# Patient Record
Sex: FEMALE | Race: WHITE | NOT HISPANIC OR LATINO | Employment: OTHER | URBAN - NONMETROPOLITAN AREA
[De-identification: names, ages, dates, MRNs, and addresses within clinical notes are randomized per-mention and may not be internally consistent; named-entity substitution may affect disease eponyms.]

---

## 2017-02-07 ENCOUNTER — TELEPHONE (OUTPATIENT)
Dept: FAMILY MEDICINE CLINIC | Facility: CLINIC | Age: 72
End: 2017-02-07

## 2017-02-20 ENCOUNTER — OFFICE VISIT (OUTPATIENT)
Dept: FAMILY MEDICINE CLINIC | Facility: CLINIC | Age: 72
End: 2017-02-20

## 2017-02-20 VITALS
HEART RATE: 58 BPM | BODY MASS INDEX: 38.93 KG/M2 | DIASTOLIC BLOOD PRESSURE: 80 MMHG | WEIGHT: 228 LBS | OXYGEN SATURATION: 97 % | SYSTOLIC BLOOD PRESSURE: 138 MMHG | TEMPERATURE: 98.4 F | HEIGHT: 64 IN

## 2017-02-20 DIAGNOSIS — N18.30 CHRONIC KIDNEY DISEASE, STAGE 3 (MODERATE): ICD-10-CM

## 2017-02-20 DIAGNOSIS — M19.90 LOCALIZED OSTEOARTHROSIS: ICD-10-CM

## 2017-02-20 DIAGNOSIS — I10 BENIGN ESSENTIAL HYPERTENSION: ICD-10-CM

## 2017-02-20 DIAGNOSIS — G56.03 BILATERAL CARPAL TUNNEL SYNDROME: ICD-10-CM

## 2017-02-20 DIAGNOSIS — IMO0002 TYPE 2 DIABETES MELLITUS, UNCONTROLLED, WITH NEUROPATHY: Primary | ICD-10-CM

## 2017-02-20 PROCEDURE — 99214 OFFICE O/P EST MOD 30 MIN: CPT | Performed by: PHYSICIAN ASSISTANT

## 2017-02-20 RX ORDER — AMLODIPINE BESYLATE 5 MG/1
5 TABLET ORAL DAILY
COMMUNITY
End: 2017-04-17 | Stop reason: ALTCHOICE

## 2017-02-20 NOTE — PROGRESS NOTES
Subjective   Britta Lee is a 71 y.o. female.     History of Present Illness     Diabetes  She is here today to follow-up on chronic issues including diabetes mellitus.  She has been out of her basal insulin for 3 days and blood sugar this morning is reported to be 218.  Current symptoms include paresthesia of the feet.  Evaluation to date has been: fasting blood sugar. Home sugars: BGs are running  consistent with Hgb A1C. Current treatments: basal insulin - Toujeo. Most recent hemoglobin A1c   Lab Results   Component Value Date    HGBA1C 7.10 (H) 12/01/2016    HGBA1C 8.8 (H) 10/20/2015    she also complains of sharp burning pains in her hands intermittently.  Uncontrolled.      Hypertension-not at goal.  She states that Dr. Johnson restarted amlodipine 5 mg and she has not had a rash with it.  She states that she has not been approved for knee replacement due to uncontrolled hypertension.    Osteoarthritis-not at goal in the knees.  She is followed by orthopedic specialist who is awaiting clearance to do total knee replacement.    Chronic kidney disease-stage III and followed by nephrology.  Not at goal    The following portions of the patient's history were reviewed and updated as appropriate: allergies, current medications, past family history, past medical history, past social history, past surgical history and problem list.    Review of Systems   Constitutional: Negative for activity change, appetite change and fever.   HENT: Negative for ear pain, sinus pressure and sore throat.    Eyes: Negative for pain and visual disturbance.   Respiratory: Negative for cough and chest tightness.    Cardiovascular: Negative for chest pain and palpitations.   Gastrointestinal: Negative for abdominal pain, constipation, diarrhea, nausea and vomiting.   Endocrine: Negative for polydipsia and polyuria.   Genitourinary: Negative for dysuria and frequency.   Musculoskeletal: Negative for back pain and myalgias.   Skin: Negative  for color change and rash.   Allergic/Immunologic: Negative for food allergies and immunocompromised state.   Neurological: Negative for dizziness, syncope and headaches.        Hand pain and knee pain   Hematological: Negative for adenopathy. Does not bruise/bleed easily.   Psychiatric/Behavioral: Negative for hallucinations and suicidal ideas. The patient is not nervous/anxious.        Objective   Physical Exam   Constitutional: She is oriented to person, place, and time. She appears well-developed and well-nourished.   HENT:   Head: Normocephalic and atraumatic.   Nose: Nose normal.   Mouth/Throat: Oropharynx is clear and moist.   Eyes: Conjunctivae and EOM are normal. Pupils are equal, round, and reactive to light.   Neck: Normal range of motion. Neck supple. No tracheal deviation present. No thyromegaly present.   Cardiovascular: Normal rate, regular rhythm, normal heart sounds and intact distal pulses.    No murmur heard.  Pulmonary/Chest: Effort normal and breath sounds normal. No respiratory distress. She has no wheezes.   Abdominal: Soft. Bowel sounds are normal. There is no tenderness. There is no guarding.   Musculoskeletal: Normal range of motion. She exhibits edema (1+ peripheral edema noted bilaterally in lower extremities). She exhibits no tenderness.   Crepitus noted bilaterally in knees.   Lymphadenopathy:     She has no cervical adenopathy.   Neurological: She is alert and oriented to person, place, and time.   Skin: Skin is warm and dry. No rash noted.   Psychiatric: She has a normal mood and affect. Her behavior is normal.   Nursing note and vitals reviewed.      Assessment/Plan   Diagnoses and all orders for this visit:    Type 2 diabetes mellitus, uncontrolled, with neuropathy  -     Insulin Glargine (TOUJEO SOLOSTAR) 300 UNIT/ML solution pen-injector; Inject 80 Units under the skin Daily.    Benign essential hypertension    Bilateral carpal tunnel syndrome    Localized  osteoarthrosis    Chronic kidney disease, stage 3 (moderate)     she is tolerating amlodipine 5 mg daily for blood pressure.  She was advised to increase her amlodipine to 10 mg daily for better blood pressure control.  She states she is waiting on controlled hypertension that she can get her knee replacement.    She was written a prescription for compounded pain gel including gabapentin and lidocaine

## 2017-03-06 ENCOUNTER — OFFICE VISIT (OUTPATIENT)
Dept: ORTHOPEDIC SURGERY | Facility: CLINIC | Age: 72
End: 2017-03-06

## 2017-03-06 ENCOUNTER — HOSPITAL ENCOUNTER (OUTPATIENT)
Dept: GENERAL RADIOLOGY | Facility: HOSPITAL | Age: 72
Discharge: HOME | End: 2017-03-06
Attending: ORTHOPAEDIC SURGERY | Admitting: ORTHOPAEDIC SURGERY

## 2017-03-06 VITALS — WEIGHT: 228 LBS | HEIGHT: 64 IN | BODY MASS INDEX: 38.93 KG/M2

## 2017-03-06 DIAGNOSIS — Z01.818 PREOP TESTING: ICD-10-CM

## 2017-03-06 DIAGNOSIS — Z22.321 CARRIER OR SUSPECTED CARRIER OF METHICILLIN SUSCEPTIBLE STAPHYLOCOCCUS AUREUS: ICD-10-CM

## 2017-03-06 DIAGNOSIS — M17.12 PRIMARY OSTEOARTHRITIS OF LEFT KNEE: Primary | ICD-10-CM

## 2017-03-06 PROCEDURE — 73562 X-RAY EXAM OF KNEE 3: CPT | Performed by: RADIOLOGY

## 2017-03-06 PROCEDURE — 99212 OFFICE O/P EST SF 10 MIN: CPT | Performed by: ORTHOPAEDIC SURGERY

## 2017-03-06 PROCEDURE — 73562 X-RAY EXAM OF KNEE 3: CPT

## 2017-03-06 NOTE — PROGRESS NOTES
Britta Lee   :1945    Date of encounter:2017        HPI:  Britta Lee is a 71 y.o. female who returns in follow-up for osteoarthritis left knee. She has not done well with viscous supplementation and minimal improvement with steroid injection. She has had several falls due to her knee instability.       Exam:  Left knee with moderate effusion, marked medial joint line tenderness, . She has a 5° flexion contracture. Neurovascular grossly intact.    Radiology:  AP and lateral views of the left knee were reviewed revealing complete joint space loss of the medial compartment.    Assessment    ICD-10-CM ICD-9-CM   1. Primary osteoarthritis of left knee M17.12 715.16       Plan:  A 71-year-old female who advanced osteoarthritis of the left knee.  She is felt conservative treatment with both steroid and viscous supplementation injections.  We've discussed proceeding with a total knee arthroplasty that she is somewhat reluctant.  She's like to hold off and think this over [facility] family.  She'll return back when she is ready to schedule total knee arthroplasty.    Written by, Guerita CONROY, acting as a scribe for Dr. Elizabeth CONROY    I, Feroz Johnson MD, personally performed the services described in this documentation as scribed by the above named individual in my presence, and it is both accurate and complete.  3/6/2017  4:47 PM

## 2017-03-20 ENCOUNTER — OFFICE VISIT (OUTPATIENT)
Dept: FAMILY MEDICINE CLINIC | Facility: CLINIC | Age: 72
End: 2017-03-20

## 2017-03-20 VITALS
DIASTOLIC BLOOD PRESSURE: 100 MMHG | TEMPERATURE: 97.7 F | HEIGHT: 65 IN | BODY MASS INDEX: 37.65 KG/M2 | OXYGEN SATURATION: 98 % | HEART RATE: 103 BPM | SYSTOLIC BLOOD PRESSURE: 190 MMHG | WEIGHT: 226 LBS

## 2017-03-20 DIAGNOSIS — Z00.00 HEALTH CARE MAINTENANCE: ICD-10-CM

## 2017-03-20 DIAGNOSIS — I10 BENIGN ESSENTIAL HYPERTENSION: ICD-10-CM

## 2017-03-20 DIAGNOSIS — Z12.31 ENCOUNTER FOR SCREENING MAMMOGRAM FOR BREAST CANCER: Primary | ICD-10-CM

## 2017-03-20 DIAGNOSIS — M79.672 LEFT FOOT PAIN: ICD-10-CM

## 2017-03-20 PROCEDURE — 96160 PT-FOCUSED HLTH RISK ASSMT: CPT | Performed by: PHYSICIAN ASSISTANT

## 2017-03-20 PROCEDURE — G0439 PPPS, SUBSEQ VISIT: HCPCS | Performed by: PHYSICIAN ASSISTANT

## 2017-03-20 PROCEDURE — 90732 PPSV23 VACC 2 YRS+ SUBQ/IM: CPT | Performed by: PHYSICIAN ASSISTANT

## 2017-03-20 PROCEDURE — 90471 IMMUNIZATION ADMIN: CPT | Performed by: PHYSICIAN ASSISTANT

## 2017-03-20 PROCEDURE — G0009 ADMIN PNEUMOCOCCAL VACCINE: HCPCS | Performed by: PHYSICIAN ASSISTANT

## 2017-03-20 PROCEDURE — 90715 TDAP VACCINE 7 YRS/> IM: CPT | Performed by: PHYSICIAN ASSISTANT

## 2017-03-20 RX ORDER — QUINAPRIL 20 MG/1
40 TABLET ORAL NIGHTLY
COMMUNITY
End: 2017-04-17 | Stop reason: ALTCHOICE

## 2017-03-20 RX ORDER — ISOSORBIDE DINITRATE 30 MG/1
30 TABLET ORAL DAILY
Status: ON HOLD | COMMUNITY
End: 2017-05-02

## 2017-03-20 NOTE — PATIENT INSTRUCTIONS
Diabetes Mellitus and Food  It is important for you to manage your blood sugar (glucose) level. Your blood glucose level can be greatly affected by what you eat. Eating healthier foods in the appropriate amounts throughout the day at about the same time each day will help you control your blood glucose level. It can also help slow or prevent worsening of your diabetes mellitus. Healthy eating may even help you improve the level of your blood pressure and reach or maintain a healthy weight.   General recommendations for healthful eating and cooking habits include:  · Eating meals and snacks regularly. Avoid going long periods of time without eating to lose weight.  · Eating a diet that consists mainly of plant-based foods, such as fruits, vegetables, nuts, legumes, and whole grains.  · Using low-heat cooking methods, such as baking, instead of high-heat cooking methods, such as deep frying.  Work with your dietitian to make sure you understand how to use the Nutrition Facts information on food labels.  HOW CAN FOOD AFFECT ME?  Carbohydrates  Carbohydrates affect your blood glucose level more than any other type of food. Your dietitian will help you determine how many carbohydrates to eat at each meal and teach you how to count carbohydrates. Counting carbohydrates is important to keep your blood glucose at a healthy level, especially if you are using insulin or taking certain medicines for diabetes mellitus.  Alcohol  Alcohol can cause sudden decreases in blood glucose (hypoglycemia), especially if you use insulin or take certain medicines for diabetes mellitus. Hypoglycemia can be a life-threatening condition. Symptoms of hypoglycemia (sleepiness, dizziness, and disorientation) are similar to symptoms of having too much alcohol.   If your health care provider has given you approval to drink alcohol, do so in moderation and use the following guidelines:  · Women should not have more than one drink per day, and men  should not have more than two drinks per day. One drink is equal to:    12 oz of beer.    5 oz of wine.    1½ oz of hard liquor.  · Do not drink on an empty stomach.  · Keep yourself hydrated. Have water, diet soda, or unsweetened iced tea.  · Regular soda, juice, and other mixers might contain a lot of carbohydrates and should be counted.  WHAT FOODS ARE NOT RECOMMENDED?  As you make food choices, it is important to remember that all foods are not the same. Some foods have fewer nutrients per serving than other foods, even though they might have the same number of calories or carbohydrates. It is difficult to get your body what it needs when you eat foods with fewer nutrients. Examples of foods that you should avoid that are high in calories and carbohydrates but low in nutrients include:  · Trans fats (most processed foods list trans fats on the Nutrition Facts label).  · Regular soda.  · Juice.  · Candy.  · Sweets, such as cake, pie, doughnuts, and cookies.  · Fried foods.  WHAT FOODS CAN I EAT?  Eat nutrient-rich foods, which will nourish your body and keep you healthy. The food you should eat also will depend on several factors, including:  · The calories you need.  · The medicines you take.  · Your weight.  · Your blood glucose level.  · Your blood pressure level.  · Your cholesterol level.  You should eat a variety of foods, including:  · Protein.    Lean cuts of meat.    Proteins low in saturated fats, such as fish, egg whites, and beans. Avoid processed meats.  · Fruits and vegetables.    Fruits and vegetables that may help control blood glucose levels, such as apples, mangoes, and yams.  · Dairy products.    Choose fat-free or low-fat dairy products, such as milk, yogurt, and cheese.  · Grains, bread, pasta, and rice.    Choose whole grain products, such as multigrain bread, whole oats, and brown rice. These foods may help control blood pressure.  · Fats.    Foods containing healthful fats, such as nuts,  avocado, olive oil, canola oil, and fish.  DOES EVERYONE WITH DIABETES MELLITUS HAVE THE SAME MEAL PLAN?  Because every person with diabetes mellitus is different, there is not one meal plan that works for everyone. It is very important that you meet with a dietitian who will help you create a meal plan that is just right for you.     This information is not intended to replace advice given to you by your health care provider. Make sure you discuss any questions you have with your health care provider.     Document Released: 09/14/2006 Document Revised: 01/08/2016 Document Reviewed: 11/14/2014  [employer] Interactive Patient Education ©2016 [employer]

## 2017-03-20 NOTE — PROGRESS NOTES
QUICK REFERENCE INFORMATION:  The ABCs of the Annual Wellness Visit    Subsequent Medicare Wellness Visit    HEALTH RISK ASSESSMENT    1945    Recent Hospitalizations:  No recent hospitalization(s)..  She was seen at [facility] emergency room last Friday for left foot pain.      Current Medical Providers:  Patient Care Team:  RAFIQ Montanez as PCP - General (Family Medicine)        Smoking Status:  History   Smoking Status   • Never Smoker   Smokeless Tobacco   • Never Used       Alcohol Consumption:  History   Alcohol Use No       Depression Screen:   PHQ-9 Depression Screening 3/20/2017   Little interest or pleasure in doing things 0   Feeling down, depressed, or hopeless 1   Trouble falling or staying asleep, or sleeping too much 0   Feeling tired or having little energy 1   Poor appetite or overeating 0   Feeling bad about yourself - or that you are a failure or have let yourself or your family down 0   Trouble concentrating on things, such as reading the newspaper or watching television 0   Moving or speaking so slowly that other people could have noticed. Or the opposite - being so fidgety or restless that you have been moving around a lot more than usual 0   Thoughts that you would be better off dead, or of hurting yourself in some way 0   PHQ-9 Total Score 2       Health Habits and Functional and Cognitive Screening:  No flowsheet data found.           Does the patient have evidence of cognitive impairment? No    Asprin use counseling:yes      Recent Lab Results:  CMP:  Lab Results   Component Value Date    BUN 33 (H) 12/01/2016    CREATININE 1.52 (H) 12/01/2016    EGFRIFNONA 34 (L) 12/01/2016    EGFRIFAFRI  09/01/2016      Comment:      <15 Indicative of kidney failure.    BCR 21.7 12/01/2016     12/01/2016    K 3.9 12/01/2016    CO2 27.5 12/01/2016    CALCIUM 9.6 12/01/2016    ALBUMIN 4.00 12/01/2016    LABIL2 1.0 (L) 12/01/2016    BILITOT 0.3 12/01/2016    ALKPHOS 128 (H)  12/01/2016    AST 38 (H) 12/01/2016    ALT 27 12/01/2016     Lipid Panel:  Lab Results   Component Value Date    CHLPL 166 10/20/2015    TRIG 108 12/01/2016    HDL 39 (L) 12/01/2016    VLDL 21.6 12/01/2016     (H) 10/20/2015     LDL:     HbA1c:  Lab Results   Component Value Date    HGBA1C 7.10 (H) 12/01/2016     Urine Microalbumin:     Visual Acuity:   Visual Acuity Screening    Right eye Left eye Both eyes   Without correction: 20/30 20/100 20/30   With correction:        Hearing acuity:  Hearing was tested today by the whisper test results are as follows  Greater than 5 feet in left ear  Greater than 5 feet and right ear    Age-appropriate Screening Schedule:  Refer to the list below for future screening recommendations based on patient's age, sex and/or medical conditions. Orders for these recommended tests are listed in the plan section. The patient has been provided with a written plan.    Health Maintenance   Topic Date Due   • HEMOGLOBIN A1C  06/01/2017   • DIABETIC EYE EXAM  08/01/2017   • LIPID PANEL  12/01/2017   • PNEUMOCOCCAL VACCINES (65+ LOW/MEDIUM RISK) (2 of 2 - PPSV23) 03/20/2018   • DIABETIC FOOT EXAM  03/20/2018   • MAMMOGRAM  03/20/2018   • URINE MICROALBUMIN  03/20/2018   • DXA SCAN  03/20/2019   • COLONOSCOPY  02/20/2026   • TDAP/TD VACCINES (2 - Td) 03/20/2027   • INFLUENZA VACCINE  Completed   • ZOSTER VACCINE  Completed        Subjective   History of Present Illness    Britta Lee is a 71 y.o. female who presents for an Subsequent Wellness Visit.    She complains of left foot pain.  She states she has trouble curling her left toes and has had lower extremity edema bilaterally.  She states that she went to [facility] on Friday night and was given a prescription for 20-30 mmHg compression knee highs.  She is an appointment with her podiatrist tomorrow.    Hypertension is notably uncontrolled today.  She states she has been out of her blood pressure medication for several days.   She reports that when she takes her blood pressure medication she runs less than 140/90 consistently at home.    For obesity she reports that she continues to eat smaller portion sizes and lower carbohydrate numbers.  She states that she continues to not drinking pop or eat ice cream.    The following portions of the patient's history were reviewed and updated as appropriate: allergies, current medications, past family history, past medical history, past social history, past surgical history and problem list.    Outpatient Medications Prior to Visit   Medication Sig Dispense Refill   • Alcohol Swabs (ALCOHOL PREP) 70 % pads      • amLODIPine (NORVASC) 5 MG tablet Take 5 mg by mouth Daily.     • aspirin 81 MG EC tablet Take 81 mg by mouth daily.     • atorvastatin (LIPITOR) 40 MG tablet Take 1 tablet by mouth Daily. 30 tablet 5   • cloNIDine (CATAPRES) 0.2 MG tablet Take 1 tablet by mouth 3 (three) times a day. 90 tablet 2   • ferrous sulfate 325 (65 FE) MG tablet Take 325 mg by mouth daily.     • insulin aspart (NovoLOG) 100 UNIT/ML injection Inject 10 Units under the skin 3 (three) times a day.     • Insulin Glargine (TOUJEO SOLOSTAR) 300 UNIT/ML solution pen-injector Inject 80 Units under the skin Daily. 9 pen 5   • Insulin Pen Needle (PEN NEEDLES) 31G X 6 MM misc 4 (four) times a day.     • isosorbide mononitrate (IMDUR) 30 MG 24 hr tablet Take 30 mg by mouth daily.     • metoprolol tartrate (LOPRESSOR) 100 MG tablet Take 1 tablet by mouth 2 (two) times a day. 60 tablet 5   • ULTICARE SHORT PEN NEEDLES 31G X 8 MM misc USE TO inject insulin FOUR TIMES DAILY AS NEEDED  3   • cyclobenzaprine (FLEXERIL) 10 MG tablet Take 1 tablet by mouth 3 (three) times a day as needed for muscle spasms. 90 tablet 2   • promethazine (PHENERGAN) 25 MG tablet Take 1 tablet by mouth every 6 (six) hours as needed for nausea or vomiting. 10 tablet 0   • quinapril (ACCUPRIL) 40 MG tablet Take 1 tablet by mouth daily.       No  facility-administered medications prior to visit.        Patient Active Problem List   Diagnosis   • Type 2 diabetes mellitus, uncontrolled, with neuropathy   • Chronic kidney disease   • Benign essential hypertension   • Cervicalgia   • Coronary atherosclerosis of native coronary vessel   • Bilateral carpal tunnel syndrome   • Abnormal blood chemistry level   • Localized osteoarthrosis   • Iron deficiency anemia due to dietary causes   • Diabetic retinopathy associated with type 2 diabetes mellitus   • Hyperlipidemia   • Sciatic neuropathy   • DDD (degenerative disc disease), lumbar       Advanced Care Planning:  has NO advanced directive - information provided to the patient today    Identification of Risk Factors:  Risk factors include: weight , cardiovascular risk, inactivity, increased fall risk, chronic pain, inadequate social support and financial stress.    Review of Systems   Constitutional: Negative for activity change, appetite change and fever.   HENT: Negative for ear pain, sinus pressure and sore throat.    Eyes: Negative for pain and visual disturbance.   Respiratory: Negative for cough and chest tightness.    Cardiovascular: Negative for chest pain and palpitations.   Gastrointestinal: Negative for abdominal pain, constipation, diarrhea, nausea and vomiting.   Endocrine: Negative for polydipsia and polyuria.   Genitourinary: Negative for dysuria and frequency.   Musculoskeletal: Negative for back pain and myalgias.        Left foot mildly erythematous with thickening of toenails noted and tenderness.  (She has an appointment with podiatry tomorrow for this issue)   Skin: Negative for color change and rash.   Allergic/Immunologic: Negative for food allergies and immunocompromised state.   Neurological: Negative for dizziness, syncope and headaches.   Hematological: Negative for adenopathy. Does not bruise/bleed easily.   Psychiatric/Behavioral: Negative for hallucinations and suicidal ideas. The  "patient is not nervous/anxious.        Compared to one year ago, the patient feels her physical health is the same.  Compared to one year ago, the patient feels her mental health is the same.    Objective     Physical Exam    Vitals:    03/20/17 0918   BP: (!) 190/100   BP Location: Left arm   Patient Position: Sitting   Cuff Size: Large Adult   Pulse: 103   Temp: 97.7 °F (36.5 °C)   TempSrc: Oral   SpO2: 98%   Weight: 226 lb (103 kg)   Height: 64.5\" (163.8 cm)       Body mass index is 38.19 kg/(m^2).  Discussed the patient's BMI with her. The BMI is above average; BMI management plan is completed.    Assessment/Plan   Patient Self-Management and Personalized Health Advice  The patient has been provided with information about: diet, fall prevention and designing advance directives and preventive services including:   · Advanced directives: has NO advanced directive - information provided to the patient today, Exercise counseling provided, Fall Risk plan of care done, Pneumococcal vaccine , TdaP vaccine.    Visit Diagnoses:    ICD-10-CM ICD-9-CM   1. Encounter for screening mammogram for breast cancer Z12.31 V76.12   2. Health care maintenance Z00.00 V70.0   3. Left foot pain M79.672 729.5   4. Benign essential hypertension I10 401.1     She was advised to  her medication and since she lives office and restart her blood pressure medication.  She was warned of the increased risk of stroke due to noncompliance.  I will see her back in 3 days' time to reevaluate her hypertension.  She was advised that if she has any abnormals like symptoms or uncontrolled blood pressure before that time she is to go to the nearest emergency room or call 911.    She was advised to keep her appointment with podiatry tomorrow regarding her foot pain.    Orders Placed This Encounter   Procedures   • Mammo Screening Digital Tomosynthesis Bilateral With CAD     Standing Status:   Future     Standing Expiration Date:   3/20/2018     " Order Specific Question:   Reason for Exam:     Answer:   screen breast cancer   • Tdap Vaccine Greater Than or Equal To 8yo IM   • Pneumococcal Polysaccharide Vaccine 23-Valent Greater Than or Equal To 3yo Subcutaneous / IM       Outpatient Encounter Prescriptions as of 3/20/2017   Medication Sig Dispense Refill   • Alcohol Swabs (ALCOHOL PREP) 70 % pads      • amLODIPine (NORVASC) 5 MG tablet Take 5 mg by mouth Daily.     • aspirin 81 MG EC tablet Take 81 mg by mouth daily.     • atorvastatin (LIPITOR) 40 MG tablet Take 1 tablet by mouth Daily. 30 tablet 5   • cloNIDine (CATAPRES) 0.2 MG tablet Take 1 tablet by mouth 3 (three) times a day. 90 tablet 2   • ferrous sulfate 325 (65 FE) MG tablet Take 325 mg by mouth daily.     • insulin aspart (NovoLOG) 100 UNIT/ML injection Inject 10 Units under the skin 3 (three) times a day.     • Insulin Glargine (TOUJEO SOLOSTAR) 300 UNIT/ML solution pen-injector Inject 80 Units under the skin Daily. 9 pen 5   • Insulin Pen Needle (PEN NEEDLES) 31G X 6 MM misc 4 (four) times a day.     • isosorbide dinitrate (ISORDIL) 30 MG tablet Take 30 mg by mouth Daily.     • isosorbide mononitrate (IMDUR) 30 MG 24 hr tablet Take 30 mg by mouth daily.     • metoprolol tartrate (LOPRESSOR) 100 MG tablet Take 1 tablet by mouth 2 (two) times a day. 60 tablet 5   • quinapril (ACCUPRIL) 20 MG tablet Take 40 mg by mouth Every Night.     • ULTICARE SHORT PEN NEEDLES 31G X 8 MM misc USE TO inject insulin FOUR TIMES DAILY AS NEEDED  3   • [DISCONTINUED] cyclobenzaprine (FLEXERIL) 10 MG tablet Take 1 tablet by mouth 3 (three) times a day as needed for muscle spasms. 90 tablet 2   • [DISCONTINUED] promethazine (PHENERGAN) 25 MG tablet Take 1 tablet by mouth every 6 (six) hours as needed for nausea or vomiting. 10 tablet 0   • [DISCONTINUED] quinapril (ACCUPRIL) 40 MG tablet Take 1 tablet by mouth daily.       No facility-administered encounter medications on file as of 3/20/2017.        Reviewed use of  high risk medication in the elderly: yes  Reviewed for potential of harmful drug interactions in the elderly: yes    Follow Up:  Return for Followup with Lexie.     An After Visit Summary and PPPS with all of these plans were given to the patient.

## 2017-03-23 ENCOUNTER — OFFICE VISIT (OUTPATIENT)
Dept: FAMILY MEDICINE CLINIC | Facility: CLINIC | Age: 72
End: 2017-03-23

## 2017-03-23 VITALS
BODY MASS INDEX: 37.65 KG/M2 | HEIGHT: 65 IN | DIASTOLIC BLOOD PRESSURE: 90 MMHG | TEMPERATURE: 97.5 F | WEIGHT: 226 LBS | OXYGEN SATURATION: 99 % | HEART RATE: 55 BPM | SYSTOLIC BLOOD PRESSURE: 148 MMHG

## 2017-03-23 DIAGNOSIS — N18.30 CHRONIC KIDNEY DISEASE, STAGE 3 (MODERATE): ICD-10-CM

## 2017-03-23 DIAGNOSIS — IMO0002 TYPE 2 DIABETES MELLITUS, UNCONTROLLED, WITH NEUROPATHY: ICD-10-CM

## 2017-03-23 DIAGNOSIS — I10 BENIGN ESSENTIAL HYPERTENSION: Primary | ICD-10-CM

## 2017-03-23 DIAGNOSIS — M19.90 LOCALIZED OSTEOARTHROSIS: ICD-10-CM

## 2017-03-23 PROCEDURE — 99214 OFFICE O/P EST MOD 30 MIN: CPT | Performed by: PHYSICIAN ASSISTANT

## 2017-03-27 ENCOUNTER — TELEPHONE (OUTPATIENT)
Dept: ORTHOPEDIC SURGERY | Facility: CLINIC | Age: 72
End: 2017-03-27

## 2017-03-27 NOTE — TELEPHONE ENCOUNTER
Patient left message on voice mail she is ready to schedule her surgery, will get her surgery date and call her back.

## 2017-03-27 NOTE — PROGRESS NOTES
Subjective   Britta Lee is a 71 y.o. female.     History of Present Illness     Hypertension-  Much improved with the increase of amlodipine to 10mg qd.  She reports home blood pressure readings less than 140/90 consistently.  She denies any side effects such as rash with this 10 mg dose.    Diabetes  She is here today to follow-up on chronic issues including diabetes mellitus.  She has been out of her basal insulin for 3 days and blood sugar this morning is reported to be 218.  Current symptoms include paresthesia of the feet.  Evaluation to date has been: fasting blood sugar. Home sugars: BGs are running  consistent with Hgb A1C. Current treatments: basal insulin - Toujeo. Most recent hemoglobin A1c   Lab Results   Component Value Date    HGBA1C 7.10 (H) 12/01/2016    HGBA1C 8.8 (H) 10/20/2015    she also complains of sharp burning pains in her hands intermittently.  Uncontrolled.      Osteoarthritis-not at goal in the knees.  She is followed by orthopedic specialist who is awaiting clearance to do total knee replacement.    Chronic kidney disease-stage III and followed by nephrology.  Not at goal    The following portions of the patient's history were reviewed and updated as appropriate: allergies, current medications, past family history, past medical history, past social history, past surgical history and problem list.    Review of Systems   Constitutional: Negative for activity change, appetite change and fever.   HENT: Negative for ear pain, sinus pressure and sore throat.    Eyes: Negative for pain and visual disturbance.   Respiratory: Negative for cough and chest tightness.    Cardiovascular: Positive for leg swelling. Negative for chest pain and palpitations.   Gastrointestinal: Negative for abdominal pain, constipation, diarrhea, nausea and vomiting.   Endocrine: Negative for polydipsia and polyuria.   Genitourinary: Negative for dysuria and frequency.   Musculoskeletal: Positive for arthralgias.  Negative for back pain and myalgias.   Skin: Negative for color change and rash.   Allergic/Immunologic: Negative for food allergies and immunocompromised state.   Neurological: Negative for dizziness, syncope and headaches.   Hematological: Negative for adenopathy. Does not bruise/bleed easily.   Psychiatric/Behavioral: Negative for hallucinations and suicidal ideas. The patient is not nervous/anxious.        Objective   Physical Exam   Constitutional: She is oriented to person, place, and time. She appears well-developed and well-nourished.   HENT:   Head: Normocephalic and atraumatic.   Nose: Nose normal.   Mouth/Throat: Oropharynx is clear and moist.   Eyes: Conjunctivae and EOM are normal. Pupils are equal, round, and reactive to light.   Neck: Normal range of motion. Neck supple. No tracheal deviation present. No thyromegaly present.   Cardiovascular: Normal rate, regular rhythm, normal heart sounds and intact distal pulses.    No murmur heard.  Pulmonary/Chest: Effort normal and breath sounds normal. No respiratory distress. She has no wheezes.   Abdominal: Soft. Bowel sounds are normal. There is no tenderness. There is no guarding.   Musculoskeletal: Normal range of motion. She exhibits edema (1+ peripheral edema noted bilaterally in lower extremities). She exhibits no tenderness.   Crepitus noted bilaterally in knees.   Lymphadenopathy:     She has no cervical adenopathy.   Neurological: She is alert and oriented to person, place, and time.   Skin: Skin is warm and dry. No rash noted.   Psychiatric: She has a normal mood and affect. Her behavior is normal.   Nursing note and vitals reviewed.      Assessment/Plan   Diagnoses and all orders for this visit:    Benign essential hypertension    Type 2 diabetes mellitus, uncontrolled, with neuropathy    Localized osteoarthrosis    Chronic kidney disease, stage 3 (moderate)    Continue amlodipine to 10 mg daily   She states she is waiting on controlled  hypertension that she can get her knee replacement.    She was advised to buy compression knee socks which should help with her leg swelling

## 2017-03-28 ENCOUNTER — HOSPITAL ENCOUNTER (OUTPATIENT)
Dept: MAMMOGRAPHY | Facility: HOSPITAL | Age: 72
Discharge: HOME | End: 2017-03-28
Admitting: PHYSICIAN ASSISTANT

## 2017-03-28 ENCOUNTER — TELEPHONE (OUTPATIENT)
Dept: ORTHOPEDIC SURGERY | Facility: CLINIC | Age: 72
End: 2017-03-28

## 2017-03-28 DIAGNOSIS — Z12.31 ENCOUNTER FOR SCREENING MAMMOGRAM FOR BREAST CANCER: ICD-10-CM

## 2017-03-28 PROCEDURE — 77063 BREAST TOMOSYNTHESIS BI: CPT | Performed by: RADIOLOGY

## 2017-03-28 PROCEDURE — G0202 SCR MAMMO BI INCL CAD: HCPCS

## 2017-03-28 PROCEDURE — G0202 SCR MAMMO BI INCL CAD: HCPCS | Performed by: RADIOLOGY

## 2017-03-28 PROCEDURE — 77063 BREAST TOMOSYNTHESIS BI: CPT

## 2017-03-28 NOTE — TELEPHONE ENCOUNTER
Spoke with patient, she is aware that Dr. Johnson is reviewing her clearance, as soon as i have surgery date i will call and let her know.  Patient verbalized understanding.

## 2017-04-03 ENCOUNTER — TELEPHONE (OUTPATIENT)
Dept: FAMILY MEDICINE CLINIC | Facility: CLINIC | Age: 72
End: 2017-04-03

## 2017-04-03 NOTE — TELEPHONE ENCOUNTER
i called Britta informed her her mammogram was normal.                      --\\\\--- Message from RAFIQ Montanez sent at 3/31/2017  3:29 PM EDT -----  Please inform her that her mammogram was benign

## 2017-04-06 ENCOUNTER — TELEPHONE (OUTPATIENT)
Dept: ORTHOPEDIC SURGERY | Facility: CLINIC | Age: 72
End: 2017-04-06

## 2017-04-17 ENCOUNTER — PREP FOR SURGERY (OUTPATIENT)
Dept: ORTHOPEDIC SURGERY | Facility: CLINIC | Age: 72
End: 2017-04-17

## 2017-04-17 ENCOUNTER — RESULTS ENCOUNTER (OUTPATIENT)
Dept: ORTHOPEDIC SURGERY | Facility: CLINIC | Age: 72
End: 2017-04-17

## 2017-04-17 ENCOUNTER — OFFICE VISIT (OUTPATIENT)
Dept: ORTHOPEDIC SURGERY | Facility: CLINIC | Age: 72
End: 2017-04-17

## 2017-04-17 VITALS
DIASTOLIC BLOOD PRESSURE: 71 MMHG | WEIGHT: 227 LBS | SYSTOLIC BLOOD PRESSURE: 162 MMHG | HEIGHT: 63 IN | BODY MASS INDEX: 40.22 KG/M2 | HEART RATE: 60 BPM

## 2017-04-17 DIAGNOSIS — M17.12 PRIMARY OSTEOARTHRITIS OF LEFT KNEE: Primary | ICD-10-CM

## 2017-04-17 DIAGNOSIS — Z01.818 PREOP TESTING: ICD-10-CM

## 2017-04-17 DIAGNOSIS — M17.12 PRIMARY OSTEOARTHRITIS OF LEFT KNEE: ICD-10-CM

## 2017-04-17 PROCEDURE — 99213 OFFICE O/P EST LOW 20 MIN: CPT | Performed by: ORTHOPAEDIC SURGERY

## 2017-04-17 RX ORDER — TRANEXAMIC ACID 100 MG/ML
10 INJECTION, SOLUTION INTRAVENOUS ONCE
Qty: 10.3 ML | Refills: 0 | Status: SHIPPED | OUTPATIENT
Start: 2017-04-17 | End: 2017-04-17

## 2017-04-17 RX ORDER — SODIUM CHLORIDE 0.9 % (FLUSH) 0.9 %
1-10 SYRINGE (ML) INJECTION AS NEEDED
Status: CANCELLED | OUTPATIENT
Start: 2017-04-17

## 2017-04-17 RX ORDER — AMLODIPINE BESYLATE 2.5 MG/1
TABLET ORAL
Refills: 1 | Status: ON HOLD | COMMUNITY
Start: 2017-04-03 | End: 2017-05-02

## 2017-04-17 RX ORDER — TRANEXAMIC ACID 100 MG/ML
10 INJECTION, SOLUTION INTRAVENOUS
Qty: 10.3 ML | Refills: 0
Start: 2017-04-17 | End: 2017-04-17 | Stop reason: SDUPTHER

## 2017-04-17 RX ORDER — GLUCOSAM/CHON-MSM1/C/MANG/BOSW 500-416.6
TABLET ORAL
Refills: 5 | Status: ON HOLD | COMMUNITY
Start: 2017-01-23 | End: 2017-05-02

## 2017-04-17 RX ORDER — TRANEXAMIC ACID 100 MG/ML
10 INJECTION, SOLUTION INTRAVENOUS
Qty: 10.3 ML | Refills: 0
Start: 2017-04-17 | End: 2017-05-01

## 2017-04-17 RX ORDER — ERGOCALCIFEROL 1.25 MG/1
CAPSULE ORAL
Refills: 2 | Status: ON HOLD | COMMUNITY
Start: 2017-04-12 | End: 2017-05-02

## 2017-04-17 RX ORDER — SODIUM CHLORIDE, SODIUM LACTATE, POTASSIUM CHLORIDE, CALCIUM CHLORIDE 600; 310; 30; 20 MG/100ML; MG/100ML; MG/100ML; MG/100ML
75 INJECTION, SOLUTION INTRAVENOUS CONTINUOUS
Status: CANCELLED | OUTPATIENT
Start: 2017-04-17

## 2017-04-17 RX ORDER — DIAPER,BRIEF,ADULT, DISPOSABLE
EACH MISCELLANEOUS
Refills: 5 | Status: ON HOLD | COMMUNITY
Start: 2017-01-23 | End: 2017-05-02

## 2017-04-17 RX ORDER — QUINAPRIL 40 MG/1
TABLET ORAL
Refills: 1 | Status: ON HOLD | COMMUNITY
Start: 2017-03-24 | End: 2017-05-02

## 2017-04-18 ENCOUNTER — PREP FOR SURGERY (OUTPATIENT)
Dept: ORTHOPEDIC SURGERY | Facility: CLINIC | Age: 72
End: 2017-04-18

## 2017-04-18 NOTE — H&P
History and Physical    Patient: Britta Lee  YOB: 1945  Date of encounter: 04/17/2017      History of Present Illness:   Britta Lee is a 71 y.o. female with primary osteoarthritis of the left knee.  She's had previous Synvisc and cortisone injections without any significant improvement.  Her pain is getting progressively worse and she is now had several falls because of the pain.  She states it's beginning to impinge on her activities of daily living.  She is ready to discuss a knee replacement.    PMH:   Patient Active Problem List   Diagnosis   • Type 2 diabetes mellitus, uncontrolled, with neuropathy   • Chronic kidney disease   • Benign essential hypertension   • Cervicalgia   • Coronary atherosclerosis of native coronary vessel   • Bilateral carpal tunnel syndrome   • Abnormal blood chemistry level   • Localized osteoarthrosis   • Iron deficiency anemia due to dietary causes   • Diabetic retinopathy associated with type 2 diabetes mellitus   • Hyperlipidemia   • Sciatic neuropathy   • DDD (degenerative disc disease), lumbar     Past Medical History:   Diagnosis Date   • Diabetes mellitus    • Heart disease    • Hypertension    • Renal insufficiency          PSH:  Past Surgical History:   Procedure Laterality Date   • APPENDECTOMY     • CARDIAC SURGERY     • CAROTID STENT     • CATARACT EXTRACTION     • CHOLECYSTECTOMY     • COLON SURGERY      polyp removal   • GALLBLADDER SURGERY     • STERILIZATION     • TONSILLECTOMY         Allergies:   No Known Allergies    Medications:     Current Outpatient Prescriptions:   •  Alcohol Swabs (ALCOHOL PREP) 70 % pads, , Disp: , Rfl:   •  aspirin 81 MG EC tablet, Take 81 mg by mouth daily., Disp: , Rfl:   •  atorvastatin (LIPITOR) 40 MG tablet, Take 1 tablet by mouth Daily., Disp: 30 tablet, Rfl: 5  •  cloNIDine (CATAPRES) 0.2 MG tablet, Take 1 tablet by mouth 3 (three) times a day., Disp: 90 tablet, Rfl: 2  •  ferrous sulfate 325 (65 FE) MG tablet, Take  325 mg by mouth daily., Disp: , Rfl:   •  insulin aspart (NovoLOG) 100 UNIT/ML injection, Inject 10 Units under the skin 3 (three) times a day., Disp: , Rfl:   •  Insulin Glargine (TOUJEO SOLOSTAR) 300 UNIT/ML solution pen-injector, Inject 80 Units under the skin Daily., Disp: 9 pen, Rfl: 5  •  Insulin Pen Needle (PEN NEEDLES) 31G X 6 MM misc, 4 (four) times a day., Disp: , Rfl:   •  isosorbide dinitrate (ISORDIL) 30 MG tablet, Take 30 mg by mouth Daily., Disp: , Rfl:   •  metoprolol tartrate (LOPRESSOR) 100 MG tablet, Take 1 tablet by mouth 2 (two) times a day., Disp: 60 tablet, Rfl: 5  •  ULTICARE SHORT PEN NEEDLES 31G X 8 MM misc, USE TO inject insulin FOUR TIMES DAILY AS NEEDED, Disp: , Rfl: 3  •  amLODIPine (NORVASC) 2.5 MG tablet, TAKE 1 TABLET BY MOUTH EVERY NIGHT AT BEDTIME, Disp: , Rfl: 1  •  quinapril (ACCUPRIL) 40 MG tablet, TAKE 1 Tablet BY MOUTH EVERY DAY, Disp: , Rfl: 1  •  Tranexamic Acid 1000 MG/10ML injection, Infuse 10.3 mL into a venous catheter 60 Minutes Prior to Surgery., Disp: 10.3 mL, Rfl: 0  •  TRUEPLUS LANCETS 33G misc, test blood sugar THREE TIMES DAILY, Disp: , Rfl: 5  •  TRUETRACK TEST test strip, test blood sugar THREE TIMES DAILY, Disp: , Rfl: 5  •  vitamin D (ERGOCALCIFEROL) 48542 UNITS capsule capsule, TAKE ONE Capsule BY MOUTH ONCE WEEKLY, Disp: , Rfl: 2    Social History:     Social History     Occupational History   • Not on file.     Social History Main Topics   • Smoking status: Never Smoker   • Smokeless tobacco: Never Used   • Alcohol use No   • Drug use: No   • Sexual activity: Defer      Social History     Social History Narrative       Family History:     Family History   Problem Relation Age of Onset   • Arthritis Mother    • Diabetes Mother    • Cancer Mother    • Heart disease Father    • Diabetes Daughter    • Diabetes Son    • Diabetes Maternal Aunt    • Heart disease Maternal Grandmother        Review of Systems:   Review of Systems   Constitutional: Negative.   "  HENT: Negative.    Eyes: Negative.    Respiratory: Negative.    Cardiovascular: Negative.    Gastrointestinal: Negative.    Skin: Negative.    Neurological: Negative.    Hematological: Negative.    Psychiatric/Behavioral: Negative.        Physical Exam:   General Appearance:    71 y.o. female  cooperative, in no acute distress.  Alert and oriented x 3,                   Vitals:    04/17/17 0833   BP: 162/71   Pulse: 60   Weight: 227 lb (103 kg)   Height: 63\" (160 cm)          HEENT: Unremarkable      Neck: Supple without lymphadenopathy.      Chest: Clear to ascultation bilaterally. Normal respiratory effort.      Heart: Regular rate and rhythm. No murmurs noted.      Skin:  Warm and dry without any rash.      Musculoskeletal:     Upper Extremities: Unremarkable.     Lower Extremities: Examination of the left knee reveals mild effusion with market medial joint line tenderness.  She    is a 5° flexion contracture.  There is no gross instability with varus or valgus stressing.  Her neurovascular    status is intact.      Radiology:     AP and lateral views of the left knee were reviewed revealing complete joint space loss of the medial compartment.    Assessment    ICD-10-CM ICD-9-CM   1. Primary osteoarthritis of left knee M17.12 715.16       Plan:  A 71-year-old female who advanced osteoarthritis of the left knee. She is felt conservative treatment with both steroid and viscous supplementation injections. We've discussed proceeding with a total knee arthroplasty.  We've discussed the risk, benefits, and future outcomes of surgery.  She accepts these risks and is agreeable to surgery.  She has been cleared and is ready for surgery.  We'll schedule her for May 2, 2017 at [facility].         Written by, Guerita CONROY, acting as a scribe for Dr. Elizabeth TORO, Feroz Johnson MD, personally performed the services described in this documentation as scribed by the above named individual in my " presence, and it is both accurate and complete.  4/18/2017  7:05 PM    This document was signed by Feroz Johnson M.D.  April 18, 2017 7:06 PM     Cc: RAFIQ Montanez

## 2017-04-18 NOTE — PROGRESS NOTES
History and Physical    Patient: Britta Lee  YOB: 1945  Date of encounter: 04/17/2017      History of Present Illness:   Britta Lee is a 71 y.o. female with primary osteoarthritis of the left knee.  She's had previous Synvisc and cortisone injections without any significant improvement.  Her pain is getting progressively worse and she is now had several falls because of the pain.  She states it's beginning to impinge on her activities of daily living.  She is ready to discuss a knee replacement.    PMH:   Patient Active Problem List   Diagnosis   • Type 2 diabetes mellitus, uncontrolled, with neuropathy   • Chronic kidney disease   • Benign essential hypertension   • Cervicalgia   • Coronary atherosclerosis of native coronary vessel   • Bilateral carpal tunnel syndrome   • Abnormal blood chemistry level   • Localized osteoarthrosis   • Iron deficiency anemia due to dietary causes   • Diabetic retinopathy associated with type 2 diabetes mellitus   • Hyperlipidemia   • Sciatic neuropathy   • DDD (degenerative disc disease), lumbar     Past Medical History:   Diagnosis Date   • Diabetes mellitus    • Heart disease    • Hypertension    • Renal insufficiency          PSH:  Past Surgical History:   Procedure Laterality Date   • APPENDECTOMY     • CARDIAC SURGERY     • CAROTID STENT     • CATARACT EXTRACTION     • CHOLECYSTECTOMY     • COLON SURGERY      polyp removal   • GALLBLADDER SURGERY     • STERILIZATION     • TONSILLECTOMY         Allergies:   No Known Allergies    Medications:     Current Outpatient Prescriptions:   •  Alcohol Swabs (ALCOHOL PREP) 70 % pads, , Disp: , Rfl:   •  aspirin 81 MG EC tablet, Take 81 mg by mouth daily., Disp: , Rfl:   •  atorvastatin (LIPITOR) 40 MG tablet, Take 1 tablet by mouth Daily., Disp: 30 tablet, Rfl: 5  •  cloNIDine (CATAPRES) 0.2 MG tablet, Take 1 tablet by mouth 3 (three) times a day., Disp: 90 tablet, Rfl: 2  •  ferrous sulfate 325 (65 FE) MG tablet, Take  325 mg by mouth daily., Disp: , Rfl:   •  insulin aspart (NovoLOG) 100 UNIT/ML injection, Inject 10 Units under the skin 3 (three) times a day., Disp: , Rfl:   •  Insulin Glargine (TOUJEO SOLOSTAR) 300 UNIT/ML solution pen-injector, Inject 80 Units under the skin Daily., Disp: 9 pen, Rfl: 5  •  Insulin Pen Needle (PEN NEEDLES) 31G X 6 MM misc, 4 (four) times a day., Disp: , Rfl:   •  isosorbide dinitrate (ISORDIL) 30 MG tablet, Take 30 mg by mouth Daily., Disp: , Rfl:   •  metoprolol tartrate (LOPRESSOR) 100 MG tablet, Take 1 tablet by mouth 2 (two) times a day., Disp: 60 tablet, Rfl: 5  •  ULTICARE SHORT PEN NEEDLES 31G X 8 MM misc, USE TO inject insulin FOUR TIMES DAILY AS NEEDED, Disp: , Rfl: 3  •  amLODIPine (NORVASC) 2.5 MG tablet, TAKE 1 TABLET BY MOUTH EVERY NIGHT AT BEDTIME, Disp: , Rfl: 1  •  quinapril (ACCUPRIL) 40 MG tablet, TAKE 1 Tablet BY MOUTH EVERY DAY, Disp: , Rfl: 1  •  Tranexamic Acid 1000 MG/10ML injection, Infuse 10.3 mL into a venous catheter 60 Minutes Prior to Surgery., Disp: 10.3 mL, Rfl: 0  •  TRUEPLUS LANCETS 33G misc, test blood sugar THREE TIMES DAILY, Disp: , Rfl: 5  •  TRUETRACK TEST test strip, test blood sugar THREE TIMES DAILY, Disp: , Rfl: 5  •  vitamin D (ERGOCALCIFEROL) 70796 UNITS capsule capsule, TAKE ONE Capsule BY MOUTH ONCE WEEKLY, Disp: , Rfl: 2    Social History:     Social History     Occupational History   • Not on file.     Social History Main Topics   • Smoking status: Never Smoker   • Smokeless tobacco: Never Used   • Alcohol use No   • Drug use: No   • Sexual activity: Defer      Social History     Social History Narrative       Family History:     Family History   Problem Relation Age of Onset   • Arthritis Mother    • Diabetes Mother    • Cancer Mother    • Heart disease Father    • Diabetes Daughter    • Diabetes Son    • Diabetes Maternal Aunt    • Heart disease Maternal Grandmother        Review of Systems:   Review of Systems   Constitutional: Negative.   "  HENT: Negative.    Eyes: Negative.    Respiratory: Negative.    Cardiovascular: Negative.    Gastrointestinal: Negative.    Skin: Negative.    Neurological: Negative.    Hematological: Negative.    Psychiatric/Behavioral: Negative.        Physical Exam:   General Appearance:    71 y.o. female  cooperative, in no acute distress.  Alert and oriented x 3,                   Vitals:    04/17/17 0833   BP: 162/71   Pulse: 60   Weight: 227 lb (103 kg)   Height: 63\" (160 cm)          HEENT: Unremarkable      Neck: Supple without lymphadenopathy.      Chest: Clear to ascultation bilaterally. Normal respiratory effort.      Heart: Regular rate and rhythm. No murmurs noted.      Skin:  Warm and dry without any rash.      Musculoskeletal:     Upper Extremities: Unremarkable.     Lower Extremities: Examination of the left knee reveals mild effusion with market medial joint line tenderness.  She    is a 5° flexion contracture.  There is no gross instability with varus or valgus stressing.  Her neurovascular    status is intact.      Radiology:     AP and lateral views of the left knee were reviewed revealing complete joint space loss of the medial compartment.    Assessment    ICD-10-CM ICD-9-CM   1. Primary osteoarthritis of left knee M17.12 715.16       Plan:  A 71-year-old female who advanced osteoarthritis of the left knee. She is felt conservative treatment with both steroid and viscous supplementation injections. We've discussed proceeding with a total knee arthroplasty.  We've discussed the risk, benefits, and future outcomes of surgery.  She accepts these risks and is agreeable to surgery.  She has been cleared and is ready for surgery.  We'll schedule her for May 2, 2017 at [facility].         Written by, Guerita CONROY, acting as a scribe for Dr. Elizabeth TORO, Feroz Johnson MD, personally performed the services described in this documentation as scribed by the above named individual in my " presence, and it is both accurate and complete.  4/18/2017  7:05 PM    Cc: RAFIQ Montanez

## 2017-05-01 ENCOUNTER — APPOINTMENT (OUTPATIENT)
Dept: PREADMISSION TESTING | Facility: HOSPITAL | Age: 72
End: 2017-05-01

## 2017-05-01 ENCOUNTER — TELEPHONE (OUTPATIENT)
Dept: ORTHOPEDIC SURGERY | Facility: CLINIC | Age: 72
End: 2017-05-01

## 2017-05-01 DIAGNOSIS — M17.12 PRIMARY OSTEOARTHRITIS OF LEFT KNEE: ICD-10-CM

## 2017-05-01 DIAGNOSIS — Z01.818 PREOP TESTING: ICD-10-CM

## 2017-05-01 DIAGNOSIS — Z22.321 CARRIER OR SUSPECTED CARRIER OF METHICILLIN SUSCEPTIBLE STAPHYLOCOCCUS AUREUS: ICD-10-CM

## 2017-05-01 LAB
ABO GROUP BLD: NORMAL
ANION GAP SERPL CALCULATED.3IONS-SCNC: 6.6 MMOL/L (ref 3.6–11.2)
BASOPHILS # BLD AUTO: 0.03 10*3/MM3 (ref 0–0.3)
BASOPHILS NFR BLD AUTO: 0.3 % (ref 0–2)
BLD GP AB SCN SERPL QL: NEGATIVE
BUN BLD-MCNC: 36 MG/DL (ref 7–21)
BUN/CREAT SERPL: 23.4 (ref 7–25)
CALCIUM SPEC-SCNC: 9.7 MG/DL (ref 7.7–10)
CHLORIDE SERPL-SCNC: 108 MMOL/L (ref 99–112)
CO2 SERPL-SCNC: 24.4 MMOL/L (ref 24.3–31.9)
CREAT BLD-MCNC: 1.54 MG/DL (ref 0.43–1.29)
DEPRECATED RDW RBC AUTO: 39.6 FL (ref 37–54)
EOSINOPHIL # BLD AUTO: 0.72 10*3/MM3 (ref 0–0.7)
EOSINOPHIL NFR BLD AUTO: 7.5 % (ref 0–7)
ERYTHROCYTE [DISTWIDTH] IN BLOOD BY AUTOMATED COUNT: 13.4 % (ref 11.5–14.5)
GFR SERPL CREATININE-BSD FRML MDRD: 33 ML/MIN/1.73
GLUCOSE BLD-MCNC: 229 MG/DL (ref 70–110)
HCT VFR BLD AUTO: 37.7 % (ref 37–47)
HGB BLD-MCNC: 12.1 G/DL (ref 12–16)
IMM GRANULOCYTES # BLD: 0.01 10*3/MM3 (ref 0–0.03)
IMM GRANULOCYTES NFR BLD: 0.1 % (ref 0–0.5)
LYMPHOCYTES # BLD AUTO: 2.15 10*3/MM3 (ref 1–3)
LYMPHOCYTES NFR BLD AUTO: 22.4 % (ref 16–46)
MCH RBC QN AUTO: 26.7 PG (ref 27–33)
MCHC RBC AUTO-ENTMCNC: 32.1 G/DL (ref 33–37)
MCV RBC AUTO: 83.2 FL (ref 80–94)
MONOCYTES # BLD AUTO: 0.63 10*3/MM3 (ref 0.1–0.9)
MONOCYTES NFR BLD AUTO: 6.6 % (ref 0–12)
MRSA DNA SPEC QL NAA+PROBE: NEGATIVE
NEUTROPHILS # BLD AUTO: 6.06 10*3/MM3 (ref 1.4–6.5)
NEUTROPHILS NFR BLD AUTO: 63.1 % (ref 40–75)
OSMOLALITY SERPL CALC.SUM OF ELEC: 293.1 MOSM/KG (ref 273–305)
PLATELET # BLD AUTO: 215 10*3/MM3 (ref 130–400)
PMV BLD AUTO: 9.6 FL (ref 6–10)
POTASSIUM BLD-SCNC: 4.2 MMOL/L (ref 3.5–5.3)
RBC # BLD AUTO: 4.53 10*6/MM3 (ref 4.2–5.4)
RH BLD: POSITIVE
S AUREUS DNA SPEC QL NAA+PROBE: NEGATIVE
SODIUM BLD-SCNC: 139 MMOL/L (ref 135–153)
WBC NRBC COR # BLD: 9.6 10*3/MM3 (ref 4.5–12.5)

## 2017-05-02 ENCOUNTER — APPOINTMENT (OUTPATIENT)
Dept: GENERAL RADIOLOGY | Facility: HOSPITAL | Age: 72
End: 2017-05-02

## 2017-05-02 ENCOUNTER — ANESTHESIA (OUTPATIENT)
Dept: PERIOP | Facility: HOSPITAL | Age: 72
End: 2017-05-02

## 2017-05-02 ENCOUNTER — HOSPITAL ENCOUNTER (INPATIENT)
Facility: HOSPITAL | Age: 72
LOS: 3 days | Discharge: NURSING, REHAB OR LONG-TERM CARE | End: 2017-05-05
Attending: ORTHOPAEDIC SURGERY | Admitting: ORTHOPAEDIC SURGERY

## 2017-05-02 ENCOUNTER — ANESTHESIA EVENT (OUTPATIENT)
Dept: PERIOP | Facility: HOSPITAL | Age: 72
End: 2017-05-02

## 2017-05-02 DIAGNOSIS — IMO0002 TYPE 2 DIABETES MELLITUS, UNCONTROLLED, WITH NEUROPATHY: ICD-10-CM

## 2017-05-02 DIAGNOSIS — Z01.818 PREOP TESTING: ICD-10-CM

## 2017-05-02 DIAGNOSIS — M17.12 PRIMARY OSTEOARTHRITIS OF LEFT KNEE: ICD-10-CM

## 2017-05-02 LAB
GLUCOSE BLDC GLUCOMTR-MCNC: 139 MG/DL (ref 70–130)
GLUCOSE BLDC GLUCOMTR-MCNC: 161 MG/DL (ref 70–130)
GLUCOSE BLDC GLUCOMTR-MCNC: 205 MG/DL (ref 70–130)
HCT VFR BLD AUTO: 38.1 % (ref 37–47)
HGB BLD-MCNC: 12.5 G/DL (ref 12–16)

## 2017-05-02 PROCEDURE — 25010000003 CEFAZOLIN PER 500 MG: Performed by: ORTHOPAEDIC SURGERY

## 2017-05-02 PROCEDURE — 99222 1ST HOSP IP/OBS MODERATE 55: CPT | Performed by: INTERNAL MEDICINE

## 2017-05-02 PROCEDURE — 0SRD0J9 REPLACEMENT OF LEFT KNEE JOINT WITH SYNTHETIC SUBSTITUTE, CEMENTED, OPEN APPROACH: ICD-10-PCS | Performed by: ORTHOPAEDIC SURGERY

## 2017-05-02 PROCEDURE — 27447 TOTAL KNEE ARTHROPLASTY: CPT | Performed by: ORTHOPAEDIC SURGERY

## 2017-05-02 PROCEDURE — 94799 UNLISTED PULMONARY SVC/PX: CPT

## 2017-05-02 PROCEDURE — C1713 ANCHOR/SCREW BN/BN,TIS/BN: HCPCS | Performed by: ORTHOPAEDIC SURGERY

## 2017-05-02 PROCEDURE — 63710000001 INSULIN DETEMIR PER 5 UNITS: Performed by: INTERNAL MEDICINE

## 2017-05-02 PROCEDURE — 25010000002 MEPERIDINE PER 100 MG: Performed by: NURSE ANESTHETIST, CERTIFIED REGISTERED

## 2017-05-02 PROCEDURE — 73560 X-RAY EXAM OF KNEE 1 OR 2: CPT

## 2017-05-02 PROCEDURE — 82962 GLUCOSE BLOOD TEST: CPT

## 2017-05-02 PROCEDURE — 73560 X-RAY EXAM OF KNEE 1 OR 2: CPT | Performed by: RADIOLOGY

## 2017-05-02 PROCEDURE — 25010000002 ONDANSETRON PER 1 MG: Performed by: NURSE ANESTHETIST, CERTIFIED REGISTERED

## 2017-05-02 PROCEDURE — 85014 HEMATOCRIT: CPT | Performed by: ORTHOPAEDIC SURGERY

## 2017-05-02 PROCEDURE — C1776 JOINT DEVICE (IMPLANTABLE): HCPCS | Performed by: ORTHOPAEDIC SURGERY

## 2017-05-02 PROCEDURE — 25010000002 PROPOFOL 10 MG/ML EMULSION: Performed by: NURSE ANESTHETIST, CERTIFIED REGISTERED

## 2017-05-02 PROCEDURE — 85018 HEMOGLOBIN: CPT | Performed by: ORTHOPAEDIC SURGERY

## 2017-05-02 DEVICE — SMARTSET HIGH PERFORMANCE MV MEDIUM VISCOSITY BONE CEMENT 40G
Type: IMPLANTABLE DEVICE | Site: KNEE | Status: FUNCTIONAL
Brand: SMARTSET

## 2017-05-02 DEVICE — ATTUNE KNEE SYSTEM TIBIAL BASE ROTATING PLATFORM SIZE 5 CEMENTED
Type: IMPLANTABLE DEVICE | Site: KNEE | Status: FUNCTIONAL
Brand: ATTUNE

## 2017-05-02 DEVICE — TOTL KN ATTUNE DEPUY 9527038: Type: IMPLANTABLE DEVICE | Status: FUNCTIONAL

## 2017-05-02 DEVICE — ATTUNE KNEE SYSTEM FEMORAL POSTERIOR STABILIZED SIZE 5 LEFT CEMENTED
Type: IMPLANTABLE DEVICE | Site: KNEE | Status: FUNCTIONAL
Brand: ATTUNE

## 2017-05-02 DEVICE — ATTUNE KNEE SYSTEM TIBIAL INSERT ROTATING PLATFORM POSTERIOR STABILIZED 5 10MM AOX
Type: IMPLANTABLE DEVICE | Site: KNEE | Status: FUNCTIONAL
Brand: ATTUNE

## 2017-05-02 RX ORDER — CLONIDINE HYDROCHLORIDE 0.1 MG/1
0.1 TABLET ORAL 3 TIMES DAILY
Status: DISCONTINUED | OUTPATIENT
Start: 2017-05-02 | End: 2017-05-03

## 2017-05-02 RX ORDER — DOCUSATE SODIUM 100 MG/1
100 CAPSULE, LIQUID FILLED ORAL 2 TIMES DAILY PRN
Status: DISCONTINUED | OUTPATIENT
Start: 2017-05-02 | End: 2017-05-05 | Stop reason: HOSPADM

## 2017-05-02 RX ORDER — ONDANSETRON 2 MG/ML
INJECTION INTRAMUSCULAR; INTRAVENOUS AS NEEDED
Status: DISCONTINUED | OUTPATIENT
Start: 2017-05-02 | End: 2017-05-02 | Stop reason: SURG

## 2017-05-02 RX ORDER — FAMOTIDINE 10 MG/ML
INJECTION, SOLUTION INTRAVENOUS AS NEEDED
Status: DISCONTINUED | OUTPATIENT
Start: 2017-05-02 | End: 2017-05-02 | Stop reason: SURG

## 2017-05-02 RX ORDER — LISINOPRIL 10 MG/1
40 TABLET ORAL DAILY
Status: CANCELLED | OUTPATIENT
Start: 2017-05-02

## 2017-05-02 RX ORDER — FERROUS SULFATE 325(65) MG
325 TABLET ORAL
Status: DISCONTINUED | OUTPATIENT
Start: 2017-05-03 | End: 2017-05-05 | Stop reason: HOSPADM

## 2017-05-02 RX ORDER — ASPIRIN 81 MG/1
81 TABLET ORAL DAILY
Status: DISCONTINUED | OUTPATIENT
Start: 2017-05-02 | End: 2017-05-05 | Stop reason: HOSPADM

## 2017-05-02 RX ORDER — SODIUM CHLORIDE, SODIUM LACTATE, POTASSIUM CHLORIDE, CALCIUM CHLORIDE 600; 310; 30; 20 MG/100ML; MG/100ML; MG/100ML; MG/100ML
75 INJECTION, SOLUTION INTRAVENOUS CONTINUOUS
Status: DISCONTINUED | OUTPATIENT
Start: 2017-05-02 | End: 2017-05-05

## 2017-05-02 RX ORDER — SODIUM CHLORIDE, SODIUM LACTATE, POTASSIUM CHLORIDE, CALCIUM CHLORIDE 600; 310; 30; 20 MG/100ML; MG/100ML; MG/100ML; MG/100ML
125 INJECTION, SOLUTION INTRAVENOUS CONTINUOUS
Status: DISCONTINUED | OUTPATIENT
Start: 2017-05-02 | End: 2017-05-02

## 2017-05-02 RX ORDER — CLONIDINE HYDROCHLORIDE 0.2 MG/1
0.2 TABLET ORAL 3 TIMES DAILY
Status: DISCONTINUED | OUTPATIENT
Start: 2017-05-02 | End: 2017-05-02

## 2017-05-02 RX ORDER — SODIUM CHLORIDE 0.9 % (FLUSH) 0.9 %
1-10 SYRINGE (ML) INJECTION AS NEEDED
Status: DISCONTINUED | OUTPATIENT
Start: 2017-05-02 | End: 2017-05-02 | Stop reason: HOSPADM

## 2017-05-02 RX ORDER — ONDANSETRON 2 MG/ML
4 INJECTION INTRAMUSCULAR; INTRAVENOUS EVERY 6 HOURS PRN
Status: DISCONTINUED | OUTPATIENT
Start: 2017-05-02 | End: 2017-05-05 | Stop reason: HOSPADM

## 2017-05-02 RX ORDER — OXYCODONE HYDROCHLORIDE AND ACETAMINOPHEN 5; 325 MG/1; MG/1
1 TABLET ORAL ONCE AS NEEDED
Status: DISCONTINUED | OUTPATIENT
Start: 2017-05-02 | End: 2017-05-02 | Stop reason: HOSPADM

## 2017-05-02 RX ORDER — FENTANYL CITRATE 50 UG/ML
50 INJECTION, SOLUTION INTRAMUSCULAR; INTRAVENOUS
Status: DISCONTINUED | OUTPATIENT
Start: 2017-05-02 | End: 2017-05-02 | Stop reason: HOSPADM

## 2017-05-02 RX ORDER — ONDANSETRON 4 MG/1
4 TABLET, ORALLY DISINTEGRATING ORAL EVERY 6 HOURS PRN
Status: DISCONTINUED | OUTPATIENT
Start: 2017-05-02 | End: 2017-05-05 | Stop reason: HOSPADM

## 2017-05-02 RX ORDER — METOPROLOL TARTRATE 100 MG/1
100 TABLET ORAL EVERY 12 HOURS SCHEDULED
Status: DISCONTINUED | OUTPATIENT
Start: 2017-05-02 | End: 2017-05-05 | Stop reason: HOSPADM

## 2017-05-02 RX ORDER — OXYCODONE HYDROCHLORIDE AND ACETAMINOPHEN 5; 325 MG/1; MG/1
1 TABLET ORAL EVERY 4 HOURS PRN
Status: DISCONTINUED | OUTPATIENT
Start: 2017-05-02 | End: 2017-05-05 | Stop reason: HOSPADM

## 2017-05-02 RX ORDER — ERGOCALCIFEROL 1.25 MG/1
50000 CAPSULE ORAL WEEKLY
Status: DISCONTINUED | OUTPATIENT
Start: 2017-05-03 | End: 2017-05-05 | Stop reason: HOSPADM

## 2017-05-02 RX ORDER — DEXTROSE MONOHYDRATE 25 G/50ML
25 INJECTION, SOLUTION INTRAVENOUS
Status: DISCONTINUED | OUTPATIENT
Start: 2017-05-02 | End: 2017-05-05 | Stop reason: HOSPADM

## 2017-05-02 RX ORDER — QUINAPRIL 40 MG/1
40 TABLET ORAL DAILY
COMMUNITY
End: 2017-06-19 | Stop reason: SDUPTHER

## 2017-05-02 RX ORDER — IPRATROPIUM BROMIDE AND ALBUTEROL SULFATE 2.5; .5 MG/3ML; MG/3ML
3 SOLUTION RESPIRATORY (INHALATION) ONCE AS NEEDED
Status: DISCONTINUED | OUTPATIENT
Start: 2017-05-02 | End: 2017-05-02 | Stop reason: HOSPADM

## 2017-05-02 RX ORDER — SODIUM CHLORIDE, SODIUM LACTATE, POTASSIUM CHLORIDE, CALCIUM CHLORIDE 600; 310; 30; 20 MG/100ML; MG/100ML; MG/100ML; MG/100ML
100 INJECTION, SOLUTION INTRAVENOUS CONTINUOUS
Status: DISCONTINUED | OUTPATIENT
Start: 2017-05-02 | End: 2017-05-02

## 2017-05-02 RX ORDER — ONDANSETRON 4 MG/1
4 TABLET, FILM COATED ORAL EVERY 6 HOURS PRN
Status: DISCONTINUED | OUTPATIENT
Start: 2017-05-02 | End: 2017-05-05 | Stop reason: HOSPADM

## 2017-05-02 RX ORDER — HYDROMORPHONE HYDROCHLORIDE 1 MG/ML
0.5 INJECTION, SOLUTION INTRAMUSCULAR; INTRAVENOUS; SUBCUTANEOUS
Status: DISCONTINUED | OUTPATIENT
Start: 2017-05-02 | End: 2017-05-05 | Stop reason: HOSPADM

## 2017-05-02 RX ORDER — NALOXONE HCL 0.4 MG/ML
0.1 VIAL (ML) INJECTION
Status: DISCONTINUED | OUTPATIENT
Start: 2017-05-02 | End: 2017-05-05 | Stop reason: HOSPADM

## 2017-05-02 RX ORDER — ISOSORBIDE MONONITRATE 30 MG/1
30 TABLET, EXTENDED RELEASE ORAL DAILY
Status: DISCONTINUED | OUTPATIENT
Start: 2017-05-03 | End: 2017-05-05 | Stop reason: HOSPADM

## 2017-05-02 RX ORDER — ATORVASTATIN CALCIUM 40 MG/1
40 TABLET, FILM COATED ORAL NIGHTLY
Status: DISCONTINUED | OUTPATIENT
Start: 2017-05-02 | End: 2017-05-05 | Stop reason: HOSPADM

## 2017-05-02 RX ORDER — AMLODIPINE BESYLATE 2.5 MG/1
2.5 TABLET ORAL NIGHTLY
COMMUNITY
End: 2018-03-19

## 2017-05-02 RX ORDER — ERGOCALCIFEROL 1.25 MG/1
50000 CAPSULE ORAL WEEKLY
COMMUNITY
End: 2017-06-19 | Stop reason: SDUPTHER

## 2017-05-02 RX ORDER — ONDANSETRON 2 MG/ML
4 INJECTION INTRAMUSCULAR; INTRAVENOUS ONCE AS NEEDED
Status: DISCONTINUED | OUTPATIENT
Start: 2017-05-02 | End: 2017-05-02 | Stop reason: HOSPADM

## 2017-05-02 RX ORDER — AMLODIPINE BESYLATE 5 MG/1
2.5 TABLET ORAL NIGHTLY
Status: DISCONTINUED | OUTPATIENT
Start: 2017-05-02 | End: 2017-05-02

## 2017-05-02 RX ORDER — MEPERIDINE HYDROCHLORIDE 25 MG/ML
12.5 INJECTION INTRAMUSCULAR; INTRAVENOUS; SUBCUTANEOUS
Status: COMPLETED | OUTPATIENT
Start: 2017-05-02 | End: 2017-05-02

## 2017-05-02 RX ORDER — NICOTINE POLACRILEX 4 MG
15 LOZENGE BUCCAL
Status: DISCONTINUED | OUTPATIENT
Start: 2017-05-02 | End: 2017-05-05 | Stop reason: HOSPADM

## 2017-05-02 RX ORDER — PROPOFOL 10 MG/ML
VIAL (ML) INTRAVENOUS AS NEEDED
Status: DISCONTINUED | OUTPATIENT
Start: 2017-05-02 | End: 2017-05-02 | Stop reason: SURG

## 2017-05-02 RX ORDER — ISOSORBIDE MONONITRATE 30 MG/1
30 TABLET, EXTENDED RELEASE ORAL DAILY
COMMUNITY
End: 2017-06-19 | Stop reason: SDUPTHER

## 2017-05-02 RX ADMIN — MEPERIDINE HYDROCHLORIDE 12.5 MG: 25 INJECTION, SOLUTION INTRAMUSCULAR; INTRAVENOUS; SUBCUTANEOUS at 13:34

## 2017-05-02 RX ADMIN — FAMOTIDINE 20 MG: 10 INJECTION, SOLUTION INTRAVENOUS at 10:37

## 2017-05-02 RX ADMIN — PROPOFOL 200 MG: 10 INJECTION, EMULSION INTRAVENOUS at 10:44

## 2017-05-02 RX ADMIN — CEFAZOLIN SODIUM 2 G: 2 SOLUTION INTRAVENOUS at 10:37

## 2017-05-02 RX ADMIN — SODIUM CHLORIDE, POTASSIUM CHLORIDE, SODIUM LACTATE AND CALCIUM CHLORIDE: 600; 310; 30; 20 INJECTION, SOLUTION INTRAVENOUS at 12:41

## 2017-05-02 RX ADMIN — OXYCODONE HYDROCHLORIDE AND ACETAMINOPHEN 1 TABLET: 5; 325 TABLET ORAL at 21:54

## 2017-05-02 RX ADMIN — ASPIRIN 81 MG: 81 TABLET ORAL at 16:24

## 2017-05-02 RX ADMIN — ATORVASTATIN CALCIUM 40 MG: 40 TABLET, FILM COATED ORAL at 21:54

## 2017-05-02 RX ADMIN — CEFAZOLIN SODIUM 2 G: 2 SOLUTION INTRAVENOUS at 21:54

## 2017-05-02 RX ADMIN — CLONIDINE HYDROCHLORIDE 0.2 MG: 0.2 TABLET ORAL at 16:24

## 2017-05-02 RX ADMIN — MEPERIDINE HYDROCHLORIDE 12.5 MG: 25 INJECTION, SOLUTION INTRAMUSCULAR; INTRAVENOUS; SUBCUTANEOUS at 13:41

## 2017-05-02 RX ADMIN — SODIUM CHLORIDE, POTASSIUM CHLORIDE, SODIUM LACTATE AND CALCIUM CHLORIDE 125 ML/HR: 600; 310; 30; 20 INJECTION, SOLUTION INTRAVENOUS at 10:16

## 2017-05-02 RX ADMIN — CLONIDINE HYDROCHLORIDE 0.1 MG: 0.2 TABLET ORAL at 21:54

## 2017-05-02 RX ADMIN — ONDANSETRON 4 MG: 2 INJECTION, SOLUTION INTRAMUSCULAR; INTRAVENOUS at 10:37

## 2017-05-02 RX ADMIN — METOPROLOL TARTRATE 100 MG: 100 TABLET, FILM COATED ORAL at 21:54

## 2017-05-02 RX ADMIN — SODIUM CHLORIDE, POTASSIUM CHLORIDE, SODIUM LACTATE AND CALCIUM CHLORIDE 100 ML/HR: 600; 310; 30; 20 INJECTION, SOLUTION INTRAVENOUS at 18:01

## 2017-05-02 RX ADMIN — INSULIN DETEMIR 40 UNITS: 100 INJECTION, SOLUTION SUBCUTANEOUS at 21:55

## 2017-05-03 LAB
ALBUMIN SERPL-MCNC: 3.2 G/DL (ref 3.4–4.8)
ALBUMIN/GLOB SERPL: 1 G/DL (ref 1.5–2.5)
ALP SERPL-CCNC: 107 U/L (ref 35–104)
ALT SERPL W P-5'-P-CCNC: 19 U/L (ref 10–36)
ANION GAP SERPL CALCULATED.3IONS-SCNC: 7.3 MMOL/L (ref 3.6–11.2)
AST SERPL-CCNC: 31 U/L (ref 10–30)
BASOPHILS # BLD AUTO: 0.02 10*3/MM3 (ref 0–0.3)
BASOPHILS NFR BLD AUTO: 0.2 % (ref 0–2)
BILIRUB SERPL-MCNC: 0.3 MG/DL (ref 0.2–1.8)
BUN BLD-MCNC: 35 MG/DL (ref 7–21)
BUN/CREAT SERPL: 19.9 (ref 7–25)
CALCIUM SPEC-SCNC: 8.5 MG/DL (ref 7.7–10)
CHLORIDE SERPL-SCNC: 107 MMOL/L (ref 99–112)
CO2 SERPL-SCNC: 24.7 MMOL/L (ref 24.3–31.9)
CREAT BLD-MCNC: 1.76 MG/DL (ref 0.43–1.29)
DEPRECATED RDW RBC AUTO: 42.3 FL (ref 37–54)
EOSINOPHIL # BLD AUTO: 0.02 10*3/MM3 (ref 0–0.7)
EOSINOPHIL NFR BLD AUTO: 0.2 % (ref 0–7)
ERYTHROCYTE [DISTWIDTH] IN BLOOD BY AUTOMATED COUNT: 13.6 % (ref 11.5–14.5)
GFR SERPL CREATININE-BSD FRML MDRD: 28 ML/MIN/1.73
GLOBULIN UR ELPH-MCNC: 3.2 GM/DL
GLUCOSE BLD-MCNC: 199 MG/DL (ref 70–110)
GLUCOSE BLDC GLUCOMTR-MCNC: 159 MG/DL (ref 70–130)
GLUCOSE BLDC GLUCOMTR-MCNC: 171 MG/DL (ref 70–130)
GLUCOSE BLDC GLUCOMTR-MCNC: 202 MG/DL (ref 70–130)
GLUCOSE BLDC GLUCOMTR-MCNC: 206 MG/DL (ref 70–130)
HBA1C MFR BLD: 7.6 % (ref 4.5–5.7)
HCT VFR BLD AUTO: 30.6 % (ref 37–47)
HGB BLD-MCNC: 9.4 G/DL (ref 12–16)
IMM GRANULOCYTES # BLD: 0.02 10*3/MM3 (ref 0–0.03)
IMM GRANULOCYTES NFR BLD: 0.2 % (ref 0–0.5)
LYMPHOCYTES # BLD AUTO: 1.59 10*3/MM3 (ref 1–3)
LYMPHOCYTES NFR BLD AUTO: 17.3 % (ref 16–46)
MCH RBC QN AUTO: 26.8 PG (ref 27–33)
MCHC RBC AUTO-ENTMCNC: 30.7 G/DL (ref 33–37)
MCV RBC AUTO: 87.2 FL (ref 80–94)
MONOCYTES # BLD AUTO: 0.85 10*3/MM3 (ref 0.1–0.9)
MONOCYTES NFR BLD AUTO: 9.3 % (ref 0–12)
NEUTROPHILS # BLD AUTO: 6.68 10*3/MM3 (ref 1.4–6.5)
NEUTROPHILS NFR BLD AUTO: 72.8 % (ref 40–75)
OSMOLALITY SERPL CALC.SUM OF ELEC: 291.1 MOSM/KG (ref 273–305)
PLATELET # BLD AUTO: 208 10*3/MM3 (ref 130–400)
PMV BLD AUTO: 9.8 FL (ref 6–10)
POTASSIUM BLD-SCNC: 5.1 MMOL/L (ref 3.5–5.3)
PROT SERPL-MCNC: 6.4 G/DL (ref 6–8)
RBC # BLD AUTO: 3.51 10*6/MM3 (ref 4.2–5.4)
SODIUM BLD-SCNC: 139 MMOL/L (ref 135–153)
WBC NRBC COR # BLD: 9.18 10*3/MM3 (ref 4.5–12.5)

## 2017-05-03 PROCEDURE — 97530 THERAPEUTIC ACTIVITIES: CPT

## 2017-05-03 PROCEDURE — 25010000003 CEFAZOLIN PER 500 MG: Performed by: ORTHOPAEDIC SURGERY

## 2017-05-03 PROCEDURE — G8978 MOBILITY CURRENT STATUS: HCPCS

## 2017-05-03 PROCEDURE — 99233 SBSQ HOSP IP/OBS HIGH 50: CPT | Performed by: INTERNAL MEDICINE

## 2017-05-03 PROCEDURE — G8979 MOBILITY GOAL STATUS: HCPCS

## 2017-05-03 PROCEDURE — 80053 COMPREHEN METABOLIC PANEL: CPT | Performed by: INTERNAL MEDICINE

## 2017-05-03 PROCEDURE — 85025 COMPLETE CBC W/AUTO DIFF WBC: CPT | Performed by: INTERNAL MEDICINE

## 2017-05-03 PROCEDURE — 25010000002 HYDROMORPHONE PER 4 MG: Performed by: ORTHOPAEDIC SURGERY

## 2017-05-03 PROCEDURE — 83036 HEMOGLOBIN GLYCOSYLATED A1C: CPT | Performed by: INTERNAL MEDICINE

## 2017-05-03 PROCEDURE — 82962 GLUCOSE BLOOD TEST: CPT

## 2017-05-03 PROCEDURE — 94799 UNLISTED PULMONARY SVC/PX: CPT

## 2017-05-03 PROCEDURE — 97116 GAIT TRAINING THERAPY: CPT

## 2017-05-03 PROCEDURE — 99024 POSTOP FOLLOW-UP VISIT: CPT | Performed by: PHYSICIAN ASSISTANT

## 2017-05-03 PROCEDURE — 97162 PT EVAL MOD COMPLEX 30 MIN: CPT

## 2017-05-03 PROCEDURE — 63710000001 INSULIN ASPART PER 5 UNITS: Performed by: ORTHOPAEDIC SURGERY

## 2017-05-03 PROCEDURE — 63710000001 INSULIN DETEMIR PER 5 UNITS: Performed by: INTERNAL MEDICINE

## 2017-05-03 PROCEDURE — 25010000002 ONDANSETRON PER 1 MG: Performed by: ORTHOPAEDIC SURGERY

## 2017-05-03 RX ORDER — AMLODIPINE BESYLATE 5 MG/1
5 TABLET ORAL
Status: DISCONTINUED | OUTPATIENT
Start: 2017-05-03 | End: 2017-05-03

## 2017-05-03 RX ORDER — CLONIDINE HYDROCHLORIDE 0.2 MG/1
0.2 TABLET ORAL 3 TIMES DAILY
Status: DISCONTINUED | OUTPATIENT
Start: 2017-05-03 | End: 2017-05-05 | Stop reason: HOSPADM

## 2017-05-03 RX ADMIN — FERROUS SULFATE TAB 325 MG (65 MG ELEMENTAL FE) 325 MG: 325 (65 FE) TAB at 08:51

## 2017-05-03 RX ADMIN — METOPROLOL TARTRATE 100 MG: 100 TABLET, FILM COATED ORAL at 08:50

## 2017-05-03 RX ADMIN — INSULIN ASPART 15 UNITS: 100 INJECTION, SOLUTION INTRAVENOUS; SUBCUTANEOUS at 08:57

## 2017-05-03 RX ADMIN — CLONIDINE HYDROCHLORIDE 0.2 MG: 0.2 TABLET ORAL at 15:16

## 2017-05-03 RX ADMIN — APIXABAN 2.5 MG: 2.5 TABLET, FILM COATED ORAL at 12:26

## 2017-05-03 RX ADMIN — ISOSORBIDE MONONITRATE 30 MG: 30 TABLET, EXTENDED RELEASE ORAL at 08:50

## 2017-05-03 RX ADMIN — CLONIDINE HYDROCHLORIDE 0.2 MG: 0.2 TABLET ORAL at 21:44

## 2017-05-03 RX ADMIN — APIXABAN 2.5 MG: 2.5 TABLET, FILM COATED ORAL at 21:44

## 2017-05-03 RX ADMIN — INSULIN DETEMIR 45 UNITS: 100 INJECTION, SOLUTION SUBCUTANEOUS at 21:44

## 2017-05-03 RX ADMIN — ATORVASTATIN CALCIUM 40 MG: 40 TABLET, FILM COATED ORAL at 21:44

## 2017-05-03 RX ADMIN — HYDROMORPHONE HYDROCHLORIDE 0.5 MG: 1 INJECTION, SOLUTION INTRAMUSCULAR; INTRAVENOUS; SUBCUTANEOUS at 12:05

## 2017-05-03 RX ADMIN — OXYCODONE HYDROCHLORIDE AND ACETAMINOPHEN 1 TABLET: 5; 325 TABLET ORAL at 10:54

## 2017-05-03 RX ADMIN — CEFAZOLIN SODIUM 2 G: 2 SOLUTION INTRAVENOUS at 06:04

## 2017-05-03 RX ADMIN — ASPIRIN 81 MG: 81 TABLET ORAL at 08:51

## 2017-05-03 RX ADMIN — ONDANSETRON 4 MG: 2 INJECTION, SOLUTION INTRAMUSCULAR; INTRAVENOUS at 13:55

## 2017-05-03 RX ADMIN — METOPROLOL TARTRATE 100 MG: 100 TABLET, FILM COATED ORAL at 21:44

## 2017-05-03 RX ADMIN — OXYCODONE HYDROCHLORIDE AND ACETAMINOPHEN 1 TABLET: 5; 325 TABLET ORAL at 06:57

## 2017-05-03 RX ADMIN — CLONIDINE HYDROCHLORIDE 0.1 MG: 0.2 TABLET ORAL at 08:50

## 2017-05-03 RX ADMIN — OXYCODONE HYDROCHLORIDE AND ACETAMINOPHEN 1 TABLET: 5; 325 TABLET ORAL at 15:17

## 2017-05-03 RX ADMIN — SODIUM CHLORIDE, POTASSIUM CHLORIDE, SODIUM LACTATE AND CALCIUM CHLORIDE 75 ML/HR: 600; 310; 30; 20 INJECTION, SOLUTION INTRAVENOUS at 18:30

## 2017-05-03 RX ADMIN — INSULIN ASPART 15 UNITS: 100 INJECTION, SOLUTION INTRAVENOUS; SUBCUTANEOUS at 17:02

## 2017-05-03 RX ADMIN — ERGOCALCIFEROL 50000 UNITS: 1.25 CAPSULE ORAL at 08:50

## 2017-05-03 RX ADMIN — SODIUM CHLORIDE, POTASSIUM CHLORIDE, SODIUM LACTATE AND CALCIUM CHLORIDE 75 ML/HR: 600; 310; 30; 20 INJECTION, SOLUTION INTRAVENOUS at 04:02

## 2017-05-03 RX ADMIN — INSULIN ASPART 15 UNITS: 100 INJECTION, SOLUTION INTRAVENOUS; SUBCUTANEOUS at 12:26

## 2017-05-04 LAB
ANION GAP SERPL CALCULATED.3IONS-SCNC: 5.9 MMOL/L (ref 3.6–11.2)
BASOPHILS # BLD AUTO: 0.02 10*3/MM3 (ref 0–0.3)
BASOPHILS NFR BLD AUTO: 0.2 % (ref 0–2)
BUN BLD-MCNC: 35 MG/DL (ref 7–21)
BUN/CREAT SERPL: 21.2 (ref 7–25)
CALCIUM SPEC-SCNC: 9.4 MG/DL (ref 7.7–10)
CHLORIDE SERPL-SCNC: 104 MMOL/L (ref 99–112)
CO2 SERPL-SCNC: 26.1 MMOL/L (ref 24.3–31.9)
CREAT BLD-MCNC: 1.65 MG/DL (ref 0.43–1.29)
DEPRECATED RDW RBC AUTO: 41.9 FL (ref 37–54)
EOSINOPHIL # BLD AUTO: 0.1 10*3/MM3 (ref 0–0.7)
EOSINOPHIL NFR BLD AUTO: 0.8 % (ref 0–7)
ERYTHROCYTE [DISTWIDTH] IN BLOOD BY AUTOMATED COUNT: 13.6 % (ref 11.5–14.5)
GFR SERPL CREATININE-BSD FRML MDRD: 30 ML/MIN/1.73
GLUCOSE BLD-MCNC: 191 MG/DL (ref 70–110)
GLUCOSE BLDC GLUCOMTR-MCNC: 114 MG/DL (ref 70–130)
GLUCOSE BLDC GLUCOMTR-MCNC: 158 MG/DL (ref 70–130)
GLUCOSE BLDC GLUCOMTR-MCNC: 199 MG/DL (ref 70–130)
GLUCOSE BLDC GLUCOMTR-MCNC: 205 MG/DL (ref 70–130)
HCT VFR BLD AUTO: 28.7 % (ref 37–47)
HGB BLD-MCNC: 8.6 G/DL (ref 12–16)
IMM GRANULOCYTES # BLD: 0.03 10*3/MM3 (ref 0–0.03)
IMM GRANULOCYTES NFR BLD: 0.2 % (ref 0–0.5)
LYMPHOCYTES # BLD AUTO: 2.38 10*3/MM3 (ref 1–3)
LYMPHOCYTES NFR BLD AUTO: 18.4 % (ref 16–46)
MCH RBC QN AUTO: 25.9 PG (ref 27–33)
MCHC RBC AUTO-ENTMCNC: 30 G/DL (ref 33–37)
MCV RBC AUTO: 86.4 FL (ref 80–94)
MONOCYTES # BLD AUTO: 1.45 10*3/MM3 (ref 0.1–0.9)
MONOCYTES NFR BLD AUTO: 11.2 % (ref 0–12)
NEUTROPHILS # BLD AUTO: 8.92 10*3/MM3 (ref 1.4–6.5)
NEUTROPHILS NFR BLD AUTO: 69.2 % (ref 40–75)
OSMOLALITY SERPL CALC.SUM OF ELEC: 285.1 MOSM/KG (ref 273–305)
PLATELET # BLD AUTO: 213 10*3/MM3 (ref 130–400)
PMV BLD AUTO: 9.5 FL (ref 6–10)
POTASSIUM BLD-SCNC: 4.4 MMOL/L (ref 3.5–5.3)
RBC # BLD AUTO: 3.32 10*6/MM3 (ref 4.2–5.4)
SODIUM BLD-SCNC: 136 MMOL/L (ref 135–153)
WBC NRBC COR # BLD: 12.9 10*3/MM3 (ref 4.5–12.5)

## 2017-05-04 PROCEDURE — 97530 THERAPEUTIC ACTIVITIES: CPT

## 2017-05-04 PROCEDURE — 85025 COMPLETE CBC W/AUTO DIFF WBC: CPT | Performed by: INTERNAL MEDICINE

## 2017-05-04 PROCEDURE — 63710000001 INSULIN DETEMIR PER 5 UNITS: Performed by: INTERNAL MEDICINE

## 2017-05-04 PROCEDURE — 94799 UNLISTED PULMONARY SVC/PX: CPT

## 2017-05-04 PROCEDURE — 82962 GLUCOSE BLOOD TEST: CPT

## 2017-05-04 PROCEDURE — 99024 POSTOP FOLLOW-UP VISIT: CPT | Performed by: PHYSICIAN ASSISTANT

## 2017-05-04 PROCEDURE — 63710000001 INSULIN ASPART PER 5 UNITS: Performed by: INTERNAL MEDICINE

## 2017-05-04 PROCEDURE — 99232 SBSQ HOSP IP/OBS MODERATE 35: CPT | Performed by: INTERNAL MEDICINE

## 2017-05-04 PROCEDURE — 63710000001 INSULIN ASPART PER 5 UNITS: Performed by: ORTHOPAEDIC SURGERY

## 2017-05-04 PROCEDURE — 80048 BASIC METABOLIC PNL TOTAL CA: CPT | Performed by: INTERNAL MEDICINE

## 2017-05-04 PROCEDURE — 97116 GAIT TRAINING THERAPY: CPT

## 2017-05-04 RX ORDER — OXYCODONE HYDROCHLORIDE AND ACETAMINOPHEN 5; 325 MG/1; MG/1
1 TABLET ORAL EVERY 4 HOURS PRN
Qty: 40 TABLET | Refills: 0 | Status: SHIPPED | OUTPATIENT
Start: 2017-05-04 | End: 2017-05-12

## 2017-05-04 RX ADMIN — APIXABAN 2.5 MG: 2.5 TABLET, FILM COATED ORAL at 20:50

## 2017-05-04 RX ADMIN — FERROUS SULFATE TAB 325 MG (65 MG ELEMENTAL FE) 325 MG: 325 (65 FE) TAB at 08:34

## 2017-05-04 RX ADMIN — APIXABAN 2.5 MG: 2.5 TABLET, FILM COATED ORAL at 08:34

## 2017-05-04 RX ADMIN — INSULIN ASPART 20 UNITS: 100 INJECTION, SOLUTION INTRAVENOUS; SUBCUTANEOUS at 17:29

## 2017-05-04 RX ADMIN — METOPROLOL TARTRATE 100 MG: 100 TABLET, FILM COATED ORAL at 20:50

## 2017-05-04 RX ADMIN — CLONIDINE HYDROCHLORIDE 0.2 MG: 0.2 TABLET ORAL at 17:29

## 2017-05-04 RX ADMIN — INSULIN ASPART 20 UNITS: 100 INJECTION, SOLUTION INTRAVENOUS; SUBCUTANEOUS at 11:31

## 2017-05-04 RX ADMIN — OXYCODONE HYDROCHLORIDE AND ACETAMINOPHEN 1 TABLET: 5; 325 TABLET ORAL at 07:12

## 2017-05-04 RX ADMIN — CLONIDINE HYDROCHLORIDE 0.2 MG: 0.2 TABLET ORAL at 20:51

## 2017-05-04 RX ADMIN — ATORVASTATIN CALCIUM 40 MG: 40 TABLET, FILM COATED ORAL at 20:50

## 2017-05-04 RX ADMIN — ASPIRIN 81 MG: 81 TABLET ORAL at 08:34

## 2017-05-04 RX ADMIN — CLONIDINE HYDROCHLORIDE 0.2 MG: 0.2 TABLET ORAL at 08:34

## 2017-05-04 RX ADMIN — METOPROLOL TARTRATE 100 MG: 100 TABLET, FILM COATED ORAL at 08:34

## 2017-05-04 RX ADMIN — INSULIN DETEMIR 50 UNITS: 100 INJECTION, SOLUTION SUBCUTANEOUS at 22:10

## 2017-05-04 RX ADMIN — ISOSORBIDE MONONITRATE 30 MG: 30 TABLET, EXTENDED RELEASE ORAL at 08:34

## 2017-05-04 RX ADMIN — INSULIN ASPART 15 UNITS: 100 INJECTION, SOLUTION INTRAVENOUS; SUBCUTANEOUS at 08:34

## 2017-05-04 RX ADMIN — SODIUM CHLORIDE, POTASSIUM CHLORIDE, SODIUM LACTATE AND CALCIUM CHLORIDE 75 ML/HR: 600; 310; 30; 20 INJECTION, SOLUTION INTRAVENOUS at 06:53

## 2017-05-05 VITALS
WEIGHT: 227 LBS | RESPIRATION RATE: 18 BRPM | SYSTOLIC BLOOD PRESSURE: 165 MMHG | TEMPERATURE: 98.2 F | OXYGEN SATURATION: 98 % | BODY MASS INDEX: 40.22 KG/M2 | HEART RATE: 79 BPM | HEIGHT: 63 IN | DIASTOLIC BLOOD PRESSURE: 63 MMHG

## 2017-05-05 LAB
ABO GROUP BLD: NORMAL
ALBUMIN SERPL-MCNC: 3.2 G/DL (ref 3.4–4.8)
ALBUMIN/GLOB SERPL: 1 G/DL (ref 1.5–2.5)
ALP SERPL-CCNC: 87 U/L (ref 35–104)
ALT SERPL W P-5'-P-CCNC: 5 U/L (ref 10–36)
ANION GAP SERPL CALCULATED.3IONS-SCNC: 7.5 MMOL/L (ref 3.6–11.2)
AST SERPL-CCNC: 21 U/L (ref 10–30)
BASOPHILS # BLD AUTO: 0.01 10*3/MM3 (ref 0–0.3)
BASOPHILS NFR BLD AUTO: 0.1 % (ref 0–2)
BILIRUB SERPL-MCNC: 0.3 MG/DL (ref 0.2–1.8)
BLD GP AB SCN SERPL QL: NEGATIVE
BUN BLD-MCNC: 32 MG/DL (ref 7–21)
BUN/CREAT SERPL: 24.8 (ref 7–25)
CALCIUM SPEC-SCNC: 8.8 MG/DL (ref 7.7–10)
CHLORIDE SERPL-SCNC: 104 MMOL/L (ref 99–112)
CO2 SERPL-SCNC: 26.5 MMOL/L (ref 24.3–31.9)
CREAT BLD-MCNC: 1.29 MG/DL (ref 0.43–1.29)
DEPRECATED RDW RBC AUTO: 41.8 FL (ref 37–54)
EOSINOPHIL # BLD AUTO: 0.11 10*3/MM3 (ref 0–0.7)
EOSINOPHIL NFR BLD AUTO: 0.8 % (ref 0–7)
ERYTHROCYTE [DISTWIDTH] IN BLOOD BY AUTOMATED COUNT: 13.6 % (ref 11.5–14.5)
GFR SERPL CREATININE-BSD FRML MDRD: 41 ML/MIN/1.73
GLOBULIN UR ELPH-MCNC: 3.2 GM/DL
GLUCOSE BLD-MCNC: 63 MG/DL (ref 70–110)
GLUCOSE BLDC GLUCOMTR-MCNC: 264 MG/DL (ref 70–130)
GLUCOSE BLDC GLUCOMTR-MCNC: 73 MG/DL (ref 70–130)
HCT VFR BLD AUTO: 22.2 % (ref 37–47)
HCT VFR BLD AUTO: 26.5 % (ref 37–47)
HGB BLD-MCNC: 6.9 G/DL (ref 12–16)
HGB BLD-MCNC: 8 G/DL (ref 12–16)
IMM GRANULOCYTES # BLD: 0.03 10*3/MM3 (ref 0–0.03)
IMM GRANULOCYTES NFR BLD: 0.2 % (ref 0–0.5)
LYMPHOCYTES # BLD AUTO: 2.3 10*3/MM3 (ref 1–3)
LYMPHOCYTES NFR BLD AUTO: 17.6 % (ref 16–46)
MCH RBC QN AUTO: 26.2 PG (ref 27–33)
MCHC RBC AUTO-ENTMCNC: 31.1 G/DL (ref 33–37)
MCV RBC AUTO: 84.4 FL (ref 80–94)
MONOCYTES # BLD AUTO: 1.16 10*3/MM3 (ref 0.1–0.9)
MONOCYTES NFR BLD AUTO: 8.9 % (ref 0–12)
NEUTROPHILS # BLD AUTO: 9.47 10*3/MM3 (ref 1.4–6.5)
NEUTROPHILS NFR BLD AUTO: 72.4 % (ref 40–75)
OSMOLALITY SERPL CALC.SUM OF ELEC: 280.6 MOSM/KG (ref 273–305)
PLATELET # BLD AUTO: 193 10*3/MM3 (ref 130–400)
PMV BLD AUTO: 9.6 FL (ref 6–10)
POTASSIUM BLD-SCNC: 4.2 MMOL/L (ref 3.5–5.3)
PROT SERPL-MCNC: 6.4 G/DL (ref 6–8)
RBC # BLD AUTO: 2.63 10*6/MM3 (ref 4.2–5.4)
RH BLD: POSITIVE
SODIUM BLD-SCNC: 138 MMOL/L (ref 135–153)
WBC NRBC COR # BLD: 13.08 10*3/MM3 (ref 4.5–12.5)

## 2017-05-05 PROCEDURE — 99024 POSTOP FOLLOW-UP VISIT: CPT | Performed by: PHYSICIAN ASSISTANT

## 2017-05-05 PROCEDURE — 63710000001 INSULIN ASPART PER 5 UNITS: Performed by: INTERNAL MEDICINE

## 2017-05-05 PROCEDURE — 82962 GLUCOSE BLOOD TEST: CPT

## 2017-05-05 PROCEDURE — 86900 BLOOD TYPING SEROLOGIC ABO: CPT | Performed by: PHYSICIAN ASSISTANT

## 2017-05-05 PROCEDURE — 86850 RBC ANTIBODY SCREEN: CPT | Performed by: PHYSICIAN ASSISTANT

## 2017-05-05 PROCEDURE — 85018 HEMOGLOBIN: CPT | Performed by: ORTHOPAEDIC SURGERY

## 2017-05-05 PROCEDURE — 97116 GAIT TRAINING THERAPY: CPT

## 2017-05-05 PROCEDURE — 99232 SBSQ HOSP IP/OBS MODERATE 35: CPT | Performed by: INTERNAL MEDICINE

## 2017-05-05 PROCEDURE — 85014 HEMATOCRIT: CPT | Performed by: ORTHOPAEDIC SURGERY

## 2017-05-05 PROCEDURE — 94799 UNLISTED PULMONARY SVC/PX: CPT

## 2017-05-05 PROCEDURE — 80053 COMPREHEN METABOLIC PANEL: CPT | Performed by: INTERNAL MEDICINE

## 2017-05-05 PROCEDURE — 86901 BLOOD TYPING SEROLOGIC RH(D): CPT | Performed by: PHYSICIAN ASSISTANT

## 2017-05-05 PROCEDURE — 97530 THERAPEUTIC ACTIVITIES: CPT

## 2017-05-05 PROCEDURE — 85025 COMPLETE CBC W/AUTO DIFF WBC: CPT | Performed by: INTERNAL MEDICINE

## 2017-05-05 RX ADMIN — INSULIN ASPART 15 UNITS: 100 INJECTION, SOLUTION INTRAVENOUS; SUBCUTANEOUS at 12:21

## 2017-05-05 RX ADMIN — ASPIRIN 81 MG: 81 TABLET ORAL at 09:55

## 2017-05-05 RX ADMIN — OXYCODONE HYDROCHLORIDE AND ACETAMINOPHEN 1 TABLET: 5; 325 TABLET ORAL at 08:39

## 2017-05-05 RX ADMIN — APIXABAN 2.5 MG: 2.5 TABLET, FILM COATED ORAL at 09:55

## 2017-05-05 RX ADMIN — FERROUS SULFATE TAB 325 MG (65 MG ELEMENTAL FE) 325 MG: 325 (65 FE) TAB at 08:33

## 2017-05-05 RX ADMIN — ISOSORBIDE MONONITRATE 30 MG: 30 TABLET, EXTENDED RELEASE ORAL at 09:55

## 2017-05-05 RX ADMIN — CLONIDINE HYDROCHLORIDE 0.2 MG: 0.2 TABLET ORAL at 09:55

## 2017-05-05 RX ADMIN — METOPROLOL TARTRATE 100 MG: 100 TABLET, FILM COATED ORAL at 09:55

## 2017-05-11 DIAGNOSIS — Z96.652 STATUS POST TOTAL LEFT KNEE REPLACEMENT: Primary | ICD-10-CM

## 2017-05-15 ENCOUNTER — HOSPITAL ENCOUNTER (OUTPATIENT)
Dept: GENERAL RADIOLOGY | Facility: HOSPITAL | Age: 72
Discharge: HOME | End: 2017-05-15
Attending: ORTHOPAEDIC SURGERY | Admitting: ORTHOPAEDIC SURGERY

## 2017-05-15 ENCOUNTER — OFFICE VISIT (OUTPATIENT)
Dept: ORTHOPEDIC SURGERY | Facility: CLINIC | Age: 72
End: 2017-05-15

## 2017-05-15 VITALS — HEIGHT: 63 IN | BODY MASS INDEX: 40.22 KG/M2 | WEIGHT: 227 LBS

## 2017-05-15 DIAGNOSIS — Z96.652 STATUS POST TOTAL LEFT KNEE REPLACEMENT: Primary | ICD-10-CM

## 2017-05-15 DIAGNOSIS — Z96.652 STATUS POST TOTAL LEFT KNEE REPLACEMENT: ICD-10-CM

## 2017-05-15 DIAGNOSIS — M17.12 PRIMARY OSTEOARTHRITIS OF LEFT KNEE: ICD-10-CM

## 2017-05-15 PROCEDURE — 73562 X-RAY EXAM OF KNEE 3: CPT | Performed by: RADIOLOGY

## 2017-05-15 PROCEDURE — 99024 POSTOP FOLLOW-UP VISIT: CPT | Performed by: ORTHOPAEDIC SURGERY

## 2017-05-15 PROCEDURE — 73562 X-RAY EXAM OF KNEE 3: CPT

## 2017-05-17 ENCOUNTER — LAB REQUISITION (OUTPATIENT)
Dept: LAB | Facility: HOSPITAL | Age: 72
End: 2017-05-17

## 2017-05-17 DIAGNOSIS — I10 ESSENTIAL (PRIMARY) HYPERTENSION: ICD-10-CM

## 2017-05-17 DIAGNOSIS — E03.9 HYPOTHYROIDISM: ICD-10-CM

## 2017-05-17 DIAGNOSIS — D64.9 ANEMIA: ICD-10-CM

## 2017-05-17 DIAGNOSIS — E78.5 HYPERLIPIDEMIA: ICD-10-CM

## 2017-05-17 LAB
25(OH)D3 SERPL-MCNC: 30 NG/ML
ALBUMIN SERPL-MCNC: 3.4 G/DL (ref 3.4–4.8)
ALBUMIN/GLOB SERPL: 0.9 G/DL (ref 1.5–2.5)
ALP SERPL-CCNC: 120 U/L (ref 35–104)
ALT SERPL W P-5'-P-CCNC: 13 U/L (ref 10–36)
ANION GAP SERPL CALCULATED.3IONS-SCNC: 2.7 MMOL/L (ref 3.6–11.2)
AST SERPL-CCNC: 25 U/L (ref 10–30)
BILIRUB SERPL-MCNC: 0.3 MG/DL (ref 0.2–1.8)
BUN BLD-MCNC: 35 MG/DL (ref 7–21)
BUN/CREAT SERPL: 24.5 (ref 7–25)
CALCIUM SPEC-SCNC: 8.9 MG/DL (ref 7.7–10)
CHLORIDE SERPL-SCNC: 106 MMOL/L (ref 99–112)
CHOLEST SERPL-MCNC: 100 MG/DL (ref 0–200)
CO2 SERPL-SCNC: 29.3 MMOL/L (ref 24.3–31.9)
CREAT BLD-MCNC: 1.43 MG/DL (ref 0.43–1.29)
DEPRECATED RDW RBC AUTO: 47 FL (ref 37–54)
ERYTHROCYTE [DISTWIDTH] IN BLOOD BY AUTOMATED COUNT: 15.1 % (ref 11.5–14.5)
GFR SERPL CREATININE-BSD FRML MDRD: 36 ML/MIN/1.73
GLOBULIN UR ELPH-MCNC: 3.6 GM/DL
GLUCOSE BLD-MCNC: 137 MG/DL (ref 70–110)
HBA1C MFR BLD: 7 % (ref 4.5–5.7)
HCT VFR BLD AUTO: 25.5 % (ref 37–47)
HDLC SERPL-MCNC: 32 MG/DL (ref 60–100)
HGB BLD-MCNC: 7.2 G/DL (ref 12–16)
IRON 24H UR-MRATE: 37 MCG/DL (ref 49–151)
IRON SATN MFR SERPL: 13 % (ref 15–50)
LDLC SERPL CALC-MCNC: 47 MG/DL (ref 0–100)
LDLC/HDLC SERPL: 1.46 {RATIO}
MCH RBC QN AUTO: 25.4 PG (ref 27–33)
MCHC RBC AUTO-ENTMCNC: 28.2 G/DL (ref 33–37)
MCV RBC AUTO: 89.8 FL (ref 80–94)
OSMOLALITY SERPL CALC.SUM OF ELEC: 285.8 MOSM/KG (ref 273–305)
PLATELET # BLD AUTO: 411 10*3/MM3 (ref 130–400)
PMV BLD AUTO: 9 FL (ref 6–10)
POTASSIUM BLD-SCNC: 5.1 MMOL/L (ref 3.5–5.3)
PROT SERPL-MCNC: 7 G/DL (ref 6–8)
RBC # BLD AUTO: 2.84 10*6/MM3 (ref 4.2–5.4)
SODIUM BLD-SCNC: 138 MMOL/L (ref 135–153)
TIBC SERPL-MCNC: 286 MCG/DL (ref 241–421)
TRIGL SERPL-MCNC: 107 MG/DL (ref 0–150)
TSH SERPL DL<=0.05 MIU/L-ACNC: 3.37 MIU/ML (ref 0.55–4.78)
VIT B12 BLD-MCNC: 485 PG/ML (ref 211–911)
VLDLC SERPL-MCNC: 21.4 MG/DL
WBC NRBC COR # BLD: 10.98 10*3/MM3 (ref 4.5–12.5)

## 2017-05-17 PROCEDURE — 82607 VITAMIN B-12: CPT | Performed by: INTERNAL MEDICINE

## 2017-05-17 PROCEDURE — 85027 COMPLETE CBC AUTOMATED: CPT | Performed by: INTERNAL MEDICINE

## 2017-05-17 PROCEDURE — 82306 VITAMIN D 25 HYDROXY: CPT | Performed by: INTERNAL MEDICINE

## 2017-05-17 PROCEDURE — 83550 IRON BINDING TEST: CPT | Performed by: INTERNAL MEDICINE

## 2017-05-17 PROCEDURE — 80053 COMPREHEN METABOLIC PANEL: CPT | Performed by: INTERNAL MEDICINE

## 2017-05-17 PROCEDURE — 83036 HEMOGLOBIN GLYCOSYLATED A1C: CPT | Performed by: INTERNAL MEDICINE

## 2017-05-17 PROCEDURE — 80061 LIPID PANEL: CPT | Performed by: INTERNAL MEDICINE

## 2017-05-17 PROCEDURE — 83540 ASSAY OF IRON: CPT | Performed by: INTERNAL MEDICINE

## 2017-05-17 PROCEDURE — 84443 ASSAY THYROID STIM HORMONE: CPT | Performed by: INTERNAL MEDICINE

## 2017-05-18 ENCOUNTER — TRANSCRIBE ORDERS (OUTPATIENT)
Dept: INFUSION THERAPY | Facility: HOSPITAL | Age: 72
End: 2017-05-18

## 2017-05-18 DIAGNOSIS — D64.9 ANEMIA, UNSPECIFIED: Primary | ICD-10-CM

## 2017-05-19 ENCOUNTER — HOSPITAL ENCOUNTER (OUTPATIENT)
Dept: INFUSION THERAPY | Facility: HOSPITAL | Age: 72
Discharge: HOME | End: 2017-05-19
Attending: INTERNAL MEDICINE | Admitting: INTERNAL MEDICINE

## 2017-05-19 ENCOUNTER — APPOINTMENT (OUTPATIENT)
Dept: GENERAL RADIOLOGY | Facility: HOSPITAL | Age: 72
End: 2017-05-19

## 2017-05-19 ENCOUNTER — HOSPITAL ENCOUNTER (EMERGENCY)
Facility: HOSPITAL | Age: 72
Discharge: HOME | End: 2017-05-19
Attending: EMERGENCY MEDICINE | Admitting: EMERGENCY MEDICINE

## 2017-05-19 VITALS
DIASTOLIC BLOOD PRESSURE: 74 MMHG | HEIGHT: 63 IN | OXYGEN SATURATION: 100 % | SYSTOLIC BLOOD PRESSURE: 154 MMHG | TEMPERATURE: 97.6 F | BODY MASS INDEX: 42.52 KG/M2 | WEIGHT: 240 LBS | RESPIRATION RATE: 18 BRPM | HEART RATE: 78 BPM

## 2017-05-19 VITALS
WEIGHT: 242 LBS | TEMPERATURE: 98.2 F | BODY MASS INDEX: 42.88 KG/M2 | SYSTOLIC BLOOD PRESSURE: 170 MMHG | HEART RATE: 67 BPM | HEIGHT: 63 IN | DIASTOLIC BLOOD PRESSURE: 68 MMHG | RESPIRATION RATE: 18 BRPM

## 2017-05-19 DIAGNOSIS — D50.0 IRON DEFICIENCY ANEMIA DUE TO CHRONIC BLOOD LOSS: Primary | ICD-10-CM

## 2017-05-19 LAB
ABO GROUP BLD: NORMAL
BLD GP AB SCN SERPL QL: NEGATIVE
HCT VFR BLD AUTO: 26.1 % (ref 37–47)
HGB BLD-MCNC: 7.7 G/DL (ref 12–16)
RH BLD: POSITIVE

## 2017-05-19 PROCEDURE — 86900 BLOOD TYPING SEROLOGIC ABO: CPT | Performed by: INTERNAL MEDICINE

## 2017-05-19 PROCEDURE — 99283 EMERGENCY DEPT VISIT LOW MDM: CPT | Performed by: SURGERY

## 2017-05-19 PROCEDURE — 36430 TRANSFUSION BLD/BLD COMPNT: CPT

## 2017-05-19 PROCEDURE — 99283 EMERGENCY DEPT VISIT LOW MDM: CPT

## 2017-05-19 PROCEDURE — 76937 US GUIDE VASCULAR ACCESS: CPT | Performed by: SURGERY

## 2017-05-19 PROCEDURE — 71010 XR CHEST 1 VW: CPT | Performed by: RADIOLOGY

## 2017-05-19 PROCEDURE — 86900 BLOOD TYPING SEROLOGIC ABO: CPT

## 2017-05-19 PROCEDURE — 85018 HEMOGLOBIN: CPT | Performed by: INTERNAL MEDICINE

## 2017-05-19 PROCEDURE — 86850 RBC ANTIBODY SCREEN: CPT | Performed by: INTERNAL MEDICINE

## 2017-05-19 PROCEDURE — 86920 COMPATIBILITY TEST SPIN: CPT

## 2017-05-19 PROCEDURE — 86901 BLOOD TYPING SEROLOGIC RH(D): CPT | Performed by: INTERNAL MEDICINE

## 2017-05-19 PROCEDURE — 85014 HEMATOCRIT: CPT | Performed by: INTERNAL MEDICINE

## 2017-05-19 PROCEDURE — 71010 HC CHEST PA OR AP: CPT

## 2017-05-19 PROCEDURE — 36556 INSERT NON-TUNNEL CV CATH: CPT | Performed by: SURGERY

## 2017-05-19 PROCEDURE — P9016 RBC LEUKOCYTES REDUCED: HCPCS

## 2017-05-19 RX ORDER — SODIUM CHLORIDE 9 MG/ML
100 INJECTION, SOLUTION INTRAVENOUS AS NEEDED
Status: DISCONTINUED | OUTPATIENT
Start: 2017-05-19 | End: 2017-05-21 | Stop reason: HOSPADM

## 2017-05-19 RX ORDER — ACETAMINOPHEN,DIPHENHYDRAMINE HCL 500; 25 MG/1; MG/1
1 TABLET, FILM COATED ORAL NIGHTLY PRN
COMMUNITY
End: 2019-12-06

## 2017-05-19 RX ORDER — ESCITALOPRAM OXALATE 10 MG/1
10 TABLET ORAL
COMMUNITY
End: 2017-06-19 | Stop reason: SDUPTHER

## 2017-05-19 RX ORDER — LACTULOSE 10 G/15ML
30 SOLUTION ORAL DAILY PRN
COMMUNITY
End: 2017-06-19

## 2017-05-19 RX ORDER — LIDOCAINE HYDROCHLORIDE 10 MG/ML
INJECTION, SOLUTION INFILTRATION; PERINEURAL
Status: COMPLETED
Start: 2017-05-19 | End: 2017-05-19

## 2017-05-19 RX ORDER — ACETAMINOPHEN 500 MG
500 TABLET ORAL EVERY 4 HOURS PRN
COMMUNITY
End: 2018-09-17

## 2017-05-19 RX ORDER — OXYCODONE HYDROCHLORIDE AND ACETAMINOPHEN 5; 325 MG/1; MG/1
1 TABLET ORAL EVERY 4 HOURS PRN
COMMUNITY
End: 2017-06-19

## 2017-05-19 RX ADMIN — LIDOCAINE HYDROCHLORIDE 2 ML: 10 INJECTION, SOLUTION INFILTRATION; PERINEURAL at 13:05

## 2017-05-20 LAB
ABO + RH BLD: NORMAL
BH BB BLOOD EXPIRATION DATE: NORMAL
BH BB BLOOD TYPE BARCODE: 7300
BH BB DISPENSE STATUS: NORMAL
BH BB PRODUCT CODE: NORMAL
BH BB UNIT NUMBER: NORMAL
CROSSMATCH INTERPRETATION: NORMAL
UNIT  ABO: NORMAL
UNIT  RH: NORMAL

## 2017-05-22 ENCOUNTER — OFFICE VISIT (OUTPATIENT)
Dept: FAMILY MEDICINE CLINIC | Facility: CLINIC | Age: 72
End: 2017-05-22

## 2017-05-22 ENCOUNTER — TELEPHONE (OUTPATIENT)
Dept: FAMILY MEDICINE CLINIC | Facility: CLINIC | Age: 72
End: 2017-05-22

## 2017-05-22 VITALS
OXYGEN SATURATION: 96 % | DIASTOLIC BLOOD PRESSURE: 78 MMHG | SYSTOLIC BLOOD PRESSURE: 148 MMHG | TEMPERATURE: 98.2 F | HEART RATE: 67 BPM | HEIGHT: 63 IN | WEIGHT: 235 LBS | BODY MASS INDEX: 41.64 KG/M2

## 2017-05-22 DIAGNOSIS — M17.12 PRIMARY OSTEOARTHRITIS OF LEFT KNEE: ICD-10-CM

## 2017-05-22 DIAGNOSIS — Z96.652 STATUS POST TOTAL LEFT KNEE REPLACEMENT: Primary | ICD-10-CM

## 2017-05-22 DIAGNOSIS — D50.8 IRON DEFICIENCY ANEMIA DUE TO DIETARY CAUSES: ICD-10-CM

## 2017-05-22 DIAGNOSIS — I10 BENIGN ESSENTIAL HYPERTENSION: ICD-10-CM

## 2017-05-22 PROCEDURE — 99214 OFFICE O/P EST MOD 30 MIN: CPT | Performed by: PHYSICIAN ASSISTANT

## 2017-05-27 ENCOUNTER — RESULTS ENCOUNTER (OUTPATIENT)
Dept: FAMILY MEDICINE CLINIC | Facility: CLINIC | Age: 72
End: 2017-05-27

## 2017-05-27 DIAGNOSIS — D50.8 IRON DEFICIENCY ANEMIA DUE TO DIETARY CAUSES: ICD-10-CM

## 2017-06-19 ENCOUNTER — OFFICE VISIT (OUTPATIENT)
Dept: FAMILY MEDICINE CLINIC | Facility: CLINIC | Age: 72
End: 2017-06-19

## 2017-06-19 VITALS
OXYGEN SATURATION: 98 % | DIASTOLIC BLOOD PRESSURE: 70 MMHG | WEIGHT: 231 LBS | BODY MASS INDEX: 40.93 KG/M2 | HEART RATE: 61 BPM | HEIGHT: 63 IN | TEMPERATURE: 98.2 F | SYSTOLIC BLOOD PRESSURE: 132 MMHG

## 2017-06-19 DIAGNOSIS — E55.9 VITAMIN D DEFICIENCY: ICD-10-CM

## 2017-06-19 DIAGNOSIS — I10 BENIGN ESSENTIAL HYPERTENSION: Primary | ICD-10-CM

## 2017-06-19 DIAGNOSIS — F33.42 RECURRENT MAJOR DEPRESSIVE DISORDER, IN FULL REMISSION (HCC): ICD-10-CM

## 2017-06-19 DIAGNOSIS — I25.10 ATHEROSCLEROSIS OF NATIVE CORONARY ARTERY OF NATIVE HEART WITHOUT ANGINA PECTORIS: ICD-10-CM

## 2017-06-19 PROCEDURE — 99214 OFFICE O/P EST MOD 30 MIN: CPT | Performed by: PHYSICIAN ASSISTANT

## 2017-06-19 RX ORDER — ESCITALOPRAM OXALATE 10 MG/1
10 TABLET ORAL
Qty: 30 TABLET | Refills: 5 | Status: SHIPPED | OUTPATIENT
Start: 2017-06-19 | End: 2018-10-02 | Stop reason: SDUPTHER

## 2017-06-19 RX ORDER — ISOSORBIDE MONONITRATE 30 MG/1
30 TABLET, EXTENDED RELEASE ORAL DAILY
Qty: 30 TABLET | Refills: 5 | Status: SHIPPED | OUTPATIENT
Start: 2017-06-19 | End: 2019-06-07 | Stop reason: SDUPTHER

## 2017-06-19 RX ORDER — QUINAPRIL 40 MG/1
40 TABLET ORAL DAILY
Qty: 30 TABLET | Refills: 5 | Status: SHIPPED | OUTPATIENT
Start: 2017-06-19 | End: 2018-09-17 | Stop reason: SDUPTHER

## 2017-06-19 RX ORDER — ERGOCALCIFEROL 1.25 MG/1
50000 CAPSULE ORAL WEEKLY
Qty: 30 CAPSULE | Refills: 5 | Status: SHIPPED | OUTPATIENT
Start: 2017-06-19 | End: 2018-03-19 | Stop reason: SDUPTHER

## 2017-06-23 DIAGNOSIS — Z96.652 STATUS POST TOTAL LEFT KNEE REPLACEMENT: Primary | ICD-10-CM

## 2017-06-28 ENCOUNTER — OFFICE VISIT (OUTPATIENT)
Dept: ORTHOPEDIC SURGERY | Facility: CLINIC | Age: 72
End: 2017-06-28

## 2017-06-28 ENCOUNTER — HOSPITAL ENCOUNTER (OUTPATIENT)
Dept: GENERAL RADIOLOGY | Facility: HOSPITAL | Age: 72
Discharge: HOME | End: 2017-06-28
Attending: ORTHOPAEDIC SURGERY | Admitting: ORTHOPAEDIC SURGERY

## 2017-06-28 VITALS — WEIGHT: 231 LBS | BODY MASS INDEX: 40.93 KG/M2 | HEIGHT: 63 IN

## 2017-06-28 DIAGNOSIS — M17.12 PRIMARY OSTEOARTHRITIS OF LEFT KNEE: ICD-10-CM

## 2017-06-28 DIAGNOSIS — Z96.652 STATUS POST TOTAL LEFT KNEE REPLACEMENT: ICD-10-CM

## 2017-06-28 DIAGNOSIS — Z96.652 STATUS POST TOTAL LEFT KNEE REPLACEMENT: Primary | ICD-10-CM

## 2017-06-28 PROCEDURE — 73560 X-RAY EXAM OF KNEE 1 OR 2: CPT

## 2017-06-28 PROCEDURE — 99024 POSTOP FOLLOW-UP VISIT: CPT | Performed by: ORTHOPAEDIC SURGERY

## 2017-06-28 PROCEDURE — 73560 X-RAY EXAM OF KNEE 1 OR 2: CPT | Performed by: RADIOLOGY

## 2017-06-28 NOTE — PROGRESS NOTES
Britta Lee : 1945DATE: 2017    DIAGNOSIS: 2 month follow up left total knee      SUBJECTIVE:Patient returns today for 2 month follow up of left total knee replacement. Patient reports doing well with no unusual complaints. Appears to be progressing appropriately.    OBJECTIVE:   Exam:. The incision is well healed. No sign of infection. Range of motion is full extension and flexion to 95. The calf is soft and nontender with a negative Homans sign. Strength is progressing and the patient is ambulating appropriately.    DIAGNOSTIC STUDIES  Xrays: 2 views of the left knee (AP and lateral) were ordered and reviewed for evaluation of recent knee replacement. They demonstrate a well positioned, well aligned knee replacement without complicating factors noted. In comparison with previous films there has been no change.    ASSESSMENT: 2 month status post left knee replacement.    PLAN: 1) Continue with PT exercises as prescribed at home   2) Follow up prn    Written by, Guerita CONROY, acting as a scribe for Dr. Johnson    CC: Lexie CONROY

## 2017-07-03 ENCOUNTER — RESULTS ENCOUNTER (OUTPATIENT)
Dept: FAMILY MEDICINE CLINIC | Facility: CLINIC | Age: 72
End: 2017-07-03

## 2017-07-03 DIAGNOSIS — D50.8 IRON DEFICIENCY ANEMIA DUE TO DIETARY CAUSES: ICD-10-CM

## 2017-08-29 DIAGNOSIS — I10 BENIGN ESSENTIAL HYPERTENSION: ICD-10-CM

## 2017-08-29 RX ORDER — METOPROLOL TARTRATE 100 MG/1
100 TABLET ORAL 2 TIMES DAILY
Qty: 60 TABLET | Refills: 5
Start: 2017-08-29 | End: 2017-12-18 | Stop reason: SDUPTHER

## 2017-09-05 RX ORDER — INSULIN GLARGINE 100 [IU]/ML
80 INJECTION, SOLUTION SUBCUTANEOUS DAILY
Qty: 20 ML | Refills: 0 | COMMUNITY
Start: 2017-09-05 | End: 2017-09-18

## 2017-09-18 ENCOUNTER — OFFICE VISIT (OUTPATIENT)
Dept: FAMILY MEDICINE CLINIC | Facility: CLINIC | Age: 72
End: 2017-09-18

## 2017-09-18 VITALS
BODY MASS INDEX: 41.64 KG/M2 | WEIGHT: 235 LBS | HEART RATE: 59 BPM | SYSTOLIC BLOOD PRESSURE: 120 MMHG | HEIGHT: 63 IN | OXYGEN SATURATION: 97 % | TEMPERATURE: 97 F | DIASTOLIC BLOOD PRESSURE: 74 MMHG

## 2017-09-18 DIAGNOSIS — IMO0002 TYPE 2 DIABETES MELLITUS, UNCONTROLLED, WITH NEUROPATHY: ICD-10-CM

## 2017-09-18 DIAGNOSIS — E55.9 VITAMIN D DEFICIENCY: ICD-10-CM

## 2017-09-18 DIAGNOSIS — S43.421D SPRAIN OF RIGHT ROTATOR CUFF CAPSULE, SUBSEQUENT ENCOUNTER: Primary | ICD-10-CM

## 2017-09-18 DIAGNOSIS — D50.8 IRON DEFICIENCY ANEMIA SECONDARY TO INADEQUATE DIETARY IRON INTAKE: ICD-10-CM

## 2017-09-18 DIAGNOSIS — E78.2 MIXED HYPERLIPIDEMIA: ICD-10-CM

## 2017-09-18 DIAGNOSIS — Z23 NEED FOR INFLUENZA VACCINATION: ICD-10-CM

## 2017-09-18 DIAGNOSIS — I10 BENIGN ESSENTIAL HYPERTENSION: ICD-10-CM

## 2017-09-18 PROCEDURE — 90686 IIV4 VACC NO PRSV 0.5 ML IM: CPT | Performed by: PHYSICIAN ASSISTANT

## 2017-09-18 PROCEDURE — 99214 OFFICE O/P EST MOD 30 MIN: CPT | Performed by: PHYSICIAN ASSISTANT

## 2017-09-18 PROCEDURE — G0008 ADMIN INFLUENZA VIRUS VAC: HCPCS | Performed by: PHYSICIAN ASSISTANT

## 2017-09-18 RX ORDER — ATORVASTATIN CALCIUM 40 MG/1
40 TABLET, FILM COATED ORAL DAILY
Qty: 30 TABLET | Refills: 5 | Status: SHIPPED | OUTPATIENT
Start: 2017-09-18 | End: 2018-10-02 | Stop reason: SDUPTHER

## 2017-09-18 RX ORDER — FERROUS SULFATE 325(65) MG
325 TABLET ORAL 2 TIMES DAILY
Qty: 60 TABLET | Refills: 5 | Status: SHIPPED | OUTPATIENT
Start: 2017-09-18 | End: 2018-11-06 | Stop reason: SDUPTHER

## 2017-09-18 NOTE — PROGRESS NOTES
Subjective   Britta Lee is a 72 y.o. female.     History of Present Illness     Right shoulder pain -  She complains of right shoulder pain after a trip and fall 2 weeks ago.  She went to [facility] the day of accident.  Symptoms and contusion gradually improving.    Iron def anemia - stable with iron supplementation    Diabetes  Current symptoms include paresthesia of the feet.  Evaluation to date has been: fasting blood sugar. Home sugars: BGs are running  consistent with Hgb A1C. Current treatments: basal insulin - toujeo and mealtime insulin - humalog. Most recent hemoglobin A1c   Lab Results   Component Value Date    HGBA1C 7.00 (H) 05/17/2017    HGBA1C 7.60 (H) 05/03/2017    HGBA1C 7.10 (H) 12/01/2016    stable    Hypertension - stable    Vit d def - stable with Vit d supplementation     The following portions of the patient's history were reviewed and updated as appropriate: allergies, current medications, past family history, past medical history, past social history, past surgical history and problem list.    Review of Systems   Constitutional: Negative for activity change, appetite change and fever.   HENT: Negative for ear pain, sinus pressure and sore throat.    Eyes: Negative for pain and visual disturbance.   Respiratory: Negative for cough and chest tightness.    Cardiovascular: Negative for chest pain and palpitations.   Gastrointestinal: Negative for abdominal pain, constipation, diarrhea, nausea and vomiting.   Endocrine: Negative for polydipsia and polyuria.   Genitourinary: Negative for dysuria and frequency.   Musculoskeletal: Negative for back pain and myalgias.        Right shoulder pain   Skin: Negative for color change and rash.   Allergic/Immunologic: Negative for food allergies and immunocompromised state.   Neurological: Negative for dizziness, syncope and headaches.   Hematological: Negative for adenopathy. Does not bruise/bleed easily.   Psychiatric/Behavioral: Negative for  hallucinations and suicidal ideas. The patient is not nervous/anxious.        Objective   Physical Exam   Constitutional: She is oriented to person, place, and time. She appears well-developed and well-nourished.   HENT:   Head: Normocephalic and atraumatic.   Nose: Nose normal.   Mouth/Throat: Oropharynx is clear and moist.   Eyes: Conjunctivae and EOM are normal. Pupils are equal, round, and reactive to light.   Neck: Normal range of motion. Neck supple. No tracheal deviation present. No thyromegaly present.   Cardiovascular: Normal rate, regular rhythm, normal heart sounds and intact distal pulses.    No murmur heard.  Pulmonary/Chest: Effort normal and breath sounds normal. No respiratory distress. She has no wheezes.   Abdominal: Soft. Bowel sounds are normal. There is no tenderness. There is no guarding.   Musculoskeletal: Normal range of motion. She exhibits tenderness (right shoulder superiorly). She exhibits no edema.   Lymphadenopathy:     She has no cervical adenopathy.   Neurological: She is alert and oriented to person, place, and time.   Skin: Skin is warm and dry. No rash noted.   Psychiatric: She has a normal mood and affect. Her behavior is normal.   Nursing note and vitals reviewed.       Assessment/Plan   Diagnoses and all orders for this visit:    Sprain of right rotator cuff capsule, subsequent encounter  Comments:  Conservative measure advised with rest and tylenol prn.  Followup if symptoms worsen.    Mixed hyperlipidemia  -     atorvastatin (LIPITOR) 40 MG tablet; Take 1 tablet by mouth Daily.  -     Lipid Panel; Future    Iron deficiency anemia secondary to inadequate dietary iron intake  -     ferrous sulfate 325 (65 FE) MG tablet; Take 1 tablet by mouth 2 (Two) Times a Day.  -     Methylmalonic Acid, Serum; Future  -     Ferritin; Future  -     Iron and TIBC; Future    Type 2 diabetes mellitus, uncontrolled, with neuropathy  -     Ambulatory Referral to Ophthalmology  -     Hemoglobin A1c;  Future    Benign essential hypertension  -     CBC & Differential; Future  -     Comprehensive Metabolic Panel; Future    Vitamin D deficiency  -     Vitamin D 25 Hydroxy; Future    Need for influenza vaccination  -     Flu Vaccine Quad PF 3YR+ (FLURIX/FLUZONE [id number])

## 2017-09-23 ENCOUNTER — RESULTS ENCOUNTER (OUTPATIENT)
Dept: FAMILY MEDICINE CLINIC | Facility: CLINIC | Age: 72
End: 2017-09-23

## 2017-09-23 DIAGNOSIS — D50.8 IRON DEFICIENCY ANEMIA SECONDARY TO INADEQUATE DIETARY IRON INTAKE: ICD-10-CM

## 2017-09-27 DIAGNOSIS — IMO0002 TYPE 2 DIABETES MELLITUS, UNCONTROLLED, WITH NEUROPATHY: ICD-10-CM

## 2017-10-30 ENCOUNTER — RESULTS ENCOUNTER (OUTPATIENT)
Dept: FAMILY MEDICINE CLINIC | Facility: CLINIC | Age: 72
End: 2017-10-30

## 2017-10-30 DIAGNOSIS — E78.2 MIXED HYPERLIPIDEMIA: ICD-10-CM

## 2017-10-30 DIAGNOSIS — I10 BENIGN ESSENTIAL HYPERTENSION: ICD-10-CM

## 2017-11-21 DIAGNOSIS — IMO0002 TYPE 2 DIABETES MELLITUS, UNCONTROLLED, WITH NEUROPATHY: ICD-10-CM

## 2017-12-18 ENCOUNTER — OFFICE VISIT (OUTPATIENT)
Dept: FAMILY MEDICINE CLINIC | Facility: CLINIC | Age: 72
End: 2017-12-18

## 2017-12-18 VITALS
HEART RATE: 60 BPM | WEIGHT: 241 LBS | SYSTOLIC BLOOD PRESSURE: 130 MMHG | TEMPERATURE: 98.1 F | BODY MASS INDEX: 42.7 KG/M2 | HEIGHT: 63 IN | DIASTOLIC BLOOD PRESSURE: 70 MMHG | OXYGEN SATURATION: 95 %

## 2017-12-18 DIAGNOSIS — E11.22 TYPE 2 DIABETES MELLITUS WITH CHRONIC KIDNEY DISEASE, WITH LONG-TERM CURRENT USE OF INSULIN, UNSPECIFIED CKD STAGE (HCC): ICD-10-CM

## 2017-12-18 DIAGNOSIS — IMO0002 TYPE 2 DIABETES MELLITUS, UNCONTROLLED, WITH NEUROPATHY: Primary | ICD-10-CM

## 2017-12-18 DIAGNOSIS — Z79.4 TYPE 2 DIABETES MELLITUS WITH CHRONIC KIDNEY DISEASE, WITH LONG-TERM CURRENT USE OF INSULIN, UNSPECIFIED CKD STAGE (HCC): ICD-10-CM

## 2017-12-18 DIAGNOSIS — E78.2 MIXED HYPERLIPIDEMIA: ICD-10-CM

## 2017-12-18 DIAGNOSIS — I10 BENIGN ESSENTIAL HYPERTENSION: ICD-10-CM

## 2017-12-18 PROCEDURE — 99214 OFFICE O/P EST MOD 30 MIN: CPT | Performed by: PHYSICIAN ASSISTANT

## 2017-12-18 RX ORDER — METOPROLOL TARTRATE 100 MG/1
100 TABLET ORAL 2 TIMES DAILY
Qty: 60 TABLET | Refills: 0
Start: 2017-12-18 | End: 2018-09-17 | Stop reason: SDUPTHER

## 2017-12-18 NOTE — PROGRESS NOTES
Subjective   Britta Lee is a 72 y.o. female.     History of Present Illness     Diabetes  She is here today to follow-up on chronic issues including diabetes.  Current symptoms include paresthesia of the feet.  Evaluation to date has been: fasting blood sugar. Home sugars: BGs are running  consistent with Hgb A1C. Current treatments: basal insulin - toujeo and more intensive attention to diet which has been effective.  Most recent hemoglobin A1c   Lab Results   Component Value Date    HGBA1C 7.00 (H) 05/17/2017    HGBA1C 7.60 (H) 05/03/2017    HGBA1C 7.10 (H) 12/01/2016    She has several comorbidities including chronic kidney disease and peripheral neuropathy.  She is being followed by Dr. Paul baez, nephrologist for chronic kidney disease.  Stable    Hypertension-stable    Dyslipidemia  Compliance with treatment has been good. The patient exercises rarely. She is currently being prescribed the following medication for her dyslipidemia - atorvastatin. Patient denies side effects associated with her medications. Most recent lipids include  Lab Results   Component Value Date    TRIG 107 05/17/2017    TRIG 108 12/01/2016    HDL 32 (L) 05/17/2017    HDL 39 (L) 12/01/2016    LDLCALC 47 05/17/2017    LDLCALC 110 (H) 12/01/2016     (H) 10/20/2015   Stable    The following portions of the patient's history were reviewed and updated as appropriate: allergies, current medications, past family history, past medical history, past social history, past surgical history and problem list.    Review of Systems   Constitutional: Negative for activity change, appetite change and fever.   HENT: Negative for ear pain, sinus pressure and sore throat.    Eyes: Negative for pain and visual disturbance.   Respiratory: Negative for cough and chest tightness.    Cardiovascular: Negative for chest pain and palpitations.   Gastrointestinal: Negative for abdominal pain, constipation, diarrhea, nausea and vomiting.   Endocrine:  Negative for polydipsia and polyuria.   Genitourinary: Negative for dysuria and frequency.   Musculoskeletal: Negative for back pain and myalgias.   Skin: Negative for color change and rash.   Allergic/Immunologic: Negative for food allergies and immunocompromised state.   Neurological: Negative for dizziness, syncope and headaches.   Hematological: Negative for adenopathy. Does not bruise/bleed easily.   Psychiatric/Behavioral: Negative for hallucinations and suicidal ideas. The patient is not nervous/anxious.        Objective   Physical Exam   Constitutional: She is oriented to person, place, and time. She appears well-developed and well-nourished.   HENT:   Head: Normocephalic and atraumatic.   Nose: Nose normal.   Mouth/Throat: Oropharynx is clear and moist.   Eyes: Conjunctivae and EOM are normal. Pupils are equal, round, and reactive to light.   Neck: Normal range of motion. Neck supple. No tracheal deviation present. No thyromegaly present.   Cardiovascular: Normal rate, regular rhythm, normal heart sounds and intact distal pulses.    No murmur heard.  Pulmonary/Chest: Effort normal and breath sounds normal. No respiratory distress. She has no wheezes.   Abdominal: Soft. Bowel sounds are normal. There is no tenderness. There is no guarding.   Musculoskeletal: Normal range of motion. She exhibits no edema or tenderness.   Lymphadenopathy:     She has no cervical adenopathy.   Neurological: She is alert and oriented to person, place, and time.   Skin: Skin is warm and dry. No rash noted.   Psychiatric: She has a normal mood and affect. Her behavior is normal.   Nursing note and vitals reviewed.      Assessment/Plan   Diagnoses and all orders for this visit:    Type 2 diabetes mellitus, uncontrolled, with neuropathy    Type 2 diabetes mellitus with chronic kidney disease, with long-term current use of insulin, unspecified CKD stage    Benign essential hypertension  -     metoprolol tartrate (LOPRESSOR) 100 MG  tablet; Take 1 tablet by mouth 2 (Two) Times a Day.    Mixed hyperlipidemia    [facility] request lab work from [facility]  Prescription written for Zostavax to be given at her local pharmacy  I will see her back in 3 months time for Medicare wellness exam

## 2018-01-30 ENCOUNTER — OFFICE VISIT (OUTPATIENT)
Dept: FAMILY MEDICINE CLINIC | Facility: CLINIC | Age: 73
End: 2018-01-30

## 2018-01-30 VITALS
OXYGEN SATURATION: 92 % | HEART RATE: 62 BPM | BODY MASS INDEX: 44.47 KG/M2 | SYSTOLIC BLOOD PRESSURE: 173 MMHG | DIASTOLIC BLOOD PRESSURE: 85 MMHG | WEIGHT: 251 LBS | TEMPERATURE: 97.4 F | HEIGHT: 63 IN

## 2018-01-30 DIAGNOSIS — R06.02 SHORT OF BREATH ON EXERTION: ICD-10-CM

## 2018-01-30 DIAGNOSIS — R05.9 COUGH: Primary | ICD-10-CM

## 2018-01-30 LAB
EXPIRATION DATE: NORMAL
FLUAV AG NPH QL: NORMAL
FLUBV AG NPH QL: NORMAL
INTERNAL CONTROL: NORMAL
Lab: NORMAL

## 2018-01-30 PROCEDURE — 87804 INFLUENZA ASSAY W/OPTIC: CPT | Performed by: NURSE PRACTITIONER

## 2018-01-30 PROCEDURE — 96372 THER/PROPH/DIAG INJ SC/IM: CPT | Performed by: NURSE PRACTITIONER

## 2018-01-30 PROCEDURE — 99213 OFFICE O/P EST LOW 20 MIN: CPT | Performed by: NURSE PRACTITIONER

## 2018-01-30 RX ORDER — CEFTRIAXONE 1 G/1
1 INJECTION, POWDER, FOR SOLUTION INTRAMUSCULAR; INTRAVENOUS ONCE
Status: COMPLETED | OUTPATIENT
Start: 2018-01-30 | End: 2018-01-30

## 2018-01-30 RX ORDER — LEVOFLOXACIN 500 MG/1
500 TABLET, FILM COATED ORAL DAILY
Qty: 10 TABLET | Refills: 0 | Status: SHIPPED | OUTPATIENT
Start: 2018-01-30 | End: 2018-03-19

## 2018-01-30 RX ORDER — METHYLPREDNISOLONE ACETATE 80 MG/ML
80 INJECTION, SUSPENSION INTRA-ARTICULAR; INTRALESIONAL; INTRAMUSCULAR; SOFT TISSUE ONCE
Status: COMPLETED | OUTPATIENT
Start: 2018-01-30 | End: 2018-01-30

## 2018-01-30 RX ORDER — AMLODIPINE BESYLATE 10 MG/1
TABLET ORAL
Refills: 1 | COMMUNITY
Start: 2017-11-27 | End: 2018-09-17 | Stop reason: SDUPTHER

## 2018-01-30 RX ADMIN — CEFTRIAXONE 1 G: 1 INJECTION, POWDER, FOR SOLUTION INTRAMUSCULAR; INTRAVENOUS at 17:01

## 2018-01-30 RX ADMIN — METHYLPREDNISOLONE ACETATE 80 MG: 80 INJECTION, SUSPENSION INTRA-ARTICULAR; INTRALESIONAL; INTRAMUSCULAR; SOFT TISSUE at 17:02

## 2018-02-02 ENCOUNTER — TELEPHONE (OUTPATIENT)
Dept: FAMILY MEDICINE CLINIC | Facility: CLINIC | Age: 73
End: 2018-02-02

## 2018-02-02 NOTE — TELEPHONE ENCOUNTER
Called patient to check and see how she was feeling, patient said she felt better one day and then felt bad again the other. Patient said that she slept for the first time in a couple days last night. Notified patient that [facility] was going to sent her a antibiotic due to chest x-ray showing possible pneumonia. Also advised that if wasn't feeling better to come in for a rocephin shot

## 2018-03-05 ENCOUNTER — TRANSCRIBE ORDERS (OUTPATIENT)
Dept: ADMINISTRATIVE | Facility: HOSPITAL | Age: 73
End: 2018-03-05

## 2018-03-05 DIAGNOSIS — Z12.39 SCREENING BREAST EXAMINATION: Primary | ICD-10-CM

## 2018-03-19 ENCOUNTER — OFFICE VISIT (OUTPATIENT)
Dept: FAMILY MEDICINE CLINIC | Facility: CLINIC | Age: 73
End: 2018-03-19

## 2018-03-19 DIAGNOSIS — Z96.652 STATUS POST TOTAL LEFT KNEE REPLACEMENT: ICD-10-CM

## 2018-03-19 DIAGNOSIS — E78.2 MIXED HYPERLIPIDEMIA: ICD-10-CM

## 2018-03-19 DIAGNOSIS — G89.29 CHRONIC PAIN OF BOTH LOWER EXTREMITIES: ICD-10-CM

## 2018-03-19 DIAGNOSIS — E55.9 VITAMIN D DEFICIENCY: ICD-10-CM

## 2018-03-19 DIAGNOSIS — IMO0002 TYPE 2 DIABETES MELLITUS, UNCONTROLLED, WITH NEUROPATHY: ICD-10-CM

## 2018-03-19 DIAGNOSIS — R79.89 ABNORMAL THYROID STIMULATING HORMONE (TSH) LEVEL: ICD-10-CM

## 2018-03-19 DIAGNOSIS — I10 BENIGN ESSENTIAL HYPERTENSION: ICD-10-CM

## 2018-03-19 DIAGNOSIS — N18.30 STAGE 3 CHRONIC KIDNEY DISEASE (HCC): ICD-10-CM

## 2018-03-19 DIAGNOSIS — E11.319 DIABETIC RETINOPATHY OF LEFT EYE ASSOCIATED WITH TYPE 2 DIABETES MELLITUS, MACULAR EDEMA PRESENCE UNSPECIFIED, UNSPECIFIED RETINOPATHY SEVERITY (HCC): ICD-10-CM

## 2018-03-19 DIAGNOSIS — H91.92 HEARING LOSS OF LEFT EAR, UNSPECIFIED HEARING LOSS TYPE: ICD-10-CM

## 2018-03-19 DIAGNOSIS — D50.8 IRON DEFICIENCY ANEMIA DUE TO DIETARY CAUSES: ICD-10-CM

## 2018-03-19 DIAGNOSIS — M79.604 CHRONIC PAIN OF BOTH LOWER EXTREMITIES: ICD-10-CM

## 2018-03-19 DIAGNOSIS — Z00.00 MEDICARE ANNUAL WELLNESS VISIT, SUBSEQUENT: Primary | ICD-10-CM

## 2018-03-19 DIAGNOSIS — I25.10 ATHEROSCLEROSIS OF NATIVE CORONARY ARTERY OF NATIVE HEART WITHOUT ANGINA PECTORIS: ICD-10-CM

## 2018-03-19 DIAGNOSIS — M19.90 LOCALIZED OSTEOARTHROSIS: ICD-10-CM

## 2018-03-19 DIAGNOSIS — G57.02 NEUROPATHY OF LEFT SCIATIC NERVE: ICD-10-CM

## 2018-03-19 DIAGNOSIS — E11.22 TYPE 2 DIABETES MELLITUS WITH CHRONIC KIDNEY DISEASE, WITH LONG-TERM CURRENT USE OF INSULIN, UNSPECIFIED CKD STAGE (HCC): ICD-10-CM

## 2018-03-19 DIAGNOSIS — M17.12 PRIMARY OSTEOARTHRITIS OF LEFT KNEE: ICD-10-CM

## 2018-03-19 DIAGNOSIS — R79.9 ABNORMAL BLOOD CHEMISTRY LEVEL: ICD-10-CM

## 2018-03-19 DIAGNOSIS — G56.03 BILATERAL CARPAL TUNNEL SYNDROME: ICD-10-CM

## 2018-03-19 DIAGNOSIS — M79.605 CHRONIC PAIN OF BOTH LOWER EXTREMITIES: ICD-10-CM

## 2018-03-19 DIAGNOSIS — Z79.4 TYPE 2 DIABETES MELLITUS WITH CHRONIC KIDNEY DISEASE, WITH LONG-TERM CURRENT USE OF INSULIN, UNSPECIFIED CKD STAGE (HCC): ICD-10-CM

## 2018-03-19 DIAGNOSIS — M51.36 DDD (DEGENERATIVE DISC DISEASE), LUMBAR: ICD-10-CM

## 2018-03-19 PROCEDURE — G0439 PPPS, SUBSEQ VISIT: HCPCS | Performed by: PHYSICIAN ASSISTANT

## 2018-03-19 PROCEDURE — 96160 PT-FOCUSED HLTH RISK ASSMT: CPT | Performed by: PHYSICIAN ASSISTANT

## 2018-03-19 RX ORDER — ERGOCALCIFEROL 1.25 MG/1
50000 CAPSULE ORAL WEEKLY
Qty: 30 CAPSULE | Refills: 5 | Status: SHIPPED | OUTPATIENT
Start: 2018-03-19 | End: 2018-09-17 | Stop reason: SDUPTHER

## 2018-03-19 RX ORDER — CHLORTHALIDONE 25 MG/1
25 TABLET ORAL DAILY
Refills: 2 | COMMUNITY
Start: 2018-02-06 | End: 2018-09-17

## 2018-03-19 NOTE — PROGRESS NOTES
QUICK REFERENCE INFORMATION:  The ABCs of the Annual Wellness Visit    Subsequent Medicare Wellness Visit    HEALTH RISK ASSESSMENT    1945    Recent Hospitalizations:  Recently treated at the following:  Other: [facility].        Current Medical Providers:  Patient Care Team:  RAFIQ Montanez as PCP - General (Family Medicine)        Smoking Status:  History   Smoking Status   • Never Smoker   Smokeless Tobacco   • Never Used       Alcohol Consumption:  History   Alcohol Use No       Depression Screen:   PHQ-2/PHQ-9 Depression Screening 3/19/2018   Little interest or pleasure in doing things 0   Feeling down, depressed, or hopeless 1   Trouble falling or staying asleep, or sleeping too much 1   Feeling tired or having little energy 2   Poor appetite or overeating 0   Feeling bad about yourself - or that you are a failure or have let yourself or your family down 0   Trouble concentrating on things, such as reading the newspaper or watching television 0   Moving or speaking so slowly that other people could have noticed. Or the opposite - being so fidgety or restless that you have been moving around a lot more than usual 0   Thoughts that you would be better off dead, or of hurting yourself in some way 0   Total Score 4       Health Habits and Functional and Cognitive Screening:  Functional & Cognitive Status 3/19/2018   Do you have difficulty preparing food and eating? No   Do you have difficulty bathing yourself, getting dressed or grooming yourself? No   Do you have difficulty using the toilet? No   Do you have difficulty moving around from place to place? No   Do you have trouble with steps or getting out of a bed or a chair? No   In the past year have you fallen or experienced a near fall? Yes   Current Diet Well Balanced Diet   Dental Exam Up to date   Eye Exam Up to date   Exercise (times per week) 0 times per week   Current Exercise Activities Include Housecleaning   Do you need help using the  phone?  No   Are you deaf or do you have serious difficulty hearing?  No   Do you need help with transportation? No   Do you need help shopping? No   Do you need help preparing meals?  No   Do you need help with housework?  Yes   Do you need help with laundry? No   Do you need help taking your medications? No   Do you need help managing money? No   Have you felt unusual stress, anger or loneliness in the last month? No   Who do you live with? Spouse   If you need help, do you have trouble finding someone available to you? No   Have you been bothered in the last four weeks by sexual problems? No   Do you have difficulty concentrating, remembering or making decisions? No     Fall Risk Assessment  Fallen in past 6 months: 0--> No  Mental Status: 0--> no mental status change  Mobility: 0--> No mobility issues  Medications: 5--> Diuretics, 1--> Insulin/ Oral hypoglycemic  Total Fall Risk Score: 8    Does the patient have evidence of cognitive impairment? No    Aspirin use counseling: Taking ASA appropriately as indicated      Recent Lab Results:  CMP:  Lab Results   Component Value Date    BUN 35 (H) 05/17/2017    CREATININE 1.43 (H) 05/17/2017    EGFRIFNONA 36 (L) 05/17/2017    EGFRIFAFRI  09/01/2016      Comment:      <15 Indicative of kidney failure.    BCR 24.5 05/17/2017     05/17/2017    K 5.1 05/17/2017    CO2 29.3 05/17/2017    CALCIUM 8.9 05/17/2017    ALBUMIN 3.40 05/17/2017    LABIL2 0.9 (L) 05/17/2017    BILITOT 0.3 05/17/2017    ALKPHOS 120 (H) 05/17/2017    AST 25 05/17/2017    ALT 13 05/17/2017     Lipid Panel:  Lab Results   Component Value Date    CHOL 100 05/17/2017    TRIG 107 05/17/2017    HDL 32 (L) 05/17/2017    VLDL 21.4 05/17/2017    LDLHDL 1.46 05/17/2017     HbA1c:  Lab Results   Component Value Date    HGBA1C 7.00 (H) 05/17/2017       Visual Acuity:   Visual Acuity Screening    Right eye Left eye Both eyes   Without correction: 20/30 20/70 20/30   With correction:      She is followed by  dr mensah for diabetic retinopathy in left eye.    Hearing acuity:  Whisper test  > 5 ft right ear  < 5 ft left ear    Age-appropriate Screening Schedule:  Refer to the list below for future screening recommendations based on patient's age, sex and/or medical conditions. Orders for these recommended tests are listed in the plan section. The patient has been provided with a written plan.    Health Maintenance   Topic Date Due   • PNEUMOCOCCAL VACCINES (65+ LOW/MEDIUM RISK) (2 of 2 - PCV13) 03/20/2018   • DIABETIC FOOT EXAM  03/20/2018   • URINE MICROALBUMIN  03/20/2018   • MAMMOGRAM  03/28/2018   • LIPID PANEL  05/17/2018   • HEMOGLOBIN A1C  06/05/2018   • DIABETIC EYE EXAM  09/18/2018   • DXA SCAN  03/20/2019   • COLONOSCOPY  02/20/2026   • TDAP/TD VACCINES (2 - Td) 03/20/2027   • INFLUENZA VACCINE  Completed   • ZOSTER VACCINE  Completed        Subjective   History of Present Illness    Britta Lee is a 72 y.o. female who presents for an Subsequent Wellness Visit.    Diabetes  Current symptoms include paresthesia of the feet, polyuria and hyperglycemia  .  Evaluation to date has been: fasting blood sugar. Home sugars: BGs are running  consistent with Hgb A1C. Current treatments: basal insulin - Toujeo, mealtime insulin - Humalog and more intensive attention to diet which has been somewhat effective. Last dilated eye exam 2017. Most recent hemoglobin A1c   Lab Results   Component Value Date    HGBA1C 7.00 (H) 05/17/2017    HGBA1C 7.60 (H) 05/03/2017    HGBA1C 7.10 (H) 12/01/2016    She is scheduled for diabetic eye exam with Dr. Mensah later this month. She has associated diabetic retinopathy,    Bilateral carpal tunnel syndrome-stable.  Patient declines referral or intervention at this time.    Neuropathy of left sciatic nerve-chronic and stable    Degenerative disc disease and lumbar spine-stable chronic kidney disease, and peripheral neuropathy.  Stable    Hypertension-well controlled with amlodipine,  chlorthalidone, clonidine, metoprolol, and quinapril.    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 Johnson, nephrologist.    Vitamin D deficiency-stable with vitamin D supplementation    Abnormal TSH level-currently watching this issue    Chronic pain of bilateral lower extremities-she is being followed by podiatry and scheduled for ultrasound later on today.  She does have a history of peripheral neuropathy.  Not at goal     Colon cancer screening-patient reports that she sent stool guaiac testing to [facility] in February 2018 that was reported to be negative.      The following portions of the patient's history were reviewed and updated as appropriate: allergies, current medications, past family history, past medical history, past social history, past surgical history and problem list.    Outpatient Medications Prior to Visit   Medication Sig Dispense Refill   • acetaminophen (TYLENOL) 500 MG tablet Take 500 mg by mouth Every 4 (Four) Hours As Needed for Mild Pain (1-3).     • amLODIPine (NORVASC) 10 MG tablet TAKE ONE Tablet BY MOUTH EACH NIGHT AT BEDTIME  1   • aspirin 81 MG tablet Take 1 tablet by mouth Daily. 30 tablet 5   • atorvastatin (LIPITOR) 40 MG tablet Take 1 tablet by mouth Daily. 30 tablet 5   • cloNIDine (CATAPRES) 0.2 MG tablet Take 1 tablet by mouth 3 (three) times a day. 90 tablet 2   • diphenhydrAMINE-acetaminophen (TYLENOL PM)  MG tablet per tablet Take 1 tablet by mouth At Night As Needed for Sleep.     • escitalopram (LEXAPRO) 10 MG tablet Take 1 tablet by mouth every night at bedtime. 30 tablet 5   • ferrous sulfate 325 (65 FE)  MG tablet Take 1 tablet by mouth 2 (Two) Times a Day. 60 tablet 5   • Insulin Glargine (TOUJEO SOLOSTAR) 300 UNIT/ML solution pen-injector Inject 40 Units under the skin Daily. 10 pen 0   • insulin lispro (humaLOG) 100 UNIT/ML injection Inject  under the skin 3 (Three) Times a Day Before Meals. ACCU CHECK AC AND HS WITH LOW DOSE SLIDING SCALE OF HUMALOG:  LESS THAN 60=CALL MD.  151-200 =0 UNITS.   201 - 250 =3 UNITS.  251 - 300 = 4 UNITS.  301 - 350 = 6 UNITS.  351 - 400 = 9 UNITS.  GREATER THAN 400 G     • isosorbide mononitrate (IMDUR) 30 MG 24 hr tablet Take 1 tablet by mouth Daily. 30 tablet 5   • metoprolol tartrate (LOPRESSOR) 100 MG tablet Take 1 tablet by mouth 2 (Two) Times a Day. 60 tablet 0   • quinapril (ACCUPRIL) 40 MG tablet Take 1 tablet by mouth Daily. 30 tablet 5   • amLODIPine (NORVASC) 2.5 MG tablet Take 2.5 mg by mouth Every Night.     • vitamin D (ERGOCALCIFEROL) 66625 UNITS capsule capsule Take 1 capsule by mouth 1 (One) Time Per Week. Takes wednesdays 30 capsule 5   • levoFLOXacin (LEVAQUIN) 500 MG tablet Take 1 tablet by mouth Daily. 10 tablet 0     No facility-administered medications prior to visit.        Patient Active Problem List   Diagnosis   • Type 2 diabetes mellitus, uncontrolled, with neuropathy   • Chronic kidney disease   • Benign essential hypertension   • Coronary atherosclerosis of native coronary vessel   • Bilateral carpal tunnel syndrome   • Abnormal blood chemistry level   • Iron deficiency anemia due to dietary causes   • Diabetic retinopathy associated with type 2 diabetes mellitus   • Hyperlipidemia   • Sciatic neuropathy   • DDD (degenerative disc disease), lumbar   • Primary osteoarthritis of left knee   • Status post total left knee replacement   • Type 2 diabetes mellitus with chronic kidney disease, with long-term current use of insulin       Advance Care Planning:  has NO advance directive - information provided to the patient today    Identification of Risk  Factors:  Risk factors include: weight , cardiovascular risk, inactivity, chronic pain, financial stress, vision limitations, incontinence and polypharmacy.    Review of Systems   Constitutional: Negative for activity change, appetite change and fever.   HENT: Negative for ear pain, sinus pressure and sore throat.    Eyes: Negative for pain and visual disturbance.   Respiratory: Negative for cough and chest tightness.    Cardiovascular: Negative for chest pain and palpitations.   Gastrointestinal: Negative for abdominal pain, constipation, diarrhea, nausea and vomiting.   Endocrine: Negative for polydipsia and polyuria.   Genitourinary: Negative for dysuria and frequency.   Musculoskeletal: Negative for back pain and myalgias.        Chronic leg pain   Skin: Negative for color change and rash.   Allergic/Immunologic: Negative for food allergies and immunocompromised state.   Neurological: Negative for dizziness, syncope and headaches.   Hematological: Negative for adenopathy. Does not bruise/bleed easily.   Psychiatric/Behavioral: Negative for hallucinations and suicidal ideas. The patient is not nervous/anxious.        Compared to one year ago, the patient feels her physical health is the same.  Compared to one year ago, the patient feels her mental health is better.    Objective     Physical Exam   Constitutional: She is oriented to person, place, and time. She appears well-developed and well-nourished.   HENT:   Head: Normocephalic and atraumatic.   Nose: Nose normal.   Mouth/Throat: Oropharynx is clear and moist.   Eyes: Conjunctivae and EOM are normal. Pupils are equal, round, and reactive to light.   Neck: Normal range of motion. Neck supple. No tracheal deviation present. No thyromegaly present.   Cardiovascular: Normal rate, regular rhythm, normal heart sounds and intact distal pulses.    No murmur heard.  Pulmonary/Chest: Effort normal and breath sounds normal. No respiratory distress. She has no wheezes.  "  Abdominal: Soft. Bowel sounds are normal. There is no tenderness. There is no guarding.   Musculoskeletal: Normal range of motion. She exhibits no edema or tenderness.   Lymphadenopathy:     She has no cervical adenopathy.   Neurological: She is alert and oriented to person, place, and time.   Skin: Skin is warm and dry. No rash noted.   Psychiatric: She has a normal mood and affect. Her behavior is normal.   Nursing note and vitals reviewed.      Vitals:    03/19/18 0921   BP: 130/80   Pulse: 55   Temp: 97.4 °F (36.3 °C)   TempSrc: Oral   SpO2: 99%   Weight: 106 kg (233 lb)   Height: 160 cm (62.99\")   PainSc: 6  Comment: chronic and stable   PainLoc: Knee       Body mass index is 41.28 kg/m².  Discussed the patient's BMI with her. BMI is above normal parameters. Follow-up plan includes:  educational material, exercise counseling and nutrition counseling.    Assessment/Plan   Patient Self-Management and Personalized Health Advice  The patient has been provided with information about: diet, exercise, prevention of cardiac or vascular disease and designing advance directives and preventive services including:   · Advance directive, Exercise counseling provided, Fall Risk assessment done, Fall Risk plan of care done, Nutrition counseling provided.    Visit Diagnoses:    ICD-10-CM ICD-9-CM   1. Medicare annual wellness visit, subsequent Z00.00 V70.0   2. Type 2 diabetes mellitus, uncontrolled, with neuropathy E11.40 250.62    E11.65 357.2   3. Diabetic retinopathy of left eye associated with type 2 diabetes mellitus, macular edema presence unspecified, unspecified retinopathy severity E11.319 250.50     362.01   4. Benign essential hypertension I10 401.1   5. Atherosclerosis of native coronary artery of native heart without angina pectoris I25.10 414.01   6. Mixed hyperlipidemia E78.2 272.2   7. Type 2 diabetes mellitus with chronic kidney disease, with long-term current use of insulin, unspecified CKD stage E11.22 " 250.40    Z79.4 585.9     V58.67   8. Bilateral carpal tunnel syndrome G56.03 354.0   9. Neuropathy of left sciatic nerve G57.02 355.0   10. DDD (degenerative disc disease), lumbar M51.36 722.52   11. Localized osteoarthrosis M19.90 715.30   12. Primary osteoarthritis of left knee M17.12 715.16   13. Iron deficiency anemia due to dietary causes D50.8 280.1   14. Stage 3 chronic kidney disease N18.3 585.3   15. Abnormal blood chemistry level R79.9 790.6   16. Status post total left knee replacement Z96.652 V43.65   17. Vitamin D deficiency E55.9 268.9   18. Abnormal thyroid stimulating hormone (TSH) level R94.6 790.6   19. Chronic pain of both lower extremities M79.604 729.5    M79.605 338.29    G89.29    20. Hearing loss of left ear, unspecified hearing loss type H91.92 389.9     She declines referral for further evaluation and treatment of hearing loss at this time.      Orders Placed This Encounter   Procedures   • Comprehensive Metabolic Panel     Standing Status:   Future     Standing Expiration Date:   3/20/2019   • Hemoglobin A1c     Standing Status:   Future     Standing Expiration Date:   3/19/2019   • Vitamin D 25 Hydroxy     Standing Status:   Future     Standing Expiration Date:   3/19/2019   • Lipid Panel     Standing Status:   Future     Standing Expiration Date:   3/19/2019   • MicroAlbumin, Urine, Random - Urine, Clean Catch     Standing Status:   Future     Standing Expiration Date:   3/19/2019   • TSH     Standing Status:   Future     Standing Expiration Date:   3/19/2019   • T4, Free     Standing Status:   Future     Standing Expiration Date:   3/19/2019   • CBC & Differential     Standing Status:   Future     Standing Expiration Date:   3/19/2019     Order Specific Question:   Manual Differential     Answer:   No       Outpatient Encounter Prescriptions as of 3/19/2018   Medication Sig Dispense Refill   • acetaminophen (TYLENOL) 500 MG tablet Take 500 mg by mouth Every 4 (Four) Hours As Needed for  Mild Pain (1-3).     • amLODIPine (NORVASC) 10 MG tablet TAKE ONE Tablet BY MOUTH EACH NIGHT AT BEDTIME  1   • aspirin 81 MG tablet Take 1 tablet by mouth Daily. 30 tablet 5   • atorvastatin (LIPITOR) 40 MG tablet Take 1 tablet by mouth Daily. 30 tablet 5   • chlorthalidone (HYGROTON) 25 MG tablet Take 25 mg by mouth Daily.  2   • cloNIDine (CATAPRES) 0.2 MG tablet Take 1 tablet by mouth 3 (three) times a day. 90 tablet 2   • diphenhydrAMINE-acetaminophen (TYLENOL PM)  MG tablet per tablet Take 1 tablet by mouth At Night As Needed for Sleep.     • escitalopram (LEXAPRO) 10 MG tablet Take 1 tablet by mouth every night at bedtime. 30 tablet 5   • ferrous sulfate 325 (65 FE) MG tablet Take 1 tablet by mouth 2 (Two) Times a Day. 60 tablet 5   • Insulin Glargine (TOUJEO SOLOSTAR) 300 UNIT/ML solution pen-injector Inject 40 Units under the skin Daily. 10 pen 0   • insulin lispro (humaLOG) 100 UNIT/ML injection Inject  under the skin 3 (Three) Times a Day Before Meals. ACCU CHECK AC AND HS WITH LOW DOSE SLIDING SCALE OF HUMALOG:  LESS THAN 60=CALL MD.  151-200 =0 UNITS.   201 - 250 =3 UNITS.  251 - 300 = 4 UNITS.  301 - 350 = 6 UNITS.  351 - 400 = 9 UNITS.  GREATER THAN 400 G     • isosorbide mononitrate (IMDUR) 30 MG 24 hr tablet Take 1 tablet by mouth Daily. 30 tablet 5   • metoprolol tartrate (LOPRESSOR) 100 MG tablet Take 1 tablet by mouth 2 (Two) Times a Day. 60 tablet 0   • quinapril (ACCUPRIL) 40 MG tablet Take 1 tablet by mouth Daily. 30 tablet 5   • vitamin D (ERGOCALCIFEROL) 12179 units capsule capsule Take 1 capsule by mouth 1 (One) Time Per Week. Takes wednesdays 30 capsule 5   • [DISCONTINUED] amLODIPine (NORVASC) 2.5 MG tablet Take 2.5 mg by mouth Every Night.     • [DISCONTINUED] vitamin D (ERGOCALCIFEROL) 61529 UNITS capsule capsule Take 1 capsule by mouth 1 (One) Time Per Week. Takes wednesdays 30 capsule 5   • [DISCONTINUED] levoFLOXacin (LEVAQUIN) 500 MG tablet Take 1 tablet by mouth Daily. 10  tablet 0     No facility-administered encounter medications on file as of 3/19/2018.        Reviewed use of high risk medication in the elderly: yes  Reviewed for potential of harmful drug interactions in the elderly: yes    Follow Up:  Return in about 3 months (around 6/19/2018) for Followup with SUE Owen.     An After Visit Summary and PPPS with all of these plans were given to the patient.

## 2018-03-21 VITALS
HEART RATE: 55 BPM | DIASTOLIC BLOOD PRESSURE: 80 MMHG | SYSTOLIC BLOOD PRESSURE: 130 MMHG | HEIGHT: 63 IN | OXYGEN SATURATION: 99 % | BODY MASS INDEX: 41.29 KG/M2 | WEIGHT: 233 LBS | TEMPERATURE: 97.4 F

## 2018-03-26 ENCOUNTER — LAB (OUTPATIENT)
Dept: FAMILY MEDICINE CLINIC | Facility: CLINIC | Age: 73
End: 2018-03-26

## 2018-03-26 DIAGNOSIS — D50.8 IRON DEFICIENCY ANEMIA DUE TO DIETARY CAUSES: ICD-10-CM

## 2018-03-26 DIAGNOSIS — E55.9 VITAMIN D DEFICIENCY: ICD-10-CM

## 2018-03-26 DIAGNOSIS — D50.8 IRON DEFICIENCY ANEMIA SECONDARY TO INADEQUATE DIETARY IRON INTAKE: ICD-10-CM

## 2018-03-26 DIAGNOSIS — E78.2 MIXED HYPERLIPIDEMIA: ICD-10-CM

## 2018-03-26 DIAGNOSIS — R79.89 ABNORMAL THYROID STIMULATING HORMONE (TSH) LEVEL: ICD-10-CM

## 2018-03-26 DIAGNOSIS — N18.30 STAGE 3 CHRONIC KIDNEY DISEASE (HCC): ICD-10-CM

## 2018-03-26 DIAGNOSIS — IMO0002 TYPE 2 DIABETES MELLITUS, UNCONTROLLED, WITH NEUROPATHY: ICD-10-CM

## 2018-03-28 LAB
25(OH)D3+25(OH)D2 SERPL-MCNC: 58 NG/ML
ALBUMIN SERPL-MCNC: 3.8 G/DL (ref 3.4–4.8)
ALBUMIN/GLOB SERPL: 1.1 G/DL (ref 1.5–2.5)
ALP SERPL-CCNC: 174 U/L (ref 35–104)
ALT SERPL-CCNC: 46 U/L (ref 10–36)
APPEARANCE UR: CLEAR
AST SERPL-CCNC: 49 U/L (ref 10–30)
BACTERIA #/AREA URNS HPF: ABNORMAL /HPF
BACTERIA UR CULT: NORMAL
BACTERIA UR CULT: NORMAL
BILIRUB SERPL-MCNC: 0.2 MG/DL (ref 0.2–1.8)
BILIRUB UR QL STRIP: NEGATIVE
BUN SERPL-MCNC: 50 MG/DL (ref 7–21)
BUN/CREAT SERPL: 27.5 (ref 7–25)
CALCIUM SERPL-MCNC: 9.1 MG/DL (ref 7.7–10)
CASTS URNS MICRO: ABNORMAL
CASTS URNS QL MICRO: PRESENT /LPF
CHLORIDE SERPL-SCNC: 107 MMOL/L (ref 99–112)
CO2 SERPL-SCNC: 27 MMOL/L (ref 24.3–31.9)
COLOR UR: YELLOW
CREAT SERPL-MCNC: 1.82 MG/DL (ref 0.43–1.29)
CREAT UR-MCNC: 151.9 MG/DL
EPI CELLS #/AREA URNS HPF: ABNORMAL /HPF
FERRITIN SERPL-MCNC: 303 NG/ML (ref 10–290.3)
GFR SERPLBLD CREATININE-BSD FMLA CKD-EPI: 27 ML/MIN/1.73
GFR SERPLBLD CREATININE-BSD FMLA CKD-EPI: 33 ML/MIN/1.73
GLOBULIN SER CALC-MCNC: 3.5 GM/DL
GLUCOSE SERPL-MCNC: 190 MG/DL (ref 70–110)
GLUCOSE UR QL: NEGATIVE
HBA1C MFR BLD: 8.1 % (ref 4.5–5.7)
HGB UR QL STRIP: NEGATIVE
KETONES UR QL STRIP: NEGATIVE
LEUKOCYTE ESTERASE UR QL STRIP: NEGATIVE
METHYLMALONATE SERPL-SCNC: 369 NMOL/L (ref 0–378)
MICRO URNS: ABNORMAL
MICROALBUMIN UR-MCNC: 248.5 UG/ML
MUCOUS THREADS URNS QL MICRO: PRESENT /HPF
NITRITE UR QL STRIP: NEGATIVE
PH UR STRIP: 5 [PH] (ref 5–7.5)
POTASSIUM SERPL-SCNC: 4.3 MMOL/L (ref 3.5–5.3)
PROT SERPL-MCNC: 7.3 G/DL (ref 6–8)
PROT UR QL STRIP: ABNORMAL
PROT UR-MCNC: 45.2 MG/DL
PROT/CREAT UR: 297.6 MG/G CREA (ref 0–200)
RBC #/AREA URNS HPF: ABNORMAL /HPF
SODIUM SERPL-SCNC: 141 MMOL/L (ref 135–153)
SP GR UR: 1.02 (ref 1–1.03)
T4 FREE SERPL-MCNC: 1.03 NG/DL (ref 0.89–1.76)
TSH SERPL DL<=0.005 MIU/L-ACNC: 2.82 MIU/ML (ref 0.55–4.78)
URINALYSIS REFLEX: ABNORMAL
UROBILINOGEN UR STRIP-MCNC: 0.2 MG/DL (ref 0.2–1)
WBC #/AREA URNS HPF: ABNORMAL /HPF

## 2018-03-29 DIAGNOSIS — IMO0002 TYPE 2 DIABETES MELLITUS, UNCONTROLLED, WITH NEUROPATHY: ICD-10-CM

## 2018-03-29 LAB
ALDOST SERPL-MCNC: 7.1 NG/DL (ref 0–30)
ALDOST/RENIN PLAS-RTO: NORMAL {RATIO}
BASOPHILS # BLD AUTO: 0.03 10*3/MM3 (ref 0–0.3)
BASOPHILS NFR BLD AUTO: 0.3 % (ref 0–2)
CHOLEST SERPL-MCNC: 100 MG/DL (ref 0–200)
EOSINOPHIL # BLD AUTO: 0.83 10*3/MM3 (ref 0–0.7)
EOSINOPHIL NFR BLD AUTO: 8.2 % (ref 0–7)
ERYTHROCYTE [DISTWIDTH] IN BLOOD BY AUTOMATED COUNT: 14.4 % (ref 11.5–14.5)
HCT VFR BLD AUTO: 36.9 % (ref 37–47)
HDLC SERPL-MCNC: 37 MG/DL (ref 60–100)
HGB BLD-MCNC: 11.8 G/DL (ref 12–16)
IMM GRANULOCYTES # BLD: 0.01 10*3/MM3 (ref 0–0.03)
IMM GRANULOCYTES NFR BLD: 0.1 % (ref 0–0.5)
LDLC SERPL CALC-MCNC: 49 MG/DL (ref 0–100)
LYMPHOCYTES # BLD AUTO: 1.99 10*3/MM3 (ref 1–3)
LYMPHOCYTES NFR BLD AUTO: 19.7 % (ref 16–46)
MCH RBC QN AUTO: 26.2 PG (ref 27–33)
MCHC RBC AUTO-ENTMCNC: 32 G/DL (ref 33–37)
MCV RBC AUTO: 81.8 FL (ref 80–94)
METANEPH FREE SERPL-MCNC: 40 PG/ML (ref 0–62)
MONOCYTES # BLD AUTO: 0.63 10*3/MM3 (ref 0.1–0.9)
MONOCYTES NFR BLD AUTO: 6.3 % (ref 0–12)
NEUTROPHILS # BLD AUTO: 6.59 10*3/MM3 (ref 1.4–6.5)
NEUTROPHILS NFR BLD AUTO: 65.4 % (ref 40–75)
NORMETANEPHRINE SERPL-MCNC: 49 PG/ML (ref 0–145)
NRBC BLD AUTO-RTO: 0 /100 WBC (ref 0–0)
PHOSPHATE SERPL-MCNC: 3.3 MG/DL (ref 2.7–4.5)
PLATELET # BLD AUTO: 237 10*3/MM3 (ref 130–400)
PTH-INTACT SERPL-MCNC: 61 PG/ML (ref 15–65)
RBC # BLD AUTO: 4.51 10*6/MM3 (ref 4.2–5.4)
RENIN PLAS-CCNC: NORMAL NG/ML/HR
REQUEST PROBLEM: NORMAL
TRIGL SERPL-MCNC: 70 MG/DL (ref 0–150)
VLDLC SERPL CALC-MCNC: 14 MG/DL
WBC # BLD AUTO: 10.08 10*3/MM3 (ref 4.5–12.5)

## 2018-04-02 ENCOUNTER — APPOINTMENT (OUTPATIENT)
Dept: MAMMOGRAPHY | Facility: HOSPITAL | Age: 73
End: 2018-04-02

## 2018-04-12 ENCOUNTER — HOSPITAL ENCOUNTER (OUTPATIENT)
Dept: MAMMOGRAPHY | Facility: HOSPITAL | Age: 73
Discharge: HOME | End: 2018-04-12
Admitting: PHYSICIAN ASSISTANT

## 2018-04-12 DIAGNOSIS — Z12.39 SCREENING BREAST EXAMINATION: ICD-10-CM

## 2018-04-12 PROCEDURE — 77063 BREAST TOMOSYNTHESIS BI: CPT

## 2018-04-12 PROCEDURE — 77067 SCR MAMMO BI INCL CAD: CPT

## 2018-04-12 PROCEDURE — 77067 SCR MAMMO BI INCL CAD: CPT | Performed by: RADIOLOGY

## 2018-04-12 PROCEDURE — 77063 BREAST TOMOSYNTHESIS BI: CPT | Performed by: RADIOLOGY

## 2018-04-16 ENCOUNTER — APPOINTMENT (OUTPATIENT)
Dept: GENERAL RADIOLOGY | Facility: HOSPITAL | Age: 73
End: 2018-04-16

## 2018-04-16 ENCOUNTER — HOSPITAL ENCOUNTER (EMERGENCY)
Facility: HOSPITAL | Age: 73
Discharge: HOME | End: 2018-04-16
Admitting: NURSE PRACTITIONER

## 2018-04-16 VITALS
BODY MASS INDEX: 41.29 KG/M2 | TEMPERATURE: 97.5 F | HEART RATE: 60 BPM | OXYGEN SATURATION: 100 % | DIASTOLIC BLOOD PRESSURE: 87 MMHG | HEIGHT: 63 IN | WEIGHT: 233 LBS | SYSTOLIC BLOOD PRESSURE: 143 MMHG | RESPIRATION RATE: 18 BRPM

## 2018-04-16 DIAGNOSIS — S46.912A STRAIN OF LEFT SHOULDER, INITIAL ENCOUNTER: ICD-10-CM

## 2018-04-16 DIAGNOSIS — S20.212A CONTUSION OF LEFT CHEST WALL, INITIAL ENCOUNTER: Primary | ICD-10-CM

## 2018-04-16 LAB
ALBUMIN SERPL-MCNC: 3.9 G/DL (ref 3.4–4.8)
ALBUMIN/GLOB SERPL: 0.9 G/DL (ref 1.5–2.5)
ALP SERPL-CCNC: 129 U/L (ref 35–104)
ALT SERPL W P-5'-P-CCNC: 30 U/L (ref 10–36)
ANION GAP SERPL CALCULATED.3IONS-SCNC: 11.1 MMOL/L (ref 3.6–11.2)
AST SERPL-CCNC: 26 U/L (ref 10–30)
BASOPHILS # BLD AUTO: 0.04 10*3/MM3 (ref 0–0.3)
BASOPHILS NFR BLD AUTO: 0.4 % (ref 0–2)
BILIRUB SERPL-MCNC: 0.3 MG/DL (ref 0.2–1.8)
BUN BLD-MCNC: 54 MG/DL (ref 7–21)
BUN/CREAT SERPL: 26.3 (ref 7–25)
CALCIUM SPEC-SCNC: 9.7 MG/DL (ref 7.7–10)
CHLORIDE SERPL-SCNC: 101 MMOL/L (ref 99–112)
CK MB SERPL-CCNC: 2.92 NG/ML (ref 0–5)
CO2 SERPL-SCNC: 24.9 MMOL/L (ref 24.3–31.9)
CREAT BLD-MCNC: 2.05 MG/DL (ref 0.43–1.29)
DEPRECATED RDW RBC AUTO: 43.8 FL (ref 37–54)
EOSINOPHIL # BLD AUTO: 0.5 10*3/MM3 (ref 0–0.7)
EOSINOPHIL NFR BLD AUTO: 4.7 % (ref 0–7)
ERYTHROCYTE [DISTWIDTH] IN BLOOD BY AUTOMATED COUNT: 14.7 % (ref 11.5–14.5)
GFR SERPL CREATININE-BSD FRML MDRD: 24 ML/MIN/1.73
GLOBULIN UR ELPH-MCNC: 4.3 GM/DL
GLUCOSE BLD-MCNC: 319 MG/DL (ref 70–110)
HCT VFR BLD AUTO: 37.7 % (ref 37–47)
HGB BLD-MCNC: 12 G/DL (ref 12–16)
HOLD SPECIMEN: NORMAL
HOLD SPECIMEN: NORMAL
IMM GRANULOCYTES # BLD: 0.01 10*3/MM3 (ref 0–0.03)
IMM GRANULOCYTES NFR BLD: 0.1 % (ref 0–0.5)
LYMPHOCYTES # BLD AUTO: 2.34 10*3/MM3 (ref 1–3)
LYMPHOCYTES NFR BLD AUTO: 22 % (ref 16–46)
MCH RBC QN AUTO: 26.1 PG (ref 27–33)
MCHC RBC AUTO-ENTMCNC: 31.8 G/DL (ref 33–37)
MCV RBC AUTO: 82 FL (ref 80–94)
MONOCYTES # BLD AUTO: 0.56 10*3/MM3 (ref 0.1–0.9)
MONOCYTES NFR BLD AUTO: 5.3 % (ref 0–12)
NEUTROPHILS # BLD AUTO: 7.18 10*3/MM3 (ref 1.4–6.5)
NEUTROPHILS NFR BLD AUTO: 67.5 % (ref 40–75)
OSMOLALITY SERPL CALC.SUM OF ELEC: 300.8 MOSM/KG (ref 273–305)
PLATELET # BLD AUTO: 239 10*3/MM3 (ref 130–400)
PMV BLD AUTO: 9.8 FL (ref 6–10)
POTASSIUM BLD-SCNC: 4.5 MMOL/L (ref 3.5–5.3)
PROT SERPL-MCNC: 8.2 G/DL (ref 6–8)
RBC # BLD AUTO: 4.6 10*6/MM3 (ref 4.2–5.4)
SODIUM BLD-SCNC: 137 MMOL/L (ref 135–153)
TROPONIN I SERPL-MCNC: 0.01 NG/ML
TROPONIN I SERPL-MCNC: 0.02 NG/ML
WBC NRBC COR # BLD: 10.63 10*3/MM3 (ref 4.5–12.5)
WHOLE BLOOD HOLD SPECIMEN: NORMAL
WHOLE BLOOD HOLD SPECIMEN: NORMAL

## 2018-04-16 PROCEDURE — 84484 ASSAY OF TROPONIN QUANT: CPT | Performed by: EMERGENCY MEDICINE

## 2018-04-16 PROCEDURE — 73030 X-RAY EXAM OF SHOULDER: CPT

## 2018-04-16 PROCEDURE — 93010 ELECTROCARDIOGRAM REPORT: CPT | Performed by: INTERNAL MEDICINE

## 2018-04-16 PROCEDURE — 85025 COMPLETE CBC W/AUTO DIFF WBC: CPT | Performed by: EMERGENCY MEDICINE

## 2018-04-16 PROCEDURE — 71046 X-RAY EXAM CHEST 2 VIEWS: CPT | Performed by: RADIOLOGY

## 2018-04-16 PROCEDURE — 99283 EMERGENCY DEPT VISIT LOW MDM: CPT

## 2018-04-16 PROCEDURE — 73030 X-RAY EXAM OF SHOULDER: CPT | Performed by: RADIOLOGY

## 2018-04-16 PROCEDURE — 93005 ELECTROCARDIOGRAM TRACING: CPT | Performed by: EMERGENCY MEDICINE

## 2018-04-16 PROCEDURE — 82553 CREATINE MB FRACTION: CPT | Performed by: EMERGENCY MEDICINE

## 2018-04-16 PROCEDURE — 36415 COLL VENOUS BLD VENIPUNCTURE: CPT

## 2018-04-16 PROCEDURE — 80053 COMPREHEN METABOLIC PANEL: CPT | Performed by: EMERGENCY MEDICINE

## 2018-04-16 PROCEDURE — 71046 X-RAY EXAM CHEST 2 VIEWS: CPT

## 2018-04-16 RX ORDER — HYDROCODONE BITARTRATE AND ACETAMINOPHEN 7.5; 325 MG/1; MG/1
1 TABLET ORAL EVERY 6 HOURS PRN
Qty: 12 TABLET | Refills: 0 | Status: SHIPPED | OUTPATIENT
Start: 2018-04-16 | End: 2018-06-18

## 2018-04-16 RX ORDER — ASPIRIN 81 MG/1
TABLET, CHEWABLE ORAL
Status: DISCONTINUED
Start: 2018-04-16 | End: 2018-04-17 | Stop reason: HOSPADM

## 2018-04-16 RX ORDER — ASPIRIN 325 MG
325 TABLET ORAL ONCE
Status: DISCONTINUED | OUTPATIENT
Start: 2018-04-16 | End: 2018-04-16

## 2018-04-16 RX ORDER — ASPIRIN 81 MG/1
243 TABLET, CHEWABLE ORAL ONCE
Status: DISCONTINUED | OUTPATIENT
Start: 2018-04-16 | End: 2018-04-16

## 2018-04-16 RX ORDER — ASPIRIN 81 MG/1
243 TABLET, CHEWABLE ORAL ONCE
Status: COMPLETED | OUTPATIENT
Start: 2018-04-16 | End: 2018-04-16

## 2018-04-16 RX ORDER — ASPIRIN 81 MG/1
324 TABLET, CHEWABLE ORAL ONCE
Status: DISCONTINUED | OUTPATIENT
Start: 2018-04-16 | End: 2018-04-16

## 2018-04-16 RX ADMIN — ASPIRIN 243 MG: 81 TABLET, CHEWABLE ORAL at 17:15

## 2018-06-18 ENCOUNTER — OFFICE VISIT (OUTPATIENT)
Dept: FAMILY MEDICINE CLINIC | Facility: CLINIC | Age: 73
End: 2018-06-18

## 2018-06-18 VITALS
OXYGEN SATURATION: 98 % | WEIGHT: 238 LBS | HEART RATE: 55 BPM | DIASTOLIC BLOOD PRESSURE: 60 MMHG | HEIGHT: 63 IN | TEMPERATURE: 98.5 F | BODY MASS INDEX: 42.17 KG/M2 | SYSTOLIC BLOOD PRESSURE: 120 MMHG

## 2018-06-18 DIAGNOSIS — E78.2 MIXED HYPERLIPIDEMIA: ICD-10-CM

## 2018-06-18 DIAGNOSIS — D50.8 IRON DEFICIENCY ANEMIA SECONDARY TO INADEQUATE DIETARY IRON INTAKE: ICD-10-CM

## 2018-06-18 DIAGNOSIS — IMO0002 TYPE 2 DIABETES MELLITUS, UNCONTROLLED, WITH NEUROPATHY: Primary | ICD-10-CM

## 2018-06-18 DIAGNOSIS — E11.22 TYPE 2 DIABETES MELLITUS WITH CHRONIC KIDNEY DISEASE, WITH LONG-TERM CURRENT USE OF INSULIN, UNSPECIFIED CKD STAGE (HCC): ICD-10-CM

## 2018-06-18 DIAGNOSIS — Z79.4 TYPE 2 DIABETES MELLITUS WITH CHRONIC KIDNEY DISEASE, WITH LONG-TERM CURRENT USE OF INSULIN, UNSPECIFIED CKD STAGE (HCC): ICD-10-CM

## 2018-06-18 DIAGNOSIS — E11.319 DIABETIC RETINOPATHY OF LEFT EYE ASSOCIATED WITH TYPE 2 DIABETES MELLITUS, MACULAR EDEMA PRESENCE UNSPECIFIED, UNSPECIFIED RETINOPATHY SEVERITY (HCC): ICD-10-CM

## 2018-06-18 DIAGNOSIS — I10 BENIGN ESSENTIAL HYPERTENSION: ICD-10-CM

## 2018-06-18 PROCEDURE — 90732 PPSV23 VACC 2 YRS+ SUBQ/IM: CPT | Performed by: PHYSICIAN ASSISTANT

## 2018-06-18 PROCEDURE — 99214 OFFICE O/P EST MOD 30 MIN: CPT | Performed by: PHYSICIAN ASSISTANT

## 2018-06-18 PROCEDURE — G0009 ADMIN PNEUMOCOCCAL VACCINE: HCPCS | Performed by: PHYSICIAN ASSISTANT

## 2018-06-18 NOTE — PROGRESS NOTES
Subjective   Britta Lee is a 72 y.o. female.     Chief complaint-diabetes    History of Present Illness     Diabetes  Current symptoms include paresthesia of the feet.  Evaluation to date has been: fasting blood sugar. Home sugars: BGs are running  consistent with Hgb A1C. Current treatments: mealtime insulin - novolog, mixed insulin - toujeo and more intensive attention to diet which has been somewhat effective. Last dilated eye exam 2017. Most recent hemoglobin A1c   Lab Results   Component Value Date    HGBA1C 8.10 (H) 03/26/2018    HGBA1C 7.00 (H) 05/17/2017    HGBA1C 7.60 (H) 05/03/2017    HGBA1C 7.10 (H) 12/01/2016    She states her insulin is no longer covered by insurance and needs to be changed.  Stable with insulin    Diabetic retinopathy of the left eye-followed by ophthalmologist whom she last saw in September 2017.  Stable    Chronic kidney disease secondary to diabetes mellitus-stable and followed by nephrologist,     Iron deficiency anemia-stable with iron supplementation    Hypertension-well controlled with Norvasc, clonidine, metoprolol, and Accupril    Dyslipidemia  Compliance with treatment has been good. The patient exercises intermittently. She is currently being prescribed the following medication for her dyslipidemia - atorvastatin. Patient denies side effects associated with her medications. Most recent lipids include  Lab Results   Component Value Date    TRIG 70 03/26/2018    TRIG 107 05/17/2017    TRIG 108 12/01/2016    HDL 37 (L) 03/26/2018    HDL 32 (L) 05/17/2017    HDL 39 (L) 12/01/2016    LDL 49 03/26/2018    LDL 47 05/17/2017     (H) 12/01/2016         The following portions of the patient's history were reviewed and updated as appropriate: allergies, current medications, past family history, past medical history, past social history, past surgical history and problem list.    Review of Systems   Constitutional: Negative for activity change, appetite change and  "fever.   HENT: Negative for ear pain, sinus pressure and sore throat.    Eyes: Negative for pain and visual disturbance.   Respiratory: Negative for cough and chest tightness.    Cardiovascular: Negative for chest pain and palpitations.   Gastrointestinal: Negative for abdominal pain, constipation, diarrhea, nausea and vomiting.   Endocrine: Negative for polydipsia and polyuria.   Genitourinary: Negative for dysuria and frequency.   Musculoskeletal: Negative for back pain and myalgias.   Skin: Negative for color change and rash.   Allergic/Immunologic: Negative for food allergies and immunocompromised state.   Neurological: Negative for dizziness, syncope and headaches.   Hematological: Negative for adenopathy. Does not bruise/bleed easily.   Psychiatric/Behavioral: Negative for hallucinations and suicidal ideas. The patient is not nervous/anxious.        /60   Pulse 55   Temp 98.5 °F (36.9 °C) (Oral)   Ht 160 cm (62.99\")   Wt 108 kg (238 lb)   SpO2 98%   BMI 42.17 kg/m²   Admission on 04/16/2018, Discharged on 04/16/2018   Component Date Value Ref Range Status   • Glucose 04/16/2018 319* 70 - 110 mg/dL Final   • BUN 04/16/2018 54* 7 - 21 mg/dL Final   • Creatinine 04/16/2018 2.05* 0.43 - 1.29 mg/dL Final   • Sodium 04/16/2018 137  135 - 153 mmol/L Final   • Potassium 04/16/2018 4.5  3.5 - 5.3 mmol/L Final   • Chloride 04/16/2018 101  99 - 112 mmol/L Final   • CO2 04/16/2018 24.9  24.3 - 31.9 mmol/L Final   • Calcium 04/16/2018 9.7  7.7 - 10.0 mg/dL Final   • Total Protein 04/16/2018 8.2* 6.0 - 8.0 g/dL Final   • Albumin 04/16/2018 3.90  3.40 - 4.80 g/dL Final   • ALT (SGPT) 04/16/2018 30  10 - 36 U/L Final   • AST (SGOT) 04/16/2018 26  10 - 30 U/L Final   • Alkaline Phosphatase 04/16/2018 129* 35 - 104 U/L Final   • Total Bilirubin 04/16/2018 0.3  0.2 - 1.8 mg/dL Final   • eGFR Non African Amer 04/16/2018 24* >60 mL/min/1.73 Final   • Globulin 04/16/2018 4.3  gm/dL Final   • A/G Ratio 04/16/2018 0.9* " 1.5 - 2.5 g/dL Final   • BUN/Creatinine Ratio 04/16/2018 26.3* 7.0 - 25.0 Final   • Anion Gap 04/16/2018 11.1  3.6 - 11.2 mmol/L Final   • Troponin I 04/16/2018 0.022  <=0.040 ng/mL Final   • Troponin I 04/16/2018 0.012  <=0.040 ng/mL Final   • CKMB 04/16/2018 2.92  0.00 - 5.00 ng/mL Final   • Extra Tube 04/16/2018 hold for add-on   Final   • Extra Tube 04/16/2018 Hold for add-ons.   Final   • Extra Tube 04/16/2018 hold for add-on   Final   • Extra Tube 04/16/2018 Hold for add-ons.   Final   • WBC 04/16/2018 10.63  4.50 - 12.50 10*3/mm3 Final   • RBC 04/16/2018 4.60  4.20 - 5.40 10*6/mm3 Final   • Hemoglobin 04/16/2018 12.0  12.0 - 16.0 g/dL Final   • Hematocrit 04/16/2018 37.7  37.0 - 47.0 % Final   • MCV 04/16/2018 82.0  80.0 - 94.0 fL Final   • MCH 04/16/2018 26.1* 27.0 - 33.0 pg Final   • MCHC 04/16/2018 31.8* 33.0 - 37.0 g/dL Final   • RDW 04/16/2018 14.7* 11.5 - 14.5 % Final   • RDW-SD 04/16/2018 43.8  37.0 - 54.0 fl Final   • MPV 04/16/2018 9.8  6.0 - 10.0 fL Final   • Platelets 04/16/2018 239  130 - 400 10*3/mm3 Final   • Neutrophil % 04/16/2018 67.5  40.0 - 75.0 % Final   • Lymphocyte % 04/16/2018 22.0  16.0 - 46.0 % Final   • Monocyte % 04/16/2018 5.3  0.0 - 12.0 % Final   • Eosinophil % 04/16/2018 4.7  0.0 - 7.0 % Final   • Basophil % 04/16/2018 0.4  0.0 - 2.0 % Final   • Immature Grans % 04/16/2018 0.1  0.0 - 0.5 % Final   • Neutrophils, Absolute 04/16/2018 7.18* 1.40 - 6.50 10*3/mm3 Final   • Lymphocytes, Absolute 04/16/2018 2.34  1.00 - 3.00 10*3/mm3 Final   • Monocytes, Absolute 04/16/2018 0.56  0.10 - 0.90 10*3/mm3 Final   • Eosinophils, Absolute 04/16/2018 0.50  0.00 - 0.70 10*3/mm3 Final   • Basophils, Absolute 04/16/2018 0.04  0.00 - 0.30 10*3/mm3 Final   • Immature Grans, Absolute 04/16/2018 0.01  0.00 - 0.03 10*3/mm3 Final   • Osmolality Calc 04/16/2018 300.8  273.0 - 305.0 mOsm/kg Final       Physical Exam   Constitutional: She is oriented to person, place, and time. She appears well-developed.  No distress.   Obese pleasant lady   HENT:   Head: Normocephalic and atraumatic.   Nose: Nose normal.   Mouth/Throat: Oropharynx is clear and moist.   Eyes: Conjunctivae and EOM are normal. Pupils are equal, round, and reactive to light.   Neck: Normal range of motion. Neck supple. No tracheal deviation present. No thyromegaly present.   Cardiovascular: Normal rate, regular rhythm, normal heart sounds and intact distal pulses.    No murmur heard.  Pulmonary/Chest: Effort normal and breath sounds normal. No respiratory distress. She has no wheezes.   Abdominal: Soft. Bowel sounds are normal. There is no tenderness. There is no guarding.   Musculoskeletal: Normal range of motion. She exhibits edema. She exhibits no tenderness.   1-2 plus pitting lower extremity edema bilaterally   Lymphadenopathy:     She has no cervical adenopathy.   Neurological: She is alert and oriented to person, place, and time.   Skin: Skin is warm and dry. No rash noted. She is not diaphoretic.   Psychiatric: She has a normal mood and affect. Her behavior is normal.   Nursing note and vitals reviewed.      Assessment/Plan     Diagnoses and all orders for this visit:    Type 2 diabetes mellitus, uncontrolled, with neuropathy  -     Comprehensive Metabolic Panel; Future  -     Hemoglobin A1c; Future  -     insulin aspart (novoLOG FLEXPEN) 100 UNIT/ML solution pen-injector sc pen; Inject 15 Units under the skin 3 (Three) Times a Day Before Meals.    Diabetic retinopathy of left eye associated with type 2 diabetes mellitus, macular edema presence unspecified, unspecified retinopathy severity    Type 2 diabetes mellitus with chronic kidney disease, with long-term current use of insulin, unspecified CKD stage    Iron deficiency anemia secondary to inadequate dietary iron intake  -     Ferritin; Future    Benign essential hypertension  -     CBC & Differential; Future    Mixed hyperlipidemia  -     Lipid Panel; Future    Other orders  -      Pneumococcal Polysaccharide Vaccine 23-Valent Greater Than or Equal To 1yo Subcutaneous / IM      Discontinue Humalog due to insurance coverage and change to NovoLog flex pen  Prescription written today for NovoLog flex pen needles           This document has been electronically signed by:  Lexie Dolan PA-C

## 2018-06-25 ENCOUNTER — LAB (OUTPATIENT)
Dept: FAMILY MEDICINE CLINIC | Facility: CLINIC | Age: 73
End: 2018-06-25

## 2018-06-25 DIAGNOSIS — D50.8 IRON DEFICIENCY ANEMIA SECONDARY TO INADEQUATE DIETARY IRON INTAKE: ICD-10-CM

## 2018-06-25 DIAGNOSIS — E55.9 VITAMIN D DEFICIENCY: ICD-10-CM

## 2018-06-25 DIAGNOSIS — E78.2 MIXED HYPERLIPIDEMIA: ICD-10-CM

## 2018-06-25 DIAGNOSIS — I10 BENIGN ESSENTIAL HYPERTENSION: ICD-10-CM

## 2018-06-25 DIAGNOSIS — IMO0002 TYPE 2 DIABETES MELLITUS, UNCONTROLLED, WITH NEUROPATHY: ICD-10-CM

## 2018-06-25 PROCEDURE — 36415 COLL VENOUS BLD VENIPUNCTURE: CPT | Performed by: PHYSICIAN ASSISTANT

## 2018-06-26 LAB
25(OH)D3+25(OH)D2 SERPL-MCNC: 59 NG/ML
ALBUMIN SERPL-MCNC: 4.3 G/DL (ref 3.4–4.8)
ALBUMIN/GLOB SERPL: 1.1 G/DL (ref 1.5–2.5)
ALP SERPL-CCNC: 149 U/L (ref 35–104)
ALT SERPL-CCNC: 21 U/L (ref 10–36)
APPEARANCE UR: ABNORMAL
AST SERPL-CCNC: 24 U/L (ref 10–30)
BACTERIA #/AREA URNS HPF: ABNORMAL /HPF
BASOPHILS # BLD AUTO: 0.03 10*3/MM3 (ref 0–0.3)
BASOPHILS NFR BLD AUTO: 0.3 % (ref 0–2)
BILIRUB SERPL-MCNC: 0.4 MG/DL (ref 0.2–1.8)
BILIRUB UR QL STRIP: NEGATIVE
BUN SERPL-MCNC: 55 MG/DL (ref 7–21)
BUN/CREAT SERPL: 26.7 (ref 7–25)
CALCIUM SERPL-MCNC: 9.4 MG/DL (ref 7.7–10)
CASTS URNS MICRO: ABNORMAL
CASTS URNS QL MICRO: PRESENT /LPF
CHLORIDE SERPL-SCNC: 101 MMOL/L (ref 99–112)
CHOLEST SERPL-MCNC: 128 MG/DL (ref 0–200)
CO2 SERPL-SCNC: 24.4 MMOL/L (ref 24.3–31.9)
COLOR UR: YELLOW
CREAT SERPL-MCNC: 2.06 MG/DL (ref 0.43–1.29)
CREAT UR-MCNC: 184.9 MG/DL
CRYSTALS URNS MICRO: ABNORMAL
EOSINOPHIL # BLD AUTO: 0.72 10*3/MM3 (ref 0–0.7)
EOSINOPHIL NFR BLD AUTO: 6.2 % (ref 0–7)
EPI CELLS #/AREA URNS HPF: ABNORMAL /HPF
ERYTHROCYTE [DISTWIDTH] IN BLOOD BY AUTOMATED COUNT: 13.6 % (ref 11.5–14.5)
FERRITIN SERPL-MCNC: 399 NG/ML (ref 10–290.3)
GLOBULIN SER CALC-MCNC: 4 GM/DL
GLUCOSE SERPL-MCNC: 206 MG/DL (ref 70–110)
GLUCOSE UR QL: NEGATIVE
HBA1C MFR BLD: 9 % (ref 4.5–5.7)
HCT VFR BLD AUTO: 38.2 % (ref 37–47)
HDLC SERPL-MCNC: 34 MG/DL (ref 60–100)
HGB BLD-MCNC: 12.3 G/DL (ref 12–16)
HGB UR QL STRIP: NEGATIVE
IMM GRANULOCYTES # BLD: 0.02 10*3/MM3 (ref 0–0.03)
IMM GRANULOCYTES NFR BLD: 0.2 % (ref 0–0.5)
KETONES UR QL STRIP: NEGATIVE
LDLC SERPL CALC-MCNC: 68 MG/DL (ref 0–100)
LEUKOCYTE ESTERASE UR QL STRIP: NEGATIVE
LYMPHOCYTES # BLD AUTO: 2.45 10*3/MM3 (ref 1–3)
LYMPHOCYTES NFR BLD AUTO: 21.2 % (ref 16–46)
MCH RBC QN AUTO: 26.7 PG (ref 27–33)
MCHC RBC AUTO-ENTMCNC: 32.2 G/DL (ref 33–37)
MCV RBC AUTO: 83 FL (ref 80–94)
MICRO URNS: ABNORMAL
MONOCYTES # BLD AUTO: 0.61 10*3/MM3 (ref 0.1–0.9)
MONOCYTES NFR BLD AUTO: 5.3 % (ref 0–12)
MUCOUS THREADS URNS QL MICRO: PRESENT /HPF
NEUTROPHILS # BLD AUTO: 7.7 10*3/MM3 (ref 1.4–6.5)
NEUTROPHILS NFR BLD AUTO: 66.8 % (ref 40–75)
NITRITE UR QL STRIP: NEGATIVE
PH UR STRIP: 5 [PH] (ref 5–7.5)
PHOSPHATE SERPL-MCNC: 3.7 MG/DL (ref 2.7–4.5)
PLATELET # BLD AUTO: 292 10*3/MM3 (ref 130–400)
POTASSIUM SERPL-SCNC: 4.7 MMOL/L (ref 3.5–5.3)
PROT SERPL-MCNC: 8.3 G/DL (ref 6–8)
PROT UR QL STRIP: ABNORMAL
PROT UR-MCNC: 29.4 MG/DL
PROT/CREAT UR: 159 MG/G CREA (ref 0–200)
PTH-INTACT SERPL-MCNC: NORMAL PG/ML
RBC # BLD AUTO: 4.6 10*6/MM3 (ref 4.2–5.4)
RBC #/AREA URNS HPF: ABNORMAL /HPF
REQUEST PROBLEM: NORMAL
SODIUM SERPL-SCNC: 136 MMOL/L (ref 135–153)
SP GR UR: 1.02 (ref 1–1.03)
TRIGL SERPL-MCNC: 128 MG/DL (ref 0–150)
UNIDENT CRYS URNS QL MICRO: PRESENT /HPF
URINALYSIS REFLEX: ABNORMAL
UROBILINOGEN UR STRIP-MCNC: 0.2 MG/DL (ref 0.2–1)
VLDLC SERPL CALC-MCNC: 25.6 MG/DL
WBC # BLD AUTO: 11.53 10*3/MM3 (ref 4.5–12.5)
WBC #/AREA URNS HPF: ABNORMAL /HPF

## 2018-07-05 ENCOUNTER — LAB (OUTPATIENT)
Dept: FAMILY MEDICINE CLINIC | Facility: CLINIC | Age: 73
End: 2018-07-05

## 2018-07-05 DIAGNOSIS — N18.30 CHRONIC KIDNEY DISEASE, STAGE III (MODERATE) (HCC): Primary | ICD-10-CM

## 2018-07-05 LAB
BUN SERPL-MCNC: 53 MG/DL (ref 7–21)
BUN/CREAT SERPL: 26.6 (ref 7–25)
CALCIUM SERPL-MCNC: 9.9 MG/DL (ref 7.7–10)
CHLORIDE SERPL-SCNC: 104 MMOL/L (ref 99–112)
CO2 SERPL-SCNC: 23.6 MMOL/L (ref 24.3–31.9)
CREAT SERPL-MCNC: 1.99 MG/DL (ref 0.43–1.29)
GLUCOSE SERPL-MCNC: 85 MG/DL (ref 70–110)
POTASSIUM SERPL-SCNC: 4.2 MMOL/L (ref 3.5–5.3)
SODIUM SERPL-SCNC: 138 MMOL/L (ref 135–153)

## 2018-07-05 PROCEDURE — 36415 COLL VENOUS BLD VENIPUNCTURE: CPT | Performed by: PHYSICIAN ASSISTANT

## 2018-07-30 ENCOUNTER — OFFICE VISIT (OUTPATIENT)
Dept: FAMILY MEDICINE CLINIC | Facility: CLINIC | Age: 73
End: 2018-07-30

## 2018-07-30 ENCOUNTER — LAB (OUTPATIENT)
Dept: FAMILY MEDICINE CLINIC | Facility: CLINIC | Age: 73
End: 2018-07-30

## 2018-07-30 VITALS
SYSTOLIC BLOOD PRESSURE: 140 MMHG | BODY MASS INDEX: 42.52 KG/M2 | DIASTOLIC BLOOD PRESSURE: 70 MMHG | HEART RATE: 100 BPM | OXYGEN SATURATION: 98 % | WEIGHT: 240 LBS | TEMPERATURE: 98.4 F | HEIGHT: 63 IN

## 2018-07-30 DIAGNOSIS — I87.2 VENOUS INSUFFICIENCY (CHRONIC) (PERIPHERAL): ICD-10-CM

## 2018-07-30 DIAGNOSIS — A08.4 VIRAL GASTROENTERITIS: Primary | ICD-10-CM

## 2018-07-30 DIAGNOSIS — N25.81 HYPERPARATHYROIDISM DUE TO RENAL INSUFFICIENCY (HCC): Primary | ICD-10-CM

## 2018-07-30 DIAGNOSIS — N18.30 CHRONIC RENAL DISEASE, STAGE III (HCC): ICD-10-CM

## 2018-07-30 DIAGNOSIS — N18.30 CHRONIC KIDNEY DISEASE, STAGE III (MODERATE) (HCC): Primary | ICD-10-CM

## 2018-07-30 DIAGNOSIS — E21.1 HYPERPARATHYROIDISM DUE TO 1,25(0H)2D3 (HCC): ICD-10-CM

## 2018-07-30 DIAGNOSIS — IMO0002 TYPE 2 DIABETES MELLITUS, UNCONTROLLED, WITH NEUROPATHY: ICD-10-CM

## 2018-07-30 DIAGNOSIS — N25.81 HYPERPARATHYROIDISM DUE TO RENAL INSUFFICIENCY (HCC): ICD-10-CM

## 2018-07-30 PROCEDURE — 99214 OFFICE O/P EST MOD 30 MIN: CPT | Performed by: PHYSICIAN ASSISTANT

## 2018-07-30 RX ORDER — PROMETHAZINE HYDROCHLORIDE 25 MG/1
25 TABLET ORAL EVERY 6 HOURS PRN
Qty: 40 TABLET | Refills: 0 | Status: SHIPPED | OUTPATIENT
Start: 2018-07-30 | End: 2018-09-17

## 2018-07-30 NOTE — PROGRESS NOTES
Subjective   Britta Lee is a 72 y.o. female.     Chief complaint-nausea    History of Present Illness     Nausea-  She complains of nausea and low-grade for the past 4 days.  She denies any vomiting or diarrhea today.    Leg swelling-  She complains of continued edema and bilateral lower extremities with some slight darkening of the skin in lower extremities bilaterally.  Not at goal    Chronic renal disease-stage III and followed by nephrology.  Stable    Diabetes  Current symptoms include paresthesia of the feet.  Evaluation to date has been: fasting blood sugar. Home sugars: BGs are running  consistent with Hgb A1C. Current treatments: basal insulin - toujeo, mealtime insulin - novolog and more intensive attention to diet which has been effective.  Most recent hemoglobin A1c   Lab Results   Component Value Date    HGBA1C 9.00 (H) 06/25/2018    HGBA1C 8.10 (H) 03/26/2018    HGBA1C 7.00 (H) 05/17/2017    HGBA1C 7.60 (H) 05/03/2017    HGBA1C 7.10 (H) 12/01/2016    Insulin is effective when she is able to take it.  She states she is limited due to cost of medication.  She states that she has not had her medication in several days due to cost.  She reports she plans to get her medication when she gets her check at the beginning of the month.      The following portions of the patient's history were reviewed and updated as appropriate: allergies, current medications, past family history, past medical history, past social history, past surgical history and problem list.    Review of Systems   Constitutional: Negative for activity change, appetite change and fever.   HENT: Negative for ear pain, sinus pressure and sore throat.    Eyes: Negative for pain and visual disturbance.   Respiratory: Negative for cough and chest tightness.    Cardiovascular: Positive for leg swelling. Negative for chest pain and palpitations.   Gastrointestinal: Positive for nausea and vomiting. Negative for abdominal pain, constipation and  "diarrhea.   Endocrine: Negative for polydipsia and polyuria.   Genitourinary: Negative for dysuria and frequency.   Musculoskeletal: Negative for back pain and myalgias.   Skin: Negative for color change and rash.   Allergic/Immunologic: Negative for food allergies and immunocompromised state.   Neurological: Negative for dizziness, syncope and headaches.   Hematological: Negative for adenopathy. Does not bruise/bleed easily.   Psychiatric/Behavioral: Negative for hallucinations and suicidal ideas. The patient is not nervous/anxious.        /70   Pulse 100   Temp 98.4 °F (36.9 °C) (Oral)   Ht 160 cm (62.99\")   Wt 109 kg (240 lb)   SpO2 98%   BMI 42.52 kg/m²   Lab on 07/05/2018   Component Date Value Ref Range Status   • Glucose 07/05/2018 85  70 - 110 mg/dL Final   • BUN 07/05/2018 53* 7 - 21 mg/dL Final   • Creatinine 07/05/2018 1.99* 0.43 - 1.29 mg/dL Final   • eGFR Non African Am 07/05/2018 24* >60 mL/min/1.73 Final   • eGFR African Am 07/05/2018 30* >60 mL/min/1.73 Final   • BUN/Creatinine Ratio 07/05/2018 26.6* 7.0 - 25.0 Final   • Sodium 07/05/2018 138  135 - 153 mmol/L Final   • Potassium 07/05/2018 4.2  3.5 - 5.3 mmol/L Final   • Chloride 07/05/2018 104  99 - 112 mmol/L Final   • Total CO2 07/05/2018 23.6* 24.3 - 31.9 mmol/L Final   • Calcium 07/05/2018 9.9  7.7 - 10.0 mg/dL Final       Physical Exam   Constitutional: She is oriented to person, place, and time. She appears well-developed and well-nourished.   She is a pleasant obese lady   HENT:   Head: Normocephalic and atraumatic.   Nose: Nose normal.   Mouth/Throat: Oropharynx is clear and moist.   Eyes: Pupils are equal, round, and reactive to light. Conjunctivae and EOM are normal.   Neck: Normal range of motion. Neck supple. No tracheal deviation present. No thyromegaly present.   Cardiovascular: Normal rate, regular rhythm, normal heart sounds and intact distal pulses.    No murmur heard.  Pulmonary/Chest: Effort normal and breath sounds " normal. No respiratory distress. She has no wheezes.   Abdominal: Soft. Bowel sounds are normal. There is no tenderness. There is no guarding.   Musculoskeletal: Normal range of motion. She exhibits edema. She exhibits no tenderness.   Bilateral 2+ lower extremity edema   Lymphadenopathy:     She has no cervical adenopathy.   Neurological: She is alert and oriented to person, place, and time.   Skin: Skin is warm and dry. No rash noted.   Psychiatric: She has a normal mood and affect. Her behavior is normal.   Nursing note and vitals reviewed.      Assessment/Plan     Diagnoses and all orders for this visit:    Viral gastroenteritis  -     promethazine (PHENERGAN) 25 MG tablet; Take 1 tablet by mouth Every 6 (Six) Hours As Needed for Nausea or Vomiting.    Venous insufficiency (chronic) (peripheral)    Chronic renal disease, stage III    Type 2 diabetes mellitus, uncontrolled, with neuropathy (CMS/[facility])    She was advised on conservative measures for venous insufficiency.  She was advised to increase her water intake and significantly decreased sodium in her diet.  She was advised to elevate her legs and use compression socks/stockings.             This document has been electronically signed by:  Lexie Dolan PA-C

## 2018-07-31 LAB
25(OH)D3+25(OH)D2 SERPL-MCNC: 46 NG/ML
ALBUMIN SERPL-MCNC: 4 G/DL (ref 3.4–4.8)
ALBUMIN/GLOB SERPL: 1.1 G/DL (ref 1.5–2.5)
ALP SERPL-CCNC: 148 U/L (ref 35–104)
ALT SERPL-CCNC: 51 U/L (ref 10–36)
AST SERPL-CCNC: 48 U/L (ref 10–30)
BILIRUB SERPL-MCNC: 0.5 MG/DL (ref 0.2–1.8)
BUN SERPL-MCNC: 23 MG/DL (ref 7–21)
BUN/CREAT SERPL: 15.3 (ref 7–25)
CALCIUM SERPL-MCNC: 9.1 MG/DL (ref 7.7–10)
CHLORIDE SERPL-SCNC: 106 MMOL/L (ref 99–112)
CO2 SERPL-SCNC: 26.1 MMOL/L (ref 24.3–31.9)
CREAT SERPL-MCNC: 1.5 MG/DL (ref 0.43–1.29)
GLOBULIN SER CALC-MCNC: 3.7 GM/DL
GLUCOSE SERPL-MCNC: 223 MG/DL (ref 70–110)
PHOSPHATE SERPL-MCNC: 2.7 MG/DL (ref 2.7–4.5)
POTASSIUM SERPL-SCNC: 3.9 MMOL/L (ref 3.5–5.3)
PROT SERPL-MCNC: 7.7 G/DL (ref 6–8)
PTH-INTACT SERPL-MCNC: 73 PG/ML (ref 15–65)
SODIUM SERPL-SCNC: 140 MMOL/L (ref 135–153)

## 2018-08-03 LAB
APPEARANCE UR: CLEAR
BACTERIA #/AREA URNS HPF: ABNORMAL /HPF
BACTERIA UR CULT: NORMAL
BACTERIA UR CULT: NORMAL
BILIRUB UR QL STRIP: NEGATIVE
CASTS URNS MICRO: ABNORMAL
CASTS URNS QL MICRO: PRESENT /LPF
COLOR UR: YELLOW
CREAT UR-MCNC: 81 MG/DL
EPI CELLS #/AREA URNS HPF: ABNORMAL /HPF
GLUCOSE UR QL: ABNORMAL
HGB UR QL STRIP: NEGATIVE
KETONES UR QL STRIP: NEGATIVE
LEUKOCYTE ESTERASE UR QL STRIP: NEGATIVE
MICRO URNS: ABNORMAL
MUCOUS THREADS URNS QL MICRO: PRESENT /HPF
NITRITE UR QL STRIP: NEGATIVE
PH UR STRIP: 5.5 [PH] (ref 5–7.5)
PROT UR QL STRIP: ABNORMAL
PROT UR-MCNC: 132.8 MG/DL
PROT/CREAT UR: 1639.5 MG/G CREA (ref 0–200)
RBC #/AREA URNS HPF: ABNORMAL /HPF
SP GR UR: 1.02 (ref 1–1.03)
URINALYSIS REFLEX: ABNORMAL
UROBILINOGEN UR STRIP-MCNC: 0.2 MG/DL (ref 0.2–1)
WBC #/AREA URNS HPF: ABNORMAL /HPF

## 2018-08-29 ENCOUNTER — OFFICE VISIT (OUTPATIENT)
Dept: FAMILY MEDICINE CLINIC | Facility: CLINIC | Age: 73
End: 2018-08-29

## 2018-08-29 VITALS
SYSTOLIC BLOOD PRESSURE: 124 MMHG | DIASTOLIC BLOOD PRESSURE: 84 MMHG | HEART RATE: 64 BPM | TEMPERATURE: 98 F | WEIGHT: 244.6 LBS | BODY MASS INDEX: 43.34 KG/M2 | OXYGEN SATURATION: 98 % | HEIGHT: 63 IN

## 2018-08-29 DIAGNOSIS — I10 BENIGN ESSENTIAL HYPERTENSION: Chronic | ICD-10-CM

## 2018-08-29 DIAGNOSIS — E78.2 MIXED HYPERLIPIDEMIA: Chronic | ICD-10-CM

## 2018-08-29 DIAGNOSIS — E11.22 TYPE 2 DIABETES MELLITUS WITH CHRONIC KIDNEY DISEASE, WITH LONG-TERM CURRENT USE OF INSULIN, UNSPECIFIED CKD STAGE (HCC): Primary | Chronic | ICD-10-CM

## 2018-08-29 DIAGNOSIS — Z79.4 TYPE 2 DIABETES MELLITUS WITH CHRONIC KIDNEY DISEASE, WITH LONG-TERM CURRENT USE OF INSULIN, UNSPECIFIED CKD STAGE (HCC): Primary | Chronic | ICD-10-CM

## 2018-08-29 DIAGNOSIS — IMO0002 TYPE 2 DIABETES MELLITUS, UNCONTROLLED, WITH NEUROPATHY: Chronic | ICD-10-CM

## 2018-08-29 PROCEDURE — 99214 OFFICE O/P EST MOD 30 MIN: CPT | Performed by: NURSE PRACTITIONER

## 2018-08-29 NOTE — PATIENT INSTRUCTIONS
"DASH Eating Plan  DASH stands for \"Dietary Approaches to Stop Hypertension.\" The DASH eating plan is a healthy eating plan that has been shown to reduce high blood pressure (hypertension). It may also reduce your risk for type 2 diabetes, heart disease, and stroke. The DASH eating plan may also help with weight loss.  What are tips for following this plan?  General guidelines  · Avoid eating more than 2,300 mg (milligrams) of salt (sodium) a day. If you have hypertension, you may need to reduce your sodium intake to 1,500 mg a day.  · Limit alcohol intake to no more than 1 drink a day for nonpregnant women and 2 drinks a day for men. One drink equals 12 oz of beer, 5 oz of wine, or 1½ oz of hard liquor.  · Work with your health care provider to maintain a healthy body weight or to lose weight. Ask what an ideal weight is for you.  · Get at least 30 minutes of exercise that causes your heart to beat faster (aerobic exercise) most days of the week. Activities may include walking, swimming, or biking.  · Work with your health care provider or diet and nutrition specialist (dietitian) to adjust your eating plan to your individual calorie needs.  Reading food labels  · Check food labels for the amount of sodium per serving. Choose foods with less than 5 percent of the Daily Value of sodium. Generally, foods with less than 300 mg of sodium per serving fit into this eating plan.  · To find whole grains, look for the word \"whole\" as the first word in the ingredient list.  Shopping  · Buy products labeled as \"low-sodium\" or \"no salt added.\"  · Buy fresh foods. Avoid canned foods and premade or frozen meals.  Cooking  · Avoid adding salt when cooking. Use salt-free seasonings or herbs instead of table salt or sea salt. Check with your health care provider or pharmacist before using salt substitutes.  · Do not nice foods. Cook foods using healthy methods such as baking, boiling, grilling, and broiling instead.  · Cook with " heart-healthy oils, such as olive, canola, soybean, or sunflower oil.  Meal planning    · Eat a balanced diet that includes:  ? 5 or more servings of fruits and vegetables each day. At each meal, try to fill half of your plate with fruits and vegetables.  ? Up to 6-8 servings of whole grains each day.  ? Less than 6 oz of lean meat, poultry, or fish each day. A 3-oz serving of meat is about the same size as a deck of cards. One egg equals 1 oz.  ? 2 servings of low-fat dairy each day.  ? A serving of nuts, seeds, or beans 5 times each week.  ? Heart-healthy fats. Healthy fats called Omega-3 fatty acids are found in foods such as flaxseeds and coldwater fish, like sardines, salmon, and mackerel.  · Limit how much you eat of the following:  ? Canned or prepackaged foods.  ? Food that is high in trans fat, such as fried foods.  ? Food that is high in saturated fat, such as fatty meat.  ? Sweets, desserts, sugary drinks, and other foods with added sugar.  ? Full-fat dairy products.  · Do not salt foods before eating.  · Try to eat at least 2 vegetarian meals each week.  · Eat more home-cooked food and less restaurant, buffet, and fast food.  · When eating at a restaurant, ask that your food be prepared with less salt or no salt, if possible.  What foods are recommended?  The items listed may not be a complete list. Talk with your dietitian about what dietary choices are best for you.  Grains  Whole-grain or whole-wheat bread. Whole-grain or whole-wheat pasta. Brown rice. Oatmeal. Quinoa. Bulgur. Whole-grain and low-sodium cereals. Nila bread. Low-fat, low-sodium crackers. Whole-wheat flour tortillas.  Vegetables  Fresh or frozen vegetables (raw, steamed, roasted, or grilled). Low-sodium or reduced-sodium tomato and vegetable juice. Low-sodium or reduced-sodium tomato sauce and tomato paste. Low-sodium or reduced-sodium canned vegetables.  Fruits  All fresh, dried, or frozen fruit. Canned fruit in natural juice (without  added sugar).  Meat and other protein foods  Skinless chicken or turkey. Ground chicken or turkey. Pork with fat trimmed off. Fish and seafood. Egg whites. Dried beans, peas, or lentils. Unsalted nuts, nut butters, and seeds. Unsalted canned beans. Lean cuts of beef with fat trimmed off. Low-sodium, lean deli meat.  Dairy  Low-fat (1%) or fat-free (skim) milk. Fat-free, low-fat, or reduced-fat cheeses. Nonfat, low-sodium ricotta or cottage cheese. Low-fat or nonfat yogurt. Low-fat, low-sodium cheese.  Fats and oils  Soft margarine without trans fats. Vegetable oil. Low-fat, reduced-fat, or light mayonnaise and salad dressings (reduced-sodium). Canola, safflower, olive, soybean, and sunflower oils. Avocado.  Seasoning and other foods  Herbs. Spices. Seasoning mixes without salt. Unsalted popcorn and pretzels. Fat-free sweets.  What foods are not recommended?  The items listed may not be a complete list. Talk with your dietitian about what dietary choices are best for you.  Grains  Baked goods made with fat, such as croissants, muffins, or some breads. Dry pasta or rice meal packs.  Vegetables  Creamed or fried vegetables. Vegetables in a cheese sauce. Regular canned vegetables (not low-sodium or reduced-sodium). Regular canned tomato sauce and paste (not low-sodium or reduced-sodium). Regular tomato and vegetable juice (not low-sodium or reduced-sodium). Pickles. Olives.  Fruits  Canned fruit in a light or heavy syrup. Fried fruit. Fruit in cream or butter sauce.  Meat and other protein foods  Fatty cuts of meat. Ribs. Fried meat. [place]. Sausage. Bologna and other processed lunch meats. Salami. Fatback. Hotdogs. Bratwurst. Salted nuts and seeds. Canned beans with added salt. Canned or smoked fish. Whole eggs or egg yolks. Chicken or turkey with skin.  Dairy  Whole or 2% milk, cream, and half-and-half. Whole or full-fat cream cheese. Whole-fat or sweetened yogurt. Full-fat cheese. Nondairy creamers. Whipped toppings.  Processed cheese and cheese spreads.  Fats and oils  Butter. Stick margarine. Lard. Shortening. Ghee. [place]verde fat. Tropical oils, such as coconut, palm kernel, or palm oil.  Seasoning and other foods  Salted popcorn and pretzels. Onion salt, garlic salt, seasoned salt, table salt, and sea salt. Worcestershire sauce. Tartar sauce. Barbecue sauce. Teriyaki sauce. Soy sauce, including reduced-sodium. Steak sauce. Canned and packaged gravies. Fish sauce. Oyster sauce. Cocktail sauce. Horseradish that you find on the shelf. Ketchup. Mustard. Meat flavorings and tenderizers. Bouillon cubes. Hot sauce and Tabasco sauce. Premade or packaged marinades. Premade or packaged taco seasonings. Relishes. Regular salad dressings.  Where to find more information:  · National Heart, Lung, and [employer] [place]: www.nhlbi.nih.gov  · American Heart Association: www.heart.org  Summary  · The DASH eating plan is a healthy eating plan that has been shown to reduce high blood pressure (hypertension). It may also reduce your risk for type 2 diabetes, heart disease, and stroke.  · With the DASH eating plan, you should limit salt (sodium) intake to 2,300 mg a day. If you have hypertension, you may need to reduce your sodium intake to 1,500 mg a day.  · When on the DASH eating plan, aim to eat more fresh fruits and vegetables, whole grains, lean proteins, low-fat dairy, and heart-healthy fats.  · Work with your health care provider or diet and nutrition specialist (dietitian) to adjust your eating plan to your individual calorie needs.  This information is not intended to replace advice given to you by your health care provider. Make sure you discuss any questions you have with your health care provider.  Document Released: 12/06/2012 Document Revised: 12/11/2017 Document Reviewed: 12/11/2017  [employer] Interactive Patient Education © 2018 [employer]  Type 2 Diabetes Mellitus, Self Care, Adult  When you have type 2 diabetes (type 2 diabetes  mellitus), you must keep your blood sugar (glucose) under control. You can do this with:  · Nutrition.  · Exercise.  · Lifestyle changes.  · Medicines or insulin, if needed.  · Support from your doctors and others.    How do I manage my blood sugar?  · Check your blood sugar level every day, as often as told.  · Call your doctor if your blood sugar is above your goal numbers for 2 tests in a row.  · Have your A1c (hemoglobin A1c) level checked at least two times a year. Have it checked more often if your doctor tells you to.  Your doctor will set treatment goals for you. Generally, you should have these blood sugar levels:  · Before meals (preprandial):  mg/dL (4.4-7.2 mmol/L).  · After meals (postprandial): lower than 180 mg/dL (10 mmol/L).  · A1c level: less than 7%.    What do I need to know about high blood sugar?  High blood sugar is called hyperglycemia. Know the signs of high blood sugar. Signs may include:  · Feeling:  ? Thirsty.  ? Hungry.  ? Very tired.  · Needing to pee (urinate) more than usual.  · Blurry vision.    What do I need to know about low blood sugar?  Low blood sugar is called hypoglycemia. This is when blood sugar is at or below 70 mg/dL (3.9 mmol/L). Symptoms may include:  · Feeling:  ? Hungry.  ? Worried or nervous (anxious).  ? Sweaty and clammy.  ? Confused.  ? Dizzy.  ? Sleepy.  ? Sick to your stomach (nauseous).  · Having:  ? A fast heartbeat (palpitations).  ? A headache.  ? A change in your vision.  ? Jerky movements that you cannot control (seizure).  ? Nightmares.  ? Tingling or no feeling (numbness) around the mouth, lips, or tongue.  · Having trouble with:  ? Talking.  ? Paying attention (concentrating).  ? Moving (coordination).  ? Sleeping.  · Shaking.  · Passing out (fainting).  · Getting upset easily (irritability).    Treating low blood sugar    To treat low blood sugar, eat or drink something sugary right away. If you can think clearly and swallow safely, follow the  15:15 rule:  · Take 15 grams of a fast-acting carb (carbohydrate). Some fast-acting carbs are:  ? 1 tube of glucose gel.  ? 3 sugar tablets (glucose pills).  ? 6-8 pieces of hard candy.  ? 4 oz (120 mL) of fruit juice.  ? 4 oz (120 mL) regular (not diet) soda.  · Check your blood sugar 15 minutes after you take the carb.  · If your blood sugar is still at or below 70 mg/dL (3.9 mmol/L), take 15 grams of a carb again.  · If your blood sugar does not go above 70 mg/dL (3.9 mmol/L) after 3 tries, get help right away.  · After your blood sugar goes back to normal, eat a meal or a snack within 1 hour.    Treating very low blood sugar  If your blood sugar is at or below 54 mg/dL (3 mmol/L), you have very low blood sugar (severe hypoglycemia). This is an emergency. Do not wait to see if the symptoms will go away. Get medical help right away. Call your local emergency services (911 in the [place] Do not drive yourself to the hospital.  If you have very low blood sugar and you cannot eat or drink, you may need a glucagon shot (injection). A family member or friend should learn how to check your blood sugar and how to give you a glucagon shot. Ask your doctor if you need to have a glucagon shot kit at home.  What else is important to manage my diabetes?  Medicine  Follow these instructions about insulin and diabetes medicines:  · Take them as told by your doctor.  · Adjust them as told by your doctor.  · Do not run out of them.    Having diabetes can raise your risk for other long-term conditions. These include heart or kidney disease. Your doctor may prescribe medicines to help prevent problems from diabetes.  Food    · Make healthy food choices. These include:  ? Chicken, fish, egg whites, and beans.  ? Oats, whole wheat, bulgur, brown rice, quinoa, and millet.  ? Fresh fruits and vegetables.  ? Low-fat dairy products.  ? Nuts, avocado, olive oil, and canola oil.  · Make a food plan with a specialist (dietitian).  · Follow  instructions from your doctor about what you cannot eat or drink.  · Drink enough fluid to keep your pee (urine) clear or pale yellow.  · Eat healthy snacks between healthy meals.  · Keep track of carbs that you eat. Read food labels. Learn food serving sizes.  · Follow your sick day plan when you cannot eat or drink normally. Make this plan with your doctor so it is ready to use.  Activity  · Exercise at least 3 times a week.  · Do not go more than 2 days without exercising.  · Talk with your doctor before you start a new exercise. Your doctor may need to adjust your insulin, medicines, or food.  Lifestyle    · Do not use any tobacco products. These include cigarettes, chewing tobacco, and e-cigarettes.If you need help quitting, ask your doctor.  · Ask your doctor how much alcohol is safe for you.  · Learn to deal with stress. If you need help with this, ask your doctor.  Body care  · Stay up to date with your shots (immunizations).  · Have your eyes and feet checked by a doctor as often as told.  · Check your skin and feet every day. Check for cuts, bruises, redness, blisters, or sores.  · Brush your teeth and gums two times a day.  · Floss at least one time a day.  · Go to the dentist least one time every 6 months.  · Stay at a healthy weight.  General instructions    · Take over-the-counter and prescription medicines only as told by your doctor.  · Share your diabetes care plan with:  ? Your work or school.  ? People you live with.  · Check your pee (urine) for ketones:  ? When you are sick.  ? As told by your doctor.  · Carry a card or wear jewelry that says that you have diabetes.  · Ask your doctor:  ? Do I need to meet with a diabetes educator?  ? Where can I find a support group for people with diabetes?  · Keep all follow-up visits as told by your doctor. This is important.  Where to find more information:  To learn more about diabetes, visit:  · American Diabetes Association: www.diabetes.org  · American  Association of Diabetes Educators: www.diabeteseducator.org/patient-resources    This information is not intended to replace advice given to you by your health care provider. Make sure you discuss any questions you have with your health care provider.  Document Released: 04/10/2017 Document Revised: 05/25/2017 Document Reviewed: 01/20/2017  [employer] Interactive Patient Education © 2018 [employer]

## 2018-08-29 NOTE — PROGRESS NOTES
"Britta presents to the clinic today in regards to her DM, type 2 which is complicated by peripheral neuropathy, diabetic nephropathy and retinopathy. Associated symptoms include  blurred vision, fatigue and visual changes. In addition, she reports she has not been able to take her basal insulin for four to five days due to the expense. \"I am in the doughnut hole\". She has been using only her Novolog several times a day.   In addition, Britta has HTN, Dyslipidemia, and Osteoarthritis which required a LTK replacement    Diabetes   She has type 2 diabetes mellitus. No MedicAlert identification noted. The initial diagnosis of diabetes was made 20 years ago. Her disease course has been fluctuating. There are no hypoglycemic associated symptoms. Pertinent negatives for hypoglycemia include no confusion, dizziness, headaches, nervousness/anxiousness, speech difficulty or tremors. Associated symptoms include blurred vision, fatigue and visual change. Pertinent negatives for diabetes include no chest pain, no foot paresthesias, no foot ulcerations, no polydipsia, no polyphagia, no polyuria, no weakness and no weight loss. There are no hypoglycemic complications. Symptoms are worsening. Diabetic complications include heart disease, nephropathy, peripheral neuropathy, PVD and retinopathy. Current diabetic treatment includes insulin injections (Tuojeo and Novolog). Compliance with diabetes treatment: Dependent on available of insulin. Her weight is increasing steadily. When asked about meal planning, she reported none. She has had a previous visit with a dietitian. She never participates in exercise. Home blood sugar record trend: Once in the morning. She sees a podiatrist.Eye exam is current.   Most recent hemoglobin A1c   Lab Results   Component Value Date    HGBA1C 9.0 (H) 06/25/2018    HGBA1C 8.10 (H) 03/26/2018    HGBA1C 7.0 05/17/2017     Hypertension  Compliance with treatment has been good. Well controlled with " "Norvasc, clonidine, metoprolol, and Accupril. She does report intermittent lower extremity swelling throughout the day. She does have compression stockings but find them very difficult to apply.     Dyslipidemia  Compliance with treatment has been good. The patient exercises seldom due to her Osteoarthritis.  She is currently being prescribed the following medication for her dyslipidemia - atorvastatin. Patient denies side effects associated with her medications. Most recent lipids include  Lab Results   Component Value Date     06/25/2018    TRIG 128 06/25/2018    HDL 34 06/25/2018    LDL 68 06/25/2018     03/26/2018    TRIG 49 03/26/2018    HDL 37 (L) 03/26/2018    LDL 49 03/26/2018     Refer to ROS for additional information.    Vitals:    08/29/18 1039   BP: 124/84   Pulse: 64   Temp: 98 °F (36.7 °C)   SpO2: 98%   Weight: 111 kg (244 lb 9.6 oz)   Height: 160 cm (62.99\")      The following portions of the patient's history were reviewed and updated as appropriate: allergies, current medications, past family history, past medical history, past social history, past surgical history and problem list.    Review of Systems   Constitutional: Positive for fatigue. Negative for activity change, appetite change, chills, fever, unexpected weight change and weight loss.   HENT: Negative.    Eyes: Positive for blurred vision and visual disturbance.   Respiratory: Negative for cough, chest tightness, shortness of breath and wheezing.    Cardiovascular: Positive for palpitations and leg swelling. Negative for chest pain.   Gastrointestinal: Negative for abdominal pain, constipation, diarrhea, nausea and vomiting.   Endocrine: Negative for cold intolerance, heat intolerance, polydipsia, polyphagia and polyuria.   Musculoskeletal: Positive for arthralgias.   Skin: Negative for color change and rash.   Neurological: Negative for dizziness, tremors, speech difficulty, weakness, light-headedness and headaches. "   Hematological: Negative for adenopathy. Bruises/bleeds easily.   Psychiatric/Behavioral: Negative for confusion, decreased concentration and suicidal ideas. The patient is not nervous/anxious.    All other systems reviewed and are negative.    Physical Exam   Constitutional: She is oriented to person, place, and time. She appears well-developed and well-nourished. No distress.   HENT:   Head: Normocephalic.   Nose: Nose normal.   Mouth/Throat: Oropharynx is clear and moist.   Eyes: Pupils are equal, round, and reactive to light. Conjunctivae are normal. Right eye exhibits no discharge. Left eye exhibits no discharge. No scleral icterus.   Neck: Neck supple. No JVD present. No thyromegaly present.   Cardiovascular: Normal rate, regular rhythm and normal heart sounds.  Exam reveals no friction rub.    No murmur heard.  Pulmonary/Chest: Effort normal. No respiratory distress. She has no wheezes. She has no rales.   Abdominal: Soft. Bowel sounds are normal. She exhibits no distension. There is no tenderness. There is no rebound and no guarding.   Musculoskeletal: She exhibits no edema.   Lymphadenopathy:     She has no cervical adenopathy.   Neurological: She is alert and oriented to person, place, and time.   Skin: Skin is warm and dry. No rash noted. No erythema.   Psychiatric: She has a normal mood and affect. Her behavior is normal. Judgment and thought content normal.   Vitals reviewed.    Assessment/Plan   Problems Addressed this Visit        Cardiovascular and Mediastinum    Benign essential hypertension    Hyperlipidemia       Endocrine    Type 2 diabetes mellitus, uncontrolled, with neuropathy (CMS/HCC)    Type 2 diabetes mellitus with chronic kidney disease, with long-term current use of insulin (CMS/[facility]) - Primary        Findings and recommendations discussed with Britta. Treatment options reviewed. Her glycemic control has done well on Toujeo so I checked resources that perhaps could assist her with her  Toujeo. Completed [employer] Patient Assistance Application and faxed it for review. She was supplied two Toujeo pens and two Basaglar pens. She does have her strips supplied and encouraged her to continue checking three to four times daily. She is due to have labs completed in September. At that time, changes in medications may be needed. Britta is also understands the importance of her nutrition plan. I reviewed her usual meal pattern and we discussed strategies for her to make healthier choices. She does admit cakes are difficult for her to resist and I offered some suggestions that may help. She also has not been as active since her LTK Replacement. She is in agreement to start walking her dog for increase in activity. She will continue to f/u with Nephrology in regards to her CKD.  Britta has follow up with Lexie Dolan PA-C, her PCP, and will have labs done prior to her appt. She will f/u with me on a prn basis especially after her September appt. Encouraged Britta to call the office if she has any issues or problems especially with receiving her insulin.

## 2018-09-17 ENCOUNTER — OFFICE VISIT (OUTPATIENT)
Dept: FAMILY MEDICINE CLINIC | Facility: CLINIC | Age: 73
End: 2018-09-17

## 2018-09-17 VITALS
TEMPERATURE: 97.7 F | OXYGEN SATURATION: 97 % | BODY MASS INDEX: 41.29 KG/M2 | SYSTOLIC BLOOD PRESSURE: 142 MMHG | HEIGHT: 63 IN | WEIGHT: 233 LBS | DIASTOLIC BLOOD PRESSURE: 90 MMHG | HEART RATE: 76 BPM

## 2018-09-17 DIAGNOSIS — N18.30 TYPE 2 DIABETES MELLITUS WITH STAGE 3 CHRONIC KIDNEY DISEASE, WITH LONG-TERM CURRENT USE OF INSULIN (HCC): ICD-10-CM

## 2018-09-17 DIAGNOSIS — E11.319 DIABETIC RETINOPATHY OF LEFT EYE ASSOCIATED WITH TYPE 2 DIABETES MELLITUS, MACULAR EDEMA PRESENCE UNSPECIFIED, UNSPECIFIED RETINOPATHY SEVERITY (HCC): Primary | ICD-10-CM

## 2018-09-17 DIAGNOSIS — E78.2 MIXED HYPERLIPIDEMIA: ICD-10-CM

## 2018-09-17 DIAGNOSIS — IMO0002 TYPE 2 DIABETES MELLITUS, UNCONTROLLED, WITH NEUROPATHY: ICD-10-CM

## 2018-09-17 DIAGNOSIS — E55.9 VITAMIN D DEFICIENCY: ICD-10-CM

## 2018-09-17 DIAGNOSIS — Z79.4 TYPE 2 DIABETES MELLITUS WITH STAGE 3 CHRONIC KIDNEY DISEASE, WITH LONG-TERM CURRENT USE OF INSULIN (HCC): ICD-10-CM

## 2018-09-17 DIAGNOSIS — I10 BENIGN ESSENTIAL HYPERTENSION: ICD-10-CM

## 2018-09-17 DIAGNOSIS — E11.22 TYPE 2 DIABETES MELLITUS WITH STAGE 3 CHRONIC KIDNEY DISEASE, WITH LONG-TERM CURRENT USE OF INSULIN (HCC): ICD-10-CM

## 2018-09-17 PROCEDURE — 99214 OFFICE O/P EST MOD 30 MIN: CPT | Performed by: PHYSICIAN ASSISTANT

## 2018-09-17 PROCEDURE — 90686 IIV4 VACC NO PRSV 0.5 ML IM: CPT | Performed by: PHYSICIAN ASSISTANT

## 2018-09-17 PROCEDURE — 90471 IMMUNIZATION ADMIN: CPT | Performed by: PHYSICIAN ASSISTANT

## 2018-09-17 RX ORDER — ERGOCALCIFEROL 1.25 MG/1
50000 CAPSULE ORAL WEEKLY
Qty: 30 CAPSULE | Refills: 5 | Status: SHIPPED | OUTPATIENT
Start: 2018-09-17 | End: 2019-06-07 | Stop reason: SDUPTHER

## 2018-09-17 RX ORDER — METOPROLOL TARTRATE 100 MG/1
100 TABLET ORAL EVERY 12 HOURS SCHEDULED
Qty: 60 TABLET | Refills: 5 | Status: SHIPPED | OUTPATIENT
Start: 2018-09-17 | End: 2018-10-02 | Stop reason: SDUPTHER

## 2018-09-17 RX ORDER — AMLODIPINE BESYLATE 10 MG/1
10 TABLET ORAL DAILY
Qty: 30 TABLET | Refills: 5 | Status: SHIPPED | OUTPATIENT
Start: 2018-09-17 | End: 2019-06-07 | Stop reason: SDUPTHER

## 2018-09-17 RX ORDER — QUINAPRIL 40 MG/1
40 TABLET ORAL DAILY
Qty: 30 TABLET | Refills: 5 | Status: SHIPPED | OUTPATIENT
Start: 2018-09-17 | End: 2018-10-02 | Stop reason: SDUPTHER

## 2018-09-17 NOTE — PROGRESS NOTES
"Alison Lee is a 73 y.o. female.     Chief complaint-diabetes mellitus    History of Present Illness     Diabetes mellitus type 2-  Uncontrolled.  She does have associated peripheral neuropathy, diabetic retinopathy, and chronic kidney disease.  She has had some challenges due to cost of insulin.  She purchases her basal insulin when she can afford it but there are several weeks when she gets into the \"doughnut hole\" where she has no insulin at all.  She reports over the weekend she had ran out of insulin and her blood glucose was 531.  She states that her daughter gave her basaglar and she took 50 units (she usually takes 80 units of toujeo).  Her blood glucose was 138 the next morning.  She has now been approved for financial assistance program to receive toujeo.    Hypertension-stable with quinapril and clonidine    Vitamin D deficiency-stable with vitamin D supplementation    Hyperlipidemia-improved with Lipitor 40 mg daily    The following portions of the patient's history were reviewed and updated as appropriate: allergies, current medications, past family history, past medical history, past social history, past surgical history and problem list.    Review of Systems   Constitutional: Negative for activity change, appetite change and fever.   HENT: Negative for ear pain, sinus pressure and sore throat.    Eyes: Negative for pain and visual disturbance.   Respiratory: Negative for cough and chest tightness.    Cardiovascular: Negative for chest pain and palpitations.   Gastrointestinal: Negative for abdominal pain, constipation, diarrhea, nausea and vomiting.   Endocrine: Negative for polydipsia and polyuria.   Genitourinary: Negative for dysuria and frequency.   Musculoskeletal: Negative for back pain and myalgias.   Skin: Negative for color change and rash.   Allergic/Immunologic: Negative for food allergies and immunocompromised state.   Neurological: Negative for dizziness, syncope and " "headaches.   Hematological: Negative for adenopathy. Does not bruise/bleed easily.   Psychiatric/Behavioral: Negative for hallucinations and suicidal ideas. The patient is not nervous/anxious.        /90   Pulse 76   Temp 97.7 °F (36.5 °C) (Temporal Artery )   Ht 160 cm (62.99\")   Wt 106 kg (233 lb)   SpO2 97%   BMI 41.29 kg/m²   Lab on 07/30/2018   Component Date Value Ref Range Status   • Specific Gravity, UA 07/30/2018 1.019  1.005 - 1.030 Final   • pH, UA 07/30/2018 5.5  5.0 - 7.5 Final   • Color, UA 07/30/2018 Yellow  Yellow Final   • Appearance, UA 07/30/2018 Clear  Clear Final   • Leukocytes, UA 07/30/2018 Negative  Negative Final   • Protein 07/30/2018 3+* Negative/Trace Final   • Glucose, UA 07/30/2018 3+* Negative Final   • Ketones 07/30/2018 Negative  Negative Final   • Blood, UA 07/30/2018 Negative  Negative Final   • Bilirubin, UA 07/30/2018 Negative  Negative Final   • Urobilinogen, UA 07/30/2018 0.2  0.2 - 1.0 mg/dL Final   • Nitrite, UA 07/30/2018 Negative  Negative Final   • Microscopic Examination 07/30/2018 See below:   Final   • Urinalysis Reflex 07/30/2018 Comment   Final   • Glucose 07/30/2018 223* 70 - 110 mg/dL Final   • BUN 07/30/2018 23* 7 - 21 mg/dL Final   • Creatinine 07/30/2018 1.50* 0.43 - 1.29 mg/dL Final   • eGFR Non  Am 07/30/2018 34* >60 mL/min/1.73 Final   • eGFR African Am 07/30/2018 41* >60 mL/min/1.73 Final   • BUN/Creatinine Ratio 07/30/2018 15.3  7.0 - 25.0 Final   • Sodium 07/30/2018 140  135 - 153 mmol/L Final   • Potassium 07/30/2018 3.9  3.5 - 5.3 mmol/L Final   • Chloride 07/30/2018 106  99 - 112 mmol/L Final   • Total CO2 07/30/2018 26.1  24.3 - 31.9 mmol/L Final   • Calcium 07/30/2018 9.1  7.7 - 10.0 mg/dL Final   • Total Protein 07/30/2018 7.7  6.0 - 8.0 g/dL Final   • Albumin 07/30/2018 4.00  3.40 - 4.80 g/dL Final   • Globulin 07/30/2018 3.7  gm/dL Final   • A/G Ratio 07/30/2018 1.1* 1.5 - 2.5 g/dL Final   • Total Bilirubin 07/30/2018 0.5  0.2 - " 1.8 mg/dL Final   • Alkaline Phosphatase 07/30/2018 148* 35 - 104 U/L Final   • AST (SGOT) 07/30/2018 48* 10 - 30 U/L Final   • ALT (SGPT) 07/30/2018 51* 10 - 36 U/L Final   • 25 Hydroxy, Vitamin D 07/30/2018 46.0  ng/ml Final   • Phosphorus 07/30/2018 2.7  2.7 - 4.5 mg/dL Final   • PTH, Intact 07/30/2018 73* 15 - 65 pg/mL Final   • WBC, UA 07/30/2018 0-5  0 - 5 /hpf Final   • RBC, UA 07/30/2018 0-2  0 - 2 /hpf Final   • Epithelial Cells (non renal) 07/30/2018 0-10  0 - 10 /hpf Final   • Casts 07/30/2018 Present* None seen /lpf Final   • Cast Type 07/30/2018 Hyaline casts  N/A Final   • Mucus, UA 07/30/2018 Present  Not Estab. /hpf Final   • Bacteria, UA 07/30/2018 Moderate* None seen/Few /hpf Final   • Urine Culture 07/30/2018 Final report   Final   • Result 1 07/30/2018 Comment   Final   • Creatinine, Urine 07/30/2018 81.0  mg/dL Final   • Total Protein, Urine 07/30/2018 132.8  mg/dL Final   • Protein/Creatinine Ratio 07/30/2018 1639.5* 0.0 - 200.0 mg/G Crea Final       Physical Exam   Constitutional: She is oriented to person, place, and time. She appears well-nourished. No distress.   Obese pleasant lady   HENT:   Head: Normocephalic and atraumatic.   Nose: Nose normal.   Mouth/Throat: Oropharynx is clear and moist.   Eyes: Pupils are equal, round, and reactive to light. Conjunctivae and EOM are normal.   Neck: Normal range of motion. Neck supple. No tracheal deviation present. No thyromegaly present.   Cardiovascular: Normal rate, regular rhythm, normal heart sounds and intact distal pulses.    No murmur heard.  Pulmonary/Chest: Effort normal and breath sounds normal. No respiratory distress. She has no wheezes.   Abdominal: Soft. Bowel sounds are normal. There is no tenderness. There is no guarding.   Musculoskeletal: Normal range of motion. She exhibits no edema or tenderness.   Lymphadenopathy:     She has no cervical adenopathy.   Neurological: She is alert and oriented to person, place, and time.   Skin:  Skin is warm and dry. No rash noted. She is not diaphoretic.   Psychiatric: She has a normal mood and affect. Her behavior is normal.   Nursing note and vitals reviewed.      Assessment/Plan     Diagnoses and all orders for this visit:    Diabetic retinopathy of left eye associated with type 2 diabetes mellitus, macular edema presence unspecified, unspecified retinopathy severity (CMS/HCC)    Benign essential hypertension  -     metoprolol tartrate (LOPRESSOR) 100 MG tablet; Take 1 tablet by mouth Every 12 (Twelve) Hours.  -     quinapril (ACCUPRIL) 40 MG tablet; Take 1 tablet by mouth Daily.  -     Lipid Panel; Future    Vitamin D deficiency  -     vitamin D (ERGOCALCIFEROL) 50428 units capsule capsule; Take 1 capsule by mouth 1 (One) Time Per Week. Takes wednesdays    Type 2 diabetes mellitus, uncontrolled, with neuropathy (CMS/[facility])  -     Comprehensive Metabolic Panel; Future  -     Lipid Panel; Future  -     Hemoglobin A1c; Future    Type 2 diabetes mellitus with stage 3 chronic kidney disease, with long-term current use of insulin (CMS/[facility])    Mixed hyperlipidemia    Other orders  -     amLODIPine (NORVASC) 10 MG tablet; Take 1 tablet by mouth Daily.  -     Fluarix/Fluzone/Afluria Quad>6 Months                 This document has been electronically signed by:  Lexie Dolan PA-C

## 2018-10-02 DIAGNOSIS — I10 BENIGN ESSENTIAL HYPERTENSION: ICD-10-CM

## 2018-10-02 DIAGNOSIS — F33.42 RECURRENT MAJOR DEPRESSIVE DISORDER, IN FULL REMISSION (HCC): ICD-10-CM

## 2018-10-02 DIAGNOSIS — E78.2 MIXED HYPERLIPIDEMIA: ICD-10-CM

## 2018-10-03 RX ORDER — CLONIDINE HYDROCHLORIDE 0.2 MG/1
TABLET ORAL
Qty: 270 TABLET | Refills: 3 | Status: SHIPPED | OUTPATIENT
Start: 2018-10-03 | End: 2019-06-07 | Stop reason: SDUPTHER

## 2018-10-03 RX ORDER — QUINAPRIL 40 MG/1
TABLET ORAL
Qty: 90 TABLET | Refills: 3 | Status: SHIPPED | OUTPATIENT
Start: 2018-10-03 | End: 2019-06-07 | Stop reason: SDUPTHER

## 2018-10-03 RX ORDER — ESCITALOPRAM OXALATE 10 MG/1
TABLET ORAL
Qty: 90 TABLET | Refills: 3 | Status: SHIPPED | OUTPATIENT
Start: 2018-10-03 | End: 2019-06-07 | Stop reason: SDUPTHER

## 2018-10-03 RX ORDER — ATORVASTATIN CALCIUM 40 MG/1
TABLET, FILM COATED ORAL
Qty: 90 TABLET | Refills: 3 | Status: SHIPPED | OUTPATIENT
Start: 2018-10-03 | End: 2019-06-07 | Stop reason: SDUPTHER

## 2018-10-03 RX ORDER — METOPROLOL TARTRATE 100 MG/1
TABLET ORAL
Qty: 180 TABLET | Refills: 3 | Status: SHIPPED | OUTPATIENT
Start: 2018-10-03 | End: 2019-06-07 | Stop reason: SDUPTHER

## 2018-11-06 DIAGNOSIS — D50.8 IRON DEFICIENCY ANEMIA SECONDARY TO INADEQUATE DIETARY IRON INTAKE: ICD-10-CM

## 2018-11-06 RX ORDER — PNV NO.95/FERROUS FUM/FOLIC AC 28MG-0.8MG
TABLET ORAL
Qty: 60 TABLET | Refills: 5 | Status: SHIPPED | OUTPATIENT
Start: 2018-11-06 | End: 2020-06-12 | Stop reason: SDUPTHER

## 2018-12-10 ENCOUNTER — TRANSCRIBE ORDERS (OUTPATIENT)
Dept: ADMINISTRATIVE | Facility: HOSPITAL | Age: 73
End: 2018-12-10

## 2018-12-10 DIAGNOSIS — N13.30 HYDRONEPHROSIS, UNSPECIFIED HYDRONEPHROSIS TYPE: Primary | ICD-10-CM

## 2018-12-12 ENCOUNTER — TELEPHONE (OUTPATIENT)
Dept: FAMILY MEDICINE CLINIC | Facility: CLINIC | Age: 73
End: 2018-12-12

## 2018-12-12 NOTE — TELEPHONE ENCOUNTER
---I called saroj  And because of insurans e she has changed  Providers she has wilfrid with several different providers                    -- Message from RAFIQ Montanez sent at 12/11/2018 11:35 AM EST -----  Please inform the patient that her chest x-ray from [facility] was abnormal.  This was ordered by a another provider and if she is still having any symptoms she should come into our office for an office visit.

## 2018-12-13 ENCOUNTER — APPOINTMENT (OUTPATIENT)
Dept: ULTRASOUND IMAGING | Facility: HOSPITAL | Age: 73
End: 2018-12-13
Attending: INTERNAL MEDICINE

## 2019-06-07 ENCOUNTER — TELEPHONE (OUTPATIENT)
Dept: FAMILY MEDICINE CLINIC | Facility: CLINIC | Age: 74
End: 2019-06-07

## 2019-06-07 ENCOUNTER — OFFICE VISIT (OUTPATIENT)
Dept: FAMILY MEDICINE CLINIC | Facility: CLINIC | Age: 74
End: 2019-06-07

## 2019-06-07 VITALS
WEIGHT: 236 LBS | HEIGHT: 63 IN | TEMPERATURE: 97.8 F | HEART RATE: 56 BPM | OXYGEN SATURATION: 97 % | DIASTOLIC BLOOD PRESSURE: 70 MMHG | BODY MASS INDEX: 41.82 KG/M2 | SYSTOLIC BLOOD PRESSURE: 140 MMHG

## 2019-06-07 DIAGNOSIS — E21.3 HYPERPARATHYROIDISM (HCC): ICD-10-CM

## 2019-06-07 DIAGNOSIS — I87.2 VENOUS INSUFFICIENCY (CHRONIC) (PERIPHERAL): ICD-10-CM

## 2019-06-07 DIAGNOSIS — D50.8 IRON DEFICIENCY ANEMIA SECONDARY TO INADEQUATE DIETARY IRON INTAKE: ICD-10-CM

## 2019-06-07 DIAGNOSIS — F33.42 RECURRENT MAJOR DEPRESSIVE DISORDER, IN FULL REMISSION (HCC): ICD-10-CM

## 2019-06-07 DIAGNOSIS — Z79.4 TYPE 2 DIABETES MELLITUS WITH STAGE 3 CHRONIC KIDNEY DISEASE, WITH LONG-TERM CURRENT USE OF INSULIN (HCC): ICD-10-CM

## 2019-06-07 DIAGNOSIS — I10 BENIGN ESSENTIAL HYPERTENSION: ICD-10-CM

## 2019-06-07 DIAGNOSIS — E55.9 VITAMIN D DEFICIENCY: ICD-10-CM

## 2019-06-07 DIAGNOSIS — E78.2 MIXED HYPERLIPIDEMIA: ICD-10-CM

## 2019-06-07 DIAGNOSIS — I25.10 ATHEROSCLEROSIS OF NATIVE CORONARY ARTERY OF NATIVE HEART WITHOUT ANGINA PECTORIS: ICD-10-CM

## 2019-06-07 DIAGNOSIS — G57.02 NEUROPATHY OF LEFT SCIATIC NERVE: ICD-10-CM

## 2019-06-07 DIAGNOSIS — G56.03 BILATERAL CARPAL TUNNEL SYNDROME: ICD-10-CM

## 2019-06-07 DIAGNOSIS — Z13.820 OSTEOPOROSIS SCREENING: ICD-10-CM

## 2019-06-07 DIAGNOSIS — E11.319 DIABETIC RETINOPATHY OF LEFT EYE ASSOCIATED WITH TYPE 2 DIABETES MELLITUS, MACULAR EDEMA PRESENCE UNSPECIFIED, UNSPECIFIED RETINOPATHY SEVERITY (HCC): ICD-10-CM

## 2019-06-07 DIAGNOSIS — D50.8 IRON DEFICIENCY ANEMIA DUE TO DIETARY CAUSES: ICD-10-CM

## 2019-06-07 DIAGNOSIS — M51.36 DDD (DEGENERATIVE DISC DISEASE), LUMBAR: ICD-10-CM

## 2019-06-07 DIAGNOSIS — N18.30 TYPE 2 DIABETES MELLITUS WITH STAGE 3 CHRONIC KIDNEY DISEASE, WITH LONG-TERM CURRENT USE OF INSULIN (HCC): ICD-10-CM

## 2019-06-07 DIAGNOSIS — M17.12 PRIMARY OSTEOARTHRITIS OF LEFT KNEE: ICD-10-CM

## 2019-06-07 DIAGNOSIS — Z00.00 MEDICARE ANNUAL WELLNESS VISIT, SUBSEQUENT: Primary | ICD-10-CM

## 2019-06-07 DIAGNOSIS — Z12.39 BREAST CANCER SCREENING: ICD-10-CM

## 2019-06-07 DIAGNOSIS — IMO0002 TYPE 2 DIABETES MELLITUS, UNCONTROLLED, WITH NEUROPATHY: ICD-10-CM

## 2019-06-07 DIAGNOSIS — N18.30 CHRONIC RENAL DISEASE, STAGE III (HCC): ICD-10-CM

## 2019-06-07 DIAGNOSIS — N25.81 HYPERPARATHYROIDISM DUE TO RENAL INSUFFICIENCY (HCC): ICD-10-CM

## 2019-06-07 DIAGNOSIS — E11.22 TYPE 2 DIABETES MELLITUS WITH STAGE 3 CHRONIC KIDNEY DISEASE, WITH LONG-TERM CURRENT USE OF INSULIN (HCC): ICD-10-CM

## 2019-06-07 PROCEDURE — 99397 PER PM REEVAL EST PAT 65+ YR: CPT | Performed by: PHYSICIAN ASSISTANT

## 2019-06-07 PROCEDURE — G0439 PPPS, SUBSEQ VISIT: HCPCS | Performed by: PHYSICIAN ASSISTANT

## 2019-06-07 PROCEDURE — 96160 PT-FOCUSED HLTH RISK ASSMT: CPT | Performed by: PHYSICIAN ASSISTANT

## 2019-06-07 RX ORDER — METOPROLOL TARTRATE 100 MG/1
100 TABLET ORAL 2 TIMES DAILY
Qty: 180 TABLET | Refills: 3 | Status: SHIPPED | OUTPATIENT
Start: 2019-06-07 | End: 2020-06-12 | Stop reason: SDUPTHER

## 2019-06-07 RX ORDER — FUROSEMIDE 20 MG/1
10 TABLET ORAL 2 TIMES DAILY
COMMUNITY
End: 2019-09-06

## 2019-06-07 RX ORDER — QUINAPRIL 40 MG/1
40 TABLET ORAL DAILY
Qty: 90 TABLET | Refills: 3 | Status: SHIPPED | OUTPATIENT
Start: 2019-06-07 | End: 2020-02-05

## 2019-06-07 RX ORDER — ERGOCALCIFEROL 1.25 MG/1
50000 CAPSULE ORAL WEEKLY
Qty: 30 CAPSULE | Refills: 5 | Status: SHIPPED | OUTPATIENT
Start: 2019-06-07 | End: 2020-06-12 | Stop reason: SDUPTHER

## 2019-06-07 RX ORDER — ISOSORBIDE MONONITRATE 30 MG/1
30 TABLET, EXTENDED RELEASE ORAL DAILY
Qty: 30 TABLET | Refills: 5 | Status: SHIPPED | OUTPATIENT
Start: 2019-06-07 | End: 2020-06-12 | Stop reason: SDUPTHER

## 2019-06-07 RX ORDER — ESCITALOPRAM OXALATE 10 MG/1
10 TABLET ORAL
Qty: 90 TABLET | Refills: 3 | Status: SHIPPED | OUTPATIENT
Start: 2019-06-07 | End: 2020-02-05

## 2019-06-07 RX ORDER — CLONIDINE HYDROCHLORIDE 0.2 MG/1
0.2 TABLET ORAL 3 TIMES DAILY
Qty: 270 TABLET | Refills: 3 | Status: SHIPPED | OUTPATIENT
Start: 2019-06-07 | End: 2020-06-12 | Stop reason: SDUPTHER

## 2019-06-07 RX ORDER — ATORVASTATIN CALCIUM 40 MG/1
40 TABLET, FILM COATED ORAL DAILY
Qty: 90 TABLET | Refills: 3 | Status: SHIPPED | OUTPATIENT
Start: 2019-06-07 | End: 2020-06-12 | Stop reason: SDUPTHER

## 2019-06-07 RX ORDER — AMLODIPINE BESYLATE 10 MG/1
10 TABLET ORAL DAILY
Qty: 30 TABLET | Refills: 5 | Status: SHIPPED | OUTPATIENT
Start: 2019-06-07 | End: 2020-02-05

## 2019-06-07 NOTE — TELEPHONE ENCOUNTER
---pt has not been in dr mensah office since 2014                        -- Message from RAFIQ Montanez sent at 6/7/2019 10:11 AM EDT -----   Call dr mensah and get her last diabetic eye exam 2019

## 2019-06-07 NOTE — PROGRESS NOTES
QUICK REFERENCE INFORMATION:  The ABCs of the Annual Wellness Visit    Subsequent Medicare Wellness Visit     HEALTH RISK ASSESSMENT    : 1945    Recent Hospitalizations:  Recently treated at the following:  Other: [facility].      Current Medical Providers:  Patient Care Team:  Lexie Dolan PA as PCP - General (Family Medicine)        Smoking Status:  Social History     Tobacco Use   Smoking Status Never Smoker   Smokeless Tobacco Never Used       Alcohol Consumption:  Social History     Substance and Sexual Activity   Alcohol Use No       Depression Screen:   PHQ-2/PHQ-9 Depression Screening 2019   Little interest or pleasure in doing things 0   Feeling down, depressed, or hopeless 0   Trouble falling or staying asleep, or sleeping too much -   Feeling tired or having little energy -   Poor appetite or overeating -   Feeling bad about yourself - or that you are a failure or have let yourself or your family down -   Trouble concentrating on things, such as reading the newspaper or watching television -   Moving or speaking so slowly that other people could have noticed. Or the opposite - being so fidgety or restless that you have been moving around a lot more than usual -   Thoughts that you would be better off dead, or of hurting yourself in some way -   Total Score 0       Health Habits and Functional and Cognitive Screening:  Functional & Cognitive Status 2019   Do you have difficulty preparing food and eating? No   Do you have difficulty bathing yourself, getting dressed or grooming yourself? No   Do you have difficulty using the toilet? No   Do you have difficulty moving around from place to place? No   Do you have trouble with steps or getting out of a bed or a chair? No   In the past year have you fallen or experienced a near fall? Yes   Current Diet Unhealthy Diet   Dental Exam Not up to date   Eye Exam Up to date   Exercise (times per week) 0 times per week   Current Exercise  Activities Include None   Do you need help using the phone?  No   Are you deaf or do you have serious difficulty hearing?  No   Do you need help with transportation? No   Do you need help shopping? No   Do you need help preparing meals?  No   Do you need help with housework?  Yes   Do you need help with laundry? No   Do you need help taking your medications? No   Do you need help managing money? No   Do you ever drive or ride in a car without wearing a seat belt? Yes   Have you felt unusual stress, anger or loneliness in the last month? Yes   Who do you live with? Spouse   If you need help, do you have trouble finding someone available to you? No   Have you been bothered in the last four weeks by sexual problems? No   Do you have difficulty concentrating, remembering or making decisions? No           Does the patient have evidence of cognitive impairment? No    Asiprin use counseling: Does not need ASA (and currently is not on it)    Vision Screening (06/07/2019)   Edited by: Lexie Dolan PA    Right eye Left eye Both eyes   Without correction 20/40 20/400 20/40   She is being followed by ophthalmology Dr. Mann and Mackenzie in [place] for this issue    Hearing acuity:  Whisper test  Greater than 5 feet left ear  Greater than 5 feet right ear    The patient has a history of falls. I did complete a risk assessment for falls. A plan of care for falls was documented.    Below are four things you can do to prevent falls:   1. Begin an exercise program to improve your leg strength & balance  2. Ask your doctor or pharmacist to review your medicines   3. Get annual eye check-ups & update your eyeglasses  4. Make your home safer by:  · Removing clutter & tripping hazards  · Putting railings on all stairs & adding grab bars in the bathroom  · Having good lighting, especially on stairs    Contact your local community or senior center for information on exercise, fall prevention programs, or options for improving home  safety.    Recent Lab Results:    Lab Results   Component Value Date     (H) 07/30/2018     Lab Results   Component Value Date    HGBA1C 9.00 (H) 06/25/2018     Lab Results   Component Value Date    CHOL 100 05/17/2017    TRIG 128 06/25/2018    HDL 34 (L) 06/25/2018    VLDL 25.6 06/25/2018    LDLHDL 1.46 05/17/2017       Age-appropriate Screening Schedule:  Refer to the list below for future screening recommendations based on patient's age, sex and/or medical conditions. Orders for these recommended tests are listed in the plan section. The patient has been provided with a written plan.    Health Maintenance   Topic Date Due   • ZOSTER VACCINE (2 of 2) 09/14/2016   • PNEUMOCOCCAL VACCINES (65+ LOW/MEDIUM RISK) (2 of 2 - PCV13) 06/18/2019   • LIPID PANEL  06/25/2019   • URINE MICROALBUMIN  07/30/2019   • INFLUENZA VACCINE  08/01/2019   • HEMOGLOBIN A1C  12/07/2019   • DIABETIC FOOT EXAM  02/13/2020   • DIABETIC EYE EXAM  05/14/2020   • MAMMOGRAM  06/07/2020   • DXA SCAN  06/07/2021   • COLONOSCOPY  02/20/2026   • TDAP/TD VACCINES (2 - Td) 03/20/2027        Subjective   History of Present Illness    Britta Lee is a 73 y.o. female who presents for an Annual Wellness Visit.    Hypertension-stable at home with blood pressure less than 130/80 reported with the use of Norvasc 10 mg, metoprolol, quinapril, and Lasix    Coronary artery atherosclerosis-stable with risk factor modification including aspirin and Lipitor    Hyperlipidemia -improved with Lipitor  Lab Results   Component Value Date    CHOL 100 05/17/2017    CHLPL 128 06/25/2018    TRIG 128 06/25/2018    HDL 34 (L) 06/25/2018    LDL 68 06/25/2018     Chronic peripheral venous insufficiency-stable with Lasix    Diabetes mellitus type 2- not at goal with Toujeo and NovoLog.  She does have associated diabetic neuropathy, diabetic retinopathy of the left eye, and nephropathy with stage III chronic kidney disease associated with diabetes.  Lab Results    Component Value Date    HGBA1C 9.00 (H) 06/25/2018     Hyperparathyroidism-continue to monitor this issue due to renal insufficiency     carpal tunnel syndrome- she complains of right wrist and hand pain.  Pain is described as moderate burning.  Onset greater than 6 months.    Lumbar degenerative disc disease-stable with Tylenol PRN    Left knee osteoarthritis- improved with left knee replacement.  Advised to start Tylenol as needed    Iron deficiency anemia-stable with iron supplementation    The following portions of the patient's history were reviewed and updated as appropriate: allergies, current medications, past family history, past medical history, past social history, past surgical history and problem list.    Outpatient Medications Prior to Visit   Medication Sig Dispense Refill   • aspirin 81 MG tablet Take 1 tablet by mouth Daily. 30 tablet 5   • diphenhydrAMINE-acetaminophen (TYLENOL PM)  MG tablet per tablet Take 1 tablet by mouth At Night As Needed for Sleep.     • Ferrous Sulfate (IRON) 325 (65 Fe) MG tablet TAKE 1 Tablet BY MOUTH TWICE DAILY 60 tablet 5   • furosemide (LASIX) 10 MG half tablet Take 10 mg by mouth 2 (Two) Times a Day.     • amLODIPine (NORVASC) 10 MG tablet Take 1 tablet by mouth Daily. 30 tablet 5   • atorvastatin (LIPITOR) 40 MG tablet TAKE 1 TABLET EVERY DAY 90 tablet 3   • CloNIDine (CATAPRES) 0.2 MG tablet TAKE 1 TABLET THREE TIMES DAILY 270 tablet 3   • escitalopram (LEXAPRO) 10 MG tablet TAKE 1 TABLET AT BEDTIME 90 tablet 3   • insulin aspart (novoLOG FLEXPEN) 100 UNIT/ML solution pen-injector sc pen Inject 15 Units under the skin 3 (Three) Times a Day Before Meals. 5 pen 5   • Insulin Glargine (TOUJEO SOLOSTAR) 300 UNIT/ML solution pen-injector Inject 40 Units under the skin Daily. 10 pen 0   • isosorbide mononitrate (IMDUR) 30 MG 24 hr tablet Take 1 tablet by mouth Daily. 30 tablet 5   • metoprolol tartrate (LOPRESSOR) 100 MG tablet TAKE 1 TABLET TWICE DAILY 180  tablet 3   • quinapril (ACCUPRIL) 40 MG tablet TAKE 1 TABLET EVERY DAY 90 tablet 3   • vitamin D (ERGOCALCIFEROL) 59145 units capsule capsule Take 1 capsule by mouth 1 (One) Time Per Week. Takes wednesdays 30 capsule 5     No facility-administered medications prior to visit.        Patient Active Problem List   Diagnosis   • Type 2 diabetes mellitus, uncontrolled, with neuropathy (CMS/[facility])   • Benign essential hypertension   • Coronary atherosclerosis of native coronary vessel   • Bilateral carpal tunnel syndrome   • Iron deficiency anemia due to dietary causes   • Diabetic retinopathy associated with type 2 diabetes mellitus (CMS/[facility])   • Hyperlipidemia   • Sciatic neuropathy   • DDD (degenerative disc disease), lumbar   • Primary osteoarthritis of left knee   • Status post total left knee replacement   • Type 2 diabetes mellitus with chronic kidney disease, with long-term current use of insulin (CMS/[facility])   • Hyperparathyroidism due to renal insufficiency (CMS/[facility])   • Chronic renal disease, stage III (CMS/[facility])   • Venous insufficiency (chronic) (peripheral)       Advance Care Planning:  Patient does not have an advance directive - information provided to the patient today    Identification of Risk Factors:  Risk factors include: cardiovascular risk, increased fall risk, vision limitations and polypharmacy.    Review of Systems   Constitutional: Negative for activity change, appetite change and fever.   HENT: Negative for ear pain, sinus pressure and sore throat.    Eyes: Negative for pain and visual disturbance.   Respiratory: Negative for cough and chest tightness.    Cardiovascular: Negative for chest pain and palpitations.   Gastrointestinal: Negative for abdominal pain, constipation, diarrhea, nausea and vomiting.   Endocrine: Negative for polydipsia and polyuria.   Genitourinary: Negative for dysuria and frequency.   Musculoskeletal: Positive for arthralgias and back pain. Negative for myalgias.        Wrist  "pain right   Skin: Negative for color change and rash.   Allergic/Immunologic: Negative for food allergies and immunocompromised state.   Neurological: Negative for dizziness, syncope and headaches.   Hematological: Negative for adenopathy. Does not bruise/bleed easily.   Psychiatric/Behavioral: Negative for hallucinations and suicidal ideas. The patient is not nervous/anxious.        Compared to one year ago, the patient feels her physical health is the same.  Compared to one year ago, the patient feels her mental health is the same.    Objective     Physical Exam   Constitutional: She is oriented to person, place, and time. She appears well-developed and well-nourished.   HENT:   Head: Normocephalic and atraumatic.   Nose: Nose normal.   Mouth/Throat: Oropharynx is clear and moist.   Eyes: Conjunctivae and EOM are normal. Pupils are equal, round, and reactive to light.   Neck: Normal range of motion. Neck supple. No tracheal deviation present. No thyromegaly present.   Cardiovascular: Normal rate, regular rhythm, normal heart sounds and intact distal pulses.   No murmur heard.  Pulmonary/Chest: Effort normal and breath sounds normal. No respiratory distress. She has no wheezes.   Abdominal: Soft. Bowel sounds are normal. There is no tenderness. There is no guarding.   Musculoskeletal: Normal range of motion. She exhibits tenderness. She exhibits no edema.   Positive Phalen sign right wrist   Lymphadenopathy:     She has no cervical adenopathy.   Neurological: She is alert and oriented to person, place, and time.   Skin: Skin is warm and dry. No rash noted.   Psychiatric: She has a normal mood and affect. Her behavior is normal.   Nursing note and vitals reviewed.      Vitals:    06/07/19 0933   BP: 140/70   Pulse: 56   Temp: 97.8 °F (36.6 °C)   TempSrc: Oral   SpO2: 97%   Weight: 107 kg (236 lb)   Height: 160 cm (62.99\")   PainSc:   8   PainLoc: Wrist     Pain Score    06/07/19 0933   PainSc:   8   PainLoc: Wrist "       Patient's Body mass index is 41.82 kg/m². BMI is above normal parameters. Recommendations include: educational material and exercise counseling.      Assessment/Plan   Patient Self-Management and Personalized Health Advice  The patient has been provided with information about: diet, exercise, fall prevention and designing advance directives and preventive services including:   · Advance directive, Bone densitometry screening, Exercise counseling provided, Fall Risk assessment done, Fall Risk plan of care done, Screening mammography, referral placed, Zostavax vaccine (Herpes Zoster).    Visit Diagnoses:    ICD-10-CM ICD-9-CM   1. Medicare annual wellness visit, subsequent Z00.00 V70.0   2. Benign essential hypertension I10 401.1   3. Atherosclerosis of native coronary artery of native heart without angina pectoris I25.10 414.01   4. Mixed hyperlipidemia E78.2 272.2   5. Venous insufficiency (chronic) (peripheral) I87.2 459.81   6. Type 2 diabetes mellitus, uncontrolled, with neuropathy (CMS/[facility]) E11.40 250.62    E11.65 357.2   7. Diabetic retinopathy of left eye associated with type 2 diabetes mellitus, macular edema presence unspecified, unspecified retinopathy severity (CMS/[facility]) E11.319 250.50     362.01   8. Type 2 diabetes mellitus with stage 3 chronic kidney disease, with long-term current use of insulin (CMS/[facility]) E11.22 250.40    N18.3 585.3    Z79.4 V58.67   9. Hyperparathyroidism due to renal insufficiency (CMS/[facility]) N25.81 588.81   10. Bilateral carpal tunnel syndrome G56.03 354.0   11. Neuropathy of left sciatic nerve G57.02 355.0   12. DDD (degenerative disc disease), lumbar M51.36 722.52   13. Primary osteoarthritis of left knee M17.12 715.16   14. Chronic renal disease, stage III (CMS/[facility]) N18.3 585.3   15. Iron deficiency anemia due to dietary causes D50.8 280.1   16. Breast cancer screening Z12.31 V76.10   17. Osteoporosis screening Z13.820 V82.81   18. Hyperparathyroidism (CMS/[facility])  E21.3 252.00    19. Mixed hyperlipidemia E78.2 272.2   20. Benign essential hypertension I10 401.1   21. Recurrent major depressive disorder, in full remission (CMS/[facility]) F33.42 296.36   22. Iron deficiency anemia secondary to inadequate dietary iron intake D50.8 280.1   23. Atherosclerosis of native coronary artery of native heart without angina pectoris I25.10 414.01   24. Vitamin D deficiency E55.9 268.9     Prescription written for cock-up wrist splint.  She was also taught stretches for the median nerve to help with carpal tunnel syndrome.  She will be referred to orthopedist for further evaluation and treatment.    Orders Placed This Encounter   Procedures   • DEXA Bone Density Axial     Standing Status:   Future     Standing Expiration Date:   6/7/2020     Order Specific Question:   Reason for Exam:     Answer:   screen osteoporosis   • Mammo Screening Digital Tomosynthesis Bilateral With CAD     Standing Status:   Future     Standing Expiration Date:   6/7/2020     Order Specific Question:   Reason for Exam:     Answer:   screen breast cancer   • Comprehensive Metabolic Panel     Standing Status:   Future     Standing Expiration Date:   6/7/2020   • Hemoglobin A1c     Standing Status:   Future     Standing Expiration Date:   6/6/2020   • Lipid Panel     Standing Status:   Future     Standing Expiration Date:   6/6/2020   • Ambulatory Referral to Orthopedic Surgery     Referral Priority:   Routine     Referral Type:   Consultation     Referral Reason:   Specialty Services Required     Referred to Provider:   Feroz Johnson MD     Requested Specialty:   Orthopedic Surgery     Number of Visits Requested:   1       Outpatient Encounter Medications as of 6/7/2019   Medication Sig Dispense Refill   • amLODIPine (NORVASC) 10 MG tablet Take 1 tablet by mouth Daily. 30 tablet 5   • aspirin 81 MG tablet Take 1 tablet by mouth Daily. 30 tablet 5   • atorvastatin (LIPITOR) 40 MG tablet Take 1 tablet by mouth Daily. 90 tablet  3   • CloNIDine (CATAPRES) 0.2 MG tablet Take 1 tablet by mouth 3 (Three) Times a Day. 270 tablet 3   • diphenhydrAMINE-acetaminophen (TYLENOL PM)  MG tablet per tablet Take 1 tablet by mouth At Night As Needed for Sleep.     • escitalopram (LEXAPRO) 10 MG tablet Take 1 tablet by mouth every night at bedtime. 90 tablet 3   • Ferrous Sulfate (IRON) 325 (65 Fe) MG tablet TAKE 1 Tablet BY MOUTH TWICE DAILY 60 tablet 5   • furosemide (LASIX) 10 MG half tablet Take 10 mg by mouth 2 (Two) Times a Day.     • insulin aspart (novoLOG FLEXPEN) 100 UNIT/ML solution pen-injector sc pen Inject 15 Units under the skin into the appropriate area as directed 3 (Three) Times a Day Before Meals. 5 pen 5   • Insulin Glargine (TOUJEO SOLOSTAR) 300 UNIT/ML solution pen-injector Inject 40 Units under the skin into the appropriate area as directed Daily. 10 pen 0   • isosorbide mononitrate (IMDUR) 30 MG 24 hr tablet Take 1 tablet by mouth Daily. 30 tablet 5   • metoprolol tartrate (LOPRESSOR) 100 MG tablet Take 1 tablet by mouth 2 (Two) Times a Day. 180 tablet 3   • quinapril (ACCUPRIL) 40 MG tablet Take 1 tablet by mouth Daily. 90 tablet 3   • vitamin D (ERGOCALCIFEROL) 16339 units capsule capsule Take 1 capsule by mouth 1 (One) Time Per Week. Takes wednesdays 30 capsule 5   • [DISCONTINUED] amLODIPine (NORVASC) 10 MG tablet Take 1 tablet by mouth Daily. 30 tablet 5   • [DISCONTINUED] atorvastatin (LIPITOR) 40 MG tablet TAKE 1 TABLET EVERY DAY 90 tablet 3   • [DISCONTINUED] CloNIDine (CATAPRES) 0.2 MG tablet TAKE 1 TABLET THREE TIMES DAILY 270 tablet 3   • [DISCONTINUED] escitalopram (LEXAPRO) 10 MG tablet TAKE 1 TABLET AT BEDTIME 90 tablet 3   • [DISCONTINUED] insulin aspart (novoLOG FLEXPEN) 100 UNIT/ML solution pen-injector sc pen Inject 15 Units under the skin 3 (Three) Times a Day Before Meals. 5 pen 5   • [DISCONTINUED] Insulin Glargine (TOUJEO SOLOSTAR) 300 UNIT/ML solution pen-injector Inject 40 Units under the skin Daily.  10 pen 0   • [DISCONTINUED] isosorbide mononitrate (IMDUR) 30 MG 24 hr tablet Take 1 tablet by mouth Daily. 30 tablet 5   • [DISCONTINUED] metoprolol tartrate (LOPRESSOR) 100 MG tablet TAKE 1 TABLET TWICE DAILY 180 tablet 3   • [DISCONTINUED] quinapril (ACCUPRIL) 40 MG tablet TAKE 1 TABLET EVERY DAY 90 tablet 3   • [DISCONTINUED] vitamin D (ERGOCALCIFEROL) 74807 units capsule capsule Take 1 capsule by mouth 1 (One) Time Per Week. Takes wednesdays 30 capsule 5   • zoster vaccine live (ZOSTAVAX) 11877 UNT/0.65ML reconstituted suspension Inject 0.65 mL under the skin into the appropriate area as directed 1 (One) Time for 1 dose. Repeat in 6 month 1 each 1   • [DISCONTINUED] zoster vaccine live (ZOSTAVAX) 71512 UNT/0.65ML reconstituted suspension Inject 0.65 mL under the skin into the appropriate area as directed 1 (One) Time for 1 dose. Repeat in 6 month 1 each 1     No facility-administered encounter medications on file as of 6/7/2019.        Reviewed use of high risk medication in the elderly: yes  Reviewed for potential of harmful drug interactions in the elderly: yes    Follow Up:  Return in about 3 months (around 9/7/2019) for Followup with Lexie.     An After Visit Summary and PPPS with all of these plans were given to the patient.

## 2019-06-07 NOTE — PATIENT INSTRUCTIONS
Mammogram  A mammogram is an X-ray of the breasts that is done to check for changes that are not normal. This test can screen for and find any changes that may suggest breast cancer. This test can also help to find other changes and variations in the breast.  What happens before the procedure?  · Have this test done about 1-2 weeks after your period. This is usually when your breasts are the least tender.  · If you are visiting a new doctor or clinic, send any past mammogram images to your new doctor's office.  · Wash your breasts and under your arms the day of the test.  · Do not use deodorants, perfumes, lotions, or powders on the day of the test.  · Take off any jewelry from your neck.  · Wear clothes that you can change into and out of easily.  What happens during the procedure?  · You will undress from the waist up. You will put on a gown.  · You will  front of the X-ray machine.  · Each breast will be placed between two plastic or glass plates. The plates will press down on your breast for a few seconds. Try to stay as relaxed as possible. This does not cause any harm to your breasts. Any discomfort you feel will be very brief.  · X-rays will be taken from different angles of each breast.  The procedure may vary among doctors and hospitals.  What happens after the procedure?  · The mammogram will be looked at by a specialist (radiologist).  · You may need to do certain parts of the test again. This depends on the quality of the images.  · Ask when your test results will be ready. Make sure you get your test results.  · You may go back to your normal activities.  This information is not intended to replace advice given to you by your health care provider. Make sure you discuss any questions you have with your health care provider.  Document Released: 03/16/2010 Document Revised: 08/02/2018 Document Reviewed: 02/26/2016  Elsevier Interactive Patient Education © 2019 [employer]  Fall Prevention in  the Home, Adult  Falls can cause injuries. They can happen to people of all ages. There are many things you can do to make your home safe and to help prevent falls. Ask for help when making these changes, if needed.  What actions can I take to prevent falls?  General Instructions  · Use good lighting in all rooms. Replace any light bulbs that burn out.  · Turn on the lights when you go into a dark area. Use night-lights.  · Keep items that you use often in easy-to-reach places. Lower the shelves around your home if necessary.  · Set up your furniture so you have a clear path. Avoid moving your furniture around.  · Do not have throw rugs and other things on the floor that can make you trip.  · Avoid walking on wet floors.  · If any of your floors are uneven, fix them.  · Add color or contrast paint or tape to clearly eder and help you see:  ? Any grab bars or handrails.  ? First and last steps of stairways.  ? Where the edge of each step is.  · If you use a stepladder:  ? Make sure that it is fully opened. Do not climb a closed stepladder.  ? Make sure that both sides of the stepladder are locked into place.  ? Ask someone to hold the stepladder for you while you use it.  · If there are any pets around you, be aware of where they are.  What can I do in the bathroom?  · Keep the floor dry. Clean up any water that spills onto the floor as soon as it happens.  · Remove soap buildup in the tub or shower regularly.  · Use non-skid mats or decals on the floor of the tub or shower.  · Attach bath mats securely with double-sided, non-slip rug tape.  · If you need to sit down in the shower, use a plastic, non-slip stool.  · Install grab bars by the toilet and in the tub and shower. Do not use towel bars as grab bars.  What can I do in the bedroom?  · Make sure that you have a light by your bed that is easy to reach.  · Do not use any sheets or blankets that are too big for your bed. They should not hang down onto the  floor.  · Have a firm chair that has side arms. You can use this for support while you get dressed.  What can I do in the kitchen?  · Clean up any spills right away.  · If you need to reach something above you, use a strong step stool that has a grab bar.  · Keep electrical cords out of the way.  · Do not use floor polish or wax that makes floors slippery. If you must use wax, use non-skid floor wax.  What can I do with my stairs?  · Do not leave any items on the stairs.  · Make sure that you have a light switch at the top of the stairs and the bottom of the stairs. If you do not have them, ask someone to add them for you.  · Make sure that there are handrails on both sides of the stairs, and use them. Fix handrails that are broken or loose. Make sure that handrails are as long as the stairways.  · Install non-slip stair treads on all stairs in your home.  · Avoid having throw rugs at the top or bottom of the stairs. If you do have throw rugs, attach them to the floor with carpet tape.  · Choose a carpet that does not hide the edge of the steps on the stairway.  · Check any carpeting to make sure that it is firmly attached to the stairs. Fix any carpet that is loose or worn.  What can I do on the outside of my home?  · Use bright outdoor lighting.  · Regularly fix the edges of walkways and driveways and fix any cracks.  · Remove anything that might make you trip as you walk through a door, such as a raised step or threshold.  · Trim any bushes or trees on the path to your home.  · Regularly check to see if handrails are loose or broken. Make sure that both sides of any steps have handrails.  · Install guardrails along the edges of any raised decks and porches.  · Clear walking paths of anything that might make someone trip, such as tools or rocks.  · Have any leaves, snow, or ice cleared regularly.  · Use sand or salt on walking paths during winter.  · Clean up any spills in your garage right away. This includes  grease or oil spills.  What other actions can I take?  · Wear shoes that:  ? Have a low heel. Do not wear high heels.  ? Have rubber bottoms.  ? Are comfortable and fit you well.  ? Are closed at the toe. Do not wear open-toe sandals.  · Use tools that help you move around (mobility aids) if they are needed. These include:  ? Canes.  ? Walkers.  ? Scooters.  ? Crutches.  · Review your medicines with your doctor. Some medicines can make you feel dizzy. This can increase your chance of falling.  Ask your doctor what other things you can do to help prevent falls.  Where to find more information  · Centers for Disease Control and Prevention, [place]: https://cdc.gov  · National [place] on Aging: https://gf9rvnu.verenice.nih.gov  Contact a doctor if:  · You are afraid of falling at home.  · You feel weak, drowsy, or dizzy at home.  · You fall at home.  Summary  · There are many simple things that you can do to make your home safe and to help prevent falls.  · Ways to make your home safe include removing tripping hazards and installing grab bars in the bathroom.  · Ask for help when making these changes in your home.  This information is not intended to replace advice given to you by your health care provider. Make sure you discuss any questions you have with your health care provider.  Document Released: 10/14/2010 Document Revised: 08/02/2018 Document Reviewed: 08/02/2018  [employer] Interactive Patient Education © 2019 [employer]  Carbohydrate Counting for Diabetes Mellitus, Adult  Carbohydrate counting is a method of keeping track of how many carbohydrates you eat. Eating carbohydrates naturally increases the amount of sugar (glucose) in the blood. Counting how many carbohydrates you eat helps keep your blood glucose within normal limits, which helps you manage your diabetes (diabetes mellitus).  It is important to know how many carbohydrates you can safely have in each meal. This is different for every person. A diet  "and nutrition specialist (registered dietitian) can help you make a meal plan and calculate how many carbohydrates you should have at each meal and snack.  Carbohydrates are found in the following foods:  · Grains, such as breads and cereals.  · Dried beans and soy products.  · Starchy vegetables, such as potatoes, peas, and corn.  · Fruit and fruit juices.  · Milk and yogurt.  · Sweets and snack foods, such as cake, cookies, candy, chips, and soft drinks.    How do I count carbohydrates?  There are two ways to count carbohydrates in food. You can use either of the methods or a combination of both.  Reading \"Nutrition Facts\" on packaged food  The \"Nutrition Facts\" list is included on the labels of almost all packaged foods and beverages in the [place] It includes:  · The serving size.  · Information about nutrients in each serving, including the grams (g) of carbohydrate per serving.    To use the “Nutrition Facts\":  · Decide how many servings you will have.  · Multiply the number of servings by the number of carbohydrates per serving.  · The resulting number is the total amount of carbohydrates that you will be having.    Learning standard serving sizes of other foods  When you eat carbohydrate foods that are not packaged or do not include \"Nutrition Facts\" on the label, you need to measure the servings in order to count the amount of carbohydrates:  · Measure the foods that you will eat with a food scale or measuring cup, if needed.  · Decide how many standard-size servings you will eat.  · Multiply the number of servings by 15. Most carbohydrate-rich foods have about 15 g of carbohydrates per serving.  ? For example, if you eat 8 oz (170 g) of strawberries, you will have eaten 2 servings and 30 g of carbohydrates (2 servings x 15 g = 30 g).  · For foods that have more than one food mixed, such as soups and casseroles, you must count the carbohydrates in each food that is included.    The following list contains " standard serving sizes of common carbohydrate-rich foods. Each of these servings has about 15 g of carbohydrates:  · ½ hamburger bun or ½ English muffin.  · ½ oz (15 mL) syrup.  · ½ oz (14 g) jelly.  · 1 slice of bread.  · 1 six-inch tortilla.  · 3 oz (85 g) cooked rice or pasta.  · 4 oz (113 g) cooked dried beans.  · 4 oz (113 g) starchy vegetable, such as peas, corn, or potatoes.  · 4 oz (113 g) hot cereal.  · 4 oz (113 g) mashed potatoes or ¼ of a large baked potato.  · 4 oz (113 g) canned or frozen fruit.  · 4 oz (120 mL) fruit juice.  · 4-6 crackers.  · 6 chicken nuggets.  · 6 oz (170 g) unsweetened dry cereal.  · 6 oz (170 g) plain fat-free yogurt or yogurt sweetened with artificial sweeteners.  · 8 oz (240 mL) milk.  · 8 oz (170 g) fresh fruit or one small piece of fruit.  · 24 oz (680 g) popped popcorn.    Example of carbohydrate counting  Sample meal  · 3 oz (85 g) chicken breast.  · 6 oz (170 g) brown rice.  · 4 oz (113 g) corn.  · 8 oz (240 mL) milk.  · 8 oz (170 g) strawberries with sugar-free whipped topping.  Carbohydrate calculation  1. Identify the foods that contain carbohydrates:  ? Rice.  ? Corn.  ? Milk.  ? Strawberries.  2. Calculate how many servings you have of each food:  ? 2 servings rice.  ? 1 serving corn.  ? 1 serving milk.  ? 1 serving strawberries.  3. Multiply each number of servings by 15 g:  ? 2 servings rice x 15 g = 30 g.  ? 1 serving corn x 15 g = 15 g.  ? 1 serving milk x 15 g = 15 g.  ? 1 serving strawberries x 15 g = 15 g.  4. Add together all of the amounts to find the total grams of carbohydrates eaten:  ? 30 g + 15 g + 15 g + 15 g = 75 g of carbohydrates total.  Summary  · Carbohydrate counting is a method of keeping track of how many carbohydrates you eat.  · Eating carbohydrates naturally increases the amount of sugar (glucose) in the blood.  · Counting how many carbohydrates you eat helps keep your blood glucose within normal limits, which helps you manage your  diabetes.  · A diet and nutrition specialist (registered dietitian) can help you make a meal plan and calculate how many carbohydrates you should have at each meal and snack.  This information is not intended to replace advice given to you by your health care provider. Make sure you discuss any questions you have with your health care provider.  Document Released: 12/18/2006 Document Revised: 06/27/2018 Document Reviewed: 05/31/2017  [employer] Interactive Patient Education © 2019 [employer]  Advance Directive  Advance directives are legal documents that let you make choices ahead of time about your health care and medical treatment in case you become unable to communicate for yourself. Advance directives are a way for you to communicate your wishes to family, friends, and health care providers. This can help convey your decisions about end-of-life care if you become unable to communicate.  Discussing and writing advance directives should happen over time rather than all at once. Advance directives can be changed depending on your situation and what you want, even after you have signed the advance directives.  If you do not have an advance directive, some states assign family decision makers to act on your behalf based on how closely you are related to them. Each state has its own laws regarding advance directives. You may want to check with your health care provider, , or state representative about the laws in your state. There are different types of advance directives, such as:  · Medical power of .  · Living will.  · Do not resuscitate (DNR) or do not attempt resuscitation (DNAR) order.    Health care proxy and medical power of   A health care proxy, also called a health care agent, is a person who is appointed to make medical decisions for you in cases in which you are unable to make the decisions yourself. Generally, people choose someone they know well and trust to represent their  preferences. Make sure to ask this person for an agreement to act as your proxy. A proxy may have to exercise judgment in the event of a medical decision for which your wishes are not known.  A medical power of  is a legal document that names your health care proxy. Depending on the laws in your state, after the document is written, it may also need to be:  · Signed.  · Notarized.  · Dated.  · Copied.  · Witnessed.  · Incorporated into your medical record.    You may also want to appoint someone to manage your financial affairs in a situation in which you are unable to do so. This is called a durable power of  for finances. It is a separate legal document from the durable power of  for health care. You may choose the same person or someone different from your health care proxy to act as your agent in financial matters.  If you do not appoint a proxy, or if there is a concern that the proxy is not acting in your best interests, a court-appointed guardian may be designated to act on your behalf.  Living will  A living will is a set of instructions documenting your wishes about medical care when you cannot express them yourself. Health care providers should keep a copy of your living will in your medical record. You may want to give a copy to family members or friends. To alert caregivers in case of an emergency, you can place a card in your wallet to let them know that you have a living will and where they can find it. A living will is used if you become:  · Terminally ill.  · Incapacitated.  · Unable to communicate or make decisions.    Items to consider in your living will include:  · The use or non-use of life-sustaining equipment, such as dialysis machines and breathing machines (ventilators).  · A DNR or DNAR order, which is the instruction not to use cardiopulmonary resuscitation (CPR) if breathing or heartbeat stops.  · The use or non-use of tube feeding.  · Withholding of food and  fluids.  · Comfort (palliative) care when the goal becomes comfort rather than a cure.  · Organ and tissue donation.    A living will does not give instructions for distributing your money and property if you should pass away. It is recommended that you seek the advice of a  when writing a will. Decisions about taxes, beneficiaries, and asset distribution will be legally binding. This process can relieve your family and friends of any concerns surrounding disputes or questions that may come up about the distribution of your assets.  DNR or DNAR  A DNR or DNAR order is a request not to have CPR in the event that your heart stops beating or you stop breathing. If a DNR or DNAR order has not been made and shared, a health care provider will try to help any patient whose heart has stopped or who has stopped breathing. If you plan to have surgery, talk with your health care provider about how your DNR or DNAR order will be followed if problems occur.  Summary  · Advance directives are the legal documents that allow you to make choices ahead of time about your health care and medical treatment in case you become unable to communicate for yourself.  · The process of discussing and writing advance directives should happen over time. You can change the advance directives, even after you have signed them.  · Advance directives include DNR or DNAR orders, living rojas, and designating an agent as your medical power of .  This information is not intended to replace advice given to you by your health care provider. Make sure you discuss any questions you have with your health care provider.  Document Released: 03/26/2009 Document Revised: 11/06/2017 Document Reviewed: 11/06/2017  [employer] Interactive Patient Education © 2019 [employer]    Medicare Wellness  Personal Prevention Plan of Service     Date of Office Visit:  06/07/2019  Encounter Provider:  RAFIQ Montanez  Place of Service:  [facility]  [facility]  Patient Name: Britta Lee  :  1945    As part of the Medicare Wellness portion of your visit today, we are providing you with this personalized preventive plan of services (PPPS). This plan is based upon recommendations of the [place] Preventive Services Task Force (USPSTF) and the Advisory Committee on Immunization Practices (ACIP).    This lists the preventive care services that should be considered, and provides dates of when you are due. Items listed as completed are up-to-date and do not require any further intervention.    Health Maintenance   Topic Date Due   • ZOSTER VACCINE (2 of 2) 2016   • PNEUMOCOCCAL VACCINES (65+ LOW/MEDIUM RISK) (2 of 2 - PCV13) 2019   • LIPID PANEL  2019   • URINE MICROALBUMIN  2019   • INFLUENZA VACCINE  2019   • HEMOGLOBIN A1C  2019   • DIABETIC FOOT EXAM  2020   • DIABETIC EYE EXAM  2020   • MEDICARE ANNUAL WELLNESS  2020   • MAMMOGRAM  2020   • DXA SCAN  2021   • COLONOSCOPY  2026   • TDAP/TD VACCINES (2 - Td) 2027       Orders Placed This Encounter   Procedures   • DEXA Bone Density Axial     Standing Status:   Future     Standing Expiration Date:   2020     Order Specific Question:   Reason for Exam:     Answer:   screen osteoporosis   • Mammo Screening Digital Tomosynthesis Bilateral With CAD     Standing Status:   Future     Standing Expiration Date:   2020     Order Specific Question:   Reason for Exam:     Answer:   screen breast cancer   • Comprehensive Metabolic Panel     Standing Status:   Future     Standing Expiration Date:   2020   • Hemoglobin A1c     Standing Status:   Future     Standing Expiration Date:   2020   • Lipid Panel     Standing Status:   Future     Standing Expiration Date:   2020   • Ambulatory Referral to Orthopedic Surgery     Referral Priority:   Routine     Referral Type:   Consultation     Referral  Reason:   Specialty Services Required     Referred to Provider:   Feroz Johnson MD     Requested Specialty:   Orthopedic Surgery     Number of Visits Requested:   1       No Follow-up on file.

## 2019-06-27 ENCOUNTER — LAB (OUTPATIENT)
Dept: FAMILY MEDICINE CLINIC | Facility: CLINIC | Age: 74
End: 2019-06-27

## 2019-06-27 DIAGNOSIS — E78.2 MIXED HYPERLIPIDEMIA: ICD-10-CM

## 2019-06-27 DIAGNOSIS — IMO0002 TYPE 2 DIABETES MELLITUS, UNCONTROLLED, WITH NEUROPATHY: ICD-10-CM

## 2019-06-27 DIAGNOSIS — I10 BENIGN ESSENTIAL HYPERTENSION: ICD-10-CM

## 2019-06-27 DIAGNOSIS — N18.30 CHRONIC KIDNEY DISEASE, STAGE III (MODERATE) (HCC): Primary | ICD-10-CM

## 2019-06-27 LAB
ALBUMIN SERPL-MCNC: 3.6 G/DL (ref 3.5–5.2)
ALBUMIN/GLOB SERPL: 0.8 G/DL
ALP SERPL-CCNC: 176 U/L (ref 39–117)
ALT SERPL W P-5'-P-CCNC: 29 U/L (ref 1–33)
ANION GAP SERPL CALCULATED.3IONS-SCNC: 13.9 MMOL/L (ref 5–15)
ANION GAP SERPL CALCULATED.3IONS-SCNC: 14.9 MMOL/L (ref 5–15)
AST SERPL-CCNC: 29 U/L (ref 1–32)
BILIRUB SERPL-MCNC: 0.3 MG/DL (ref 0.2–1.2)
BUN BLD-MCNC: 42 MG/DL (ref 8–23)
BUN BLD-MCNC: 43 MG/DL (ref 8–23)
BUN/CREAT SERPL: 21.2 (ref 7–25)
BUN/CREAT SERPL: 23.1 (ref 7–25)
CALCIUM SPEC-SCNC: 9.7 MG/DL (ref 8.6–10.5)
CALCIUM SPEC-SCNC: 9.9 MG/DL (ref 8.6–10.5)
CHLORIDE SERPL-SCNC: 101 MMOL/L (ref 98–107)
CHLORIDE SERPL-SCNC: 103 MMOL/L (ref 98–107)
CHOLEST SERPL-MCNC: 130 MG/DL (ref 0–200)
CO2 SERPL-SCNC: 21.1 MMOL/L (ref 22–29)
CO2 SERPL-SCNC: 22.1 MMOL/L (ref 22–29)
CREAT BLD-MCNC: 1.82 MG/DL (ref 0.57–1)
CREAT BLD-MCNC: 2.03 MG/DL (ref 0.57–1)
GFR SERPL CREATININE-BSD FRML MDRD: 24 ML/MIN/1.73
GFR SERPL CREATININE-BSD FRML MDRD: 27 ML/MIN/1.73
GLOBULIN UR ELPH-MCNC: 4.7 GM/DL
GLUCOSE BLD-MCNC: 92 MG/DL (ref 65–99)
GLUCOSE BLD-MCNC: 94 MG/DL (ref 65–99)
HBA1C MFR BLD: 8.4 % (ref 4.8–5.6)
HDLC SERPL-MCNC: 38 MG/DL (ref 40–60)
LDLC SERPL CALC-MCNC: 68 MG/DL (ref 0–100)
LDLC/HDLC SERPL: 1.78 {RATIO}
POTASSIUM BLD-SCNC: 4.2 MMOL/L (ref 3.5–5.2)
POTASSIUM BLD-SCNC: 4.4 MMOL/L (ref 3.5–5.2)
PROT SERPL-MCNC: 8.3 G/DL (ref 6–8.5)
SODIUM BLD-SCNC: 136 MMOL/L (ref 136–145)
SODIUM BLD-SCNC: 140 MMOL/L (ref 136–145)
TRIGL SERPL-MCNC: 122 MG/DL (ref 0–150)
VLDLC SERPL-MCNC: 24.4 MG/DL (ref 5–40)

## 2019-06-27 PROCEDURE — 80061 LIPID PANEL: CPT | Performed by: PHYSICIAN ASSISTANT

## 2019-06-27 PROCEDURE — 80053 COMPREHEN METABOLIC PANEL: CPT | Performed by: PHYSICIAN ASSISTANT

## 2019-06-27 PROCEDURE — 83036 HEMOGLOBIN GLYCOSYLATED A1C: CPT | Performed by: PHYSICIAN ASSISTANT

## 2019-07-03 ENCOUNTER — TELEPHONE (OUTPATIENT)
Dept: FAMILY MEDICINE CLINIC | Facility: CLINIC | Age: 74
End: 2019-07-03

## 2019-07-03 DIAGNOSIS — IMO0002 TYPE 2 DIABETES MELLITUS, UNCONTROLLED, WITH NEUROPATHY: ICD-10-CM

## 2019-07-17 ENCOUNTER — TELEPHONE (OUTPATIENT)
Dept: FAMILY MEDICINE CLINIC | Facility: CLINIC | Age: 74
End: 2019-07-17

## 2019-07-19 ENCOUNTER — HOSPITAL ENCOUNTER (OUTPATIENT)
Dept: BONE DENSITY | Facility: HOSPITAL | Age: 74
Discharge: HOME | End: 2019-07-19

## 2019-07-19 ENCOUNTER — HOSPITAL ENCOUNTER (OUTPATIENT)
Dept: MAMMOGRAPHY | Facility: HOSPITAL | Age: 74
Discharge: HOME | End: 2019-07-19
Admitting: PHYSICIAN ASSISTANT

## 2019-07-19 DIAGNOSIS — Z12.39 BREAST CANCER SCREENING: ICD-10-CM

## 2019-07-19 DIAGNOSIS — Z13.820 OSTEOPOROSIS SCREENING: ICD-10-CM

## 2019-07-19 DIAGNOSIS — E21.3 HYPERPARATHYROIDISM (HCC): ICD-10-CM

## 2019-07-19 DIAGNOSIS — M79.642 PAIN OF LEFT HAND: Primary | ICD-10-CM

## 2019-07-19 PROCEDURE — 77080 DXA BONE DENSITY AXIAL: CPT

## 2019-07-19 PROCEDURE — 77080 DXA BONE DENSITY AXIAL: CPT | Performed by: RADIOLOGY

## 2019-07-19 PROCEDURE — 77067 SCR MAMMO BI INCL CAD: CPT | Performed by: RADIOLOGY

## 2019-07-19 PROCEDURE — 77063 BREAST TOMOSYNTHESIS BI: CPT

## 2019-07-19 PROCEDURE — 77067 SCR MAMMO BI INCL CAD: CPT

## 2019-07-19 PROCEDURE — 77063 BREAST TOMOSYNTHESIS BI: CPT | Performed by: RADIOLOGY

## 2019-07-22 ENCOUNTER — OFFICE VISIT (OUTPATIENT)
Dept: ORTHOPEDIC SURGERY | Facility: CLINIC | Age: 74
End: 2019-07-22

## 2019-07-22 ENCOUNTER — HOSPITAL ENCOUNTER (OUTPATIENT)
Dept: GENERAL RADIOLOGY | Facility: HOSPITAL | Age: 74
Discharge: HOME | End: 2019-07-22
Admitting: ORTHOPAEDIC SURGERY

## 2019-07-22 VITALS — HEIGHT: 63 IN | BODY MASS INDEX: 41.8 KG/M2 | WEIGHT: 235.89 LBS

## 2019-07-22 DIAGNOSIS — M79.642 PAIN OF LEFT HAND: ICD-10-CM

## 2019-07-22 DIAGNOSIS — M79.641 PAIN OF RIGHT HAND: ICD-10-CM

## 2019-07-22 DIAGNOSIS — R20.0 NUMBNESS OF RIGHT HAND: Primary | ICD-10-CM

## 2019-07-22 PROCEDURE — 73130 X-RAY EXAM OF HAND: CPT

## 2019-07-22 PROCEDURE — 99213 OFFICE O/P EST LOW 20 MIN: CPT | Performed by: ORTHOPAEDIC SURGERY

## 2019-07-22 PROCEDURE — 73130 X-RAY EXAM OF HAND: CPT | Performed by: RADIOLOGY

## 2019-07-22 NOTE — PROGRESS NOTES
Established New Problem      Patient: Britta Lee  YOB: 1945  Date of Encounter: 07/22/2019        Chief Complaint:   Chief Complaint   Patient presents with   • Right Hand - Numbness, Pain           HPI:   Britta Lee, 73 y.o. female, referred by Lexie Dolan PA presents today for evaluation of right hand pain and numbness.  She reports no injury.  Reports progressively worsening symptoms right hand since 2002.  She is diabetic and she attributes her current symptoms to diabetes.  In the past her glucoses run above 200 but she reports currently glucose runs around 100.  She has had EMG nerve conduction studies and reports that she has been told that she has carpal tunnel.  He has been prescribed a cock-up wrist brace but has not obtained.  She denies contralateral hand pain.  She worked in the past great deal of computer work.  She currently does not work and has not worked for the past 10 years.  She denies neck pain.      Active Problem List:  Patient Active Problem List   Diagnosis   • Type 2 diabetes mellitus, uncontrolled, with neuropathy (CMS/HCC)   • Benign essential hypertension   • Coronary atherosclerosis of native coronary vessel   • Bilateral carpal tunnel syndrome   • Iron deficiency anemia due to dietary causes   • Diabetic retinopathy associated with type 2 diabetes mellitus (CMS/HCC)   • Hyperlipidemia   • Sciatic neuropathy   • DDD (degenerative disc disease), lumbar   • Primary osteoarthritis of left knee   • Status post total left knee replacement   • Type 2 diabetes mellitus with chronic kidney disease, with long-term current use of insulin (CMS/HCC)   • Hyperparathyroidism due to renal insufficiency (CMS/HCC)   • Chronic renal disease, stage III (CMS/HCC)   • Venous insufficiency (chronic) (peripheral)         Past Medical History:  Past Medical History:   Diagnosis Date   • Anemia    • Arthritis    • Carpal tunnel syndrome    • Coronary artery disease    • Diabetes  mellitus (CMS/HCC)    • Heart disease    • High cholesterol    • History of pneumonia    • Hypertension    • Insomnia    • Osteoarthritis    • Renal insufficiency    • Sciatica          Past Surgical History:  Past Surgical History:   Procedure Laterality Date   • ABDOMINAL SURGERY     • APPENDECTOMY     • CARDIAC SURGERY     • CAROTID STENT     • CATARACT EXTRACTION     • CHOLECYSTECTOMY     • ENDOSCOPY AND COLONOSCOPY     • GALLBLADDER SURGERY     • JOINT REPLACEMENT Left 05/02/2017    [facility]  DR JOHNSON  LEFT TOTAL KNEE   • KNEE ARTHROSCOPY W/ MENISCECTOMY Right    • LAPAROSCOPIC TUBAL LIGATION     • SD TOTAL KNEE ARTHROPLASTY Left 5/2/2017    Procedure: TOTAL KNEE ARTHROPLASTY;  Surgeon: Feroz Johnson MD;  Location: [facility] OR;  Service: Orthopedics   • STERILIZATION     • TONSILLECTOMY           Family History:  Family History   Problem Relation Age of Onset   • Arthritis Mother    • Diabetes Mother    • Cancer Mother    • Heart disease Father    • Diabetes Daughter    • Diabetes Son    • Diabetes Maternal Aunt    • Heart disease Maternal Grandmother    • Breast cancer Neg Hx          Social History:  Social History     Socioeconomic History   • Marital status:      Spouse name: natalie   • Number of children: 3   • Years of education: 12   • Highest education level: Not on file   Occupational History   • Occupation: retired   Social Needs   • Financial resource strain: Somewhat hard   • Food insecurity:     Worry: Never true     Inability: Never true   • Transportation needs:     Medical: Yes     Non-medical: No   Tobacco Use   • Smoking status: Never Smoker   • Smokeless tobacco: Never Used   Substance and Sexual Activity   • Alcohol use: No   • Drug use: No   • Sexual activity: Defer     Birth control/protection: Surgical   Lifestyle   • Physical activity:     Days per week: 0 days     Minutes per session: 0 min   • Stress: Only a little     Body mass index is 41.8  "kg/m².      Medications:  Current Outpatient Medications   Medication Sig Dispense Refill   • amLODIPine (NORVASC) 10 MG tablet Take 1 tablet by mouth Daily. 30 tablet 5   • aspirin 81 MG tablet Take 1 tablet by mouth Daily. 30 tablet 5   • atorvastatin (LIPITOR) 40 MG tablet Take 1 tablet by mouth Daily. 90 tablet 3   • CloNIDine (CATAPRES) 0.2 MG tablet Take 1 tablet by mouth 3 (Three) Times a Day. 270 tablet 3   • diphenhydrAMINE-acetaminophen (TYLENOL PM)  MG tablet per tablet Take 1 tablet by mouth At Night As Needed for Sleep.     • escitalopram (LEXAPRO) 10 MG tablet Take 1 tablet by mouth every night at bedtime. 90 tablet 3   • Ferrous Sulfate (IRON) 325 (65 Fe) MG tablet TAKE 1 Tablet BY MOUTH TWICE DAILY 60 tablet 5   • furosemide (LASIX) 10 MG half tablet Take 10 mg by mouth 2 (Two) Times a Day.     • insulin aspart (novoLOG FLEXPEN) 100 UNIT/ML solution pen-injector sc pen Inject 15 Units under the skin into the appropriate area as directed 3 (Three) Times a Day Before Meals. 5 pen 5   • Insulin Glargine (TOUJEO SOLOSTAR) 300 UNIT/ML solution pen-injector Inject 40 Units under the skin into the appropriate area as directed Daily. 5 pen 0   • isosorbide mononitrate (IMDUR) 30 MG 24 hr tablet Take 1 tablet by mouth Daily. 30 tablet 5   • metoprolol tartrate (LOPRESSOR) 100 MG tablet Take 1 tablet by mouth 2 (Two) Times a Day. 180 tablet 3   • quinapril (ACCUPRIL) 40 MG tablet Take 1 tablet by mouth Daily. 90 tablet 3   • vitamin D (ERGOCALCIFEROL) 82868 units capsule capsule Take 1 capsule by mouth 1 (One) Time Per Week. Takes wednesdays 30 capsule 5     No current facility-administered medications for this visit.          Allergies:  No Known Allergies      Physical Exam:   Physical Exam  GENERAL: 73 y.o. female, alert and oriented X 3 in no acute distress.   Visit Vitals  Ht 160 cm (62.99\")   Wt 107 kg (235 lb 14.3 oz)   BMI 41.80 kg/m²     Musculoskeletal: Right hand examination feels normal " thenar tone compared to the opposite hand.  She has decreased  and pinch strength mild compared to the opposite hand.  She has negative Phalen sign negative Tinel sign with mild decreased sensation thumb index and middle fingers right hand compared to the left.      Radiology/Labs:     Xr Hand 3+ View Right    Result Date: 7/22/2019  No acute or destructive bony abnormality.  COMMUNICATION: Per this written report.  This report was finalized on 7/22/2019 8:34 AM by Dr. Francisco Javier Pacheco MD.          Assessment & Plan:   73 y.o. female with clinical findings consistent with carpal tunnel syndrome right hand.  I suspect her symptoms are at least partially related to her diabetes however her diabetes is currently well controlled and she remains symptomatic.  We have requested her EMG nerve conduction studies but not obtained.  She is given a new prescription today for a cock-up wrist brace.  She will wear this at night some during the day and follow-up in 6 weeks time.      ICD-10-CM ICD-9-CM   1. Numbness of right hand R20.0 782.0           Cc:   Lexie Dolan PA              This document has been electronically signed by Feroz Johnson MD   July 22, 2019 8:56 AM

## 2019-07-26 ENCOUNTER — TELEPHONE (OUTPATIENT)
Dept: FAMILY MEDICINE CLINIC | Facility: CLINIC | Age: 74
End: 2019-07-26

## 2019-07-26 NOTE — TELEPHONE ENCOUNTER
I called saroj informed her mammogram was negative      ----- Message from RAFIQ Montanez sent at 7/24/2019  4:17 PM EDT -----  Inform patient her mammogram was negative

## 2019-07-26 NOTE — TELEPHONE ENCOUNTER
I called saroj inform her bone density scan showed she has a slightly bone mass which increase her risk for a fracture. Lexie wants her to  otc supplementation calcium with vitamin d 1000mg  Calcium and 800 un of vitamin d.                ----- Message from RAFIQ Montanez sent at 7/24/2019  4:18 PM EDT -----  Inform patient that her DEXA scan/bone density scan showed that she does have a slightly low bone mass increasing her risk of having a fracture should she fall.  I recommend that she take calcium with vitamin D over-the-counter daily for supplementation at least 1000 mg calcium and 800 mg international units twice daily

## 2019-09-04 ENCOUNTER — OFFICE VISIT (OUTPATIENT)
Dept: ORTHOPEDIC SURGERY | Facility: CLINIC | Age: 74
End: 2019-09-04

## 2019-09-04 VITALS — WEIGHT: 232 LBS | HEIGHT: 63 IN | BODY MASS INDEX: 41.11 KG/M2

## 2019-09-04 DIAGNOSIS — M79.641 HAND PAIN, RIGHT: Primary | ICD-10-CM

## 2019-09-04 PROCEDURE — 99213 OFFICE O/P EST LOW 20 MIN: CPT | Performed by: ORTHOPAEDIC SURGERY

## 2019-09-04 RX ORDER — INSULIN DEGLUDEC INJECTION 100 U/ML
INJECTION, SOLUTION SUBCUTANEOUS
COMMUNITY
Start: 2019-09-03 | End: 2019-12-06

## 2019-09-06 ENCOUNTER — OFFICE VISIT (OUTPATIENT)
Dept: FAMILY MEDICINE CLINIC | Facility: CLINIC | Age: 74
End: 2019-09-06

## 2019-09-06 VITALS
TEMPERATURE: 98.4 F | DIASTOLIC BLOOD PRESSURE: 60 MMHG | HEART RATE: 66 BPM | WEIGHT: 229 LBS | HEIGHT: 63 IN | BODY MASS INDEX: 40.57 KG/M2 | SYSTOLIC BLOOD PRESSURE: 118 MMHG | OXYGEN SATURATION: 95 %

## 2019-09-06 DIAGNOSIS — N18.30 CHRONIC RENAL DISEASE, STAGE III (HCC): ICD-10-CM

## 2019-09-06 DIAGNOSIS — I10 BENIGN ESSENTIAL HYPERTENSION: Primary | ICD-10-CM

## 2019-09-06 DIAGNOSIS — N18.30 TYPE 2 DIABETES MELLITUS WITH STAGE 3 CHRONIC KIDNEY DISEASE, WITH LONG-TERM CURRENT USE OF INSULIN (HCC): ICD-10-CM

## 2019-09-06 DIAGNOSIS — E11.22 TYPE 2 DIABETES MELLITUS WITH STAGE 3 CHRONIC KIDNEY DISEASE, WITH LONG-TERM CURRENT USE OF INSULIN (HCC): ICD-10-CM

## 2019-09-06 DIAGNOSIS — IMO0002 TYPE 2 DIABETES MELLITUS, UNCONTROLLED, WITH NEUROPATHY: ICD-10-CM

## 2019-09-06 DIAGNOSIS — Z79.4 TYPE 2 DIABETES MELLITUS WITH STAGE 3 CHRONIC KIDNEY DISEASE, WITH LONG-TERM CURRENT USE OF INSULIN (HCC): ICD-10-CM

## 2019-09-06 DIAGNOSIS — E78.2 MIXED HYPERLIPIDEMIA: ICD-10-CM

## 2019-09-06 DIAGNOSIS — D22.30 NEVUS OF FACE: ICD-10-CM

## 2019-09-06 PROCEDURE — 99214 OFFICE O/P EST MOD 30 MIN: CPT | Performed by: PHYSICIAN ASSISTANT

## 2019-09-06 NOTE — PROGRESS NOTES
Subjective   Britta Lee is a 74 y.o. female.       Chief Complaint -hypertension    History of Present Illness -      Hypertension-  Controlled with amlodipine metoprolol and quinapril  Nephrology discontinued Lasix due to chronic kidney disease.    Hyperlipidemia- improved with Lipitor  Lab Results   Component Value Date    CHOL 130 06/27/2019    CHOL 100 05/17/2017    CHOL 171 12/01/2016    CHLPL 128 06/25/2018    CHLPL 100 03/26/2018    CHLPL 166 10/20/2015     Lab Results   Component Value Date    TRIG 122 06/27/2019    TRIG 128 06/25/2018    TRIG 70 03/26/2018     Lab Results   Component Value Date    HDL 38 (L) 06/27/2019    HDL 34 (L) 06/25/2018    HDL 37 (L) 03/26/2018     Lab Results   Component Value Date    LDL 68 06/27/2019    LDL 68 06/25/2018    LDL 49 03/26/2018     Diabetes mellitus-  Not at goal with NovoLog but she was recently changed from Toujeo to Tresiba.  Lab Results   Component Value Date    HGBA1C 8.40 (H) 06/27/2019     Diabetes mellitus type 2 with chronic kidney disease-  Chronic kidney disease is stable with discontinuing Lasix.  Followed by Dr. Johnson, nephrology.  Lab Results   Component Value Date    BUN 42 (H) 06/27/2019     Lab Results   Component Value Date    CREATININE 1.82 (H) 06/27/2019         The following portions of the patient's history were reviewed and updated as appropriate: allergies, current medications, past family history, past medical history, past social history, past surgical history and problem list.    Review of Systems   Constitutional: Negative for activity change, appetite change and fever.   HENT: Negative for ear pain, sinus pressure and sore throat.    Eyes: Negative for pain and visual disturbance.   Respiratory: Negative for cough and chest tightness.    Cardiovascular: Negative for chest pain and palpitations.   Gastrointestinal: Negative for abdominal pain, constipation, diarrhea, nausea and vomiting.   Endocrine: Negative for polydipsia and  "polyuria.   Genitourinary: Negative for dysuria and frequency.   Musculoskeletal: Negative for back pain and myalgias.   Skin: Negative for color change and rash.   Allergic/Immunologic: Negative for food allergies and immunocompromised state.   Neurological: Negative for dizziness, syncope and headaches.   Hematological: Negative for adenopathy. Does not bruise/bleed easily.   Psychiatric/Behavioral: Negative for hallucinations and suicidal ideas. The patient is not nervous/anxious.        /60   Pulse 66   Temp 98.4 °F (36.9 °C) (Oral)   Ht 160 cm (62.99\")   Wt 104 kg (229 lb)   SpO2 95%   BMI 40.58 kg/m²   Lab on 06/27/2019   Component Date Value Ref Range Status   • Glucose 06/27/2019 92  65 - 99 mg/dL Final   • BUN 06/27/2019 42* 8 - 23 mg/dL Final   • Creatinine 06/27/2019 1.82* 0.57 - 1.00 mg/dL Final   • Sodium 06/27/2019 136  136 - 145 mmol/L Final   • Potassium 06/27/2019 4.2  3.5 - 5.2 mmol/L Final   • Chloride 06/27/2019 101  98 - 107 mmol/L Final   • CO2 06/27/2019 21.1* 22.0 - 29.0 mmol/L Final   • Calcium 06/27/2019 9.9  8.6 - 10.5 mg/dL Final   • Total Protein 06/27/2019 8.3  6.0 - 8.5 g/dL Final   • Albumin 06/27/2019 3.60  3.50 - 5.20 g/dL Final   • ALT (SGPT) 06/27/2019 29  1 - 33 U/L Final   • AST (SGOT) 06/27/2019 29  1 - 32 U/L Final   • Alkaline Phosphatase 06/27/2019 176* 39 - 117 U/L Final   • Total Bilirubin 06/27/2019 0.3  0.2 - 1.2 mg/dL Final   • eGFR Non African Amer 06/27/2019 27* >60 mL/min/1.73 Final   • Globulin 06/27/2019 4.7  gm/dL Final   • A/G Ratio 06/27/2019 0.8  g/dL Final   • BUN/Creatinine Ratio 06/27/2019 23.1  7.0 - 25.0 Final   • Anion Gap 06/27/2019 13.9  5.0 - 15.0 mmol/L Final   • Hemoglobin A1C 06/27/2019 8.40* 4.80 - 5.60 % Final   • Total Cholesterol 06/27/2019 130  0 - 200 mg/dL Final   • Triglycerides 06/27/2019 122  0 - 150 mg/dL Final   • HDL Cholesterol 06/27/2019 38* 40 - 60 mg/dL Final   • LDL Cholesterol  06/27/2019 68  0 - 100 mg/dL Final   • " VLDL Cholesterol 06/27/2019 24.4  5 - 40 mg/dL Final   • LDL/HDL Ratio 06/27/2019 1.78   Final   • Glucose 06/27/2019 94  65 - 99 mg/dL Final   • BUN 06/27/2019 43* 8 - 23 mg/dL Final   • Creatinine 06/27/2019 2.03* 0.57 - 1.00 mg/dL Final   • Sodium 06/27/2019 140  136 - 145 mmol/L Final   • Potassium 06/27/2019 4.4  3.5 - 5.2 mmol/L Final   • Chloride 06/27/2019 103  98 - 107 mmol/L Final   • CO2 06/27/2019 22.1  22.0 - 29.0 mmol/L Final   • Calcium 06/27/2019 9.7  8.6 - 10.5 mg/dL Final   • eGFR Non African Amer 06/27/2019 24* >60 mL/min/1.73 Final   • BUN/Creatinine Ratio 06/27/2019 21.2  7.0 - 25.0 Final   • Anion Gap 06/27/2019 14.9  5.0 - 15.0 mmol/L Final       Physical Exam   Constitutional: She is oriented to person, place, and time. She appears well-nourished. No distress.   Obese pleasant lady   HENT:   Head: Normocephalic and atraumatic.   Nose: Nose normal.   Mouth/Throat: Oropharynx is clear and moist.   Eyes: Conjunctivae and EOM are normal. Pupils are equal, round, and reactive to light.   Neck: Normal range of motion. Neck supple. No tracheal deviation present. No thyromegaly present.   Cardiovascular: Normal rate, regular rhythm, normal heart sounds and intact distal pulses.   No murmur heard.  Pulmonary/Chest: Effort normal and breath sounds normal. No respiratory distress. She has no wheezes.   Abdominal: Soft. Bowel sounds are normal. There is no tenderness. There is no guarding.   Musculoskeletal: Normal range of motion. She exhibits no edema or tenderness.   Lymphadenopathy:     She has no cervical adenopathy.   Neurological: She is alert and oriented to person, place, and time.   Skin: Skin is warm and dry. No rash noted. She is not diaphoretic.   Approximately 1/2 x 1/2 cm black well-demarcated nevus noted right facial cheek   Psychiatric: She has a normal mood and affect. Her behavior is normal.   Nursing note and vitals reviewed.      Assessment/Plan     Diagnoses and all orders for this  visit:    Benign essential hypertension  Comments:  Continue amlodipine metoprolol and quinapril  Orders:  -     CBC & Differential; Future  -     Comprehensive Metabolic Panel; Future    Mixed hyperlipidemia  Comments:  Continue Lipitor and advised low-cholesterol diet  Orders:  -     Lipid Panel; Future    Type 2 diabetes mellitus, uncontrolled, with neuropathy (CMS/[facility])  Comments:  Continue NovoLog sliding scale and Tresiba insulin.  She was advised to keep a fasting and 2-hour postprandial blood glucose log.  Plan to continue to monitor  Orders:  -     Hemoglobin A1c; Future  -     MicroAlbumin, Urine, Random - Urine, Clean Catch; Future    Type 2 diabetes mellitus with stage 3 chronic kidney disease, with long-term current use of insulin (CMS/[facility])  Comments:  Conservative treatment is continued.  Lasix discontinued due to chronic kidney disease  Advised increase fluid intake.    Chronic renal disease, stage III (CMS/HCC)  Comments:  Conservative treatment is continued.  Lasix discontinued due to chronic kidney disease  Advised increase fluid intake.    Nevus of face  Comments:  Excisional biopsy advised.  Patient declines at this time stating she has had lesion removed in the past and it recurred.                     This document has been electronically signed by:  Lexie Dolan PA-C

## 2019-09-25 ENCOUNTER — OFFICE VISIT (OUTPATIENT)
Dept: ORTHOPEDIC SURGERY | Facility: CLINIC | Age: 74
End: 2019-09-25

## 2019-09-25 VITALS
OXYGEN SATURATION: 97 % | HEIGHT: 63 IN | BODY MASS INDEX: 40.62 KG/M2 | DIASTOLIC BLOOD PRESSURE: 70 MMHG | HEART RATE: 56 BPM | WEIGHT: 229.28 LBS | SYSTOLIC BLOOD PRESSURE: 120 MMHG

## 2019-09-25 DIAGNOSIS — G56.01 CARPAL TUNNEL SYNDROME OF RIGHT WRIST: Primary | ICD-10-CM

## 2019-09-25 PROCEDURE — 99214 OFFICE O/P EST MOD 30 MIN: CPT | Performed by: ORTHOPAEDIC SURGERY

## 2019-09-25 NOTE — PROGRESS NOTES
History and Physical      Patient: Britta Lee  YOB: 1945  Date of Encounter: 09/25/2019      Chief Complaint:   Chief Complaint   Patient presents with   • Right Hand - Pain, Follow-up         HPI:   Britta Lee, 74 y.o. female, seen today follow-up with her right hand complaints.  She has been seen in the past diagnosed with carpal tunnel syndrome.  She is known diabetic.  Last visit which we did review her EMG nerve conduction studies but she reported doing significantly better and surgery was not scheduled.  She presents today stating that symptoms have returned she describes a constant burning sensation in her right hand primarily right thumb and index fingers.  She reports difficulty sleeping at night, is now experiencing pain in her thumb index and third fingers.      Active Problem List:  Patient Active Problem List   Diagnosis   • Type 2 diabetes mellitus, uncontrolled, with neuropathy (CMS/HCC)   • Benign essential hypertension   • Coronary atherosclerosis of native coronary vessel   • Bilateral carpal tunnel syndrome   • Iron deficiency anemia due to dietary causes   • Diabetic retinopathy associated with type 2 diabetes mellitus (CMS/HCC)   • Hyperlipidemia   • Sciatic neuropathy   • DDD (degenerative disc disease), lumbar   • Primary osteoarthritis of left knee   • Status post total left knee replacement   • Type 2 diabetes mellitus with chronic kidney disease, with long-term current use of insulin (CMS/HCC)   • Hyperparathyroidism due to renal insufficiency (CMS/HCC)   • Chronic renal disease, stage III (CMS/HCC)   • Venous insufficiency (chronic) (peripheral)   • Hand pain, right         Past Medical History:  Past Medical History:   Diagnosis Date   • Anemia    • Arthritis    • Carpal tunnel syndrome    • Coronary artery disease    • Diabetes mellitus (CMS/HCC)    • Heart disease    • High cholesterol    • History of pneumonia    • Hypertension    • Insomnia    • Osteoarthritis     • Renal insufficiency    • Sciatica          Past Surgical History:  Past Surgical History:   Procedure Laterality Date   • ABDOMINAL SURGERY     • APPENDECTOMY     • CARDIAC SURGERY     • CAROTID STENT     • CATARACT EXTRACTION     • CHOLECYSTECTOMY     • ENDOSCOPY AND COLONOSCOPY     • GALLBLADDER SURGERY     • JOINT REPLACEMENT Left 05/02/2017    [facility]  DR JOHNSON  LEFT TOTAL KNEE   • KNEE ARTHROSCOPY W/ MENISCECTOMY Right    • LAPAROSCOPIC TUBAL LIGATION     • WA TOTAL KNEE ARTHROPLASTY Left 5/2/2017    Procedure: TOTAL KNEE ARTHROPLASTY;  Surgeon: Feroz Johnson MD;  Location: [facility];  Service: Orthopedics   • STERILIZATION     • TONSILLECTOMY           Family History:  Family History   Problem Relation Age of Onset   • Arthritis Mother    • Diabetes Mother    • Cancer Mother    • Heart disease Father    • Diabetes Daughter    • Diabetes Son    • Diabetes Maternal Aunt    • Heart disease Maternal Grandmother    • Breast cancer Neg Hx          Social History:  Social History     Socioeconomic History   • Marital status:      Spouse name: natalie   • Number of children: 3   • Years of education: 12   • Highest education level: Not on file   Occupational History   • Occupation: retired   Social Needs   • Financial resource strain: Somewhat hard   • Food insecurity:     Worry: Never true     Inability: Never true   • Transportation needs:     Medical: Yes     Non-medical: No   Tobacco Use   • Smoking status: Never Smoker   • Smokeless tobacco: Never Used   Substance and Sexual Activity   • Alcohol use: No   • Drug use: No   • Sexual activity: Defer     Birth control/protection: Surgical   Lifestyle   • Physical activity:     Days per week: 0 days     Minutes per session: 0 min   • Stress: Only a little     Body mass index is 40.63 kg/m².      Medications:  Current Outpatient Medications   Medication Sig Dispense Refill   • amLODIPine (NORVASC) 10 MG tablet Take 1 tablet by mouth Daily. 30 tablet 5    • aspirin 81 MG tablet Take 1 tablet by mouth Daily. 30 tablet 5   • atorvastatin (LIPITOR) 40 MG tablet Take 1 tablet by mouth Daily. 90 tablet 3   • calcium carb-cholecalciferol (CALCIUM 600/VITAMIN D3) 600-800 MG-UNIT tablet Take  by mouth.     • CloNIDine (CATAPRES) 0.2 MG tablet Take 1 tablet by mouth 3 (Three) Times a Day. 270 tablet 3   • diphenhydrAMINE-acetaminophen (TYLENOL PM)  MG tablet per tablet Take 1 tablet by mouth At Night As Needed for Sleep.     • escitalopram (LEXAPRO) 10 MG tablet Take 1 tablet by mouth every night at bedtime. 90 tablet 3   • Ferrous Sulfate (IRON) 325 (65 Fe) MG tablet TAKE 1 Tablet BY MOUTH TWICE DAILY 60 tablet 5   • insulin aspart (novoLOG FLEXPEN) 100 UNIT/ML solution pen-injector sc pen Inject 15 Units under the skin into the appropriate area as directed 3 (Three) Times a Day Before Meals. 5 pen 5   • isosorbide mononitrate (IMDUR) 30 MG 24 hr tablet Take 1 tablet by mouth Daily. 30 tablet 5   • metoprolol tartrate (LOPRESSOR) 100 MG tablet Take 1 tablet by mouth 2 (Two) Times a Day. 180 tablet 3   • quinapril (ACCUPRIL) 40 MG tablet Take 1 tablet by mouth Daily. 90 tablet 3   • TRESIBA FLEXTOUCH 100 UNIT/ML solution pen-injector injection      • vitamin D (ERGOCALCIFEROL) 14927 units capsule capsule Take 1 capsule by mouth 1 (One) Time Per Week. Takes wednesdays 30 capsule 5     No current facility-administered medications for this visit.          Allergies:  No Known Allergies      Review of Systems:   Review of Systems   Constitutional: Positive for activity change and fatigue.   HENT: Positive for sore throat.    Eyes: Positive for itching.   Respiratory: Positive for shortness of breath.    Cardiovascular: Positive for palpitations and leg swelling.   Gastrointestinal: Positive for constipation and nausea.   Endocrine: Positive for cold intolerance, heat intolerance and polydipsia.   Genitourinary: Negative.    Musculoskeletal: Positive for arthralgias and  "back pain.   Skin: Negative.    Allergic/Immunologic: Negative.    Neurological: Positive for weakness and numbness.   Hematological: Negative.    Psychiatric/Behavioral: Positive for confusion and dysphoric mood.         Physical Exam:   Physical Exam   Constitutional: She is oriented to person, place, and time. No distress.   HENT:   Head: Normocephalic and atraumatic.   Right Ear: External ear normal.   Left Ear: External ear normal.   Eyes: Conjunctivae and EOM are normal. Right eye exhibits no discharge. Left eye exhibits no discharge.   Neck: Normal range of motion. Neck supple.   Cardiovascular: Normal rate, regular rhythm and normal heart sounds.   No murmur heard.  Pulmonary/Chest: Effort normal and breath sounds normal. No respiratory distress. She has no wheezes.   Abdominal: Soft. She exhibits no distension. There is no guarding.   Neurological: She is alert and oriented to person, place, and time.   Skin: Skin is warm and dry. Capillary refill takes less than 2 seconds. She is not diaphoretic.   Psychiatric: She has a normal mood and affect. Her behavior is normal. Judgment and thought content normal.   Nursing note and vitals reviewed.    GENERAL: 74 y.o. female, alert and oriented X 3 in no acute distress.   Visit Vitals  /70   Pulse 56   Ht 160 cm (62.99\")   Wt 104 kg (229 lb 4.5 oz)   SpO2 97%   BMI 40.63 kg/m²       Musculoskeletal: Right hand evaluation reveals significant thenar atrophy with decreased sensation thumb index and middle fingers.  She has mildly positive Phalen's negative Tinel's.  NeuroVascular examination is intact.    EMG/NCS:         Assessment & Plan:   74 y.o. female with clinical findings consistent with carpal tunnel syndrome.  Her symptoms have progressed she now wishes to proceed with surgical release we will schedule her for carpal tunnel release right hand [facility].      ICD-10-CM ICD-9-CM   1. Carpal tunnel syndrome of right wrist G56.01 354.0 "           Cc:   Lexie Dolan PA              This document has been electronically signed by Feroz Johnson MD   September 26, 2019 10:02 AM

## 2019-09-26 PROBLEM — G56.01 CARPAL TUNNEL SYNDROME OF RIGHT WRIST: Status: ACTIVE | Noted: 2019-09-26

## 2019-09-27 ENCOUNTER — TELEPHONE (OUTPATIENT)
Dept: ORTHOPEDIC SURGERY | Facility: CLINIC | Age: 74
End: 2019-09-27

## 2019-09-27 NOTE — TELEPHONE ENCOUNTER
Patient has been notified concerning SX date 10-8-19 and PAT date/time 10-3-19@10:45. Patient was also advised to stop Aspirin 81mg 9-30-19. Patient verbalized understanding.

## 2019-10-03 ENCOUNTER — APPOINTMENT (OUTPATIENT)
Dept: PREADMISSION TESTING | Facility: HOSPITAL | Age: 74
End: 2019-10-03

## 2019-10-03 DIAGNOSIS — G56.01 CARPAL TUNNEL SYNDROME OF RIGHT WRIST: ICD-10-CM

## 2019-10-03 LAB
ALBUMIN SERPL-MCNC: 3.7 G/DL (ref 3.5–5.2)
ALBUMIN/GLOB SERPL: 0.8 G/DL
ALP SERPL-CCNC: 189 U/L (ref 39–117)
ALT SERPL W P-5'-P-CCNC: 25 U/L (ref 1–33)
ANION GAP SERPL CALCULATED.3IONS-SCNC: 14.5 MMOL/L (ref 5–15)
AST SERPL-CCNC: 31 U/L (ref 1–32)
BILIRUB SERPL-MCNC: 0.2 MG/DL (ref 0.2–1.2)
BUN BLD-MCNC: 34 MG/DL (ref 8–23)
BUN/CREAT SERPL: 22.1 (ref 7–25)
CALCIUM SPEC-SCNC: 9.5 MG/DL (ref 8.6–10.5)
CHLORIDE SERPL-SCNC: 105 MMOL/L (ref 98–107)
CO2 SERPL-SCNC: 21.5 MMOL/L (ref 22–29)
CREAT BLD-MCNC: 1.54 MG/DL (ref 0.57–1)
DEPRECATED RDW RBC AUTO: 43.4 FL (ref 37–54)
ERYTHROCYTE [DISTWIDTH] IN BLOOD BY AUTOMATED COUNT: 14.2 % (ref 12.3–15.4)
GFR SERPL CREATININE-BSD FRML MDRD: 33 ML/MIN/1.73
GLOBULIN UR ELPH-MCNC: 4.7 GM/DL
GLUCOSE BLD-MCNC: 145 MG/DL (ref 65–99)
HCT VFR BLD AUTO: 37.9 % (ref 34–46.6)
HGB BLD-MCNC: 11.8 G/DL (ref 12–15.9)
MCH RBC QN AUTO: 26.5 PG (ref 26.6–33)
MCHC RBC AUTO-ENTMCNC: 31.1 G/DL (ref 31.5–35.7)
MCV RBC AUTO: 85 FL (ref 79–97)
PLATELET # BLD AUTO: 239 10*3/MM3 (ref 140–450)
PMV BLD AUTO: 9.3 FL (ref 6–12)
POTASSIUM BLD-SCNC: 4.4 MMOL/L (ref 3.5–5.2)
PROT SERPL-MCNC: 8.4 G/DL (ref 6–8.5)
RBC # BLD AUTO: 4.46 10*6/MM3 (ref 3.77–5.28)
SODIUM BLD-SCNC: 141 MMOL/L (ref 136–145)
WBC NRBC COR # BLD: 10.15 10*3/MM3 (ref 3.4–10.8)

## 2019-10-03 PROCEDURE — 36415 COLL VENOUS BLD VENIPUNCTURE: CPT

## 2019-10-03 PROCEDURE — 80053 COMPREHEN METABOLIC PANEL: CPT | Performed by: ANESTHESIOLOGY

## 2019-10-03 PROCEDURE — 85027 COMPLETE CBC AUTOMATED: CPT | Performed by: ANESTHESIOLOGY

## 2019-10-03 PROCEDURE — 93005 ELECTROCARDIOGRAM TRACING: CPT

## 2019-10-03 NOTE — DISCHARGE INSTRUCTIONS
1000-----10/08/2019   Arrival time    TAKE the following medications the morning of surgery:  All heart or blood pressure medications    HOLD all diabetic medications the morning of surgery as ordered by physician.    Please discontinue all blood thinners and anticoagulants (except aspirin) prior to surgery as per your surgeon and cardiologist instructions.  Aspirin may be continued up to the day prior to surgery.     CHLORHEXIDINE CLOTHS GIVEN WITH INSTRUCTIONS AND FORM TO RETURN TO HOSPITAL    General Instructions:  · Do not eat or drink after midnight: includes water, mints, or gum. You may brush your teeth.  Dental appliances that are removable must be taken out day of surgery.  · Do not smoke, chew tobacco, or drink alcohol.  · Bring medications in original bottles, any inhalers and if applicable your C-PAP/BI-PAP machine.  · Bring any papers given to you in the doctor's office.  · Wear clean comfortable clothes and socks.  · Do not wear contact lenses or make-up. Bring a case for your glasses if applicable.  · Bring crutches or walker if applicable.  · Leave all other valuables and jewelry at home.    If you were given a blood bank ID arm band remember to bring it with you the day of surgery.    Preventing a Surgical Site Infection:  Shower the night before surgery (unless instructed other wise) using a fresh bar of anti-bacterial soap (such as Dial) and clean washcloth. Dry with a clean towel and dress in clean clothing.  For 2 to 3 days before surgery, avoid shaving with a razor near where you will have surgery because the razor can irritate skin and make it easier to develop an infection. Ask your surgeon if you will be receiving antibiotics prior to surgery.  Make sure you, your family, and all healthcare providers clear their hands with soap and water or an alcohol-based hand  before caring for you or your wound.  If at all possible, quit smoking as many days before surgery as you can.    Day of  surgery:  Upon arrival, a Pre-op nurse and Anesthesiologist will review your health history, obtain vital signs, and answer questions you may have. The only belongings needed at this time will be your home medications and if applicable your C-PAP/BI-PAP machine. If you are staying overnight your family can leave the rest of your belongings in the car and bring them to your room later. A Pre-op nurse will start an IV and you may receive medication in preparation for surgery, including something to help you relax. Your family will be able to see you in the Pre-op area. While you are in surgery your family should notify the waiting room  if they leave the waiting room area and provide a contact phone number.    Please be aware that surgery does come with discomfort. We want to make every effort to control your discomfort so please discuss any uncontrolled symptoms with your nurse. Your doctor will most likely have prescribed pain medications.    If you are going home after surgery you will receive individualized written care instructions before being discharged. A responsible adult must drive you to and from the hospital on the day of surgery and stay with you for 24 hours.    If you are staying overnight following surgery, you will be transported to your hospital room following the recovery period.  [facility] has all private rooms.    If you have any questions please call Pre-Admission Testing at [phone].  Deductibles and co-payments are collected on the day of service. Please be prepared to pay the required co-pay, deductible or deposit on the day of service as defined by your plan.

## 2019-10-07 ENCOUNTER — TELEPHONE (OUTPATIENT)
Dept: ORTHOPEDIC SURGERY | Facility: CLINIC | Age: 74
End: 2019-10-07

## 2019-10-08 ENCOUNTER — HOSPITAL ENCOUNTER (OUTPATIENT)
Facility: HOSPITAL | Age: 74
Setting detail: HOSPITAL OUTPATIENT SURGERY
Discharge: HOME | End: 2019-10-08
Attending: ORTHOPAEDIC SURGERY | Admitting: ORTHOPAEDIC SURGERY

## 2019-10-08 ENCOUNTER — ANESTHESIA EVENT (OUTPATIENT)
Dept: PERIOP | Facility: HOSPITAL | Age: 74
End: 2019-10-08

## 2019-10-08 ENCOUNTER — ANESTHESIA (OUTPATIENT)
Dept: PERIOP | Facility: HOSPITAL | Age: 74
End: 2019-10-08

## 2019-10-08 VITALS
OXYGEN SATURATION: 95 % | BODY MASS INDEX: 41.92 KG/M2 | RESPIRATION RATE: 14 BRPM | DIASTOLIC BLOOD PRESSURE: 85 MMHG | SYSTOLIC BLOOD PRESSURE: 152 MMHG | HEART RATE: 62 BPM | HEIGHT: 63 IN | TEMPERATURE: 97.6 F | WEIGHT: 236.6 LBS

## 2019-10-08 DIAGNOSIS — G56.01 CARPAL TUNNEL SYNDROME OF RIGHT WRIST: ICD-10-CM

## 2019-10-08 LAB
GLUCOSE BLDC GLUCOMTR-MCNC: 66 MG/DL (ref 70–130)
GLUCOSE BLDC GLUCOMTR-MCNC: 76 MG/DL (ref 70–130)

## 2019-10-08 PROCEDURE — 93010 ELECTROCARDIOGRAM REPORT: CPT | Performed by: INTERNAL MEDICINE

## 2019-10-08 PROCEDURE — 93005 ELECTROCARDIOGRAM TRACING: CPT | Performed by: ANESTHESIOLOGY

## 2019-10-08 PROCEDURE — 25010000002 PROPOFOL 10 MG/ML EMULSION: Performed by: NURSE ANESTHETIST, CERTIFIED REGISTERED

## 2019-10-08 PROCEDURE — 25010000003 LIDOCAINE 1 % SOLUTION: Performed by: ORTHOPAEDIC SURGERY

## 2019-10-08 PROCEDURE — 25010000002 HYDRALAZINE PER 20 MG: Performed by: NURSE ANESTHETIST, CERTIFIED REGISTERED

## 2019-10-08 PROCEDURE — 82962 GLUCOSE BLOOD TEST: CPT

## 2019-10-08 PROCEDURE — 94799 UNLISTED PULMONARY SVC/PX: CPT

## 2019-10-08 PROCEDURE — 25010000002 FENTANYL CITRATE (PF) 100 MCG/2ML SOLUTION: Performed by: NURSE ANESTHETIST, CERTIFIED REGISTERED

## 2019-10-08 PROCEDURE — 64721 CARPAL TUNNEL SURGERY: CPT | Performed by: ORTHOPAEDIC SURGERY

## 2019-10-08 PROCEDURE — 25010000002 ONDANSETRON PER 1 MG: Performed by: NURSE ANESTHETIST, CERTIFIED REGISTERED

## 2019-10-08 RX ORDER — OXYCODONE HYDROCHLORIDE AND ACETAMINOPHEN 5; 325 MG/1; MG/1
1 TABLET ORAL EVERY 4 HOURS PRN
Qty: 16 TABLET | Refills: 0 | Status: SHIPPED | OUTPATIENT
Start: 2019-10-08 | End: 2019-12-06

## 2019-10-08 RX ORDER — FAMOTIDINE 10 MG/ML
INJECTION, SOLUTION INTRAVENOUS AS NEEDED
Status: DISCONTINUED | OUTPATIENT
Start: 2019-10-08 | End: 2019-10-08 | Stop reason: SURG

## 2019-10-08 RX ORDER — LIDOCAINE HYDROCHLORIDE 20 MG/ML
15 SOLUTION OROPHARYNGEAL ONCE
Status: COMPLETED | OUTPATIENT
Start: 2019-10-08 | End: 2019-10-08

## 2019-10-08 RX ORDER — OXYCODONE HYDROCHLORIDE AND ACETAMINOPHEN 5; 325 MG/1; MG/1
1 TABLET ORAL ONCE AS NEEDED
Status: DISCONTINUED | OUTPATIENT
Start: 2019-10-08 | End: 2019-10-08 | Stop reason: HOSPADM

## 2019-10-08 RX ORDER — MAGNESIUM HYDROXIDE 1200 MG/15ML
LIQUID ORAL AS NEEDED
Status: DISCONTINUED | OUTPATIENT
Start: 2019-10-08 | End: 2019-10-08 | Stop reason: HOSPADM

## 2019-10-08 RX ORDER — ONDANSETRON 2 MG/ML
4 INJECTION INTRAMUSCULAR; INTRAVENOUS AS NEEDED
Status: DISCONTINUED | OUTPATIENT
Start: 2019-10-08 | End: 2019-10-08 | Stop reason: HOSPADM

## 2019-10-08 RX ORDER — MELATONIN
1000 DAILY
COMMUNITY
End: 2020-02-05

## 2019-10-08 RX ORDER — FUROSEMIDE 20 MG/1
20 TABLET ORAL DAILY
COMMUNITY
End: 2020-02-05

## 2019-10-08 RX ORDER — SODIUM CHLORIDE, SODIUM LACTATE, POTASSIUM CHLORIDE, CALCIUM CHLORIDE 600; 310; 30; 20 MG/100ML; MG/100ML; MG/100ML; MG/100ML
125 INJECTION, SOLUTION INTRAVENOUS CONTINUOUS
Status: DISCONTINUED | OUTPATIENT
Start: 2019-10-08 | End: 2019-10-08 | Stop reason: HOSPADM

## 2019-10-08 RX ORDER — PROPOFOL 10 MG/ML
VIAL (ML) INTRAVENOUS AS NEEDED
Status: DISCONTINUED | OUTPATIENT
Start: 2019-10-08 | End: 2019-10-08 | Stop reason: SURG

## 2019-10-08 RX ORDER — SODIUM CHLORIDE 0.9 % (FLUSH) 0.9 %
3 SYRINGE (ML) INJECTION EVERY 12 HOURS SCHEDULED
Status: DISCONTINUED | OUTPATIENT
Start: 2019-10-08 | End: 2019-10-08 | Stop reason: HOSPADM

## 2019-10-08 RX ORDER — SODIUM CHLORIDE 0.9 % (FLUSH) 0.9 %
3-10 SYRINGE (ML) INJECTION AS NEEDED
Status: DISCONTINUED | OUTPATIENT
Start: 2019-10-08 | End: 2019-10-08 | Stop reason: HOSPADM

## 2019-10-08 RX ORDER — MEPERIDINE HYDROCHLORIDE 25 MG/ML
12.5 INJECTION INTRAMUSCULAR; INTRAVENOUS; SUBCUTANEOUS
Status: DISCONTINUED | OUTPATIENT
Start: 2019-10-08 | End: 2019-10-08 | Stop reason: HOSPADM

## 2019-10-08 RX ORDER — PHENOL 1.4 %
600 AEROSOL, SPRAY (ML) MUCOUS MEMBRANE DAILY
COMMUNITY
End: 2019-12-06

## 2019-10-08 RX ORDER — ONDANSETRON 2 MG/ML
INJECTION INTRAMUSCULAR; INTRAVENOUS AS NEEDED
Status: DISCONTINUED | OUTPATIENT
Start: 2019-10-08 | End: 2019-10-08 | Stop reason: SURG

## 2019-10-08 RX ORDER — FENTANYL CITRATE 50 UG/ML
50 INJECTION, SOLUTION INTRAMUSCULAR; INTRAVENOUS
Status: DISCONTINUED | OUTPATIENT
Start: 2019-10-08 | End: 2019-10-08 | Stop reason: HOSPADM

## 2019-10-08 RX ORDER — LIDOCAINE HYDROCHLORIDE 10 MG/ML
INJECTION, SOLUTION INFILTRATION; PERINEURAL AS NEEDED
Status: DISCONTINUED | OUTPATIENT
Start: 2019-10-08 | End: 2019-10-08 | Stop reason: HOSPADM

## 2019-10-08 RX ORDER — BUPIVACAINE HYDROCHLORIDE 5 MG/ML
INJECTION, SOLUTION PERINEURAL AS NEEDED
Status: DISCONTINUED | OUTPATIENT
Start: 2019-10-08 | End: 2019-10-08 | Stop reason: HOSPADM

## 2019-10-08 RX ORDER — ALUMINA, MAGNESIA, AND SIMETHICONE 2400; 2400; 240 MG/30ML; MG/30ML; MG/30ML
15 SUSPENSION ORAL ONCE
Status: COMPLETED | OUTPATIENT
Start: 2019-10-08 | End: 2019-10-08

## 2019-10-08 RX ORDER — LIDOCAINE HYDROCHLORIDE 20 MG/ML
INJECTION, SOLUTION INFILTRATION; PERINEURAL AS NEEDED
Status: DISCONTINUED | OUTPATIENT
Start: 2019-10-08 | End: 2019-10-08 | Stop reason: SURG

## 2019-10-08 RX ORDER — IPRATROPIUM BROMIDE AND ALBUTEROL SULFATE 2.5; .5 MG/3ML; MG/3ML
3 SOLUTION RESPIRATORY (INHALATION) ONCE AS NEEDED
Status: DISCONTINUED | OUTPATIENT
Start: 2019-10-08 | End: 2019-10-08 | Stop reason: HOSPADM

## 2019-10-08 RX ORDER — PHENOBARBITAL, HYOSCYAMINE SULFATE, ATROPINE SULFATE AND SCOPOLAMINE HYDROBROMIDE .0194; .1037; 16.2; .0065 MG/1; MG/1; MG/1; MG/1
2 TABLET ORAL ONCE
Status: COMPLETED | OUTPATIENT
Start: 2019-10-08 | End: 2019-10-08

## 2019-10-08 RX ORDER — FENTANYL CITRATE 50 UG/ML
INJECTION, SOLUTION INTRAMUSCULAR; INTRAVENOUS AS NEEDED
Status: DISCONTINUED | OUTPATIENT
Start: 2019-10-08 | End: 2019-10-08 | Stop reason: SURG

## 2019-10-08 RX ORDER — HYDRALAZINE HYDROCHLORIDE 20 MG/ML
INJECTION INTRAMUSCULAR; INTRAVENOUS AS NEEDED
Status: DISCONTINUED | OUTPATIENT
Start: 2019-10-08 | End: 2019-10-08 | Stop reason: SURG

## 2019-10-08 RX ADMIN — SODIUM CHLORIDE, POTASSIUM CHLORIDE, SODIUM LACTATE AND CALCIUM CHLORIDE: 600; 310; 30; 20 INJECTION, SOLUTION INTRAVENOUS at 12:37

## 2019-10-08 RX ADMIN — PHENOBARBITAL, HYOSCYAMINE SULFATE, ATROPINE SULFATE, SCOPOLAMINE HYDROBROMIDE 32.4 MG: 16.2; .1037; .0194; .0065 TABLET ORAL at 14:51

## 2019-10-08 RX ADMIN — FAMOTIDINE 20 MG: 10 INJECTION INTRAVENOUS at 12:42

## 2019-10-08 RX ADMIN — PROPOFOL 20 MG: 10 INJECTION, EMULSION INTRAVENOUS at 12:49

## 2019-10-08 RX ADMIN — PROPOFOL 20 MG: 10 INJECTION, EMULSION INTRAVENOUS at 12:45

## 2019-10-08 RX ADMIN — ONDANSETRON 4 MG: 2 INJECTION INTRAMUSCULAR; INTRAVENOUS at 12:42

## 2019-10-08 RX ADMIN — PROPOFOL 20 MG: 10 INJECTION, EMULSION INTRAVENOUS at 12:42

## 2019-10-08 RX ADMIN — LIDOCAINE HYDROCHLORIDE 15 ML: 20 SOLUTION ORAL; TOPICAL at 14:51

## 2019-10-08 RX ADMIN — LIDOCAINE HYDROCHLORIDE 60 MG: 20 INJECTION, SOLUTION INFILTRATION; PERINEURAL at 12:42

## 2019-10-08 RX ADMIN — FENTANYL CITRATE 100 MCG: 50 INJECTION INTRAMUSCULAR; INTRAVENOUS at 12:42

## 2019-10-08 RX ADMIN — HYDRALAZINE HYDROCHLORIDE 5 MG: 20 INJECTION INTRAMUSCULAR; INTRAVENOUS at 13:00

## 2019-10-08 RX ADMIN — PROPOFOL 100 MCG/KG/MIN: 10 INJECTION, EMULSION INTRAVENOUS at 12:57

## 2019-10-08 RX ADMIN — ALUMINUM HYDROXIDE, MAGNESIUM HYDROXIDE, AND DIMETHICONE 15 ML: 400; 400; 40 SUSPENSION ORAL at 14:52

## 2019-10-08 NOTE — ANESTHESIA POSTPROCEDURE EVALUATION
Patient: Britta Lee    Procedure Summary     Date:  10/08/19 Room / Location:  [facility] OR 03 /  [facility] OR    Anesthesia Start:  1237 Anesthesia Stop:  1327    Procedure:  CARPAL TUNNEL RELEASE (Right Wrist) Diagnosis:       Carpal tunnel syndrome of right wrist      (Carpal tunnel syndrome of right wrist [G56.01])    Surgeon:  Feroz Johnson MD Provider:  Edilson Mccrary DO    Anesthesia Type:  MAC ASA Status:  3          Anesthesia Type: MAC  Last vitals  BP   155/79 (10/08/19 1419)   Temp   97.6 °F (36.4 °C) (10/08/19 1329)   Pulse   60 (10/08/19 1419)   Resp   11 (10/08/19 1419)     SpO2   97 % (10/08/19 1419)     Post Anesthesia Care and Evaluation    Patient location during evaluation: PHASE II  Patient participation: complete - patient participated  Level of consciousness: awake and alert  Pain score: 1  Pain management: adequate  Airway patency: patent  Anesthetic complications: No anesthetic complications  PONV Status: controlled  Cardiovascular status: acceptable  Respiratory status: acceptable and room air  Hydration status: stable  No anesthesia care post op

## 2019-10-08 NOTE — OP NOTE
CARPAL TUNNEL RELEASE  Procedure Note    Britta Lee  10/8/2019    Pre-op Diagnosis:   Carpal tunnel syndrome of right wrist [G56.01]    Post-op Diagnosis:     Post-Op Diagnosis Codes:     * Carpal tunnel syndrome of right wrist [G56.01]    Procedure(s):  CARPAL TUNNEL RELEASE RIGHT    Surgeon(s):  Feroz Johnson MD    Anesthesia: General    Operative technique: With patient in the operating theatre under general anesthesia the right hand and arm were sterilely prepped and draped in the usual manner with tourniquet applied the extremity was exsanguinated tourniquet inflated to 200 mmHg.  Palmar aspect right hand was infiltrated with 5 cc of 0.5 Marcaine.  Longitudinal incision made beginning at the distal wrist crease and extending distally 5 cm in the direction of the fourth finger with skin divided sharply and palmar fascia divided underlying transverse carpal ligament was identified and divided from distal to proximal protecting the underlying median nerve.  With transverse carpal ligament completely divided tourniquet was deflated hemostasis obtained the wound closed with single layer 3-0 nylon vertical mattress suture sterile dressing applied and patient taken to recovery in stable condition.    Staff:   Circulator: Zaki Grey RN  Scrub Person: Linda Lynn LPN; Silvio Whittington  Documenter: Mike Moore RN    Estimated Blood Loss: 10 cc    Specimens:   none             * No orders in the log *    Implants/Grafts: none      Drains: None      Complications: none    Tourniquet time: 16   min    Feroz Johnson MD     Date: 10/8/2019  Time: 1:26 PM    Cc: Lexie Dolan PA

## 2019-10-08 NOTE — ANESTHESIA PREPROCEDURE EVALUATION
Anesthesia Evaluation     Patient summary reviewed and Nursing notes reviewed   no history of anesthetic complications:  NPO Solid Status: > 8 hours  NPO Liquid Status: > 8 hours           Airway   Mallampati: III  TM distance: >3 FB  Possible difficult intubation and Large neck circumference  Dental    (+) poor dentition    Pulmonary - negative pulmonary ROS and normal exam   Cardiovascular     ECG reviewed    (+) hypertension 2 medications or greater, CAD, cardiac stents more than 12 months ago hyperlipidemia,     ROS comment: Followed by cardiologist Dr. Martines in [place], last saw about one year ago    Neuro/Psych  (+) psychiatric history,     GI/Hepatic/Renal/Endo    (+)   renal disease CRI, diabetes mellitus type 1 using insulin,     Musculoskeletal     Abdominal  - normal exam   Substance History      OB/GYN          Other   (+) arthritis                       Anesthesia Plan    ASA 3     MAC     intravenous induction   Anesthetic plan, all risks, benefits, and alternatives have been provided, discussed and informed consent has been obtained with: patient.    Plan discussed with CRNA.

## 2019-10-16 ENCOUNTER — OFFICE VISIT (OUTPATIENT)
Dept: ORTHOPEDIC SURGERY | Facility: CLINIC | Age: 74
End: 2019-10-16

## 2019-10-16 VITALS — HEIGHT: 63 IN | BODY MASS INDEX: 41.82 KG/M2 | WEIGHT: 236 LBS

## 2019-10-16 DIAGNOSIS — Z09 POSTOP CHECK: Primary | ICD-10-CM

## 2019-10-16 DIAGNOSIS — Z98.890 S/P CARPAL TUNNEL RELEASE: ICD-10-CM

## 2019-10-16 PROCEDURE — 99024 POSTOP FOLLOW-UP VISIT: CPT | Performed by: ORTHOPAEDIC SURGERY

## 2019-10-16 NOTE — PROGRESS NOTES
Follow-up Visit         Patient: Britta Lee  YOB: 1945  Date of Encounter: 10/16/2019      Chief  Complaint:   Chief Complaint   Patient presents with   • Right Wrist - Post-op, Follow-up, Pain     10/08/19 (8 days post-op)     Feroz Johnson MD      CARPAL TUNNEL RELEASE - Right             HPI:  Britta Lee, 74 y.o. female returns in follow-up carpal tunnel release right hand she is doing well.  Her pain is well controlled.    Medical History:  Patient Active Problem List   Diagnosis   • Type 2 diabetes mellitus, uncontrolled, with neuropathy (CMS/HCC)   • Benign essential hypertension   • Coronary atherosclerosis of native coronary vessel   • Bilateral carpal tunnel syndrome   • Iron deficiency anemia due to dietary causes   • Diabetic retinopathy associated with type 2 diabetes mellitus (CMS/HCC)   • Hyperlipidemia   • Sciatic neuropathy   • DDD (degenerative disc disease), lumbar   • Primary osteoarthritis of left knee   • Status post total left knee replacement   • Type 2 diabetes mellitus with chronic kidney disease, with long-term current use of insulin (CMS/HCC)   • Hyperparathyroidism due to renal insufficiency (CMS/HCC)   • Chronic renal disease, stage III (CMS/HCC)   • Venous insufficiency (chronic) (peripheral)   • Hand pain, right   • Carpal tunnel syndrome of right wrist     Past Medical History:   Diagnosis Date   • Anemia    • Arthritis    • Carpal tunnel syndrome    • CHF (congestive heart failure) (CMS/HCC)    • Coronary artery disease    • Diabetes mellitus (CMS/HCC)    • Elevated cholesterol    • GERD (gastroesophageal reflux disease)    • Heart disease    • High cholesterol    • History of pneumonia    • History of unsteady gait     OCASSIONALLY   • Hypertension    • Insomnia    • Kidney disease    • Osteoarthritis    • Renal insufficiency    • Sciatica        Social History:  Social History     Socioeconomic History   • Marital status:      Spouse name:  natalie   • Number of children: 3   • Years of education: 12   • Highest education level: Not on file   Occupational History   • Occupation: retired   Social Needs   • Financial resource strain: Somewhat hard   • Food insecurity:     Worry: Never true     Inability: Never true   • Transportation needs:     Medical: Yes     Non-medical: No   Tobacco Use   • Smoking status: Never Smoker   • Smokeless tobacco: Never Used   Substance and Sexual Activity   • Alcohol use: Yes     Comment: socially   • Drug use: No   • Sexual activity: Defer     Birth control/protection: Surgical   Lifestyle   • Physical activity:     Days per week: 0 days     Minutes per session: 0 min   • Stress: Only a little       Surgical History:  Past Surgical History:   Procedure Laterality Date   • ABDOMINAL SURGERY     • APPENDECTOMY     • CARDIAC CATHETERIZATION      1 STENT  ---  2000   • CARDIAC SURGERY     • CAROTID STENT     • CARPAL TUNNEL RELEASE Right 10/8/2019    Procedure: CARPAL TUNNEL RELEASE;  Surgeon: Feroz Johnson MD;  Location: [facility];  Service: Orthopedics   • CATARACT EXTRACTION     • CHOLECYSTECTOMY     • COLONOSCOPY     • ENDOSCOPY     • ENDOSCOPY AND COLONOSCOPY     • GALLBLADDER SURGERY     • JOINT REPLACEMENT Left 05/02/2017    [facility]  DR JOHNSON  LEFT TOTAL KNEE   • KNEE ARTHROSCOPY W/ MENISCECTOMY Right    • LAPAROSCOPIC TUBAL LIGATION     • NH TOTAL KNEE ARTHROPLASTY Left 5/2/2017    Procedure: TOTAL KNEE ARTHROPLASTY;  Surgeon: Feroz Johnson MD;  Location: [facility];  Service: Orthopedics   • STERILIZATION     • TONSILLECTOMY       Examination:   Right wrist examination reveals the incision to be intact.    Assessment & Plan:   74 y.o. female ring well following carpal tunnel release right hand.  He continues to have twinge of pain in her right hand but significantly improved we will follow-up in 6 weeks.  Sutures are removed today.         Diagnosis Plan   1. Postop check     2. S/P carpal tunnel  release               Cc:  Lexie Dolan PA              This document has been electronically signed by Feroz Johnson MD   October 16, 2019 10:19 AM

## 2019-11-26 ENCOUNTER — LAB (OUTPATIENT)
Dept: FAMILY MEDICINE CLINIC | Facility: CLINIC | Age: 74
End: 2019-11-26

## 2019-11-26 DIAGNOSIS — I10 BENIGN ESSENTIAL HYPERTENSION: ICD-10-CM

## 2019-11-26 DIAGNOSIS — IMO0002 TYPE 2 DIABETES MELLITUS, UNCONTROLLED, WITH NEUROPATHY: ICD-10-CM

## 2019-11-26 DIAGNOSIS — E78.2 MIXED HYPERLIPIDEMIA: ICD-10-CM

## 2019-11-26 DIAGNOSIS — D50.9 IRON DEFICIENCY ANEMIA, UNSPECIFIED IRON DEFICIENCY ANEMIA TYPE: ICD-10-CM

## 2019-11-26 DIAGNOSIS — N25.81 SECONDARY HYPERPARATHYROIDISM OF RENAL ORIGIN (HCC): ICD-10-CM

## 2019-11-26 DIAGNOSIS — N18.30 CHRONIC KIDNEY DISEASE, STAGE III (MODERATE) (HCC): Primary | ICD-10-CM

## 2019-11-26 LAB
25(OH)D3 SERPL-MCNC: 29.3 NG/ML (ref 30–100)
ALBUMIN SERPL-MCNC: 3.8 G/DL (ref 3.5–5.2)
ALBUMIN UR-MCNC: 308.5 MG/DL
ALBUMIN/GLOB SERPL: 0.8 G/DL
ALP SERPL-CCNC: 183 U/L (ref 39–117)
ALT SERPL W P-5'-P-CCNC: 23 U/L (ref 1–33)
ANION GAP SERPL CALCULATED.3IONS-SCNC: 15.2 MMOL/L (ref 5–15)
AST SERPL-CCNC: 21 U/L (ref 1–32)
BACTERIA UR QL AUTO: ABNORMAL /HPF
BASOPHILS # BLD AUTO: 0.04 10*3/MM3 (ref 0–0.2)
BASOPHILS NFR BLD AUTO: 0.4 % (ref 0–1.5)
BILIRUB SERPL-MCNC: 0.3 MG/DL (ref 0.2–1.2)
BILIRUB UR QL STRIP: NEGATIVE
BUN BLD-MCNC: 34 MG/DL (ref 8–23)
BUN/CREAT SERPL: 19.8 (ref 7–25)
CALCIUM SPEC-SCNC: 9.6 MG/DL (ref 8.6–10.5)
CHLORIDE SERPL-SCNC: 99 MMOL/L (ref 98–107)
CHOLEST SERPL-MCNC: 121 MG/DL (ref 0–200)
CLARITY UR: ABNORMAL
CO2 SERPL-SCNC: 20.8 MMOL/L (ref 22–29)
COLOR UR: YELLOW
CREAT BLD-MCNC: 1.72 MG/DL (ref 0.57–1)
CREAT UR-MCNC: 169 MG/DL
DEPRECATED RDW RBC AUTO: 38.5 FL (ref 37–54)
EOSINOPHIL # BLD AUTO: 0.53 10*3/MM3 (ref 0–0.4)
EOSINOPHIL NFR BLD AUTO: 5.6 % (ref 0.3–6.2)
ERYTHROCYTE [DISTWIDTH] IN BLOOD BY AUTOMATED COUNT: 12.8 % (ref 12.3–15.4)
GFR SERPL CREATININE-BSD FRML MDRD: 29 ML/MIN/1.73
GLOBULIN UR ELPH-MCNC: 4.5 GM/DL
GLUCOSE BLD-MCNC: 323 MG/DL (ref 65–99)
GLUCOSE UR STRIP-MCNC: ABNORMAL MG/DL
HBA1C MFR BLD: 9.5 % (ref 4.8–5.6)
HCT VFR BLD AUTO: 35 % (ref 34–46.6)
HDLC SERPL-MCNC: 32 MG/DL (ref 40–60)
HGB BLD-MCNC: 11.2 G/DL (ref 12–15.9)
HGB UR QL STRIP.AUTO: NEGATIVE
HYALINE CASTS UR QL AUTO: ABNORMAL /LPF
IMM GRANULOCYTES # BLD AUTO: 0.02 10*3/MM3 (ref 0–0.05)
IMM GRANULOCYTES NFR BLD AUTO: 0.2 % (ref 0–0.5)
KETONES UR QL STRIP: NEGATIVE
LDLC SERPL CALC-MCNC: 59 MG/DL (ref 0–100)
LDLC/HDLC SERPL: 1.84 {RATIO}
LEUKOCYTE ESTERASE UR QL STRIP.AUTO: NEGATIVE
LYMPHOCYTES # BLD AUTO: 1.82 10*3/MM3 (ref 0.7–3.1)
LYMPHOCYTES NFR BLD AUTO: 19.1 % (ref 19.6–45.3)
MCH RBC QN AUTO: 26.2 PG (ref 26.6–33)
MCHC RBC AUTO-ENTMCNC: 32 G/DL (ref 31.5–35.7)
MCV RBC AUTO: 81.8 FL (ref 79–97)
MONOCYTES # BLD AUTO: 0.45 10*3/MM3 (ref 0.1–0.9)
MONOCYTES NFR BLD AUTO: 4.7 % (ref 5–12)
NEUTROPHILS # BLD AUTO: 6.68 10*3/MM3 (ref 1.7–7)
NEUTROPHILS NFR BLD AUTO: 70 % (ref 42.7–76)
NITRITE UR QL STRIP: NEGATIVE
NRBC BLD AUTO-RTO: 0 /100 WBC (ref 0–0.2)
PH UR STRIP.AUTO: 5.5 [PH] (ref 5–8)
PHOSPHATE SERPL-MCNC: 3.4 MG/DL (ref 2.5–4.5)
PLATELET # BLD AUTO: 291 10*3/MM3 (ref 140–450)
PMV BLD AUTO: 10.7 FL (ref 6–12)
POTASSIUM BLD-SCNC: 4.6 MMOL/L (ref 3.5–5.2)
PROT SERPL-MCNC: 8.3 G/DL (ref 6–8.5)
PROT UR QL STRIP: ABNORMAL
PROT UR-MCNC: 485 MG/DL
PROT/CREAT UR: 2869.8 MG/G CREA (ref 0–200)
PTH-INTACT SERPL-MCNC: 60 PG/ML (ref 15–65)
RBC # BLD AUTO: 4.28 10*6/MM3 (ref 3.77–5.28)
RBC # UR: ABNORMAL /HPF
REF LAB TEST METHOD: ABNORMAL
SODIUM BLD-SCNC: 135 MMOL/L (ref 136–145)
SP GR UR STRIP: 1.02 (ref 1–1.03)
SQUAMOUS #/AREA URNS HPF: ABNORMAL /HPF
TRIGL SERPL-MCNC: 150 MG/DL (ref 0–150)
UROBILINOGEN UR QL STRIP: ABNORMAL
VLDLC SERPL-MCNC: 30 MG/DL (ref 5–40)
WBC NRBC COR # BLD: 9.54 10*3/MM3 (ref 3.4–10.8)
WBC UR QL AUTO: ABNORMAL /HPF

## 2019-11-26 PROCEDURE — 85025 COMPLETE CBC W/AUTO DIFF WBC: CPT | Performed by: PHYSICIAN ASSISTANT

## 2019-11-26 PROCEDURE — 82043 UR ALBUMIN QUANTITATIVE: CPT | Performed by: PHYSICIAN ASSISTANT

## 2019-11-26 PROCEDURE — 80053 COMPREHEN METABOLIC PANEL: CPT | Performed by: PHYSICIAN ASSISTANT

## 2019-11-26 PROCEDURE — 83036 HEMOGLOBIN GLYCOSYLATED A1C: CPT | Performed by: PHYSICIAN ASSISTANT

## 2019-11-26 PROCEDURE — 82570 ASSAY OF URINE CREATININE: CPT | Performed by: PHYSICIAN ASSISTANT

## 2019-11-26 PROCEDURE — 84156 ASSAY OF PROTEIN URINE: CPT | Performed by: PHYSICIAN ASSISTANT

## 2019-11-26 PROCEDURE — 83970 ASSAY OF PARATHORMONE: CPT | Performed by: INTERNAL MEDICINE

## 2019-11-26 PROCEDURE — 80061 LIPID PANEL: CPT | Performed by: PHYSICIAN ASSISTANT

## 2019-11-26 PROCEDURE — 84100 ASSAY OF PHOSPHORUS: CPT | Performed by: PHYSICIAN ASSISTANT

## 2019-11-26 PROCEDURE — 81001 URINALYSIS AUTO W/SCOPE: CPT | Performed by: PHYSICIAN ASSISTANT

## 2019-11-26 PROCEDURE — 82306 VITAMIN D 25 HYDROXY: CPT | Performed by: INTERNAL MEDICINE

## 2019-11-27 ENCOUNTER — OFFICE VISIT (OUTPATIENT)
Dept: ORTHOPEDIC SURGERY | Facility: CLINIC | Age: 74
End: 2019-11-27

## 2019-11-27 VITALS — WEIGHT: 235.89 LBS | BODY MASS INDEX: 41.8 KG/M2 | HEIGHT: 63 IN

## 2019-11-27 DIAGNOSIS — Z98.890 S/P CARPAL TUNNEL RELEASE: ICD-10-CM

## 2019-11-27 DIAGNOSIS — Z09 POSTOP CHECK: Primary | ICD-10-CM

## 2019-11-27 PROCEDURE — 99024 POSTOP FOLLOW-UP VISIT: CPT | Performed by: ORTHOPAEDIC SURGERY

## 2019-12-05 ENCOUNTER — TELEPHONE (OUTPATIENT)
Dept: FAMILY MEDICINE CLINIC | Facility: CLINIC | Age: 74
End: 2019-12-05

## 2019-12-05 NOTE — TELEPHONE ENCOUNTER
I called saroj informed her some labs were abnormal but blu will go over   those with  Her tomorrow at her [facility]          ----- Message from RAFIQ Montanez sent at 12/3/2019  3:37 PM EST -----  Inform patient lab work was abnormal and I will discuss it with her at her visit on 12/6/2019

## 2019-12-06 ENCOUNTER — OFFICE VISIT (OUTPATIENT)
Dept: FAMILY MEDICINE CLINIC | Facility: CLINIC | Age: 74
End: 2019-12-06

## 2019-12-06 VITALS
SYSTOLIC BLOOD PRESSURE: 154 MMHG | HEIGHT: 63 IN | DIASTOLIC BLOOD PRESSURE: 80 MMHG | BODY MASS INDEX: 40.57 KG/M2 | HEART RATE: 79 BPM | WEIGHT: 229 LBS | OXYGEN SATURATION: 97 % | TEMPERATURE: 98.4 F

## 2019-12-06 DIAGNOSIS — I10 BENIGN ESSENTIAL HYPERTENSION: ICD-10-CM

## 2019-12-06 DIAGNOSIS — Z79.4 TYPE 2 DIABETES MELLITUS WITH STAGE 4 CHRONIC KIDNEY DISEASE, WITH LONG-TERM CURRENT USE OF INSULIN (HCC): Primary | ICD-10-CM

## 2019-12-06 DIAGNOSIS — E08.65 DIABETES MELLITUS DUE TO UNDERLYING CONDITION WITH HYPERGLYCEMIA, WITH LONG-TERM CURRENT USE OF INSULIN (HCC): ICD-10-CM

## 2019-12-06 DIAGNOSIS — E66.01 MORBID OBESITY (HCC): ICD-10-CM

## 2019-12-06 DIAGNOSIS — D50.8 IRON DEFICIENCY ANEMIA DUE TO DIETARY CAUSES: ICD-10-CM

## 2019-12-06 DIAGNOSIS — Z79.4 DIABETES MELLITUS DUE TO UNDERLYING CONDITION WITH HYPERGLYCEMIA, WITH LONG-TERM CURRENT USE OF INSULIN (HCC): ICD-10-CM

## 2019-12-06 DIAGNOSIS — N18.4 TYPE 2 DIABETES MELLITUS WITH STAGE 4 CHRONIC KIDNEY DISEASE, WITH LONG-TERM CURRENT USE OF INSULIN (HCC): Primary | ICD-10-CM

## 2019-12-06 DIAGNOSIS — E11.22 TYPE 2 DIABETES MELLITUS WITH STAGE 4 CHRONIC KIDNEY DISEASE, WITH LONG-TERM CURRENT USE OF INSULIN (HCC): Primary | ICD-10-CM

## 2019-12-06 PROCEDURE — 99214 OFFICE O/P EST MOD 30 MIN: CPT | Performed by: PHYSICIAN ASSISTANT

## 2019-12-09 RX ORDER — BLOOD SUGAR DIAGNOSTIC
1 STRIP MISCELLANEOUS 3 TIMES DAILY
Qty: 100 EACH | Refills: 5 | Status: SHIPPED | OUTPATIENT
Start: 2019-12-09 | End: 2020-02-05

## 2019-12-11 NOTE — PATIENT INSTRUCTIONS
"Carbohydrate Counting for Diabetes Mellitus, Adult    Carbohydrate counting is a method of keeping track of how many carbohydrates you eat. Eating carbohydrates naturally increases the amount of sugar (glucose) in the blood. Counting how many carbohydrates you eat helps keep your blood glucose within normal limits, which helps you manage your diabetes (diabetes mellitus).  It is important to know how many carbohydrates you can safely have in each meal. This is different for every person. A diet and nutrition specialist (registered dietitian) can help you make a meal plan and calculate how many carbohydrates you should have at each meal and snack.  Carbohydrates are found in the following foods:  · Grains, such as breads and cereals.  · Dried beans and soy products.  · Starchy vegetables, such as potatoes, peas, and corn.  · Fruit and fruit juices.  · Milk and yogurt.  · Sweets and snack foods, such as cake, cookies, candy, chips, and soft drinks.  How do I count carbohydrates?  There are two ways to count carbohydrates in food. You can use either of the methods or a combination of both.  Reading \"Nutrition Facts\" on packaged food  The \"Nutrition Facts\" list is included on the labels of almost all packaged foods and beverages in the [place] It includes:  · The serving size.  · Information about nutrients in each serving, including the grams (g) of carbohydrate per serving.  To use the “Nutrition Facts\":  · Decide how many servings you will have.  · Multiply the number of servings by the number of carbohydrates per serving.  · The resulting number is the total amount of carbohydrates that you will be having.  Learning standard serving sizes of other foods  When you eat carbohydrate foods that are not packaged or do not include \"Nutrition Facts\" on the label, you need to measure the servings in order to count the amount of carbohydrates:  · Measure the foods that you will eat with a food scale or measuring cup, if " needed.  · Decide how many standard-size servings you will eat.  · Multiply the number of servings by 15. Most carbohydrate-rich foods have about 15 g of carbohydrates per serving.  ? For example, if you eat 8 oz (170 g) of strawberries, you will have eaten 2 servings and 30 g of carbohydrates (2 servings x 15 g = 30 g).  · For foods that have more than one food mixed, such as soups and casseroles, you must count the carbohydrates in each food that is included.  The following list contains standard serving sizes of common carbohydrate-rich foods. Each of these servings has about 15 g of carbohydrates:  · ½ hamburger bun or ½ English muffin.  · ½ oz (15 mL) syrup.  · ½ oz (14 g) jelly.  · 1 slice of bread.  · 1 six-inch tortilla.  · 3 oz (85 g) cooked rice or pasta.  · 4 oz (113 g) cooked dried beans.  · 4 oz (113 g) starchy vegetable, such as peas, corn, or potatoes.  · 4 oz (113 g) hot cereal.  · 4 oz (113 g) mashed potatoes or ¼ of a large baked potato.  · 4 oz (113 g) canned or frozen fruit.  · 4 oz (120 mL) fruit juice.  · 4-6 crackers.  · 6 chicken nuggets.  · 6 oz (170 g) unsweetened dry cereal.  · 6 oz (170 g) plain fat-free yogurt or yogurt sweetened with artificial sweeteners.  · 8 oz (240 mL) milk.  · 8 oz (170 g) fresh fruit or one small piece of fruit.  · 24 oz (680 g) popped popcorn.  Example of carbohydrate counting  Sample meal  · 3 oz (85 g) chicken breast.  · 6 oz (170 g) brown rice.  · 4 oz (113 g) corn.  · 8 oz (240 mL) milk.  · 8 oz (170 g) strawberries with sugar-free whipped topping.  Carbohydrate calculation  1. Identify the foods that contain carbohydrates:  ? Rice.  ? Corn.  ? Milk.  ? Strawberries.  2. Calculate how many servings you have of each food:  ? 2 servings rice.  ? 1 serving corn.  ? 1 serving milk.  ? 1 serving strawberries.  3. Multiply each number of servings by 15 g:  ? 2 servings rice x 15 g = 30 g.  ? 1 serving corn x 15 g = 15 g.  ? 1 serving milk x 15 g = 15 g.  ? 1  serving strawberries x 15 g = 15 g.  4. Add together all of the amounts to find the total grams of carbohydrates eaten:  ? 30 g + 15 g + 15 g + 15 g = 75 g of carbohydrates total.  Summary  · Carbohydrate counting is a method of keeping track of how many carbohydrates you eat.  · Eating carbohydrates naturally increases the amount of sugar (glucose) in the blood.  · Counting how many carbohydrates you eat helps keep your blood glucose within normal limits, which helps you manage your diabetes.  · A diet and nutrition specialist (registered dietitian) can help you make a meal plan and calculate how many carbohydrates you should have at each meal and snack.  This information is not intended to replace advice given to you by your health care provider. Make sure you discuss any questions you have with your health care provider.  Document Released: 12/18/2006 Document Revised: 06/27/2018 Document Reviewed: 05/31/2017  [employer] Interactive Patient Education © 2019 [employer]

## 2019-12-11 NOTE — PROGRESS NOTES
Subjective   Britta Lee is a 74 y.o. female.       Chief Complaint -diabetes    History of Present Illness -      Diabetes-  Uncontrolled diabetes mellitus.  She does have associated chronic kidney disease.  She complains of noncompliance due to cost of medication.  She states that her insurance was covering Toujeo but it was changed to Tresiba by another physician.  She states that she is unable to pay for the Tresiba so she has been out of her insulin for over a week.    Chronic kidney disease-not at goal due to continued uncontrolled diabetes mellitus  Lab Results   Component Value Date    BUN 34 (H) 11/26/2019     Hypertension- amlodipine, clonidine, metoprolol, quinapril, and Imdur.  She was evaluated by Dr. Han, cardiologist who recently started the Imdur.  She reports blood pressure has been improved from previous readings.    Iron deficiency anemia-stable with iron supplementation    Morbid obesity-not at goal due to continued uncontrolled diabetes mellitus and lack of exercise    The following portions of the patient's history were reviewed and updated as appropriate: allergies, current medications, past family history, past medical history, past social history, past surgical history and problem list.    Review of Systems   Constitutional: Positive for fatigue (chronic). Negative for activity change, appetite change and fever.   HENT: Negative for ear pain, sinus pressure and sore throat.    Eyes: Negative for pain and visual disturbance.   Respiratory: Negative for cough and chest tightness.    Cardiovascular: Negative for chest pain and palpitations.   Gastrointestinal: Negative for abdominal pain, constipation, diarrhea, nausea and vomiting.   Endocrine: Negative for polydipsia and polyuria.   Genitourinary: Negative for dysuria and frequency.   Musculoskeletal: Negative for back pain and myalgias.   Skin: Negative for color change and rash.   Allergic/Immunologic: Negative for food allergies and  "immunocompromised state.   Neurological: Negative for dizziness, syncope and headaches.   Hematological: Negative for adenopathy. Does not bruise/bleed easily.   Psychiatric/Behavioral: Negative for hallucinations and suicidal ideas. The patient is not nervous/anxious.        /80   Pulse 79   Temp 98.4 °F (36.9 °C) (Oral)   Ht 160 cm (62.99\")   Wt 104 kg (229 lb)   SpO2 97%   BMI 40.58 kg/m²   Lab on 11/26/2019   Component Date Value Ref Range Status   • Glucose 11/26/2019 323* 65 - 99 mg/dL Final   • BUN 11/26/2019 34* 8 - 23 mg/dL Final   • Creatinine 11/26/2019 1.72* 0.57 - 1.00 mg/dL Final   • Sodium 11/26/2019 135* 136 - 145 mmol/L Final   • Potassium 11/26/2019 4.6  3.5 - 5.2 mmol/L Final   • Chloride 11/26/2019 99  98 - 107 mmol/L Final   • CO2 11/26/2019 20.8* 22.0 - 29.0 mmol/L Final   • Calcium 11/26/2019 9.6  8.6 - 10.5 mg/dL Final   • Total Protein 11/26/2019 8.3  6.0 - 8.5 g/dL Final   • Albumin 11/26/2019 3.80  3.50 - 5.20 g/dL Final   • ALT (SGPT) 11/26/2019 23  1 - 33 U/L Final   • AST (SGOT) 11/26/2019 21  1 - 32 U/L Final   • Alkaline Phosphatase 11/26/2019 183* 39 - 117 U/L Final   • Total Bilirubin 11/26/2019 0.3  0.2 - 1.2 mg/dL Final   • eGFR Non African Amer 11/26/2019 29* >60 mL/min/1.73 Final   • Globulin 11/26/2019 4.5  gm/dL Final   • A/G Ratio 11/26/2019 0.8  g/dL Final   • BUN/Creatinine Ratio 11/26/2019 19.8  7.0 - 25.0 Final   • Anion Gap 11/26/2019 15.2* 5.0 - 15.0 mmol/L Final   • Hemoglobin A1C 11/26/2019 9.50* 4.80 - 5.60 % Final   • Microalbumin, Urine 11/26/2019 308.5  mg/dL Final   • Total Cholesterol 11/26/2019 121  0 - 200 mg/dL Final   • Triglycerides 11/26/2019 150  0 - 150 mg/dL Final   • HDL Cholesterol 11/26/2019 32* 40 - 60 mg/dL Final   • LDL Cholesterol  11/26/2019 59  0 - 100 mg/dL Final   • VLDL Cholesterol 11/26/2019 30  5 - 40 mg/dL Final   • LDL/HDL Ratio 11/26/2019 1.84   Final   • 25 Hydroxy, Vitamin D 11/26/2019 29.3* 30.0 - 100.0 ng/ml Final   • " Protein/Creatinine Ratio, Urine 11/26/2019 2,869.8* 0.0 - 200.0 mg/G Crea Final   • Creatinine, Urine 11/26/2019 169.0  mg/dL Final   • Total Protein, Urine 11/26/2019 485.0  mg/dL Final   • PTH, Intact 11/26/2019 60.0  15.0 - 65.0 pg/mL Final   • Phosphorus 11/26/2019 3.4  2.5 - 4.5 mg/dL Final   • WBC 11/26/2019 9.54  3.40 - 10.80 10*3/mm3 Final   • RBC 11/26/2019 4.28  3.77 - 5.28 10*6/mm3 Final   • Hemoglobin 11/26/2019 11.2* 12.0 - 15.9 g/dL Final   • Hematocrit 11/26/2019 35.0  34.0 - 46.6 % Final   • MCV 11/26/2019 81.8  79.0 - 97.0 fL Final   • MCH 11/26/2019 26.2* 26.6 - 33.0 pg Final   • MCHC 11/26/2019 32.0  31.5 - 35.7 g/dL Final   • RDW 11/26/2019 12.8  12.3 - 15.4 % Final   • RDW-SD 11/26/2019 38.5  37.0 - 54.0 fl Final   • MPV 11/26/2019 10.7  6.0 - 12.0 fL Final   • Platelets 11/26/2019 291  140 - 450 10*3/mm3 Final   • Neutrophil % 11/26/2019 70.0  42.7 - 76.0 % Final   • Lymphocyte % 11/26/2019 19.1* 19.6 - 45.3 % Final   • Monocyte % 11/26/2019 4.7* 5.0 - 12.0 % Final   • Eosinophil % 11/26/2019 5.6  0.3 - 6.2 % Final   • Basophil % 11/26/2019 0.4  0.0 - 1.5 % Final   • Immature Grans % 11/26/2019 0.2  0.0 - 0.5 % Final   • Neutrophils, Absolute 11/26/2019 6.68  1.70 - 7.00 10*3/mm3 Final   • Lymphocytes, Absolute 11/26/2019 1.82  0.70 - 3.10 10*3/mm3 Final   • Monocytes, Absolute 11/26/2019 0.45  0.10 - 0.90 10*3/mm3 Final   • Eosinophils, Absolute 11/26/2019 0.53* 0.00 - 0.40 10*3/mm3 Final   • Basophils, Absolute 11/26/2019 0.04  0.00 - 0.20 10*3/mm3 Final   • Immature Grans, Absolute 11/26/2019 0.02  0.00 - 0.05 10*3/mm3 Final   • nRBC 11/26/2019 0.0  0.0 - 0.2 /100 WBC Final   • Color, UA 11/26/2019 Yellow  Yellow, Straw Final   • Appearance, UA 11/26/2019 Cloudy* Clear Final   • pH, UA 11/26/2019 5.5  5.0 - 8.0 Final   • Specific Gravity, UA 11/26/2019 1.023  1.005 - 1.030 Final   • Glucose, UA 11/26/2019 250 mg/dL (1+)* Negative Final   • Ketones, UA 11/26/2019 Negative  Negative Final   •  Bilirubin, UA 11/26/2019 Negative  Negative Final   • Blood, UA 11/26/2019 Negative  Negative Final   • Protein, UA 11/26/2019 >=300 mg/dL (3+)* Negative Final   • Leuk Esterase, UA 11/26/2019 Negative  Negative Final   • Nitrite, UA 11/26/2019 Negative  Negative Final   • Urobilinogen, UA 11/26/2019 0.2 E.U./dL  0.2 - 1.0 E.U./dL Final   • RBC, UA 11/26/2019 0-2  None Seen, 0-2 /HPF Final   • WBC, UA 11/26/2019 3-5* None Seen, 0-2 /HPF Final   • Bacteria, UA 11/26/2019 4+* None Seen /HPF Final   • Squamous Epithelial Cells, UA 11/26/2019 7-12* None Seen, 0-2 /HPF Final   • Hyaline Casts, UA 11/26/2019 7-12  None Seen /LPF Final   • Methodology 11/26/2019 Automated Microscopy   Final       Physical Exam   Constitutional: She is oriented to person, place, and time. No distress.   Obese pleasant lady   HENT:   Head: Normocephalic and atraumatic.   Nose: Nose normal.   Mouth/Throat: Oropharynx is clear and moist.   Eyes: Pupils are equal, round, and reactive to light. Conjunctivae and EOM are normal.   Neck: Normal range of motion. Neck supple. No tracheal deviation present. No thyromegaly present.   Cardiovascular: Normal rate, regular rhythm, normal heart sounds and intact distal pulses.   No murmur heard.  Pulmonary/Chest: Effort normal and breath sounds normal. No respiratory distress. She has no wheezes.   Abdominal: Soft. Bowel sounds are normal. There is no tenderness. There is no guarding.   Musculoskeletal: Normal range of motion. She exhibits no edema or tenderness.   Lymphadenopathy:     She has no cervical adenopathy.   Neurological: She is alert and oriented to person, place, and time.   Skin: Skin is warm and dry. No rash noted. She is not diaphoretic.   Psychiatric: She has a normal mood and affect. Her behavior is normal.   Nursing note and vitals reviewed.      Assessment/Plan     Diagnoses and all orders for this visit:    Type 2 diabetes mellitus with stage 4 chronic kidney disease, with long-term  current use of insulin (CMS/Formerly [facility])  Comments:  Discontinue Tresiba due to cost  Restart Toujeo since it is covered by the patient's insurance  Advised daily fasting and 2-hour PP glucose reading log  Orders:  -     Comprehensive Metabolic Panel; Future  -     Lipid Panel; Future  -     Hemoglobin A1c; Future    Diabetes mellitus due to underlying condition with hyperglycemia, with long-term current use of insulin (CMS/Formerly [facility])  Comments:  Restart insulin and discussed the importance of compliance with medication  Offered information on financial assistance with medication  Orders:  -     Insulin Glargine, 1 Unit Dial, (TOUJEO SOLOSTAR) 300 UNIT/ML solution pen-injector injection; Inject 80 Units under the skin into the appropriate area as directed Daily.  -     Lipid Panel; Future    Benign essential hypertension  Comments:  Continue amlodipine clonidine metoprolol quinapril and Imdur  Continue to monitor  Orders:  -     Lipid Panel; Future    Iron deficiency anemia due to dietary causes  Comments:  Continue iron supplementation    Morbid obesity (CMS/Former[facility])  Comments:  Advised weight loss and advised low carbohydrate diet                 This document has been electronically signed by:  Lexie Dolan PA-C

## 2020-02-05 ENCOUNTER — APPOINTMENT (OUTPATIENT)
Dept: GENERAL RADIOLOGY | Facility: HOSPITAL | Age: 75
End: 2020-02-05

## 2020-02-05 ENCOUNTER — HOSPITAL ENCOUNTER (OUTPATIENT)
Facility: HOSPITAL | Age: 75
Setting detail: OBSERVATION
Discharge: HOME | End: 2020-02-06
Attending: EMERGENCY MEDICINE | Admitting: INTERNAL MEDICINE

## 2020-02-05 DIAGNOSIS — I50.31 ACUTE DIASTOLIC CONGESTIVE HEART FAILURE (HCC): ICD-10-CM

## 2020-02-05 DIAGNOSIS — R06.00 DYSPNEA, UNSPECIFIED TYPE: Primary | ICD-10-CM

## 2020-02-05 PROBLEM — I50.9 HEART FAILURE: Status: ACTIVE | Noted: 2020-02-05

## 2020-02-05 LAB
ALBUMIN SERPL-MCNC: 3.21 G/DL (ref 3.5–5.2)
ALBUMIN/GLOB SERPL: 0.7 G/DL
ALP SERPL-CCNC: 298 U/L (ref 39–117)
ALT SERPL W P-5'-P-CCNC: 44 U/L (ref 1–33)
ANION GAP SERPL CALCULATED.3IONS-SCNC: 14.4 MMOL/L (ref 5–15)
AST SERPL-CCNC: 59 U/L (ref 1–32)
BASOPHILS # BLD AUTO: 0.04 10*3/MM3 (ref 0–0.2)
BASOPHILS NFR BLD AUTO: 0.4 % (ref 0–1.5)
BILIRUB SERPL-MCNC: 0.3 MG/DL (ref 0.2–1.2)
BUN BLD-MCNC: 28 MG/DL (ref 8–23)
BUN/CREAT SERPL: 17.5 (ref 7–25)
CALCIUM SPEC-SCNC: 9.4 MG/DL (ref 8.6–10.5)
CHLORIDE SERPL-SCNC: 105 MMOL/L (ref 98–107)
CO2 SERPL-SCNC: 21.6 MMOL/L (ref 22–29)
CREAT BLD-MCNC: 1.6 MG/DL (ref 0.57–1)
DEPRECATED RDW RBC AUTO: 43.8 FL (ref 37–54)
EOSINOPHIL # BLD AUTO: 0.37 10*3/MM3 (ref 0–0.4)
EOSINOPHIL NFR BLD AUTO: 3.5 % (ref 0.3–6.2)
ERYTHROCYTE [DISTWIDTH] IN BLOOD BY AUTOMATED COUNT: 14.1 % (ref 12.3–15.4)
GFR SERPL CREATININE-BSD FRML MDRD: 31 ML/MIN/1.73
GLOBULIN UR ELPH-MCNC: 4.9 GM/DL
GLUCOSE BLD-MCNC: 203 MG/DL (ref 65–99)
GLUCOSE BLDC GLUCOMTR-MCNC: 119 MG/DL (ref 70–130)
GLUCOSE BLDC GLUCOMTR-MCNC: 213 MG/DL (ref 70–130)
HAV IGM SERPL QL IA: NORMAL
HBA1C MFR BLD: 7.7 % (ref 4.8–5.6)
HBV CORE IGM SERPL QL IA: NORMAL
HBV SURFACE AG SERPL QL IA: NORMAL
HCT VFR BLD AUTO: 35.7 % (ref 34–46.6)
HCV AB SER DONR QL: NORMAL
HGB BLD-MCNC: 10.8 G/DL (ref 12–15.9)
HOLD SPECIMEN: NORMAL
HOLD SPECIMEN: NORMAL
IMM GRANULOCYTES # BLD AUTO: 0.03 10*3/MM3 (ref 0–0.05)
IMM GRANULOCYTES NFR BLD AUTO: 0.3 % (ref 0–0.5)
LYMPHOCYTES # BLD AUTO: 1.94 10*3/MM3 (ref 0.7–3.1)
LYMPHOCYTES NFR BLD AUTO: 18.2 % (ref 19.6–45.3)
MAGNESIUM SERPL-MCNC: 2.2 MG/DL (ref 1.6–2.4)
MCH RBC QN AUTO: 25.8 PG (ref 26.6–33)
MCHC RBC AUTO-ENTMCNC: 30.3 G/DL (ref 31.5–35.7)
MCV RBC AUTO: 85.2 FL (ref 79–97)
MONOCYTES # BLD AUTO: 0.61 10*3/MM3 (ref 0.1–0.9)
MONOCYTES NFR BLD AUTO: 5.7 % (ref 5–12)
NEUTROPHILS # BLD AUTO: 7.66 10*3/MM3 (ref 1.7–7)
NEUTROPHILS NFR BLD AUTO: 71.9 % (ref 42.7–76)
NRBC BLD AUTO-RTO: 0 /100 WBC (ref 0–0.2)
NT-PROBNP SERPL-MCNC: 2074 PG/ML (ref 5–1800)
PLATELET # BLD AUTO: 253 10*3/MM3 (ref 140–450)
PMV BLD AUTO: 8.9 FL (ref 6–12)
POTASSIUM BLD-SCNC: 4.2 MMOL/L (ref 3.5–5.2)
PROT SERPL-MCNC: 8.1 G/DL (ref 6–8.5)
RBC # BLD AUTO: 4.19 10*6/MM3 (ref 3.77–5.28)
SODIUM BLD-SCNC: 141 MMOL/L (ref 136–145)
TROPONIN T SERPL-MCNC: 0.02 NG/ML (ref 0–0.03)
WBC NRBC COR # BLD: 10.65 10*3/MM3 (ref 3.4–10.8)
WHOLE BLOOD HOLD SPECIMEN: NORMAL
WHOLE BLOOD HOLD SPECIMEN: NORMAL

## 2020-02-05 PROCEDURE — 83880 ASSAY OF NATRIURETIC PEPTIDE: CPT | Performed by: EMERGENCY MEDICINE

## 2020-02-05 PROCEDURE — 36415 COLL VENOUS BLD VENIPUNCTURE: CPT

## 2020-02-05 PROCEDURE — G0378 HOSPITAL OBSERVATION PER HR: HCPCS

## 2020-02-05 PROCEDURE — 80053 COMPREHEN METABOLIC PANEL: CPT | Performed by: EMERGENCY MEDICINE

## 2020-02-05 PROCEDURE — 71045 X-RAY EXAM CHEST 1 VIEW: CPT | Performed by: RADIOLOGY

## 2020-02-05 PROCEDURE — 83036 HEMOGLOBIN GLYCOSYLATED A1C: CPT | Performed by: NURSE PRACTITIONER

## 2020-02-05 PROCEDURE — 82962 GLUCOSE BLOOD TEST: CPT

## 2020-02-05 PROCEDURE — 94799 UNLISTED PULMONARY SVC/PX: CPT

## 2020-02-05 PROCEDURE — 25010000002 FUROSEMIDE PER 20 MG: Performed by: PHYSICIAN ASSISTANT

## 2020-02-05 PROCEDURE — 83735 ASSAY OF MAGNESIUM: CPT | Performed by: NURSE PRACTITIONER

## 2020-02-05 PROCEDURE — 84484 ASSAY OF TROPONIN QUANT: CPT | Performed by: EMERGENCY MEDICINE

## 2020-02-05 PROCEDURE — 94640 AIRWAY INHALATION TREATMENT: CPT

## 2020-02-05 PROCEDURE — 96372 THER/PROPH/DIAG INJ SC/IM: CPT

## 2020-02-05 PROCEDURE — 99220 PR INITIAL OBSERVATION CARE/DAY 70 MINUTES: CPT | Performed by: NURSE PRACTITIONER

## 2020-02-05 PROCEDURE — 71045 X-RAY EXAM CHEST 1 VIEW: CPT

## 2020-02-05 PROCEDURE — 84484 ASSAY OF TROPONIN QUANT: CPT | Performed by: NURSE PRACTITIONER

## 2020-02-05 PROCEDURE — 80074 ACUTE HEPATITIS PANEL: CPT | Performed by: NURSE PRACTITIONER

## 2020-02-05 PROCEDURE — 93010 ELECTROCARDIOGRAM REPORT: CPT | Performed by: INTERNAL MEDICINE

## 2020-02-05 PROCEDURE — 25010000002 HEPARIN (PORCINE) PER 1000 UNITS: Performed by: NURSE PRACTITIONER

## 2020-02-05 PROCEDURE — 85025 COMPLETE CBC W/AUTO DIFF WBC: CPT | Performed by: EMERGENCY MEDICINE

## 2020-02-05 PROCEDURE — 84484 ASSAY OF TROPONIN QUANT: CPT | Performed by: PHYSICIAN ASSISTANT

## 2020-02-05 PROCEDURE — 99284 EMERGENCY DEPT VISIT MOD MDM: CPT

## 2020-02-05 PROCEDURE — 93005 ELECTROCARDIOGRAM TRACING: CPT | Performed by: EMERGENCY MEDICINE

## 2020-02-05 PROCEDURE — 96374 THER/PROPH/DIAG INJ IV PUSH: CPT

## 2020-02-05 PROCEDURE — 63710000001 INSULIN ASPART PER 5 UNITS: Performed by: NURSE PRACTITIONER

## 2020-02-05 RX ORDER — FERROUS SULFATE 325(65) MG
325 TABLET ORAL 2 TIMES DAILY WITH MEALS
Status: DISCONTINUED | OUTPATIENT
Start: 2020-02-05 | End: 2020-02-06 | Stop reason: HOSPADM

## 2020-02-05 RX ORDER — LISINOPRIL 10 MG/1
40 TABLET ORAL DAILY
Status: DISCONTINUED | OUTPATIENT
Start: 2020-02-06 | End: 2020-02-06 | Stop reason: HOSPADM

## 2020-02-05 RX ORDER — FUROSEMIDE 10 MG/ML
40 INJECTION INTRAMUSCULAR; INTRAVENOUS ONCE
Status: COMPLETED | OUTPATIENT
Start: 2020-02-05 | End: 2020-02-05

## 2020-02-05 RX ORDER — DEXTROSE MONOHYDRATE 25 G/50ML
25 INJECTION, SOLUTION INTRAVENOUS
Status: DISCONTINUED | OUTPATIENT
Start: 2020-02-05 | End: 2020-02-06 | Stop reason: HOSPADM

## 2020-02-05 RX ORDER — SODIUM CHLORIDE 0.9 % (FLUSH) 0.9 %
10 SYRINGE (ML) INJECTION AS NEEDED
Status: DISCONTINUED | OUTPATIENT
Start: 2020-02-05 | End: 2020-02-06 | Stop reason: HOSPADM

## 2020-02-05 RX ORDER — METOPROLOL TARTRATE 5 MG/5ML
5 INJECTION INTRAVENOUS ONCE
Status: DISCONTINUED | OUTPATIENT
Start: 2020-02-05 | End: 2020-02-05

## 2020-02-05 RX ORDER — HEPARIN SODIUM 5000 [USP'U]/ML
5000 INJECTION, SOLUTION INTRAVENOUS; SUBCUTANEOUS EVERY 12 HOURS SCHEDULED
Status: DISCONTINUED | OUTPATIENT
Start: 2020-02-05 | End: 2020-02-06 | Stop reason: HOSPADM

## 2020-02-05 RX ORDER — ACETAMINOPHEN 500 MG
500 TABLET ORAL EVERY 6 HOURS PRN
Status: ON HOLD | COMMUNITY
End: 2020-11-03

## 2020-02-05 RX ORDER — ACETAMINOPHEN 500 MG
500 TABLET ORAL EVERY 6 HOURS PRN
Status: CANCELLED | OUTPATIENT
Start: 2020-02-05

## 2020-02-05 RX ORDER — IPRATROPIUM BROMIDE AND ALBUTEROL SULFATE 2.5; .5 MG/3ML; MG/3ML
3 SOLUTION RESPIRATORY (INHALATION) ONCE
Status: COMPLETED | OUTPATIENT
Start: 2020-02-05 | End: 2020-02-05

## 2020-02-05 RX ORDER — LISINOPRIL 40 MG/1
40 TABLET ORAL DAILY
COMMUNITY
End: 2020-04-17

## 2020-02-05 RX ORDER — NICOTINE POLACRILEX 4 MG
15 LOZENGE BUCCAL
Status: DISCONTINUED | OUTPATIENT
Start: 2020-02-05 | End: 2020-02-06 | Stop reason: HOSPADM

## 2020-02-05 RX ORDER — ISOSORBIDE MONONITRATE 30 MG/1
30 TABLET, EXTENDED RELEASE ORAL ONCE
Status: COMPLETED | OUTPATIENT
Start: 2020-02-05 | End: 2020-02-05

## 2020-02-05 RX ORDER — SODIUM CHLORIDE 0.9 % (FLUSH) 0.9 %
10 SYRINGE (ML) INJECTION EVERY 12 HOURS SCHEDULED
Status: DISCONTINUED | OUTPATIENT
Start: 2020-02-05 | End: 2020-02-06 | Stop reason: HOSPADM

## 2020-02-05 RX ORDER — CLONIDINE HYDROCHLORIDE 0.1 MG/1
0.2 TABLET ORAL ONCE
Status: COMPLETED | OUTPATIENT
Start: 2020-02-05 | End: 2020-02-05

## 2020-02-05 RX ORDER — AMLODIPINE BESYLATE 5 MG/1
5 TABLET ORAL NIGHTLY
Status: DISCONTINUED | OUTPATIENT
Start: 2020-02-05 | End: 2020-02-06 | Stop reason: HOSPADM

## 2020-02-05 RX ORDER — ATORVASTATIN CALCIUM 40 MG/1
40 TABLET, FILM COATED ORAL DAILY
Status: DISCONTINUED | OUTPATIENT
Start: 2020-02-06 | End: 2020-02-06 | Stop reason: HOSPADM

## 2020-02-05 RX ORDER — ISOSORBIDE MONONITRATE 30 MG/1
30 TABLET, EXTENDED RELEASE ORAL DAILY
Status: DISCONTINUED | OUTPATIENT
Start: 2020-02-06 | End: 2020-02-06 | Stop reason: HOSPADM

## 2020-02-05 RX ORDER — ACETAMINOPHEN,DIPHENHYDRAMINE HCL 500; 25 MG/1; MG/1
1 TABLET, FILM COATED ORAL NIGHTLY PRN
COMMUNITY
End: 2020-03-06

## 2020-02-05 RX ORDER — METOPROLOL TARTRATE 50 MG/1
100 TABLET, FILM COATED ORAL ONCE
Status: COMPLETED | OUTPATIENT
Start: 2020-02-05 | End: 2020-02-05

## 2020-02-05 RX ORDER — AMLODIPINE BESYLATE 5 MG/1
7.5 TABLET ORAL NIGHTLY
COMMUNITY
End: 2020-09-01 | Stop reason: SDUPTHER

## 2020-02-05 RX ORDER — METOPROLOL TARTRATE 100 MG/1
100 TABLET ORAL 2 TIMES DAILY
Status: DISCONTINUED | OUTPATIENT
Start: 2020-02-05 | End: 2020-02-06 | Stop reason: HOSPADM

## 2020-02-05 RX ORDER — CLONIDINE HYDROCHLORIDE 0.2 MG/1
0.2 TABLET ORAL 3 TIMES DAILY
Status: DISCONTINUED | OUTPATIENT
Start: 2020-02-05 | End: 2020-02-06 | Stop reason: HOSPADM

## 2020-02-05 RX ORDER — NITROGLYCERIN 0.4 MG/1
0.4 TABLET SUBLINGUAL
Status: DISCONTINUED | OUTPATIENT
Start: 2020-02-05 | End: 2020-02-06 | Stop reason: HOSPADM

## 2020-02-05 RX ORDER — ASPIRIN 81 MG/1
81 TABLET ORAL DAILY
Status: DISCONTINUED | OUTPATIENT
Start: 2020-02-06 | End: 2020-02-06 | Stop reason: HOSPADM

## 2020-02-05 RX ADMIN — HEPARIN SODIUM 5000 UNITS: 5000 INJECTION INTRAVENOUS; SUBCUTANEOUS at 21:37

## 2020-02-05 RX ADMIN — AMLODIPINE BESYLATE 5 MG: 5 TABLET ORAL at 21:36

## 2020-02-05 RX ADMIN — METOPROLOL TARTRATE 100 MG: 100 TABLET, FILM COATED ORAL at 21:36

## 2020-02-05 RX ADMIN — INSULIN ASPART 3 UNITS: 100 INJECTION, SOLUTION INTRAVENOUS; SUBCUTANEOUS at 21:36

## 2020-02-05 RX ADMIN — Medication 1 TABLET: at 21:36

## 2020-02-05 RX ADMIN — METOPROLOL TARTRATE 100 MG: 50 TABLET, FILM COATED ORAL at 12:53

## 2020-02-05 RX ADMIN — IPRATROPIUM BROMIDE AND ALBUTEROL SULFATE 3 ML: .5; 3 SOLUTION RESPIRATORY (INHALATION) at 10:13

## 2020-02-05 RX ADMIN — FUROSEMIDE 40 MG: 10 INJECTION, SOLUTION INTRAMUSCULAR; INTRAVENOUS at 15:15

## 2020-02-05 RX ADMIN — CLONIDINE HYDROCHLORIDE 0.2 MG: 0.2 TABLET ORAL at 21:36

## 2020-02-05 RX ADMIN — SODIUM CHLORIDE, PRESERVATIVE FREE 10 ML: 5 INJECTION INTRAVENOUS at 21:35

## 2020-02-05 RX ADMIN — FERROUS SULFATE TAB 325 MG (65 MG ELEMENTAL FE) 325 MG: 325 (65 FE) TAB at 21:36

## 2020-02-05 RX ADMIN — CLONIDINE HYDROCHLORIDE 0.2 MG: 0.1 TABLET ORAL at 12:52

## 2020-02-05 RX ADMIN — ISOSORBIDE MONONITRATE 30 MG: 30 TABLET, EXTENDED RELEASE ORAL at 21:36

## 2020-02-05 RX ADMIN — OFLOXACIN 50000 UNITS: 300 TABLET, COATED ORAL at 21:36

## 2020-02-05 NOTE — PLAN OF CARE
Problem: Patient Care Overview  Goal: Plan of Care Review  Outcome: Ongoing (interventions implemented as appropriate)  Flowsheets (Taken 2/5/2020 1833)  Progress: no change  Plan of Care Reviewed With: patient  Note:   Patient resting comfortably since arrival to floor. Denies any complaints at this time. Will continue to monitor.  Goal: Individualization and Mutuality  Outcome: Ongoing (interventions implemented as appropriate)  Goal: Discharge Needs Assessment  Outcome: Ongoing (interventions implemented as appropriate)  Goal: Interprofessional Rounds/Family Conf  Outcome: Ongoing (interventions implemented as appropriate)     Problem: Cardiac: Heart Failure (Adult)  Goal: Signs and Symptoms of Listed Potential Problems Will be Absent, Minimized or Managed (Cardiac: Heart Failure)  Outcome: Ongoing (interventions implemented as appropriate)  Flowsheets (Taken 2/5/2020 1834)  Problems Assessed (Heart Failure): situational response  Problems Present (Heart Failure): situational response     Problem: Diabetes, Type 2 (Adult)  Goal: Signs and Symptoms of Listed Potential Problems Will be Absent, Minimized or Managed (Diabetes, Type 2)  Outcome: Ongoing (interventions implemented as appropriate)  Flowsheets (Taken 2/5/2020 1834)  Problems Assessed (Type 2 Diabetes): hyperglycemia  Problems Present (Type 2 Diabetes): hyperglycemia

## 2020-02-05 NOTE — ED NOTES
Pt leave Ed with ED tech, has no sign of distress, vs stable, iv patent.     Joy Riggs, RN  02/05/20 [id number]

## 2020-02-05 NOTE — H&P
[facility] Medicine Services  History & Physical          Patient Identification:  Name:  Britta Lee  Age:  74 y.o.  Sex:  female  :  1945  MRN:  [id number]   Visit Number:  [id number]  Primary Care Physician:  Lexie Dolan PA    I have seen the patient in conjunction with ERNIE Garay and I agree with the following statements:     Subjective     Chief complaint: cough, dyspnea     History of presenting illness:    Mrs. Lee is a 74 year old female patient who presented to [facility] ED on 2020. She states around 3 weeks she has been sick with what she thought was bronchitis with cough and wheezing. She states she has had swelling that she has at baseline but has worsened some. She states she can't walk very far due to her shortness of breath. She does not have a scale to weigh herself. She states 3 weeks ago at the doctors office she weighed 232. She has been having to sleep in her recliner for around 3 weeks, she states she usually does sleep in the bed. She saw her PCP 3 weeks ago, Dr. English and she states he didn't do anything. She denies fever. She does report some intermittent chest pain that happens when she does to much physically like cleaning associated with dyspnea, she states she can rest and it improves.     Her work up in the ED showed a proBNP 2074, glucose 203, sodium 141, potassium 4.2, bicarb 21.6, anion gap 14.4, creatinine 1.60, BUN 28, alkaline phosphatase 298, ALT 44, AST 59, albumin 3.21, WBC 10.65, H&H 10.8 and 35.7, platelet count 253.  Chest x-ray done in the ED per radiology reading shows cardiomegaly and pulmonary vascular congestion.  Treatment ED included 40 mg IV Lasix, clonidine 0.2 mg oral, albuterol neb and 100 mg of Lopressor.    Her past medical history consist of CKD Stage III (Dr. Johnson) , Dm Type 2 insulin dependent, Heart Failure (Dr. Martines in [place]), CAD s/p stent , GERD, Hyperlipidemia, and HTN. She denies any history  of seizures, strokes or cancers. She does not smoke, with no hx of it, she does not drink ETOH regularly, socially on occasion. Her  is her next of kin, Brandon.       ---------------------------------------------------------------------------------------------------------------------   Review of Systems   Constitutional: Negative for chills, diaphoresis, fatigue and fever.   HENT: Negative for congestion and rhinorrhea.    Eyes: Negative for visual disturbance.   Respiratory: Positive for cough, shortness of breath and wheezing.    Cardiovascular: Positive for chest pain and leg swelling.   Gastrointestinal: Negative for abdominal distention, constipation, diarrhea, nausea and vomiting.   Genitourinary: Negative for difficulty urinating.   Musculoskeletal: Negative for arthralgias and myalgias.   Skin: Negative for color change.   Neurological: Negative for dizziness, weakness and light-headedness.   Psychiatric/Behavioral: Negative for agitation, behavioral problems and confusion.      ---------------------------------------------------------------------------------------------------------------------   Past Medical History:   Diagnosis Date   • Anemia    • Arthritis    • Carpal tunnel syndrome    • CHF (congestive heart failure) (CMS/[facility])    • Coronary artery disease    • Diabetes mellitus (CMS/Formerly [facility])    • Elevated cholesterol    • GERD (gastroesophageal reflux disease)    • Heart disease    • High cholesterol    • History of pneumonia    • History of unsteady gait     OCASSIONALLY   • Hypertension    • Insomnia    • Kidney disease    • Osteoarthritis    • Renal insufficiency    • Sciatica      Past Surgical History:   Procedure Laterality Date   • ABDOMINAL SURGERY     • APPENDECTOMY     • CARDIAC CATHETERIZATION      1 STENT  ---  2000   • CARDIAC SURGERY     • CAROTID STENT     • CARPAL TUNNEL RELEASE Right 10/8/2019    Procedure: CARPAL TUNNEL RELEASE;  Surgeon: Feroz Johnson MD;  Location:   [facility] OR;  Service: Orthopedics   • CATARACT EXTRACTION     • CHOLECYSTECTOMY     • COLONOSCOPY     • ENDOSCOPY     • ENDOSCOPY AND COLONOSCOPY     • GALLBLADDER SURGERY     • JOINT REPLACEMENT Left 05/02/2017    [facility]  DR JOHNSON  LEFT TOTAL KNEE   • KNEE ARTHROSCOPY W/ MENISCECTOMY Right    • LAPAROSCOPIC TUBAL LIGATION     • MD TOTAL KNEE ARTHROPLASTY Left 5/2/2017    Procedure: TOTAL KNEE ARTHROPLASTY;  Surgeon: Feroz Johnson MD;  Location: [facility] OR;  Service: Orthopedics   • STERILIZATION     • TONSILLECTOMY       Family History   Problem Relation Age of Onset   • Arthritis Mother    • Diabetes Mother    • Cancer Mother    • Heart disease Father    • Diabetes Daughter    • Diabetes Son    • Diabetes Maternal Aunt    • Heart disease Maternal Grandmother    • Breast cancer Neg Hx      Social History     Socioeconomic History   • Marital status:      Spouse name: natalie   • Number of children: 3   • Years of education: 12   • Highest education level: Not on file   Occupational History   • Occupation: retired   Social Needs   • Financial resource strain: Somewhat hard   • Food insecurity:     Worry: Never true     Inability: Never true   • Transportation needs:     Medical: Yes     Non-medical: No   Tobacco Use   • Smoking status: Never Smoker   • Smokeless tobacco: Never Used   Substance and Sexual Activity   • Alcohol use: Yes     Comment: socially   • Drug use: No   • Sexual activity: Defer     Birth control/protection: Surgical   Lifestyle   • Physical activity:     Days per week: 0 days     Minutes per session: 0 min   • Stress: Only a little     ---------------------------------------------------------------------------------------------------------------------   Allergies:  Patient has no known allergies.  ---------------------------------------------------------------------------------------------------------------------     Medications below are reported home medications pulling from within  the system; at this time, these medications have not been reconciled unless otherwise specified and are in the verification process for further verifcation as current home medications.    Prior to Admission Medications     Prescriptions Last Dose Informant Patient Reported? Taking?    amLODIPine (NORVASC) 10 MG tablet   No No    Take 1 tablet by mouth Daily.    aspirin 81 MG tablet   No No    Take 1 tablet by mouth Daily.    atorvastatin (LIPITOR) 40 MG tablet   No No    Take 1 tablet by mouth Daily.    calcium carb-cholecalciferol (CALCIUM 600/VITAMIN D3) 600-800 MG-UNIT tablet   Yes No    Take  by mouth.    cholecalciferol (VITAMIN D3) 1000 units tablet   Yes No    Take 1,000 Units by mouth Daily.    CloNIDine (CATAPRES) 0.2 MG tablet   No No    Take 1 tablet by mouth 3 (Three) Times a Day.    escitalopram (LEXAPRO) 10 MG tablet   No No    Take 1 tablet by mouth every night at bedtime.    Ferrous Sulfate (IRON) 325 (65 Fe) MG tablet   No No    TAKE 1 Tablet BY MOUTH TWICE DAILY    furosemide (LASIX) 20 MG tablet   Yes No    Take 20 mg by mouth Daily.    insulin aspart (novoLOG FLEXPEN) 100 UNIT/ML solution pen-injector sc pen   No No    Inject 15 Units under the skin into the appropriate area as directed 3 (Three) Times a Day Before Meals.    Insulin Glargine, 1 Unit Dial, (TOUJEO SOLOSTAR) 300 UNIT/ML solution pen-injector injection   No No    Inject 80 Units under the skin into the appropriate area as directed Daily.    Insulin Pen Needle (PEN NEEDLES) 32G X 5 MM misc   No No    1 Device 3 (Three) Times a Day.    isosorbide mononitrate (IMDUR) 30 MG 24 hr tablet   No No    Take 1 tablet by mouth Daily.    metoprolol tartrate (LOPRESSOR) 100 MG tablet   No No    Take 1 tablet by mouth 2 (Two) Times a Day.    quinapril (ACCUPRIL) 40 MG tablet   No No    Take 1 tablet by mouth Daily.    vitamin D (ERGOCALCIFEROL) 68355 units capsule capsule   No No    Take 1 capsule by mouth 1 (One) Time Per Week. Takes wednesdays         Hospital Scheduled Meds:        ---------------------------------------------------------------------------------------------------------------------   Objective       Vital Signs:  Temp:  [97.6 °F (36.4 °C)] 97.6 °F (36.4 °C)  Heart Rate:  [62-89] 62  Resp:  [18-20] 18  BP: (136-200)/(71-96) 173/89      02/05/20  0909   Weight: 110 kg (242 lb)     Body mass index is 42.87 kg/m².  ---------------------------------------------------------------------------------------------------------------------       Physical Exam    Physical Exam:  Constitutional:  Well-developed and well-nourished.     HENT:  Head: Normocephalic and atraumatic.  Mouth:  Moist mucous membranes.    Eyes:  Conjunctivae and EOM are normal.  Pupils are equal, round, and reactive to light.  No scleral icterus.  Neck:  Neck supple.  No JVD present.    Cardiovascular:  Normal rate, regular rhythm.  with no murmur.  Pulmonary/Chest:  No respiratory distress, no wheezes, no crackles, with normal breath sounds and good air movement.  Abdominal:  Soft.  Bowel sounds are present.  No distension and no tenderness.   Musculoskeletal:  Bilateral hands and lower extremity edema, 1+ pitting, no tenderness, and no deformity.  No red or swollen joints anywhere.    Neurological:  Alert and oriented to person, place, and time.   No tongue deviation.  No facial droop.  No slurred speech.   Skin:  Skin is warm and dry.  No rash noted.  No pallor.   Psychiatric:  Normal mood and affect.  Behavior is normal.  Judgment and thought content normal.     ---------------------------------------------------------------------------------------------------------------------  EKG:          ---------------------------------------------------------------------------------------------------------------------   Results from last 7 days   Lab Units 02/05/20  0948   WBC 10*3/mm3 10.65   HEMOGLOBIN g/dL 10.8*   HEMATOCRIT % 35.7   MCV fL 85.2   MCHC g/dL 30.3*   PLATELETS 10*3/mm3  253         Results from last 7 days   Lab Units 02/05/20  0948   SODIUM mmol/L 141   POTASSIUM mmol/L 4.2   CHLORIDE mmol/L 105   CO2 mmol/L 21.6*   BUN mg/dL 28*   CREATININE mg/dL 1.60*   EGFR IF NONAFRICN AM mL/min/1.73 31*   CALCIUM mg/dL 9.4   GLUCOSE mg/dL 203*   ALBUMIN g/dL 3.21*   BILIRUBIN mg/dL 0.3   ALK PHOS U/L 298*   AST (SGOT) U/L 59*   ALT (SGPT) U/L 44*   Estimated Creatinine Clearance: 35.6 mL/min (A) (by C-G formula based on SCr of 1.6 mg/dL (H)).  No results found for: AMMONIA  Results from last 7 days   Lab Units 02/05/20  1317 02/05/20  0948   TROPONIN T ng/mL 0.017 0.019     Results from last 7 days   Lab Units 02/05/20  0948   PROBNP pg/mL 2,074.0*     Lab Results   Component Value Date    HGBA1C 9.50 (H) 11/26/2019     Lab Results   Component Value Date    TSH 2.820 03/26/2018    FREET4 1.03 03/26/2018     No results found for: PREGTESTUR, PREGSERUM, HCG, HCGQUANT  Pain Management Panel     Pain Management Panel Latest Ref Rng & Units 11/26/2019 7/30/2018    CREATININE UR mg/dL 169.0 81.0                ---------------------------------------------------------------------------------------------------------------------  Imaging Results (Last 7 Days)     Procedure Component Value Units Date/Time    XR Chest 1 View [[id number]] Collected:  02/05/20 1010     Updated:  02/05/20 1253    Narrative:       EXAMINATION: XR CHEST 1 VW-      CLINICAL INDICATION:     SOA Triage Protocol     TECHNIQUE:  XR CHEST 1 VW-      COMPARISON: 04/16/2018      FINDINGS:   Lungs appear clear.  Pulmonary vascular congestion.   Cardiomegaly.   No pneumothorax.   No effusions.   No acute osseous findings.          Impression:       Cardiomegaly and pulmonary vascular congestion.     This report was finalized on 2/5/2020 10:10 AM by Dr. Guillermo Macias MD.             Cultures:none    ---------------------------------------------------------------------------------------------------------------------  Assessment / Plan        Assessment and Plan:    Heart Failure, unknown type: echo ordered, repeat proBNP In the am, daily weights, lasix given in the ED. Monitor renal and BP tolerance. Further diuresis depending on repeat labs and clinical status in the AM.    Chest pain with hx of CAD s/p stent 2000: associated with dyspnea and activity, monitor on telemetry trend troponin, no chest pain during exam or today.     Essential HTN:/71, resume home meds when available by pharmacy with holding parameters.     Elevated Transaminases and Alkaline Phosphatase: AL 44, AST 59, Alkaline phosph is 298, total bili is 0.3. Acute Hep panel ordered, possibly due to fluid overload, repeat CMP in the am.     DM Type 2, insulin dependent: A1c ordered, hypoglycemia protocol initiated, accu checks ac/hs and prn, diabetic diet, low dose sliding scale ordered.     CKD stage III: creatinine is 1.60, appears at her baseline, monitor trend and repeat in the am. Avoid nephrotoxic agents.     Iron Deficiency Anemia: H/H is 10.8/35.7, PLT count 253, continue PO iron.     Prolonged QTc: 493 ms, potassium is 4.2, magnesium pending, monitor on telemetry.     Hyperlipidemia: continue statin.     Obesity, BMI 42.87    Activity: up with assistance   DVT prophylaxis: heparin SQ    Code status: Full     Patient is high risk for the following reasons: Acute HF Exacerbation    ERNIE Hernández  02/05/20  3:58 PM  ---------------------------------------------------------------------------------------------------------------------       Patient seen and examined independently of ERNIE Garay.  Agree with HPI, ROS, PE, Assessment and Plan.  Patient appears to be resting quite comfortably during my exam.  Minimal inspiratory crackles on ausculation.  Will continue to diurese as renal function allows.  Likely d/c in 24-48 hours.

## 2020-02-05 NOTE — ED NOTES
Pt reports shortness of air, coug x 3 week, vomit this morning.   Edema noted on bilateral feet.     Joy Riggs, RN  02/05/20 5326

## 2020-02-06 ENCOUNTER — APPOINTMENT (OUTPATIENT)
Dept: CARDIOLOGY | Facility: HOSPITAL | Age: 75
End: 2020-02-06

## 2020-02-06 VITALS
RESPIRATION RATE: 20 BRPM | WEIGHT: 242.1 LBS | DIASTOLIC BLOOD PRESSURE: 86 MMHG | HEART RATE: 72 BPM | HEIGHT: 63 IN | TEMPERATURE: 98.2 F | OXYGEN SATURATION: 99 % | SYSTOLIC BLOOD PRESSURE: 142 MMHG | BODY MASS INDEX: 42.89 KG/M2

## 2020-02-06 LAB
ALBUMIN SERPL-MCNC: 2.87 G/DL (ref 3.5–5.2)
ALBUMIN/GLOB SERPL: 0.6 G/DL
ALP SERPL-CCNC: 259 U/L (ref 39–117)
ALT SERPL W P-5'-P-CCNC: 42 U/L (ref 1–33)
ANION GAP SERPL CALCULATED.3IONS-SCNC: 13.7 MMOL/L (ref 5–15)
AST SERPL-CCNC: 53 U/L (ref 1–32)
BASOPHILS # BLD AUTO: 0.05 10*3/MM3 (ref 0–0.2)
BASOPHILS NFR BLD AUTO: 0.6 % (ref 0–1.5)
BH CV ECHO MEAS - % IVS THICK: 26.9 %
BH CV ECHO MEAS - % LVPW THICK: 60 %
BH CV ECHO MEAS - ACS: 1.7 CM
BH CV ECHO MEAS - AO ROOT AREA (BSA CORRECTED): 1.5
BH CV ECHO MEAS - AO ROOT AREA: 7.8 CM^2
BH CV ECHO MEAS - AO ROOT DIAM: 3.2 CM
BH CV ECHO MEAS - BSA(HAYCOCK): 2.3 M^2
BH CV ECHO MEAS - BSA: 2.1 M^2
BH CV ECHO MEAS - BZI_BMI: 42.9 KILOGRAMS/M^2
BH CV ECHO MEAS - BZI_METRIC_HEIGHT: 160 CM
BH CV ECHO MEAS - BZI_METRIC_WEIGHT: 109.8 KG
BH CV ECHO MEAS - CI(CUBED): 1.7 L/MIN/M^2
BH CV ECHO MEAS - CI(MOD-SP4): 1.9 L/MIN/M^2
BH CV ECHO MEAS - CI(TEICH): 1.6 L/MIN/M^2
BH CV ECHO MEAS - CO(CUBED): 3.5 L/MIN
BH CV ECHO MEAS - CO(MOD-SP4): 4 L/MIN
BH CV ECHO MEAS - CO(TEICH): 3.3 L/MIN
BH CV ECHO MEAS - EDV(CUBED): 76 ML
BH CV ECHO MEAS - EDV(MOD-SP4): 80.2 ML
BH CV ECHO MEAS - EDV(TEICH): 80.1 ML
BH CV ECHO MEAS - EF(CUBED): 78.9 %
BH CV ECHO MEAS - EF(MOD-SP4): 86.3 %
BH CV ECHO MEAS - EF(TEICH): 71.6 %
BH CV ECHO MEAS - ESV(CUBED): 16 ML
BH CV ECHO MEAS - ESV(MOD-SP4): 11 ML
BH CV ECHO MEAS - ESV(TEICH): 22.8 ML
BH CV ECHO MEAS - FS: 40.5 %
BH CV ECHO MEAS - IVS/LVPW: 1.2
BH CV ECHO MEAS - IVSD: 1.6 CM
BH CV ECHO MEAS - IVSS: 2 CM
BH CV ECHO MEAS - LA DIMENSION: 4.8 CM
BH CV ECHO MEAS - LA/AO: 1.5
BH CV ECHO MEAS - LV DIASTOLIC VOL/BSA (35-75): 38.2 ML/M^2
BH CV ECHO MEAS - LV MASS(C)D: 246 GRAMS
BH CV ECHO MEAS - LV MASS(C)DI: 117.3 GRAMS/M^2
BH CV ECHO MEAS - LV MASS(C)S: 238.6 GRAMS
BH CV ECHO MEAS - LV MASS(C)SI: 113.8 GRAMS/M^2
BH CV ECHO MEAS - LV SYSTOLIC VOL/BSA (12-30): 5.2 ML/M^2
BH CV ECHO MEAS - LVIDD: 4.2 CM
BH CV ECHO MEAS - LVIDS: 2.5 CM
BH CV ECHO MEAS - LVLD AP4: 6.8 CM
BH CV ECHO MEAS - LVLS AP4: 5 CM
BH CV ECHO MEAS - LVOT AREA (M): 2.5 CM^2
BH CV ECHO MEAS - LVOT AREA: 2.5 CM^2
BH CV ECHO MEAS - LVOT DIAM: 1.8 CM
BH CV ECHO MEAS - LVPWD: 1.4 CM
BH CV ECHO MEAS - LVPWS: 2.2 CM
BH CV ECHO MEAS - MM HR: 58 BPM
BH CV ECHO MEAS - MM R-R INT: 1 SEC
BH CV ECHO MEAS - MV A MAX VEL: 144 CM/SEC
BH CV ECHO MEAS - MV E MAX VEL: 135 CM/SEC
BH CV ECHO MEAS - MV E/A: 0.94
BH CV ECHO MEAS - PA ACC TIME: 0.13 SEC
BH CV ECHO MEAS - PA PR(ACCEL): 21 MMHG
BH CV ECHO MEAS - RVDD: 1.4 CM
BH CV ECHO MEAS - SI(CUBED): 28.6 ML/M^2
BH CV ECHO MEAS - SI(MOD-SP4): 33 ML/M^2
BH CV ECHO MEAS - SI(TEICH): 27.4 ML/M^2
BH CV ECHO MEAS - SV(CUBED): 60 ML
BH CV ECHO MEAS - SV(MOD-SP4): 69.2 ML
BH CV ECHO MEAS - SV(TEICH): 57.4 ML
BILIRUB SERPL-MCNC: 0.3 MG/DL (ref 0.2–1.2)
BUN BLD-MCNC: 27 MG/DL (ref 8–23)
BUN/CREAT SERPL: 17 (ref 7–25)
CALCIUM SPEC-SCNC: 9.1 MG/DL (ref 8.6–10.5)
CHLORIDE SERPL-SCNC: 105 MMOL/L (ref 98–107)
CO2 SERPL-SCNC: 22.3 MMOL/L (ref 22–29)
CREAT BLD-MCNC: 1.59 MG/DL (ref 0.57–1)
DEPRECATED RDW RBC AUTO: 44.7 FL (ref 37–54)
EOSINOPHIL # BLD AUTO: 0.35 10*3/MM3 (ref 0–0.4)
EOSINOPHIL NFR BLD AUTO: 3.9 % (ref 0.3–6.2)
ERYTHROCYTE [DISTWIDTH] IN BLOOD BY AUTOMATED COUNT: 14.4 % (ref 12.3–15.4)
GFR SERPL CREATININE-BSD FRML MDRD: 32 ML/MIN/1.73
GLOBULIN UR ELPH-MCNC: 4.4 GM/DL
GLUCOSE BLD-MCNC: 137 MG/DL (ref 65–99)
GLUCOSE BLDC GLUCOMTR-MCNC: 102 MG/DL (ref 70–130)
GLUCOSE BLDC GLUCOMTR-MCNC: 244 MG/DL (ref 70–130)
HCT VFR BLD AUTO: 36 % (ref 34–46.6)
HGB BLD-MCNC: 10.8 G/DL (ref 12–15.9)
HOLD SPECIMEN: NORMAL
IMM GRANULOCYTES # BLD AUTO: 0.02 10*3/MM3 (ref 0–0.05)
IMM GRANULOCYTES NFR BLD AUTO: 0.2 % (ref 0–0.5)
LV EF 2D ECHO EST: 50 %
LYMPHOCYTES # BLD AUTO: 1.76 10*3/MM3 (ref 0.7–3.1)
LYMPHOCYTES NFR BLD AUTO: 19.6 % (ref 19.6–45.3)
MAXIMAL PREDICTED HEART RATE: 144 BPM
MCH RBC QN AUTO: 25.7 PG (ref 26.6–33)
MCHC RBC AUTO-ENTMCNC: 30 G/DL (ref 31.5–35.7)
MCV RBC AUTO: 85.5 FL (ref 79–97)
MONOCYTES # BLD AUTO: 0.54 10*3/MM3 (ref 0.1–0.9)
MONOCYTES NFR BLD AUTO: 6 % (ref 5–12)
NEUTROPHILS # BLD AUTO: 6.28 10*3/MM3 (ref 1.7–7)
NEUTROPHILS NFR BLD AUTO: 69.7 % (ref 42.7–76)
NRBC BLD AUTO-RTO: 0 /100 WBC (ref 0–0.2)
NT-PROBNP SERPL-MCNC: 1833 PG/ML (ref 5–1800)
PLATELET # BLD AUTO: 250 10*3/MM3 (ref 140–450)
PMV BLD AUTO: 9.4 FL (ref 6–12)
POTASSIUM BLD-SCNC: 4.2 MMOL/L (ref 3.5–5.2)
PROT SERPL-MCNC: 7.3 G/DL (ref 6–8.5)
RBC # BLD AUTO: 4.21 10*6/MM3 (ref 3.77–5.28)
SODIUM BLD-SCNC: 141 MMOL/L (ref 136–145)
STRESS TARGET HR: 122 BPM
TROPONIN T SERPL-MCNC: 0.03 NG/ML (ref 0–0.03)
WBC NRBC COR # BLD: 9 10*3/MM3 (ref 3.4–10.8)

## 2020-02-06 PROCEDURE — 85025 COMPLETE CBC W/AUTO DIFF WBC: CPT | Performed by: NURSE PRACTITIONER

## 2020-02-06 PROCEDURE — G0378 HOSPITAL OBSERVATION PER HR: HCPCS

## 2020-02-06 PROCEDURE — 80053 COMPREHEN METABOLIC PANEL: CPT | Performed by: NURSE PRACTITIONER

## 2020-02-06 PROCEDURE — 84484 ASSAY OF TROPONIN QUANT: CPT | Performed by: NURSE PRACTITIONER

## 2020-02-06 PROCEDURE — 96372 THER/PROPH/DIAG INJ SC/IM: CPT

## 2020-02-06 PROCEDURE — 83880 ASSAY OF NATRIURETIC PEPTIDE: CPT | Performed by: NURSE PRACTITIONER

## 2020-02-06 PROCEDURE — 82962 GLUCOSE BLOOD TEST: CPT

## 2020-02-06 PROCEDURE — 25010000002 HEPARIN (PORCINE) PER 1000 UNITS: Performed by: NURSE PRACTITIONER

## 2020-02-06 PROCEDURE — 93306 TTE W/DOPPLER COMPLETE: CPT | Performed by: INTERNAL MEDICINE

## 2020-02-06 PROCEDURE — 93306 TTE W/DOPPLER COMPLETE: CPT

## 2020-02-06 PROCEDURE — 99217 PR OBSERVATION CARE DISCHARGE MANAGEMENT: CPT | Performed by: INTERNAL MEDICINE

## 2020-02-06 RX ADMIN — FERROUS SULFATE TAB 325 MG (65 MG ELEMENTAL FE) 325 MG: 325 (65 FE) TAB at 08:31

## 2020-02-06 RX ADMIN — HEPARIN SODIUM 5000 UNITS: 5000 INJECTION INTRAVENOUS; SUBCUTANEOUS at 08:31

## 2020-02-06 RX ADMIN — METOPROLOL TARTRATE 100 MG: 100 TABLET, FILM COATED ORAL at 08:31

## 2020-02-06 RX ADMIN — ATORVASTATIN CALCIUM 40 MG: 40 TABLET, FILM COATED ORAL at 08:30

## 2020-02-06 RX ADMIN — ISOSORBIDE MONONITRATE 30 MG: 30 TABLET, EXTENDED RELEASE ORAL at 08:31

## 2020-02-06 RX ADMIN — Medication 1 TABLET: at 08:30

## 2020-02-06 RX ADMIN — CLONIDINE HYDROCHLORIDE 0.2 MG: 0.2 TABLET ORAL at 08:31

## 2020-02-06 RX ADMIN — LISINOPRIL 40 MG: 10 TABLET ORAL at 08:31

## 2020-02-06 RX ADMIN — SODIUM CHLORIDE, PRESERVATIVE FREE 10 ML: 5 INJECTION INTRAVENOUS at 08:37

## 2020-02-06 RX ADMIN — ASPIRIN 81 MG: 81 TABLET, COATED ORAL at 08:30

## 2020-02-06 NOTE — DISCHARGE SUMMARY
[facility] HOSPITALIST MEDICINE DISCHARGE SUMMARY    Patient Identification:  Name:  Britta Lee  Age:  74 y.o.  Sex:  female  :  1945  MRN:  [id number]  Visit Number:  [id number]    Date of Admission: 2020  Date of Discharge:  2020     PCP: Lexie Dolan PA    DISCHARGE DIAGNOSIS   1. Acute CHF, type unspecified  2. Essential HTN  3. IDDMII  4. CKD III  5. Iron Deficiency Anemia  6. HLD  7. Morbid Obesity        CONSULTS  None      PROCEDURES PERFORMED   None      HOSPITAL COURSE  Ms. Lee is a 74 y.o. female who presented to [facility] ED with a chief complaint of cough with shortness of breath.  Patient has a past medical history remarkable for CKD stage III, insulin-dependent type 2 diabetes mellitus, chronic CHF, CAD, GERD, hyperlipidemia and essential hypertension.  Patient presented to [facility] emergency department on 2020 with a chief complaint of shortness of breath with cough.  She reports feeling sick approximately 3 weeks prior to evaluation in the emergency department.  She self diagnosed herself with bronchitis with a cough and some wheezing.  However, since that time she does report increased bilateral lower extremity edema with increasing dyspnea on exertion.  She does not have a scale at home so she has not been able to perform daily weights.  Secondary to worsening shortness of breath and bilateral lower extremity edema, patient presented to the emergency department for further treatment and evaluation.  Initial evaluation in the emergency department did consist of basic laboratory work as well as physical exam and vital signs.  Initial vital signs were essentially within normal limits.  Initial lab work did include a CBC and a CMP.  CBC demonstrated mild anemia with hemoglobin 10.8 and hematocrit 35.7.  CMP demonstrated elevated serum creatinine of 1.6 but this is patient's baseline as she does have CKD stage III.  All other lab  work was essentially within normal limits except for mildly elevated transaminases.  Have recommended patient follow-up with her primary care provider in an outpatient setting for further evaluation..  Chest x-ray demonstrated cardiomegaly with pulmonary vascular congestion.  Patient did receive IV Lasix 40 mg in the emergency department and request was made for admission for acute on chronic congestive heart failure.    Patient was continued on IV Lasix during her short hospital stay.  At no point was patient hypoxic.  During my exam on 2/6/2020, patient reports her shortness of breath has completely resolved and she has noticed improvement in her bilateral lower extremity edema.  Did have lengthy discussion with patient in regards to adherence to heart failure diet including low-salt and 2 L fluid restricted diet.  Patient does admit to not adhering to this diet prior to admission.  Did express to patient adherence to this diet would likely prevent her from being readmitted to the hospital in the near future.  With this in mind, it is felt patient has reached maximum medical benefit of current hospitalization and she will be discharged home in stable condition today.  The beforementioned plan was thoroughly discussed with the patient and she expressed understanding and willingness to proceed with the beforementioned plan.    VITAL SIGNS:      02/05/20  0909 02/06/20  0447   Weight: 110 kg (242 lb) 110 kg (242 lb 1.6 oz)     Body mass index is 42.89 kg/m².    PHYSICAL EXAM:  General -obese  female appearing stated age in no apparent distress  HEENT -head normocephalic and atraumatic, pupils equally round and reactive to light  Lungs -clear to auscultation bilaterally with no wheezes, rales or rhonchi is appreciated  Cardiovascular -regular rate and rhythm with no murmurs, rubs, clicks or gallops appreciated  GI -abdomen soft, nontender and nondistended  Skin -warm and dry to palpation with no rashes or  lesions appreciated  Neurological -cranial nerves II through XII intact with no focal deficit or unintentional motor movement appreciated    DISCHARGE DISPOSITION   Stable    DISCHARGE MEDICATIONS:     Discharge Medications      Continue These Medications      Instructions Start Date   acetaminophen 500 MG tablet  Commonly known as:  TYLENOL   500 mg, Oral, Every 6 Hours PRN      amLODIPine 5 MG tablet  Commonly known as:  NORVASC   5 mg, Oral, Nightly      aspirin 81 MG tablet   81 mg, Oral, Daily      atorvastatin 40 MG tablet  Commonly known as:  LIPITOR   40 mg, Oral, Daily      CALCIUM 600/VITAMIN D3 600-800 MG-UNIT tablet  Generic drug:  calcium carb-cholecalciferol   1 tablet, Oral, 2 Times Daily With Meals      cloNIDine 0.2 MG tablet  Commonly known as:  CATAPRES   0.2 mg, Oral, 3 Times Daily      diphenhydrAMINE-acetaminophen  MG tablet per tablet  Commonly known as:  TYLENOL PM   1 tablet, Oral, Nightly PRN      ferrous sulfate 325 (65 Fe) MG tablet   TAKE 1 Tablet BY MOUTH TWICE DAILY      Insulin Glargine (1 Unit Dial) 300 UNIT/ML solution pen-injector injection  Commonly known as:  TOUJEO SOLOSTAR   80 Units, Subcutaneous, Daily      insulin lispro 100 UNIT/ML injection  Commonly known as:  humaLOG   20 Units, Subcutaneous, Every Morning, Prior to [facility] Admission, Patient was on: 20 units in the morning, 25 in the evening, 5 at night      insulin lispro 100 UNIT/ML injection  Commonly known as:  humaLOG   25 Units, Subcutaneous, Every Evening, Prior to [facility] Admission, Patient was on: 20 units in the morning, 25 in the evening, 5 at night      insulin lispro 100 UNIT/ML injection  Commonly known as:  humaLOG   5 Units, Subcutaneous, Nightly, Prior to [facility] Admission, Patient was on: 20 units in the morning, 25 in the evening, 5 at night      isosorbide mononitrate 30 MG 24 hr tablet  Commonly known as:  IMDUR   30 mg, Oral, Daily      lisinopril 40 MG tablet  Commonly known as:   PRINIVIL,ZESTRIL   40 mg, Oral, Daily      metoprolol tartrate 100 MG tablet  Commonly known as:  LOPRESSOR   100 mg, Oral, 2 Times Daily      vitamin D 1.25 MG (70095 UT) capsule capsule  Commonly known as:  ERGOCALCIFEROL   50,000 Units, Oral, Weekly, Takes wednesdays               Your Scheduled Appointments    Mar 02, 2020  8:20 AM EST  Lab with [facility] (--) [address] [place] [id number] [phone]      Mar 06, 2020  8:15 AM EST  Office Visit with RAFIQ Montanez  [facility] (--) [address] [place] [id number] [phone]   Please arrive 10 minutes early, bring a complete list of all medications and bring any previous records or diagnostic testing results.            Additional Instructions for the Follow-ups that You Need to Schedule     Discharge Follow-up with PCP   As directed       Currently Documented PCP:    Lexie Dolan PA    PCP Phone Number:    [phone]     Follow Up Details:  Please follow up with PCP in 1-2 weeks           Follow-up Information     Lexie Dolan PA .    Specialty:  Family Medicine  Why:  Please follow up with PCP in 1-2 weeks  Contact information:  [address] [place] [id number] [phone]                   TEST  RESULTS PENDING AT DISCHARGE       Jose López DO  02/06/20  10:33 AM    Please note that this discharge summary required more than 30 minutes to complete.    Please send a copy of this dictation to the following providers:  Lexie Dolan PA

## 2020-02-06 NOTE — PROGRESS NOTES
Case Management Discharge Note      Final Note: Patient discharged home on 2-6-2020.  No needs identified.           Destination      No service has been selected for the patient.      Durable Medical Equipment      No service has been selected for the patient.      Dialysis/Infusion      No service has been selected for the patient.      Home Medical Care      No service has been selected for the patient.      Therapy      No service has been selected for the patient.      Community Resources      No service has been selected for the patient.             Final Discharge Disposition Code: 01 - home or self-care

## 2020-02-06 NOTE — PLAN OF CARE
Problem: Patient Care Overview  Goal: Plan of Care Review  Outcome: Ongoing (interventions implemented as appropriate)  Flowsheets (Taken 2/6/2020 0300)  Outcome Summary: Pt had complaint of feeling short of breath after ambulating to the restroom. Placed patient on 1L NC and patient stated she had relief. Pt denies any other complaints. Pt has rested well this shift. Will continue to monitor.

## 2020-02-07 ENCOUNTER — TRANSITIONAL CARE MANAGEMENT TELEPHONE ENCOUNTER (OUTPATIENT)
Dept: FAMILY MEDICINE CLINIC | Facility: CLINIC | Age: 75
End: 2020-02-07

## 2020-02-07 ENCOUNTER — READMISSION MANAGEMENT (OUTPATIENT)
Dept: CALL CENTER | Facility: HOSPITAL | Age: 75
End: 2020-02-07

## 2020-02-07 NOTE — OUTREACH NOTE
Prep Survey      Responses   Facility patient discharged from?  [facility]   Is patient eligible?  Yes   Discharge diagnosis   Acute CHF, type unspecified   Does the patient have one of the following disease processes/diagnoses(primary or secondary)?  CHF   Does the patient have Home health ordered?  No   Is there a DME ordered?  No   Prep survey completed?  Yes          Nakita Stout RN

## 2020-02-08 NOTE — ED PROVIDER NOTES
Subjective   Increased shortness of breath, cough hx of chf       History provided by:  Patient   used: No    Shortness of Breath   Severity:  Moderate  Onset quality:  Gradual  Duration:  3 weeks  Progression:  Worsening  Chronicity:  Chronic  Relieved by:  Nothing  Worsened by:  Coughing and deep breathing  Associated symptoms: cough    Associated symptoms: no chest pain, no ear pain, no fever, no headaches, no rash, no sore throat, no vomiting and no wheezing        Review of Systems   Constitutional: Negative for chills and fever.   HENT: Negative for congestion, ear pain and sore throat.    Respiratory: Positive for cough and shortness of breath. Negative for wheezing.    Cardiovascular: Positive for leg swelling. Negative for chest pain.   Gastrointestinal: Negative for diarrhea, nausea and vomiting.   Genitourinary: Negative for dysuria and flank pain.   Skin: Negative for rash.   Neurological: Negative for headaches.   Psychiatric/Behavioral: The patient is not nervous/anxious.    All other systems reviewed and are negative.      Past Medical History:   Diagnosis Date   • Anemia    • Arthritis    • Carpal tunnel syndrome    • CHF (congestive heart failure) (CMS/HCC)    • Coronary artery disease    • Diabetes mellitus (CMS/HCC)    • Elevated cholesterol    • GERD (gastroesophageal reflux disease)    • Heart disease    • High cholesterol    • History of pneumonia    • History of unsteady gait     OCASSIONALLY   • Hypertension    • Insomnia    • Kidney disease    • Osteoarthritis    • Renal insufficiency    • Sciatica        No Known Allergies    Past Surgical History:   Procedure Laterality Date   • ABDOMINAL SURGERY     • APPENDECTOMY     • CARDIAC CATHETERIZATION      1 STENT  ---  2000   • CARDIAC SURGERY     • CAROTID STENT     • CARPAL TUNNEL RELEASE Right 10/8/2019    Procedure: CARPAL TUNNEL RELEASE;  Surgeon: Feroz Johnson MD;  Location: [facility];  Service: Orthopedics    • CATARACT EXTRACTION     • CHOLECYSTECTOMY     • COLONOSCOPY     • ENDOSCOPY     • ENDOSCOPY AND COLONOSCOPY     • GALLBLADDER SURGERY     • JOINT REPLACEMENT Left 05/02/2017    [facility]  DR JOHNSON  LEFT TOTAL KNEE   • KNEE ARTHROSCOPY W/ MENISCECTOMY Right    • LAPAROSCOPIC TUBAL LIGATION     • NE TOTAL KNEE ARTHROPLASTY Left 5/2/2017    Procedure: TOTAL KNEE ARTHROPLASTY;  Surgeon: Feroz Johnson MD;  Location: [facility];  Service: Orthopedics   • STERILIZATION     • TONSILLECTOMY         Family History   Problem Relation Age of Onset   • Arthritis Mother    • Diabetes Mother    • Cancer Mother    • Heart disease Father    • Diabetes Daughter    • Diabetes Son    • Diabetes Maternal Aunt    • Heart disease Maternal Grandmother    • Breast cancer Neg Hx        Social History     Socioeconomic History   • Marital status:      Spouse name: natalie   • Number of children: 3   • Years of education: 12   • Highest education level: Not on file   Occupational History   • Occupation: retired   Social Needs   • Financial resource strain: Somewhat hard   • Food insecurity:     Worry: Never true     Inability: Never true   • Transportation needs:     Medical: Yes     Non-medical: No   Tobacco Use   • Smoking status: Never Smoker   • Smokeless tobacco: Never Used   Substance and Sexual Activity   • Alcohol use: Yes     Comment: socially   • Drug use: No   • Sexual activity: Defer     Birth control/protection: Surgical   Lifestyle   • Physical activity:     Days per week: 0 days     Minutes per session: 0 min   • Stress: Only a little           Objective   Physical Exam   Constitutional: She is oriented to person, place, and time. She appears well-developed and well-nourished.   HENT:   Head: Normocephalic.   Mouth/Throat: Oropharynx is clear and moist.   Neck: Neck supple.   Cardiovascular: Normal rate and regular rhythm.   Pulmonary/Chest: Effort normal. She has decreased breath sounds in the right lower field  and the left lower field.   Abdominal: Soft. Bowel sounds are normal. There is no tenderness.   Musculoskeletal: Normal range of motion.   Neurological: She is alert and oriented to person, place, and time.   Skin: Skin is warm and dry.   Psychiatric: She has a normal mood and affect. Her behavior is normal. Judgment and thought content normal.   Nursing note and vitals reviewed.      Procedures           ED Course  ED Course as of Feb 08 0109   Wed Feb 05, 2020   1500 Ekg no significant change   Per curd     [LC]   1505 Discussed patient with Dr. epps also he came to see patient as well.    [LC]   1509 Troponin [LC]   1511 Discussed with Dr Lpóez   Will come see pt     [LC]   1612 Pt seen with PA.  Reviewed findings.  Agree with assessment and plan.    [BC]      ED Course User Index  [BC] Sanya Epps MD  [LC] Mag Woodard PA                                               MDM    Final diagnoses:   Dyspnea, unspecified type   Acute diastolic congestive heart failure (CMS/HCC)            Mag Woodard PA  02/08/20 0109

## 2020-02-10 ENCOUNTER — READMISSION MANAGEMENT (OUTPATIENT)
Dept: CALL CENTER | Facility: HOSPITAL | Age: 75
End: 2020-02-10

## 2020-02-10 NOTE — OUTREACH NOTE
CHF Week 1 Survey      Responses   Facility patient discharged from?  [facility]   Does the patient have one of the following disease processes/diagnoses(primary or secondary)?  CHF   Is there a successful TCM telephone encounter documented?  Yes          Alma Coppola RN

## 2020-02-13 ENCOUNTER — OFFICE VISIT (OUTPATIENT)
Dept: FAMILY MEDICINE CLINIC | Facility: CLINIC | Age: 75
End: 2020-02-13

## 2020-02-13 VITALS
DIASTOLIC BLOOD PRESSURE: 78 MMHG | SYSTOLIC BLOOD PRESSURE: 140 MMHG | HEIGHT: 63 IN | WEIGHT: 236 LBS | OXYGEN SATURATION: 97 % | BODY MASS INDEX: 41.82 KG/M2 | TEMPERATURE: 97.3 F | HEART RATE: 63 BPM

## 2020-02-13 DIAGNOSIS — Z91.119 NONCOMPLIANCE WITH DIET AND MEDICATION REGIMEN: ICD-10-CM

## 2020-02-13 DIAGNOSIS — N18.30 CHRONIC RENAL DISEASE, STAGE III (HCC): ICD-10-CM

## 2020-02-13 DIAGNOSIS — E11.65 TYPE 2 DIABETES MELLITUS WITH HYPERGLYCEMIA, WITH LONG-TERM CURRENT USE OF INSULIN (HCC): ICD-10-CM

## 2020-02-13 DIAGNOSIS — Z79.4 TYPE 2 DIABETES MELLITUS WITH STAGE 3 CHRONIC KIDNEY DISEASE, WITH LONG-TERM CURRENT USE OF INSULIN (HCC): ICD-10-CM

## 2020-02-13 DIAGNOSIS — E66.01 MORBID OBESITY (HCC): ICD-10-CM

## 2020-02-13 DIAGNOSIS — I50.9 CHRONIC CONGESTIVE HEART FAILURE, UNSPECIFIED HEART FAILURE TYPE (HCC): Primary | ICD-10-CM

## 2020-02-13 DIAGNOSIS — IMO0002 TYPE 2 DIABETES MELLITUS, UNCONTROLLED, WITH NEUROPATHY: ICD-10-CM

## 2020-02-13 DIAGNOSIS — N18.30 TYPE 2 DIABETES MELLITUS WITH STAGE 3 CHRONIC KIDNEY DISEASE, WITH LONG-TERM CURRENT USE OF INSULIN (HCC): ICD-10-CM

## 2020-02-13 DIAGNOSIS — Z91.14 NONCOMPLIANCE WITH DIET AND MEDICATION REGIMEN: ICD-10-CM

## 2020-02-13 DIAGNOSIS — E11.22 TYPE 2 DIABETES MELLITUS WITH STAGE 3 CHRONIC KIDNEY DISEASE, WITH LONG-TERM CURRENT USE OF INSULIN (HCC): ICD-10-CM

## 2020-02-13 DIAGNOSIS — L98.9 SKIN LESION OF FACE: ICD-10-CM

## 2020-02-13 DIAGNOSIS — Z79.4 TYPE 2 DIABETES MELLITUS WITH HYPERGLYCEMIA, WITH LONG-TERM CURRENT USE OF INSULIN (HCC): ICD-10-CM

## 2020-02-13 DIAGNOSIS — R13.19 ESOPHAGEAL DYSPHAGIA: ICD-10-CM

## 2020-02-13 PROCEDURE — 99496 TRANSJ CARE MGMT HIGH F2F 7D: CPT | Performed by: PHYSICIAN ASSISTANT

## 2020-02-13 RX ORDER — ESCITALOPRAM OXALATE 10 MG/1
10 TABLET ORAL DAILY
COMMUNITY
End: 2020-06-12 | Stop reason: SDUPTHER

## 2020-02-13 NOTE — PATIENT INSTRUCTIONS
Calorie Counting for Weight Loss  Calories are units of energy. Your body needs a certain amount of calories from food to keep you going throughout the day. When you eat more calories than your body needs, your body stores the extra calories as fat. When you eat fewer calories than your body needs, your body burns fat to get the energy it needs.  Calorie counting means keeping track of how many calories you eat and drink each day. Calorie counting can be helpful if you need to lose weight. If you make sure to eat fewer calories than your body needs, you should lose weight. Ask your health care provider what a healthy weight is for you.  For calorie counting to work, you will need to eat the right number of calories in a day in order to lose a healthy amount of weight per week. A dietitian can help you determine how many calories you need in a day and will give you suggestions on how to reach your calorie goal.  · A healthy amount of weight to lose per week is usually 1-2 lb (0.5-0.9 kg). This usually means that your daily calorie intake should be reduced by 500-750 calories.  · Eating 1,200 - 1,500 calories per day can help most women lose weight.  · Eating 1,500 - 1,800 calories per day can help most men lose weight.  What is my plan?  My goal is to have __________ calories per day.  If I have this many calories per day, I should lose around __________ pounds per week.  What do I need to know about calorie counting?  In order to meet your daily calorie goal, you will need to:  · Find out how many calories are in each food you would like to eat. Try to do this before you eat.  · Decide how much of the food you plan to eat.  · Write down what you ate and how many calories it had. Doing this is called keeping a food log.  To successfully lose weight, it is important to balance calorie counting with a healthy lifestyle that includes regular activity. Aim for 150 minutes of moderate exercise (such as walking) or 75  minutes of vigorous exercise (such as running) each week.  Where do I find calorie information?    The number of calories in a food can be found on a Nutrition Facts label. If a food does not have a Nutrition Facts label, try to look up the calories online or ask your dietitian for help.  Remember that calories are listed per serving. If you choose to have more than one serving of a food, you will have to multiply the calories per serving by the amount of servings you plan to eat. For example, the label on a package of bread might say that a serving size is 1 slice and that there are 90 calories in a serving. If you eat 1 slice, you will have eaten 90 calories. If you eat 2 slices, you will have eaten 180 calories.  How do I keep a food log?  Immediately after each meal, record the following information in your food log:  · What you ate. Don't forget to include toppings, sauces, and other extras on the food.  · How much you ate. This can be measured in cups, ounces, or number of items.  · How many calories each food and drink had.  · The total number of calories in the meal.  Keep your food log near you, such as in a small notebook in your pocket, or use a mobile wilfrid or website. Some programs will calculate calories for you and show you how many calories you have left for the day to meet your goal.  What are some calorie counting tips?    · Use your calories on foods and drinks that will fill you up and not leave you hungry:  ? Some examples of foods that fill you up are nuts and nut butters, vegetables, lean proteins, and high-fiber foods like whole grains. High-fiber foods are foods with more than 5 g fiber per serving.  ? Drinks such as sodas, specialty coffee drinks, alcohol, and juices have a lot of calories, yet do not fill you up.  · Eat nutritious foods and avoid empty calories. Empty calories are calories you get from foods or beverages that do not have many vitamins or protein, such as candy, sweets, and  "soda. It is better to have a nutritious high-calorie food (such as an avocado) than a food with few nutrients (such as a bag of chips).  · Know how many calories are in the foods you eat most often. This will help you calculate calorie counts faster.  · Pay attention to calories in drinks. Low-calorie drinks include water and unsweetened drinks.  · Pay attention to nutrition labels for \"low fat\" or \"fat free\" foods. These foods sometimes have the same amount of calories or more calories than the full fat versions. They also often have added sugar, starch, or salt, to make up for flavor that was removed with the fat.  · Find a way of tracking calories that works for you. Get creative. Try different apps or programs if writing down calories does not work for you.  What are some portion control tips?  · Know how many calories are in a serving. This will help you know how many servings of a certain food you can have.  · Use a measuring cup to measure serving sizes. You could also try weighing out portions on a kitchen scale. With time, you will be able to estimate serving sizes for some foods.  · Take some time to put servings of different foods on your favorite plates, bowls, and cups so you know what a serving looks like.  · Try not to eat straight from a bag or box. Doing this can lead to overeating. Put the amount you would like to eat in a cup or on a plate to make sure you are eating the right portion.  · Use smaller plates, glasses, and bowls to prevent overeating.  · Try not to multitask (for example, watch TV or use your computer) while eating. If it is time to eat, sit down at a table and enjoy your food. This will help you to know when you are full. It will also help you to be aware of what you are eating and how much you are eating.  What are tips for following this plan?  Reading food labels  · Check the calorie count compared to the serving size. The serving size may be smaller than what you are used to " "eating.  · Check the source of the calories. Make sure the food you are eating is high in vitamins and protein and low in saturated and trans fats.  Shopping  · Read nutrition labels while you shop. This will help you make healthy decisions before you decide to purchase your food.  · Make a grocery list and stick to it.  Cooking  · Try to cook your favorite foods in a healthier way. For example, try baking instead of frying.  · Use low-fat dairy products.  Meal planning  · Use more fruits and vegetables. Half of your plate should be fruits and vegetables.  · Include lean proteins like poultry and fish.  How do I count calories when eating out?  · Ask for smaller portion sizes.  · Consider sharing an entree and sides instead of getting your own entree.  · If you get your own entree, eat only half. Ask for a box at the beginning of your meal and put the rest of your entree in it so you are not tempted to eat it.  · If calories are listed on the menu, choose the lower calorie options.  · Choose dishes that include vegetables, fruits, whole grains, low-fat dairy products, and lean protein.  · Choose items that are boiled, broiled, grilled, or steamed. Stay away from items that are buttered, battered, fried, or served with cream sauce. Items labeled \"crispy\" are usually fried, unless stated otherwise.  · Choose water, low-fat milk, unsweetened iced tea, or other drinks without added sugar. If you want an alcoholic beverage, choose a lower calorie option such as a glass of wine or light beer.  · Ask for dressings, sauces, and syrups on the side. These are usually high in calories, so you should limit the amount you eat.  · If you want a salad, choose a garden salad and ask for grilled meats. Avoid extra toppings like dixon, cheese, or fried items. Ask for the dressing on the side, or ask for olive oil and vinegar or lemon to use as dressing.  · Estimate how many servings of a food you are given. For example, a serving of " cooked rice is ½ cup or about the size of half a baseball. Knowing serving sizes will help you be aware of how much food you are eating at restaurants. The list below tells you how big or small some common portion sizes are based on everyday objects:  ? 1 oz--4 stacked dice.  ? 3 oz--1 deck of cards.  ? 1 tsp--1 die.  ? 1 Tbsp--½ a ping-pong ball.  ? 2 Tbsp--1 ping-pong ball.  ? ½ cup--½ baseball.  ? 1 cup--1 baseball.  Summary  · Calorie counting means keeping track of how many calories you eat and drink each day. If you eat fewer calories than your body needs, you should lose weight.  · A healthy amount of weight to lose per week is usually 1-2 lb (0.5-0.9 kg). This usually means reducing your daily calorie intake by 500-750 calories.  · The number of calories in a food can be found on a Nutrition Facts label. If a food does not have a Nutrition Facts label, try to look up the calories online or ask your dietitian for help.  · Use your calories on foods and drinks that will fill you up, and not on foods and drinks that will leave you hungry.  · Use smaller plates, glasses, and bowls to prevent overeating.  This information is not intended to replace advice given to you by your health care provider. Make sure you discuss any questions you have with your health care provider.  Document Released: 12/18/2006 Document Revised: 09/06/2019 Document Reviewed: 11/17/2017  "[employer] Interactive Patient Education © 2019 "[employer] Inc.      Carbohydrate Counting for Diabetes Mellitus, Adult    Carbohydrate counting is a method of keeping track of how many carbohydrates you eat. Eating carbohydrates naturally increases the amount of sugar (glucose) in the blood. Counting how many carbohydrates you eat helps keep your blood glucose within normal limits, which helps you manage your diabetes (diabetes mellitus).  It is important to know how many carbohydrates you can safely have in each meal. This is different for every person. A  "diet and nutrition specialist (registered dietitian) can help you make a meal plan and calculate how many carbohydrates you should have at each meal and snack.  Carbohydrates are found in the following foods:  · Grains, such as breads and cereals.  · Dried beans and soy products.  · Starchy vegetables, such as potatoes, peas, and corn.  · Fruit and fruit juices.  · Milk and yogurt.  · Sweets and snack foods, such as cake, cookies, candy, chips, and soft drinks.  How do I count carbohydrates?  There are two ways to count carbohydrates in food. You can use either of the methods or a combination of both.  Reading \"Nutrition Facts\" on packaged food  The \"Nutrition Facts\" list is included on the labels of almost all packaged foods and beverages in the [place] It includes:  · The serving size.  · Information about nutrients in each serving, including the grams (g) of carbohydrate per serving.  To use the “Nutrition Facts\":  · Decide how many servings you will have.  · Multiply the number of servings by the number of carbohydrates per serving.  · The resulting number is the total amount of carbohydrates that you will be having.  Learning standard serving sizes of other foods  When you eat carbohydrate foods that are not packaged or do not include \"Nutrition Facts\" on the label, you need to measure the servings in order to count the amount of carbohydrates:  · Measure the foods that you will eat with a food scale or measuring cup, if needed.  · Decide how many standard-size servings you will eat.  · Multiply the number of servings by 15. Most carbohydrate-rich foods have about 15 g of carbohydrates per serving.  ? For example, if you eat 8 oz (170 g) of strawberries, you will have eaten 2 servings and 30 g of carbohydrates (2 servings x 15 g = 30 g).  · For foods that have more than one food mixed, such as soups and casseroles, you must count the carbohydrates in each food that is included.  The following list contains " standard serving sizes of common carbohydrate-rich foods. Each of these servings has about 15 g of carbohydrates:  · ½ hamburger bun or ½ English muffin.  · ½ oz (15 mL) syrup.  · ½ oz (14 g) jelly.  · 1 slice of bread.  · 1 six-inch tortilla.  · 3 oz (85 g) cooked rice or pasta.  · 4 oz (113 g) cooked dried beans.  · 4 oz (113 g) starchy vegetable, such as peas, corn, or potatoes.  · 4 oz (113 g) hot cereal.  · 4 oz (113 g) mashed potatoes or ¼ of a large baked potato.  · 4 oz (113 g) canned or frozen fruit.  · 4 oz (120 mL) fruit juice.  · 4-6 crackers.  · 6 chicken nuggets.  · 6 oz (170 g) unsweetened dry cereal.  · 6 oz (170 g) plain fat-free yogurt or yogurt sweetened with artificial sweeteners.  · 8 oz (240 mL) milk.  · 8 oz (170 g) fresh fruit or one small piece of fruit.  · 24 oz (680 g) popped popcorn.  Example of carbohydrate counting  Sample meal  · 3 oz (85 g) chicken breast.  · 6 oz (170 g) brown rice.  · 4 oz (113 g) corn.  · 8 oz (240 mL) milk.  · 8 oz (170 g) strawberries with sugar-free whipped topping.  Carbohydrate calculation  1. Identify the foods that contain carbohydrates:  ? Rice.  ? Corn.  ? Milk.  ? Strawberries.  2. Calculate how many servings you have of each food:  ? 2 servings rice.  ? 1 serving corn.  ? 1 serving milk.  ? 1 serving strawberries.  3. Multiply each number of servings by 15 g:  ? 2 servings rice x 15 g = 30 g.  ? 1 serving corn x 15 g = 15 g.  ? 1 serving milk x 15 g = 15 g.  ? 1 serving strawberries x 15 g = 15 g.  4. Add together all of the amounts to find the total grams of carbohydrates eaten:  ? 30 g + 15 g + 15 g + 15 g = 75 g of carbohydrates total.  Summary  · Carbohydrate counting is a method of keeping track of how many carbohydrates you eat.  · Eating carbohydrates naturally increases the amount of sugar (glucose) in the blood.  · Counting how many carbohydrates you eat helps keep your blood glucose within normal limits, which helps you manage your  diabetes.  · A diet and nutrition specialist (registered dietitian) can help you make a meal plan and calculate how many carbohydrates you should have at each meal and snack.  This information is not intended to replace advice given to you by your health care provider. Make sure you discuss any questions you have with your health care provider.  Document Released: 12/18/2006 Document Revised: 06/27/2018 Document Reviewed: 05/31/2017  [employer] Interactive Patient Education © 2019 [employer]

## 2020-02-13 NOTE — PROGRESS NOTES
Transitional Care Follow Up Visit  Subjective     Britta Lee is a 74 y.o. female who presents for a transitional care management visit.    Within 48 business hours after discharge our office contacted her via telephone to coordinate her care and needs.      I reviewed and discussed the details of that call along with the discharge summary, hospital problems, inpatient lab results, inpatient diagnostic studies, and consultation reports with Britta.     Lab Results   Component Value Date    GLUCOSE 137 (H) 02/06/2020    BUN 27 (H) 02/06/2020    CREATININE 1.59 (H) 02/06/2020    EGFRIFNONA 32 (L) 02/06/2020    EGFRIFAFRI 41 (L) 07/30/2018    BCR 17.0 02/06/2020    K 4.2 02/06/2020    CO2 22.3 02/06/2020    CALCIUM 9.1 02/06/2020    PROTENTOTREF 7.7 07/30/2018    ALBUMIN 2.87 (L) 02/06/2020    LABIL2 1.1 (L) 07/30/2018    AST 53 (H) 02/06/2020    ALT 42 (H) 02/06/2020     Lab Results   Component Value Date    HGBA1C 7.70 (H) 02/05/2020       Current outpatient and discharge medications have been reconciled for the patient.  Reviewed by: RAFIQ Montanez      Date of TCM Phone Call 2/10/2020   [facility]   Date of Admission 2/5/2020   Date of Discharge 2/6/2020   Discharge Disposition Home or Self Care     Risk for Readmission (LACE) Score: 7 (2/6/2020  6:01 AM)      History of Present Illness   Course During Hospital Stay:    On 2/5/2020 she presented to [facility] emergency department with cough and shortness of breath.  She was diagnosed with congestive heart failure.  Chest x-ray demonstrated cardiomegaly with pulmonary vascular congestion.  She received Lasix 40 mg IV in the emergency department and she was admitted for acute on chronic congestive heart failure.  Her shortness of breath improved with diuretics and bilateral LE edema improved.  She was discharged after shortness of breath resolved completely on 2/6/2020.    Congestive heart failure- stable without diuretics at this  time.    Diabetes mellitus-  Uncontrolled.  She states that she does not check her blood glucose at home as the fingersticks make her finger sore.  She states that she does like her cakes pies and sweets and that her  just got her some chocolate candies for [employer] Day.  She states that while in the hospital she was told that her basal insulin dose of 80 units might be too high since she felt like she was getting low blood sugars.  Although no official medication changes were made to her insulin doses the patient has decreased her Toujeo from 80 units to 40 units nightly.  She also takes Humalog 20 units with breakfast 25 units with lunch and 5 units with supper.    Dysphagia-  She complains of trouble swallowing solid foods.  She states that she does not have nausea but when she eats certain solid foods such as chunks of meat that she often feels like they get stuck in her throat and she has to vomit them back up.  She states that she has no trouble swallowing liquids.  Onset 1 month.    Chronic kidney disease-  Kidney function remained stable while recently in hospital.  She is not taking diuretic as an outpatient.  Not at goal    Obesity-  Uncontrolled.  She states that she does not follow a regular diet and frequently eats carbohydrates and foods high in glycemic index.  She does not exercise regularly.    Skin lesion-  She complains of skin lesion on her right facial cheek that has grown darker in color and larger in size over the past few months.  Onset greater than 6 months.  She states that she has had the area frozen in the past but it did recur.     The following portions of the patient's history were reviewed and updated as appropriate: allergies, current medications, past family history, past medical history, past social history, past surgical history and problem list.    Review of Systems   Constitutional: Positive for fatigue. Negative for activity change, appetite change and fever.   HENT:  Negative for ear pain, sinus pressure and sore throat.    Eyes: Negative for pain and visual disturbance.   Respiratory: Positive for cough and shortness of breath. Negative for chest tightness.    Cardiovascular: Positive for leg swelling. Negative for chest pain and palpitations.   Gastrointestinal: Negative for abdominal pain, constipation, diarrhea, nausea and vomiting.   Endocrine: Negative for polydipsia and polyuria.   Genitourinary: Negative for dysuria and frequency.        Dysphagia   Musculoskeletal: Negative for back pain and myalgias.   Skin: Negative for color change and rash.        Skin lesion on face   Allergic/Immunologic: Negative for food allergies and immunocompromised state.   Neurological: Negative for dizziness, syncope and headaches.   Hematological: Negative for adenopathy. Does not bruise/bleed easily.   Psychiatric/Behavioral: Negative for hallucinations and suicidal ideas. The patient is not nervous/anxious.        Objective   Physical Exam   Constitutional: She is oriented to person, place, and time. No distress.   Morbidly obese pleasant lady   HENT:   Head: Normocephalic and atraumatic.   Nose: Nose normal.   Mouth/Throat: Oropharynx is clear and moist.   Eyes: Pupils are equal, round, and reactive to light. Conjunctivae and EOM are normal.   Neck: Normal range of motion. Neck supple. No tracheal deviation present. No thyromegaly present.   Cardiovascular: Normal rate, regular rhythm, normal heart sounds and intact distal pulses.   No murmur heard.  Pulmonary/Chest: Effort normal and breath sounds normal. No respiratory distress. She has no wheezes.   Abdominal: Soft. Bowel sounds are normal. There is no tenderness. There is no guarding.   Musculoskeletal: Normal range of motion. She exhibits edema. She exhibits no tenderness.   1+ pitting peripheral lower extremity edema   Lymphadenopathy:     She has no cervical adenopathy.   Neurological: She is alert and oriented to person, place,  and time.   Skin: Skin is warm and dry. No rash noted. She is not diaphoretic.   Approximately 1 x 1/2 cm black crusted papule noted on right facial cheek inferior to right eye with tan-colored papules extending the borders.   Psychiatric: She has a normal mood and affect. Her behavior is normal.   Nursing note and vitals reviewed.      Assessment/Plan   Diagnoses and all orders for this visit:    Chronic congestive heart failure, unspecified heart failure type (CMS/[facility])  Comments:  Continue to monitor  We discussed the use of diuretics for acute exacerbations with relation to kidney function.    Esophageal dysphagia  Comments:  I suspect esophageal stricture.  Refer to surgery for further evaluation and treatment/EGD.  Orders:  -     Ambulatory Referral to General Surgery    Type 2 diabetes mellitus, uncontrolled, with neuropathy (CMS/[facility])  Comments:  Continue current insulin doses as medication changes cannot effectively be made without blood glucose readings    Type 2 diabetes mellitus with hyperglycemia, with long-term current use of insulin (CMS/[facility])  Comments:  Prescription written for Dexcom glucose monitoring system  Strongly advised compliance with checking home blood glucose    Chronic renal disease, stage III (CMS/[facility])  Comments:  Continue to monitor    Type 2 diabetes mellitus with stage 3 chronic kidney disease, with long-term current use of insulin (CMS/[facility])  Comments:  Continue to monitor    Morbid obesity (CMS/[facility])  Comments:  Referral made to diabetic specialist for nutrition education with regard to diabetes    Noncompliance with diet and medication regimen  Comments:  We discussed compliance and risks of noncompliance including dialysis, blindness, and loss of limb due to uncontrolled diabetes    Skin lesion of face  Comments:  Suspect neoplasm.  Plan excisional biopsy next week.

## 2020-02-14 ENCOUNTER — READMISSION MANAGEMENT (OUTPATIENT)
Dept: CALL CENTER | Facility: HOSPITAL | Age: 75
End: 2020-02-14

## 2020-02-14 NOTE — OUTREACH NOTE
CHF Week 2 Survey      Responses   Facility patient discharged from?  [facility]   Does the patient have one of the following disease processes/diagnoses(primary or secondary)?  CHF   Week 2 attempt successful?  Yes   Call start time  1547   Call end time  1551   Discharge diagnosis   Acute CHF   Is patient permission given to speak with other caregiver?  No   Meds reviewed with patient/caregiver?  Yes   Is the patient having any side effects they believe may be caused by any medication additions or changes?  No   Does the patient have all medications ordered at discharge?  Yes   Is the patient taking all medications as directed (includes completed medication regime)?  Yes   Does the patient have a primary care provider?   Yes   Comments regarding PCP  Patient states that she saw PCP yesterday.    Has the patient kept scheduled appointments due by today?  Yes   Has home health visited the patient within 72 hours of discharge?  N/A   Psychosocial issues?  No   Did the patient receive a copy of their discharge instructions?  Yes   Nursing interventions  Reviewed instructions with patient   What is the patient's perception of their health status since discharge?  Improving   Nursing interventions  Nurse provided patient education   Is the patient weighing daily?  No   Does the patient have scales?  Yes   Daily weight interventions  Education provided on importance of daily weight   Is the patient able to teach back Heart Failure diet management?  Yes   Is the patient able to teach back signs and symptoms of worsening condition? (i.e. weight gain, shortness of air, etc.)  Yes   Is the patient/caregiver able to teach back the hierarchy of who to call/visit for symptoms/problems? PCP, Specialist, Home health nurse, Urgent Care, ED, 911  Yes   CHF Week 2 call completed?  Yes          Steffi Hobbs RN

## 2020-02-21 ENCOUNTER — LAB (OUTPATIENT)
Dept: FAMILY MEDICINE CLINIC | Facility: CLINIC | Age: 75
End: 2020-02-21

## 2020-02-21 ENCOUNTER — READMISSION MANAGEMENT (OUTPATIENT)
Dept: CALL CENTER | Facility: HOSPITAL | Age: 75
End: 2020-02-21

## 2020-02-21 DIAGNOSIS — E11.22 TYPE 2 DIABETES MELLITUS WITH STAGE 4 CHRONIC KIDNEY DISEASE, WITH LONG-TERM CURRENT USE OF INSULIN (HCC): ICD-10-CM

## 2020-02-21 DIAGNOSIS — Z79.4 TYPE 2 DIABETES MELLITUS WITH STAGE 4 CHRONIC KIDNEY DISEASE, WITH LONG-TERM CURRENT USE OF INSULIN (HCC): ICD-10-CM

## 2020-02-21 DIAGNOSIS — N18.4 TYPE 2 DIABETES MELLITUS WITH STAGE 4 CHRONIC KIDNEY DISEASE, WITH LONG-TERM CURRENT USE OF INSULIN (HCC): ICD-10-CM

## 2020-02-21 DIAGNOSIS — N18.30 CHRONIC KIDNEY DISEASE, STAGE III (MODERATE) (HCC): Primary | ICD-10-CM

## 2020-02-21 DIAGNOSIS — Z79.4 DIABETES MELLITUS DUE TO UNDERLYING CONDITION WITH HYPERGLYCEMIA, WITH LONG-TERM CURRENT USE OF INSULIN (HCC): ICD-10-CM

## 2020-02-21 DIAGNOSIS — I10 BENIGN ESSENTIAL HYPERTENSION: ICD-10-CM

## 2020-02-21 DIAGNOSIS — E08.65 DIABETES MELLITUS DUE TO UNDERLYING CONDITION WITH HYPERGLYCEMIA, WITH LONG-TERM CURRENT USE OF INSULIN (HCC): ICD-10-CM

## 2020-02-21 DIAGNOSIS — N25.81 SECONDARY HYPERPARATHYROIDISM OF RENAL ORIGIN (HCC): ICD-10-CM

## 2020-02-21 PROCEDURE — 80053 COMPREHEN METABOLIC PANEL: CPT | Performed by: INTERNAL MEDICINE

## 2020-02-21 PROCEDURE — 80061 LIPID PANEL: CPT | Performed by: INTERNAL MEDICINE

## 2020-02-21 PROCEDURE — 85025 COMPLETE CBC W/AUTO DIFF WBC: CPT | Performed by: INTERNAL MEDICINE

## 2020-02-21 PROCEDURE — 83036 HEMOGLOBIN GLYCOSYLATED A1C: CPT | Performed by: PHYSICIAN ASSISTANT

## 2020-02-21 PROCEDURE — 36415 COLL VENOUS BLD VENIPUNCTURE: CPT | Performed by: PHYSICIAN ASSISTANT

## 2020-02-21 PROCEDURE — 82306 VITAMIN D 25 HYDROXY: CPT | Performed by: INTERNAL MEDICINE

## 2020-02-21 PROCEDURE — 81015 MICROSCOPIC EXAM OF URINE: CPT | Performed by: INTERNAL MEDICINE

## 2020-02-21 PROCEDURE — 84100 ASSAY OF PHOSPHORUS: CPT | Performed by: INTERNAL MEDICINE

## 2020-02-21 PROCEDURE — 83970 ASSAY OF PARATHORMONE: CPT | Performed by: INTERNAL MEDICINE

## 2020-02-21 NOTE — OUTREACH NOTE
CHF Week 3 Survey      Responses   Facility patient discharged from?  [facility]   Does the patient have one of the following disease processes/diagnoses(primary or secondary)?  CHF   Week 3 attempt successful?  Yes   Call start time  1656   Call end time  1658   Discharge diagnosis   Acute CHF   Meds reviewed with patient/caregiver?  Yes   Is the patient taking all medications as directed (includes completed medication regime)?  Yes   Has the patient kept scheduled appointments due by today?  Yes   Psychosocial issues?  No   Comments  Pt reports having issues with food getting stuck in throat, having EGD next week. Her gluc levels 70. Monitoring BP and wts. Pt able to lay in bed without SOA.    What is the patient's perception of their health status since discharge?  Improving   Is the patient weighing daily?  Yes   Does the patient have scales?  Yes   Daily weight interventions  Education provided on importance of daily weight   Is the patient able to teach back Heart Failure Zones?  Yes   Is the patient able to teach back signs and symptoms of worsening condition? (i.e. weight gain, shortness of air, etc.)  Yes   CHF Week 3 call completed?  Yes          Alma Coppola RN

## 2020-02-22 LAB
25(OH)D3 SERPL-MCNC: 22.5 NG/ML (ref 30–100)
ALBUMIN SERPL-MCNC: 3.7 G/DL (ref 3.5–5.2)
ALBUMIN/GLOB SERPL: 0.9 G/DL
ALP SERPL-CCNC: 201 U/L (ref 39–117)
ALT SERPL W P-5'-P-CCNC: 18 U/L (ref 1–33)
ANION GAP SERPL CALCULATED.3IONS-SCNC: 16.1 MMOL/L (ref 5–15)
AST SERPL-CCNC: 28 U/L (ref 1–32)
BACTERIA UR QL AUTO: ABNORMAL /HPF
BASOPHILS # BLD AUTO: 0.04 10*3/MM3 (ref 0–0.2)
BASOPHILS NFR BLD AUTO: 0.5 % (ref 0–1.5)
BILIRUB SERPL-MCNC: 0.3 MG/DL (ref 0.2–1.2)
BUN BLD-MCNC: 37 MG/DL (ref 8–23)
BUN/CREAT SERPL: 16.1 (ref 7–25)
CALCIUM SPEC-SCNC: 9.9 MG/DL (ref 8.6–10.5)
CHLORIDE SERPL-SCNC: 103 MMOL/L (ref 98–107)
CHOLEST SERPL-MCNC: 136 MG/DL (ref 0–200)
CO2 SERPL-SCNC: 20.9 MMOL/L (ref 22–29)
CREAT BLD-MCNC: 2.3 MG/DL (ref 0.57–1)
DEPRECATED RDW RBC AUTO: 42.8 FL (ref 37–54)
EOSINOPHIL # BLD AUTO: 0.34 10*3/MM3 (ref 0–0.4)
EOSINOPHIL NFR BLD AUTO: 4.2 % (ref 0.3–6.2)
ERYTHROCYTE [DISTWIDTH] IN BLOOD BY AUTOMATED COUNT: 13.6 % (ref 12.3–15.4)
GFR SERPL CREATININE-BSD FRML MDRD: 21 ML/MIN/1.73
GLOBULIN UR ELPH-MCNC: 4.3 GM/DL
GLUCOSE BLD-MCNC: 177 MG/DL (ref 65–99)
HBA1C MFR BLD: 7.44 % (ref 4.8–5.6)
HCT VFR BLD AUTO: 36.3 % (ref 34–46.6)
HDLC SERPL-MCNC: 41 MG/DL (ref 40–60)
HGB BLD-MCNC: 11.2 G/DL (ref 12–15.9)
HYALINE CASTS UR QL AUTO: ABNORMAL /LPF
IMM GRANULOCYTES # BLD AUTO: 0.01 10*3/MM3 (ref 0–0.05)
IMM GRANULOCYTES NFR BLD AUTO: 0.1 % (ref 0–0.5)
LDLC SERPL CALC-MCNC: 61 MG/DL (ref 0–100)
LDLC/HDLC SERPL: 1.49 {RATIO}
LYMPHOCYTES # BLD AUTO: 2.25 10*3/MM3 (ref 0.7–3.1)
LYMPHOCYTES NFR BLD AUTO: 27.8 % (ref 19.6–45.3)
MCH RBC QN AUTO: 25.9 PG (ref 26.6–33)
MCHC RBC AUTO-ENTMCNC: 30.9 G/DL (ref 31.5–35.7)
MCV RBC AUTO: 83.8 FL (ref 79–97)
MONOCYTES # BLD AUTO: 0.54 10*3/MM3 (ref 0.1–0.9)
MONOCYTES NFR BLD AUTO: 6.7 % (ref 5–12)
NEUTROPHILS # BLD AUTO: 4.92 10*3/MM3 (ref 1.7–7)
NEUTROPHILS NFR BLD AUTO: 60.7 % (ref 42.7–76)
NRBC BLD AUTO-RTO: 0 /100 WBC (ref 0–0.2)
PHOSPHATE SERPL-MCNC: 5.2 MG/DL (ref 2.5–4.5)
PLATELET # BLD AUTO: 312 10*3/MM3 (ref 140–450)
PMV BLD AUTO: 10 FL (ref 6–12)
POTASSIUM BLD-SCNC: 5.4 MMOL/L (ref 3.5–5.2)
PROT SERPL-MCNC: 8 G/DL (ref 6–8.5)
PTH-INTACT SERPL-MCNC: 80.1 PG/ML (ref 15–65)
RBC # BLD AUTO: 4.33 10*6/MM3 (ref 3.77–5.28)
RBC # UR: ABNORMAL /HPF
REF LAB TEST METHOD: ABNORMAL
SODIUM BLD-SCNC: 140 MMOL/L (ref 136–145)
SQUAMOUS #/AREA URNS HPF: ABNORMAL /HPF
TRIGL SERPL-MCNC: 170 MG/DL (ref 0–150)
VLDLC SERPL-MCNC: 34 MG/DL
WBC NRBC COR # BLD: 8.1 10*3/MM3 (ref 3.4–10.8)
WBC UR QL AUTO: ABNORMAL /HPF

## 2020-02-25 ENCOUNTER — OFFICE VISIT (OUTPATIENT)
Dept: SURGERY | Facility: CLINIC | Age: 75
End: 2020-02-25

## 2020-02-25 VITALS — BODY MASS INDEX: 41.99 KG/M2 | WEIGHT: 237 LBS | HEIGHT: 63 IN

## 2020-02-25 DIAGNOSIS — E66.01 MORBID OBESITY (HCC): ICD-10-CM

## 2020-02-25 DIAGNOSIS — R13.19 ESOPHAGEAL DYSPHAGIA: Primary | ICD-10-CM

## 2020-02-25 PROCEDURE — 99214 OFFICE O/P EST MOD 30 MIN: CPT | Performed by: SURGERY

## 2020-02-25 NOTE — PROGRESS NOTES
Subjective   Britta Lee is a 74 y.o. female.     Chief Complaint: dysphagia    History of Present Illness She has has had food getting stuck in her throat with some foods. It usually happens 5-10 minutes after eating. She used to have a lot of heartburn and was told she had a hiatal hernia. No prior EGD. She has had a colonoscopy that was ok in 2017.     The following portions of the patient's history were reviewed and updated as appropriate: current medications, past family history, past medical history, past social history, past surgical history and problem list.    Review of Systems   Constitutional: Negative for activity change, appetite change, chills, fever and unexpected weight change.   HENT: Positive for trouble swallowing. Negative for congestion, facial swelling and sore throat.    Eyes: Negative for photophobia and visual disturbance.   Respiratory: Negative for chest tightness, shortness of breath and wheezing.    Cardiovascular: Negative for chest pain, palpitations and leg swelling.   Gastrointestinal: Negative for abdominal distention, abdominal pain, anal bleeding, blood in stool, constipation, diarrhea, nausea, rectal pain and vomiting.   Endocrine: Negative for cold intolerance, heat intolerance, polydipsia and polyuria.   Genitourinary: Negative for difficulty urinating, dysuria, flank pain and urgency.   Musculoskeletal: Negative for back pain and myalgias.   Skin: Negative for rash and wound.   Allergic/Immunologic: Negative for immunocompromised state.   Neurological: Positive for dizziness and light-headedness. Negative for seizures, syncope, numbness and headaches.   Hematological: Negative for adenopathy. Does not bruise/bleed easily.   Psychiatric/Behavioral: Negative for behavioral problems and confusion. The patient is not nervous/anxious.        Objective   Physical Exam   Constitutional: She is oriented to person, place, and time. She appears well-developed and well-nourished. She  does not appear ill. No distress.   HENT:   Head: Normocephalic. Head is without laceration. Hair is normal.   Right Ear: Hearing and ear canal normal.   Left Ear: Hearing and ear canal normal.   Nose: Nose normal. No sinus tenderness. No epistaxis. Right sinus exhibits no maxillary sinus tenderness and no frontal sinus tenderness. Left sinus exhibits no maxillary sinus tenderness and no frontal sinus tenderness.   Eyes: Pupils are equal, round, and reactive to light. Conjunctivae and lids are normal.   Neck: Normal range of motion. No JVD present. No tracheal tenderness present. No tracheal deviation present. No thyroid mass and no thyromegaly present.   Cardiovascular: Normal rate and regular rhythm. Exam reveals no gallop.   No murmur heard.  Pulmonary/Chest: Effort normal and breath sounds normal. No stridor. She has no wheezes. She exhibits no tenderness.   Abdominal: Soft. Bowel sounds are normal. She exhibits no distension, no ascites and no mass. There is no tenderness. There is no rebound and no guarding. No hernia.   Musculoskeletal: She exhibits no edema or deformity.   Lymphadenopathy:     She has no cervical adenopathy.     She has no axillary adenopathy.        Right: No inguinal and no supraclavicular adenopathy present.        Left: No inguinal and no supraclavicular adenopathy present.   Neurological: She is alert and oriented to person, place, and time. She exhibits normal muscle tone.   Skin: Skin is warm, dry and intact. No rash noted. No erythema. No pallor.   Psychiatric: She has a normal mood and affect. Her behavior is normal. Thought content normal.   Vitals reviewed.      Assessment/Plan   Britta was seen today for egd.    Diagnoses and all orders for this visit:    Esophageal dysphagia    Morbid obesity (CMS/HCC)    EGD, discussed importance of weight loss

## 2020-02-27 ENCOUNTER — OFFICE VISIT (OUTPATIENT)
Dept: FAMILY MEDICINE CLINIC | Facility: CLINIC | Age: 75
End: 2020-02-27

## 2020-02-27 VITALS
HEIGHT: 63 IN | RESPIRATION RATE: 14 BRPM | HEART RATE: 79 BPM | BODY MASS INDEX: 42.52 KG/M2 | DIASTOLIC BLOOD PRESSURE: 70 MMHG | TEMPERATURE: 98 F | OXYGEN SATURATION: 96 % | SYSTOLIC BLOOD PRESSURE: 130 MMHG | WEIGHT: 240 LBS

## 2020-02-27 DIAGNOSIS — I87.2 VENOUS INSUFFICIENCY (CHRONIC) (PERIPHERAL): Chronic | ICD-10-CM

## 2020-02-27 DIAGNOSIS — E66.01 SEVERE OBESITY (BMI >= 40) (HCC): ICD-10-CM

## 2020-02-27 DIAGNOSIS — Z79.4 TYPE 2 DIABETES MELLITUS WITH STAGE 4 CHRONIC KIDNEY DISEASE, WITH LONG-TERM CURRENT USE OF INSULIN (HCC): Primary | Chronic | ICD-10-CM

## 2020-02-27 DIAGNOSIS — N18.4 TYPE 2 DIABETES MELLITUS WITH STAGE 4 CHRONIC KIDNEY DISEASE, WITH LONG-TERM CURRENT USE OF INSULIN (HCC): Primary | Chronic | ICD-10-CM

## 2020-02-27 DIAGNOSIS — E11.22 TYPE 2 DIABETES MELLITUS WITH STAGE 4 CHRONIC KIDNEY DISEASE, WITH LONG-TERM CURRENT USE OF INSULIN (HCC): Primary | Chronic | ICD-10-CM

## 2020-02-27 DIAGNOSIS — E78.2 MIXED HYPERLIPIDEMIA: Chronic | ICD-10-CM

## 2020-02-27 DIAGNOSIS — I10 BENIGN ESSENTIAL HYPERTENSION: Chronic | ICD-10-CM

## 2020-02-27 PROCEDURE — 99214 OFFICE O/P EST MOD 30 MIN: CPT | Performed by: NURSE PRACTITIONER

## 2020-02-27 NOTE — PROGRESS NOTES
History of Present Illness   Britta Lee is a 73 yo female who presents to the clinic today in regards to her DM, type 2 which is complicated by peripheral neuropathy, diabetic nephropathy and retinopathy. Associated symptoms include blurred vision, fatigue and visual changes.   In addition, Britta has HTN, Dyslipidemia, and Venous Insufficiency of Lower Extremities.     Diabetes   She has type 2 diabetes mellitus. No MedicAlert identification noted. The initial diagnosis of diabetes was made 20 years ago. Her disease course has been improving. There are no hypoglycemic associated symptoms. Associated symptoms include blurred vision, fatigue and visual change. Pertinent negatives for diabetes include no chest pain, no foot ulcerations, no polydipsia, no polyphagia, no polyuria, no weakness and no weight loss. There are no hypoglycemic complications. Symptoms are worsening. Diabetic complications include heart disease, nephropathy, peripheral neuropathy, PVD and retinopathy. Current diabetic treatment includes insulin injections (Toujeo and Novolog). Compliance with diabetes treatment: Dependent on available of insulin. Her weight is increasing steadily. When asked about meal planning, she reported none. She has had a previous visit with a dietitian. She never participates in exercise. She is checking her blood sugars four times a day. She sees a podiatrist.Eye exam is current.   Most recent HGBA1C  Lab Results   Component Value Date    HGBA1C 7.44 (H) 02/21/2020     Lab Results   Component Value Date    GLUCOSE 177 (H) 02/21/2020    BUN 37 (H) 02/21/2020    CREATININE 2.30 (H) 02/21/2020    EGFRIFNONA 21 (L) 02/21/2020    BCR 16.1 02/21/2020    K 5.4 (H) 02/21/2020    CO2 20.9 (L) 02/21/2020    CALCIUM 9.9 02/21/2020    ALBUMIN 3.70 02/21/2020    AST 28 02/21/2020    ALT 18 02/21/2020     Hypertension  Compliance with treatment has been good. Well controlled with Norvasc, clonidine, metoprolol, and Accupril.  "She does report intermittent lower extremity swelling throughout the day. She does have compression stockings but find them very difficult to apply.   Lab Results   Component Value Date    CREATININE 2.30 (H) 02/21/2020     Dyslipidemia  Compliance with treatment has been good. The patient exercises seldom due to her Osteoarthritis.  She is currently being prescribed the following medication for her dyslipidemia - atorvastatin. Patient denies side effects associated with her medications. Most recent lipids include  Lab Results   Component Value Date    CHOL 136 02/21/2020    TRIG 170 (H) 02/21/2020    HDL 41 02/21/2020    LDL 61 02/21/2020     Vitals:    02/27/20 0933 02/27/20 0954   BP: 140/78 130/70   BP Location:  Right arm   Patient Position:  Sitting   Cuff Size:  Adult   Pulse: 79 79   Resp:  14   Temp: 98 °F (36.7 °C) 98 °F (36.7 °C)   TempSrc: Temporal Temporal   SpO2: 96% 96%   Weight: 109 kg (240 lb) 109 kg (240 lb)   Height: 160 cm (63\") 160 cm (63\")        The following portions of the patient's history were reviewed and updated as appropriate: allergies, current medications, past family history, past medical history, past social history, past surgical history and problem list.    Review of Systems   Constitutional: Negative for activity change, appetite change, chills, fatigue, fever and unexpected weight change.   HENT: Negative.    Eyes: Positive for visual disturbance.   Respiratory: Positive for cough. Negative for chest tightness, shortness of breath and wheezing.    Cardiovascular: Positive for leg swelling. Negative for chest pain and palpitations.   Gastrointestinal: Negative for abdominal pain, constipation, diarrhea, nausea and vomiting.   Endocrine: Negative for cold intolerance, heat intolerance, polydipsia, polyphagia and polyuria.   Skin: Negative for color change and rash.   Neurological: Negative for dizziness, tremors, speech difficulty, weakness, light-headedness and headaches. "   Hematological: Negative for adenopathy.   Psychiatric/Behavioral: Negative for confusion, decreased concentration and suicidal ideas. The patient is not nervous/anxious.    All other systems reviewed and are negative.    Physical Exam   Constitutional: She is oriented to person, place, and time. She appears well-developed and well-nourished. No distress.   Pleasant; in good spirits   HENT:   Head: Normocephalic.   Nose: Nose normal.   Mouth/Throat: Oropharynx is clear and moist. No oropharyngeal exudate.   Eyes: Pupils are equal, round, and reactive to light. Conjunctivae and EOM are normal. Right eye exhibits no discharge. Left eye exhibits no discharge. No scleral icterus.   Neck: Neck supple. No JVD present. No thyromegaly present.   Cardiovascular: Normal rate, regular rhythm and normal heart sounds. Exam reveals no friction rub.   No murmur heard.  Pulmonary/Chest: Effort normal and breath sounds normal. No respiratory distress. She has no wheezes. She has no rales.   Abdominal: Soft. Bowel sounds are normal. She exhibits no distension. There is no tenderness. There is no rebound and no guarding.   Musculoskeletal: She exhibits edema (Bilateral lower extremity edema). She exhibits no tenderness.   Lymphadenopathy:     She has no cervical adenopathy.   Neurological: She is alert and oriented to person, place, and time.   Skin: Skin is warm and dry. Capillary refill takes less than 2 seconds. No rash noted. No erythema.   Psychiatric: She has a normal mood and affect.   Nursing note and vitals reviewed.    Results for orders placed or performed in visit on 02/21/20   Vitamin D 25 hydroxy   Result Value Ref Range    25 Hydroxy, Vitamin D 22.5 (L) 30.0 - 100.0 ng/ml   Comprehensive metabolic panel   Result Value Ref Range    Glucose 177 (H) 65 - 99 mg/dL    BUN 37 (H) 8 - 23 mg/dL    Creatinine 2.30 (H) 0.57 - 1.00 mg/dL    Sodium 140 136 - 145 mmol/L    Potassium 5.4 (H) 3.5 - 5.2 mmol/L    Chloride 103 98 - 107  mmol/L    CO2 20.9 (L) 22.0 - 29.0 mmol/L    Calcium 9.9 8.6 - 10.5 mg/dL    Total Protein 8.0 6.0 - 8.5 g/dL    Albumin 3.70 3.50 - 5.20 g/dL    ALT (SGPT) 18 1 - 33 U/L    AST (SGOT) 28 1 - 32 U/L    Alkaline Phosphatase 201 (H) 39 - 117 U/L    Total Bilirubin 0.3 0.2 - 1.2 mg/dL    eGFR Non African Amer 21 (L) >60 mL/min/1.73    Globulin 4.3 gm/dL    A/G Ratio 0.9 g/dL    BUN/Creatinine Ratio 16.1 7.0 - 25.0    Anion Gap 16.1 (H) 5.0 - 15.0 mmol/L   PTH, Intact   Result Value Ref Range    PTH, Intact 80.1 (H) 15.0 - 65.0 pg/mL   Phosphorus   Result Value Ref Range    Phosphorus 5.2 (H) 2.5 - 4.5 mg/dL   Urinalysis, Microscopic Only - Urine, Clean Catch   Result Value Ref Range    RBC, UA 0-2 None Seen, 0-2 /HPF    WBC, UA 3-5 (A) None Seen, 0-2 /HPF    Bacteria, UA 1+ (A) None Seen /HPF    Squamous Epithelial Cells, UA 3-6 (A) None Seen, 0-2 /HPF    Hyaline Casts, UA 3-6 None Seen /LPF    Methodology Automated Microscopy    Lipid Panel   Result Value Ref Range    Total Cholesterol 136 0 - 200 mg/dL    Triglycerides 170 (H) 0 - 150 mg/dL    HDL Cholesterol 41 40 - 60 mg/dL    LDL Cholesterol  61 0 - 100 mg/dL    VLDL Cholesterol 34 mg/dL    LDL/HDL Ratio 1.49    Hemoglobin A1c   Result Value Ref Range    Hemoglobin A1C 7.44 (H) 4.80 - 5.60 %   CBC Auto Differential   Result Value Ref Range    WBC 8.10 3.40 - 10.80 10*3/mm3    RBC 4.33 3.77 - 5.28 10*6/mm3    Hemoglobin 11.2 (L) 12.0 - 15.9 g/dL    Hematocrit 36.3 34.0 - 46.6 %    MCV 83.8 79.0 - 97.0 fL    MCH 25.9 (L) 26.6 - 33.0 pg    MCHC 30.9 (L) 31.5 - 35.7 g/dL    RDW 13.6 12.3 - 15.4 %    RDW-SD 42.8 37.0 - 54.0 fl    MPV 10.0 6.0 - 12.0 fL    Platelets 312 140 - 450 10*3/mm3    Neutrophil % 60.7 42.7 - 76.0 %    Lymphocyte % 27.8 19.6 - 45.3 %    Monocyte % 6.7 5.0 - 12.0 %    Eosinophil % 4.2 0.3 - 6.2 %    Basophil % 0.5 0.0 - 1.5 %    Immature Grans % 0.1 0.0 - 0.5 %    Neutrophils, Absolute 4.92 1.70 - 7.00 10*3/mm3    Lymphocytes, Absolute 2.25 0.70 -  3.10 10*3/mm3    Monocytes, Absolute 0.54 0.10 - 0.90 10*3/mm3    Eosinophils, Absolute 0.34 0.00 - 0.40 10*3/mm3    Basophils, Absolute 0.04 0.00 - 0.20 10*3/mm3    Immature Grans, Absolute 0.01 0.00 - 0.05 10*3/mm3    nRBC 0.0 0.0 - 0.2 /100 WBC     Assessment/Plan     Patient's Body mass index is 42.51 kg/m². BMI is above normal parameters. Recommendations include: educational material, exercise counseling and nutrition counseling.   (Normal BMI:  18.5-24.9, OW 25-29.9, Obesity 30 or greater)    Problems Addressed this Visit        Cardiovascular and Mediastinum    Benign essential hypertension    Hyperlipidemia    Venous insufficiency (chronic) (peripheral)       Digestive    Severe obesity (BMI >= 40) (CMS/HCC)       Endocrine    Type 2 diabetes mellitus with chronic kidney disease, with long-term current use of insulin (CMS/[facility]) - Primary        Findings and recommendations discussed with Britta. Reviewed above labs with her which included an A1C of 7.4. Praise given! Discussed again regarding her insulin costs which she finds very difficult to cover. Will request Patient Assistance for her. She has done so well on the Toujeo that I would like for her to continue. She will f/u with Dr Johnson, Nephrologist, for her CKD. Her blood pressure is adequately controlled at this time. Lipid panel reviewed and she will continue Atorvastatin..Supportive care measures including elevation recommended for her Peripheral Venous Insufficiency..Lifestyle modifications including nutrition and activity recommendations reinforced. She will f/u with me in March; sooner if problems/concerns occur.        This document has been electronically signed by ERNIE Locke, CHUN-BC, BRAULIOE  February 27, 2020 6:22 PM

## 2020-02-27 NOTE — PATIENT INSTRUCTIONS
Hypoglycemia  Hypoglycemia is when the sugar (glucose) level in your blood is too low. Signs of low blood sugar may include:  · Feeling:  ? Hungry.  ? Worried or nervous (anxious).  ? Sweaty and clammy.  ? Confused.  ? Dizzy.  ? Sleepy.  ? Sick to your stomach (nauseous).  · Having:  ? A fast heartbeat.  ? A headache.  ? A change in your vision.  ? Tingling or no feeling (numbness) around your mouth, lips, or tongue.  ? Jerky movements that you cannot control (seizure).  · Having trouble with:  ? Moving (coordination).  ? Sleeping.  ? Passing out (fainting).  ? Getting upset easily (irritability).  Low blood sugar can happen to people who have diabetes and people who do not have diabetes. Low blood sugar can happen quickly, and it can be an emergency.  Treating low blood sugar  Low blood sugar is often treated by eating or drinking something sugary right away, such as:  · Fruit juice, 4-6 oz (120-150 mL).  · Regular soda (not diet soda), 4-6 oz (120-150 mL).  · Low-fat milk, 4 oz (120 mL).  · Several pieces of hard candy.  · Sugar or honey, 1 Tbsp (15 mL).  Treating low blood sugar if you have diabetes  If you can think clearly and swallow safely, follow the 15:15 rule:  · Take 15 grams of a fast-acting carb (carbohydrate). Talk with your doctor about how much you should take.  · Always keep a source of fast-acting carb with you, such as:  ? Sugar tablets (glucose pills). Take 3-4 pills.  ? 6-8 pieces of hard candy.  ? 4-6 oz (120-150 mL) of fruit juice.  ? 4-6 oz (120-150 mL) of regular (not diet) soda.  ? 1 Tbsp (15 mL) honey or sugar.  · Check your blood sugar 15 minutes after you take the carb.  · If your blood sugar is still at or below 70 mg/dL (3.9 mmol/L), take 15 grams of a carb again.  · If your blood sugar does not go above 70 mg/dL (3.9 mmol/L) after 3 tries, get help right away.  · After your blood sugar goes back to normal, eat a meal or a snack within 1 hour.    Treating very low blood sugar  If your  blood sugar is at or below 54 mg/dL (3 mmol/L), you have very low blood sugar (severe hypoglycemia). This may also cause:  · Passing out.  · Jerky movements you cannot control (seizure).  · Losing consciousness (coma).  This is an emergency. Do not wait to see if the symptoms will go away. Get medical help right away. Call your local emergency services (911 in the [place] Do not drive yourself to the hospital.  If you have very low blood sugar and you cannot eat or drink, you may need a glucagon shot (injection). A family member or friend should learn how to check your blood sugar and how to give you a glucagon shot. Ask your doctor if you need to have a glucagon shot kit at home.  Follow these instructions at home:  General instructions  · Take over-the-counter and prescription medicines only as told by your doctor.  · Stay aware of your blood sugar as told by your doctor.  · Limit alcohol intake to no more than 1 drink a day for nonpregnant women and 2 drinks a day for men. One drink equals 12 oz of beer (355 mL), 5 oz of wine (148 mL), or 1½ oz of hard liquor (44 mL).  · Keep all follow-up visits as told by your doctor. This is important.  If you have diabetes:    · Follow your diabetes care plan as told by your doctor. Make sure you:  ? Know the signs of low blood sugar.  ? Take your medicines as told.  ? Follow your exercise and meal plan.  ? Eat on time. Do not skip meals.  ? Check your blood sugar as often as told by your doctor. Always check it before and after exercise.  ? Follow your sick day plan when you cannot eat or drink normally. Make this plan ahead of time with your doctor.  · Share your diabetes care plan with:  ? Your work or school.  ? People you live with.  · Check your pee (urine) for ketones:  ? When you are sick.  ? As told by your doctor.  · Carry a card or wear jewelry that says you have diabetes.  Contact a doctor if:  · You have trouble keeping your blood sugar in your target  range.  · You have low blood sugar often.  Get help right away if:  · You still have symptoms after you eat or drink something sugary.  · Your blood sugar is at or below 54 mg/dL (3 mmol/L).  · You have jerky movements that you cannot control.  · You pass out.  These symptoms may be an emergency. Do not wait to see if the symptoms will go away. Get medical help right away. Call your local emergency services (911 in the [place] Do not drive yourself to the hospital.  Summary  · Hypoglycemia happens when the level of sugar (glucose) in your blood is too low.  · Low blood sugar can happen to people who have diabetes and people who do not have diabetes. Low blood sugar can happen quickly, and it can be an emergency.  · Make sure you know the signs of low blood sugar and know how to treat it.  · Always keep a source of sugar (fast-acting carb) with you to treat low blood sugar.  This information is not intended to replace advice given to you by your health care provider. Make sure you discuss any questions you have with your health care provider.  Document Released: 03/14/2011 Document Revised: 06/11/2019 Document Reviewed: 01/20/2017  [employer] Interactive Patient Education © 2020 [employer]  Type 2 Diabetes Mellitus, Self Care, Adult  When you have type 2 diabetes (type 2 diabetes mellitus), you must make sure your blood sugar (glucose) stays in a healthy range. You can do this with:  · Nutrition.  · Exercise.  · Lifestyle changes.  · Medicines or insulin, if needed.  · Support from your doctors and others.  How to stay aware of blood sugar    · Check your blood sugar level every day, as often as told.  · Have your A1c (hemoglobin A1c) level checked two or more times a year. Have it checked more often if your doctor tells you to.  Your doctor will set personal treatment goals for you. Generally, you should have these blood sugar levels:  · Before meals (preprandial):  mg/dL (4.4-7.2 mmol/L).  · After meals  (postprandial): below 180 mg/dL (10 mmol/L).  · A1c level: less than 7%.  How to manage high and low blood sugar  Signs of high blood sugar  High blood sugar is called hyperglycemia. Know the signs of high blood sugar. Signs may include:  · Feeling:  ? Thirsty.  ? Hungry.  ? Very tired.  · Needing to pee (urinate) more than usual.  · Blurry vision.  Signs of low blood sugar  Low blood sugar is called hypoglycemia. This is when blood sugar is at or below 70 mg/dL (3.9 mmol/L). Signs may include:  · Feeling:  ? Hungry.  ? Worried or nervous (anxious).  ? Sweaty and clammy.  ? Confused.  ? Dizzy.  ? Sleepy.  ? Sick to your stomach (nauseous).  · Having:  ? A fast heartbeat.  ? A headache.  ? A change in your vision.  ? Jerky movements that you cannot control (seizure).  ? Tingling or no feeling (numbness) around your mouth, lips, or tongue.  · Having trouble with:  ? Moving (coordination).  ? Sleeping.  ? Passing out (fainting).  ? Getting upset easily (irritability).  Treating low blood sugar  To treat low blood sugar, eat or drink something sugary right away. If you can think clearly and swallow safely, follow the 15:15 rule:  · Take 15 grams of a fast-acting carb (carbohydrate). Talk with your doctor about how much you should take.  · Some fast-acting carbs are:  ? Sugar tablets (glucose pills). Take 3-4 pills.  ? 6-8 pieces of hard candy.  ? 4-6 oz (120-150 mL) of fruit juice.  ? 4-6 oz (120-150 mL) of regular (not diet) soda.  ? 1 Tbsp (15 mL) honey or sugar.  · Check your blood sugar 15 minutes after you take the carb.  · If your blood sugar is still at or below 70 mg/dL (3.9 mmol/L), take 15 grams of a carb again.  · If your blood sugar does not go above 70 mg/dL (3.9 mmol/L) after 3 tries, get help right away.  · After your blood sugar goes back to normal, eat a meal or a snack within 1 hour.  Treating very low blood sugar  If your blood sugar is at or below 54 mg/dL (3 mmol/L), you have very low blood sugar  (severe hypoglycemia). This is an emergency. Do not wait to see if the symptoms will go away. Get medical help right away. Call your local emergency services (911 in the [place]  If you have very low blood sugar and you cannot eat or drink, you may need a glucagon shot (injection). A family member or friend should learn how to check your blood sugar and how to give you a glucagon shot. Ask your doctor if you need to have a glucagon shot kit at home.  Follow these instructions at home:  Medicine  · Take insulin and diabetes medicines as told.  · If your doctor says you should take more or less insulin and medicines, do this exactly as told.  · Do not run out of insulin or medicines.  Having diabetes can raise your risk for other long-term conditions. These include heart disease and kidney disease. Your doctor may prescribe medicines to help you not have these problems.  Food    · Make healthy food choices. These include:  ? Chicken, fish, egg whites, and beans.  ? Oats, whole wheat, bulgur, brown rice, quinoa, and millet.  ? Fresh fruits and vegetables.  ? Low-fat dairy products.  ? Nuts, avocado, olive oil, and canola oil.  · Meet with a  (dietitian). He or she can help you make an eating plan that is right for you.  · Follow instructions from your doctor about what you cannot eat or drink.  · Drink enough fluid to keep your pee (urine) pale yellow.  · Keep track of carbs that you eat. Do this by reading food labels and learning food serving sizes.  · Follow your sick day plan when you cannot eat or drink normally. Make this plan with your doctor so it is ready to use.  Activity  · Exercise 3 or more times a week.  · Do not go more than 2 days without exercising.  · Talk with your doctor before you start a new exercise. Your doctor may need to tell you to change:  ? How much insulin or medicines you take.  ? How much food you eat.  Lifestyle  · Do not use any tobacco products. These include cigarettes,  chewing tobacco, and e-cigarettes. If you need help quitting, ask your doctor.  · Ask your doctor how much alcohol is safe for you.  · Learn to deal with stress. If you need help with this, ask your doctor.  Body care    · Stay up to date with your shots (immunizations).  · Have your eyes and feet checked by a doctor as often as told.  · Check your skin and feet every day. Check for cuts, bruises, redness, blisters, or sores.  · Brush your teeth and gums two times a day. Floss one or more times a day.  · Go to the dentist one or more times every 6 months.  · Stay at a healthy weight.  General instructions  · Take over-the-counter and prescription medicines only as told by your doctor.  · Share your diabetes care plan with:  ? Your work or school.  ? People you live with.  · Carry a card or wear jewelry that says you have diabetes.  · Keep all follow-up visits as told by your doctor. This is important.  Questions to ask your doctor  · Do I need to meet with a diabetes educator?  · Where can I find a support group for people with diabetes?  Where to find more information  To learn more about diabetes, visit:  · American Diabetes Association: www.diabetes.org  · American Association of Diabetes Educators: www.diabeteseducator.org  Summary  · When you have type 2 diabetes, you must make sure your blood sugar (glucose) stays in a healthy range.  · Check your blood sugar every day, as often as told.  · Having diabetes can raise your risk for other conditions. Your doctor may prescribe medicines to help you not have these problems.  · Keep all follow-up visits as told by your doctor. This is important.  This information is not intended to replace advice given to you by your health care provider. Make sure you discuss any questions you have with your health care provider.  Document Released: 04/10/2017 Document Revised: 06/10/2019 Document Reviewed: 01/20/2017  [employer] Interactive Patient Education © 2020 [employer]

## 2020-02-28 ENCOUNTER — READMISSION MANAGEMENT (OUTPATIENT)
Dept: CALL CENTER | Facility: HOSPITAL | Age: 75
End: 2020-02-28

## 2020-03-03 PROBLEM — E66.01 MORBID OBESITY: Status: ACTIVE | Noted: 2020-02-25

## 2020-03-05 ENCOUNTER — ANESTHESIA (OUTPATIENT)
Dept: PERIOP | Facility: HOSPITAL | Age: 75
End: 2020-03-05

## 2020-03-05 ENCOUNTER — ANESTHESIA EVENT (OUTPATIENT)
Dept: PERIOP | Facility: HOSPITAL | Age: 75
End: 2020-03-05

## 2020-03-05 ENCOUNTER — HOSPITAL ENCOUNTER (OUTPATIENT)
Facility: HOSPITAL | Age: 75
Setting detail: HOSPITAL OUTPATIENT SURGERY
Discharge: HOME | End: 2020-03-05
Attending: SURGERY | Admitting: SURGERY

## 2020-03-05 VITALS
WEIGHT: 236 LBS | RESPIRATION RATE: 20 BRPM | HEIGHT: 63 IN | TEMPERATURE: 97.6 F | HEART RATE: 66 BPM | OXYGEN SATURATION: 99 % | BODY MASS INDEX: 41.82 KG/M2 | DIASTOLIC BLOOD PRESSURE: 79 MMHG | SYSTOLIC BLOOD PRESSURE: 161 MMHG

## 2020-03-05 DIAGNOSIS — E66.01 MORBID OBESITY (HCC): ICD-10-CM

## 2020-03-05 DIAGNOSIS — R13.19 ESOPHAGEAL DYSPHAGIA: ICD-10-CM

## 2020-03-05 PROCEDURE — 87081 CULTURE SCREEN ONLY: CPT | Performed by: SURGERY

## 2020-03-05 PROCEDURE — 43239 EGD BIOPSY SINGLE/MULTIPLE: CPT | Performed by: SURGERY

## 2020-03-05 PROCEDURE — 25010000002 PROPOFOL 10 MG/ML EMULSION: Performed by: NURSE ANESTHETIST, CERTIFIED REGISTERED

## 2020-03-05 RX ORDER — MIDAZOLAM HYDROCHLORIDE 1 MG/ML
1 INJECTION INTRAMUSCULAR; INTRAVENOUS
Status: DISCONTINUED | OUTPATIENT
Start: 2020-03-05 | End: 2020-03-05 | Stop reason: HOSPADM

## 2020-03-05 RX ORDER — PROPOFOL 10 MG/ML
VIAL (ML) INTRAVENOUS AS NEEDED
Status: DISCONTINUED | OUTPATIENT
Start: 2020-03-05 | End: 2020-03-05 | Stop reason: SURG

## 2020-03-05 RX ORDER — IPRATROPIUM BROMIDE AND ALBUTEROL SULFATE 2.5; .5 MG/3ML; MG/3ML
3 SOLUTION RESPIRATORY (INHALATION) ONCE AS NEEDED
Status: CANCELLED | OUTPATIENT
Start: 2020-03-05

## 2020-03-05 RX ORDER — MIDAZOLAM HYDROCHLORIDE 1 MG/ML
2 INJECTION INTRAMUSCULAR; INTRAVENOUS
Status: DISCONTINUED | OUTPATIENT
Start: 2020-03-05 | End: 2020-03-05 | Stop reason: HOSPADM

## 2020-03-05 RX ORDER — ONDANSETRON 2 MG/ML
4 INJECTION INTRAMUSCULAR; INTRAVENOUS AS NEEDED
Status: CANCELLED | OUTPATIENT
Start: 2020-03-05

## 2020-03-05 RX ORDER — SODIUM CHLORIDE 0.9 % (FLUSH) 0.9 %
10 SYRINGE (ML) INJECTION AS NEEDED
Status: DISCONTINUED | OUTPATIENT
Start: 2020-03-05 | End: 2020-03-05 | Stop reason: HOSPADM

## 2020-03-05 RX ORDER — SODIUM CHLORIDE 0.9 % (FLUSH) 0.9 %
10 SYRINGE (ML) INJECTION EVERY 12 HOURS SCHEDULED
Status: DISCONTINUED | OUTPATIENT
Start: 2020-03-05 | End: 2020-03-05 | Stop reason: HOSPADM

## 2020-03-05 RX ORDER — SODIUM CHLORIDE, SODIUM LACTATE, POTASSIUM CHLORIDE, CALCIUM CHLORIDE 600; 310; 30; 20 MG/100ML; MG/100ML; MG/100ML; MG/100ML
125 INJECTION, SOLUTION INTRAVENOUS CONTINUOUS
Status: DISCONTINUED | OUTPATIENT
Start: 2020-03-05 | End: 2020-03-05 | Stop reason: HOSPADM

## 2020-03-05 RX ADMIN — PROPOFOL 40 MG: 10 INJECTION, EMULSION INTRAVENOUS at 08:33

## 2020-03-05 RX ADMIN — PROPOFOL 70 MG: 10 INJECTION, EMULSION INTRAVENOUS at 08:31

## 2020-03-05 RX ADMIN — SODIUM CHLORIDE, POTASSIUM CHLORIDE, SODIUM LACTATE AND CALCIUM CHLORIDE: 600; 310; 30; 20 INJECTION, SOLUTION INTRAVENOUS at 08:27

## 2020-03-05 NOTE — ANESTHESIA PREPROCEDURE EVALUATION
Anesthesia Evaluation     Patient summary reviewed and Nursing notes reviewed   no history of anesthetic complications:  NPO Solid Status: > 8 hours  NPO Liquid Status: > 8 hours           Airway   Mallampati: III  TM distance: >3 FB  Possible difficult intubation and Large neck circumference  Dental    (+) poor dentition        Pulmonary - negative pulmonary ROS and normal exam   Cardiovascular     ECG reviewed    (+) hypertension 2 medications or greater, CAD, cardiac stents more than 12 months ago CHF , hyperlipidemia,     ROS comment: Followed by cardiologist Dr. Martines in [place], last saw about one year ago    Neuro/Psych  (+) psychiatric history Anxiety,     GI/Hepatic/Renal/Endo    (+) obesity,  GERD,  renal disease CRI, diabetes mellitus type 1 using insulin,     Musculoskeletal     Abdominal  - normal exam   Substance History      OB/GYN          Other   arthritis,                        Anesthesia Plan    ASA 3     general     intravenous induction     Anesthetic plan, all risks, benefits, and alternatives have been provided, discussed and informed consent has been obtained with: patient.

## 2020-03-05 NOTE — OP NOTE
ESOPHAGOGASTRODUODENOSCOPY  Procedure Note    Britta Lee  3/5/2020    Pre-op Diagnosis:   Esophageal dysphagia [R13.10]  Morbid obesity (CMS/HCC) [E66.01]    Post-op Diagnosis: mild gastritis, hiatal hernia        Procedure(s):  ESOPHAGOGASTRODUODENOSCOPY    Surgeon(s):  Alexandru Bagley MD    Anesthesia: General    Staff:   Circulator: Allison Rodrigues RN  Scrub Person: Omari Lewis    Estimated Blood Loss: none    Specimens:                Order Name Source Comment Collection Info Order Time   UREASE FOR H PYLORI Gastric, Antrum  Collected By: Alexandru Bagley MD 3/5/2020  8:34 AM         Drains: * No LDAs found *    Procedure: The scope went easily to her duodenum. It was unremarkable. The stomach had minimal irritation and a biopsy was done for urease. She has a small hiatal hernia but no esophagitis or strictures.     Findings: small hiatal hernia     Complications: none   Grafts / Implants N/A    Alexandru Bagley MD     Date: 3/5/2020  Time: 8:44 AM

## 2020-03-05 NOTE — ANESTHESIA POSTPROCEDURE EVALUATION
Patient: Britta Lee    Procedure Summary     Date:  03/05/20 Room / Location:  [facility] OR  /  [facility] OR    Anesthesia Start:  0827 Anesthesia Stop:  0837    Procedure:  ESOPHAGOGASTRODUODENOSCOPY (N/A Esophagus) Diagnosis:       Esophageal dysphagia      Morbid obesity (CMS/HCC)      (Esophageal dysphagia [R13.10])      (Morbid obesity (CMS/HCC) [E66.01])    Surgeon:  Alexandru Bagley MD Provider:  Edilson Jacobo MD    Anesthesia Type:  general ASA Status:  3          Anesthesia Type: general    Vitals  Vitals Value Taken Time   /80 3/5/2020  8:51 AM   Temp 97.6 °F (36.4 °C) 3/5/2020  8:37 AM   Pulse 64 3/5/2020  8:51 AM   Resp 20 3/5/2020  8:51 AM   SpO2 99 % 3/5/2020  8:51 AM           Post Anesthesia Care and Evaluation    Patient location during evaluation: PHASE II  Patient participation: complete - patient participated  Level of consciousness: awake and alert  Pain score: 1  Pain management: adequate  Airway patency: patent  Anesthetic complications: No anesthetic complications  PONV Status: controlled  Cardiovascular status: acceptable  Respiratory status: acceptable  Hydration status: acceptable

## 2020-03-06 ENCOUNTER — PROCEDURE VISIT (OUTPATIENT)
Dept: FAMILY MEDICINE CLINIC | Facility: CLINIC | Age: 75
End: 2020-03-06

## 2020-03-06 VITALS
HEIGHT: 63 IN | DIASTOLIC BLOOD PRESSURE: 80 MMHG | TEMPERATURE: 97.3 F | WEIGHT: 240 LBS | BODY MASS INDEX: 42.52 KG/M2 | HEART RATE: 56 BPM | OXYGEN SATURATION: 98 % | SYSTOLIC BLOOD PRESSURE: 150 MMHG

## 2020-03-06 DIAGNOSIS — L98.9 SKIN LESION OF FACE: Primary | ICD-10-CM

## 2020-03-06 LAB — UREASE TISS QL: NEGATIVE

## 2020-03-06 PROCEDURE — 11641 EXC F/E/E/N/L MAL+MRG 0.6-1: CPT | Performed by: PHYSICIAN ASSISTANT

## 2020-03-08 NOTE — PROGRESS NOTES
"Alison Lee is a 74 y.o. female.       Chief Complaint -skin lesion procedure    History of Present Illness -      Skin lesion-  She complains of a black skin lesion on her right facial cheek.  Skin lesion has been changing darker in size and grown larger over the last 6 months.  She is here today for excision.    The following portions of the patient's history were reviewed and updated as appropriate: allergies, current medications, past family history, past medical history, past social history, past surgical history and problem list.    Review of Systems   Constitutional: Negative for activity change, appetite change, fatigue and fever.   HENT: Negative for ear pain, sinus pressure and sore throat.    Eyes: Negative for pain and visual disturbance.   Respiratory: Negative for cough and chest tightness.    Cardiovascular: Negative for chest pain and palpitations.   Gastrointestinal: Negative for abdominal pain, constipation, diarrhea, nausea and vomiting.   Endocrine: Negative for polydipsia and polyuria.   Genitourinary: Negative for dysuria and frequency.   Musculoskeletal: Negative for back pain and myalgias.   Skin: Positive for color change. Negative for rash.        Black papular lesion with irregular borders noted on right facial cheek near inferior orbit approximately 3/4 x 3/4 cm in size.   Allergic/Immunologic: Negative for food allergies and immunocompromised state.   Neurological: Negative for dizziness, syncope and headaches.   Hematological: Negative for adenopathy. Does not bruise/bleed easily.   Psychiatric/Behavioral: Negative for hallucinations and suicidal ideas. The patient is not nervous/anxious.        /80   Pulse 56   Temp 97.3 °F (36.3 °C) (Oral)   Ht 160 cm (62.99\")   Wt 109 kg (240 lb)   SpO2 98%   BMI 42.52 kg/m²     Physical Exam   Constitutional: She is oriented to person, place, and time. She appears well-developed and well-nourished.   Cardiovascular: Normal " rate, regular rhythm and normal heart sounds.   No murmur heard.  Pulmonary/Chest: Effort normal and breath sounds normal. No respiratory distress. She has no wheezes.   Musculoskeletal: She exhibits no tenderness.   Neurological: She is alert and oriented to person, place, and time.   Skin: Skin is warm and dry. No rash noted.   Approximately 3/4 x 3/4 mm black papule noted with irregular borders right inferior orbit   Psychiatric: She has a normal mood and affect. Her behavior is normal.   Nursing note and vitals reviewed.      Assessment/Plan     Diagnoses and all orders for this visit:    Skin lesion of face  Comments:  Excised and sent for pathology today  Orders:  -     Tissue Pathology Exam    Procedure: Excision of skin lesion suspicious for malignancy from face  Provider: Lexie Dolan PA-C  Indication: Skin lesion on face growing larger and changing in color  Description: The right facial cheek was prepped and draped in sterile fashion.  1% lidocaine with epinephrine was used for local anesthesia.  The skin lesion was excised and sent for pathology.  5-0 Ethilon suture was used to close the wound in simple interrupted fashion.  Pressure was held and sterile dressing was placed.  No complication  Estimate blood loss: Minimal  Patient tolerated procedure well.        This document has been electronically signed by:  Lexie Dolan PA-C

## 2020-03-08 NOTE — PATIENT INSTRUCTIONS
Wound Care, Adult  Taking care of your wound properly can help to prevent pain, infection, and scarring. It can also help your wound to heal more quickly.  How to care for your wound  Wound care         · Follow instructions from your health care provider about how to take care of your wound. Make sure you:  ? Wash your hands with soap and water before you change the bandage (dressing). If soap and water are not available, use hand .  ? Change your dressing as told by your health care provider.  ? Leave stitches (sutures), skin glue, or adhesive strips in place. These skin closures may need to stay in place for 2 weeks or longer. If adhesive strip edges start to loosen and curl up, you may trim the loose edges. Do not remove adhesive strips completely unless your health care provider tells you to do that.  · Check your wound area every day for signs of infection. Check for:  ? Redness, swelling, or pain.  ? Fluid or blood.  ? Warmth.  ? Pus or a bad smell.  · Ask your health care provider if you should clean the wound with mild soap and water. Doing this may include:  ? Using a clean towel to pat the wound dry after cleaning it. Do not rub or scrub the wound.  ? Applying a cream or ointment. Do this only as told by your health care provider.  ? Covering the incision with a clean dressing.  · Ask your health care provider when you can leave the wound uncovered.  · Keep the dressing dry until your health care provider says it can be removed. Do not take baths, swim, use a hot tub, or do anything that would put the wound underwater until your health care provider approves. Ask your health care provider if you can take showers. You may only be allowed to take sponge baths.  Medicines    · If you were prescribed an antibiotic medicine, cream, or ointment, take or use the antibiotic as told by your health care provider. Do not stop taking or using the antibiotic even if your condition improves.  · Take  over-the-counter and prescription medicines only as told by your health care provider. If you were prescribed pain medicine, take it 30 or more minutes before you do any wound care or as told by your health care provider.  General instructions  · Return to your normal activities as told by your health care provider. Ask your health care provider what activities are safe.  · Do not scratch or pick at the wound.  · Do not use any products that contain nicotine or tobacco, such as cigarettes and e-cigarettes. These may delay wound healing. If you need help quitting, ask your health care provider.  · Keep all follow-up visits as told by your health care provider. This is important.  · Eat a diet that includes protein, vitamin A, vitamin C, and other nutrient-rich foods to help the wound heal.  ? Foods rich in protein include meat, dairy, beans, nuts, and other sources.  ? Foods rich in vitamin A include carrots and dark green, leafy vegetables.  ? Foods rich in vitamin C include citrus, tomatoes, and other fruits and vegetables.  ? Nutrient-rich foods have protein, carbohydrates, fat, vitamins, or minerals. Eat a variety of healthy foods including vegetables, fruits, and whole grains.  Contact a health care provider if:  · You received a tetanus shot and you have swelling, severe pain, redness, or bleeding at the injection site.  · Your pain is not controlled with medicine.  · You have redness, swelling, or pain around the wound.  · You have fluid or blood coming from the wound.  · Your wound feels warm to the touch.  · You have pus or a bad smell coming from the wound.  · You have a fever or chills.  · You are nauseous or you vomit.  · You are dizzy.  Get help right away if:  · You have a red streak going away from your wound.  · The edges of the wound open up and separate.  · Your wound is bleeding, and the bleeding does not stop with gentle pressure.  · You have a rash.  · You faint.  · You have trouble  breathing.  Summary  · Always wash your hands with soap and water before changing your bandage (dressing).  · To help with healing, eat foods that are rich in protein, vitamin A, vitamin C, and other nutrients.  · Check your wound every day for signs of infection. Contact your health care provider if you suspect that your wound is infected.  This information is not intended to replace advice given to you by your health care provider. Make sure you discuss any questions you have with your health care provider.  Document Released: 09/26/2009 Document Revised: 01/29/2019 Document Reviewed: 07/04/2017  [employer] Interactive Patient Education © 2020 [employer]

## 2020-03-13 ENCOUNTER — OFFICE VISIT (OUTPATIENT)
Dept: FAMILY MEDICINE CLINIC | Facility: CLINIC | Age: 75
End: 2020-03-13

## 2020-03-13 VITALS
DIASTOLIC BLOOD PRESSURE: 70 MMHG | BODY MASS INDEX: 41.64 KG/M2 | HEART RATE: 58 BPM | TEMPERATURE: 97.1 F | HEIGHT: 63 IN | SYSTOLIC BLOOD PRESSURE: 128 MMHG | OXYGEN SATURATION: 97 % | WEIGHT: 235 LBS

## 2020-03-13 DIAGNOSIS — E11.65 UNCONTROLLED TYPE 2 DIABETES MELLITUS WITH HYPERGLYCEMIA (HCC): ICD-10-CM

## 2020-03-13 DIAGNOSIS — I50.20 SYSTOLIC HEART FAILURE, UNSPECIFIED HF CHRONICITY (HCC): ICD-10-CM

## 2020-03-13 DIAGNOSIS — I25.10 ATHEROSCLEROSIS OF NATIVE CORONARY ARTERY OF NATIVE HEART WITHOUT ANGINA PECTORIS: ICD-10-CM

## 2020-03-13 DIAGNOSIS — L57.0 ACTINIC KERATOSIS OF RIGHT CHEEK: Primary | ICD-10-CM

## 2020-03-13 DIAGNOSIS — I10 ESSENTIAL HYPERTENSION, MALIGNANT: ICD-10-CM

## 2020-03-13 DIAGNOSIS — N18.9 CHRONIC RENAL FAILURE, UNSPECIFIED CKD STAGE: ICD-10-CM

## 2020-03-13 LAB
ALBUMIN SERPL-MCNC: 4.2 G/DL (ref 3.5–5.2)
ALBUMIN/GLOB SERPL: 0.9 G/DL
ALP SERPL-CCNC: 258 U/L (ref 39–117)
ALT SERPL W P-5'-P-CCNC: 32 U/L (ref 1–33)
ANION GAP SERPL CALCULATED.3IONS-SCNC: 15.9 MMOL/L (ref 5–15)
AST SERPL-CCNC: 56 U/L (ref 1–32)
BACTERIA UR QL AUTO: ABNORMAL /HPF
BILIRUB SERPL-MCNC: 0.3 MG/DL (ref 0.2–1.2)
BILIRUB UR QL STRIP: NEGATIVE
BUN BLD-MCNC: 28 MG/DL (ref 8–23)
BUN/CREAT SERPL: 16.8 (ref 7–25)
CALCIUM SPEC-SCNC: 9.9 MG/DL (ref 8.6–10.5)
CHLORIDE SERPL-SCNC: 100 MMOL/L (ref 98–107)
CLARITY UR: ABNORMAL
CO2 SERPL-SCNC: 21.1 MMOL/L (ref 22–29)
COLOR UR: YELLOW
CREAT BLD-MCNC: 1.67 MG/DL (ref 0.57–1)
CREAT UR-MCNC: 118.1 MG/DL
GFR SERPL CREATININE-BSD FRML MDRD: 30 ML/MIN/1.73
GLOBULIN UR ELPH-MCNC: 4.5 GM/DL
GLUCOSE BLD-MCNC: 86 MG/DL (ref 65–99)
GLUCOSE UR STRIP-MCNC: NEGATIVE MG/DL
HGB UR QL STRIP.AUTO: NEGATIVE
HYALINE CASTS UR QL AUTO: ABNORMAL /LPF
KETONES UR QL STRIP: NEGATIVE
LEUKOCYTE ESTERASE UR QL STRIP.AUTO: NEGATIVE
NITRITE UR QL STRIP: NEGATIVE
PH UR STRIP.AUTO: 6 [PH] (ref 5–8)
POTASSIUM BLD-SCNC: 4.8 MMOL/L (ref 3.5–5.2)
PROT SERPL-MCNC: 8.7 G/DL (ref 6–8.5)
PROT UR QL STRIP: ABNORMAL
PROT UR-MCNC: 970 MG/DL
PROT/CREAT UR: 8213.4 MG/G CREA (ref 0–200)
RBC # UR: ABNORMAL /HPF
REF LAB TEST METHOD: ABNORMAL
SODIUM BLD-SCNC: 137 MMOL/L (ref 136–145)
SP GR UR STRIP: 1.02 (ref 1–1.03)
SQUAMOUS #/AREA URNS HPF: ABNORMAL /HPF
UROBILINOGEN UR QL STRIP: ABNORMAL
WBC UR QL AUTO: ABNORMAL /HPF

## 2020-03-13 PROCEDURE — 82570 ASSAY OF URINE CREATININE: CPT | Performed by: PHYSICIAN ASSISTANT

## 2020-03-13 PROCEDURE — 81001 URINALYSIS AUTO W/SCOPE: CPT | Performed by: PHYSICIAN ASSISTANT

## 2020-03-13 PROCEDURE — 84156 ASSAY OF PROTEIN URINE: CPT | Performed by: PHYSICIAN ASSISTANT

## 2020-03-13 PROCEDURE — 80053 COMPREHEN METABOLIC PANEL: CPT | Performed by: PHYSICIAN ASSISTANT

## 2020-03-13 PROCEDURE — 99213 OFFICE O/P EST LOW 20 MIN: CPT | Performed by: PHYSICIAN ASSISTANT

## 2020-03-14 NOTE — PROGRESS NOTES
"Alison Lee is a 74 y.o. female.       Chief Complaint -postop visit for skin lesion excision    History of Present Illness -      Postop-  On 3/6/2020 a skin lesion was removed from her right facial cheek.  Pathology revealed a pigmented actinic keratosis with adjacent seborrheic keratosis.  Negative for in situ or invasive carcinoma.  Patient has done well sutures are due to be removed today.    The following portions of the patient's history were reviewed and updated as appropriate: allergies, current medications, past family history, past medical history, past social history, past surgical history and problem list.    Review of Systems   Constitutional: Negative for activity change, appetite change, fatigue and fever.   HENT: Negative for ear pain, sinus pressure and sore throat.    Eyes: Negative for pain and visual disturbance.   Respiratory: Negative for cough and chest tightness.    Cardiovascular: Negative for chest pain and palpitations.   Gastrointestinal: Negative for abdominal pain, constipation, diarrhea, nausea and vomiting.   Endocrine: Negative for polydipsia and polyuria.   Genitourinary: Negative for dysuria and frequency.   Musculoskeletal: Negative for back pain and myalgias.   Skin: Positive for wound. Negative for color change and rash.        Postop wound noted right cheek   Allergic/Immunologic: Negative for food allergies and immunocompromised state.   Neurological: Negative for dizziness, syncope and headaches.   Hematological: Negative for adenopathy. Does not bruise/bleed easily.   Psychiatric/Behavioral: Negative for hallucinations and suicidal ideas. The patient is not nervous/anxious.        /70   Pulse 58   Temp 97.1 °F (36.2 °C) (Oral)   Ht 160 cm (62.99\")   Wt 107 kg (235 lb)   SpO2 97%   BMI 41.64 kg/m²   Admission on 03/05/2020, Discharged on 03/05/2020   Component Date Value Ref Range Status   • Urease 03/05/2020 Negative  Negative Final       Physical " Exam   Constitutional: She is oriented to person, place, and time. She appears well-developed and well-nourished.   Cardiovascular: Normal rate, regular rhythm and normal heart sounds.   No murmur heard.  Pulmonary/Chest: Effort normal and breath sounds normal. No respiratory distress. She has no wheezes.   Musculoskeletal: She exhibits no tenderness.   Neurological: She is alert and oriented to person, place, and time.   Skin: Skin is warm and dry. No rash noted.   Right cheek postop wound healing well with no swelling or erythema   Psychiatric: She has a normal mood and affect. Her behavior is normal.   Nursing note and vitals reviewed.      Assessment/Plan     Diagnoses and all orders for this visit:    Actinic keratosis of right cheek  Comments:  Sutures removed today.  Pathology reviewed with the patient.    Systolic heart failure, unspecified HF chronicity (CMS/[facility])  -     Ambulatory Referral to Cardiology  -     Comprehensive metabolic panel  -     Protein / Creatinine Ratio, Urine - Urine, Clean Catch  -     Urinalysis With Culture If Indicated - Urine, Clean Catch  -     Urinalysis, Microscopic Only - Urine, Clean Catch; Future  -     Urinalysis, Microscopic Only - Urine, Clean Catch    Atherosclerosis of native coronary artery of native heart without angina pectoris  -     Ambulatory Referral to Cardiology  -     Comprehensive metabolic panel  -     Protein / Creatinine Ratio, Urine - Urine, Clean Catch  -     Urinalysis With Culture If Indicated - Urine, Clean Catch  -     Urinalysis, Microscopic Only - Urine, Clean Catch; Future  -     Urinalysis, Microscopic Only - Urine, Clean Catch    Uncontrolled type 2 diabetes mellitus with hyperglycemia (CMS/[facility])  -     Comprehensive metabolic panel  -     Protein / Creatinine Ratio, Urine - Urine, Clean Catch  -     Urinalysis With Culture If Indicated - Urine, Clean Catch  -     Urinalysis, Microscopic Only - Urine, Clean Catch; Future  -     Urinalysis,  Microscopic Only - Urine, Clean Catch    Essential hypertension, malignant  -     Comprehensive metabolic panel  -     Protein / Creatinine Ratio, Urine - Urine, Clean Catch  -     Urinalysis With Culture If Indicated - Urine, Clean Catch  -     Urinalysis, Microscopic Only - Urine, Clean Catch; Future  -     Urinalysis, Microscopic Only - Urine, Clean Catch    Chronic renal failure, unspecified CKD stage  -     Comprehensive metabolic panel  -     Protein / Creatinine Ratio, Urine - Urine, Clean Catch  -     Urinalysis With Culture If Indicated - Urine, Clean Catch  -     Urinalysis, Microscopic Only - Urine, Clean Catch; Future  -     Urinalysis, Microscopic Only - Urine, Clean Catch      While patient was here today for postop visit and suture removal she did have lab work performed which was ordered by another provider.      This document has been electronically signed by:  Lexie Dolan PA-C

## 2020-03-14 NOTE — PATIENT INSTRUCTIONS
Wound Care, Adult  Taking care of your wound properly can help to prevent pain, infection, and scarring. It can also help your wound to heal more quickly.  How to care for your wound  Wound care         · Follow instructions from your health care provider about how to take care of your wound. Make sure you:  ? Wash your hands with soap and water before you change the bandage (dressing). If soap and water are not available, use hand .  ? Change your dressing as told by your health care provider.  ? Leave stitches (sutures), skin glue, or adhesive strips in place. These skin closures may need to stay in place for 2 weeks or longer. If adhesive strip edges start to loosen and curl up, you may trim the loose edges. Do not remove adhesive strips completely unless your health care provider tells you to do that.  · Check your wound area every day for signs of infection. Check for:  ? Redness, swelling, or pain.  ? Fluid or blood.  ? Warmth.  ? Pus or a bad smell.  · Ask your health care provider if you should clean the wound with mild soap and water. Doing this may include:  ? Using a clean towel to pat the wound dry after cleaning it. Do not rub or scrub the wound.  ? Applying a cream or ointment. Do this only as told by your health care provider.  ? Covering the incision with a clean dressing.  · Ask your health care provider when you can leave the wound uncovered.  · Keep the dressing dry until your health care provider says it can be removed. Do not take baths, swim, use a hot tub, or do anything that would put the wound underwater until your health care provider approves. Ask your health care provider if you can take showers. You may only be allowed to take sponge baths.  Medicines    · If you were prescribed an antibiotic medicine, cream, or ointment, take or use the antibiotic as told by your health care provider. Do not stop taking or using the antibiotic even if your condition improves.  · Take  over-the-counter and prescription medicines only as told by your health care provider. If you were prescribed pain medicine, take it 30 or more minutes before you do any wound care or as told by your health care provider.  General instructions  · Return to your normal activities as told by your health care provider. Ask your health care provider what activities are safe.  · Do not scratch or pick at the wound.  · Do not use any products that contain nicotine or tobacco, such as cigarettes and e-cigarettes. These may delay wound healing. If you need help quitting, ask your health care provider.  · Keep all follow-up visits as told by your health care provider. This is important.  · Eat a diet that includes protein, vitamin A, vitamin C, and other nutrient-rich foods to help the wound heal.  ? Foods rich in protein include meat, dairy, beans, nuts, and other sources.  ? Foods rich in vitamin A include carrots and dark green, leafy vegetables.  ? Foods rich in vitamin C include citrus, tomatoes, and other fruits and vegetables.  ? Nutrient-rich foods have protein, carbohydrates, fat, vitamins, or minerals. Eat a variety of healthy foods including vegetables, fruits, and whole grains.  Contact a health care provider if:  · You received a tetanus shot and you have swelling, severe pain, redness, or bleeding at the injection site.  · Your pain is not controlled with medicine.  · You have redness, swelling, or pain around the wound.  · You have fluid or blood coming from the wound.  · Your wound feels warm to the touch.  · You have pus or a bad smell coming from the wound.  · You have a fever or chills.  · You are nauseous or you vomit.  · You are dizzy.  Get help right away if:  · You have a red streak going away from your wound.  · The edges of the wound open up and separate.  · Your wound is bleeding, and the bleeding does not stop with gentle pressure.  · You have a rash.  · You faint.  · You have trouble  breathing.  Summary  · Always wash your hands with soap and water before changing your bandage (dressing).  · To help with healing, eat foods that are rich in protein, vitamin A, vitamin C, and other nutrients.  · Check your wound every day for signs of infection. Contact your health care provider if you suspect that your wound is infected.  This information is not intended to replace advice given to you by your health care provider. Make sure you discuss any questions you have with your health care provider.  Document Released: 09/26/2009 Document Revised: 01/29/2019 Document Reviewed: 07/04/2017  [employer] Interactive Patient Education © 2020 [employer]

## 2020-04-09 ENCOUNTER — OFFICE VISIT (OUTPATIENT)
Dept: CARDIOLOGY | Facility: CLINIC | Age: 75
End: 2020-04-09

## 2020-04-09 ENCOUNTER — TELEPHONE (OUTPATIENT)
Dept: CARDIOLOGY | Facility: CLINIC | Age: 75
End: 2020-04-09

## 2020-04-09 VITALS
BODY MASS INDEX: 40.93 KG/M2 | SYSTOLIC BLOOD PRESSURE: 151 MMHG | DIASTOLIC BLOOD PRESSURE: 73 MMHG | WEIGHT: 231 LBS | HEIGHT: 63 IN | HEART RATE: 51 BPM

## 2020-04-09 DIAGNOSIS — I51.7 LVH (LEFT VENTRICULAR HYPERTROPHY): ICD-10-CM

## 2020-04-09 DIAGNOSIS — I25.10 ATHEROSCLEROSIS OF NATIVE CORONARY ARTERY OF NATIVE HEART, ANGINA PRESENCE UNSPECIFIED: Primary | ICD-10-CM

## 2020-04-09 DIAGNOSIS — E78.2 MIXED HYPERLIPIDEMIA: ICD-10-CM

## 2020-04-09 DIAGNOSIS — R07.2 PRECORDIAL CHEST PAIN: ICD-10-CM

## 2020-04-09 DIAGNOSIS — R06.02 SHORTNESS OF BREATH: ICD-10-CM

## 2020-04-09 DIAGNOSIS — I73.9 PVD (PERIPHERAL VASCULAR DISEASE) WITH CLAUDICATION (HCC): ICD-10-CM

## 2020-04-09 DIAGNOSIS — I10 ESSENTIAL HYPERTENSION: ICD-10-CM

## 2020-04-09 PROCEDURE — 99214 OFFICE O/P EST MOD 30 MIN: CPT | Performed by: SPECIALIST

## 2020-04-09 PROCEDURE — 93000 ELECTROCARDIOGRAM COMPLETE: CPT | Performed by: SPECIALIST

## 2020-04-09 RX ORDER — HYDROCHLOROTHIAZIDE 25 MG/1
25 TABLET ORAL DAILY
Qty: 90 TABLET | Refills: 1 | Status: SHIPPED | OUTPATIENT
Start: 2020-04-09 | End: 2020-06-12 | Stop reason: SDUPTHER

## 2020-04-09 NOTE — TELEPHONE ENCOUNTER
----- Message from Hever Cruz MD sent at 4/9/2020 10:50 AM EDT -----  Cardiac catheterization result form year 2000, done at [facility] in [facility], thanks

## 2020-04-09 NOTE — PROGRESS NOTES
Subjective   Initial consultation, coronary artery disease  Britta Lee is a 74 y.o. female who presents to day for Establish Care (re establish cardiac care (former Dr. Martines pt)).    CHIEF COMPLIANT  Chief Complaint   Patient presents with   • Establish Care     re establish cardiac care (former Dr. Martines pt)       Active Problems:  Problem List Items Addressed This Visit        Cardiovascular and Mediastinum    Essential hypertension    Overview     The patient was advised to keep a daily blood pressure and pulse log. They were advised contact our office if consistently running over 120/80 and bring the log to the next follow up visit.          Atherosclerosis of native coronary artery of native heart - Primary    Overview     S/P one stent placement followed by [facility] in [place] - Dr. Alexandru Martines          Hyperlipidemia    Overview     Lipid panel done 10/20/15 TC: 166 Tri HDL: 34 LDL: 104         PVD (peripheral vascular disease) with claudication (CMS/HCC)    LVH (left ventricular hypertrophy)       Respiratory    Shortness of breath       Nervous and Auditory    Precordial chest pain          HPI  HPI  History of PCI of unknown artery about 120 years ago, now with retrosternal chest discomfort when walking the dog, also with pain on the calves when she walks about 400 yards, she also has shortness of breath in mild to moderate exertion, with lower extremity edema, she feels fatigued all day, and sleepy, but does not think that she snores at night, her father  with MI age 66, she also has diabetes and that is usually not well controlled according to her, and also high blood pressure which is high at home, she never smoked, but she is exposed to second hand smoking through her  and daughter who are heavy smoker  PRIOR MEDS  Current Outpatient Medications on File Prior to Visit   Medication Sig Dispense Refill   • acetaminophen (TYLENOL) 500 MG tablet Take 500 mg by mouth Every 6  (Six) Hours As Needed for Mild Pain .     • amLODIPine (NORVASC) 5 MG tablet Take 7.5 mg by mouth Every Night.     • aspirin 81 MG tablet Take 1 tablet by mouth Daily. 30 tablet 5   • atorvastatin (LIPITOR) 40 MG tablet Take 1 tablet by mouth Daily. 90 tablet 3   • calcium carb-cholecalciferol (CALCIUM 600/VITAMIN D3) 600-800 MG-UNIT tablet Take 1 tablet by mouth 2 (Two) Times a Day With Meals.     • CloNIDine (CATAPRES) 0.2 MG tablet Take 1 tablet by mouth 3 (Three) Times a Day. 270 tablet 3   • escitalopram (LEXAPRO) 10 MG tablet Take 10 mg by mouth Daily.     • Ferrous Sulfate (IRON) 325 (65 Fe) MG tablet TAKE 1 Tablet BY MOUTH TWICE DAILY 60 tablet 5   • Insulin Glargine, 1 Unit Dial, (TOUJEO SOLOSTAR) 300 UNIT/ML solution pen-injector injection Inject 80 Units under the skin into the appropriate area as directed Daily. 5 pen 5   • insulin lispro (humaLOG) 100 UNIT/ML injection Inject 25 Units under the skin into the appropriate area as directed Every Evening. Prior to [facility] [place], operated by [facility], Patient was on: 20 units in the morning, 25 in the evening, 5 at night     • isosorbide mononitrate (IMDUR) 30 MG 24 hr tablet Take 1 tablet by mouth Daily. 30 tablet 5   • lisinopril (PRINIVIL,ZESTRIL) 40 MG tablet Take 40 mg by mouth Daily.     • metoprolol tartrate (LOPRESSOR) 100 MG tablet Take 1 tablet by mouth 2 (Two) Times a Day. 180 tablet 3   • vitamin D (ERGOCALCIFEROL) 63234 units capsule capsule Take 1 capsule by mouth 1 (One) Time Per Week. Takes wednesdays 30 capsule 5     No current facility-administered medications on file prior to visit.        ALLERGIES  Patient has no known allergies.    HISTORY  Past Medical History:   Diagnosis Date   • Anemia    • Arthritis    • Carpal tunnel syndrome    • CHF (congestive heart failure) (CMS/HCC)    • Coronary artery disease    • Diabetes mellitus (CMS/HCC)    • Elevated cholesterol    • GERD (gastroesophageal reflux disease)    • Heart disease    • High cholesterol    •  History of pneumonia    • History of unsteady gait     OCASSIONALLY   • Hypertension    • Insomnia    • Kidney disease    • Osteoarthritis    • Renal insufficiency    • Sciatica        Social History     Socioeconomic History   • Marital status:      Spouse name: natalie   • Number of children: 3   • Years of education: 12   • Highest education level: Not on file   Occupational History   • Occupation: retired   Social Needs   • Financial resource strain: Somewhat hard   • Food insecurity:     Worry: Never true     Inability: Never true   • Transportation needs:     Medical: Yes     Non-medical: No   Tobacco Use   • Smoking status: Never Smoker   • Smokeless tobacco: Never Used   Substance and Sexual Activity   • Alcohol use: Yes     Comment: socially   • Drug use: No   • Sexual activity: Defer     Birth control/protection: Surgical   Lifestyle   • Physical activity:     Days per week: 0 days     Minutes per session: 0 min   • Stress: Only a little       Family History   Problem Relation Age of Onset   • Arthritis Mother    • Diabetes Mother    • Cancer Mother    • Heart disease Father    • Diabetes Daughter    • Diabetes Son    • Diabetes Maternal Aunt    • Heart disease Maternal Grandmother    • Breast cancer Neg Hx        Review of Systems   Constitutional: Negative for chills and fever.   HENT: Negative for congestion and drooling. Trouble swallowing: Hx.    Eyes: Negative for discharge and itching.   Respiratory: Negative for chest tightness and shortness of breath.    Cardiovascular: Negative for chest pain and palpitations.   Gastrointestinal: Negative for abdominal distention and vomiting.   Endocrine: Negative for cold intolerance and heat intolerance.   Genitourinary: Negative for flank pain.   Musculoskeletal: Positive for myalgias.   Skin: Negative for color change and pallor.   Allergic/Immunologic: Negative for immunocompromised state.   Neurological: Positive for dizziness. Negative for  "light-headedness.   Hematological: Does not bruise/bleed easily.   Psychiatric/Behavioral: Negative for agitation and behavioral problems.       Objective     VITALS: /73 (BP Location: Left arm, Patient Position: Sitting, Cuff Size: Adult)   Pulse 51   Ht 160 cm (63\")   Wt 105 kg (231 lb)   BMI 40.92 kg/m²     LABS:   Lab Results (most recent)     None          IMAGING:   Xr Chest 1 View    Result Date: 2/5/2020  Cardiomegaly and pulmonary vascular congestion.  This report was finalized on 2/5/2020 10:10 AM by Dr. Guillermo Macias MD.        EXAM:  Physical Exam   Constitutional: She is oriented to person, place, and time. She appears well-developed and well-nourished.   HENT:   Head: Normocephalic and atraumatic.   Eyes: Pupils are equal, round, and reactive to light.   Neck: Neck supple. No JVD present. No thyromegaly present.   Cardiovascular: Normal rate, regular rhythm, normal heart sounds and intact distal pulses. Exam reveals no gallop and no friction rub.   No murmur heard.  Pulmonary/Chest: Effort normal and breath sounds normal. No stridor. No respiratory distress. She has no wheezes. She has no rales. She exhibits no tenderness.   Musculoskeletal: She exhibits no edema, tenderness or deformity.   Neurological: She is alert and oriented to person, place, and time. No cranial nerve deficit. Coordination normal.   Skin: Skin is warm and dry.   Psychiatric: She has a normal mood and affect.   Vitals reviewed.      Procedure   Procedures     Date/Time Action Taken User Additional Information   04/09/20 5163 Mag Harvey MA Ordering Mode: Per protocol: no cosign required       EKG: Sinus bradycardia ( 50/min ), LAD, LVH, compare with EKG on 2/6/2020 now with bradycardia  Assessment/Plan    Diagnosis Plan   1. Atherosclerosis of native coronary artery of native heart, angina presence unspecified     2. Precordial chest pain     3. PVD (peripheral vascular disease) with claudication " (CMS/HCC)     4. Shortness of breath     5. Essential hypertension     6. Mixed hyperlipidemia     7. LVH (left ventricular hypertrophy)       1. With symptoms c/w angina, will proceed with stress testing to assess for ischemia  2. Blood pressure is elevated, she says her blood pressure is elevated at home as well, will add HCTZ  3. I reviewed echocardiogram with significant LVH, will try to get better blood pressure control  4. Will get LE arterial Doppler to assess for arterial circulation  5. I reviewed lipid profile, with mildly elevated triglycerides, and HgA1c, I discused result with patient in details  6. Will try to get old records regarding her PCI procedure.   No follow-ups on file.    Britta was seen today for establish care.    Diagnoses and all orders for this visit:    Atherosclerosis of native coronary artery of native heart, angina presence unspecified    Precordial chest pain    PVD (peripheral vascular disease) with claudication (CMS/HCC)    Shortness of breath    Essential hypertension    Mixed hyperlipidemia    LVH (left ventricular hypertrophy)                 MEDS ORDERED DURING VISIT:  No orders of the defined types were placed in this encounter.        This document has been electronically signed by Hever Cruz MD  April 9, 2020 10:35

## 2020-04-17 ENCOUNTER — OFFICE VISIT (OUTPATIENT)
Dept: FAMILY MEDICINE CLINIC | Facility: CLINIC | Age: 75
End: 2020-04-17

## 2020-04-17 DIAGNOSIS — N18.4 TYPE 2 DIABETES MELLITUS WITH STAGE 4 CHRONIC KIDNEY DISEASE, WITH LONG-TERM CURRENT USE OF INSULIN (HCC): Primary | ICD-10-CM

## 2020-04-17 DIAGNOSIS — E11.22 TYPE 2 DIABETES MELLITUS WITH STAGE 4 CHRONIC KIDNEY DISEASE, WITH LONG-TERM CURRENT USE OF INSULIN (HCC): Primary | ICD-10-CM

## 2020-04-17 DIAGNOSIS — I10 ESSENTIAL HYPERTENSION: ICD-10-CM

## 2020-04-17 DIAGNOSIS — Z79.4 TYPE 2 DIABETES MELLITUS WITH STAGE 4 CHRONIC KIDNEY DISEASE, WITH LONG-TERM CURRENT USE OF INSULIN (HCC): Primary | ICD-10-CM

## 2020-04-17 DIAGNOSIS — E78.2 MIXED HYPERLIPIDEMIA: ICD-10-CM

## 2020-04-17 PROCEDURE — 99443 PR PHYS/QHP TELEPHONE EVALUATION 21-30 MIN: CPT | Performed by: NURSE PRACTITIONER

## 2020-04-17 RX ORDER — DIPHENHYDRAMINE HYDROCHLORIDE 25 MG/1
1 CAPSULE, LIQUID FILLED ORAL DAILY
Qty: 1 EACH | Refills: 0 | Status: ON HOLD | OUTPATIENT
Start: 2020-04-17 | End: 2020-11-03

## 2020-04-17 RX ORDER — QUINAPRIL 40 MG/1
40 TABLET ORAL DAILY
COMMUNITY
Start: 2020-03-12 | End: 2020-07-01 | Stop reason: SDUPTHER

## 2020-04-17 RX ORDER — INSULIN GLARGINE 300 U/ML
INJECTION, SOLUTION SUBCUTANEOUS
Status: ON HOLD | COMMUNITY
Start: 2020-04-09 | End: 2020-11-03

## 2020-04-17 RX ORDER — PEN NEEDLE, DIABETIC 31 GX3/16"
NEEDLE, DISPOSABLE MISCELLANEOUS
Status: ON HOLD | COMMUNITY
Start: 2020-04-15 | End: 2020-11-03

## 2020-04-17 RX ORDER — ALCOHOL ANTISEPTIC PADS
PADS, MEDICATED (EA) TOPICAL
Status: ON HOLD | COMMUNITY
Start: 2020-04-15 | End: 2020-11-03

## 2020-04-17 NOTE — PROGRESS NOTES
You have chosen to receive care through a telephone visit. Do you consent to use a telephone visit for your medical care today? Yes    History of Present Illness   Britta Lee is a 75 yo female who has DM, type 2 which is complicated by peripheral neuropathy, diabetic nephropathy and retinopathy. Associated symptoms include blurred vision, fatigue and visual changes. In addition, Britta has HTN, Dyslipidemia, and Venous Insufficiency of Lower Extremities.     Diabetes   She has type 2 diabetes mellitus. The initial diagnosis of diabetes was made 20 years ago. Her disease course has been fluctuating. There are no hypoglycemic associated symptoms. Associated symptoms include blurred vision and fatigue.. Pertinent negatives for diabetes include no chest pain, no foot ulcerations, no polydipsia, no polyphagia, no polyuria, no weakness and no weight loss.  Symptoms are worsening. Diabetic complications include heart disease, nephropathy, peripheral neuropathy, PVD and retinopathy. Current diabetic treatment includes insulin injections (Toujeo and Novolog). Compliance with diabetes treatment: Dependent on available of insulin. Her weight is increasing steadily. When asked about meal planning, she reported none. She has had a previous visit with a dietitian. She never participates in exercise. She reports her blood glucose monitor is not working so she is not checking her blood sugars at this time. She is awaiting a DexCom. She sees a podiatrist.Eye exam is current.   Most recent HGBA1C  Lab Results   Component Value Date    HGBA1C 7.44 (H) 02/21/2020     Lab Results   Component Value Date    GLUCOSE 177 (H) 02/21/2020    BUN 37 (H) 02/21/2020    CREATININE 2.30 (H) 02/21/2020    EGFRIFNONA 21 (L) 02/21/2020    BCR 16.1 02/21/2020    K 5.4 (H) 02/21/2020    CO2 20.9 (L) 02/21/2020    CALCIUM 9.9 02/21/2020    ALBUMIN 3.70 02/21/2020    AST 28 02/21/2020    ALT 18 02/21/2020     Hypertension  Compliance with treatment  has been good. Well controlled with Norvasc, clonidine, metoprolol, HCTZ and Accupril. She does report intermittent lower extremity swelling throughout the day. She does have compression stockings but find them very difficult to apply.   Lab Results   Component Value Date    CREATININE 2.30 (H) 02/21/2020     Dyslipidemia  Compliance with treatment has been good. The patient exercises seldom due to her Osteoarthritis.  She is currently being prescribed the following medication for her dyslipidemia - atorvastatin. Patient denies side effects associated with her medications. Most recent lipids include  Lab Results   Component Value Date    CHOL 136 02/21/2020    TRIG 170 (H) 02/21/2020    HDL 41 02/21/2020    LDL 61 02/21/2020     The following portions of the patient's history were reviewed and updated as appropriate: allergies, current medications, past family history, past medical history, past social history, past surgical history and problem list.    Review of Systems   Constitutional: Positive for activity change and fatigue. Negative for appetite change, chills, fever and unexpected weight change.   HENT: Negative.    Eyes: Positive for visual disturbance.   Respiratory: Negative for cough, shortness of breath and wheezing.    Cardiovascular: Positive for leg swelling. Negative for chest pain and palpitations.   Gastrointestinal: Negative for nausea and vomiting.   Endocrine: Negative for cold intolerance, heat intolerance, polydipsia, polyphagia and polyuria.   Skin: Negative for color change and rash.   Neurological: Positive for weakness and numbness (Peripheral neuropathy). Negative for dizziness, tremors, speech difficulty, light-headedness and headaches.   Hematological: Negative for adenopathy.   Psychiatric/Behavioral: Negative for confusion, decreased concentration and suicidal ideas. The patient is not nervous/anxious.    All other systems reviewed and are negative.    Assessment/Plan     Problems  Addressed this Visit        Cardiovascular and Mediastinum    Essential hypertension    Relevant Medications    quinapril (ACCUPRIL) 40 MG tablet    Hyperlipidemia       Endocrine    Type 2 diabetes mellitus with chronic kidney disease, with long-term current use of insulin (CMS/[facility]) - Primary    Relevant Medications    GAYATHRI VIDAL SOLOSTAR 300 UNIT/ML solution pen-injector injection    Blood Glucose Monitoring Suppl (BLOOD GLUCOSE MONITOR SYSTEM) w/Device kit      Spent time discussing her current medications and symptoms. Strategy for her to obtain a blood glucose monitor until she is approved for the DexCom. Her  will  an AccuChek Guide Me from the office when he goes to work today. Encouraged her to be cautious regarding taking insulin without knowing her blood sugar. Her blood pressure monitor is not working but she said Dr Johnson told her that her blood pressure was good at her visit with him. She will continue Atorvastatin and lifestyle modifications to reduce cardiovascular risk reduction. Requested she call me next week to let me know her glucose readings. She is to f/u with Lexie Dolan PA-C in May. Encouraged her to phone if she has any problems/concerns.   This visit has been scheduled as a phone visit to comply with patient safety concerns in accordance with CDC recommendations. Total time of discussion was 23  minutes.    This document has been electronically signed by ERNIE Locke, CHUN-BC, JAEL  April 17, 2020 11:24

## 2020-06-12 ENCOUNTER — OFFICE VISIT (OUTPATIENT)
Dept: FAMILY MEDICINE CLINIC | Facility: CLINIC | Age: 75
End: 2020-06-12

## 2020-06-12 DIAGNOSIS — E55.9 VITAMIN D DEFICIENCY: ICD-10-CM

## 2020-06-12 DIAGNOSIS — N18.4 TYPE 2 DIABETES MELLITUS WITH STAGE 4 CHRONIC KIDNEY DISEASE, WITH LONG-TERM CURRENT USE OF INSULIN (HCC): ICD-10-CM

## 2020-06-12 DIAGNOSIS — Z79.4 DIABETES MELLITUS DUE TO UNDERLYING CONDITION WITH HYPERGLYCEMIA, WITH LONG-TERM CURRENT USE OF INSULIN (HCC): ICD-10-CM

## 2020-06-12 DIAGNOSIS — I10 BENIGN ESSENTIAL HYPERTENSION: ICD-10-CM

## 2020-06-12 DIAGNOSIS — Z79.4 TYPE 2 DIABETES MELLITUS WITH STAGE 4 CHRONIC KIDNEY DISEASE, WITH LONG-TERM CURRENT USE OF INSULIN (HCC): ICD-10-CM

## 2020-06-12 DIAGNOSIS — E78.2 MIXED HYPERLIPIDEMIA: ICD-10-CM

## 2020-06-12 DIAGNOSIS — D50.8 IRON DEFICIENCY ANEMIA SECONDARY TO INADEQUATE DIETARY IRON INTAKE: ICD-10-CM

## 2020-06-12 DIAGNOSIS — E11.22 TYPE 2 DIABETES MELLITUS WITH STAGE 4 CHRONIC KIDNEY DISEASE, WITH LONG-TERM CURRENT USE OF INSULIN (HCC): ICD-10-CM

## 2020-06-12 DIAGNOSIS — F33.1 MODERATE EPISODE OF RECURRENT MAJOR DEPRESSIVE DISORDER (HCC): Primary | ICD-10-CM

## 2020-06-12 DIAGNOSIS — I25.10 ATHEROSCLEROSIS OF NATIVE CORONARY ARTERY OF NATIVE HEART WITHOUT ANGINA PECTORIS: ICD-10-CM

## 2020-06-12 DIAGNOSIS — I10 ESSENTIAL HYPERTENSION: ICD-10-CM

## 2020-06-12 DIAGNOSIS — E08.65 DIABETES MELLITUS DUE TO UNDERLYING CONDITION WITH HYPERGLYCEMIA, WITH LONG-TERM CURRENT USE OF INSULIN (HCC): ICD-10-CM

## 2020-06-12 PROCEDURE — 99443 PR PHYS/QHP TELEPHONE EVALUATION 21-30 MIN: CPT | Performed by: PHYSICIAN ASSISTANT

## 2020-06-12 RX ORDER — LANCETS
1 EACH MISCELLANEOUS 3 TIMES DAILY
Status: ON HOLD | COMMUNITY
Start: 2020-05-28 | End: 2020-11-03

## 2020-06-12 RX ORDER — ESCITALOPRAM OXALATE 10 MG/1
10 TABLET ORAL DAILY
Qty: 90 TABLET | Refills: 3 | Status: SHIPPED | OUTPATIENT
Start: 2020-06-12 | End: 2021-04-20 | Stop reason: SDUPTHER

## 2020-06-12 RX ORDER — PNV NO.95/FERROUS FUM/FOLIC AC 28MG-0.8MG
1 TABLET ORAL 2 TIMES DAILY
Qty: 180 TABLET | Refills: 3 | Status: SHIPPED | OUTPATIENT
Start: 2020-06-12 | End: 2021-03-26 | Stop reason: ALTCHOICE

## 2020-06-12 RX ORDER — ISOSORBIDE MONONITRATE 30 MG/1
30 TABLET, EXTENDED RELEASE ORAL DAILY
Qty: 90 TABLET | Refills: 3 | Status: SHIPPED | OUTPATIENT
Start: 2020-06-12 | End: 2020-11-08 | Stop reason: HOSPADM

## 2020-06-12 RX ORDER — ERGOCALCIFEROL 1.25 MG/1
50000 CAPSULE ORAL WEEKLY
Qty: 90 CAPSULE | Refills: 3 | Status: ON HOLD | OUTPATIENT
Start: 2020-06-12 | End: 2020-11-03

## 2020-06-12 RX ORDER — ATORVASTATIN CALCIUM 40 MG/1
40 TABLET, FILM COATED ORAL DAILY
Qty: 90 TABLET | Refills: 3 | Status: ON HOLD | OUTPATIENT
Start: 2020-06-12 | End: 2020-11-03

## 2020-06-12 RX ORDER — METOPROLOL TARTRATE 100 MG/1
100 TABLET ORAL 2 TIMES DAILY
Qty: 180 TABLET | Refills: 3 | Status: SHIPPED | OUTPATIENT
Start: 2020-06-12 | End: 2020-11-08 | Stop reason: HOSPADM

## 2020-06-12 RX ORDER — CLONIDINE HYDROCHLORIDE 0.2 MG/1
0.2 TABLET ORAL 3 TIMES DAILY
Qty: 270 TABLET | Refills: 3 | Status: SHIPPED | OUTPATIENT
Start: 2020-06-12 | End: 2020-12-01 | Stop reason: SDUPTHER

## 2020-06-12 RX ORDER — HYDROCHLOROTHIAZIDE 25 MG/1
25 TABLET ORAL DAILY
Qty: 90 TABLET | Refills: 1 | Status: SHIPPED | OUTPATIENT
Start: 2020-06-12 | End: 2020-11-08 | Stop reason: HOSPADM

## 2020-06-12 RX ORDER — BLOOD SUGAR DIAGNOSTIC
1 STRIP MISCELLANEOUS 3 TIMES DAILY
Status: ON HOLD | COMMUNITY
Start: 2020-05-28 | End: 2020-11-03

## 2020-06-12 NOTE — PROGRESS NOTES
"Alison Lee is a 74 y.o. female.     Telephone visit    You have chosen to receive care through a telephone visit. Do you consent to use a telephone visit for your medical care today? Yes    This visit has been rescheduled as a phone visit to comply with patient safety concerns in accordance with CDC recommendations. Total time of discussion was 21 minutes.      Chief Complaint -depression    History of Present Illness -      Depression-  Stable with Lexapro.  She denies any SI or HI.    Diabetes mellitus type 2-  Not at goal since she states that she is unable to afford her insulin and is already in the \"donut hole\" she states that when she takes her Toujeo and Humalog her blood glucose is much better control.  She has only been using Humalog and has been out of the Toujeo for the last few weeks.  Lab Results   Component Value Date    HGBA1C 7.44 (H) 02/21/2020     Hyperlipidemia-stable with atorvastatin and diet  Lab Results   Component Value Date    CHOL 136 02/21/2020    CHOL 121 11/26/2019    CHOL 130 06/27/2019    CHLPL 128 06/25/2018    CHLPL 100 03/26/2018    CHLPL 166 10/20/2015     Lab Results   Component Value Date    TRIG 170 (H) 02/21/2020    TRIG 150 11/26/2019    TRIG 122 06/27/2019     Lab Results   Component Value Date    HDL 41 02/21/2020    HDL 32 (L) 11/26/2019    HDL 38 (L) 06/27/2019     Lab Results   Component Value Date    LDL 61 02/21/2020    LDL 59 11/26/2019    LDL 68 06/27/2019     Hypertension-stable with metoprolol, Norvasc, clonidine, and quinapril    Iron deficiency anemia-stable with iron supplementation    Atherosclerosis/CAD- stable with risk factor modification including aspirin and atorvastatin    Vitamin D deficiency-stable with vitamin D supplementation    The following portions of the patient's history were reviewed and updated as appropriate: allergies, current medications, past family history, past medical history, past social history, past surgical history " and problem list.    Review of Systems   Constitutional: Positive for fatigue (chronic). Negative for activity change, appetite change and fever.   HENT: Negative for ear pain, sinus pressure and sore throat.    Eyes: Negative for pain and visual disturbance.   Respiratory: Negative for cough and chest tightness.    Cardiovascular: Negative for chest pain and palpitations.   Gastrointestinal: Negative for abdominal pain, constipation, diarrhea, nausea and vomiting.   Endocrine: Negative for polydipsia and polyuria.   Genitourinary: Negative for dysuria and frequency.   Musculoskeletal: Negative for back pain and myalgias.   Skin: Negative for color change and rash.   Allergic/Immunologic: Negative for food allergies and immunocompromised state.   Neurological: Negative for dizziness, syncope and headaches.   Hematological: Negative for adenopathy. Does not bruise/bleed easily.   Psychiatric/Behavioral: Negative for hallucinations and suicidal ideas. The patient is not nervous/anxious.        There were no vitals taken for this visit.  Office Visit on 03/13/2020   Component Date Value Ref Range Status   • Glucose 03/13/2020 86  65 - 99 mg/dL Final   • BUN 03/13/2020 28* 8 - 23 mg/dL Final   • Creatinine 03/13/2020 1.67* 0.57 - 1.00 mg/dL Final   • Sodium 03/13/2020 137  136 - 145 mmol/L Final   • Potassium 03/13/2020 4.8  3.5 - 5.2 mmol/L Final   • Chloride 03/13/2020 100  98 - 107 mmol/L Final   • CO2 03/13/2020 21.1* 22.0 - 29.0 mmol/L Final   • Calcium 03/13/2020 9.9  8.6 - 10.5 mg/dL Final   • Total Protein 03/13/2020 8.7* 6.0 - 8.5 g/dL Final   • Albumin 03/13/2020 4.20  3.50 - 5.20 g/dL Final   • ALT (SGPT) 03/13/2020 32  1 - 33 U/L Final   • AST (SGOT) 03/13/2020 56* 1 - 32 U/L Final   • Alkaline Phosphatase 03/13/2020 258* 39 - 117 U/L Final   • Total Bilirubin 03/13/2020 0.3  0.2 - 1.2 mg/dL Final   • eGFR Non African Amer 03/13/2020 30* >60 mL/min/1.73 Final   • Globulin 03/13/2020 4.5  gm/dL Final   • A/G  Ratio 03/13/2020 0.9  g/dL Final   • BUN/Creatinine Ratio 03/13/2020 16.8  7.0 - 25.0 Final   • Anion Gap 03/13/2020 15.9* 5.0 - 15.0 mmol/L Final   • Protein/Creatinine Ratio, Urine 03/13/2020 8,213.4* 0.0 - 200.0 mg/G Crea Final   • Creatinine, Urine 03/13/2020 118.1  mg/dL Final   • Total Protein, Urine 03/13/2020 970.0  mg/dL Final   • Color, UA 03/13/2020 Yellow  Yellow, Straw Final   • Appearance, UA 03/13/2020 Cloudy* Clear Final   • pH, UA 03/13/2020 6.0  5.0 - 8.0 Final   • Specific Gravity, UA 03/13/2020 1.021  1.005 - 1.030 Final   • Glucose, UA 03/13/2020 Negative  Negative Final   • Ketones, UA 03/13/2020 Negative  Negative Final   • Bilirubin, UA 03/13/2020 Negative  Negative Final   • Blood, UA 03/13/2020 Negative  Negative Final   • Protein, UA 03/13/2020 >=300 mg/dL (3+)* Negative Final   • Leuk Esterase, UA 03/13/2020 Negative  Negative Final   • Nitrite, UA 03/13/2020 Negative  Negative Final   • Urobilinogen, UA 03/13/2020 0.2 E.U./dL  0.2 - 1.0 E.U./dL Final   • RBC, UA 03/13/2020 0-2  None Seen, 0-2 /HPF Final   • WBC, UA 03/13/2020 3-5* None Seen, 0-2 /HPF Final   • Bacteria, UA 03/13/2020 1+* None Seen /HPF Final   • Squamous Epithelial Cells, UA 03/13/2020 0-2  None Seen, 0-2 /HPF Final   • Hyaline Casts, UA 03/13/2020 0-2  None Seen /LPF Final   • Methodology 03/13/2020 Manual Light Microscopy   Final       Physical Exam   Psychiatric: She has a normal mood and affect. Her behavior is normal. Thought content normal.       Assessment/Plan     Diagnoses and all orders for this visit:    Moderate episode of recurrent major depressive disorder (CMS/HCC)  -     escitalopram (LEXAPRO) 10 MG tablet; Take 1 tablet by mouth Daily.    Mixed hyperlipidemia  Comments:  Advised low-cholesterol diet  Orders:  -     atorvastatin (LIPITOR) 40 MG tablet; Take 1 tablet by mouth Daily.  -     Lipid Panel; Future    Benign essential hypertension  -     cloNIDine (CATAPRES) 0.2 MG tablet; Take 1 tablet by mouth  3 (Three) Times a Day.  -     metoprolol tartrate (LOPRESSOR) 100 MG tablet; Take 1 tablet by mouth 2 (Two) Times a Day.  -     Comprehensive Metabolic Panel; Future  -     Lipid Panel; Future    Iron deficiency anemia secondary to inadequate dietary iron intake  -     ferrous sulfate 325 (65 Fe) MG tablet; Take 1 tablet by mouth 2 (Two) Times a Day.    Essential hypertension  -     hydroCHLOROthiazide (HYDRODIURIL) 25 MG tablet; Take 1 tablet by mouth Daily.    Diabetes mellitus due to underlying condition with hyperglycemia, with long-term current use of insulin (CMS/[facility])  Comments:  Restart insulin and discussed the importance of compliance with medication  Offered information on financial assistance with medication  Orders:  -     Insulin Glargine, 1 Unit Dial, (Toujeo SoloStar) 300 UNIT/ML solution pen-injector injection; Inject 80 Units under the skin into the appropriate area as directed Daily.  -     Hemoglobin A1c; Future  -     MicroAlbumin, Urine, Random - Urine, Clean Catch; Future    Atherosclerosis of native coronary artery of native heart without angina pectoris  Comments:  Continue risk factor modification including aspirin and atorvastatin  Orders:  -     isosorbide mononitrate (IMDUR) 30 MG 24 hr tablet; Take 1 tablet by mouth Daily.    Vitamin D deficiency  -     vitamin D (ERGOCALCIFEROL) 1.25 MG (85952 UT) capsule capsule; Take 1 capsule by mouth 1 (One) Time Per Week. Takes wednesdays  -     Vitamin D 25 Hydroxy; Future    Type 2 diabetes mellitus with stage 4 chronic kidney disease, with long-term current use of insulin (CMS/[facility])  Comments:  Advised patient to keep appointment with her nephrologist    She will be provided with samples of Toujeo insulin.  These will be left in the front office for the patient to come .  We had a long discussion regarding her diabetes in relation to diet, medication use, and financial assistance programs.        This document has been electronically  signed by:  Lexie Dolan PA-C

## 2020-06-14 NOTE — PATIENT INSTRUCTIONS
"Carbohydrate Counting for Diabetes Mellitus, Adult    Carbohydrate counting is a method of keeping track of how many carbohydrates you eat. Eating carbohydrates naturally increases the amount of sugar (glucose) in the blood. Counting how many carbohydrates you eat helps keep your blood glucose within normal limits, which helps you manage your diabetes (diabetes mellitus).  It is important to know how many carbohydrates you can safely have in each meal. This is different for every person. A diet and nutrition specialist (registered dietitian) can help you make a meal plan and calculate how many carbohydrates you should have at each meal and snack.  Carbohydrates are found in the following foods:  · Grains, such as breads and cereals.  · Dried beans and soy products.  · Starchy vegetables, such as potatoes, peas, and corn.  · Fruit and fruit juices.  · Milk and yogurt.  · Sweets and snack foods, such as cake, cookies, candy, chips, and soft drinks.  How do I count carbohydrates?  There are two ways to count carbohydrates in food. You can use either of the methods or a combination of both.  Reading \"Nutrition Facts\" on packaged food  The \"Nutrition Facts\" list is included on the labels of almost all packaged foods and beverages in the [place] It includes:  · The serving size.  · Information about nutrients in each serving, including the grams (g) of carbohydrate per serving.  To use the “Nutrition Facts\":  · Decide how many servings you will have.  · Multiply the number of servings by the number of carbohydrates per serving.  · The resulting number is the total amount of carbohydrates that you will be having.  Learning standard serving sizes of other foods  When you eat carbohydrate foods that are not packaged or do not include \"Nutrition Facts\" on the label, you need to measure the servings in order to count the amount of carbohydrates:  · Measure the foods that you will eat with a food scale or measuring cup, if " needed.  · Decide how many standard-size servings you will eat.  · Multiply the number of servings by 15. Most carbohydrate-rich foods have about 15 g of carbohydrates per serving.  ? For example, if you eat 8 oz (170 g) of strawberries, you will have eaten 2 servings and 30 g of carbohydrates (2 servings x 15 g = 30 g).  · For foods that have more than one food mixed, such as soups and casseroles, you must count the carbohydrates in each food that is included.  The following list contains standard serving sizes of common carbohydrate-rich foods. Each of these servings has about 15 g of carbohydrates:  · ½ hamburger bun or ½ English muffin.  · ½ oz (15 mL) syrup.  · ½ oz (14 g) jelly.  · 1 slice of bread.  · 1 six-inch tortilla.  · 3 oz (85 g) cooked rice or pasta.  · 4 oz (113 g) cooked dried beans.  · 4 oz (113 g) starchy vegetable, such as peas, corn, or potatoes.  · 4 oz (113 g) hot cereal.  · 4 oz (113 g) mashed potatoes or ¼ of a large baked potato.  · 4 oz (113 g) canned or frozen fruit.  · 4 oz (120 mL) fruit juice.  · 4-6 crackers.  · 6 chicken nuggets.  · 6 oz (170 g) unsweetened dry cereal.  · 6 oz (170 g) plain fat-free yogurt or yogurt sweetened with artificial sweeteners.  · 8 oz (240 mL) milk.  · 8 oz (170 g) fresh fruit or one small piece of fruit.  · 24 oz (680 g) popped popcorn.  Example of carbohydrate counting  Sample meal  · 3 oz (85 g) chicken breast.  · 6 oz (170 g) brown rice.  · 4 oz (113 g) corn.  · 8 oz (240 mL) milk.  · 8 oz (170 g) strawberries with sugar-free whipped topping.  Carbohydrate calculation  1. Identify the foods that contain carbohydrates:  ? Rice.  ? Corn.  ? Milk.  ? Strawberries.  2. Calculate how many servings you have of each food:  ? 2 servings rice.  ? 1 serving corn.  ? 1 serving milk.  ? 1 serving strawberries.  3. Multiply each number of servings by 15 g:  ? 2 servings rice x 15 g = 30 g.  ? 1 serving corn x 15 g = 15 g.  ? 1 serving milk x 15 g = 15 g.  ? 1  serving strawberries x 15 g = 15 g.  4. Add together all of the amounts to find the total grams of carbohydrates eaten:  ? 30 g + 15 g + 15 g + 15 g = 75 g of carbohydrates total.  Summary  · Carbohydrate counting is a method of keeping track of how many carbohydrates you eat.  · Eating carbohydrates naturally increases the amount of sugar (glucose) in the blood.  · Counting how many carbohydrates you eat helps keep your blood glucose within normal limits, which helps you manage your diabetes.  · A diet and nutrition specialist (registered dietitian) can help you make a meal plan and calculate how many carbohydrates you should have at each meal and snack.  This information is not intended to replace advice given to you by your health care provider. Make sure you discuss any questions you have with your health care provider.  Document Released: 12/18/2006 Document Revised: 07/12/2018 Document Reviewed: 05/31/2017  [employer] Patient Education © 2020 "[email or web address]      Fat and Cholesterol Restricted Eating Plan  Getting too much fat and cholesterol in your diet may cause health problems. Choosing the right foods helps keep your fat and cholesterol at normal levels. This can keep you from getting certain diseases.  Your doctor may recommend an eating plan that includes:  · Total fat: ______% or less of total calories a day.  · Saturated fat: ______% or less of total calories a day.  · Cholesterol: less than _________mg a day.  · Fiber: ______g a day.  What are tips for following this plan?  Meal planning  · At meals, divide your plate into four equal parts:  ? Fill one-half of your plate with vegetables and green salads.  ? Fill one-fourth of your plate with whole grains.  ? Fill one-fourth of your plate with low-fat (lean) protein foods.  · Eat fish that is high in omega-3 fats at least two times a week. This includes mackerel, tuna, sardines, and salmon.  · Eat foods that are high in fiber, such as whole grains, beans,  "apples, broccoli, carrots, peas, and barley.  General tips    · Work with your doctor to lose weight if you need to.  · Avoid:  ? Foods with added sugar.  ? Fried foods.  ? Foods with partially hydrogenated oils.  · Limit alcohol intake to no more than 1 drink a day for nonpregnant women and 2 drinks a day for men. One drink equals 12 oz of beer, 5 oz of wine, or 1½ oz of hard liquor.  Reading food labels  · Check food labels for:  ? Trans fats.  ? Partially hydrogenated oils.  ? Saturated fat (g) in each serving.  ? Cholesterol (mg) in each serving.  ? Fiber (g) in each serving.  · Choose foods with healthy fats, such as:  ? Monounsaturated fats.  ? Polyunsaturated fats.  ? Omega-3 fats.  · Choose grain products that have whole grains. Look for the word \"whole\" as the first word in the ingredient list.  Cooking  · Cook foods using low-fat methods. These include baking, boiling, grilling, and broiling.  · Eat more home-cooked foods. Eat at restaurants and buffets less often.  · Avoid cooking using saturated fats, such as butter, cream, palm oil, palm kernel oil, and coconut oil.  Recommended foods    Fruits  · All fresh, canned (in natural juice), or frozen fruits.  Vegetables  · Fresh or frozen vegetables (raw, steamed, roasted, or grilled). Green salads.  Grains  · Whole grains, such as whole wheat or whole grain breads, crackers, cereals, and pasta. Unsweetened oatmeal, bulgur, barley, quinoa, or brown rice. Corn or whole wheat flour tortillas.  Meats and other protein foods  · Ground beef (85% or leaner), grass-fed beef, or beef trimmed of fat. Skinless chicken or turkey. Ground chicken or turkey. Pork trimmed of fat. All fish and seafood. Egg whites. Dried beans, peas, or lentils. Unsalted nuts or seeds. Unsalted canned beans. Nut butters without added sugar or oil.  Dairy  · Low-fat or nonfat dairy products, such as skim or 1% milk, 2% or reduced-fat cheeses, low-fat and fat-free ricotta or cottage cheese, or " plain low-fat and nonfat yogurt.  Fats and oils  · Tub margarine without trans fats. Light or reduced-fat mayonnaise and salad dressings. Avocado. Olive, canola, sesame, or safflower oils.  The items listed above may not be a complete list of foods and beverages you can eat. Contact a dietitian for more information.  Foods to avoid  Fruits  · Canned fruit in heavy syrup. Fruit in cream or butter sauce. Fried fruit.  Vegetables  · Vegetables cooked in cheese, cream, or butter sauce. Fried vegetables.  Grains  · White bread. White pasta. White rice. Cornbread. Bagels, pastries, and croissants. Crackers and snack foods that contain trans fat and hydrogenated oils.  Meats and other protein foods  · Fatty cuts of meat. Ribs, chicken wings, dixon, sausage, bologna, salami, chitterlings, fatback, hot dogs, bratwurst, and packaged lunch meats. Liver and organ meats. Whole eggs and egg yolks. Chicken and turkey with skin. Fried meat.  Dairy  · Whole or 2% milk, cream, half-and-half, and cream cheese. Whole milk cheeses. Whole-fat or sweetened yogurt. Full-fat cheeses. Nondairy creamers and whipped toppings. Processed cheese, cheese spreads, and cheese curds.  Beverages  · Alcohol. Sugar-sweetened drinks such as sodas, lemonade, and fruit drinks.  Fats and oils  · Butter, stick margarine, lard, shortening, ghee, or dixon fat. Coconut, palm kernel, and palm oils.  Sweets and desserts  · Corn syrup, sugars, honey, and molasses. Candy. Jam and jelly. Syrup. Sweetened cereals. Cookies, pies, cakes, donuts, muffins, and ice cream.  The items listed above may not be a complete list of foods and beverages you should avoid. Contact a dietitian for more information.  Summary  · Choosing the right foods helps keep your fat and cholesterol at normal levels. This can keep you from getting certain diseases.  · At meals, fill one-half of your plate with vegetables and green salads.  · Eat high-fiber foods, like whole grains, beans, apples,  carrots, peas, and barley.  · Limit added sugar, saturated fats, alcohol, and fried foods.  This information is not intended to replace advice given to you by your health care provider. Make sure you discuss any questions you have with your health care provider.  Document Released: 06/18/2013 Document Revised: 08/21/2019 Document Reviewed: 09/04/2018  [employer] Patient Education © 2020 [employer]

## 2020-07-02 ENCOUNTER — OFFICE VISIT (OUTPATIENT)
Dept: FAMILY MEDICINE CLINIC | Facility: CLINIC | Age: 75
End: 2020-07-02

## 2020-07-02 ENCOUNTER — LAB (OUTPATIENT)
Dept: FAMILY MEDICINE CLINIC | Facility: CLINIC | Age: 75
End: 2020-07-02

## 2020-07-02 DIAGNOSIS — Z20.822 CLOSE EXPOSURE TO COVID-19 VIRUS: Primary | ICD-10-CM

## 2020-07-02 DIAGNOSIS — Z20.822 CLOSE EXPOSURE TO COVID-19 VIRUS: ICD-10-CM

## 2020-07-02 PROCEDURE — 99441 PR PHYS/QHP TELEPHONE EVALUATION 5-10 MIN: CPT | Performed by: PHYSICIAN ASSISTANT

## 2020-07-02 RX ORDER — QUINAPRIL 40 MG/1
40 TABLET ORAL DAILY
Qty: 30 TABLET | Refills: 5 | Status: SHIPPED | OUTPATIENT
Start: 2020-07-02 | End: 2020-11-08 | Stop reason: HOSPADM

## 2020-07-02 NOTE — PATIENT INSTRUCTIONS
How to Quarantine at Home  Information for Patients and Families    These instructions are for people with confirmed or suspected COVID-19 who do not need to be hospitalized and those with confirmed COVID-19 who were hospitalized and discharged to care for themselves at home.    If you were tested through the Health Department  The Health Department will monitor your wellbeing.  If it is determined that you do not need to be hospitalized and can be isolated at home, you will be monitored by staff from your local or state health department.     If you were tested through a Commercial Lab  You will need to monitor yourself and report changes in your symptoms to your doctor.  See the section below called Monitor Your Symptoms.    Follow these steps until a healthcare provider or local or state health department says you can return to your normal activities.    Stay home except to get medical care  • Restrict activities outside your home, except for getting medical care.   • Do not go to work, school, or public areas.   • Avoid using public transportation, ride-sharing, or taxis.    Separate yourself from other people and animals in your home  People  As much as possible, you should stay in a specific room and away from other people in your home. Also, you should use a separate bathroom, if available.    Animals  You should restrict contact with pets and other animals while you are sick with COVID-19, just like you would around other people. When possible, have another member of your household care for your animals while you are sick. If you are sick with COVID-19, avoid contact with your pet, including petting, snuggling, being kissed or licked, and sharing food. If you must care for your pet or be around animals while you are sick, wash your hands before and after you interact with pets and wear a facemask. See COVID-19 and Animals for more information.    Call ahead before visiting your doctor  If you have a medical  appointment, call the healthcare provider and tell them that you have or may have COVID-19. This information will help the healthcare provider’s office take steps to keep other people from getting infected or exposed.    Wear a facemask  You should wear a facemask when you are around other people (e.g., sharing a room or vehicle) or pets and before you enter a healthcare provider’s office.     If you are not able to wear a facemask (for example, because it causes trouble breathing), then people who live with you should not stay in the same room with you, or they should wear a facemask if they enter your room.    Cover your coughs and sneezes  • Cover your mouth and nose with a tissue when you cough or sneeze.   • Throw used tissues in a lined trash can.   • Immediately wash your hands with soap and water for at least 20 seconds or, if soap and water are not available, clean your hands with an alcohol-based hand  that contains at least 60% alcohol.    Clean your hands often  • Wash your hands often with soap and water for at least 20 seconds, especially after blowing your nose, coughing, or sneezing; going to the bathroom; and before eating or preparing food.     • If soap and water are not readily available, use an alcohol-based hand  with at least 60% alcohol, covering all surfaces of your hands and rubbing them together until they feel dry.    • Soap and water are the best option if hands are visibly dirty. Avoid touching your eyes, nose, and mouth with unwashed hands.    Avoid sharing personal household items  • You should not share dishes, drinking glasses, cups, eating utensils, towels, or bedding with other people or pets in your home.   • After using these items, they should be washed thoroughly with soap and water.    Clean all “high-touch” surfaces everyday  • High touch surfaces include counters, tabletops, doorknobs, bathroom fixtures, toilets, phones, keyboards, tablets, and bedside  tables.   • Also, clean any surfaces that may have blood, stool, or body fluids on them.   • Use a household cleaning spray or wipe, according to the label instructions. Labels contain instructions for safe and effective use of the cleaning product, including precautions you should take when applying the product, such as wearing gloves and making sure you have good ventilation during use of the product.    Monitor your symptoms  • Seek prompt medical attention if your illness is worsening (e.g., difficulty breathing).   • Before seeking care, call your healthcare provider and tell them that you have, or are being evaluated for, COVID-19.   • Put on a facemask before you enter the facility.     • These steps will help the healthcare provider’s office to keep other people in the office or waiting room from getting infected or exposed.   • Persons who are placed under active monitoring or facilitated self-monitoring should follow instructions provided by their local health department or occupational health professionals, as appropriate.  • If you have a medical emergency and need to call 911, notify the dispatch personnel that you have, or are being evaluated for COVID-19. If possible, put on a facemask before emergency medical services arrive.    Discontinuing home isolation  Patients with confirmed COVID-19 should remain under home isolation precautions until the risk of secondary transmission to others is thought to be low. The decision to discontinue home isolation precautions should be made on a case-by-case basis, in consultation with healthcare providers and state and local health departments.    The below content are for household members, intimate partners, and caregivers of a patient with symptomatic laboratory-confirmed COVID-19 or a patient under investigation:    Household members, intimate partners, and caregivers may have close contact with a person with symptomatic, laboratory-confirmed COVID-19 or a  person under investigation.     Close contacts should monitor their health; they should call their healthcare provider right away if they develop symptoms suggestive of COVID-19 (e.g., fever, cough, shortness of breath)     Close contacts should also follow these recommendations:  • Make sure that you understand and can help the patient follow their healthcare provider’s instructions for medication(s) and care. You should help the patient with basic needs in the home and provide support for getting groceries, prescriptions, and other personal needs.  • Monitor the patient’s symptoms. If the patient is getting sicker, call his or her healthcare provider and tell them that the patient has laboratory-confirmed COVID-19. This will help the healthcare provider’s office take steps to keep other people in the office or waiting room from getting infected. Ask the healthcare provider to call the local or [facility] health department for additional guidance. If the patient has a medical emergency and you need to call 911, notify the dispatch personnel that the patient has, or is being evaluated for COVID-19.  • Household members should stay in another room or be  from the patient as much as possible. Household members should use a separate bedroom and bathroom, if available.  • Prohibit visitors who do not have an essential need to be in the home.  • Household members should care for any pets in the home. Do not handle pets or other animals while sick.  For more information, see COVID-19 and Animals.  • Make sure that shared spaces in the home have good air flow, such as by an air conditioner or an opened window, weather permitting.  • Perform hand hygiene frequently. Wash your hands often with soap and water for at least 20 seconds or use an alcohol-based hand  that contains 60 to 95% alcohol, covering all surfaces of your hands and rubbing them together until they feel dry. Soap and water should be used  preferentially if hands are visibly dirty.  • Avoid touching your eyes, nose, and mouth with unwashed hands.  • The patient should wear a facemask when you are around other people. If the patient is not able to wear a facemask (for example, because it causes trouble breathing), you, as the caregiver, should wear a mask when you are in the same room as the patient.  • Wear a disposable facemask and gloves when you touch or have contact with the patient’s blood, stool, or body fluids, such as saliva, sputum, nasal mucus, vomit, or urine.   o Throw out disposable facemasks and gloves after using them. Do not reuse.  o When removing personal protective equipment, first remove and dispose of gloves. Then, immediately clean your hands with soap and water or alcohol-based hand . Next, remove and dispose of facemask, and immediately clean your hands again with soap and water or alcohol-based hand .  • Avoid sharing household items with the patient. You should not share dishes, drinking glasses, cups, eating utensils, towels, bedding, or other items. After the patient uses these items, you should wash them thoroughly (see below “Wash laundry thoroughly”).  • Clean all “high-touch” surfaces, such as counters, tabletops, doorknobs, bathroom fixtures, toilets, phones, keyboards, tablets, and bedside tables, every day. Also, clean any surfaces that may have blood, stool, or body fluids on them.   o Use a household cleaning spray or wipe, according to the label instructions. Labels contain instructions for safe and effective use of the cleaning product including precautions you should take when applying the product, such as wearing gloves and making sure you have good ventilation during use of the product.  • Wash laundry thoroughly.   o Immediately remove and wash clothes or bedding that have blood, stool, or body fluids on them.  o Wear disposable gloves while handling soiled items and keep soiled items away  from your body. Clean your hands (with soap and water or an alcohol-based hand ) immediately after removing your gloves.  o Read and follow directions on labels of laundry or clothing items and detergent. In general, using a normal laundry detergent according to washing machine instructions and dry thoroughly using the warmest temperatures recommended on the clothing label.  • Place all used disposable gloves, facemasks, and other contaminated items in a lined container before disposing of them with other household waste. Clean your hands (with soap and water or an alcohol-based hand ) immediately after handling these items. Soap and water should be used preferentially if hands are visibly dirty.  • Discuss any additional questions with your state or local health department or healthcare provider.    Adapted from information provided by the Centers for Disease Control and Prevention.  For more information, visit https://www.cdc.gov/coronavirus/2019-ncov/hcp/guidance-prevent-spread.html

## 2020-07-02 NOTE — PROGRESS NOTES
Subjective   Britta Lee is a 74 y.o. female.     Telephone Visit    You have chosen to receive care through a telephone visit. Do you consent to use a telephone visit for your medical care today? Yes    This visit has been rescheduled as a phone visit to comply with patient safety concerns in accordance with CDC recommendations. Total time of discussion was 7 minutes.    Chief Complaint -exposure to COVID-19    History of Present Illness -      Exposure-  She states that her grandson son and daughter and other family members recently got back from the [place] at [place].  She states that they were all tested for COVID-19 and her 26-year-old grandson tested positive.  He was at her home yesterday sitting directly beside her on the couch for greater than 30 minutes.  She states that he got his test results of positive after he left her house.  She denies any current symptoms.    The following portions of the patient's history were reviewed and updated as appropriate: allergies, current medications, past family history, past medical history, past social history, past surgical history and problem list.    Review of Systems   Constitutional: Negative for activity change, appetite change, fatigue and fever.   HENT: Negative for ear pain, sinus pressure and sore throat.    Eyes: Negative for pain and visual disturbance.   Respiratory: Negative for cough and chest tightness.         Exposure to COVID-19 positive patient   Cardiovascular: Negative for chest pain and palpitations.   Gastrointestinal: Negative for abdominal pain, constipation, diarrhea, nausea and vomiting.   Endocrine: Negative for polydipsia and polyuria.   Genitourinary: Negative for dysuria and frequency.   Musculoskeletal: Negative for back pain and myalgias.   Skin: Negative for color change and rash.   Allergic/Immunologic: Negative for food allergies and immunocompromised state.   Neurological: Negative for dizziness, syncope and headaches.    Hematological: Negative for adenopathy. Does not bruise/bleed easily.   Psychiatric/Behavioral: Negative for hallucinations and suicidal ideas. The patient is not nervous/anxious.        There were no vitals taken for this visit.    Physical Exam   Psychiatric: Her behavior is normal. Thought content normal.   Mood is concerned and worried       Assessment/Plan     Diagnoses and all orders for this visit:    Close exposure to COVID-19 virus  -     COVID-19,[facility] ROUTINE, NP/OP SWAB IN TRANSPORT MEDIA OR ESWAB 72 HR TAT - Swab, Nasopharynx; Future    We discussed her close exposure which does increase her risk of dominick COVID-19 virus from her grandson.  She states that she will have the COVID-19 swab performed today.  She was advised to home quarantine for the next 14 days.  She was advised to let me know if she develops any symptoms for COVID-19 including fatigue fever sore throat loss of taste of sense or smell or diarrhea.        This document has been electronically signed by:  Lexie Dolan PA-C

## 2020-07-06 ENCOUNTER — TELEPHONE (OUTPATIENT)
Dept: FAMILY MEDICINE CLINIC | Facility: CLINIC | Age: 75
End: 2020-07-06

## 2020-07-14 NOTE — ADDENDUM NOTE
Addended by: ELIANA HAIRSTON on: 7/14/2020 04:35 PM     Modules accepted: Orders     Native Organ Dx:       Unable to determine transplant candidacy at this time due to multiple medical issues.  Patient situation to be re-evaluated in 4-6 weeks.      Note written by Frances Napier RN    ===============================================    I was present at the meeting and attest to the decision of the committee.    Patient s/p liver transplant with prolonged LAURA more than 3 month. To be declared ESRD and be re-evaluated by transplant selection group periodically (every 4 weeks) for listing active     Scotty Craig  02/02/2018

## 2020-07-20 ENCOUNTER — APPOINTMENT (OUTPATIENT)
Dept: MAMMOGRAPHY | Facility: HOSPITAL | Age: 75
End: 2020-07-20

## 2020-07-31 ENCOUNTER — TELEPHONE (OUTPATIENT)
Dept: CARDIOLOGY | Facility: CLINIC | Age: 75
End: 2020-07-31

## 2020-07-31 NOTE — TELEPHONE ENCOUNTER
Called pt and advised her to take her medications her the day of the test. She expressed understanding.

## 2020-08-04 ENCOUNTER — HOSPITAL ENCOUNTER (OUTPATIENT)
Dept: NUCLEAR MEDICINE | Facility: HOSPITAL | Age: 75
Discharge: HOME | End: 2020-08-04

## 2020-08-04 ENCOUNTER — HOSPITAL ENCOUNTER (OUTPATIENT)
Dept: ULTRASOUND IMAGING | Facility: HOSPITAL | Age: 75
Discharge: HOME | End: 2020-08-04

## 2020-08-04 ENCOUNTER — HOSPITAL ENCOUNTER (OUTPATIENT)
Dept: CARDIOLOGY | Facility: HOSPITAL | Age: 75
Discharge: HOME | End: 2020-08-04

## 2020-08-04 DIAGNOSIS — I73.9 PVD (PERIPHERAL VASCULAR DISEASE) WITH CLAUDICATION (HCC): ICD-10-CM

## 2020-08-04 DIAGNOSIS — I25.10 ATHEROSCLEROSIS OF NATIVE CORONARY ARTERY OF NATIVE HEART, ANGINA PRESENCE UNSPECIFIED: ICD-10-CM

## 2020-08-04 DIAGNOSIS — R07.2 PRECORDIAL CHEST PAIN: ICD-10-CM

## 2020-08-04 LAB
BH CV NUCLEAR PRIOR STUDY: 3
BH CV STRESS BP STAGE 1: NORMAL
BH CV STRESS BP STAGE 2: NORMAL
BH CV STRESS COMMENTS STAGE 1: NORMAL
BH CV STRESS COMMENTS STAGE 2: NORMAL
BH CV STRESS DOSE REGADENOSON STAGE 1: 0.4
BH CV STRESS DURATION MIN STAGE 1: 0
BH CV STRESS DURATION MIN STAGE 2: 4
BH CV STRESS DURATION SEC STAGE 1: 10
BH CV STRESS DURATION SEC STAGE 2: 0
BH CV STRESS HR STAGE 1: 78
BH CV STRESS HR STAGE 2: 66
BH CV STRESS PROTOCOL 1: NORMAL
BH CV STRESS RECOVERY BP: NORMAL MMHG
BH CV STRESS RECOVERY HR: 68 BPM
BH CV STRESS STAGE 1: 1
BH CV STRESS STAGE 2: 2
LV EF NUC BP: 69 %
MAXIMAL PREDICTED HEART RATE: 144 BPM
PERCENT MAX PREDICTED HR: 45.83 %
STRESS BASELINE BP: NORMAL MMHG
STRESS BASELINE HR: 61 BPM
STRESS PERCENT HR: 54 %
STRESS POST PEAK BP: NORMAL MMHG
STRESS POST PEAK HR: 66 BPM
STRESS TARGET HR: 122 BPM

## 2020-08-04 PROCEDURE — A9500 TC99M SESTAMIBI: HCPCS | Performed by: SPECIALIST

## 2020-08-04 PROCEDURE — 25010000002 REGADENOSON 0.4 MG/5ML SOLUTION: Performed by: SPECIALIST

## 2020-08-04 PROCEDURE — 78452 HT MUSCLE IMAGE SPECT MULT: CPT | Performed by: SPECIALIST

## 2020-08-04 PROCEDURE — 78452 HT MUSCLE IMAGE SPECT MULT: CPT

## 2020-08-04 PROCEDURE — 93017 CV STRESS TEST TRACING ONLY: CPT

## 2020-08-04 PROCEDURE — 93018 CV STRESS TEST I&R ONLY: CPT | Performed by: SPECIALIST

## 2020-08-04 PROCEDURE — 93925 LOWER EXTREMITY STUDY: CPT | Performed by: RADIOLOGY

## 2020-08-04 PROCEDURE — 0 TECHNETIUM SESTAMIBI: Performed by: SPECIALIST

## 2020-08-04 PROCEDURE — 93925 LOWER EXTREMITY STUDY: CPT

## 2020-08-04 RX ORDER — FUROSEMIDE 20 MG/1
20 TABLET ORAL 2 TIMES DAILY
COMMUNITY
End: 2020-09-01 | Stop reason: ALTCHOICE

## 2020-08-04 RX ADMIN — REGADENOSON 0.4 MG: 0.08 INJECTION, SOLUTION INTRAVENOUS at 09:37

## 2020-08-04 RX ADMIN — TECHNETIUM TC 99M SESTAMIBI 1 DOSE: 1 INJECTION INTRAVENOUS at 09:37

## 2020-08-04 RX ADMIN — TECHNETIUM TC 99M SESTAMIBI 1 DOSE: 1 INJECTION INTRAVENOUS at 08:20

## 2020-08-05 ENCOUNTER — HOSPITAL ENCOUNTER (OUTPATIENT)
Dept: MAMMOGRAPHY | Facility: HOSPITAL | Age: 75
Discharge: HOME | End: 2020-08-05
Admitting: PHYSICIAN ASSISTANT

## 2020-08-05 DIAGNOSIS — Z12.31 VISIT FOR SCREENING MAMMOGRAM: ICD-10-CM

## 2020-08-05 PROCEDURE — 77067 SCR MAMMO BI INCL CAD: CPT | Performed by: RADIOLOGY

## 2020-08-05 PROCEDURE — 77067 SCR MAMMO BI INCL CAD: CPT

## 2020-08-05 PROCEDURE — 77063 BREAST TOMOSYNTHESIS BI: CPT | Performed by: RADIOLOGY

## 2020-08-05 PROCEDURE — 77063 BREAST TOMOSYNTHESIS BI: CPT

## 2020-09-01 ENCOUNTER — OFFICE VISIT (OUTPATIENT)
Dept: CARDIOLOGY | Facility: CLINIC | Age: 75
End: 2020-09-01

## 2020-09-01 VITALS
DIASTOLIC BLOOD PRESSURE: 72 MMHG | SYSTOLIC BLOOD PRESSURE: 157 MMHG | HEART RATE: 51 BPM | BODY MASS INDEX: 41.53 KG/M2 | TEMPERATURE: 97.1 F | HEIGHT: 63 IN | WEIGHT: 234.4 LBS

## 2020-09-01 DIAGNOSIS — I10 ESSENTIAL HYPERTENSION: Primary | ICD-10-CM

## 2020-09-01 DIAGNOSIS — E66.01 MORBID (SEVERE) OBESITY DUE TO EXCESS CALORIES (HCC): ICD-10-CM

## 2020-09-01 DIAGNOSIS — R06.02 SHORTNESS OF BREATH: ICD-10-CM

## 2020-09-01 DIAGNOSIS — I51.7 LVH (LEFT VENTRICULAR HYPERTROPHY): ICD-10-CM

## 2020-09-01 DIAGNOSIS — I73.9 PVD (PERIPHERAL VASCULAR DISEASE) WITH CLAUDICATION (HCC): ICD-10-CM

## 2020-09-01 DIAGNOSIS — E78.2 MIXED HYPERLIPIDEMIA: ICD-10-CM

## 2020-09-01 DIAGNOSIS — I25.10 ATHEROSCLEROSIS OF NATIVE CORONARY ARTERY OF NATIVE HEART WITHOUT ANGINA PECTORIS: ICD-10-CM

## 2020-09-01 PROCEDURE — 99213 OFFICE O/P EST LOW 20 MIN: CPT | Performed by: SPECIALIST

## 2020-09-01 RX ORDER — AMLODIPINE BESYLATE 10 MG/1
10 TABLET ORAL NIGHTLY
Qty: 90 TABLET | Refills: 1 | Status: SHIPPED | OUTPATIENT
Start: 2020-09-01 | End: 2020-12-01 | Stop reason: SDUPTHER

## 2020-09-01 NOTE — PROGRESS NOTES
Subjective   Follow up, hypertension  Britta Lee is a 75 y.o. female who presents to day for Follow-up (STRESS RESULTS) and Med Management.    CHIEF COMPLIANT  Chief Complaint   Patient presents with   • Follow-up     STRESS RESULTS   • Med Management       Active Problems:  Problem List Items Addressed This Visit        Cardiovascular and Mediastinum    Essential hypertension - Primary    Overview     The patient was advised to keep a daily blood pressure and pulse log. They were advised contact our office if consistently running over 120/80 and bring the log to the next follow up visit.          Atherosclerosis of native coronary artery of native heart    Overview     S/P one stent placement followed by [facility] in [place] - Dr. Alexandru Martines          Hyperlipidemia    Overview     Lipid panel done 10/20/15 TC: 166 Tri HDL: 34 LDL: 104         PVD (peripheral vascular disease) with claudication (CMS/HCC)    LVH (left ventricular hypertrophy)       Respiratory    Shortness of breath       Digestive    Morbid (severe) obesity due to excess calories (CMS/HCC)    Overview     Added automatically from request for surgery [id number]               HPI  HPI  Had no chest pain since he was last seen still having shortness of breath doing things like housework and so on and intermittent lower extremity edema still have some weakness in her legs when she walks but no pain denies snoring at night  PRIOR MEDS  Current Outpatient Medications on File Prior to Visit   Medication Sig Dispense Refill   • acetaminophen (TYLENOL) 500 MG tablet Take 500 mg by mouth Every 6 (Six) Hours As Needed for Mild Pain .     • amLODIPine (NORVASC) 5 MG tablet Take 7.5 mg by mouth Every Night.     • aspirin 81 MG tablet Take 1 tablet by mouth Daily. 30 tablet 5   • atorvastatin (LIPITOR) 40 MG tablet Take 1 tablet by mouth Daily. (Patient taking differently: Take 20 mg by mouth Daily.) 90 tablet 3   • cloNIDine (CATAPRES) 0.2 MG  tablet Take 1 tablet by mouth 3 (Three) Times a Day. 270 tablet 3   • escitalopram (LEXAPRO) 10 MG tablet Take 1 tablet by mouth Daily. 90 tablet 3   • ferrous sulfate 325 (65 Fe) MG tablet Take 1 tablet by mouth 2 (Two) Times a Day. 180 tablet 3   • GLOBAL EASE INJECT PEN NEEDLES 31G X 5 MM misc      • hydroCHLOROthiazide (HYDRODIURIL) 25 MG tablet Take 1 tablet by mouth Daily. 90 tablet 1   • Insulin Glargine, 1 Unit Dial, (Toujeo SoloStar) 300 UNIT/ML solution pen-injector injection Inject 80 Units under the skin into the appropriate area as directed Daily. 5 pen 5   • insulin lispro (humaLOG) 100 UNIT/ML injection Inject 25 Units under the skin into the appropriate area as directed Every Evening. Prior to [facility], Patient was on: 20 units in the morning, 25 in the evening, 5 at night     • isosorbide mononitrate (IMDUR) 30 MG 24 hr tablet Take 1 tablet by mouth Daily. 90 tablet 3   • metoprolol tartrate (LOPRESSOR) 100 MG tablet Take 1 tablet by mouth 2 (Two) Times a Day. 180 tablet 3   • quinapril (ACCUPRIL) 40 MG tablet Take 1 tablet by mouth Daily. 30 tablet 5   • TOUJEO MAX SOLOSTAR 300 UNIT/ML solution pen-injector injection INJECT 80 UNITS SUBCUTANEOUSLY EVERY NIGHT AT BEDTIME     • vitamin D (ERGOCALCIFEROL) 1.25 MG (92181 UT) capsule capsule Take 1 capsule by mouth 1 (One) Time Per Week. Takes wednesdays 90 capsule 3   • Accu-Chek FastClix Lancets misc 1 each by Other route 3 (Three) Times a Day.     • ACCU-CHEK GUIDE test strip 1 each by Other route 3 (Three) Times a Day.     • Alcohol Swabs (GLOBAL ALCOHOL PREP EASE) 70 % pads      • Blood Glucose Monitoring Suppl (BLOOD GLUCOSE MONITOR SYSTEM) w/Device kit 1 Device Daily. 1 each 0   • calcium carb-cholecalciferol (CALCIUM 600/VITAMIN D3) 600-800 MG-UNIT tablet Take 1 tablet by mouth 2 (Two) Times a Day With Meals.     • [DISCONTINUED] furosemide (LASIX) 20 MG tablet Take 20 mg by mouth 2 (Two) Times a Day.       No current  facility-administered medications on file prior to visit.        ALLERGIES  Patient has no known allergies.    HISTORY  Past Medical History:   Diagnosis Date   • Anemia    • Arthritis    • Carpal tunnel syndrome    • CHF (congestive heart failure) (CMS/[facility])    • Coronary artery disease    • Diabetes mellitus (CMS/[facility])    • Elevated cholesterol    • GERD (gastroesophageal reflux disease)    • Heart disease    • High cholesterol    • History of pneumonia    • History of unsteady gait     OCASSIONALLY   • Hypertension    • Insomnia    • Kidney disease    • Osteoarthritis    • Renal insufficiency    • Sciatica        Social History     Socioeconomic History   • Marital status:      Spouse name: natalie   • Number of children: 3   • Years of education: 12   • Highest education level: Not on file   Occupational History   • Occupation: retired   Social Needs   • Financial resource strain: Somewhat hard   • Food insecurity:     Worry: Never true     Inability: Never true   • Transportation needs:     Medical: Yes     Non-medical: No   Tobacco Use   • Smoking status: Never Smoker   • Smokeless tobacco: Never Used   Substance and Sexual Activity   • Alcohol use: Yes     Comment: socially   • Drug use: No   • Sexual activity: Defer     Birth control/protection: Surgical   Lifestyle   • Physical activity:     Days per week: 0 days     Minutes per session: 0 min   • Stress: Only a little       Family History   Problem Relation Age of Onset   • Arthritis Mother    • Diabetes Mother    • Cancer Mother    • Heart disease Father    • Diabetes Daughter    • Diabetes Son    • Diabetes Maternal Aunt    • Heart disease Maternal Grandmother    • Breast cancer Neg Hx        Review of Systems   Constitutional: Negative for activity change and appetite change.   HENT: Negative for congestion.    Eyes: Negative for pain and discharge.   Respiratory: Positive for shortness of breath. Negative for apnea, cough, choking, chest tightness,  "wheezing and stridor.    Cardiovascular: Positive for leg swelling. Negative for chest pain and palpitations.   Gastrointestinal: Negative for abdominal distention and abdominal pain.   Endocrine: Negative for cold intolerance and heat intolerance.   Genitourinary: Negative for flank pain.   Musculoskeletal: Negative for neck stiffness.   Skin: Negative for color change and pallor.   Neurological: Negative for dizziness and facial asymmetry.   Hematological: Does not bruise/bleed easily.   Psychiatric/Behavioral: Negative for agitation and behavioral problems.       Objective     VITALS: /72   Pulse 51   Temp 97.1 °F (36.2 °C)   Ht 160 cm (63\")   Wt 106 kg (234 lb 6.4 oz)   BMI 41.52 kg/m²     LABS:   Lab Results (most recent)     None          IMAGING:   Us Arterial Doppler Lower Extremity Complete    Result Date: 8/4/2020  Poor flow in the lower extremities from the superficial femoral arteries and distal. No occlusion was seen.    This report was finalized on 8/4/2020 8:21 AM by Dr. Silvio Mcdonald MD.      Mammo Screening Digital Tomosynthesis Bilateral With Cad    Result Date: 8/5/2020  Stable benign findings with no mammographic evidence of malignancy.  RECOMMENDATION:  Continue annual screening mammography.  BI-RADS CATEGORY II, BENIGN   PLEASE NOTE THE FOLLOWING ACR RECOMMENDATIONS: A:  Only 80% of breast cancers can be diagnosed by mammography. B:  A negative mammogram does not exclude cancer in clinically palpable abnormalities.  Biopsy should be done based on ultrasound findings and clinical judgment. C:  A mammogram has limited value in detecting cancer in patients with adenosis or dense breasts.  [employer] GUIDELINES For breast cancer detection in asymptomatic women: Age  ACR Recommendations 35-40  Baseline Mammogram 40-49  Annual or Biannual Mammogram 50+  Annual Mammogram Annual physical and frequent self breast examination.  Patient has been entered into an automatic " reminder system.  This report was finalized on 8/5/2020 1:45 PM by Dr. Francisco Javier Pacheco MD.        EXAM:  Physical Exam   Constitutional: She is oriented to person, place, and time. She appears well-developed and well-nourished.   HENT:   Head: Normocephalic and atraumatic.   Eyes: Pupils are equal, round, and reactive to light.   Neck: Neck supple. No JVD present. No thyromegaly present.   Cardiovascular: Normal rate, regular rhythm, normal heart sounds and intact distal pulses. Exam reveals no gallop and no friction rub.   No murmur heard.  Trace LE edema   Pulmonary/Chest: Effort normal and breath sounds normal. No stridor. No respiratory distress. She has no wheezes. She has no rales. She exhibits no tenderness.   Musculoskeletal: She exhibits no edema, tenderness or deformity.   Neurological: She is alert and oriented to person, place, and time. No cranial nerve deficit. Coordination normal.   Skin: Skin is warm and dry.   Psychiatric: She has a normal mood and affect.   Vitals reviewed.      Procedure   Procedures       Assessment/Plan    Diagnosis Plan   1. Essential hypertension     2. Atherosclerosis of native coronary artery of native heart without angina pectoris     3. Mixed hyperlipidemia     4. PVD (peripheral vascular disease) with claudication (CMS/HCC)     5. LVH (left ventricular hypertrophy)     6. Shortness of breath     7. Morbid (severe) obesity due to excess calories (CMS/HCC)     1.  Her blood pressure is still elevated I would like to try Spironolactone unfortunately I do not have her lab results including her renal function nor her potassium so for now I am going to increase her amlodipine to 10 mg/day instead of 5 and will get labs prior to her next visit  2.  I discussed the stress test which was negative for ischemia she had no chest pain will continue current management  3.  I discussed the lower extremity arterial Doppler with superficial arterial insufficiency I discussed with her  exercise program with walking  4.  Advised to lose weight  5.  I do not have her lipids I will get her lipid profile prior to the next visit    No follow-ups on file.    Britta was seen today for follow-up and med management.    Diagnoses and all orders for this visit:    Essential hypertension    Atherosclerosis of native coronary artery of native heart without angina pectoris    Mixed hyperlipidemia    PVD (peripheral vascular disease) with claudication (CMS/HCC)    LVH (left ventricular hypertrophy)    Shortness of breath    Morbid (severe) obesity due to excess calories (CMS/HCC)                 MEDS ORDERED DURING VISIT:  No orders of the defined types were placed in this encounter.        This document has been electronically signed by Hever Cruz MD  September 1, 2020 11:26

## 2020-09-04 ENCOUNTER — LAB (OUTPATIENT)
Dept: FAMILY MEDICINE CLINIC | Facility: CLINIC | Age: 75
End: 2020-09-04

## 2020-09-04 DIAGNOSIS — Z23 ENCOUNTER FOR IMMUNIZATION: ICD-10-CM

## 2020-09-04 DIAGNOSIS — E55.9 VITAMIN D DEFICIENCY: ICD-10-CM

## 2020-09-04 DIAGNOSIS — Z79.4 DIABETES MELLITUS DUE TO UNDERLYING CONDITION WITH HYPERGLYCEMIA, WITH LONG-TERM CURRENT USE OF INSULIN (HCC): ICD-10-CM

## 2020-09-04 DIAGNOSIS — N18.30 CHRONIC KIDNEY DISEASE, STAGE III (MODERATE) (HCC): Primary | ICD-10-CM

## 2020-09-04 DIAGNOSIS — E08.65 DIABETES MELLITUS DUE TO UNDERLYING CONDITION WITH HYPERGLYCEMIA, WITH LONG-TERM CURRENT USE OF INSULIN (HCC): ICD-10-CM

## 2020-09-04 DIAGNOSIS — E78.2 MIXED HYPERLIPIDEMIA: ICD-10-CM

## 2020-09-04 LAB
25(OH)D3 SERPL-MCNC: 24.8 NG/ML (ref 30–100)
ALBUMIN SERPL-MCNC: 3.9 G/DL (ref 3.5–5.2)
ALBUMIN UR-MCNC: 77 MG/DL
ALBUMIN/GLOB SERPL: 0.9 G/DL
ALP SERPL-CCNC: 155 U/L (ref 39–117)
ALT SERPL W P-5'-P-CCNC: 20 U/L (ref 1–33)
ANION GAP SERPL CALCULATED.3IONS-SCNC: 14.5 MMOL/L (ref 5–15)
AST SERPL-CCNC: 28 U/L (ref 1–32)
BACTERIA UR QL AUTO: ABNORMAL /HPF
BILIRUB SERPL-MCNC: 0.3 MG/DL (ref 0–1.2)
BILIRUB UR QL STRIP: NEGATIVE
BUN SERPL-MCNC: 54 MG/DL (ref 8–23)
BUN/CREAT SERPL: 24.9 (ref 7–25)
CALCIUM SPEC-SCNC: 9.5 MG/DL (ref 8.6–10.5)
CHLORIDE SERPL-SCNC: 102 MMOL/L (ref 98–107)
CHOLEST SERPL-MCNC: 125 MG/DL (ref 0–200)
CLARITY UR: CLEAR
CO2 SERPL-SCNC: 20.5 MMOL/L (ref 22–29)
COLOR UR: YELLOW
CREAT SERPL-MCNC: 2.17 MG/DL (ref 0.57–1)
CREAT UR-MCNC: 56.6 MG/DL
GFR SERPL CREATININE-BSD FRML MDRD: 22 ML/MIN/1.73
GLOBULIN UR ELPH-MCNC: 4.2 GM/DL
GLUCOSE SERPL-MCNC: 62 MG/DL (ref 65–99)
GLUCOSE UR STRIP-MCNC: NEGATIVE MG/DL
HBA1C MFR BLD: 8.4 % (ref 4.8–5.6)
HDLC SERPL-MCNC: 36 MG/DL (ref 40–60)
HGB UR QL STRIP.AUTO: NEGATIVE
HYALINE CASTS UR QL AUTO: ABNORMAL /LPF
KETONES UR QL STRIP: NEGATIVE
LDLC SERPL CALC-MCNC: 63 MG/DL (ref 0–100)
LDLC/HDLC SERPL: 1.76 {RATIO}
LEUKOCYTE ESTERASE UR QL STRIP.AUTO: NEGATIVE
NITRITE UR QL STRIP: NEGATIVE
PH UR STRIP.AUTO: 6.5 [PH] (ref 5–8)
POTASSIUM SERPL-SCNC: 4.9 MMOL/L (ref 3.5–5.2)
PROT SERPL-MCNC: 8.1 G/DL (ref 6–8.5)
PROT UR QL STRIP: ABNORMAL
PROT UR-MCNC: 123 MG/DL
PROT/CREAT UR: 2173.1 MG/G CREA (ref 0–200)
RBC # UR: ABNORMAL /HPF
REF LAB TEST METHOD: ABNORMAL
SODIUM SERPL-SCNC: 137 MMOL/L (ref 136–145)
SP GR UR STRIP: 1.01 (ref 1–1.03)
SQUAMOUS #/AREA URNS HPF: ABNORMAL /HPF
TRIGL SERPL-MCNC: 128 MG/DL (ref 0–150)
UROBILINOGEN UR QL STRIP: ABNORMAL
VLDLC SERPL-MCNC: 25.6 MG/DL (ref 5–40)
WBC UR QL AUTO: ABNORMAL /HPF

## 2020-09-04 PROCEDURE — 81001 URINALYSIS AUTO W/SCOPE: CPT | Performed by: PHYSICIAN ASSISTANT

## 2020-09-04 PROCEDURE — 83036 HEMOGLOBIN GLYCOSYLATED A1C: CPT | Performed by: PHYSICIAN ASSISTANT

## 2020-09-04 PROCEDURE — 80053 COMPREHEN METABOLIC PANEL: CPT | Performed by: SPECIALIST

## 2020-09-04 PROCEDURE — 82306 VITAMIN D 25 HYDROXY: CPT | Performed by: PHYSICIAN ASSISTANT

## 2020-09-04 PROCEDURE — 90686 IIV4 VACC NO PRSV 0.5 ML IM: CPT | Performed by: PHYSICIAN ASSISTANT

## 2020-09-04 PROCEDURE — 80061 LIPID PANEL: CPT | Performed by: SPECIALIST

## 2020-09-04 PROCEDURE — 82043 UR ALBUMIN QUANTITATIVE: CPT | Performed by: PHYSICIAN ASSISTANT

## 2020-09-04 PROCEDURE — 84156 ASSAY OF PROTEIN URINE: CPT | Performed by: PHYSICIAN ASSISTANT

## 2020-09-04 PROCEDURE — 82570 ASSAY OF URINE CREATININE: CPT | Performed by: PHYSICIAN ASSISTANT

## 2020-09-04 PROCEDURE — G0008 ADMIN INFLUENZA VIRUS VAC: HCPCS | Performed by: PHYSICIAN ASSISTANT

## 2020-09-09 ENCOUNTER — TELEPHONE (OUTPATIENT)
Dept: FAMILY MEDICINE CLINIC | Facility: CLINIC | Age: 75
End: 2020-09-09

## 2020-09-09 NOTE — TELEPHONE ENCOUNTER
Spoke with patient and she is in understanding of her results. Said she will keep her appointment for Friday.

## 2020-09-11 ENCOUNTER — OFFICE VISIT (OUTPATIENT)
Dept: FAMILY MEDICINE CLINIC | Facility: CLINIC | Age: 75
End: 2020-09-11

## 2020-09-11 VITALS
SYSTOLIC BLOOD PRESSURE: 142 MMHG | WEIGHT: 237.4 LBS | HEART RATE: 59 BPM | HEIGHT: 63 IN | OXYGEN SATURATION: 98 % | BODY MASS INDEX: 42.06 KG/M2 | TEMPERATURE: 97.3 F | DIASTOLIC BLOOD PRESSURE: 90 MMHG

## 2020-09-11 DIAGNOSIS — E78.2 MIXED HYPERLIPIDEMIA: ICD-10-CM

## 2020-09-11 DIAGNOSIS — M51.36 DDD (DEGENERATIVE DISC DISEASE), LUMBAR: ICD-10-CM

## 2020-09-11 DIAGNOSIS — N18.4 CHRONIC RENAL DISEASE, STAGE IV (HCC): ICD-10-CM

## 2020-09-11 DIAGNOSIS — E66.01 MORBID (SEVERE) OBESITY DUE TO EXCESS CALORIES (HCC): ICD-10-CM

## 2020-09-11 DIAGNOSIS — M17.12 PRIMARY OSTEOARTHRITIS OF LEFT KNEE: ICD-10-CM

## 2020-09-11 DIAGNOSIS — N25.81 HYPERPARATHYROIDISM DUE TO RENAL INSUFFICIENCY (HCC): ICD-10-CM

## 2020-09-11 DIAGNOSIS — Z00.00 MEDICARE ANNUAL WELLNESS VISIT, SUBSEQUENT: Primary | ICD-10-CM

## 2020-09-11 DIAGNOSIS — Z96.652 STATUS POST TOTAL LEFT KNEE REPLACEMENT: ICD-10-CM

## 2020-09-11 DIAGNOSIS — I10 ESSENTIAL HYPERTENSION: ICD-10-CM

## 2020-09-11 DIAGNOSIS — IMO0002 TYPE 2 DIABETES MELLITUS, UNCONTROLLED, WITH NEUROPATHY: ICD-10-CM

## 2020-09-11 DIAGNOSIS — I73.9 PVD (PERIPHERAL VASCULAR DISEASE) WITH CLAUDICATION (HCC): ICD-10-CM

## 2020-09-11 DIAGNOSIS — N18.4 TYPE 2 DIABETES MELLITUS WITH STAGE 4 CHRONIC KIDNEY DISEASE, WITH LONG-TERM CURRENT USE OF INSULIN (HCC): ICD-10-CM

## 2020-09-11 DIAGNOSIS — I87.2 VENOUS INSUFFICIENCY (CHRONIC) (PERIPHERAL): ICD-10-CM

## 2020-09-11 DIAGNOSIS — I51.7 LVH (LEFT VENTRICULAR HYPERTROPHY): ICD-10-CM

## 2020-09-11 DIAGNOSIS — I25.10 ATHEROSCLEROSIS OF NATIVE CORONARY ARTERY OF NATIVE HEART WITHOUT ANGINA PECTORIS: ICD-10-CM

## 2020-09-11 DIAGNOSIS — G56.03 BILATERAL CARPAL TUNNEL SYNDROME: ICD-10-CM

## 2020-09-11 DIAGNOSIS — I50.22 CHRONIC SYSTOLIC HEART FAILURE (HCC): ICD-10-CM

## 2020-09-11 DIAGNOSIS — E11.22 TYPE 2 DIABETES MELLITUS WITH STAGE 4 CHRONIC KIDNEY DISEASE, WITH LONG-TERM CURRENT USE OF INSULIN (HCC): ICD-10-CM

## 2020-09-11 DIAGNOSIS — E11.319 DIABETIC RETINOPATHY OF LEFT EYE ASSOCIATED WITH TYPE 2 DIABETES MELLITUS, MACULAR EDEMA PRESENCE UNSPECIFIED, UNSPECIFIED RETINOPATHY SEVERITY (HCC): ICD-10-CM

## 2020-09-11 DIAGNOSIS — D50.8 IRON DEFICIENCY ANEMIA DUE TO DIETARY CAUSES: ICD-10-CM

## 2020-09-11 DIAGNOSIS — G57.02 NEUROPATHY OF LEFT SCIATIC NERVE: ICD-10-CM

## 2020-09-11 DIAGNOSIS — Z79.4 TYPE 2 DIABETES MELLITUS WITH STAGE 4 CHRONIC KIDNEY DISEASE, WITH LONG-TERM CURRENT USE OF INSULIN (HCC): ICD-10-CM

## 2020-09-11 PROCEDURE — G0439 PPPS, SUBSEQ VISIT: HCPCS | Performed by: PHYSICIAN ASSISTANT

## 2020-09-11 NOTE — PROGRESS NOTES
The ABCs of the Annual Wellness Visit  Subsequent Medicare Wellness Visit    Chief Complaint   Patient presents with   • Medicare Wellness-subsequent       Subjective   History of Present Illness:  Britta Lee is a 75 y.o. female who presents for a Subsequent Medicare Wellness Visit.    Atherosclerosis of coronary artery-stable with risk factor modification including aspirin and Lipitor    Hypertension-stable with amlodipine, clonidine, and metoprolol    Chronic congestive heart failure-stable with hydrochlorothiazide    Hyperlipidemia-stable with use of Lipitor and diet    Left ventricular hypertrophy-stable with blood pressure control    Peripheral vascular disease-stable with aspirin    Chronic venous insufficiency-currently stable with conservative measures including leg elevation    Morbid obesity-not at goal.  Patient states she does remain as active as possible but is not limited due to knee pain and arthritis    Diabetes mellitus type 2- not at goal with recent A1c is 8.4.  She states that she is using Toujeo 80 units nightly but has not been taking her Humalog.    Hyperparathyroidism due to renal insufficiency-continue to monitor    Bilateral carpal tunnel syndrome- stable with conservative measures    Low back pain secondary to degenerative disc disease of lumbar spine and osteoarthritis with associated left sciatica-stable with conservative measures including Tylenol PRN.  She is status post left total knee replacement.    Chronic renal disease-  This has worsened to stage IV and lab work was discussed with the patient today as her recent GFR was 22 which is significantly decreased from previous GFR.  She does continue to be followed by nephrologist regularly.    Iron deficiency anemia-stable with iron supplementation    HEALTH RISK ASSESSMENT    Recent Hospitalizations:  No hospitalization(s) within the last year.    Current Medical Providers:  Patient Care Team:  Lexie Dolan PA as PCP -  General (Family Medicine)    Smoking Status:  Social History     Tobacco Use   Smoking Status Never Smoker   Smokeless Tobacco Never Used       Alcohol Consumption:  Social History     Substance and Sexual Activity   Alcohol Use Yes    Comment: socially       Depression Screen:   PHQ-2/PHQ-9 Depression Screening 9/11/2020   Little interest or pleasure in doing things 1   Feeling down, depressed, or hopeless 1   Trouble falling or staying asleep, or sleeping too much 2   Feeling tired or having little energy 0   Poor appetite or overeating 0   Feeling bad about yourself - or that you are a failure or have let yourself or your family down 0   Trouble concentrating on things, such as reading the newspaper or watching television 0   Moving or speaking so slowly that other people could have noticed. Or the opposite - being so fidgety or restless that you have been moving around a lot more than usual 0   Thoughts that you would be better off dead, or of hurting yourself in some way 0   Total Score 4   If you checked off any problems, how difficult have these problems made it for you to do your work, take care of things at home, or get along with other people? Somewhat difficult       Fall Risk Screen:  STEADI Fall Risk Assessment was completed, and patient is at HIGH risk for falls. Assessment completed on:9/11/2020    Health Habits and Functional and Cognitive Screening:  Functional & Cognitive Status 9/11/2020   Do you have difficulty preparing food and eating? No   Do you have difficulty bathing yourself, getting dressed or grooming yourself? No   Do you have difficulty using the toilet? No   Do you have difficulty moving around from place to place? No   Do you have trouble with steps or getting out of a bed or a chair? Yes   Current Diet Well Balanced Diet   Dental Exam Not up to date   Eye Exam Not up to date   Exercise (times per week) 0 times per week   Current Exercise Activities Include None   Do you need help  using the phone?  No   Are you deaf or do you have serious difficulty hearing?  No   Do you need help with transportation? No   Do you need help shopping? No   Do you need help preparing meals?  No   Do you need help with housework?  Yes   Do you need help with laundry? No   Do you need help taking your medications? No   Do you need help managing money? No   Do you ever drive or ride in a car without wearing a seat belt? Yes   Have you felt unusual stress, anger or loneliness in the last month? No   Who do you live with? Spouse   If you need help, do you have trouble finding someone available to you? No   Have you been bothered in the last four weeks by sexual problems? No   Do you have difficulty concentrating, remembering or making decisions? No         Does the patient have evidence of cognitive impairment? No    Asprin use counseling:Taking ASA appropriately as indicated    Hearing acuity:  Whisper test  Greater than 5 feet left ear  Greater than 5 feet right ear      Age-appropriate Screening Schedule:  Refer to the list below for future screening recommendations based on patient's age, sex and/or medical conditions. Orders for these recommended tests are listed in the plan section. The patient has been provided with a written plan.    Health Maintenance   Topic Date Due   • ZOSTER VACCINE (3 of 3) 09/14/2016   • DIABETIC EYE EXAM  10/30/2020 (Originally 5/14/2020)   • HEMOGLOBIN A1C  03/04/2021   • DXA SCAN  07/19/2021   • MAMMOGRAM  08/05/2021   • LIPID PANEL  09/04/2021   • URINE MICROALBUMIN  09/04/2021   • COLONOSCOPY  02/20/2026   • TDAP/TD VACCINES (2 - Td) 03/20/2027   • INFLUENZA VACCINE  Completed          The following portions of the patient's history were reviewed and updated as appropriate: allergies, current medications, past family history, past medical history, past social history, past surgical history and problem list.    Outpatient Medications Prior to Visit   Medication Sig Dispense Refill    • Accu-Chek FastClix Lancets misc 1 each by Other route 3 (Three) Times a Day.     • ACCU-CHEK GUIDE test strip 1 each by Other route 3 (Three) Times a Day.     • acetaminophen (TYLENOL) 500 MG tablet Take 500 mg by mouth Every 6 (Six) Hours As Needed for Mild Pain .     • Alcohol Swabs (GLOBAL ALCOHOL PREP EASE) 70 % pads      • amLODIPine (NORVASC) 10 MG tablet Take 1 tablet by mouth Every Night. 90 tablet 1   • aspirin 81 MG tablet Take 1 tablet by mouth Daily. 30 tablet 5   • atorvastatin (LIPITOR) 40 MG tablet Take 1 tablet by mouth Daily. (Patient taking differently: Take 20 mg by mouth Daily.) 90 tablet 3   • Blood Glucose Monitoring Suppl (BLOOD GLUCOSE MONITOR SYSTEM) w/Device kit 1 Device Daily. 1 each 0   • calcium carb-cholecalciferol (CALCIUM 600/VITAMIN D3) 600-800 MG-UNIT tablet Take 1 tablet by mouth 2 (Two) Times a Day With Meals.     • cloNIDine (CATAPRES) 0.2 MG tablet Take 1 tablet by mouth 3 (Three) Times a Day. 270 tablet 3   • escitalopram (LEXAPRO) 10 MG tablet Take 1 tablet by mouth Daily. 90 tablet 3   • ferrous sulfate 325 (65 Fe) MG tablet Take 1 tablet by mouth 2 (Two) Times a Day. 180 tablet 3   • GLOBAL EASE INJECT PEN NEEDLES 31G X 5 MM misc      • hydroCHLOROthiazide (HYDRODIURIL) 25 MG tablet Take 1 tablet by mouth Daily. 90 tablet 1   • Insulin Glargine, 1 Unit Dial, (Toujeo SoloStar) 300 UNIT/ML solution pen-injector injection Inject 80 Units under the skin into the appropriate area as directed Daily. 5 pen 5   • insulin lispro (humaLOG) 100 UNIT/ML injection Inject 25 Units under the skin into the appropriate area as directed Every Evening. Prior to [facility] [place], [facility], Patient was on: 20 units in the morning, 25 in the evening, 5 at night     • isosorbide mononitrate (IMDUR) 30 MG 24 hr tablet Take 1 tablet by mouth Daily. 90 tablet 3   • metoprolol tartrate (LOPRESSOR) 100 MG tablet Take 1 tablet by mouth 2 (Two) Times a Day. 180 tablet 3   • quinapril (ACCUPRIL) 40 MG tablet  Take 1 tablet by mouth Daily. 30 tablet 5   • TOUJEO MAX SOLOSTAR 300 UNIT/ML solution pen-injector injection INJECT 80 UNITS SUBCUTANEOUSLY EVERY NIGHT AT BEDTIME     • vitamin D (ERGOCALCIFEROL) 1.25 MG (49023 UT) capsule capsule Take 1 capsule by mouth 1 (One) Time Per Week. Takes wednesdays 90 capsule 3     No facility-administered medications prior to visit.        Patient Active Problem List   Diagnosis   • Type 2 diabetes mellitus, uncontrolled, with neuropathy (CMS/[facility])   • Essential hypertension   • Atherosclerosis of native coronary artery of native heart   • Bilateral carpal tunnel syndrome   • Iron deficiency anemia due to dietary causes   • Diabetic retinopathy associated with type 2 diabetes mellitus (CMS/[facility])   • Hyperlipidemia   • Sciatic neuropathy   • DDD (degenerative disc disease), lumbar   • Primary osteoarthritis of left knee   • Status post total left knee replacement   • Type 2 diabetes mellitus with chronic kidney disease, with long-term current use of insulin (CMS/[facility])   • Hyperparathyroidism due to renal insufficiency (CMS/[facility])   • Venous insufficiency (chronic) (peripheral)   • Heart failure (CMS/[facility])   • Morbid (severe) obesity due to excess calories (CMS/[facility])   • PVD (peripheral vascular disease) with claudication (CMS/[facility])   • LVH (left ventricular hypertrophy)   • Chronic renal disease, stage IV (CMS/[facility])       Advanced Care Planning:  ACP discussion was held with the patient during this visit. Patient does not have an advance directive, information provided.    Review of Systems   Constitutional: Positive for fatigue. Negative for activity change, appetite change and fever.   HENT: Negative for ear pain, sinus pressure and sore throat.    Eyes: Negative for pain and visual disturbance.   Respiratory: Negative for cough and chest tightness.    Cardiovascular: Positive for leg swelling. Negative for chest pain and palpitations.   Gastrointestinal: Negative for abdominal pain,  "constipation, diarrhea, nausea and vomiting.   Endocrine: Positive for polydipsia. Negative for polyuria.   Genitourinary: Negative for dysuria and frequency.   Musculoskeletal: Negative for back pain and myalgias.   Skin: Negative for color change and rash.   Allergic/Immunologic: Negative for food allergies and immunocompromised state.   Neurological: Negative for dizziness, syncope and headaches.   Hematological: Negative for adenopathy. Does not bruise/bleed easily.   Psychiatric/Behavioral: Negative for hallucinations and suicidal ideas. The patient is not nervous/anxious.        Compared to one year ago, the patient feels her physical health is the same.  Compared to one year ago, the patient feels her mental health is the same.    Reviewed chart for potential of high risk medication in the elderly: yes  Reviewed chart for potential of harmful drug interactions in the elderly:yes    Objective         Vitals:    09/11/20 0955 09/11/20 1002   BP:  142/90   Pulse:  59   Temp:  97.3 °F (36.3 °C)   SpO2:  98%   Weight:  108 kg (237 lb 6.4 oz)   Height: 160 cm (63\") 160 cm (63\")       Body mass index is 42.05 kg/m².  Discussed the patient's BMI with her. The BMI is above average; BMI management plan is completed.    Physical Exam   Constitutional: She is oriented to person, place, and time. She appears well-developed.   HENT:   Head: Normocephalic and atraumatic.   Right Ear: Tympanic membrane normal.   Left Ear: Tympanic membrane normal.   Nose: Nose normal.   Mouth/Throat: Mucous membranes are moist. No oropharyngeal exudate or posterior oropharyngeal erythema.   Eyes: Pupils are equal, round, and reactive to light. Conjunctivae are normal.   Neck: Normal range of motion. Neck supple. No tracheal deviation present. No thyromegaly present.   Cardiovascular: Regular rhythm and normal heart sounds. Bradycardia present.   No murmur heard.  Pulmonary/Chest: Effort normal and breath sounds normal. No respiratory " distress. She has no wheezes.   Abdominal: Soft. Bowel sounds are normal. There is no abdominal tenderness. There is no guarding.   Musculoskeletal: Normal range of motion. Swelling present. No tenderness.      Comments: 1+ bilateral lower extremity pitting edema noted   Lymphadenopathy:     She has no cervical adenopathy.   Neurological: She is alert and oriented to person, place, and time.   Skin: Skin is warm and dry. No rash noted.   Psychiatric: Her behavior is normal. Mood and thought content normal.   Nursing note and vitals reviewed.      Lab Results   Component Value Date    TRIG 128 09/04/2020    HDL 36 (L) 09/04/2020    LDL 63 09/04/2020    VLDL 25.6 09/04/2020    HGBA1C 8.40 (H) 09/04/2020        Assessment/Plan   Medicare Risks and Personalized Health Plan  CMS Preventative Services Quick Reference  Advance Directive Discussion  Cardiovascular risk  Fall Risk  Inactivity/Sedentary  Obesity/Overweight     The above risks/problems have been discussed with the patient.  Pertinent information has been shared with the patient in the After Visit Summary.  Follow up plans and orders are seen below in the Assessment/Plan Section.    Diagnoses and all orders for this visit:    1. Medicare annual wellness visit, subsequent (Primary)  Comments:  Performed and documented today    2. Atherosclerosis of native coronary artery of native heart without angina pectoris  Comments:  Continue risk factor modification including aspirin and Lipitor    3. Essential hypertension  Comments:  Continue to monitor  Continue amlodipine clonidine and metoprolol    4. Chronic systolic heart failure (CMS/[facility])  Comments:  Advised low-sodium diet as well as hydrochlorothiazide as advised by her cardiologist    5. Mixed hyperlipidemia  Comments:  Advised low-cholesterol diet  Continue Lipitor    6. LVH (left ventricular hypertrophy)  Comments:  Continue to monitor and advised the importance of controlled hypertension    7. PVD (peripheral  vascular disease) with claudication (CMS/[facility])  Comments:  Advised conservative measures including leg elevation, low-sodium diet, and increased water intake  She was advised to wear compression knee-high socks    8. Venous insufficiency (chronic) (peripheral)  Comments:  Conservative measures advised including knee-high compression socks    9. Morbid (severe) obesity due to excess calories (CMS/[facility])  Comments:  Advised to follow an 1800 -calorie/day diabetic diet  Activity as tolerated advised    10. Diabetic retinopathy of left eye associated with type 2 diabetes mellitus, macular edema presence unspecified, unspecified retinopathy severity (CMS/[facility])  Comments:  Continue to monitor and advised patient to make appointment this year with Dr. Delgadillo.  She declines me making referral for    11. Hyperparathyroidism due to renal insufficiency (CMS/[facility])  Comments:  Continue to monitor advised to see nephrologist regularly    12. Type 2 diabetes mellitus with stage 4 chronic kidney disease, with long-term current use of insulin (CMS/[facility])  Comments:  Continue Toujeo 80 units nightly  Start Humalog with 5 units if greater than 150 BG Qac  Need to monitor    13. Type 2 diabetes mellitus, uncontrolled, with neuropathy (CMS/[facility])  Comments:  Advised to start Humalog with meals as stated above    14. Bilateral carpal tunnel syndrome  Comments:  Continue to monitor and advised stretches daily    15. Neuropathy of left sciatic nerve  Comments:  Advised Tylenol as needed and daily stretching    16. DDD (degenerative disc disease), lumbar  Comments:  Advised Tylenol as needed and activity as tolerated    17. Primary osteoarthritis of left knee  Comments:  Advised activity as tolerated    18. Chronic renal disease, stage IV (CMS/[facility])  Comments:  We discussed her worsening of chronic renal disease and the importance of increased water intake and compliance with nephrology instruction    19. Iron deficiency anemia due to dietary  causes  Comments:  Continue iron supplementation    20. Status post total left knee replacement      Follow Up:  Return in about 3 months (around 12/11/2020) for Followup with SUE Owen.     An After Visit Summary and PPPS were given to the patient.

## 2020-09-11 NOTE — PATIENT INSTRUCTIONS
Medicare Wellness  Personal Prevention Plan of Service     Date of Office Visit:  2020  Encounter Provider:  RAFIQ Montanez  Place of Service:  [facility]  Patient Name: Britta Lee  :  1945    As part of the Medicare Wellness portion of your visit today, we are providing you with this personalized preventive plan of services (PPPS). This plan is based upon recommendations of the [place] Preventive Services Task Force (USPSTF) and the Advisory Committee on Immunization Practices (ACIP).    This lists the preventive care services that should be considered, and provides dates of when you are due. Items listed as completed are up-to-date and do not require any further intervention.    Health Maintenance   Topic Date Due   • ZOSTER VACCINE (3 of 3) 2016   • DIABETIC EYE EXAM  10/30/2020 (Originally 2020)   • HEMOGLOBIN A1C  2021   • DXA SCAN  2021   • MAMMOGRAM  2021   • LIPID PANEL  2021   • URINE MICROALBUMIN  2021   • MEDICARE ANNUAL WELLNESS  2021   • COLONOSCOPY  2026   • TDAP/TD VACCINES (2 - Td) 2027   • HEPATITIS C SCREENING  Completed   • Pneumococcal Vaccine Once at 65 Years Old  Completed   • INFLUENZA VACCINE  Completed       No orders of the defined types were placed in this encounter.      No follow-ups on file.          Advance Directive    Advance directives are legal documents that let you make choices ahead of time about your health care and medical treatment in case you become unable to communicate for yourself. Advance directives are a way for you to communicate your wishes to family, friends, and health care providers. This can help convey your decisions about end-of-life care if you become unable to communicate.  Discussing and writing advance directives should happen over time rather than all at once. Advance directives can be changed depending on your situation and what you  want, even after you have signed the advance directives.  If you do not have an advance directive, some states assign family decision makers to act on your behalf based on how closely you are related to them. Each state has its own laws regarding advance directives. You may want to check with your health care provider, , or state representative about the laws in your state. There are different types of advance directives, such as:  · Medical power of .  · Living will.  · Do not resuscitate (DNR) or do not attempt resuscitation (DNAR) order.  Health care proxy and medical power of   A health care proxy, also called a health care agent, is a person who is appointed to make medical decisions for you in cases in which you are unable to make the decisions yourself. Generally, people choose someone they know well and trust to represent their preferences. Make sure to ask this person for an agreement to act as your proxy. A proxy may have to exercise judgment in the event of a medical decision for which your wishes are not known.  A medical power of  is a legal document that names your health care proxy. Depending on the laws in your state, after the document is written, it may also need to be:  · Signed.  · Notarized.  · Dated.  · Copied.  · Witnessed.  · Incorporated into your medical record.  You may also want to appoint someone to manage your financial affairs in a situation in which you are unable to do so. This is called a durable power of  for finances. It is a separate legal document from the durable power of  for health care. You may choose the same person or someone different from your health care proxy to act as your agent in financial matters.  If you do not appoint a proxy, or if there is a concern that the proxy is not acting in your best interests, a court-appointed guardian may be designated to act on your behalf.  Living will  A living will is a set of  instructions documenting your wishes about medical care when you cannot express them yourself. Health care providers should keep a copy of your living will in your medical record. You may want to give a copy to family members or friends. To alert caregivers in case of an emergency, you can place a card in your wallet to let them know that you have a living will and where they can find it. A living will is used if you become:  · Terminally ill.  · Incapacitated.  · Unable to communicate or make decisions.  Items to consider in your living will include:  · The use or non-use of life-sustaining equipment, such as dialysis machines and breathing machines (ventilators).  · A DNR or DNAR order, which is the instruction not to use cardiopulmonary resuscitation (CPR) if breathing or heartbeat stops.  · The use or non-use of tube feeding.  · Withholding of food and fluids.  · Comfort (palliative) care when the goal becomes comfort rather than a cure.  · Organ and tissue donation.  A living will does not give instructions for distributing your money and property if you should pass away. It is recommended that you seek the advice of a  when writing a will. Decisions about taxes, beneficiaries, and asset distribution will be legally binding. This process can relieve your family and friends of any concerns surrounding disputes or questions that may come up about the distribution of your assets.  DNR or DNAR  A DNR or DNAR order is a request not to have CPR in the event that your heart stops beating or you stop breathing. If a DNR or DNAR order has not been made and shared, a health care provider will try to help any patient whose heart has stopped or who has stopped breathing. If you plan to have surgery, talk with your health care provider about how your DNR or DNAR order will be followed if problems occur.  Summary  · Advance directives are the legal documents that allow you to make choices ahead of time about your  health care and medical treatment in case you become unable to communicate for yourself.  · The process of discussing and writing advance directives should happen over time. You can change the advance directives, even after you have signed them.  · Advance directives include DNR or DNAR orders, living rojas, and designating an agent as your medical power of .  This information is not intended to replace advice given to you by your health care provider. Make sure you discuss any questions you have with your health care provider.  Document Released: 03/26/2009 Document Revised: 01/22/2020 Document Reviewed: 11/06/2017  Elsevier Patient Education © 2020 [employer]      Fall Prevention in the Home, Adult  Falls can cause injuries. They can happen to people of all ages. There are many things you can do to make your home safe and to help prevent falls. Ask for help when making these changes, if needed.  What actions can I take to prevent falls?  General Instructions  · Use good lighting in all rooms. Replace any light bulbs that burn out.  · Turn on the lights when you go into a dark area. Use night-lights.  · Keep items that you use often in easy-to-reach places. Lower the shelves around your home if necessary.  · Set up your furniture so you have a clear path. Avoid moving your furniture around.  · Do not have throw rugs and other things on the floor that can make you trip.  · Avoid walking on wet floors.  · If any of your floors are uneven, fix them.  · Add color or contrast paint or tape to clearly eder and help you see:  ? Any grab bars or handrails.  ? First and last steps of stairways.  ? Where the edge of each step is.  · If you use a stepladder:  ? Make sure that it is fully opened. Do not climb a closed stepladder.  ? Make sure that both sides of the stepladder are locked into place.  ? Ask someone to hold the stepladder for you while you use it.  · If there are any pets around you, be aware of where  they are.  What can I do in the bathroom?         · Keep the floor dry. Clean up any water that spills onto the floor as soon as it happens.  · Remove soap buildup in the tub or shower regularly.  · Use non-skid mats or decals on the floor of the tub or shower.  · Attach bath mats securely with double-sided, non-slip rug tape.  · If you need to sit down in the shower, use a plastic, non-slip stool.  · Install grab bars by the toilet and in the tub and shower. Do not use towel bars as grab bars.  What can I do in the bedroom?  · Make sure that you have a light by your bed that is easy to reach.  · Do not use any sheets or blankets that are too big for your bed. They should not hang down onto the floor.  · Have a firm chair that has side arms. You can use this for support while you get dressed.  What can I do in the kitchen?  · Clean up any spills right away.  · If you need to reach something above you, use a strong step stool that has a grab bar.  · Keep electrical cords out of the way.  · Do not use floor polish or wax that makes floors slippery. If you must use wax, use non-skid floor wax.  What can I do with my stairs?  · Do not leave any items on the stairs.  · Make sure that you have a light switch at the top of the stairs and the bottom of the stairs. If you do not have them, ask someone to add them for you.  · Make sure that there are handrails on both sides of the stairs, and use them. Fix handrails that are broken or loose. Make sure that handrails are as long as the stairways.  · Install non-slip stair treads on all stairs in your home.  · Avoid having throw rugs at the top or bottom of the stairs. If you do have throw rugs, attach them to the floor with carpet tape.  · Choose a carpet that does not hide the edge of the steps on the stairway.  · Check any carpeting to make sure that it is firmly attached to the stairs. Fix any carpet that is loose or worn.  What can I do on the outside of my home?  · Use  bright outdoor lighting.  · Regularly fix the edges of walkways and driveways and fix any cracks.  · Remove anything that might make you trip as you walk through a door, such as a raised step or threshold.  · Trim any bushes or trees on the path to your home.  · Regularly check to see if handrails are loose or broken. Make sure that both sides of any steps have handrails.  · Install guardrails along the edges of any raised decks and porches.  · Clear walking paths of anything that might make someone trip, such as tools or rocks.  · Have any leaves, snow, or ice cleared regularly.  · Use sand or salt on walking paths during winter.  · Clean up any spills in your garage right away. This includes grease or oil spills.  What other actions can I take?  · Wear shoes that:  ? Have a low heel. Do not wear high heels.  ? Have rubber bottoms.  ? Are comfortable and fit you well.  ? Are closed at the toe. Do not wear open-toe sandals.  · Use tools that help you move around (mobility aids) if they are needed. These include:  ? Canes.  ? Walkers.  ? Scooters.  ? Crutches.  · Review your medicines with your doctor. Some medicines can make you feel dizzy. This can increase your chance of falling.  Ask your doctor what other things you can do to help prevent falls.  Where to find more information  · Centers for Disease Control and Prevention, [place]: https://cdc.gov  · [place] on Aging: https://af4dfpa.verenice.nih.gov  Contact a doctor if:  · You are afraid of falling at home.  · You feel weak, drowsy, or dizzy at home.  · You fall at home.  Summary  · There are many simple things that you can do to make your home safe and to help prevent falls.  · Ways to make your home safe include removing tripping hazards and installing grab bars in the bathroom.  · Ask for help when making these changes in your home.  This information is not intended to replace advice given to you by your health care provider. Make sure you discuss any  "questions you have with your health care provider.  Document Released: 10/14/2010 Document Revised: 04/09/2020 Document Reviewed: 08/02/2018  Elsevier Patient Education © 2020 [employer]      Fat and Cholesterol Restricted Eating Plan  Getting too much fat and cholesterol in your diet may cause health problems. Choosing the right foods helps keep your fat and cholesterol at normal levels. This can keep you from getting certain diseases.  Your doctor may recommend an eating plan that includes:  · Total fat: ______% or less of total calories a day.  · Saturated fat: ______% or less of total calories a day.  · Cholesterol: less than _________mg a day.  · Fiber: ______g a day.  What are tips for following this plan?  Meal planning  · At meals, divide your plate into four equal parts:  ? Fill one-half of your plate with vegetables and green salads.  ? Fill one-fourth of your plate with whole grains.  ? Fill one-fourth of your plate with low-fat (lean) protein foods.  · Eat fish that is high in omega-3 fats at least two times a week. This includes mackerel, tuna, sardines, and salmon.  · Eat foods that are high in fiber, such as whole grains, beans, apples, broccoli, carrots, peas, and barley.  General tips    · Work with your doctor to lose weight if you need to.  · Avoid:  ? Foods with added sugar.  ? Fried foods.  ? Foods with partially hydrogenated oils.  · Limit alcohol intake to no more than 1 drink a day for nonpregnant women and 2 drinks a day for men. One drink equals 12 oz of beer, 5 oz of wine, or 1½ oz of hard liquor.  Reading food labels  · Check food labels for:  ? Trans fats.  ? Partially hydrogenated oils.  ? Saturated fat (g) in each serving.  ? Cholesterol (mg) in each serving.  ? Fiber (g) in each serving.  · Choose foods with healthy fats, such as:  ? Monounsaturated fats.  ? Polyunsaturated fats.  ? Omega-3 fats.  · Choose grain products that have whole grains. Look for the word \"whole\" as the " first word in the ingredient list.  Cooking  · Cook foods using low-fat methods. These include baking, boiling, grilling, and broiling.  · Eat more home-cooked foods. Eat at restaurants and buffets less often.  · Avoid cooking using saturated fats, such as butter, cream, palm oil, palm kernel oil, and coconut oil.  Recommended foods    Fruits  · All fresh, canned (in natural juice), or frozen fruits.  Vegetables  · Fresh or frozen vegetables (raw, steamed, roasted, or grilled). Green salads.  Grains  · Whole grains, such as whole wheat or whole grain breads, crackers, cereals, and pasta. Unsweetened oatmeal, bulgur, barley, quinoa, or brown rice. Corn or whole wheat flour tortillas.  Meats and other protein foods  · Ground beef (85% or leaner), grass-fed beef, or beef trimmed of fat. Skinless chicken or turkey. Ground chicken or turkey. Pork trimmed of fat. All fish and seafood. Egg whites. Dried beans, peas, or lentils. Unsalted nuts or seeds. Unsalted canned beans. Nut butters without added sugar or oil.  Dairy  · Low-fat or nonfat dairy products, such as skim or 1% milk, 2% or reduced-fat cheeses, low-fat and fat-free ricotta or cottage cheese, or plain low-fat and nonfat yogurt.  Fats and oils  · Tub margarine without trans fats. Light or reduced-fat mayonnaise and salad dressings. Avocado. Olive, canola, sesame, or safflower oils.  The items listed above may not be a complete list of foods and beverages you can eat. Contact a dietitian for more information.  Foods to avoid  Fruits  · Canned fruit in heavy syrup. Fruit in cream or butter sauce. Fried fruit.  Vegetables  · Vegetables cooked in cheese, cream, or butter sauce. Fried vegetables.  Grains  · White bread. White pasta. White rice. Cornbread. Bagels, pastries, and croissants. Crackers and snack foods that contain trans fat and hydrogenated oils.  Meats and other protein foods  · Fatty cuts of meat. Ribs, chicken wings, dixon, sausage, bologna, salami,  chitterlings, fatback, hot dogs, bratwurst, and packaged lunch meats. Liver and organ meats. Whole eggs and egg yolks. Chicken and turkey with skin. Fried meat.  Dairy  · Whole or 2% milk, cream, half-and-half, and cream cheese. Whole milk cheeses. Whole-fat or sweetened yogurt. Full-fat cheeses. Nondairy creamers and whipped toppings. Processed cheese, cheese spreads, and cheese curds.  Beverages  · Alcohol. Sugar-sweetened drinks such as sodas, lemonade, and fruit drinks.  Fats and oils  · Butter, stick margarine, lard, shortening, ghee, or dixon fat. Coconut, palm kernel, and palm oils.  Sweets and desserts  · Corn syrup, sugars, honey, and molasses. Candy. Jam and jelly. Syrup. Sweetened cereals. Cookies, pies, cakes, donuts, muffins, and ice cream.  The items listed above may not be a complete list of foods and beverages you should avoid. Contact a dietitian for more information.  Summary  · Choosing the right foods helps keep your fat and cholesterol at normal levels. This can keep you from getting certain diseases.  · At meals, fill one-half of your plate with vegetables and green salads.  · Eat high-fiber foods, like whole grains, beans, apples, carrots, peas, and barley.  · Limit added sugar, saturated fats, alcohol, and fried foods.  This information is not intended to replace advice given to you by your health care provider. Make sure you discuss any questions you have with your health care provider.  Document Released: 06/18/2013 Document Revised: 08/21/2019 Document Reviewed: 09/04/2018  Elsevier Patient Education © 2020 [employer]

## 2020-09-13 PROBLEM — R07.2 PRECORDIAL CHEST PAIN: Status: RESOLVED | Noted: 2020-04-09 | Resolved: 2020-09-13

## 2020-09-13 PROBLEM — N18.30 CHRONIC RENAL DISEASE, STAGE III: Status: RESOLVED | Noted: 2018-07-30 | Resolved: 2020-09-13

## 2020-09-13 PROBLEM — N18.4 CHRONIC RENAL DISEASE, STAGE IV (HCC): Status: ACTIVE | Noted: 2020-09-13

## 2020-09-13 PROBLEM — R06.02 SHORTNESS OF BREATH: Status: RESOLVED | Noted: 2020-04-09 | Resolved: 2020-09-13

## 2020-09-13 PROBLEM — R13.19 ESOPHAGEAL DYSPHAGIA: Status: RESOLVED | Noted: 2020-02-25 | Resolved: 2020-09-13

## 2020-09-13 PROBLEM — G56.01 CARPAL TUNNEL SYNDROME OF RIGHT WRIST: Status: RESOLVED | Noted: 2019-09-26 | Resolved: 2020-09-13

## 2020-09-13 PROBLEM — M79.641 HAND PAIN, RIGHT: Status: RESOLVED | Noted: 2019-09-04 | Resolved: 2020-09-13

## 2020-09-13 PROBLEM — E66.01 SEVERE OBESITY (BMI >= 40): Status: RESOLVED | Noted: 2020-02-25 | Resolved: 2020-09-13

## 2020-10-01 ENCOUNTER — OFFICE VISIT (OUTPATIENT)
Dept: FAMILY MEDICINE CLINIC | Facility: CLINIC | Age: 75
End: 2020-10-01

## 2020-10-01 VITALS
HEART RATE: 73 BPM | TEMPERATURE: 97.1 F | WEIGHT: 240 LBS | HEIGHT: 63 IN | SYSTOLIC BLOOD PRESSURE: 130 MMHG | BODY MASS INDEX: 42.52 KG/M2 | DIASTOLIC BLOOD PRESSURE: 60 MMHG | OXYGEN SATURATION: 97 %

## 2020-10-01 DIAGNOSIS — N18.4 TYPE 2 DIABETES MELLITUS WITH STAGE 4 CHRONIC KIDNEY DISEASE, WITH LONG-TERM CURRENT USE OF INSULIN (HCC): Chronic | ICD-10-CM

## 2020-10-01 DIAGNOSIS — I10 ESSENTIAL HYPERTENSION: Chronic | ICD-10-CM

## 2020-10-01 DIAGNOSIS — IMO0002 TYPE 2 DIABETES MELLITUS, UNCONTROLLED, WITH NEUROPATHY: Primary | Chronic | ICD-10-CM

## 2020-10-01 DIAGNOSIS — E78.2 MIXED HYPERLIPIDEMIA: ICD-10-CM

## 2020-10-01 DIAGNOSIS — Z79.4 TYPE 2 DIABETES MELLITUS WITH STAGE 4 CHRONIC KIDNEY DISEASE, WITH LONG-TERM CURRENT USE OF INSULIN (HCC): Chronic | ICD-10-CM

## 2020-10-01 DIAGNOSIS — E11.22 TYPE 2 DIABETES MELLITUS WITH STAGE 4 CHRONIC KIDNEY DISEASE, WITH LONG-TERM CURRENT USE OF INSULIN (HCC): Chronic | ICD-10-CM

## 2020-10-01 PROCEDURE — 99214 OFFICE O/P EST MOD 30 MIN: CPT | Performed by: NURSE PRACTITIONER

## 2020-10-01 NOTE — PROGRESS NOTES
History of Present Illness   Britta eLe is a 74 yo female who presents to the clinic today pertaining to her  DM, type 2 which is complicated by peripheral neuropathy, diabetic nephropathy and retinopathy. Associated symptoms include blurred vision, fatigue and visual changes. In addition, Britta has HTN, Dyslipidemia, and Venous Insufficiency of Lower Extremities.     Diabetes   She has type 2 diabetes mellitus. The initial diagnosis of diabetes was made 20 years ago. Her disease course has been worsening with an increase of her A1C of 1%. She has had several hypoglycemic episodes but has not needed assistance  Associated symptoms pertaining to the diabetes include polydipsia, polyuria and polyphagia,  blurred vision and fatigue.. Pertinent negatives for diabetes include no chest pain, no foot ulcerations, no weakness and no weight loss.  Diabetic complications include heart disease, nephropathy, peripheral neuropathy, PVD and retinopathy. Current diabetic treatment includes insulin injections (Toujeo and Novolog). Compliance with diabetes treatment: Dependent on available of insulin. She generally checks Her weight is increasing steadily. When asked about meal planning, she reported none. She has had a previous visit with a dietitian. She never participates in exercise.  She had been prescribed  a DexCom but she reports she did not qualify. Unsure of the rationale due to her current regimen.She sees a podiatrist.     Lab Results   Component Value Date    HGBA1C 8.40 (H) 09/04/2020   Most recent HGBA1C  Lab Results   Component Value Date    HGBA1C 7.44 (H) 02/21/2020     Hypertension  Compliance with treatment has been good. Well controlled with Norvasc, clonidine, metoprolol, HCTZ and Accupril. She does report intermittent lower extremity swelling throughout the day. She does have compression stockings but find them very difficult to apply.     Lab Results   Component Value Date    CREATININE 2.17 (H)  "09/04/2020     Dyslipidemia  Compliance with treatment has been good. The patient exercises seldom due to her Osteoarthritis.  She is currently being prescribed the following medication for her dyslipidemia - atorvastatin. Patient denies side effects associated with her medications. Most recent lipids include    Lab Results   Component Value Date    CHOL 125 09/04/2020    TRIG 128 09/04/2020    HDL 36 (L) 09/04/2020    LDL 63 09/04/2020   Refer to ROS for additional information.     Vitals:    10/01/20 0929   BP: 130/60   BP Location: Right arm   Patient Position: Sitting   Cuff Size: Adult   Pulse: 73   Temp: 97.1 °F (36.2 °C)   TempSrc: Temporal   SpO2: 97%   Weight: 109 kg (240 lb)   Height: 160 cm (62.99\")      The following portions of the patient's history were reviewed and updated as appropriate: allergies, current medications, past family history, past medical history, past social history, past surgical history and problem list.    Review of Systems   Constitutional: Positive for appetite change, fatigue and unexpected weight gain. Negative for activity change, chills, diaphoresis and fever.   Eyes: Positive for blurred vision and visual disturbance. Negative for double vision.   Respiratory: Positive for shortness of breath and wheezing (Slight). Negative for cough.    Cardiovascular: Positive for leg swelling. Negative for chest pain and palpitations.   Gastrointestinal: Negative for nausea and vomiting.   Endocrine: Positive for polydipsia, polyphagia and polyuria. Negative for cold intolerance and heat intolerance.   Musculoskeletal: Positive for arthralgias, gait problem and myalgias.   Skin: Positive for color change (Lower extremities). Negative for rash.   Neurological: Positive for numbness.   Psychiatric/Behavioral: Positive for sleep disturbance. Negative for behavioral problems. The patient is nervous/anxious (About her health).       Physical Exam  Vitals signs reviewed.   Constitutional:       " General: She is not in acute distress.     Appearance: She is well-developed.      Comments: Pleasant; wearing appropriate face mask   HENT:      Head: Normocephalic.      Nose: Nose normal.   Eyes:      General:         Right eye: No discharge.         Left eye: No discharge.      Conjunctiva/sclera: Conjunctivae normal.      Pupils: Pupils are equal, round, and reactive to light.   Neck:      Musculoskeletal: Normal range of motion and neck supple.      Thyroid: No thyromegaly.      Vascular: No JVD.   Cardiovascular:      Rate and Rhythm: Normal rate and regular rhythm.      Heart sounds: Normal heart sounds.   Pulmonary:      Effort: Pulmonary effort is normal. No respiratory distress.      Breath sounds: Normal breath sounds. No wheezing or rales.   Abdominal:      General: There is no distension.      Palpations: Abdomen is soft.      Tenderness: There is no abdominal tenderness. There is no guarding or rebound.   Musculoskeletal:      Right lower leg: Edema (With slight erythema) present.      Left lower leg: Edema (With slight erythema) present.   Lymphadenopathy:      Cervical: No cervical adenopathy.   Skin:     General: Skin is warm and dry.      Capillary Refill: Capillary refill takes 2 to 3 seconds.      Findings: No rash.   Neurological:      Mental Status: She is alert and oriented to person, place, and time.      Cranial Nerves: No cranial nerve deficit.      Gait: Gait abnormal.   Psychiatric:         Attention and Perception: Attention normal.         Mood and Affect: Mood and affect normal.         Speech: Speech normal.         Behavior: Behavior is cooperative.         Thought Content: Thought content normal.         Judgment: Judgment normal.       Assessment/Plan   Problems Addressed this Visit        Cardiovascular and Mediastinum    Essential hypertension    Hyperlipidemia       Endocrine    Type 2 diabetes mellitus, uncontrolled, with neuropathy (CMS/HCC) - Primary    Type 2 diabetes  "mellitus with chronic kidney disease, with long-term current use of insulin (CMS/[facility])      Diagnoses       Codes Comments    Type 2 diabetes mellitus, uncontrolled, with neuropathy (CMS/[facility])    -  Primary ICD-10-CM: E11.40, E11.65  ICD-9-CM: 250.62, 357.2 Findings and recommendations discussed with Britta. She reports \"I am in the doughnut hole\" and can't afford both the Toujeo and Novolog.     Type 2 diabetes mellitus with stage 4 chronic kidney disease, with long-term current use of insulin (CMS/[facility])     ICD-10-CM: E11.22, N18.4, Z79.4  ICD-9-CM: 250.40, 585.4, V58.67 Discussed the balance of having protein and carbohydrates within her meal plan.Also,the balance of hydrating for renal issues and decreasing fluid intake    Essential hypertension     ICD-10-CM: I10  ICD-9-CM: 401.9 Adequately controlled.     Mixed hyperlipidemia     ICD-10-CM: E78.2  ICD-9-CM: 272.2 Continue Atorvastatin       Findings and recommendations discussed with Britta. She reports \"I am in the doughnut hole\" and can't afford both the Toujeo and Novolog. Will request staff check regarding patient assistance for her which she did previously qualified.Issue with DexCom will be checked. I did provide her additional  Toujeo and one Admelog pen she could use for her premeal insulin. She does report she has noticed some wheezing and her weight gain with edema. She f/u with Dr De Los Santos in December but encouraged her if she continues she should f/u sooner. She will f/u with me in November to monitor how her regimen is going; sooner if problems/concerns occur.       This document has been electronically signed by ERNIE Locke, CHUN-BC, BRAULIOE  October 1, 2020 09:39 EDT                   "

## 2020-10-19 ENCOUNTER — OFFICE VISIT (OUTPATIENT)
Dept: FAMILY MEDICINE CLINIC | Facility: CLINIC | Age: 75
End: 2020-10-19

## 2020-10-19 DIAGNOSIS — I50.9 ACUTE ON CHRONIC CONGESTIVE HEART FAILURE, UNSPECIFIED HEART FAILURE TYPE (HCC): Primary | ICD-10-CM

## 2020-10-19 DIAGNOSIS — I10 ESSENTIAL HYPERTENSION: ICD-10-CM

## 2020-10-19 DIAGNOSIS — N18.4 CHRONIC RENAL DISEASE, STAGE IV (HCC): ICD-10-CM

## 2020-10-19 PROCEDURE — 99442 PR PHYS/QHP TELEPHONE EVALUATION 11-20 MIN: CPT | Performed by: PHYSICIAN ASSISTANT

## 2020-10-19 PROCEDURE — G2025 DIS SITE TELE SVCS RHC/FQHC: HCPCS | Performed by: PHYSICIAN ASSISTANT

## 2020-10-19 RX ORDER — FUROSEMIDE 20 MG/1
20 TABLET ORAL DAILY
Qty: 1 TABLET | Refills: 0
Start: 2020-10-19 | End: 2020-11-08 | Stop reason: HOSPADM

## 2020-10-20 VITALS
DIASTOLIC BLOOD PRESSURE: 42 MMHG | HEIGHT: 63 IN | WEIGHT: 240 LBS | SYSTOLIC BLOOD PRESSURE: 146 MMHG | BODY MASS INDEX: 42.52 KG/M2 | HEART RATE: 56 BPM

## 2020-10-20 NOTE — PROGRESS NOTES
Subjective   Britta Lee is a 75 y.o. female.     Telephone Visit    You have chosen to receive care through a telephone visit. Do you consent to use a telephone visit for your medical care today? Yes    This visit has been rescheduled as a phone visit to comply with patient safety concerns in accordance with CDC recommendations. Total time of discussion was 12 minutes.    Chief Complaint -shortness of breath    History of Present Illness -      Shortness of breath-  Patient complains of worsening shortness of breath with associated bilateral lower extremity edema.  Shortness of breath is worse when she lies supine.  Patient states she is now having to sit in a chair to sleep.  Shortness of breath became worse when furosemide was discontinued by nephrologist approximately 2 weeks ago.  Patient continues to use hydrochlorothiazide 25 mg daily with minimal relief.  Minimal relief with albuterol inhaler.    Congestive heart failure-not at goal due to acute shortness of breath and exacerbation    Hypertension-not at goal but may be due to stress and anxiety over shortness of breath    Chronic renal disease-uncontrolled and chronic.  Patient continues to use hydrochlorothiazide 25 mg daily but furosemide was discontinued at the last visit due to renal function.  Lab Results   Component Value Date    GLUCOSE 62 (L) 09/04/2020    BUN 54 (H) 09/04/2020    CREATININE 2.17 (H) 09/04/2020    EGFRIFNONA 22 (L) 09/04/2020    EGFRIFAFRI 41 (L) 07/30/2018    BCR 24.9 09/04/2020    K 4.9 09/04/2020    CO2 20.5 (L) 09/04/2020    CALCIUM 9.5 09/04/2020    PROTENTOTREF 7.7 07/30/2018    ALBUMIN 3.90 09/04/2020    LABIL2 1.1 (L) 07/30/2018    AST 28 09/04/2020    ALT 20 09/04/2020       The following portions of the patient's history were reviewed and updated as appropriate: allergies, current medications, past family history, past medical history, past social history, past surgical history and problem list.    Review of Systems  "  Constitutional: Positive for activity change and fatigue. Negative for appetite change and fever.   HENT: Negative for ear pain, sinus pressure and sore throat.    Eyes: Negative for pain and visual disturbance.   Respiratory: Positive for shortness of breath. Negative for cough and chest tightness.    Cardiovascular: Positive for leg swelling. Negative for chest pain and palpitations.   Gastrointestinal: Negative for abdominal pain, constipation, diarrhea, nausea and vomiting.   Endocrine: Negative for polydipsia and polyuria.   Genitourinary: Negative for dysuria and frequency.   Musculoskeletal: Negative for back pain and myalgias.   Skin: Negative for color change and rash.   Allergic/Immunologic: Negative for food allergies and immunocompromised state.   Neurological: Negative for dizziness, syncope and headaches.   Hematological: Negative for adenopathy. Does not bruise/bleed easily.   Psychiatric/Behavioral: Negative for hallucinations and suicidal ideas. The patient is not nervous/anxious.        /42 Comment: patient reported  Pulse 56 Comment: patient reported  Ht 160 cm (63\")   Wt 109 kg (240 lb) Comment: pt reported all vitals today  BMI 42.51 kg/m²     Physical Exam  Vitals signs and nursing note reviewed.   Neurological:      Mental Status: She is alert and oriented to person, place, and time.   Psychiatric:         Mood and Affect: Mood normal.         Thought Content: Thought content normal.         Assessment/Plan     Diagnoses and all orders for this visit:    1. Acute on chronic congestive heart failure, unspecified heart failure type (CMS/HCC) (Primary)  Comments:  start lasix 20mg qd x 5 days (short-term)  continue HCTZ  Advised to RTC if symptoms persist or worsen over the next 10 days  Orders:  -     furosemide (Lasix) 20 MG tablet; Take 1 tablet by mouth Daily.  Dispense: 1 tablet; Refill: 0    2. Essential hypertension  Comments:  Continue to monitor  Continue current hypertensive " medications    3. Chronic renal disease, stage IV (CMS/[facility])  Comments:  Continue to monitor and advised to use furosemide short-term for current acute exacerbation of CHF    We discussed the issue with relation to her congestive heart failure in need of diuretics for symptom relief and the negative effect on her renal function.  Patient was advised to use the furosemide only short-term at this time.  Patient advised to discuss long-term diuretic medication use with her cardiologist in December.        This document has been electronically signed by:  Lexie Dolan PA-C

## 2020-11-03 ENCOUNTER — APPOINTMENT (OUTPATIENT)
Dept: GENERAL RADIOLOGY | Facility: HOSPITAL | Age: 75
End: 2020-11-03

## 2020-11-03 ENCOUNTER — HOSPITAL ENCOUNTER (INPATIENT)
Facility: HOSPITAL | Age: 75
LOS: 5 days | Discharge: HOME | End: 2020-11-08
Attending: FAMILY MEDICINE | Admitting: INTERNAL MEDICINE

## 2020-11-03 ENCOUNTER — OFFICE VISIT (OUTPATIENT)
Dept: FAMILY MEDICINE CLINIC | Facility: CLINIC | Age: 75
End: 2020-11-03

## 2020-11-03 VITALS — WEIGHT: 240 LBS | HEIGHT: 63 IN | BODY MASS INDEX: 42.52 KG/M2

## 2020-11-03 DIAGNOSIS — I50.23 ACUTE ON CHRONIC SYSTOLIC HEART FAILURE (HCC): Primary | ICD-10-CM

## 2020-11-03 DIAGNOSIS — I50.33 ACUTE ON CHRONIC DIASTOLIC CHF (CONGESTIVE HEART FAILURE) (HCC): ICD-10-CM

## 2020-11-03 DIAGNOSIS — N18.4 CHRONIC RENAL DISEASE, STAGE IV (HCC): ICD-10-CM

## 2020-11-03 DIAGNOSIS — I50.9 ACUTE ON CHRONIC CONGESTIVE HEART FAILURE, UNSPECIFIED HEART FAILURE TYPE (HCC): Primary | ICD-10-CM

## 2020-11-03 LAB
A-A DO2: 35.5 MMHG (ref 0–300)
ALBUMIN SERPL-MCNC: 3.84 G/DL (ref 3.5–5.2)
ALBUMIN/GLOB SERPL: 0.8 G/DL
ALP SERPL-CCNC: 280 U/L (ref 39–117)
ALT SERPL W P-5'-P-CCNC: 35 U/L (ref 1–33)
ANION GAP SERPL CALCULATED.3IONS-SCNC: 15.3 MMOL/L (ref 5–15)
APTT PPP: 31 SECONDS (ref 25.6–35.3)
ARTERIAL PATENCY WRIST A: ABNORMAL
AST SERPL-CCNC: 53 U/L (ref 1–32)
ATMOSPHERIC PRESS: 735 MMHG
BACTERIA UR QL AUTO: NORMAL /HPF
BASE EXCESS BLDA CALC-SCNC: -3 MMOL/L (ref 0–2)
BASOPHILS # BLD AUTO: 0.05 10*3/MM3 (ref 0–0.2)
BASOPHILS NFR BLD AUTO: 0.4 % (ref 0–1.5)
BDY SITE: ABNORMAL
BILIRUB SERPL-MCNC: 0.4 MG/DL (ref 0–1.2)
BILIRUB UR QL STRIP: NEGATIVE
BODY TEMPERATURE: 0 C
BUN SERPL-MCNC: 63 MG/DL (ref 8–23)
BUN/CREAT SERPL: 23.6 (ref 7–25)
CALCIUM SPEC-SCNC: 9.6 MG/DL (ref 8.6–10.5)
CHLORIDE SERPL-SCNC: 105 MMOL/L (ref 98–107)
CLARITY UR: CLEAR
CO2 BLDA-SCNC: 24.1 MMOL/L (ref 22–33)
CO2 SERPL-SCNC: 19.7 MMOL/L (ref 22–29)
COHGB MFR BLD: 1.8 % (ref 0–5)
COLOR UR: YELLOW
CREAT SERPL-MCNC: 2.67 MG/DL (ref 0.57–1)
CRP SERPL-MCNC: 1.37 MG/DL (ref 0–0.5)
DEPRECATED RDW RBC AUTO: 45.1 FL (ref 37–54)
EOSINOPHIL # BLD AUTO: 0.14 10*3/MM3 (ref 0–0.4)
EOSINOPHIL NFR BLD AUTO: 1.1 % (ref 0.3–6.2)
ERYTHROCYTE [DISTWIDTH] IN BLOOD BY AUTOMATED COUNT: 14.4 % (ref 12.3–15.4)
GFR SERPL CREATININE-BSD FRML MDRD: 17 ML/MIN/1.73
GLOBULIN UR ELPH-MCNC: 4.7 GM/DL
GLUCOSE BLDC GLUCOMTR-MCNC: 153 MG/DL (ref 70–130)
GLUCOSE BLDC GLUCOMTR-MCNC: 77 MG/DL (ref 70–130)
GLUCOSE BLDC GLUCOMTR-MCNC: 92 MG/DL (ref 70–130)
GLUCOSE SERPL-MCNC: 87 MG/DL (ref 65–99)
GLUCOSE UR STRIP-MCNC: NEGATIVE MG/DL
HAV IGM SERPL QL IA: NORMAL
HBA1C MFR BLD: 7.3 % (ref 4.8–5.6)
HBV CORE IGM SERPL QL IA: NORMAL
HBV SURFACE AG SERPL QL IA: NORMAL
HCO3 BLDA-SCNC: 22.8 MMOL/L (ref 20–26)
HCT VFR BLD AUTO: 33.5 % (ref 34–46.6)
HCT VFR BLD CALC: 30.4 % (ref 38–51)
HCV AB SER DONR QL: NORMAL
HGB BLD-MCNC: 9.8 G/DL (ref 12–15.9)
HGB BLDA-MCNC: 9.9 G/DL (ref 13.5–17.5)
HGB UR QL STRIP.AUTO: NEGATIVE
HOLD SPECIMEN: NORMAL
HOLD SPECIMEN: NORMAL
HYALINE CASTS UR QL AUTO: NORMAL /LPF
IMM GRANULOCYTES # BLD AUTO: 0.04 10*3/MM3 (ref 0–0.05)
IMM GRANULOCYTES NFR BLD AUTO: 0.3 % (ref 0–0.5)
INHALED O2 CONCENTRATION: 21 %
INR PPP: 1.28 (ref 0.9–1.1)
KETONES UR QL STRIP: NEGATIVE
LEUKOCYTE ESTERASE UR QL STRIP.AUTO: NEGATIVE
LYMPHOCYTES # BLD AUTO: 2.07 10*3/MM3 (ref 0.7–3.1)
LYMPHOCYTES NFR BLD AUTO: 16.8 % (ref 19.6–45.3)
Lab: ABNORMAL
MAGNESIUM SERPL-MCNC: 2.9 MG/DL (ref 1.6–2.4)
MCH RBC QN AUTO: 25.4 PG (ref 26.6–33)
MCHC RBC AUTO-ENTMCNC: 29.3 G/DL (ref 31.5–35.7)
MCV RBC AUTO: 86.8 FL (ref 79–97)
METHGB BLD QL: 0.3 % (ref 0–3)
MODALITY: ABNORMAL
MONOCYTES # BLD AUTO: 0.38 10*3/MM3 (ref 0.1–0.9)
MONOCYTES NFR BLD AUTO: 3.1 % (ref 5–12)
NEUTROPHILS NFR BLD AUTO: 78.3 % (ref 42.7–76)
NEUTROPHILS NFR BLD AUTO: 9.64 10*3/MM3 (ref 1.7–7)
NITRITE UR QL STRIP: NEGATIVE
NOTE: ABNORMAL
NRBC BLD AUTO-RTO: 0 /100 WBC (ref 0–0.2)
NT-PROBNP SERPL-MCNC: 2940 PG/ML (ref 0–1800)
OXYHGB MFR BLDV: 88.8 % (ref 94–99)
PCO2 BLDA: 43.1 MM HG (ref 35–45)
PCO2 TEMP ADJ BLD: ABNORMAL MM[HG]
PH BLDA: 7.33 PH UNITS (ref 7.35–7.45)
PH UR STRIP.AUTO: <=5 [PH] (ref 5–8)
PH, TEMP CORRECTED: ABNORMAL
PLATELET # BLD AUTO: 320 10*3/MM3 (ref 140–450)
PMV BLD AUTO: 9.7 FL (ref 6–12)
PO2 BLDA: 60.4 MM HG (ref 83–108)
PO2 TEMP ADJ BLD: ABNORMAL MM[HG]
POTASSIUM SERPL-SCNC: 5.4 MMOL/L (ref 3.5–5.2)
PROT SERPL-MCNC: 8.5 G/DL (ref 6–8.5)
PROT UR QL STRIP: ABNORMAL
PROTHROMBIN TIME: 15.8 SECONDS (ref 11.9–14.1)
QT INTERVAL: 496 MS
QT INTERVAL: 562 MS
QT INTERVAL: 570 MS
QTC INTERVAL: 496 MS
QTC INTERVAL: 498 MS
QTC INTERVAL: 502 MS
RBC # BLD AUTO: 3.86 10*6/MM3 (ref 3.77–5.28)
RBC # UR: NORMAL /HPF
REF LAB TEST METHOD: NORMAL
SAO2 % BLDCOA: 90.7 % (ref 94–99)
SARS-COV-2 RDRP RESP QL NAA+PROBE: NOT DETECTED
SODIUM SERPL-SCNC: 140 MMOL/L (ref 136–145)
SP GR UR STRIP: 1.01 (ref 1–1.03)
SQUAMOUS #/AREA URNS HPF: NORMAL /HPF
TROPONIN T SERPL-MCNC: <0.01 NG/ML (ref 0–0.03)
TSH SERPL DL<=0.05 MIU/L-ACNC: 5.41 UIU/ML (ref 0.27–4.2)
UROBILINOGEN UR QL STRIP: ABNORMAL
VENTILATOR MODE: ABNORMAL
WBC # BLD AUTO: 12.32 10*3/MM3 (ref 3.4–10.8)
WBC UR QL AUTO: NORMAL /HPF
WHOLE BLOOD HOLD SPECIMEN: NORMAL
WHOLE BLOOD HOLD SPECIMEN: NORMAL

## 2020-11-03 PROCEDURE — 84484 ASSAY OF TROPONIN QUANT: CPT | Performed by: INTERNAL MEDICINE

## 2020-11-03 PROCEDURE — 93010 ELECTROCARDIOGRAM REPORT: CPT | Performed by: INTERNAL MEDICINE

## 2020-11-03 PROCEDURE — 84443 ASSAY THYROID STIM HORMONE: CPT | Performed by: PHYSICIAN ASSISTANT

## 2020-11-03 PROCEDURE — 71045 X-RAY EXAM CHEST 1 VIEW: CPT

## 2020-11-03 PROCEDURE — 93005 ELECTROCARDIOGRAM TRACING: CPT | Performed by: INTERNAL MEDICINE

## 2020-11-03 PROCEDURE — 99441 PR PHYS/QHP TELEPHONE EVALUATION 5-10 MIN: CPT | Performed by: PHYSICIAN ASSISTANT

## 2020-11-03 PROCEDURE — 82805 BLOOD GASES W/O2 SATURATION: CPT

## 2020-11-03 PROCEDURE — 99223 1ST HOSP IP/OBS HIGH 75: CPT | Performed by: PHYSICIAN ASSISTANT

## 2020-11-03 PROCEDURE — 85730 THROMBOPLASTIN TIME PARTIAL: CPT | Performed by: PHYSICIAN ASSISTANT

## 2020-11-03 PROCEDURE — 85025 COMPLETE CBC W/AUTO DIFF WBC: CPT | Performed by: PHYSICIAN ASSISTANT

## 2020-11-03 PROCEDURE — 25010000002 HEPARIN (PORCINE) PER 1000 UNITS: Performed by: INTERNAL MEDICINE

## 2020-11-03 PROCEDURE — 83036 HEMOGLOBIN GLYCOSYLATED A1C: CPT | Performed by: PHYSICIAN ASSISTANT

## 2020-11-03 PROCEDURE — 83880 ASSAY OF NATRIURETIC PEPTIDE: CPT | Performed by: PHYSICIAN ASSISTANT

## 2020-11-03 PROCEDURE — 71045 X-RAY EXAM CHEST 1 VIEW: CPT | Performed by: RADIOLOGY

## 2020-11-03 PROCEDURE — 93005 ELECTROCARDIOGRAM TRACING: CPT | Performed by: PHYSICIAN ASSISTANT

## 2020-11-03 PROCEDURE — 94799 UNLISTED PULMONARY SVC/PX: CPT

## 2020-11-03 PROCEDURE — 83735 ASSAY OF MAGNESIUM: CPT | Performed by: PHYSICIAN ASSISTANT

## 2020-11-03 PROCEDURE — 80053 COMPREHEN METABOLIC PANEL: CPT | Performed by: PHYSICIAN ASSISTANT

## 2020-11-03 PROCEDURE — 86140 C-REACTIVE PROTEIN: CPT | Performed by: PHYSICIAN ASSISTANT

## 2020-11-03 PROCEDURE — 99284 EMERGENCY DEPT VISIT MOD MDM: CPT

## 2020-11-03 PROCEDURE — 87040 BLOOD CULTURE FOR BACTERIA: CPT | Performed by: PHYSICIAN ASSISTANT

## 2020-11-03 PROCEDURE — 36600 WITHDRAWAL OF ARTERIAL BLOOD: CPT

## 2020-11-03 PROCEDURE — 84484 ASSAY OF TROPONIN QUANT: CPT | Performed by: PHYSICIAN ASSISTANT

## 2020-11-03 PROCEDURE — 81001 URINALYSIS AUTO W/SCOPE: CPT | Performed by: PHYSICIAN ASSISTANT

## 2020-11-03 PROCEDURE — 83050 HGB METHEMOGLOBIN QUAN: CPT

## 2020-11-03 PROCEDURE — 87635 SARS-COV-2 COVID-19 AMP PRB: CPT | Performed by: PHYSICIAN ASSISTANT

## 2020-11-03 PROCEDURE — 82962 GLUCOSE BLOOD TEST: CPT

## 2020-11-03 PROCEDURE — 85610 PROTHROMBIN TIME: CPT | Performed by: PHYSICIAN ASSISTANT

## 2020-11-03 PROCEDURE — 80074 ACUTE HEPATITIS PANEL: CPT | Performed by: PHYSICIAN ASSISTANT

## 2020-11-03 PROCEDURE — 82375 ASSAY CARBOXYHB QUANT: CPT

## 2020-11-03 RX ORDER — BUMETANIDE 0.25 MG/ML
1 INJECTION INTRAMUSCULAR; INTRAVENOUS ONCE
Status: COMPLETED | OUTPATIENT
Start: 2020-11-03 | End: 2020-11-03

## 2020-11-03 RX ORDER — ATORVASTATIN CALCIUM 40 MG/1
40 TABLET, FILM COATED ORAL DAILY
Status: CANCELLED | OUTPATIENT
Start: 2020-11-03

## 2020-11-03 RX ORDER — ASPIRIN 81 MG/1
81 TABLET ORAL DAILY
Status: DISCONTINUED | OUTPATIENT
Start: 2020-11-04 | End: 2020-11-08 | Stop reason: HOSPADM

## 2020-11-03 RX ORDER — CLONIDINE HYDROCHLORIDE 0.2 MG/1
0.2 TABLET ORAL 3 TIMES DAILY
Status: DISCONTINUED | OUTPATIENT
Start: 2020-11-03 | End: 2020-11-08 | Stop reason: HOSPADM

## 2020-11-03 RX ORDER — SODIUM CHLORIDE 0.9 % (FLUSH) 0.9 %
10 SYRINGE (ML) INJECTION EVERY 12 HOURS SCHEDULED
Status: DISCONTINUED | OUTPATIENT
Start: 2020-11-03 | End: 2020-11-08 | Stop reason: HOSPADM

## 2020-11-03 RX ORDER — ISOSORBIDE MONONITRATE 30 MG/1
30 TABLET, EXTENDED RELEASE ORAL DAILY
Status: DISCONTINUED | OUTPATIENT
Start: 2020-11-04 | End: 2020-11-07

## 2020-11-03 RX ORDER — HEPARIN SODIUM 5000 [USP'U]/ML
5000 INJECTION, SOLUTION INTRAVENOUS; SUBCUTANEOUS EVERY 12 HOURS SCHEDULED
Status: DISCONTINUED | OUTPATIENT
Start: 2020-11-03 | End: 2020-11-08 | Stop reason: HOSPADM

## 2020-11-03 RX ORDER — FERROUS SULFATE 325(65) MG
325 TABLET ORAL 2 TIMES DAILY WITH MEALS
Status: DISCONTINUED | OUTPATIENT
Start: 2020-11-03 | End: 2020-11-08 | Stop reason: HOSPADM

## 2020-11-03 RX ORDER — LISINOPRIL 10 MG/1
40 TABLET ORAL
Status: CANCELLED | OUTPATIENT
Start: 2020-11-04

## 2020-11-03 RX ORDER — SODIUM CHLORIDE 0.9 % (FLUSH) 0.9 %
10 SYRINGE (ML) INJECTION AS NEEDED
Status: DISCONTINUED | OUTPATIENT
Start: 2020-11-03 | End: 2020-11-08 | Stop reason: HOSPADM

## 2020-11-03 RX ORDER — NICOTINE POLACRILEX 4 MG
15 LOZENGE BUCCAL
Status: DISCONTINUED | OUTPATIENT
Start: 2020-11-03 | End: 2020-11-08 | Stop reason: HOSPADM

## 2020-11-03 RX ORDER — DEXTROSE MONOHYDRATE 25 G/50ML
25 INJECTION, SOLUTION INTRAVENOUS
Status: DISCONTINUED | OUTPATIENT
Start: 2020-11-03 | End: 2020-11-08 | Stop reason: HOSPADM

## 2020-11-03 RX ORDER — METOPROLOL TARTRATE 100 MG/1
100 TABLET ORAL 2 TIMES DAILY
Status: CANCELLED | OUTPATIENT
Start: 2020-11-03

## 2020-11-03 RX ORDER — SODIUM POLYSTYRENE SULFONATE 4.1 MEQ/G
15 POWDER, FOR SUSPENSION ORAL; RECTAL ONCE
Status: COMPLETED | OUTPATIENT
Start: 2020-11-03 | End: 2020-11-03

## 2020-11-03 RX ORDER — ATORVASTATIN CALCIUM 40 MG/1
40 TABLET, FILM COATED ORAL NIGHTLY
Status: DISCONTINUED | OUTPATIENT
Start: 2020-11-03 | End: 2020-11-08 | Stop reason: HOSPADM

## 2020-11-03 RX ORDER — ATORVASTATIN CALCIUM 40 MG/1
40 TABLET, FILM COATED ORAL NIGHTLY
COMMUNITY
End: 2020-12-01 | Stop reason: SDUPTHER

## 2020-11-03 RX ORDER — AMLODIPINE BESYLATE 10 MG/1
10 TABLET ORAL NIGHTLY
Status: CANCELLED | OUTPATIENT
Start: 2020-11-04

## 2020-11-03 RX ORDER — FUROSEMIDE 20 MG/1
20 TABLET ORAL DAILY
Status: CANCELLED | OUTPATIENT
Start: 2020-11-04

## 2020-11-03 RX ORDER — ESCITALOPRAM OXALATE 10 MG/1
10 TABLET ORAL DAILY
Status: DISCONTINUED | OUTPATIENT
Start: 2020-11-04 | End: 2020-11-08 | Stop reason: HOSPADM

## 2020-11-03 RX ORDER — NITROGLYCERIN 0.4 MG/1
0.4 TABLET SUBLINGUAL
Status: DISCONTINUED | OUTPATIENT
Start: 2020-11-03 | End: 2020-11-08 | Stop reason: HOSPADM

## 2020-11-03 RX ORDER — AMLODIPINE BESYLATE 10 MG/1
10 TABLET ORAL NIGHTLY
Status: DISCONTINUED | OUTPATIENT
Start: 2020-11-03 | End: 2020-11-08 | Stop reason: HOSPADM

## 2020-11-03 RX ORDER — HYDROCHLOROTHIAZIDE 25 MG/1
25 TABLET ORAL DAILY
Status: CANCELLED | OUTPATIENT
Start: 2020-11-04

## 2020-11-03 RX ADMIN — AMLODIPINE BESYLATE 10 MG: 10 TABLET ORAL at 20:10

## 2020-11-03 RX ADMIN — BUMETANIDE 1 MG: 0.25 INJECTION, SOLUTION INTRAMUSCULAR; INTRAVENOUS at 14:09

## 2020-11-03 RX ADMIN — BUMETANIDE 1 MG: 0.25 INJECTION INTRAMUSCULAR; INTRAVENOUS at 12:55

## 2020-11-03 RX ADMIN — CLONIDINE HYDROCHLORIDE 0.2 MG: 0.2 TABLET ORAL at 20:10

## 2020-11-03 RX ADMIN — ATORVASTATIN CALCIUM 40 MG: 40 TABLET, FILM COATED ORAL at 20:10

## 2020-11-03 RX ADMIN — FERROUS SULFATE TAB 325 MG (65 MG ELEMENTAL FE) 325 MG: 325 (65 FE) TAB at 20:10

## 2020-11-03 RX ADMIN — SODIUM POLYSTYRENE SULFONATE 15 G: 1 POWDER ORAL; RECTAL at 20:10

## 2020-11-03 RX ADMIN — HEPARIN SODIUM 5000 UNITS: 5000 INJECTION INTRAVENOUS; SUBCUTANEOUS at 20:10

## 2020-11-03 NOTE — H&P
[facility] Medicine Services  History & Physical    Patient Identification:  Name:  Britta Lee  Age:  75 y.o.  Sex:  female  :  1945  MRN:  [id number]   Visit Number:  [id number]  Admit Date: 11/3/2020   Primary Care Physician:  Lexie Dolan PA    Subjective     Chief complaint: Shortness of breath, possible CHF exacerbation per PCP    History of presenting illness:      Britta Lee is a 75 y.o. female with past medical history significant for CHF, HTN, IDDM2, CKD III, Iron deficiency anemia, hyperlipidemia, morbid obesity.  She was last admitted to this facility from 2020-2020 with acute exacerbation of CHF.     She presented to the ED of this facility on 11/3/2020 for further evaluation of shortness of breath.  She first presented to the office of her PCP. She was evaluated by Physician Assistant Lexie Dolan and referred to ED given concern for acute exacerbation of CHF that is documented by PCP to be systolic in nature, though prior echocardiograms within system do not further specify systolic +/- diastolic.       In any event, upon arrival to the ED, vital signs were T 96.6F, HR 46, RR 22, and /89.  Potassium was found to be 5.4 with creatinine of 2.67 and estimated glomerular filtration rate of 17 with documented prior history of CKD stage IV.  Blood cell count was found to be 12.32 with hemoglobin of 9.8 and platelet count of 320.  C-reactive protein was found to be mildly elevated at 1.37 with magnesium of 2.9.    Mrs. Lee has recently been taking Lasix 20mg daily with worsening dyspnea over 3 weeks. She reports orthopnea and worsening swelling in her bilateral lower extremities.  Weight in ED is 8lbs more than prior weights within system, though the majority of these appear to be stated.  Mrs. Lee reports worsening dyspnea on exertion in addition to orthopnea.  She reports worsening swelling of her bilateral lower extremities.  She does have a  blister on her right foot and she thinks this may have been from cooking but is really unable to identify any time during which she may have spilled something on her foot.  She reports she has been taking Lasix daily and tells me she also has an upcoming appoint with Dr. Johnson (Nov 16th per her account). She reports significant chest pressure     In regard to insulin she tells me that she uses Humalog at what sounds like a possible sliding scale with meals and uses 80 units of Toujeo nightly. Parvin Ta NP helps with her DM.  She tells me is supposed to see her Nov 11th. She denies use of tobacco or alcohol and does puzzles for fun.     Known Emergency Department medications received prior to my evaluation included IV Bumex 1mg x2 doses.     ---------------------------------------------------------------------------------------------------------------------   Review of Systems   Constitutional: Positive for fatigue. Negative for activity change and chills.   HENT: Negative for congestion and drooling.    Eyes: Negative for pain and discharge.   Respiratory: Positive for shortness of breath. Negative for cough and wheezing.    Cardiovascular: Positive for leg swelling. Negative for chest pain.   Gastrointestinal: Negative for abdominal distention, constipation, diarrhea and vomiting.   Endocrine: Negative for cold intolerance and heat intolerance.   Genitourinary: Negative for difficulty urinating and dysuria.   Musculoskeletal: Negative for arthralgias and gait problem.   Skin: Positive for wound. Negative for color change.   Allergic/Immunologic: Negative for environmental allergies.   Neurological: Negative for dizziness and headaches.   Hematological: Negative for adenopathy. Does not bruise/bleed easily.   Psychiatric/Behavioral: Negative for agitation and confusion.        ---------------------------------------------------------------------------------------------------------------------   Past Medical  History:   Diagnosis Date   • Anemia    • Arthritis    • Carpal tunnel syndrome    • CHF (congestive heart failure) (CMS/[facility])    • Coronary artery disease    • Diabetes mellitus (CMS/HCC)    • Elevated cholesterol    • GERD (gastroesophageal reflux disease)    • Heart disease    • High cholesterol    • History of pneumonia    • History of unsteady gait     OCASSIONALLY   • Hypertension    • Insomnia    • Kidney disease    • Osteoarthritis    • Renal insufficiency    • Sciatica      Past Surgical History:   Procedure Laterality Date   • ABDOMINAL SURGERY     • APPENDECTOMY     • CARDIAC CATHETERIZATION      1 STENT  ---  2000   • CARDIAC SURGERY     • CAROTID STENT     • CARPAL TUNNEL RELEASE Right 10/8/2019    Procedure: CARPAL TUNNEL RELEASE;  Surgeon: Feroz Johnson MD;  Location: [facility];  Service: Orthopedics   • CATARACT EXTRACTION     • CHOLECYSTECTOMY     • COLONOSCOPY     • CORONARY ANGIOPLASTY WITH STENT PLACEMENT     • ENDOSCOPY     • ENDOSCOPY N/A 3/5/2020    Procedure: ESOPHAGOGASTRODUODENOSCOPY;  Surgeon: Alexandru Bagley MD;  Location: [facility];  Service: Gastroenterology;  Laterality: N/A;   • ENDOSCOPY AND COLONOSCOPY     • GALLBLADDER SURGERY     • JOINT REPLACEMENT Left 05/02/2017    [facility]  DR JOHNSON  LEFT TOTAL KNEE   • KNEE ARTHROSCOPY W/ MENISCECTOMY Right    • LAPAROSCOPIC TUBAL LIGATION     • MT TOTAL KNEE ARTHROPLASTY Left 5/2/2017    Procedure: TOTAL KNEE ARTHROPLASTY;  Surgeon: Feroz Johnson MD;  Location: [facility];  Service: Orthopedics   • STERILIZATION     • TONSILLECTOMY       Family History   Problem Relation Age of Onset   • Arthritis Mother    • Diabetes Mother    • Cancer Mother    • Heart disease Father    • Diabetes Daughter    • Diabetes Son    • Diabetes Maternal Aunt    • Heart disease Maternal Grandmother    • Breast cancer Neg Hx      Social History     Socioeconomic History   • Marital status:      Spouse name: natalie   • Number of children: 3   •  Years of education: 12   • Highest education level: Not on file   Occupational History   • Occupation: retired   Social Needs   • Financial resource strain: Somewhat hard   • Food insecurity     Worry: Never true     Inability: Never true   • Transportation needs     Medical: Yes     Non-medical: No   Tobacco Use   • Smoking status: Never Smoker   • Smokeless tobacco: Never Used   Substance and Sexual Activity   • Alcohol use: Yes     Comment: socially   • Drug use: No   • Sexual activity: Defer     Birth control/protection: Surgical   Lifestyle   • Physical activity     Days per week: 0 days     Minutes per session: 0 min   • Stress: Only a little     ---------------------------------------------------------------------------------------------------------------------   Allergies:  Patient has no known allergies.  ---------------------------------------------------------------------------------------------------------------------   Home medications:    Medications below are reported home medications pulling from within the system; at this time, these medications have not been reconciled unless otherwise specified and are in the verification process for further verifcation as current home medications.  Medications Prior to Admission   Medication Sig Dispense Refill Last Dose   • Accu-Chek FastClix Lancets misc 1 each by Other route 3 (Three) Times a Day.      • ACCU-CHEK GUIDE test strip 1 each by Other route 3 (Three) Times a Day.      • acetaminophen (TYLENOL) 500 MG tablet Take 500 mg by mouth Every 6 (Six) Hours As Needed for Mild Pain .      • Alcohol Swabs (GLOBAL ALCOHOL PREP EASE) 70 % pads       • amLODIPine (NORVASC) 10 MG tablet Take 1 tablet by mouth Every Night. 90 tablet 1    • aspirin 81 MG tablet Take 1 tablet by mouth Daily. 30 tablet 5    • atorvastatin (LIPITOR) 40 MG tablet Take 1 tablet by mouth Daily. (Patient taking differently: Take 20 mg by mouth Daily.) 90 tablet 3    • Blood Glucose  Monitoring Suppl (BLOOD GLUCOSE MONITOR SYSTEM) w/Device kit 1 Device Daily. 1 each 0    • calcium carb-cholecalciferol (CALCIUM 600/VITAMIN D3) 600-800 MG-UNIT tablet Take 1 tablet by mouth 2 (Two) Times a Day With Meals.      • cloNIDine (CATAPRES) 0.2 MG tablet Take 1 tablet by mouth 3 (Three) Times a Day. 270 tablet 3    • escitalopram (LEXAPRO) 10 MG tablet Take 1 tablet by mouth Daily. 90 tablet 3    • ferrous sulfate 325 (65 Fe) MG tablet Take 1 tablet by mouth 2 (Two) Times a Day. 180 tablet 3    • furosemide (Lasix) 20 MG tablet Take 1 tablet by mouth Daily. 1 tablet 0    • GLOBAL EASE INJECT PEN NEEDLES 31G X 5 MM misc       • hydroCHLOROthiazide (HYDRODIURIL) 25 MG tablet Take 1 tablet by mouth Daily. 90 tablet 1    • Insulin Glargine, 1 Unit Dial, (Toujeo SoloStar) 300 UNIT/ML solution pen-injector injection Inject 80 Units under the skin into the appropriate area as directed Daily. 5 pen 5    • insulin lispro (humaLOG) 100 UNIT/ML injection Inject 25 Units under the skin into the appropriate area as directed Every Evening. Prior to [facility] Admission, Patient was on: 20 units in the morning, 25 in the evening, 5 at night      • isosorbide mononitrate (IMDUR) 30 MG 24 hr tablet Take 1 tablet by mouth Daily. 90 tablet 3    • metoprolol tartrate (LOPRESSOR) 100 MG tablet Take 1 tablet by mouth 2 (Two) Times a Day. 180 tablet 3    • quinapril (ACCUPRIL) 40 MG tablet Take 1 tablet by mouth Daily. 30 tablet 5    • TOUJEO MAX SOLOSTAR 300 UNIT/ML solution pen-injector injection INJECT 80 UNITS SUBCUTANEOUSLY EVERY NIGHT AT BEDTIME      • vitamin D (ERGOCALCIFEROL) 1.25 MG (13133 UT) capsule capsule Take 1 capsule by mouth 1 (One) Time Per Week. Takes wednesdays 90 capsule 3        Hospital Scheduled Meds:  heparin (porcine), 5,000 Units, Subcutaneous, Q12H  sodium chloride, 10 mL, Intravenous, Q12H  sodium chloride, 10 mL, Intravenous, Q12H           Current listed hospital scheduled medications may not yet  reflect those currently placed in orders that are signed and held awaiting patient's arrival to floor.   ---------------------------------------------------------------------------------------------------------------------     Objective     Vital Signs:  Temp:  [96.6 °F (35.9 °C)-98 °F (36.7 °C)] 98 °F (36.7 °C)  Heart Rate:  [46-56] 56  Resp:  [18-22] 18  BP: (121-171)/(53-89) 171/69      20  1133 20  1531   Weight: 110 kg (242 lb 8.1 oz) 113 kg (248 lb 9.6 oz)     Body mass index is 44.04 kg/m².  ---------------------------------------------------------------------------------------------------------------------       Physical Exam  Constitutional:       Appearance: Normal appearance. She is well-developed. She is not ill-appearing or toxic-appearing.      Interventions: Nasal cannula in place.   HENT:      Head: Normocephalic and atraumatic.      Mouth/Throat:      Dentition: Abnormal dentition.     Eyes:      General: Lids are normal.      Conjunctiva/sclera:      Right eye: Right conjunctiva is not injected.      Left eye: Left conjunctiva is not injected.   Cardiovascular:      Rate and Rhythm: Normal rate and regular rhythm.      Heart sounds: S1 normal and S2 normal.   Pulmonary:      Effort: No tachypnea or bradypnea.      Breath sounds: Examination of the right-lower field reveals decreased breath sounds. Examination of the left-lower field reveals decreased breath sounds. Decreased breath sounds present. No wheezing, rhonchi or rales.   Abdominal:      General: Bowel sounds are normal.      Palpations: Abdomen is soft.      Tenderness: There is no abdominal tenderness.   Musculoskeletal:      Right forearm: She exhibits edema.      Left forearm: She exhibits edema.      Right lower le+ Pitting Edema present.      Left lower le+ Pitting Edema present.   Skin:     General: Skin is warm and dry.          Neurological:      Mental Status: She is alert and oriented to person, place, and  time.   Psychiatric:         Attention and Perception: Attention normal.         Mood and Affect: Mood normal.         Behavior: Behavior is cooperative.               ---------------------------------------------------------------------------------------------------------------------  EKG:                      ---------------------------------------------------------------------------------------------------------------------   Results from last 7 days   Lab Units 11/03/20  1128   CRP mg/dL 1.37*   WBC 10*3/mm3 12.32*   HEMOGLOBIN g/dL 9.8*   HEMATOCRIT % 33.5*   MCV fL 86.8   MCHC g/dL 29.3*   PLATELETS 10*3/mm3 320   INR  1.28*     Results from last 7 days   Lab Units 11/03/20  1136   PH, ARTERIAL pH units 7.332*   PO2 ART mm Hg 60.4*   PCO2, ARTERIAL mm Hg 43.1   HCO3 ART mmol/L 22.8     Results from last 7 days   Lab Units 11/03/20  1128   SODIUM mmol/L 140   POTASSIUM mmol/L 5.4*   MAGNESIUM mg/dL 2.9*   CHLORIDE mmol/L 105   CO2 mmol/L 19.7*   BUN mg/dL 63*   CREATININE mg/dL 2.67*   EGFR IF NONAFRICN AM mL/min/1.73 17*   CALCIUM mg/dL 9.6   GLUCOSE mg/dL 87   ALBUMIN g/dL 3.84   BILIRUBIN mg/dL 0.4   ALK PHOS U/L 280*   AST (SGOT) U/L 53*   ALT (SGPT) U/L 35*   Estimated Creatinine Clearance: 21.3 mL/min (A) (by C-G formula based on SCr of 2.67 mg/dL (H)).  No results found for: AMMONIA  Results from last 7 days   Lab Units 11/03/20  1358 11/03/20  1128   TROPONIN T ng/mL <0.010 <0.010     Results from last 7 days   Lab Units 11/03/20  1128   PROBNP pg/mL 2,940.0*     Lab Results   Component Value Date    HGBA1C 8.40 (H) 09/04/2020     Lab Results   Component Value Date    TSH 5.410 (H) 11/03/2020    FREET4 1.03 03/26/2018     No results found for: PREGTESTUR, PREGSERUM, HCG, HCGQUANT  Pain Management Panel     Pain Management Panel Latest Ref Rng & Units 9/4/2020 3/13/2020    CREATININE UR mg/dL 56.6 118.1        No results found for: BLOODCX  No results found for: URINECX  No results found for: WOUNDCX  No  results found for: STOOLCX      ---------------------------------------------------------------------------------------------------------------------  Imaging Results (Last 7 Days)     Procedure Component Value Units Date/Time    XR Chest 1 View [[id number]] Collected: 20 120     Updated: 20 120    Narrative:      EXAMINATION: XR CHEST 1 VW-      CLINICAL INDICATION:     sob, edema     TECHNIQUE:  XR CHEST 1 VW-      COMPARISON: NONE      FINDINGS:      Bibasilar airspace disease likely atelectasis.   Cardiomegaly. Pulmonary vascular congestion.   No pneumothorax.   Small pleural effusions.   No acute osseous findings.          Impression:      CHF/edema with small effusions.     This report was finalized on 11/3/2020 12:03 PM by Dr. Guillermo Macias MD.             Cultures:  No results found for: BLOODCX, URINECX, WOUNDCX, MRSACX, RESPCX, STOOLCX    Last echocardiogram:  Results for orders placed in visit on 20   SCANNED - ECHOCARDIOGRAM       Britta Lee  Echo Complete w/Doppler and Color Flow  Order# [id number]  Reading physician: Ole Delarosa MD Ordering physician: Dasha Claire APRN Study date: 20   Patient Information    Patient Name   Britta Lee MRN   [id number] Sex   Female  (Age)   1945 (75 y.o.)   Sedation Narrator Report    Sedation Narrator Report   Interpretation Summary    · Estimated EF = 50%.  · Left ventricular systolic function is normal.  · Left ventricular wall thickness is consistent with moderate concentric hypertrophy.  · Mild mitral valve regurgitation is present  · Mild gradient was noted across the mitral valve.         I have personally reviewed the above radiology images and read the final radiology report on 20  ---------------------------------------------------------------------------------------------------------------------  Assessment / Plan     Active Hospital Problems    Diagnosis POA   • Acute on chronic congestive heart failure  (CMS/[facility]) [I50.9] Yes       ASSESSMENT/PLAN:  · Acute on chronic CHF:  -February 2020 echocardiogram reviewed with EF 50% in early 2020.   -IV Bumex 1mg x 2 in ED @1132 and 1352.  Will monitor output response with further dosing.   -Update echocardiogram to evaluate for drop in EF.   -Orders placed to titrate oxygen saturation greater than or equal to 90%.    -Monitor daily weight I/O.   -Cardiology consulted for assistance with diuresis and medication optimization.  -Monitor telemetry per protocol.  -Beta blocker held at present due to bradycardia (Metoprolol 100mg BID).     · ETHEL on CKD IV:   -Creatinine is 2.67 with baseline appearing to be 1.5-1.7, though last creatinine in 9/2020 found to be 2.17. Will follow response to Bumex.   -AM BMP.   -ACEI Held  -100mg/dL protein on UA  -9/4/2020 protein/creatinine ratio 2173.1    · Mild hypoglycemia with known DM2, ID:   -FSBG ACHS with SSI PRN.   -She is on Toujeo 80u nightly at home.  We have Levemir on formulary; however, given near hypoglycemia will decrease dose to 30units and place hold instruction for hypoglycemia.   -Consistent carb diet   -Hemoglobin a1c 8.4% on 9/4/2020    · Hyperkalemia:   -Potassium 5.4.    -Bradycardic on HR.    -Kayexelate x1 and repeat potassium.    · Sinus bradycardia:   -TSH mildly elevated.   -Telemetry monitoring.  -Treat hyperkalemia.   -Hold home Metoprolol 100mg BID.     · Hypermagnesemia:  -Recheck AM Mag.     · Mild transaminitis possibly 2/2 congestive hepatopathy with CHF:   -History of such with negative hepatitis panel in Feb 2020  -Repeat Hepatitis panel and monitor.   -INR mildly elevated.   -Possibly add US liver if transaminases keep elevating.      · Mildly elevated TSH:   -Possibly subclinical elevation.   -Add free t4.   -Recheck & TSH as an outpatient.     · Leukocytosis:   -UA unremarkable.   -CXR with changes of CHF.     · Essential Hypertension:   -Imdur on board with holding parameters.   -Metoprolol held 2/2  bradycardia.  -ACEI held 2/2 CKD IV.   -Clonidine and Norvasc continued with hold parameters      ----------  -DVT prophylaxis:  SQ Heparin  -Activity: Up with assist  -Diet:   Dietary Orders (From admission, onward)     Start     Ordered    11/03/20 1600  Diet Regular; Cardiac, Renal  Diet Effective Now     Question Answer Comment   Diet Texture / Consistency Regular    Common Modifiers Cardiac    Common Modifiers Renal        11/03/20 1559                -Expected length of stay:   INPATIENT status due to the need for care which can only be reasonably provided in an hospital setting such as aggressive/expedited ancillary services and/or consultation services, the necessity for IV medications, close physician monitoring and/or the possible need for procedures.  In such, I feel patient’s risk for adverse outcomes and need for care warrant INPATIENT evaluation and predict the patient’s care encounter to likely last beyond 2 midnights.        High risk secondary to acute CHF with ETHEL on CKD IV with close monitoring for further diuresis.     Emily Kincaid PA-C  11/03/20  16:21 EST  Pager # [phone]  ---------------------------------------------------------------------------------------------------------------------

## 2020-11-03 NOTE — PROGRESS NOTES
Subjective   Britta Lee is a 75 y.o. female.     Telephone Visit    You have chosen to receive care through a telephone visit. Do you consent to use a telephone visit for your medical care today? Yes    This visit has been rescheduled as a phone visit to comply with patient safety concerns in accordance with CDC recommendations. Total time of discussion was 9 minutes.    Chief Complaint -shortness of breath    History of Present Illness -      Shortness of breath-  She complains of shortness of breath that is worse with laying supine or any exertion.  She reports associated wheezing.  Minimal relief with furosemide 20 mg daily.    Congestive heart failure-not at goal due to acute shortness of breath    Chronic renal disease-  Not at goal with estimated GFR of 22 on September 4, 2020.  She is followed by nephrology.    The following portions of the patient's history were reviewed and updated as appropriate: allergies, current medications, past family history, past medical history, past social history, past surgical history and problem list.    Review of Systems   Constitutional: Positive for activity change and fatigue. Negative for appetite change and fever.   HENT: Negative for ear pain, sinus pressure and sore throat.    Eyes: Negative for pain and visual disturbance.   Respiratory: Positive for shortness of breath and wheezing. Negative for cough and chest tightness.    Cardiovascular: Negative for chest pain and palpitations.   Gastrointestinal: Negative for abdominal pain, constipation, diarrhea, nausea and vomiting.   Endocrine: Negative for polydipsia and polyuria.   Genitourinary: Negative for dysuria and frequency.   Musculoskeletal: Negative for back pain and myalgias.   Skin: Negative for color change and rash.   Allergic/Immunologic: Negative for food allergies and immunocompromised state.   Neurological: Negative for dizziness, syncope and headaches.   Hematological: Negative for adenopathy. Does not  "bruise/bleed easily.   Psychiatric/Behavioral: Negative for hallucinations and suicidal ideas. The patient is not nervous/anxious.        Ht 160 cm (63\")   Wt 109 kg (240 lb) Comment: Patient reported on telephone today  BMI 42.51 kg/m²   Lab on 09/04/2020   Component Date Value Ref Range Status   • Hemoglobin A1C 09/04/2020 8.40* 4.80 - 5.60 % Final   • 25 Hydroxy, Vitamin D 09/04/2020 24.8* 30.0 - 100.0 ng/ml Final   • Microalbumin, Urine 09/04/2020 77.0  mg/dL Final   • Glucose 09/04/2020 62* 65 - 99 mg/dL Final   • BUN 09/04/2020 54* 8 - 23 mg/dL Final   • Creatinine 09/04/2020 2.17* 0.57 - 1.00 mg/dL Final   • Sodium 09/04/2020 137  136 - 145 mmol/L Final   • Potassium 09/04/2020 4.9  3.5 - 5.2 mmol/L Final   • Chloride 09/04/2020 102  98 - 107 mmol/L Final   • CO2 09/04/2020 20.5* 22.0 - 29.0 mmol/L Final   • Calcium 09/04/2020 9.5  8.6 - 10.5 mg/dL Final   • Total Protein 09/04/2020 8.1  6.0 - 8.5 g/dL Final   • Albumin 09/04/2020 3.90  3.50 - 5.20 g/dL Final   • ALT (SGPT) 09/04/2020 20  1 - 33 U/L Final   • AST (SGOT) 09/04/2020 28  1 - 32 U/L Final   • Alkaline Phosphatase 09/04/2020 155* 39 - 117 U/L Final   • Total Bilirubin 09/04/2020 0.3  0.0 - 1.2 mg/dL Final   • eGFR Non African Amer 09/04/2020 22* >60 mL/min/1.73 Final   • Globulin 09/04/2020 4.2  gm/dL Final   • A/G Ratio 09/04/2020 0.9  g/dL Final   • BUN/Creatinine Ratio 09/04/2020 24.9  7.0 - 25.0 Final   • Anion Gap 09/04/2020 14.5  5.0 - 15.0 mmol/L Final   • Total Cholesterol 09/04/2020 125  0 - 200 mg/dL Final   • Triglycerides 09/04/2020 128  0 - 150 mg/dL Final   • HDL Cholesterol 09/04/2020 36* 40 - 60 mg/dL Final   • LDL Cholesterol  09/04/2020 63  0 - 100 mg/dL Final   • VLDL Cholesterol 09/04/2020 25.6  5 - 40 mg/dL Final   • LDL/HDL Ratio 09/04/2020 1.76   Final   • Protein/Creatinine Ratio, Urine 09/04/2020 2,173.1* 0.0 - 200.0 mg/G Crea Final   • Creatinine, Urine 09/04/2020 56.6  mg/dL Final   • Total Protein, Urine 09/04/2020 " 123.0  mg/dL Final   • Color, UA 09/04/2020 Yellow  Yellow, Straw Final   • Appearance, UA 09/04/2020 Clear  Clear Final   • pH, UA 09/04/2020 6.5  5.0 - 8.0 Final   • Specific Gravity, UA 09/04/2020 1.012  1.005 - 1.030 Final   • Glucose, UA 09/04/2020 Negative  Negative Final   • Ketones, UA 09/04/2020 Negative  Negative Final   • Bilirubin, UA 09/04/2020 Negative  Negative Final   • Blood, UA 09/04/2020 Negative  Negative Final   • Protein, UA 09/04/2020 100 mg/dL (2+)* Negative Final   • Leuk Esterase, UA 09/04/2020 Negative  Negative Final   • Nitrite, UA 09/04/2020 Negative  Negative Final   • Urobilinogen, UA 09/04/2020 0.2 E.U./dL  0.2 - 1.0 E.U./dL Final   • RBC, UA 09/04/2020 0-2  None Seen, 0-2 /HPF Final   • WBC, UA 09/04/2020 0-2  None Seen, 0-2 /HPF Final   • Bacteria, UA 09/04/2020 1+* None Seen /HPF Final   • Squamous Epithelial Cells, UA 09/04/2020 0-2  None Seen, 0-2 /HPF Final   • Hyaline Casts, UA 09/04/2020 0-2  None Seen /LPF Final   • Methodology 09/04/2020 Automated Microscopy   Final       Physical Exam  Vitals signs reviewed.   Constitutional:       Comments: Patient sounds lethargic on phone today   Neurological:      Mental Status: She is oriented to person, place, and time.   Psychiatric:         Mood and Affect: Mood normal.         Thought Content: Thought content normal.         Assessment/Plan     Diagnoses and all orders for this visit:    1. Acute on chronic systolic heart failure (CMS/HCC) (Primary)  Comments:  Advised to go to [facility] emergency room for further evaluation and treatment    2. Chronic renal disease, stage IV (CMS/HCC)  Comments:  Continue to monitor            This document has been electronically signed by:  Lexie Dolan PA-C

## 2020-11-03 NOTE — ED PROVIDER NOTES
Subjective   75-year-old female who presents to the ED today for shortness of breath.  She states she has had worsening shortness of breath over the last 3 weeks.  She states she is unable to lay down due to her shortness of breath and it is also worse with exertion.  She states she does have a history of congestive heart failure.  She states she takes Lasix 20 mg once a day but it has not been helping with her symptoms.  She states she has swelling to her bilateral lower extremities that does seem to be getting worse.  She denies any chest pain.  She states she has had a mild cough.  She denies any fever.  She states she does not wear home oxygen.  She is not a smoker.  She had a telehealth visit with her primary care provider today and it was recommended that she come to the ER for an evaluation.  I did review her last echocardiogram which was in February of this year and her EF was 50% at that time.  She also has a history of stage IV chronic kidney disease.  She states she is not a dialysis patient.      History provided by:  Patient  Shortness of Breath  Severity:  Moderate  Onset quality:  Gradual  Duration:  3 weeks  Timing:  Constant  Progression:  Worsening  Chronicity:  New  Relieved by:  Nothing  Worsened by:  Exertion  Associated symptoms: cough    Associated symptoms: no abdominal pain, no chest pain, no diaphoresis, no ear pain, no fever, no headaches, no hemoptysis, no neck pain, no PND, no rash, no sore throat, no sputum production, no syncope, no swollen glands, no vomiting and no wheezing    Risk factors: obesity    Risk factors: no tobacco use        Review of Systems   Constitutional: Negative.  Negative for diaphoresis and fever.   HENT: Negative.  Negative for ear pain and sore throat.    Eyes: Negative.    Respiratory: Positive for cough and shortness of breath. Negative for hemoptysis, sputum production, chest tightness and wheezing.    Cardiovascular: Positive for leg swelling. Negative for  chest pain, syncope and PND.   Gastrointestinal: Negative for abdominal pain, diarrhea, nausea and vomiting.   Genitourinary: Negative.    Musculoskeletal: Negative.  Negative for neck pain.   Skin: Negative for rash.   Neurological: Negative for headaches.   Psychiatric/Behavioral: Negative.    All other systems reviewed and are negative.      Past Medical History:   Diagnosis Date   • Anemia    • Arthritis    • Carpal tunnel syndrome    • CHF (congestive heart failure) (CMS/[facility])    • Coronary artery disease    • Diabetes mellitus (CMS/[facility])    • Elevated cholesterol    • GERD (gastroesophageal reflux disease)    • Heart disease    • High cholesterol    • History of pneumonia    • History of unsteady gait     OCASSIONALLY   • Hypertension    • Insomnia    • Kidney disease    • Osteoarthritis    • Renal insufficiency    • Sciatica        No Known Allergies    Past Surgical History:   Procedure Laterality Date   • ABDOMINAL SURGERY     • APPENDECTOMY     • CARDIAC CATHETERIZATION      1 STENT  ---  2000   • CARDIAC SURGERY     • CAROTID STENT     • CARPAL TUNNEL RELEASE Right 10/8/2019    Procedure: CARPAL TUNNEL RELEASE;  Surgeon: Feroz Johnson MD;  Location: [facility];  Service: Orthopedics   • CATARACT EXTRACTION     • CHOLECYSTECTOMY     • COLONOSCOPY     • CORONARY ANGIOPLASTY WITH STENT PLACEMENT     • ENDOSCOPY     • ENDOSCOPY N/A 3/5/2020    Procedure: ESOPHAGOGASTRODUODENOSCOPY;  Surgeon: Alexandru Bagley MD;  Location: [facility];  Service: Gastroenterology;  Laterality: N/A;   • ENDOSCOPY AND COLONOSCOPY     • GALLBLADDER SURGERY     • JOINT REPLACEMENT Left 05/02/2017    [facility]  DR JOHNSON  LEFT TOTAL KNEE   • KNEE ARTHROSCOPY W/ MENISCECTOMY Right    • LAPAROSCOPIC TUBAL LIGATION     • RI TOTAL KNEE ARTHROPLASTY Left 5/2/2017    Procedure: TOTAL KNEE ARTHROPLASTY;  Surgeon: Feroz Johnson MD;  Location: [facility];  Service: Orthopedics   • STERILIZATION     • TONSILLECTOMY         Family  History   Problem Relation Age of Onset   • Arthritis Mother    • Diabetes Mother    • Cancer Mother    • Heart disease Father    • Diabetes Daughter    • Diabetes Son    • Diabetes Maternal Aunt    • Heart disease Maternal Grandmother    • Breast cancer Neg Hx        Social History     Socioeconomic History   • Marital status:      Spouse name: natalie   • Number of children: 3   • Years of education: 12   • Highest education level: Not on file   Occupational History   • Occupation: retired   Social Needs   • Financial resource strain: Somewhat hard   • Food insecurity     Worry: Never true     Inability: Never true   • Transportation needs     Medical: Yes     Non-medical: No   Tobacco Use   • Smoking status: Never Smoker   • Smokeless tobacco: Never Used   Substance and Sexual Activity   • Alcohol use: Yes     Comment: socially   • Drug use: No   • Sexual activity: Defer     Birth control/protection: Surgical   Lifestyle   • Physical activity     Days per week: 0 days     Minutes per session: 0 min   • Stress: Only a little           Objective   Physical Exam  Vitals signs and nursing note reviewed.   Constitutional:       Appearance: She is well-developed. She is obese. She is ill-appearing. She is not toxic-appearing or diaphoretic.   HENT:      Head: Normocephalic and atraumatic.      Mouth/Throat:      Mouth: Mucous membranes are moist.      Pharynx: Oropharynx is clear.   Eyes:      Extraocular Movements: Extraocular movements intact.      Pupils: Pupils are equal, round, and reactive to light.   Neck:      Musculoskeletal: Normal range of motion and neck supple.      Vascular: No JVD.      Trachea: No tracheal deviation.   Cardiovascular:      Rate and Rhythm: Regular rhythm. Bradycardia present.      Pulses: Normal pulses.      Heart sounds: Normal heart sounds.   Pulmonary:      Effort: Tachypnea present. No respiratory distress.      Breath sounds: Examination of the right-upper field reveals rales.  Examination of the left-upper field reveals rales. Examination of the right-lower field reveals decreased breath sounds. Examination of the left-lower field reveals decreased breath sounds. Decreased breath sounds and rales present.   Chest:      Chest wall: No tenderness.   Abdominal:      General: Bowel sounds are normal.      Palpations: Abdomen is soft.      Tenderness: There is no abdominal tenderness. There is no guarding or rebound.   Musculoskeletal:      Right lower leg: Edema present.      Left lower leg: Edema present.      Comments: 3+ edema to bilateral lower extremities   Lymphadenopathy:      Cervical: No cervical adenopathy.   Skin:     General: Skin is warm and dry.      Capillary Refill: Capillary refill takes less than 2 seconds.   Neurological:      General: No focal deficit present.      Mental Status: She is alert and oriented to person, place, and time.   Psychiatric:         Mood and Affect: Mood normal.         Procedures           ED Course  ED Course as of Nov 03 1440   Tue Nov 03, 2020   1130 Case discussed with Dr. Davis.  Will give Bumex 1 mg IV.    [AH]   1150 Patient placed on 2 L O2 after reviewing ABG    [AH]   1222 FINDINGS:      Bibasilar airspace disease likely atelectasis.   Cardiomegaly. Pulmonary vascular congestion.   No pneumothorax.   Small pleural effusions.   No acute osseous findings.        IMPRESSION:  CHF/edema with small effusions.   XR Chest 1 View [AH]   1240 EKG sinus bradycardia with a first-degree AV block minimal voltage criteria for LVH and left anterior fascicular block present QTC is 498 and QT is 570.    [EG]   1255 COVID19: Not Detected [AH]   1400 I have discussed with Dr. Avendaño who will admit.    [AH]   1411 Repeat EKG shows sinus bradycardia with prolonged QT at 502 ms NJ interval 188 ms    [EG]      ED Course User Index  [AH] Deepa Damon PA  [EG] Holly Davis DO                                         HEART Score (for prediction of 6-week risk  of major adverse cardiac event) reviewed and/or performed as part of the patient evaluation and treatment planning process.  The result associated with this review/performance is: 5       MDM  Number of Diagnoses or Management Options  Acute on chronic congestive heart failure, unspecified heart failure type (CMS/HCC):      Amount and/or Complexity of Data Reviewed  Clinical lab tests: reviewed  Tests in the radiology section of CPT®: reviewed  Tests in the medicine section of CPT®: reviewed  Decide to obtain previous medical records or to obtain history from someone other than the patient: yes  Discuss the patient with other providers: yes    Patient Progress  Patient progress: stable      Final diagnoses:   Acute on chronic congestive heart failure, unspecified heart failure type (CMS/HCC)            Deepa Damon PA  11/03/20 3843

## 2020-11-04 ENCOUNTER — APPOINTMENT (OUTPATIENT)
Dept: ULTRASOUND IMAGING | Facility: HOSPITAL | Age: 75
End: 2020-11-04

## 2020-11-04 ENCOUNTER — APPOINTMENT (OUTPATIENT)
Dept: CARDIOLOGY | Facility: HOSPITAL | Age: 75
End: 2020-11-04

## 2020-11-04 LAB
ALBUMIN SERPL-MCNC: 3.27 G/DL (ref 3.5–5.2)
ALBUMIN/GLOB SERPL: 0.8 G/DL
ALP SERPL-CCNC: 237 U/L (ref 39–117)
ALT SERPL W P-5'-P-CCNC: 30 U/L (ref 1–33)
ANION GAP SERPL CALCULATED.3IONS-SCNC: 13.4 MMOL/L (ref 5–15)
AST SERPL-CCNC: 41 U/L (ref 1–32)
BASOPHILS # BLD AUTO: 0.04 10*3/MM3 (ref 0–0.2)
BASOPHILS NFR BLD AUTO: 0.4 % (ref 0–1.5)
BH CV ECHO MEAS - % IVS THICK: -14.7 %
BH CV ECHO MEAS - % LVPW THICK: 30.4 %
BH CV ECHO MEAS - ACS: 1.7 CM
BH CV ECHO MEAS - AO MAX PG (FULL): 4.4 MMHG
BH CV ECHO MEAS - AO MAX PG: 10.8 MMHG
BH CV ECHO MEAS - AO MEAN PG (FULL): 2 MMHG
BH CV ECHO MEAS - AO MEAN PG: 6 MMHG
BH CV ECHO MEAS - AO ROOT AREA (BSA CORRECTED): 1.5
BH CV ECHO MEAS - AO ROOT AREA: 7.5 CM^2
BH CV ECHO MEAS - AO ROOT DIAM: 3.1 CM
BH CV ECHO MEAS - AO V2 MAX: 164 CM/SEC
BH CV ECHO MEAS - AO V2 MEAN: 117 CM/SEC
BH CV ECHO MEAS - AO V2 VTI: 43.8 CM
BH CV ECHO MEAS - AVA(I,A): 2.6 CM^2
BH CV ECHO MEAS - AVA(I,D): 2.6 CM^2
BH CV ECHO MEAS - AVA(V,A): 2.2 CM^2
BH CV ECHO MEAS - AVA(V,D): 2.2 CM^2
BH CV ECHO MEAS - BSA(HAYCOCK): 2.3 M^2
BH CV ECHO MEAS - BSA: 2.1 M^2
BH CV ECHO MEAS - BZI_BMI: 43.9 KILOGRAMS/M^2
BH CV ECHO MEAS - BZI_METRIC_HEIGHT: 160 CM
BH CV ECHO MEAS - BZI_METRIC_WEIGHT: 112.5 KG
BH CV ECHO MEAS - EDV(CUBED): 51.3 ML
BH CV ECHO MEAS - EDV(MOD-SP4): 59.6 ML
BH CV ECHO MEAS - EDV(TEICH): 58.7 ML
BH CV ECHO MEAS - EF(CUBED): 75.6 %
BH CV ECHO MEAS - EF(MOD-SP4): 65.9 %
BH CV ECHO MEAS - EF(TEICH): 68.4 %
BH CV ECHO MEAS - ESV(CUBED): 12.5 ML
BH CV ECHO MEAS - ESV(MOD-SP4): 20.3 ML
BH CV ECHO MEAS - ESV(TEICH): 18.5 ML
BH CV ECHO MEAS - FS: 37.6 %
BH CV ECHO MEAS - IVS/LVPW: 1.1
BH CV ECHO MEAS - IVSD: 1.4 CM
BH CV ECHO MEAS - IVSS: 1.2 CM
BH CV ECHO MEAS - LA DIMENSION: 4 CM
BH CV ECHO MEAS - LA/AO: 1.3
BH CV ECHO MEAS - LV DIASTOLIC VOL/BSA (35-75): 28.1 ML/M^2
BH CV ECHO MEAS - LV MASS(C)D: 175.5 GRAMS
BH CV ECHO MEAS - LV MASS(C)DI: 82.8 GRAMS/M^2
BH CV ECHO MEAS - LV MASS(C)S: 105.2 GRAMS
BH CV ECHO MEAS - LV MASS(C)SI: 49.6 GRAMS/M^2
BH CV ECHO MEAS - LV MAX PG: 6.4 MMHG
BH CV ECHO MEAS - LV MEAN PG: 4 MMHG
BH CV ECHO MEAS - LV SYSTOLIC VOL/BSA (12-30): 9.6 ML/M^2
BH CV ECHO MEAS - LV V1 MAX: 126 CM/SEC
BH CV ECHO MEAS - LV V1 MEAN: 100 CM/SEC
BH CV ECHO MEAS - LV V1 VTI: 39.8 CM
BH CV ECHO MEAS - LVIDD: 3.7 CM
BH CV ECHO MEAS - LVIDS: 2.3 CM
BH CV ECHO MEAS - LVLD AP4: 7.2 CM
BH CV ECHO MEAS - LVLS AP4: 4.6 CM
BH CV ECHO MEAS - LVOT AREA (M): 2.8 CM^2
BH CV ECHO MEAS - LVOT AREA: 2.8 CM^2
BH CV ECHO MEAS - LVOT DIAM: 1.9 CM
BH CV ECHO MEAS - LVPWD: 1.3 CM
BH CV ECHO MEAS - LVPWS: 1.6 CM
BH CV ECHO MEAS - MV A MAX VEL: 145 CM/SEC
BH CV ECHO MEAS - MV DEC SLOPE: 578 CM/SEC^2
BH CV ECHO MEAS - MV E MAX VEL: 181 CM/SEC
BH CV ECHO MEAS - MV E/A: 1.2
BH CV ECHO MEAS - MV MAX PG: 20.8 MMHG
BH CV ECHO MEAS - MV MEAN PG: 6 MMHG
BH CV ECHO MEAS - MV P1/2T MAX VEL: 213 CM/SEC
BH CV ECHO MEAS - MV P1/2T: 107.9 MSEC
BH CV ECHO MEAS - MV V2 MAX: 228 CM/SEC
BH CV ECHO MEAS - MV V2 MEAN: 102 CM/SEC
BH CV ECHO MEAS - MV V2 VTI: 74.8 CM
BH CV ECHO MEAS - MVA P1/2T LCG: 1 CM^2
BH CV ECHO MEAS - MVA(P1/2T): 2 CM^2
BH CV ECHO MEAS - MVA(VTI): 1.5 CM^2
BH CV ECHO MEAS - PA ACC TIME: 0.07 SEC
BH CV ECHO MEAS - PA PR(ACCEL): 47.5 MMHG
BH CV ECHO MEAS - RAP SYSTOLE: 10 MMHG
BH CV ECHO MEAS - RVSP: 73.4 MMHG
BH CV ECHO MEAS - SI(AO): 156 ML/M^2
BH CV ECHO MEAS - SI(CUBED): 18.3 ML/M^2
BH CV ECHO MEAS - SI(LVOT): 53.3 ML/M^2
BH CV ECHO MEAS - SI(MOD-SP4): 18.5 ML/M^2
BH CV ECHO MEAS - SI(TEICH): 19 ML/M^2
BH CV ECHO MEAS - SV(AO): 330.6 ML
BH CV ECHO MEAS - SV(CUBED): 38.8 ML
BH CV ECHO MEAS - SV(LVOT): 112.8 ML
BH CV ECHO MEAS - SV(MOD-SP4): 39.3 ML
BH CV ECHO MEAS - SV(TEICH): 40.2 ML
BH CV ECHO MEAS - TR MAX VEL: 398 CM/SEC
BILIRUB SERPL-MCNC: 0.3 MG/DL (ref 0–1.2)
BUN SERPL-MCNC: 62 MG/DL (ref 8–23)
BUN/CREAT SERPL: 25.3 (ref 7–25)
CALCIUM SPEC-SCNC: 9 MG/DL (ref 8.6–10.5)
CHLORIDE SERPL-SCNC: 107 MMOL/L (ref 98–107)
CO2 SERPL-SCNC: 19.6 MMOL/L (ref 22–29)
CREAT SERPL-MCNC: 2.45 MG/DL (ref 0.57–1)
DEPRECATED RDW RBC AUTO: 47.6 FL (ref 37–54)
EOSINOPHIL # BLD AUTO: 0.34 10*3/MM3 (ref 0–0.4)
EOSINOPHIL NFR BLD AUTO: 3.6 % (ref 0.3–6.2)
ERYTHROCYTE [DISTWIDTH] IN BLOOD BY AUTOMATED COUNT: 14.5 % (ref 12.3–15.4)
GFR SERPL CREATININE-BSD FRML MDRD: 19 ML/MIN/1.73
GLOBULIN UR ELPH-MCNC: 3.9 GM/DL
GLUCOSE BLDC GLUCOMTR-MCNC: 216 MG/DL (ref 70–130)
GLUCOSE BLDC GLUCOMTR-MCNC: 227 MG/DL (ref 70–130)
GLUCOSE BLDC GLUCOMTR-MCNC: 237 MG/DL (ref 70–130)
GLUCOSE BLDC GLUCOMTR-MCNC: 82 MG/DL (ref 70–130)
GLUCOSE SERPL-MCNC: 101 MG/DL (ref 65–99)
HCT VFR BLD AUTO: 31.7 % (ref 34–46.6)
HGB BLD-MCNC: 9 G/DL (ref 12–15.9)
HYPOCHROMIA BLD QL: NORMAL
IMM GRANULOCYTES # BLD AUTO: 0.02 10*3/MM3 (ref 0–0.05)
IMM GRANULOCYTES NFR BLD AUTO: 0.2 % (ref 0–0.5)
IRON 24H UR-MRATE: 51 MCG/DL (ref 37–145)
IRON SATN MFR SERPL: 15 % (ref 20–50)
LYMPHOCYTES # BLD AUTO: 1.9 10*3/MM3 (ref 0.7–3.1)
LYMPHOCYTES NFR BLD AUTO: 20.3 % (ref 19.6–45.3)
MAGNESIUM SERPL-MCNC: 3.1 MG/DL (ref 1.6–2.4)
MCH RBC QN AUTO: 25.9 PG (ref 26.6–33)
MCHC RBC AUTO-ENTMCNC: 28.4 G/DL (ref 31.5–35.7)
MCV RBC AUTO: 91.4 FL (ref 79–97)
MONOCYTES # BLD AUTO: 0.55 10*3/MM3 (ref 0.1–0.9)
MONOCYTES NFR BLD AUTO: 5.9 % (ref 5–12)
NEUTROPHILS NFR BLD AUTO: 6.53 10*3/MM3 (ref 1.7–7)
NEUTROPHILS NFR BLD AUTO: 69.6 % (ref 42.7–76)
NRBC BLD AUTO-RTO: 0 /100 WBC (ref 0–0.2)
PLAT MORPH BLD: NORMAL
PLATELET # BLD AUTO: 226 10*3/MM3 (ref 140–450)
PMV BLD AUTO: 9.4 FL (ref 6–12)
POTASSIUM SERPL-SCNC: 4.7 MMOL/L (ref 3.5–5.2)
PROT SERPL-MCNC: 7.2 G/DL (ref 6–8.5)
RBC # BLD AUTO: 3.47 10*6/MM3 (ref 3.77–5.28)
SODIUM SERPL-SCNC: 140 MMOL/L (ref 136–145)
TIBC SERPL-MCNC: 344 MCG/DL (ref 298–536)
TRANSFERRIN SERPL-MCNC: 231 MG/DL (ref 200–360)
TROPONIN T SERPL-MCNC: <0.01 NG/ML (ref 0–0.03)
WBC # BLD AUTO: 9.38 10*3/MM3 (ref 3.4–10.8)

## 2020-11-04 PROCEDURE — 85025 COMPLETE CBC W/AUTO DIFF WBC: CPT | Performed by: INTERNAL MEDICINE

## 2020-11-04 PROCEDURE — 93306 TTE W/DOPPLER COMPLETE: CPT | Performed by: SPECIALIST

## 2020-11-04 PROCEDURE — 99222 1ST HOSP IP/OBS MODERATE 55: CPT | Performed by: SPECIALIST

## 2020-11-04 PROCEDURE — 84466 ASSAY OF TRANSFERRIN: CPT | Performed by: INTERNAL MEDICINE

## 2020-11-04 PROCEDURE — 63710000001 INSULIN DETEMIR PER 5 UNITS: Performed by: PHYSICIAN ASSISTANT

## 2020-11-04 PROCEDURE — 84484 ASSAY OF TROPONIN QUANT: CPT | Performed by: INTERNAL MEDICINE

## 2020-11-04 PROCEDURE — 83735 ASSAY OF MAGNESIUM: CPT | Performed by: PHYSICIAN ASSISTANT

## 2020-11-04 PROCEDURE — 93306 TTE W/DOPPLER COMPLETE: CPT

## 2020-11-04 PROCEDURE — 63710000001 INSULIN ASPART PER 5 UNITS: Performed by: PHYSICIAN ASSISTANT

## 2020-11-04 PROCEDURE — 82962 GLUCOSE BLOOD TEST: CPT

## 2020-11-04 PROCEDURE — 93971 EXTREMITY STUDY: CPT

## 2020-11-04 PROCEDURE — 25010000002 HEPARIN (PORCINE) PER 1000 UNITS: Performed by: INTERNAL MEDICINE

## 2020-11-04 PROCEDURE — 92610 EVALUATE SWALLOWING FUNCTION: CPT

## 2020-11-04 PROCEDURE — 80053 COMPREHEN METABOLIC PANEL: CPT | Performed by: INTERNAL MEDICINE

## 2020-11-04 PROCEDURE — 85007 BL SMEAR W/DIFF WBC COUNT: CPT | Performed by: INTERNAL MEDICINE

## 2020-11-04 PROCEDURE — 93971 EXTREMITY STUDY: CPT | Performed by: RADIOLOGY

## 2020-11-04 PROCEDURE — 99233 SBSQ HOSP IP/OBS HIGH 50: CPT | Performed by: INTERNAL MEDICINE

## 2020-11-04 PROCEDURE — 83540 ASSAY OF IRON: CPT | Performed by: INTERNAL MEDICINE

## 2020-11-04 RX ORDER — NYSTATIN 100000 [USP'U]/G
POWDER TOPICAL EVERY 12 HOURS SCHEDULED
Status: DISCONTINUED | OUTPATIENT
Start: 2020-11-04 | End: 2020-11-08 | Stop reason: HOSPADM

## 2020-11-04 RX ORDER — BUMETANIDE 0.25 MG/ML
1 INJECTION INTRAMUSCULAR; INTRAVENOUS ONCE
Status: DISCONTINUED | OUTPATIENT
Start: 2020-11-04 | End: 2020-11-04

## 2020-11-04 RX ORDER — BUMETANIDE 0.25 MG/ML
2 INJECTION INTRAMUSCULAR; INTRAVENOUS ONCE
Status: COMPLETED | OUTPATIENT
Start: 2020-11-04 | End: 2020-11-04

## 2020-11-04 RX ADMIN — ATORVASTATIN CALCIUM 40 MG: 40 TABLET, FILM COATED ORAL at 20:30

## 2020-11-04 RX ADMIN — SODIUM CHLORIDE, PRESERVATIVE FREE 10 ML: 5 INJECTION INTRAVENOUS at 08:02

## 2020-11-04 RX ADMIN — ESCITALOPRAM 10 MG: 10 TABLET, FILM COATED ORAL at 08:02

## 2020-11-04 RX ADMIN — INSULIN DETEMIR 30 UNITS: 100 INJECTION, SOLUTION SUBCUTANEOUS at 20:31

## 2020-11-04 RX ADMIN — Medication 1 TABLET: at 08:01

## 2020-11-04 RX ADMIN — FERROUS SULFATE TAB 325 MG (65 MG ELEMENTAL FE) 325 MG: 325 (65 FE) TAB at 17:05

## 2020-11-04 RX ADMIN — FERROUS SULFATE TAB 325 MG (65 MG ELEMENTAL FE) 325 MG: 325 (65 FE) TAB at 08:00

## 2020-11-04 RX ADMIN — ASPIRIN 81 MG: 81 TABLET, COATED ORAL at 08:01

## 2020-11-04 RX ADMIN — HEPARIN SODIUM 5000 UNITS: 5000 INJECTION INTRAVENOUS; SUBCUTANEOUS at 20:30

## 2020-11-04 RX ADMIN — BUMETANIDE 2 MG: 0.25 INJECTION INTRAMUSCULAR; INTRAVENOUS at 18:04

## 2020-11-04 RX ADMIN — AMLODIPINE BESYLATE 10 MG: 10 TABLET ORAL at 20:30

## 2020-11-04 RX ADMIN — CLONIDINE HYDROCHLORIDE 0.2 MG: 0.2 TABLET ORAL at 08:01

## 2020-11-04 RX ADMIN — NYSTATIN: 100000 POWDER TOPICAL at 18:04

## 2020-11-04 RX ADMIN — HEPARIN SODIUM 5000 UNITS: 5000 INJECTION INTRAVENOUS; SUBCUTANEOUS at 08:01

## 2020-11-04 RX ADMIN — INSULIN ASPART 3 UNITS: 100 INJECTION, SOLUTION INTRAVENOUS; SUBCUTANEOUS at 17:04

## 2020-11-04 RX ADMIN — CLONIDINE HYDROCHLORIDE 0.2 MG: 0.2 TABLET ORAL at 17:04

## 2020-11-04 RX ADMIN — SODIUM CHLORIDE, PRESERVATIVE FREE 10 ML: 5 INJECTION INTRAVENOUS at 20:31

## 2020-11-04 RX ADMIN — INSULIN ASPART 3 UNITS: 100 INJECTION, SOLUTION INTRAVENOUS; SUBCUTANEOUS at 12:27

## 2020-11-04 RX ADMIN — ISOSORBIDE MONONITRATE 30 MG: 30 TABLET, EXTENDED RELEASE ORAL at 08:00

## 2020-11-04 RX ADMIN — CLONIDINE HYDROCHLORIDE 0.2 MG: 0.2 TABLET ORAL at 20:30

## 2020-11-04 NOTE — PROGRESS NOTES
HOSPITALIST PROGRESS NOTE    Patient Identification:  Name:  Britta Lee  Age:  75 y.o.  Sex:  female  :  1945  MRN:  [id number]  Visit Number:  [id number]  Primary Care Provider:  Lexie Dolan PA    Length of stay:  1     HPI: 74 yo female being seen in follow up for shortness of breath, swelling    Subjective:  Patient was seen this afternoon and she was resting comfortably in bed.  Patient was sleeping but was easily arousable.  Patient denied any shortness of air during my visit but stated that she had not been very ambulatory today and when she was up to the bedside commode she did have some shortness of breath. No cough. No chest pains.     She does complain of intermittent pain in her right calf and in her right groin that she thinks started today.     Present during exam: ELOISE Layne    Current Hospital Meds:  amLODIPine, 10 mg, Oral, Nightly  aspirin, 81 mg, Oral, Daily  atorvastatin, 40 mg, Oral, Nightly  calcium carb-cholecalciferol, 1 tablet, Oral, Daily  cloNIDine, 0.2 mg, Oral, TID  escitalopram, 10 mg, Oral, Daily  ferrous sulfate, 325 mg, Oral, BID With Meals  heparin (porcine), 5,000 Units, Subcutaneous, Q12H  insulin aspart, 0-7 Units, Subcutaneous, TID AC  insulin detemir, 30 Units, Subcutaneous, Nightly  isosorbide mononitrate, 30 mg, Oral, Daily  sodium chloride, 10 mL, Intravenous, Q12H  sodium chloride, 10 mL, Intravenous, Q12H      Vital Signs  Temp:  [97.4 °F (36.3 °C)-98.3 °F (36.8 °C)] 97.4 °F (36.3 °C)  Heart Rate:  [47-77] 69  Resp:  [18] 18  BP: (121-171)/(50-80) 164/76      20  1133 20  1531 20  0519   Weight: 110 kg (242 lb 8.1 oz) 113 kg (248 lb 9.6 oz) 113 kg (248 lb 9.6 oz) (admit weight)     Body mass index is 44.04 kg/m².         Physical exam:  Physical Exam  Constitutional:       General: She is not in acute distress.     Appearance: She is well-developed. She is obese.      Interventions: Nasal cannula in place.   HENT:      Head:  Normocephalic and atraumatic.   Eyes:      Conjunctiva/sclera: Conjunctivae normal.      Pupils: Pupils are equal, round, and reactive to light.   Neck:      Musculoskeletal: Neck supple.      Trachea: No tracheal deviation.   Cardiovascular:      Rate and Rhythm: Normal rate and regular rhythm.      Heart sounds: No murmur. No friction rub. No gallop.       Comments: Trace edema, most impressive right foot but improved from admission. Blister noted on top of right foot.  Pulmonary:      Effort: No respiratory distress.      Breath sounds: Examination of the right-lower field reveals decreased breath sounds. Examination of the left-lower field reveals decreased breath sounds. Decreased breath sounds present. No wheezing or rales.   Abdominal:      General: Bowel sounds are normal. There is no distension.      Palpations: Abdomen is soft.      Tenderness: There is no abdominal tenderness. There is no guarding.   Musculoskeletal: Normal range of motion.         General: No tenderness.   Skin:     General: Skin is warm and dry.      Findings: No erythema or rash.   Neurological:      Mental Status: She is alert and oriented to person, place, and time.      Cranial Nerves: No cranial nerve deficit.         Results Review:  Results from last 7 days   Lab Units 11/04/20  0422 11/03/20  1128   WBC 10*3/mm3 9.38 12.32*   HEMOGLOBIN g/dL 9.0* 9.8*   HEMATOCRIT % 31.7* 33.5*   PLATELETS 10*3/mm3 226 320     Results from last 7 days   Lab Units 11/04/20  0422 11/03/20  1128   SODIUM mmol/L 140 140   POTASSIUM mmol/L 4.7 5.4*   CHLORIDE mmol/L 107 105   CO2 mmol/L 19.6* 19.7*   BUN mg/dL 62* 63*   CREATININE mg/dL 2.45* 2.67*   CALCIUM mg/dL 9.0 9.6   GLUCOSE mg/dL 101* 87     Results from last 7 days   Lab Units 11/04/20  0422 11/03/20  1128   BILIRUBIN mg/dL 0.3 0.4   ALK PHOS U/L 237* 280*   AST (SGOT) U/L 41* 53*   ALT (SGPT) U/L 30 35*     Results from last 7 days   Lab Units 11/04/20  0422 11/03/20  1128   MAGNESIUM mg/dL  3.1* 2.9*     Results from last 7 days   Lab Units 11/03/20  1128   INR  1.28*     Results from last 7 days   Lab Units 11/04/20  0422 11/03/20  2156 11/03/20  1711   TROPONIN T ng/mL <0.010 <0.010 <0.010     Echocardiogram:  Interpretation Summary    · Left ventricular wall thickness is consistent with concentric hypertrophy.  · Left ventricular ejection fraction appears to be greater than 70%. Left ventricular systolic function is hyperdynamic (EF > 70%).  · Left ventricular diastolic function is consistent with (grade II w/high LAP) pseudonormalization.  · The left atrial cavity is moderate to severely dilated.  · The right atrial cavity is mildly dilated.  · Moderate mitral annular calcification is present.  · Mild mitral valve regurgitation is present.  · Mild tricuspid valve regurgitation is present.  · Estimated right ventricular systolic pressure from tricuspid regurgitation is markedly elevated (>55 mmHg).         Assessment/Plan     Active Problems:    Acute on chronic congestive heart failure (CMS/HCC)    -Acute on chronic diastolic congestive heart failure  -Acute kidney injury on CKD stage IV  -Right lower extremity calf and groin pain; rule out DVT  -Morbid obesity  -Severe pulmonary hypertension  -Mild transaminitis likely passive congestion from CHF; hepatitis panel negative  -Uncontrolled essential hypertension  -Diabetes mellitus type 2 that is insulin-dependent and currently controlled  -Normocytic anemia that may be related to some chronic kidney disease    Patient has been evaluated by cardiology.  Appreciate their assistance.  Echocardiogram report has been reviewed and patient found to have concentric hypertrophy with grade 2 diastolic dysfunction.  With mild mitral valve and tricuspid valve regurgitation and her estimated right ventricular systolic pressure was significantly elevated at greater than 55 mmHg.    Patient without significant urine output from admission yesterday evening after  receiving diuretics in the emergency department.  I will order the patient another dose of IV Bumex (2 mg x 1).  Nephrology will be consulted given her history of acute on chronic kidney disease, however, patient's creatinine has slightly improved from admission at 2.45 today (previously 2.67).    I will obtain a venous Doppler ultrasound of the right lower extremity to rule out deep vein thrombosis.    Plan to repeat patient's chemistry panel in a.m. that she is continuing to receive IV diuretics in the setting of ETHEL/CKD    The patient is high risk due to the following diagnoses/reasons: Morbid obesity, CHF, CKD, diabetes mellitus, advanced age    I discussed the patients findings and my recommendations with patient and nursing staff.        Disposition  Home when medically stable      Suzi Avendaño DO  11/04/20  13:37 EST

## 2020-11-04 NOTE — NURSING NOTE
Thank You for your referral to Cardiac Rehab:    CHF has a criteria that has to be met before insurance will pay. The criteria is as follows:  Stable CHF ( with these specific qualifications Left Ventricular Ejection Fraction of 35% or less, NYHA class II to IV symptoms despite optimal heart failure therapy for at least 6 weeks and has not had a recent (less than or equal to 6 weeks) or planned (less than or equal to 6 months) major cardiovascular hospitalizations or procedures. Due to patient being in the hospital does not meet criteria at this time) Please contact us at [phone] with questions.     Patient echo results are not up at this time to review EF.

## 2020-11-04 NOTE — PLAN OF CARE
Goal Outcome Evaluation:  Plan of Care Reviewed With: patient  Progress: improving  Outcome Summary: VSS. Pt remains on 2L nasal cannula and tolerating well. Pt continues to complain of shortness of breath on exertion. Pt is resting in bed comfortably with no distress noted. Will continue to monitor and follow plan of care.

## 2020-11-04 NOTE — PLAN OF CARE
Pt continues to have SOA that is exacerbated w/ slight activity. Pt has rested comfortably w/ no s/s of distress noted.

## 2020-11-04 NOTE — PROGRESS NOTES
Discharge Planning Assessment  [facility]     Patient Name: Britta Lee  MRN: [id number]  Today's Date: 11/4/2020    Admit Date: 11/3/2020    Discharge Needs Assessment     Row Name 11/04/20 0839       Living Environment    Lives With  child(ella), adult;spouse    Name(s) of Who Lives With Patient  Dtr-Na Montano & Brandon Lee-spouse    Quality of Family Relationships  supportive;helpful;involved    Able to Return to Prior Arrangements  yes       Resource/Environmental Concerns    Transportation Concerns  car, none       Transition Planning    Patient/Family Anticipated Services at Transition  none    Transportation Anticipated  family or friend will provide       Discharge Needs Assessment    Readmission Within the Last 30 Days  no previous admission in last 30 days    Equipment Currently Used at Home  cane, quad;glucometer    Concerns to be Addressed  no discharge needs identified;denies needs/concerns at this time    Equipment Needed After Discharge  none        Discharge Plan     Row Name 11/04/20 0842       Plan    Plan  Pt lives at home with spouse, Brandon, with dtr Na & spouse, living in the home.She lives at [address] [place] [address]. She is pleasant to speak with. She follows with Lexie Dolan for her PCP. Rx's filled @ [facility]. Pt has leena HAM repl. and denies any issues with med cost. She has quad cane and doesnt utilize Home Health services. Pt is indep at home w/assistance from family as needed, reports good family support. Pt currently denies any anticipated dc needs. She plans to return home with family providing transportation at time of dc. CM will follow and assist as needed.    Patient/Family in Agreement with Plan  yes    Row Name 11/04/20 0818       Plan    Plan Comments  Presented with c/o SOA,worsening swelling BLE. Admitted w/Ac. On chr. CHF, ETHEL on CKD. Mon. Response to diuretics, Cards cst'd, mon. Renals & lytes. CRP 1.37, WBC 12.3, Bun 63/Cr. 2.67,pBNP 2940, sats 97% on  2lnc, A1C 7.3, BC(P), DM educ. Cst. Obtain echo.        Continued Care and Services - Admitted Since 11/3/2020    Coordination has not been started for this encounter.         Demographic Summary     Row Name 11/04/20 0828       General Information    Admission Type  inpatient    General Information Comments  Lexie Dolan-PCP        Functional Status     Row Name 11/04/20 0839       Functional Status    Usual Activity Tolerance  fair    Functional Status Comments  indep       Functional Status, IADL    IADL Comments  indep. Dtr & pt's spouse assist as needed.        Psychosocial    No documentation.       Abuse/Neglect    No documentation.       Legal    No documentation.       Substance Abuse    No documentation.       Patient Forms    No documentation.           Guerita Razo RN

## 2020-11-04 NOTE — NURSING NOTE
Cardiac Rehab referral received on patient. After reviewing patient information there is no qualifying diagnosis noted at this time .Qualifications for Cardiac Rehab include Coronary Stenting, AMI (NSTEMI, STEMI), Stable Angina, CABG, Heart Valve Repair/Replacement, Heart Transplant or Stable CHF (with these specific qualifications, Left Ventricular Ejection Fraction of 35% or less, NYHA class II to IV symptoms despite optimal heart failure therapy for at least 6 weeks and has not had a recent (less than or equal to 6 weeks) or planned (less than or equal to 6 months) major cardiovascular hospitalizations or procedures. Due to patient being in the hospital, does not meet criteria at this time) Please contact us at [phone] with questions.    If the diagnosis is CHF when the patient meets the CHF criteria for Cardiac Rehab with a new order we will gladly schedule them.     Patient EF is  >70%

## 2020-11-04 NOTE — CONSULTS
"Diabetes Education  Assessment/Teaching    Patient Name:  Britta Lee  YOB: 1945  MRN: [id number]  Admit Date:  11/3/2020      Assessment Date:  11/4/2020    Most Recent Value   General Information    Height  160 cm (63\")   Height Method  Stated   Weight  113 kg (248 lb 9.6 oz) [admit weight]   Weight Method  Bed scale   Pregnancy Assessment   Diabetes History   Education Preferences   Nutrition Information   Assessment Topics   DM Goals            Most Recent Value   DM Education Needs   Meter  Has own   Frequency of Testing  2 times a day   Blood Glucose Target  100   Medication  Insulin   Problem Solving  Hypoglycemia, Hyperglycemia, Sick days, Signs, Symptoms, Treatment   Reducing Risks  Neuropathy, Cardiovascular, Immunizations, Foot care, Dental exam, Eye exam, Blood pressure, Lipids, A1C testing, Retinopathy   Healthy Eating  Basic meal plan provided   Physical Activity  Walking   Physical Activity Frequency  Occasionally [patient states that she walks and moves as much as she can tolerate.]   Healthy Coping  Appropriate   Discharge Plan  Home, Follow-up with PCP   Motivation  Engaged   Teaching Method  Explanation, Discussion, Demonstration, Handouts   Patient Response  Verbalized understanding            Other Comments:  A1C 7.3 patient and  wore mask. Patient and  was educated on diet, blood glucose monitoring, activity, checking feet daily, and taking medications as directed. Patient has good control of blood glucose levels and has made some changes in diet to help with both blood glucose and potassium levels. Please re-consult or call if any questions or concerns. Thank you.         Electronically signed by:  Palmira Dill RN  11/04/20 10:53 EST  "

## 2020-11-04 NOTE — PAYOR COMM NOTE
"[facility]  NPI:[id number]    Utilization Review  Contact: Brenda Merchant RN  Phone: [phone]  Fax:[phone]    INITIATE INPATIENT AUTHORIZATION   Pending auth # [id number]    Britta Lee (75 y.o. Female)     Date of Birth Social Security Number Address Home Phone MRN    1945  [address] [address] [phone] [id number]    Gnosticism Marital Status          Yazidism        Admission Date Admission Type Admitting Provider Attending Provider Department, Room/Bed    11/3/20 Emergency Suzi Avendaño DO Grace, Aimee Russell, DO [address], [address]    Discharge Date Discharge Disposition Discharge Destination                       Attending Provider: Suzi Avendaño DO    Allergies: No Known Allergies    Isolation: None   Infection: COVID (rule out) (11/03/20)   Code Status: CPR    Ht: 160 cm (63\")   Wt: 113 kg (248 lb 9.6 oz)    Admission Cmt: None   Principal Problem: None                Active Insurance as of 11/3/2020     Primary Coverage     Payor Plan Insurance Group Employer/Plan Group    ANTHEM MEDICARE REPLACEMENT ANTHEM MEDICARE ADVANTAGE [id number]     Payor Plan Address Payor Plan Phone Number Payor Plan Fax Number Effective Dates    [address] [phone]  1/1/2019 - None Entered    [facility] [id number]       Subscriber Name Subscriber Birth Date Member ID       BRITTA LEE 1945 [id number]                 Emergency Contacts      (Rel.) Home Phone Work Phone Mobile Phone    Brandon Lee (Spouse) -- -- [phone]    YENI GOODE (Daughter) -- -- [phone]               History & Physical      Emily Kincaid PA-C at 11/03/20 0409     Attestation signed by Suzi Avendaño DO at 11/03/20 5504    I have read the documentation below and agree.  Patient also seen and examined at bedside.  The patient reports palpitations that have been intermittent for the past 3 weeks.  " "She also reports shortness of air with minimal exertion and also \"wheezing\" when attempting to sleep at night.  Patient reports occasional dysphagia.  Patient denies any chest pains or pressure.  The patient denies any shoulder or jaw pain.  Patient denies any changes in her fluid intake and/or her sodium intake.  The patient states that she finds it difficult to follow her cardiologist recommendations to recommend fluid restrictions versus her nephrologist recommendations for increased fluid/water intake.    Patient with diminished bibasilar breath sounds.  Patient with JVD noted on exam.  Patient with right-sided bronchovesicular breath sounds.  Patient with significant pedal edema and and an open blister noted on her right foot.    Portable chest x-ray images reviewed and patient does have evidence of bilateral pleural effusions and cardiomegaly.    Monitor response to Bumex.  Cautious use of diuretics in the setting of CKD.  Monitor daily weight and strict intake and output.  I have requested that nursing staff place an external catheter for more accurate measurement.  Agree with updating her echocardiogram and consulting cardiology.  Follow-up repeat potassium level after Kayexalate given.                      [facility] Medicine Services  History & Physical    Patient Identification:  Name:  Britta Lee  Age:  75 y.o.  Sex:  female  :  1945  MRN:  [id number]   Visit Number:  [id number]  Admit Date: 11/3/2020   Primary Care Physician:  Lexie Dolan PA    Subjective     Chief complaint: Shortness of breath, possible CHF exacerbation per PCP    History of presenting illness:      Britta Lee is a 75 y.o. female with past medical history significant for CHF, HTN, IDDM2, CKD III, Iron deficiency anemia, hyperlipidemia, morbid obesity.  She was last admitted to this facility from 2020-2020 with acute exacerbation of CHF.     She presented to the ED of this facility on " 11/3/2020 for further evaluation of shortness of breath.  She first presented to the office of her PCP. She was evaluated by Physician Assistant Lexie Dolan and referred to ED given concern for acute exacerbation of CHF that is documented by PCP to be systolic in nature, though prior echocardiograms within system do not further specify systolic +/- diastolic.       In any event, upon arrival to the ED, vital signs were T 96.6F, HR 46, RR 22, and /89.  Potassium was found to be 5.4 with creatinine of 2.67 and estimated glomerular filtration rate of 17 with documented prior history of CKD stage IV.  Blood cell count was found to be 12.32 with hemoglobin of 9.8 and platelet count of 320.  C-reactive protein was found to be mildly elevated at 1.37 with magnesium of 2.9.    Mrs. Lee has recently been taking Lasix 20mg daily with worsening dyspnea over 3 weeks. She reports orthopnea and worsening swelling in her bilateral lower extremities.  Weight in ED is 8lbs more than prior weights within system, though the majority of these appear to be stated.  Mrs. Lee reports worsening dyspnea on exertion in addition to orthopnea.  She reports worsening swelling of her bilateral lower extremities.  She does have a blister on her right foot and she thinks this may have been from cooking but is really unable to identify any time during which she may have spilled something on her foot.  She reports she has been taking Lasix daily and tells me she also has an upcoming appoint with Dr. Johnson (Nov 16th per her account). She reports significant chest pressure     In regard to insulin she tells me that she uses Humalog at what sounds like a possible sliding scale with meals and uses 80 units of Toujeo nightly. Parvin Ta NP helps with her DM.  She tells me is supposed to see her Nov 11th. She denies use of tobacco or alcohol and does puzzles for fun.     Known Emergency Department medications received prior to my  evaluation included IV Bumex 1mg x2 doses.     ---------------------------------------------------------------------------------------------------------------------   Review of Systems   Constitutional: Positive for fatigue. Negative for activity change and chills.   HENT: Negative for congestion and drooling.    Eyes: Negative for pain and discharge.   Respiratory: Positive for shortness of breath. Negative for cough and wheezing.    Cardiovascular: Positive for leg swelling. Negative for chest pain.   Gastrointestinal: Negative for abdominal distention, constipation, diarrhea and vomiting.   Endocrine: Negative for cold intolerance and heat intolerance.   Genitourinary: Negative for difficulty urinating and dysuria.   Musculoskeletal: Negative for arthralgias and gait problem.   Skin: Positive for wound. Negative for color change.   Allergic/Immunologic: Negative for environmental allergies.   Neurological: Negative for dizziness and headaches.   Hematological: Negative for adenopathy. Does not bruise/bleed easily.   Psychiatric/Behavioral: Negative for agitation and confusion.        ---------------------------------------------------------------------------------------------------------------------   Past Medical History:   Diagnosis Date   • Anemia    • Arthritis    • Carpal tunnel syndrome    • CHF (congestive heart failure) (CMS/[facility])    • Coronary artery disease    • Diabetes mellitus (CMS/[facility])    • Elevated cholesterol    • GERD (gastroesophageal reflux disease)    • Heart disease    • High cholesterol    • History of pneumonia    • History of unsteady gait     OCASSIONALLY   • Hypertension    • Insomnia    • Kidney disease    • Osteoarthritis    • Renal insufficiency    • Sciatica      Past Surgical History:   Procedure Laterality Date   • ABDOMINAL SURGERY     • APPENDECTOMY     • CARDIAC CATHETERIZATION      1 STENT  ---  2000   • CARDIAC SURGERY     • CAROTID STENT     • CARPAL TUNNEL RELEASE Right  10/8/2019    Procedure: CARPAL TUNNEL RELEASE;  Surgeon: Feroz Johnson MD;  Location: [facility];  Service: Orthopedics   • CATARACT EXTRACTION     • CHOLECYSTECTOMY     • COLONOSCOPY     • CORONARY ANGIOPLASTY WITH STENT PLACEMENT     • ENDOSCOPY     • ENDOSCOPY N/A 3/5/2020    Procedure: ESOPHAGOGASTRODUODENOSCOPY;  Surgeon: Alexandru Bagley MD;  Location: [facility];  Service: Gastroenterology;  Laterality: N/A;   • ENDOSCOPY AND COLONOSCOPY     • GALLBLADDER SURGERY     • JOINT REPLACEMENT Left 05/02/2017    [facility]  DR JOHNSON  LEFT TOTAL KNEE   • KNEE ARTHROSCOPY W/ MENISCECTOMY Right    • LAPAROSCOPIC TUBAL LIGATION     • AK TOTAL KNEE ARTHROPLASTY Left 5/2/2017    Procedure: TOTAL KNEE ARTHROPLASTY;  Surgeon: Feroz Johnson MD;  Location: [facility];  Service: Orthopedics   • STERILIZATION     • TONSILLECTOMY       Family History   Problem Relation Age of Onset   • Arthritis Mother    • Diabetes Mother    • Cancer Mother    • Heart disease Father    • Diabetes Daughter    • Diabetes Son    • Diabetes Maternal Aunt    • Heart disease Maternal Grandmother    • Breast cancer Neg Hx      Social History     Socioeconomic History   • Marital status:      Spouse name: natalie   • Number of children: 3   • Years of education: 12   • Highest education level: Not on file   Occupational History   • Occupation: retired   Social Needs   • Financial resource strain: Somewhat hard   • Food insecurity     Worry: Never true     Inability: Never true   • Transportation needs     Medical: Yes     Non-medical: No   Tobacco Use   • Smoking status: Never Smoker   • Smokeless tobacco: Never Used   Substance and Sexual Activity   • Alcohol use: Yes     Comment: socially   • Drug use: No   • Sexual activity: Defer     Birth control/protection: Surgical   Lifestyle   • Physical activity     Days per week: 0 days     Minutes per session: 0 min   • Stress: Only a little      ---------------------------------------------------------------------------------------------------------------------   Allergies:  Patient has no known allergies.  ---------------------------------------------------------------------------------------------------------------------   Home medications:    Medications below are reported home medications pulling from within the system; at this time, these medications have not been reconciled unless otherwise specified and are in the verification process for further verifcation as current home medications.  Medications Prior to Admission   Medication Sig Dispense Refill Last Dose   • Accu-Chek FastClix Lancets misc 1 each by Other route 3 (Three) Times a Day.      • ACCU-CHEK GUIDE test strip 1 each by Other route 3 (Three) Times a Day.      • acetaminophen (TYLENOL) 500 MG tablet Take 500 mg by mouth Every 6 (Six) Hours As Needed for Mild Pain .      • Alcohol Swabs (GLOBAL ALCOHOL PREP EASE) 70 % pads       • amLODIPine (NORVASC) 10 MG tablet Take 1 tablet by mouth Every Night. 90 tablet 1    • aspirin 81 MG tablet Take 1 tablet by mouth Daily. 30 tablet 5    • atorvastatin (LIPITOR) 40 MG tablet Take 1 tablet by mouth Daily. (Patient taking differently: Take 20 mg by mouth Daily.) 90 tablet 3    • Blood Glucose Monitoring Suppl (BLOOD GLUCOSE MONITOR SYSTEM) w/Device kit 1 Device Daily. 1 each 0    • calcium carb-cholecalciferol (CALCIUM 600/VITAMIN D3) 600-800 MG-UNIT tablet Take 1 tablet by mouth 2 (Two) Times a Day With Meals.      • cloNIDine (CATAPRES) 0.2 MG tablet Take 1 tablet by mouth 3 (Three) Times a Day. 270 tablet 3    • escitalopram (LEXAPRO) 10 MG tablet Take 1 tablet by mouth Daily. 90 tablet 3    • ferrous sulfate 325 (65 Fe) MG tablet Take 1 tablet by mouth 2 (Two) Times a Day. 180 tablet 3    • furosemide (Lasix) 20 MG tablet Take 1 tablet by mouth Daily. 1 tablet 0    • GLOBAL EASE INJECT PEN NEEDLES 31G X 5 MM misc       • hydroCHLOROthiazide  (HYDRODIURIL) 25 MG tablet Take 1 tablet by mouth Daily. 90 tablet 1    • Insulin Glargine, 1 Unit Dial, (Toujeo SoloStar) 300 UNIT/ML solution pen-injector injection Inject 80 Units under the skin into the appropriate area as directed Daily. 5 pen 5    • insulin lispro (humaLOG) 100 UNIT/ML injection Inject 25 Units under the skin into the appropriate area as directed Every Evening. Prior to [facility] Admission, Patient was on: 20 units in the morning, 25 in the evening, 5 at night      • isosorbide mononitrate (IMDUR) 30 MG 24 hr tablet Take 1 tablet by mouth Daily. 90 tablet 3    • metoprolol tartrate (LOPRESSOR) 100 MG tablet Take 1 tablet by mouth 2 (Two) Times a Day. 180 tablet 3    • quinapril (ACCUPRIL) 40 MG tablet Take 1 tablet by mouth Daily. 30 tablet 5    • TOUJEO MAX SOLOSTAR 300 UNIT/ML solution pen-injector injection INJECT 80 UNITS SUBCUTANEOUSLY EVERY NIGHT AT BEDTIME      • vitamin D (ERGOCALCIFEROL) 1.25 MG (58040 UT) capsule capsule Take 1 capsule by mouth 1 (One) Time Per Week. Takes wednesdays 90 capsule 3        Hospital Scheduled Meds:  heparin (porcine), 5,000 Units, Subcutaneous, Q12H  sodium chloride, 10 mL, Intravenous, Q12H  sodium chloride, 10 mL, Intravenous, Q12H           Current listed hospital scheduled medications may not yet reflect those currently placed in orders that are signed and held awaiting patient's arrival to floor.   ---------------------------------------------------------------------------------------------------------------------     Objective     Vital Signs:  Temp:  [96.6 °F (35.9 °C)-98 °F (36.7 °C)] 98 °F (36.7 °C)  Heart Rate:  [46-56] 56  Resp:  [18-22] 18  BP: (121-171)/(53-89) 171/69      11/03/20  1133 11/03/20  1531   Weight: 110 kg (242 lb 8.1 oz) 113 kg (248 lb 9.6 oz)     Body mass index is 44.04 kg/m².  ---------------------------------------------------------------------------------------------------------------------       Physical  Exam  Constitutional:       Appearance: Normal appearance. She is well-developed. She is not ill-appearing or toxic-appearing.      Interventions: Nasal cannula in place.   HENT:      Head: Normocephalic and atraumatic.      Mouth/Throat:      Dentition: Abnormal dentition.     Eyes:      General: Lids are normal.      Conjunctiva/sclera:      Right eye: Right conjunctiva is not injected.      Left eye: Left conjunctiva is not injected.   Cardiovascular:      Rate and Rhythm: Normal rate and regular rhythm.      Heart sounds: S1 normal and S2 normal.   Pulmonary:      Effort: No tachypnea or bradypnea.      Breath sounds: Examination of the right-lower field reveals decreased breath sounds. Examination of the left-lower field reveals decreased breath sounds. Decreased breath sounds present. No wheezing, rhonchi or rales.   Abdominal:      General: Bowel sounds are normal.      Palpations: Abdomen is soft.      Tenderness: There is no abdominal tenderness.   Musculoskeletal:      Right forearm: She exhibits edema.      Left forearm: She exhibits edema.      Right lower le+ Pitting Edema present.      Left lower le+ Pitting Edema present.   Skin:     General: Skin is warm and dry.          Neurological:      Mental Status: She is alert and oriented to person, place, and time.   Psychiatric:         Attention and Perception: Attention normal.         Mood and Affect: Mood normal.         Behavior: Behavior is cooperative.               ---------------------------------------------------------------------------------------------------------------------  EKG:                      ---------------------------------------------------------------------------------------------------------------------   Results from last 7 days   Lab Units 20  1128   CRP mg/dL 1.37*   WBC 10*3/mm3 12.32*   HEMOGLOBIN g/dL 9.8*   HEMATOCRIT % 33.5*   MCV fL 86.8   MCHC g/dL 29.3*   PLATELETS 10*3/mm3 320   INR  1.28*     Results  from last 7 days   Lab Units 11/03/20  1136   PH, ARTERIAL pH units 7.332*   PO2 ART mm Hg 60.4*   PCO2, ARTERIAL mm Hg 43.1   HCO3 ART mmol/L 22.8     Results from last 7 days   Lab Units 11/03/20  1128   SODIUM mmol/L 140   POTASSIUM mmol/L 5.4*   MAGNESIUM mg/dL 2.9*   CHLORIDE mmol/L 105   CO2 mmol/L 19.7*   BUN mg/dL 63*   CREATININE mg/dL 2.67*   EGFR IF NONAFRICN AM mL/min/1.73 17*   CALCIUM mg/dL 9.6   GLUCOSE mg/dL 87   ALBUMIN g/dL 3.84   BILIRUBIN mg/dL 0.4   ALK PHOS U/L 280*   AST (SGOT) U/L 53*   ALT (SGPT) U/L 35*   Estimated Creatinine Clearance: 21.3 mL/min (A) (by C-G formula based on SCr of 2.67 mg/dL (H)).  No results found for: AMMONIA  Results from last 7 days   Lab Units 11/03/20  1358 11/03/20  1128   TROPONIN T ng/mL <0.010 <0.010     Results from last 7 days   Lab Units 11/03/20  1128   PROBNP pg/mL 2,940.0*     Lab Results   Component Value Date    HGBA1C 8.40 (H) 09/04/2020     Lab Results   Component Value Date    TSH 5.410 (H) 11/03/2020    FREET4 1.03 03/26/2018     No results found for: PREGTESTUR, PREGSERUM, HCG, HCGQUANT  Pain Management Panel     Pain Management Panel Latest Ref Rng & Units 9/4/2020 3/13/2020    CREATININE UR mg/dL 56.6 118.1        No results found for: BLOODCX  No results found for: URINECX  No results found for: WOUNDCX  No results found for: STOOLCX      ---------------------------------------------------------------------------------------------------------------------  Imaging Results (Last 7 Days)     Procedure Component Value Units Date/Time    XR Chest 1 View [[id number]] Collected: 11/03/20 1202     Updated: 11/03/20 1207    Narrative:      EXAMINATION: XR CHEST 1 VW-      CLINICAL INDICATION:     sob, edema     TECHNIQUE:  XR CHEST 1 VW-      COMPARISON: NONE      FINDINGS:      Bibasilar airspace disease likely atelectasis.   Cardiomegaly. Pulmonary vascular congestion.   No pneumothorax.   Small pleural effusions.   No acute osseous findings.           Impression:      CHF/edema with small effusions.     This report was finalized on 11/3/2020 12:03 PM by Dr. Guillermo Macias MD.             Cultures:  No results found for: BLOODCX, URINECX, WOUNDCX, MRSACX, RESPCX, STOOLCX    Last echocardiogram:  Results for orders placed in visit on 20   SCANNED - ECHOCARDIOGRAM       Britta Lee  Echo Complete w/Doppler and Color Flow  Order# [id number]  Reading physician: Ole Delarosa MD Ordering physician: Dasha Claire APRN Study date: 20   Patient Information    Patient Name   Britta Lee MRN   [id number] Sex   Female  (Age)   1945 (75 y.o.)   Sedation Narrator Report    Sedation Narrator Report   Interpretation Summary    · Estimated EF = 50%.  · Left ventricular systolic function is normal.  · Left ventricular wall thickness is consistent with moderate concentric hypertrophy.  · Mild mitral valve regurgitation is present  · Mild gradient was noted across the mitral valve.         I have personally reviewed the above radiology images and read the final radiology report on 20  ---------------------------------------------------------------------------------------------------------------------  Assessment / Plan     Active Hospital Problems    Diagnosis POA   • Acute on chronic congestive heart failure (CMS/HCC) [I50.9] Yes       ASSESSMENT/PLAN:  · Acute on chronic CHF:  -2020 echocardiogram reviewed with EF 50% in early .   -IV Bumex 1mg x 2 in ED @1132 and 1352.  Will monitor output response with further dosing.   -Update echocardiogram to evaluate for drop in EF.   -Orders placed to titrate oxygen saturation greater than or equal to 90%.    -Monitor daily weight I/O.   -Cardiology consulted for assistance with diuresis and medication optimization.  -Monitor telemetry per protocol.  -Beta blocker held at present due to bradycardia (Metoprolol 100mg BID).     · ETHEL on CKD IV:   -Creatinine is 2.67 with baseline appearing to  be 1.5-1.7, though last creatinine in 9/2020 found to be 2.17. Will follow response to Bumex.   -AM BMP.   -ACEI Held  -100mg/dL protein on UA  -9/4/2020 protein/creatinine ratio 2173.1    · Mild hypoglycemia with known DM2, ID:   -FSBG ACHS with SSI PRN.   -She is on Toujeo 80u nightly at home.  We have Levemir on formulary; however, given near hypoglycemia will decrease dose to 30units and place hold instruction for hypoglycemia.   -Consistent carb diet   -Hemoglobin a1c 8.4% on 9/4/2020    · Hyperkalemia:   -Potassium 5.4.    -Bradycardic on HR.    -Kayexelate x1 and repeat potassium.    · Sinus bradycardia:   -TSH mildly elevated.   -Telemetry monitoring.  -Treat hyperkalemia.   -Hold home Metoprolol 100mg BID.     · Hypermagnesemia:  -Recheck AM Mag.     · Mild transaminitis possibly 2/2 congestive hepatopathy with CHF:   -History of such with negative hepatitis panel in Feb 2020  -Repeat Hepatitis panel and monitor.   -INR mildly elevated.   -Possibly add US liver if transaminases keep elevating.      · Mildly elevated TSH:   -Possibly subclinical elevation.   -Add free t4.   -Recheck & TSH as an outpatient.     · Leukocytosis:   -UA unremarkable.   -CXR with changes of CHF.     · Essential Hypertension:   -Imdur on board with holding parameters.   -Metoprolol held 2/2 bradycardia.  -ACEI held 2/2 CKD IV.   -Clonidine and Norvasc continued with hold parameters      ----------  -DVT prophylaxis:  SQ Heparin  -Activity: Up with assist  -Diet:   Dietary Orders (From admission, onward)     Start     Ordered    11/03/20 1600  Diet Regular; Cardiac, Renal  Diet Effective Now     Question Answer Comment   Diet Texture / Consistency Regular    Common Modifiers Cardiac    Common Modifiers Renal        11/03/20 1559                -Expected length of stay:   INPATIENT status due to the need for care which can only be reasonably provided in an hospital setting such as aggressive/expedited ancillary services and/or  consultation services, the necessity for IV medications, close physician monitoring and/or the possible need for procedures.  In such, I feel patient’s risk for adverse outcomes and need for care warrant INPATIENT evaluation and predict the patient’s care encounter to likely last beyond 2 midnights.        High risk secondary to acute CHF with ETHEL on CKD IV with close monitoring for further diuresis.     Emily Kincaid PA-C  11/03/20  16:21 EST  Pager # [phone]  ---------------------------------------------------------------------------------------------------------------------             Electronically signed by Suzi Avendaño DO at 11/03/20 1944          Emergency Department Notes      Deepa Damon PA at 11/03/20 1115     Attestation signed by Holly Davis DO at 11/03/20 1736          For this patient encounter, I reviewed the NP or PA documentation, treatment plan, and medical decision making. Holly Davis DO 11/3/2020 17:36 EST                  Subjective   75-year-old female who presents to the ED today for shortness of breath.  She states she has had worsening shortness of breath over the last 3 weeks.  She states she is unable to lay down due to her shortness of breath and it is also worse with exertion.  She states she does have a history of congestive heart failure.  She states she takes Lasix 20 mg once a day but it has not been helping with her symptoms.  She states she has swelling to her bilateral lower extremities that does seem to be getting worse.  She denies any chest pain.  She states she has had a mild cough.  She denies any fever.  She states she does not wear home oxygen.  She is not a smoker.  She had a telehealth visit with her primary care provider today and it was recommended that she come to the ER for an evaluation.  I did review her last echocardiogram which was in February of this year and her EF was 50% at that time.  She also has a history of stage IV  chronic kidney disease.  She states she is not a dialysis patient.      History provided by:  Patient  Shortness of Breath  Severity:  Moderate  Onset quality:  Gradual  Duration:  3 weeks  Timing:  Constant  Progression:  Worsening  Chronicity:  New  Relieved by:  Nothing  Worsened by:  Exertion  Associated symptoms: cough    Associated symptoms: no abdominal pain, no chest pain, no diaphoresis, no ear pain, no fever, no headaches, no hemoptysis, no neck pain, no PND, no rash, no sore throat, no sputum production, no syncope, no swollen glands, no vomiting and no wheezing    Risk factors: obesity    Risk factors: no tobacco use        Review of Systems   Constitutional: Negative.  Negative for diaphoresis and fever.   HENT: Negative.  Negative for ear pain and sore throat.    Eyes: Negative.    Respiratory: Positive for cough and shortness of breath. Negative for hemoptysis, sputum production, chest tightness and wheezing.    Cardiovascular: Positive for leg swelling. Negative for chest pain, syncope and PND.   Gastrointestinal: Negative for abdominal pain, diarrhea, nausea and vomiting.   Genitourinary: Negative.    Musculoskeletal: Negative.  Negative for neck pain.   Skin: Negative for rash.   Neurological: Negative for headaches.   Psychiatric/Behavioral: Negative.    All other systems reviewed and are negative.      Past Medical History:   Diagnosis Date   • Anemia    • Arthritis    • Carpal tunnel syndrome    • CHF (congestive heart failure) (CMS/HCC)    • Coronary artery disease    • Diabetes mellitus (CMS/HCC)    • Elevated cholesterol    • GERD (gastroesophageal reflux disease)    • Heart disease    • High cholesterol    • History of pneumonia    • History of unsteady gait     OCASSIONALLY   • Hypertension    • Insomnia    • Kidney disease    • Osteoarthritis    • Renal insufficiency    • Sciatica        No Known Allergies    Past Surgical History:   Procedure Laterality Date   • ABDOMINAL SURGERY     •  APPENDECTOMY     • CARDIAC CATHETERIZATION      1 STENT  ---  2000   • CARDIAC SURGERY     • CAROTID STENT     • CARPAL TUNNEL RELEASE Right 10/8/2019    Procedure: CARPAL TUNNEL RELEASE;  Surgeon: Feroz Johnson MD;  Location: [facility];  Service: Orthopedics   • CATARACT EXTRACTION     • CHOLECYSTECTOMY     • COLONOSCOPY     • CORONARY ANGIOPLASTY WITH STENT PLACEMENT     • ENDOSCOPY     • ENDOSCOPY N/A 3/5/2020    Procedure: ESOPHAGOGASTRODUODENOSCOPY;  Surgeon: Alexandru Bagley MD;  Location: [facility];  Service: Gastroenterology;  Laterality: N/A;   • ENDOSCOPY AND COLONOSCOPY     • GALLBLADDER SURGERY     • JOINT REPLACEMENT Left 05/02/2017    [facility]  DR JOHNSON  LEFT TOTAL KNEE   • KNEE ARTHROSCOPY W/ MENISCECTOMY Right    • LAPAROSCOPIC TUBAL LIGATION     • NC TOTAL KNEE ARTHROPLASTY Left 5/2/2017    Procedure: TOTAL KNEE ARTHROPLASTY;  Surgeon: Feroz Johnson MD;  Location: [facility];  Service: Orthopedics   • STERILIZATION     • TONSILLECTOMY         Family History   Problem Relation Age of Onset   • Arthritis Mother    • Diabetes Mother    • Cancer Mother    • Heart disease Father    • Diabetes Daughter    • Diabetes Son    • Diabetes Maternal Aunt    • Heart disease Maternal Grandmother    • Breast cancer Neg Hx        Social History     Socioeconomic History   • Marital status:      Spouse name: natalie   • Number of children: 3   • Years of education: 12   • Highest education level: Not on file   Occupational History   • Occupation: retired   Social Needs   • Financial resource strain: Somewhat hard   • Food insecurity     Worry: Never true     Inability: Never true   • Transportation needs     Medical: Yes     Non-medical: No   Tobacco Use   • Smoking status: Never Smoker   • Smokeless tobacco: Never Used   Substance and Sexual Activity   • Alcohol use: Yes     Comment: socially   • Drug use: No   • Sexual activity: Defer     Birth control/protection: Surgical   Lifestyle   •  Physical activity     Days per week: 0 days     Minutes per session: 0 min   • Stress: Only a little           Objective   Physical Exam  Vitals signs and nursing note reviewed.   Constitutional:       Appearance: She is well-developed. She is obese. She is ill-appearing. She is not toxic-appearing or diaphoretic.   HENT:      Head: Normocephalic and atraumatic.      Mouth/Throat:      Mouth: Mucous membranes are moist.      Pharynx: Oropharynx is clear.   Eyes:      Extraocular Movements: Extraocular movements intact.      Pupils: Pupils are equal, round, and reactive to light.   Neck:      Musculoskeletal: Normal range of motion and neck supple.      Vascular: No JVD.      Trachea: No tracheal deviation.   Cardiovascular:      Rate and Rhythm: Regular rhythm. Bradycardia present.      Pulses: Normal pulses.      Heart sounds: Normal heart sounds.   Pulmonary:      Effort: Tachypnea present. No respiratory distress.      Breath sounds: Examination of the right-upper field reveals rales. Examination of the left-upper field reveals rales. Examination of the right-lower field reveals decreased breath sounds. Examination of the left-lower field reveals decreased breath sounds. Decreased breath sounds and rales present.   Chest:      Chest wall: No tenderness.   Abdominal:      General: Bowel sounds are normal.      Palpations: Abdomen is soft.      Tenderness: There is no abdominal tenderness. There is no guarding or rebound.   Musculoskeletal:      Right lower leg: Edema present.      Left lower leg: Edema present.      Comments: 3+ edema to bilateral lower extremities   Lymphadenopathy:      Cervical: No cervical adenopathy.   Skin:     General: Skin is warm and dry.      Capillary Refill: Capillary refill takes less than 2 seconds.   Neurological:      General: No focal deficit present.      Mental Status: She is alert and oriented to person, place, and time.   Psychiatric:         Mood and Affect: Mood normal.          Procedures          ED Course  ED Course as of Nov 03 1440   Tue Nov 03, 2020   1130 Case discussed with Dr. Davis.  Will give Bumex 1 mg IV.    [AH]   1150 Patient placed on 2 L O2 after reviewing ABG    [AH]   1222 FINDINGS:      Bibasilar airspace disease likely atelectasis.   Cardiomegaly. Pulmonary vascular congestion.   No pneumothorax.   Small pleural effusions.   No acute osseous findings.        IMPRESSION:  CHF/edema with small effusions.   XR Chest 1 View [AH]   1240 EKG sinus bradycardia with a first-degree AV block minimal voltage criteria for LVH and left anterior fascicular block present QTC is 498 and QT is 570.    [EG]   1255 COVID19: Not Detected [AH]   1400 I have discussed with Dr. Avendaño who will admit.    [AH]   1411 Repeat EKG shows sinus bradycardia with prolonged QT at 502 ms VA interval 188 ms    [EG]      ED Course User Index  [AH] Deepa Damon PA  [EG] Holly Davis DO                                         HEART Score (for prediction of 6-week risk of major adverse cardiac event) reviewed and/or performed as part of the patient evaluation and treatment planning process.  The result associated with this review/performance is: 5       MDM  Number of Diagnoses or Management Options  Acute on chronic congestive heart failure, unspecified heart failure type (CMS/HCC):      Amount and/or Complexity of Data Reviewed  Clinical lab tests: reviewed  Tests in the radiology section of CPT®: reviewed  Tests in the medicine section of CPT®: reviewed  Decide to obtain previous medical records or to obtain history from someone other than the patient: yes  Discuss the patient with other providers: yes    Patient Progress  Patient progress: stable      Final diagnoses:   Acute on chronic congestive heart failure, unspecified heart failure type (CMS/HCC)            Deepa Damon PA  11/03/20 1440      Electronically signed by Holly Davis DO at 11/03/20 3762          Facility-Administered Medications as of 11/4/2020   Medication Dose Route Frequency Provider Last Rate Last Dose   • amLODIPine (NORVASC) tablet 10 mg  10 mg Oral Nightly Emily Kincaid PA-C   10 mg at 11/03/20 2010   • aspirin EC tablet 81 mg  81 mg Oral Daily Emily Kincaid PA-C       • atorvastatin (LIPITOR) tablet 40 mg  40 mg Oral Nightly Emily Kincaid PA-C   40 mg at 11/03/20 2010   • [COMPLETED] bumetanide (BUMEX) injection 1 mg  1 mg Intravenous Once Deepa Damon PA   1 mg at 11/03/20 1255   • [COMPLETED] bumetanide (BUMEX) injection 1 mg  1 mg Intravenous Once Deepa Damon PA   1 mg at 11/03/20 1409   • calcium carb-cholecalciferol 600-800 MG-UNIT tablet 1 tablet  1 tablet Oral Daily Emily Kincaid PA-C       • cloNIDine (CATAPRES) tablet 0.2 mg  0.2 mg Oral TID Emily Kincaid PA-C   0.2 mg at 11/03/20 2010   • dextrose (D50W) 25 g/ 50mL Intravenous Solution 25 g  25 g Intravenous Q15 Min PRN Emily Kincaid PA-C       • dextrose (GLUTOSE) oral gel 15 g  15 g Oral Q15 Min PRN Emily Kincaid PA-C       • escitalopram (LEXAPRO) tablet 10 mg  10 mg Oral Daily Emily Kincaid PA-C       • ferrous sulfate tablet 325 mg  325 mg Oral BID With Meals Emily Kincaid PA-C   325 mg at 11/03/20 2010   • glucagon (human recombinant) (GLUCAGEN DIAGNOSTIC) injection 1 mg  1 mg Subcutaneous Q15 Min PRN Emily Kincaid PA-C       • heparin (porcine) 5000 UNIT/ML injection 5,000 Units  5,000 Units Subcutaneous Q12H Suzi Avendaño DO   5,000 Units at 11/03/20 2010   • insulin aspart (novoLOG) injection 0-7 Units  0-7 Units Subcutaneous TID AC Emily Kincaid PA-C       • insulin detemir (LEVEMIR) injection 30 Units  30 Units Subcutaneous Nightly Emily Kincaid PA-C       • isosorbide mononitrate (IMDUR) 24 hr tablet 30 mg  30 mg Oral Daily Emily Kincaid PA-C       • nitroglycerin (NITROSTAT) SL tablet 0.4 mg   0.4 mg Sublingual Q5 Min PRN Suzi Avendaño, DO       • sodium chloride 0.9 % flush 10 mL  10 mL Intravenous PRN Suzi Avendaño, DO       • sodium chloride 0.9 % flush 10 mL  10 mL Intravenous Q12H Suzi Avendaño, DO       • sodium chloride 0.9 % flush 10 mL  10 mL Intravenous PRN Suzi Avendaño, DO       • sodium chloride 0.9 % flush 10 mL  10 mL Intravenous Q12H Suzi Avendaño, DO       • sodium chloride 0.9 % flush 10 mL  10 mL Intravenous PRN Suzi Avendaño, DO       • [COMPLETED] sodium polystyrene (KAYEXALATE) powder 15 g  15 g Oral Once Emily Kincaid PA-C   15 g at 11/03/20 2010     Physician Progress Notes (all)    No notes of this type exist for this encounter.         Consult Notes (all)    No notes of this type exist for this encounter.

## 2020-11-04 NOTE — THERAPY EVALUATION
Acute Care - Speech Language Pathology   Swallow Initial Evaluation [facility]   CLINICAL DYSPHAGIA ASSESSMENT     Patient Name: Britta Lee  : 1945  MRN: [id number]  Today's Date: 2020             Admit Date: 11/3/2020     Britta Lee  is seen at bedside this am on 3S to assess safety/efficacy of swallowing fnx, determine safest/least restrictive diet tolerance.     Ms. Lee was admitted to [facility] [place] from her private residence 2/2 SOB w/ diagnosis of CHF. She is assessed to r/o dysphagia.     Social History     Socioeconomic History   • Marital status:      Spouse name: natalie   • Number of children: 3   • Years of education: 12   • Highest education level: Not on file   Occupational History   • Occupation: retired   Social Needs   • Financial resource strain: Somewhat hard   • Food insecurity     Worry: Never true     Inability: Never true   • Transportation needs     Medical: Yes     Non-medical: No   Tobacco Use   • Smoking status: Never Smoker   • Smokeless tobacco: Never Used   Substance and Sexual Activity   • Alcohol use: Yes     Comment: socially   • Drug use: No   • Sexual activity: Defer     Birth control/protection: Surgical   Lifestyle   • Physical activity     Days per week: 0 days     Minutes per session: 0 min   • Stress: Only a little      Imaging:   EXAMINATION: XR CHEST 1 VW-      CLINICAL INDICATION:     sob, edema     TECHNIQUE:  XR CHEST 1 VW-      COMPARISON: NONE      FINDINGS:      Bibasilar airspace disease likely atelectasis.   Cardiomegaly. Pulmonary vascular congestion.   No pneumothorax.   Small pleural effusions.   No acute osseous findings.        IMPRESSION:  CHF/edema with small effusions.     This report was finalized on 11/3/2020 12:03 PM by Dr. Guillermo Macias MD.        Labs:   20 1036  POC Glucose Once   Collected: 20 1018  Final result  Specimen: Blood    Glucose 216High  mg/dL            20 0642  POC Glucose Once   Collected: 20 0614   Final result  Specimen: Blood    Glucose 82 mg/dL            11/04/20 0502  Comprehensive Metabolic Panel   Collected: 11/04/20 0422  Final result  Specimen: Blood    Glucose 101High  mg/dL ALT (SGPT) 30 U/L   BUN 62High  mg/dL AST (SGOT) 41High  U/L   Creatinine 2.45High  mg/dL Alkaline Phosphatase 237High  U/L   Sodium 140 mmol/L Total Bilirubin 0.3 mg/dL   Potassium 4.7 mmol/L  eGFR Non African Am 19Low  mL/min/1.73   Chloride 107 mmol/L Globulin 3.9 gm/dL   CO2 19.6Low  mmol/L A/G Ratio 0.8 g/dL   Calcium 9.0 mg/dL BUN/Creatinine Ratio 25.3High    Total Protein 7.2 g/dL Anion Gap 13.4 mmol/L   Albumin 3.27Low  g/dL            11/04/20 0502  Iron Profile   Collected: 11/04/20 0422  Final result  Specimen: Blood    Iron 51 mcg/dL Transferrin 231 mg/dL   Iron Saturation 15Low  % TIBC 344 mcg/dL          11/04/20 0502  Troponin   Collected: 11/04/20 0422  Final result  Specimen: Blood    Troponin T <0.010 ng/mL            11/04/20 0502  Magnesium   Collected: 11/04/20 0422  Final result  Specimen: Blood    Magnesium 3.1High  mg/dL            11/04/20 0459  CBC & Differential   Collected: 11/04/20 0422  Final result  Specimen: Blood           11/04/20 0459  CBC Auto Differential   Collected: 11/04/20 0422  Final result  Specimen: Blood    WBC 9.38 10*3/mm3 Lymphocyte Rel % 20.3 %   RBC 3.47Low  10*6/mm3 Monocyte Rel % 5.9 %   Hemoglobin 9.0Low  g/dL Eosinophil Rel % 3.6 %   Hematocrit 31.7Low  % Basophil Rel % 0.4 %   MCV 91.4 fL Immature Granulocyte Rel % 0.2 %   MCH 25.9Low  pg Neutrophils Absolute 6.53 10*3/mm3   MCHC 28.4Low  g/dL Lymphocytes Absolute 1.90 10*3/mm3   RDW 14.5 % Monocytes Absolute 0.55 10*3/mm3   RDW-SD 47.6 fl Eosinophils Absolute 0.34 10*3/mm3   MPV 9.4 fL Basophils Absolute 0.04 10*3/mm3   Platelets 226 10*3/mm3 Immature Grans, Absolute 0.02 10*3/mm3   Neutrophil Rel % 69.6 % nRBC 0.0 /100 WBC          11/04/20 0459  Scan Slide   Collected: 11/04/20 0422  Final result   Specimen: Blood    Hypochromia Mod/2+ Platelet Morphology Normal            Diet Orders (active) (From admission, onward)     Start     Ordered    11/03/20 1732  Diet Regular; Cardiac, Renal, Consistent Carbohydrate  Diet Effective Now      11/03/20 1733              Observedon O2 via NC 2L.     Ms. Lee  is positioned upright and centered in bed to accept multiple po presentations of ice chips solid cracker, puree and thin liquids via spoon, cup and straw.  She is able to self feed.     Facial/oral structures are symmetrical upon observation. Lingual protrusion reveals no deviation. Oral mucosa are moist, pink, and clean. Secretions are clear, thin, and well controlled. OROM/KATHIA is wfl to imitate oral postures. Gag is not assessed. Volitional cough is intact w/ adequate  intensity, clear in quality, non-productive. Voice is adequate in intensity, clear in quality w/ intelligible speech.    Upon po presentations, adequate bolus anticipation and acceptance w/ good labial seal for bolus clearance via spoon bowl, cup rim stability and suction via straw. Bolus formation, manipulation and control are wfl w/ rotary mastication pattern. A-p transit is timely w/o oral residue. No overt s/a aspiration before the swallow.     Pharyngeal swallow is timely w/ adequate hyolaryngeal elevation per palpation. No overt s/s aspiration evidenced across this evaluation. No silent aspiration suspected. She denies odynophagia.    Visit Dx:     ICD-10-CM ICD-9-CM   1. Acute on chronic congestive heart failure, unspecified heart failure type (CMS/HCC)  I50.9 428.0   2. Acute on chronic diastolic CHF (congestive heart failure) (CMS/HCC)  I50.33 428.33     428.0     Patient Active Problem List   Diagnosis   • Type 2 diabetes mellitus, uncontrolled, with neuropathy (CMS/HCC)   • Essential hypertension   • Atherosclerosis of native coronary artery of native heart   • Bilateral carpal tunnel syndrome   • Iron deficiency anemia due to dietary  causes   • Diabetic retinopathy associated with type 2 diabetes mellitus (CMS/[facility])   • Hyperlipidemia   • Sciatic neuropathy   • DDD (degenerative disc disease), lumbar   • Primary osteoarthritis of left knee   • Status post total left knee replacement   • Type 2 diabetes mellitus with chronic kidney disease, with long-term current use of insulin (CMS/[facility])   • Hyperparathyroidism due to renal insufficiency (CMS/[facility])   • Venous insufficiency (chronic) (peripheral)   • Heart failure (CMS/[facility])   • Morbid (severe) obesity due to excess calories (CMS/[facility])   • PVD (peripheral vascular disease) with claudication (CMS/HCC)   • LVH (left ventricular hypertrophy)   • Chronic renal disease, stage IV (CMS/HCC)   • Acute on chronic congestive heart failure (CMS/[facility])     Past Medical History:   Diagnosis Date   • Anemia    • Arthritis    • Carpal tunnel syndrome    • CHF (congestive heart failure) (CMS/HCC)    • Coronary artery disease    • Diabetes mellitus (CMS/[facility])    • Elevated cholesterol    • GERD (gastroesophageal reflux disease)    • Heart disease    • High cholesterol    • History of pneumonia    • History of unsteady gait     OCASSIONALLY   • Hypertension    • Insomnia    • Kidney disease    • Osteoarthritis    • Renal insufficiency    • Sciatica      Past Surgical History:   Procedure Laterality Date   • ABDOMINAL SURGERY     • APPENDECTOMY     • CARDIAC CATHETERIZATION      1 STENT  ---  2000   • CARDIAC SURGERY     • CAROTID STENT     • CARPAL TUNNEL RELEASE Right 10/8/2019    Procedure: CARPAL TUNNEL RELEASE;  Surgeon: Feroz Johnson MD;  Location: [facility];  Service: Orthopedics   • CATARACT EXTRACTION     • CHOLECYSTECTOMY     • COLONOSCOPY     • CORONARY ANGIOPLASTY WITH STENT PLACEMENT     • ENDOSCOPY     • ENDOSCOPY N/A 3/5/2020    Procedure: ESOPHAGOGASTRODUODENOSCOPY;  Surgeon: Alexandru Bagley MD;  Location: [facility];  Service: Gastroenterology;  Laterality: N/A;   • ENDOSCOPY AND COLONOSCOPY      • GALLBLADDER SURGERY     • JOINT REPLACEMENT Left 05/02/2017    [facility] [place]  DR JOHNSON  LEFT TOTAL KNEE   • KNEE ARTHROSCOPY W/ MENISCECTOMY Right    • LAPAROSCOPIC TUBAL LIGATION     • NV TOTAL KNEE ARTHROPLASTY Left 5/2/2017    Procedure: TOTAL KNEE ARTHROPLASTY;  Surgeon: Feroz Johnson MD;  Location: [facility] OR;  Service: Orthopedics   • STERILIZATION     • TONSILLECTOMY       EDUCATION  The patient has been educated in the following areas:   Dysphagia (Swallowing Impairment) Oral Care/Hydration.    Impression: Ms. Lee presents w/ wfl oropharyngeal swallow w/o s/s aspiration.No s/s indicative of silent aspiration.  No odynophagia reported.    SLP Recommendation and Plan    1. Continue least restrictive regular po diet, thin liquids.    2. Medicatins whole in puree/thins.   3. Upright and centered for all po intake  4. CHELSEY precautions.  5. Oral care protocol.  No further formal SLP f/u warranted at this time.    D/w Ms. Lee results and recommendations w/ verbal agreement.    Patient was not wearing a face mask during this therapy encounter. Therapist used appropriate personal protective equipment including mask, eye protection and gloves.  Mask used was standard procedure mask. Appropriate PPE was worn during the entire therapy session. The patient did not cough during this evaluation. Therapist was within 6 feet for 15 minutes or more during the evaluation. Hand hygiene was completed before and after therapy session. Patient is not in enhanced droplet precautions.     Thank you for allowing me to participate in the care of your patient-  Jackie Cleveland M.S., CCC/SLP                                                                        Time Calculation:       Therapy Charges for Today     Code Description Service Date Service Provider Modifiers Qty    [id number] HC ST EVAL ORAL PHARYNG SWALLOW 3 11/4/2020 Jackie Cleveland, MS CCC-SLP GN 1               Jackie Cleveland, MS  CCC-SLP  11/4/2020

## 2020-11-04 NOTE — CONSULTS
Date of Admit: 11/3/2020  Date of Consult: 11/04/20  No ref. provider found        Acute on chronic congestive heart failure (CMS/[facility])      Assessment      1. Acute on chronic diastolic congestive heart failure, decompensated  2. ASCVD status post PCI in the remote past  3. Essential hypertension, uncontrolled  4. Peripheral vascular disease  5. Acute kidney injury on chronic kidney disease stage IV, baseline creatinine 1.5-1.7, currently 2.45  6. Bradycardia on admission, improving  7. Severe pulmonary hypertension  8. Morbid obesity    Recommendations     1. Patient has diastolic heart failure placated by the fact that she has class III renal dysfunction with worsening creatinine would recommend consultation of nephrology for advice  2. Patient with severe portal hypertension which is multifactorial  3. Bradycardia has improved  4. Patient also has mild mitral stenosis on echo however the valve area is above 2  5. Advised to lose weight        Reason for consultation: Acute CHF    Subjective       Subjective      History of Present Illness    Britta Lee is a 75-year-old female with a past medical history significant for congestive heart failure, hypertension, diabetes mellitus type 2, chronic kidney disease stage III, iron deficiency anemia, hyperlipidemia and morbid obesity.  Patient presented to the ER with complaints of shortness of breath.  She was referred to the ER by her primary care physician yesterday with concern that she may be in acute CHF.  Patient states that she has been short of breath over the last 3 weeks.  She also reports worsening orthopnea and lower extremity edema.  She denies any chest pain, cough, fever or chills.  She states that she was recently taken off her p.o. diuretics at home and noticed that her swelling and shortness of breath got worse after that.  Patient has chronic kidney disease and has been following with Dr. Johnson.  Creatinine on admission 2.45.  Echocardiogram in  February 2020 showed EF of 50%.    Cardiac risk factors:diabetes mellitus, hypercholesterolemia, hypertension and Sedentary life style    Last Echo: 2/6/2020  · Estimated EF = 50%.  · Left ventricular systolic function is normal.  · Left ventricular wall thickness is consistent with moderate concentric hypertrophy.  · Mild mitral valve regurgitation is present  · Mild gradient was noted across the mitral valve.    Last Stress: 8/4/2020  · Findings consistent with a normal ECG stress test.  · Breast attenuation artifact is present.  · Left ventricular ejection fraction is normal (Calculated EF = 69%).  · Impressions are consistent with a low risk study.  · There is no prior study available for comparison.  · Patient has a fixed defect on the anterior wall but with normal wall motions it is consistent with breast attenuation artifact, no ischemia seen.     Past Medical History:   Diagnosis Date   • Anemia    • Arthritis    • Carpal tunnel syndrome    • CHF (congestive heart failure) (CMS/HCC)    • Coronary artery disease    • Diabetes mellitus (CMS/HCC)    • Elevated cholesterol    • GERD (gastroesophageal reflux disease)    • Heart disease    • High cholesterol    • History of pneumonia    • History of unsteady gait     OCASSIONALLY   • Hypertension    • Insomnia    • Kidney disease    • Osteoarthritis    • Renal insufficiency    • Sciatica      Past Surgical History:   Procedure Laterality Date   • ABDOMINAL SURGERY     • APPENDECTOMY     • CARDIAC CATHETERIZATION      1 STENT  ---  2000   • CARDIAC SURGERY     • CAROTID STENT     • CARPAL TUNNEL RELEASE Right 10/8/2019    Procedure: CARPAL TUNNEL RELEASE;  Surgeon: Feroz Johnson MD;  Location: [facility];  Service: Orthopedics   • CATARACT EXTRACTION     • CHOLECYSTECTOMY     • COLONOSCOPY     • CORONARY ANGIOPLASTY WITH STENT PLACEMENT     • ENDOSCOPY     • ENDOSCOPY N/A 3/5/2020    Procedure: ESOPHAGOGASTRODUODENOSCOPY;  Surgeon: Alexandru Bagley MD;   Location: [facility] OR;  Service: Gastroenterology;  Laterality: N/A;   • ENDOSCOPY AND COLONOSCOPY     • GALLBLADDER SURGERY     • JOINT REPLACEMENT Left 05/02/2017    [facility]  DR JOHNSON  LEFT TOTAL KNEE   • KNEE ARTHROSCOPY W/ MENISCECTOMY Right    • LAPAROSCOPIC TUBAL LIGATION     • NM TOTAL KNEE ARTHROPLASTY Left 5/2/2017    Procedure: TOTAL KNEE ARTHROPLASTY;  Surgeon: Feroz Johnson MD;  Location: [facility];  Service: Orthopedics   • STERILIZATION     • TONSILLECTOMY       Family History   Problem Relation Age of Onset   • Arthritis Mother    • Diabetes Mother    • Cancer Mother    • Heart disease Father    • Diabetes Daughter    • Diabetes Son    • Diabetes Maternal Aunt    • Heart disease Maternal Grandmother    • Breast cancer Neg Hx      Social History     Tobacco Use   • Smoking status: Never Smoker   • Smokeless tobacco: Never Used   Substance Use Topics   • Alcohol use: Yes     Comment: socially   • Drug use: No     Medications Prior to Admission   Medication Sig Dispense Refill Last Dose   • amLODIPine (NORVASC) 10 MG tablet Take 1 tablet by mouth Every Night. 90 tablet 1 11/2/2020 at pm   • aspirin 81 MG tablet Take 1 tablet by mouth Daily. 30 tablet 5 11/3/2020 at am   • atorvastatin (LIPITOR) 40 MG tablet Take 40 mg by mouth Every Night.   11/2/2020 at pm   • calcium carb-cholecalciferol (CALCIUM 600/VITAMIN D3) 600-800 MG-UNIT tablet Take 1 tablet by mouth Daily.   11/3/2020 at am   • cloNIDine (CATAPRES) 0.2 MG tablet Take 1 tablet by mouth 3 (Three) Times a Day. 270 tablet 3 11/3/2020 at am   • escitalopram (LEXAPRO) 10 MG tablet Take 1 tablet by mouth Daily. 90 tablet 3 11/3/2020 at am   • ferrous sulfate 325 (65 Fe) MG tablet Take 1 tablet by mouth 2 (Two) Times a Day. 180 tablet 3 11/3/2020 at am   • furosemide (Lasix) 20 MG tablet Take 1 tablet by mouth Daily. 1 tablet 0 11/3/2020 at am   • hydroCHLOROthiazide (HYDRODIURIL) 25 MG tablet Take 1 tablet by mouth Daily. 90 tablet 1 11/3/2020  at am   • Insulin Glargine, 1 Unit Dial, (TOUJEO) 300 UNIT/ML solution pen-injector injection Inject 80 Units under the skin into the appropriate area as directed Every Night.   11/2/2020 at pm   • insulin lispro (humaLOG) 100 UNIT/ML injection Inject 5 Units under the skin into the appropriate area as directed 3 (Three) Times a Day Before Meals.   11/2/2020 at Unknown time   • isosorbide mononitrate (IMDUR) 30 MG 24 hr tablet Take 1 tablet by mouth Daily. 90 tablet 3 11/3/2020 at am   • metoprolol tartrate (LOPRESSOR) 100 MG tablet Take 1 tablet by mouth 2 (Two) Times a Day. 180 tablet 3 11/3/2020 at am   • quinapril (ACCUPRIL) 40 MG tablet Take 1 tablet by mouth Daily. 30 tablet 5 11/3/2020 at am     Allergies:  Patient has no known allergies.    Review of Systems   Constitutional: Negative for chills, diaphoresis, fatigue and fever.   HENT: Negative for congestion and trouble swallowing.    Eyes: Negative for photophobia and visual disturbance.   Respiratory: Positive for shortness of breath and wheezing. Negative for cough and chest tightness.    Cardiovascular: Positive for leg swelling. Negative for chest pain and palpitations.   Gastrointestinal: Negative for abdominal distention, abdominal pain, nausea and vomiting.   Endocrine: Negative for polyphagia and polyuria.   Genitourinary: Negative for dysuria and hematuria.   Musculoskeletal: Negative for neck pain and neck stiffness.   Skin: Negative for rash and wound.   Allergic/Immunologic: Negative for food allergies and immunocompromised state.   Neurological: Negative for dizziness, weakness and light-headedness.   Hematological: Negative for adenopathy. Does not bruise/bleed easily.   Psychiatric/Behavioral: Negative for confusion and suicidal ideas.       Objective       Objective      Vital Signs  Temp:  [96.6 °F (35.9 °C)-98.3 °F (36.8 °C)] 97.7 °F (36.5 °C)  Heart Rate:  [46-77] 77  Resp:  [18-22] 18  BP: (121-171)/(50-89) 166/66     Vital Signs (last  72 hrs)       11/01 0700  -  11/02 0659 11/02 0700  -  11/03 0659 11/03 0700  -  11/04 0659 11/04 0700  -  11/04 0919   Most Recent    Temp (°F)     96.6 -  98.3       97.7 (36.5)    Heart Rate     46 -  75      77     77    Resp     18 -  22       18    BP     121/63 -  171/69       166/66    SpO2 (%)     94 -  99       97        Body mass index is 44.04 kg/m².    Intake/Output Summary (Last 24 hours) at 11/4/2020 0919  Last data filed at 11/4/2020 0519  Gross per 24 hour   Intake 480 ml   Output 500 ml   Net -20 ml     Physical Exam  Constitutional:       General: She is not in acute distress.     Appearance: Normal appearance. She is well-developed and normal weight.   HENT:      Head: Normocephalic and atraumatic.   Eyes:      General: Lids are normal.      Conjunctiva/sclera: Conjunctivae normal.      Pupils: Pupils are equal, round, and reactive to light.   Neck:      Musculoskeletal: Normal range of motion and neck supple.      Vascular: No carotid bruit or JVD.   Cardiovascular:      Rate and Rhythm: Normal rate and regular rhythm.      Pulses: Normal pulses.      Heart sounds: Normal heart sounds, S1 normal and S2 normal. No murmur.   Pulmonary:      Effort: Pulmonary effort is normal. No respiratory distress.      Breath sounds: Rales present. No wheezing.   Abdominal:      General: Bowel sounds are normal. There is no distension.      Palpations: Abdomen is soft. There is no hepatomegaly or splenomegaly.      Tenderness: There is no abdominal tenderness.   Musculoskeletal: Normal range of motion.         General: No swelling.      Right lower leg: No edema.      Left lower leg: No edema.   Skin:     General: Skin is warm and dry.      Coloration: Skin is not jaundiced.      Findings: No rash.   Neurological:      General: No focal deficit present.      Mental Status: She is alert and oriented to person, place, and time. Mental status is at baseline.   Psychiatric:         Mood and Affect: Mood normal.          Speech: Speech normal.         Behavior: Behavior normal.         Thought Content: Thought content normal.         Judgment: Judgment normal.         Results review     Results Review:    I reviewed the patient's new clinical results.  Results from last 7 days   Lab Units 11/04/20  0422 11/03/20  2156 11/03/20  1711 11/03/20  1358 11/03/20  1128   TROPONIN T ng/mL <0.010 <0.010 <0.010 <0.010 <0.010     Results from last 7 days   Lab Units 11/04/20  0422 11/03/20  1128   WBC 10*3/mm3 9.38 12.32*   HEMOGLOBIN g/dL 9.0* 9.8*   PLATELETS 10*3/mm3 226 320     Results from last 7 days   Lab Units 11/04/20  0422 11/03/20  1128   SODIUM mmol/L 140 140   POTASSIUM mmol/L 4.7 5.4*   CHLORIDE mmol/L 107 105   CO2 mmol/L 19.6* 19.7*   BUN mg/dL 62* 63*   CREATININE mg/dL 2.45* 2.67*   CALCIUM mg/dL 9.0 9.6   GLUCOSE mg/dL 101* 87   ALT (SGPT) U/L 30 35*   AST (SGOT) U/L 41* 53*     Lab Results   Component Value Date    INR 1.28 (H) 11/03/2020     Lab Results   Component Value Date    MG 3.1 (H) 11/04/2020    MG 2.9 (H) 11/03/2020    MG 2.2 02/05/2020     Lab Results   Component Value Date    TSH 5.410 (H) 11/03/2020    CHLPL 128 06/25/2018    TRIG 128 09/04/2020    HDL 36 (L) 09/04/2020    LDL 63 09/04/2020      Lab Results   Component Value Date    PROBNP 2,940.0 (H) 11/03/2020    PROBNP 1,833.0 (H) 02/06/2020    PROBNP 2,074.0 (H) 02/05/2020       ECG  ECG/EMG Results (last 24 hours)     Procedure Component Value Units Date/Time    ECG 12 Lead [[id number]] Collected: 11/03/20 1214     Updated: 11/03/20 2146     QT Interval 570 ms      QTC Interval 498 ms     Narrative:      Test Reason : sob  Blood Pressure : **/** mmHG  Vent. Rate : 046 BPM     Atrial Rate : 046 BPM     P-R Int : 216 ms          QRS Dur : 092 ms      QT Int : 570 ms       P-R-T Axes : 042 -50 044 degrees     QTc Int : 498 ms    Sinus bradycardia with 1st degree AV block  Left anterior fascicular block  Minimal voltage criteria for LVH, may be normal  variant  Prolonged QT r/o drug effect  Abnormal ECG  When compared with ECG of 05-FEB-2020 09:22,  PA interval has increased  Vent. rate has decreased BY  36 BPM  Non-specific change in ST segment in Lateral leads  Confirmed by Mason Leone ([id number]) on 11/3/2020 9:46:06 PM    Referred By:  ANATOLIY           Confirmed By:Mason Leone    ECG 12 Lead [[id number]] Collected: 11/03/20 1406     Updated: 11/03/20 2148     QT Interval 562 ms      QTC Interval 502 ms     Narrative:      Test Reason : CHF  Blood Pressure : **/** mmHG  Vent. Rate : 048 BPM     Atrial Rate : 048 BPM     P-R Int : 188 ms          QRS Dur : 092 ms      QT Int : 562 ms       P-R-T Axes : 050 -54 064 degrees     QTc Int : 502 ms    Sinus bradycardia  Left anterior fascicular block  Minimal voltage criteria for LVH, may be normal variant  Prolonged QT r/o drug effect  Abnormal ECG  When compared with ECG of 03-NOV-2020 12:14, (Unconfirmed)  No significant change was found  Confirmed by Mason Leone ([id number]) on 11/3/2020 9:47:54 PM    Referred By:  ANATOLIY           Confirmed By:Mason Leone    ECG 12 Lead [[id number]] Collected: 11/03/20 2018     Updated: 11/03/20 2158     QT Interval 496 ms      QTC Interval 496 ms     Narrative:      Test Reason : dyspnea  Blood Pressure : **/** mmHG  Vent. Rate : 060 BPM     Atrial Rate : 060 BPM     P-R Int : 202 ms          QRS Dur : 090 ms      QT Int : 496 ms       P-R-T Axes : 050 -54 082 degrees     QTc Int : 496 ms    Normal sinus rhythm  Left anterior fascicular block  Minimal voltage criteria for LVH, may be normal variant  Prolonged QT  Abnormal ECG  When compared with ECG of 03-NOV-2020 14:06, (Unconfirmed)  No significant change was found  Confirmed by Mason Leone ([id number]) on 11/3/2020 9:58:09 PM    Referred By:  PHILOMENA           Confirmed By:Mason Leone          Imaging Results (Last 72 Hours)     Procedure Component Value Units Date/Time    XR Chest 1 View [[id number]] Collected: 11/03/20 1202     Updated:  11/03/20 1207    Narrative:      EXAMINATION: XR CHEST 1 VW-      CLINICAL INDICATION:     sob, edema     TECHNIQUE:  XR CHEST 1 VW-      COMPARISON: NONE      FINDINGS:      Bibasilar airspace disease likely atelectasis.   Cardiomegaly. Pulmonary vascular congestion.   No pneumothorax.   Small pleural effusions.   No acute osseous findings.          Impression:      CHF/edema with small effusions.     This report was finalized on 11/3/2020 12:03 PM by Dr. Guillermo Macias MD.             I have discussed my impression and recommendations with the patient and family.    Thank you very much for asking us to be involved in this patient's care.  We will follow along with you.      ERNIE Olsen , acting as scribe for Dr. Hever Cruz MD [facility]  11/04/20  09:19 EST  Hever TORO MD, [facility], performed the services described in this documentation as documented by the above-named individual under my supervision and made necessary changes in the note is both accurate and complete  Please note that portions of this note were completed with a voice recognition program.

## 2020-11-05 LAB
ANION GAP SERPL CALCULATED.3IONS-SCNC: 10.6 MMOL/L (ref 5–15)
BASOPHILS # BLD AUTO: 0.03 10*3/MM3 (ref 0–0.2)
BASOPHILS NFR BLD AUTO: 0.3 % (ref 0–1.5)
BUN SERPL-MCNC: 61 MG/DL (ref 8–23)
BUN/CREAT SERPL: 26.3 (ref 7–25)
CALCIUM SPEC-SCNC: 9 MG/DL (ref 8.6–10.5)
CHLORIDE SERPL-SCNC: 104 MMOL/L (ref 98–107)
CO2 SERPL-SCNC: 22.4 MMOL/L (ref 22–29)
CREAT SERPL-MCNC: 2.32 MG/DL (ref 0.57–1)
DEPRECATED RDW RBC AUTO: 47.1 FL (ref 37–54)
EOSINOPHIL # BLD AUTO: 0.34 10*3/MM3 (ref 0–0.4)
EOSINOPHIL NFR BLD AUTO: 3.9 % (ref 0.3–6.2)
ERYTHROCYTE [DISTWIDTH] IN BLOOD BY AUTOMATED COUNT: 14.4 % (ref 12.3–15.4)
GFR SERPL CREATININE-BSD FRML MDRD: 20 ML/MIN/1.73
GLUCOSE BLDC GLUCOMTR-MCNC: 143 MG/DL (ref 70–130)
GLUCOSE BLDC GLUCOMTR-MCNC: 245 MG/DL (ref 70–130)
GLUCOSE BLDC GLUCOMTR-MCNC: 253 MG/DL (ref 70–130)
GLUCOSE BLDC GLUCOMTR-MCNC: 94 MG/DL (ref 70–130)
GLUCOSE SERPL-MCNC: 194 MG/DL (ref 65–99)
HCT VFR BLD AUTO: 29.5 % (ref 34–46.6)
HGB BLD-MCNC: 8.6 G/DL (ref 12–15.9)
IMM GRANULOCYTES # BLD AUTO: 0.03 10*3/MM3 (ref 0–0.05)
IMM GRANULOCYTES NFR BLD AUTO: 0.3 % (ref 0–0.5)
LYMPHOCYTES # BLD AUTO: 2.14 10*3/MM3 (ref 0.7–3.1)
LYMPHOCYTES NFR BLD AUTO: 24.4 % (ref 19.6–45.3)
MCH RBC QN AUTO: 26.3 PG (ref 26.6–33)
MCHC RBC AUTO-ENTMCNC: 29.2 G/DL (ref 31.5–35.7)
MCV RBC AUTO: 90.2 FL (ref 79–97)
MONOCYTES # BLD AUTO: 0.59 10*3/MM3 (ref 0.1–0.9)
MONOCYTES NFR BLD AUTO: 6.7 % (ref 5–12)
NEUTROPHILS NFR BLD AUTO: 5.65 10*3/MM3 (ref 1.7–7)
NEUTROPHILS NFR BLD AUTO: 64.4 % (ref 42.7–76)
NRBC BLD AUTO-RTO: 0 /100 WBC (ref 0–0.2)
PLATELET # BLD AUTO: 187 10*3/MM3 (ref 140–450)
PMV BLD AUTO: 9.7 FL (ref 6–12)
POTASSIUM SERPL-SCNC: 4.7 MMOL/L (ref 3.5–5.2)
RBC # BLD AUTO: 3.27 10*6/MM3 (ref 3.77–5.28)
SODIUM SERPL-SCNC: 137 MMOL/L (ref 136–145)
T4 FREE SERPL-MCNC: 0.98 NG/DL (ref 0.93–1.7)
WBC # BLD AUTO: 8.78 10*3/MM3 (ref 3.4–10.8)

## 2020-11-05 PROCEDURE — 84439 ASSAY OF FREE THYROXINE: CPT | Performed by: PHYSICIAN ASSISTANT

## 2020-11-05 PROCEDURE — 63710000001 INSULIN ASPART PER 5 UNITS: Performed by: PHYSICIAN ASSISTANT

## 2020-11-05 PROCEDURE — 80048 BASIC METABOLIC PNL TOTAL CA: CPT | Performed by: INTERNAL MEDICINE

## 2020-11-05 PROCEDURE — 82962 GLUCOSE BLOOD TEST: CPT

## 2020-11-05 PROCEDURE — 99231 SBSQ HOSP IP/OBS SF/LOW 25: CPT | Performed by: INTERNAL MEDICINE

## 2020-11-05 PROCEDURE — 25010000002 HEPARIN (PORCINE) PER 1000 UNITS: Performed by: INTERNAL MEDICINE

## 2020-11-05 PROCEDURE — 99232 SBSQ HOSP IP/OBS MODERATE 35: CPT | Performed by: PHYSICIAN ASSISTANT

## 2020-11-05 PROCEDURE — 85025 COMPLETE CBC W/AUTO DIFF WBC: CPT | Performed by: INTERNAL MEDICINE

## 2020-11-05 PROCEDURE — 63710000001 INSULIN DETEMIR PER 5 UNITS: Performed by: PHYSICIAN ASSISTANT

## 2020-11-05 RX ORDER — ACETAMINOPHEN 325 MG/1
650 TABLET ORAL EVERY 6 HOURS PRN
Status: DISCONTINUED | OUTPATIENT
Start: 2020-11-05 | End: 2020-11-08 | Stop reason: HOSPADM

## 2020-11-05 RX ORDER — BUMETANIDE 0.25 MG/ML
2 INJECTION INTRAMUSCULAR; INTRAVENOUS ONCE
Status: COMPLETED | OUTPATIENT
Start: 2020-11-05 | End: 2020-11-05

## 2020-11-05 RX ADMIN — CLONIDINE HYDROCHLORIDE 0.2 MG: 0.2 TABLET ORAL at 20:14

## 2020-11-05 RX ADMIN — SODIUM CHLORIDE, PRESERVATIVE FREE 10 ML: 5 INJECTION INTRAVENOUS at 08:07

## 2020-11-05 RX ADMIN — ATORVASTATIN CALCIUM 40 MG: 40 TABLET, FILM COATED ORAL at 20:14

## 2020-11-05 RX ADMIN — AMLODIPINE BESYLATE 10 MG: 10 TABLET ORAL at 20:14

## 2020-11-05 RX ADMIN — INSULIN DETEMIR 30 UNITS: 100 INJECTION, SOLUTION SUBCUTANEOUS at 20:15

## 2020-11-05 RX ADMIN — ESCITALOPRAM 10 MG: 10 TABLET, FILM COATED ORAL at 08:06

## 2020-11-05 RX ADMIN — NYSTATIN: 100000 POWDER TOPICAL at 20:53

## 2020-11-05 RX ADMIN — NYSTATIN: 100000 POWDER TOPICAL at 08:07

## 2020-11-05 RX ADMIN — FERROUS SULFATE TAB 325 MG (65 MG ELEMENTAL FE) 325 MG: 325 (65 FE) TAB at 08:07

## 2020-11-05 RX ADMIN — INSULIN ASPART 4 UNITS: 100 INJECTION, SOLUTION INTRAVENOUS; SUBCUTANEOUS at 17:09

## 2020-11-05 RX ADMIN — ISOSORBIDE MONONITRATE 30 MG: 30 TABLET, EXTENDED RELEASE ORAL at 08:06

## 2020-11-05 RX ADMIN — FERROUS SULFATE TAB 325 MG (65 MG ELEMENTAL FE) 325 MG: 325 (65 FE) TAB at 17:09

## 2020-11-05 RX ADMIN — SODIUM CHLORIDE, PRESERVATIVE FREE 10 ML: 5 INJECTION INTRAVENOUS at 20:14

## 2020-11-05 RX ADMIN — HEPARIN SODIUM 5000 UNITS: 5000 INJECTION INTRAVENOUS; SUBCUTANEOUS at 20:13

## 2020-11-05 RX ADMIN — BUMETANIDE 2 MG: 0.25 INJECTION INTRAMUSCULAR; INTRAVENOUS at 11:23

## 2020-11-05 RX ADMIN — HEPARIN SODIUM 5000 UNITS: 5000 INJECTION INTRAVENOUS; SUBCUTANEOUS at 08:07

## 2020-11-05 RX ADMIN — CLONIDINE HYDROCHLORIDE 0.2 MG: 0.2 TABLET ORAL at 17:09

## 2020-11-05 RX ADMIN — CLONIDINE HYDROCHLORIDE 0.2 MG: 0.2 TABLET ORAL at 08:06

## 2020-11-05 RX ADMIN — ACETAMINOPHEN 650 MG: 325 TABLET ORAL at 11:23

## 2020-11-05 RX ADMIN — ASPIRIN 81 MG: 81 TABLET, COATED ORAL at 08:07

## 2020-11-05 RX ADMIN — Medication 1 TABLET: at 08:06

## 2020-11-05 NOTE — CONSULTS
Nephrology Consult Note    Referring Provider: Suzi Avendaño  Reason for Consultation: ETHEL on CKD, volume overload    Subjective       History of present illness:  Britta Lee is a 75 y.o. female who presented to [facility] emergency department with chief complaint of worsening shortness of breath.   Pt is known to have CKD, congestive heart failure, follows Dr Johnson in clinic. She has been taking PO lasix but for past few days she started to feel more short of breath and worsening lower ext swelling.   Pt denied high salt intake or increased fluid intake.   Pt baseline Cr is around 1.5-2.0, Cr admitted with 2.6, she received Bumex 2mg yesterday, she says her shortness of breath has improved.   Pt denied any history of Chronic NSAIDS use. Patient denies hematuria, dysuria, difficulty passing urine. No prior history of renal stones. No family history of renal disease    History  Past Medical History:   Diagnosis Date   • Anemia    • Arthritis    • Carpal tunnel syndrome    • CHF (congestive heart failure) (CMS/[facility])    • Coronary artery disease    • Diabetes mellitus (CMS/[facility])    • Elevated cholesterol    • GERD (gastroesophageal reflux disease)    • Heart disease    • High cholesterol    • History of pneumonia    • History of unsteady gait     OCASSIONALLY   • Hypertension    • Insomnia    • Kidney disease    • Osteoarthritis    • Renal insufficiency    • Sciatica    ,   Past Surgical History:   Procedure Laterality Date   • ABDOMINAL SURGERY     • APPENDECTOMY     • CARDIAC CATHETERIZATION      1 STENT  ---  2000   • CARDIAC SURGERY     • CAROTID STENT     • CARPAL TUNNEL RELEASE Right 10/8/2019    Procedure: CARPAL TUNNEL RELEASE;  Surgeon: Feroz Johnson MD;  Location: [facility];  Service: Orthopedics   • CATARACT EXTRACTION     • CHOLECYSTECTOMY     • COLONOSCOPY     • CORONARY ANGIOPLASTY WITH STENT PLACEMENT     • ENDOSCOPY     • ENDOSCOPY N/A 3/5/2020    Procedure:  ESOPHAGOGASTRODUODENOSCOPY;  Surgeon: Alexandru Bagley MD;  Location: [facility];  Service: Gastroenterology;  Laterality: N/A;   • ENDOSCOPY AND COLONOSCOPY     • GALLBLADDER SURGERY     • JOINT REPLACEMENT Left 05/02/2017    [facility]  DR JOHNSON  LEFT TOTAL KNEE   • KNEE ARTHROSCOPY W/ MENISCECTOMY Right    • LAPAROSCOPIC TUBAL LIGATION     • MO TOTAL KNEE ARTHROPLASTY Left 5/2/2017    Procedure: TOTAL KNEE ARTHROPLASTY;  Surgeon: Feroz Johnson MD;  Location: [facility];  Service: Orthopedics   • STERILIZATION     • TONSILLECTOMY     ,   Family History   Problem Relation Age of Onset   • Arthritis Mother    • Diabetes Mother    • Cancer Mother    • Heart disease Father    • Diabetes Daughter    • Diabetes Son    • Diabetes Maternal Aunt    • Heart disease Maternal Grandmother    • Breast cancer Neg Hx    ,   Social History     Tobacco Use   • Smoking status: Never Smoker   • Smokeless tobacco: Never Used   Substance Use Topics   • Alcohol use: Yes     Comment: socially   • Drug use: No   ,   Medications Prior to Admission   Medication Sig Dispense Refill Last Dose   • amLODIPine (NORVASC) 10 MG tablet Take 1 tablet by mouth Every Night. 90 tablet 1 11/2/2020 at pm   • aspirin 81 MG tablet Take 1 tablet by mouth Daily. 30 tablet 5 11/3/2020 at am   • atorvastatin (LIPITOR) 40 MG tablet Take 40 mg by mouth Every Night.   11/2/2020 at pm   • calcium carb-cholecalciferol (CALCIUM 600/VITAMIN D3) 600-800 MG-UNIT tablet Take 1 tablet by mouth Daily.   11/3/2020 at am   • cloNIDine (CATAPRES) 0.2 MG tablet Take 1 tablet by mouth 3 (Three) Times a Day. 270 tablet 3 11/3/2020 at am   • escitalopram (LEXAPRO) 10 MG tablet Take 1 tablet by mouth Daily. 90 tablet 3 11/3/2020 at am   • ferrous sulfate 325 (65 Fe) MG tablet Take 1 tablet by mouth 2 (Two) Times a Day. 180 tablet 3 11/3/2020 at am   • furosemide (Lasix) 20 MG tablet Take 1 tablet by mouth Daily. 1 tablet 0 11/3/2020 at am   • hydroCHLOROthiazide (HYDRODIURIL)  25 MG tablet Take 1 tablet by mouth Daily. 90 tablet 1 11/3/2020 at am   • Insulin Glargine, 1 Unit Dial, (TOUJEO) 300 UNIT/ML solution pen-injector injection Inject 80 Units under the skin into the appropriate area as directed Every Night.   11/2/2020 at pm   • insulin lispro (humaLOG) 100 UNIT/ML injection Inject 5 Units under the skin into the appropriate area as directed 3 (Three) Times a Day Before Meals.   11/2/2020 at Unknown time   • isosorbide mononitrate (IMDUR) 30 MG 24 hr tablet Take 1 tablet by mouth Daily. 90 tablet 3 11/3/2020 at am   • metoprolol tartrate (LOPRESSOR) 100 MG tablet Take 1 tablet by mouth 2 (Two) Times a Day. 180 tablet 3 11/3/2020 at am   • quinapril (ACCUPRIL) 40 MG tablet Take 1 tablet by mouth Daily. 30 tablet 5 11/3/2020 at am   , Scheduled Meds:  amLODIPine, 10 mg, Oral, Nightly  aspirin, 81 mg, Oral, Daily  atorvastatin, 40 mg, Oral, Nightly  calcium carb-cholecalciferol, 1 tablet, Oral, Daily  cloNIDine, 0.2 mg, Oral, TID  escitalopram, 10 mg, Oral, Daily  ferrous sulfate, 325 mg, Oral, BID With Meals  heparin (porcine), 5,000 Units, Subcutaneous, Q12H  insulin aspart, 0-7 Units, Subcutaneous, TID AC  insulin detemir, 30 Units, Subcutaneous, Nightly  isosorbide mononitrate, 30 mg, Oral, Daily  nystatin, , Topical, Q12H  sodium chloride, 10 mL, Intravenous, Q12H  sodium chloride, 10 mL, Intravenous, Q12H    , Continuous Infusions:   , PRN Meds:  dextrose  •  dextrose  •  glucagon (human recombinant)  •  nitroglycerin  •  [COMPLETED] Insert peripheral IV **AND** sodium chloride  •  sodium chloride  •  sodium chloride and Allergies:  Patient has no known allergies.    Review of Systems  More than 10 point review of systems was done. Pertinent items are noted in HPI, all other systems reviewed and negative    Objective     Vital Signs  Temp:  [97.4 °F (36.3 °C)-98.1 °F (36.7 °C)] 97.6 °F (36.4 °C)  Heart Rate:  [67-72] 68  Resp:  [18] 18  BP: (142-164)/(57-76) 149/57    No  intake/output data recorded.  I/O last 3 completed shifts:  In: 1080 [P.O.:1080]  Out: 1700 [Urine:1700]    Physical Examination:  General Appearance: not in acute distress  Neck: No adenopathy, suppple, no carotid bruit and No JVD  Lungs: B/L lower zone crackles and decreased intensity of breath sounds  Heart: Regular rhythm & normal rate, normal S1, S2, no murmur, no gallop, no rub   Abdomen: Normal bowel sounds, no masses and soft non-tender  Rectal: Deferred  Extremities: Moves extremities well, no redness and 2+ edema  Pulses: Palpable and equal bilaterally  Skin: No bleeding, bruising or rash  Neurologic: Orientated to person, place, time, grossly no focal deficitis    Laboratory Data :      WBC WBC   Date Value Ref Range Status   11/05/2020 8.78 3.40 - 10.80 10*3/mm3 Final   11/04/2020 9.38 3.40 - 10.80 10*3/mm3 Final   11/03/2020 12.32 (H) 3.40 - 10.80 10*3/mm3 Final      HGB Hemoglobin   Date Value Ref Range Status   11/05/2020 8.6 (L) 12.0 - 15.9 g/dL Final   11/04/2020 9.0 (L) 12.0 - 15.9 g/dL Final   11/03/2020 9.8 (L) 12.0 - 15.9 g/dL Final      HCT Hematocrit   Date Value Ref Range Status   11/05/2020 29.5 (L) 34.0 - 46.6 % Final   11/04/2020 31.7 (L) 34.0 - 46.6 % Final   11/03/2020 33.5 (L) 34.0 - 46.6 % Final      Platlets No results found for: LABPLAT   MCV MCV   Date Value Ref Range Status   11/05/2020 90.2 79.0 - 97.0 fL Final   11/04/2020 91.4 79.0 - 97.0 fL Final   11/03/2020 86.8 79.0 - 97.0 fL Final          Sodium Sodium   Date Value Ref Range Status   11/05/2020 137 136 - 145 mmol/L Final   11/04/2020 140 136 - 145 mmol/L Final   11/03/2020 140 136 - 145 mmol/L Final      Potassium Potassium   Date Value Ref Range Status   11/05/2020 4.7 3.5 - 5.2 mmol/L Final     Comment:     Slight hemolysis detected by analyzer. Results may be affected.   11/04/2020 4.7 3.5 - 5.2 mmol/L Final     Comment:     Slight hemolysis detected by analyzer. Results may be affected.   11/03/2020 5.4 (H) 3.5 - 5.2  mmol/L Final     Comment:     Slight hemolysis detected by analyzer. Results may be affected.      Chloride Chloride   Date Value Ref Range Status   11/05/2020 104 98 - 107 mmol/L Final   11/04/2020 107 98 - 107 mmol/L Final   11/03/2020 105 98 - 107 mmol/L Final      CO2 CO2   Date Value Ref Range Status   11/05/2020 22.4 22.0 - 29.0 mmol/L Final   11/04/2020 19.6 (L) 22.0 - 29.0 mmol/L Final   11/03/2020 19.7 (L) 22.0 - 29.0 mmol/L Final      BUN BUN   Date Value Ref Range Status   11/05/2020 61 (H) 8 - 23 mg/dL Final   11/04/2020 62 (H) 8 - 23 mg/dL Final   11/03/2020 63 (H) 8 - 23 mg/dL Final      Creatinine Creatinine   Date Value Ref Range Status   11/05/2020 2.32 (H) 0.57 - 1.00 mg/dL Final   11/04/2020 2.45 (H) 0.57 - 1.00 mg/dL Final   11/03/2020 2.67 (H) 0.57 - 1.00 mg/dL Final      Calcium Calcium   Date Value Ref Range Status   11/05/2020 9.0 8.6 - 10.5 mg/dL Final   11/04/2020 9.0 8.6 - 10.5 mg/dL Final   11/03/2020 9.6 8.6 - 10.5 mg/dL Final      PO4 No results found for: CAPO4   Albumin Albumin   Date Value Ref Range Status   11/04/2020 3.27 (L) 3.50 - 5.20 g/dL Final   11/03/2020 3.84 3.50 - 5.20 g/dL Final      Magnesium Magnesium   Date Value Ref Range Status   11/04/2020 3.1 (H) 1.6 - 2.4 mg/dL Final   11/03/2020 2.9 (H) 1.6 - 2.4 mg/dL Final      Uric Acid No results found for: URICACID     Radiology results :     Imaging Results (Last 72 Hours)     Procedure Component Value Units Date/Time    US Venous Doppler Lower Extremity Right (duplex for insufficiency) [[id number]] Collected: 11/05/20 0755     Updated: 11/05/20 0757    Narrative:      US DUPLEX VENOUS DOPPLER UNILAT RIGHT FOR INSUFFICIENCY-     CLINICAL INDICATION: pain and edema; I50.9-Heart failure, unspecified;  I50.33-Acute on chronic diastolic (congestive) heart failure        COMPARISON: None available      TECHNIQUE: Color Doppler imaging was used with compression and  augmentation to evaluate the right lower extremity deep venous  system.     FINDINGS:   There is patent spontaneous flow from the common femoral vein through  the posterior tibial veins.  There was no internal clot or area of noncompressibility in the right  lower extremity.  Normal augmentation was elicited where applicable.             Impression:      No DVT in the right lower extremity on today's exam.      This report was finalized on 11/5/2020 7:55 AM by Dr. Silvio Mcdonald MD.       XR Chest 1 View [[id number]] Collected: 11/03/20 1202     Updated: 11/03/20 1207    Narrative:      EXAMINATION: XR CHEST 1 VW-      CLINICAL INDICATION:     sob, edema     TECHNIQUE:  XR CHEST 1 VW-      COMPARISON: NONE      FINDINGS:      Bibasilar airspace disease likely atelectasis.   Cardiomegaly. Pulmonary vascular congestion.   No pneumothorax.   Small pleural effusions.   No acute osseous findings.          Impression:      CHF/edema with small effusions.     This report was finalized on 11/3/2020 12:03 PM by Dr. Guillermo Macias MD.               Medications:      amLODIPine, 10 mg, Oral, Nightly  aspirin, 81 mg, Oral, Daily  atorvastatin, 40 mg, Oral, Nightly  calcium carb-cholecalciferol, 1 tablet, Oral, Daily  cloNIDine, 0.2 mg, Oral, TID  escitalopram, 10 mg, Oral, Daily  ferrous sulfate, 325 mg, Oral, BID With Meals  heparin (porcine), 5,000 Units, Subcutaneous, Q12H  insulin aspart, 0-7 Units, Subcutaneous, TID AC  insulin detemir, 30 Units, Subcutaneous, Nightly  isosorbide mononitrate, 30 mg, Oral, Daily  nystatin, , Topical, Q12H  sodium chloride, 10 mL, Intravenous, Q12H  sodium chloride, 10 mL, Intravenous, Q12H           Assessment/Plan       Acute on chronic congestive heart failure (CMS/HCC)      1. Acute on chronic congestive heart failure, HFpEF  2. ETHEL on CKD 3b  3. Essential hypertension  4. DM-II  5.  Normocytic anemia    ETHEL likely 2/2 CRS-I  Baseline Cr 1.5-2.0, peaked to 2.6, improved to 2.0  UA no hematuria, previously had about  2G of proteinuria that improved  from 8G  -bumex 2mg once  -avoid any nephrotoxic agents, hypotension and adjust medications according to eGFR    Thanks Dr Avendaño for the consult. Nephrology will follow the patient.   I discussed the patient's findings and my recommendations with patient and primary care team    Louise Cooper MD  11/05/20  08:33 EST

## 2020-11-05 NOTE — PROGRESS NOTES
[facility] HOSPITALIST PROGRESS NOTE     Patient Identification:  Name:  Britta Lee  Age:  75 y.o.  Sex:  female  :  1945  MRN:  [id number]  Visit Number:  [id number]  Primary Care Provider:  Lexie Dolan PA    Date of admission: 11/3/2020  Length of stay:  2    ----------------------------------------------------------------------------------------------------------------------  Subjective     Chief Complaint:   Chief Complaint   Patient presents with   • Shortness of Breath     Subjective/Interval History:    75 y.o. female who was admitted on 11/3/2020 with acute on chronic diastolic CHF and ETHEL on stage IV CKD. For complete admission information, please see history and physical.     Procedures/Scans:  1. Transthoracic echocardiogram   2. Venous doppler RLE  3. SLP evaluation    Today, the patient reported that she is breathing some better. No chest pains. She complains of pains in her ankles since her venous doppler yesterday which she reports is from them having to push so hard. However, she only had a doppler on the right. She denies any recent falls or injury. She has been up to the bedside commode, but otherwise has not been up moving around. Discussed with RNXi who reports the above mentioned ankle pain. No new events or concerns reported.     Review of Systems   Constitutional: Negative for chills and fever.   Respiratory: Positive for shortness of breath (Improving. ). Negative for cough and wheezing.    Cardiovascular: Positive for leg swelling (Down a bit). Negative for chest pain.   Gastrointestinal: Negative for abdominal pain.   Genitourinary: Negative for difficulty urinating and dysuria.   Musculoskeletal: Positive for arthralgias.   Skin: Negative for color change.   Psychiatric/Behavioral: Negative for agitation and confusion.       ----------------------------------------------------------------------------------------------------------------------  Objective   [facility] Meds:  amLODIPine, 10 mg, Oral, Nightly  aspirin, 81 mg, Oral, Daily  atorvastatin, 40 mg, Oral, Nightly  calcium carb-cholecalciferol, 1 tablet, Oral, Daily  cloNIDine, 0.2 mg, Oral, TID  escitalopram, 10 mg, Oral, Daily  ferrous sulfate, 325 mg, Oral, BID With Meals  heparin (porcine), 5,000 Units, Subcutaneous, Q12H  insulin aspart, 0-7 Units, Subcutaneous, TID AC  insulin detemir, 30 Units, Subcutaneous, Nightly  isosorbide mononitrate, 30 mg, Oral, Daily  nystatin, , Topical, Q12H  sodium chloride, 10 mL, Intravenous, Q12H  sodium chloride, 10 mL, Intravenous, Q12H         ----------------------------------------------------------------------------------------------------------------------  Vital Signs:  Temp:  [97.5 °F (36.4 °C)-98.1 °F (36.7 °C)] 97.5 °F (36.4 °C)  Heart Rate:  [67-72] 70  Resp:  [18] 18  BP: (133-153)/(49-67) 133/59  Mean Arterial Pressure (Non-Invasive) for the past 24 hrs (Last 3 readings):   Noninvasive MAP (mmHg)   11/05/20 1003 89   11/05/20 0620 82   11/05/20 0205 80     SpO2 Percentage    11/05/20 0205 11/05/20 0620 11/05/20 1003   SpO2: 99% 97% 96%     SpO2:  [96 %-99 %] 96 %  on  Flow (L/min):  [2] 2;   Device (Oxygen Therapy): nasal cannula    Body mass index is 43.97 kg/m².  Wt Readings from Last 3 Encounters:   11/05/20 113 kg (248 lb 3.2 oz)   11/03/20 109 kg (240 lb)   10/19/20 109 kg (240 lb)        Intake/Output Summary (Last 24 hours) at 11/5/2020 1202  Last data filed at 11/5/2020 0900  Gross per 24 hour   Intake 840 ml   Output 900 ml   Net -60 ml     Diet Regular; Cardiac, Renal, Consistent Carbohydrate  ----------------------------------------------------------------------------------------------------------------------  Physical exam:  Constitutional: Vital signs reviewed. Well-developed and well-nourished.  No  respiratory distress on 2L O2 via nasal cannula.   HENT:  Head:  Normocephalic and atraumatic.  Mouth:  Moist mucous membranes.    Eyes:  Conjunctivae and EOM are normal. No scleral icterus. No erythema or drainage.  Neck:  Neck supple.   Cardiovascular:  Normal rate, regular rhythm and normal heart sounds with no murmur.  Pulmonary/Chest:  No respiratory distress, no wheezes, no crackles. Reduced air movement at the bases bilaterally.   Abdominal:  Soft.  Bowel sounds are present x4.  No distension and no tenderness.   Musculoskeletal: Trace edema bilateral lower extremities, open blister on the dorsum of the right foot. Ankles do not appear edematous or erythematous. She has generalized tenderness to palpation of the bilateral lower extremities.   Neurological:  Alert and oriented to person, place, and time. No facial droop.  No slurred speech.   Skin:  Skin is warm and dry. No rash noted. No pallor.   Peripheral vascular: No clubbing, no cyanosis, trace edema. 2+ pedal pulses bilaterally.   Genitourinary: No julian catheter in place.     I have reviewed and updated the physical exam as needed to reflect that of 11/05/20  ----------------------------------------------------------------------------------------------------------------------  Tele: SR in the 70s-80s.    ----------------------------------------------------------------------------------------------------------------------  Results from last 7 days   Lab Units 11/04/20  0422 11/03/20  2156 11/03/20  1711   TROPONIN T ng/mL <0.010 <0.010 <0.010     Results from last 7 days   Lab Units 11/03/20  1128   PROBNP pg/mL 2,940.0*       Results from last 7 days   Lab Units 11/03/20  1136   PH, ARTERIAL pH units 7.332*   PO2 ART mm Hg 60.4*   PCO2, ARTERIAL mm Hg 43.1   HCO3 ART mmol/L 22.8     Results from last 7 days   Lab Units 11/05/20  0124 11/04/20  0422 11/03/20  1128   CRP mg/dL  --   --  1.37*   WBC 10*3/mm3 8.78 9.38 12.32*   HEMOGLOBIN g/dL 8.6* 9.0*  9.8*   HEMATOCRIT % 29.5* 31.7* 33.5*   MCV fL 90.2 91.4 86.8   MCHC g/dL 29.2* 28.4* 29.3*   PLATELETS 10*3/mm3 187 226 320   INR   --   --  1.28*     Results from last 7 days   Lab Units 11/05/20  0125 11/04/20  0422 11/03/20  1128   SODIUM mmol/L 137 140 140   POTASSIUM mmol/L 4.7 4.7 5.4*   MAGNESIUM mg/dL  --  3.1* 2.9*   CHLORIDE mmol/L 104 107 105   CO2 mmol/L 22.4 19.6* 19.7*   BUN mg/dL 61* 62* 63*   CREATININE mg/dL 2.32* 2.45* 2.67*   EGFR IF NONAFRICN AM mL/min/1.73 20* 19* 17*   CALCIUM mg/dL 9.0 9.0 9.6   GLUCOSE mg/dL 194* 101* 87   ALBUMIN g/dL  --  3.27* 3.84   BILIRUBIN mg/dL  --  0.3 0.4   ALK PHOS U/L  --  237* 280*   AST (SGOT) U/L  --  41* 53*   ALT (SGPT) U/L  --  30 35*   Estimated Creatinine Clearance: 24.6 mL/min (A) (by C-G formula based on SCr of 2.32 mg/dL (H)).  No results found for: AMMONIA    Hemoglobin A1C   Date/Time Value Ref Range Status   11/03/2020 1128 7.30 (H) 4.80 - 5.60 % Final     Glucose   Date/Time Value Ref Range Status   11/05/2020 1002 143 (H) 70 - 130 mg/dL Final   11/05/2020 0615 94 70 - 130 mg/dL Final   11/04/2020 1850 227 (H) 70 - 130 mg/dL Final   11/04/2020 1610 237 (H) 70 - 130 mg/dL Final   11/04/2020 1018 216 (H) 70 - 130 mg/dL Final   11/04/2020 0614 82 70 - 130 mg/dL Final   11/03/2020 1901 153 (H) 70 - 130 mg/dL Final   11/03/2020 1804 92 70 - 130 mg/dL Final     Lab Results   Component Value Date    HGBA1C 7.30 (H) 11/03/2020     Lab Results   Component Value Date    TSH 5.410 (H) 11/03/2020    FREET4 1.03 03/26/2018       Blood Culture   Date Value Ref Range Status   11/03/2020 No growth at 24 hours  Preliminary   11/03/2020 No growth at 24 hours  Preliminary     No results found for: URINECX  No results found for: WOUNDCX  No results found for: STOOLCX  No results found for: RESPCX  Pain Management Panel     Pain Management Panel Latest Ref Rng & Units 9/4/2020 3/13/2020    CREATININE UR mg/dL 56.6 118.1        I have personally reviewed the above  laboratory results for 11/05/20  ----------------------------------------------------------------------------------------------------------------------  Imaging Results (Last 24 Hours)     Procedure Component Value Units Date/Time    US Venous Doppler Lower Extremity Right (duplex for insufficiency) [[id number]] Collected: 11/05/20 0755     Updated: 11/05/20 0757    Narrative:      US DUPLEX VENOUS DOPPLER UNILAT RIGHT FOR INSUFFICIENCY-     CLINICAL INDICATION: pain and edema; I50.9-Heart failure, unspecified;  I50.33-Acute on chronic diastolic (congestive) heart failure        COMPARISON: None available      TECHNIQUE: Color Doppler imaging was used with compression and  augmentation to evaluate the right lower extremity deep venous system.     FINDINGS:   There is patent spontaneous flow from the common femoral vein through  the posterior tibial veins.  There was no internal clot or area of noncompressibility in the right  lower extremity.  Normal augmentation was elicited where applicable.             Impression:      No DVT in the right lower extremity on today's exam.      This report was finalized on 11/5/2020 7:55 AM by Dr. Silvio Mcdonald MD.               SLP Evaluation    ----------------------------------------------------------------------------------------------------------------------  Assessment/Plan     · Acute on chronic diastolic congestive heart failure  · Severe pulmonary hypertension, RVSP >55mmHg  · Mild transaminitis, likely passive congestion from CHF; hepatitis panel negative  -Ruled out for acute MI on admission  -Echo with EF>70%, grade II diastolic dysfunction with high LAP  -Cardiology following, appreciate their recommendations, continue with IV bumex 2mg today  -Net I/O -600  -Creatinine improved to 2.32  -Continue cautious diuresis and monitor renal function closely  -She is on 2L O2 via NC, I do not see any document hypoxia, but ABG was borderline at admission, wean O2 as  tolerated  -Consult CHF educator    · Sinus bradycardia   -In the 40s at admission, home metoprolol held with improvement noted  -Remains on clonidine 0.2mg TID  -TSH mildly elevated at 5.410, check free T4  -Potassium normal, mag mildly elevated  -Continue to monitor on telemetry    · Acute kidney injury on CKD stage IV  -Baseline creatinine 1.5-1.8, admission creatinine 2.67  -Improving with diuresis, now 2.32  -Continue to monitor  -Nephrology consult pending, appreciate their input    · Right lower extremity calf and groin pain  -RLE venous doppler negative for DVT    · Bilateral ankle pain  -Possibly related to edema, which is improving  -Monitor for now    · Uncontrolled essential hypertension  -Home metoprolol held 2/2 sinus bradycardia in the 40s at admission, improved  -Continued on home clonidine and amlodipine  -Mild intermittent elevations noted, if continued, will consider adding low dose carvedilol since her HR has improved (previously on metoprolol tartrate 100mg BID)    · Diabetes mellitus type 2 that is insulin-dependent and currently controlled  -A1c 7.3%, improved from 8.4% in 09/2020  -Glucose  last 24 hours  -On low dose SSI and levemir at half of home basal dose, 30 units nightly  -Noted borderline low AM glucose readings, but are improving, will monitor and consider adjusting basal insulin if needed    · Normocytic anemia, may be related to some chronic kidney disease  -Stable, monitor    · Prolonged QTc, 496ms  -Stable on repeat checks  -Potassium normal, magnesium borderline elevated  -Avoid QT prolonging agents  -Continue to monitor on telemetry    · Morbid obesity, BMI 43.97, with associated HTN, DM    • DVT Prophylaxis  -subq heparin BID    The patient is high risk due to the following diagnoses/reasons:  CHF, severe pulmonary hypertension, CKD, morbid obesity    I have discussed the patient's assessment and plan with the patient, RN, Xi, and attending physician,   Kateryna.    Disposition: Plan for discharge home when CHF/renal function improved, likely 2-3 days.     RAFIQ Knott  11/05/20  12:02 EST

## 2020-11-05 NOTE — PROGRESS NOTES
LOS: 2 days     Name: Britta Lee  Age/Sex: 75 y.o. female  :  1945        PCP: Lexie Dolan PA  REF: No ref. provider found    Principal Problem:    Acute on chronic congestive heart failure (CMS/[facility])      Reason for follow-up: Acute congestive heart failure     Subjective       Subjective     Britta Lee is a 75-year-old female with a past medical history significant for congestive heart failure, hypertension, diabetes mellitus type 2, chronic kidney disease stage III, iron deficiency anemia, hyperlipidemia and morbid obesity.  Patient presented to the ER with complaints of shortness of breath.  She was referred to the ER by her primary care physician yesterday with concern that she may be in acute CHF.  Patient states that she has been short of breath over the last 3 weeks.  She also reports worsening orthopnea and lower extremity edema.  She denies any chest pain, cough, fever or chills.  She states that she was recently taken off her p.o. diuretics at home and noticed that her swelling and shortness of breath got worse after that.  Patient has chronic kidney disease and has been following with Dr. Johnson.  Creatinine on admission 2.45.  Echocardiogram in 2020 showed EF of 50%.    Interval History:  Patient reports her breathing has improved since admission. She reports good urine output with IV diuresis. Creatinine 2.3 today.       Vital Signs  Temp:  [97.4 °F (36.3 °C)-98.1 °F (36.7 °C)] 97.6 °F (36.4 °C)  Heart Rate:  [67-72] 68  Resp:  [18] 18  BP: (142-164)/(57-76) 149/57     Vital Signs (last 72 hrs)        0700  -  / 0659  0700  -   0659  07  -   0659  07  -   0911   Most Recent    Temp (°F)   96.6 -  98.3    97.4 -  98.1       97.6 (36.4)    Heart Rate   46 -  75    67 -  77      68     68    Resp   18 -  22      18       18    BP   121/63 -  171/69    142/58 -  164/76       149/57    SpO2 (%)   94 -  99    96 -  99       97         Body mass index is 43.97 kg/m².    Intake/Output Summary (Last 24 hours) at 11/5/2020 0911  Last data filed at 11/5/2020 0517  Gross per 24 hour   Intake 480 ml   Output 1200 ml   Net -720 ml     Objective    Objective     I have seen and examined Ms. Lee today.  Physical Exam:     General Appearance:    Alert, cooperative, in no acute distress   Head:    Normocephalic, without obvious abnormality, atraumatic   Eyes:            Conjunctivae and sclerae normal, no   icterus, no pallor, corneas clear.   Neck:   No adenopathy, supple, trachea midline, no thyromegaly, no   carotid bruit, no JVD   Lungs:     Bibasilar rales,respirations regular, even and                  unlabored    Heart:    Regular rhythm and normal rate, normal S1 and S2, no            murmur, no gallop, no rub, no click   Chest Wall:    No abnormalities observed   Abdomen:     Normal bowel sounds, no masses, no organomegaly, soft        non-tender, non-distended, no guarding, no rebound                tenderness   Extremities:   Moves all extremities well, 2+ edema on both legs, no cyanosis, no  redness   Pulses:   Pulses palpable and equal bilaterally   Skin:   No bleeding, bruising or rash       Neurologic:   Alert and oriented    I have seen and performed a physical examination today.       Results Review:   Results from last 7 days   Lab Units 11/05/20  0124 11/04/20 0422 11/03/20  1128   WBC 10*3/mm3 8.78 9.38 12.32*   HEMOGLOBIN g/dL 8.6* 9.0* 9.8*   PLATELETS 10*3/mm3 187 226 320     Results from last 7 days   Lab Units 11/05/20  0125 11/04/20  0422 11/03/20  1128   SODIUM mmol/L 137 140 140   POTASSIUM mmol/L 4.7 4.7 5.4*   CHLORIDE mmol/L 104 107 105   CO2 mmol/L 22.4 19.6* 19.7*   BUN mg/dL 61* 62* 63*   CREATININE mg/dL 2.32* 2.45* 2.67*   CALCIUM mg/dL 9.0 9.0 9.6   GLUCOSE mg/dL 194* 101* 87   ALT (SGPT) U/L  --  30 35*   AST (SGOT) U/L  --  41* 53*     Results from last 7 days   Lab Units 11/04/20  0422 11/03/20  2156  11/03/20  1711 11/03/20  1358 11/03/20  1128   TROPONIN T ng/mL <0.010 <0.010 <0.010 <0.010 <0.010     Lab Results   Component Value Date    INR 1.28 (H) 11/03/2020     Lab Results   Component Value Date    MG 3.1 (H) 11/04/2020    MG 2.9 (H) 11/03/2020    MG 2.2 02/05/2020     Lab Results   Component Value Date    TSH 5.410 (H) 11/03/2020    CHLPL 128 06/25/2018    TRIG 128 09/04/2020    HDL 36 (L) 09/04/2020    LDL 63 09/04/2020      Imaging Results (Last 48 Hours)     Procedure Component Value Units Date/Time    US Venous Doppler Lower Extremity Right (duplex for insufficiency) [[id number]] Collected: 11/05/20 0755     Updated: 11/05/20 0757    Narrative:      US DUPLEX VENOUS DOPPLER UNILAT RIGHT FOR INSUFFICIENCY-     CLINICAL INDICATION: pain and edema; I50.9-Heart failure, unspecified;  I50.33-Acute on chronic diastolic (congestive) heart failure        COMPARISON: None available      TECHNIQUE: Color Doppler imaging was used with compression and  augmentation to evaluate the right lower extremity deep venous system.     FINDINGS:   There is patent spontaneous flow from the common femoral vein through  the posterior tibial veins.  There was no internal clot or area of noncompressibility in the right  lower extremity.  Normal augmentation was elicited where applicable.             Impression:      No DVT in the right lower extremity on today's exam.      This report was finalized on 11/5/2020 7:55 AM by Dr. Silvio Mcdonald MD.       XR Chest 1 View [[id number]] Collected: 11/03/20 1202     Updated: 11/03/20 1207    Narrative:      EXAMINATION: XR CHEST 1 VW-      CLINICAL INDICATION:     sob, edema     TECHNIQUE:  XR CHEST 1 VW-      COMPARISON: NONE      FINDINGS:      Bibasilar airspace disease likely atelectasis.   Cardiomegaly. Pulmonary vascular congestion.   No pneumothorax.   Small pleural effusions.   No acute osseous findings.          Impression:      CHF/edema with small effusions.     This report was  finalized on 11/3/2020 12:03 PM by Dr. Guillermo Macias MD.           Echo   Results for orders placed during the hospital encounter of 11/03/20   Adult Transthoracic Echo Complete W/ Cont if Necessary Per Protocol    Narrative · Left ventricular wall thickness is consistent with concentric   hypertrophy.  · Left ventricular ejection fraction appears to be greater than 70%. Left   ventricular systolic function is hyperdynamic (EF > 70%).  · Left ventricular diastolic function is consistent with (grade II w/high   LAP) pseudonormalization.  · The left atrial cavity is moderate to severely dilated.  · The right atrial cavity is mildly dilated.  · Moderate mitral annular calcification is present.  · Mild mitral valve regurgitation is present.  · Mild tricuspid valve regurgitation is present.  · Estimated right ventricular systolic pressure from tricuspid   regurgitation is markedly elevated (>55 mmHg).         I reviewed the patient's new clinical results.    Telemetry: NSR 70's        Medication Review:   amLODIPine, 10 mg, Oral, Nightly  aspirin, 81 mg, Oral, Daily  atorvastatin, 40 mg, Oral, Nightly  bumetanide, 2 mg, Intravenous, Once  calcium carb-cholecalciferol, 1 tablet, Oral, Daily  cloNIDine, 0.2 mg, Oral, TID  escitalopram, 10 mg, Oral, Daily  ferrous sulfate, 325 mg, Oral, BID With Meals  heparin (porcine), 5,000 Units, Subcutaneous, Q12H  insulin aspart, 0-7 Units, Subcutaneous, TID AC  insulin detemir, 30 Units, Subcutaneous, Nightly  isosorbide mononitrate, 30 mg, Oral, Daily  nystatin, , Topical, Q12H  sodium chloride, 10 mL, Intravenous, Q12H  sodium chloride, 10 mL, Intravenous, Q12H             Assessment      Assessment:  1. Acute on chronic diastolic congestive heart failure, decompensated  2. ASCVD status post PCI in the remote past  3. Essential hypertension, uncontrolled  4. Peripheral vascular disease  5. Acute kidney injury on chronic kidney disease stage IV, baseline creatinine 1.5-1.7,  currently 2.32  6. Bradycardia on admission, improving  7. Severe pulmonary hypertension  8. Morbid obesity    Plan     Recommendations:  1. Continue with IV Bumex as tolerated and continue to monitor the kidney function closely.  2. I had a long discussion with her about salt restricted diet and the foods to avoid such as canned vegetables, canned soups, all chips and processed meats, pickles.  Patient expressed understanding.  3. I have emphasized to her about the importance of keeping her blood pressure under good control to help avoid recurrent decompensated diastolic heart failure.  4. We will enroll her in heart failure clinic discharge.    I discussed the patients findings and my recommendations with patient and family      Priscila ERNIE Holland,acting as scribe for Dr. Mason Leone MD [facility]  11/05/20  09:11 EST    I, Mason Leone MD, [facility], personally performed the services described in this documentation as documented by the above named individual under my supervision and made necessary changes and the note is both accurate and complete.     Mason Leone MD, [facility]  11/5/2020          Please note that portions of this note were completed with a voice recognition program.

## 2020-11-05 NOTE — CONSULTS
Heart Failure Education Consult    Patient was not wearing a mask during this consultation visit.      75 y.o. female with past medical history significant for CHF, HTN, IDDM2, CKD III, Iron deficiency anemia, hyperlipidemia, morbid obesity    Type of Heart Failure: Diastolic     Length of diagnosis: Previous Diagnosis     Current HF knowledge: fair     Have you had HF education/teaching in the past? No  Do you check your weight daily? No    Current weight        11/04/20 0519 11/05/20 0517   Weight: 113 kg (248 lb 9.6 oz) (admit weight) 113 kg (248 lb 3.2 oz)          Most recent EF >70% Date 11/4/2020       Most recent ProBNP 2940pg/ml Date 11/3/2020      Edema Yes and Improving        Shortness of Breath: Yes and Improving     Number of pillows used to sleep at night: 2  Barriers to learning: Other - patient dozing at intervals during visit and education. Will need reinforcement of education.     Materials Provided:Living with HF Book, HF Action Plan, Daily Weight Monitoring  and 2 Gm Na+ diet             Referral candidate for HF Clinic:Yes      Thank you for this consult. Please let me know if I can be of any assistance with HF education for this patient.  Leilani Jimenez, RN   11/05/20 14:55 EST

## 2020-11-05 NOTE — PLAN OF CARE
Goal Outcome Evaluation:  Plan of Care Reviewed With: patient  Progress: improving  Outcome Summary: PT asleep in bed at this time. PT continues on 2L NC. PT has reported SOA with exertion only. Edema +3 is still present in bilateral lower extremities.(predominately in the right side). PT has had minor complaints of pain in the lower extremities when pushing hard on feet during ultrasound of lower extremities. Care Cluster, and foot care with lotion provided. No other complaints of pain/discomfort noted. VSS. Afebrile. Will Continue to Monitor.

## 2020-11-05 NOTE — PLAN OF CARE
Goal Outcome Evaluation:  Plan of Care Reviewed With: patient  Progress: improving  Outcome Summary: Pt is currently resting in bed, no distress noted. Pt has complained of pain in her BLE, PRN Tylenol given. VSS. Pt remains on 2L nasal cannula and continues to report SOB on exertion. Will continue to monitor and follow plan of care.

## 2020-11-06 LAB
ANION GAP SERPL CALCULATED.3IONS-SCNC: 10 MMOL/L (ref 5–15)
BASOPHILS # BLD AUTO: 0.03 10*3/MM3 (ref 0–0.2)
BASOPHILS NFR BLD AUTO: 0.3 % (ref 0–1.5)
BUN SERPL-MCNC: 54 MG/DL (ref 8–23)
BUN/CREAT SERPL: 25.8 (ref 7–25)
CALCIUM SPEC-SCNC: 9.5 MG/DL (ref 8.6–10.5)
CHLORIDE SERPL-SCNC: 104 MMOL/L (ref 98–107)
CO2 SERPL-SCNC: 25 MMOL/L (ref 22–29)
CREAT SERPL-MCNC: 2.09 MG/DL (ref 0.57–1)
DEPRECATED RDW RBC AUTO: 44.3 FL (ref 37–54)
EOSINOPHIL # BLD AUTO: 0.25 10*3/MM3 (ref 0–0.4)
EOSINOPHIL NFR BLD AUTO: 2.9 % (ref 0.3–6.2)
ERYTHROCYTE [DISTWIDTH] IN BLOOD BY AUTOMATED COUNT: 14.4 % (ref 12.3–15.4)
GFR SERPL CREATININE-BSD FRML MDRD: 23 ML/MIN/1.73
GLUCOSE BLDC GLUCOMTR-MCNC: 115 MG/DL (ref 70–130)
GLUCOSE BLDC GLUCOMTR-MCNC: 215 MG/DL (ref 70–130)
GLUCOSE BLDC GLUCOMTR-MCNC: 231 MG/DL (ref 70–130)
GLUCOSE BLDC GLUCOMTR-MCNC: 280 MG/DL (ref 70–130)
GLUCOSE BLDC GLUCOMTR-MCNC: 98 MG/DL (ref 70–130)
GLUCOSE SERPL-MCNC: 181 MG/DL (ref 65–99)
HCT VFR BLD AUTO: 28.4 % (ref 34–46.6)
HGB BLD-MCNC: 8.6 G/DL (ref 12–15.9)
IMM GRANULOCYTES # BLD AUTO: 0.02 10*3/MM3 (ref 0–0.05)
IMM GRANULOCYTES NFR BLD AUTO: 0.2 % (ref 0–0.5)
LYMPHOCYTES # BLD AUTO: 1.86 10*3/MM3 (ref 0.7–3.1)
LYMPHOCYTES NFR BLD AUTO: 21.7 % (ref 19.6–45.3)
MCH RBC QN AUTO: 25.8 PG (ref 26.6–33)
MCHC RBC AUTO-ENTMCNC: 30.3 G/DL (ref 31.5–35.7)
MCV RBC AUTO: 85.3 FL (ref 79–97)
MONOCYTES # BLD AUTO: 0.78 10*3/MM3 (ref 0.1–0.9)
MONOCYTES NFR BLD AUTO: 9.1 % (ref 5–12)
NEUTROPHILS NFR BLD AUTO: 5.65 10*3/MM3 (ref 1.7–7)
NEUTROPHILS NFR BLD AUTO: 65.8 % (ref 42.7–76)
NRBC BLD AUTO-RTO: 0 /100 WBC (ref 0–0.2)
PLATELET # BLD AUTO: 192 10*3/MM3 (ref 140–450)
PMV BLD AUTO: 9.6 FL (ref 6–12)
POTASSIUM SERPL-SCNC: 4.5 MMOL/L (ref 3.5–5.2)
RBC # BLD AUTO: 3.33 10*6/MM3 (ref 3.77–5.28)
SODIUM SERPL-SCNC: 139 MMOL/L (ref 136–145)
WBC # BLD AUTO: 8.59 10*3/MM3 (ref 3.4–10.8)

## 2020-11-06 PROCEDURE — 25010000002 HEPARIN (PORCINE) PER 1000 UNITS: Performed by: INTERNAL MEDICINE

## 2020-11-06 PROCEDURE — 99232 SBSQ HOSP IP/OBS MODERATE 35: CPT | Performed by: SPECIALIST

## 2020-11-06 PROCEDURE — 63710000001 INSULIN ASPART PER 5 UNITS: Performed by: PHYSICIAN ASSISTANT

## 2020-11-06 PROCEDURE — 82962 GLUCOSE BLOOD TEST: CPT

## 2020-11-06 PROCEDURE — 63710000001 INSULIN DETEMIR PER 5 UNITS: Performed by: PHYSICIAN ASSISTANT

## 2020-11-06 PROCEDURE — 80048 BASIC METABOLIC PNL TOTAL CA: CPT | Performed by: PHYSICIAN ASSISTANT

## 2020-11-06 PROCEDURE — 85025 COMPLETE CBC W/AUTO DIFF WBC: CPT | Performed by: PHYSICIAN ASSISTANT

## 2020-11-06 PROCEDURE — 99232 SBSQ HOSP IP/OBS MODERATE 35: CPT | Performed by: PHYSICIAN ASSISTANT

## 2020-11-06 PROCEDURE — 94799 UNLISTED PULMONARY SVC/PX: CPT

## 2020-11-06 RX ORDER — BUMETANIDE 0.25 MG/ML
2 INJECTION INTRAMUSCULAR; INTRAVENOUS ONCE
Status: COMPLETED | OUTPATIENT
Start: 2020-11-06 | End: 2020-11-06

## 2020-11-06 RX ORDER — METOPROLOL SUCCINATE 25 MG/1
25 TABLET, EXTENDED RELEASE ORAL
Status: DISCONTINUED | OUTPATIENT
Start: 2020-11-06 | End: 2020-11-08

## 2020-11-06 RX ADMIN — ISOSORBIDE MONONITRATE 30 MG: 30 TABLET, EXTENDED RELEASE ORAL at 08:47

## 2020-11-06 RX ADMIN — ATORVASTATIN CALCIUM 40 MG: 40 TABLET, FILM COATED ORAL at 21:17

## 2020-11-06 RX ADMIN — HEPARIN SODIUM 5000 UNITS: 5000 INJECTION INTRAVENOUS; SUBCUTANEOUS at 08:47

## 2020-11-06 RX ADMIN — CLONIDINE HYDROCHLORIDE 0.2 MG: 0.2 TABLET ORAL at 17:02

## 2020-11-06 RX ADMIN — FERROUS SULFATE TAB 325 MG (65 MG ELEMENTAL FE) 325 MG: 325 (65 FE) TAB at 17:22

## 2020-11-06 RX ADMIN — NYSTATIN: 100000 POWDER TOPICAL at 21:18

## 2020-11-06 RX ADMIN — NYSTATIN: 100000 POWDER TOPICAL at 08:47

## 2020-11-06 RX ADMIN — ESCITALOPRAM 10 MG: 10 TABLET, FILM COATED ORAL at 08:47

## 2020-11-06 RX ADMIN — METOPROLOL SUCCINATE 25 MG: 25 TABLET, EXTENDED RELEASE ORAL at 12:16

## 2020-11-06 RX ADMIN — Medication 1 TABLET: at 08:47

## 2020-11-06 RX ADMIN — INSULIN ASPART 3 UNITS: 100 INJECTION, SOLUTION INTRAVENOUS; SUBCUTANEOUS at 12:16

## 2020-11-06 RX ADMIN — ASPIRIN 81 MG: 81 TABLET, COATED ORAL at 08:47

## 2020-11-06 RX ADMIN — INSULIN ASPART 4 UNITS: 100 INJECTION, SOLUTION INTRAVENOUS; SUBCUTANEOUS at 17:02

## 2020-11-06 RX ADMIN — BUMETANIDE 2 MG: 0.25 INJECTION INTRAMUSCULAR; INTRAVENOUS at 08:46

## 2020-11-06 RX ADMIN — INSULIN DETEMIR 30 UNITS: 100 INJECTION, SOLUTION SUBCUTANEOUS at 21:19

## 2020-11-06 RX ADMIN — CLONIDINE HYDROCHLORIDE 0.2 MG: 0.2 TABLET ORAL at 21:17

## 2020-11-06 RX ADMIN — CLONIDINE HYDROCHLORIDE 0.2 MG: 0.2 TABLET ORAL at 08:47

## 2020-11-06 RX ADMIN — HEPARIN SODIUM 5000 UNITS: 5000 INJECTION INTRAVENOUS; SUBCUTANEOUS at 21:17

## 2020-11-06 RX ADMIN — SODIUM CHLORIDE, PRESERVATIVE FREE 10 ML: 5 INJECTION INTRAVENOUS at 08:46

## 2020-11-06 RX ADMIN — SODIUM CHLORIDE, PRESERVATIVE FREE 10 ML: 5 INJECTION INTRAVENOUS at 08:48

## 2020-11-06 RX ADMIN — FERROUS SULFATE TAB 325 MG (65 MG ELEMENTAL FE) 325 MG: 325 (65 FE) TAB at 08:47

## 2020-11-06 RX ADMIN — AMLODIPINE BESYLATE 10 MG: 10 TABLET ORAL at 21:17

## 2020-11-06 RX ADMIN — SODIUM CHLORIDE, PRESERVATIVE FREE 10 ML: 5 INJECTION INTRAVENOUS at 21:18

## 2020-11-06 NOTE — PLAN OF CARE
Goal Outcome Evaluation:  Plan of Care Reviewed With: patient  Progress: improving  Pt weaned off O2 today, 96% on RA. Pt currently resting in bed. No s/s of distress. VSS. Will continue with plan of care. Will continue to monitor.

## 2020-11-06 NOTE — DISCHARGE INSTR - APPOINTMENTS
PT  HAS  AN  APT  WITH  DR SINCLAIR  FOR  DEC 7 AT  12 NOON  IN  [place]  OFFICE   Patient  Also  Has  An  Apt  With steven layton  For  Nov. 12 b at 9  Am  Dr meneses  For  Dec 1  At  9am  And  Dr sinclair  For dec  7  At  12  Noon  In  [facility]  Office  And  Heart  Failure  Clinic  For  Nov 11   At  9  am

## 2020-11-06 NOTE — PROGRESS NOTES
[facility]     Patient Identification:  Name:  Britta Lee  Age:  75 y.o.  Sex:  female  :  1945  MRN:  [id number]  Visit Number:  [id number]  Primary Care Provider:  Lexie Dolan PA    Date of admission: 11/3/2020  Length of stay:  3    ----------------------------------------------------------------------------------------------------------------------  Subjective     Chief Complaint:   Chief Complaint   Patient presents with   • Shortness of Breath     Subjective/Interval History:    75 y.o. female who was admitted on 11/3/2020 with acute on chronic diastolic CHF and ETHEL on stage IV CKD. For complete admission information, please see history and physical.     Procedures/Scans:  1. Transthoracic echocardiogram   2. Venous doppler RLE  3. SLP evaluation    Today, the patient reports her breathing is doing some better. Her legs/ankles are still a little bit sore, but overall have improved. She is drowsy, but arousable and answers questions appropriately. Discussed with RN, Idalmis who reports that the patient has been awake/alert, conversing and ate her lunch. No new events or concerns reported.     Review of Systems   Constitutional: Negative for chills and fever.   Respiratory: Negative for cough, shortness of breath (Improving. ) and wheezing.    Cardiovascular: Positive for leg swelling (Down a bit). Negative for chest pain.   Gastrointestinal: Negative for abdominal pain.   Genitourinary: Negative for difficulty urinating and dysuria.   Musculoskeletal: Positive for arthralgias (Ankle pains are improving.).      ----------------------------------------------------------------------------------------------------------------------  Objective   [facility] Meds:  amLODIPine, 10 mg, Oral, Nightly  aspirin, 81 mg, Oral, Daily  atorvastatin, 40 mg, Oral, Nightly  calcium carb-cholecalciferol, 1 tablet, Oral, Daily  cloNIDine, 0.2 mg, Oral,  TID  escitalopram, 10 mg, Oral, Daily  ferrous sulfate, 325 mg, Oral, BID With Meals  heparin (porcine), 5,000 Units, Subcutaneous, Q12H  insulin aspart, 0-7 Units, Subcutaneous, TID AC  insulin detemir, 30 Units, Subcutaneous, Nightly  isosorbide mononitrate, 30 mg, Oral, Daily  metoprolol succinate XL, 25 mg, Oral, Q24H  nystatin, , Topical, Q12H  sodium chloride, 10 mL, Intravenous, Q12H  sodium chloride, 10 mL, Intravenous, Q12H         ----------------------------------------------------------------------------------------------------------------------  Vital Signs:  Temp:  [95.9 °F (35.5 °C)-98.6 °F (37 °C)] 97.2 °F (36.2 °C)  Heart Rate:  [70-87] 82  Resp:  [16-20] 18  BP: (125-163)/(49-80) 150/62  Mean Arterial Pressure (Non-Invasive) for the past 24 hrs (Last 3 readings):   Noninvasive MAP (mmHg)   11/06/20 1404 95   11/06/20 1015 88   11/06/20 0850 101     SpO2 Percentage    11/06/20 0616 11/06/20 1015 11/06/20 1404   SpO2: 99% 99% 98%     SpO2:  [97 %-99 %] 98 %  on  Flow (L/min):  [2] 2;   Device (Oxygen Therapy): nasal cannula    Body mass index is 43.97 kg/m².  Wt Readings from Last 3 Encounters:   11/05/20 113 kg (248 lb 3.2 oz)   11/03/20 109 kg (240 lb)   10/19/20 109 kg (240 lb)        Intake/Output Summary (Last 24 hours) at 11/6/2020 1437  Last data filed at 11/6/2020 1300  Gross per 24 hour   Intake 720 ml   Output 1900 ml   Net -1180 ml     Diet Regular; Cardiac, Renal, Consistent Carbohydrate  ----------------------------------------------------------------------------------------------------------------------  Physical exam:  Constitutional: Vital signs reviewed. Well-developed and well-nourished.  No respiratory distress on 2L O2 via nasal cannula. She is sleepy. Awakes easily to voice and answers questions appropriately, but she is drowsy.   HENT:  Head:  Normocephalic and atraumatic.  Mouth:  Moist mucous membranes.    Eyes:  Conjunctivae and EOM are normal. No scleral icterus. No erythema  or drainage.  Neck:  Neck supple.   Cardiovascular:  Normal rate, regular rhythm and normal heart sounds with no murmur.  Pulmonary/Chest:  No respiratory distress, no wheezes, no crackles. Reduced air movement at the bases bilaterally.   Abdominal:  Soft.  Bowel sounds are present x4.  No distension and no tenderness.   Musculoskeletal: Trace edema bilateral lower extremities, open blister on the dorsum of the right foot.  Neurological:  Alert and oriented to person, place, and time. No facial droop.  No slurred speech.   Skin:  Skin is warm and dry. No rash noted. No pallor.   Peripheral vascular: No clubbing, no cyanosis, trace edema. 2+ pedal pulses bilaterally.   Genitourinary: No julian catheter in place.     I have reviewed and updated the physical exam as needed to reflect that of 11/06/20  ----------------------------------------------------------------------------------------------------------------------  Tele: SR in the 70s-80s.    ----------------------------------------------------------------------------------------------------------------------  Results from last 7 days   Lab Units 11/04/20  0422 11/03/20  2156 11/03/20  1711   TROPONIN T ng/mL <0.010 <0.010 <0.010     Results from last 7 days   Lab Units 11/03/20  1128   PROBNP pg/mL 2,940.0*       Results from last 7 days   Lab Units 11/03/20  1136   PH, ARTERIAL pH units 7.332*   PO2 ART mm Hg 60.4*   PCO2, ARTERIAL mm Hg 43.1   HCO3 ART mmol/L 22.8     Results from last 7 days   Lab Units 11/06/20  0122 11/05/20  0124 11/04/20 0422 11/03/20  1128   CRP mg/dL  --   --   --  1.37*   WBC 10*3/mm3 8.59 8.78 9.38 12.32*   HEMOGLOBIN g/dL 8.6* 8.6* 9.0* 9.8*   HEMATOCRIT % 28.4* 29.5* 31.7* 33.5*   MCV fL 85.3 90.2 91.4 86.8   MCHC g/dL 30.3* 29.2* 28.4* 29.3*   PLATELETS 10*3/mm3 192 187 226 320   INR   --   --   --  1.28*     Results from last 7 days   Lab Units 11/06/20  0122 11/05/20  0125 11/04/20 0422 11/03/20  1128   SODIUM mmol/L 139 137 140  140   POTASSIUM mmol/L 4.5 4.7 4.7 5.4*   MAGNESIUM mg/dL  --   --  3.1* 2.9*   CHLORIDE mmol/L 104 104 107 105   CO2 mmol/L 25.0 22.4 19.6* 19.7*   BUN mg/dL 54* 61* 62* 63*   CREATININE mg/dL 2.09* 2.32* 2.45* 2.67*   EGFR IF NONAFRICN AM mL/min/1.73 23* 20* 19* 17*   CALCIUM mg/dL 9.5 9.0 9.0 9.6   GLUCOSE mg/dL 181* 194* 101* 87   ALBUMIN g/dL  --   --  3.27* 3.84   BILIRUBIN mg/dL  --   --  0.3 0.4   ALK PHOS U/L  --   --  237* 280*   AST (SGOT) U/L  --   --  41* 53*   ALT (SGPT) U/L  --   --  30 35*   Estimated Creatinine Clearance: 27.3 mL/min (A) (by C-G formula based on SCr of 2.09 mg/dL (H)).  No results found for: AMMONIA    Glucose   Date/Time Value Ref Range Status   11/06/2020 1017 231 (H) 70 - 130 mg/dL Final   11/06/2020 0620 98 70 - 130 mg/dL Final   11/06/2020 0504 115 70 - 130 mg/dL Final   11/05/2020 1957 245 (H) 70 - 130 mg/dL Final   11/05/2020 1630 253 (H) 70 - 130 mg/dL Final   11/05/2020 1002 143 (H) 70 - 130 mg/dL Final   11/05/2020 0615 94 70 - 130 mg/dL Final   11/04/2020 1850 227 (H) 70 - 130 mg/dL Final     Lab Results   Component Value Date    HGBA1C 7.30 (H) 11/03/2020     Lab Results   Component Value Date    TSH 5.410 (H) 11/03/2020    FREET4 0.98 11/05/2020       Blood Culture   Date Value Ref Range Status   11/03/2020 No growth at 24 hours  Preliminary   11/03/2020 No growth at 24 hours  Preliminary     No results found for: URINECX  No results found for: WOUNDCX  No results found for: STOOLCX  No results found for: RESPCX  Pain Management Panel     Pain Management Panel Latest Ref Rng & Units 9/4/2020 3/13/2020    CREATININE UR mg/dL 56.6 118.1        I have personally reviewed the above laboratory results for 11/06/20  ----------------------------------------------------------------------------------------------------------------------  Imaging Results (Last 24 Hours)     ** No results found for the last 24 hours. **            SLP  Evaluation    ----------------------------------------------------------------------------------------------------------------------  Assessment/Plan     · Acute on chronic diastolic congestive heart failure  · Severe pulmonary hypertension, RVSP >55mmHg  · Mild transaminitis, likely passive congestion from CHF; hepatitis panel negative  -Ruled out for acute MI on admission  -Echo with EF>70%, grade II diastolic dysfunction with high LAP  -CHF educator consulted this admission, plan for outpatient HF clinic follow up at [place]  -Cardiology following, appreciate their recommendations, continue with IV bumex 2mg once daily with plans to change to PO in AM   -Net I/O -880 last 24 hours  -Creatinine improved to 2.09  -She is on 2L O2 via NC, on room air at home, wean O2 as tolerated, she was 97% on 2L, decreased to 1L and RN to monitor closely and continue to titrate as possible  -Consult PT    · Sinus bradycardia   -In the 40s at admission, home metoprolol tartrate 100mg bid held with improvement noted  -Remains on clonidine 0.2mg TID  -TSH mildly elevated, free t4 normal  -Cardiology has started metoprolol succinate 25mg daily due to increasing BP  -Continue to monitor on telemetry    · Acute kidney injury on CKD stage IV  -Baseline creatinine 1.5-1.8, admission creatinine 2.67  -Improving with diuresis, now 2.09  -Continue to monitor    -Nephrology consulted and has now signed off, follow up outpatient in 4 weeks    · Right lower extremity calf and groin pain  -RLE venous doppler negative for DVT    · Bilateral ankle pain  -Possibly related to edema, which is improving, pain also improving  -Monitor for now    · Uncontrolled essential hypertension  -Home metoprolol held 2/2 sinus bradycardia in the 40s at admission, improved  -Continued on home clonidine and amlodipine  -Started on metoprolol succinate by cardiology as noted above    · Diabetes mellitus type 2 that is insulin-dependent and currently controlled  -A1c  7.3%, improved from 8.4% in 09/2020  -Glucose  last 24 hours  -On low dose SSI and levemir at half of home basal dose, 30 units nightly    · Normocytic anemia, may be related to some chronic kidney disease  -Stable, monitor    · Prolonged QTc, 496ms  -Stable on repeat checks  -Potassium normal, magnesium borderline elevated  -Avoid QT prolonging agents  -Continue to monitor on telemetry    · Morbid obesity, BMI 43.97, with associated HTN, DM    • DVT Prophylaxis  -subq heparin BID    The patient is high risk due to the following diagnoses/reasons:  CHF, severe pulmonary hypertension, CKD, morbid obesity    I have discussed the patient's assessment and plan with the patient, RNIdalmis and attending physician, Dr. Avendaño.    Disposition: Plan for discharge home soon, possibly home in AM. She lives at home with her  and children.    RAFIQ Knott  11/06/20  14:37 EST

## 2020-11-06 NOTE — PLAN OF CARE
Problem: Adult Inpatient Plan of Care  Goal: Plan of Care Review  Outcome: Ongoing, Progressing  Flowsheets (Taken 11/6/2020 0306)  Progress: improving  Plan of Care Reviewed With: patient  Outcome Summary: PT asleep in bed at this time. PT has had pain in lower extremities around her foot/ankle. Foot Massage/Lotion applied, and PRN Tylenol available for PT. VSS. Afebrile. PT reports SOA with exertion and remains on 2L NC. Will Continue to Monitor.  Goal: Patient-Specific Goal (Individualized)  Outcome: Ongoing, Progressing  Goal: Absence of Hospital-Acquired Illness or Injury  Outcome: Ongoing, Progressing  Intervention: Identify and Manage Fall Risk  Recent Flowsheet Documentation  Taken 11/6/2020 0300 by Tsering Truong, RN  Safety Promotion/Fall Prevention:   activity supervised   assistive device/personal items within reach   clutter free environment maintained   fall prevention program maintained   nonskid shoes/slippers when out of bed   room organization consistent   safety round/check completed  Taken 11/6/2020 0100 by Tsering Truong, RN  Safety Promotion/Fall Prevention:   activity supervised   assistive device/personal items within reach   clutter free environment maintained   fall prevention program maintained   nonskid shoes/slippers when out of bed   room organization consistent   safety round/check completed  Taken 11/5/2020 2300 by Tsering Truong, RN  Safety Promotion/Fall Prevention:   activity supervised   assistive device/personal items within reach   clutter free environment maintained   fall prevention program maintained   nonskid shoes/slippers when out of bed   room organization consistent   safety round/check completed  Taken 11/5/2020 2100 by Tsering Truong, RN  Safety Promotion/Fall Prevention:   activity supervised   assistive device/personal items within reach   clutter free environment maintained   fall prevention program maintained   nonskid shoes/slippers when out of bed   room  organization consistent   safety round/check completed  Taken 11/5/2020 2030 by Tsering Truong RN  Safety Promotion/Fall Prevention:   assistive device/personal items within reach   activity supervised   clutter free environment maintained   fall prevention program maintained   nonskid shoes/slippers when out of bed   room organization consistent   safety round/check completed  Taken 11/5/2020 1900 by Tsering Truong RN  Safety Promotion/Fall Prevention:   activity supervised   assistive device/personal items within reach   clutter free environment maintained   fall prevention program maintained   nonskid shoes/slippers when out of bed   room organization consistent   safety round/check completed  Intervention: Prevent and Manage VTE (venous thromboembolism) Risk  Recent Flowsheet Documentation  Taken 11/5/2020 2030 by Tsering Truong RN  VTE Prevention/Management: (See Mar) other (see comments)  Intervention: Prevent Infection  Recent Flowsheet Documentation  Taken 11/6/2020 0300 by Tsering Truong RN  Infection Prevention: rest/sleep promoted  Taken 11/6/2020 0100 by Tsering Truong RN  Infection Prevention: rest/sleep promoted  Taken 11/5/2020 2300 by Tsering Truong RN  Infection Prevention: rest/sleep promoted  Taken 11/5/2020 2100 by Tsering Truong RN  Infection Prevention: rest/sleep promoted  Taken 11/5/2020 2030 by Tsering Truong RN  Infection Prevention: rest/sleep promoted  Taken 11/5/2020 1900 by Tsering Truong RN  Infection Prevention: rest/sleep promoted  Goal: Optimal Comfort and Wellbeing  Outcome: Ongoing, Progressing  Intervention: Provide Person-Centered Care  Recent Flowsheet Documentation  Taken 11/5/2020 2030 by Tsering Truong RN  Trust Relationship/Rapport:   care explained   choices provided   empathic listening provided   thoughts/feelings acknowledged  Goal: Readiness for Transition of Care  Outcome: Ongoing, Progressing     Problem: Fall Injury Risk  Goal: Absence of Fall  and Fall-Related Injury  Outcome: Ongoing, Progressing  Intervention: Identify and Manage Contributors to Fall Injury Risk  Recent Flowsheet Documentation  Taken 11/6/2020 0300 by Tsering Truong RN  Medication Review/Management: medications reviewed  Taken 11/6/2020 0100 by Tsering Truong, RN  Medication Review/Management: medications reviewed  Taken 11/5/2020 2300 by Tsering Truong, RN  Medication Review/Management: medications reviewed  Taken 11/5/2020 2100 by Tsering Truong, RN  Medication Review/Management: medications reviewed  Taken 11/5/2020 2030 by Tsering Truong RN  Medication Review/Management: medications reviewed  Self-Care Promotion:   independence encouraged   BADL personal objects within reach  Taken 11/5/2020 1900 by sTering Truong, RN  Medication Review/Management: medications reviewed  Intervention: Promote Injury-Free Environment  Recent Flowsheet Documentation  Taken 11/6/2020 0300 by Tsering Truong, RN  Safety Promotion/Fall Prevention:   activity supervised   assistive device/personal items within reach   clutter free environment maintained   fall prevention program maintained   nonskid shoes/slippers when out of bed   room organization consistent   safety round/check completed  Taken 11/6/2020 0100 by Tsering Truong, RN  Safety Promotion/Fall Prevention:   activity supervised   assistive device/personal items within reach   clutter free environment maintained   fall prevention program maintained   nonskid shoes/slippers when out of bed   room organization consistent   safety round/check completed  Taken 11/5/2020 2300 by Tsering Truong, RN  Safety Promotion/Fall Prevention:   activity supervised   assistive device/personal items within reach   clutter free environment maintained   fall prevention program maintained   nonskid shoes/slippers when out of bed   room organization consistent   safety round/check completed  Taken 11/5/2020 2100 by Tsering Truong, RN  Safety Promotion/Fall  Prevention:   activity supervised   assistive device/personal items within reach   clutter free environment maintained   fall prevention program maintained   nonskid shoes/slippers when out of bed   room organization consistent   safety round/check completed  Taken 11/5/2020 2030 by Tsering Truong RN  Safety Promotion/Fall Prevention:   assistive device/personal items within reach   activity supervised   clutter free environment maintained   fall prevention program maintained   nonskid shoes/slippers when out of bed   room organization consistent   safety round/check completed  Taken 11/5/2020 1900 by Tsering Truong RN  Safety Promotion/Fall Prevention:   activity supervised   assistive device/personal items within reach   clutter free environment maintained   fall prevention program maintained   nonskid shoes/slippers when out of bed   room organization consistent   safety round/check completed     Problem: Skin Injury Risk Increased  Goal: Skin Health and Integrity  Outcome: Ongoing, Progressing  Intervention: Optimize Skin Protection  Recent Flowsheet Documentation  Taken 11/5/2020 2030 by Tsering Truong, RN  Pressure Reduction Techniques:   weight shift assistance provided   frequent weight shift encouraged  Pressure Reduction Devices: pressure-redistributing mattress utilized  Skin Protection:   skin-to-device areas padded   adhesive use limited  Intervention: Promote and Optimize Oral Intake  Recent Flowsheet Documentation  Taken 11/5/2020 2030 by Tsering Truong, RN  Oral Nutrition Promotion: rest periods promoted   Goal Outcome Evaluation:  Plan of Care Reviewed With: patient  Progress: improving  Outcome Summary: PT asleep in bed at this time. PT has had pain in lower extremities around her foot/ankle. Foot Massage/Lotion applied, and PRN Tylenol available for PT. VSS. Afebrile. PT reports SOA with exertion and remains on 2L NC. Will Continue to Monitor.

## 2020-11-06 NOTE — PROGRESS NOTES
Nephrology Progress Note      Subjective     Patient's shortness of breath is much better, no chest pain    Objective       Vital signs :     Temp:  [97.5 °F (36.4 °C)-98.6 °F (37 °C)] 97.8 °F (36.6 °C)  Heart Rate:  [68-78] 78  Resp:  [16-20] 18  BP: (125-148)/(49-80) 125/80      Intake/Output Summary (Last 24 hours) at 11/6/2020 0739  Last data filed at 11/6/2020 0500  Gross per 24 hour   Intake 720 ml   Output 1600 ml   Net -880 ml       Physical Exam:    General Appearance : not in acute distress  Lungs : B/L lower zone crackles with decreased intensity of breath sounds  Heart :  regular rhythm & normal rate, normal S1, S2 and no murmur, no rub  Abdomen : normal bowel sounds, no masses, no hepatomegaly, no splenomegaly, soft non-tender and no guarding  Extremities : moves extremities well, 2+ edema, no cyanosis and no redness  Skin :  no bleeding, bruising or rash  Neurologic :   orientated to person, place, time and situation, Grossly no focal deficits        Laboratory Data :     Albumin Albumin   Date Value Ref Range Status   11/04/2020 3.27 (L) 3.50 - 5.20 g/dL Final   11/03/2020 3.84 3.50 - 5.20 g/dL Final      Magnesium Magnesium   Date Value Ref Range Status   11/04/2020 3.1 (H) 1.6 - 2.4 mg/dL Final   11/03/2020 2.9 (H) 1.6 - 2.4 mg/dL Final          PTH               No results found for: PTH    CBC and coagulation:  Results from last 7 days   Lab Units 11/06/20  0122 11/05/20  0124 11/04/20  0422 11/03/20  1128   CRP mg/dL  --   --   --  1.37*   WBC 10*3/mm3 8.59 8.78 9.38 12.32*   HEMOGLOBIN g/dL 8.6* 8.6* 9.0* 9.8*   HEMATOCRIT % 28.4* 29.5* 31.7* 33.5*   MCV fL 85.3 90.2 91.4 86.8   MCHC g/dL 30.3* 29.2* 28.4* 29.3*   PLATELETS 10*3/mm3 192 187 226 320   INR   --   --   --  1.28*     Acid/base balance:  Results from last 7 days   Lab Units 11/03/20  1136   PH, ARTERIAL pH units 7.332*   PO2 ART mm Hg 60.4*   PCO2, ARTERIAL mm Hg 43.1   HCO3 ART mmol/L 22.8     Renal and electrolytes:  Results from  last 7 days   Lab Units 11/06/20  0122 11/05/20  0125 11/04/20  0422 11/03/20  1128   SODIUM mmol/L 139 137 140 140   POTASSIUM mmol/L 4.5 4.7 4.7 5.4*   MAGNESIUM mg/dL  --   --  3.1* 2.9*   CHLORIDE mmol/L 104 104 107 105   CO2 mmol/L 25.0 22.4 19.6* 19.7*   BUN mg/dL 54* 61* 62* 63*   CREATININE mg/dL 2.09* 2.32* 2.45* 2.67*   EGFR IF NONAFRICN AM mL/min/1.73 23* 20* 19* 17*   CALCIUM mg/dL 9.5 9.0 9.0 9.6     Estimated Creatinine Clearance: 27.3 mL/min (A) (by C-G formula based on SCr of 2.09 mg/dL (H)).    Liver and pancreatic function:  Results from last 7 days   Lab Units 11/04/20  0422 11/03/20  1128   ALBUMIN g/dL 3.27* 3.84   BILIRUBIN mg/dL 0.3 0.4   ALK PHOS U/L 237* 280*   AST (SGOT) U/L 41* 53*   ALT (SGPT) U/L 30 35*         Cardiac:  Results from last 7 days   Lab Units 11/03/20  1128   PROBNP pg/mL 2,940.0*     Liver and pancreatic function:  Results from last 7 days   Lab Units 11/04/20  0422 11/03/20  1128   ALBUMIN g/dL 3.27* 3.84   BILIRUBIN mg/dL 0.3 0.4   ALK PHOS U/L 237* 280*   AST (SGOT) U/L 41* 53*   ALT (SGPT) U/L 30 35*       Medications :     amLODIPine, 10 mg, Oral, Nightly  aspirin, 81 mg, Oral, Daily  atorvastatin, 40 mg, Oral, Nightly  calcium carb-cholecalciferol, 1 tablet, Oral, Daily  cloNIDine, 0.2 mg, Oral, TID  escitalopram, 10 mg, Oral, Daily  ferrous sulfate, 325 mg, Oral, BID With Meals  heparin (porcine), 5,000 Units, Subcutaneous, Q12H  insulin aspart, 0-7 Units, Subcutaneous, TID AC  insulin detemir, 30 Units, Subcutaneous, Nightly  isosorbide mononitrate, 30 mg, Oral, Daily  nystatin, , Topical, Q12H  sodium chloride, 10 mL, Intravenous, Q12H  sodium chloride, 10 mL, Intravenous, Q12H             Assessment/Plan     1. Acute on chronic congestive heart failure, HFpEF  2. ETHEL on CKD 3b  3. Essential hypertension  4. DM-II  5.  Normocytic anemia     Cr improved to 2.0 that is her baseline, having adequate diuresis with current bumex.   Will sign off, please call if any  questions. Out patient follow up with me in 4 wks after discharge.     ETHEL likely 2/2 CRS-I  Baseline Cr 1.5-2.0, peaked to 2.6, improved to 2.0  UA no hematuria, previously had about  2G of proteinuria that improved from 8G  -avoid any nephrotoxic agents, hypotension and adjust medications according to eGFR      Louise Cooper MD  11/06/20  07:39 EST

## 2020-11-06 NOTE — PROGRESS NOTES
LOS: 3 days     Name: Britta Lee  Age/Sex: 75 y.o. female  :  1945        PCP: Lexie Dolan PA  REF: No ref. provider found    Principal Problem:    Acute on chronic congestive heart failure (CMS/[facility])      Reason for follow-up: Acute congestive heart failure     Subjective       Subjective     Britta Lee is a 75-year-old female with a past medical history significant for congestive heart failure, hypertension, diabetes mellitus type 2, chronic kidney disease stage III, iron deficiency anemia, hyperlipidemia and morbid obesity.  Patient presented to the ER with complaints of shortness of breath.  She was referred to the ER by her primary care physician yesterday with concern that she may be in acute CHF.  Patient states that she has been short of breath over the last 3 weeks.  She also reports worsening orthopnea and lower extremity edema.  She denies any chest pain, cough, fever or chills.  She states that she was recently taken off her p.o. diuretics at home and noticed that her swelling and shortness of breath got worse after that.  Patient has chronic kidney disease and has been following with Dr. Johnson.  Creatinine on admission 2.45.  Echocardiogram in 2020 showed EF of 50%.    Interval History: Patient is -880ml of fluid. Creatinine improved at 2.09 today.       Vital Signs  Temp:  [97.5 °F (36.4 °C)-98.6 °F (37 °C)] 97.8 °F (36.6 °C)  Heart Rate:  [70-87] 87  Resp:  [16-20] 18  BP: (125-163)/(49-80) 163/65     Vital Signs (last 72 hrs)        0700  -   0659  0700  -   0659  07  -   0659  0700  -   0925   Most Recent    Temp (°F) 96.6 -  98.3    97.4 -  98.1    97.5 -  98.6       97.8 (36.6)    Heart Rate 46 -  75    67 -  77    68 -  78      87     87    Resp 18 -  22      18    16 -  20       18    /63 -  171/69    142/58 -  164/76    125/80 -  148/64      163/65     163/65    SpO2 (%) 94 -  99    96 -  99    96 -  99       99         Body mass index is 43.97 kg/m².    Intake/Output Summary (Last 24 hours) at 11/6/2020 0925  Last data filed at 11/6/2020 0841  Gross per 24 hour   Intake 720 ml   Output 1600 ml   Net -880 ml     Objective    Objective       Physical Exam:     General Appearance:    Alert, cooperative, in no acute distress   Head:    Normocephalic, without obvious abnormality, atraumatic   Eyes:            Conjunctivae and sclerae normal, no   icterus, no pallor, corneas clear.   Neck:   No adenopathy, supple, trachea midline, no thyromegaly, no   carotid bruit, no JVD   Lungs:     Clear to auscultation,respirations regular, even and                  unlabored    Heart:    Regular rhythm and normal rate, normal S1 and S2, no            murmur, no gallop, no rub, no click   Chest Wall:    No abnormalities observed   Abdomen:     Normal bowel sounds, no masses, no organomegaly, soft        non-tender, non-distended, no guarding, no rebound                tenderness   Extremities:   Moves all extremities well, no edema, no cyanosis, no             redness   Pulses:   Pulses palpable and equal bilaterally   Skin:   No bleeding, bruising or rash       Neurologic:   Alert and oriented    I have seen and performed a physical examination today.     Results review       Results Review:   Results from last 7 days   Lab Units 11/06/20  0122 11/05/20  0124 11/04/20 0422 11/03/20  1128   WBC 10*3/mm3 8.59 8.78 9.38 12.32*   HEMOGLOBIN g/dL 8.6* 8.6* 9.0* 9.8*   PLATELETS 10*3/mm3 192 187 226 320     Results from last 7 days   Lab Units 11/06/20  0122 11/05/20  0125 11/04/20 0422 11/03/20  1128   SODIUM mmol/L 139 137 140 140   POTASSIUM mmol/L 4.5 4.7 4.7 5.4*   CHLORIDE mmol/L 104 104 107 105   CO2 mmol/L 25.0 22.4 19.6* 19.7*   BUN mg/dL 54* 61* 62* 63*   CREATININE mg/dL 2.09* 2.32* 2.45* 2.67*   CALCIUM mg/dL 9.5 9.0 9.0 9.6   GLUCOSE mg/dL 181* 194* 101* 87   ALT (SGPT) U/L  --   --  30 35*   AST (SGOT) U/L  --   --  41* 53*      Results from last 7 days   Lab Units 11/04/20  0422 11/03/20  2156 11/03/20  1711 11/03/20  1358 11/03/20  1128   TROPONIN T ng/mL <0.010 <0.010 <0.010 <0.010 <0.010     Lab Results   Component Value Date    INR 1.28 (H) 11/03/2020     Lab Results   Component Value Date    MG 3.1 (H) 11/04/2020    MG 2.9 (H) 11/03/2020    MG 2.2 02/05/2020     Lab Results   Component Value Date    TSH 5.410 (H) 11/03/2020    CHLPL 128 06/25/2018    TRIG 128 09/04/2020    HDL 36 (L) 09/04/2020    LDL 63 09/04/2020      Imaging Results (Last 48 Hours)     Procedure Component Value Units Date/Time    US Venous Doppler Lower Extremity Right (duplex for insufficiency) [[id number]] Collected: 11/05/20 0755     Updated: 11/05/20 0757    Narrative:      US DUPLEX VENOUS DOPPLER UNILAT RIGHT FOR INSUFFICIENCY-     CLINICAL INDICATION: pain and edema; I50.9-Heart failure, unspecified;  I50.33-Acute on chronic diastolic (congestive) heart failure        COMPARISON: None available      TECHNIQUE: Color Doppler imaging was used with compression and  augmentation to evaluate the right lower extremity deep venous system.     FINDINGS:   There is patent spontaneous flow from the common femoral vein through  the posterior tibial veins.  There was no internal clot or area of noncompressibility in the right  lower extremity.  Normal augmentation was elicited where applicable.             Impression:      No DVT in the right lower extremity on today's exam.      This report was finalized on 11/5/2020 7:55 AM by Dr. Silvio Mcdonald MD.             Echo   Results for orders placed during the hospital encounter of 11/03/20   Adult Transthoracic Echo Complete W/ Cont if Necessary Per Protocol    Narrative · Left ventricular wall thickness is consistent with concentric   hypertrophy.  · Left ventricular ejection fraction appears to be greater than 70%. Left   ventricular systolic function is hyperdynamic (EF > 70%).  · Left ventricular diastolic function  is consistent with (grade II w/high   LAP) pseudonormalization.  · The left atrial cavity is moderate to severely dilated.  · The right atrial cavity is mildly dilated.  · Moderate mitral annular calcification is present.  · Mild mitral valve regurgitation is present.  · Mild tricuspid valve regurgitation is present.  · Estimated right ventricular systolic pressure from tricuspid   regurgitation is markedly elevated (>55 mmHg).         I reviewed the patient's new clinical results.    Telemetry: NSR 60-70 bpm        Medication Review:   amLODIPine, 10 mg, Oral, Nightly  aspirin, 81 mg, Oral, Daily  atorvastatin, 40 mg, Oral, Nightly  calcium carb-cholecalciferol, 1 tablet, Oral, Daily  cloNIDine, 0.2 mg, Oral, TID  escitalopram, 10 mg, Oral, Daily  ferrous sulfate, 325 mg, Oral, BID With Meals  heparin (porcine), 5,000 Units, Subcutaneous, Q12H  insulin aspart, 0-7 Units, Subcutaneous, TID AC  insulin detemir, 30 Units, Subcutaneous, Nightly  isosorbide mononitrate, 30 mg, Oral, Daily  nystatin, , Topical, Q12H  sodium chloride, 10 mL, Intravenous, Q12H  sodium chloride, 10 mL, Intravenous, Q12H             Assessment      Assessment:  1. Acute on chronic diastolic congestive heart failure, decompensated  2. ASCVD status post PCI in the remote past  3. Essential hypertension, uncontrolled  4. Peripheral vascular disease  5. Acute kidney injury on chronic kidney disease stage IV, baseline creatinine 1.5-1.7, currently 2.32  6. Bradycardia on admission, improving  7. Severe pulmonary hypertension  8. Morbid obesity    Plan     Recommendations:  1. Diuresed well subjectively she feels better potentially can switch diuretics to p.o. tomorrow  2. Blood pressures remain somewhat elevated will add metoprolol and monitor her heart rate right now her heart rate in the 80s  3. Creatinine is improving  4. Again long discussion regarding restriction of salt intake patient understood    I discussed the patients findings and my  recommendations with patient and family      Priscila ERNIE Holland ,acting as scribe for Dr. Mason Leone MD [facility]  11/06/20  09:25 NANCY  I, Hever Cruz MD, [facility], performed the services described in this documentation as documented by the above-named individual under my supervision and made necessary changes in the note is both accurate and complete  Please note that portions of this note were completed with a voice recognition program.

## 2020-11-07 LAB
ANION GAP SERPL CALCULATED.3IONS-SCNC: 11.6 MMOL/L (ref 5–15)
BUN SERPL-MCNC: 41 MG/DL (ref 8–23)
BUN/CREAT SERPL: 24.3 (ref 7–25)
CALCIUM SPEC-SCNC: 9.5 MG/DL (ref 8.6–10.5)
CHLORIDE SERPL-SCNC: 103 MMOL/L (ref 98–107)
CO2 SERPL-SCNC: 24.4 MMOL/L (ref 22–29)
CREAT SERPL-MCNC: 1.69 MG/DL (ref 0.57–1)
GFR SERPL CREATININE-BSD FRML MDRD: 29 ML/MIN/1.73
GLUCOSE BLDC GLUCOMTR-MCNC: 211 MG/DL (ref 70–130)
GLUCOSE BLDC GLUCOMTR-MCNC: 221 MG/DL (ref 70–130)
GLUCOSE BLDC GLUCOMTR-MCNC: 314 MG/DL (ref 70–130)
GLUCOSE BLDC GLUCOMTR-MCNC: 339 MG/DL (ref 70–130)
GLUCOSE BLDC GLUCOMTR-MCNC: 99 MG/DL (ref 70–130)
GLUCOSE SERPL-MCNC: 102 MG/DL (ref 65–99)
POTASSIUM SERPL-SCNC: 4.4 MMOL/L (ref 3.5–5.2)
SODIUM SERPL-SCNC: 139 MMOL/L (ref 136–145)

## 2020-11-07 PROCEDURE — 63710000001 INSULIN ASPART PER 5 UNITS: Performed by: PHYSICIAN ASSISTANT

## 2020-11-07 PROCEDURE — 94799 UNLISTED PULMONARY SVC/PX: CPT

## 2020-11-07 PROCEDURE — 99232 SBSQ HOSP IP/OBS MODERATE 35: CPT | Performed by: INTERNAL MEDICINE

## 2020-11-07 PROCEDURE — 99239 HOSP IP/OBS DSCHRG MGMT >30: CPT | Performed by: PHYSICIAN ASSISTANT

## 2020-11-07 PROCEDURE — 82962 GLUCOSE BLOOD TEST: CPT

## 2020-11-07 PROCEDURE — 63710000001 INSULIN DETEMIR PER 5 UNITS: Performed by: PHYSICIAN ASSISTANT

## 2020-11-07 PROCEDURE — 80048 BASIC METABOLIC PNL TOTAL CA: CPT | Performed by: PHYSICIAN ASSISTANT

## 2020-11-07 PROCEDURE — 25010000002 HEPARIN (PORCINE) PER 1000 UNITS: Performed by: INTERNAL MEDICINE

## 2020-11-07 RX ORDER — ISOSORBIDE MONONITRATE 60 MG/1
60 TABLET, EXTENDED RELEASE ORAL DAILY
Status: DISCONTINUED | OUTPATIENT
Start: 2020-11-07 | End: 2020-11-08 | Stop reason: HOSPADM

## 2020-11-07 RX ORDER — HYDRALAZINE HYDROCHLORIDE 25 MG/1
25 TABLET, FILM COATED ORAL EVERY 6 HOURS SCHEDULED
Status: DISCONTINUED | OUTPATIENT
Start: 2020-11-07 | End: 2020-11-08

## 2020-11-07 RX ORDER — BUMETANIDE 1 MG/1
1 TABLET ORAL DAILY
Status: DISCONTINUED | OUTPATIENT
Start: 2020-11-07 | End: 2020-11-08 | Stop reason: HOSPADM

## 2020-11-07 RX ADMIN — CLONIDINE HYDROCHLORIDE 0.2 MG: 0.2 TABLET ORAL at 15:00

## 2020-11-07 RX ADMIN — NYSTATIN: 100000 POWDER TOPICAL at 09:21

## 2020-11-07 RX ADMIN — FERROUS SULFATE TAB 325 MG (65 MG ELEMENTAL FE) 325 MG: 325 (65 FE) TAB at 17:35

## 2020-11-07 RX ADMIN — INSULIN ASPART 3 UNITS: 100 INJECTION, SOLUTION INTRAVENOUS; SUBCUTANEOUS at 11:23

## 2020-11-07 RX ADMIN — ESCITALOPRAM 10 MG: 10 TABLET, FILM COATED ORAL at 09:21

## 2020-11-07 RX ADMIN — NYSTATIN: 100000 POWDER TOPICAL at 20:18

## 2020-11-07 RX ADMIN — SODIUM CHLORIDE, PRESERVATIVE FREE 10 ML: 5 INJECTION INTRAVENOUS at 09:22

## 2020-11-07 RX ADMIN — METOPROLOL SUCCINATE 25 MG: 25 TABLET, EXTENDED RELEASE ORAL at 09:21

## 2020-11-07 RX ADMIN — CLONIDINE HYDROCHLORIDE 0.2 MG: 0.2 TABLET ORAL at 20:23

## 2020-11-07 RX ADMIN — HYDRALAZINE HYDROCHLORIDE 25 MG: 25 TABLET, FILM COATED ORAL at 11:23

## 2020-11-07 RX ADMIN — Medication 1 TABLET: at 09:21

## 2020-11-07 RX ADMIN — ISOSORBIDE MONONITRATE 60 MG: 60 TABLET ORAL at 11:23

## 2020-11-07 RX ADMIN — CLONIDINE HYDROCHLORIDE 0.2 MG: 0.2 TABLET ORAL at 09:21

## 2020-11-07 RX ADMIN — INSULIN ASPART 3 UNITS: 100 INJECTION, SOLUTION INTRAVENOUS; SUBCUTANEOUS at 17:36

## 2020-11-07 RX ADMIN — SODIUM CHLORIDE, PRESERVATIVE FREE 10 ML: 5 INJECTION INTRAVENOUS at 20:18

## 2020-11-07 RX ADMIN — INSULIN DETEMIR 30 UNITS: 100 INJECTION, SOLUTION SUBCUTANEOUS at 20:25

## 2020-11-07 RX ADMIN — ACETAMINOPHEN 650 MG: 325 TABLET ORAL at 06:56

## 2020-11-07 RX ADMIN — HEPARIN SODIUM 5000 UNITS: 5000 INJECTION INTRAVENOUS; SUBCUTANEOUS at 09:20

## 2020-11-07 RX ADMIN — BUMETANIDE 1 MG: 1 TABLET ORAL at 14:56

## 2020-11-07 RX ADMIN — ATORVASTATIN CALCIUM 40 MG: 40 TABLET, FILM COATED ORAL at 20:18

## 2020-11-07 RX ADMIN — FERROUS SULFATE TAB 325 MG (65 MG ELEMENTAL FE) 325 MG: 325 (65 FE) TAB at 09:21

## 2020-11-07 RX ADMIN — HEPARIN SODIUM 5000 UNITS: 5000 INJECTION INTRAVENOUS; SUBCUTANEOUS at 20:18

## 2020-11-07 RX ADMIN — AMLODIPINE BESYLATE 10 MG: 10 TABLET ORAL at 20:18

## 2020-11-07 RX ADMIN — HYDRALAZINE HYDROCHLORIDE 25 MG: 25 TABLET, FILM COATED ORAL at 17:36

## 2020-11-07 RX ADMIN — ASPIRIN 81 MG: 81 TABLET, COATED ORAL at 09:21

## 2020-11-07 NOTE — PROGRESS NOTES
[facility]     Patient Identification:  Name:  Britta Lee  Age:  75 y.o.  Sex:  female  :  1945  MRN:  [id number]  Visit Number:  [id number]  Primary Care Provider:  Lexie Dolan PA    Date of admission: 11/3/2020  Length of stay:  4    ----------------------------------------------------------------------------------------------------------------------  Subjective     Chief Complaint:   Chief Complaint   Patient presents with   • Shortness of Breath     Subjective/Interval History:    75 y.o. female who was admitted on 11/3/2020 with acute on chronic diastolic CHF and ETHEL on stage IV CKD. For complete admission information, please see history and physical.     Procedures/Scans:  1. Transthoracic echocardiogram   2. Venous doppler RLE  3. SLP evaluation    Today, the patient reports that she is doing well. No new complaints. She has been on room air since last night and has maintained O2sats. Pains in her RLE and ankles have improved. Discussed with RNIdalmis who denies any new events or concerns.     Review of Systems   Constitutional: Negative for chills and fever.   Respiratory: Negative for cough, shortness of breath (Improving. ) and wheezing.    Cardiovascular: Negative for chest pain and leg swelling (Down a bit).   Gastrointestinal: Negative for abdominal pain.   Genitourinary: Negative for difficulty urinating and dysuria.   Musculoskeletal: Negative for arthralgias (Ankle pains are improving.).      ----------------------------------------------------------------------------------------------------------------------  Objective   Current [facility] Meds:  amLODIPine, 10 mg, Oral, Nightly  aspirin, 81 mg, Oral, Daily  atorvastatin, 40 mg, Oral, Nightly  calcium carb-cholecalciferol, 1 tablet, Oral, Daily  cloNIDine, 0.2 mg, Oral, TID  escitalopram, 10 mg, Oral, Daily  ferrous sulfate, 325 mg, Oral, BID With Meals  heparin (porcine), 5,000 Units,  Subcutaneous, Q12H  hydrALAZINE, 25 mg, Oral, Q6H  insulin aspart, 0-7 Units, Subcutaneous, TID AC  insulin detemir, 30 Units, Subcutaneous, Nightly  isosorbide mononitrate, 60 mg, Oral, Daily  metoprolol succinate XL, 25 mg, Oral, Q24H  nystatin, , Topical, Q12H  sodium chloride, 10 mL, Intravenous, Q12H  sodium chloride, 10 mL, Intravenous, Q12H         ----------------------------------------------------------------------------------------------------------------------  Vital Signs:  Temp:  [97.2 °F (36.2 °C)-98.1 °F (36.7 °C)] 97.9 °F (36.6 °C)  Heart Rate:  [73-82] 74  Resp:  [18] 18  BP: (142-164)/(61-81) 164/66  Mean Arterial Pressure (Non-Invasive) for the past 24 hrs (Last 3 readings):   Noninvasive MAP (mmHg)   11/07/20 1009 107   11/07/20 0617 100   11/06/20 1404 95     SpO2 Percentage    11/07/20 0617 11/07/20 1009 11/07/20 1100   SpO2: 97% 96% 96%     SpO2:  [96 %-98 %] 96 %  on  Flow (L/min):  [2] 2;   Device (Oxygen Therapy): room air    Body mass index is 43.17 kg/m².  Wt Readings from Last 3 Encounters:   11/07/20 111 kg (243 lb 11.2 oz)   11/03/20 109 kg (240 lb)   10/19/20 109 kg (240 lb)        Intake/Output Summary (Last 24 hours) at 11/7/2020 1210  Last data filed at 11/7/2020 0830  Gross per 24 hour   Intake 1080 ml   Output 1425 ml   Net -345 ml     Diet Regular; Cardiac, Renal, Consistent Carbohydrate  ----------------------------------------------------------------------------------------------------------------------  Physical exam:  Constitutional: Vital signs reviewed. Well-developed and well-nourished.  No respiratory distress on room air.  HENT:  Head:  Normocephalic and atraumatic.  Mouth:  Moist mucous membranes.    Eyes:  Conjunctivae and EOM are normal. No scleral icterus. No erythema or drainage.  Neck:  Neck supple.   Cardiovascular:  Normal rate, regular rhythm and normal heart sounds with no murmur.  Pulmonary/Chest:  No respiratory distress, no wheezes. Few rales at the left  base. Difficult exam 2/2 body habitus.   Abdominal:  Soft.  Bowel sounds are present x4.  No distension and no tenderness.   Musculoskeletal: Trace edema dorsum of right foot only with open blister, no surrounding erythema.   Neurological:  Alert and oriented to person, place, and time. No facial droop.  No slurred speech.   Skin:  Skin is warm and dry. No rash noted. No pallor.   Peripheral vascular: No clubbing, no cyanosis, trace edema right foot. 2+ pedal pulses bilaterally.   Genitourinary: No julian catheter in place.     I have reviewed and updated the physical exam as needed to reflect that of 11/07/20  ----------------------------------------------------------------------------------------------------------------------  Tele: SR in the 70s    ----------------------------------------------------------------------------------------------------------------------  Results from last 7 days   Lab Units 11/04/20  0422 11/03/20  2156 11/03/20  1711   TROPONIN T ng/mL <0.010 <0.010 <0.010     Results from last 7 days   Lab Units 11/03/20  1128   PROBNP pg/mL 2,940.0*       Results from last 7 days   Lab Units 11/03/20  1136   PH, ARTERIAL pH units 7.332*   PO2 ART mm Hg 60.4*   PCO2, ARTERIAL mm Hg 43.1   HCO3 ART mmol/L 22.8     Results from last 7 days   Lab Units 11/06/20 0122 11/05/20 0124 11/04/20 0422 11/03/20  1128   CRP mg/dL  --   --   --  1.37*   WBC 10*3/mm3 8.59 8.78 9.38 12.32*   HEMOGLOBIN g/dL 8.6* 8.6* 9.0* 9.8*   HEMATOCRIT % 28.4* 29.5* 31.7* 33.5*   MCV fL 85.3 90.2 91.4 86.8   MCHC g/dL 30.3* 29.2* 28.4* 29.3*   PLATELETS 10*3/mm3 192 187 226 320   INR   --   --   --  1.28*     Results from last 7 days   Lab Units 11/07/20 0425 11/06/20 0122 11/05/20 0125 11/04/20 0422 11/03/20  1128   SODIUM mmol/L 139 139 137 140 140   POTASSIUM mmol/L 4.4 4.5 4.7 4.7 5.4*   MAGNESIUM mg/dL  --   --   --  3.1* 2.9*   CHLORIDE mmol/L 103 104 104 107 105   CO2 mmol/L 24.4 25.0 22.4 19.6* 19.7*   BUN mg/dL  41* 54* 61* 62* 63*   CREATININE mg/dL 1.69* 2.09* 2.32* 2.45* 2.67*   EGFR IF NONAFRICN AM mL/min/1.73 29* 23* 20* 19* 17*   CALCIUM mg/dL 9.5 9.5 9.0 9.0 9.6   GLUCOSE mg/dL 102* 181* 194* 101* 87   ALBUMIN g/dL  --   --   --  3.27* 3.84   BILIRUBIN mg/dL  --   --   --  0.3 0.4   ALK PHOS U/L  --   --   --  237* 280*   AST (SGOT) U/L  --   --   --  41* 53*   ALT (SGPT) U/L  --   --   --  30 35*   Estimated Creatinine Clearance: 33.4 mL/min (A) (by C-G formula based on SCr of 1.69 mg/dL (H)).  No results found for: AMMONIA    Glucose   Date/Time Value Ref Range Status   11/07/2020 1023 221 (H) 70 - 130 mg/dL Final   11/07/2020 0619 99 70 - 130 mg/dL Final   11/06/2020 1854 215 (H) 70 - 130 mg/dL Final   11/06/2020 1612 280 (H) 70 - 130 mg/dL Final   11/06/2020 1017 231 (H) 70 - 130 mg/dL Final   11/06/2020 0620 98 70 - 130 mg/dL Final   11/06/2020 0504 115 70 - 130 mg/dL Final   11/05/2020 1957 245 (H) 70 - 130 mg/dL Final     Lab Results   Component Value Date    HGBA1C 7.30 (H) 11/03/2020     Lab Results   Component Value Date    TSH 5.410 (H) 11/03/2020    FREET4 0.98 11/05/2020       Blood Culture   Date Value Ref Range Status   11/03/2020 No growth at 24 hours  Preliminary   11/03/2020 No growth at 24 hours  Preliminary     No results found for: URINECX  No results found for: WOUNDCX  No results found for: STOOLCX  No results found for: RESPCX  Pain Management Panel     Pain Management Panel Latest Ref Rng & Units 9/4/2020 3/13/2020    CREATININE UR mg/dL 56.6 118.1        I have personally reviewed the above laboratory results for 11/07/20  ----------------------------------------------------------------------------------------------------------------------  Imaging Results (Last 24 Hours)     ** No results found for the last 24 hours. **            SLP Evaluation    ----------------------------------------------------------------------------------------------------------------------  Assessment/Plan      · Acute on chronic diastolic congestive heart failure  · Severe pulmonary hypertension, RVSP >55mmHg  · Mild transaminitis, likely passive congestion from CHF; hepatitis panel negative  -Ruled out for acute MI on admission  -Echo with EF>70%, grade II diastolic dysfunction with high LAP  -CHF educator consulted this admission, plan for outpatient HF clinic follow up at [place]  -Cardiology following, adjusting antihypertensive regimen   -Net I/O -865 last 24 hours  -Creatinine improved to 1.69  -She has been weaned to room air and tolerating well  -Will transition to PO bumex at 1mg daily    · Sinus bradycardia   -In the 40s at admission, home metoprolol tartrate 100mg bid held with improvement noted  -Remains on clonidine 0.2mg TID  -TSH mildly elevated, free t4 normal  -Cardiology has started metoprolol succinate 25mg daily, HR stable  -Continue to monitor on telemetry    · Acute kidney injury on CKD stage IV  -Baseline creatinine 1.5-1.8, admission creatinine 2.67  -Improving with diuresis, now 1.69  -Continue to monitor    -Nephrology consulted and has now signed off, follow up outpatient in 4 weeks    · Right lower extremity calf and groin pain  -RLE venous doppler negative for DVT    · Bilateral ankle pain  -Possibly related to edema, which is improving, pain also improving  -Monitor for now    · Uncontrolled essential hypertension  -Home metoprolol held 2/2 sinus bradycardia in the 40s at admission, improved  -Continued on home clonidine and amlodipine  -Started on metoprolol succinate by cardiology as noted above on 11/6/2020 and imdur/hydralazine today, monitor for improvement     · Diabetes mellitus type 2 that is insulin-dependent and currently controlled  -A1c 7.3%, improved from 8.4% in 09/2020  -Glucose  last 24 hours  -On low dose SSI and levemir at half of home basal dose, 30 units nightly    · Normocytic anemia, may be related to some chronic kidney disease  -Stable, monitor    · Prolonged QTc,  496ms  -Stable on repeat checks  -Potassium normal, magnesium borderline elevated  -Avoid QT prolonging agents  -Continue to monitor on telemetry    · Morbid obesity, BMI 43.97, with associated HTN, DM    • DVT Prophylaxis  -subq heparin BID    The patient is high risk due to the following diagnoses/reasons: CHF, severe pulmonary hypertension, CKD, morbid obesity    I have discussed the patient's assessment and plan with the patient, RNIdalmis and attending physician, Dr. Avendaño. Dr. Avendaño has also discussed the case over the phone with Dr. Martin.    Disposition: Plan for discharge home soon, possibly home in AM. She lives at home with her  and children.    Needs at discharge:  · Nephrology follow up in 4 weeks (Kenneth)  · Cardiology follow up (Yared)  · Referral to CHF clinic    RAFIQ Knott  11/07/20  12:10 EST

## 2020-11-07 NOTE — PROGRESS NOTES
TODAY'S DATE   11/07/20    Chief Complaint: Congestive heart failure    SUBJECTIVE:  Patient denies any chest pain or shortness of breath.  Creatinine has improved to 1.69 today from 2.69.  Blood pressure still elevated.  Echocardiogram done recently showed ejection fraction of 50%.  Patient has stage IV 8 renal insufficiency.  PHYSICAL EXAM:    General Appearance:    Alert, cooperative, in no acute distress   Head:    Normocephalic, without obvious abnormality, atraumatic       Neck:   Supple, trachea midline, no JVD   Lungs:     Clear to auscultation,respirations regular, even and                  unlabored    Heart:    Regular rhythm and normal rate, normal S1 and S2, no            murmur, no gallop, no rub, no click   Chest Wall:    No abnormalities observed   Abdomen:     Normal bowel sounds,  non-distended   Extremities:   Moves all extremities well, no edema, no cyanosis, no             redness   Pulses:   Pulses palpable and equal bilaterally   Skin:   No bleeding, bruising or rash   Neurologic:   A & O x 3            No Known Allergies    Past Medical History:   Diagnosis Date   • Anemia    • Arthritis    • Carpal tunnel syndrome    • CHF (congestive heart failure) (CMS/HCC)    • Coronary artery disease    • Diabetes mellitus (CMS/HCC)    • Elevated cholesterol    • GERD (gastroesophageal reflux disease)    • Heart disease    • High cholesterol    • History of pneumonia    • History of unsteady gait     OCASSIONALLY   • Hypertension    • Insomnia    • Kidney disease    • Osteoarthritis    • Renal insufficiency    • Sciatica        Past Surgical History:   Procedure Laterality Date   • ABDOMINAL SURGERY     • APPENDECTOMY     • CARDIAC CATHETERIZATION      1 STENT  ---  2000   • CARDIAC SURGERY     • CAROTID STENT     • CARPAL TUNNEL RELEASE Right 10/8/2019    Procedure: CARPAL TUNNEL RELEASE;  Surgeon: Feroz Johnson MD;  [facility];  Service: Orthopedics   • CATARACT EXTRACTION     •  CHOLECYSTECTOMY     • COLONOSCOPY     • CORONARY ANGIOPLASTY WITH STENT PLACEMENT     • ENDOSCOPY     • ENDOSCOPY N/A 3/5/2020    Procedure: ESOPHAGOGASTRODUODENOSCOPY;  Surgeon: Alexandru Bagley MD;  Location: [facility];  Service: Gastroenterology;  Laterality: N/A;   • ENDOSCOPY AND COLONOSCOPY     • GALLBLADDER SURGERY     • JOINT REPLACEMENT Left 05/02/2017    [facility]  DR JOHNSON  LEFT TOTAL KNEE   • KNEE ARTHROSCOPY W/ MENISCECTOMY Right    • LAPAROSCOPIC TUBAL LIGATION     • MD TOTAL KNEE ARTHROPLASTY Left 5/2/2017    Procedure: TOTAL KNEE ARTHROPLASTY;  Surgeon: Feroz Johnson MD;  Location: [facility];  Service: Orthopedics   • STERILIZATION     • TONSILLECTOMY         Social History     Socioeconomic History   • Marital status:      Spouse name: natalie   • Number of children: 3   • Years of education: 12   • Highest education level: Not on file   Occupational History   • Occupation: retired   Social Needs   • Financial resource strain: Somewhat hard   • Food insecurity     Worry: Never true     Inability: Never true   • Transportation needs     Medical: Yes     Non-medical: No   Tobacco Use   • Smoking status: Never Smoker   • Smokeless tobacco: Never Used   Substance and Sexual Activity   • Alcohol use: Yes     Comment: socially   • Drug use: No   • Sexual activity: Defer     Birth control/protection: Surgical   Lifestyle   • Physical activity     Days per week: 0 days     Minutes per session: 0 min   • Stress: Only a little       Family History   Problem Relation Age of Onset   • Arthritis Mother    • Diabetes Mother    • Cancer Mother    • Heart disease Father    • Diabetes Daughter    • Diabetes Son    • Diabetes Maternal Aunt    • Heart disease Maternal Grandmother    • Breast cancer Neg Hx        Patient Active Problem List   Diagnosis   • Type 2 diabetes mellitus, uncontrolled, with neuropathy (CMS/HCC)   • Essential hypertension   • Atherosclerosis of native coronary artery of native heart    • Bilateral carpal tunnel syndrome   • Iron deficiency anemia due to dietary causes   • Diabetic retinopathy associated with type 2 diabetes mellitus (CMS/[facility])   • Hyperlipidemia   • Sciatic neuropathy   • DDD (degenerative disc disease), lumbar   • Primary osteoarthritis of left knee   • Status post total left knee replacement   • Type 2 diabetes mellitus with chronic kidney disease, with long-term current use of insulin (CMS/[facility])   • Hyperparathyroidism due to renal insufficiency (CMS/[facility])   • Venous insufficiency (chronic) (peripheral)   • Heart failure (CMS/[facility])   • Morbid (severe) obesity due to excess calories (CMS/[facility])   • PVD (peripheral vascular disease) with claudication (CMS/[facility])   • LVH (left ventricular hypertrophy)   • Chronic renal disease, stage IV (CMS/[facility])   • Acute on chronic congestive heart failure (CMS/[facility])        Vital Signs (last 24 hours)       11/06 0700  -  11/07 0659 11/07 0700  -  11/07 1147   Most Recent    Temp (°F) 95.9 -  98.1      97.9     97.9 (36.6)    Heart Rate 72 -  87      74     74    Resp 18 -  20      18     18    /49 -  163/65      164/66     164/66    SpO2 (%) 96 -  99      96     96          LABS:    Results from last 7 days   Lab Units 11/06/20  0122 11/05/20  0124 11/04/20 0422 11/03/20  1128   WBC 10*3/mm3 8.59 8.78 9.38 12.32*   HEMOGLOBIN g/dL 8.6* 8.6* 9.0* 9.8*   PLATELETS 10*3/mm3 192 187 226 320     Results from last 7 days   Lab Units 11/07/20  0425 11/06/20 0122 11/05/20  0125 11/04/20 0422 11/03/20  1128   SODIUM mmol/L 139 139 137 140 140   POTASSIUM mmol/L 4.4 4.5 4.7 4.7 5.4*   CHLORIDE mmol/L 103 104 104 107 105   CO2 mmol/L 24.4 25.0 22.4 19.6* 19.7*   BUN mg/dL 41* 54* 61* 62* 63*   CREATININE mg/dL 1.69* 2.09* 2.32* 2.45* 2.67*   CALCIUM mg/dL 9.5 9.5 9.0 9.0 9.6   GLUCOSE mg/dL 102* 181* 194* 101* 87     Results from last 7 days   Lab Units 11/04/20  0422 11/03/20  1128   BILIRUBIN mg/dL 0.3 0.4   ALK PHOS U/L 237* 280*   AST (SGOT) U/L 41*  53*   ALT (SGPT) U/L 30 35*     Results from last 7 days   Lab Units 11/04/20  0422 11/03/20  1128   MAGNESIUM mg/dL 3.1* 2.9*     Results from last 7 days   Lab Units 11/03/20  1128   INR  1.28*     Results from last 7 days   Lab Units 11/04/20  0422 11/03/20  2156 11/03/20  1711   TROPONIN T ng/mL <0.010 <0.010 <0.010       No results found for: HGBA1C  Glucose   Date/Time Value Ref Range Status   11/07/2020 1023 221 (H) 70 - 130 mg/dL Final   11/07/2020 0619 99 70 - 130 mg/dL Final   11/06/2020 1854 215 (H) 70 - 130 mg/dL Final   11/06/2020 1612 280 (H) 70 - 130 mg/dL Final   11/06/2020 1017 231 (H) 70 - 130 mg/dL Final   11/06/2020 0620 98 70 - 130 mg/dL Final   11/06/2020 0504 115 70 - 130 mg/dL Final   11/05/2020 1957 245 (H) 70 - 130 mg/dL Final       Hospital Medications:    amLODIPine, 10 mg, Oral, Nightly  aspirin, 81 mg, Oral, Daily  atorvastatin, 40 mg, Oral, Nightly  calcium carb-cholecalciferol, 1 tablet, Oral, Daily  cloNIDine, 0.2 mg, Oral, TID  escitalopram, 10 mg, Oral, Daily  ferrous sulfate, 325 mg, Oral, BID With Meals  heparin (porcine), 5,000 Units, Subcutaneous, Q12H  hydrALAZINE, 25 mg, Oral, Q6H  insulin aspart, 0-7 Units, Subcutaneous, TID AC  insulin detemir, 30 Units, Subcutaneous, Nightly  isosorbide mononitrate, 60 mg, Oral, Daily  metoprolol succinate XL, 25 mg, Oral, Q24H  nystatin, , Topical, Q12H  sodium chloride, 10 mL, Intravenous, Q12H  sodium chloride, 10 mL, Intravenous, Q12H           A/P: Acute on chronic diastolic left ventricular dysfunction now compensated  Hypertension  Stage IV rate renal insufficiency improving    Plan: We will add hydralazine and nitrate combo for better blood pressure control      Sánchez Martin MD

## 2020-11-07 NOTE — PLAN OF CARE
Goal Outcome Evaluation:  Plan of Care Reviewed With: patient  Progress: improving   Pt ambulated in hallway and is sitting up in chair, tolerating well. 95% on RA.No s/s of distress. Will continue with plan of care. Will continue to monitor.

## 2020-11-07 NOTE — DISCHARGE SUMMARY
[facility] HOSPITALIST DISCHARGE SUMMARY    Patient Identification:  Name:  Britta Lee  Age:  75 y.o.  Sex:  female  :  1945  MRN:  [id number]  Visit Number:  [id number]    Date of Admission: 11/3/2020  Date of Discharge: 2020    PCP: Lexie Dolan PA    Discharging Provider: RAFIQ Knott    Discharge Diagnoses     Discharge Diagnoses:  1. Acute on chronic diastolic CHF  2. Sinus bradycardia    3. ETHEL on stage IV CKD  4. Uncontrolled essential hypertension  5. Prolonged QTc  6. Normocytic anemia   7. Mildly elevated transaminases  8. Severe pulmonary hypertension, RVSP >55mmHg    Secondary Diagnoses:  1. IDDM, type II   2. Morbid obesity, BMI 43.97    Consults/Procedures     Consults:   Consults     Date and Time Order Name Status Description    2020 1337 Inpatient Nephrology Consult Completed     11/3/2020 1673 Inpatient Cardiology Consult Completed         Procedures/Scans Performed:  1. Transthoracic echocardiogram   2. Venous doppler RLE  3. SLP evaluation    History of Presenting Illness     Chief Complaint   Patient presents with   • Shortness of Breath     Ms. Lee is a 75 y.o. female presented to [facility] complaining of shortness of breath.  She was diagnosed with an acute exacerbation of her chronic diastolic CHF as well as ETHEL on stage IV CKD.  Please see the admitting history and physical for further details.      Hospital Course     She was admitted to the telemetry unit for further evaluation and therapy.  She was started on IV diuresis with Bumex.  She had borderline hypoxemia with PO2 of 60.4 and arterial O2 saturation of 90.7% on room air.  She was therefore placed on 2 L O2 via nasal cannula and this was gradually weaned to room air prior to discharge.  Serial troponins were negative, ruling out for acute MI.  Transthoracic echocardiogram showed EF of >70% with grade 2 diastolic dysfunction. She also had severe pulmonary hypertension  with RVSP >55 mmHg.     Cardiology consultation was obtained for acute on chronic diastolic CHF and she continued to receive diuresis.  Medications for blood pressure were also optimized as she had uncontrolled hypertension during her stay with improvement noted.  Please see discharge MAR for final antihypertensive regimen.  She had a swallow evaluation with no signs or symptoms of silent aspiration and was continued on a regular diet.  During her stay, she did develop complaints of right lower extremity pain.  She had a venous Doppler which was negative for DVT.  Cardiology recommended nephrology consultation due to ETHEL on CKD.  Nephrology was consulted and renal function actually gradually improved with diuresis back to her baseline. They signed off and recommended outpatient follow-up in 4 weeks.  Patient was transitioned to p.o. Bumex 1 mg daily.  Supplemental O2 was weaned to room air and she maintained O2 saturations at 95% with ambulation.  It was felt that she was stable for discharge home.  She was referred to our outpatient CHF clinic.  Follow-up with cardiology in 2 weeks and with nephrology in 4 weeks.  Obtain a basic metabolic panel in 1 week to make sure her renal function remains stable with medication adjustments.  She was discharged home in stable condition on 11/8/2020.    Discharge Vitals/Physical Examination     Vital Signs:  Temp:  [97.3 °F (36.3 °C)-98 °F (36.7 °C)] 98 °F (36.7 °C)  Heart Rate:  [65-83] 71  Resp:  [18-20] 18  BP: (117-175)/(46-79) 135/54  Mean Arterial Pressure (Non-Invasive) for the past 24 hrs (Last 3 readings):   Noninvasive MAP (mmHg)   11/08/20 1146 97   11/08/20 1037 82   11/08/20 0811 107     SpO2 Percentage    11/08/20 0300 11/08/20 0636 11/08/20 1037   SpO2: 99% 97% 98%     SpO2:  [97 %-99 %] 98 %  on   ;   Device (Oxygen Therapy): room air    Body mass index is 42.9 kg/m².  Wt Readings from Last 3 Encounters:   11/08/20 110 kg (242 lb 3.2 oz)   11/03/20 109 kg (240  lb)   10/19/20 109 kg (240 lb)         Physical Exam:  Physical Exam  Constitutional:       Appearance: Normal appearance.   HENT:      Head: Normocephalic and atraumatic.      Mouth/Throat:      Mouth: Mucous membranes are moist.   Eyes:      General:         Right eye: No discharge.         Left eye: No discharge.      Conjunctiva/sclera: Conjunctivae normal.   Cardiovascular:      Rate and Rhythm: Normal rate and regular rhythm.      Heart sounds: Normal heart sounds. No murmur.   Pulmonary:      Effort: Pulmonary effort is normal. No respiratory distress.      Breath sounds: Normal breath sounds.   Musculoskeletal: Normal range of motion.         General: No swelling.   Skin:     General: Skin is warm and dry.      Comments: Small healing blister dorsum of right foot   Neurological:      General: No focal deficit present.      Mental Status: She is alert and oriented to person, place, and time.   Psychiatric:         Mood and Affect: Mood normal.         Behavior: Behavior normal.         Pertinent Laboratory/Radiology Results     Pertinent Laboratory Results:  Results from last 7 days   Lab Units 11/04/20  0422 11/03/20  2156 11/03/20  1711   TROPONIN T ng/mL <0.010 <0.010 <0.010     Results from last 7 days   Lab Units 11/03/20  1128   PROBNP pg/mL 2,940.0*       Results from last 7 days   Lab Units 11/03/20  1136   PH, ARTERIAL pH units 7.332*   PO2 ART mm Hg 60.4*   PCO2, ARTERIAL mm Hg 43.1   HCO3 ART mmol/L 22.8     Results from last 7 days   Lab Units 11/06/20  0122 11/05/20  0124 11/04/20  0422 11/03/20  1128   CRP mg/dL  --   --   --  1.37*   WBC 10*3/mm3 8.59 8.78 9.38 12.32*   HEMOGLOBIN g/dL 8.6* 8.6* 9.0* 9.8*   HEMATOCRIT % 28.4* 29.5* 31.7* 33.5*   MCV fL 85.3 90.2 91.4 86.8   MCHC g/dL 30.3* 29.2* 28.4* 29.3*   PLATELETS 10*3/mm3 192 187 226 320   INR   --   --   --  1.28*     Results from last 7 days   Lab Units 11/08/20  0458 11/07/20  0425 11/06/20  0122  11/04/20  0422 11/03/20  1128   SODIUM  mmol/L 133* 139 139   < > 140 140   POTASSIUM mmol/L 4.1 4.4 4.5   < > 4.7 5.4*   MAGNESIUM mg/dL  --   --   --   --  3.1* 2.9*   CHLORIDE mmol/L 99 103 104   < > 107 105   CO2 mmol/L 23.6 24.4 25.0   < > 19.6* 19.7*   BUN mg/dL 38* 41* 54*   < > 62* 63*   CREATININE mg/dL 1.64* 1.69* 2.09*   < > 2.45* 2.67*   EGFR IF NONAFRICN AM mL/min/1.73 30* 29* 23*   < > 19* 17*   CALCIUM mg/dL 9.4 9.5 9.5   < > 9.0 9.6   GLUCOSE mg/dL 112* 102* 181*   < > 101* 87   ALBUMIN g/dL  --   --   --   --  3.27* 3.84   BILIRUBIN mg/dL  --   --   --   --  0.3 0.4   ALK PHOS U/L  --   --   --   --  237* 280*   AST (SGOT) U/L  --   --   --   --  41* 53*   ALT (SGPT) U/L  --   --   --   --  30 35*    < > = values in this interval not displayed.   Estimated Creatinine Clearance: 34.2 mL/min (A) (by C-G formula based on SCr of 1.64 mg/dL (H)).  No results found for: AMMONIA    Glucose   Date/Time Value Ref Range Status   11/08/2020 1050 192 (H) 70 - 130 mg/dL Final   11/08/2020 0705 114 70 - 130 mg/dL Final   11/07/2020 2024 339 (H) 70 - 130 mg/dL Final   11/07/2020 1824 314 (H) 70 - 130 mg/dL Final   11/07/2020 1620 211 (H) 70 - 130 mg/dL Final   11/07/2020 1023 221 (H) 70 - 130 mg/dL Final   11/07/2020 0619 99 70 - 130 mg/dL Final   11/06/2020 1854 215 (H) 70 - 130 mg/dL Final     Lab Results   Component Value Date    HGBA1C 7.30 (H) 11/03/2020     Lab Results   Component Value Date    TSH 5.410 (H) 11/03/2020    FREET4 0.98 11/05/2020       Blood Culture   Date Value Ref Range Status   11/03/2020 No growth at 4 days  Preliminary   11/03/2020 No growth at 4 days  Preliminary     No results found for: URINECX  No results found for: WOUNDCX  No results found for: STOOLCX  No results found for: RESPCX  Pain Management Panel     Pain Management Panel Latest Ref Rng & Units 9/4/2020 3/13/2020    CREATININE UR mg/dL 56.6 118.1          Pertinent Radiology Results:  Imaging Results (All)     Procedure Component Value Units Date/Time    US Venous  Doppler Lower Extremity Right (duplex for insufficiency) [[id number]] Collected: 11/05/20 0755     Updated: 11/05/20 0757    Narrative:      US DUPLEX VENOUS DOPPLER UNILAT RIGHT FOR INSUFFICIENCY-     CLINICAL INDICATION: pain and edema; I50.9-Heart failure, unspecified;  I50.33-Acute on chronic diastolic (congestive) heart failure        COMPARISON: None available      TECHNIQUE: Color Doppler imaging was used with compression and  augmentation to evaluate the right lower extremity deep venous system.     FINDINGS:   There is patent spontaneous flow from the common femoral vein through  the posterior tibial veins.  There was no internal clot or area of noncompressibility in the right  lower extremity.  Normal augmentation was elicited where applicable.             Impression:      No DVT in the right lower extremity on today's exam.      This report was finalized on 11/5/2020 7:55 AM by Dr. Silvio Mcdonald MD.       XR Chest 1 View [[id number]] Collected: 11/03/20 1202     Updated: 11/03/20 1207    Narrative:      EXAMINATION: XR CHEST 1 VW-      CLINICAL INDICATION:     sob, edema     TECHNIQUE:  XR CHEST 1 VW-      COMPARISON: NONE      FINDINGS:      Bibasilar airspace disease likely atelectasis.   Cardiomegaly. Pulmonary vascular congestion.   No pneumothorax.   Small pleural effusions.   No acute osseous findings.          Impression:      CHF/edema with small effusions.     This report was finalized on 11/3/2020 12:03 PM by Dr. Guillermo Macias MD.             Test Results Pending at Discharge:  None.     Discharge Disposition/Discharge Medications/Discharge Appointments     Discharge Disposition:   Home or Self Care    Condition at Discharge:  Stable.    Discharge Diet:  Diet Instructions     Diet: Cardiac, Consistent Carbohydrate      Discharge Diet:  Cardiac  Consistent Carbohydrate             Discharge Activity:  Activity Instructions     Gradually Increase Activity Until at Pre-Hospitalization Level             Code Status While Inpatient:  Code Status and Medical Interventions:   Ordered at: 11/03/20 1447     Code Status:    CPR     Medical Interventions (Level of Support Prior to Arrest):    Full       Discharge Medications:     Discharge Medications      New Medications      Instructions Start Date   bumetanide 1 MG tablet  Commonly known as: BUMEX   1 mg, Oral, Daily      hydrALAZINE 25 MG tablet  Commonly known as: APRESOLINE   25 mg, Oral, Every 8 Hours Scheduled      metoprolol succinate XL 25 MG 24 hr tablet  Commonly known as: TOPROL-XL   25 mg, Oral, Daily      nystatin 273017 UNIT/GM powder  Commonly known as: MYCOSTATIN   Apply  one application topically to the appropriate area as directed Every 12 (Twelve) Hours.         Changes to Medications      Instructions Start Date   isosorbide mononitrate 60 MG 24 hr tablet  Commonly known as: IMDUR  What changed:   · medication strength  · how much to take   60 mg, Oral, Daily         Continue These Medications      Instructions Start Date   amLODIPine 10 MG tablet  Commonly known as: NORVASC   10 mg, Oral, Nightly      aspirin 81 MG tablet   81 mg, Oral, Daily      atorvastatin 40 MG tablet  Commonly known as: LIPITOR   40 mg, Oral, Nightly      Calcium 600/Vitamin D3 600-800 MG-UNIT tablet  Generic drug: calcium carb-cholecalciferol   1 tablet, Oral, Daily      cloNIDine 0.2 MG tablet  Commonly known as: CATAPRES   0.2 mg, Oral, 3 Times Daily      escitalopram 10 MG tablet  Commonly known as: LEXAPRO   10 mg, Oral, Daily      ferrous sulfate 325 (65 Fe) MG tablet   325 mg, Oral, 2 Times Daily      Insulin Glargine (1 Unit Dial) 300 UNIT/ML solution pen-injector injection  Commonly known as: TOUJEO   80 Units, Subcutaneous, Nightly      insulin lispro 100 UNIT/ML injection  Commonly known as: humaLOG   5 Units, Subcutaneous, 3 Times Daily Before Meals         Stop These Medications    furosemide 20 MG tablet  Commonly known as: Lasix     hydroCHLOROthiazide 25  MG tablet  Commonly known as: HYDRODIURIL     metoprolol tartrate 100 MG tablet  Commonly known as: LOPRESSOR     quinapril 40 MG tablet  Commonly known as: ACCUPRIL            Discharge Appointments:  Your Scheduled Appointments    Nov 11, 2020  9:00 AM  Initial Evaluation with [facility]  [facility] ([place]) [address] [facility] [address] [phone]   Bring all previous medical records and films, along with current medications and insurance information.       Nov 12, 2020  9:00 AM  Office Visit with ERNIE Wilder  [facility] ([place]) [address] [facility] [address] [phone]   Please arrive 10 minutes early, bring a complete list of all medications and bring any previous records or diagnostic testing results.       Dec 01, 2020  9:00 AM  Follow Up with Hever Cruz MD  [facility] CARDIOLOGY (--) [address] [facility] [address] [phone]   Arrive 15 minutes prior to appointment.       Dec 11, 2020  9:30 AM  Office Visit with RAFIQ Montanez  [facility] ([place]) [address] [facility] [address] [phone]   Please arrive 10 minutes early, bring a complete list of all medications and bring any previous records or diagnostic testing results.        Additional instructions:     PT  HAS  AN  APT  WITH  DR SINCLAIR  FOR  DEC 7 AT  12 NOON  IN  [place]  OFFICE   Patient  Also  Has  An  Apt  With steven layton  For  Nov. 12 b at 9  Am  Dr cruz  For  Dec 1  At  9am  And  Dr sinclair  For dec  7  At  12  Noon  In  [facility]  Office  And  Heart  Failure  Clinic  For  Nov 11   At  9  am                    Additional Instructions for the Follow-ups that You Need to Schedule     Call MD With Problems / Concerns   As directed      Instructions: Contact PCP or return to the ED with recurrent symptoms or concerns.    Order  Comments: Instructions: Contact PCP or return to the ED with recurrent symptoms or concerns.          Discharge Follow-up with PCP   As directed       Currently Documented PCP:    Lexie Dolan PA    PCP Phone Number:    [phone]     Follow Up Details: 1 week; hospital follow up acute CHF exacerbation, uncontrolled hypertension and acute on chronic renal failure         Discharge Follow-up with Specified Provider: Dr. Vail; 2 Weeks   As directed      To: Dr. Vail    Follow Up: 2 Weeks         Discharge Follow-up with Specified Provider: Dr. Cooper; 1 Month   As directed      To: Dr. Cooper    Follow Up: 1 Month         Basic Metabolic Panel    Nov 15, 2020 (Approximate)        Follow-up Information     [facility] .    Specialty: Cardiology  Contact information:  [address] [place] [address] [phone]           Jackson Johnson MD Follow up.    Specialty: Nephrology  Why: PT  HAS  AN  APT  WITH  DR JOHNSON  FOR  DECEMBER 7  AT  12  NOON  Contact information:  [address] [place] [address]   [address] [address] [phone]             Lexie Dolan PA .    Specialty: Family Medicine  Why: 1 week; hospital follow up acute CHF exacerbation, uncontrolled hypertension and acute on chronic renal failure  Contact information:  [address] [place] [address] [phone]                   Additional Instructions for the Follow-ups that You Need to Schedule     Call MD With Problems / Concerns   As directed      Instructions: Contact PCP or return to the ED with recurrent symptoms or concerns.    Order Comments: Instructions: Contact PCP or return to the ED with recurrent symptoms or concerns.          Discharge Follow-up with PCP   As directed       Currently Documented PCP:    Lexie Dolan PA    PCP Phone Number:    [phone]     Follow Up Details: 1 week; hospital follow up acute CHF exacerbation, uncontrolled hypertension and acute on chronic  renal failure         Discharge Follow-up with Specified Provider: Dr. Vail; 2 Weeks   As directed      To: Dr. Vail    Follow Up: 2 Weeks         Discharge Follow-up with Specified Provider: Dr. Cooper; 1 Month   As directed      To: Dr. Cooper    Follow Up: 1 Month         Basic Metabolic Panel    Nov 15, 2020 (Approximate)            Attestation: I personally discussed the patient's hospital course, disposition, discharge planning, and discharge medications with attending physician, Dr. Suzi Avendaño, prior to time of discharge. I have also discussed with RN, Shreyas, prior to discharge.       RAFIQ Knott  11/08/20  17:58 EST    Time to complete discharge: >30 minutes.     Please send a copy of this dictation to the following providers:  Lexie Dolan PA

## 2020-11-08 ENCOUNTER — READMISSION MANAGEMENT (OUTPATIENT)
Dept: CALL CENTER | Facility: HOSPITAL | Age: 75
End: 2020-11-08

## 2020-11-08 VITALS
RESPIRATION RATE: 18 BRPM | TEMPERATURE: 98 F | SYSTOLIC BLOOD PRESSURE: 135 MMHG | WEIGHT: 242.2 LBS | BODY MASS INDEX: 42.91 KG/M2 | HEART RATE: 71 BPM | HEIGHT: 63 IN | DIASTOLIC BLOOD PRESSURE: 54 MMHG | OXYGEN SATURATION: 98 %

## 2020-11-08 LAB
ANION GAP SERPL CALCULATED.3IONS-SCNC: 10.4 MMOL/L (ref 5–15)
BACTERIA SPEC AEROBE CULT: NORMAL
BACTERIA SPEC AEROBE CULT: NORMAL
BUN SERPL-MCNC: 38 MG/DL (ref 8–23)
BUN/CREAT SERPL: 23.2 (ref 7–25)
CALCIUM SPEC-SCNC: 9.4 MG/DL (ref 8.6–10.5)
CHLORIDE SERPL-SCNC: 99 MMOL/L (ref 98–107)
CO2 SERPL-SCNC: 23.6 MMOL/L (ref 22–29)
CREAT SERPL-MCNC: 1.64 MG/DL (ref 0.57–1)
GFR SERPL CREATININE-BSD FRML MDRD: 30 ML/MIN/1.73
GLUCOSE BLDC GLUCOMTR-MCNC: 114 MG/DL (ref 70–130)
GLUCOSE BLDC GLUCOMTR-MCNC: 192 MG/DL (ref 70–130)
GLUCOSE SERPL-MCNC: 112 MG/DL (ref 65–99)
POTASSIUM SERPL-SCNC: 4.1 MMOL/L (ref 3.5–5.2)
SODIUM SERPL-SCNC: 133 MMOL/L (ref 136–145)

## 2020-11-08 PROCEDURE — 99232 SBSQ HOSP IP/OBS MODERATE 35: CPT | Performed by: INTERNAL MEDICINE

## 2020-11-08 PROCEDURE — 80048 BASIC METABOLIC PNL TOTAL CA: CPT | Performed by: PHYSICIAN ASSISTANT

## 2020-11-08 PROCEDURE — 82962 GLUCOSE BLOOD TEST: CPT

## 2020-11-08 PROCEDURE — 25010000002 HEPARIN (PORCINE) PER 1000 UNITS: Performed by: INTERNAL MEDICINE

## 2020-11-08 PROCEDURE — 63710000001 INSULIN ASPART PER 5 UNITS: Performed by: PHYSICIAN ASSISTANT

## 2020-11-08 RX ORDER — HYDRALAZINE HYDROCHLORIDE 50 MG/1
50 TABLET, FILM COATED ORAL EVERY 6 HOURS SCHEDULED
Status: DISCONTINUED | OUTPATIENT
Start: 2020-11-08 | End: 2020-11-08

## 2020-11-08 RX ORDER — HYDRALAZINE HYDROCHLORIDE 25 MG/1
25 TABLET, FILM COATED ORAL EVERY 8 HOURS SCHEDULED
Status: DISCONTINUED | OUTPATIENT
Start: 2020-11-08 | End: 2020-11-08 | Stop reason: HOSPADM

## 2020-11-08 RX ORDER — NYSTATIN 100000 [USP'U]/G
POWDER TOPICAL EVERY 12 HOURS SCHEDULED
Qty: 60 G | Refills: 0 | Status: ON HOLD | OUTPATIENT
Start: 2020-11-08 | End: 2022-11-02

## 2020-11-08 RX ORDER — METOPROLOL SUCCINATE 50 MG/1
100 TABLET, EXTENDED RELEASE ORAL
Status: DISCONTINUED | OUTPATIENT
Start: 2020-11-09 | End: 2020-11-08 | Stop reason: HOSPADM

## 2020-11-08 RX ORDER — METOPROLOL SUCCINATE 100 MG/1
100 TABLET, EXTENDED RELEASE ORAL
Qty: 30 TABLET | Refills: 0 | Status: CANCELLED | OUTPATIENT
Start: 2020-11-09

## 2020-11-08 RX ORDER — ISOSORBIDE MONONITRATE 60 MG/1
60 TABLET, EXTENDED RELEASE ORAL DAILY
Qty: 30 TABLET | Refills: 0 | Status: SHIPPED | OUTPATIENT
Start: 2020-11-08 | End: 2020-12-01 | Stop reason: SDUPTHER

## 2020-11-08 RX ORDER — METOPROLOL SUCCINATE 25 MG/1
25 TABLET, EXTENDED RELEASE ORAL DAILY
Qty: 30 TABLET | Refills: 0 | Status: SHIPPED | OUTPATIENT
Start: 2020-11-08 | End: 2020-12-01 | Stop reason: SDUPTHER

## 2020-11-08 RX ORDER — HYDRALAZINE HYDROCHLORIDE 25 MG/1
25 TABLET, FILM COATED ORAL EVERY 8 HOURS SCHEDULED
Qty: 90 TABLET | Refills: 0 | Status: SHIPPED | OUTPATIENT
Start: 2020-11-08 | End: 2020-12-01

## 2020-11-08 RX ORDER — BUMETANIDE 1 MG/1
1 TABLET ORAL DAILY
Qty: 30 TABLET | Refills: 0 | Status: SHIPPED | OUTPATIENT
Start: 2020-11-08 | End: 2020-11-16 | Stop reason: SDUPTHER

## 2020-11-08 RX ADMIN — ISOSORBIDE MONONITRATE 60 MG: 60 TABLET ORAL at 07:21

## 2020-11-08 RX ADMIN — ASPIRIN 81 MG: 81 TABLET, COATED ORAL at 07:20

## 2020-11-08 RX ADMIN — Medication 1 TABLET: at 07:20

## 2020-11-08 RX ADMIN — FERROUS SULFATE TAB 325 MG (65 MG ELEMENTAL FE) 325 MG: 325 (65 FE) TAB at 07:20

## 2020-11-08 RX ADMIN — INSULIN ASPART 2 UNITS: 100 INJECTION, SOLUTION INTRAVENOUS; SUBCUTANEOUS at 11:16

## 2020-11-08 RX ADMIN — ESCITALOPRAM 10 MG: 10 TABLET, FILM COATED ORAL at 07:21

## 2020-11-08 RX ADMIN — HEPARIN SODIUM 5000 UNITS: 5000 INJECTION INTRAVENOUS; SUBCUTANEOUS at 07:22

## 2020-11-08 RX ADMIN — HYDRALAZINE HYDROCHLORIDE 25 MG: 25 TABLET, FILM COATED ORAL at 10:10

## 2020-11-08 RX ADMIN — METOPROLOL SUCCINATE 25 MG: 25 TABLET, EXTENDED RELEASE ORAL at 07:21

## 2020-11-08 RX ADMIN — BUMETANIDE 1 MG: 1 TABLET ORAL at 07:22

## 2020-11-08 RX ADMIN — SODIUM CHLORIDE, PRESERVATIVE FREE 10 ML: 5 INJECTION INTRAVENOUS at 07:22

## 2020-11-08 RX ADMIN — CLONIDINE HYDROCHLORIDE 0.2 MG: 0.2 TABLET ORAL at 07:21

## 2020-11-08 RX ADMIN — NYSTATIN: 100000 POWDER TOPICAL at 07:22

## 2020-11-08 NOTE — PROGRESS NOTES
TODAY'S DATE   11/08/20    Chief Complaint: Congestive heart failure    SUBJECTIVE:  The shortness of breath has improved.  Creatinine has further improved from 1.69-1.64.  Echocardiogram showed diastolic dysfunction of the left ventricle.    PHYSICAL EXAM:    General Appearance:    Alert, cooperative, in no acute distress   Head:    Normocephalic, without obvious abnormality, atraumatic       Neck:   Supple, trachea midline, no JVD   Lungs:     Clear to auscultation,respirations regular, even and                  unlabored    Heart:    Regular rhythm and normal rate, normal S1 and S2, no            murmur, no gallop, no rub, no click   Chest Wall:    No abnormalities observed   Abdomen:     Normal bowel sounds,  non-distended   Extremities:   Moves all extremities well, no edema, no cyanosis, no             redness   Pulses:   Pulses palpable and equal bilaterally   Skin:   No bleeding, bruising or rash   Neurologic:   A & O x 3            No Known Allergies    Past Medical History:   Diagnosis Date   • Anemia    • Arthritis    • Carpal tunnel syndrome    • CHF (congestive heart failure) (CMS/[facility])    • Coronary artery disease    • Diabetes mellitus (CMS/[facility])    • Elevated cholesterol    • GERD (gastroesophageal reflux disease)    • Heart disease    • High cholesterol    • History of pneumonia    • History of unsteady gait     OCASSIONALLY   • Hypertension    • Insomnia    • Kidney disease    • Osteoarthritis    • Renal insufficiency    • Sciatica        Past Surgical History:   Procedure Laterality Date   • ABDOMINAL SURGERY     • APPENDECTOMY     • CARDIAC CATHETERIZATION      1 STENT  ---  2000   • CARDIAC SURGERY     • CAROTID STENT     • CARPAL TUNNEL RELEASE Right 10/8/2019    Procedure: CARPAL TUNNEL RELEASE;  Surgeon: Feroz Johnson MD;  Location: [facility];  Service: Orthopedics   • CATARACT EXTRACTION     • CHOLECYSTECTOMY     • COLONOSCOPY     • CORONARY ANGIOPLASTY WITH STENT PLACEMENT     •  ENDOSCOPY     • ENDOSCOPY N/A 3/5/2020    Procedure: ESOPHAGOGASTRODUODENOSCOPY;  Surgeon: Alexandru Bagley MD;  Location: [facility];  Service: Gastroenterology;  Laterality: N/A;   • ENDOSCOPY AND COLONOSCOPY     • GALLBLADDER SURGERY     • JOINT REPLACEMENT Left 05/02/2017    [facility] [place]  DR JOHNSON  LEFT TOTAL KNEE   • KNEE ARTHROSCOPY W/ MENISCECTOMY Right    • LAPAROSCOPIC TUBAL LIGATION     • NH TOTAL KNEE ARTHROPLASTY Left 5/2/2017    Procedure: TOTAL KNEE ARTHROPLASTY;  Surgeon: Feroz Johnson MD;  Location: [facility];  Service: Orthopedics   • STERILIZATION     • TONSILLECTOMY         Social History     Socioeconomic History   • Marital status:      Spouse name: natalie   • Number of children: 3   • Years of education: 12   • Highest education level: Not on file   Occupational History   • Occupation: retired   Social Needs   • Financial resource strain: Somewhat hard   • Food insecurity     Worry: Never true     Inability: Never true   • Transportation needs     Medical: Yes     Non-medical: No   Tobacco Use   • Smoking status: Never Smoker   • Smokeless tobacco: Never Used   Substance and Sexual Activity   • Alcohol use: Yes     Comment: socially   • Drug use: No   • Sexual activity: Defer     Birth control/protection: Surgical   Lifestyle   • Physical activity     Days per week: 0 days     Minutes per session: 0 min   • Stress: Only a little       Family History   Problem Relation Age of Onset   • Arthritis Mother    • Diabetes Mother    • Cancer Mother    • Heart disease Father    • Diabetes Daughter    • Diabetes Son    • Diabetes Maternal Aunt    • Heart disease Maternal Grandmother    • Breast cancer Neg Hx        Patient Active Problem List   Diagnosis   • Type 2 diabetes mellitus, uncontrolled, with neuropathy (CMS/HCC)   • Essential hypertension   • Atherosclerosis of native coronary artery of native heart   • Bilateral carpal tunnel syndrome   • Iron deficiency anemia due to dietary causes    • Diabetic retinopathy associated with type 2 diabetes mellitus (CMS/[facility])   • Hyperlipidemia   • Sciatic neuropathy   • DDD (degenerative disc disease), lumbar   • Primary osteoarthritis of left knee   • Status post total left knee replacement   • Type 2 diabetes mellitus with chronic kidney disease, with long-term current use of insulin (CMS/[facility])   • Hyperparathyroidism due to renal insufficiency (CMS/[facility])   • Venous insufficiency (chronic) (peripheral)   • Heart failure (CMS/[facility])   • Morbid (severe) obesity due to excess calories (CMS/[facility])   • PVD (peripheral vascular disease) with claudication (CMS/[facility])   • LVH (left ventricular hypertrophy)   • Chronic renal disease, stage IV (CMS/[facility])   • Acute on chronic congestive heart failure (CMS/[facility])        Vital Signs (last 24 hours)       11/07 0700  -  11/08 0659 11/08 0700  -  11/08 1105   Most Recent    Temp (°F) 97.3 -  98      98     98 (36.7)    Heart Rate 64 -  83    67 -  72     67    Resp 18 -  20      18     18    /46 -  175/79    122/53 -  161/63     125/57    SpO2 (%) 96 -  99      98     98          LABS:    Results from last 7 days   Lab Units 11/06/20  0122 11/05/20  0124 11/04/20 0422 11/03/20  1128   WBC 10*3/mm3 8.59 8.78 9.38 12.32*   HEMOGLOBIN g/dL 8.6* 8.6* 9.0* 9.8*   PLATELETS 10*3/mm3 192 187 226 320     Results from last 7 days   Lab Units 11/08/20  0458 11/07/20  0425 11/06/20  0122 11/05/20  0125 11/04/20  0422 11/03/20  1128   SODIUM mmol/L 133* 139 139 137 140 140   POTASSIUM mmol/L 4.1 4.4 4.5 4.7 4.7 5.4*   CHLORIDE mmol/L 99 103 104 104 107 105   CO2 mmol/L 23.6 24.4 25.0 22.4 19.6* 19.7*   BUN mg/dL 38* 41* 54* 61* 62* 63*   CREATININE mg/dL 1.64* 1.69* 2.09* 2.32* 2.45* 2.67*   CALCIUM mg/dL 9.4 9.5 9.5 9.0 9.0 9.6   GLUCOSE mg/dL 112* 102* 181* 194* 101* 87     Results from last 7 days   Lab Units 11/04/20  0422 11/03/20  1128   BILIRUBIN mg/dL 0.3 0.4   ALK PHOS U/L 237* 280*   AST (SGOT) U/L 41* 53*   ALT (SGPT) U/L 30 35*      Results from last 7 days   Lab Units 11/04/20  0422 11/03/20  1128   MAGNESIUM mg/dL 3.1* 2.9*     Results from last 7 days   Lab Units 11/03/20  1128   INR  1.28*     Results from last 7 days   Lab Units 11/04/20  0422 11/03/20  2156 11/03/20  1711   TROPONIN T ng/mL <0.010 <0.010 <0.010       No results found for: HGBA1C  Glucose   Date/Time Value Ref Range Status   11/08/2020 1050 192 (H) 70 - 130 mg/dL Final   11/08/2020 0705 114 70 - 130 mg/dL Final   11/07/2020 2024 339 (H) 70 - 130 mg/dL Final   11/07/2020 1824 314 (H) 70 - 130 mg/dL Final   11/07/2020 1620 211 (H) 70 - 130 mg/dL Final   11/07/2020 1023 221 (H) 70 - 130 mg/dL Final   11/07/2020 0619 99 70 - 130 mg/dL Final   11/06/2020 1854 215 (H) 70 - 130 mg/dL Final       Hospital Medications:    amLODIPine, 10 mg, Oral, Nightly  aspirin, 81 mg, Oral, Daily  atorvastatin, 40 mg, Oral, Nightly  bumetanide, 1 mg, Oral, Daily  calcium carb-cholecalciferol, 1 tablet, Oral, Daily  cloNIDine, 0.2 mg, Oral, TID  escitalopram, 10 mg, Oral, Daily  ferrous sulfate, 325 mg, Oral, BID With Meals  heparin (porcine), 5,000 Units, Subcutaneous, Q12H  hydrALAZINE, 25 mg, Oral, Q8H  insulin aspart, 0-7 Units, Subcutaneous, TID AC  insulin detemir, 30 Units, Subcutaneous, Nightly  isosorbide mononitrate, 60 mg, Oral, Daily  [START ON 11/9/2020] metoprolol succinate XL, 100 mg, Oral, Q24H  nystatin, , Topical, Q12H  sodium chloride, 10 mL, Intravenous, Q12H  sodium chloride, 10 mL, Intravenous, Q12H           A/P: Acute on chronic diastolic dysfunction of the left ventricle improving.  Blood pressure still elevated.  Will increase hydralazine and Toprol      Sánchez Martin MD

## 2020-11-08 NOTE — PLAN OF CARE
Problem: Fall Injury Risk  Goal: Absence of Fall and Fall-Related Injury  Outcome: Adequate for Care Transition     Problem: Skin Injury Risk Increased  Goal: Skin Health and Integrity  Outcome: Adequate for Care Transition     Problem: Cardiac Output Decreased (Heart Failure)  Goal: Optimal Cardiac Output  Outcome: Adequate for Care Transition   Goal Outcome Evaluation:  Plan of Care Reviewed With: patient  Progress: improving

## 2020-11-09 ENCOUNTER — TRANSITIONAL CARE MANAGEMENT TELEPHONE ENCOUNTER (OUTPATIENT)
Dept: CALL CENTER | Facility: HOSPITAL | Age: 75
End: 2020-11-09

## 2020-11-09 NOTE — PAYOR COMM NOTE
"[facility]  NPI:[id number]    Utilization Review  Contact: Brenda Merchant RN  Phone: [phone]  Fax:[phone]    DISCHARGE NOTIFICATION pending auth #    [id number]   Britta Mccormick (75 y.o. Female)     Date of Birth Social Security Number Address Home Phone MRN    1945  [id number] [address] [phone] [id number]    Confucianism Marital Status          [employer]        Admission Date Admission Type Admitting Provider Attending Provider Department, Room/Bed    11/3/20 Emergency Suzi Avendaño DO  [facility], 3314/1S    Discharge Date Discharge Disposition Discharge Destination        11/8/2020 Home or Self Care              Attending Provider: (none)   Allergies: No Known Allergies    Isolation: None   Infection: None   Code Status: Prior    Ht: 160 cm (63\")   Wt: 110 kg (242 lb 3.2 oz)    Admission Cmt: None   Principal Problem: Acute on chronic congestive heart failure (CMS/HCC) [I50.9]                 Active Insurance as of 11/3/2020     Primary Coverage     Payor Plan Insurance Group Employer/Plan Group    ANTHEM MEDICARE REPLACEMENT ANTHEM MEDICARE ADVANTAGE [id number]     Payor Plan Address Payor Plan Phone Number Payor Plan Fax Number Effective Dates    [address] [phone]  1/1/2019 - None Entered    [facility] [address]       Subscriber Name Subscriber Birth Date Member ID       BRITTA MCCORMICK 1945 [id number]                 Emergency Contacts      (Rel.) Home Phone Work Phone Mobile Phone    Brandon Mccormick (Spouse) -- -- [phone]    RUSSEL YENI (Daughter) -- -- [phone]            Discharge Summary    No notes of this type exist for this encounter.         "

## 2020-11-09 NOTE — OUTREACH NOTE
Prep Survey      Responses   [facility] facility patient discharged from?  [facility]   Is LACE score < 7 ?  No   Eligibility  [facility]   Hospital  [facility]   Date of Admission  11/03/20   Date of Discharge  11/08/20   Discharge Disposition  Home or Self Care   Discharge diagnosis  A/C CHF, HTN, ETHEL on CKD   Does the patient have one of the following disease processes/diagnoses(primary or secondary)?  CHF   Does the patient have Home health ordered?  No   Is there a DME ordered?  No   Prep survey completed?  Yes          Dolores Walden RN

## 2020-11-09 NOTE — OUTREACH NOTE
Call Center TCM Note      Responses   [facility] patient discharged from?  [facility]   Does the patient have one of the following disease processes/diagnoses(primary or secondary)?  CHF   TCM attempt successful?  Yes   Call start time  0834   Call end time  0838   Discharge diagnosis  A/C CHF, HTN, ETHEL on CKD   Person spoke with today (if not patient) and relationship  Brandon-spouse    Meds reviewed with patient/caregiver?  Yes   Is the patient having any side effects they believe may be caused by any medication additions or changes?  No   Does the patient have all medications ordered at discharge?  Yes   Is the patient taking all medications as directed (includes completed medication regime)?  Yes   Does the patient have a primary care provider?   Yes   Does the patient have an appointment with their PCP within 7 days of discharge?  Yes   Comments regarding PCP  Hospital d/c f/u appt is on 11/12/20 at 9:00 am with ERNIE Locke   Has the patient kept scheduled appointments due by today?  N/A   Pulse Ox monitoring  None   Psychosocial issues?  No   Did the patient receive a copy of their discharge instructions?  Yes   Nursing interventions  Reviewed instructions with patient   What is the patient's perception of their health status since discharge?  Improving   Nursing interventions  Nurse provided patient education   Is the patient weighing daily?  No   Does the patient have scales?  No   Daily weight interventions  Education provided on importance of daily weight   Is the patient able to teach back signs and symptoms of worsening condition? (i.e. weight gain, shortness of air, etc.)  No [Provided education]   If the patient is a current smoker, are they able to teach back resources for cessation?  Not a smoker   Is the patient/caregiver able to teach back the hierarchy of who to call/visit for symptoms/problems? PCP, Specialist, Home health nurse, Urgent Care, ED, 911  Yes   TCM call completed?  Yes            Jennie Painting RN    11/9/2020, 08:38 EST

## 2020-11-11 ENCOUNTER — HOSPITAL ENCOUNTER (OUTPATIENT)
Dept: CARDIOLOGY | Facility: HOSPITAL | Age: 75
Discharge: HOME | End: 2020-11-11
Admitting: NURSE PRACTITIONER

## 2020-11-11 VITALS
RESPIRATION RATE: 16 BRPM | DIASTOLIC BLOOD PRESSURE: 68 MMHG | OXYGEN SATURATION: 98 % | BODY MASS INDEX: 41.24 KG/M2 | TEMPERATURE: 96.2 F | SYSTOLIC BLOOD PRESSURE: 144 MMHG | HEART RATE: 82 BPM | WEIGHT: 232.8 LBS

## 2020-11-11 DIAGNOSIS — N18.4 CHRONIC RENAL DISEASE, STAGE IV (HCC): ICD-10-CM

## 2020-11-11 DIAGNOSIS — I10 ESSENTIAL HYPERTENSION: ICD-10-CM

## 2020-11-11 DIAGNOSIS — I50.32 CHRONIC DIASTOLIC CONGESTIVE HEART FAILURE (HCC): Primary | ICD-10-CM

## 2020-11-11 DIAGNOSIS — E66.01 MORBID (SEVERE) OBESITY DUE TO EXCESS CALORIES (HCC): ICD-10-CM

## 2020-11-11 LAB
ANION GAP SERPL CALCULATED.3IONS-SCNC: 12.8 MMOL/L (ref 5–15)
BUN SERPL-MCNC: 41 MG/DL (ref 8–23)
BUN/CREAT SERPL: 21.6 (ref 7–25)
CALCIUM SPEC-SCNC: 9.6 MG/DL (ref 8.6–10.5)
CHLORIDE SERPL-SCNC: 105 MMOL/L (ref 98–107)
CO2 SERPL-SCNC: 22.2 MMOL/L (ref 22–29)
CREAT SERPL-MCNC: 1.9 MG/DL (ref 0.57–1)
GFR SERPL CREATININE-BSD FRML MDRD: 26 ML/MIN/1.73
GLUCOSE SERPL-MCNC: 64 MG/DL (ref 65–99)
MAGNESIUM SERPL-MCNC: 1.9 MG/DL (ref 1.6–2.4)
NT-PROBNP SERPL-MCNC: 1514 PG/ML (ref 0–1800)
POTASSIUM SERPL-SCNC: 3.9 MMOL/L (ref 3.5–5.2)
SODIUM SERPL-SCNC: 140 MMOL/L (ref 136–145)

## 2020-11-11 PROCEDURE — 80048 BASIC METABOLIC PNL TOTAL CA: CPT | Performed by: NURSE PRACTITIONER

## 2020-11-11 PROCEDURE — 83880 ASSAY OF NATRIURETIC PEPTIDE: CPT | Performed by: NURSE PRACTITIONER

## 2020-11-11 PROCEDURE — 99214 OFFICE O/P EST MOD 30 MIN: CPT | Performed by: NURSE PRACTITIONER

## 2020-11-11 PROCEDURE — 83735 ASSAY OF MAGNESIUM: CPT | Performed by: NURSE PRACTITIONER

## 2020-11-11 PROCEDURE — G0463 HOSPITAL OUTPT CLINIC VISIT: HCPCS | Performed by: PHARMACIST

## 2020-11-11 RX ORDER — ERGOCALCIFEROL (VITAMIN D2) 10 MCG
400 TABLET ORAL WEEKLY
COMMUNITY
End: 2021-04-14

## 2020-11-11 NOTE — PROGRESS NOTES
[facility], [place] CHF CLINIC OFFICE VISIT    Subjective:   History of Present Illness   Britta Lee is a 75 y.o.  female who presents to the clinic today as a new patient for heart failure follow up. She was recently hospitalized for exacerbation of diastolic congestive heart failure. Her EF was > 70% from echocardiogram on 11/4/2020. She was discharged home on Bumex 1 mg daily. She reports good urine output. She reports that her shortness of air has significantly improved and her swelling has improved. Upon admission she was noted to have sinus bradycardia and her metoprolol 100 mg bid was held initially and then changed to metoprolol 25 mg daily. She has been tolerating that well since going home. She reports daily weight of 240 lbs. Home BP monitoring around 140's/80's, HR 70-80 bpm, no report available for review. She's been unable to tolerated Spironolactone due to kidney function. Her spouse reports he is trying to help monitor sodium intake. She continues on HYdralazine 25 mg daily, Imdur 60 mg daily, Metorpolol 25 mg daily, Norvasc 10 mg daily and Clonidine 0.2 mg 3x/day for HTN. Denies chest pain or palpitations. Limited activity at home but she does try to be active as much as possible.     Past medical history significant for HTN, DM type 2, CKD III, iron deficiency anemia, hyperlipidemia, obesity.     PCP: Lexie Dolan  Cardiologist: Dr. Cruz  Nephrologist: Dr. Johnson     Hospitalizations: Discharged on 11/8/2020    Past Medical History:   Diagnosis Date   • Anemia    • Arthritis    • Carpal tunnel syndrome    • CHF (congestive heart failure) (CMS/[facility])    • Coronary artery disease    • Diabetes mellitus (CMS/[facility])    • Elevated cholesterol    • GERD (gastroesophageal reflux disease)    • Heart disease    • High cholesterol    • History of pneumonia    • History of unsteady gait     OCASSIONALLY   • Hypertension    • Insomnia    • Kidney disease    • Osteoarthritis    • Renal insufficiency    • Sciatica       Past Surgical History:   Procedure Laterality Date   • ABDOMINAL SURGERY     • APPENDECTOMY     • CARDIAC CATHETERIZATION      1 STENT  ---  2000   • CARDIAC SURGERY     • CAROTID STENT     • CARPAL TUNNEL RELEASE Right 10/8/2019    Procedure: CARPAL TUNNEL RELEASE;  Surgeon: Feroz Johnson MD;  Location: [facility];  Service: Orthopedics   • CATARACT EXTRACTION     • CHOLECYSTECTOMY     • COLONOSCOPY     • CORONARY ANGIOPLASTY WITH STENT PLACEMENT     • ENDOSCOPY     • ENDOSCOPY N/A 3/5/2020    Procedure: ESOPHAGOGASTRODUODENOSCOPY;  Surgeon: Alexandru Bagley MD;  Location: [facility];  Service: Gastroenterology;  Laterality: N/A;   • ENDOSCOPY AND COLONOSCOPY     • GALLBLADDER SURGERY     • JOINT REPLACEMENT Left 05/02/2017    [facility]  DR JOHNSON  LEFT TOTAL KNEE   • KNEE ARTHROSCOPY W/ MENISCECTOMY Right    • LAPAROSCOPIC TUBAL LIGATION     • TX TOTAL KNEE ARTHROPLASTY Left 5/2/2017    Procedure: TOTAL KNEE ARTHROPLASTY;  Surgeon: Feroz Johnson MD;  Location: [facility];  Service: Orthopedics   • STERILIZATION     • TONSILLECTOMY       Social History     Socioeconomic History   • Marital status:      Spouse name: natalie   • Number of children: 3   • Years of education: 12   • Highest education level: Not on file   Occupational History   • Occupation: retired   Social Needs   • Financial resource strain: Somewhat hard   • Food insecurity     Worry: Never true     Inability: Never true   • Transportation needs     Medical: Yes     Non-medical: No   Tobacco Use   • Smoking status: Never Smoker   • Smokeless tobacco: Never Used   Substance and Sexual Activity   • Alcohol use: Yes     Comment: socially   • Drug use: No   • Sexual activity: Defer     Birth control/protection: Surgical   Lifestyle   • Physical activity     Days per week: 0 days     Minutes per session: 0 min   • Stress: Only a little     Family History   Problem Relation Age of Onset   • Arthritis Mother    • Diabetes Mother    •  Cancer Mother    • Heart disease Father    • Diabetes Daughter    • Diabetes Son    • Diabetes Maternal Aunt    • Heart disease Maternal Grandmother    • Breast cancer Neg Hx        Allergies:  No Known Allergies    Review of Systems   Constitution: Negative for chills, fever, weight gain and weight loss.   HENT: Negative for ear discharge, hoarse voice and sore throat.    Eyes: Negative for discharge, double vision, pain, redness and visual disturbance.   Cardiovascular: Positive for dyspnea on exertion and leg swelling (improving ). Negative for chest pain, claudication, cyanosis, irregular heartbeat, near-syncope, orthopnea, palpitations and syncope.   Respiratory: Positive for shortness of breath (improved ). Negative for cough and wheezing.    Endocrine: Negative for cold intolerance, heat intolerance and polyuria.   Hematologic/Lymphatic: Negative for bleeding problem. Does not bruise/bleed easily.   Skin: Negative for color change, flushing and rash.   Musculoskeletal: Negative for arthritis, back pain, joint pain, joint swelling and muscle cramps.   Gastrointestinal: Negative for abdominal pain, constipation, diarrhea, nausea and vomiting.   Genitourinary: Negative for dysuria, flank pain, frequency, hesitancy and urgency.   Neurological: Negative for difficulty with concentration, dizziness, light-headedness, sensory change, vertigo and weakness.   Psychiatric/Behavioral: Negative for depression. The patient does not have insomnia and is not nervous/anxious.      Current Outpatient Medications   Medication Sig Dispense Refill   • amLODIPine (NORVASC) 10 MG tablet Take 1 tablet by mouth Every Night. 90 tablet 1   • aspirin 81 MG tablet Take 1 tablet by mouth Daily. 30 tablet 5   • atorvastatin (LIPITOR) 40 MG tablet Take 40 mg by mouth Every Night.     • bumetanide (BUMEX) 1 MG tablet Take 1 tablet by mouth Daily. 30 tablet 0   • calcium carb-cholecalciferol (CALCIUM 600/VITAMIN D3) 600-800 MG-UNIT tablet  Take 1 tablet by mouth Daily.     • cloNIDine (CATAPRES) 0.2 MG tablet Take 1 tablet by mouth 3 (Three) Times a Day. 270 tablet 3   • escitalopram (LEXAPRO) 10 MG tablet Take 1 tablet by mouth Daily. 90 tablet 3   • ferrous sulfate 325 (65 Fe) MG tablet Take 1 tablet by mouth 2 (Two) Times a Day. 180 tablet 3   • hydrALAZINE (APRESOLINE) 25 MG tablet Take 1 tablet by mouth Every 8 (Eight) Hours. 90 tablet 0   • Insulin Glargine, 1 Unit Dial, (TOUJEO) 300 UNIT/ML solution pen-injector injection Inject 80 Units under the skin into the appropriate area as directed Every Night.     • insulin lispro (humaLOG) 100 UNIT/ML injection Inject 5 Units under the skin into the appropriate area as directed 3 (Three) Times a Day Before Meals.     • isosorbide mononitrate (IMDUR) 60 MG 24 hr tablet Take 1 tablet by mouth Daily. 30 tablet 0   • metoprolol succinate XL (TOPROL-XL) 25 MG 24 hr tablet Take 1 tablet by mouth Daily for 30 days. 30 tablet 0   • nystatin (MYCOSTATIN) 873792 UNIT/GM powder Apply  one application topically to the appropriate area as directed Every 12 (Twelve) Hours. 60 g 0     No current facility-administered medications for this encounter.      Objective:     Vitals:    11/11/20 0900   BP: 144/68   Pulse: 82   Resp: 16   Temp: 96.2 °F (35.7 °C)   SpO2: 98%     Wt Readings from Last 3 Encounters:   11/08/20 110 kg (242 lb 3.2 oz)   11/03/20 109 kg (240 lb)   10/19/20 109 kg (240 lb)            Vitals signs reviewed.   Constitutional:       Appearance: Normal appearance. Well-developed.      Comments: Wears a mask during encounter, in wheelchair today    Eyes:      Conjunctiva/sclera: Conjunctivae normal.   HENT:      Head: Normocephalic.   Neck:      Musculoskeletal: Normal range of motion and neck supple.      Vascular: No JVD or JVR.   Pulmonary:      Effort: Pulmonary effort is normal.      Breath sounds: Normal breath sounds.   Cardiovascular:      Normal rate. Regular rhythm.   Pulses:     Intact distal  pulses.   Edema:     Peripheral edema present.     Ankle: bilateral 1+ edema of the ankle.     Feet: bilateral 1+ edema of the feet.  Abdominal:      General: Bowel sounds are normal.      Palpations: Abdomen is soft. There is no hepatomegaly or splenomegaly.   Musculoskeletal: Normal range of motion.        Feet:    Skin:     General: Skin is warm and dry.   Feet:      Comments: Small blister noted on R foot   Neurological:      Mental Status: Alert and oriented to person, place, and time.   Psychiatric:         Attention and Perception: Attention normal.         Mood and Affect: Mood normal.         Speech: Speech normal.         Behavior: Behavior normal. Behavior is cooperative.         Cognition and Memory: Cognition normal.     Cardiographics  Results for orders placed during the hospital encounter of 20   Adult Transthoracic Echo Complete W/ Cont if Necessary Per Protocol    Narrative · Left ventricular wall thickness is consistent with concentric   hypertrophy.  · Left ventricular ejection fraction appears to be greater than 70%. Left   ventricular systolic function is hyperdynamic (EF > 70%).  · Left ventricular diastolic function is consistent with (grade II w/high   LAP) pseudonormalization.  · The left atrial cavity is moderate to severely dilated.  · The right atrial cavity is mildly dilated.  · Moderate mitral annular calcification is present.  · Mild mitral valve regurgitation is present.  · Mild tricuspid valve regurgitation is present.  · Estimated right ventricular systolic pressure from tricuspid   regurgitation is markedly elevated (>55 mmHg).        EK/3/2020    Chest x-ray: 11/3/2020    IMPRESSION:  CHF/edema with small effusions    Lab Review   Lab Results   Component Value Date    TSH 5.410 (H) 2020     Lab Results   Component Value Date    GLUCOSE 112 (H) 2020    BUN 38 (H) 2020    CREATININE 1.64 (H) 2020    EGFRIFNONA 30 (L) 2020    EGFRIFAFRI 41  (L) 07/30/2018    BCR 23.2 11/08/2020    K 4.1 11/08/2020    CO2 23.6 11/08/2020    CALCIUM 9.4 11/08/2020    PROTENTOTREF 7.7 07/30/2018    ALBUMIN 3.27 (L) 11/04/2020    LABIL2 1.1 (L) 07/30/2018    AST 41 (H) 11/04/2020    ALT 30 11/04/2020     Lab Results   Component Value Date    WBC 8.59 11/06/2020    HGB 8.6 (L) 11/06/2020    HCT 28.4 (L) 11/06/2020    MCV 85.3 11/06/2020     11/06/2020     Lab Results   Component Value Date    CKMB 2.92 04/16/2018    TROPONINI 0.012 04/16/2018    TROPONINT <0.010 11/04/2020     Lab Results   Component Value Date    PROBNP 2,940.0 (H) 11/03/2020     The following portions of the patient's history were reviewed and updated as appropriate: allergies, current medications, past family history, past medical history, past social history, past surgical history and problem list.     Old records reviewed and pertinent information is included in the above objective data.     Assessment/Plan:   1. Chronic Diastolic Congestive Heart Failure, EF > 70%   2. HTN  3. CKD stage IV  4. Obesity     1. Pro-BNP, BMP, magnesium today  2. Discussed and reviewed labs with patient and  today.   3. Due to increase in kidney function, will hold Bumex for 2 days. Then restart at 1 mg daily and recheck labs.   4. Dietician at next visit - scheduled  5. Continue on current medications  6. Monitor BP  7. Referral for sleep evaluation to r/o LENNY  8. Follow up in 1 week, sooner if needed.       30 minutes face to face spent counseling patient extensively on dietary Na+ intake, importance of activity, weight monitoring, compliance with medications and follow up appointments.

## 2020-11-11 NOTE — PATIENT INSTRUCTIONS
Heart Failure, Diagnosis    Heart failure means that your heart is not able to pump blood in the right way. This makes it hard for your body to work well. Heart failure is usually a long-term (chronic) condition. You must take good care of yourself and follow your treatment plan from your doctor.  What are the causes?  This condition may be caused by:  · High blood pressure.  · Build up of cholesterol and fat in the arteries.  · Heart attack. This injures the heart muscle.  · Heart valves that do not open and close properly.  · Damage of the heart muscle. This is also called cardiomyopathy.  · Lung disease.  · Abnormal heart rhythms.  What increases the risk?  The risk of heart failure goes up as a person ages. This condition is also more likely to develop in people who:  · Are overweight.  · Are male.  · Smoke or chew tobacco.  · Abuse alcohol or illegal drugs.  · Have taken medicines that can damage the heart.  · Have diabetes.  · Have abnormal heart rhythms.  · Have thyroid problems.  · Have low blood counts (anemia).  What are the signs or symptoms?  Symptoms of this condition include:  · Shortness of breath.  · Coughing.  · Swelling of the feet, ankles, legs, or belly.  · Losing weight for no reason.  · Trouble breathing.  · Waking from sleep because of the need to sit up and get more air.  · Rapid heartbeat.  · Being very tired.  · Feeling dizzy, or feeling like you may pass out (faint).  · Having no desire to eat.  · Feeling like you may vomit (nauseous).  · Peeing (urinating) more at night.  · Feeling confused.  How is this treated?         This condition may be treated with:  · Medicines. These can be given to treat blood pressure and to make the heart muscles stronger.  · Changes in your daily life. These may include eating a healthy diet, staying at a healthy body weight, quitting tobacco and illegal drug use, or doing exercises.  · Surgery. Surgery can be done to open blocked valves, or to put devices  in the heart, such as pacemakers.  · A donor heart (heart transplant). You will receive a healthy heart from a donor.  Follow these instructions at home:  · Treat other conditions as told by your doctor. These may include high blood pressure, diabetes, thyroid disease, or abnormal heart rhythms.  · Learn as much as you can about heart failure.  · Get support as you need it.  · Keep all follow-up visits as told by your doctor. This is important.  Summary  · Heart failure means that your heart is not able to pump blood in the right way.  · This condition is caused by high blood pressure, heart attack, or damage of the heart muscle.  · Symptoms of this condition include shortness of breath and swelling of the feet, ankles, legs, or belly. You may also feel very tired or feel like you may vomit.  · You may be treated with medicines, surgery, or changes in your daily life.  · Treat other health conditions as told by your doctor.  This information is not intended to replace advice given to you by your health care provider. Make sure you discuss any questions you have with your health care provider.  Document Released: 09/26/2009 Document Revised: 03/06/2020 Document Reviewed: 03/06/2020  [employer] Patient Education © 2020 [employer]    Heart Failure Action Plan  A heart failure action plan helps you understand what to do when you have symptoms of heart failure. Follow the plan that was created by you and your health care provider. Review your plan each time you visit your health care provider.  Red zone  These signs and symptoms mean you should get medical help right away:  · You have trouble breathing when resting.  · You have a dry cough that is getting worse.  · You have swelling or pain in your legs or abdomen that is getting worse.  · You suddenly gain more than 2-3 lb (0.9-1.4 kg) in a day, or more than 5 lb (2.3 kg) in one week. This amount may be more or less depending on your condition.  · You have trouble  staying awake or you feel confused.  · You have chest pain.  · You do not have an appetite.  · You pass out.  If you experience any of these symptoms:  · Call your local emergency services (911 in the [place] right away or seek help at the emergency department of the nearest hospital.  Yellow zone  These signs and symptoms mean your condition may be getting worse and you should make some changes:  · You have trouble breathing when you are active or you need to sleep with extra pillows.  · You have swelling in your legs or abdomen.  · You gain 2-3 lb (0.9-1.4 kg) in one day, or 5 lb (2.3 kg) in one week. This amount may be more or less depending on your condition.  · You get tired easily.  · You have trouble sleeping.  · You have a dry cough.  If you experience any of these symptoms:  · Contact your health care provider within the next day.  · Your health care provider may adjust your medicines.  Green zone  These signs mean you are doing well and can continue what you are doing:  · You do not have shortness of breath.  · You have very little swelling or no new swelling.  · Your weight is stable (no gain or loss).  · You have a normal activity level.  · You do not have chest pain or any other new symptoms.  Follow these instructions at home:  · Take over-the-counter and prescription medicines only as told by your health care provider.  · Weigh yourself daily. Your target weight is __________ lb (__________ kg).  ? Call your health care provider if you gain more than __________ lb (__________ kg) in a day, or more than __________ lb (__________ kg) in one week.  · Eat a heart-healthy diet. Work with a diet and nutrition specialist (dietitian) to create an eating plan that is best for you.  · Keep all follow-up visits as told by your health care provider. This is important.  Where to find more information  · American Heart Association: www.heart.org  Summary  · Follow the action plan that was created by you and your  health care provider.  · Get help right away if you have any symptoms in the Red zone.  This information is not intended to replace advice given to you by your health care provider. Make sure you discuss any questions you have with your health care provider.  Document Released: 01/27/2018 Document Revised: 11/30/2018 Document Reviewed: 01/27/2018  [employer]vier Patient Education © 2020 [employer]

## 2020-11-11 NOTE — PROGRESS NOTES
Heart Failure Pharmacy Note  Patient Name: Britta Lee   Referring Provider: Priscila Holland  Primary Cardiologist: Anthony    Medication Use:   Adherence: No issues  Hx of med intolerances: bradycardia with metoprolol 100mg BID  Affordability: No issues reported   Retail Rx Management: none    Past Medical History:   Diagnosis Date   • Anemia    • Arthritis    • Carpal tunnel syndrome    • CHF (congestive heart failure) (CMS/[facility])    • Coronary artery disease    • Diabetes mellitus (CMS/[facility])    • Elevated cholesterol    • GERD (gastroesophageal reflux disease)    • Heart disease    • High cholesterol    • History of pneumonia    • History of unsteady gait     OCASSIONALLY   • Hypertension    • Insomnia    • Kidney disease    • Osteoarthritis    • Renal insufficiency    • Sciatica      ALLERGIES: Patient has no known allergies.  Current Outpatient Medications   Medication Sig Dispense Refill   • amLODIPine (NORVASC) 10 MG tablet Take 1 tablet by mouth Every Night. 90 tablet 1   • aspirin 81 MG tablet Take 1 tablet by mouth Daily. 30 tablet 5   • atorvastatin (LIPITOR) 40 MG tablet Take 40 mg by mouth Every Night.     • bumetanide (BUMEX) 1 MG tablet Take 1 tablet by mouth Daily. 30 tablet 0   • cloNIDine (CATAPRES) 0.2 MG tablet Take 1 tablet by mouth 3 (Three) Times a Day. 270 tablet 3   • escitalopram (LEXAPRO) 10 MG tablet Take 1 tablet by mouth Daily. 90 tablet 3   • ferrous sulfate 325 (65 Fe) MG tablet Take 1 tablet by mouth 2 (Two) Times a Day. 180 tablet 3   • hydrALAZINE (APRESOLINE) 25 MG tablet Take 1 tablet by mouth Every 8 (Eight) Hours. 90 tablet 0   • Insulin Glargine, 1 Unit Dial, (TOUJEO) 300 UNIT/ML solution pen-injector injection Inject 80 Units under the skin into the appropriate area as directed Every Night.     • insulin lispro (humaLOG) 100 UNIT/ML injection Inject 5 Units under the skin into the appropriate area as directed 3 (Three) Times a Day Before Meals.     • isosorbide mononitrate  (IMDUR) 60 MG 24 hr tablet Take 1 tablet by mouth Daily. 30 tablet 0   • metoprolol succinate XL (TOPROL-XL) 25 MG 24 hr tablet Take 1 tablet by mouth Daily for 30 days. 30 tablet 0   • nystatin (MYCOSTATIN) 934332 UNIT/GM powder Apply  one application topically to the appropriate area as directed Every 12 (Twelve) Hours. 60 g 0   • Vitamin D, Cholecalciferol, (CHOLECALCIFEROL) 10 MCG (400 UNIT) tablet Take 400 Units by mouth Daily.       No current facility-administered medications for this encounter.        Vaccination History:   Pneumonia: Reports UTD  Annual Influenza: Reports UTD    Objective  Vitals:    11/11/20 0900   BP: 144/68   BP Location: Left arm   Patient Position: Sitting   Pulse: 82   Resp: 16   Temp: 96.2 °F (35.7 °C)   TempSrc: Oral   SpO2: 98%   Weight: 106 kg (232 lb 12.8 oz)     Wt Readings from Last 3 Encounters:   11/11/20 106 kg (232 lb 12.8 oz)   11/08/20 110 kg (242 lb 3.2 oz)   11/03/20 109 kg (240 lb)         11/11/20  0900   Weight: 106 kg (232 lb 12.8 oz)     Lab Results   Component Value Date    GLUCOSE 64 (L) 11/11/2020    BUN 41 (H) 11/11/2020    CREATININE 1.90 (H) 11/11/2020    EGFRIFNONA 26 (L) 11/11/2020    EGFRIFAFRI 41 (L) 07/30/2018    BCR 21.6 11/11/2020    K 3.9 11/11/2020    CO2 22.2 11/11/2020    CALCIUM 9.6 11/11/2020    PROTENTOTREF 7.7 07/30/2018    ALBUMIN 3.27 (L) 11/04/2020    LABIL2 1.1 (L) 07/30/2018    AST 41 (H) 11/04/2020    ALT 30 11/04/2020     Lab Results   Component Value Date    WBC 8.59 11/06/2020    HGB 8.6 (L) 11/06/2020    HCT 28.4 (L) 11/06/2020    MCV 85.3 11/06/2020     11/06/2020     Lab Results   Component Value Date    CKMB 2.92 04/16/2018    TROPONINI 0.012 04/16/2018    TROPONINT <0.010 11/04/2020     Lab Results   Component Value Date    PROBNP 1,514.0 11/11/2020     Results for orders placed during the hospital encounter of 11/03/20   Adult Transthoracic Echo Complete W/ Cont if Necessary Per Protocol    Narrative · Left ventricular  wall thickness is consistent with concentric   hypertrophy.  · Left ventricular ejection fraction appears to be greater than 70%. Left   ventricular systolic function is hyperdynamic (EF > 70%).  · Left ventricular diastolic function is consistent with (grade II w/high   LAP) pseudonormalization.  · The left atrial cavity is moderate to severely dilated.  · The right atrial cavity is mildly dilated.  · Moderate mitral annular calcification is present.  · Mild mitral valve regurgitation is present.  · Mild tricuspid valve regurgitation is present.  · Estimated right ventricular systolic pressure from tricuspid   regurgitation is markedly elevated (>55 mmHg).                   Class   Drug   Dose Last Dose Adjustment   ACEi/ARB/ARNI      Beta Blocker Toprol XL  25mg 11/8/2020   MRA        Drug Therapy Problems    1. Uncontrolled HTN    Recommendations:     1. None at this time - meds recently changed at discharge  2. Left message for dietitian to see patient at next visit.     Discharge medications have been reviewed and reconciled.    Patient was educated on heart failure medications and the importance of medication adherence. All questions were addressed and patient expressed understanding.     Thank you for allowing me to participate in the care of your patient,    Melisa Leahy, PharmD  11/11/20  12:33 EST

## 2020-11-12 ENCOUNTER — OFFICE VISIT (OUTPATIENT)
Dept: FAMILY MEDICINE CLINIC | Facility: CLINIC | Age: 75
End: 2020-11-12

## 2020-11-12 VITALS
HEART RATE: 87 BPM | WEIGHT: 232 LBS | OXYGEN SATURATION: 97 % | SYSTOLIC BLOOD PRESSURE: 120 MMHG | DIASTOLIC BLOOD PRESSURE: 60 MMHG | HEIGHT: 63 IN | BODY MASS INDEX: 41.11 KG/M2 | TEMPERATURE: 97.1 F

## 2020-11-12 DIAGNOSIS — IMO0002 TYPE 2 DIABETES MELLITUS, UNCONTROLLED, WITH NEUROPATHY: Primary | ICD-10-CM

## 2020-11-12 DIAGNOSIS — I10 ESSENTIAL HYPERTENSION: Chronic | ICD-10-CM

## 2020-11-12 DIAGNOSIS — Z79.4 TYPE 2 DIABETES MELLITUS WITH STAGE 4 CHRONIC KIDNEY DISEASE, WITH LONG-TERM CURRENT USE OF INSULIN (HCC): Primary | Chronic | ICD-10-CM

## 2020-11-12 DIAGNOSIS — E78.2 MIXED HYPERLIPIDEMIA: Chronic | ICD-10-CM

## 2020-11-12 DIAGNOSIS — N18.4 TYPE 2 DIABETES MELLITUS WITH STAGE 4 CHRONIC KIDNEY DISEASE, WITH LONG-TERM CURRENT USE OF INSULIN (HCC): Primary | Chronic | ICD-10-CM

## 2020-11-12 DIAGNOSIS — E11.22 TYPE 2 DIABETES MELLITUS WITH STAGE 4 CHRONIC KIDNEY DISEASE, WITH LONG-TERM CURRENT USE OF INSULIN (HCC): Primary | Chronic | ICD-10-CM

## 2020-11-12 DIAGNOSIS — I50.33 ACUTE ON CHRONIC DIASTOLIC CONGESTIVE HEART FAILURE (HCC): Chronic | ICD-10-CM

## 2020-11-12 LAB — GLUCOSE BLDC GLUCOMTR-MCNC: 133 MG/DL (ref 70–130)

## 2020-11-12 PROCEDURE — 82962 GLUCOSE BLOOD TEST: CPT | Performed by: NURSE PRACTITIONER

## 2020-11-12 PROCEDURE — 99214 OFFICE O/P EST MOD 30 MIN: CPT | Performed by: NURSE PRACTITIONER

## 2020-11-12 RX ORDER — PROCHLORPERAZINE 25 MG/1
1 SUPPOSITORY RECTAL DAILY
Qty: 1 DEVICE | Refills: 0 | Status: SHIPPED | OUTPATIENT
Start: 2020-11-12 | End: 2021-06-29 | Stop reason: SDUPTHER

## 2020-11-12 RX ORDER — PROCHLORPERAZINE 25 MG/1
1 SUPPOSITORY RECTAL DAILY
Qty: 1 EACH | Refills: 0 | Status: SHIPPED | OUTPATIENT
Start: 2020-11-12 | End: 2021-03-25 | Stop reason: SDUPTHER

## 2020-11-12 RX ORDER — PROCHLORPERAZINE 25 MG/1
SUPPOSITORY RECTAL
Qty: 9 EACH | Refills: 3 | Status: SHIPPED | OUTPATIENT
Start: 2020-11-12 | End: 2021-03-25 | Stop reason: SDUPTHER

## 2020-11-12 NOTE — PATIENT INSTRUCTIONS
"Heart Failure Eating Plan  Heart failure, also called congestive heart failure, occurs when your heart does not pump blood well enough to meet your body's needs for oxygen-rich blood. Heart failure is a long-term (chronic) condition. Living with heart failure can be challenging. However, following your health care provider's instructions about a healthy lifestyle and working with a diet and nutrition specialist (dietitian) to choose the right foods may help to improve your symptoms.  What are tips for following this plan?  Reading food labels  · Check food labels for the amount of sodium per serving. Choose foods that have less than 140 mg (milligrams) of sodium in each serving.  · Check food labels for the number of calories per serving. This is important if you need to limit your daily calorie intake to lose weight.  · Check food labels for the serving size. If you eat more than one serving, you will be eating more sodium and calories than what is listed on the label.  · Look for foods that are labeled as \"sodium-free,\" \"very low sodium,\" or \"low sodium.\"  ? Foods labeled as \"reduced sodium\" or \"lightly salted\" may still have more sodium than what is recommended for you.  Cooking  · Avoid adding salt when cooking. Ask your health care provider or dietitian before using salt substitutes.  · Season food with salt-free seasonings, spices, or herbs. Check the label of seasoning mixes to make sure they do not contain salt.  · Cook with heart-healthy oils, such as olive, canola, soybean, or sunflower oil.  · Do not nice foods. Cook foods using low-fat methods, such as baking, boiling, grilling, and broiling.  · Limit unhealthy fats when cooking by:  ? Removing the skin from poultry, such as chicken.  ? Removing all visible fats from meats.  ? Skimming the fat off from stews, soups, and gravies before serving them.  Meal planning    · Limit your intake of:  ? Processed, canned, or pre-packaged foods.  ? Foods that are " high in trans fat, such as fried foods.  ? Sweets, desserts, sugary drinks, and other foods with added sugar.  ? Full-fat dairy products, such as whole milk.  · Eat a balanced diet that includes:  ? 4-5 servings of fruit each day and 4-5 servings of vegetables each day. At each meal, try to fill half of your plate with fruits and vegetables.  ? Up to 6-8 servings of whole grains each day.  ? Up to 2 servings of lean meat, poultry, or fish each day. One serving of meat is equal to 3 oz. This is about the same size as a deck of cards.  ? 2 servings of low-fat dairy each day.  ? Heart-healthy fats. Healthy fats called omega-3 fatty acids are found in foods such as flaxseed and cold-water fish like sardines, salmon, and mackerel.  · Aim to eat 25-35 g (grams) of fiber a day. Foods that are high in fiber include apples, broccoli, carrots, beans, peas, and whole grains.  · Do not add salt or condiments that contain salt (such as soy sauce) to foods before eating.  · When eating at a restaurant, ask that your food be prepared with less salt or no salt, if possible.  · Try to eat 2 or more vegetarian meals each week.  · Eat more home-cooked food and eat less restaurant, buffet, and fast food.  General information  · Do not eat more than 2,300 mg of salt (sodium) a day. The amount of sodium that is recommended for you may be lower, depending on your condition.  · Maintain a healthy body weight as directed. Ask your health care provider what a healthy weight is for you.  ? Check your weight every day.  ? Work with your health care provider and dietitian to make a plan that is right for you to lose weight or maintain your current weight.  · Limit how much fluid you drink. Ask your health care provider or dietitian how much fluid you can have each day.  · Limit or avoid alcohol as told by your health care provider or dietitian.  Recommended foods  The items listed may not be a complete list. Talk with your dietitian about what  dietary choices are best for you.  Fruits  All fresh, frozen, and canned fruits. Dried fruits, such as raisins, prunes, and cranberries.  Vegetables  All fresh vegetables. Vegetables that are frozen without sauce or added salt. Low-sodium or sodium-free canned vegetables.  Grains  Bread with less than 80 mg of sodium per slice. Whole-wheat pasta, quinoa, and brown rice. Oats and oatmeal. Barley. Millet. Grits and cream of wheat. Whole-grain and whole-wheat cold cereal.  Meats and other protein foods  Lean cuts of meat. Skinless chicken and turkey. Fish with high omega-3 fatty acids, such as salmon, sardines, and other cold-water fishes. Eggs. Dried beans, peas, and edamame. Unsalted nuts and nut butters.  Dairy  Low-fat or nonfat (skim) milk and dried milk. Rice milk, soy milk, and almond milk. Low-fat or nonfat yogurt. Small amounts of reduced-sodium block cheese. Low-sodium cottage cheese.  Fats and oils  Olive, canola, soybean, flaxseed, or sunflower oil. Avocado.  Sweets and desserts  Apple sauce. Granola bars. Sugar-free pudding and gelatin. Frozen fruit bars.  Seasoning and other foods  Fresh and dried herbs. Lemon or lime juice. Vinegar. Low-sodium ketchup. Salt-free marinades, salad dressings, sauces, and seasonings.  The items listed above may not be a complete list of foods and beverages you can eat. Contact a dietitian for more information.  Foods to avoid  The items listed may not be a complete list. Talk with your dietitian about what dietary choices are best for you.  Fruits  Fruits that are dried with sodium-containing preservatives.  Vegetables  Canned vegetables. Frozen vegetables with sauce or seasonings. Creamed vegetables. French fries. Onion rings. Pickled vegetables and sauerkraut.  Grains  Bread with more than 80 mg of sodium per slice. Hot or cold cereal with more than 140 mg sodium per serving. Salted pretzels and crackers. Pre-packaged breadcrumbs. Bagels, croissants, and biscuits.  Meats  and other protein foods  Ribs and chicken wings. Dixon, ham, pepperoni, bologna, salami, and packaged luncheon meats. Hot dogs, bratwurst, and sausage. Canned meat. Smoked meat and fish. Salted nuts and seeds.  Dairy  Whole milk, half-and-half, and cream. Buttermilk. Processed cheese, cheese spreads, and cheese curds. Regular cottage cheese. Feta cheese. Shredded cheese. String cheese.  Fats and oils  Butter, lard, shortening, ghee, and dixon fat. Canned and packaged gravies.  Seasoning and other foods  Onion salt, garlic salt, table salt, and sea salt. Marinades. Regular salad dressings. Relishes, pickles, and olives. Meat flavorings and tenderizers, and bouillon cubes. Horseradish, ketchup, and mustard. Worcestershire sauce. Teriyaki sauce, soy sauce (including reduced sodium). Hot sauce and Tabasco sauce. Steak sauce, fish sauce, oyster sauce, and cocktail sauce. Taco seasonings. Barbecue sauce. Tartar sauce.  The items listed above may not be a complete list of foods and beverages you should avoid. Contact a dietitian for more information.  Summary  · A heart failure eating plan includes changes that limit your intake of sodium and unhealthy fat, and it may help you lose weight or maintain a healthy weight. Your health care provider may also recommend limiting how much fluid you drink.  · Most people with heart failure should eat no more than 2,300 mg of salt (sodium) a day. The amount of sodium that is recommended for you may be lower, depending on your condition.  · Contact your health care provider or dietitian before making any major changes to your diet.  This information is not intended to replace advice given to you by your health care provider. Make sure you discuss any questions you have with your health care provider.  Document Released: 05/04/2018 Document Revised: 02/13/2020 Document Reviewed: 05/04/2018  [employer] Patient Education © 2020 [employer]  Type 2 Diabetes Mellitus, Self Care,  Adult  When you have type 2 diabetes (type 2 diabetes mellitus), you must make sure your blood sugar (glucose) stays in a healthy range. You can do this with:  · Nutrition.  · Exercise.  · Lifestyle changes.  · Medicines or insulin, if needed.  · Support from your doctors and others.  How to stay aware of blood sugar    · Check your blood sugar level every day, as often as told.  · Have your A1c (hemoglobin A1c) level checked two or more times a year. Have it checked more often if your doctor tells you to.  Your doctor will set personal treatment goals for you. Generally, you should have these blood sugar levels:  · Before meals (preprandial):  mg/dL (4.4-7.2 mmol/L).  · After meals (postprandial): below 180 mg/dL (10 mmol/L).  · A1c level: less than 7%.  How to manage high and low blood sugar  Signs of high blood sugar  High blood sugar is called hyperglycemia. Know the signs of high blood sugar. Signs may include:  · Feeling:  ? Thirsty.  ? Hungry.  ? Very tired.  · Needing to pee (urinate) more than usual.  · Blurry vision.  Signs of low blood sugar  Low blood sugar is called hypoglycemia. This is when blood sugar is at or below 70 mg/dL (3.9 mmol/L). Signs may include:  · Feeling:  ? Hungry.  ? Worried or nervous (anxious).  ? Sweaty and clammy.  ? Confused.  ? Dizzy.  ? Sleepy.  ? Sick to your stomach (nauseous).  · Having:  ? A fast heartbeat.  ? A headache.  ? A change in your vision.  ? Jerky movements that you cannot control (seizure).  ? Tingling or no feeling (numbness) around your mouth, lips, or tongue.  · Having trouble with:  ? Moving (coordination).  ? Sleeping.  ? Passing out (fainting).  ? Getting upset easily (irritability).  Treating low blood sugar  To treat low blood sugar, eat or drink something sugary right away. If you can think clearly and swallow safely, follow the 15:15 rule:  · Take 15 grams of a fast-acting carb (carbohydrate). Talk with your doctor about how much you should  take.  · Some fast-acting carbs are:  ? Sugar tablets (glucose pills). Take 3-4 pills.  ? 6-8 pieces of hard candy.  ? 4-6 oz (120-150 mL) of fruit juice.  ? 4-6 oz (120-150 mL) of regular (not diet) soda.  ? 1 Tbsp (15 mL) honey or sugar.  · Check your blood sugar 15 minutes after you take the carb.  · If your blood sugar is still at or below 70 mg/dL (3.9 mmol/L), take 15 grams of a carb again.  · If your blood sugar does not go above 70 mg/dL (3.9 mmol/L) after 3 tries, get help right away.  · After your blood sugar goes back to normal, eat a meal or a snack within 1 hour.  Treating very low blood sugar  If your blood sugar is at or below 54 mg/dL (3 mmol/L), you have very low blood sugar (severe hypoglycemia). This is an emergency. Do not wait to see if the symptoms will go away. Get medical help right away. Call your local emergency services (911 in the [place]  If you have very low blood sugar and you cannot eat or drink, you may need a glucagon shot (injection). A family member or friend should learn how to check your blood sugar and how to give you a glucagon shot. Ask your doctor if you need to have a glucagon shot kit at home.  Follow these instructions at home:  Medicine  · Take insulin and diabetes medicines as told.  · If your doctor says you should take more or less insulin and medicines, do this exactly as told.  · Do not run out of insulin or medicines.  Having diabetes can raise your risk for other long-term conditions. These include heart disease and kidney disease. Your doctor may prescribe medicines to help you not have these problems.  Food    · Make healthy food choices. These include:  ? Chicken, fish, egg whites, and beans.  ? Oats, whole wheat, bulgur, brown rice, quinoa, and millet.  ? Fresh fruits and vegetables.  ? Low-fat dairy products.  ? Nuts, avocado, olive oil, and canola oil.  · Meet with a  (dietitian). He or she can help you make an eating plan that is right for  you.  · Follow instructions from your doctor about what you cannot eat or drink.  · Drink enough fluid to keep your pee (urine) pale yellow.  · Keep track of carbs that you eat. Do this by reading food labels and learning food serving sizes.  · Follow your sick day plan when you cannot eat or drink normally. Make this plan with your doctor so it is ready to use.  Activity  · Exercise 3 or more times a week.  · Do not go more than 2 days without exercising.  · Talk with your doctor before you start a new exercise. Your doctor may need to tell you to change:  ? How much insulin or medicines you take.  ? How much food you eat.  Lifestyle  · Do not use any tobacco products. These include cigarettes, chewing tobacco, and e-cigarettes. If you need help quitting, ask your doctor.  · Ask your doctor how much alcohol is safe for you.  · Learn to deal with stress. If you need help with this, ask your doctor.  Body care    · Stay up to date with your shots (immunizations).  · Have your eyes and feet checked by a doctor as often as told.  · Check your skin and feet every day. Check for cuts, bruises, redness, blisters, or sores.  · Brush your teeth and gums two times a day. Floss one or more times a day.  · Go to the dentist one or more times every 6 months.  · Stay at a healthy weight.  General instructions  · Take over-the-counter and prescription medicines only as told by your doctor.  · Share your diabetes care plan with:  ? Your work or school.  ? People you live with.  · Carry a card or wear jewelry that says you have diabetes.  · Keep all follow-up visits as told by your doctor. This is important.  Questions to ask your doctor  · Do I need to meet with a diabetes educator?  · Where can I find a support group for people with diabetes?  Where to find more information  To learn more about diabetes, visit:  · American Diabetes Association: www.diabetes.org  · American Association of Diabetes Educators:  www.diabeteseducator.org  Summary  · When you have type 2 diabetes, you must make sure your blood sugar (glucose) stays in a healthy range.  · Check your blood sugar every day, as often as told.  · Having diabetes can raise your risk for other conditions. Your doctor may prescribe medicines to help you not have these problems.  · Keep all follow-up visits as told by your doctor. This is important.  This information is not intended to replace advice given to you by your health care provider. Make sure you discuss any questions you have with your health care provider.  Document Released: 04/10/2017 Document Revised: 06/10/2019 Document Reviewed: 01/20/2017  [employer] Patient Education © 2020 [employer] Inc.

## 2020-11-12 NOTE — PROGRESS NOTES
History of Present Illness   Britta Lee is a 74 yo female who presents to the clinic today pertaining to her  DM, type 2 which is complicated by peripheral neuropathy, diabetic nephropathy and retinopathy. Associated symptoms include blurred vision, fatigue and visual changes. In addition, Britta has HTN, Dyslipidemia, and Venous Insufficiency of Lower Extremities. She was hospitalized from 11/03 to 11/08/2020 with Acute on Chronic CHF. She has been referred to the Heart Failure Clinic and had her initial visit on 11/11/2020 with f/u scheduled for 11/16/2020. She is weighing herself daily, following her medication regimen and monitoring her sodium intake.     Diabetes   She has type 2 diabetes mellitus. The initial diagnosis of diabetes was made 20 years ago. Her disease course has been improving with an decrease of her A1C of 1% from 8.4 to 7.3. Diabetic complications include heart disease, nephropathy, peripheral neuropathy, PVD and retinopathy. Current diabetic treatment includes insulin injections (Toujeo and Novolog). Compliance with diabetes treatment: Dependent on available of insulin. She generally checks her blood sugars at least three to four times daily.  When asked about meal planning, she is really trying to follow her recommended nutrition plan which does include a combination Healthy renal and cardiovascular plan.  She has had a previous visit with a dietitian. She rarely participates in exercise. She had been prescribed  a DexCom but she reports she did not qualify. Unsure of the rationale due to her current regimen. She sees a podiatrist.   Lab Results   Component Value Date    HGBA1C 7.30 (H) 11/03/2020     Lab Results   Component Value Date    HGBA1C 8.40 (H) 09/04/2020     Hypertension  Compliance with treatment has been good. Well controlled with Norvasc, clonidine, metoprolol, HCTZ and Accupril. She does report lower extremity swelling throughout the day. She does have compression  "stockings but find them very difficult to apply.     Lab Results   Component Value Date    GLUCOSE 64 (L) 11/11/2020    BUN 41 (H) 11/11/2020    CREATININE 1.90 (H) 11/11/2020    EGFRIFNONA 26 (L) 11/11/2020    BCR 21.6 11/11/2020    K 3.9 11/11/2020    CO2 22.2 11/11/2020    CALCIUM 9.6 11/11/2020    ALBUMIN 3.27 (L) 11/04/2020    AST 41 (H) 11/04/2020    ALT 30 11/04/2020     Dyslipidemia  Compliance with treatment has been good. The patient exercises seldom due to her Osteoarthritis.  She is currently being prescribed the following medication for her dyslipidemia - atorvastatin. Patient denies side effects associated with her medications. Most recent lipids include    Lab Results   Component Value Date    CHOL 125 09/04/2020    TRIG 128 09/04/2020    HDL 36 (L) 09/04/2020    LDL 63 09/04/2020   Refer to ROS for additional information.   Vitals:    11/12/20 0858   BP: 120/60   BP Location: Right arm   Patient Position: Sitting   Cuff Size: Adult   Pulse: 87   Temp: 97.1 °F (36.2 °C)   TempSrc: Temporal   SpO2: 97%   Weight: 105 kg (232 lb)   Height: 160 cm (62.99\")        The following portions of the patient's history were reviewed and updated as appropriate: allergies, current medications, past family history, past medical history, past social history, past surgical history and problem list.    Review of Systems   Constitutional: Positive for activity change, appetite change and fatigue. Negative for chills and diaphoresis.   HENT: Negative.    Eyes: Positive for visual disturbance.   Respiratory: Positive for cough and shortness of breath. Negative for chest tightness and wheezing.    Cardiovascular: Positive for leg swelling. Negative for chest pain and palpitations.   Gastrointestinal: Negative for nausea and vomiting.   Endocrine: Negative for polydipsia, polyphagia and polyuria.   Genitourinary: Negative for decreased urine volume.   Musculoskeletal: Positive for arthralgias and gait problem.   Skin: " Positive for color change (Lower extremities) and bruise.   Allergic/Immunologic: Positive for environmental allergies.   Neurological: Positive for numbness. Negative for confusion.   Hematological: Bruises/bleeds easily.   Psychiatric/Behavioral: Positive for sleep disturbance and stress (Worries a great deal about finances and having her medications).      Physical Exam  Vitals signs reviewed.   Constitutional:       General: She is not in acute distress.     Appearance: She is well-developed.      Comments: Pleasant; in good spirits. Wearing appropriate face covering   HENT:      Head: Normocephalic.      Nose: Nose normal.   Eyes:      General: No scleral icterus.        Right eye: No discharge.         Left eye: No discharge.      Conjunctiva/sclera: Conjunctivae normal.      Pupils: Pupils are equal, round, and reactive to light.   Neck:      Musculoskeletal: Neck supple.      Thyroid: No thyromegaly.      Vascular: No JVD.   Cardiovascular:      Rate and Rhythm: Normal rate and regular rhythm.      Heart sounds: Normal heart sounds. No murmur. No friction rub.   Pulmonary:      Effort: Pulmonary effort is normal. No respiratory distress.      Breath sounds: Normal breath sounds. No decreased breath sounds, wheezing, rhonchi or rales.   Abdominal:      General: Bowel sounds are normal. There is no distension.      Palpations: Abdomen is soft.      Tenderness: There is no abdominal tenderness. There is no guarding or rebound.   Musculoskeletal:      Right lower le+ Edema present.      Left lower le+ Edema present.   Lymphadenopathy:      Cervical: No cervical adenopathy.   Skin:     General: Skin is warm and dry.      Capillary Refill: Capillary refill takes less than 2 seconds.   Neurological:      Mental Status: She is alert and oriented to person, place, and time.   Psychiatric:         Mood and Affect: Mood and affect normal.         Speech: Speech normal.         Behavior: Behavior is cooperative.          Thought Content: Thought content normal.         Cognition and Memory: Cognition normal.         Judgment: Judgment normal.       Current outpatient and discharge medications have been reconciled for the patient.  Reviewed by: ERNIE Wilder    Assessment/Plan     Problems Addressed this Visit        Cardiovascular and Mediastinum    Essential hypertension    Hyperlipidemia    Acute on chronic congestive heart failure (CMS/HCC)       Endocrine    Type 2 diabetes mellitus with chronic kidney disease, with long-term current use of insulin (CMS/[facility]) - Primary    Relevant Orders    POC Glucose (Completed)      Diagnoses       Codes Comments    Type 2 diabetes mellitus with stage 4 chronic kidney disease, with long-term current use of insulin (CMS/[facility])    -  Primary ICD-10-CM: E11.22, N18.4, Z79.4  ICD-9-CM: 250.40, 585.4, V58.67 Praise given for an improvement of her A1C and discussed the importance of maintaining glycemic control due to the renal and cardiovascular benefits.    Essential hypertension     ICD-10-CM: I10  ICD-9-CM: 401.9 Well controlled    Mixed hyperlipidemia     ICD-10-CM: E78.2  ICD-9-CM: 272.2 Continue Atorvastatin    Acute on chronic diastolic congestive heart failure (CMS/[facility])     ICD-10-CM: I50.33  ICD-9-CM: 428.33, 428.0 Noted ECHO of 70%. Will monitor and possibly add Farxiga later when EGFR improves.       Findings and recommendations discussed with Britta. Reviewed her hospitalization records and labs. Praise given for an improvement of her A1C and discussed the importance of maintaining glycemic control due to the renal and cardiovascular benefits.She has applied for patient assistance from [employer] for her insulin and has not heard anything from them. Will contact them to check status.Will monitor and possibly add Farxiga later when EGFR improves.  Blood pressure is well controlled. Lipid panel is at target. Encouraged her to keep her f/u appointment with the Heart Failure  Clinic on 11/16 and f/u with her PCP on 11/16 for hospital follow up. She will f/u with me in late December sooner if problems/concerns occur.         This document has been electronically signed by ERNIE Locke, CHUN-BC, CDE  November 12, 2020 08:58 EST

## 2020-11-16 ENCOUNTER — HOSPITAL ENCOUNTER (OUTPATIENT)
Dept: CARDIOLOGY | Facility: HOSPITAL | Age: 75
Discharge: HOME | End: 2020-11-16
Admitting: NURSE PRACTITIONER

## 2020-11-16 ENCOUNTER — OFFICE VISIT (OUTPATIENT)
Dept: FAMILY MEDICINE CLINIC | Facility: CLINIC | Age: 75
End: 2020-11-16

## 2020-11-16 VITALS
TEMPERATURE: 97.9 F | BODY MASS INDEX: 41.53 KG/M2 | RESPIRATION RATE: 20 BRPM | DIASTOLIC BLOOD PRESSURE: 70 MMHG | SYSTOLIC BLOOD PRESSURE: 158 MMHG | OXYGEN SATURATION: 94 % | HEIGHT: 63 IN | HEART RATE: 93 BPM | WEIGHT: 234.4 LBS

## 2020-11-16 VITALS
HEART RATE: 94 BPM | BODY MASS INDEX: 41.64 KG/M2 | DIASTOLIC BLOOD PRESSURE: 60 MMHG | TEMPERATURE: 96.9 F | HEIGHT: 63 IN | OXYGEN SATURATION: 96 % | WEIGHT: 235 LBS | SYSTOLIC BLOOD PRESSURE: 150 MMHG

## 2020-11-16 DIAGNOSIS — I50.33 ACUTE ON CHRONIC DIASTOLIC CONGESTIVE HEART FAILURE (HCC): Primary | ICD-10-CM

## 2020-11-16 DIAGNOSIS — I10 ESSENTIAL HYPERTENSION: ICD-10-CM

## 2020-11-16 DIAGNOSIS — N18.4 CHRONIC RENAL DISEASE, STAGE IV (HCC): ICD-10-CM

## 2020-11-16 DIAGNOSIS — I50.32 CHRONIC DIASTOLIC CONGESTIVE HEART FAILURE (HCC): Primary | ICD-10-CM

## 2020-11-16 DIAGNOSIS — N18.4 STAGE 4 CHRONIC KIDNEY DISEASE (HCC): ICD-10-CM

## 2020-11-16 DIAGNOSIS — E66.01 MORBID (SEVERE) OBESITY DUE TO EXCESS CALORIES (HCC): ICD-10-CM

## 2020-11-16 LAB
ANION GAP SERPL CALCULATED.3IONS-SCNC: 12.4 MMOL/L (ref 5–15)
BUN SERPL-MCNC: 45 MG/DL (ref 8–23)
BUN/CREAT SERPL: 20.8 (ref 7–25)
CALCIUM SPEC-SCNC: 9.5 MG/DL (ref 8.6–10.5)
CHLORIDE SERPL-SCNC: 104 MMOL/L (ref 98–107)
CO2 SERPL-SCNC: 20.6 MMOL/L (ref 22–29)
CREAT SERPL-MCNC: 2.16 MG/DL (ref 0.57–1)
GFR SERPL CREATININE-BSD FRML MDRD: 22 ML/MIN/1.73
GLUCOSE SERPL-MCNC: 135 MG/DL (ref 65–99)
MAGNESIUM SERPL-MCNC: 2.3 MG/DL (ref 1.6–2.4)
NT-PROBNP SERPL-MCNC: 1356 PG/ML (ref 0–1800)
POTASSIUM SERPL-SCNC: 4.8 MMOL/L (ref 3.5–5.2)
SODIUM SERPL-SCNC: 137 MMOL/L (ref 136–145)

## 2020-11-16 PROCEDURE — 83880 ASSAY OF NATRIURETIC PEPTIDE: CPT

## 2020-11-16 PROCEDURE — G0463 HOSPITAL OUTPT CLINIC VISIT: HCPCS | Performed by: PHARMACIST

## 2020-11-16 PROCEDURE — 80048 BASIC METABOLIC PNL TOTAL CA: CPT | Performed by: NURSE PRACTITIONER

## 2020-11-16 PROCEDURE — 36415 COLL VENOUS BLD VENIPUNCTURE: CPT | Performed by: PHYSICIAN ASSISTANT

## 2020-11-16 PROCEDURE — 80048 BASIC METABOLIC PNL TOTAL CA: CPT

## 2020-11-16 PROCEDURE — 99214 OFFICE O/P EST MOD 30 MIN: CPT | Performed by: NURSE PRACTITIONER

## 2020-11-16 PROCEDURE — 36415 COLL VENOUS BLD VENIPUNCTURE: CPT

## 2020-11-16 PROCEDURE — 99495 TRANSJ CARE MGMT MOD F2F 14D: CPT | Performed by: PHYSICIAN ASSISTANT

## 2020-11-16 PROCEDURE — 83735 ASSAY OF MAGNESIUM: CPT

## 2020-11-16 RX ORDER — BUMETANIDE 1 MG/1
TABLET ORAL
Qty: 30 TABLET | Refills: 0
Start: 2020-11-16 | End: 2020-11-23 | Stop reason: SDUPTHER

## 2020-11-16 NOTE — PATIENT INSTRUCTIONS
Living With Heart Failure   Heart failure is a long-term (chronic) condition in which the heart cannot pump enough blood through the body. When this happens, parts of the body do not get the blood and oxygen they need.  There is no cure for heart failure at this time, so it is important for you to take good care of yourself and follow the treatment plan set by your health care provider. If you are living with heart failure, there are ways to help you manage the disease.  Follow these instructions at home:  Living with heart failure requires you to make changes in your life. Your health care team will teach you about the changes you need to make in order to relieve your symptoms and lower your risk of going to the hospital. Follow the treatment plan as set by your health care provider.  Medicines  Medicines are important in reducing your heart's workload, slowing the progression of heart failure, and improving your symptoms.  · Take over-the-counter and prescription medicines only as told by your health care provider.  · Do not stop taking your medicine unless your health care provider tells you to do that.  · Do not skip any dose of your medicine.  · Refill prescriptions before you run out of medicine. You need your medicines every day.  Eating and drinking    · Eat heart-healthy foods. Talk with a dietitian to make an eating plan that is right for you.  ? If directed by your health care provider:  ? Limit salt (sodium). Lowering your sodium intake may reduce symptoms of heart failure. Ask a dietitian to recommend heart-healthy seasonings.  ? Limit your fluid intake. Fluid restriction may reduce symptoms of heart failure.  ? Use low-fat cooking methods instead of frying. Low-fat methods include roasting, grilling, broiling, baking, poaching, steaming, and stir-frying.  ? Choose foods that contain no trans fat and are low in saturated fat and cholesterol. Healthy choices include fresh or frozen fruits and vegetables,  fish, lean meats, legumes, fat-free or low-fat dairy products, and whole-grain or high-fiber foods.  · Limit alcohol intake to no more than 1 drink a day for nonpregnant women and 2 drinks a day for men. One drink equals 12 oz of beer, 5 oz of wine, or 1½ oz of hard liquor.  ? Drinking more than that is harmful to your heart. Tell your health care provider if you drink alcohol several times a week.  ? Talk with your health care provider about whether any level of alcohol use is safe for you.  Activity    · Ask your health care provider about attending cardiac rehabilitation. These programs include aerobic physical activity, which provides many benefits for your heart.  · If no cardiac rehabilitation program is available, ask your health care provider what aerobic exercises are safe for you to do.  Lifestyle  Make the lifestyle changes recommended by your health care provider. In general:  · Lose weight if your health care provider tells you to do that. Weight loss may reduce symptoms of heart failure.  · Do not use any products that contain nicotine or tobacco, such as cigarettes or e-cigarettes. If you need help quitting, ask your health care provider.  · Do not use street (illegal) drugs.  · Return to your normal activities as told by your health care provider. Ask your health care provider what activities are safe for you.  General instructions    · Make sure you weigh yourself every day to track your weight. Rapid weight gain may indicate an increase in fluid in your body and may increase the workload of your heart.  ? Weigh yourself every morning. Do this after you urinate but before you eat breakfast.  ? Wear the same type of clothing, without shoes, each time you weigh yourself.  ? Weigh yourself on the same scale and in the same spot each time.  · Living with chronic heart failure often leads to emotions such as fear, stress, anxiety, and depression. If you feel any of these emotions and need help coping,  contact your health care provider. Other ways to get help include:  ? Talking to friends and family members about your condition. They can give you support and guidance. Explain your symptoms to them and, if comfortable, invite them to attend appointments or rehabilitation with you.  ? Joining a support group for people with chronic heart failure. Talking with other people who have the same symptoms may give you new ways of coping with your disease and your emotions.  · Stay up to date with your shots (vaccines). Staying current on pneumococcal and influenza vaccines is especially important in preventing germs from attacking your airways (respiratory infections).  · Keep all follow-up visits as told by your health care provider. This is important.  How to recognize changes in your condition  You and your family members need to know what changes to watch for in your condition.  Watch for the following changes and report them to your health care provider:  · Sudden weight gain. Ask your health care provider what amount of weight gain to report.  · Shortness of breath:  ? Feeling short of breath while at rest, with no exercise or activity that required great effort.  ? Feeling breathless with activity.  · Swelling of your lower legs or ankles.  · Difficulty sleeping:  ? You wake up feeling short of breath.  ? You have to use more pillows to raise your head in order to sleep.  · Frequent, dry, hacking cough.  · Loss of appetite.  · Feeling more tired all the time.  · Depression or feelings of sadness or hopelessness.  · Bloating in the stomach.  Where to find more information  · Local support groups. Ask your health care provider about groups near you.  · The American Heart Association: www.heart.org  Contact a health care provider if:  · You have a rapid weight gain.  · You have increasing shortness of breath that is unusual for you.  · You are unable to participate in your usual physical activities.  · You tire  easily.  · You cough more than normal, especially with physical activity.  · You have any swelling or more swelling in areas such as your hands, feet, ankles, or abdomen.  · You feel like your heart is beating quickly (palpitations).  · You become dizzy or light-headed when you stand up.  Get help right away if:  · You have difficulty breathing.  · You notice or your family notices a change in your awareness, such as having trouble staying awake or having difficulty with concentration.  · You have pain or discomfort in your chest.  · You have an episode of fainting (syncope).  Summary  · There is no cure for heart failure, so it is important for you to take good care of yourself and follow the treatment plan set by your health care provider.  · Medicines are important in reducing your heart's workload, slowing the progression of heart failure, and improving your symptoms.  · Living with chronic heart failure often leads to emotions such as fear, stress, anxiety, and depression. If you are feeling any of these emotions and need help coping, contact your health care provider.  This information is not intended to replace advice given to you by your health care provider. Make sure you discuss any questions you have with your health care provider.  Document Released: 05/02/2018 Document Revised: 11/30/2018 Document Reviewed: 05/02/2018  [email or web address] Patient Education © 2020 [email or web address]    Preventing Heart Failure  Heart failure is a condition in which the heart has trouble pumping blood because it has become weak or stiff. This may mean that the heart cannot pump enough blood out to the body or that the heart does not fill up with enough blood. Either of those problems can lead to symptoms such as tiredness (fatigue), trouble breathing, and swelling throughout the body.  This is a common medical condition that affects not only the heart, but the entire body. Making certain nutrition and lifestyle changes can help you  prevent heart failure and avoid serious health problems.  How can this condition affect me?  Heart failure can cause very serious problems that may get worse over time, such as:  · Extreme fatigue during normal physical activities.  · Shortness of breath or trouble breathing.  · Swelling in your abdomen, legs, ankles, feet, or neck.  · Fluid buildup throughout the body.  · Weight gain.  · Cough.  · Frequent urination.  What can increase my risk?  The risk of heart failure increases as a person ages. The following factors may also make you more likely to develop this condition:  · Being overweight.  · Being male.  · Smoking or chewing tobacco.  · Abusing alcohol or drugs.  · Having taken medicines that can damage the heart, such as chemotherapy drugs.  · Having any of these medical conditions:  ? Diabetes.  ? Abnormal heart rhythms.  ? Thyroid problems.  ? Low blood counts (anemia).  What actions can I take to prevent heart failure?         Nutrition  · If you are overweight or obese, reduce how many calories you eat each day so that you lose weight. Work with your health care provider or a diet and nutrition specialist (dietitian) to determine how many calories you need each day.  · Eat foods that are low in salt (sodium). Avoid adding extra salt to foods. This can help keep your blood pressure in a normal range.  · Eat a well-balanced diet that includes a lot of:  ? Fresh fruits and vegetables.  ? Whole grains.  ? Lean meats.  ? Beans.  ? Fat-free or low-fat dairy products.  · Avoid foods that contain a lot of:  ? Trans fats.  ? Saturated fats.  ? Sugar.  ? Cholesterol.  Alcohol  · Do not drink alcohol if:  ? Your health care provider tells you not to drink.  ? You are pregnant, may be pregnant, or are planning to become pregnant.  · If you drink alcohol:  ? Limit how much you use to:  § 0-1 drink a day for women.  § 0-2 drinks a day for men.  ? Be aware of how much alcohol is in your drink. In the [place] one drink  equals one 12 oz bottle of beer (355 mL), one 5 oz glass of wine (148 mL), or one 1½ oz glass of hard liquor (44 mL).  Lifestyle  · Do not use any products that contain nicotine or tobacco, such as cigarettes, e-cigarettes, and chewing tobacco. If you need help quitting, ask your health care provider.  · Exercise for at least 150 minutes each week, or as much as told by your health care provider.  ? Do moderate-intensity exercise, such as brisk walking, bicycling, or water aerobics.  ? Ask your health care provider which activities are safe for you.  · Try to get 7-9 hours of sleep each night. To help with sleep:  ? Keep your bedroom cool and dark.  ? Do not eat a heavy meal during the hour before you go to bed.  ? Do not drink alcohol or caffeinated drinks before bed.  ? Avoid screen time before bedtime. This means avoiding television, computers, tablets, and mobile phones.  · Find ways to relax and manage stress. These may include:  ? Breathing exercises.  ? Meditation.  ? Yoga.  ? Listening to music.  General instructions  · See a health care provider regularly for screening and wellness checks. Work with your health care provider to manage your:  ? Blood pressure.  ? Cholesterol levels.  ? Blood sugar (glucose) levels.  ? Weight and BMI.  · If you have diabetes, manage your condition and follow your treatment plan as instructed.  Where to find more information  · National Heart, Lung, and Blood [place]: www.nhlbi.nih.gov  · Centers for Disease Control and Prevention: www.cdc.gov  · National [place] on Aging: www.verenice.nih.gov  · American Heart Association: www.heart.org  Contact a health care provider if:  · You have rapid weight gain.  · You have increasing shortness of breath that is unusual for you.  · You tire easily or you are unable to participate in your usual activities.  · You cough more than normal, especially with physical activity.  · You have any swelling or more swelling in areas such as your  "hands, feet, ankles, or abdomen.  Get help right away if you have:  · Trouble breathing.  · Pain or discomfort in your chest.  · An episode of fainting.  These symptoms may represent a serious problem that is an emergency. Do not wait to see if the symptoms will go away. Get medical help right away. Call your local emergency services (911 in the [place] Do not drive yourself to the hospital.  Summary  · Heart failure can cause very serious problems over time.  · Heart failure can be prevented by making changes to your diet and your lifestyle.  · It is important to eat a healthy diet, manage your weight, exercise regularly, manage stress, avoid drugs and alcohol, and keep your cholesterol and blood pressure under control.  This information is not intended to replace advice given to you by your health care provider. Make sure you discuss any questions you have with your health care provider.  Document Released: 08/08/2017 Document Revised: 07/17/2020 Document Reviewed: 07/17/2020  [employer] Patient Education © 2020 [employer]    DASH Eating Plan  DASH stands for \"Dietary Approaches to Stop Hypertension.\" The DASH eating plan is a healthy eating plan that has been shown to reduce high blood pressure (hypertension). It may also reduce your risk for type 2 diabetes, heart disease, and stroke. The DASH eating plan may also help with weight loss.  What are tips for following this plan?    General guidelines  · Avoid eating more than 2,300 mg (milligrams) of salt (sodium) a day. If you have hypertension, you may need to reduce your sodium intake to 1,500 mg a day.  · Limit alcohol intake to no more than 1 drink a day for nonpregnant women and 2 drinks a day for men. One drink equals 12 oz of beer, 5 oz of wine, or 1½ oz of hard liquor.  · Work with your health care provider to maintain a healthy body weight or to lose weight. Ask what an ideal weight is for you.  · Get at least 30 minutes of exercise that causes your heart " "to beat faster (aerobic exercise) most days of the week. Activities may include walking, swimming, or biking.  · Work with your health care provider or diet and nutrition specialist (dietitian) to adjust your eating plan to your individual calorie needs.  Reading food labels    · Check food labels for the amount of sodium per serving. Choose foods with less than 5 percent of the Daily Value of sodium. Generally, foods with less than 300 mg of sodium per serving fit into this eating plan.  · To find whole grains, look for the word \"whole\" as the first word in the ingredient list.  Shopping  · Buy products labeled as \"low-sodium\" or \"no salt added.\"  · Buy fresh foods. Avoid canned foods and premade or frozen meals.  Cooking  · Avoid adding salt when cooking. Use salt-free seasonings or herbs instead of table salt or sea salt. Check with your health care provider or pharmacist before using salt substitutes.  · Do not nice foods. Cook foods using healthy methods such as baking, boiling, grilling, and broiling instead.  · Cook with heart-healthy oils, such as olive, canola, soybean, or sunflower oil.  Meal planning  · Eat a balanced diet that includes:  ? 5 or more servings of fruits and vegetables each day. At each meal, try to fill half of your plate with fruits and vegetables.  ? Up to 6-8 servings of whole grains each day.  ? Less than 6 oz of lean meat, poultry, or fish each day. A 3-oz serving of meat is about the same size as a deck of cards. One egg equals 1 oz.  ? 2 servings of low-fat dairy each day.  ? A serving of nuts, seeds, or beans 5 times each week.  ? Heart-healthy fats. Healthy fats called Omega-3 fatty acids are found in foods such as flaxseeds and coldwater fish, like sardines, salmon, and mackerel.  · Limit how much you eat of the following:  ? Canned or prepackaged foods.  ? Food that is high in trans fat, such as fried foods.  ? Food that is high in saturated fat, such as fatty meat.  ? Sweets, " desserts, sugary drinks, and other foods with added sugar.  ? Full-fat dairy products.  · Do not salt foods before eating.  · Try to eat at least 2 vegetarian meals each week.  · Eat more home-cooked food and less restaurant, buffet, and fast food.  · When eating at a restaurant, ask that your food be prepared with less salt or no salt, if possible.  What foods are recommended?  The items listed may not be a complete list. Talk with your dietitian about what dietary choices are best for you.  Grains  Whole-grain or whole-wheat bread. Whole-grain or whole-wheat pasta. Brown rice. Oatmeal. Quinoa. Bulgur. Whole-grain and low-sodium cereals. Nila bread. Low-fat, low-sodium crackers. Whole-wheat flour tortillas.  Vegetables  Fresh or frozen vegetables (raw, steamed, roasted, or grilled). Low-sodium or reduced-sodium tomato and vegetable juice. Low-sodium or reduced-sodium tomato sauce and tomato paste. Low-sodium or reduced-sodium canned vegetables.  Fruits  All fresh, dried, or frozen fruit. Canned fruit in natural juice (without added sugar).  Meat and other protein foods  Skinless chicken or turkey. Ground chicken or turkey. Pork with fat trimmed off. Fish and seafood. Egg whites. Dried beans, peas, or lentils. Unsalted nuts, nut butters, and seeds. Unsalted canned beans. Lean cuts of beef with fat trimmed off. Low-sodium, lean deli meat.  Dairy  Low-fat (1%) or fat-free (skim) milk. Fat-free, low-fat, or reduced-fat cheeses. Nonfat, low-sodium ricotta or cottage cheese. Low-fat or nonfat yogurt. Low-fat, low-sodium cheese.  Fats and oils  Soft margarine without trans fats. Vegetable oil. Low-fat, reduced-fat, or light mayonnaise and salad dressings (reduced-sodium). Canola, safflower, olive, soybean, and sunflower oils. Avocado.  Seasoning and other foods  Herbs. Spices. Seasoning mixes without salt. Unsalted popcorn and pretzels. Fat-free sweets.  What foods are not recommended?  The items listed may not be a  complete list. Talk with your dietitian about what dietary choices are best for you.  Grains  Baked goods made with fat, such as croissants, muffins, or some breads. Dry pasta or rice meal packs.  Vegetables  Creamed or fried vegetables. Vegetables in a cheese sauce. Regular canned vegetables (not low-sodium or reduced-sodium). Regular canned tomato sauce and paste (not low-sodium or reduced-sodium). Regular tomato and vegetable juice (not low-sodium or reduced-sodium). Pickles. Olives.  Fruits  Canned fruit in a light or heavy syrup. Fried fruit. Fruit in cream or butter sauce.  Meat and other protein foods  Fatty cuts of meat. Ribs. Fried meat. [place]. Sausage. Bologna and other processed lunch meats. Salami. Fatback. Hotdogs. Bratwurst. Salted nuts and seeds. Canned beans with added salt. Canned or smoked fish. Whole eggs or egg yolks. Chicken or turkey with skin.  Dairy  Whole or 2% milk, cream, and half-and-half. Whole or full-fat cream cheese. Whole-fat or sweetened yogurt. Full-fat cheese. Nondairy creamers. Whipped toppings. Processed cheese and cheese spreads.  Fats and oils  Butter. Stick margarine. Lard. Shortening. Ghee. [place] fat. Tropical oils, such as coconut, palm kernel, or palm oil.  Seasoning and other foods  Salted popcorn and pretzels. Onion salt, garlic salt, seasoned salt, table salt, and sea salt. [facility] sauce. Tartar sauce. Barbecue sauce. Teriyaki sauce. Soy sauce, including reduced-sodium. Steak sauce. Canned and packaged gravies. Fish sauce. Oyster sauce. Cocktail sauce. Horseradish that you find on the shelf. Ketchup. Mustard. Meat flavorings and tenderizers. Bouillon cubes. Hot sauce and Tabasco sauce. Premade or packaged marinades. Premade or packaged taco seasonings. Relishes. Regular salad dressings.  Where to find more information:  · National Heart, Lung, and Blood [place]: www.nhlbi.nih.gov  · American Heart Association: www.heart.org  Summary  · The DASH eating plan is a  healthy eating plan that has been shown to reduce high blood pressure (hypertension). It may also reduce your risk for type 2 diabetes, heart disease, and stroke.  · With the DASH eating plan, you should limit salt (sodium) intake to 2,300 mg a day. If you have hypertension, you may need to reduce your sodium intake to 1,500 mg a day.  · When on the DASH eating plan, aim to eat more fresh fruits and vegetables, whole grains, lean proteins, low-fat dairy, and heart-healthy fats.  · Work with your health care provider or diet and nutrition specialist (dietitian) to adjust your eating plan to your individual calorie needs.  This information is not intended to replace advice given to you by your health care provider. Make sure you discuss any questions you have with your health care provider.  Document Released: 12/06/2012 Document Revised: 11/30/2018 Document Reviewed: 12/11/2017  [employer] Patient Education © 2020 [employer]    Hypertension, Adult  Hypertension is another name for high blood pressure. High blood pressure forces your heart to work harder to pump blood. This can cause problems over time.  There are two numbers in a blood pressure reading. There is a top number (systolic) over a bottom number (diastolic). It is best to have a blood pressure that is below 120/80. Healthy choices can help lower your blood pressure, or you may need medicine to help lower it.  What are the causes?  The cause of this condition is not known. Some conditions may be related to high blood pressure.  What increases the risk?  · Smoking.  · Having type 2 diabetes mellitus, high cholesterol, or both.  · Not getting enough exercise or physical activity.  · Being overweight.  · Having too much fat, sugar, calories, or salt (sodium) in your diet.  · Drinking too much alcohol.  · Having long-term (chronic) kidney disease.  · Having a family history of high blood pressure.  · Age. Risk increases with age.  · Race. You may be at higher  risk if you are .  · Gender. Men are at higher risk than women before age 45. After age 65, women are at higher risk than men.  · Having obstructive sleep apnea.  · Stress.  What are the signs or symptoms?  · High blood pressure may not cause symptoms. Very high blood pressure (hypertensive crisis) may cause:  ? Headache.  ? Feelings of worry or nervousness (anxiety).  ? Shortness of breath.  ? Nosebleed.  ? A feeling of being sick to your stomach (nausea).  ? Throwing up (vomiting).  ? Changes in how you see.  ? Very bad chest pain.  ? Seizures.  How is this treated?  · This condition is treated by making healthy lifestyle changes, such as:  ? Eating healthy foods.  ? Exercising more.  ? Drinking less alcohol.  · Your health care provider may prescribe medicine if lifestyle changes are not enough to get your blood pressure under control, and if:  ? Your top number is above 130.  ? Your bottom number is above 80.  · Your personal target blood pressure may vary.  Follow these instructions at home:  Eating and drinking    · If told, follow the DASH eating plan. To follow this plan:  ? Fill one half of your plate at each meal with fruits and vegetables.  ? Fill one fourth of your plate at each meal with whole grains. Whole grains include whole-wheat pasta, brown rice, and whole-grain bread.  ? Eat or drink low-fat dairy products, such as skim milk or low-fat yogurt.  ? Fill one fourth of your plate at each meal with low-fat (lean) proteins. Low-fat proteins include fish, chicken without skin, eggs, beans, and tofu.  ? Avoid fatty meat, cured and processed meat, or chicken with skin.  ? Avoid pre-made or processed food.  · Eat less than 1,500 mg of salt each day.  · Do not drink alcohol if:  ? Your doctor tells you not to drink.  ? You are pregnant, may be pregnant, or are planning to become pregnant.  · If you drink alcohol:  ? Limit how much you use to:  § 0-1 drink a day for women.  § 0-2 drinks a day  for men.  ? Be aware of how much alcohol is in your drink. In the [place] one drink equals one 12 oz bottle of beer (355 mL), one 5 oz glass of wine (148 mL), or one 1½ oz glass of hard liquor (44 mL).  Lifestyle    · Work with your doctor to stay at a healthy weight or to lose weight. Ask your doctor what the best weight is for you.  · Get at least 30 minutes of exercise most days of the week. This may include walking, swimming, or biking.  · Get at least 30 minutes of exercise that strengthens your muscles (resistance exercise) at least 3 days a week. This may include lifting weights or doing Pilates.  · Do not use any products that contain nicotine or tobacco, such as cigarettes, e-cigarettes, and chewing tobacco. If you need help quitting, ask your doctor.  · Check your blood pressure at home as told by your doctor.  · Keep all follow-up visits as told by your doctor. This is important.  Medicines  · Take over-the-counter and prescription medicines only as told by your doctor. Follow directions carefully.  · Do not skip doses of blood pressure medicine. The medicine does not work as well if you skip doses. Skipping doses also puts you at risk for problems.  · Ask your doctor about side effects or reactions to medicines that you should watch for.  Contact a doctor if you:  · Think you are having a reaction to the medicine you are taking.  · Have headaches that keep coming back (recurring).  · Feel dizzy.  · Have swelling in your ankles.  · Have trouble with your vision.  Get help right away if you:  · Get a very bad headache.  · Start to feel mixed up (confused).  · Feel weak or numb.  · Feel faint.  · Have very bad pain in your:  ? Chest.  ? Belly (abdomen).  · Throw up more than once.  · Have trouble breathing.  Summary  · Hypertension is another name for high blood pressure.  · High blood pressure forces your heart to work harder to pump blood.  · For most people, a normal blood pressure is less than  120/80.  · Making healthy choices can help lower blood pressure. If your blood pressure does not get lower with healthy choices, you may need to take medicine.  This information is not intended to replace advice given to you by your health care provider. Make sure you discuss any questions you have with your health care provider.  Document Released: 06/05/2009 Document Revised: 08/28/2019 Document Reviewed: 08/28/2019  [employer] Patient Education © 2020 [employer]    Hypertension, Adult  Hypertension is another name for high blood pressure. High blood pressure forces your heart to work harder to pump blood. This can cause problems over time.  There are two numbers in a blood pressure reading. There is a top number (systolic) over a bottom number (diastolic). It is best to have a blood pressure that is below 120/80. Healthy choices can help lower your blood pressure, or you may need medicine to help lower it.  What are the causes?  The cause of this condition is not known. Some conditions may be related to high blood pressure.  What increases the risk?  · Smoking.  · Having type 2 diabetes mellitus, high cholesterol, or both.  · Not getting enough exercise or physical activity.  · Being overweight.  · Having too much fat, sugar, calories, or salt (sodium) in your diet.  · Drinking too much alcohol.  · Having long-term (chronic) kidney disease.  · Having a family history of high blood pressure.  · Age. Risk increases with age.  · Race. You may be at higher risk if you are .  · Gender. Men are at higher risk than women before age 45. After age 65, women are at higher risk than men.  · Having obstructive sleep apnea.  · Stress.  What are the signs or symptoms?  · High blood pressure may not cause symptoms. Very high blood pressure (hypertensive crisis) may cause:  ? Headache.  ? Feelings of worry or nervousness (anxiety).  ? Shortness of breath.  ? Nosebleed.  ? A feeling of being sick to your stomach  (nausea).  ? Throwing up (vomiting).  ? Changes in how you see.  ? Very bad chest pain.  ? Seizures.  How is this treated?  · This condition is treated by making healthy lifestyle changes, such as:  ? Eating healthy foods.  ? Exercising more.  ? Drinking less alcohol.  · Your health care provider may prescribe medicine if lifestyle changes are not enough to get your blood pressure under control, and if:  ? Your top number is above 130.  ? Your bottom number is above 80.  · Your personal target blood pressure may vary.  Follow these instructions at home:  Eating and drinking    · If told, follow the DASH eating plan. To follow this plan:  ? Fill one half of your plate at each meal with fruits and vegetables.  ? Fill one fourth of your plate at each meal with whole grains. Whole grains include whole-wheat pasta, brown rice, and whole-grain bread.  ? Eat or drink low-fat dairy products, such as skim milk or low-fat yogurt.  ? Fill one fourth of your plate at each meal with low-fat (lean) proteins. Low-fat proteins include fish, chicken without skin, eggs, beans, and tofu.  ? Avoid fatty meat, cured and processed meat, or chicken with skin.  ? Avoid pre-made or processed food.  · Eat less than 1,500 mg of salt each day.  · Do not drink alcohol if:  ? Your doctor tells you not to drink.  ? You are pregnant, may be pregnant, or are planning to become pregnant.  · If you drink alcohol:  ? Limit how much you use to:  § 0-1 drink a day for women.  § 0-2 drinks a day for men.  ? Be aware of how much alcohol is in your drink. In the [place] one drink equals one 12 oz bottle of beer (355 mL), one 5 oz glass of wine (148 mL), or one 1½ oz glass of hard liquor (44 mL).  Lifestyle    · Work with your doctor to stay at a healthy weight or to lose weight. Ask your doctor what the best weight is for you.  · Get at least 30 minutes of exercise most days of the week. This may include walking, swimming, or biking.  · Get at least 30  minutes of exercise that strengthens your muscles (resistance exercise) at least 3 days a week. This may include lifting weights or doing Pilates.  · Do not use any products that contain nicotine or tobacco, such as cigarettes, e-cigarettes, and chewing tobacco. If you need help quitting, ask your doctor.  · Check your blood pressure at home as told by your doctor.  · Keep all follow-up visits as told by your doctor. This is important.  Medicines  · Take over-the-counter and prescription medicines only as told by your doctor. Follow directions carefully.  · Do not skip doses of blood pressure medicine. The medicine does not work as well if you skip doses. Skipping doses also puts you at risk for problems.  · Ask your doctor about side effects or reactions to medicines that you should watch for.  Contact a doctor if you:  · Think you are having a reaction to the medicine you are taking.  · Have headaches that keep coming back (recurring).  · Feel dizzy.  · Have swelling in your ankles.  · Have trouble with your vision.  Get help right away if you:  · Get a very bad headache.  · Start to feel mixed up (confused).  · Feel weak or numb.  · Feel faint.  · Have very bad pain in your:  ? Chest.  ? Belly (abdomen).  · Throw up more than once.  · Have trouble breathing.  Summary  · Hypertension is another name for high blood pressure.  · High blood pressure forces your heart to work harder to pump blood.  · For most people, a normal blood pressure is less than 120/80.  · Making healthy choices can help lower blood pressure. If your blood pressure does not get lower with healthy choices, you may need to take medicine.  This information is not intended to replace advice given to you by your health care provider. Make sure you discuss any questions you have with your health care provider.  Document Released: 06/05/2009 Document Revised: 08/28/2019 Document Reviewed: 08/28/2019  [employer] Patient Education © 2020 [employer]

## 2020-11-16 NOTE — PROGRESS NOTES
Heart Failure Pharmacy Note  Patient Name: Britta Lee   Referring Provider: Priscila Holland  Primary Cardiologist: Anthony    Medication Use:   Adherence: No issues  Hx of med intolerances: bradycardia with metoprolol 100mg BID  Affordability: No issues reported   Retail Rx Management: none    Past Medical History:   Diagnosis Date   • Anemia    • Arthritis    • Carpal tunnel syndrome    • CHF (congestive heart failure) (CMS/[facility])    • Coronary artery disease    • Diabetes mellitus (CMS/[facility])    • Elevated cholesterol    • GERD (gastroesophageal reflux disease)    • Heart disease    • High cholesterol    • History of pneumonia    • History of unsteady gait     OCASSIONALLY   • Hypertension    • Insomnia    • Kidney disease    • Osteoarthritis    • Renal insufficiency    • Sciatica      ALLERGIES: Patient has no known allergies.  Current Outpatient Medications   Medication Sig Dispense Refill   • amLODIPine (NORVASC) 10 MG tablet Take 1 tablet by mouth Every Night. 90 tablet 1   • aspirin 81 MG tablet Take 1 tablet by mouth Daily. 30 tablet 5   • atorvastatin (LIPITOR) 40 MG tablet Take 40 mg by mouth Every Night.     • bumetanide (BUMEX) 1 MG tablet Take 1 tablet by mouth Daily. 30 tablet 0   • cloNIDine (CATAPRES) 0.2 MG tablet Take 1 tablet by mouth 3 (Three) Times a Day. 270 tablet 3   • Continuous Blood Gluc  (Dexcom G6 ) device 1 Device Daily. 1 Device 0   • Continuous Blood Gluc Sensor (Dexcom G6 Sensor) Every 10 (Ten) Days. 9 each 3   • Continuous Blood Gluc Transmit (Dexcom G6 Transmitter) misc 1 Device Daily. 1 each 0   • escitalopram (LEXAPRO) 10 MG tablet Take 1 tablet by mouth Daily. 90 tablet 3   • ferrous sulfate 325 (65 Fe) MG tablet Take 1 tablet by mouth 2 (Two) Times a Day. 180 tablet 3   • hydrALAZINE (APRESOLINE) 25 MG tablet Take 1 tablet by mouth Every 8 (Eight) Hours. 90 tablet 0   • Insulin Glargine, 1 Unit Dial, (TOUJEO) 300 UNIT/ML solution pen-injector injection Inject 80  "Units under the skin into the appropriate area as directed Every Night.     • insulin lispro (humaLOG) 100 UNIT/ML injection Inject 5 Units under the skin into the appropriate area as directed 3 (Three) Times a Day Before Meals.     • isosorbide mononitrate (IMDUR) 60 MG 24 hr tablet Take 1 tablet by mouth Daily. 30 tablet 0   • metoprolol succinate XL (TOPROL-XL) 25 MG 24 hr tablet Take 1 tablet by mouth Daily for 30 days. 30 tablet 0   • nystatin (MYCOSTATIN) 278502 UNIT/GM powder Apply  one application topically to the appropriate area as directed Every 12 (Twelve) Hours. 60 g 0   • Vitamin D, Cholecalciferol, (CHOLECALCIFEROL) 10 MCG (400 UNIT) tablet Take 400 Units by mouth Daily.       No current facility-administered medications for this encounter.        Vaccination History:   Pneumonia: Reports UTD  Annual Influenza: Reports UTD    Objective  Vitals:    11/16/20 0908   BP: 158/70   BP Location: Right arm   Patient Position: Sitting   Cuff Size: Adult   Pulse: 93   Resp: 20   Temp: 97.9 °F (36.6 °C)   TempSrc: Temporal   SpO2: 94%   Weight: 106 kg (234 lb 6.4 oz)   Height: 160 cm (62.99\")     Wt Readings from Last 3 Encounters:   11/16/20 106 kg (234 lb 6.4 oz)   11/12/20 105 kg (232 lb)   11/11/20 106 kg (232 lb 12.8 oz)         11/16/20  0908   Weight: 106 kg (234 lb 6.4 oz)     Lab Results   Component Value Date    GLUCOSE 135 (H) 11/16/2020    BUN 45 (H) 11/16/2020    CREATININE 2.16 (H) 11/16/2020    EGFRIFNONA 22 (L) 11/16/2020    EGFRIFAFRI 41 (L) 07/30/2018    BCR 20.8 11/16/2020    K 4.8 11/16/2020    CO2 20.6 (L) 11/16/2020    CALCIUM 9.5 11/16/2020    PROTENTOTREF 7.7 07/30/2018    ALBUMIN 3.27 (L) 11/04/2020    LABIL2 1.1 (L) 07/30/2018    AST 41 (H) 11/04/2020    ALT 30 11/04/2020     Lab Results   Component Value Date    WBC 8.59 11/06/2020    HGB 8.6 (L) 11/06/2020    HCT 28.4 (L) 11/06/2020    MCV 85.3 11/06/2020     11/06/2020     Lab Results   Component Value Date    CKMB 2.92 " 04/16/2018    TROPONINI 0.012 04/16/2018    TROPONINT <0.010 11/04/2020     Lab Results   Component Value Date    PROBNP 1,356.0 11/16/2020     Results for orders placed during the hospital encounter of 11/03/20   Adult Transthoracic Echo Complete W/ Cont if Necessary Per Protocol    Narrative · Left ventricular wall thickness is consistent with concentric   hypertrophy.  · Left ventricular ejection fraction appears to be greater than 70%. Left   ventricular systolic function is hyperdynamic (EF > 70%).  · Left ventricular diastolic function is consistent with (grade II w/high   LAP) pseudonormalization.  · The left atrial cavity is moderate to severely dilated.  · The right atrial cavity is mildly dilated.  · Moderate mitral annular calcification is present.  · Mild mitral valve regurgitation is present.  · Mild tricuspid valve regurgitation is present.  · Estimated right ventricular systolic pressure from tricuspid   regurgitation is markedly elevated (>55 mmHg).                   Class   Drug   Dose Last Dose Adjustment   ACEi/ARB/ARNI      Beta Blocker Toprol XL  25mg 11/8/2020   MRA        Drug Therapy Problems    1. Increased Scr     Recommendations:     1. Consider backing off of loop diuretic.     Patient was educated on heart failure medications and the importance of medication adherence. All questions were addressed and patient expressed understanding.     Thank you for allowing me to participate in the care of your patient,    Melisa Leahy, PharmD  11/16/20  10:13 EST

## 2020-11-16 NOTE — PROGRESS NOTES
[facility], [place] CHF CLINIC OFFICE VISIT    Subjective:   History of Present Illness   Britta Lee is a 75 y.o.  female who presents to the clinic today for follow up on heart failure. She has a hx of diastolic congestive heart failure. Her EF was > 70% from echocardiogram on 11/4/2020. Due to abnormal kidney function at her last visit her Bumex was held for 2 days and then restarted. She reports good urine output. She repots home weights stable at 200 lbs   Home /60 when leena performed her in home evaluation on 11/15/2020. She reports that her shortness of air is stable and her swelling has improved. She's been unable to tolerated Spironolactone due to kidney function. Her spouse reports he is trying to help monitor sodium intake. She continues on HYdralazine 25 mg daily, Imdur 60 mg daily, Metorpolol 25 mg daily, Norvasc 10 mg daily and Clonidine 0.2 mg 3x/day for HTN. Denies chest pain or palpitations. Limited activity at home but she does try to be active as much as possible.     Past medical history significant for HTN, DM type 2, CKD III, iron deficiency anemia, hyperlipidemia, obesity.     PCP: Lexie Dolan  Cardiologist: Dr. Cruz  Nephrologist: Dr. Johnson     Hospitalizations: Discharged on 11/8/2020    Past Medical History:   Diagnosis Date   • Anemia    • Arthritis    • Carpal tunnel syndrome    • CHF (congestive heart failure) (CMS/[facility])    • Coronary artery disease    • Diabetes mellitus (CMS/[facility])    • Elevated cholesterol    • GERD (gastroesophageal reflux disease)    • Heart disease    • High cholesterol    • History of pneumonia    • History of unsteady gait     OCASSIONALLY   • Hypertension    • Insomnia    • Kidney disease    • Osteoarthritis    • Renal insufficiency    • Sciatica      Past Surgical History:   Procedure Laterality Date   • ABDOMINAL SURGERY     • APPENDECTOMY     • CARDIAC CATHETERIZATION      1 STENT  ---  2000   • CARDIAC SURGERY     • CAROTID STENT     • CARPAL TUNNEL RELEASE  Right 10/8/2019    Procedure: CARPAL TUNNEL RELEASE;  Surgeon: Feroz Johnson MD;  Location: [facility];  Service: Orthopedics   • CATARACT EXTRACTION     • CHOLECYSTECTOMY     • COLONOSCOPY     • CORONARY ANGIOPLASTY WITH STENT PLACEMENT     • ENDOSCOPY     • ENDOSCOPY N/A 3/5/2020    Procedure: ESOPHAGOGASTRODUODENOSCOPY;  Surgeon: Alexandru Bagley MD;  Location: [facility];  Service: Gastroenterology;  Laterality: N/A;   • ENDOSCOPY AND COLONOSCOPY     • GALLBLADDER SURGERY     • JOINT REPLACEMENT Left 05/02/2017    [facility]  DR JOHNSON  LEFT TOTAL KNEE   • KNEE ARTHROSCOPY W/ MENISCECTOMY Right    • LAPAROSCOPIC TUBAL LIGATION     • AZ TOTAL KNEE ARTHROPLASTY Left 5/2/2017    Procedure: TOTAL KNEE ARTHROPLASTY;  Surgeon: Feroz Johnson MD;  Location: [facility];  Service: Orthopedics   • STERILIZATION     • TONSILLECTOMY       Social History     Socioeconomic History   • Marital status:      Spouse name: natalie   • Number of children: 3   • Years of education: 12   • Highest education level: Not on file   Occupational History   • Occupation: retired   Social Needs   • Financial resource strain: Somewhat hard   • Food insecurity     Worry: Never true     Inability: Never true   • Transportation needs     Medical: Yes     Non-medical: No   Tobacco Use   • Smoking status: Never Smoker   • Smokeless tobacco: Never Used   Substance and Sexual Activity   • Alcohol use: Yes     Comment: socially   • Drug use: No   • Sexual activity: Defer     Birth control/protection: Surgical   Lifestyle   • Physical activity     Days per week: 0 days     Minutes per session: 0 min   • Stress: Only a little     Family History   Problem Relation Age of Onset   • Arthritis Mother    • Diabetes Mother    • Cancer Mother    • Heart disease Father    • Diabetes Daughter    • Diabetes Son    • Diabetes Maternal Aunt    • Heart disease Maternal Grandmother    • Breast cancer Neg Hx      Allergies:  No Known  Allergies    Review of Systems   Constitution: Negative for chills, fever, weight gain and weight loss.   HENT: Negative for ear discharge, hoarse voice and sore throat.    Eyes: Negative for discharge, double vision, pain, redness and visual disturbance.   Cardiovascular: Positive for dyspnea on exertion and leg swelling. Negative for chest pain, claudication, cyanosis, irregular heartbeat, near-syncope, orthopnea, palpitations and syncope.   Respiratory: Positive for shortness of breath. Negative for cough and wheezing.    Endocrine: Negative for cold intolerance, heat intolerance and polyuria.   Hematologic/Lymphatic: Negative for bleeding problem. Does not bruise/bleed easily.   Skin: Negative for color change, flushing and rash.   Musculoskeletal: Negative for arthritis, back pain, joint pain, joint swelling and muscle cramps.   Gastrointestinal: Negative for abdominal pain, constipation, diarrhea, nausea and vomiting.   Genitourinary: Negative for dysuria, flank pain, frequency, hesitancy and urgency.   Neurological: Negative for difficulty with concentration, dizziness, light-headedness, sensory change, vertigo and weakness.   Psychiatric/Behavioral: Negative for depression. The patient does not have insomnia and is not nervous/anxious.      Current Outpatient Medications   Medication Sig Dispense Refill   • amLODIPine (NORVASC) 10 MG tablet Take 1 tablet by mouth Every Night. 90 tablet 1   • aspirin 81 MG tablet Take 1 tablet by mouth Daily. 30 tablet 5   • atorvastatin (LIPITOR) 40 MG tablet Take 40 mg by mouth Every Night.     • bumetanide (BUMEX) 1 MG tablet Take 1 tablet by mouth Daily. 30 tablet 0   • cloNIDine (CATAPRES) 0.2 MG tablet Take 1 tablet by mouth 3 (Three) Times a Day. 270 tablet 3   • Continuous Blood Gluc  (Dexcom G6 ) device 1 Device Daily. 1 Device 0   • Continuous Blood Gluc Sensor (Dexcom G6 Sensor) Every 10 (Ten) Days. 9 each 3   • Continuous Blood Gluc Transmit  (Dexcom G6 Transmitter) misc 1 Device Daily. 1 each 0   • escitalopram (LEXAPRO) 10 MG tablet Take 1 tablet by mouth Daily. 90 tablet 3   • ferrous sulfate 325 (65 Fe) MG tablet Take 1 tablet by mouth 2 (Two) Times a Day. 180 tablet 3   • hydrALAZINE (APRESOLINE) 25 MG tablet Take 1 tablet by mouth Every 8 (Eight) Hours. 90 tablet 0   • Insulin Glargine, 1 Unit Dial, (TOUJEO) 300 UNIT/ML solution pen-injector injection Inject 80 Units under the skin into the appropriate area as directed Every Night.     • insulin lispro (humaLOG) 100 UNIT/ML injection Inject 5 Units under the skin into the appropriate area as directed 3 (Three) Times a Day Before Meals.     • isosorbide mononitrate (IMDUR) 60 MG 24 hr tablet Take 1 tablet by mouth Daily. 30 tablet 0   • metoprolol succinate XL (TOPROL-XL) 25 MG 24 hr tablet Take 1 tablet by mouth Daily for 30 days. 30 tablet 0   • nystatin (MYCOSTATIN) 286933 UNIT/GM powder Apply  one application topically to the appropriate area as directed Every 12 (Twelve) Hours. 60 g 0   • Vitamin D, Cholecalciferol, (CHOLECALCIFEROL) 10 MCG (400 UNIT) tablet Take 400 Units by mouth Daily.       No current facility-administered medications for this encounter.      Objective:     Vitals:    11/16/20 0908   BP: 158/70   Pulse: 93   Resp: 20   Temp: 97.9 °F (36.6 °C)   SpO2: 94%     Wt Readings from Last 3 Encounters:   11/16/20 106 kg (234 lb 6.4 oz)   11/12/20 105 kg (232 lb)   11/11/20 106 kg (232 lb 12.8 oz)            Vitals signs reviewed.   Constitutional:       Appearance: Normal appearance. Well-developed.      Comments: Wears a mask during encounter, walked into clinic today    Eyes:      Conjunctiva/sclera: Conjunctivae normal.   HENT:      Head: Normocephalic.   Neck:      Musculoskeletal: Normal range of motion and neck supple.      Vascular: No JVD or JVR.   Pulmonary:      Effort: Pulmonary effort is normal.      Breath sounds: Normal breath sounds.   Cardiovascular:      Normal  rate. Regular rhythm.   Pulses:     Intact distal pulses.   Edema:     Peripheral edema present.     Ankle: bilateral 1+ edema of the ankle.     Feet: bilateral 1+ edema of the feet.  Abdominal:      General: Bowel sounds are normal.      Palpations: Abdomen is soft. There is no hepatomegaly or splenomegaly.   Musculoskeletal: Normal range of motion.   Skin:     General: Skin is warm and dry.   Neurological:      Mental Status: Alert and oriented to person, place, and time.   Psychiatric:         Attention and Perception: Attention normal.         Mood and Affect: Mood normal.         Speech: Speech normal.         Behavior: Behavior normal. Behavior is cooperative.         Cognition and Memory: Cognition normal.     Cardiographics  Results for orders placed during the hospital encounter of 20   Adult Transthoracic Echo Complete W/ Cont if Necessary Per Protocol    Narrative · Left ventricular wall thickness is consistent with concentric   hypertrophy.  · Left ventricular ejection fraction appears to be greater than 70%. Left   ventricular systolic function is hyperdynamic (EF > 70%).  · Left ventricular diastolic function is consistent with (grade II w/high   LAP) pseudonormalization.  · The left atrial cavity is moderate to severely dilated.  · The right atrial cavity is mildly dilated.  · Moderate mitral annular calcification is present.  · Mild mitral valve regurgitation is present.  · Mild tricuspid valve regurgitation is present.  · Estimated right ventricular systolic pressure from tricuspid   regurgitation is markedly elevated (>55 mmHg).        EK/3/2020    Chest x-ray: 11/3/2020    IMPRESSION:  CHF/edema with small effusions    Lab Review   Lab Results   Component Value Date    TSH 5.410 (H) 2020     Lab Results   Component Value Date    GLUCOSE 64 (L) 2020    BUN 41 (H) 2020    CREATININE 1.90 (H) 2020    EGFRIFNONA 26 (L) 2020    EGFRIFAFRI 41 (L) 2018     BCR 21.6 11/11/2020    K 3.9 11/11/2020    CO2 22.2 11/11/2020    CALCIUM 9.6 11/11/2020    PROTENTOTREF 7.7 07/30/2018    ALBUMIN 3.27 (L) 11/04/2020    LABIL2 1.1 (L) 07/30/2018    AST 41 (H) 11/04/2020    ALT 30 11/04/2020     Lab Results   Component Value Date    WBC 8.59 11/06/2020    HGB 8.6 (L) 11/06/2020    HCT 28.4 (L) 11/06/2020    MCV 85.3 11/06/2020     11/06/2020     Lab Results   Component Value Date    CKMB 2.92 04/16/2018    TROPONINI 0.012 04/16/2018    TROPONINT <0.010 11/04/2020     Lab Results   Component Value Date    PROBNP 1,514.0 11/11/2020     The following portions of the patient's history were reviewed and updated as appropriate: allergies, current medications, past family history, past medical history, past social history, past surgical history and problem list.     Old records reviewed and pertinent information is included in the above objective data.     Assessment/Plan:   1. Chronic Diastolic Congestive Heart Failure, EF > 70%   2. HTN  3. CKD, stage IV  4. Obesity     1. Pro-BNP, BMP, magnesium today  2. Discussed and reviewed labs with patient and  today.   3. Due to abnormal kidney function, will continue to hold Bumex. Recheck labs on Friday 11/20/2020 and further recommendations pending lab results.   4. Dietician education at today's visit  5. Continue on current medications  6. Monitor BP  7. Referred for sleep evaluation to r/o [facility] - awaiting appointment.   8. Follow up in 1 week, sooner if needed.     30 minutes face to face spent counseling patient extensively on dietary Na+ intake, importance of activity, weight monitoring, compliance with medications and follow up appointments.

## 2020-11-17 LAB
ANION GAP SERPL CALCULATED.3IONS-SCNC: 14.4 MMOL/L (ref 5–15)
BUN SERPL-MCNC: 45 MG/DL (ref 8–23)
BUN/CREAT SERPL: 21.1 (ref 7–25)
CALCIUM SPEC-SCNC: 9.6 MG/DL (ref 8.6–10.5)
CHLORIDE SERPL-SCNC: 105 MMOL/L (ref 98–107)
CO2 SERPL-SCNC: 20.6 MMOL/L (ref 22–29)
CREAT SERPL-MCNC: 2.13 MG/DL (ref 0.57–1)
GFR SERPL CREATININE-BSD FRML MDRD: 22 ML/MIN/1.73
GLUCOSE SERPL-MCNC: 50 MG/DL (ref 65–99)
POTASSIUM SERPL-SCNC: 4.8 MMOL/L (ref 3.5–5.2)
SODIUM SERPL-SCNC: 140 MMOL/L (ref 136–145)

## 2020-11-17 NOTE — PROGRESS NOTES
Subjective   Britta Lee is a 75 y.o. female.       Chief Complaint -congestive heart failure/hospital follow-up    History of Present Illness -      Hospital follow-up-  Date of admission 11/3/2020  Date of discharge 11/8/2020 at [facility]    Current outpatient and discharge medications have been reconciled for the patient.  Reviewed by: RAFIQ Montanez    She presented to [facility] complaining of shortness of breath.  She was diagnosed with acute exacerbation of chronic diastolic congestive heart failure as well as acute kidney injury on stage IV chronic kidney disease.  She was admitted and started on IV Bumex.  She had borderline hypoxemia with a PO2 of 60.4 and arterial O2 sat 90% on room air.  She was placed on 2 L O2 nasal cannula and then weaned off to room air prior to discharge.  Transthoracic echocardiogram showed EF 70% estimated with grade 2 diastolic dysfunction.  She also had severe pulmonary hypertension with RVSP greater than 55 mmHg.    Cardiology consult was obtained and she continued on diuresis.  Medications for blood pressure were optimized and her blood pressure improved.  She did a swallow evaluation with no signs or symptoms of silent aspiration and was continued on a regular diet.  She developed complaints of right lower extremity pain.  She had venous Doppler which was negative for DVT.  Nephrology was consulted for her acute kidney injury on chronic kidney disease and renal function actually improved with diuresis back to her baseline.  Nephrology made no changes.  Patient was transitioned to p.o. Bumex 1 mg daily.  She was weaned off of O2 to room air and maintained O2 sat 95% with ambulation.  She was referred to the outpatient CHF clinic.      She was seen at the CHF clinic yesterday and medication changes included holding her Bumex until BMP can be repeated today.  She states that she did take her Bumex on Saturday Sunday and today.  She has an order  "with her today to repeat BMP today.  She reports shortness of breath that is slightly improved since hospitalization.  She does have some lower extremity edema when she holds her Bumex.    Hypertension-slightly elevated today despite the use of Norvasc, clonidine and Toprol-XL.  Not at goal    Chronic kidney disease-  Stable at hospital discharge.  She has an appointment with Dr. Johnson, nephrologist next week.        The following portions of the patient's history were reviewed and updated as appropriate: allergies, current medications, past family history, past medical history, past social history, past surgical history and problem list.    Review of Systems   Constitutional: Positive for fatigue. Negative for activity change, appetite change and fever.   HENT: Negative for ear pain, sinus pressure and sore throat.    Eyes: Negative for pain and visual disturbance.   Respiratory: Positive for shortness of breath. Negative for cough and chest tightness.    Cardiovascular: Positive for palpitations. Negative for chest pain.   Gastrointestinal: Negative for abdominal pain, constipation, diarrhea, nausea and vomiting.   Endocrine: Negative for polydipsia and polyuria.   Genitourinary: Negative for dysuria and frequency.   Musculoskeletal: Negative for back pain and myalgias.   Skin: Negative for color change and rash.   Allergic/Immunologic: Negative for food allergies and immunocompromised state.   Neurological: Negative for dizziness, syncope and headaches.   Hematological: Negative for adenopathy. Does not bruise/bleed easily.   Psychiatric/Behavioral: Negative for hallucinations and suicidal ideas. The patient is not nervous/anxious.        /60   Pulse 94   Temp 96.9 °F (36.1 °C) (Temporal)   Ht 160 cm (62.99\")   Wt 107 kg (235 lb)   SpO2 96%   BMI 41.64 kg/m²   Hospital Outpatient Visit on 11/16/2020   Component Date Value Ref Range Status   • Glucose 11/16/2020 135* 65 - 99 mg/dL Final   • BUN " 11/16/2020 45* 8 - 23 mg/dL Final   • Creatinine 11/16/2020 2.16* 0.57 - 1.00 mg/dL Final   • Sodium 11/16/2020 137  136 - 145 mmol/L Final   • Potassium 11/16/2020 4.8  3.5 - 5.2 mmol/L Final   • Chloride 11/16/2020 104  98 - 107 mmol/L Final   • CO2 11/16/2020 20.6* 22.0 - 29.0 mmol/L Final   • Calcium 11/16/2020 9.5  8.6 - 10.5 mg/dL Final   • eGFR Non African Amer 11/16/2020 22* >60 mL/min/1.73 Final   • BUN/Creatinine Ratio 11/16/2020 20.8  7.0 - 25.0 Final   • Anion Gap 11/16/2020 12.4  5.0 - 15.0 mmol/L Final   • Magnesium 11/16/2020 2.3  1.6 - 2.4 mg/dL Final   • proBNP 11/16/2020 1,356.0  0.0-1,800.0 pg/mL Final       Physical Exam  Vitals signs and nursing note reviewed.   Constitutional:       Appearance: Normal appearance. She is well-developed. She is obese.   HENT:      Head: Normocephalic and atraumatic.      Right Ear: Tympanic membrane normal.      Left Ear: Tympanic membrane normal.      Nose: Nose normal.      Mouth/Throat:      Mouth: Mucous membranes are moist.      Pharynx: No oropharyngeal exudate or posterior oropharyngeal erythema.   Eyes:      Extraocular Movements: Extraocular movements intact.      Conjunctiva/sclera: Conjunctivae normal.   Neck:      Musculoskeletal: Normal range of motion and neck supple.      Thyroid: No thyromegaly.      Trachea: No tracheal deviation.   Cardiovascular:      Rate and Rhythm: Normal rate and regular rhythm.      Heart sounds: Normal heart sounds. No murmur.   Pulmonary:      Effort: Pulmonary effort is normal. No respiratory distress.      Breath sounds: Normal breath sounds. No wheezing.   Abdominal:      General: Bowel sounds are normal.      Palpations: Abdomen is soft.      Tenderness: There is no abdominal tenderness. There is no guarding.   Musculoskeletal: Normal range of motion.         General: No tenderness.      Right lower leg: Edema present.      Left lower leg: Edema present.      Comments: 1+ pitting edema noted bilateral lower extremity    Lymphadenopathy:      Cervical: No cervical adenopathy.   Skin:     General: Skin is warm and dry.      Findings: No rash.   Neurological:      General: No focal deficit present.      Mental Status: She is alert and oriented to person, place, and time.   Psychiatric:         Mood and Affect: Mood normal.         Behavior: Behavior normal.         Thought Content: Thought content normal.         Assessment/Plan     Diagnoses and all orders for this visit:    1. Acute on chronic diastolic congestive heart failure (CMS/HCC) (Primary)  Comments:  BMP will be repeated today  Hold Bumex as directed by CHF clinic until after BMP results are known  Orders:  -     Basic metabolic panel    2. Essential hypertension  Comments:  Continue Norvasc, clonidine and Toprol-XL  Continue to monitor BP and pulse daily    3. Chronic renal disease, stage IV (CMS/HCC)  Comments:  We discussed the importance of drinking plenty of water and staying hydrated.  She was advised to keep her appointment with nephrologist next week.    I will follow-up with her next month or sooner if needed.        This document has been electronically signed by:  Lexie Dolan PA-C

## 2020-11-18 ENCOUNTER — READMISSION MANAGEMENT (OUTPATIENT)
Dept: CALL CENTER | Facility: HOSPITAL | Age: 75
End: 2020-11-18

## 2020-11-18 NOTE — OUTREACH NOTE
CHF Week 2 Survey      Responses   [facility] patient discharged from?  [facility]   Does the patient have one of the following disease processes/diagnoses(primary or secondary)?  CHF   Week 2 attempt successful?  Yes   Call start time  1552   Call end time  1557   Meds reviewed with patient/caregiver?  Yes   Is the patient having any side effects they believe may be caused by any medication additions or changes?  No   Does the patient have all medications ordered at discharge?  Yes   Is the patient taking all medications as directed (includes completed medication regime)?  Yes   Does the patient have a primary care provider?   Yes   Does the patient have an appointment with their PCP within 7 days of discharge?  Yes   Has the patient kept scheduled appointments due by today?  Yes   Comments  Next appt with PCP is in 12/22/20.   Has home health visited the patient within 72 hours of discharge?  N/A   Pulse Ox monitoring  None   Psychosocial issues?  No   Did the patient receive a copy of their discharge instructions?  Yes   Nursing interventions  Reviewed instructions with patient   What is the patient's perception of their health status since discharge?  Improving   Nursing interventions  Nurse provided patient education   Is the patient weighing daily?  Yes   Does the patient have scales?  Yes   Daily weight interventions  Education provided on importance of daily weight   Is the patient able to teach back Heart Failure diet management?  Yes   Is the patient able to teach back Heart Failure Zones?  Yes [green zone today]   Is the patient able to teach back signs and symptoms of worsening condition? (i.e. weight gain, shortness of air, etc.)  Yes   Is the patient/caregiver able to teach back the hierarchy of who to call/visit for symptoms/problems? PCP, Specialist, Home health nurse, Urgent Care, ED, 911  Yes   CHF Week 2 call completed?  Yes   Wrap up additional comments  States is feeling better-has been  keeping her appts. States will be getting blood work to check kidney function. States pedal/lower leg edema improving. Daily weight today-234#. Denies any SOA or any needs today.          Linsey Mims RN

## 2020-11-20 ENCOUNTER — DOCUMENTATION (OUTPATIENT)
Dept: CARDIOLOGY | Facility: CLINIC | Age: 75
End: 2020-11-20

## 2020-11-20 ENCOUNTER — LAB (OUTPATIENT)
Dept: LAB | Facility: HOSPITAL | Age: 75
End: 2020-11-20

## 2020-11-20 DIAGNOSIS — N18.4 CHRONIC RENAL DISEASE, STAGE IV (HCC): ICD-10-CM

## 2020-11-20 LAB
ANION GAP SERPL CALCULATED.3IONS-SCNC: 11 MMOL/L (ref 5–15)
BUN SERPL-MCNC: 43 MG/DL (ref 8–23)
BUN/CREAT SERPL: 22.6 (ref 7–25)
CALCIUM SPEC-SCNC: 9.8 MG/DL (ref 8.6–10.5)
CHLORIDE SERPL-SCNC: 108 MMOL/L (ref 98–107)
CO2 SERPL-SCNC: 21 MMOL/L (ref 22–29)
CREAT SERPL-MCNC: 1.9 MG/DL (ref 0.57–1)
GFR SERPL CREATININE-BSD FRML MDRD: 26 ML/MIN/1.73
GLUCOSE SERPL-MCNC: 46 MG/DL (ref 65–99)
POTASSIUM SERPL-SCNC: 5.2 MMOL/L (ref 3.5–5.2)
SODIUM SERPL-SCNC: 140 MMOL/L (ref 136–145)

## 2020-11-20 PROCEDURE — 80048 BASIC METABOLIC PNL TOTAL CA: CPT

## 2020-11-20 PROCEDURE — 36415 COLL VENOUS BLD VENIPUNCTURE: CPT

## 2020-11-23 ENCOUNTER — HOSPITAL ENCOUNTER (OUTPATIENT)
Dept: CARDIOLOGY | Facility: HOSPITAL | Age: 75
Discharge: HOME | End: 2020-11-23
Admitting: NURSE PRACTITIONER

## 2020-11-23 VITALS
BODY MASS INDEX: 41.11 KG/M2 | TEMPERATURE: 97.9 F | DIASTOLIC BLOOD PRESSURE: 56 MMHG | RESPIRATION RATE: 20 BRPM | SYSTOLIC BLOOD PRESSURE: 155 MMHG | WEIGHT: 232 LBS | HEART RATE: 94 BPM | OXYGEN SATURATION: 96 % | HEIGHT: 63 IN

## 2020-11-23 DIAGNOSIS — I50.32 CHRONIC DIASTOLIC (CONGESTIVE) HEART FAILURE (HCC): Primary | ICD-10-CM

## 2020-11-23 DIAGNOSIS — N18.4 CHRONIC RENAL DISEASE, STAGE IV (HCC): ICD-10-CM

## 2020-11-23 DIAGNOSIS — E66.01 MORBID (SEVERE) OBESITY DUE TO EXCESS CALORIES (HCC): ICD-10-CM

## 2020-11-23 DIAGNOSIS — I10 ESSENTIAL HYPERTENSION: ICD-10-CM

## 2020-11-23 LAB
ANION GAP SERPL CALCULATED.3IONS-SCNC: 10.9 MMOL/L (ref 5–15)
BUN SERPL-MCNC: 44 MG/DL (ref 8–23)
BUN/CREAT SERPL: 21.6 (ref 7–25)
CALCIUM SPEC-SCNC: 9.6 MG/DL (ref 8.6–10.5)
CHLORIDE SERPL-SCNC: 109 MMOL/L (ref 98–107)
CO2 SERPL-SCNC: 22.1 MMOL/L (ref 22–29)
CREAT SERPL-MCNC: 2.04 MG/DL (ref 0.57–1)
GFR SERPL CREATININE-BSD FRML MDRD: 24 ML/MIN/1.73
GLUCOSE SERPL-MCNC: 83 MG/DL (ref 65–99)
MAGNESIUM SERPL-MCNC: 2.5 MG/DL (ref 1.6–2.4)
NT-PROBNP SERPL-MCNC: 975.7 PG/ML (ref 0–1800)
POTASSIUM SERPL-SCNC: 5.1 MMOL/L (ref 3.5–5.2)
SODIUM SERPL-SCNC: 142 MMOL/L (ref 136–145)

## 2020-11-23 PROCEDURE — 83880 ASSAY OF NATRIURETIC PEPTIDE: CPT

## 2020-11-23 PROCEDURE — G0463 HOSPITAL OUTPT CLINIC VISIT: HCPCS | Performed by: PHARMACIST

## 2020-11-23 PROCEDURE — 36415 COLL VENOUS BLD VENIPUNCTURE: CPT | Performed by: NURSE PRACTITIONER

## 2020-11-23 PROCEDURE — 99214 OFFICE O/P EST MOD 30 MIN: CPT | Performed by: NURSE PRACTITIONER

## 2020-11-23 PROCEDURE — 83735 ASSAY OF MAGNESIUM: CPT | Performed by: NURSE PRACTITIONER

## 2020-11-23 PROCEDURE — 80048 BASIC METABOLIC PNL TOTAL CA: CPT | Performed by: NURSE PRACTITIONER

## 2020-11-23 RX ORDER — BUMETANIDE 1 MG/1
1 TABLET ORAL 3 TIMES WEEKLY
Qty: 30 TABLET | Refills: 0
Start: 2020-11-23 | End: 2020-12-01 | Stop reason: SDUPTHER

## 2020-11-23 NOTE — PROGRESS NOTES
[facility] CHF CLINIC OFFICE VISIT    Subjective:   History of Present Illness   Britta Lee is a 75 y.o.  female who presents to the clinic today for follow up on heart failure. She has a hx of diastolic congestive heart failure. Her EF was > 70% from echocardiogram on 11/4/2020. Due to abnormal kidney function she has not been taking her Bumex. She reports that her shortness of air and swelling are stable. She reports home weights stable at 200 lbs,   home /60-70, HR 61/88 bpm per patient report (no log available for review today). She reports compliance in restricting sodium and fluids. She's been unable to tolerated Spironolactone due to kidney function. She reports that her dietary consult at last visit was very helpful in helping with her eating habits. She continues on HYdralazine 25 mg daily, Imdur 60 mg daily, Metorpolol 25 mg daily, Norvasc 10 mg daily and Clonidine 0.2 mg 3x/day for HTN. Denies chest pain or palpitations. She has upcoming appointment to see her kidney doctor on 12/7/2020 and ask that I fax her recent labs to his office. She has recently had follow up with PCP and Bhanu for her DM.     Past medical history significant for HTN, DM type 2, CKD III, iron deficiency anemia, hyperlipidemia, obesity.     PCP: Lexie Dolan  Cardiologist: Dr. Cruz  Nephrologist: Dr. Johnson     Hospitalizations: Discharged on 11/8/2020    Past Medical History:   Diagnosis Date   • Anemia    • Arthritis    • Carpal tunnel syndrome    • CHF (congestive heart failure) (CMS/[facility])    • Coronary artery disease    • Diabetes mellitus (CMS/[facility])    • Elevated cholesterol    • GERD (gastroesophageal reflux disease)    • Heart disease    • High cholesterol    • History of pneumonia    • History of unsteady gait     OCASSIONALLY   • Hypertension    • Insomnia    • Kidney disease    • Osteoarthritis    • Renal insufficiency    • Sciatica      Past Surgical History:   Procedure Laterality Date   • ABDOMINAL SURGERY      • APPENDECTOMY     • CARDIAC CATHETERIZATION      1 STENT  ---  2000   • CARDIAC SURGERY     • CAROTID STENT     • CARPAL TUNNEL RELEASE Right 10/8/2019    Procedure: CARPAL TUNNEL RELEASE;  Surgeon: Feroz Johnson MD;  Location: [facility];  Service: Orthopedics   • CATARACT EXTRACTION     • CHOLECYSTECTOMY     • COLONOSCOPY     • CORONARY ANGIOPLASTY WITH STENT PLACEMENT     • ENDOSCOPY     • ENDOSCOPY N/A 3/5/2020    Procedure: ESOPHAGOGASTRODUODENOSCOPY;  Surgeon: Alexandru Bagley MD;  Location: [facility];  Service: Gastroenterology;  Laterality: N/A;   • ENDOSCOPY AND COLONOSCOPY     • GALLBLADDER SURGERY     • JOINT REPLACEMENT Left 05/02/2017    [facility]  DR JOHNSON  LEFT TOTAL KNEE   • KNEE ARTHROSCOPY W/ MENISCECTOMY Right    • LAPAROSCOPIC TUBAL LIGATION     • OR TOTAL KNEE ARTHROPLASTY Left 5/2/2017    Procedure: TOTAL KNEE ARTHROPLASTY;  Surgeon: Feroz Johnson MD;  Location: [facility];  Service: Orthopedics   • STERILIZATION     • TONSILLECTOMY       Social History     Socioeconomic History   • Marital status:      Spouse name: natalie   • Number of children: 3   • Years of education: 12   • Highest education level: Not on file   Occupational History   • Occupation: retired   Social Needs   • Financial resource strain: Somewhat hard   • Food insecurity     Worry: Never true     Inability: Never true   • Transportation needs     Medical: Yes     Non-medical: No   Tobacco Use   • Smoking status: Never Smoker   • Smokeless tobacco: Never Used   Substance and Sexual Activity   • Alcohol use: Yes     Comment: socially   • Drug use: No   • Sexual activity: Defer     Birth control/protection: Surgical   Lifestyle   • Physical activity     Days per week: 0 days     Minutes per session: 0 min   • Stress: Only a little     Family History   Problem Relation Age of Onset   • Arthritis Mother    • Diabetes Mother    • Cancer Mother    • Heart disease Father    • Diabetes Daughter    • Diabetes Son     • Diabetes Maternal Aunt    • Heart disease Maternal Grandmother    • Breast cancer Neg Hx      Allergies:  No Known Allergies    Review of Systems   Constitution: Negative for chills, fever, weight gain and weight loss.   HENT: Negative for ear discharge, hoarse voice and sore throat.    Eyes: Negative for discharge, double vision, pain, redness and visual disturbance.   Cardiovascular: Positive for dyspnea on exertion and leg swelling (stable ). Negative for chest pain, claudication, cyanosis, irregular heartbeat, near-syncope, orthopnea, palpitations and syncope.   Respiratory: Positive for shortness of breath (stable). Negative for cough and wheezing.    Endocrine: Negative for cold intolerance, heat intolerance and polyuria.   Hematologic/Lymphatic: Negative for bleeding problem. Does not bruise/bleed easily.   Skin: Negative for color change, flushing and rash.   Musculoskeletal: Negative for arthritis, back pain, joint pain, joint swelling and muscle cramps.   Gastrointestinal: Negative for abdominal pain, constipation, diarrhea, nausea and vomiting.   Genitourinary: Negative for dysuria, flank pain, frequency, hesitancy and urgency.   Neurological: Negative for difficulty with concentration, dizziness, light-headedness, sensory change, vertigo and weakness.   Psychiatric/Behavioral: Negative for depression. The patient does not have insomnia and is not nervous/anxious.      Current Outpatient Medications   Medication Sig Dispense Refill   • amLODIPine (NORVASC) 10 MG tablet Take 1 tablet by mouth Every Night. 90 tablet 1   • aspirin 81 MG tablet Take 1 tablet by mouth Daily. 30 tablet 5   • atorvastatin (LIPITOR) 40 MG tablet Take 40 mg by mouth Every Night.     • bumetanide (BUMEX) 1 MG tablet Hold until repeat labs on Friday 11/20/2020 30 tablet 0   • cloNIDine (CATAPRES) 0.2 MG tablet Take 1 tablet by mouth 3 (Three) Times a Day. 270 tablet 3   • Continuous Blood Gluc  (Dexcom G6 )  device 1 Device Daily. 1 Device 0   • Continuous Blood Gluc Sensor (Dexcom G6 Sensor) Every 10 (Ten) Days. 9 each 3   • Continuous Blood Gluc Transmit (Dexcom G6 Transmitter) misc 1 Device Daily. 1 each 0   • escitalopram (LEXAPRO) 10 MG tablet Take 1 tablet by mouth Daily. 90 tablet 3   • ferrous sulfate 325 (65 Fe) MG tablet Take 1 tablet by mouth 2 (Two) Times a Day. 180 tablet 3   • hydrALAZINE (APRESOLINE) 25 MG tablet Take 1 tablet by mouth Every 8 (Eight) Hours. 90 tablet 0   • Insulin Glargine, 1 Unit Dial, (TOUJEO) 300 UNIT/ML solution pen-injector injection Inject 80 Units under the skin into the appropriate area as directed Every Night.     • insulin lispro (humaLOG) 100 UNIT/ML injection Inject 5 Units under the skin into the appropriate area as directed 3 (Three) Times a Day Before Meals.     • isosorbide mononitrate (IMDUR) 60 MG 24 hr tablet Take 1 tablet by mouth Daily. 30 tablet 0   • metoprolol succinate XL (TOPROL-XL) 25 MG 24 hr tablet Take 1 tablet by mouth Daily for 30 days. 30 tablet 0   • nystatin (MYCOSTATIN) 302153 UNIT/GM powder Apply  one application topically to the appropriate area as directed Every 12 (Twelve) Hours. 60 g 0   • Vitamin D, Cholecalciferol, (CHOLECALCIFEROL) 10 MCG (400 UNIT) tablet Take 400 Units by mouth Daily.       No current facility-administered medications for this encounter.      Objective:     Vitals:    11/23/20 0923   BP: 155/56   Pulse: 94   Resp: 20   Temp: 97.9 °F (36.6 °C)   SpO2: 96%   weight down several pounds from last visit  Wt Readings from Last 3 Encounters:   11/23/20 105 kg (232 lb)   11/16/20 106 kg (234 lb 6.4 oz)   11/16/20 107 kg (235 lb)        Vitals signs reviewed.   Constitutional:       Appearance: Normal appearance. Well-developed.      Comments: Wears a mask during encounter, walked into clinic today    Eyes:      Conjunctiva/sclera: Conjunctivae normal.   HENT:      Head: Normocephalic.   Neck:      Musculoskeletal: Normal range of  motion and neck supple.      Vascular: No JVD or JVR.   Pulmonary:      Effort: Pulmonary effort is normal.      Breath sounds: Normal breath sounds.   Cardiovascular:      Normal rate. Regular rhythm.   Pulses:     Intact distal pulses.   Edema:     Peripheral edema present.     Ankle: bilateral 1+ edema of the ankle.     Feet: bilateral 1+ edema of the feet.  Abdominal:      General: Bowel sounds are normal.      Palpations: Abdomen is soft. There is no hepatomegaly or splenomegaly.   Musculoskeletal: Normal range of motion.   Skin:     General: Skin is warm and dry.   Neurological:      Mental Status: Alert and oriented to person, place, and time.   Psychiatric:         Attention and Perception: Attention normal.         Mood and Affect: Mood normal.         Speech: Speech normal.         Behavior: Behavior normal. Behavior is cooperative.         Cognition and Memory: Cognition normal.     Cardiographics  Results for orders placed during the hospital encounter of 20   Adult Transthoracic Echo Complete W/ Cont if Necessary Per Protocol    Narrative · Left ventricular wall thickness is consistent with concentric   hypertrophy.  · Left ventricular ejection fraction appears to be greater than 70%. Left   ventricular systolic function is hyperdynamic (EF > 70%).  · Left ventricular diastolic function is consistent with (grade II w/high   LAP) pseudonormalization.  · The left atrial cavity is moderate to severely dilated.  · The right atrial cavity is mildly dilated.  · Moderate mitral annular calcification is present.  · Mild mitral valve regurgitation is present.  · Mild tricuspid valve regurgitation is present.  · Estimated right ventricular systolic pressure from tricuspid   regurgitation is markedly elevated (>55 mmHg).        EK/3/2020    Chest x-ray: 11/3/2020    IMPRESSION:  CHF/edema with small effusions    Lab Review   Lab Results   Component Value Date    TSH 5.410 (H) 2020     Lab Results    Component Value Date    GLUCOSE 46 (C) 11/20/2020    BUN 43 (H) 11/20/2020    CREATININE 1.90 (H) 11/20/2020    EGFRIFNONA 26 (L) 11/20/2020    EGFRIFAFRI 41 (L) 07/30/2018    BCR 22.6 11/20/2020    K 5.2 11/20/2020    CO2 21.0 (L) 11/20/2020    CALCIUM 9.8 11/20/2020    PROTENTOTREF 7.7 07/30/2018    ALBUMIN 3.27 (L) 11/04/2020    LABIL2 1.1 (L) 07/30/2018    AST 41 (H) 11/04/2020    ALT 30 11/04/2020     Lab Results   Component Value Date    WBC 8.59 11/06/2020    HGB 8.6 (L) 11/06/2020    HCT 28.4 (L) 11/06/2020    MCV 85.3 11/06/2020     11/06/2020     Lab Results   Component Value Date    CKMB 2.92 04/16/2018    TROPONINI 0.012 04/16/2018    TROPONINT <0.010 11/04/2020     Lab Results   Component Value Date    PROBNP 1,356.0 11/16/2020     The following portions of the patient's history were reviewed and updated as appropriate: allergies, current medications, past family history, past medical history, past social history, past surgical history and problem list.     Old records reviewed and pertinent information is included in the above objective data.     Assessment/Plan:   1. Chronic Diastolic Congestive Heart Failure, EF > 70%   2. HTN  3. CKD, stage IV  4. Obesity     1. Pro-BNP, BMP, magnesium today  2. Discussed and reviewed labs with patient and  today.   3. Restart Bumex 1 mg on Monday, Wednesday and Friday. Could increase if patient developed swelling or shortness of air.    4. Fax labs to nephrology  5. Monitor BP, if persistent elevation will need to consider medication titration.   6. Continue on current medications  7. Encourage in low magnesium diet (handout given) and avoid magnesium supplements.   8. Referred for sleep evaluation to r/o [facility] - awaiting appointment.   9. Follow up in 2 weeks, sooner if needed.     30 minutes face to face spent counseling patient extensively on dietary Na+ intake, importance of activity, weight monitoring, compliance with medications and follow up  appointments.

## 2020-11-23 NOTE — PATIENT INSTRUCTIONS
Heart Failure, Diagnosis    Heart failure means that your heart is not able to pump blood in the right way. This makes it hard for your body to work well. Heart failure is usually a long-term (chronic) condition. You must take good care of yourself and follow your treatment plan from your doctor.  What are the causes?  This condition may be caused by:  · High blood pressure.  · Build up of cholesterol and fat in the arteries.  · Heart attack. This injures the heart muscle.  · Heart valves that do not open and close properly.  · Damage of the heart muscle. This is also called cardiomyopathy.  · Lung disease.  · Abnormal heart rhythms.  What increases the risk?  The risk of heart failure goes up as a person ages. This condition is also more likely to develop in people who:  · Are overweight.  · Are male.  · Smoke or chew tobacco.  · Abuse alcohol or illegal drugs.  · Have taken medicines that can damage the heart.  · Have diabetes.  · Have abnormal heart rhythms.  · Have thyroid problems.  · Have low blood counts (anemia).  What are the signs or symptoms?  Symptoms of this condition include:  · Shortness of breath.  · Coughing.  · Swelling of the feet, ankles, legs, or belly.  · Losing weight for no reason.  · Trouble breathing.  · Waking from sleep because of the need to sit up and get more air.  · Rapid heartbeat.  · Being very tired.  · Feeling dizzy, or feeling like you may pass out (faint).  · Having no desire to eat.  · Feeling like you may vomit (nauseous).  · Peeing (urinating) more at night.  · Feeling confused.  How is this treated?         This condition may be treated with:  · Medicines. These can be given to treat blood pressure and to make the heart muscles stronger.  · Changes in your daily life. These may include eating a healthy diet, staying at a healthy body weight, quitting tobacco and illegal drug use, or doing exercises.  · Surgery. Surgery can be done to open blocked valves, or to put devices in  the heart, such as pacemakers.  · A donor heart (heart transplant). You will receive a healthy heart from a donor.  Follow these instructions at home:  · Treat other conditions as told by your doctor. These may include high blood pressure, diabetes, thyroid disease, or abnormal heart rhythms.  · Learn as much as you can about heart failure.  · Get support as you need it.  · Keep all follow-up visits as told by your doctor. This is important.  Summary  · Heart failure means that your heart is not able to pump blood in the right way.  · This condition is caused by high blood pressure, heart attack, or damage of the heart muscle.  · Symptoms of this condition include shortness of breath and swelling of the feet, ankles, legs, or belly. You may also feel very tired or feel like you may vomit.  · You may be treated with medicines, surgery, or changes in your daily life.  · Treat other health conditions as told by your doctor.  This information is not intended to replace advice given to you by your health care provider. Make sure you discuss any questions you have with your health care provider.  Document Released: 09/26/2009 Document Revised: 03/06/2020 Document Reviewed: 03/06/2020  [employer] Patient Education © 2020 [employer]    Hypertension, Adult  Hypertension is another name for high blood pressure. High blood pressure forces your heart to work harder to pump blood. This can cause problems over time.  There are two numbers in a blood pressure reading. There is a top number (systolic) over a bottom number (diastolic). It is best to have a blood pressure that is below 120/80. Healthy choices can help lower your blood pressure, or you may need medicine to help lower it.  What are the causes?  The cause of this condition is not known. Some conditions may be related to high blood pressure.  What increases the risk?  · Smoking.  · Having type 2 diabetes mellitus, high cholesterol, or both.  · Not getting enough  exercise or physical activity.  · Being overweight.  · Having too much fat, sugar, calories, or salt (sodium) in your diet.  · Drinking too much alcohol.  · Having long-term (chronic) kidney disease.  · Having a family history of high blood pressure.  · Age. Risk increases with age.  · Race. You may be at higher risk if you are .  · Gender. Men are at higher risk than women before age 45. After age 65, women are at higher risk than men.  · Having obstructive sleep apnea.  · Stress.  What are the signs or symptoms?  · High blood pressure may not cause symptoms. Very high blood pressure (hypertensive crisis) may cause:  ? Headache.  ? Feelings of worry or nervousness (anxiety).  ? Shortness of breath.  ? Nosebleed.  ? A feeling of being sick to your stomach (nausea).  ? Throwing up (vomiting).  ? Changes in how you see.  ? Very bad chest pain.  ? Seizures.  How is this treated?  · This condition is treated by making healthy lifestyle changes, such as:  ? Eating healthy foods.  ? Exercising more.  ? Drinking less alcohol.  · Your health care provider may prescribe medicine if lifestyle changes are not enough to get your blood pressure under control, and if:  ? Your top number is above 130.  ? Your bottom number is above 80.  · Your personal target blood pressure may vary.  Follow these instructions at home:  Eating and drinking    · If told, follow the DASH eating plan. To follow this plan:  ? Fill one half of your plate at each meal with fruits and vegetables.  ? Fill one fourth of your plate at each meal with whole grains. Whole grains include whole-wheat pasta, brown rice, and whole-grain bread.  ? Eat or drink low-fat dairy products, such as skim milk or low-fat yogurt.  ? Fill one fourth of your plate at each meal with low-fat (lean) proteins. Low-fat proteins include fish, chicken without skin, eggs, beans, and tofu.  ? Avoid fatty meat, cured and processed meat, or chicken with skin.  ? Avoid  pre-made or processed food.  · Eat less than 1,500 mg of salt each day.  · Do not drink alcohol if:  ? Your doctor tells you not to drink.  ? You are pregnant, may be pregnant, or are planning to become pregnant.  · If you drink alcohol:  ? Limit how much you use to:  § 0-1 drink a day for women.  § 0-2 drinks a day for men.  ? Be aware of how much alcohol is in your drink. In the [place] one drink equals one 12 oz bottle of beer (355 mL), one 5 oz glass of wine (148 mL), or one 1½ oz glass of hard liquor (44 mL).  Lifestyle    · Work with your doctor to stay at a healthy weight or to lose weight. Ask your doctor what the best weight is for you.  · Get at least 30 minutes of exercise most days of the week. This may include walking, swimming, or biking.  · Get at least 30 minutes of exercise that strengthens your muscles (resistance exercise) at least 3 days a week. This may include lifting weights or doing Pilates.  · Do not use any products that contain nicotine or tobacco, such as cigarettes, e-cigarettes, and chewing tobacco. If you need help quitting, ask your doctor.  · Check your blood pressure at home as told by your doctor.  · Keep all follow-up visits as told by your doctor. This is important.  Medicines  · Take over-the-counter and prescription medicines only as told by your doctor. Follow directions carefully.  · Do not skip doses of blood pressure medicine. The medicine does not work as well if you skip doses. Skipping doses also puts you at risk for problems.  · Ask your doctor about side effects or reactions to medicines that you should watch for.  Contact a doctor if you:  · Think you are having a reaction to the medicine you are taking.  · Have headaches that keep coming back (recurring).  · Feel dizzy.  · Have swelling in your ankles.  · Have trouble with your vision.  Get help right away if you:  · Get a very bad headache.  · Start to feel mixed up (confused).  · Feel weak or numb.  · Feel  faint.  · Have very bad pain in your:  ? Chest.  ? Belly (abdomen).  · Throw up more than once.  · Have trouble breathing.  Summary  · Hypertension is another name for high blood pressure.  · High blood pressure forces your heart to work harder to pump blood.  · For most people, a normal blood pressure is less than 120/80.  · Making healthy choices can help lower blood pressure. If your blood pressure does not get lower with healthy choices, you may need to take medicine.  This information is not intended to replace advice given to you by your health care provider. Make sure you discuss any questions you have with your health care provider.  Document Released: 06/05/2009 Document Revised: 08/28/2019 Document Reviewed: 08/28/2019  [employer] Patient Education © 2020 [employer]    Obesity, Adult  Obesity is having too much body fat. Being obese means that your weight is more than what is healthy for you.  BMI is a number that explains how much body fat you have. If you have a BMI of 30 or more, you are obese. Obesity is often caused by eating or drinking more calories than your body uses. Changing your lifestyle can help you lose weight.  Obesity can cause serious health problems, such as:  · Stroke.  · Coronary artery disease (CAD).  · Type 2 diabetes.  · Some types of cancer, including cancers of the colon, breast, uterus, and gallbladder.  · Osteoarthritis.  · High blood pressure (hypertension).  · High cholesterol.  · Sleep apnea.  · Gallbladder stones.  · Infertility problems.  What are the causes?  · Eating meals each day that are high in calories, sugar, and fat.  · Being born with genes that may make you more likely to become obese.  · Having a medical condition that causes obesity.  · Taking certain medicines.  · Sitting a lot (having a sedentary lifestyle).  · Not getting enough sleep.  · Drinking a lot of drinks that have sugar in them.  What increases the risk?  · Having a family history of  obesity.  · Being an  woman.  · Being a  man.  · Living in an area with limited access to:  ? [place], recreation centers, or sidewalks.  ? Healthy food choices, such as grocery stores and farmers' markets.  What are the signs or symptoms?  The main sign is having too much body fat.  How is this treated?  · Treatment for this condition often includes changing your lifestyle. Treatment may include:  ? Changing your diet. This may include making a healthy meal plan.  ? Exercise. This may include activity that causes your heart to beat faster (aerobic exercise) and strength training. Work with your doctor to design a program that works for you.  ? Medicine to help you lose weight. This may be used if you are not able to lose 1 pound a week after 6 weeks of healthy eating and more exercise.  ? Treating conditions that cause the obesity.  ? Surgery. Options may include gastric banding and gastric bypass. This may be done if:  § Other treatments have not helped to improve your condition.  § You have a BMI of 40 or higher.  § You have life-threatening health problems related to obesity.  Follow these instructions at home:  Eating and drinking    · Follow advice from your doctor about what to eat and drink. Your doctor may tell you to:  ? Limit fast food, sweets, and processed snack foods.  ? Choose low-fat options. For example, choose low-fat milk instead of whole milk.  ? Eat 5 or more servings of fruits or vegetables each day.  ? Eat at home more often. This gives you more control over what you eat.  ? Choose healthy foods when you eat out.  ? Learn to read food labels. This will help you learn how much food is in 1 serving.  ? Keep low-fat snacks available.  ? Avoid drinks that have a lot of sugar in them. These include soda, fruit juice, iced tea with sugar, and flavored milk.  · Drink enough water to keep your pee (urine) pale yellow.  · Do not go on fad diets.  Physical activity  · Exercise  often, as told by your doctor. Most adults should get up to 150 minutes of moderate-intensity exercise every week.Ask your doctor:  ? What types of exercise are safe for you.  ? How often you should exercise.  · Warm up and stretch before being active.  · Do slow stretching after being active (cool down).  · Rest between times of being active.  Lifestyle  · Work with your doctor and a food expert (dietitian) to set a weight-loss goal that is best for you.  · Limit your screen time.  · Find ways to reward yourself that do not involve food.  · Do not drink alcohol if:  ? Your doctor tells you not to drink.  ? You are pregnant, may be pregnant, or are planning to become pregnant.  · If you drink alcohol:  ? Limit how much you use to:  § 0-1 drink a day for women.  § 0-2 drinks a day for men.  ? Be aware of how much alcohol is in your drink. In the [place] one drink equals one 12 oz bottle of beer (355 mL), one 5 oz glass of wine (148 mL), or one 1½ oz glass of hard liquor (44 mL).  General instructions  · Keep a weight-loss journal. This can help you keep track of:  ? The food that you eat.  ? How much exercise you get.  · Take over-the-counter and prescription medicines only as told by your doctor.  · Take vitamins and supplements only as told by your doctor.  · Think about joining a support group.  · Keep all follow-up visits as told by your doctor. This is important.  Contact a doctor if:  · You cannot meet your weight loss goal after you have changed your diet and lifestyle for 6 weeks.  Get help right away if you:  · Are having trouble breathing.  · Are having thoughts of harming yourself.  Summary  · Obesity is having too much body fat.  · Being obese means that your weight is more than what is healthy for you.  · Work with your doctor to set a weight-loss goal.  · Get regular exercise as told by your doctor.  This information is not intended to replace advice given to you by your health care provider. Make sure  you discuss any questions you have with your health care provider.  Document Released: 03/11/2013 Document Revised: 08/22/2019 Document Reviewed: 08/22/2019  [employer] Patient Education © 2020 [employer]

## 2020-11-23 NOTE — PROGRESS NOTES
Heart Failure Pharmacy Note  Patient Name: Britta Lee   Referring Provider: Priscila Holland  Primary Cardiologist: Anthony    Medication Use:   Adherence: No issues  Hx of med intolerances: bradycardia with metoprolol 100mg BID  Affordability: No issues reported   Retail Rx Management: none    Past Medical History:   Diagnosis Date   • Anemia    • Arthritis    • Carpal tunnel syndrome    • CHF (congestive heart failure) (CMS/[facility])    • Coronary artery disease    • Diabetes mellitus (CMS/[facility])    • Elevated cholesterol    • GERD (gastroesophageal reflux disease)    • Heart disease    • High cholesterol    • History of pneumonia    • History of unsteady gait     OCASSIONALLY   • Hypertension    • Insomnia    • Kidney disease    • Osteoarthritis    • Renal insufficiency    • Sciatica      ALLERGIES: Patient has no known allergies.  Current Outpatient Medications   Medication Sig Dispense Refill   • amLODIPine (NORVASC) 10 MG tablet Take 1 tablet by mouth Every Night. 90 tablet 1   • aspirin 81 MG tablet Take 1 tablet by mouth Daily. 30 tablet 5   • atorvastatin (LIPITOR) 40 MG tablet Take 40 mg by mouth Every Night.     • cloNIDine (CATAPRES) 0.2 MG tablet Take 1 tablet by mouth 3 (Three) Times a Day. 270 tablet 3   • escitalopram (LEXAPRO) 10 MG tablet Take 1 tablet by mouth Daily. 90 tablet 3   • ferrous sulfate 325 (65 Fe) MG tablet Take 1 tablet by mouth 2 (Two) Times a Day. 180 tablet 3   • hydrALAZINE (APRESOLINE) 25 MG tablet Take 1 tablet by mouth Every 8 (Eight) Hours. 90 tablet 0   • Insulin Glargine, 1 Unit Dial, (TOUJEO) 300 UNIT/ML solution pen-injector injection Inject 80 Units under the skin into the appropriate area as directed Every Night.     • insulin lispro (humaLOG) 100 UNIT/ML injection Inject 5 Units under the skin into the appropriate area as directed 3 (Three) Times a Day Before Meals.     • isosorbide mononitrate (IMDUR) 60 MG 24 hr tablet Take 1 tablet by mouth Daily. 30 tablet 0   •  "metoprolol succinate XL (TOPROL-XL) 25 MG 24 hr tablet Take 1 tablet by mouth Daily for 30 days. 30 tablet 0   • nystatin (MYCOSTATIN) 184445 UNIT/GM powder Apply  one application topically to the appropriate area as directed Every 12 (Twelve) Hours. 60 g 0   • Vitamin D, Cholecalciferol, (CHOLECALCIFEROL) 10 MCG (400 UNIT) tablet Take 400 Units by mouth Daily.     • bumetanide (BUMEX) 1 MG tablet Hold until repeat labs on Friday 11/20/2020 30 tablet 0   • Continuous Blood Gluc  (Dexcom G6 ) device 1 Device Daily. 1 Device 0   • Continuous Blood Gluc Sensor (Dexcom G6 Sensor) Every 10 (Ten) Days. 9 each 3   • Continuous Blood Gluc Transmit (Dexcom G6 Transmitter) misc 1 Device Daily. 1 each 0     No current facility-administered medications for this encounter.        Vaccination History:   Pneumonia: Reports UTD  Annual Influenza: Reports UTD for 2020-21 Season    Objective  Vitals:    11/23/20 0923   BP: 155/56   BP Location: Left arm   Patient Position: Sitting   Cuff Size: Adult   Pulse: 94   Resp: 20   Temp: 97.9 °F (36.6 °C)   TempSrc: Temporal   SpO2: 96%   Weight: 105 kg (232 lb)   Height: 160 cm (62.99\")     Wt Readings from Last 3 Encounters:   11/23/20 105 kg (232 lb)   11/16/20 106 kg (234 lb 6.4 oz)   11/16/20 107 kg (235 lb)         11/23/20 0923   Weight: 105 kg (232 lb)     Lab Results   Component Value Date    GLUCOSE 83 11/23/2020    BUN 44 (H) 11/23/2020    CREATININE 2.04 (H) 11/23/2020    EGFRIFNONA 24 (L) 11/23/2020    EGFRIFAFRI 41 (L) 07/30/2018    BCR 21.6 11/23/2020    K 5.1 11/23/2020    CO2 22.1 11/23/2020    CALCIUM 9.6 11/23/2020    PROTENTOTREF 7.7 07/30/2018    ALBUMIN 3.27 (L) 11/04/2020    LABIL2 1.1 (L) 07/30/2018    AST 41 (H) 11/04/2020    ALT 30 11/04/2020     Lab Results   Component Value Date    WBC 8.59 11/06/2020    HGB 8.6 (L) 11/06/2020    HCT 28.4 (L) 11/06/2020    MCV 85.3 11/06/2020     11/06/2020     Lab Results   Component Value Date    CKMB " 2.92 04/16/2018    TROPONINI 0.012 04/16/2018    TROPONINT <0.010 11/04/2020     Lab Results   Component Value Date    PROBNP 1,356.0 11/16/2020     Results for orders placed during the hospital encounter of 11/03/20   Adult Transthoracic Echo Complete W/ Cont if Necessary Per Protocol    Narrative · Left ventricular wall thickness is consistent with concentric   hypertrophy.  · Left ventricular ejection fraction appears to be greater than 70%. Left   ventricular systolic function is hyperdynamic (EF > 70%).  · Left ventricular diastolic function is consistent with (grade II w/high   LAP) pseudonormalization.  · The left atrial cavity is moderate to severely dilated.  · The right atrial cavity is mildly dilated.  · Moderate mitral annular calcification is present.  · Mild mitral valve regurgitation is present.  · Mild tricuspid valve regurgitation is present.  · Estimated right ventricular systolic pressure from tricuspid   regurgitation is markedly elevated (>55 mmHg).                    Class   Drug   Dose Last Dose Adjustment   ACEi/ARB/ARNI      Beta Blocker Toprol XL  25mg 11/8/2020   MRA        Drug Therapy Problems: None at this time    Recommendations:  1. Consider bumetanide on PRN basis since Pt tolerating hold well.     Patient was educated on heart failure medications and the importance of medication adherence. Also educated Pt on Rule of 15 and S/Sx of hypoglycemia. All questions were addressed and patient expressed understanding.     Thank you for allowing me to participate in the care of your patient,    Melisa Leahy, PharmD  11/23/20  10:12 EST

## 2020-12-01 ENCOUNTER — OFFICE VISIT (OUTPATIENT)
Dept: CARDIOLOGY | Facility: CLINIC | Age: 75
End: 2020-12-01

## 2020-12-01 VITALS
DIASTOLIC BLOOD PRESSURE: 69 MMHG | SYSTOLIC BLOOD PRESSURE: 141 MMHG | WEIGHT: 232 LBS | HEIGHT: 63 IN | BODY MASS INDEX: 41.11 KG/M2 | HEART RATE: 65 BPM | TEMPERATURE: 93.2 F | OXYGEN SATURATION: 98 % | RESPIRATION RATE: 16 BRPM

## 2020-12-01 DIAGNOSIS — E66.01 MORBID OBESITY WITH BMI OF 40.0-44.9, ADULT (HCC): ICD-10-CM

## 2020-12-01 DIAGNOSIS — I10 ESSENTIAL HYPERTENSION: ICD-10-CM

## 2020-12-01 DIAGNOSIS — I50.32 CHRONIC DIASTOLIC CONGESTIVE HEART FAILURE (HCC): Primary | ICD-10-CM

## 2020-12-01 DIAGNOSIS — G47.33 OSA (OBSTRUCTIVE SLEEP APNEA): ICD-10-CM

## 2020-12-01 DIAGNOSIS — I25.10 ATHEROSCLEROSIS OF NATIVE CORONARY ARTERY OF NATIVE HEART WITHOUT ANGINA PECTORIS: ICD-10-CM

## 2020-12-01 DIAGNOSIS — E78.2 MIXED HYPERLIPIDEMIA: ICD-10-CM

## 2020-12-01 DIAGNOSIS — I10 BENIGN ESSENTIAL HYPERTENSION: ICD-10-CM

## 2020-12-01 DIAGNOSIS — N18.4 CKD (CHRONIC KIDNEY DISEASE) STAGE 4, GFR 15-29 ML/MIN (HCC): ICD-10-CM

## 2020-12-01 PROCEDURE — 99213 OFFICE O/P EST LOW 20 MIN: CPT | Performed by: SPECIALIST

## 2020-12-01 RX ORDER — CLONIDINE HYDROCHLORIDE 0.2 MG/1
0.2 TABLET ORAL 3 TIMES DAILY
Qty: 270 TABLET | Refills: 1 | Status: SHIPPED | OUTPATIENT
Start: 2020-12-01 | End: 2022-02-21

## 2020-12-01 RX ORDER — ATORVASTATIN CALCIUM 40 MG/1
40 TABLET, FILM COATED ORAL NIGHTLY
Qty: 90 TABLET | Refills: 1 | Status: SHIPPED | OUTPATIENT
Start: 2020-12-01 | End: 2021-09-28

## 2020-12-01 RX ORDER — BUMETANIDE 1 MG/1
1 TABLET ORAL 3 TIMES WEEKLY
Qty: 30 TABLET | Refills: 3
Start: 2020-12-02 | End: 2020-12-11 | Stop reason: SDUPTHER

## 2020-12-01 RX ORDER — METOPROLOL SUCCINATE 25 MG/1
25 TABLET, EXTENDED RELEASE ORAL DAILY
Qty: 90 TABLET | Refills: 1 | Status: SHIPPED | OUTPATIENT
Start: 2020-12-01 | End: 2020-12-28 | Stop reason: SDUPTHER

## 2020-12-01 RX ORDER — ISOSORBIDE MONONITRATE 60 MG/1
60 TABLET, EXTENDED RELEASE ORAL DAILY
Qty: 90 TABLET | Refills: 1 | Status: SHIPPED | OUTPATIENT
Start: 2020-12-01 | End: 2021-07-13 | Stop reason: SDUPTHER

## 2020-12-01 RX ORDER — HYDRALAZINE HYDROCHLORIDE 50 MG/1
50 TABLET, FILM COATED ORAL EVERY 8 HOURS SCHEDULED
Qty: 270 TABLET | Refills: 1 | Status: SHIPPED | OUTPATIENT
Start: 2020-12-01 | End: 2020-12-01 | Stop reason: SDUPTHER

## 2020-12-01 RX ORDER — AMLODIPINE BESYLATE 10 MG/1
10 TABLET ORAL NIGHTLY
Qty: 90 TABLET | Refills: 1 | Status: SHIPPED | OUTPATIENT
Start: 2020-12-01 | End: 2021-08-06

## 2020-12-01 RX ORDER — HYDRALAZINE HYDROCHLORIDE 50 MG/1
50 TABLET, FILM COATED ORAL EVERY 8 HOURS SCHEDULED
Qty: 270 TABLET | Refills: 1 | Status: SHIPPED | OUTPATIENT
Start: 2020-12-01 | End: 2021-09-13 | Stop reason: SDUPTHER

## 2020-12-01 NOTE — PROGRESS NOTES
"Subjective   Follow up, post hospital discharge  Britta Lee is a 75 y.o. female who presents to day for Congestive Heart Failure (recent hosp stay), Shortness of Breath (mild exertion), and Med Management (verbal/\"nothing changed\").    CHIEF COMPLIANT  Chief Complaint   Patient presents with   • Congestive Heart Failure     recent hosp stay   • Shortness of Breath     mild exertion   • Med Management     verbal/\"nothing changed\"       Active Problems:  Problem List Items Addressed This Visit        Cardiovascular and Mediastinum    Essential hypertension    Overview     The patient was advised to keep a daily blood pressure and pulse log. They were advised contact our office if consistently running over 120/80 and bring the log to the next follow up visit.          Atherosclerosis of native coronary artery of native heart    Overview     S/P one stent placement followed by [facility] in [place] - Dr. Alexandru Martines          Hyperlipidemia    Overview     Lipid panel done 10/20/15 TC: 166 Tri HDL: 34 LDL: 104         Chronic diastolic congestive heart failure (CMS/HCC) - Primary       Respiratory    LENNY (obstructive sleep apnea)       Digestive    Morbid obesity with BMI of 40.0-44.9, adult (CMS/[facility])       Genitourinary    CKD (chronic kidney disease) stage 4, GFR 15-29 ml/min (CMS/[facility])          HPI  HPI  She says she is doing well her breathing is better since being discharged no chest pain no edema no palpitations no dizziness sleepy during the day and very tired, possibly snores at night  PRIOR MEDS  Current Outpatient Medications on File Prior to Visit   Medication Sig Dispense Refill   • amLODIPine (NORVASC) 10 MG tablet Take 1 tablet by mouth Every Night. 90 tablet 1   • aspirin 81 MG tablet Take 1 tablet by mouth Daily. 30 tablet 5   • atorvastatin (LIPITOR) 40 MG tablet Take 40 mg by mouth Every Night.     • bumetanide (BUMEX) 1 MG tablet Take 1 tablet by mouth 3 (Three) Times a Week. Monday, " Wednesday, and Friday 30 tablet 0   • cloNIDine (CATAPRES) 0.2 MG tablet Take 1 tablet by mouth 3 (Three) Times a Day. 270 tablet 3   • Continuous Blood Gluc  (Dexcom G6 ) device 1 Device Daily. 1 Device 0   • Continuous Blood Gluc Sensor (Dexcom G6 Sensor) Every 10 (Ten) Days. 9 each 3   • Continuous Blood Gluc Transmit (Dexcom G6 Transmitter) misc 1 Device Daily. 1 each 0   • escitalopram (LEXAPRO) 10 MG tablet Take 1 tablet by mouth Daily. 90 tablet 3   • ferrous sulfate 325 (65 Fe) MG tablet Take 1 tablet by mouth 2 (Two) Times a Day. 180 tablet 3   • hydrALAZINE (APRESOLINE) 25 MG tablet Take 1 tablet by mouth Every 8 (Eight) Hours. 90 tablet 0   • Insulin Glargine, 1 Unit Dial, (TOUJEO) 300 UNIT/ML solution pen-injector injection Inject 80 Units under the skin into the appropriate area as directed Every Night.     • insulin lispro (humaLOG) 100 UNIT/ML injection Inject 5 Units under the skin into the appropriate area as directed 3 (Three) Times a Day Before Meals.     • isosorbide mononitrate (IMDUR) 60 MG 24 hr tablet Take 1 tablet by mouth Daily. 30 tablet 0   • metoprolol succinate XL (TOPROL-XL) 25 MG 24 hr tablet Take 1 tablet by mouth Daily for 30 days. 30 tablet 0   • nystatin (MYCOSTATIN) 690177 UNIT/GM powder Apply  one application topically to the appropriate area as directed Every 12 (Twelve) Hours. 60 g 0   • Vitamin D, Cholecalciferol, (CHOLECALCIFEROL) 10 MCG (400 UNIT) tablet Take 400 Units by mouth Daily.       No current facility-administered medications on file prior to visit.        ALLERGIES  Patient has no known allergies.    HISTORY  Past Medical History:   Diagnosis Date   • Anemia    • Arthritis    • Carpal tunnel syndrome    • CHF (congestive heart failure) (CMS/HCC)    • Coronary artery disease    • Diabetes mellitus (CMS/HCC)    • Elevated cholesterol    • GERD (gastroesophageal reflux disease)    • Heart disease    • High cholesterol    • History of pneumonia    •  History of unsteady gait     OCASSIONALLY   • Hypertension    • Insomnia    • Kidney disease    • LENNY (obstructive sleep apnea) 12/1/2020   • Osteoarthritis    • Renal insufficiency    • Sciatica        Social History     Socioeconomic History   • Marital status:      Spouse name: natalie   • Number of children: 3   • Years of education: 12   • Highest education level: Not on file   Occupational History   • Occupation: retired   Social Needs   • Financial resource strain: Somewhat hard   • Food insecurity     Worry: Never true     Inability: Never true   • Transportation needs     Medical: Yes     Non-medical: No   Tobacco Use   • Smoking status: Never Smoker   • Smokeless tobacco: Never Used   Substance and Sexual Activity   • Alcohol use: Yes     Comment: socially   • Drug use: No   • Sexual activity: Defer     Birth control/protection: Surgical   Lifestyle   • Physical activity     Days per week: 0 days     Minutes per session: 0 min   • Stress: Only a little       Family History   Problem Relation Age of Onset   • Arthritis Mother    • Diabetes Mother    • Cancer Mother    • Heart disease Father    • Diabetes Daughter    • Diabetes Son    • Diabetes Maternal Aunt    • Heart disease Maternal Grandmother    • Breast cancer Neg Hx        Review of Systems   Constitutional: Negative for activity change and appetite change.   HENT: Negative for congestion.    Eyes: Negative for discharge and itching.   Respiratory: Positive for shortness of breath. Negative for apnea, cough, choking, chest tightness, wheezing and stridor.    Cardiovascular: Negative for chest pain, palpitations and leg swelling.   Gastrointestinal: Negative for abdominal distention and abdominal pain.   Endocrine: Negative for cold intolerance and heat intolerance.   Genitourinary: Negative for flank pain.   Musculoskeletal: Negative for joint swelling.   Skin: Negative for color change and pallor.   Allergic/Immunologic: Negative for  "immunocompromised state.   Neurological: Negative for dizziness.   Hematological: Does not bruise/bleed easily.   Psychiatric/Behavioral: Negative for agitation and behavioral problems.       Objective     VITALS: /69   Pulse 65   Temp 93.2 °F (34 °C)   Resp 16   Ht 160 cm (63\")   Wt 105 kg (232 lb)   SpO2 98%   BMI 41.10 kg/m²     LABS:   Lab Results (most recent)     None          IMAGING:   Xr Chest 1 View    Result Date: 11/3/2020  CHF/edema with small effusions.  This report was finalized on 11/3/2020 12:03 PM by Dr. Guillermo Macias MD.      Us Arterial Doppler Lower Extremity Complete    Result Date: 8/4/2020  Poor flow in the lower extremities from the superficial femoral arteries and distal. No occlusion was seen.    This report was finalized on 8/4/2020 8:21 AM by Dr. Silvio Mcdonald MD.      Us Venous Doppler Lower Extremity Right (duplex For Insufficiency)    Result Date: 11/5/2020  No DVT in the right lower extremity on today's exam.  This report was finalized on 11/5/2020 7:55 AM by Dr. Silvio Mcdonald MD.      Mammo Screening Digital Tomosynthesis Bilateral With Cad    Result Date: 8/5/2020  Stable benign findings with no mammographic evidence of malignancy.  RECOMMENDATION:  Continue annual screening mammography.  BI-RADS CATEGORY II, BENIGN   PLEASE NOTE THE FOLLOWING ACR RECOMMENDATIONS: A:  Only 80% of breast cancers can be diagnosed by mammography. B:  A negative mammogram does not exclude cancer in clinically palpable abnormalities.  Biopsy should be done based on ultrasound findings and clinical judgment. C:  A mammogram has limited value in detecting cancer in patients with adenosis or dense breasts.  [employer] GUIDELINES For breast cancer detection in asymptomatic women: Age  ACR Recommendations 35-40  Baseline Mammogram 40-49  Annual or Biannual Mammogram 50+  Annual Mammogram Annual physical and frequent self breast examination.  Patient has been entered into an " automatic reminder system.  This report was finalized on 8/5/2020 1:45 PM by Dr. Francisco Javier Pacheco MD.        EXAM:  Physical Exam  Vitals signs reviewed.   Constitutional:       Appearance: She is well-developed.   HENT:      Head: Normocephalic and atraumatic.   Eyes:      Pupils: Pupils are equal, round, and reactive to light.   Neck:      Musculoskeletal: Neck supple.      Thyroid: No thyromegaly.      Vascular: No JVD.   Cardiovascular:      Rate and Rhythm: Normal rate and regular rhythm.      Heart sounds: Normal heart sounds. No murmur. No friction rub. No gallop.    Pulmonary:      Effort: Pulmonary effort is normal. No respiratory distress.      Breath sounds: Normal breath sounds. No stridor. No wheezing or rales.   Chest:      Chest wall: No tenderness.   Musculoskeletal:         General: No tenderness or deformity.   Skin:     General: Skin is warm and dry.   Neurological:      Mental Status: She is alert and oriented to person, place, and time.      Cranial Nerves: No cranial nerve deficit.      Coordination: Coordination normal.         Procedure   Procedures       Assessment/Plan    Diagnosis Plan   1. Chronic diastolic congestive heart failure (CMS/HCC)     2. Essential hypertension     3. Atherosclerosis of native coronary artery of native heart without angina pectoris     4. Mixed hyperlipidemia     5. Morbid obesity with BMI of 40.0-44.9, adult (CMS/HCC)     6. LENNY (obstructive sleep apnea)     7. CKD (chronic kidney disease) stage 4, GFR 15-29 ml/min (CMS/HCC)       1. Compensated been very careful with the salt restriction she started taking Bumex recently and she said she is diuresing well could not tolerate spironolactone we will read with renal dysfunction  2.  Her blood pressure remains elevated I am going to increase hydralazine to 50 mg 3 times daily  3.  She will follow up with a nephrologist next week her creatinine has increased to 2.04  4.  Her symptoms exist consistent with obstructive  sleep apnea will refer for home sleep study  5.  Advised to lose weight  No follow-ups on file.    Diagnoses and all orders for this visit:    1. Chronic diastolic congestive heart failure (CMS/HCC) (Primary)    2. Essential hypertension    3. Atherosclerosis of native coronary artery of native heart without angina pectoris    4. Mixed hyperlipidemia    5. Morbid obesity with BMI of 40.0-44.9, adult (CMS/[facility])    6. LENNY (obstructive sleep apnea)    7. CKD (chronic kidney disease) stage 4, GFR 15-29 ml/min (CMS/[facility])               MEDS ORDERED DURING VISIT:  No orders of the defined types were placed in this encounter.        This document has been electronically signed by Hever Cruz MD  December 1, 2020 10:08 EST

## 2020-12-04 ENCOUNTER — APPOINTMENT (OUTPATIENT)
Dept: CT IMAGING | Facility: HOSPITAL | Age: 75
End: 2020-12-04

## 2020-12-04 ENCOUNTER — HOSPITAL ENCOUNTER (EMERGENCY)
Facility: HOSPITAL | Age: 75
Discharge: HOME | End: 2020-12-04
Attending: EMERGENCY MEDICINE | Admitting: EMERGENCY MEDICINE

## 2020-12-04 ENCOUNTER — APPOINTMENT (OUTPATIENT)
Dept: GENERAL RADIOLOGY | Facility: HOSPITAL | Age: 75
End: 2020-12-04

## 2020-12-04 VITALS
DIASTOLIC BLOOD PRESSURE: 54 MMHG | BODY MASS INDEX: 41.11 KG/M2 | TEMPERATURE: 97.6 F | RESPIRATION RATE: 16 BRPM | OXYGEN SATURATION: 96 % | WEIGHT: 232 LBS | HEIGHT: 63 IN | SYSTOLIC BLOOD PRESSURE: 156 MMHG | HEART RATE: 82 BPM

## 2020-12-04 DIAGNOSIS — E11.9 DIABETES MELLITUS TYPE 2, INSULIN DEPENDENT (HCC): ICD-10-CM

## 2020-12-04 DIAGNOSIS — E16.2 HYPOGLYCEMIA: Primary | ICD-10-CM

## 2020-12-04 DIAGNOSIS — Z79.4 DIABETES MELLITUS TYPE 2, INSULIN DEPENDENT (HCC): ICD-10-CM

## 2020-12-04 LAB
6-ACETYL MORPHINE: NEGATIVE
ALBUMIN SERPL-MCNC: 3.56 G/DL (ref 3.5–5.2)
ALBUMIN/GLOB SERPL: 0.8 G/DL
ALP SERPL-CCNC: 209 U/L (ref 39–117)
ALT SERPL W P-5'-P-CCNC: 30 U/L (ref 1–33)
AMPHET+METHAMPHET UR QL: NEGATIVE
ANION GAP SERPL CALCULATED.3IONS-SCNC: 14.1 MMOL/L (ref 5–15)
APTT PPP: 30.7 SECONDS (ref 25.6–35.3)
AST SERPL-CCNC: 50 U/L (ref 1–32)
BACTERIA UR QL AUTO: ABNORMAL /HPF
BARBITURATES UR QL SCN: NEGATIVE
BASOPHILS # BLD AUTO: 0.03 10*3/MM3 (ref 0–0.2)
BASOPHILS NFR BLD AUTO: 0.4 % (ref 0–1.5)
BENZODIAZ UR QL SCN: NEGATIVE
BILIRUB SERPL-MCNC: 0.2 MG/DL (ref 0–1.2)
BILIRUB UR QL STRIP: NEGATIVE
BUN SERPL-MCNC: 42 MG/DL (ref 8–23)
BUN/CREAT SERPL: 21.2 (ref 7–25)
BUPRENORPHINE SERPL-MCNC: NEGATIVE NG/ML
CALCIUM SPEC-SCNC: 9.4 MG/DL (ref 8.6–10.5)
CANNABINOIDS SERPL QL: NEGATIVE
CHLORIDE SERPL-SCNC: 104 MMOL/L (ref 98–107)
CK SERPL-CCNC: 67 U/L (ref 20–180)
CLARITY UR: CLEAR
CO2 SERPL-SCNC: 19.9 MMOL/L (ref 22–29)
COCAINE UR QL: NEGATIVE
COLOR UR: YELLOW
CREAT SERPL-MCNC: 1.98 MG/DL (ref 0.57–1)
CRP SERPL-MCNC: 1.12 MG/DL (ref 0–0.5)
DEPRECATED RDW RBC AUTO: 45.2 FL (ref 37–54)
EOSINOPHIL # BLD AUTO: 0.19 10*3/MM3 (ref 0–0.4)
EOSINOPHIL NFR BLD AUTO: 2.4 % (ref 0.3–6.2)
ERYTHROCYTE [DISTWIDTH] IN BLOOD BY AUTOMATED COUNT: 14.5 % (ref 12.3–15.4)
ERYTHROCYTE [SEDIMENTATION RATE] IN BLOOD: >100 MM/HR (ref 0–30)
ETHANOL BLD-MCNC: <10 MG/DL (ref 0–10)
ETHANOL UR QL: <0.01 %
GFR SERPL CREATININE-BSD FRML MDRD: 24 ML/MIN/1.73
GLOBULIN UR ELPH-MCNC: 4.2 GM/DL
GLUCOSE BLDC GLUCOMTR-MCNC: 135 MG/DL (ref 70–130)
GLUCOSE BLDC GLUCOMTR-MCNC: 85 MG/DL (ref 70–130)
GLUCOSE SERPL-MCNC: 117 MG/DL (ref 65–99)
GLUCOSE UR STRIP-MCNC: NEGATIVE MG/DL
HCT VFR BLD AUTO: 32.4 % (ref 34–46.6)
HGB BLD-MCNC: 9.4 G/DL (ref 12–15.9)
HGB UR QL STRIP.AUTO: NEGATIVE
HYALINE CASTS UR QL AUTO: ABNORMAL /LPF
IMM GRANULOCYTES # BLD AUTO: 0.02 10*3/MM3 (ref 0–0.05)
IMM GRANULOCYTES NFR BLD AUTO: 0.3 % (ref 0–0.5)
INR PPP: 1.2 (ref 0.9–1.1)
KETONES UR QL STRIP: NEGATIVE
LEUKOCYTE ESTERASE UR QL STRIP.AUTO: NEGATIVE
LYMPHOCYTES # BLD AUTO: 0.89 10*3/MM3 (ref 0.7–3.1)
LYMPHOCYTES NFR BLD AUTO: 11.4 % (ref 19.6–45.3)
MAGNESIUM SERPL-MCNC: 2.4 MG/DL (ref 1.6–2.4)
MCH RBC QN AUTO: 24.9 PG (ref 26.6–33)
MCHC RBC AUTO-ENTMCNC: 29 G/DL (ref 31.5–35.7)
MCV RBC AUTO: 85.9 FL (ref 79–97)
METHADONE UR QL SCN: NEGATIVE
MONOCYTES # BLD AUTO: 0.36 10*3/MM3 (ref 0.1–0.9)
MONOCYTES NFR BLD AUTO: 4.6 % (ref 5–12)
NEUTROPHILS NFR BLD AUTO: 6.3 10*3/MM3 (ref 1.7–7)
NEUTROPHILS NFR BLD AUTO: 80.9 % (ref 42.7–76)
NITRITE UR QL STRIP: NEGATIVE
NRBC BLD AUTO-RTO: 0 /100 WBC (ref 0–0.2)
NT-PROBNP SERPL-MCNC: 908.6 PG/ML (ref 0–1800)
OPIATES UR QL: NEGATIVE
OXYCODONE UR QL SCN: NEGATIVE
PCP UR QL SCN: NEGATIVE
PH UR STRIP.AUTO: <=5 [PH] (ref 5–8)
PHOSPHATE SERPL-MCNC: 4.2 MG/DL (ref 2.5–4.5)
PLATELET # BLD AUTO: 238 10*3/MM3 (ref 140–450)
PMV BLD AUTO: 9.3 FL (ref 6–12)
POTASSIUM SERPL-SCNC: 5 MMOL/L (ref 3.5–5.2)
PROT SERPL-MCNC: 7.8 G/DL (ref 6–8.5)
PROT UR QL STRIP: ABNORMAL
PROTHROMBIN TIME: 15 SECONDS (ref 11.9–14.1)
RBC # BLD AUTO: 3.77 10*6/MM3 (ref 3.77–5.28)
RBC # UR: ABNORMAL /HPF
REF LAB TEST METHOD: ABNORMAL
SODIUM SERPL-SCNC: 138 MMOL/L (ref 136–145)
SP GR UR STRIP: 1.01 (ref 1–1.03)
SQUAMOUS #/AREA URNS HPF: ABNORMAL /HPF
T4 FREE SERPL-MCNC: 1.03 NG/DL (ref 0.93–1.7)
TROPONIN T SERPL-MCNC: <0.01 NG/ML (ref 0–0.03)
TSH SERPL DL<=0.05 MIU/L-ACNC: 4.65 UIU/ML (ref 0.27–4.2)
UROBILINOGEN UR QL STRIP: ABNORMAL
WBC # BLD AUTO: 7.79 10*3/MM3 (ref 3.4–10.8)
WBC UR QL AUTO: ABNORMAL /HPF

## 2020-12-04 PROCEDURE — 84484 ASSAY OF TROPONIN QUANT: CPT | Performed by: EMERGENCY MEDICINE

## 2020-12-04 PROCEDURE — 80307 DRUG TEST PRSMV CHEM ANLYZR: CPT | Performed by: EMERGENCY MEDICINE

## 2020-12-04 PROCEDURE — 93010 ELECTROCARDIOGRAM REPORT: CPT | Performed by: INTERNAL MEDICINE

## 2020-12-04 PROCEDURE — 85652 RBC SED RATE AUTOMATED: CPT | Performed by: EMERGENCY MEDICINE

## 2020-12-04 PROCEDURE — 83735 ASSAY OF MAGNESIUM: CPT | Performed by: EMERGENCY MEDICINE

## 2020-12-04 PROCEDURE — 71045 X-RAY EXAM CHEST 1 VIEW: CPT

## 2020-12-04 PROCEDURE — 70450 CT HEAD/BRAIN W/O DYE: CPT | Performed by: RADIOLOGY

## 2020-12-04 PROCEDURE — 81001 URINALYSIS AUTO W/SCOPE: CPT | Performed by: EMERGENCY MEDICINE

## 2020-12-04 PROCEDURE — 71045 X-RAY EXAM CHEST 1 VIEW: CPT | Performed by: RADIOLOGY

## 2020-12-04 PROCEDURE — 84100 ASSAY OF PHOSPHORUS: CPT | Performed by: EMERGENCY MEDICINE

## 2020-12-04 PROCEDURE — 86140 C-REACTIVE PROTEIN: CPT | Performed by: EMERGENCY MEDICINE

## 2020-12-04 PROCEDURE — 85025 COMPLETE CBC W/AUTO DIFF WBC: CPT | Performed by: EMERGENCY MEDICINE

## 2020-12-04 PROCEDURE — 93005 ELECTROCARDIOGRAM TRACING: CPT | Performed by: EMERGENCY MEDICINE

## 2020-12-04 PROCEDURE — 84443 ASSAY THYROID STIM HORMONE: CPT | Performed by: EMERGENCY MEDICINE

## 2020-12-04 PROCEDURE — 80053 COMPREHEN METABOLIC PANEL: CPT | Performed by: EMERGENCY MEDICINE

## 2020-12-04 PROCEDURE — 85730 THROMBOPLASTIN TIME PARTIAL: CPT | Performed by: EMERGENCY MEDICINE

## 2020-12-04 PROCEDURE — 82962 GLUCOSE BLOOD TEST: CPT

## 2020-12-04 PROCEDURE — 70450 CT HEAD/BRAIN W/O DYE: CPT

## 2020-12-04 PROCEDURE — 83880 ASSAY OF NATRIURETIC PEPTIDE: CPT | Performed by: EMERGENCY MEDICINE

## 2020-12-04 PROCEDURE — 85610 PROTHROMBIN TIME: CPT | Performed by: EMERGENCY MEDICINE

## 2020-12-04 PROCEDURE — 84439 ASSAY OF FREE THYROXINE: CPT | Performed by: EMERGENCY MEDICINE

## 2020-12-04 PROCEDURE — 99284 EMERGENCY DEPT VISIT MOD MDM: CPT

## 2020-12-04 PROCEDURE — 82550 ASSAY OF CK (CPK): CPT | Performed by: EMERGENCY MEDICINE

## 2020-12-04 RX ORDER — SODIUM CHLORIDE 0.9 % (FLUSH) 0.9 %
10 SYRINGE (ML) INJECTION AS NEEDED
Status: DISCONTINUED | OUTPATIENT
Start: 2020-12-04 | End: 2020-12-04 | Stop reason: HOSPADM

## 2020-12-04 RX ADMIN — SODIUM CHLORIDE 500 ML: 9 INJECTION, SOLUTION INTRAVENOUS at 06:10

## 2020-12-04 NOTE — ED NOTES
Pt BG 85, Dr. Epps made aware. VORB to order Pt regular diet tray, ordered at this time.      Liyah Edmond RN  12/04/20 0815

## 2020-12-04 NOTE — ED PROVIDER NOTES
Subjective   75-year-old female in the emergency department for was called out to EMS altered mental status.  Upon arrival to her home EMS obtained a blood glucose level of 59, and gave her treatment in route to the emergency department.  Family reports that she was altered.  However, when EMS arrived to the scene patient was alert and oriented x4 with a GCS of 15.  Patient was still brought to the emergency department for further evaluation and treatment.  Denied pain.  Denies any injury.  Denied nausea, vomiting, diarrhea, fever, chills, chest pain, or shortness of breath.      History provided by:  Patient   used: No    Altered Mental Status  Presenting symptoms: no confusion    Severity:  Mild  Most recent episode:  Today  Episode history:  Single  Timing:  Constant  Progression:  Improving  Chronicity:  New  Context: recent illness and recent infection    Context: not alcohol use, not dementia, not drug use, not head injury, not homeless, taking medications as prescribed, not nursing home resident and not recent change in medication    Associated symptoms: no abdominal pain, normal movement, no agitation, no bladder incontinence, no decreased appetite, no depression, no difficulty breathing, no eye deviation, no fever, no hallucinations, no headaches, no light-headedness, no nausea, no palpitations, no rash, no seizures, no slurred speech, no suicidal behavior, no visual change, no vomiting and no weakness        Review of Systems   Constitutional: Negative for activity change, appetite change, chills, decreased appetite, diaphoresis, fatigue and fever.   HENT: Negative for congestion, ear pain and sore throat.    Eyes: Negative for redness.   Respiratory: Negative for cough, chest tightness, shortness of breath and wheezing.    Cardiovascular: Negative for chest pain, palpitations and leg swelling.   Gastrointestinal: Negative for abdominal pain, diarrhea, nausea and vomiting.    Genitourinary: Negative for bladder incontinence, dysuria and urgency.   Musculoskeletal: Negative for arthralgias, back pain, myalgias and neck pain.   Skin: Negative for pallor, rash and wound.   Neurological: Negative for dizziness, seizures, speech difficulty, weakness, light-headedness and headaches.   Psychiatric/Behavioral: Negative for agitation, behavioral problems, confusion, decreased concentration and hallucinations.   All other systems reviewed and are negative.      Past Medical History:   Diagnosis Date   • Anemia    • Arthritis    • Carpal tunnel syndrome    • CHF (congestive heart failure) (CMS/[facility])    • Coronary artery disease    • Diabetes mellitus (CMS/[facility])    • Elevated cholesterol    • GERD (gastroesophageal reflux disease)    • Heart disease    • High cholesterol    • History of pneumonia    • History of unsteady gait     OCASSIONALLY   • Hypertension    • Insomnia    • Kidney disease    • LENNY (obstructive sleep apnea) 12/1/2020   • Osteoarthritis    • Renal insufficiency    • Sciatica        No Known Allergies    Past Surgical History:   Procedure Laterality Date   • ABDOMINAL SURGERY     • APPENDECTOMY     • CARDIAC CATHETERIZATION      1 STENT  ---  2000   • CARDIAC SURGERY     • CAROTID STENT     • CARPAL TUNNEL RELEASE Right 10/8/2019    Procedure: CARPAL TUNNEL RELEASE;  Surgeon: Feroz Johnson MD;  Location: [facility];  Service: Orthopedics   • CATARACT EXTRACTION     • CHOLECYSTECTOMY     • COLONOSCOPY     • CORONARY ANGIOPLASTY WITH STENT PLACEMENT     • ENDOSCOPY     • ENDOSCOPY N/A 3/5/2020    Procedure: ESOPHAGOGASTRODUODENOSCOPY;  Surgeon: Alexandru Bagley MD;  Location: [facility];  Service: Gastroenterology;  Laterality: N/A;   • ENDOSCOPY AND COLONOSCOPY     • GALLBLADDER SURGERY     • JOINT REPLACEMENT Left 05/02/2017    [facility]  DR JOHNSON  LEFT TOTAL KNEE   • KNEE ARTHROSCOPY W/ MENISCECTOMY Right    • LAPAROSCOPIC TUBAL LIGATION     • TN TOTAL KNEE ARTHROPLASTY Left  5/2/2017    Procedure: TOTAL KNEE ARTHROPLASTY;  Surgeon: Feroz Johnson MD;  Location: [facility];  Service: Orthopedics   • STERILIZATION     • TONSILLECTOMY         Family History   Problem Relation Age of Onset   • Arthritis Mother    • Diabetes Mother    • Cancer Mother    • Heart disease Father    • Diabetes Daughter    • Diabetes Son    • Diabetes Maternal Aunt    • Heart disease Maternal Grandmother    • Breast cancer Neg Hx        Social History     Socioeconomic History   • Marital status:      Spouse name: natalie   • Number of children: 3   • Years of education: 12   • Highest education level: Not on file   Occupational History   • Occupation: retired   Social Needs   • Financial resource strain: Somewhat hard   • Food insecurity     Worry: Never true     Inability: Never true   • Transportation needs     Medical: Yes     Non-medical: No   Tobacco Use   • Smoking status: Never Smoker   • Smokeless tobacco: Never Used   Substance and Sexual Activity   • Alcohol use: Yes     Comment: socially   • Drug use: No   • Sexual activity: Defer     Birth control/protection: Surgical   Lifestyle   • Physical activity     Days per week: 0 days     Minutes per session: 0 min   • Stress: Only a little           Objective   Physical Exam  Vitals signs and nursing note reviewed.   Constitutional:       General: She is not in acute distress.     Appearance: Normal appearance. She is well-developed. She is not toxic-appearing or diaphoretic.   HENT:      Head: Normocephalic and atraumatic.      Right Ear: External ear normal.      Left Ear: External ear normal.      Nose: Nose normal.      Mouth/Throat:      Pharynx: No oropharyngeal exudate.      Tonsils: No tonsillar exudate.   Eyes:      General: Lids are normal.      Conjunctiva/sclera: Conjunctivae normal.      Pupils: Pupils are equal, round, and reactive to light.   Neck:      Musculoskeletal: Full passive range of motion without pain, normal range of  motion and neck supple.      Thyroid: No thyromegaly.   Cardiovascular:      Rate and Rhythm: Normal rate and regular rhythm.      Pulses: Normal pulses.      Heart sounds: Normal heart sounds, S1 normal and S2 normal.   Pulmonary:      Effort: Pulmonary effort is normal. No tachypnea or respiratory distress.      Breath sounds: Normal breath sounds. No decreased breath sounds, wheezing or rales.   Chest:      Chest wall: No tenderness.   Abdominal:      General: Bowel sounds are normal. There is no distension.      Palpations: Abdomen is soft.      Tenderness: There is no abdominal tenderness. There is no guarding or rebound.   Musculoskeletal: Normal range of motion.         General: No tenderness or deformity.   Lymphadenopathy:      Cervical: No cervical adenopathy.   Skin:     General: Skin is warm and dry.      Coloration: Skin is not pale.      Findings: No erythema or rash.   Neurological:      Mental Status: She is alert and oriented to person, place, and time.      GCS: GCS eye subscore is 4. GCS verbal subscore is 5. GCS motor subscore is 6.      Cranial Nerves: No cranial nerve deficit.      Sensory: No sensory deficit.   Psychiatric:         Speech: Speech normal.         Behavior: Behavior normal.         Thought Content: Thought content normal.         Judgment: Judgment normal.         Procedures  Results for orders placed or performed during the hospital encounter of 12/04/20   Comprehensive Metabolic Panel    Specimen: Arm, Right; Blood   Result Value Ref Range    Glucose 117 (H) 65 - 99 mg/dL    BUN 42 (H) 8 - 23 mg/dL    Creatinine 1.98 (H) 0.57 - 1.00 mg/dL    Sodium 138 136 - 145 mmol/L    Potassium 5.0 3.5 - 5.2 mmol/L    Chloride 104 98 - 107 mmol/L    CO2 19.9 (L) 22.0 - 29.0 mmol/L    Calcium 9.4 8.6 - 10.5 mg/dL    Total Protein 7.8 6.0 - 8.5 g/dL    Albumin 3.56 3.50 - 5.20 g/dL    ALT (SGPT) 30 1 - 33 U/L    AST (SGOT) 50 (H) 1 - 32 U/L    Alkaline Phosphatase 209 (H) 39 - 117 U/L     Total Bilirubin 0.2 0.0 - 1.2 mg/dL    eGFR Non African Amer 24 (L) >60 mL/min/1.73    Globulin 4.2 gm/dL    A/G Ratio 0.8 g/dL    BUN/Creatinine Ratio 21.2 7.0 - 25.0    Anion Gap 14.1 5.0 - 15.0 mmol/L   Protime-INR    Specimen: Arm, Right; Blood   Result Value Ref Range    Protime 15.0 (H) 11.9 - 14.1 Seconds    INR 1.20 (H) 0.90 - 1.10   aPTT    Specimen: Arm, Right; Blood   Result Value Ref Range    PTT 30.7 25.6 - 35.3 seconds   Urinalysis With Culture If Indicated - Urine, Clean Catch    Specimen: Urine, Clean Catch   Result Value Ref Range    Color, UA Yellow Yellow, Straw    Appearance, UA Clear Clear    pH, UA <=5.0 5.0 - 8.0    Specific Gravity, UA 1.013 1.005 - 1.030    Glucose, UA Negative Negative    Ketones, UA Negative Negative    Bilirubin, UA Negative Negative    Blood, UA Negative Negative    Protein,  mg/dL (2+) (A) Negative    Leuk Esterase, UA Negative Negative    Nitrite, UA Negative Negative    Urobilinogen, UA 0.2 E.U./dL 0.2 - 1.0 E.U./dL   T4, Free    Specimen: Arm, Right; Blood   Result Value Ref Range    Free T4 1.03 0.93 - 1.70 ng/dL   TSH    Specimen: Arm, Right; Blood   Result Value Ref Range    TSH 4.650 (H) 0.270 - 4.200 uIU/mL   Phosphorus    Specimen: Arm, Right; Blood   Result Value Ref Range    Phosphorus 4.2 2.5 - 4.5 mg/dL   Magnesium    Specimen: Arm, Right; Blood   Result Value Ref Range    Magnesium 2.4 1.6 - 2.4 mg/dL   CK    Specimen: Arm, Right; Blood   Result Value Ref Range    Creatine Kinase 67 20 - 180 U/L   Urine Drug Screen - Urine, Clean Catch    Specimen: Urine, Clean Catch   Result Value Ref Range    Amphetamine Screen, Urine Negative Negative    Barbiturates Screen, Urine Negative Negative    Benzodiazepine Screen, Urine Negative Negative    Cocaine Screen, Urine Negative Negative    Methadone Screen, Urine Negative Negative    Opiate Screen Negative Negative    Phencyclidine (PCP), Urine Negative Negative    THC, Screen, Urine Negative Negative     6-ACETYL MORPHINE Negative Negative    Buprenorphine, Screen, Urine Negative Negative    Oxycodone Screen, Urine Negative Negative   Ethanol    Specimen: Arm, Right; Blood   Result Value Ref Range    Ethanol <10 0 - 10 mg/dL    Ethanol % <0.010 %   C-reactive Protein    Specimen: Arm, Right; Blood   Result Value Ref Range    C-Reactive Protein 1.12 (H) 0.00 - 0.50 mg/dL   Sedimentation Rate    Specimen: Arm, Right; Blood   Result Value Ref Range    Sed Rate >100 (H) 0 - 30 mm/hr   Troponin    Specimen: Arm, Right; Blood   Result Value Ref Range    Troponin T <0.010 0.000 - 0.030 ng/mL   BNP    Specimen: Arm, Right; Blood   Result Value Ref Range    proBNP 908.6 0.0-1,800.0 pg/mL   CBC Auto Differential    Specimen: Arm, Right; Blood   Result Value Ref Range    WBC 7.79 3.40 - 10.80 10*3/mm3    RBC 3.77 3.77 - 5.28 10*6/mm3    Hemoglobin 9.4 (L) 12.0 - 15.9 g/dL    Hematocrit 32.4 (L) 34.0 - 46.6 %    MCV 85.9 79.0 - 97.0 fL    MCH 24.9 (L) 26.6 - 33.0 pg    MCHC 29.0 (L) 31.5 - 35.7 g/dL    RDW 14.5 12.3 - 15.4 %    RDW-SD 45.2 37.0 - 54.0 fl    MPV 9.3 6.0 - 12.0 fL    Platelets 238 140 - 450 10*3/mm3    Neutrophil % 80.9 (H) 42.7 - 76.0 %    Lymphocyte % 11.4 (L) 19.6 - 45.3 %    Monocyte % 4.6 (L) 5.0 - 12.0 %    Eosinophil % 2.4 0.3 - 6.2 %    Basophil % 0.4 0.0 - 1.5 %    Immature Grans % 0.3 0.0 - 0.5 %    Neutrophils, Absolute 6.30 1.70 - 7.00 10*3/mm3    Lymphocytes, Absolute 0.89 0.70 - 3.10 10*3/mm3    Monocytes, Absolute 0.36 0.10 - 0.90 10*3/mm3    Eosinophils, Absolute 0.19 0.00 - 0.40 10*3/mm3    Basophils, Absolute 0.03 0.00 - 0.20 10*3/mm3    Immature Grans, Absolute 0.02 0.00 - 0.05 10*3/mm3    nRBC 0.0 0.0 - 0.2 /100 WBC   Urinalysis, Microscopic Only - Urine, Clean Catch    Specimen: Urine, Clean Catch   Result Value Ref Range    RBC, UA 0-2 None Seen, 0-2 /HPF    WBC, UA 0-2 None Seen, 0-2 /HPF    Bacteria, UA 1+ (A) None Seen /HPF    Squamous Epithelial Cells, UA 3-6 (A) None Seen, 0-2 /HPF     Hyaline Casts, UA None Seen None Seen /LPF    Methodology Automated Microscopy    POC Glucose Once    Specimen: Blood   Result Value Ref Range    Glucose 135 (H) 70 - 130 mg/dL   POC Glucose Once    Specimen: Blood   Result Value Ref Range    Glucose 85 70 - 130 mg/dL     Ct Head Without Contrast    Result Date: 12/4/2020  Narrative: EXAM:   CT Head Without Intravenous Contrast  EXAM DATE:   12/4/2020 7:42 AM  CLINICAL HISTORY:   Mental status change, unknown cause  TECHNIQUE:   Axial computed tomography images of the head/brain without intravenous contrast.  Sagittal and coronal reformatted images were created and reviewed.  This CT exam was performed using one or more of the following dose reduction techniques:  automated exposure control, adjustment of the mA and/or kV according to patient size, and/or use of iterative reconstruction technique.  COMPARISON:   No relevant prior studies available.  FINDINGS:   BRAIN:  Unremarkable.  No hemorrhage.  No significant white matter disease.  No edema.   VENTRICLES:  Unremarkable.  No ventriculomegaly.   BONES/JOINTS:  Unremarkable.  No acute fracture.   SOFT TISSUES:  Unremarkable.   SINUSES:  Unremarkable as visualized.  No acute sinusitis.   MASTOID AIR CELLS:  Unremarkable as visualized.  No mastoid effusion.      Impression:   Unremarkable exam demonstrating no CT evidence of acute intracranial findings.  This report was finalized on 12/4/2020 8:33 AM by Dr. Francisco Javier Pacheco MD.      Xr Chest 1 View    Result Date: 12/4/2020  Narrative: EXAM:   XR Chest, 1 View  EXAM DATE:   12/4/2020 6:31 AM  CLINICAL HISTORY:   sob  TECHNIQUE:   Frontal view of the chest.  COMPARISON:   No relevant prior studies available.  FINDINGS:   LUNGS:  Coarsened interstitial markings.   PLEURAL SPACE:  Small right pleural effusion.  No pneumothorax.   HEART:  Cardiomegaly.   MEDIASTINUM:  Unremarkable.   BONES/JOINTS:  Unremarkable.      Impression: 1.  Small right pleural effusion. 2.   Cardiomegaly. 3.  Coarsened interstitial markings.  This report was finalized on 12/4/2020 8:34 AM by Dr. Francisco Javier Pacheco MD.      Us Venous Doppler Lower Extremity Right (duplex For Insufficiency)    Result Date: 11/5/2020  Narrative: US DUPLEX VENOUS DOPPLER UNILAT RIGHT FOR INSUFFICIENCY-  CLINICAL INDICATION: pain and edema; I50.9-Heart failure, unspecified; I50.33-Acute on chronic diastolic (congestive) heart failure   COMPARISON: None available  TECHNIQUE: Color Doppler imaging was used with compression and augmentation to evaluate the right lower extremity deep venous system.  FINDINGS: There is patent spontaneous flow from the common femoral vein through the posterior tibial veins. There was no internal clot or area of noncompressibility in the right lower extremity. Normal augmentation was elicited where applicable.        Impression: No DVT in the right lower extremity on today's exam.  This report was finalized on 11/5/2020 7:55 AM by Dr. Silvio Mcdonald MD.               ED Course  ED Course as of Dec 04 1018   Fri Dec 04, 2020   0650 Normal sinus rhythm  Left anterior fascicular block  Voltage criteria for left ventricular hypertrophy  Prolonged QT  Abnormal ECG  When compared with ECG of 03-NOV-2020 20:18,  No significant change was found  Vent. rate 73 BPM  RI interval 206 ms  QRS duration 94 ms  QT/QTc 448/493 ms  P-R-T axes 63 -60 64   ECG 12 Lead [ES]   0651 Patient was recently diagnosed from the hospital on 11/7/2020 the following diagnoses:  1. Acute on chronic diastolic CHF  2. Sinus bradycardia    3. ETHEL on stage IV CKD  4. Uncontrolled essential hypertension  5. Prolonged QTc  6. Normocytic anemia   7. Mildly elevated transaminases  8. Severe pulmonary hypertension, RVSP >55mmHg    [ES]   0651 When evaluated in the emergency department at bedside patient had no complaints.  She is not really aware why she is in the emergency department or why her family called to have her sent to the emergency  department.    [ES]   0652 Endorsed to Dr. Epps.    [ES]   1040 Care assumed from Dr. Solano at shift change.  Patient awake and alert with no complaints.  She states she is feeling better now.  She is    [BC]      ED Course User Index  [BC] Sanya Epps MD  [ES] Kervin Solano MD                                           MDM  Number of Diagnoses or Management Options     Amount and/or Complexity of Data Reviewed  Clinical lab tests: reviewed and ordered  Tests in the radiology section of CPT®: ordered and reviewed  Tests in the medicine section of CPT®: reviewed and ordered  Review and summarize past medical records: yes  Discuss the patient with other providers: yes  Independent visualization of images, tracings, or specimens: yes    Risk of Complications, Morbidity, and/or Mortality  Presenting problems: moderate  Diagnostic procedures: moderate  Management options: moderate    Patient Progress  Patient progress: stable      Final diagnoses:   Hypoglycemia   Diabetes mellitus type 2, insulin dependent (CMS/[facility])            Sanya Epps MD  12/04/20 1011

## 2020-12-05 LAB
QT INTERVAL: 448 MS
QTC INTERVAL: 493 MS

## 2020-12-07 ENCOUNTER — PATIENT OUTREACH (OUTPATIENT)
Dept: CASE MANAGEMENT | Facility: OTHER | Age: 75
End: 2020-12-07

## 2020-12-07 NOTE — OUTREACH NOTE
Care Plan Note      Responses   Lifestyle Goals  Self monitor blood sugar   Barriers  Disease education   Self Management  Medication Adherence, Home Glucose Monitoring, Other (See Comment) [Patient to keep BG Log to review with PCP at follow-up appointment.]   Annual Wellness Visit:   Patient Has Completed   Care Gaps Addressed  Colon Cancer Screening, Diabetic A1C, Flu Shot, Mammogram, Pneumonia Vaccine   Colon Cancer Screening Type  Colonoscopy   Colonoscopy Status  Up to Date (< 10 yrs)   HbA1c Status  Up to Date-within defined limits   HbA1c Completion at [facility] or Other  [facility]   Flu Shot Status  Up to Date   Flu Shot Completion at [facility] or Other  [facility]   Mammogram Status  Up to Date   Pneumonia Vaccine Status  Up to Date   Specific Disease Process Teaching  Diabetes   Does patient have depression diagnosis?  Yes   Advanced Directives:  Not Interested At This Time   Ed Visits past 12 months:  1   Hospitalizations past 12 months  3 or more   Discharged From:  [facility] ED   Discharged to:  Home to follow with PCP   Discharge Date:  12/04/20   Medication Adherence  Medications understood        The main concerns and/or symptoms the patient would like to address are: RN-ACM outreach to patient.  Patient had ED visit at [facility] 12/04/20.  Patient presented via EMS with c/o hypoglycemia.  Patient was treated and discharged to home without acute admission.  Medications changes noted at discharge include reduction of insulin from 80 units to 60 units.  Patient voiced awareness and compliance although she tested her bg last evening and deemed 285 high enough to warrant 80 units.  She reported a bg this am of 65.  Education provided.  Patient stated she is eating adequate and timely meals and has orange juice and sugared candies to use if her bg is too low.  Patient reported her spouse is helpful in reminding her to test and monitoring her for low bg.  Patient stated she had been to see  Dr. Johnson/Nephrology this morning and discussed the bg highs/lows with him and has an appointment to follow with PCP Amisha on 12/11/20.      Education/instruction provided by Care Coordinator: RN-HECTOR provided education and encouraged patient to keep a BG Log to review with PCP at upcoming appointment.  Patient v/u.      Follow Up Outreach Due: Ongoing    Dinorah Vital RN  Ambulatory     12/7/2020, 11:49 EST

## 2020-12-09 ENCOUNTER — HOSPITAL ENCOUNTER (OUTPATIENT)
Dept: CARDIOLOGY | Facility: HOSPITAL | Age: 75
Discharge: HOME | End: 2020-12-09
Admitting: NURSE PRACTITIONER

## 2020-12-09 VITALS
WEIGHT: 226.8 LBS | HEIGHT: 63 IN | HEART RATE: 54 BPM | DIASTOLIC BLOOD PRESSURE: 70 MMHG | OXYGEN SATURATION: 100 % | RESPIRATION RATE: 20 BRPM | BODY MASS INDEX: 40.18 KG/M2 | SYSTOLIC BLOOD PRESSURE: 154 MMHG | TEMPERATURE: 97.1 F

## 2020-12-09 DIAGNOSIS — I50.32 CHRONIC DIASTOLIC CONGESTIVE HEART FAILURE (HCC): Primary | ICD-10-CM

## 2020-12-09 DIAGNOSIS — E66.01 MORBID OBESITY WITH BMI OF 40.0-44.9, ADULT (HCC): ICD-10-CM

## 2020-12-09 DIAGNOSIS — N18.4 CKD (CHRONIC KIDNEY DISEASE) STAGE 4, GFR 15-29 ML/MIN (HCC): ICD-10-CM

## 2020-12-09 DIAGNOSIS — I10 ESSENTIAL HYPERTENSION: ICD-10-CM

## 2020-12-09 LAB
ANION GAP SERPL CALCULATED.3IONS-SCNC: 11.1 MMOL/L (ref 5–15)
BUN SERPL-MCNC: 50 MG/DL (ref 8–23)
BUN/CREAT SERPL: 27.6 (ref 7–25)
CALCIUM SPEC-SCNC: 9.5 MG/DL (ref 8.6–10.5)
CHLORIDE SERPL-SCNC: 105 MMOL/L (ref 98–107)
CO2 SERPL-SCNC: 23.9 MMOL/L (ref 22–29)
CREAT SERPL-MCNC: 1.81 MG/DL (ref 0.57–1)
GFR SERPL CREATININE-BSD FRML MDRD: 27 ML/MIN/1.73
GLUCOSE SERPL-MCNC: 198 MG/DL (ref 65–99)
MAGNESIUM SERPL-MCNC: 2.4 MG/DL (ref 1.6–2.4)
NT-PROBNP SERPL-MCNC: 1353 PG/ML (ref 0–1800)
POTASSIUM SERPL-SCNC: 4.5 MMOL/L (ref 3.5–5.2)
SODIUM SERPL-SCNC: 140 MMOL/L (ref 136–145)

## 2020-12-09 PROCEDURE — 83880 ASSAY OF NATRIURETIC PEPTIDE: CPT

## 2020-12-09 PROCEDURE — 83735 ASSAY OF MAGNESIUM: CPT | Performed by: NURSE PRACTITIONER

## 2020-12-09 PROCEDURE — 80048 BASIC METABOLIC PNL TOTAL CA: CPT | Performed by: NURSE PRACTITIONER

## 2020-12-09 PROCEDURE — 99214 OFFICE O/P EST MOD 30 MIN: CPT | Performed by: NURSE PRACTITIONER

## 2020-12-09 PROCEDURE — 36415 COLL VENOUS BLD VENIPUNCTURE: CPT | Performed by: NURSE PRACTITIONER

## 2020-12-09 PROCEDURE — G0463 HOSPITAL OUTPT CLINIC VISIT: HCPCS | Performed by: PHARMACIST

## 2020-12-09 NOTE — PROGRESS NOTES
Heart Failure Clinic    Vitals:    12/09/20 1042   BP: 154/70   Pulse: 54   Resp: 20   Temp: 97.1 °F (36.2 °C)   SpO2: 100%    Mask Worn: Yes     Method of arrival: Ambulatory    Weighing self daily: Yes    Monitoring Heart Failure Zones: Yes    Today's HF Zone: Green    Taking medications as prescribed: Yes    Edema Yes; Some in feet. No Edema in legs.    Shortness of Air: No    Number of pillows used at night:<2    Educational Materials given:    Jackie Jasmine MA 12/09/20 10:44 EST

## 2020-12-09 NOTE — PROGRESS NOTES
Heart Failure Pharmacy Note  Patient Name: Britta Lee   Referring Provider: Priscila Holland  Primary Cardiologist: Anthony    Medication Use:   Adherence: No issues  Hx of med intolerances: bradycardia with metoprolol 100mg BID  Affordability: No issues reported   Retail Rx Management: none    Past Medical History:   Diagnosis Date   • Anemia    • Arthritis    • Carpal tunnel syndrome    • CHF (congestive heart failure) (CMS/formerly [facility])    • Coronary artery disease    • Diabetes mellitus (CMS/formerly [facility])    • Elevated cholesterol    • GERD (gastroesophageal reflux disease)    • Heart disease    • High cholesterol    • History of pneumonia    • History of unsteady gait     OCASSIONALLY   • Hypertension    • Insomnia    • Kidney disease    • LENNY (obstructive sleep apnea) 12/1/2020   • Osteoarthritis    • Renal insufficiency    • Sciatica      ALLERGIES: Patient has no known allergies.  Current Outpatient Medications   Medication Sig Dispense Refill   • amLODIPine (NORVASC) 10 MG tablet Take 1 tablet by mouth Every Night. 90 tablet 1   • aspirin 81 MG tablet Take 1 tablet by mouth Daily. 30 tablet 5   • atorvastatin (LIPITOR) 40 MG tablet Take 1 tablet by mouth Every Night. 90 tablet 1   • bumetanide (BUMEX) 1 MG tablet Take 1 tablet by mouth 3 (Three) Times a Week. Monday, Wednesday, and Friday (Patient taking differently: Take 1 mg by mouth Daily. Monday, Wednesday, and Friday) 30 tablet 3   • cloNIDine (CATAPRES) 0.2 MG tablet Take 1 tablet by mouth 3 (Three) Times a Day. 270 tablet 1   • escitalopram (LEXAPRO) 10 MG tablet Take 1 tablet by mouth Daily. 90 tablet 3   • ferrous sulfate 325 (65 Fe) MG tablet Take 1 tablet by mouth 2 (Two) Times a Day. 180 tablet 3   • hydrALAZINE (APRESOLINE) 50 MG tablet Take 1 tablet by mouth Every 8 (Eight) Hours. 270 tablet 1   • Insulin Glargine, 1 Unit Dial, (TOUJEO) 300 UNIT/ML solution pen-injector injection Inject 60 Units under the skin into the appropriate area as directed Every Night.   "0   • isosorbide mononitrate (IMDUR) 60 MG 24 hr tablet Take 1 tablet by mouth Daily. 90 tablet 1   • metoprolol succinate XL (TOPROL-XL) 25 MG 24 hr tablet Take 1 tablet by mouth Daily for 30 days. 90 tablet 1   • nystatin (MYCOSTATIN) 205735 UNIT/GM powder Apply  one application topically to the appropriate area as directed Every 12 (Twelve) Hours. 60 g 0   • Vitamin D, Cholecalciferol, (CHOLECALCIFEROL) 10 MCG (400 UNIT) tablet Take 400 Units by mouth Daily.     • Continuous Blood Gluc  (Dexcom G6 ) device 1 Device Daily. 1 Device 0   • Continuous Blood Gluc Sensor (Dexcom G6 Sensor) Every 10 (Ten) Days. 9 each 3   • Continuous Blood Gluc Transmit (Dexcom G6 Transmitter) misc 1 Device Daily. 1 each 0   • insulin lispro (humaLOG) 100 UNIT/ML injection Inject 5 Units under the skin into the appropriate area as directed 3 (Three) Times a Day Before Meals.       No current facility-administered medications for this encounter.        Vaccination History:   Pneumonia: Reports UTD  Annual Influenza: Reports UTD for 2020-21 Season    Objective  Vitals:    12/09/20 1042   BP: 154/70   BP Location: Left arm   Patient Position: Sitting   Pulse: 54   Resp: 20   Temp: 97.1 °F (36.2 °C)   TempSrc: Temporal   SpO2: 100%   Weight: 103 kg (226 lb 12.8 oz)   Height: 160 cm (63\")     Wt Readings from Last 3 Encounters:   12/09/20 103 kg (226 lb 12.8 oz)   12/04/20 105 kg (232 lb)   12/01/20 105 kg (232 lb)         12/09/20  1042   Weight: 103 kg (226 lb 12.8 oz)     Lab Results   Component Value Date    GLUCOSE 198 (H) 12/09/2020    BUN 50 (H) 12/09/2020    CREATININE 1.81 (H) 12/09/2020    EGFRIFNONA 27 (L) 12/09/2020    EGFRIFAFRI 41 (L) 07/30/2018    BCR 27.6 (H) 12/09/2020    K 4.5 12/09/2020    CO2 23.9 12/09/2020    CALCIUM 9.5 12/09/2020    PROTENTOTREF 7.7 07/30/2018    ALBUMIN 3.56 12/04/2020    LABIL2 1.1 (L) 07/30/2018    AST 50 (H) 12/04/2020    ALT 30 12/04/2020     Lab Results   Component Value Date    " WBC 7.79 12/04/2020    HGB 9.4 (L) 12/04/2020    HCT 32.4 (L) 12/04/2020    MCV 85.9 12/04/2020     12/04/2020     Lab Results   Component Value Date    CKTOTAL 67 12/04/2020    CKMB 2.92 04/16/2018    TROPONINI 0.012 04/16/2018    TROPONINT <0.010 12/04/2020     Lab Results   Component Value Date    PROBNP 1,353.0 12/09/2020     Results for orders placed during the hospital encounter of 11/03/20   Adult Transthoracic Echo Complete W/ Cont if Necessary Per Protocol    Narrative · Left ventricular wall thickness is consistent with concentric   hypertrophy.  · Left ventricular ejection fraction appears to be greater than 70%. Left   ventricular systolic function is hyperdynamic (EF > 70%).  · Left ventricular diastolic function is consistent with (grade II w/high   LAP) pseudonormalization.  · The left atrial cavity is moderate to severely dilated.  · The right atrial cavity is mildly dilated.  · Moderate mitral annular calcification is present.  · Mild mitral valve regurgitation is present.  · Mild tricuspid valve regurgitation is present.  · Estimated right ventricular systolic pressure from tricuspid   regurgitation is markedly elevated (>55 mmHg).                    Class   Drug   Dose Last Dose Adjustment   ACEi/ARB/ARNI      Beta Blocker Toprol XL  25mg 11/8/2020   MRA        Drug Therapy Problems:     1. Taking Loop diuretic differently than prescribed, Pt taking daily  2. Product not approved    Recommendations:    1. Consider bumetanide on PRN basis  2. Pt reports Dexcom was not approved. Since Pt had a hypoglycemic event which led to ED visit, highly encouraged Pt to discuss with PCP on Friday to see about getting Dexcom.     Patient was educated on heart failure medications and the importance of medication adherence. Also educated Pt on Rule of 15 and S/Sx of hypoglycemia. Recommended they get glucose tablets to have on hand but explained juice, regular soda would work as well.  AlsAll questions were  addressed and patient expressed understanding.     Thank you for allowing me to participate in the care of your patient,    Melisa Leahy, PharmD  12/09/20  11:28 EST

## 2020-12-09 NOTE — PROGRESS NOTES
"[facility] CHF CLINIC OFFICE VISIT    Subjective:   History of Present Illness   Britta Lee is a 75 y.o.  female who presents to the clinic today for follow up on heart failure. She is accompanied by her . She has a hx of diastolic congestive heart failure. Her EF was > 70% from echocardiogram on 11/4/2020. Due to abnormal kidney function we had stopped her Spironolactone and restarted Bumex 3 times weekly but she reports taking it everyday. She denies shortness of air and swelling. Her  reports its been a while since her feet were this small. She feels that she has \"christophe feet\". She reports home weights stable at 220 lbs,   home /60-70, HR 67 bpm per patient report (no log available for review today). She reports compliance in restricting sodium and fluids. She recently seen cardiology and he increased her Hydralazine to 50 mg TID and she seems to be tolerating that well. She has recently seen Dr. Johnson with no changes in medications per patients report. She continues on Hydralazine 50 mg TID, Imdur 60 mg daily, Metorpolol 25 mg daily, Norvasc 10 mg daily and Clonidine 0.2 mg 3x/day for HTN. Denies chest pain or palpitations. She was in the ER about 1 week ago for syncope/hypoglycemic episode. Her insulin was decreased from 80 mg to 60 mg and she seems to be stable and doing better. She denies any further hypoglycemia or syncopal episodes. She reports upcoming follow up with PCP.      Past medical history significant for HTN, DM type 2, CKD III, iron deficiency anemia, hyperlipidemia, obesity.     PCP: Lexie Dolan  Cardiologist: Dr. Cruz  Nephrologist: Dr. Johnson     Hospitalizations: Discharged on 11/8/2020  ER visit: 12/4/2020    Past Medical History:   Diagnosis Date   • Anemia    • Arthritis    • Carpal tunnel syndrome    • CHF (congestive heart failure) (CMS/HCC)    • Coronary artery disease    • Diabetes mellitus (CMS/HCC)    • Elevated cholesterol    • GERD (gastroesophageal reflux " disease)    • Heart disease    • High cholesterol    • History of pneumonia    • History of unsteady gait     OCASSIONALLY   • Hypertension    • Insomnia    • Kidney disease    • LENNY (obstructive sleep apnea) 12/1/2020   • Osteoarthritis    • Renal insufficiency    • Sciatica      Past Surgical History:   Procedure Laterality Date   • ABDOMINAL SURGERY     • APPENDECTOMY     • CARDIAC CATHETERIZATION      1 STENT  ---  2000   • CARDIAC SURGERY     • CAROTID STENT     • CARPAL TUNNEL RELEASE Right 10/8/2019    Procedure: CARPAL TUNNEL RELEASE;  Surgeon: Feroz Johnson MD;  Location: [facility];  Service: Orthopedics   • CATARACT EXTRACTION     • CHOLECYSTECTOMY     • COLONOSCOPY     • CORONARY ANGIOPLASTY WITH STENT PLACEMENT     • ENDOSCOPY     • ENDOSCOPY N/A 3/5/2020    Procedure: ESOPHAGOGASTRODUODENOSCOPY;  Surgeon: Alexandru Bagley MD;  Location: [facility];  Service: Gastroenterology;  Laterality: N/A;   • ENDOSCOPY AND COLONOSCOPY     • GALLBLADDER SURGERY     • JOINT REPLACEMENT Left 05/02/2017    [facility]  DR JOHNSON  LEFT TOTAL KNEE   • KNEE ARTHROSCOPY W/ MENISCECTOMY Right    • LAPAROSCOPIC TUBAL LIGATION     • AK TOTAL KNEE ARTHROPLASTY Left 5/2/2017    Procedure: TOTAL KNEE ARTHROPLASTY;  Surgeon: Feroz Johnson MD;  Location: [facility];  Service: Orthopedics   • STERILIZATION     • TONSILLECTOMY       Social History     Socioeconomic History   • Marital status:      Spouse name: natalie   • Number of children: 3   • Years of education: 12   • Highest education level: Not on file   Occupational History   • Occupation: retired   Social Needs   • Financial resource strain: Somewhat hard   • Food insecurity     Worry: Never true     Inability: Never true   • Transportation needs     Medical: Yes     Non-medical: No   Tobacco Use   • Smoking status: Never Smoker   • Smokeless tobacco: Never Used   Substance and Sexual Activity   • Alcohol use: Yes     Comment: socially   • Drug use: No   •  Sexual activity: Defer     Birth control/protection: Surgical   Lifestyle   • Physical activity     Days per week: 0 days     Minutes per session: 0 min   • Stress: Only a little     Family History   Problem Relation Age of Onset   • Arthritis Mother    • Diabetes Mother    • Cancer Mother    • Heart disease Father    • Diabetes Daughter    • Diabetes Son    • Diabetes Maternal Aunt    • Heart disease Maternal Grandmother    • Breast cancer Neg Hx      Allergies:  No Known Allergies    Review of Systems   Constitution: Negative for chills, fever, weight gain and weight loss.   HENT: Negative for ear discharge, hoarse voice and sore throat.    Eyes: Negative for discharge, double vision, pain, redness and visual disturbance.   Cardiovascular: Positive for dyspnea on exertion and leg swelling (improved). Negative for chest pain, claudication, cyanosis, irregular heartbeat, near-syncope, orthopnea, palpitations and syncope.   Respiratory: Negative for cough and wheezing.    Endocrine: Negative for cold intolerance, heat intolerance and polyuria.   Hematologic/Lymphatic: Negative for bleeding problem. Does not bruise/bleed easily.   Skin: Negative for color change, flushing and rash.   Musculoskeletal: Negative for arthritis, back pain, joint pain, joint swelling and muscle cramps.   Gastrointestinal: Negative for abdominal pain, constipation, diarrhea, nausea and vomiting.   Genitourinary: Negative for dysuria, flank pain, frequency, hesitancy and urgency.   Neurological: Negative for difficulty with concentration, dizziness, light-headedness, sensory change, vertigo and weakness.   Psychiatric/Behavioral: Negative for depression. The patient does not have insomnia and is not nervous/anxious.      Current Outpatient Medications   Medication Sig Dispense Refill   • amLODIPine (NORVASC) 10 MG tablet Take 1 tablet by mouth Every Night. 90 tablet 1   • aspirin 81 MG tablet Take 1 tablet by mouth Daily. 30 tablet 5   •  atorvastatin (LIPITOR) 40 MG tablet Take 1 tablet by mouth Every Night. 90 tablet 1   • bumetanide (BUMEX) 1 MG tablet Take 1 tablet by mouth 3 (Three) Times a Week. Monday, Wednesday, and Friday (Patient taking differently: Take 1 mg by mouth Daily. Monday, Wednesday, and Friday) 30 tablet 3   • cloNIDine (CATAPRES) 0.2 MG tablet Take 1 tablet by mouth 3 (Three) Times a Day. 270 tablet 1   • escitalopram (LEXAPRO) 10 MG tablet Take 1 tablet by mouth Daily. 90 tablet 3   • ferrous sulfate 325 (65 Fe) MG tablet Take 1 tablet by mouth 2 (Two) Times a Day. 180 tablet 3   • hydrALAZINE (APRESOLINE) 50 MG tablet Take 1 tablet by mouth Every 8 (Eight) Hours. 270 tablet 1   • Insulin Glargine, 1 Unit Dial, (TOUJEO) 300 UNIT/ML solution pen-injector injection Inject 60 Units under the skin into the appropriate area as directed Every Night.  0   • isosorbide mononitrate (IMDUR) 60 MG 24 hr tablet Take 1 tablet by mouth Daily. 90 tablet 1   • metoprolol succinate XL (TOPROL-XL) 25 MG 24 hr tablet Take 1 tablet by mouth Daily for 30 days. 90 tablet 1   • nystatin (MYCOSTATIN) 045157 UNIT/GM powder Apply  one application topically to the appropriate area as directed Every 12 (Twelve) Hours. 60 g 0   • Vitamin D, Cholecalciferol, (CHOLECALCIFEROL) 10 MCG (400 UNIT) tablet Take 400 Units by mouth Daily.     • Continuous Blood Gluc  (Dexcom G6 ) device 1 Device Daily. 1 Device 0   • Continuous Blood Gluc Sensor (Dexcom G6 Sensor) Every 10 (Ten) Days. 9 each 3   • Continuous Blood Gluc Transmit (Dexcom G6 Transmitter) misc 1 Device Daily. 1 each 0   • insulin lispro (humaLOG) 100 UNIT/ML injection Inject 5 Units under the skin into the appropriate area as directed 3 (Three) Times a Day Before Meals.       No current facility-administered medications for this encounter.      Objective:     Vitals:    12/09/20 1042   BP: 154/70   Pulse: 54   Resp: 20   Temp: 97.1 °F (36.2 °C)   SpO2: 100%   weight down several pounds  from last visit  Wt Readings from Last 3 Encounters:   12/09/20 103 kg (226 lb 12.8 oz)   12/04/20 105 kg (232 lb)   12/01/20 105 kg (232 lb)        Vitals signs reviewed.   Constitutional:       Appearance: Normal appearance. Well-developed.      Comments: Wears a mask during encounter, walked into clinic   Eyes:      Conjunctiva/sclera: Conjunctivae normal.   HENT:      Head: Normocephalic.   Neck:      Musculoskeletal: Normal range of motion and neck supple.      Vascular: No JVD or JVR.   Pulmonary:      Effort: Pulmonary effort is normal.      Breath sounds: Normal breath sounds.   Cardiovascular:      Normal rate. Regular rhythm.   Pulses:     Intact distal pulses.   Edema:     Peripheral edema present.     Ankle: bilateral 1+ edema of the ankle.     Feet: bilateral 1+ edema of the feet.  Abdominal:      General: Bowel sounds are normal.      Palpations: Abdomen is soft. There is no hepatomegaly or splenomegaly.   Musculoskeletal: Normal range of motion.   Skin:     General: Skin is warm and dry.   Neurological:      Mental Status: Alert and oriented to person, place, and time.   Psychiatric:         Attention and Perception: Attention normal.         Mood and Affect: Mood normal.         Speech: Speech normal.         Behavior: Behavior normal. Behavior is cooperative.         Cognition and Memory: Cognition normal.     Cardiographics  Results for orders placed during the hospital encounter of 11/03/20   Adult Transthoracic Echo Complete W/ Cont if Necessary Per Protocol    Narrative · Left ventricular wall thickness is consistent with concentric   hypertrophy.  · Left ventricular ejection fraction appears to be greater than 70%. Left   ventricular systolic function is hyperdynamic (EF > 70%).  · Left ventricular diastolic function is consistent with (grade II w/high   LAP) pseudonormalization.  · The left atrial cavity is moderate to severely dilated.  · The right atrial cavity is mildly dilated.  · Moderate  mitral annular calcification is present.  · Mild mitral valve regurgitation is present.  · Mild tricuspid valve regurgitation is present.  · Estimated right ventricular systolic pressure from tricuspid   regurgitation is markedly elevated (>55 mmHg).        EK/3/2020    Chest x-ray: 11/3/2020    IMPRESSION:  CHF/edema with small effusions    Lab Review   Lab Results   Component Value Date    TSH 4.650 (H) 2020     Lab Results   Component Value Date    GLUCOSE 117 (H) 2020    BUN 42 (H) 2020    CREATININE 1.98 (H) 2020    EGFRIFNONA 24 (L) 2020    EGFRIFAFRI 41 (L) 2018    BCR 21.2 2020    K 5.0 2020    CO2 19.9 (L) 2020    CALCIUM 9.4 2020    PROTENTOTREF 7.7 2018    ALBUMIN 3.56 2020    LABIL2 1.1 (L) 2018    AST 50 (H) 2020    ALT 30 2020     Lab Results   Component Value Date    WBC 7.79 2020    HGB 9.4 (L) 2020    HCT 32.4 (L) 2020    MCV 85.9 2020     2020     Lab Results   Component Value Date    CKTOTAL 67 2020    CKMB 2.92 2018    TROPONINI 0.012 2018    TROPONINT <0.010 2020     Lab Results   Component Value Date    PROBNP 908.6 2020     The following portions of the patient's history were reviewed and updated as appropriate: allergies, current medications, past family history, past medical history, past social history, past surgical history and problem list.     Old records reviewed and pertinent information is included in the above objective data.     Assessment/Plan:   1. Chronic Diastolic Congestive Heart Failure, EF > 70%   2. HTN  3. CKD, stage IV  4. Obesity     1. Pro-BNP, BMP, magnesium today  2. Discussed and reviewed labs with patient and  today.   3. Continue on Bumex 1 mg daily  4. Monitor BP    5.Continue on current medications   6. Referred for sleep evaluation to r/o LENNY - awaiting appointment.   7. Follow up in 3 weeks,  sooner if needed.     30 minutes face to face spent counseling patient extensively on dietary Na+ intake, importance of activity, weight monitoring, compliance with medications and follow up appointments.

## 2020-12-11 ENCOUNTER — OFFICE VISIT (OUTPATIENT)
Dept: FAMILY MEDICINE CLINIC | Facility: CLINIC | Age: 75
End: 2020-12-11

## 2020-12-11 VITALS
HEIGHT: 63 IN | SYSTOLIC BLOOD PRESSURE: 105 MMHG | WEIGHT: 227 LBS | HEART RATE: 66 BPM | BODY MASS INDEX: 40.22 KG/M2 | DIASTOLIC BLOOD PRESSURE: 70 MMHG | TEMPERATURE: 96.9 F | OXYGEN SATURATION: 98 %

## 2020-12-11 DIAGNOSIS — IMO0002 TYPE 2 DIABETES MELLITUS, UNCONTROLLED, WITH NEUROPATHY: Primary | ICD-10-CM

## 2020-12-11 DIAGNOSIS — Z79.4 TYPE 2 DIABETES MELLITUS WITH STAGE 4 CHRONIC KIDNEY DISEASE, WITH LONG-TERM CURRENT USE OF INSULIN (HCC): ICD-10-CM

## 2020-12-11 DIAGNOSIS — I10 ESSENTIAL HYPERTENSION: ICD-10-CM

## 2020-12-11 DIAGNOSIS — E78.2 MIXED HYPERLIPIDEMIA: ICD-10-CM

## 2020-12-11 DIAGNOSIS — E11.22 TYPE 2 DIABETES MELLITUS WITH STAGE 4 CHRONIC KIDNEY DISEASE, WITH LONG-TERM CURRENT USE OF INSULIN (HCC): ICD-10-CM

## 2020-12-11 DIAGNOSIS — N18.4 TYPE 2 DIABETES MELLITUS WITH STAGE 4 CHRONIC KIDNEY DISEASE, WITH LONG-TERM CURRENT USE OF INSULIN (HCC): ICD-10-CM

## 2020-12-11 PROCEDURE — 99214 OFFICE O/P EST MOD 30 MIN: CPT | Performed by: PHYSICIAN ASSISTANT

## 2020-12-11 RX ORDER — BUMETANIDE 1 MG/1
1 TABLET ORAL DAILY
Qty: 30 TABLET | Refills: 3
Start: 2020-12-11 | End: 2020-12-17 | Stop reason: SDUPTHER

## 2020-12-13 NOTE — PROGRESS NOTES
Subjective   Britta Lee is a 75 y.o. female.       Chief Complaint -diabetes    History of Present Illness -      Diabetes-  She had some hypoglycemia recently.  She went to the emergency room and Toujeo was decreased from 80 units nightly to 60 units nightly.  She uses 5 units mealtime insulin before every meal if glucose greater than 150.  She states that blood glucose was 132, 176, and 109 2-hour postprandially within the last few days with fasting blood glucose this morning 150.  She does have associated peripheral neuropathy and kidney disease.    She checks her blood glucose 4 times daily.  She needs to take injections up to 4 times daily of insulin for glucose control.  She would benefit from Dexcom monitoring system to improve glucose control,    Chronic kidney disease-  Not at goal with continued chronic kidney disease and insufficient GFR.  She states that she was started on hydralazine by her nephrologist since the last visit.    Hypertension-controlled with amlodipine, clonidine, metoprolol and Bumex    Hyperlipidemia-stable with atorvastatin and diet  Lab Results   Component Value Date    CHOL 125 09/04/2020    CHOL 136 02/21/2020    CHOL 121 11/26/2019    CHLPL 128 06/25/2018    CHLPL 100 03/26/2018    CHLPL 166 10/20/2015     Lab Results   Component Value Date    TRIG 128 09/04/2020    TRIG 170 (H) 02/21/2020    TRIG 150 11/26/2019     Lab Results   Component Value Date    HDL 36 (L) 09/04/2020    HDL 41 02/21/2020    HDL 32 (L) 11/26/2019     Lab Results   Component Value Date    LDL 63 09/04/2020    LDL 61 02/21/2020    LDL 59 11/26/2019         The following portions of the patient's history were reviewed and updated as appropriate: allergies, current medications, past family history, past medical history, past social history, past surgical history and problem list.    Review of Systems   Constitutional: Positive for fatigue. Negative for activity change, appetite change and fever.   HENT:  "Negative for ear pain, sinus pressure and sore throat.    Eyes: Negative for pain and visual disturbance.   Respiratory: Negative for cough and chest tightness.    Cardiovascular: Negative for chest pain and palpitations.   Gastrointestinal: Negative for abdominal pain, constipation, diarrhea, nausea and vomiting.   Endocrine: Negative for polydipsia and polyuria.   Genitourinary: Negative for dysuria and frequency.   Musculoskeletal: Negative for back pain and myalgias.   Skin: Negative for color change and rash.        Fingers sore due to checking blood glucose multiple times daily   Allergic/Immunologic: Negative for food allergies and immunocompromised state.   Neurological: Negative for dizziness, syncope and headaches.   Hematological: Negative for adenopathy. Does not bruise/bleed easily.   Psychiatric/Behavioral: Negative for hallucinations and suicidal ideas. The patient is not nervous/anxious.        /70   Pulse 66   Temp 96.9 °F (36.1 °C) (Temporal)   Ht 160 cm (63\")   Wt 103 kg (227 lb)   SpO2 98%   BMI 40.21 kg/m²   Hospital Outpatient Visit on 12/09/2020   Component Date Value Ref Range Status   • proBNP 12/09/2020 1,353.0  0.0-1,800.0 pg/mL Final   • Magnesium 12/09/2020 2.4  1.6 - 2.4 mg/dL Final   • Glucose 12/09/2020 198* 65 - 99 mg/dL Final   • BUN 12/09/2020 50* 8 - 23 mg/dL Final   • Creatinine 12/09/2020 1.81* 0.57 - 1.00 mg/dL Final   • Sodium 12/09/2020 140  136 - 145 mmol/L Final   • Potassium 12/09/2020 4.5  3.5 - 5.2 mmol/L Final   • Chloride 12/09/2020 105  98 - 107 mmol/L Final   • CO2 12/09/2020 23.9  22.0 - 29.0 mmol/L Final   • Calcium 12/09/2020 9.5  8.6 - 10.5 mg/dL Final   • eGFR Non African Amer 12/09/2020 27* >60 mL/min/1.73 Final   • BUN/Creatinine Ratio 12/09/2020 27.6* 7.0 - 25.0 Final   • Anion Gap 12/09/2020 11.1  5.0 - 15.0 mmol/L Final       Physical Exam  Vitals signs and nursing note reviewed.   Constitutional:       Appearance: Normal appearance. She is " well-developed. She is obese.   HENT:      Head: Normocephalic and atraumatic.      Right Ear: Tympanic membrane normal.      Left Ear: Tympanic membrane normal.      Nose: Nose normal.      Mouth/Throat:      Mouth: Mucous membranes are moist.      Pharynx: No oropharyngeal exudate or posterior oropharyngeal erythema.   Eyes:      Extraocular Movements: Extraocular movements intact.      Conjunctiva/sclera: Conjunctivae normal.   Neck:      Musculoskeletal: Normal range of motion and neck supple.      Thyroid: No thyromegaly.      Trachea: No tracheal deviation.   Cardiovascular:      Rate and Rhythm: Normal rate and regular rhythm.      Heart sounds: Normal heart sounds. No murmur.   Pulmonary:      Effort: Pulmonary effort is normal. No respiratory distress.      Breath sounds: Normal breath sounds. No wheezing.   Abdominal:      General: Bowel sounds are normal.      Palpations: Abdomen is soft.      Tenderness: There is no abdominal tenderness. There is no guarding.   Musculoskeletal: Normal range of motion.         General: No tenderness.   Lymphadenopathy:      Cervical: No cervical adenopathy.   Skin:     General: Skin is warm and dry.      Findings: No rash.   Neurological:      General: No focal deficit present.      Mental Status: She is alert and oriented to person, place, and time.   Psychiatric:         Mood and Affect: Mood normal.         Behavior: Behavior normal.         Thought Content: Thought content normal.         Assessment/Plan     Diagnoses and all orders for this visit:    1. Type 2 diabetes mellitus, uncontrolled, with neuropathy (CMS/HCC) (Primary)  Comments:  Decrease Toujeo 60 units nightly  Continue 5 units Humalog before every meal if blood glucose greater than 150  Orders:  -     Insulin Pen Needle 32G X 5 MM misc; 1 each 4 (Four) Times a Day.  Dispense: 120 each; Refill: 5    2. Type 2 diabetes mellitus with stage 4 chronic kidney disease, with long-term current use of insulin  (CMS/[facility])  Comments:  Continue hydralazine as advised by Dr. Johnson    3. Essential hypertension  Comments:  Advised to check blood pressure and pulse daily  Continue current medications    4. Mixed hyperlipidemia  Comments:  Advised low-cholesterol diet  Continue atorvastatin    15 minutes of total face-to-face 25-minute office visit spent counseling the patient on diabetes and hypoglycemia.  She was advised to keep appointment with JAEL Locke APRN on 12/22/2020.  I will follow-up with her approximately 3 weeks later on 1/11/2021.  Patient advised that should she develop any hypoglycemia or hyperglycemic episodes she should contact the office and will be glad to evaluate for further medication adjustment needs.  We also discussed that she should have liquid glucose or glucose tablets, peanut butter or orange juice at home should she develop hypoglycemia.      This document has been electronically signed by:  Lexie Dolan PA-C

## 2020-12-13 NOTE — PATIENT INSTRUCTIONS
"Carbohydrate Counting for Diabetes Mellitus, Adult    Carbohydrate counting is a method of keeping track of how many carbohydrates you eat. Eating carbohydrates naturally increases the amount of sugar (glucose) in the blood. Counting how many carbohydrates you eat helps keep your blood glucose within normal limits, which helps you manage your diabetes (diabetes mellitus).  It is important to know how many carbohydrates you can safely have in each meal. This is different for every person. A diet and nutrition specialist (registered dietitian) can help you make a meal plan and calculate how many carbohydrates you should have at each meal and snack.  Carbohydrates are found in the following foods:  · Grains, such as breads and cereals.  · Dried beans and soy products.  · Starchy vegetables, such as potatoes, peas, and corn.  · Fruit and fruit juices.  · Milk and yogurt.  · Sweets and snack foods, such as cake, cookies, candy, chips, and soft drinks.  How do I count carbohydrates?  There are two ways to count carbohydrates in food. You can use either of the methods or a combination of both.  Reading \"Nutrition Facts\" on packaged food  The \"Nutrition Facts\" list is included on the labels of almost all packaged foods and beverages in the [place] It includes:  · The serving size.  · Information about nutrients in each serving, including the grams (g) of carbohydrate per serving.  To use the “Nutrition Facts\":  · Decide how many servings you will have.  · Multiply the number of servings by the number of carbohydrates per serving.  · The resulting number is the total amount of carbohydrates that you will be having.  Learning standard serving sizes of other foods  When you eat carbohydrate foods that are not packaged or do not include \"Nutrition Facts\" on the label, you need to measure the servings in order to count the amount of carbohydrates:  · Measure the foods that you will eat with a food scale or measuring cup, if " needed.  · Decide how many standard-size servings you will eat.  · Multiply the number of servings by 15. Most carbohydrate-rich foods have about 15 g of carbohydrates per serving.  ? For example, if you eat 8 oz (170 g) of strawberries, you will have eaten 2 servings and 30 g of carbohydrates (2 servings x 15 g = 30 g).  · For foods that have more than one food mixed, such as soups and casseroles, you must count the carbohydrates in each food that is included.  The following list contains standard serving sizes of common carbohydrate-rich foods. Each of these servings has about 15 g of carbohydrates:  · ½ hamburger bun or ½ English muffin.  · ½ oz (15 mL) syrup.  · ½ oz (14 g) jelly.  · 1 slice of bread.  · 1 six-inch tortilla.  · 3 oz (85 g) cooked rice or pasta.  · 4 oz (113 g) cooked dried beans.  · 4 oz (113 g) starchy vegetable, such as peas, corn, or potatoes.  · 4 oz (113 g) hot cereal.  · 4 oz (113 g) mashed potatoes or ¼ of a large baked potato.  · 4 oz (113 g) canned or frozen fruit.  · 4 oz (120 mL) fruit juice.  · 4-6 crackers.  · 6 chicken nuggets.  · 6 oz (170 g) unsweetened dry cereal.  · 6 oz (170 g) plain fat-free yogurt or yogurt sweetened with artificial sweeteners.  · 8 oz (240 mL) milk.  · 8 oz (170 g) fresh fruit or one small piece of fruit.  · 24 oz (680 g) popped popcorn.  Example of carbohydrate counting  Sample meal  · 3 oz (85 g) chicken breast.  · 6 oz (170 g) brown rice.  · 4 oz (113 g) corn.  · 8 oz (240 mL) milk.  · 8 oz (170 g) strawberries with sugar-free whipped topping.  Carbohydrate calculation  1. Identify the foods that contain carbohydrates:  ? Rice.  ? Corn.  ? Milk.  ? Strawberries.  2. Calculate how many servings you have of each food:  ? 2 servings rice.  ? 1 serving corn.  ? 1 serving milk.  ? 1 serving strawberries.  3. Multiply each number of servings by 15 g:  ? 2 servings rice x 15 g = 30 g.  ? 1 serving corn x 15 g = 15 g.  ? 1 serving milk x 15 g = 15 g.  ? 1  serving strawberries x 15 g = 15 g.  4. Add together all of the amounts to find the total grams of carbohydrates eaten:  ? 30 g + 15 g + 15 g + 15 g = 75 g of carbohydrates total.  Summary  · Carbohydrate counting is a method of keeping track of how many carbohydrates you eat.  · Eating carbohydrates naturally increases the amount of sugar (glucose) in the blood.  · Counting how many carbohydrates you eat helps keep your blood glucose within normal limits, which helps you manage your diabetes.  · A diet and nutrition specialist (registered dietitian) can help you make a meal plan and calculate how many carbohydrates you should have at each meal and snack.  This information is not intended to replace advice given to you by your health care provider. Make sure you discuss any questions you have with your health care provider.  Document Revised: 07/12/2018 Document Reviewed: 05/31/2017  Elsevier Patient Education © 2020 [employer]

## 2020-12-17 DIAGNOSIS — I50.32 CHRONIC DIASTOLIC CONGESTIVE HEART FAILURE (HCC): ICD-10-CM

## 2020-12-17 RX ORDER — BUMETANIDE 1 MG/1
1 TABLET ORAL DAILY
Qty: 30 TABLET | Refills: 3 | Status: SHIPPED | OUTPATIENT
Start: 2020-12-17 | End: 2020-12-28

## 2020-12-17 RX ORDER — BUMETANIDE 1 MG/1
1 TABLET ORAL DAILY
Qty: 30 TABLET | Refills: 3
Start: 2020-12-17

## 2020-12-17 NOTE — TELEPHONE ENCOUNTER
Caller: Jesus Britta    Relationship: Self    Best call back number: [phone]   Medication needed:   Requested Prescriptions     Pending Prescriptions Disp Refills   • bumetanide (BUMEX) 1 MG tablet 30 tablet 3     Sig: Take 1 tablet by mouth Daily. Monday, Wednesday, and Friday       When do you need the refill by: ASAP  What details did the patient provide when requesting the medication: OUT OF MEDICATION    Does the patient have less than a 3 day supply:  [x] Yes  [] No    What is the patient's preferred pharmacy: [facility] [address] - [phone]  - [phone]       PLEASE CALL PATIENT IF QUESTIONS -[phone]

## 2020-12-22 ENCOUNTER — OFFICE VISIT (OUTPATIENT)
Dept: FAMILY MEDICINE CLINIC | Facility: CLINIC | Age: 75
End: 2020-12-22

## 2020-12-22 VITALS
RESPIRATION RATE: 14 BRPM | HEART RATE: 85 BPM | WEIGHT: 225 LBS | OXYGEN SATURATION: 98 % | TEMPERATURE: 96.9 F | DIASTOLIC BLOOD PRESSURE: 70 MMHG | HEIGHT: 63 IN | SYSTOLIC BLOOD PRESSURE: 130 MMHG | BODY MASS INDEX: 39.87 KG/M2

## 2020-12-22 DIAGNOSIS — I50.32 CHRONIC DIASTOLIC CONGESTIVE HEART FAILURE (HCC): Chronic | ICD-10-CM

## 2020-12-22 DIAGNOSIS — N18.4 TYPE 2 DIABETES MELLITUS WITH STAGE 4 CHRONIC KIDNEY DISEASE, WITH LONG-TERM CURRENT USE OF INSULIN (HCC): Primary | Chronic | ICD-10-CM

## 2020-12-22 DIAGNOSIS — E78.2 MIXED HYPERLIPIDEMIA: Chronic | ICD-10-CM

## 2020-12-22 DIAGNOSIS — E11.22 TYPE 2 DIABETES MELLITUS WITH STAGE 4 CHRONIC KIDNEY DISEASE, WITH LONG-TERM CURRENT USE OF INSULIN (HCC): Primary | Chronic | ICD-10-CM

## 2020-12-22 DIAGNOSIS — Z79.4 TYPE 2 DIABETES MELLITUS WITH STAGE 4 CHRONIC KIDNEY DISEASE, WITH LONG-TERM CURRENT USE OF INSULIN (HCC): Primary | Chronic | ICD-10-CM

## 2020-12-22 DIAGNOSIS — I10 ESSENTIAL HYPERTENSION: Chronic | ICD-10-CM

## 2020-12-22 PROCEDURE — 99214 OFFICE O/P EST MOD 30 MIN: CPT | Performed by: NURSE PRACTITIONER

## 2020-12-22 NOTE — PROGRESS NOTES
History of Present Illness   Britta Lee is a 74 yo female who presents to the clinic today pertaining to her  DM, type 2 which is complicated by peripheral neuropathy, diabetic nephropathy and retinopathy. In addition, Britta has HTN, Dyslipidemia, and Venous Insufficiency of Lower Extremities. She was hospitalized from 11/03 to 11/08/2020 with Acute on Chronic CHF. She has been referred to the [facility] Failure [facility] and has had several appointments. She is weighing herself daily, following her medication regimen and monitoring her sodium intake.     Diabetes   She has type 2 diabetes mellitus. The initial diagnosis of diabetes was made 20 years ago.  Diabetic complications include heart disease, nephropathy, peripheral neuropathy, PVD and retinopathy. Current diabetic treatment includes insulin injections (Toujeo and Novolog). Compliance with diabetes treatment: Dependent on available of insulin. She generally checks her blood sugars at least three to four times daily.  When asked about meal planning, she is really trying to follow her recommended nutrition plan which does include a combination Healthy renal and cardiovascular plan.  She has had a previous visit with a dietitian. She rarely participates in exercise. She had been prescribed  a DexCom which she has received and is in the process of getting started on it. She has decreased her Toujeo to 60 units due to several episodes of hypoglycemia.   Lab Results   Component Value Date    HGBA1C 7.30 (H) 11/03/2020     Lab Results   Component Value Date    HGBA1C 8.40 (H) 09/04/2020     Hypertension  Compliance with treatment has been good. Well controlled with Norvasc, clonidine, metoprolol, HCTZ and Accupril. She does report lower extremity swelling throughout the day which is improving with Bumex. She does have compression stockings but find them very difficult to apply.   Lab Results   Component Value Date    GLUCOSE 198 (H) 12/09/2020    BUN 50 (H)  "12/09/2020    CREATININE 1.81 (H) 12/09/2020    EGFRIFNONA 27 (L) 12/09/2020    BCR 27.6 (H) 12/09/2020    K 4.5 12/09/2020    CO2 23.9 12/09/2020    CALCIUM 9.5 12/09/2020    ALBUMIN 3.56 12/04/2020    AST 50 (H) 12/04/2020    ALT 30 12/04/2020     Dyslipidemia  Compliance with treatment has been good. The patient exercises seldom due to her Osteoarthritis.  She is currently being prescribed the following medication for her dyslipidemia - atorvastatin. Patient denies side effects associated with her medications. Most recent lipids include    Lab Results   Component Value Date    CHOL 125 09/04/2020    TRIG 128 09/04/2020    HDL 36 (L) 09/04/2020    LDL 63 09/04/2020     Vitals:    12/22/20 0849   BP: 130/70   BP Location: Left arm   Patient Position: Sitting   Cuff Size: Adult   Pulse: 85   Resp: 14   Temp: 96.9 °F (36.1 °C)   TempSrc: Temporal   SpO2: 98%   Weight: 102 kg (225 lb)   Height: 160 cm (62.99\")        The following portions of the patient's history were reviewed and updated as appropriate: allergies, current medications, past family history, past medical history, past social history, past surgical history and problem list.    Review of Systems   Constitutional: Positive for fatigue. Negative for activity change, appetite change, chills and diaphoresis.   HENT: Negative.    Eyes: Positive for visual disturbance.   Respiratory: Positive for shortness of breath (Improving). Negative for cough, chest tightness and wheezing.    Cardiovascular: Positive for leg swelling. Negative for chest pain and palpitations.   Gastrointestinal: Negative for nausea and vomiting.   Endocrine: Negative for polydipsia, polyphagia and polyuria.   Genitourinary: Negative for decreased urine volume.   Musculoskeletal: Positive for arthralgias and gait problem.   Skin: Positive for color change (Lower extremities) and bruise.   Allergic/Immunologic: Negative for environmental allergies.   Neurological: Positive for numbness. " Negative for dizziness and confusion.   Hematological: Bruises/bleeds easily.   Psychiatric/Behavioral: Positive for sleep disturbance and stress (Worries a great deal about finances and having her medications). The patient is not nervous/anxious.       Physical Exam  Vitals signs reviewed.   Constitutional:       Appearance: She is well-developed.      Comments: Pleasant; in good spirits. Wearing appropriate face covering   HENT:      Head: Normocephalic.      Nose: Nose normal.   Eyes:      General: No scleral icterus.        Right eye: No discharge.         Left eye: No discharge.      Conjunctiva/sclera: Conjunctivae normal.   Neck:      Musculoskeletal: Neck supple.      Thyroid: No thyromegaly.      Vascular: No JVD.   Cardiovascular:      Rate and Rhythm: Normal rate and regular rhythm.      Heart sounds: Normal heart sounds. No murmur. No friction rub.   Pulmonary:      Effort: No respiratory distress.      Breath sounds: Normal breath sounds. No wheezing or rales.   Abdominal:      General: Bowel sounds are normal. There is no distension.      Palpations: Abdomen is soft.      Tenderness: There is no abdominal tenderness. There is no guarding or rebound.   Musculoskeletal:      Right lower leg: Edema present.      Left lower leg: Edema present.   Lymphadenopathy:      Cervical: No cervical adenopathy.   Skin:     General: Skin is warm and dry.      Capillary Refill: Capillary refill takes less than 2 seconds.   Neurological:      Mental Status: She is alert and oriented to person, place, and time.   Psychiatric:         Mood and Affect: Mood normal.         Speech: Speech normal.         Behavior: Behavior is cooperative.         Thought Content: Thought content normal.         Cognition and Memory: Cognition normal.         Judgment: Judgment normal.       Assessment/Plan     Problems Addressed this Visit        Cardiovascular and Mediastinum    Essential hypertension    Hyperlipidemia    Chronic diastolic  congestive heart failure (CMS/[facility])       Endocrine    Type 2 diabetes mellitus with chronic kidney disease, with long-term current use of insulin (CMS/[facility]) - Primary      Diagnoses       Codes Comments    Type 2 diabetes mellitus with stage 4 chronic kidney disease, with long-term current use of insulin (CMS/[facility])    -  Primary ICD-10-CM: E11.22, N18.4, Z79.4  ICD-9-CM: 250.40, 585.4, V58.67 Call if she needs assistance with DexCom.Continue Levemir and Humalog    Essential hypertension     ICD-10-CM: I10  ICD-9-CM: 401.9 At target today    Mixed hyperlipidemia     ICD-10-CM: E78.2  ICD-9-CM: 272.2 Continue Atorvastatin    Chronic diastolic congestive heart failure (CMS/[facility])     ICD-10-CM: I50.32  ICD-9-CM: 428.32, 428.0 Encouraged to continue with the HF Clinic and home management program        Findings and recommendations discussed with Britta. She does bring her DexCom in today and needed assistance with putting the Sensor code into the device. Successfully accomplished this. Blood pressure is well controlled at this time. She will continue cardiovascular risk modifications and follow her HF home management program. Praise given for the job she has done..     This document has been electronically signed by ERNIE Locke, CHUN-BC, BRAULIOE  December 22, 2020 14:25 EST

## 2020-12-22 NOTE — PATIENT INSTRUCTIONS
Type 2 Diabetes Mellitus, Self Care, Adult  Caring for yourself after you have been diagnosed with type 2 diabetes (type 2 diabetes mellitus) means keeping your blood sugar (glucose) under control with a balance of:  · Nutrition.  · Exercise.  · Lifestyle changes.  · Medicines or insulin, if necessary.  · Support from your team of health care providers and others.  The following information explains what you need to know to manage your diabetes at home.  What are the risks?  Having diabetes can put you at risk for other long-term (chronic) conditions, such as heart disease and kidney disease. Your health care provider may prescribe medicines to help prevent complications from diabetes. These medicines may include:  · Aspirin.  · Medicine to lower cholesterol.  · Medicine to control blood pressure.  How to monitor blood glucose    · Check your blood glucose every day, as often as told by your health care provider.  · Have your A1c (hemoglobin A1c) level checked two or more times a year, or as often as told by your health care provider.  Your health care provider will set individualized treatment goals for you. Generally, the goal of treatment is to maintain the following blood glucose levels:  · Before meals (preprandial):  mg/dL (4.4-7.2 mmol/L).  · After meals (postprandial): below 180 mg/dL (10 mmol/L).  · A1c level: less than 7%.  How to manage hyperglycemia and hypoglycemia  Hyperglycemia symptoms  Hyperglycemia, also called high blood glucose, occurs when blood glucose is too high. Make sure you know the early signs of hyperglycemia, such as:  · Increased thirst.  · Hunger.  · Feeling very tired.  · Needing to urinate more often than usual.  · Blurry vision.  Hypoglycemia symptoms  Hypoglycemia, also called low blood glucose, occurs with a blood glucose level at or below 70 mg/dL (3.9 mmol/L). The risk for hypoglycemia increases during or after exercise, during sleep, during illness, and when skipping  meals or not eating for a long time (fasting).  It is important to know the symptoms of hypoglycemia and treat it right away. Always have a 15-gram rapid-acting carbohydrate snack with you to treat low blood glucose. Family members and close friends should also know the symptoms and should understand how to treat hypoglycemia, in case you are not able to treat yourself. Symptoms may include:  · Hunger.  · Anxiety.  · Sweating and feeling clammy.  · Confusion.  · Dizziness or feeling light-headed.  · Sleepiness.  · Nausea.  · Increased heart rate.  · Headache.  · Blurry vision.  · Irritability.  · A change in coordination.  · Tingling or numbness around the mouth, lips, or tongue.  · Restless sleep.  · Fainting.  · Seizure.  Treating hypoglycemia  If you are alert and able to swallow safely, follow the 15:15 rule:  · Take 15 grams of a rapid-acting carbohydrate. Talk with your health care provider about how much you should take.  · Rapid-acting options include:  ? Glucose pills (take 15 grams).  ? 6-8 pieces of hard candy.  ? 4-6 oz (120-150 mL) of fruit juice.  ? 4-6 oz (120-150 mL) of regular (not diet) soda.  ? 1 Tbsp (15 mL) honey or sugar.  · Check your blood glucose 15 minutes after you take the carbohydrate.  · If the repeat blood glucose level is still at or below 70 mg/dL (3.9 mmol/L), take 15 grams of a carbohydrate again.  · If your blood glucose level does not increase above 70 mg/dL (3.9 mmol/L) after 3 tries, seek emergency medical care.  · After your blood glucose level returns to normal, eat a meal or a snack within 1 hour.  Treating severe hypoglycemia  Severe hypoglycemia is when your blood glucose level is at or below 54 mg/dL (3 mmol/L). Severe hypoglycemia is an emergency. Do not wait to see if the symptoms will go away. Get medical help right away. Call your local emergency services (911 in the [place]  If you have severe hypoglycemia and you cannot eat or drink, you may need an injection of  glucagon. A family member or close friend should learn how to check your blood glucose and how to give you a glucagon injection. Ask your health care provider if you need to have an emergency glucagon injection kit available.  Severe hypoglycemia may need to be treated in a hospital. The treatment may include getting glucose through an IV. You may also need treatment for the cause of your hypoglycemia.  Follow these instructions at home:  Take diabetes medicines as told  · If your health care provider prescribed insulin or diabetes medicines, take them every day.  · Do not run out of insulin or other diabetes medicines that you take. Plan ahead so you always have these available.  · If you use insulin, adjust your dosage based on how physically active you are and what foods you eat. Your health care provider will tell you how to adjust your dosage.  Make healthy food choices    The things that you eat and drink affect your blood glucose and your insulin dosage. Making good choices helps to control your diabetes and prevent other health problems. A healthy meal plan includes eating lean proteins, complex carbohydrates, fresh fruits and vegetables, low-fat dairy products, and healthy fats.  Make an appointment to see a diet and nutrition specialist (registered dietitian) to help you create an eating plan that is right for you. Make sure that you:  · Follow instructions from your health care provider about eating or drinking restrictions.  · Drink enough fluid to keep your urine pale yellow.  · Keep a record of the carbohydrates that you eat. Do this by reading food labels and learning the standard serving sizes of foods.  · Follow your sick day plan whenever you cannot eat or drink as usual. Make this plan in advance with your health care provider.    Stay active  Exercise regularly, as told by your health care provider. This may include:  · Stretching and doing strength exercises, such as yoga or weightlifting, 2 or  more times a week.  · Doing 150 minutes or more of moderate-intensity or vigorous-intensity exercise each week. This could be brisk walking, biking, or water aerobics.  ? Spread out your activity over 3 or more days of the week.  ? Do not go more than 2 days in a row without doing some kind of physical activity.  When you start a new exercise or activity, work with your health care provider to adjust your insulin, medicines, or food intake as needed.  Make healthy lifestyle choices  · Do not use any tobacco products, such as cigarettes, chewing tobacco, and e-cigarettes. If you need help quitting, ask your health care provider.  · If your health care provider says that alcohol is safe for you, limit alcohol intake to no more than 1 drink per day for nonpregnant women and 2 drinks per day for men. One drink equals 12 oz of beer (355 mL), 5 oz of wine (148 mL), or 1½ oz of hard liquor (44 mL).  · Learn to manage stress. If you need help with this, ask your health care provider.  Care for your body    · Keep your immunizations up to date. In addition to getting vaccinations as told by your health care provider, it is recommended that you get vaccinated against the following illnesses:  ? The flu (influenza). Get a flu shot every year.  ? Pneumonia.  ? Hepatitis B.  · Schedule an eye exam soon after your diagnosis, and then one time every year after that.  · Check your skin and feet every day for cuts, bruises, redness, blisters, or sores. Schedule a foot exam with your health care provider once every year.  · Brush your teeth and gums two times a day, and floss one or more times a day. Visit your dentist one or more times every 6 months.  · Maintain a healthy weight.  General instructions  · Take over-the-counter and prescription medicines only as told by your health care provider.  · Share your diabetes management plan with people in your workplace, school, and household.  · Carry a medical alert card or wear medical  alert johnathan.  · Keep all follow-up visits as told by your health care provider. This is important.  Questions to ask your health care provider  · Do I need to meet with a diabetes educator?  · Where can I find a support group for people with diabetes?  Where to find more information  For more information about diabetes, visit:  · American Diabetes Association (ADA): www.diabetes.org  · American Association of Diabetes Educators (AADE): www.diabeteseducator.org  Summary  · Caring for yourself after you have been diagnosed with (type 2 diabetes mellitus) means keeping your blood sugar (glucose) under control with a balance of nutrition, exercise, lifestyle changes, and medicine.  · Check your blood glucose every day, as often as told by your health care provider.  · Having diabetes can put you at risk for other long-term (chronic) conditions, such as heart disease and kidney disease. Your health care provider may prescribe medicines to help prevent complications from diabetes.  · Keep all follow-up visits as told by your health care provider. This is important.  This information is not intended to replace advice given to you by your health care provider. Make sure you discuss any questions you have with your health care provider.  Document Revised: 06/10/2019 Document Reviewed: 01/20/2017  [employer] Patient Education © 2020 [employer]    Heart Failure, Self Care  Heart failure is a serious condition. This sheet explains things you need to do to take care of yourself at home. To help you stay as healthy as possible, you may be asked to change your diet, take certain medicines, and make other changes in your life. Your doctor may also give you more specific instructions. If you have problems or questions, call your doctor.  What are the risks?  Having heart failure makes it more likely for you to have some problems. These problems can get worse if you do not take good care of yourself. Problems may include:  · Blood  clotting problems. This may cause a stroke.  · Damage to the kidneys, liver, or lungs.  · Abnormal heart rhythms.  Supplies needed:  · Scale for weighing yourself.  · Blood pressure monitor.  · Notebook.  · Medicines.  How to care for yourself when you have heart failure  Medicines  Take over-the-counter and prescription medicines only as told by your doctor. Take your medicines every day.  · Do not stop taking your medicine unless your doctor tells you to do so.  · Do not skip any medicines.  · Get your prescriptions refilled before you run out of medicine. This is important.  Eating and drinking    · Eat heart-healthy foods. Talk with a diet specialist (dietitian) to create an eating plan.  · Choose foods that:  ? Have no trans fat.  ? Are low in saturated fat and cholesterol.  · Choose healthy foods, such as:  ? Fresh or frozen fruits and vegetables.  ? Fish.  ? Low-fat (lean) meats.  ? Legumes, such as beans, peas, and lentils.  ? Fat-free or low-fat dairy products.  ? Whole-grain foods.  ? High-fiber foods.  · Limit salt (sodium) if told by your doctor. Ask your diet specialist to tell you which seasonings are healthy for your heart.  · Cook in healthy ways instead of frying. Healthy ways of cooking include roasting, grilling, broiling, baking, poaching, steaming, and stir-frying.  · Limit how much fluid you drink, if told by your doctor.  Alcohol use  · Do not drink alcohol if:  ? Your doctor tells you not to drink.  ? Your heart was damaged by alcohol, or you have very bad heart failure.  ? You are pregnant, may be pregnant, or are planning to become pregnant.  · If you drink alcohol:  ? Limit how much you use to:  § 0-1 drink a day for women.  § 0-2 drinks a day for men.  ? Be aware of how much alcohol is in your drink. In the [place] one drink equals one 12 oz bottle of beer (355 mL), one 5 oz glass of wine (148 mL), or one 1½ oz glass of hard liquor (44 mL).  Lifestyle    · Do not use any products that  contain nicotine or tobacco, such as cigarettes, e-cigarettes, and chewing tobacco. If you need help quitting, ask your doctor.  ? Do not use nicotine gum or patches before talking to your doctor.  · Do not use illegal drugs.  · Lose weight if told by your doctor.  · Do physical activity if told by your doctor. Talk to your doctor before you begin an exercise if:  ? You are an older adult.  ? You have very bad heart failure.  · Learn to manage stress. If you need help, ask your doctor.  · Get rehab (rehabilitation) to help you stay independent and to help with your quality of life.  · Plan time to rest when you get tired.  Check weight and blood pressure    · Weigh yourself every day. This will help you to know if fluid is building up in your body.  ? Weigh yourself every morning after you pee (urinate) and before you eat breakfast.  ? Wear the same amount of clothing each time.  ? Write down your daily weight. Give your record to your doctor.  · Check and write down your blood pressure as told by your doctor.  · Check your pulse as told by your doctor.  Dealing with very hot and very cold weather  · If it is very hot:  ? Avoid activities that take a lot of energy.  ? Use air conditioning or fans, or find a cooler place.  ? Avoid caffeine and alcohol.  ? Wear clothing that is loose-fitting, lightweight, and light-colored.  · If it is very cold:  ? Avoid activities that take a lot of energy.  ? Layer your clothes.  ? Wear mittens or gloves, a hat, and a scarf when you go outside.  ? Avoid alcohol.  Follow these instructions at home:  · Stay up to date with shots (vaccines). Get pneumococcal and flu (influenza) shots.  · Keep all follow-up visits as told by your doctor. This is important.  Contact a doctor if:  · You gain weight quickly.  · You have increasing shortness of breath.  · You cannot do your normal activities.  · You get tired easily.  · You cough a lot.  · You don't feel like eating or feel like you may  "vomit (nauseous).  · You become puffy (swell) in your hands, feet, ankles, or belly (abdomen).  · You cannot sleep well because it is hard to breathe.  · You feel like your heart is beating fast (palpitations).  · You get dizzy when you stand up.  Get help right away if:  · You have trouble breathing.  · You or someone else notices a change in your behavior, such as having trouble staying awake.  · You have chest pain or discomfort.  · You pass out (faint).  These symptoms may be an emergency. Do not wait to see if the symptoms will go away. Get medical help right away. Call your local emergency services (911 in the [place] Do not drive yourself to the hospital.  Summary  · Heart failure is a serious condition. To care for yourself, you may have to change your diet, take medicines, and make other lifestyle changes.  · Take your medicines every day. Do not stop taking them unless your doctor tells you to do so.  · Eat heart-healthy foods, such as fresh or frozen fruits and vegetables, fish, lean meats, legumes, fat-free or low-fat dairy products, and whole-grain or high-fiber foods.  · Ask your doctor if you can drink alcohol. You may have to stop alcohol use if you have very bad heart failure.  · Contact your doctor if you gain weight quickly or feel that your heart is beating too fast. Get help right away if you pass out, or have chest pain or trouble breathing.  This information is not intended to replace advice given to you by your health care provider. Make sure you discuss any questions you have with your health care provider.  Document Revised: 03/31/2020 Document Reviewed: 04/01/2020  [employer] Patient Education © 2020 [employer]    DASH Eating Plan  DASH stands for \"Dietary Approaches to Stop Hypertension.\" The DASH eating plan is a healthy eating plan that has been shown to reduce high blood pressure (hypertension). It may also reduce your risk for type 2 diabetes, heart disease, and stroke. The DASH " "eating plan may also help with weight loss.  What are tips for following this plan?    General guidelines  · Avoid eating more than 2,300 mg (milligrams) of salt (sodium) a day. If you have hypertension, you may need to reduce your sodium intake to 1,500 mg a day.  · Limit alcohol intake to no more than 1 drink a day for nonpregnant women and 2 drinks a day for men. One drink equals 12 oz of beer, 5 oz of wine, or 1½ oz of hard liquor.  · Work with your health care provider to maintain a healthy body weight or to lose weight. Ask what an ideal weight is for you.  · Get at least 30 minutes of exercise that causes your heart to beat faster (aerobic exercise) most days of the week. Activities may include walking, swimming, or biking.  · Work with your health care provider or diet and nutrition specialist (dietitian) to adjust your eating plan to your individual calorie needs.  Reading food labels    · Check food labels for the amount of sodium per serving. Choose foods with less than 5 percent of the Daily Value of sodium. Generally, foods with less than 300 mg of sodium per serving fit into this eating plan.  · To find whole grains, look for the word \"whole\" as the first word in the ingredient list.  Shopping  · Buy products labeled as \"low-sodium\" or \"no salt added.\"  · Buy fresh foods. Avoid canned foods and premade or frozen meals.  Cooking  · Avoid adding salt when cooking. Use salt-free seasonings or herbs instead of table salt or sea salt. Check with your health care provider or pharmacist before using salt substitutes.  · Do not nice foods. Cook foods using healthy methods such as baking, boiling, grilling, and broiling instead.  · Cook with heart-healthy oils, such as olive, canola, soybean, or sunflower oil.  Meal planning  · Eat a balanced diet that includes:  ? 5 or more servings of fruits and vegetables each day. At each meal, try to fill half of your plate with fruits and vegetables.  ? Up to 6-8 servings " of whole grains each day.  ? Less than 6 oz of lean meat, poultry, or fish each day. A 3-oz serving of meat is about the same size as a deck of cards. One egg equals 1 oz.  ? 2 servings of low-fat dairy each day.  ? A serving of nuts, seeds, or beans 5 times each week.  ? Heart-healthy fats. Healthy fats called Omega-3 fatty acids are found in foods such as flaxseeds and coldwater fish, like sardines, salmon, and mackerel.  · Limit how much you eat of the following:  ? Canned or prepackaged foods.  ? Food that is high in trans fat, such as fried foods.  ? Food that is high in saturated fat, such as fatty meat.  ? Sweets, desserts, sugary drinks, and other foods with added sugar.  ? Full-fat dairy products.  · Do not salt foods before eating.  · Try to eat at least 2 vegetarian meals each week.  · Eat more home-cooked food and less restaurant, buffet, and fast food.  · When eating at a restaurant, ask that your food be prepared with less salt or no salt, if possible.  What foods are recommended?  The items listed may not be a complete list. Talk with your dietitian about what dietary choices are best for you.  Grains  Whole-grain or whole-wheat bread. Whole-grain or whole-wheat pasta. Brown rice. Oatmeal. Quinoa. Bulgur. Whole-grain and low-sodium cereals. Nila bread. Low-fat, low-sodium crackers. Whole-wheat flour tortillas.  Vegetables  Fresh or frozen vegetables (raw, steamed, roasted, or grilled). Low-sodium or reduced-sodium tomato and vegetable juice. Low-sodium or reduced-sodium tomato sauce and tomato paste. Low-sodium or reduced-sodium canned vegetables.  Fruits  All fresh, dried, or frozen fruit. Canned fruit in natural juice (without added sugar).  Meat and other protein foods  Skinless chicken or turkey. Ground chicken or turkey. Pork with fat trimmed off. Fish and seafood. Egg whites. Dried beans, peas, or lentils. Unsalted nuts, nut butters, and seeds. Unsalted canned beans. Lean cuts of beef with fat  trimmed off. Low-sodium, lean deli meat.  Dairy  Low-fat (1%) or fat-free (skim) milk. Fat-free, low-fat, or reduced-fat cheeses. Nonfat, low-sodium ricotta or cottage cheese. Low-fat or nonfat yogurt. Low-fat, low-sodium cheese.  Fats and oils  Soft margarine without trans fats. Vegetable oil. Low-fat, reduced-fat, or light mayonnaise and salad dressings (reduced-sodium). Canola, safflower, olive, soybean, and sunflower oils. Avocado.  Seasoning and other foods  Herbs. Spices. Seasoning mixes without salt. Unsalted popcorn and pretzels. Fat-free sweets.  What foods are not recommended?  The items listed may not be a complete list. Talk with your dietitian about what dietary choices are best for you.  Grains  Baked goods made with fat, such as croissants, muffins, or some breads. Dry pasta or rice meal packs.  Vegetables  Creamed or fried vegetables. Vegetables in a cheese sauce. Regular canned vegetables (not low-sodium or reduced-sodium). Regular canned tomato sauce and paste (not low-sodium or reduced-sodium). Regular tomato and vegetable juice (not low-sodium or reduced-sodium). Pickles. Olives.  Fruits  Canned fruit in a light or heavy syrup. Fried fruit. Fruit in cream or butter sauce.  Meat and other protein foods  Fatty cuts of meat. Ribs. Fried meat. [place]. Sausage. Bologna and other processed lunch meats. Salami. Fatback. Hotdogs. Bratwurst. Salted nuts and seeds. Canned beans with added salt. Canned or smoked fish. Whole eggs or egg yolks. Chicken or turkey with skin.  Dairy  Whole or 2% milk, cream, and half-and-half. Whole or full-fat cream cheese. Whole-fat or sweetened yogurt. Full-fat cheese. Nondairy creamers. Whipped toppings. Processed cheese and cheese spreads.  Fats and oils  Butter. Stick margarine. Lard. Shortening. Ghee. [place] fat. Tropical oils, such as coconut, palm kernel, or palm oil.  Seasoning and other foods  Salted popcorn and pretzels. Onion salt, garlic salt, seasoned salt, table  salt, and sea salt. [facility] sauce. Tartar sauce. Barbecue sauce. Teriyaki sauce. Soy sauce, including reduced-sodium. Steak sauce. Canned and packaged gravies. Fish sauce. Oyster sauce. Cocktail sauce. Horseradish that you find on the shelf. Ketchup. Mustard. Meat flavorings and tenderizers. Bouillon cubes. Hot sauce and Tabasco sauce. Premade or packaged marinades. Premade or packaged taco seasonings. Relishes. Regular salad dressings.  Where to find more information:  · National Heart, Lung, and [facility] [place]: www.nhlbi.nih.gov  · American Heart Association: www.heart.org  Summary  · The DASH eating plan is a healthy eating plan that has been shown to reduce high blood pressure (hypertension). It may also reduce your risk for type 2 diabetes, heart disease, and stroke.  · With the DASH eating plan, you should limit salt (sodium) intake to 2,300 mg a day. If you have hypertension, you may need to reduce your sodium intake to 1,500 mg a day.  · When on the DASH eating plan, aim to eat more fresh fruits and vegetables, whole grains, lean proteins, low-fat dairy, and heart-healthy fats.  · Work with your health care provider or diet and nutrition specialist (dietitian) to adjust your eating plan to your individual calorie needs.  This information is not intended to replace advice given to you by your health care provider. Make sure you discuss any questions you have with your health care provider.  Document Revised: 11/30/2018 Document Reviewed: 12/11/2017  Elsevier Patient Education © 2020 [employer]

## 2020-12-28 ENCOUNTER — HOSPITAL ENCOUNTER (OUTPATIENT)
Dept: CARDIOLOGY | Facility: HOSPITAL | Age: 75
Discharge: HOME | End: 2020-12-28
Admitting: NURSE PRACTITIONER

## 2020-12-28 VITALS
OXYGEN SATURATION: 99 % | BODY MASS INDEX: 40.15 KG/M2 | TEMPERATURE: 97.5 F | HEIGHT: 63 IN | RESPIRATION RATE: 22 BRPM | HEART RATE: 59 BPM | DIASTOLIC BLOOD PRESSURE: 45 MMHG | WEIGHT: 226.6 LBS | SYSTOLIC BLOOD PRESSURE: 122 MMHG

## 2020-12-28 DIAGNOSIS — I10 ESSENTIAL HYPERTENSION: ICD-10-CM

## 2020-12-28 DIAGNOSIS — E66.01 MORBID OBESITY WITH BMI OF 40.0-44.9, ADULT (HCC): ICD-10-CM

## 2020-12-28 DIAGNOSIS — N18.4 CKD (CHRONIC KIDNEY DISEASE) STAGE 4, GFR 15-29 ML/MIN (HCC): ICD-10-CM

## 2020-12-28 DIAGNOSIS — I50.32 CHRONIC DIASTOLIC CONGESTIVE HEART FAILURE (HCC): Primary | ICD-10-CM

## 2020-12-28 LAB
ANION GAP SERPL CALCULATED.3IONS-SCNC: 10.4 MMOL/L (ref 5–15)
BUN SERPL-MCNC: 48 MG/DL (ref 8–23)
BUN/CREAT SERPL: 22.7 (ref 7–25)
CALCIUM SPEC-SCNC: 9.4 MG/DL (ref 8.6–10.5)
CHLORIDE SERPL-SCNC: 104 MMOL/L (ref 98–107)
CO2 SERPL-SCNC: 20.6 MMOL/L (ref 22–29)
CREAT SERPL-MCNC: 2.11 MG/DL (ref 0.57–1)
GFR SERPL CREATININE-BSD FRML MDRD: 23 ML/MIN/1.73
GLUCOSE SERPL-MCNC: 152 MG/DL (ref 65–99)
MAGNESIUM SERPL-MCNC: 2.3 MG/DL (ref 1.6–2.4)
NT-PROBNP SERPL-MCNC: 1463 PG/ML (ref 0–1800)
POTASSIUM SERPL-SCNC: 4 MMOL/L (ref 3.5–5.2)
SODIUM SERPL-SCNC: 135 MMOL/L (ref 136–145)

## 2020-12-28 PROCEDURE — 83735 ASSAY OF MAGNESIUM: CPT | Performed by: NURSE PRACTITIONER

## 2020-12-28 PROCEDURE — 36415 COLL VENOUS BLD VENIPUNCTURE: CPT | Performed by: NURSE PRACTITIONER

## 2020-12-28 PROCEDURE — 99214 OFFICE O/P EST MOD 30 MIN: CPT | Performed by: NURSE PRACTITIONER

## 2020-12-28 PROCEDURE — 80048 BASIC METABOLIC PNL TOTAL CA: CPT | Performed by: NURSE PRACTITIONER

## 2020-12-28 PROCEDURE — G0463 HOSPITAL OUTPT CLINIC VISIT: HCPCS

## 2020-12-28 PROCEDURE — 83880 ASSAY OF NATRIURETIC PEPTIDE: CPT

## 2020-12-28 RX ORDER — METOPROLOL SUCCINATE 25 MG/1
25 TABLET, EXTENDED RELEASE ORAL DAILY
Qty: 30 TABLET | Refills: 1 | Status: SHIPPED | OUTPATIENT
Start: 2020-12-28 | End: 2021-01-22 | Stop reason: SDUPTHER

## 2020-12-28 RX ORDER — BUMETANIDE 1 MG/1
1 TABLET ORAL 3 TIMES WEEKLY
COMMUNITY
End: 2021-01-20 | Stop reason: SDUPTHER

## 2020-12-28 NOTE — PATIENT INSTRUCTIONS
Living With Heart Failure   Heart failure is a long-term (chronic) condition in which the heart cannot pump enough blood through the body. When this happens, parts of the body do not get the blood and oxygen they need.  There is no cure for heart failure at this time, so it is important for you to take good care of yourself and follow the treatment plan set by your health care provider. If you are living with heart failure, there are ways to help you manage the disease.  Follow these instructions at home:  Living with heart failure requires you to make changes in your life. Your health care team will teach you about the changes you need to make in order to relieve your symptoms and lower your risk of going to the hospital. Follow the treatment plan as set by your health care provider.  Medicines  Medicines are important in reducing your heart's workload, slowing the progression of heart failure, and improving your symptoms.  · Take over-the-counter and prescription medicines only as told by your health care provider.  · Do not stop taking your medicine unless your health care provider tells you to do that.  · Do not skip any dose of your medicine.  · Refill prescriptions before you run out of medicine. You need your medicines every day.  Eating and drinking    · Eat heart-healthy foods. Talk with a dietitian to make an eating plan that is right for you.  ? If directed by your health care provider:  § Limit salt (sodium). Lowering your sodium intake may reduce symptoms of heart failure. Ask a dietitian to recommend heart-healthy seasonings.  § Limit your fluid intake. Fluid restriction may reduce symptoms of heart failure.  ? Use low-fat cooking methods instead of frying. Low-fat methods include roasting, grilling, broiling, baking, poaching, steaming, and stir-frying.  ? Choose foods that contain no trans fat and are low in saturated fat and cholesterol. Healthy choices include fresh or frozen fruits and vegetables,  fish, lean meats, legumes, fat-free or low-fat dairy products, and whole-grain or high-fiber foods.  · Limit alcohol intake to no more than 1 drink a day for nonpregnant women and 2 drinks a day for men. One drink equals 12 oz of beer, 5 oz of wine, or 1½ oz of hard liquor.  ? Drinking more than that is harmful to your heart. Tell your health care provider if you drink alcohol several times a week.  ? Talk with your health care provider about whether any level of alcohol use is safe for you.  Activity    · Ask your health care provider about attending cardiac rehabilitation. These programs include aerobic physical activity, which provides many benefits for your heart.  · If no cardiac rehabilitation program is available, ask your health care provider what aerobic exercises are safe for you to do.  Lifestyle  Make the lifestyle changes recommended by your health care provider. In general:  · Lose weight if your health care provider tells you to do that. Weight loss may reduce symptoms of heart failure.  · Do not use any products that contain nicotine or tobacco, such as cigarettes or e-cigarettes. If you need help quitting, ask your health care provider.  · Do not use street (illegal) drugs.  · Return to your normal activities as told by your health care provider. Ask your health care provider what activities are safe for you.  General instructions    · Make sure you weigh yourself every day to track your weight. Rapid weight gain may indicate an increase in fluid in your body and may increase the workload of your heart.  ? Weigh yourself every morning. Do this after you urinate but before you eat breakfast.  ? Wear the same type of clothing, without shoes, each time you weigh yourself.  ? Weigh yourself on the same scale and in the same spot each time.  · Living with chronic heart failure often leads to emotions such as fear, stress, anxiety, and depression. If you feel any of these emotions and need help coping,  contact your health care provider. Other ways to get help include:  ? Talking to friends and family members about your condition. They can give you support and guidance. Explain your symptoms to them and, if comfortable, invite them to attend appointments or rehabilitation with you.  ? Joining a support group for people with chronic heart failure. Talking with other people who have the same symptoms may give you new ways of coping with your disease and your emotions.  · Stay up to date with your shots (vaccines). Staying current on pneumococcal and influenza vaccines is especially important in preventing germs from attacking your airways (respiratory infections).  · Keep all follow-up visits as told by your health care provider. This is important.  How to recognize changes in your condition  You and your family members need to know what changes to watch for in your condition.  Watch for the following changes and report them to your health care provider:  · Sudden weight gain. Ask your health care provider what amount of weight gain to report.  · Shortness of breath:  ? Feeling short of breath while at rest, with no exercise or activity that required great effort.  ? Feeling breathless with activity.  · Swelling of your lower legs or ankles.  · Difficulty sleeping:  ? You wake up feeling short of breath.  ? You have to use more pillows to raise your head in order to sleep.  · Frequent, dry, hacking cough.  · Loss of appetite.  · Feeling more tired all the time.  · Depression or feelings of sadness or hopelessness.  · Bloating in the stomach.  Where to find more information  · Local support groups. Ask your health care provider about groups near you.  · The American Heart Association: www.heart.org  Contact a health care provider if:  · You have a rapid weight gain.  · You have increasing shortness of breath that is unusual for you.  · You are unable to participate in your usual physical activities.  · You tire  easily.  · You cough more than normal, especially with physical activity.  · You have any swelling or more swelling in areas such as your hands, feet, ankles, or abdomen.  · You feel like your heart is beating quickly (palpitations).  · You become dizzy or light-headed when you stand up.  Get help right away if:  · You have difficulty breathing.  · You notice or your family notices a change in your awareness, such as having trouble staying awake or having difficulty with concentration.  · You have pain or discomfort in your chest.  · You have an episode of fainting (syncope).  Summary  · There is no cure for heart failure, so it is important for you to take good care of yourself and follow the treatment plan set by your health care provider.  · Medicines are important in reducing your heart's workload, slowing the progression of heart failure, and improving your symptoms.  · Living with chronic heart failure often leads to emotions such as fear, stress, anxiety, and depression. If you are feeling any of these emotions and need help coping, contact your health care provider.  This information is not intended to replace advice given to you by your health care provider. Make sure you discuss any questions you have with your health care provider.  Document Revised: 11/30/2018 Document Reviewed: 05/02/2018  [employer] Patient Education © 2020 [employer]

## 2020-12-28 NOTE — PROGRESS NOTES
Heart Failure Pharmacy Note  Patient Name: Britta Lee   Referring Provider: Priscila Holland  Primary Cardiologist: Dr. Cruz    Medication Use:   Adherence: No issues  Hx of med intolerances: bradycardia with metoprolol 100mg BID  Affordability: No issues reported - Dexcom now approved and pt is using currently  Retail Rx Management: none    Past Medical History:   Diagnosis Date   • Anemia    • Arthritis    • Carpal tunnel syndrome    • CHF (congestive heart failure) (CMS/[facility])    • Coronary artery disease    • Diabetes mellitus (CMS/[facility])    • Elevated cholesterol    • GERD (gastroesophageal reflux disease)    • Heart disease    • High cholesterol    • History of pneumonia    • History of unsteady gait     OCASSIONALLY   • Hypertension    • Insomnia    • Kidney disease    • LENNY (obstructive sleep apnea) 12/1/2020   • Osteoarthritis    • Renal insufficiency    • Sciatica      ALLERGIES: Patient has no known allergies.  Current Outpatient Medications   Medication Sig Dispense Refill   • amLODIPine (NORVASC) 10 MG tablet Take 1 tablet by mouth Every Night. 90 tablet 1   • aspirin 81 MG tablet Take 1 tablet by mouth Daily. 30 tablet 5   • atorvastatin (LIPITOR) 40 MG tablet Take 1 tablet by mouth Every Night. 90 tablet 1   • bumetanide (BUMEX) 1 MG tablet Take 1 mg by mouth 3 (Three) Times a Week. MWF     • cloNIDine (CATAPRES) 0.2 MG tablet Take 1 tablet by mouth 3 (Three) Times a Day. 270 tablet 1   • Continuous Blood Gluc  (Dexcom G6 ) device 1 Device Daily. 1 Device 0   • Continuous Blood Gluc Sensor (Dexcom G6 Sensor) Every 10 (Ten) Days. 9 each 3   • Continuous Blood Gluc Transmit (Dexcom G6 Transmitter) misc 1 Device Daily. 1 each 0   • escitalopram (LEXAPRO) 10 MG tablet Take 1 tablet by mouth Daily. 90 tablet 3   • ferrous sulfate 325 (65 Fe) MG tablet Take 1 tablet by mouth 2 (Two) Times a Day. 180 tablet 3   • hydrALAZINE (APRESOLINE) 50 MG tablet Take 1 tablet by mouth Every 8 (Eight)  "Hours. 270 tablet 1   • Insulin Glargine, 1 Unit Dial, (TOUJEO) 300 UNIT/ML solution pen-injector injection Inject 60 Units under the skin into the appropriate area as directed Every Night.  0   • insulin lispro (humaLOG) 100 UNIT/ML injection Inject 5 Units under the skin into the appropriate area as directed 3 (Three) Times a Day Before Meals.     • Insulin Pen Needle 32G X 5 MM misc 1 each 4 (Four) Times a Day. 120 each 5   • isosorbide mononitrate (IMDUR) 60 MG 24 hr tablet Take 1 tablet by mouth Daily. 90 tablet 1   • metoprolol succinate XL (TOPROL-XL) 25 MG 24 hr tablet Take 1 tablet by mouth Daily for 30 days. 90 tablet 1   • nystatin (MYCOSTATIN) 185255 UNIT/GM powder Apply  one application topically to the appropriate area as directed Every 12 (Twelve) Hours. (Patient taking differently: Apply  topically to the appropriate area as directed 2 (Two) Times a Day As Needed (itching).) 60 g 0   • Vitamin D, Cholecalciferol, (CHOLECALCIFEROL) 10 MCG (400 UNIT) tablet Take 400 Units by mouth Daily.       No current facility-administered medications for this encounter.        Vaccination History:   Pneumonia: Reports UTD  Annual Influenza: Reports UTD for 2020-21 Season    Objective  Vitals:    12/28/20 0854   BP: 122/45   BP Location: Left arm   Patient Position: Sitting   Cuff Size: Adult   Pulse: 59   Resp: 22   Temp: 97.5 °F (36.4 °C)   TempSrc: Temporal   SpO2: 99%   Weight: 103 kg (226 lb 9.6 oz)   Height: 160 cm (62.99\")     Wt Readings from Last 3 Encounters:   12/28/20 103 kg (226 lb 9.6 oz)   12/22/20 102 kg (225 lb)   12/11/20 103 kg (227 lb)         12/28/20  0854   Weight: 103 kg (226 lb 9.6 oz)     Lab Results   Component Value Date    GLUCOSE 152 (H) 12/28/2020    BUN 48 (H) 12/28/2020    CREATININE 2.11 (H) 12/28/2020    EGFRIFNONA 23 (L) 12/28/2020    EGFRIFAFRI 41 (L) 07/30/2018    BCR 22.7 12/28/2020    K 4.0 12/28/2020    CO2 20.6 (L) 12/28/2020    CALCIUM 9.4 12/28/2020    PROTENTOTREF 7.7 " 07/30/2018    ALBUMIN 3.56 12/04/2020    LABIL2 1.1 (L) 07/30/2018    AST 50 (H) 12/04/2020    ALT 30 12/04/2020     Lab Results   Component Value Date    WBC 7.79 12/04/2020    HGB 9.4 (L) 12/04/2020    HCT 32.4 (L) 12/04/2020    MCV 85.9 12/04/2020     12/04/2020     Lab Results   Component Value Date    CKTOTAL 67 12/04/2020    CKMB 2.92 04/16/2018    TROPONINI 0.012 04/16/2018    TROPONINT <0.010 12/04/2020     Lab Results   Component Value Date    PROBNP 1,463.0 12/28/2020     Results for orders placed during the hospital encounter of 11/03/20   Adult Transthoracic Echo Complete W/ Cont if Necessary Per Protocol    Narrative · Left ventricular wall thickness is consistent with concentric   hypertrophy.  · Left ventricular ejection fraction appears to be greater than 70%. Left   ventricular systolic function is hyperdynamic (EF > 70%).  · Left ventricular diastolic function is consistent with (grade II w/high   LAP) pseudonormalization.  · The left atrial cavity is moderate to severely dilated.  · The right atrial cavity is mildly dilated.  · Moderate mitral annular calcification is present.  · Mild mitral valve regurgitation is present.  · Mild tricuspid valve regurgitation is present.  · Estimated right ventricular systolic pressure from tricuspid   regurgitation is markedly elevated (>55 mmHg).                   Class   Drug   Dose Last Dose Adjustment   ACEi/ARB/ARNI      Beta Blocker Toprol XL  25mg 11/8/2020   MRA         Pt reports no edema; home BP ~150/60-80; reports weight at home stable; no recent hypoglycemia episodes    Drug Therapy Problems:     1. Taking bumetanide differently than prescribed. Recommended to take daily at last visit but got a new prescription from PCP for 3x weekly dosing so has been taking it that way.  2. Clarification needed on beta blocker - med list and [facility] report Toprol XL 25 mg daily is most recent but pt reporting she uses Lopressor 100 mg BID.  Pharmacy reports they haven't filled that since October.    Recommendations:    1. Pt reports she is doing well on three-times-weekly dosing. Continue and monitor Scr/K closely  2. Pt called back after getting home and spoke to Emily to clarify metoprolol and bumetanide dosing. She reported she has been taking metoprolol tartrate 100 mg BID and Bumetanide 1 mg three times weekly. Emily sent in new prescription for Toprol XL 25 mg daily to pt's home pharmacy with instructions for them to cancel all prescriptions with refills remaining of metoprolol tartrate. I called them to instruct them to do that as well.  Emily instructed pt to stop the metoprolol tartrate and resume the Toprol XL starting tomorrw (12/29) since pt already had a dose of the metoprolol tartrate today. Pt was also instructed to continue Bumetanide three times weekly MWF.    Patient was educated on heart failure medications and the importance of medication adherence.  All questions were addressed and patient expressed understanding.     Thank you for allowing me to participate in the care of your patient,    Milagros Pina, PharmD  12/28/20  09:53 EST

## 2020-12-28 NOTE — PROGRESS NOTES
Heart Failure Clinic    Vitals:    12/28/20 0854   BP: 122/45   Pulse: 59   Resp: 22   Temp: 97.5 °F (36.4 °C)   SpO2: 99%      Mask Worn: Yes    Method of arrival: Ambulatory    Weighing self daily: Yes    Monitoring Heart Failure Zones: Yes    Today's HF Zone: Green    Taking medications as prescribed: Yes    Edema No    Shortness of Air: No    Number of pillows used at night:<2    Educational Materials given:    Jackie Jasmine MA 12/28/20 08:58 EST

## 2020-12-28 NOTE — ADDENDUM NOTE
Encounter addended by: Milagros Pina, PharmD on: 12/28/2020 2:43 PM   Actions taken: Charge Capture section accepted

## 2020-12-28 NOTE — PROGRESS NOTES
[facility] CHF CLINIC OFFICE VISIT    Subjective:   History of Present Illness   Britta Lee is a 75 y.o.  female who presents to the clinic today for follow up on heart failure. She has a hx of diastolic congestive heart failure. Her EF was > 70% from echocardiogram on 11/4/2020. She is currently taking Bumex 1 mg Monday, Wednesday and Friday and had previously been taking 1 mg daily. She denies shortness of air and swelling. She reports good urine output and feels like she is doing fine with Bumex every other day. Her home weight is stable at 220 lbs, /70-80, HR 58-97 bpm (no record available for review). She was unable to tolerate Spironolactone due to abnormal kidney function. She reports adherence to sodium restrictions and fluid restrictions. She is still awaiting sleep study. She continues on Hydralazine 50 mg TID, Imdur 60 mg daily, Metorpolol XL 25 mg daily( however she reported going back to metoprolol tartrate and taking BID), Norvasc 10 mg daily and Clonidine 0.2 mg TID for HTN. Denies chest pain or palpitations. She denies any further episodes of hypoglycemia and reports recent follow up with Bhanu and improvement in her DM. She is using the dexcom now and is happy with it. She reports her sugar was elevated a bit recently because of some treats over the holidays.     Past medical history significant for HTN, DM type 2, CKD III, iron deficiency anemia, hyperlipidemia, obesity.     PCP: Lexie Dolan  Cardiologist: Dr. Cruz  Nephrologist: Dr. Johnson     Hospitalizations: Discharged on 11/8/2020  ER visit: 12/4/2020    Past Medical History:   Diagnosis Date   • Anemia    • Arthritis    • Carpal tunnel syndrome    • CHF (congestive heart failure) (CMS/HCC)    • Coronary artery disease    • Diabetes mellitus (CMS/HCC)    • Elevated cholesterol    • GERD (gastroesophageal reflux disease)    • Heart disease    • High cholesterol    • History of pneumonia    • History of unsteady gait      OCASSIONALLY   • Hypertension    • Insomnia    • Kidney disease    • LENNY (obstructive sleep apnea) 12/1/2020   • Osteoarthritis    • Renal insufficiency    • Sciatica      Past Surgical History:   Procedure Laterality Date   • ABDOMINAL SURGERY     • APPENDECTOMY     • CARDIAC CATHETERIZATION      1 STENT  ---  2000   • CARDIAC SURGERY     • CAROTID STENT     • CARPAL TUNNEL RELEASE Right 10/8/2019    Procedure: CARPAL TUNNEL RELEASE;  Surgeon: Feroz Johnson MD;  Location: [facility];  Service: Orthopedics   • CATARACT EXTRACTION     • CHOLECYSTECTOMY     • COLONOSCOPY     • CORONARY ANGIOPLASTY WITH STENT PLACEMENT     • ENDOSCOPY     • ENDOSCOPY N/A 3/5/2020    Procedure: ESOPHAGOGASTRODUODENOSCOPY;  Surgeon: Alexandru Bagley MD;  Location: [facility];  Service: Gastroenterology;  Laterality: N/A;   • ENDOSCOPY AND COLONOSCOPY     • GALLBLADDER SURGERY     • JOINT REPLACEMENT Left 05/02/2017    [facility]  DR JOHNSON  LEFT TOTAL KNEE   • KNEE ARTHROSCOPY W/ MENISCECTOMY Right    • LAPAROSCOPIC TUBAL LIGATION     • HI TOTAL KNEE ARTHROPLASTY Left 5/2/2017    Procedure: TOTAL KNEE ARTHROPLASTY;  Surgeon: Feroz Johnson MD;  Location: [facility];  Service: Orthopedics   • STERILIZATION     • TONSILLECTOMY       Social History     Socioeconomic History   • Marital status:      Spouse name: natalie   • Number of children: 3   • Years of education: 12   • Highest education level: Not on file   Occupational History   • Occupation: retired   Social Needs   • Financial resource strain: Somewhat hard   • Food insecurity     Worry: Never true     Inability: Never true   • Transportation needs     Medical: Yes     Non-medical: No   Tobacco Use   • Smoking status: Never Smoker   • Smokeless tobacco: Never Used   Substance and Sexual Activity   • Alcohol use: Yes     Comment: socially   • Drug use: No   • Sexual activity: Defer     Birth control/protection: Surgical   Lifestyle   • Physical activity     Days per  week: 0 days     Minutes per session: 0 min   • Stress: Only a little     Family History   Problem Relation Age of Onset   • Arthritis Mother    • Diabetes Mother    • Cancer Mother    • Heart disease Father    • Diabetes Daughter    • Diabetes Son    • Diabetes Maternal Aunt    • Heart disease Maternal Grandmother    • Breast cancer Neg Hx      Allergies:  No Known Allergies    Review of Systems   Constitution: Negative for chills, fever, weight gain and weight loss.   HENT: Negative for ear discharge, hoarse voice and sore throat.    Eyes: Negative for discharge, double vision, pain, redness and visual disturbance.   Cardiovascular: Negative for chest pain, claudication, cyanosis, dyspnea on exertion, irregular heartbeat, leg swelling, near-syncope, orthopnea, palpitations and syncope.   Respiratory: Negative for cough, shortness of breath and wheezing.    Endocrine: Negative for cold intolerance, heat intolerance and polyuria.   Hematologic/Lymphatic: Negative for bleeding problem. Does not bruise/bleed easily.   Skin: Negative for color change, flushing and rash.   Musculoskeletal: Positive for back pain. Negative for arthritis, joint pain, joint swelling and muscle cramps.   Gastrointestinal: Negative for abdominal pain, constipation, diarrhea, nausea and vomiting.   Genitourinary: Negative for dysuria, flank pain, frequency, hesitancy and urgency.   Neurological: Negative for difficulty with concentration, dizziness, light-headedness, sensory change, vertigo and weakness.   Psychiatric/Behavioral: Negative for depression. The patient does not have insomnia and is not nervous/anxious.      Current Outpatient Medications   Medication Sig Dispense Refill   • amLODIPine (NORVASC) 10 MG tablet Take 1 tablet by mouth Every Night. 90 tablet 1   • aspirin 81 MG tablet Take 1 tablet by mouth Daily. 30 tablet 5   • atorvastatin (LIPITOR) 40 MG tablet Take 1 tablet by mouth Every Night. 90 tablet 1   • bumetanide (BUMEX)  1 MG tablet Take 1 tablet by mouth Daily. Monday, Wednesday, and Friday 30 tablet 3   • cloNIDine (CATAPRES) 0.2 MG tablet Take 1 tablet by mouth 3 (Three) Times a Day. 270 tablet 1   • Continuous Blood Gluc  (Dexcom G6 ) device 1 Device Daily. 1 Device 0   • Continuous Blood Gluc Sensor (Dexcom G6 Sensor) Every 10 (Ten) Days. 9 each 3   • Continuous Blood Gluc Transmit (Dexcom G6 Transmitter) misc 1 Device Daily. 1 each 0   • escitalopram (LEXAPRO) 10 MG tablet Take 1 tablet by mouth Daily. 90 tablet 3   • ferrous sulfate 325 (65 Fe) MG tablet Take 1 tablet by mouth 2 (Two) Times a Day. 180 tablet 3   • hydrALAZINE (APRESOLINE) 50 MG tablet Take 1 tablet by mouth Every 8 (Eight) Hours. 270 tablet 1   • Insulin Glargine, 1 Unit Dial, (TOUJEO) 300 UNIT/ML solution pen-injector injection Inject 60 Units under the skin into the appropriate area as directed Every Night.  0   • insulin lispro (humaLOG) 100 UNIT/ML injection Inject 5 Units under the skin into the appropriate area as directed 3 (Three) Times a Day Before Meals.     • Insulin Pen Needle 32G X 5 MM misc 1 each 4 (Four) Times a Day. 120 each 5   • isosorbide mononitrate (IMDUR) 60 MG 24 hr tablet Take 1 tablet by mouth Daily. 90 tablet 1   • metoprolol succinate XL (TOPROL-XL) 25 MG 24 hr tablet Take 1 tablet by mouth Daily for 30 days. 90 tablet 1   • nystatin (MYCOSTATIN) 212694 UNIT/GM powder Apply  one application topically to the appropriate area as directed Every 12 (Twelve) Hours. 60 g 0   • Vitamin D, Cholecalciferol, (CHOLECALCIFEROL) 10 MCG (400 UNIT) tablet Take 400 Units by mouth Daily.       No current facility-administered medications for this encounter.      Objective:     Vitals:    12/28/20 0854   BP: 122/45   Pulse: 59   Resp: 22   Temp: 97.5 °F (36.4 °C)   SpO2: 99%     Wt Readings from Last 3 Encounters:   12/22/20 102 kg (225 lb)   12/11/20 103 kg (227 lb)   12/09/20 103 kg (226 lb 12.8 oz)        Vitals signs reviewed.    Constitutional:       Appearance: Normal appearance. Well-developed.      Comments: Wears a mask during encounter, walked into clinic   Eyes:      Conjunctiva/sclera: Conjunctivae normal.   HENT:      Head: Normocephalic.   Neck:      Musculoskeletal: Normal range of motion and neck supple.      Vascular: No JVD or JVR.   Pulmonary:      Effort: Pulmonary effort is normal.      Breath sounds: Normal breath sounds.   Cardiovascular:      Normal rate. Regular rhythm.   Pulses:     Intact distal pulses.   Edema:     Peripheral edema present.     Ankle: bilateral trace edema of the ankle.     Feet: bilateral trace edema of the feet.  Abdominal:      General: Bowel sounds are normal.      Palpations: Abdomen is soft. There is no hepatomegaly or splenomegaly.   Musculoskeletal: Normal range of motion.   Skin:     General: Skin is warm and dry.   Neurological:      Mental Status: Alert and oriented to person, place, and time.   Psychiatric:         Attention and Perception: Attention normal.         Mood and Affect: Mood normal.         Speech: Speech normal.         Behavior: Behavior normal. Behavior is cooperative.         Cognition and Memory: Cognition normal.     Cardiographics  Results for orders placed during the hospital encounter of 11/03/20   Adult Transthoracic Echo Complete W/ Cont if Necessary Per Protocol    Narrative · Left ventricular wall thickness is consistent with concentric   hypertrophy.  · Left ventricular ejection fraction appears to be greater than 70%. Left   ventricular systolic function is hyperdynamic (EF > 70%).  · Left ventricular diastolic function is consistent with (grade II w/high   LAP) pseudonormalization.  · The left atrial cavity is moderate to severely dilated.  · The right atrial cavity is mildly dilated.  · Moderate mitral annular calcification is present.  · Mild mitral valve regurgitation is present.  · Mild tricuspid valve regurgitation is present.  · Estimated right  ventricular systolic pressure from tricuspid   regurgitation is markedly elevated (>55 mmHg).        EK/3/2020    Chest x-ray: 11/3/2020    IMPRESSION:  CHF/edema with small effusions    Lab Review   Lab Results   Component Value Date    TSH 4.650 (H) 2020     Lab Results   Component Value Date    GLUCOSE 198 (H) 2020    BUN 50 (H) 2020    CREATININE 1.81 (H) 2020    EGFRIFNONA 27 (L) 2020    EGFRIFAFRI 41 (L) 2018    BCR 27.6 (H) 2020    K 4.5 2020    CO2 23.9 2020    CALCIUM 9.5 2020    PROTENTOTREF 7.7 2018    ALBUMIN 3.56 2020    LABIL2 1.1 (L) 2018    AST 50 (H) 2020    ALT 30 2020     Lab Results   Component Value Date    WBC 7.79 2020    HGB 9.4 (L) 2020    HCT 32.4 (L) 2020    MCV 85.9 2020     2020     Lab Results   Component Value Date    CKTOTAL 67 2020    CKMB 2.92 2018    TROPONINI 0.012 2018    TROPONINT <0.010 2020     Lab Results   Component Value Date    PROBNP 1,353.0 2020     6MWT - Patient walked 72 meters. She complained of sciatic pain and held onto railing. Her oxygen saturation remained > 95%, Her HR stable at 70 bpm. She complained of dyspnea and fatigue on a Lissette scale of 3.     The following portions of the patient's history were reviewed and updated as appropriate: allergies, current medications, past family history, past medical history, past social history, past surgical history and problem list.     Old records reviewed and pertinent information is included in the above objective data.     Assessment/Plan:   1. Chronic Diastolic Congestive Heart Failure, EF > 70%   2. HTN  3. CKD, stage IV  4. Morbid Obesity     1. Pro-BNP, BMP, magnesium today  2. Discussed and reviewed labs with patient today.   3. Patient reported taking metoprolol tartrate 100 mg BID which was discontinued at hospital discharge and replaced with Metoprolol  succinate 25 mg daily. Verified prescription with pharmacy. Patient called back and stated her bottle was tartrate and instructions were 100 mg BID. Advised patient to stop tartrate and start succinate 25 mg daily. New prescription sent to pharmacy with d/c instructions for all remaining refills on tartrate. Patient expresses understanding.   4. Continue Bumex 1 mg M,W,F. If she develops shortness of breath or swelling she could increase Bumex to daily.   5. Continue on current meds  6. Monitor BP, HR   7. 6MWT today  8. Reviewed and discussed results with patient today  9. Awaiting sleep study    7. Follow up in 2 weeks, sooner if needed.     30 minutes face to face spent counseling patient extensively on dietary Na+ intake, importance of activity, weight monitoring, compliance with medications and follow up appointments.

## 2020-12-28 NOTE — PROGRESS NOTES
Exercise Oximetry    Patient Name:Britta Lee   MRN: [id number]   Date: 12/28/20             ROOM AIR BASELINE   SpO2% 98   HR 60   Blood Pressure 122/45     EXERCISE ON ROOM AIR SpO2%/HR EXERCISE ON O2 @  LPM SpO2%   1 MINUTE 97%/70 1 MINUTE    2 MINUTES 96%/73 2 MINUTES    3 MINUTES 99%/70 3 MINUTES    4 MINUTES 0 4 MINUTES    5 MINUTES 0 5 MINUTES    6 MINUTES 0 6 MINUTES               Distance Walked  72 Meters Distance Walked   Dyspnea (Lissette Scale)    3 Dyspnea (Lissette Scale)   Fatigue (Lissette Scale)  3 Fatigue (Lissette Scale)   SpO2% Post Exercise  99% SpO2% Post Exercise   HR Post Exercise 70 HR Post Exercise   Time to Recovery  1.5 minutes Time to Recovery     Comments: Patient held onto rail. Had to stop just before 3 minute eder to catch breath. Patient stopped due to sciatic pain before 4 minute eder.

## 2021-01-11 ENCOUNTER — OFFICE VISIT (OUTPATIENT)
Dept: FAMILY MEDICINE CLINIC | Facility: CLINIC | Age: 76
End: 2021-01-11

## 2021-01-11 VITALS
DIASTOLIC BLOOD PRESSURE: 70 MMHG | HEART RATE: 72 BPM | WEIGHT: 215 LBS | SYSTOLIC BLOOD PRESSURE: 158 MMHG | BODY MASS INDEX: 38.09 KG/M2 | HEIGHT: 63 IN

## 2021-01-11 DIAGNOSIS — I10 ESSENTIAL HYPERTENSION: ICD-10-CM

## 2021-01-11 DIAGNOSIS — J40 BRONCHITIS: Primary | ICD-10-CM

## 2021-01-11 DIAGNOSIS — IMO0002 TYPE 2 DIABETES MELLITUS, UNCONTROLLED, WITH NEUROPATHY: ICD-10-CM

## 2021-01-11 PROCEDURE — 99442 PR PHYS/QHP TELEPHONE EVALUATION 11-20 MIN: CPT | Performed by: PHYSICIAN ASSISTANT

## 2021-01-11 RX ORDER — LEVOFLOXACIN 500 MG/1
500 TABLET, FILM COATED ORAL DAILY
Qty: 10 TABLET | Refills: 0 | Status: SHIPPED | OUTPATIENT
Start: 2021-01-11 | End: 2021-01-22

## 2021-01-11 RX ORDER — INSULIN GLARGINE 300 U/ML
40 INJECTION, SOLUTION SUBCUTANEOUS NIGHTLY
COMMUNITY
Start: 2020-12-09 | End: 2021-02-23 | Stop reason: SDUPTHER

## 2021-01-11 RX ORDER — PREDNISONE 20 MG/1
TABLET ORAL
Qty: 10 TABLET | Refills: 0 | Status: SHIPPED | OUTPATIENT
Start: 2021-01-11 | End: 2021-01-20

## 2021-01-11 RX ORDER — ALBUTEROL SULFATE 90 UG/1
2 AEROSOL, METERED RESPIRATORY (INHALATION) EVERY 4 HOURS PRN
Qty: 18 G | Refills: 0 | Status: SHIPPED | OUTPATIENT
Start: 2021-01-11 | End: 2022-03-07

## 2021-01-11 RX ORDER — PEN NEEDLE, DIABETIC 32GX 5/32"
NEEDLE, DISPOSABLE MISCELLANEOUS
COMMUNITY
Start: 2021-01-07 | End: 2022-03-07

## 2021-01-11 NOTE — PATIENT INSTRUCTIONS

## 2021-01-11 NOTE — PROGRESS NOTES
Subjective   Britta Lee is a 75 y.o. female.     Telephone visit    You have chosen to receive care through a telephone visit. Do you consent to use a telephone visit for your medical care today? Yes    This visit has been rescheduled as a phone visit to comply with patient safety concerns in accordance with CDC recommendations. Total time of discussion was 14 minutes.      Chief Complaint -coughing    History of Present Illness -       Cough-  She complains of coughing with productive sputum, wheezing and sneezing for the past week.  She has associated shortness of breath.  She reports that she has not been out of her home and has not been in contact with anyone who has had Covid type symptoms or Covid positive individuals.    Diabetes mellitus-  Uncontrolled as her blood glucose this morning was 298 after taking 60 units basal insulin last night.  She states that she took 5 units of Humalog this morning and her blood glucose has come down to 157 currently.    Hypertension-  Not at goal today but may be elevated due to persistent coughing and acute illness.  Blood pressure is usually controlled with clonidine and metoprolol.    The following portions of the patient's history were reviewed and updated as appropriate: allergies, current medications, past family history, past medical history, past social history, past surgical history and problem list.    Review of Systems   Constitutional: Positive for activity change, appetite change and fatigue. Negative for chills and fever.   HENT: Positive for congestion. Negative for ear pain, sinus pressure and sore throat.    Eyes: Negative for pain and visual disturbance.   Respiratory: Positive for cough, shortness of breath and wheezing. Negative for chest tightness.    Cardiovascular: Negative for chest pain and palpitations.   Gastrointestinal: Negative for abdominal pain, constipation, diarrhea, nausea and vomiting.   Endocrine: Negative for polydipsia and polyuria.  "  Genitourinary: Negative for dysuria and frequency.   Musculoskeletal: Negative for back pain and myalgias.   Skin: Negative for color change and rash.   Allergic/Immunologic: Negative for food allergies and immunocompromised state.   Neurological: Negative for dizziness, syncope and headaches.   Hematological: Negative for adenopathy. Does not bruise/bleed easily.   Psychiatric/Behavioral: Negative for hallucinations and suicidal ideas. The patient is not nervous/anxious.        /70   Pulse 72   Ht 160 cm (63\")   Wt 97.5 kg (215 lb)   BMI 38.09 kg/m²     Physical Exam  Vitals signs and nursing note reviewed.   Pulmonary:      Effort: No respiratory distress.      Breath sounds: Wheezing present.      Comments: Patient found short of breath on telephone today with having to take breaks during sentences to take deep breaths.  Neurological:      Mental Status: She is alert and oriented to person, place, and time.   Psychiatric:         Mood and Affect: Mood normal.         Thought Content: Thought content normal.         Assessment/Plan     Diagnoses and all orders for this visit:    1. Bronchitis (Primary)  Comments:  Plan to empirically treat for bronchitis and pneumonia  Start Levaquin prednisone and albuterol  Orders:  -     levoFLOXacin (Levaquin) 500 MG tablet; Take 1 tablet by mouth Daily.  Dispense: 10 tablet; Refill: 0  -     predniSONE (DELTASONE) 20 MG tablet; 2 daily  Dispense: 10 tablet; Refill: 0  -     albuterol sulfate  (90 Base) MCG/ACT inhaler; Inhale 2 puffs Every 4 (Four) Hours As Needed for Shortness of Air.  Dispense: 18 g; Refill: 0    2. Type 2 diabetes mellitus, uncontrolled, with neuropathy (CMS/[facility])  Comments:  Continue 60 units basal insulin nightly  Advised Humalog 5 units for elevated blood glucose as needed    3. Essential hypertension  Comments:  Continue clonidine and metoprolol  Continue to monitor as this is likely elevated due to acute illness      Acute " bronchitis  Advised regarding symptomatic treatment.  Suggested OTC remedies.  Antibiotic as per orders.  Encouraged to report if any worse or if any new symptoms.  Call in 4 days if symptoms aren't resolving.  We discussed her treatment options.  We discussed possible hospitalization if needed but decided together to try outpatient antibiotic therapy first due to limited hospital capacity with current Covid pandemic and her being a high risk patient.  Patient agreed with the plan.  I will reassess her on Friday or sooner if needed.  She was advised that it should she develop a worsening of any shortness of breath fever or symptoms that she should go to the nearest emergency room for further evaluation and treatment or call back to the office for further evaluation.        This document has been electronically signed by:  Lexie Dolan PA-C

## 2021-01-13 ENCOUNTER — APPOINTMENT (OUTPATIENT)
Dept: CARDIOLOGY | Facility: HOSPITAL | Age: 76
End: 2021-01-13

## 2021-01-15 ENCOUNTER — OFFICE VISIT (OUTPATIENT)
Dept: FAMILY MEDICINE CLINIC | Facility: CLINIC | Age: 76
End: 2021-01-15

## 2021-01-15 VITALS
WEIGHT: 233 LBS | HEIGHT: 63 IN | HEART RATE: 58 BPM | OXYGEN SATURATION: 93 % | SYSTOLIC BLOOD PRESSURE: 130 MMHG | TEMPERATURE: 98.6 F | BODY MASS INDEX: 41.29 KG/M2 | DIASTOLIC BLOOD PRESSURE: 80 MMHG

## 2021-01-15 DIAGNOSIS — J40 BRONCHITIS: ICD-10-CM

## 2021-01-15 DIAGNOSIS — I50.32 CHRONIC DIASTOLIC CONGESTIVE HEART FAILURE (HCC): Primary | ICD-10-CM

## 2021-01-15 DIAGNOSIS — I87.2 VENOUS INSUFFICIENCY (CHRONIC) (PERIPHERAL): ICD-10-CM

## 2021-01-15 DIAGNOSIS — N18.4 CHRONIC RENAL DISEASE, STAGE IV (HCC): ICD-10-CM

## 2021-01-15 DIAGNOSIS — I10 ESSENTIAL HYPERTENSION: ICD-10-CM

## 2021-01-15 PROCEDURE — 99214 OFFICE O/P EST MOD 30 MIN: CPT | Performed by: PHYSICIAN ASSISTANT

## 2021-01-15 RX ORDER — QUINAPRIL 40 MG/1
40 TABLET ORAL DAILY
COMMUNITY
Start: 2021-01-07 | End: 2021-01-22

## 2021-01-15 RX ORDER — FUROSEMIDE 20 MG/1
20 TABLET ORAL EVERY MORNING
Qty: 30 TABLET | Refills: 0 | Status: SHIPPED | OUTPATIENT
Start: 2021-01-15 | End: 2021-01-20

## 2021-01-15 NOTE — PROGRESS NOTES
Subjective   Britta Lee is a 75 y.o. female.       Chief Complaint -shortness of breath    History of Present Illness -      Shortness of breath-  She complains of increased shortness of breath and swelling in bilateral lower extremities.  She has gained approximately 18 pounds in the past 4 days.  She is currently taking Bumex 1 mg Monday Wednesday and Friday.    CHF-not at goal due to shortness of breath and symptomatic with fluid overload.    Bronchitis-  She reports that her cough and wheezing are significantly improved with use of Levaquin and prednisone for the past 4 days.  She still has several days left of Levaquin.    Hypertension-stable with metoprolol and quinapril    The following portions of the patient's history were reviewed and updated as appropriate: allergies, current medications, past family history, past medical history, past social history, past surgical history and problem list.    Review of Systems   Constitutional: Negative for activity change, appetite change, fatigue and fever.   HENT: Negative for ear pain, sinus pressure and sore throat.    Eyes: Negative for pain and visual disturbance.   Respiratory: Positive for cough, shortness of breath and wheezing (improving). Negative for chest tightness.    Cardiovascular: Positive for leg swelling. Negative for chest pain and palpitations.   Gastrointestinal: Negative for abdominal pain, constipation, diarrhea, nausea and vomiting.   Endocrine: Negative for polydipsia and polyuria.   Genitourinary: Negative for dysuria and frequency.   Musculoskeletal: Negative for back pain and myalgias.   Skin: Negative for color change and rash.   Allergic/Immunologic: Negative for food allergies and immunocompromised state.   Neurological: Negative for dizziness, syncope and headaches.   Hematological: Negative for adenopathy. Does not bruise/bleed easily.   Psychiatric/Behavioral: Negative for hallucinations and suicidal ideas. The patient is not  "nervous/anxious.        /80   Pulse 58   Temp 98.6 °F (37 °C) (Temporal)   Ht 160 cm (62.99\")   Wt 106 kg (233 lb)   SpO2 93%   BMI 41.29 kg/m²   Hospital Outpatient Visit on 12/28/2020   Component Date Value Ref Range Status   • proBNP 12/28/2020 1,463.0  0.0-1,800.0 pg/mL Final   • Glucose 12/28/2020 152* 65 - 99 mg/dL Final   • BUN 12/28/2020 48* 8 - 23 mg/dL Final   • Creatinine 12/28/2020 2.11* 0.57 - 1.00 mg/dL Final   • Sodium 12/28/2020 135* 136 - 145 mmol/L Final   • Potassium 12/28/2020 4.0  3.5 - 5.2 mmol/L Final   • Chloride 12/28/2020 104  98 - 107 mmol/L Final   • CO2 12/28/2020 20.6* 22.0 - 29.0 mmol/L Final   • Calcium 12/28/2020 9.4  8.6 - 10.5 mg/dL Final   • eGFR Non African Amer 12/28/2020 23* >60 mL/min/1.73 Final   • BUN/Creatinine Ratio 12/28/2020 22.7  7.0 - 25.0 Final   • Anion Gap 12/28/2020 10.4  5.0 - 15.0 mmol/L Final   • Magnesium 12/28/2020 2.3  1.6 - 2.4 mg/dL Final       Physical Exam  Vitals signs and nursing note reviewed.   Constitutional:       Appearance: Normal appearance. She is well-developed. She is obese.   HENT:      Head: Normocephalic and atraumatic.      Right Ear: Tympanic membrane normal.      Left Ear: Tympanic membrane normal.      Nose: Nose normal.      Mouth/Throat:      Mouth: Mucous membranes are moist.      Pharynx: No oropharyngeal exudate or posterior oropharyngeal erythema.   Eyes:      Extraocular Movements: Extraocular movements intact.      Conjunctiva/sclera: Conjunctivae normal.   Neck:      Musculoskeletal: Normal range of motion and neck supple.      Thyroid: No thyromegaly.      Trachea: No tracheal deviation.   Cardiovascular:      Rate and Rhythm: Normal rate and regular rhythm.      Heart sounds: Normal heart sounds. No murmur.   Pulmonary:      Effort: Pulmonary effort is normal. No respiratory distress.      Breath sounds: Rhonchi (bilateral lung bases) present. No wheezing.   Abdominal:      General: Bowel sounds are normal.      " Palpations: Abdomen is soft.      Tenderness: There is no abdominal tenderness. There is no guarding.   Musculoskeletal: Normal range of motion.         General: No tenderness.      Right lower leg: Edema present.      Left lower leg: Edema present.   Lymphadenopathy:      Cervical: No cervical adenopathy.   Skin:     General: Skin is warm and dry.      Findings: No rash.   Neurological:      General: No focal deficit present.      Mental Status: She is alert and oriented to person, place, and time.   Psychiatric:         Mood and Affect: Mood normal.         Behavior: Behavior normal.         Thought Content: Thought content normal.         Assessment/Plan     Diagnoses and all orders for this visit:    1. Chronic diastolic congestive heart failure (CMS/HCC) (Primary)  Comments:  Start Lasix 20 mg every morning for the next 4 days  Continue Bumex 1 mg Monday Wednesday Friday  Reevaluate patient early next week  Orders:  -     furosemide (Lasix) 20 MG tablet; Take 1 tablet by mouth Every Morning.  Dispense: 30 tablet; Refill: 0    2. Chronic renal disease, stage IV (CMS/HCC)  Comments:  Discussed the use of diuretics with regard to renal function.  Due to CHF exacerbation diuretic therapy is necessary at this time.  Patient agrees with plan.    3. Essential hypertension  Comments:  Continue metoprolol and quinapril    4. Venous insufficiency (chronic) (peripheral)  Comments:  Advised leg elevation and increased fluid intake  Continue Bumex 1 mg Monday Wednesday Friday    5. Bronchitis  Comments:  Continue prednisone and Levaquin until finished with this course            This document has been electronically signed by:  Lexie Dolan PA-C

## 2021-01-19 ENCOUNTER — OFFICE VISIT (OUTPATIENT)
Dept: FAMILY MEDICINE CLINIC | Facility: CLINIC | Age: 76
End: 2021-01-19

## 2021-01-19 VITALS — WEIGHT: 233 LBS | HEIGHT: 63 IN | BODY MASS INDEX: 41.29 KG/M2

## 2021-01-19 DIAGNOSIS — J40 BRONCHITIS: ICD-10-CM

## 2021-01-19 DIAGNOSIS — I50.32 CHRONIC DIASTOLIC CONGESTIVE HEART FAILURE (HCC): Primary | ICD-10-CM

## 2021-01-19 PROCEDURE — 99441 PR PHYS/QHP TELEPHONE EVALUATION 5-10 MIN: CPT | Performed by: PHYSICIAN ASSISTANT

## 2021-01-20 ENCOUNTER — HOSPITAL ENCOUNTER (OUTPATIENT)
Dept: CARDIOLOGY | Facility: HOSPITAL | Age: 76
Discharge: HOME | End: 2021-01-20
Admitting: NURSE PRACTITIONER

## 2021-01-20 VITALS
TEMPERATURE: 97.9 F | HEIGHT: 63 IN | OXYGEN SATURATION: 98 % | SYSTOLIC BLOOD PRESSURE: 140 MMHG | WEIGHT: 226.2 LBS | HEART RATE: 56 BPM | RESPIRATION RATE: 22 BRPM | DIASTOLIC BLOOD PRESSURE: 68 MMHG | BODY MASS INDEX: 40.08 KG/M2

## 2021-01-20 DIAGNOSIS — I50.33 ACUTE ON CHRONIC DIASTOLIC CONGESTIVE HEART FAILURE (HCC): Primary | ICD-10-CM

## 2021-01-20 DIAGNOSIS — E66.01 CLASS 3 SEVERE OBESITY DUE TO EXCESS CALORIES WITH BODY MASS INDEX (BMI) OF 40.0 TO 44.9 IN ADULT, UNSPECIFIED WHETHER SERIOUS COMORBIDITY PRESENT (HCC): ICD-10-CM

## 2021-01-20 DIAGNOSIS — Z87.09 HX OF BRONCHITIS: ICD-10-CM

## 2021-01-20 DIAGNOSIS — N18.4 CKD (CHRONIC KIDNEY DISEASE) STAGE 4, GFR 15-29 ML/MIN (HCC): ICD-10-CM

## 2021-01-20 DIAGNOSIS — I10 ESSENTIAL HYPERTENSION: ICD-10-CM

## 2021-01-20 LAB
ANION GAP SERPL CALCULATED.3IONS-SCNC: 11.5 MMOL/L (ref 5–15)
BUN SERPL-MCNC: 48 MG/DL (ref 8–23)
BUN/CREAT SERPL: 22.9 (ref 7–25)
CALCIUM SPEC-SCNC: 9 MG/DL (ref 8.6–10.5)
CHLORIDE SERPL-SCNC: 107 MMOL/L (ref 98–107)
CO2 SERPL-SCNC: 20.5 MMOL/L (ref 22–29)
CREAT SERPL-MCNC: 2.1 MG/DL (ref 0.57–1)
GFR SERPL CREATININE-BSD FRML MDRD: 23 ML/MIN/1.73
GLUCOSE SERPL-MCNC: 121 MG/DL (ref 65–99)
MAGNESIUM SERPL-MCNC: 1.8 MG/DL (ref 1.6–2.4)
NT-PROBNP SERPL-MCNC: 1972 PG/ML (ref 0–1800)
POTASSIUM SERPL-SCNC: 4.5 MMOL/L (ref 3.5–5.2)
SODIUM SERPL-SCNC: 139 MMOL/L (ref 136–145)

## 2021-01-20 PROCEDURE — 83880 ASSAY OF NATRIURETIC PEPTIDE: CPT

## 2021-01-20 PROCEDURE — G0463 HOSPITAL OUTPT CLINIC VISIT: HCPCS | Performed by: PHARMACIST

## 2021-01-20 PROCEDURE — 80048 BASIC METABOLIC PNL TOTAL CA: CPT | Performed by: NURSE PRACTITIONER

## 2021-01-20 PROCEDURE — 99214 OFFICE O/P EST MOD 30 MIN: CPT | Performed by: NURSE PRACTITIONER

## 2021-01-20 PROCEDURE — 83735 ASSAY OF MAGNESIUM: CPT | Performed by: NURSE PRACTITIONER

## 2021-01-20 PROCEDURE — 36415 COLL VENOUS BLD VENIPUNCTURE: CPT | Performed by: NURSE PRACTITIONER

## 2021-01-20 RX ORDER — BUMETANIDE 1 MG/1
TABLET ORAL
Start: 2021-01-20 | End: 2021-01-22 | Stop reason: SDUPTHER

## 2021-01-20 NOTE — PROGRESS NOTES
Subjective   Britta Lee is a 75 y.o. female.     Telephone Visit    You have chosen to receive care through a telephone visit. Do you consent to use a telephone visit for your medical care today? Yes    This visit has been rescheduled as a phone visit to comply with patient safety concerns in accordance with CDC recommendations. Total time of discussion was 8 minutes.    Chief Complaint -congestive heart failure    History of Present Illness -      Congestive heart failure-  She reports that her shortness of breath is significantly improved with the use of Lasix along with her Bumex this week.  She continues to be followed by the heart failure clinic as well although she had to reschedule her appointment from last week due to acute illness    Bronchitis-  Improved with use of Levaquin and prednisone.  She states that her shortness of breath is significantly improved and wheezing has resolved.    The following portions of the patient's history were reviewed and updated as appropriate: allergies, current medications, past family history, past medical history, past social history, past surgical history and problem list.    Review of Systems   Constitutional: Negative for activity change, appetite change, fatigue and fever.   HENT: Negative for ear pain, sinus pressure and sore throat.    Eyes: Negative for pain and visual disturbance.   Respiratory: Positive for cough and shortness of breath (improved). Negative for chest tightness.    Cardiovascular: Negative for chest pain and palpitations.   Gastrointestinal: Negative for abdominal pain, constipation, diarrhea, nausea and vomiting.   Endocrine: Negative for polydipsia and polyuria.   Genitourinary: Negative for dysuria and frequency.   Musculoskeletal: Negative for back pain and myalgias.   Skin: Negative for color change and rash.   Allergic/Immunologic: Negative for food allergies and immunocompromised state.   Neurological: Negative for dizziness, syncope and  "headaches.   Hematological: Negative for adenopathy. Does not bruise/bleed easily.   Psychiatric/Behavioral: Negative for hallucinations and suicidal ideas. The patient is not nervous/anxious.        Ht 160 cm (62.99\")   Wt 106 kg (233 lb)   BMI 41.29 kg/m²   Hospital Outpatient Visit on 12/28/2020   Component Date Value Ref Range Status   • proBNP 12/28/2020 1,463.0  0.0-1,800.0 pg/mL Final   • Glucose 12/28/2020 152* 65 - 99 mg/dL Final   • BUN 12/28/2020 48* 8 - 23 mg/dL Final   • Creatinine 12/28/2020 2.11* 0.57 - 1.00 mg/dL Final   • Sodium 12/28/2020 135* 136 - 145 mmol/L Final   • Potassium 12/28/2020 4.0  3.5 - 5.2 mmol/L Final   • Chloride 12/28/2020 104  98 - 107 mmol/L Final   • CO2 12/28/2020 20.6* 22.0 - 29.0 mmol/L Final   • Calcium 12/28/2020 9.4  8.6 - 10.5 mg/dL Final   • eGFR Non African Amer 12/28/2020 23* >60 mL/min/1.73 Final   • BUN/Creatinine Ratio 12/28/2020 22.7  7.0 - 25.0 Final   • Anion Gap 12/28/2020 10.4  5.0 - 15.0 mmol/L Final   • Magnesium 12/28/2020 2.3  1.6 - 2.4 mg/dL Final       Physical Exam  Vitals signs and nursing note reviewed.   Neurological:      Mental Status: She is alert and oriented to person, place, and time.   Psychiatric:         Mood and Affect: Mood normal.         Thought Content: Thought content normal.         Assessment/Plan     Diagnoses and all orders for this visit:    1. Chronic diastolic congestive heart failure (CMS/HCC) (Primary)  Comments:  Discontinue Lasix  Continue Bumex 1 mg Monday Wednesday Friday    2. Bronchitis  Comments:  Resolved  Advised albuterol inhaler as needed for residual cough      We discussed her current symptoms and they are likely residual from the bronchitis.  It appears that she is doing much better.  I will follow-up with her for her regularly scheduled visit in April with lab work prior to that time.  She was advised that if symptoms should worsen before that time she should contact the office back for an earlier " appointment.      This document has been electronically signed by:  Lexie Dolan PA-C

## 2021-01-20 NOTE — PROGRESS NOTES
Heart Failure Pharmacy Note  Patient Name: Britta Lee   Referring Provider: Priscila Holland  Primary Cardiologist: Dr. Cruz    Medication Use:   Adherence: No issues  Hx of med intolerances: bradycardia with metoprolol 100mg BID  Affordability: No issues reported   Retail Rx Management: none    Past Medical History:   Diagnosis Date   • Anemia    • Arthritis    • Carpal tunnel syndrome    • CHF (congestive heart failure) (CMS/[facility])    • Coronary artery disease    • Diabetes mellitus (CMS/[facility])    • Elevated cholesterol    • GERD (gastroesophageal reflux disease)    • Heart disease    • High cholesterol    • History of pneumonia    • History of unsteady gait     OCASSIONALLY   • Hypertension    • Insomnia    • Kidney disease    • LENNY (obstructive sleep apnea) 12/1/2020   • Osteoarthritis    • Renal insufficiency    • Sciatica      ALLERGIES: Patient has no known allergies.  Current Outpatient Medications   Medication Sig Dispense Refill   • albuterol sulfate  (90 Base) MCG/ACT inhaler Inhale 2 puffs Every 4 (Four) Hours As Needed for Shortness of Air. 18 g 0   • amLODIPine (NORVASC) 10 MG tablet Take 1 tablet by mouth Every Night. 90 tablet 1   • aspirin 81 MG tablet Take 1 tablet by mouth Daily. 30 tablet 5   • atorvastatin (LIPITOR) 40 MG tablet Take 1 tablet by mouth Every Night. 90 tablet 1   • BD Pen Needle Evon U/F 32G X 4 MM misc      • bumetanide (BUMEX) 1 MG tablet Take 1 mg alternating with 0.5 mg daily     • cloNIDine (CATAPRES) 0.2 MG tablet Take 1 tablet by mouth 3 (Three) Times a Day. 270 tablet 1   • Continuous Blood Gluc  (Dexcom G6 ) device 1 Device Daily. 1 Device 0   • Continuous Blood Gluc Sensor (Dexcom G6 Sensor) Every 10 (Ten) Days. 9 each 3   • Continuous Blood Gluc Transmit (Dexcom G6 Transmitter) misc 1 Device Daily. 1 each 0   • escitalopram (LEXAPRO) 10 MG tablet Take 1 tablet by mouth Daily. 90 tablet 3   • ferrous sulfate 325 (65 Fe) MG tablet Take 1 tablet by  "mouth 2 (Two) Times a Day. 180 tablet 3   • hydrALAZINE (APRESOLINE) 50 MG tablet Take 1 tablet by mouth Every 8 (Eight) Hours. 270 tablet 1   • insulin lispro (humaLOG) 100 UNIT/ML injection Inject 5 Units under the skin into the appropriate area as directed 3 (Three) Times a Day Before Meals.     • Insulin Pen Needle 32G X 5 MM misc 1 each 4 (Four) Times a Day. 120 each 5   • isosorbide mononitrate (IMDUR) 60 MG 24 hr tablet Take 1 tablet by mouth Daily. 90 tablet 1   • levoFLOXacin (Levaquin) 500 MG tablet Take 1 tablet by mouth Daily. 10 tablet 0   • metoprolol succinate XL (TOPROL-XL) 25 MG 24 hr tablet Take 1 tablet by mouth Daily for 30 days. (Patient taking differently: Take 25 mg by mouth Daily. Taking old Rx for 100mg tart once daily) 30 tablet 1   • nystatin (MYCOSTATIN) 925843 UNIT/GM powder Apply  one application topically to the appropriate area as directed Every 12 (Twelve) Hours. (Patient taking differently: Apply  topically to the appropriate area as directed 2 (Two) Times a Day As Needed (itching).) 60 g 0   • quinapril (ACCUPRIL) 40 MG tablet Take 40 mg by mouth Daily.     • Toujeo Max SoloStar 300 UNIT/ML solution pen-injector injection Inject 60 Units under the skin into the appropriate area as directed Every Night.     • Vitamin D, Cholecalciferol, (CHOLECALCIFEROL) 10 MCG (400 UNIT) tablet Take 400 Units by mouth Daily.       No current facility-administered medications for this encounter.        Vaccination History:   Pneumonia: Reports UTD  Annual Influenza: Reports UTD for 2020-21 Season    Objective  Vitals:    01/20/21 0840   BP: 140/68   BP Location: Left arm   Patient Position: Sitting   Cuff Size: Adult   Pulse: 56   Resp: 22   Temp: 97.9 °F (36.6 °C)   TempSrc: Temporal   SpO2: 98%   Weight: 103 kg (226 lb 3.2 oz)   Height: 160 cm (62.99\")     Wt Readings from Last 3 Encounters:   01/20/21 103 kg (226 lb 3.2 oz)   01/19/21 106 kg (233 lb)   01/15/21 106 kg (233 lb)         " 01/20/21  0840   Weight: 103 kg (226 lb 3.2 oz)     Lab Results   Component Value Date    GLUCOSE 121 (H) 01/20/2021    BUN 48 (H) 01/20/2021    CREATININE 2.10 (H) 01/20/2021    EGFRIFNONA 23 (L) 01/20/2021    EGFRIFAFRI 41 (L) 07/30/2018    BCR 22.9 01/20/2021    K 4.5 01/20/2021    CO2 20.5 (L) 01/20/2021    CALCIUM 9.0 01/20/2021    PROTENTOTREF 7.7 07/30/2018    ALBUMIN 3.56 12/04/2020    LABIL2 1.1 (L) 07/30/2018    AST 50 (H) 12/04/2020    ALT 30 12/04/2020     Lab Results   Component Value Date    WBC 7.79 12/04/2020    HGB 9.4 (L) 12/04/2020    HCT 32.4 (L) 12/04/2020    MCV 85.9 12/04/2020     12/04/2020     Lab Results   Component Value Date    CKTOTAL 67 12/04/2020    CKMB 2.92 04/16/2018    TROPONINI 0.012 04/16/2018    TROPONINT <0.010 12/04/2020     Lab Results   Component Value Date    PROBNP 1,972.0 (H) 01/20/2021     Results for orders placed during the hospital encounter of 11/03/20   Adult Transthoracic Echo Complete W/ Cont if Necessary Per Protocol    Narrative · Left ventricular wall thickness is consistent with concentric   hypertrophy.  · Left ventricular ejection fraction appears to be greater than 70%. Left   ventricular systolic function is hyperdynamic (EF > 70%).  · Left ventricular diastolic function is consistent with (grade II w/high   LAP) pseudonormalization.  · The left atrial cavity is moderate to severely dilated.  · The right atrial cavity is mildly dilated.  · Moderate mitral annular calcification is present.  · Mild mitral valve regurgitation is present.  · Mild tricuspid valve regurgitation is present.  · Estimated right ventricular systolic pressure from tricuspid   regurgitation is markedly elevated (>55 mmHg).                   Class   Drug   Dose Last Dose Adjustment    ACEi/ARB/ARNI quinapril 40mg 11/1 Should have been stopped    Beta Blocker Toprol XL  25mg 11/8/2020    MRA --------------   eGFR needs to be >30mL/min       Drug Therapy Problems:     1. Duplicate  loop diuretics.  Pt was taking furosemide daily x 4 days in addition to Bumex MWF.  2. Clarification needed on beta blocker - med list and [facility] report Toprol XL 25 mg daily is most recent but pt reporting she uses Lopressor 100 mg BID. Pharmacy reports they haven't filled that since October.   3. Pt taking quinapril that was supposed to be stopped in November at hospitalization.   4. Levaquin needs renally adjusted     Recommendations:    1. Would recommend returning back to previous dose of bumetanide as renal function has worsened.   2. With confusion regarding medications patient is taking, I have asked her to bring in bottles to next visit.   3. Will recommend discontinuation to PCP or Nephrology at next visit if Scr remains elevated   4. Pt only has one dose of levaquin remaining, however dosing should have been 500mg x 1, then 250mg every 24 hours.  In the future, would recommend renal dose adjustment.     Patient was educated on heart failure medications and the importance of medication adherence.  All questions were addressed and patient expressed understanding.     Thank you for allowing me to participate in the care of your patient,    Melisa Leahy, PharmD  01/20/21  13:59 EST

## 2021-01-20 NOTE — PROGRESS NOTES
[facility] CHF CLINIC OFFICE VISIT    Subjective:   History of Present Illness   Britta Lee is a 75 y.o.  female who presents to the clinic today for follow up on heart failure. She is accompanied by her . She has a hx of diastolic congestive heart failure. Her EF was > 70% from echocardiogram on 11/4/2020. She is currently taking Bumex 1 mg Monday, Wednesday and Friday.     Has seen PCP and been treated for bronchitis with Levaquin and Prednisone and seems to be doing better   Has some swelling and weight gain and feels like she was retaining a lot of water. Taking Bumex 1 mg MWF and then primary added Lasix 20 mg for 4 days. She has completed the addition of Lasix      Weight stable at 220 lbs per her report   Breathing seems better and stable,  reports that her wheezing has resolved   Denies any hx of COPD/asthma  She has been unable to tolerate spironolactone due to kidney function  reports good urine output  Trying to adhere to sodium restrictions,  reports he was strictly adhering to a low-sodium diet however he became lax with him restrictions and noticed that is when she began to gain weight and wheeze since that time he has began to restrict sodium in their food once more   Upcoming follow up appt with Dr. Johnson    Home BP - 153/70, 135/80  Home HR  68-80  Done home sleep study and hasn't heard results   Non smoker exposed to second hand smoke  She continues on Hydralazine 50 mg TID, Imdur 60 mg daily, Metorpolol XL 25 mg daily, Norvasc 10 mg daily and Clonidine 0.2 mg TID for HTN.   Denies chest pain or palpitations   She apparently has some metoprolol tartrate 100 mg at home and has been taking those once a day instead of starting the metoprolol XL 25 mg daily as instructed  She also was instructed to stop taking quinapril at hospital discharge and it has recently appeared back on her routine maintenance meds    Past medical history significant for HTN, DM type 2, CKD III, iron  deficiency anemia, hyperlipidemia, obesity.     PCP: Lexie Dolan  Cardiologist: Dr. Cruz  Nephrologist: Dr. Johnson     Hospitalizations: Discharged on 11/8/2020  ER visit: 12/4/2020  Past Medical History:   Diagnosis Date   • Anemia    • Arthritis    • Carpal tunnel syndrome    • CHF (congestive heart failure) (CMS/Formerly [facility])    • Coronary artery disease    • Diabetes mellitus (CMS/Formerly [facility])    • Elevated cholesterol    • GERD (gastroesophageal reflux disease)    • Heart disease    • High cholesterol    • History of pneumonia    • History of unsteady gait     OCASSIONALLY   • Hypertension    • Insomnia    • Kidney disease    • LENNY (obstructive sleep apnea) 12/1/2020   • Osteoarthritis    • Renal insufficiency    • Sciatica      Past Surgical History:   Procedure Laterality Date   • ABDOMINAL SURGERY     • APPENDECTOMY     • CARDIAC CATHETERIZATION      1 STENT  ---  2000   • CARDIAC SURGERY     • CAROTID STENT     • CARPAL TUNNEL RELEASE Right 10/8/2019    Procedure: CARPAL TUNNEL RELEASE;  Surgeon: Feroz Levin MD;  Location: [facility];  Service: Orthopedics   • CATARACT EXTRACTION     • CHOLECYSTECTOMY     • COLONOSCOPY     • CORONARY ANGIOPLASTY WITH STENT PLACEMENT     • ENDOSCOPY     • ENDOSCOPY N/A 3/5/2020    Procedure: ESOPHAGOGASTRODUODENOSCOPY;  Surgeon: Alexandru Bagley MD;  Location: [facility];  Service: Gastroenterology;  Laterality: N/A;   • ENDOSCOPY AND COLONOSCOPY     • GALLBLADDER SURGERY     • JOINT REPLACEMENT Left 05/02/2017    [facility]  DR LEVIN  LEFT TOTAL KNEE   • KNEE ARTHROSCOPY W/ MENISCECTOMY Right    • LAPAROSCOPIC TUBAL LIGATION     • AK TOTAL KNEE ARTHROPLASTY Left 5/2/2017    Procedure: TOTAL KNEE ARTHROPLASTY;  Surgeon: Feroz Levin MD;  Location: [facility];  Service: Orthopedics   • STERILIZATION     • TONSILLECTOMY       Social History     Socioeconomic History   • Marital status:      Spouse name: natalie   • Number of children: 3   • Years of education: 12   •  Highest education level: Not on file   Occupational History   • Occupation: retired   Social Needs   • Financial resource strain: Somewhat hard   • Food insecurity     Worry: Never true     Inability: Never true   • Transportation needs     Medical: Yes     Non-medical: No   Tobacco Use   • Smoking status: Never Smoker   • Smokeless tobacco: Never Used   Substance and Sexual Activity   • Alcohol use: Yes     Comment: socially   • Drug use: No   • Sexual activity: Defer     Birth control/protection: Surgical   Lifestyle   • Physical activity     Days per week: 0 days     Minutes per session: 0 min   • Stress: Only a little     Family History   Problem Relation Age of Onset   • Arthritis Mother    • Diabetes Mother    • Cancer Mother    • Heart disease Father    • Diabetes Daughter    • Diabetes Son    • Diabetes Maternal Aunt    • Heart disease Maternal Grandmother    • Breast cancer Neg Hx      Allergies:  No Known Allergies    Review of Systems   Constitution: Positive for weight gain. Negative for chills, fever and weight loss.   HENT: Negative for ear discharge, hoarse voice and sore throat.    Eyes: Negative for discharge, double vision, pain, redness and visual disturbance.   Cardiovascular: Positive for leg swelling. Negative for chest pain, claudication, cyanosis, dyspnea on exertion, irregular heartbeat, near-syncope, orthopnea, palpitations and syncope.   Respiratory: Positive for shortness of breath and wheezing. Negative for cough.    Endocrine: Negative for cold intolerance, heat intolerance and polyuria.   Hematologic/Lymphatic: Negative for bleeding problem. Does not bruise/bleed easily.   Skin: Negative for color change, flushing and rash.   Musculoskeletal: Negative for arthritis, back pain, joint pain, joint swelling and muscle cramps.   Gastrointestinal: Negative for abdominal pain, constipation, diarrhea, nausea and vomiting.   Genitourinary: Negative for dysuria, flank pain, frequency, hesitancy  and urgency.   Neurological: Negative for difficulty with concentration, dizziness, light-headedness, sensory change, vertigo and weakness.   Psychiatric/Behavioral: Negative for depression. The patient does not have insomnia and is not nervous/anxious.      Current Outpatient Medications   Medication Sig Dispense Refill   • albuterol sulfate  (90 Base) MCG/ACT inhaler Inhale 2 puffs Every 4 (Four) Hours As Needed for Shortness of Air. 18 g 0   • amLODIPine (NORVASC) 10 MG tablet Take 1 tablet by mouth Every Night. 90 tablet 1   • aspirin 81 MG tablet Take 1 tablet by mouth Daily. 30 tablet 5   • atorvastatin (LIPITOR) 40 MG tablet Take 1 tablet by mouth Every Night. 90 tablet 1   • BD Pen Needle Evon U/F 32G X 4 MM misc      • bumetanide (BUMEX) 1 MG tablet Take 1 mg by mouth 3 (Three) Times a Week. MWF     • cloNIDine (CATAPRES) 0.2 MG tablet Take 1 tablet by mouth 3 (Three) Times a Day. 270 tablet 1   • Continuous Blood Gluc  (Dexcom G6 ) device 1 Device Daily. 1 Device 0   • Continuous Blood Gluc Sensor (Dexcom G6 Sensor) Every 10 (Ten) Days. 9 each 3   • Continuous Blood Gluc Transmit (Dexcom G6 Transmitter) misc 1 Device Daily. 1 each 0   • escitalopram (LEXAPRO) 10 MG tablet Take 1 tablet by mouth Daily. 90 tablet 3   • ferrous sulfate 325 (65 Fe) MG tablet Take 1 tablet by mouth 2 (Two) Times a Day. 180 tablet 3   • furosemide (Lasix) 20 MG tablet Take 1 tablet by mouth Every Morning. 30 tablet 0   • hydrALAZINE (APRESOLINE) 50 MG tablet Take 1 tablet by mouth Every 8 (Eight) Hours. 270 tablet 1   • insulin lispro (humaLOG) 100 UNIT/ML injection Inject 5 Units under the skin into the appropriate area as directed 3 (Three) Times a Day Before Meals.     • Insulin Pen Needle 32G X 5 MM misc 1 each 4 (Four) Times a Day. 120 each 5   • isosorbide mononitrate (IMDUR) 60 MG 24 hr tablet Take 1 tablet by mouth Daily. 90 tablet 1   • levoFLOXacin (Levaquin) 500 MG tablet Take 1 tablet by mouth  Daily. 10 tablet 0   • metoprolol succinate XL (TOPROL-XL) 25 MG 24 hr tablet Take 1 tablet by mouth Daily for 30 days. 30 tablet 1   • nystatin (MYCOSTATIN) 989762 UNIT/GM powder Apply  one application topically to the appropriate area as directed Every 12 (Twelve) Hours. (Patient taking differently: Apply  topically to the appropriate area as directed 2 (Two) Times a Day As Needed (itching).) 60 g 0   • predniSONE (DELTASONE) 20 MG tablet 2 daily 10 tablet 0   • quinapril (ACCUPRIL) 40 MG tablet Take 40 mg by mouth Daily.     • Toujeo Max SoloStar 300 UNIT/ML solution pen-injector injection      • Vitamin D, Cholecalciferol, (CHOLECALCIFEROL) 10 MCG (400 UNIT) tablet Take 400 Units by mouth Daily.       No current facility-administered medications for this encounter.      Objective:     Vitals:    01/20/21 0840   BP: 140/68   Pulse: 56   Resp: 22   Temp: 97.9 °F (36.6 °C)   SpO2: 98%     Wt Readings from Last 3 Encounters:   01/19/21 106 kg (233 lb)   01/15/21 106 kg (233 lb)   01/11/21 97.5 kg (215 lb)        Vitals signs reviewed.   Constitutional:       Appearance: Normal appearance. Well-developed.      Comments: Wears a mask during encounter   Eyes:      Conjunctiva/sclera: Conjunctivae normal.   HENT:      Head: Normocephalic.   Neck:      Musculoskeletal: Normal range of motion and neck supple.      Vascular: No JVD or JVR.   Pulmonary:      Effort: Pulmonary effort is normal.      Breath sounds: Normal breath sounds.   Cardiovascular:      Bradycardia present. Regular rhythm.   Pulses:     Intact distal pulses.   Edema:     Peripheral edema present.     Ankle: bilateral 1+ edema of the ankle.     Feet: bilateral 1+ edema of the feet.  Abdominal:      General: Bowel sounds are normal.      Palpations: Abdomen is soft. There is no hepatomegaly or splenomegaly.   Musculoskeletal: Normal range of motion.   Skin:     General: Skin is warm and dry.   Neurological:      Mental Status: Alert and oriented to  person, place, and time.   Psychiatric:         Attention and Perception: Attention normal.         Mood and Affect: Mood normal.         Speech: Speech normal.         Behavior: Behavior normal. Behavior is cooperative.         Cognition and Memory: Cognition normal.     Cardiographics  Results for orders placed during the hospital encounter of 20   Adult Transthoracic Echo Complete W/ Cont if Necessary Per Protocol    Narrative · Left ventricular wall thickness is consistent with concentric   hypertrophy.  · Left ventricular ejection fraction appears to be greater than 70%. Left   ventricular systolic function is hyperdynamic (EF > 70%).  · Left ventricular diastolic function is consistent with (grade II w/high   LAP) pseudonormalization.  · The left atrial cavity is moderate to severely dilated.  · The right atrial cavity is mildly dilated.  · Moderate mitral annular calcification is present.  · Mild mitral valve regurgitation is present.  · Mild tricuspid valve regurgitation is present.  · Estimated right ventricular systolic pressure from tricuspid   regurgitation is markedly elevated (>55 mmHg).        EK/3/2020    Chest x-ray: 11/3/2020    IMPRESSION:  CHF/edema with small effusions    Lab Review   Lab Results   Component Value Date    TSH 4.650 (H) 2020     Lab Results   Component Value Date    GLUCOSE 152 (H) 2020    BUN 48 (H) 2020    CREATININE 2.11 (H) 2020    EGFRIFNONA 23 (L) 2020    EGFRIFAFRI 41 (L) 2018    BCR 22.7 2020    K 4.0 2020    CO2 20.6 (L) 2020    CALCIUM 9.4 2020    PROTENTOTREF 7.7 2018    ALBUMIN 3.56 2020    LABIL2 1.1 (L) 2018    AST 50 (H) 2020    ALT 30 2020     Lab Results   Component Value Date    WBC 7.79 2020    HGB 9.4 (L) 2020    HCT 32.4 (L) 2020    MCV 85.9 2020     2020     Lab Results   Component Value Date    CKTOTAL 67 2020     CKMB 2.92 04/16/2018    TROPONINI 0.012 04/16/2018    TROPONINT <0.010 12/04/2020     Lab Results   Component Value Date    PROBNP 1,463.0 12/28/2020     The following portions of the patient's history were reviewed and updated as appropriate: allergies, current medications, past family history, past medical history, past social history, past surgical history and problem list.     Old records reviewed and pertinent information is included in the above objective data.     Assessment/Plan:   1. Acute on Chronic Diastolic Congestive Heart Failure, EF > 70%   2. HTN  3. CKD, stage IV  4. Hx of bronchitis   4. Morbid Obesity     Pro-BNP, BMP, magnesium today  Discussed and reviewed labs with patient today.   Patient reports that her bottle at home states metoprolol tartrate 100 mg BID however [facility] reports they last filled metoprolol XL 25 mg daily.   Will increase Bumex to 1 mg alternating with 0.5 mg daily. She is advised to stop taking Lasix.   If in fact she is taking quinapril when she was advised to discontinue this, it could be impacting her kidney function  Monitor BP, HR   Continue and complete antibiotics as prescribed for her recent bronchitis  Await sleep study results  request that she bring all her medication bottles at follow-up visit for further review and clarification on metoprolol and quinapril   Counseling patient extensively on dietary Na+ intake, importance of activity, weight monitoring, compliance with medications and follow up appointments.  Follow up in 1 week, sooner if needed.

## 2021-01-20 NOTE — PROGRESS NOTES
Heart Failure Clinic    Vitals:    01/20/21 0840   BP: 140/68   Pulse: 56   Resp: 22   Temp: 97.9 °F (36.6 °C)   SpO2: 98%      Mask Worn: Yes     Method of arrival: Ambulatory    Weighing self daily: Yes    Monitoring Heart Failure Zones: Yes    Today's HF Zone: Green    Taking medications as prescribed: Yes    Edema Yes    Shortness of Air: No    Number of pillows used at night:<2    Educational Materials given:    Jackie Jasmine MA 01/20/21 09:09 EST

## 2021-01-21 ENCOUNTER — TELEPHONE (OUTPATIENT)
Dept: CARDIOLOGY | Facility: HOSPITAL | Age: 76
End: 2021-01-21

## 2021-01-21 PROBLEM — Z87.09 HX OF BRONCHITIS: Status: ACTIVE | Noted: 2021-01-21

## 2021-01-21 NOTE — TELEPHONE ENCOUNTER
Britta called stating that her feet are swollen twice normal size and thinks it may be from the Bumex. She stated she is peeing okay. I spoke to Emily then called Mrs. Callejas back and encouraged her to take a Bumex now, and come see us in the morning to see if she may need Diuresis. Britta was compliant to schedule for 9:00 in the morning. No shortness of breath.  She had no other problems or concerns at this time.

## 2021-01-22 ENCOUNTER — LAB (OUTPATIENT)
Dept: LAB | Facility: HOSPITAL | Age: 76
End: 2021-01-22

## 2021-01-22 ENCOUNTER — TRANSCRIBE ORDERS (OUTPATIENT)
Dept: ADMINISTRATIVE | Facility: HOSPITAL | Age: 76
End: 2021-01-22

## 2021-01-22 ENCOUNTER — HOSPITAL ENCOUNTER (OUTPATIENT)
Dept: CARDIOLOGY | Facility: HOSPITAL | Age: 76
Discharge: HOME | End: 2021-01-22

## 2021-01-22 VITALS
OXYGEN SATURATION: 94 % | RESPIRATION RATE: 22 BRPM | SYSTOLIC BLOOD PRESSURE: 130 MMHG | DIASTOLIC BLOOD PRESSURE: 86 MMHG | BODY MASS INDEX: 39.83 KG/M2 | HEIGHT: 63 IN | TEMPERATURE: 97.8 F | HEART RATE: 66 BPM | WEIGHT: 224.8 LBS

## 2021-01-22 DIAGNOSIS — R60.0 PEDAL EDEMA: ICD-10-CM

## 2021-01-22 DIAGNOSIS — E66.09 CLASS 2 OBESITY DUE TO EXCESS CALORIES WITH BODY MASS INDEX (BMI) OF 39.0 TO 39.9 IN ADULT, UNSPECIFIED WHETHER SERIOUS COMORBIDITY PRESENT: ICD-10-CM

## 2021-01-22 DIAGNOSIS — I10 ESSENTIAL HYPERTENSION: ICD-10-CM

## 2021-01-22 DIAGNOSIS — Z87.09 HX OF BRONCHITIS: ICD-10-CM

## 2021-01-22 DIAGNOSIS — N18.1 CHRONIC KIDNEY DISEASE, STAGE I: ICD-10-CM

## 2021-01-22 DIAGNOSIS — N18.4 CKD (CHRONIC KIDNEY DISEASE) STAGE 4, GFR 15-29 ML/MIN (HCC): ICD-10-CM

## 2021-01-22 DIAGNOSIS — N18.1 CHRONIC KIDNEY DISEASE, STAGE I: Primary | ICD-10-CM

## 2021-01-22 DIAGNOSIS — I50.32 CHRONIC DIASTOLIC CONGESTIVE HEART FAILURE (HCC): Primary | ICD-10-CM

## 2021-01-22 PROBLEM — E66.812 CLASS 2 OBESITY DUE TO EXCESS CALORIES WITH BODY MASS INDEX (BMI) OF 39.0 TO 39.9 IN ADULT: Status: ACTIVE | Noted: 2020-02-25

## 2021-01-22 LAB
25(OH)D3 SERPL-MCNC: 27.8 NG/ML (ref 30–100)
ALBUMIN SERPL-MCNC: 3.6 G/DL (ref 3.5–5.2)
ALBUMIN/GLOB SERPL: 1.2 G/DL
ALP SERPL-CCNC: 164 U/L (ref 39–117)
ALT SERPL W P-5'-P-CCNC: 27 U/L (ref 1–33)
ANION GAP SERPL CALCULATED.3IONS-SCNC: 10.2 MMOL/L (ref 5–15)
ANION GAP SERPL CALCULATED.3IONS-SCNC: 13.3 MMOL/L (ref 5–15)
AST SERPL-CCNC: 38 U/L (ref 1–32)
BASOPHILS # BLD AUTO: 0.04 10*3/MM3 (ref 0–0.2)
BASOPHILS NFR BLD AUTO: 0.5 % (ref 0–1.5)
BILIRUB SERPL-MCNC: 0.2 MG/DL (ref 0–1.2)
BILIRUB UR QL STRIP: NEGATIVE
BUN SERPL-MCNC: 52 MG/DL (ref 8–23)
BUN SERPL-MCNC: 53 MG/DL (ref 8–23)
BUN/CREAT SERPL: 19.4 (ref 7–25)
BUN/CREAT SERPL: 20.6 (ref 7–25)
CALCIUM SPEC-SCNC: 8.7 MG/DL (ref 8.6–10.5)
CALCIUM SPEC-SCNC: 8.8 MG/DL (ref 8.6–10.5)
CHLORIDE SERPL-SCNC: 105 MMOL/L (ref 98–107)
CHLORIDE SERPL-SCNC: 106 MMOL/L (ref 98–107)
CLARITY UR: CLEAR
CO2 SERPL-SCNC: 22.7 MMOL/L (ref 22–29)
CO2 SERPL-SCNC: 24.8 MMOL/L (ref 22–29)
COLOR UR: YELLOW
CREAT SERPL-MCNC: 2.52 MG/DL (ref 0.57–1)
CREAT SERPL-MCNC: 2.73 MG/DL (ref 0.57–1)
CREAT UR-MCNC: 76.7 MG/DL
DEPRECATED RDW RBC AUTO: 41.8 FL (ref 37–54)
EOSINOPHIL # BLD AUTO: 0.44 10*3/MM3 (ref 0–0.4)
EOSINOPHIL NFR BLD AUTO: 5.1 % (ref 0.3–6.2)
ERYTHROCYTE [DISTWIDTH] IN BLOOD BY AUTOMATED COUNT: 14.3 % (ref 12.3–15.4)
FERRITIN SERPL-MCNC: 154 NG/ML (ref 13–150)
GFR SERPL CREATININE-BSD FRML MDRD: 17 ML/MIN/1.73
GFR SERPL CREATININE-BSD FRML MDRD: 19 ML/MIN/1.73
GLOBULIN UR ELPH-MCNC: 3 GM/DL
GLUCOSE SERPL-MCNC: 127 MG/DL (ref 65–99)
GLUCOSE SERPL-MCNC: 134 MG/DL (ref 65–99)
GLUCOSE UR STRIP-MCNC: NEGATIVE MG/DL
HCT VFR BLD AUTO: 31.1 % (ref 34–46.6)
HGB BLD-MCNC: 9.7 G/DL (ref 12–15.9)
HGB UR QL STRIP.AUTO: NEGATIVE
IMM GRANULOCYTES # BLD AUTO: 0.03 10*3/MM3 (ref 0–0.05)
IMM GRANULOCYTES NFR BLD AUTO: 0.3 % (ref 0–0.5)
IRON 24H UR-MRATE: 46 MCG/DL (ref 37–145)
IRON SATN MFR SERPL: 14 % (ref 20–50)
KETONES UR QL STRIP: NEGATIVE
LEUKOCYTE ESTERASE UR QL STRIP.AUTO: NEGATIVE
LYMPHOCYTES # BLD AUTO: 1.55 10*3/MM3 (ref 0.7–3.1)
LYMPHOCYTES NFR BLD AUTO: 17.9 % (ref 19.6–45.3)
MAGNESIUM SERPL-MCNC: 2 MG/DL (ref 1.6–2.4)
MCH RBC QN AUTO: 25.3 PG (ref 26.6–33)
MCHC RBC AUTO-ENTMCNC: 31.2 G/DL (ref 31.5–35.7)
MCV RBC AUTO: 81 FL (ref 79–97)
MONOCYTES # BLD AUTO: 0.62 10*3/MM3 (ref 0.1–0.9)
MONOCYTES NFR BLD AUTO: 7.1 % (ref 5–12)
NEUTROPHILS NFR BLD AUTO: 6 10*3/MM3 (ref 1.7–7)
NEUTROPHILS NFR BLD AUTO: 69.1 % (ref 42.7–76)
NITRITE UR QL STRIP: NEGATIVE
NRBC BLD AUTO-RTO: 0 /100 WBC (ref 0–0.2)
NT-PROBNP SERPL-MCNC: 1610 PG/ML (ref 0–1800)
PH UR STRIP.AUTO: 5.5 [PH] (ref 5–8)
PHOSPHATE SERPL-MCNC: 4 MG/DL (ref 2.5–4.5)
PLATELET # BLD AUTO: 204 10*3/MM3 (ref 140–450)
PMV BLD AUTO: 10.7 FL (ref 6–12)
POTASSIUM SERPL-SCNC: 4.7 MMOL/L (ref 3.5–5.2)
POTASSIUM SERPL-SCNC: 4.7 MMOL/L (ref 3.5–5.2)
PROT SERPL-MCNC: 6.6 G/DL (ref 6–8.5)
PROT UR QL STRIP: ABNORMAL
PROT UR-MCNC: 53 MG/DL
PROT/CREAT UR: 691 MG/G CREA (ref 0–200)
RBC # BLD AUTO: 3.84 10*6/MM3 (ref 3.77–5.28)
SODIUM SERPL-SCNC: 141 MMOL/L (ref 136–145)
SODIUM SERPL-SCNC: 141 MMOL/L (ref 136–145)
SP GR UR STRIP: 1.01 (ref 1–1.03)
TIBC SERPL-MCNC: 340 MCG/DL (ref 298–536)
TRANSFERRIN SERPL-MCNC: 228 MG/DL (ref 200–360)
UROBILINOGEN UR QL STRIP: ABNORMAL
WBC # BLD AUTO: 8.68 10*3/MM3 (ref 3.4–10.8)

## 2021-01-22 PROCEDURE — G0463 HOSPITAL OUTPT CLINIC VISIT: HCPCS | Performed by: PHARMACIST

## 2021-01-22 PROCEDURE — 36415 COLL VENOUS BLD VENIPUNCTURE: CPT | Performed by: NURSE PRACTITIONER

## 2021-01-22 PROCEDURE — 82570 ASSAY OF URINE CREATININE: CPT

## 2021-01-22 PROCEDURE — 82728 ASSAY OF FERRITIN: CPT

## 2021-01-22 PROCEDURE — 84466 ASSAY OF TRANSFERRIN: CPT

## 2021-01-22 PROCEDURE — 83880 ASSAY OF NATRIURETIC PEPTIDE: CPT

## 2021-01-22 PROCEDURE — 80053 COMPREHEN METABOLIC PANEL: CPT | Performed by: NURSE PRACTITIONER

## 2021-01-22 PROCEDURE — 84100 ASSAY OF PHOSPHORUS: CPT

## 2021-01-22 PROCEDURE — 85025 COMPLETE CBC W/AUTO DIFF WBC: CPT

## 2021-01-22 PROCEDURE — 84156 ASSAY OF PROTEIN URINE: CPT

## 2021-01-22 PROCEDURE — 83540 ASSAY OF IRON: CPT

## 2021-01-22 PROCEDURE — 81003 URINALYSIS AUTO W/O SCOPE: CPT

## 2021-01-22 PROCEDURE — 82306 VITAMIN D 25 HYDROXY: CPT

## 2021-01-22 PROCEDURE — 99214 OFFICE O/P EST MOD 30 MIN: CPT | Performed by: NURSE PRACTITIONER

## 2021-01-22 PROCEDURE — 83735 ASSAY OF MAGNESIUM: CPT | Performed by: NURSE PRACTITIONER

## 2021-01-22 RX ORDER — METOPROLOL SUCCINATE 25 MG/1
25 TABLET, EXTENDED RELEASE ORAL DAILY
Qty: 30 TABLET | Refills: 1 | Status: SHIPPED | OUTPATIENT
Start: 2021-01-22 | End: 2021-08-11 | Stop reason: ALTCHOICE

## 2021-01-22 RX ORDER — BUMETANIDE 1 MG/1
1 TABLET ORAL DAILY
Start: 2021-01-22 | End: 2021-01-27 | Stop reason: SDUPTHER

## 2021-01-22 NOTE — PROGRESS NOTES
[facility] [place] CHF CLINIC OFFICE VISIT    Subjective:   History of Present Illness   Britta Lee is a 75 y.o.  female who presents to the clinic today for an urgent evaluation for heart failure. She is accompanied by her daughter. She has a hx of diastolic congestive heart failure. Her EF was > 70% from echocardiogram on 11/4/2020. She is currently taking Bumex 1 mg alternating with 0.5 mg daily     Called clinic yesterday evening with concerns about lower extremity swelling   She took 1 mg of Bumex yesterday and was instructed to take an extra evening dose which she reports she did and swelling seemed to have improved   She has now completed Levaquin and Prednisone for bronchitis   Home weights about 216-220 lbs   Denies shortness of breath or wheezing   She did stop lasix and hasn't taken extra since her appt on Wednesday   There were some medication concerns and she brings in her pill bottles today and she has resumed the quinapril that was stopped at hospital discharge. She is unsure of when it was resumed. She reports Dr. Johnson doesn't want her to take it either. She has a bottle for HCTZ but reports that she hasn't been taking it. She has been taking Metoprolol tartrate 100 mg daily instead of switching to the XL form as was instructed at hospital discharge due to bradycardia. When verified with pharmacy they report filling the XL but patient reports no other bottle at home   BP at home 153/69  HR at home 69-70  She has been unable to tolerate spironolactone due to kidney function  Reports good urine output  Trying to adhere to sodium restrictions,  reports he was strictly adhering to a low-sodium diet however he became lax with him restrictions and noticed that is when she began to gain weight and wheeze since that time he has began to restrict sodium in their food once more   Upcoming follow up appt with Dr. Johnson  And request to have labs done today   She continues on Hydralazine 50 mg TID, Imdur 60  mg daily, Metorpolol XL 25 mg daily, Norvasc 10 mg daily and Clonidine 0.2 mg TID for HTN.   Reports bilateral LE swelling worsening over the last several days and unable to wear her shoes   Denies chest pain or palpitations, denies calf pain or redness     Past medical history significant for HTN, DM type 2, CKD III, iron deficiency anemia, hyperlipidemia, obesity.     PCP: Lexie Dolan  Cardiologist: Dr. Cruz  Nephrologist: Dr. Johnson     Hospitalizations: Discharged on 11/8/2020  ER visit: 12/4/2020  Past Medical History:   Diagnosis Date   • Anemia    • Arthritis    • Carpal tunnel syndrome    • CHF (congestive heart failure) (CMS/[facility])    • Coronary artery disease    • Diabetes mellitus (CMS/[facility])    • Elevated cholesterol    • GERD (gastroesophageal reflux disease)    • Heart disease    • High cholesterol    • History of pneumonia    • History of unsteady gait     OCASSIONALLY   • Hypertension    • Insomnia    • Kidney disease    • LENNY (obstructive sleep apnea) 12/1/2020   • Osteoarthritis    • Renal insufficiency    • Sciatica      Past Surgical History:   Procedure Laterality Date   • ABDOMINAL SURGERY     • APPENDECTOMY     • CARDIAC CATHETERIZATION      1 STENT  ---  2000   • CARDIAC SURGERY     • CAROTID STENT     • CARPAL TUNNEL RELEASE Right 10/8/2019    Procedure: CARPAL TUNNEL RELEASE;  Surgeon: Feroz Levin MD;  Location: [facility];  Service: Orthopedics   • CATARACT EXTRACTION     • CHOLECYSTECTOMY     • COLONOSCOPY     • CORONARY ANGIOPLASTY WITH STENT PLACEMENT     • ENDOSCOPY     • ENDOSCOPY N/A 3/5/2020    Procedure: ESOPHAGOGASTRODUODENOSCOPY;  Surgeon: Alexandru Bagley MD;  Location: [facility];  Service: Gastroenterology;  Laterality: N/A;   • ENDOSCOPY AND COLONOSCOPY     • GALLBLADDER SURGERY     • JOINT REPLACEMENT Left 05/02/2017    [facility] [place]  DR LEVIN  LEFT TOTAL KNEE   • KNEE ARTHROSCOPY W/ MENISCECTOMY Right    • LAPAROSCOPIC TUBAL LIGATION     • NM TOTAL KNEE ARTHROPLASTY Left  5/2/2017    Procedure: TOTAL KNEE ARTHROPLASTY;  Surgeon: Feroz Johnson MD;  Location: [facility];  Service: Orthopedics   • STERILIZATION     • TONSILLECTOMY       Social History     Socioeconomic History   • Marital status:      Spouse name: natalie   • Number of children: 3   • Years of education: 12   • Highest education level: Not on file   Occupational History   • Occupation: retired   Social Needs   • Financial resource strain: Somewhat hard   • Food insecurity     Worry: Never true     Inability: Never true   • Transportation needs     Medical: Yes     Non-medical: No   Tobacco Use   • Smoking status: Never Smoker   • Smokeless tobacco: Never Used   Substance and Sexual Activity   • Alcohol use: Yes     Comment: socially   • Drug use: No   • Sexual activity: Defer     Birth control/protection: Surgical   Lifestyle   • Physical activity     Days per week: 0 days     Minutes per session: 0 min   • Stress: Only a little     Family History   Problem Relation Age of Onset   • Arthritis Mother    • Diabetes Mother    • Cancer Mother    • Heart disease Father    • Diabetes Daughter    • Diabetes Son    • Diabetes Maternal Aunt    • Heart disease Maternal Grandmother    • Breast cancer Neg Hx      Allergies:  No Known Allergies    Review of Systems   Constitution: Negative for chills, fever, weight gain and weight loss.   HENT: Negative for ear discharge, hoarse voice and sore throat.    Eyes: Negative for discharge, double vision, pain, redness and visual disturbance.   Cardiovascular: Positive for leg swelling. Negative for chest pain, claudication, cyanosis, dyspnea on exertion, irregular heartbeat, near-syncope, orthopnea, palpitations and syncope.   Respiratory: Negative for cough, shortness of breath and wheezing.    Endocrine: Negative for cold intolerance, heat intolerance and polyuria.   Hematologic/Lymphatic: Negative for bleeding problem. Does not bruise/bleed easily.   Skin: Negative for  color change, flushing and rash.   Musculoskeletal: Negative for arthritis, back pain, joint pain, joint swelling and muscle cramps.   Gastrointestinal: Negative for abdominal pain, constipation, diarrhea, nausea and vomiting.   Genitourinary: Negative for dysuria, flank pain, frequency, hesitancy and urgency.   Neurological: Negative for difficulty with concentration, dizziness, light-headedness, sensory change, vertigo and weakness.   Psychiatric/Behavioral: Negative for depression. The patient does not have insomnia and is not nervous/anxious.      Current Outpatient Medications   Medication Sig Dispense Refill   • albuterol sulfate  (90 Base) MCG/ACT inhaler Inhale 2 puffs Every 4 (Four) Hours As Needed for Shortness of Air. 18 g 0   • amLODIPine (NORVASC) 10 MG tablet Take 1 tablet by mouth Every Night. 90 tablet 1   • aspirin 81 MG tablet Take 1 tablet by mouth Daily. 30 tablet 5   • atorvastatin (LIPITOR) 40 MG tablet Take 1 tablet by mouth Every Night. 90 tablet 1   • BD Pen Needle Evon U/F 32G X 4 MM misc      • bumetanide (BUMEX) 1 MG tablet Take 1 mg alternating with 0.5 mg daily     • cloNIDine (CATAPRES) 0.2 MG tablet Take 1 tablet by mouth 3 (Three) Times a Day. 270 tablet 1   • Continuous Blood Gluc  (Dexcom G6 ) device 1 Device Daily. 1 Device 0   • Continuous Blood Gluc Sensor (Dexcom G6 Sensor) Every 10 (Ten) Days. 9 each 3   • Continuous Blood Gluc Transmit (Dexcom G6 Transmitter) misc 1 Device Daily. 1 each 0   • escitalopram (LEXAPRO) 10 MG tablet Take 1 tablet by mouth Daily. 90 tablet 3   • ferrous sulfate 325 (65 Fe) MG tablet Take 1 tablet by mouth 2 (Two) Times a Day. 180 tablet 3   • hydrALAZINE (APRESOLINE) 50 MG tablet Take 1 tablet by mouth Every 8 (Eight) Hours. 270 tablet 1   • insulin lispro (humaLOG) 100 UNIT/ML injection Inject 5 Units under the skin into the appropriate area as directed 3 (Three) Times a Day Before Meals.     • Insulin Pen Needle 32G X 5 MM  misc 1 each 4 (Four) Times a Day. 120 each 5   • isosorbide mononitrate (IMDUR) 60 MG 24 hr tablet Take 1 tablet by mouth Daily. 90 tablet 1   • levoFLOXacin (Levaquin) 500 MG tablet Take 1 tablet by mouth Daily. 10 tablet 0   • metoprolol succinate XL (TOPROL-XL) 25 MG 24 hr tablet Take 1 tablet by mouth Daily for 30 days. (Patient taking differently: Take 25 mg by mouth Daily. Taking old Rx for 100mg tart once daily) 30 tablet 1   • nystatin (MYCOSTATIN) 426122 UNIT/GM powder Apply  one application topically to the appropriate area as directed Every 12 (Twelve) Hours. (Patient taking differently: Apply  topically to the appropriate area as directed 2 (Two) Times a Day As Needed (itching).) 60 g 0   • quinapril (ACCUPRIL) 40 MG tablet Take 40 mg by mouth Daily.     • Toujeo Max SoloStar 300 UNIT/ML solution pen-injector injection Inject 60 Units under the skin into the appropriate area as directed Every Night.     • Vitamin D, Cholecalciferol, (CHOLECALCIFEROL) 10 MCG (400 UNIT) tablet Take 400 Units by mouth Daily.       No current facility-administered medications for this encounter.      Objective:     Vitals:    01/22/21 0855   BP: 130/86   Pulse: 66   Resp: 22   Temp: 97.8 °F (36.6 °C)   SpO2: 94%     Wt Readings from Last 3 Encounters:   01/20/21 103 kg (226 lb 3.2 oz)   01/19/21 106 kg (233 lb)   01/15/21 106 kg (233 lb)        Vitals signs reviewed.   Constitutional:       Appearance: Normal appearance. Well-developed.      Comments: Wears a mask during encounter   Eyes:      Conjunctiva/sclera: Conjunctivae normal.   HENT:      Head: Normocephalic.   Neck:      Musculoskeletal: Normal range of motion and neck supple.      Vascular: No JVD or JVR.   Pulmonary:      Effort: Pulmonary effort is normal.      Breath sounds: Normal breath sounds.   Cardiovascular:      Normal rate. Regular rhythm.   Pulses:     Intact distal pulses.   Edema:     Peripheral edema present.     Ankle: bilateral 2+ edema of the  ankle.     Feet: bilateral 2+ edema of the feet.  Abdominal:      General: Bowel sounds are normal.      Palpations: Abdomen is soft. There is no hepatomegaly or splenomegaly.   Musculoskeletal: Normal range of motion.   Skin:     General: Skin is warm and dry.   Neurological:      Mental Status: Alert and oriented to person, place, and time.   Psychiatric:         Attention and Perception: Attention normal.         Mood and Affect: Mood normal.         Speech: Speech normal.         Behavior: Behavior normal. Behavior is cooperative.         Cognition and Memory: Cognition normal.     Cardiographics  Results for orders placed during the hospital encounter of 20   Adult Transthoracic Echo Complete W/ Cont if Necessary Per Protocol    Narrative · Left ventricular wall thickness is consistent with concentric   hypertrophy.  · Left ventricular ejection fraction appears to be greater than 70%. Left   ventricular systolic function is hyperdynamic (EF > 70%).  · Left ventricular diastolic function is consistent with (grade II w/high   LAP) pseudonormalization.  · The left atrial cavity is moderate to severely dilated.  · The right atrial cavity is mildly dilated.  · Moderate mitral annular calcification is present.  · Mild mitral valve regurgitation is present.  · Mild tricuspid valve regurgitation is present.  · Estimated right ventricular systolic pressure from tricuspid   regurgitation is markedly elevated (>55 mmHg).        EK/3/2020    Chest x-ray: 11/3/2020    IMPRESSION:  CHF/edema with small effusions    Lab Review   Lab Results   Component Value Date    TSH 4.650 (H) 2020     Lab Results   Component Value Date    GLUCOSE 121 (H) 2021    BUN 48 (H) 2021    CREATININE 2.10 (H) 2021    EGFRIFNONA 23 (L) 2021    EGFRIFAFRI 41 (L) 2018    BCR 22.9 2021    K 4.5 2021    CO2 20.5 (L) 2021    CALCIUM 9.0 2021    PROTENTOTREF 7.7 2018    ALBUMIN  3.56 12/04/2020    LABIL2 1.1 (L) 07/30/2018    AST 50 (H) 12/04/2020    ALT 30 12/04/2020     Lab Results   Component Value Date    WBC 7.79 12/04/2020    HGB 9.4 (L) 12/04/2020    HCT 32.4 (L) 12/04/2020    MCV 85.9 12/04/2020     12/04/2020     Lab Results   Component Value Date    CKTOTAL 67 12/04/2020    CKMB 2.92 04/16/2018    TROPONINI 0.012 04/16/2018    TROPONINT <0.010 12/04/2020     Lab Results   Component Value Date    PROBNP 1,972.0 (H) 01/20/2021     The following portions of the patient's history were reviewed and updated as appropriate: allergies, current medications, past family history, past medical history, past social history, past surgical history and problem list.     Old records reviewed and pertinent information is included in the above objective data.     Assessment/Plan:   1. Chronic Diastolic Congestive Heart Failure, EF > 70%   2. HTN  3. Pedal edema   3. CKD, stage IV  4. Hx of bronchitis   4. Obesity, Class II    Pro-BNP, BMP, magnesium today  Discussed and reviewed labs with patient today  Reviewed medication bottles and discussed medication changes with patient   Stop quinapril, monitor BP and bring log in for review at next visit, many need medication titrations   Metoprolol succinate XL 25 mg daily - new Rx thru pharmacy, monitor HR and holding parameters discussed with patient   Stop metoprolol tartrate 100 mg daily   Bumex 1 mg daily and may take extra 0.5 mg in the evening if needed for swelling or weight gain    Await sleep study results  Weight loss discussed and healthy lifestyle recommended.   Counseling patient extensively on dietary Na+ intake, importance of activity, weight monitoring, compliance with medications and follow up appointments.  Follow up in 1 week, sooner if needed.

## 2021-01-22 NOTE — PROGRESS NOTES
Heart Failure Clinic    Vitals:    01/22/21 0855   BP: 130/86   Pulse: 66   Resp: 22   Temp: 97.8 °F (36.6 °C)   SpO2: 94%    Mask Worn: Yes    Method of arrival: Ambulatory    Weighing self daily: Yes    Monitoring Heart Failure Zones: Yes    Today's HF Zone: Yellow     Taking medications as prescribed: Yes    Edema Yes swelling B/L feet and ankles. Has improved in last 12 hours.    Shortness of Air: No    Number of pillows used at night:<2    Educational Materials given:    Jackie Jasmine MA 01/22/21 08:58 EST

## 2021-01-22 NOTE — PROGRESS NOTES
Heart Failure Pharmacy Note  Patient Name: Britta Lee   Referring Provider: Priscila Holland  Primary Cardiologist: Dr. Cruz    Medication Use:   Adherence: No issues  Hx of med intolerances: bradycardia with metoprolol 100mg BID  Affordability: No issues reported   Retail Rx Management: none    Past Medical History:   Diagnosis Date   • Anemia    • Arthritis    • Carpal tunnel syndrome    • CHF (congestive heart failure) (CMS/[facility])    • Coronary artery disease    • Diabetes mellitus (CMS/[facility])    • Elevated cholesterol    • GERD (gastroesophageal reflux disease)    • Heart disease    • High cholesterol    • History of pneumonia    • History of unsteady gait     OCASSIONALLY   • Hypertension    • Insomnia    • Kidney disease    • LENNY (obstructive sleep apnea) 12/1/2020   • Osteoarthritis    • Renal insufficiency    • Sciatica      ALLERGIES: Patient has no known allergies.  Current Outpatient Medications   Medication Sig Dispense Refill   • albuterol sulfate  (90 Base) MCG/ACT inhaler Inhale 2 puffs Every 4 (Four) Hours As Needed for Shortness of Air. 18 g 0   • amLODIPine (NORVASC) 10 MG tablet Take 1 tablet by mouth Every Night. 90 tablet 1   • aspirin 81 MG tablet Take 1 tablet by mouth Daily. 30 tablet 5   • atorvastatin (LIPITOR) 40 MG tablet Take 1 tablet by mouth Every Night. 90 tablet 1   • BD Pen Needle Evon U/F 32G X 4 MM misc      • bumetanide (BUMEX) 1 MG tablet Take 1 tablet by mouth Daily. May take an extra 0.5 mg in the evening for swelling     • cloNIDine (CATAPRES) 0.2 MG tablet Take 1 tablet by mouth 3 (Three) Times a Day. 270 tablet 1   • Continuous Blood Gluc  (Dexcom G6 ) device 1 Device Daily. 1 Device 0   • Continuous Blood Gluc Sensor (Dexcom G6 Sensor) Every 10 (Ten) Days. 9 each 3   • Continuous Blood Gluc Transmit (Dexcom G6 Transmitter) misc 1 Device Daily. 1 each 0   • escitalopram (LEXAPRO) 10 MG tablet Take 1 tablet by mouth Daily. 90 tablet 3   • ferrous  "sulfate 325 (65 Fe) MG tablet Take 1 tablet by mouth 2 (Two) Times a Day. 180 tablet 3   • hydrALAZINE (APRESOLINE) 50 MG tablet Take 1 tablet by mouth Every 8 (Eight) Hours. 270 tablet 1   • insulin lispro (humaLOG) 100 UNIT/ML injection Inject 5 Units under the skin into the appropriate area as directed 3 (Three) Times a Day Before Meals.     • Insulin Pen Needle 32G X 5 MM misc 1 each 4 (Four) Times a Day. 120 each 5   • isosorbide mononitrate (IMDUR) 60 MG 24 hr tablet Take 1 tablet by mouth Daily. 90 tablet 1   • nystatin (MYCOSTATIN) 140872 UNIT/GM powder Apply  one application topically to the appropriate area as directed Every 12 (Twelve) Hours. (Patient taking differently: Apply  topically to the appropriate area as directed 2 (Two) Times a Day As Needed (itching).) 60 g 0   • Toujeo Max SoloStar 300 UNIT/ML solution pen-injector injection Inject 60 Units under the skin into the appropriate area as directed Every Night.     • Vitamin D, Cholecalciferol, (CHOLECALCIFEROL) 10 MCG (400 UNIT) tablet Take 400 Units by mouth Daily.     • metoprolol succinate XL (TOPROL-XL) 25 MG 24 hr tablet Take 1 tablet by mouth Daily. Hold if HR less than 60 bpm. 30 tablet 1     No current facility-administered medications for this encounter.        Vaccination History:   Pneumonia: Reports UTD  Annual Influenza: Reports UTD for 2020-21 Season    Objective  Vitals:    01/22/21 0855   BP: 130/86   BP Location: Left arm   Patient Position: Sitting   Cuff Size: Adult   Pulse: 66   Resp: 22   Temp: 97.8 °F (36.6 °C)   TempSrc: Temporal   SpO2: 94%   Weight: 102 kg (224 lb 12.8 oz)   Height: 160 cm (62.99\")     Wt Readings from Last 3 Encounters:   01/22/21 102 kg (224 lb 12.8 oz)   01/20/21 103 kg (226 lb 3.2 oz)   01/19/21 106 kg (233 lb)         01/22/21  0855   Weight: 102 kg (224 lb 12.8 oz)     Lab Results   Component Value Date    GLUCOSE 134 (H) 01/22/2021    BUN 52 (H) 01/22/2021    CREATININE 2.52 (H) 01/22/2021    " EGFRIFNONA 19 (L) 01/22/2021    EGFRIFAFRI 41 (L) 07/30/2018    BCR 20.6 01/22/2021    K 4.7 01/22/2021    CO2 24.8 01/22/2021    CALCIUM 8.8 01/22/2021    PROTENTOTREF 7.7 07/30/2018    ALBUMIN 3.56 12/04/2020    LABIL2 1.1 (L) 07/30/2018    AST 50 (H) 12/04/2020    ALT 30 12/04/2020     Lab Results   Component Value Date    WBC 7.79 12/04/2020    HGB 9.4 (L) 12/04/2020    HCT 32.4 (L) 12/04/2020    MCV 85.9 12/04/2020     12/04/2020     Lab Results   Component Value Date    CKTOTAL 67 12/04/2020    CKMB 2.92 04/16/2018    TROPONINI 0.012 04/16/2018    TROPONINT <0.010 12/04/2020     Lab Results   Component Value Date    PROBNP 1,610.0 01/22/2021     Results for orders placed during the hospital encounter of 11/03/20   Adult Transthoracic Echo Complete W/ Cont if Necessary Per Protocol    Narrative · Left ventricular wall thickness is consistent with concentric   hypertrophy.  · Left ventricular ejection fraction appears to be greater than 70%. Left   ventricular systolic function is hyperdynamic (EF > 70%).  · Left ventricular diastolic function is consistent with (grade II w/high   LAP) pseudonormalization.  · The left atrial cavity is moderate to severely dilated.  · The right atrial cavity is mildly dilated.  · Moderate mitral annular calcification is present.  · Mild mitral valve regurgitation is present.  · Mild tricuspid valve regurgitation is present.  · Estimated right ventricular systolic pressure from tricuspid   regurgitation is markedly elevated (>55 mmHg).                   Class   Drug   Dose Last Dose Adjustment   Notes   ACEi/ARB/ARNI    Worsening renal fxn 1/22/21   Beta Blocker Toprol XL  25mg 1/22/21 Pt had never started yet   MRA --------------   eGFR needs to be >30mL/min       Drug Therapy Problems:     1. ACEi and possibly contributing to worsening renal function  2. Clarification still required for beta blocker as patient brought in home medication bottles and had the Lopressor 100  mg BID filled in October 2020. Pt reported taking once daily. Confirmed that Toprol XL 25 mg daily had been dispensed to patient, however, patient unsure if she has the bottle or not at home which could create issue with refilling through insurance.   3. History of bradycardia with metoprolol 100 mg BID/borderline HR today in clinic       Recommendations:    1. Would recommend patient discontinue quinapril as she was instructed on most recent hospital discharge. Will notify PCP/nephrology of discontinuation of quinapril.   2. Would recommend seeing if patient can pay cash price for Toprol XL if insurance does not allow for a refill since she reports not having it or if possible assistance could be provided in order for patient to have prior to leaving.   3. Would recommend HR holding parameter placed on Toprol XL Rx due to patients history, borderline HR, and new medication for patient.     Patient was educated on heart failure medications and the importance of medication adherence.  All questions were addressed and patient expressed understanding.     Thank you for allowing me to participate in the care of your patient,    Shanta Palomares. Dylan, PharmD  01/22/21  12:09 EST

## 2021-01-27 ENCOUNTER — HOSPITAL ENCOUNTER (OUTPATIENT)
Dept: CARDIOLOGY | Facility: HOSPITAL | Age: 76
Discharge: HOME | End: 2021-01-27
Admitting: NURSE PRACTITIONER

## 2021-01-27 VITALS
SYSTOLIC BLOOD PRESSURE: 128 MMHG | DIASTOLIC BLOOD PRESSURE: 74 MMHG | HEIGHT: 63 IN | BODY MASS INDEX: 39.97 KG/M2 | OXYGEN SATURATION: 98 % | HEART RATE: 61 BPM | TEMPERATURE: 98 F | WEIGHT: 225.6 LBS | RESPIRATION RATE: 20 BRPM

## 2021-01-27 DIAGNOSIS — I10 ESSENTIAL HYPERTENSION: ICD-10-CM

## 2021-01-27 DIAGNOSIS — I50.32 CHRONIC DIASTOLIC CONGESTIVE HEART FAILURE (HCC): Primary | ICD-10-CM

## 2021-01-27 DIAGNOSIS — E66.09 CLASS 2 OBESITY DUE TO EXCESS CALORIES WITH BODY MASS INDEX (BMI) OF 39.0 TO 39.9 IN ADULT, UNSPECIFIED WHETHER SERIOUS COMORBIDITY PRESENT: ICD-10-CM

## 2021-01-27 DIAGNOSIS — N18.4 CHRONIC RENAL DISEASE, STAGE IV (HCC): ICD-10-CM

## 2021-01-27 DIAGNOSIS — R60.0 PEDAL EDEMA: ICD-10-CM

## 2021-01-27 LAB
ANION GAP SERPL CALCULATED.3IONS-SCNC: 12.9 MMOL/L (ref 5–15)
BUN SERPL-MCNC: 43 MG/DL (ref 8–23)
BUN/CREAT SERPL: 22.5 (ref 7–25)
CALCIUM SPEC-SCNC: 9.5 MG/DL (ref 8.6–10.5)
CHLORIDE SERPL-SCNC: 107 MMOL/L (ref 98–107)
CO2 SERPL-SCNC: 19.1 MMOL/L (ref 22–29)
CREAT SERPL-MCNC: 1.91 MG/DL (ref 0.57–1)
GFR SERPL CREATININE-BSD FRML MDRD: 25 ML/MIN/1.73
GLUCOSE SERPL-MCNC: 131 MG/DL (ref 65–99)
MAGNESIUM SERPL-MCNC: 2.1 MG/DL (ref 1.6–2.4)
NT-PROBNP SERPL-MCNC: 1783 PG/ML (ref 0–1800)
POTASSIUM SERPL-SCNC: 4.8 MMOL/L (ref 3.5–5.2)
SODIUM SERPL-SCNC: 139 MMOL/L (ref 136–145)

## 2021-01-27 PROCEDURE — 36415 COLL VENOUS BLD VENIPUNCTURE: CPT | Performed by: NURSE PRACTITIONER

## 2021-01-27 PROCEDURE — G0463 HOSPITAL OUTPT CLINIC VISIT: HCPCS | Performed by: PHARMACIST

## 2021-01-27 PROCEDURE — 99213 OFFICE O/P EST LOW 20 MIN: CPT | Performed by: NURSE PRACTITIONER

## 2021-01-27 PROCEDURE — 80048 BASIC METABOLIC PNL TOTAL CA: CPT | Performed by: NURSE PRACTITIONER

## 2021-01-27 PROCEDURE — 83880 ASSAY OF NATRIURETIC PEPTIDE: CPT

## 2021-01-27 PROCEDURE — 83735 ASSAY OF MAGNESIUM: CPT | Performed by: NURSE PRACTITIONER

## 2021-01-27 RX ORDER — BUMETANIDE 1 MG/1
1 TABLET ORAL DAILY
Qty: 60 TABLET | Refills: 1 | Status: SHIPPED | OUTPATIENT
Start: 2021-01-27 | End: 2021-05-26 | Stop reason: SDUPTHER

## 2021-01-27 NOTE — PROGRESS NOTES
[facility] CHF CLINIC OFFICE VISIT    Subjective:   Congestive Heart Failure  Pertinent negatives include no abdominal pain, chest pain, claudication, near-syncope, palpitations or shortness of breath.      Britta Lee is a 75 y.o.  female who presents to the clinic today for follow up on heart failure. She is accompanied by her . She has a hx of diastolic congestive heart failure. Her EF was > 70% from echocardiogram on 11/4/2020. She is currently taking Bumex 1 mg daily. She has been unable to tolerate Spironolactone due to abnormal kidney function.     Leg swelling has improved, she is keeping her feet and legs elevated as much as possible   Needs refill on Bumex to [facility]   Taking Bumex 1 mg daily and hasn't had to use any extra over the last week  She reports her breathing is doing great and denies shortness of breath or wheezing.   Home weight log down a few lbs at 218  Home -177/72 however readings are prior to medications   Home HR 67-80 with an isolated reading at 98 bpm   Denies chest discomfort, palpitations or tachycardia.   Due to see nephrologists in February and recent had labs    She has changed her Metoprolol from tartrate to succinate and seems to be tolerating it well.    She has been unable to tolerate spironolactone due to kidney function  Reports good urine output  Trying to adhere to sodium restrictions and she reports improvement in adherence.    She continues on Hydralazine 50 mg TID, Imdur 60 mg daily, Metorpolol XL 25 mg daily, Norvasc 10 mg daily and Clonidine 0.2 mg TID for HTN.  She has remained off Quinapril since her last visit    Concerns about blood sugars going to high and to low     Past medical history significant for HTN, DM type 2, CKD III, iron deficiency anemia, hyperlipidemia, obesity.     PCP: Lexie Dolan  Cardiologist: Dr. Cruz  Nephrologist: Dr. Johnson     Hospitalizations: Discharged on 11/8/2020  ER visit: 12/4/2020  Past Medical History:    Diagnosis Date   • Anemia    • Arthritis    • Carpal tunnel syndrome    • CHF (congestive heart failure) (CMS/[facility])    • Coronary artery disease    • Diabetes mellitus (CMS/[facility])    • Elevated cholesterol    • GERD (gastroesophageal reflux disease)    • Heart disease    • High cholesterol    • History of pneumonia    • History of unsteady gait     OCASSIONALLY   • Hypertension    • Insomnia    • Kidney disease    • LENNY (obstructive sleep apnea) 12/1/2020   • Osteoarthritis    • Renal insufficiency    • Sciatica      Past Surgical History:   Procedure Laterality Date   • ABDOMINAL SURGERY     • APPENDECTOMY     • CARDIAC CATHETERIZATION      1 STENT  ---  2000   • CARDIAC SURGERY     • CAROTID STENT     • CARPAL TUNNEL RELEASE Right 10/8/2019    Procedure: CARPAL TUNNEL RELEASE;  Surgeon: Feroz Johnson MD;  Location: [facility];  Service: Orthopedics   • CATARACT EXTRACTION     • CHOLECYSTECTOMY     • COLONOSCOPY     • CORONARY ANGIOPLASTY WITH STENT PLACEMENT     • ENDOSCOPY     • ENDOSCOPY N/A 3/5/2020    Procedure: ESOPHAGOGASTRODUODENOSCOPY;  Surgeon: Alexandru Bagley MD;  Location: [facility];  Service: Gastroenterology;  Laterality: N/A;   • ENDOSCOPY AND COLONOSCOPY     • GALLBLADDER SURGERY     • JOINT REPLACEMENT Left 05/02/2017    [facility]  DR JOHNSON  LEFT TOTAL KNEE   • KNEE ARTHROSCOPY W/ MENISCECTOMY Right    • LAPAROSCOPIC TUBAL LIGATION     • OK TOTAL KNEE ARTHROPLASTY Left 5/2/2017    Procedure: TOTAL KNEE ARTHROPLASTY;  Surgeon: Feroz Johnson MD;  Location: [facility];  Service: Orthopedics   • STERILIZATION     • TONSILLECTOMY       Social History     Socioeconomic History   • Marital status:      Spouse name: natalie   • Number of children: 3   • Years of education: 12   • Highest education level: Not on file   Occupational History   • Occupation: retired   Social Needs   • Financial resource strain: Somewhat hard   • Food insecurity     Worry: Never true     Inability: Never true    • Transportation needs     Medical: Yes     Non-medical: No   Tobacco Use   • Smoking status: Never Smoker   • Smokeless tobacco: Never Used   Substance and Sexual Activity   • Alcohol use: Yes     Comment: socially   • Drug use: No   • Sexual activity: Defer     Birth control/protection: Surgical   Lifestyle   • Physical activity     Days per week: 0 days     Minutes per session: 0 min   • Stress: Only a little     Family History   Problem Relation Age of Onset   • Arthritis Mother    • Diabetes Mother    • Cancer Mother    • Heart disease Father    • Diabetes Daughter    • Diabetes Son    • Diabetes Maternal Aunt    • Heart disease Maternal Grandmother    • Breast cancer Neg Hx      Allergies:  No Known Allergies    Review of Systems   Constitution: Negative for chills, fever, weight gain and weight loss.   HENT: Negative for ear discharge, hoarse voice and sore throat.    Eyes: Negative for discharge, double vision, pain, redness and visual disturbance.   Cardiovascular: Positive for leg swelling. Negative for chest pain, claudication, cyanosis, dyspnea on exertion, irregular heartbeat, near-syncope, orthopnea, palpitations and syncope.   Respiratory: Negative for cough, shortness of breath and wheezing.    Endocrine: Negative for cold intolerance, heat intolerance and polyuria.   Hematologic/Lymphatic: Negative for bleeding problem. Does not bruise/bleed easily.   Skin: Negative for color change, flushing and rash.   Musculoskeletal: Negative for arthritis, back pain, joint pain, joint swelling and muscle cramps.   Gastrointestinal: Negative for abdominal pain, constipation, diarrhea, nausea and vomiting.   Genitourinary: Negative for dysuria, flank pain, frequency, hesitancy and urgency.   Neurological: Negative for difficulty with concentration, dizziness, light-headedness, sensory change, vertigo and weakness.   Psychiatric/Behavioral: Negative for depression. The patient does not have insomnia and is not  nervous/anxious.      Current Outpatient Medications   Medication Sig Dispense Refill   • albuterol sulfate  (90 Base) MCG/ACT inhaler Inhale 2 puffs Every 4 (Four) Hours As Needed for Shortness of Air. 18 g 0   • amLODIPine (NORVASC) 10 MG tablet Take 1 tablet by mouth Every Night. 90 tablet 1   • aspirin 81 MG tablet Take 1 tablet by mouth Daily. 30 tablet 5   • atorvastatin (LIPITOR) 40 MG tablet Take 1 tablet by mouth Every Night. 90 tablet 1   • BD Pen Needle Evon U/F 32G X 4 MM misc      • bumetanide (BUMEX) 1 MG tablet Take 1 tablet by mouth Daily. May take an extra 0.5 mg in the evening for swelling     • cloNIDine (CATAPRES) 0.2 MG tablet Take 1 tablet by mouth 3 (Three) Times a Day. 270 tablet 1   • Continuous Blood Gluc  (Dexcom G6 ) device 1 Device Daily. 1 Device 0   • Continuous Blood Gluc Sensor (Dexcom G6 Sensor) Every 10 (Ten) Days. 9 each 3   • Continuous Blood Gluc Transmit (Dexcom G6 Transmitter) misc 1 Device Daily. 1 each 0   • escitalopram (LEXAPRO) 10 MG tablet Take 1 tablet by mouth Daily. 90 tablet 3   • ferrous sulfate 325 (65 Fe) MG tablet Take 1 tablet by mouth 2 (Two) Times a Day. 180 tablet 3   • hydrALAZINE (APRESOLINE) 50 MG tablet Take 1 tablet by mouth Every 8 (Eight) Hours. 270 tablet 1   • insulin lispro (humaLOG) 100 UNIT/ML injection Inject 5 Units under the skin into the appropriate area as directed 3 (Three) Times a Day Before Meals.     • Insulin Pen Needle 32G X 5 MM misc 1 each 4 (Four) Times a Day. 120 each 5   • isosorbide mononitrate (IMDUR) 60 MG 24 hr tablet Take 1 tablet by mouth Daily. 90 tablet 1   • metoprolol succinate XL (TOPROL-XL) 25 MG 24 hr tablet Take 1 tablet by mouth Daily. Hold if HR less than 60 bpm. 30 tablet 1   • nystatin (MYCOSTATIN) 032170 UNIT/GM powder Apply  one application topically to the appropriate area as directed Every 12 (Twelve) Hours. (Patient taking differently: Apply  topically to the appropriate area as  directed 2 (Two) Times a Day As Needed (itching).) 60 g 0   • Toujeo Max SoloStar 300 UNIT/ML solution pen-injector injection Inject 60 Units under the skin into the appropriate area as directed Every Night.     • Vitamin D, Cholecalciferol, (CHOLECALCIFEROL) 10 MCG (400 UNIT) tablet Take 400 Units by mouth Daily.       No current facility-administered medications for this encounter.      Objective:     Vitals:    01/27/21 0840   BP: 128/74   Pulse: 61   Resp: 20   Temp: 98 °F (36.7 °C)   SpO2: 98%     Body mass index is 39.98 kg/m².    Wt Readings from Last 3 Encounters:   01/27/21 102 kg (225 lb 9.6 oz)   01/22/21 102 kg (224 lb 12.8 oz)   01/20/21 103 kg (226 lb 3.2 oz)        Vitals signs reviewed.   Constitutional:       Appearance: Normal appearance. Well-developed.      Comments: Wears a mask during encounter   Eyes:      Conjunctiva/sclera: Conjunctivae normal.   HENT:      Head: Normocephalic.   Neck:      Musculoskeletal: Normal range of motion and neck supple.      Vascular: No JVD or JVR.   Pulmonary:      Effort: Pulmonary effort is normal.      Breath sounds: Normal breath sounds.   Cardiovascular:      Normal rate. Regular rhythm.   Pulses:     Intact distal pulses.   Edema:     Peripheral edema present.     Ankle: bilateral 1+ edema of the ankle.     Feet: bilateral 1+ edema of the feet.  Abdominal:      General: Bowel sounds are normal.      Palpations: Abdomen is soft. There is no hepatomegaly or splenomegaly.   Musculoskeletal: Normal range of motion.   Skin:     General: Skin is warm and dry.   Neurological:      Mental Status: Alert and oriented to person, place, and time.   Psychiatric:         Attention and Perception: Attention normal.         Mood and Affect: Mood normal.         Speech: Speech normal.         Behavior: Behavior normal. Behavior is cooperative.         Cognition and Memory: Cognition normal.     Cardiographics  Results for orders placed during the hospital encounter of  20   Adult Transthoracic Echo Complete W/ Cont if Necessary Per Protocol    Narrative · Left ventricular wall thickness is consistent with concentric   hypertrophy.  · Left ventricular ejection fraction appears to be greater than 70%. Left   ventricular systolic function is hyperdynamic (EF > 70%).  · Left ventricular diastolic function is consistent with (grade II w/high   LAP) pseudonormalization.  · The left atrial cavity is moderate to severely dilated.  · The right atrial cavity is mildly dilated.  · Moderate mitral annular calcification is present.  · Mild mitral valve regurgitation is present.  · Mild tricuspid valve regurgitation is present.  · Estimated right ventricular systolic pressure from tricuspid   regurgitation is markedly elevated (>55 mmHg).        EK/3/2020    Chest x-ray: 11/3/2020    IMPRESSION:  CHF/edema with small effusions    Lab Review   Lab Results   Component Value Date    TSH 4.650 (H) 2020     Lab Results   Component Value Date    GLUCOSE 127 (H) 2021    BUN 53 (H) 2021    CREATININE 2.73 (H) 2021    EGFRIFNONA 17 (L) 2021    EGFRIFAFRI 41 (L) 2018    BCR 19.4 2021    K 4.7 2021    CO2 22.7 2021    CALCIUM 8.7 2021    PROTENTOTREF 7.7 2018    ALBUMIN 3.60 2021    LABIL2 1.1 (L) 2018    AST 38 (H) 2021    ALT 27 2021     Lab Results   Component Value Date    WBC 8.68 2021    HGB 9.7 (L) 2021    HCT 31.1 (L) 2021    MCV 81.0 2021     2021     Lab Results   Component Value Date    CKTOTAL 67 2020    CKMB 2.92 2018    TROPONINI 0.012 2018    TROPONINT <0.010 2020     Lab Results   Component Value Date    PROBNP 1,610.0 2021     The following portions of the patient's history were reviewed and updated as appropriate: allergies, current medications, past family history, past medical history, past social history, past surgical  history and problem list.     Old records reviewed and pertinent information is included in the above objective data.     Assessment/Plan:   1. Chronic Diastolic Congestive Heart Failure, EF > 70%   2. HTN  3. Pedal edema   3. CKD, stage IV  4. Obesity, Class II    Pro-BNP, BMP, magnesium today  Discussed and reviewed labs with patient today  Refill Bumex   Monitor BP and HR, patient was asked to check BP about 1-2 hrs after medications and keep a log as further titrations maybe warranted   Continue on Bumex 1 mg daily and may take extra 0.5 mg in the evening if needed for swelling or weight gain    Await sleep study results from home sleep study   Recommended she call steven layton about fluctuating blood glucose for further instructions on insulin dosing     Weight loss discussed and healthy lifestyle recommended   Counseling patient extensively on dietary Na+ intake, importance of activity, weight monitoring, compliance with medications and follow up appointments.  Follow up in 4 weeks, sooner if needed.

## 2021-01-27 NOTE — PATIENT INSTRUCTIONS
"Exercises To Do While Sitting    Exercises that you do while sitting (chair exercises) can give you many of the same benefits as full exercise. Benefits include strengthening your heart, burning calories, and keeping muscles and joints healthy. Exercise can also improve your mood and help with depression and anxiety.  You may benefit from chair exercises if you are unable to do standing exercises because of:  · Diabetic foot pain.  · Obesity.  · Illness.  · Arthritis.  · Recovery from surgery or injury.  · Breathing problems.  · Balance problems.  · Another type of disability.  Before starting chair exercises, check with your health care provider or a physical therapist to find out how much exercise you can tolerate and which exercises are safe for you. If your health care provider approves:  · Start out slowly and build up over time. Aim to work up to about 10-20 minutes for each exercise session.  · Make exercise part of your daily routine.  · Drink water when you exercise. Do not wait until you are thirsty. Drink every 10-15 minutes.  · Stop exercising right away if you have pain, nausea, shortness of breath, or dizziness.  · If you are exercising in a wheelchair, make sure to lock the wheels.  · Ask your health care provider whether you can do vincenzo chi or yoga. Many positions in these mind-body exercises can be modified to do while seated.  Warm-up  Before starting other exercises:  1. Sit up as straight as you can. Have your knees bent at 90 degrees, which is the shape of the capital letter \"L.\" Keep your feet flat on the floor.  2. Sit at the front edge of your chair, if you can.  3. Pull in (tighten) the muscles in your abdomen and stretch your spine and neck as straight as you can. Hold this position for a few minutes.  4. Breathe in and out evenly. Try to concentrate on your breathing, and relax your mind.  Stretching  Exercise A: Arm stretch  1. Hold your arms out straight in front of your body.  2. Bend " "your hands at the wrist with your fingers pointing up, as if signaling someone to stop. Notice the slight tension in your forearms as you hold the position.  3. Keeping your arms out and your hands bent, rotate your hands outward as far as you can and hold this stretch. Aim to have your thumbs pointing up and your pinkie fingers pointing down.  Slowly repeat arm stretches for one minute as tolerated.  Exercise B: Leg stretch  1. If you can move your legs, try to \"draw\" letters on the floor with the toes of your foot. Write your name with one foot.  2. Write your name with the toes of your other foot.  Slowly repeat the movements for one minute as tolerated.  Exercise C: Reach for the nighat  1. Reach your hands as far over your head as you can to stretch your spine.  2. Move your hands and arms as if you are climbing a rope.  Slowly repeat the movements for one minute as tolerated.  Range of motion exercises  Exercise A: Shoulder roll  1. Let your arms hang loosely at your sides.  2. Lift just your shoulders up toward your ears, then let them relax back down.  3. When your shoulders feel loose, rotate your shoulders in backward and forward circles.  Do shoulder rolls slowly for one minute as tolerated.  Exercise B: March in place  1. As if you are marching, pump your arms and lift your legs up and down. Lift your knees as high as you can.  ? If you are unable to lift your knees, just pump your arms and move your ankles and feet up and down.  March in place for one minute as tolerated.  Exercise C: Seated jumping jacks  1. Let your arms hang down straight.  2. Keeping your arms straight, lift them up over your head. Aim to point your fingers to the ceiling.  3. While you lift your arms, straighten your legs and slide your heels along the floor to your sides, as wide as you can.  4. As you bring your arms back down to your sides, slide your legs back together.  ? If you are unable to use your legs, just move your " arms.  Slowly repeat seated jumping jacks for one minute as tolerated.  Strengthening exercises  Exercise A: Shoulder squeeze  1. Hold your arms straight out from your body to your sides, with your elbows bent and your fists pointed at the ceiling.  2. Keeping your arms in the bent position, move them forward so your elbows and forearms meet in front of your face.  3. Open your arms back out as wide as you can with your elbows still bent, until you feel your shoulder blades squeezing together. Hold for 5 seconds.  Slowly repeat the movements forward and backward for one minute as tolerated.  Contact a health care provider if you:  · Had to stop exercising due to any of the following:  ? Pain.  ? Nausea.  ? Shortness of breath.  ? Dizziness.  ? Fatigue.  · Have significant pain or soreness after exercising.  Get help right away if you have:  · Chest pain.  · Difficulty breathing.  These symptoms may represent a serious problem that is an emergency. Do not wait to see if the symptoms will go away. Get medical help right away. Call your local emergency services (911 in the [place] Do not drive yourself to the hospital.  This information is not intended to replace advice given to you by your health care provider. Make sure you discuss any questions you have with your health care provider.  Document Revised: 04/09/2020 Document Reviewed: 10/31/2018  Elsevier Patient Education © 2020 [employer]    Living With Heart Failure   Heart failure is a long-term (chronic) condition in which the heart cannot pump enough blood through the body. When this happens, parts of the body do not get the blood and oxygen they need.  There is no cure for heart failure at this time, so it is important for you to take good care of yourself and follow the treatment plan set by your health care provider. If you are living with heart failure, there are ways to help you manage the disease.  Follow these instructions at home:  Living with heart  failure requires you to make changes in your life. Your health care team will teach you about the changes you need to make in order to relieve your symptoms and lower your risk of going to the hospital. Follow the treatment plan as set by your health care provider.  Medicines  Medicines are important in reducing your heart's workload, slowing the progression of heart failure, and improving your symptoms.  · Take over-the-counter and prescription medicines only as told by your health care provider.  · Do not stop taking your medicine unless your health care provider tells you to do that.  · Do not skip any dose of your medicine.  · Refill prescriptions before you run out of medicine. You need your medicines every day.  Eating and drinking    · Eat heart-healthy foods. Talk with a dietitian to make an eating plan that is right for you.  ? If directed by your health care provider:  § Limit salt (sodium). Lowering your sodium intake may reduce symptoms of heart failure. Ask a dietitian to recommend heart-healthy seasonings.  § Limit your fluid intake. Fluid restriction may reduce symptoms of heart failure.  ? Use low-fat cooking methods instead of frying. Low-fat methods include roasting, grilling, broiling, baking, poaching, steaming, and stir-frying.  ? Choose foods that contain no trans fat and are low in saturated fat and cholesterol. Healthy choices include fresh or frozen fruits and vegetables, fish, lean meats, legumes, fat-free or low-fat dairy products, and whole-grain or high-fiber foods.  · Limit alcohol intake to no more than 1 drink a day for nonpregnant women and 2 drinks a day for men. One drink equals 12 oz of beer, 5 oz of wine, or 1½ oz of hard liquor.  ? Drinking more than that is harmful to your heart. Tell your health care provider if you drink alcohol several times a week.  ? Talk with your health care provider about whether any level of alcohol use is safe for you.  Activity    · Ask your health  care provider about attending cardiac rehabilitation. These programs include aerobic physical activity, which provides many benefits for your heart.  · If no cardiac rehabilitation program is available, ask your health care provider what aerobic exercises are safe for you to do.  Lifestyle  Make the lifestyle changes recommended by your health care provider. In general:  · Lose weight if your health care provider tells you to do that. Weight loss may reduce symptoms of heart failure.  · Do not use any products that contain nicotine or tobacco, such as cigarettes or e-cigarettes. If you need help quitting, ask your health care provider.  · Do not use street (illegal) drugs.  · Return to your normal activities as told by your health care provider. Ask your health care provider what activities are safe for you.  General instructions    · Make sure you weigh yourself every day to track your weight. Rapid weight gain may indicate an increase in fluid in your body and may increase the workload of your heart.  ? Weigh yourself every morning. Do this after you urinate but before you eat breakfast.  ? Wear the same type of clothing, without shoes, each time you weigh yourself.  ? Weigh yourself on the same scale and in the same spot each time.  · Living with chronic heart failure often leads to emotions such as fear, stress, anxiety, and depression. If you feel any of these emotions and need help coping, contact your health care provider. Other ways to get help include:  ? Talking to friends and family members about your condition. They can give you support and guidance. Explain your symptoms to them and, if comfortable, invite them to attend appointments or rehabilitation with you.  ? Joining a support group for people with chronic heart failure. Talking with other people who have the same symptoms may give you new ways of coping with your disease and your emotions.  · Stay up to date with your shots (vaccines). Staying  current on pneumococcal and influenza vaccines is especially important in preventing germs from attacking your airways (respiratory infections).  · Keep all follow-up visits as told by your health care provider. This is important.  How to recognize changes in your condition  You and your family members need to know what changes to watch for in your condition.  Watch for the following changes and report them to your health care provider:  · Sudden weight gain. Ask your health care provider what amount of weight gain to report.  · Shortness of breath:  ? Feeling short of breath while at rest, with no exercise or activity that required great effort.  ? Feeling breathless with activity.  · Swelling of your lower legs or ankles.  · Difficulty sleeping:  ? You wake up feeling short of breath.  ? You have to use more pillows to raise your head in order to sleep.  · Frequent, dry, hacking cough.  · Loss of appetite.  · Feeling more tired all the time.  · Depression or feelings of sadness or hopelessness.  · Bloating in the stomach.  Where to find more information  · Local support groups. Ask your health care provider about groups near you.  · The American Heart Association: www.heart.org  Contact a health care provider if:  · You have a rapid weight gain.  · You have increasing shortness of breath that is unusual for you.  · You are unable to participate in your usual physical activities.  · You tire easily.  · You cough more than normal, especially with physical activity.  · You have any swelling or more swelling in areas such as your hands, feet, ankles, or abdomen.  · You feel like your heart is beating quickly (palpitations).  · You become dizzy or light-headed when you stand up.  Get help right away if:  · You have difficulty breathing.  · You notice or your family notices a change in your awareness, such as having trouble staying awake or having difficulty with concentration.  · You have pain or discomfort in your  chest.  · You have an episode of fainting (syncope).  Summary  · There is no cure for heart failure, so it is important for you to take good care of yourself and follow the treatment plan set by your health care provider.  · Medicines are important in reducing your heart's workload, slowing the progression of heart failure, and improving your symptoms.  · Living with chronic heart failure often leads to emotions such as fear, stress, anxiety, and depression. If you are feeling any of these emotions and need help coping, contact your health care provider.  This information is not intended to replace advice given to you by your health care provider. Make sure you discuss any questions you have with your health care provider.  Document Revised: 11/30/2018 Document Reviewed: 05/02/2018  Elsevier Patient Education © 2020 [employer]

## 2021-01-27 NOTE — PROGRESS NOTES
Heart Failure Clinic    Vitals:    01/27/21 0840   BP: 128/74   Pulse: 61   Resp: 20   Temp: 98 °F (36.7 °C)   SpO2: 98%      Mask Worn: Yes     Method of arrival: Ambulatory    Weighing self daily: Yes    Monitoring Heart Failure Zones: Yes    Today's HF Zone: Green states feeling good. Has had some problems with Blood Sugars going up and down.     Taking medications as prescribed: Yes    Edema No    Shortness of Air: No    Number of pillows used at night:<2    Educational Materials given:    Jackie Jasmine MA 01/27/21 08:42 EST

## 2021-01-27 NOTE — PROGRESS NOTES
Heart Failure Pharmacy Note  Patient Name: Britta Lee   Referring Provider: Priscila Holland  Primary Cardiologist: Dr. Cruz    Medication Use:   Adherence: No issues  Hx of med intolerances: bradycardia with metoprolol 100mg BID  Affordability: No issues reported   Retail Rx Management: none    Past Medical History:   Diagnosis Date   • Anemia    • Arthritis    • Carpal tunnel syndrome    • CHF (congestive heart failure) (CMS/[facility])    • Coronary artery disease    • Diabetes mellitus (CMS/[facility])    • Elevated cholesterol    • GERD (gastroesophageal reflux disease)    • Heart disease    • High cholesterol    • History of pneumonia    • History of unsteady gait     OCASSIONALLY   • Hypertension    • Insomnia    • Kidney disease    • LENNY (obstructive sleep apnea) 12/1/2020   • Osteoarthritis    • Renal insufficiency    • Sciatica      ALLERGIES: Patient has no known allergies.  Current Outpatient Medications   Medication Sig Dispense Refill   • albuterol sulfate  (90 Base) MCG/ACT inhaler Inhale 2 puffs Every 4 (Four) Hours As Needed for Shortness of Air. 18 g 0   • amLODIPine (NORVASC) 10 MG tablet Take 1 tablet by mouth Every Night. 90 tablet 1   • aspirin 81 MG tablet Take 1 tablet by mouth Daily. 30 tablet 5   • atorvastatin (LIPITOR) 40 MG tablet Take 1 tablet by mouth Every Night. 90 tablet 1   • BD Pen Needle Evon U/F 32G X 4 MM misc      • bumetanide (BUMEX) 1 MG tablet Take 1 tablet by mouth Daily. May take an extra 0.5 mg in the evening for swelling     • cloNIDine (CATAPRES) 0.2 MG tablet Take 1 tablet by mouth 3 (Three) Times a Day. 270 tablet 1   • Continuous Blood Gluc  (Dexcom G6 ) device 1 Device Daily. 1 Device 0   • Continuous Blood Gluc Sensor (Dexcom G6 Sensor) Every 10 (Ten) Days. 9 each 3   • Continuous Blood Gluc Transmit (Dexcom G6 Transmitter) misc 1 Device Daily. 1 each 0   • escitalopram (LEXAPRO) 10 MG tablet Take 1 tablet by mouth Daily. 90 tablet 3   • ferrous  "sulfate 325 (65 Fe) MG tablet Take 1 tablet by mouth 2 (Two) Times a Day. 180 tablet 3   • hydrALAZINE (APRESOLINE) 50 MG tablet Take 1 tablet by mouth Every 8 (Eight) Hours. 270 tablet 1   • insulin lispro (humaLOG) 100 UNIT/ML injection Inject 5 Units under the skin into the appropriate area as directed 3 (Three) Times a Day Before Meals.     • Insulin Pen Needle 32G X 5 MM misc 1 each 4 (Four) Times a Day. 120 each 5   • isosorbide mononitrate (IMDUR) 60 MG 24 hr tablet Take 1 tablet by mouth Daily. 90 tablet 1   • metoprolol succinate XL (TOPROL-XL) 25 MG 24 hr tablet Take 1 tablet by mouth Daily. Hold if HR less than 60 bpm. 30 tablet 1   • nystatin (MYCOSTATIN) 720991 UNIT/GM powder Apply  one application topically to the appropriate area as directed Every 12 (Twelve) Hours. (Patient taking differently: Apply  topically to the appropriate area as directed 2 (Two) Times a Day As Needed (itching).) 60 g 0   • Toujeo Max SoloStar 300 UNIT/ML solution pen-injector injection Inject 40 Units under the skin into the appropriate area as directed Every Night.     • Vitamin D, Cholecalciferol, (CHOLECALCIFEROL) 10 MCG (400 UNIT) tablet Take 400 Units by mouth Daily.       No current facility-administered medications for this encounter.        Vaccination History:   Pneumonia: Reports UTD  Annual Influenza: Reports UTD for 2020-21 Season    Objective  Vitals:    01/27/21 0840   BP: 128/74   BP Location: Left arm   Patient Position: Sitting   Cuff Size: Large Adult   Pulse: 61   Resp: 20   Temp: 98 °F (36.7 °C)   TempSrc: Temporal   SpO2: 98%   Weight: 102 kg (225 lb 9.6 oz)   Height: 160 cm (62.99\")     Wt Readings from Last 3 Encounters:   01/27/21 102 kg (225 lb 9.6 oz)   01/22/21 102 kg (224 lb 12.8 oz)   01/20/21 103 kg (226 lb 3.2 oz)         01/27/21  0840   Weight: 102 kg (225 lb 9.6 oz)     Lab Results   Component Value Date    GLUCOSE 131 (H) 01/27/2021    BUN 43 (H) 01/27/2021    CREATININE 1.91 (H) 01/27/2021 "    EGFRIFNONA 25 (L) 01/27/2021    EGFRIFAFRI 41 (L) 07/30/2018    BCR 22.5 01/27/2021    K 4.8 01/27/2021    CO2 19.1 (L) 01/27/2021    CALCIUM 9.5 01/27/2021    PROTENTOTREF 7.7 07/30/2018    ALBUMIN 3.60 01/22/2021    LABIL2 1.1 (L) 07/30/2018    AST 38 (H) 01/22/2021    ALT 27 01/22/2021     Lab Results   Component Value Date    WBC 8.68 01/22/2021    HGB 9.7 (L) 01/22/2021    HCT 31.1 (L) 01/22/2021    MCV 81.0 01/22/2021     01/22/2021     Lab Results   Component Value Date    CKTOTAL 67 12/04/2020    CKMB 2.92 04/16/2018    TROPONINI 0.012 04/16/2018    TROPONINT <0.010 12/04/2020     Lab Results   Component Value Date    PROBNP 1,783.0 01/27/2021     Results for orders placed during the hospital encounter of 11/03/20   Adult Transthoracic Echo Complete W/ Cont if Necessary Per Protocol    Narrative · Left ventricular wall thickness is consistent with concentric   hypertrophy.  · Left ventricular ejection fraction appears to be greater than 70%. Left   ventricular systolic function is hyperdynamic (EF > 70%).  · Left ventricular diastolic function is consistent with (grade II w/high   LAP) pseudonormalization.  · The left atrial cavity is moderate to severely dilated.  · The right atrial cavity is mildly dilated.  · Moderate mitral annular calcification is present.  · Mild mitral valve regurgitation is present.  · Mild tricuspid valve regurgitation is present.  · Estimated right ventricular systolic pressure from tricuspid   regurgitation is markedly elevated (>55 mmHg).                   Class   Drug   Dose Last Dose Adjustment   Notes   ACEi/ARB/ARNI    Worsening renal fxn 1/22/21   Beta Blocker Toprol XL  25mg 1/22/21 Pt had never started yet   MRA --------------   eGFR needs to be >30mL/min       Drug Therapy Problems:     1.  Needs COVID 19 Vaccination     Recommendations:    1. Will attempt to schedule patient for vaccine clinic     Patient was educated on heart failure medications and the  importance of medication adherence.  All questions were addressed and patient expressed understanding.     Thank you for allowing me to participate in the care of your patient,    Melisa Leahy, PharmD  01/27/21  09:22 EST

## 2021-01-28 ENCOUNTER — IMMUNIZATION (OUTPATIENT)
Dept: VACCINE CLINIC | Facility: HOSPITAL | Age: 76
End: 2021-01-28

## 2021-01-28 PROCEDURE — 91300 HC SARSCOV02 VAC 30MCG/0.3ML IM: CPT | Performed by: FAMILY MEDICINE

## 2021-01-28 PROCEDURE — 0001A: CPT | Performed by: FAMILY MEDICINE

## 2021-02-12 ENCOUNTER — PATIENT OUTREACH (OUTPATIENT)
Dept: PHARMACY | Facility: HOSPITAL | Age: 76
End: 2021-02-12

## 2021-02-12 NOTE — OUTREACH NOTE
I spoke with the patient today regarding medication adherence to atorvastatin. Pt states she is not having any issues getting refills and is taking it every day as directed.    Aziza Cruz, PharmD  02/12/21

## 2021-02-18 ENCOUNTER — IMMUNIZATION (OUTPATIENT)
Dept: VACCINE CLINIC | Facility: HOSPITAL | Age: 76
End: 2021-02-18

## 2021-02-18 PROCEDURE — 91300 HC SARSCOV02 VAC 30MCG/0.3ML IM: CPT | Performed by: INTERNAL MEDICINE

## 2021-02-18 PROCEDURE — 0002A: CPT | Performed by: INTERNAL MEDICINE

## 2021-02-23 ENCOUNTER — OFFICE VISIT (OUTPATIENT)
Dept: FAMILY MEDICINE CLINIC | Facility: CLINIC | Age: 76
End: 2021-02-23

## 2021-02-23 VITALS
OXYGEN SATURATION: 95 % | TEMPERATURE: 97.1 F | RESPIRATION RATE: 16 BRPM | SYSTOLIC BLOOD PRESSURE: 140 MMHG | HEART RATE: 78 BPM | BODY MASS INDEX: 40.22 KG/M2 | WEIGHT: 227 LBS | DIASTOLIC BLOOD PRESSURE: 70 MMHG | HEIGHT: 63 IN

## 2021-02-23 DIAGNOSIS — Z79.4 TYPE 2 DIABETES MELLITUS WITH CHRONIC KIDNEY DISEASE, WITH LONG-TERM CURRENT USE OF INSULIN, UNSPECIFIED CKD STAGE (HCC): Primary | Chronic | ICD-10-CM

## 2021-02-23 DIAGNOSIS — I10 ESSENTIAL HYPERTENSION: Chronic | ICD-10-CM

## 2021-02-23 DIAGNOSIS — E11.22 TYPE 2 DIABETES MELLITUS WITH CHRONIC KIDNEY DISEASE, WITH LONG-TERM CURRENT USE OF INSULIN, UNSPECIFIED CKD STAGE (HCC): Primary | Chronic | ICD-10-CM

## 2021-02-23 DIAGNOSIS — I50.32 CHRONIC DIASTOLIC CONGESTIVE HEART FAILURE (HCC): Chronic | ICD-10-CM

## 2021-02-23 DIAGNOSIS — E78.5 DYSLIPIDEMIA: Chronic | ICD-10-CM

## 2021-02-23 PROCEDURE — 99214 OFFICE O/P EST MOD 30 MIN: CPT | Performed by: NURSE PRACTITIONER

## 2021-02-23 RX ORDER — ERGOCALCIFEROL 1.25 MG/1
50000 CAPSULE ORAL WEEKLY
COMMUNITY
End: 2021-09-30 | Stop reason: SDUPTHER

## 2021-02-23 RX ORDER — INSULIN GLARGINE 300 U/ML
40 INJECTION, SOLUTION SUBCUTANEOUS NIGHTLY
Qty: 3 PEN | Refills: 0 | Status: SHIPPED | OUTPATIENT
Start: 2021-02-23 | End: 2021-02-23

## 2021-02-23 RX ORDER — INSULIN DEGLUDEC INJECTION 100 U/ML
50 INJECTION, SOLUTION SUBCUTANEOUS DAILY
Qty: 40 ML | Refills: 0 | COMMUNITY
Start: 2021-02-23 | End: 2021-03-25

## 2021-02-23 NOTE — PATIENT INSTRUCTIONS
Type 2 Diabetes Mellitus, Self Care, Adult  Caring for yourself after you have been diagnosed with type 2 diabetes (type 2 diabetes mellitus) means keeping your blood sugar (glucose) under control with a balance of:  · Nutrition.  · Exercise.  · Lifestyle changes.  · Medicines or insulin, if necessary.  · Support from your team of health care providers and others.  The following information explains what you need to know to manage your diabetes at home.  What are the risks?  Having diabetes can put you at risk for other long-term (chronic) conditions, such as heart disease and kidney disease. Your health care provider may prescribe medicines to help prevent complications from diabetes. These medicines may include:  · Aspirin.  · Medicine to lower cholesterol.  · Medicine to control blood pressure.  How to monitor blood glucose    · Check your blood glucose every day, as often as told by your health care provider.  · Have your A1c (hemoglobin A1c) level checked two or more times a year, or as often as told by your health care provider.  Your health care provider will set individualized treatment goals for you. Generally, the goal of treatment is to maintain the following blood glucose levels:  · Before meals (preprandial):  mg/dL (4.4-7.2 mmol/L).  · After meals (postprandial): below 180 mg/dL (10 mmol/L).  · A1c level: less than 7%.  How to manage hyperglycemia and hypoglycemia  Hyperglycemia symptoms  Hyperglycemia, also called high blood glucose, occurs when blood glucose is too high. Make sure you know the early signs of hyperglycemia, such as:  · Increased thirst.  · Hunger.  · Feeling very tired.  · Needing to urinate more often than usual.  · Blurry vision.  Hypoglycemia symptoms  Hypoglycemia, also called low blood glucose, occurs with a blood glucose level at or below 70 mg/dL (3.9 mmol/L). The risk for hypoglycemia increases during or after exercise, during sleep, during illness, and when skipping  meals or not eating for a long time (fasting).  It is important to know the symptoms of hypoglycemia and treat it right away. Always have a 15-gram rapid-acting carbohydrate snack with you to treat low blood glucose. Family members and close friends should also know the symptoms and should understand how to treat hypoglycemia, in case you are not able to treat yourself. Symptoms may include:  · Hunger.  · Anxiety.  · Sweating and feeling clammy.  · Confusion.  · Dizziness or feeling light-headed.  · Sleepiness.  · Nausea.  · Increased heart rate.  · Headache.  · Blurry vision.  · Irritability.  · A change in coordination.  · Tingling or numbness around the mouth, lips, or tongue.  · Restless sleep.  · Fainting.  · Seizure.  Treating hypoglycemia  If you are alert and able to swallow safely, follow the 15:15 rule:  · Take 15 grams of a rapid-acting carbohydrate. Talk with your health care provider about how much you should take.  · Rapid-acting options include:  ? Glucose pills (take 15 grams).  ? 6-8 pieces of hard candy.  ? 4-6 oz (120-150 mL) of fruit juice.  ? 4-6 oz (120-150 mL) of regular (not diet) soda.  ? 1 Tbsp (15 mL) honey or sugar.  · Check your blood glucose 15 minutes after you take the carbohydrate.  · If the repeat blood glucose level is still at or below 70 mg/dL (3.9 mmol/L), take 15 grams of a carbohydrate again.  · If your blood glucose level does not increase above 70 mg/dL (3.9 mmol/L) after 3 tries, seek emergency medical care.  · After your blood glucose level returns to normal, eat a meal or a snack within 1 hour.  Treating severe hypoglycemia  Severe hypoglycemia is when your blood glucose level is at or below 54 mg/dL (3 mmol/L). Severe hypoglycemia is an emergency. Do not wait to see if the symptoms will go away. Get medical help right away. Call your local emergency services (911 in the [place]  If you have severe hypoglycemia and you cannot eat or drink, you may need an injection of  glucagon. A family member or close friend should learn how to check your blood glucose and how to give you a glucagon injection. Ask your health care provider if you need to have an emergency glucagon injection kit available.  Severe hypoglycemia may need to be treated in a hospital. The treatment may include getting glucose through an IV. You may also need treatment for the cause of your hypoglycemia.  Follow these instructions at home:  Take diabetes medicines as told  · If your health care provider prescribed insulin or diabetes medicines, take them every day.  · Do not run out of insulin or other diabetes medicines that you take. Plan ahead so you always have these available.  · If you use insulin, adjust your dosage based on how physically active you are and what foods you eat. Your health care provider will tell you how to adjust your dosage.  Make healthy food choices    The things that you eat and drink affect your blood glucose and your insulin dosage. Making good choices helps to control your diabetes and prevent other health problems. A healthy meal plan includes eating lean proteins, complex carbohydrates, fresh fruits and vegetables, low-fat dairy products, and healthy fats.  Make an appointment to see a diet and nutrition specialist (registered dietitian) to help you create an eating plan that is right for you. Make sure that you:  · Follow instructions from your health care provider about eating or drinking restrictions.  · Drink enough fluid to keep your urine pale yellow.  · Keep a record of the carbohydrates that you eat. Do this by reading food labels and learning the standard serving sizes of foods.  · Follow your sick day plan whenever you cannot eat or drink as usual. Make this plan in advance with your health care provider.    Stay active  Exercise regularly, as told by your health care provider. This may include:  · Stretching and doing strength exercises, such as yoga or weightlifting, 2 or  more times a week.  · Doing 150 minutes or more of moderate-intensity or vigorous-intensity exercise each week. This could be brisk walking, biking, or water aerobics.  ? Spread out your activity over 3 or more days of the week.  ? Do not go more than 2 days in a row without doing some kind of physical activity.  When you start a new exercise or activity, work with your health care provider to adjust your insulin, medicines, or food intake as needed.  Make healthy lifestyle choices  · Do not use any tobacco products, such as cigarettes, chewing tobacco, and e-cigarettes. If you need help quitting, ask your health care provider.  · If your health care provider says that alcohol is safe for you, limit alcohol intake to no more than 1 drink per day for nonpregnant women and 2 drinks per day for men. One drink equals 12 oz of beer (355 mL), 5 oz of wine (148 mL), or 1½ oz of hard liquor (44 mL).  · Learn to manage stress. If you need help with this, ask your health care provider.  Care for your body    · Keep your immunizations up to date. In addition to getting vaccinations as told by your health care provider, it is recommended that you get vaccinated against the following illnesses:  ? The flu (influenza). Get a flu shot every year.  ? Pneumonia.  ? Hepatitis B.  · Schedule an eye exam soon after your diagnosis, and then one time every year after that.  · Check your skin and feet every day for cuts, bruises, redness, blisters, or sores. Schedule a foot exam with your health care provider once every year.  · Brush your teeth and gums two times a day, and floss one or more times a day. Visit your dentist one or more times every 6 months.  · Maintain a healthy weight.  General instructions  · Take over-the-counter and prescription medicines only as told by your health care provider.  · Share your diabetes management plan with people in your workplace, school, and household.  · Carry a medical alert card or wear medical  alert jewelry.  · Keep all follow-up visits as told by your health care provider. This is important.  Questions to ask your health care provider  · Do I need to meet with a diabetes educator?  · Where can I find a support group for people with diabetes?  Where to find more information  For more information about diabetes, visit:  · American Diabetes Association (ADA): www.diabetes.org  · American Association of Diabetes Educators (AADE): www.diabeteseducator.org  Summary  · Caring for yourself after you have been diagnosed with (type 2 diabetes mellitus) means keeping your blood sugar (glucose) under control with a balance of nutrition, exercise, lifestyle changes, and medicine.  · Check your blood glucose every day, as often as told by your health care provider.  · Having diabetes can put you at risk for other long-term (chronic) conditions, such as heart disease and kidney disease. Your health care provider may prescribe medicines to help prevent complications from diabetes.  · Keep all follow-up visits as told by your health care provider. This is important.  This information is not intended to replace advice given to you by your health care provider. Make sure you discuss any questions you have with your health care provider.  Document Revised: 06/10/2019 Document Reviewed: 01/20/2017  [employer] Patient Education © 2020 [employer]    Type 2 Diabetes Mellitus, Self Care, Adult  When you have type 2 diabetes (type 2 diabetes mellitus), you must make sure your blood sugar (glucose) stays in a healthy range. You can do this with:  · Nutrition.  · Exercise.  · Lifestyle changes.  · Medicines or insulin, if needed.  · Support from your doctors and others.  How to stay aware of blood sugar    · Check your blood sugar level every day, as often as told.  · Have your A1c (hemoglobin A1c) level checked two or more times a year. Have it checked more often if your doctor tells you to.  Your doctor will set personal  treatment goals for you. Generally, you should have these blood sugar levels:  · Before meals (preprandial):  mg/dL (4.4-7.2 mmol/L).  · After meals (postprandial): below 180 mg/dL (10 mmol/L).  · A1c level: less than 7%.  How to manage high and low blood sugar  Signs of high blood sugar  High blood sugar is called hyperglycemia. Know the signs of high blood sugar. Signs may include:  · Feeling:  ? Thirsty.  ? Hungry.  ? Very tired.  · Needing to pee (urinate) more than usual.  · Blurry vision.  Signs of low blood sugar  Low blood sugar is called hypoglycemia. This is when blood sugar is at or below 70 mg/dL (3.9 mmol/L). Signs may include:  · Feeling:  ? Hungry.  ? Worried or nervous (anxious).  ? Sweaty and clammy.  ? Confused.  ? Dizzy.  ? Sleepy.  ? Sick to your stomach (nauseous).  · Having:  ? A fast heartbeat.  ? A headache.  ? A change in your vision.  ? Jerky movements that you cannot control (seizure).  ? Tingling or no feeling (numbness) around your mouth, lips, or tongue.  · Having trouble with:  ? Moving (coordination).  ? Sleeping.  ? Passing out (fainting).  ? Getting upset easily (irritability).  Treating low blood sugar  To treat low blood sugar, eat or drink something sugary right away. If you can think clearly and swallow safely, follow the 15:15 rule:  · Take 15 grams of a fast-acting carb (carbohydrate). Talk with your doctor about how much you should take.  · Some fast-acting carbs are:  ? Sugar tablets (glucose pills). Take 3-4 pills.  ? 6-8 pieces of hard candy.  ? 4-6 oz (120-150 mL) of fruit juice.  ? 4-6 oz (120-150 mL) of regular (not diet) soda.  ? 1 Tbsp (15 mL) honey or sugar.  · Check your blood sugar 15 minutes after you take the carb.  · If your blood sugar is still at or below 70 mg/dL (3.9 mmol/L), take 15 grams of a carb again.  · If your blood sugar does not go above 70 mg/dL (3.9 mmol/L) after 3 tries, get help right away.  · After your blood sugar goes back to normal,  eat a meal or a snack within 1 hour.  Treating very low blood sugar  If your blood sugar is at or below 54 mg/dL (3 mmol/L), you have very low blood sugar (severe hypoglycemia). This is an emergency. Do not wait to see if the symptoms will go away. Get medical help right away. Call your local emergency services (911 in the [place]  If you have very low blood sugar and you cannot eat or drink, you may need a glucagon shot (injection). A family member or friend should learn how to check your blood sugar and how to give you a glucagon shot. Ask your doctor if you need to have a glucagon shot kit at home.  Follow these instructions at home:  Medicine  · Take insulin and diabetes medicines as told.  · If your doctor says you should take more or less insulin and medicines, do this exactly as told.  · Do not run out of insulin or medicines.  Having diabetes can raise your risk for other long-term conditions. These include heart disease and kidney disease. Your doctor may prescribe medicines to help you not have these problems.  Food    · Make healthy food choices. These include:  ? Chicken, fish, egg whites, and beans.  ? Oats, whole wheat, bulgur, brown rice, quinoa, and millet.  ? Fresh fruits and vegetables.  ? Low-fat dairy products.  ? Nuts, avocado, olive oil, and canola oil.  · Meet with a  (dietitian). He or she can help you make an eating plan that is right for you.  · Follow instructions from your doctor about what you cannot eat or drink.  · Drink enough fluid to keep your pee (urine) pale yellow.  · Keep track of carbs that you eat. Do this by reading food labels and learning food serving sizes.  · Follow your sick day plan when you cannot eat or drink normally. Make this plan with your doctor so it is ready to use.  Activity  · Exercise 3 or more times a week.  · Do not go more than 2 days without exercising.  · Talk with your doctor before you start a new exercise. Your doctor may need to tell  you to change:  ? How much insulin or medicines you take.  ? How much food you eat.  Lifestyle  · Do not use any tobacco products. These include cigarettes, chewing tobacco, and e-cigarettes. If you need help quitting, ask your doctor.  · Ask your doctor how much alcohol is safe for you.  · Learn to deal with stress. If you need help with this, ask your doctor.  Body care    · Stay up to date with your shots (immunizations).  · Have your eyes and feet checked by a doctor as often as told.  · Check your skin and feet every day. Check for cuts, bruises, redness, blisters, or sores.  · Brush your teeth and gums two times a day. Floss one or more times a day.  · Go to the dentist one or more times every 6 months.  · Stay at a healthy weight.  General instructions  · Take over-the-counter and prescription medicines only as told by your doctor.  · Share your diabetes care plan with:  ? Your work or school.  ? People you live with.  · Carry a card or wear jewelry that says you have diabetes.  · Keep all follow-up visits as told by your doctor. This is important.  Questions to ask your doctor  · Do I need to meet with a diabetes educator?  · Where can I find a support group for people with diabetes?  Where to find more information  To learn more about diabetes, visit:  · American Diabetes Association: www.diabetes.org  · American Association of Diabetes Educators: www.diabeteseducator.org  Summary  · When you have type 2 diabetes, you must make sure your blood sugar (glucose) stays in a healthy range.  · Check your blood sugar every day, as often as told.  · Having diabetes can raise your risk for other conditions. Your doctor may prescribe medicines to help you not have these problems.  · Keep all follow-up visits as told by your doctor. This is important.  This information is not intended to replace advice given to you by your health care provider. Make sure you discuss any questions you have with your health care  provider.  Document Revised: 06/10/2019 Document Reviewed: 01/20/2017  [employer] Patient Education © 2020 [employer]

## 2021-02-23 NOTE — PROGRESS NOTES
"  History of Present Illness   Britta Lee is a 74 yo female who presents to the clinic today pertaining to her  DM, type 2 which is complicated by peripheral neuropathy, diabetic nephropathy and retinopathy. In addition, Britta has HTN, Dyslipidemia,Venous Insufficiency of Lower Extremities and  CHF which has continued going to the HF Clinic..    Diabetes   She has type 2 diabetes mellitus. The initial diagnosis of diabetes was made 20 years ago.  Diabetic complications include heart disease, nephropathy, peripheral neuropathy, PVD and retinopathy. Current diabetic treatment includes insulin injections (Toujeo and Novolog). Compliance with diabetes treatment: Dependent on available of insulin. She had been using a DexCom but currently is out of supplies.  When asked about meal planning, she is really trying to follow her recommended nutrition plan which does include a combination Healthy renal and cardiovascular plan.  She has had a previous visit with a dietitian. She rarely participates in exercise. She is typically taking between 40-65 units of Toujeo \" according to her blood sugar\"    Lab Results   Component Value Date    HGBA1C 7.30 (H) 11/03/2020     Lab Results   Component Value Date    HGBA1C 8.40 (H) 09/04/2020     Hypertension  Compliance with treatment has been good. Well controlled with Norvasc, clonidine, metoprolol, and Accupril. She does report lower extremity swelling throughout the day which is improving with Bumex. She does have compression stockings but find them very difficult to apply.     Lab Results   Component Value Date    GLUCOSE 131 (H) 01/27/2021    BUN 43 (H) 01/27/2021    CREATININE 1.91 (H) 01/27/2021    EGFRIFNONA 25 (L) 01/27/2021    BCR 22.5 01/27/2021    K 4.8 01/27/2021    CO2 19.1 (L) 01/27/2021    CALCIUM 9.5 01/27/2021    ALBUMIN 3.60 01/22/2021    AST 38 (H) 01/22/2021    ALT 27 01/22/2021     Dyslipidemia  Compliance with treatment has been good. The patient exercises " "seldom due to her Osteoarthritis.  She is currently being prescribed the following medication for her dyslipidemia - atorvastatin. Patient denies side effects associated with her medications. Most recent lipids include    Lab Results   Component Value Date    CHOL 125 09/04/2020    TRIG 128 09/04/2020    HDL 36 (L) 09/04/2020    LDL 63 09/04/2020     Vitals:    02/23/21 0829 02/23/21 0910   BP: 180/70 140/70   BP Location: Right arm Left arm   Patient Position: Sitting Sitting   Cuff Size: Adult Adult   Pulse: 78 78   Resp:  16   Temp: 97.1 °F (36.2 °C) 97.1 °F (36.2 °C)   TempSrc: Temporal Temporal   SpO2: 92% 95%   Weight: 103 kg (227 lb) 103 kg (227 lb)   Height: 160 cm (62.99\") 160 cm (62.99\")        The following portions of the patient's history were reviewed and updated as appropriate: allergies, current medications, past family history, past medical history, past social history, past surgical history and problem list.    Review of Systems   Constitutional: Positive for fatigue. Negative for activity change, appetite change, chills and diaphoresis.   HENT: Negative.    Eyes: Positive for visual disturbance.   Respiratory: Positive for shortness of breath (Improving). Negative for cough, chest tightness and wheezing.    Cardiovascular: Positive for leg swelling. Negative for chest pain and palpitations.   Gastrointestinal: Negative for nausea and vomiting.   Endocrine: Negative for polydipsia, polyphagia and polyuria.   Genitourinary: Negative for decreased urine volume.   Musculoskeletal: Positive for arthralgias and gait problem.   Skin: Positive for color change (Lower extremities) and bruise.   Allergic/Immunologic: Negative for environmental allergies.   Neurological: Positive for numbness. Negative for dizziness and confusion.   Hematological: Bruises/bleeds easily.   Psychiatric/Behavioral: Positive for sleep disturbance and stress (Worries a great deal about finances and having her medications). The " patient is not nervous/anxious.       Physical Exam  Vitals signs reviewed.   Constitutional:       General: She is not in acute distress.     Appearance: She is well-developed.      Comments: Pleasant; in good spirits. Wearing appropriate face covering   HENT:      Head: Normocephalic.      Nose: Nose normal.   Eyes:      General: No scleral icterus.        Right eye: No discharge.         Left eye: No discharge.      Conjunctiva/sclera: Conjunctivae normal.   Neck:      Musculoskeletal: Neck supple.      Thyroid: No thyromegaly.      Vascular: No JVD.   Cardiovascular:      Rate and Rhythm: Normal rate and regular rhythm.      Heart sounds: Normal heart sounds. No murmur. No friction rub.   Pulmonary:      Effort: Pulmonary effort is normal. No respiratory distress.      Breath sounds: Normal breath sounds. No wheezing or rales.   Abdominal:      General: Bowel sounds are normal. There is no distension.      Palpations: Abdomen is soft.      Tenderness: There is no abdominal tenderness. There is no guarding or rebound.   Lymphadenopathy:      Cervical: No cervical adenopathy.   Skin:     General: Skin is warm and dry.      Capillary Refill: Capillary refill takes less than 2 seconds.      Findings: Erythema (Lower extremities L>R) present. No rash.      Comments: Lower extremities with erythema; LLE has vesicular rash   Neurological:      Mental Status: She is alert and oriented to person, place, and time.      Cranial Nerves: Cranial nerves are intact.   Psychiatric:         Mood and Affect: Mood normal.         Speech: Speech normal.         Behavior: Behavior is cooperative.         Thought Content: Thought content normal.       Assessment/Plan     Problems Addressed this Visit        Cardiac and Vasculature    Essential hypertension    Dyslipidemia    Chronic diastolic congestive heart failure (CMS/HCC)       Endocrine and Metabolic    Type 2 diabetes mellitus with chronic kidney disease, with long-term  current use of insulin (CMS/[facility]) - Primary    Relevant Medications    glucose blood test strip    Toujeo Max SoloStar 300 UNIT/ML solution pen-injector injection      Diagnoses       Codes Comments    Type 2 diabetes mellitus with chronic kidney disease, with long-term current use of insulin, unspecified CKD stage (CMS/[facility])    -  Primary ICD-10-CM: E11.22, Z79.4  ICD-9-CM: 250.40, 585.9, V58.67 Findings and recommendations discussed with Britta. She does report she is out of Toujeo with her last dose last night and DexCom supplies.     Essential hypertension     ICD-10-CM: I10  ICD-9-CM: 401.9 Continue cardiovascular risk reduction modifications including nutrition and medication    Dyslipidemia     ICD-10-CM: E78.5  ICD-9-CM: 272.4 Continue Lipitor    Chronic diastolic congestive heart failure (CMS/HCC)     ICD-10-CM: I50.32  ICD-9-CM: 428.32, 428.0 F/U with HF Clinic        Findings and recommendations discussed with Britta. She does report she is out of Toujeo with her last dose last night. She is out of DexCom supplies. The MA is checking with the company about this issue. I did provide her with samples of Tresiba to use if she can not obtain Toujeo. Reinforced the action of each insulin. Discouraged her from using her basal insulin to lower blood sugars which would be better to use the Humalog insulin. She will continue cardiovascular risk reduction modifications including Heart Failure home management skills. She will f/u with me in March; sooner if problems/concerns occur.       This document has been electronically signed by ERNIE Locke, CHUN-BC, CDE  February 23, 2021 13:52 EST

## 2021-02-24 ENCOUNTER — HOSPITAL ENCOUNTER (OUTPATIENT)
Dept: CARDIOLOGY | Facility: HOSPITAL | Age: 76
Discharge: HOME | End: 2021-02-24
Admitting: NURSE PRACTITIONER

## 2021-02-24 VITALS
RESPIRATION RATE: 22 BRPM | HEIGHT: 63 IN | WEIGHT: 226 LBS | DIASTOLIC BLOOD PRESSURE: 80 MMHG | HEART RATE: 83 BPM | OXYGEN SATURATION: 98 % | SYSTOLIC BLOOD PRESSURE: 138 MMHG | TEMPERATURE: 97.8 F | BODY MASS INDEX: 40.04 KG/M2

## 2021-02-24 DIAGNOSIS — I10 ESSENTIAL HYPERTENSION: ICD-10-CM

## 2021-02-24 DIAGNOSIS — I50.32 CHRONIC DIASTOLIC CONGESTIVE HEART FAILURE (HCC): Primary | ICD-10-CM

## 2021-02-24 DIAGNOSIS — N18.4 CKD (CHRONIC KIDNEY DISEASE) STAGE 4, GFR 15-29 ML/MIN (HCC): ICD-10-CM

## 2021-02-24 DIAGNOSIS — R60.0 PEDAL EDEMA: ICD-10-CM

## 2021-02-24 DIAGNOSIS — E66.09 CLASS 2 OBESITY DUE TO EXCESS CALORIES WITH BODY MASS INDEX (BMI) OF 39.0 TO 39.9 IN ADULT, UNSPECIFIED WHETHER SERIOUS COMORBIDITY PRESENT: ICD-10-CM

## 2021-02-24 LAB
ANION GAP SERPL CALCULATED.3IONS-SCNC: 12.6 MMOL/L (ref 5–15)
BUN SERPL-MCNC: 32 MG/DL (ref 8–23)
BUN/CREAT SERPL: 18.9 (ref 7–25)
CALCIUM SPEC-SCNC: 9.8 MG/DL (ref 8.6–10.5)
CHLORIDE SERPL-SCNC: 106 MMOL/L (ref 98–107)
CO2 SERPL-SCNC: 22.4 MMOL/L (ref 22–29)
CREAT SERPL-MCNC: 1.69 MG/DL (ref 0.57–1)
GFR SERPL CREATININE-BSD FRML MDRD: 29 ML/MIN/1.73
GLUCOSE SERPL-MCNC: 69 MG/DL (ref 65–99)
MAGNESIUM SERPL-MCNC: 2 MG/DL (ref 1.6–2.4)
NT-PROBNP SERPL-MCNC: 1634 PG/ML (ref 0–1800)
POTASSIUM SERPL-SCNC: 4.2 MMOL/L (ref 3.5–5.2)
SODIUM SERPL-SCNC: 141 MMOL/L (ref 136–145)

## 2021-02-24 PROCEDURE — 80048 BASIC METABOLIC PNL TOTAL CA: CPT | Performed by: NURSE PRACTITIONER

## 2021-02-24 PROCEDURE — 99214 OFFICE O/P EST MOD 30 MIN: CPT | Performed by: NURSE PRACTITIONER

## 2021-02-24 PROCEDURE — 36415 COLL VENOUS BLD VENIPUNCTURE: CPT | Performed by: NURSE PRACTITIONER

## 2021-02-24 PROCEDURE — 83735 ASSAY OF MAGNESIUM: CPT | Performed by: NURSE PRACTITIONER

## 2021-02-24 PROCEDURE — 83880 ASSAY OF NATRIURETIC PEPTIDE: CPT

## 2021-02-24 PROCEDURE — 94726 PLETHYSMOGRAPHY LUNG VOLUMES: CPT

## 2021-02-24 NOTE — PROGRESS NOTES
Heart Failure Clinic    Date: 02/24/21     Vitals:    02/24/21 0849   BP: 138/80   Pulse: 83   Resp: 22   Temp: 97.8 °F (36.6 °C)   SpO2: 98%      Mask Worn: Yes     Method of arrival: Ambulatory    Weighing self daily: Yes    Monitoring Heart Failure Zones: Yes    Today's HF Zone: Green    Taking medications as prescribed: Yes    Edema Yes has improved     Shortness of Air: No    Number of pillows used at night:<2    Educational Materials given:                                                                           ReDS Value:   36-41 Possible Hypervolemic Status 40%      Jackie Jasmine MA 02/24/21 09:13 EST

## 2021-02-24 NOTE — PROGRESS NOTES
Heart Failure Pharmacy Note  Patient Name: Britta Lee   Referring Provider: Priscila Holland  Primary Cardiologist: Dr. Cruz    Medication Use:   Adherence: No issues  Hx of med intolerances: bradycardia with metoprolol 100mg BID  Affordability: No issues reported   Retail Rx Management: none    Past Medical History:   Diagnosis Date   • Anemia    • Arthritis    • Carpal tunnel syndrome    • CHF (congestive heart failure) (CMS/[facility])    • Coronary artery disease    • Diabetes mellitus (CMS/[facility])    • Elevated cholesterol    • GERD (gastroesophageal reflux disease)    • Heart disease    • High cholesterol    • History of pneumonia    • History of unsteady gait     OCASSIONALLY   • Hypertension    • Insomnia    • Kidney disease    • LENNY (obstructive sleep apnea) 12/1/2020   • Osteoarthritis    • Renal insufficiency    • Sciatica      ALLERGIES: Patient has no known allergies.  Current Outpatient Medications   Medication Sig Dispense Refill   • albuterol sulfate  (90 Base) MCG/ACT inhaler Inhale 2 puffs Every 4 (Four) Hours As Needed for Shortness of Air. 18 g 0   • amLODIPine (NORVASC) 10 MG tablet Take 1 tablet by mouth Every Night. 90 tablet 1   • aspirin 81 MG tablet Take 1 tablet by mouth Daily. 30 tablet 5   • atorvastatin (LIPITOR) 40 MG tablet Take 1 tablet by mouth Every Night. 90 tablet 1   • BD Pen Needle Evon U/F 32G X 4 MM misc      • bumetanide (BUMEX) 1 MG tablet Take 1 tablet by mouth Daily. May take an extra 0.5 mg in the evening for swelling or weight gain 60 tablet 1   • cloNIDine (CATAPRES) 0.2 MG tablet Take 1 tablet by mouth 3 (Three) Times a Day. 270 tablet 1   • Continuous Blood Gluc  (Dexcom G6 ) device 1 Device Daily. 1 Device 0   • Continuous Blood Gluc Sensor (Dexcom G6 Sensor) Every 10 (Ten) Days. 9 each 3   • Continuous Blood Gluc Transmit (Dexcom G6 Transmitter) misc 1 Device Daily. 1 each 0   • escitalopram (LEXAPRO) 10 MG tablet Take 1 tablet by mouth Daily. 90  "tablet 3   • ferrous sulfate 325 (65 Fe) MG tablet Take 1 tablet by mouth 2 (Two) Times a Day. 180 tablet 3   • glucose blood test strip 1 each by Other route 3 (Three) Times a Day. 100 each 12   • hydrALAZINE (APRESOLINE) 50 MG tablet Take 1 tablet by mouth Every 8 (Eight) Hours. 270 tablet 1   • Insulin Degludec (Tresiba) 100 UNIT/ML solution injection Inject 50 Units under the skin into the appropriate area as directed Daily. 40 mL 0   • insulin lispro (humaLOG) 100 UNIT/ML injection Inject 5 Units under the skin into the appropriate area as directed 3 (Three) Times a Day Before Meals.     • Insulin Pen Needle 32G X 5 MM misc 1 each 4 (Four) Times a Day. 120 each 5   • isosorbide mononitrate (IMDUR) 60 MG 24 hr tablet Take 1 tablet by mouth Daily. 90 tablet 1   • metoprolol succinate XL (TOPROL-XL) 25 MG 24 hr tablet Take 1 tablet by mouth Daily. Hold if HR less than 60 bpm. 30 tablet 1   • nystatin (MYCOSTATIN) 425749 UNIT/GM powder Apply  one application topically to the appropriate area as directed Every 12 (Twelve) Hours. (Patient taking differently: Apply  topically to the appropriate area as directed 2 (Two) Times a Day As Needed (itching).) 60 g 0   • vitamin D (ERGOCALCIFEROL) 1.25 MG (03158 UT) capsule capsule Take 50,000 Units by mouth 1 (One) Time Per Week.     • Vitamin D, Cholecalciferol, (CHOLECALCIFEROL) 10 MCG (400 UNIT) tablet Take 400 Units by mouth Daily.       No current facility-administered medications for this encounter.        Vaccination History:   Pneumonia: Reports UTD  Annual Influenza: Reports UTD for 2020-21 Season    Objective  Vitals:    02/24/21 0849   BP: 138/80   BP Location: Left arm   Patient Position: Sitting   Cuff Size: Large Adult   Pulse: 83   Resp: 22   Temp: 97.8 °F (36.6 °C)   TempSrc: Temporal   SpO2: 98%   Weight: 103 kg (226 lb)   Height: 160 cm (62.99\")     Wt Readings from Last 3 Encounters:   02/24/21 103 kg (226 lb)   02/23/21 103 kg (227 lb)   01/27/21 102 kg " (225 lb 9.6 oz)         02/24/21  0849   Weight: 103 kg (226 lb)     Lab Results   Component Value Date    GLUCOSE 69 02/24/2021    BUN 32 (H) 02/24/2021    CREATININE 1.69 (H) 02/24/2021    EGFRIFNONA 29 (L) 02/24/2021    EGFRIFAFRI 41 (L) 07/30/2018    BCR 18.9 02/24/2021    K 4.2 02/24/2021    CO2 22.4 02/24/2021    CALCIUM 9.8 02/24/2021    PROTENTOTREF 7.7 07/30/2018    ALBUMIN 3.60 01/22/2021    LABIL2 1.1 (L) 07/30/2018    AST 38 (H) 01/22/2021    ALT 27 01/22/2021     Lab Results   Component Value Date    WBC 8.68 01/22/2021    HGB 9.7 (L) 01/22/2021    HCT 31.1 (L) 01/22/2021    MCV 81.0 01/22/2021     01/22/2021     Lab Results   Component Value Date    CKTOTAL 67 12/04/2020    CKMB 2.92 04/16/2018    TROPONINI 0.012 04/16/2018    TROPONINT <0.010 12/04/2020     Lab Results   Component Value Date    PROBNP 1,634.0 02/24/2021     Results for orders placed during the hospital encounter of 11/03/20   Adult Transthoracic Echo Complete W/ Cont if Necessary Per Protocol    Narrative · Left ventricular wall thickness is consistent with concentric   hypertrophy.  · Left ventricular ejection fraction appears to be greater than 70%. Left   ventricular systolic function is hyperdynamic (EF > 70%).  · Left ventricular diastolic function is consistent with (grade II w/high   LAP) pseudonormalization.  · The left atrial cavity is moderate to severely dilated.  · The right atrial cavity is mildly dilated.  · Moderate mitral annular calcification is present.  · Mild mitral valve regurgitation is present.  · Mild tricuspid valve regurgitation is present.  · Estimated right ventricular systolic pressure from tricuspid   regurgitation is markedly elevated (>55 mmHg).                   Class   Drug   Dose Last Dose Adjustment   Notes   ACEi/ARB/ARNI    Worsening renal fxn 1/22/21   Beta Blocker Toprol XL  25mg 1/22/21    MRA --------------   eGFR needs to be >30mL/min       Drug Therapy Problems:     1.  Pt reports  taking Humalog regardless of meals.     Recommendations:    1. Educated Pt to make sure to eat when taking Humalog (within 15 min before or immediately after a meal)    Patient was educated on heart failure medications and the importance of medication adherence.  All questions were addressed and patient expressed understanding.     Thank you for allowing me to participate in the care of your patient,    Melisa Leahy, PharmD  02/24/21  11:38 EST

## 2021-02-24 NOTE — PROGRESS NOTES
[facility] CHF CLINIC OFFICE VISIT    Subjective:   Congestive Heart Failure  Pertinent negatives include no abdominal pain, chest pain, claudication, near-syncope, palpitations or shortness of breath.      Britta Lee is a 75 y.o.  female who presents to the clinic today for follow up on heart failure. She has a hx of diastolic congestive heart failure. Her EF was > 70% from echocardiogram on 11/4/2020. She is currently taking Bumex 1 mg daily. She has been unable to tolerate Spironolactone due to abnormal kidney function.     Recently saw Alex with a good report. She reports he started her on Vitamin D supplement   Leg swelling has improved, she is keeping her feet and legs elevated as much as possible   Bhanu also gave her a good report from her diabetes   Taking Bumex 1 mg daily which is controlling her symptoms, she hasn't had to use any extra Bumex over the last several weeks   She reports her breathing is doing great and denies shortness of breath or wheezing, she reports not having to use nebulizer treatments    Home weight stable at 220 lbs   Home -177/71-82 however readings are prior to medications   Home HR 65-80, denies syncope or lightheadedness    Denies chest discomfort, palpitations or tachycardia.   She seems to be tolerating her Metoprolol well without any bradycardia   She has been unable to tolerate spironolactone due to kidney function  Good urine output  Trying to adhere to sodium restrictions and reports her  does most of her cooking   She continues on Hydralazine 50 mg TID, Imdur 60 mg daily, Metorpolol XL 25 mg daily, Norvasc 10 mg daily and Clonidine 0.2 mg TID for HTN.  She is not taking Quinapril      Past medical history significant for HTN, DM type 2, CKD III, iron deficiency anemia, hyperlipidemia, obesity.     PCP: Lexie Dolan  Cardiologist: Dr. Cruz  Nephrologist: Dr. Johnson     Hospitalizations: Discharged on 11/8/2020  ER visit: 12/4/2020  Past Medical  History:   Diagnosis Date   • Anemia    • Arthritis    • Carpal tunnel syndrome    • CHF (congestive heart failure) (CMS/[facility])    • Coronary artery disease    • Diabetes mellitus (CMS/[facility])    • Elevated cholesterol    • GERD (gastroesophageal reflux disease)    • Heart disease    • High cholesterol    • History of pneumonia    • History of unsteady gait     OCASSIONALLY   • Hypertension    • Insomnia    • Kidney disease    • LENNY (obstructive sleep apnea) 12/1/2020   • Osteoarthritis    • Renal insufficiency    • Sciatica      Past Surgical History:   Procedure Laterality Date   • ABDOMINAL SURGERY     • APPENDECTOMY     • CARDIAC CATHETERIZATION      1 STENT  ---  2000   • CARDIAC SURGERY     • CAROTID STENT     • CARPAL TUNNEL RELEASE Right 10/8/2019    Procedure: CARPAL TUNNEL RELEASE;  Surgeon: Feroz Johnson MD;  Location: [facility];  Service: Orthopedics   • CATARACT EXTRACTION     • CHOLECYSTECTOMY     • COLONOSCOPY     • CORONARY ANGIOPLASTY WITH STENT PLACEMENT     • ENDOSCOPY     • ENDOSCOPY N/A 3/5/2020    Procedure: ESOPHAGOGASTRODUODENOSCOPY;  Surgeon: Alexandru Bagley MD;  Location: [facility];  Service: Gastroenterology;  Laterality: N/A;   • ENDOSCOPY AND COLONOSCOPY     • GALLBLADDER SURGERY     • JOINT REPLACEMENT Left 05/02/2017    [facility]  DR JOHNSON  LEFT TOTAL KNEE   • KNEE ARTHROSCOPY W/ MENISCECTOMY Right    • LAPAROSCOPIC TUBAL LIGATION     • WI TOTAL KNEE ARTHROPLASTY Left 5/2/2017    Procedure: TOTAL KNEE ARTHROPLASTY;  Surgeon: Feroz Johnson MD;  Location: [facility];  Service: Orthopedics   • STERILIZATION     • TONSILLECTOMY       Social History     Socioeconomic History   • Marital status:      Spouse name: natalie   • Number of children: 3   • Years of education: 12   • Highest education level: Not on file   Occupational History   • Occupation: retired   Social Needs   • Financial resource strain: Somewhat hard   • Food insecurity     Worry: Never true     Inability:  Never true   • Transportation needs     Medical: Yes     Non-medical: No   Tobacco Use   • Smoking status: Never Smoker   • Smokeless tobacco: Never Used   Substance and Sexual Activity   • Alcohol use: Yes     Comment: socially   • Drug use: No   • Sexual activity: Defer     Birth control/protection: Surgical   Lifestyle   • Physical activity     Days per week: 0 days     Minutes per session: 0 min   • Stress: Only a little     Family History   Problem Relation Age of Onset   • Arthritis Mother    • Diabetes Mother    • Cancer Mother    • Heart disease Father    • Diabetes Daughter    • Diabetes Son    • Diabetes Maternal Aunt    • Heart disease Maternal Grandmother    • Breast cancer Neg Hx      Allergies:  No Known Allergies    Review of Systems   Constitution: Negative for chills, fever, weight gain and weight loss.   HENT: Negative for ear discharge, hoarse voice and sore throat.    Eyes: Negative for discharge, double vision, pain, redness and visual disturbance.   Cardiovascular: Positive for leg swelling. Negative for chest pain, claudication, cyanosis, dyspnea on exertion, irregular heartbeat, near-syncope, orthopnea, palpitations and syncope.   Respiratory: Negative for cough, shortness of breath and wheezing.    Endocrine: Negative for cold intolerance, heat intolerance and polyuria.   Hematologic/Lymphatic: Negative for bleeding problem. Does not bruise/bleed easily.   Skin: Negative for color change, flushing and rash.   Musculoskeletal: Negative for arthritis, back pain, joint pain, joint swelling and muscle cramps.   Gastrointestinal: Negative for abdominal pain, constipation, diarrhea, nausea and vomiting.   Genitourinary: Negative for dysuria, flank pain, frequency, hesitancy and urgency.   Neurological: Negative for difficulty with concentration, dizziness, light-headedness, sensory change, vertigo and weakness.   Psychiatric/Behavioral: Negative for depression. The patient does not have insomnia  and is not nervous/anxious.      Current Outpatient Medications   Medication Sig Dispense Refill   • albuterol sulfate  (90 Base) MCG/ACT inhaler Inhale 2 puffs Every 4 (Four) Hours As Needed for Shortness of Air. 18 g 0   • amLODIPine (NORVASC) 10 MG tablet Take 1 tablet by mouth Every Night. 90 tablet 1   • aspirin 81 MG tablet Take 1 tablet by mouth Daily. 30 tablet 5   • atorvastatin (LIPITOR) 40 MG tablet Take 1 tablet by mouth Every Night. 90 tablet 1   • BD Pen Needle Evon U/F 32G X 4 MM misc      • bumetanide (BUMEX) 1 MG tablet Take 1 tablet by mouth Daily. May take an extra 0.5 mg in the evening for swelling or weight gain 60 tablet 1   • cloNIDine (CATAPRES) 0.2 MG tablet Take 1 tablet by mouth 3 (Three) Times a Day. 270 tablet 1   • Continuous Blood Gluc  (Dexcom G6 ) device 1 Device Daily. 1 Device 0   • Continuous Blood Gluc Sensor (Dexcom G6 Sensor) Every 10 (Ten) Days. 9 each 3   • Continuous Blood Gluc Transmit (Dexcom G6 Transmitter) misc 1 Device Daily. 1 each 0   • escitalopram (LEXAPRO) 10 MG tablet Take 1 tablet by mouth Daily. 90 tablet 3   • ferrous sulfate 325 (65 Fe) MG tablet Take 1 tablet by mouth 2 (Two) Times a Day. 180 tablet 3   • glucose blood test strip 1 each by Other route 3 (Three) Times a Day. 100 each 12   • hydrALAZINE (APRESOLINE) 50 MG tablet Take 1 tablet by mouth Every 8 (Eight) Hours. 270 tablet 1   • Insulin Degludec (Tresiba) 100 UNIT/ML solution injection Inject 50 Units under the skin into the appropriate area as directed Daily. 40 mL 0   • insulin lispro (humaLOG) 100 UNIT/ML injection Inject 5 Units under the skin into the appropriate area as directed 3 (Three) Times a Day Before Meals.     • Insulin Pen Needle 32G X 5 MM misc 1 each 4 (Four) Times a Day. 120 each 5   • isosorbide mononitrate (IMDUR) 60 MG 24 hr tablet Take 1 tablet by mouth Daily. 90 tablet 1   • metoprolol succinate XL (TOPROL-XL) 25 MG 24 hr tablet Take 1 tablet by mouth  Daily. Hold if HR less than 60 bpm. 30 tablet 1   • nystatin (MYCOSTATIN) 883889 UNIT/GM powder Apply  one application topically to the appropriate area as directed Every 12 (Twelve) Hours. (Patient taking differently: Apply  topically to the appropriate area as directed 2 (Two) Times a Day As Needed (itching).) 60 g 0   • vitamin D (ERGOCALCIFEROL) 1.25 MG (24249 UT) capsule capsule Take 50,000 Units by mouth 1 (One) Time Per Week.     • Vitamin D, Cholecalciferol, (CHOLECALCIFEROL) 10 MCG (400 UNIT) tablet Take 400 Units by mouth Daily.       No current facility-administered medications for this encounter.      Objective:     Vitals:    02/24/21 0849   BP: 138/80   Pulse: 83   Resp: 22   Temp: 97.8 °F (36.6 °C)   SpO2: 98%     Body mass index is 40.05 kg/m².    Wt Readings from Last 3 Encounters:   02/23/21 103 kg (227 lb)   01/27/21 102 kg (225 lb 9.6 oz)   01/22/21 102 kg (224 lb 12.8 oz)        Vitals signs reviewed.   Constitutional:       Appearance: Normal appearance. Well-developed.      Comments: Wears a mask during encounter   Eyes:      Conjunctiva/sclera: Conjunctivae normal.   HENT:      Head: Normocephalic.   Neck:      Musculoskeletal: Normal range of motion and neck supple.      Vascular: No JVD or JVR.   Pulmonary:      Effort: Pulmonary effort is normal.      Breath sounds: Normal breath sounds.   Cardiovascular:      Normal rate. Regular rhythm.   Pulses:     Intact distal pulses.   Edema:     Peripheral edema present.     Ankle: bilateral 1+ edema of the ankle.     Feet: bilateral 1+ edema of the feet.  Abdominal:      General: Bowel sounds are normal.      Palpations: Abdomen is soft. There is no hepatomegaly or splenomegaly.   Musculoskeletal: Normal range of motion.   Skin:     General: Skin is warm and dry.   Neurological:      Mental Status: Alert and oriented to person, place, and time.   Psychiatric:         Attention and Perception: Attention normal.         Mood and Affect: Mood normal.          Speech: Speech normal.         Behavior: Behavior normal. Behavior is cooperative.         Cognition and Memory: Cognition normal.     Cardiographics  Results for orders placed during the hospital encounter of 20   Adult Transthoracic Echo Complete W/ Cont if Necessary Per Protocol    Narrative · Left ventricular wall thickness is consistent with concentric   hypertrophy.  · Left ventricular ejection fraction appears to be greater than 70%. Left   ventricular systolic function is hyperdynamic (EF > 70%).  · Left ventricular diastolic function is consistent with (grade II w/high   LAP) pseudonormalization.  · The left atrial cavity is moderate to severely dilated.  · The right atrial cavity is mildly dilated.  · Moderate mitral annular calcification is present.  · Mild mitral valve regurgitation is present.  · Mild tricuspid valve regurgitation is present.  · Estimated right ventricular systolic pressure from tricuspid   regurgitation is markedly elevated (>55 mmHg).        EK/3/2020    Chest x-ray: 11/3/2020    IMPRESSION:  CHF/edema with small effusions    Lab Review   Lab Results   Component Value Date    TSH 4.650 (H) 2020     Lab Results   Component Value Date    GLUCOSE 131 (H) 2021    BUN 43 (H) 2021    CREATININE 1.91 (H) 2021    EGFRIFNONA 25 (L) 2021    EGFRIFAFRI 41 (L) 2018    BCR 22.5 2021    K 4.8 2021    CO2 19.1 (L) 2021    CALCIUM 9.5 2021    PROTENTOTREF 7.7 2018    ALBUMIN 3.60 2021    LABIL2 1.1 (L) 2018    AST 38 (H) 2021    ALT 27 2021     Lab Results   Component Value Date    WBC 8.68 2021    HGB 9.7 (L) 2021    HCT 31.1 (L) 2021    MCV 81.0 2021     2021     Lab Results   Component Value Date    CKTOTAL 67 2020    CKMB 2.92 2018    TROPONINI 0.012 2018    TROPONINT <0.010 2020     Lab Results   Component Value Date    PROBNP  1,783.0 01/27/2021     The following portions of the patient's history were reviewed and updated as appropriate: allergies, current medications, past family history, past medical history, past social history, past surgical history and problem list.     Old records reviewed and pertinent information is included in the above objective data.     Assessment/Plan:   1. Chronic Diastolic Congestive Heart Failure, EF > 70%   2. HTN  3. Pedal edema   3. CKD, stage IV  4. Obesity, Class II    Pro-BNP, BMP, magnesium today  Discussed and reviewed labs with patient today  ReDs reviewed and discussed with patient today   Continue on Bumex 1 mg daily and may take extra 0.5 to 1 mg if needed for swelling or weight gain     Continue on current medications   Monitor BP and HR, patient was asked to check BP about 1-2 hrs after medications and keep a log as further titrations maybe warranted   If her kidney function continues to remain stable may consider low dose Lisinopril with close monitoring of SCr to see if she can tolerate  Await sleep study results from home sleep study - she should further discuss with cardiology at follow up    Weight loss discussed and healthy lifestyle recommended   Counseling patient extensively on dietary Na+ intake, importance of activity, weight monitoring, compliance with medications and follow up appointments.  Follow up in 6 weeks, sooner if needed.

## 2021-03-02 ENCOUNTER — OFFICE VISIT (OUTPATIENT)
Dept: CARDIOLOGY | Facility: CLINIC | Age: 76
End: 2021-03-02

## 2021-03-02 VITALS
DIASTOLIC BLOOD PRESSURE: 58 MMHG | HEART RATE: 61 BPM | HEIGHT: 63 IN | WEIGHT: 225 LBS | TEMPERATURE: 97.4 F | SYSTOLIC BLOOD PRESSURE: 108 MMHG | RESPIRATION RATE: 16 BRPM | BODY MASS INDEX: 39.87 KG/M2

## 2021-03-02 DIAGNOSIS — E66.01 MORBID OBESITY WITH BMI OF 40.0-44.9, ADULT (HCC): ICD-10-CM

## 2021-03-02 DIAGNOSIS — I25.10 ATHEROSCLEROSIS OF NATIVE CORONARY ARTERY OF NATIVE HEART WITHOUT ANGINA PECTORIS: ICD-10-CM

## 2021-03-02 DIAGNOSIS — E78.5 DYSLIPIDEMIA: ICD-10-CM

## 2021-03-02 DIAGNOSIS — I10 ESSENTIAL HYPERTENSION: Primary | ICD-10-CM

## 2021-03-02 PROCEDURE — 99214 OFFICE O/P EST MOD 30 MIN: CPT | Performed by: SPECIALIST

## 2021-03-02 RX ORDER — INSULIN GLARGINE 300 U/ML
60 INJECTION, SOLUTION SUBCUTANEOUS DAILY
COMMUNITY
End: 2021-04-07 | Stop reason: SDUPTHER

## 2021-03-02 NOTE — PROGRESS NOTES
Subjective   Follow up, hypertension  Britta Lee is a 75 y.o. female who presents to day for Follow-up (home sleep study findings), Coronary Artery Disease, Congestive Heart Failure, Shortness of Breath (unchanged), and Med Management (presented).    CHIEF COMPLIANT  Chief Complaint   Patient presents with   • Follow-up     home sleep study findings   • Coronary Artery Disease   • Congestive Heart Failure   • Shortness of Breath     unchanged   • Med Management     presented       Active Problems:  Problem List Items Addressed This Visit        Cardiac and Vasculature    Essential hypertension - Primary    Overview     The patient was advised to keep a daily blood pressure and pulse log. They were advised contact our office if consistently running over 120/80 and bring the log to the next follow up visit.          Atherosclerosis of native coronary artery of native heart    Overview     S/P one stent placement followed by [facility] in [place] - Dr. Alexandru Martines          Dyslipidemia    Overview     Lipid panel done 10/20/15 TC: 166 Tri HDL: 34 LDL: 104         Relevant Orders    Lipid Panel    Comprehensive Metabolic Panel       Endocrine and Metabolic    Morbid obesity with BMI of 40.0-44.9, adult (CMS/[facility])          HPI  HPI  She has been doing well she has no chest pain she is active at home she has a little bit of stable shortness of breath on exertion class II she has intermittent edema but this is much better since she was last the seen no palpitations no dizziness no claudication  PRIOR MEDS  Current Outpatient Medications on File Prior to Visit   Medication Sig Dispense Refill   • albuterol sulfate  (90 Base) MCG/ACT inhaler Inhale 2 puffs Every 4 (Four) Hours As Needed for Shortness of Air. 18 g 0   • amLODIPine (NORVASC) 10 MG tablet Take 1 tablet by mouth Every Night. 90 tablet 1   • aspirin 81 MG tablet Take 1 tablet by mouth Daily. 30 tablet 5   • atorvastatin (LIPITOR) 40 MG  tablet Take 1 tablet by mouth Every Night. 90 tablet 1   • bumetanide (BUMEX) 1 MG tablet Take 1 tablet by mouth Daily. May take an extra 0.5 mg in the evening for swelling or weight gain 60 tablet 1   • cloNIDine (CATAPRES) 0.2 MG tablet Take 1 tablet by mouth 3 (Three) Times a Day. 270 tablet 1   • escitalopram (LEXAPRO) 10 MG tablet Take 1 tablet by mouth Daily. 90 tablet 3   • ferrous sulfate 325 (65 Fe) MG tablet Take 1 tablet by mouth 2 (Two) Times a Day. 180 tablet 3   • hydrALAZINE (APRESOLINE) 50 MG tablet Take 1 tablet by mouth Every 8 (Eight) Hours. 270 tablet 1   • Insulin Glargine, 2 Unit Dial, (Toujeo Max SoloStar) 300 UNIT/ML solution pen-injector injection Inject  under the skin into the appropriate area as directed Daily.     • isosorbide mononitrate (IMDUR) 60 MG 24 hr tablet Take 1 tablet by mouth Daily. 90 tablet 1   • metoprolol succinate XL (TOPROL-XL) 25 MG 24 hr tablet Take 1 tablet by mouth Daily. Hold if HR less than 60 bpm. 30 tablet 1   • nystatin (MYCOSTATIN) 707752 UNIT/GM powder Apply  one application topically to the appropriate area as directed Every 12 (Twelve) Hours. (Patient taking differently: Apply  topically to the appropriate area as directed 2 (Two) Times a Day As Needed (itching).) 60 g 0   • vitamin D (ERGOCALCIFEROL) 1.25 MG (79599 UT) capsule capsule Take 50,000 Units by mouth 1 (One) Time Per Week.     • Vitamin D, Cholecalciferol, (CHOLECALCIFEROL) 10 MCG (400 UNIT) tablet Take 400 Units by mouth 1 (One) Time Per Week.     • BD Pen Needle Evon U/F 32G X 4 MM misc      • Continuous Blood Gluc  (Dexcom G6 ) device 1 Device Daily. 1 Device 0   • Continuous Blood Gluc Sensor (Dexcom G6 Sensor) Every 10 (Ten) Days. 9 each 3   • Continuous Blood Gluc Transmit (Dexcom G6 Transmitter) misc 1 Device Daily. 1 each 0   • glucose blood test strip 1 each by Other route 3 (Three) Times a Day. 100 each 12   • Insulin Degludec (Tresiba) 100 UNIT/ML solution injection  Inject 50 Units under the skin into the appropriate area as directed Daily. 40 mL 0   • insulin lispro (humaLOG) 100 UNIT/ML injection Inject 5 Units under the skin into the appropriate area as directed 3 (Three) Times a Day Before Meals.     • Insulin Pen Needle 32G X 5 MM misc 1 each 4 (Four) Times a Day. 120 each 5     No current facility-administered medications on file prior to visit.        ALLERGIES  Patient has no known allergies.    HISTORY  Past Medical History:   Diagnosis Date   • Anemia    • Arthritis    • Carpal tunnel syndrome    • CHF (congestive heart failure) (CMS/[facility])    • Coronary artery disease    • Diabetes mellitus (CMS/[facility])    • Elevated cholesterol    • GERD (gastroesophageal reflux disease)    • Heart disease    • High cholesterol    • History of pneumonia    • History of unsteady gait     OCASSIONALLY   • Hypertension    • Insomnia    • Kidney disease    • LENNY (obstructive sleep apnea) 12/1/2020   • Osteoarthritis    • Renal insufficiency    • Sciatica        Social History     Socioeconomic History   • Marital status:      Spouse name: natalie   • Number of children: 3   • Years of education: 12   • Highest education level: Not on file   Occupational History   • Occupation: retired   Social Needs   • Financial resource strain: Somewhat hard   • Food insecurity     Worry: Never true     Inability: Never true   • Transportation needs     Medical: Yes     Non-medical: No   Tobacco Use   • Smoking status: Never Smoker   • Smokeless tobacco: Never Used   Substance and Sexual Activity   • Alcohol use: Yes     Comment: socially   • Drug use: No   • Sexual activity: Defer     Birth control/protection: Surgical   Lifestyle   • Physical activity     Days per week: 0 days     Minutes per session: 0 min   • Stress: Only a little       Family History   Problem Relation Age of Onset   • Arthritis Mother    • Diabetes Mother    • Cancer Mother    • Heart disease Father    • Diabetes Daughter    •  "Diabetes Son    • Diabetes Maternal Aunt    • Heart disease Maternal Grandmother    • Breast cancer Neg Hx        Review of Systems   Constitutional: Negative for activity change and appetite change.   HENT: Negative for congestion.    Eyes: Negative for discharge and itching.   Respiratory: Positive for shortness of breath. Negative for apnea, cough, choking, chest tightness, wheezing and stridor.    Cardiovascular: Negative for chest pain and palpitations.   Gastrointestinal: Negative for abdominal distention and abdominal pain.   Endocrine: Negative for cold intolerance and heat intolerance.   Genitourinary: Negative for flank pain.   Musculoskeletal: Negative for gait problem.   Skin: Negative for color change and pallor.   Neurological: Negative for dizziness.   Psychiatric/Behavioral: Negative for agitation and behavioral problems.       Objective     VITALS: /58   Pulse 61   Temp 97.4 °F (36.3 °C)   Resp 16   Ht 160 cm (63\")   Wt 102 kg (225 lb)   BMI 39.86 kg/m²     LABS:   Lab Results (most recent)     None          IMAGING:   Ct Head Without Contrast    Result Date: 12/4/2020    Unremarkable exam demonstrating no CT evidence of acute intracranial findings.  This report was finalized on 12/4/2020 8:33 AM by Dr. Francisco Javier Pacheco MD.      Xr Chest 1 View    Result Date: 12/4/2020  1.  Small right pleural effusion. 2.  Cardiomegaly. 3.  Coarsened interstitial markings.  This report was finalized on 12/4/2020 8:34 AM by Dr. Francisco Javier Pacheco MD.      Xr Chest 1 View    Result Date: 11/3/2020  CHF/edema with small effusions.  This report was finalized on 11/3/2020 12:03 PM by Dr. Guillermo Macias MD.      Us Venous Doppler Lower Extremity Right (duplex For Insufficiency)    Result Date: 11/5/2020  No DVT in the right lower extremity on today's exam.  This report was finalized on 11/5/2020 7:55 AM by Dr. Silvio Mcdonald MD.        EXAM:  Physical Exam  Vitals signs reviewed.   Constitutional:       Appearance: " She is well-developed.   HENT:      Head: Normocephalic and atraumatic.   Eyes:      Pupils: Pupils are equal, round, and reactive to light.   Neck:      Musculoskeletal: Neck supple.      Thyroid: No thyromegaly.      Vascular: No JVD.   Cardiovascular:      Rate and Rhythm: Normal rate and regular rhythm.      Heart sounds: Normal heart sounds. No murmur. No friction rub. No gallop.    Pulmonary:      Effort: Pulmonary effort is normal. No respiratory distress.      Breath sounds: Normal breath sounds. No stridor. No wheezing or rales.   Chest:      Chest wall: No tenderness.   Musculoskeletal:         General: No tenderness or deformity.   Skin:     General: Skin is warm and dry.   Neurological:      Mental Status: She is alert and oriented to person, place, and time.      Cranial Nerves: No cranial nerve deficit.      Coordination: Coordination normal.         Procedure   Procedures       Assessment/Plan    Diagnosis Plan   1. Essential hypertension     2. Atherosclerosis of native coronary artery of native heart without angina pectoris     3. Dyslipidemia  Lipid Panel    Comprehensive Metabolic Panel   4. Morbid obesity with BMI of 40.0-44.9, adult (CMS/[facility])     1.  Her blood pressures well controlled we will continue with current management  2.  She is active with no chest pain we will continue the current management, she also denies any claudications in her lower extremities  3.  I reviewed her labs no lipids available her creatinine is a little bit elevated but she is being followed by the nephrologist had a BNP is within normal limits we will continue current management  4.  I discussed with her the results of the sleep study which was negative for sleep apnea with an AHI of 1.2  5.  Advised her to lose weight and exercise    Return in about 3 months (around 6/2/2021).    Diagnoses and all orders for this visit:    1. Essential hypertension (Primary)    2. Atherosclerosis of native coronary artery of native  heart without angina pectoris    3. Dyslipidemia  -     Lipid Panel; Future  -     Comprehensive Metabolic Panel; Future    4. Morbid obesity with BMI of 40.0-44.9, adult (CMS/[facility])                               MEDS ORDERED DURING VISIT:  No orders of the defined types were placed in this encounter.        This document has been electronically signed by Hever Cruz MD  March 2, 2021 09:50 EST

## 2021-03-12 ENCOUNTER — LAB (OUTPATIENT)
Dept: FAMILY MEDICINE CLINIC | Facility: CLINIC | Age: 76
End: 2021-03-12

## 2021-03-12 DIAGNOSIS — N18.30 STAGE 3 CHRONIC KIDNEY DISEASE, UNSPECIFIED WHETHER STAGE 3A OR 3B CKD (HCC): Primary | ICD-10-CM

## 2021-03-12 DIAGNOSIS — N25.81 SECONDARY HYPERPARATHYROIDISM OF RENAL ORIGIN (HCC): ICD-10-CM

## 2021-03-12 PROCEDURE — 84156 ASSAY OF PROTEIN URINE: CPT | Performed by: INTERNAL MEDICINE

## 2021-03-12 PROCEDURE — 83970 ASSAY OF PARATHORMONE: CPT | Performed by: INTERNAL MEDICINE

## 2021-03-12 PROCEDURE — 84100 ASSAY OF PHOSPHORUS: CPT | Performed by: INTERNAL MEDICINE

## 2021-03-12 PROCEDURE — 85025 COMPLETE CBC W/AUTO DIFF WBC: CPT | Performed by: INTERNAL MEDICINE

## 2021-03-12 PROCEDURE — 81001 URINALYSIS AUTO W/SCOPE: CPT | Performed by: INTERNAL MEDICINE

## 2021-03-12 PROCEDURE — 80053 COMPREHEN METABOLIC PANEL: CPT | Performed by: INTERNAL MEDICINE

## 2021-03-12 PROCEDURE — 82570 ASSAY OF URINE CREATININE: CPT | Performed by: INTERNAL MEDICINE

## 2021-03-12 PROCEDURE — 36415 COLL VENOUS BLD VENIPUNCTURE: CPT | Performed by: PHYSICIAN ASSISTANT

## 2021-03-12 PROCEDURE — 82306 VITAMIN D 25 HYDROXY: CPT | Performed by: INTERNAL MEDICINE

## 2021-03-13 LAB
25(OH)D3 SERPL-MCNC: 34.1 NG/ML (ref 30–100)
ALBUMIN SERPL-MCNC: 4 G/DL (ref 3.5–5.2)
ALBUMIN/GLOB SERPL: 1.1 G/DL
ALP SERPL-CCNC: 291 U/L (ref 39–117)
ALT SERPL W P-5'-P-CCNC: 26 U/L (ref 1–33)
ANION GAP SERPL CALCULATED.3IONS-SCNC: 14.5 MMOL/L (ref 5–15)
AST SERPL-CCNC: 36 U/L (ref 1–32)
BACTERIA UR QL AUTO: ABNORMAL /HPF
BASOPHILS # BLD AUTO: 0.05 10*3/MM3 (ref 0–0.2)
BASOPHILS NFR BLD AUTO: 0.5 % (ref 0–1.5)
BILIRUB SERPL-MCNC: 0.3 MG/DL (ref 0–1.2)
BILIRUB UR QL STRIP: NEGATIVE
BUN SERPL-MCNC: 53 MG/DL (ref 8–23)
BUN/CREAT SERPL: 24.3 (ref 7–25)
CALCIUM SPEC-SCNC: 9.4 MG/DL (ref 8.6–10.5)
CHLORIDE SERPL-SCNC: 105 MMOL/L (ref 98–107)
CLARITY UR: CLEAR
CO2 SERPL-SCNC: 20.5 MMOL/L (ref 22–29)
COLOR UR: YELLOW
CREAT SERPL-MCNC: 2.18 MG/DL (ref 0.57–1)
CREAT UR-MCNC: 91.3 MG/DL
DEPRECATED RDW RBC AUTO: 43.1 FL (ref 37–54)
EOSINOPHIL # BLD AUTO: 0.9 10*3/MM3 (ref 0–0.4)
EOSINOPHIL NFR BLD AUTO: 8.6 % (ref 0.3–6.2)
ERYTHROCYTE [DISTWIDTH] IN BLOOD BY AUTOMATED COUNT: 14.6 % (ref 12.3–15.4)
GFR SERPL CREATININE-BSD FRML MDRD: 22 ML/MIN/1.73
GLOBULIN UR ELPH-MCNC: 3.8 GM/DL
GLUCOSE SERPL-MCNC: 92 MG/DL (ref 65–99)
GLUCOSE UR STRIP-MCNC: NEGATIVE MG/DL
HCT VFR BLD AUTO: 29 % (ref 34–46.6)
HGB BLD-MCNC: 8.9 G/DL (ref 12–15.9)
HGB UR QL STRIP.AUTO: NEGATIVE
HYALINE CASTS UR QL AUTO: ABNORMAL /LPF
IMM GRANULOCYTES # BLD AUTO: 0.03 10*3/MM3 (ref 0–0.05)
IMM GRANULOCYTES NFR BLD AUTO: 0.3 % (ref 0–0.5)
KETONES UR QL STRIP: NEGATIVE
LEUKOCYTE ESTERASE UR QL STRIP.AUTO: NEGATIVE
LYMPHOCYTES # BLD AUTO: 2.72 10*3/MM3 (ref 0.7–3.1)
LYMPHOCYTES NFR BLD AUTO: 26 % (ref 19.6–45.3)
MCH RBC QN AUTO: 24.9 PG (ref 26.6–33)
MCHC RBC AUTO-ENTMCNC: 30.7 G/DL (ref 31.5–35.7)
MCV RBC AUTO: 81 FL (ref 79–97)
MONOCYTES # BLD AUTO: 0.65 10*3/MM3 (ref 0.1–0.9)
MONOCYTES NFR BLD AUTO: 6.2 % (ref 5–12)
NEUTROPHILS NFR BLD AUTO: 58.4 % (ref 42.7–76)
NEUTROPHILS NFR BLD AUTO: 6.13 10*3/MM3 (ref 1.7–7)
NITRITE UR QL STRIP: NEGATIVE
NRBC BLD AUTO-RTO: 0 /100 WBC (ref 0–0.2)
PH UR STRIP.AUTO: 5.5 [PH] (ref 5–8)
PHOSPHATE SERPL-MCNC: 3.9 MG/DL (ref 2.5–4.5)
PLATELET # BLD AUTO: 297 10*3/MM3 (ref 140–450)
PMV BLD AUTO: 10.4 FL (ref 6–12)
POTASSIUM SERPL-SCNC: 4.5 MMOL/L (ref 3.5–5.2)
PROT SERPL-MCNC: 7.8 G/DL (ref 6–8.5)
PROT UR QL STRIP: ABNORMAL
PROT UR-MCNC: 70.2 MG/DL
PROT/CREAT UR: 768.9 MG/G CREA (ref 0–200)
PTH-INTACT SERPL-MCNC: 84.7 PG/ML (ref 15–65)
RBC # BLD AUTO: 3.58 10*6/MM3 (ref 3.77–5.28)
RBC # UR: ABNORMAL /HPF
REF LAB TEST METHOD: ABNORMAL
SODIUM SERPL-SCNC: 140 MMOL/L (ref 136–145)
SP GR UR STRIP: 1.01 (ref 1–1.03)
SQUAMOUS #/AREA URNS HPF: ABNORMAL /HPF
UROBILINOGEN UR QL STRIP: ABNORMAL
WBC # BLD AUTO: 10.48 10*3/MM3 (ref 3.4–10.8)
WBC UR QL AUTO: ABNORMAL /HPF

## 2021-03-25 ENCOUNTER — OFFICE VISIT (OUTPATIENT)
Dept: FAMILY MEDICINE CLINIC | Facility: CLINIC | Age: 76
End: 2021-03-25

## 2021-03-25 DIAGNOSIS — I10 ESSENTIAL HYPERTENSION: ICD-10-CM

## 2021-03-25 DIAGNOSIS — N25.81 HYPERPARATHYROIDISM DUE TO RENAL INSUFFICIENCY (HCC): ICD-10-CM

## 2021-03-25 DIAGNOSIS — E78.5 DYSLIPIDEMIA: Chronic | ICD-10-CM

## 2021-03-25 DIAGNOSIS — E55.9 VITAMIN D DEFICIENCY: ICD-10-CM

## 2021-03-25 DIAGNOSIS — IMO0002 TYPE 2 DIABETES MELLITUS, UNCONTROLLED, WITH NEUROPATHY: ICD-10-CM

## 2021-03-25 DIAGNOSIS — IMO0002 TYPE 2 DIABETES MELLITUS, UNCONTROLLED, WITH NEUROPATHY: Primary | Chronic | ICD-10-CM

## 2021-03-25 DIAGNOSIS — E11.22 TYPE 2 DIABETES MELLITUS WITH CHRONIC KIDNEY DISEASE, WITH LONG-TERM CURRENT USE OF INSULIN, UNSPECIFIED CKD STAGE (HCC): ICD-10-CM

## 2021-03-25 DIAGNOSIS — E78.5 DYSLIPIDEMIA: ICD-10-CM

## 2021-03-25 DIAGNOSIS — I50.33 ACUTE ON CHRONIC DIASTOLIC CONGESTIVE HEART FAILURE (HCC): Chronic | ICD-10-CM

## 2021-03-25 DIAGNOSIS — I10 ESSENTIAL HYPERTENSION: Chronic | ICD-10-CM

## 2021-03-25 DIAGNOSIS — Z79.4 TYPE 2 DIABETES MELLITUS WITH CHRONIC KIDNEY DISEASE, WITH LONG-TERM CURRENT USE OF INSULIN, UNSPECIFIED CKD STAGE (HCC): ICD-10-CM

## 2021-03-25 DIAGNOSIS — I50.33 ACUTE ON CHRONIC DIASTOLIC CONGESTIVE HEART FAILURE (HCC): Primary | ICD-10-CM

## 2021-03-25 PROCEDURE — 99214 OFFICE O/P EST MOD 30 MIN: CPT | Performed by: NURSE PRACTITIONER

## 2021-03-25 RX ORDER — PROCHLORPERAZINE 25 MG/1
SUPPOSITORY RECTAL
Qty: 9 EACH | Refills: 3 | Status: SHIPPED | OUTPATIENT
Start: 2021-03-25 | End: 2021-06-29 | Stop reason: SDUPTHER

## 2021-03-25 RX ORDER — PROCHLORPERAZINE 25 MG/1
1 SUPPOSITORY RECTAL DAILY
Qty: 1 EACH | Refills: 0 | Status: SHIPPED | OUTPATIENT
Start: 2021-03-25 | End: 2021-06-29 | Stop reason: SDUPTHER

## 2021-03-25 NOTE — PROGRESS NOTES
History of Present Illness   Britta Lee is a 76 yo female who presents to the clinic today pertaining to her  DM, type 2 which is complicated by peripheral neuropathy, diabetic nephropathy and retinopathy. In addition, Britta has HTN, Dyslipidemia,Venous Insufficiency of Lower Extremities and CHF. She is being followed by the [facility].    Diabetes   She has type 2 diabetes mellitus. The initial diagnosis of diabetes was made 20 years ago.  Diabetic complications include heart disease, nephropathy, peripheral neuropathy, PVD and retinopathy. Current diabetic treatment includes insulin injections (Toujeo and Novolog). Compliance with diabetes treatment: Dependent on available of insulin. She had been using a DexCom but currently she reports her DexCom is not working.  When asked about meal planning, she is really trying to follow her recommended nutrition plan which does include a combination Healthy renal and cardiovascular plan.  She has had a previous visit with a dietitian. She rarely participates in exercise. She is typically taking  60 units of Toujeo.   Lab Results   Component Value Date    HGBA1C 7.30 (H) 11/03/2020     Lab Results   Component Value Date    HGBA1C 8.40 (H) 09/04/2020     Hypertension  Compliance with treatment has been good. Well controlled with Norvasc, clonidine, metoprolol, and Accupril. She does report lower extremity swelling throughout the day which is improving with Bumex. She does have compression stockings but find them very difficult to apply.     Lab Results   Component Value Date    GLUCOSE 131 (H) 01/27/2021    BUN 43 (H) 01/27/2021    CREATININE 1.91 (H) 01/27/2021    EGFRIFNONA 25 (L) 01/27/2021    BCR 22.5 01/27/2021    K 4.8 01/27/2021    CO2 19.1 (L) 01/27/2021    CALCIUM 9.5 01/27/2021    ALBUMIN 3.60 01/22/2021    AST 38 (H) 01/22/2021    ALT 27 01/22/2021     Dyslipidemia  Compliance with treatment has been good. The patient exercises seldom due to her  "Osteoarthritis.  She is currently being prescribed the following medication for her dyslipidemia - atorvastatin. Patient denies side effects associated with her medications. Most recent lipids include    Lab Results   Component Value Date    CHOL 125 09/04/2020    TRIG 128 09/04/2020    HDL 36 (L) 09/04/2020    LDL 63 09/04/2020     Vitals:    03/25/21 0919   BP: 140/62   BP Location: Left arm   Patient Position: Sitting   Cuff Size: Adult   Pulse: 83   Resp: 14   Temp: 96.9 °F (36.1 °C)   TempSrc: Temporal   SpO2: 100%   Weight: 103 kg (226 lb)   Height: 160 cm (62.99\")      The following portions of the patient's history were reviewed and updated as appropriate: allergies, current medications, past family history, past medical history, past social history, past surgical history and problem list.    Review of Systems   Constitutional: Positive for fatigue. Negative for activity change, appetite change, chills and diaphoresis.   HENT: Negative.    Eyes: Positive for visual disturbance.   Respiratory: Positive for shortness of breath (Improving). Negative for cough, chest tightness and wheezing.    Cardiovascular: Positive for leg swelling. Negative for chest pain and palpitations.   Gastrointestinal: Negative for nausea and vomiting.   Endocrine: Negative for polydipsia, polyphagia and polyuria.   Genitourinary: Negative for decreased urine volume.   Musculoskeletal: Positive for arthralgias and gait problem.   Skin: Positive for color change (Lower extremities) and bruise.   Allergic/Immunologic: Negative for environmental allergies.   Neurological: Positive for numbness. Negative for dizziness and confusion.   Hematological: Bruises/bleeds easily.   Psychiatric/Behavioral: Positive for sleep disturbance and stress (Worries a great deal about finances and having her medications). The patient is not nervous/anxious.       Physical Exam  Vitals reviewed.   Constitutional:       General: She is not in acute " distress.     Appearance: She is well-developed.      Comments: Pleasant; wearing appropriate face covering   HENT:      Head: Normocephalic.      Nose: Nose normal.   Eyes:      General:         Right eye: No discharge.         Left eye: No discharge.      Conjunctiva/sclera: Conjunctivae normal.   Neck:      Thyroid: No thyromegaly.      Vascular: No JVD.   Cardiovascular:      Rate and Rhythm: Normal rate and regular rhythm.      Heart sounds: Normal heart sounds. No murmur heard.   No friction rub.   Pulmonary:      Effort: Pulmonary effort is normal. No respiratory distress.      Breath sounds: Normal breath sounds. No decreased breath sounds, wheezing, rhonchi or rales.   Abdominal:      General: Bowel sounds are normal.      Palpations: Abdomen is soft.      Tenderness: There is no abdominal tenderness. There is no guarding or rebound.   Musculoskeletal:      Cervical back: Neck supple.   Lymphadenopathy:      Cervical: No cervical adenopathy.   Skin:     General: Skin is warm and dry.      Capillary Refill: Capillary refill takes less than 2 seconds.      Findings: Erythema (Bilateral lower extremities) present. No rash.   Neurological:      Mental Status: She is alert and oriented to person, place, and time.   Psychiatric:         Mood and Affect: Mood and affect normal.         Speech: Speech normal.         Behavior: Behavior is cooperative.         Thought Content: Thought content normal.         Judgment: Judgment normal.       Assessment/Plan     Problems Addressed this Visit        Cardiac and Vasculature    Essential hypertension    Dyslipidemia    Acute on chronic congestive heart failure (CMS/HCC)       Endocrine and Metabolic    Type 2 diabetes mellitus, uncontrolled, with neuropathy (CMS/HCC) - Primary    Relevant Medications    Insulin Pen Needle 32G X 5 MM misc      Diagnoses       Codes Comments    Type 2 diabetes mellitus, uncontrolled, with neuropathy (CMS/HCC)    -  Primary ICD-10-CM:  E11.40, E11.65  ICD-9-CM: 250.62, 357.2 Encouraged her to phone if her family does not have success with getting her DexCom restarted.     Essential hypertension     ICD-10-CM: I10  ICD-9-CM: 401.9 Cardiovascular risk reduction including nutrition recommendations reinforced.     Dyslipidemia     ICD-10-CM: E78.5  ICD-9-CM: 272.4 Lipid panel in target. Continue Atorvastatin    Acute on chronic diastolic congestive heart failure (CMS/HCC)     ICD-10-CM: I50.33  ICD-9-CM: 428.33, 428.0 Continue f/u with HF Clinic. Noted last ECHO in 11/2020 revealed an EF 70%      Findings and recommendations discussed with Britta. Discussed the issues she was having with her DexCom G. Discussed adding a SGLT 2 or GLP-1 Agonist. She is due for labs in April and I will f/u with her in May; sooner if problems/concerns occur.          This document has been electronically signed by ERNIE Locke, CHUN-BC, JAEL  March 25, 2021 09:22 EDT

## 2021-03-26 ENCOUNTER — CONSULT (OUTPATIENT)
Dept: ONCOLOGY | Facility: CLINIC | Age: 76
End: 2021-03-26

## 2021-03-26 VITALS
RESPIRATION RATE: 18 BRPM | DIASTOLIC BLOOD PRESSURE: 60 MMHG | OXYGEN SATURATION: 95 % | TEMPERATURE: 97.3 F | HEART RATE: 98 BPM | HEIGHT: 62 IN | WEIGHT: 227 LBS | SYSTOLIC BLOOD PRESSURE: 148 MMHG | BODY MASS INDEX: 41.77 KG/M2

## 2021-03-26 VITALS
WEIGHT: 226 LBS | TEMPERATURE: 96.9 F | HEART RATE: 83 BPM | OXYGEN SATURATION: 100 % | DIASTOLIC BLOOD PRESSURE: 62 MMHG | BODY MASS INDEX: 40.04 KG/M2 | SYSTOLIC BLOOD PRESSURE: 140 MMHG | RESPIRATION RATE: 14 BRPM | HEIGHT: 63 IN

## 2021-03-26 DIAGNOSIS — D64.9 ANEMIA, UNSPECIFIED TYPE: Primary | ICD-10-CM

## 2021-03-26 DIAGNOSIS — N18.9 CHRONIC KIDNEY DISEASE, UNSPECIFIED CKD STAGE: ICD-10-CM

## 2021-03-26 DIAGNOSIS — K90.9 MALABSORPTION OF IRON: ICD-10-CM

## 2021-03-26 DIAGNOSIS — D50.9 IRON DEFICIENCY ANEMIA, UNSPECIFIED IRON DEFICIENCY ANEMIA TYPE: ICD-10-CM

## 2021-03-26 LAB
ALBUMIN SERPL-MCNC: 4.09 G/DL (ref 3.5–5.2)
ALBUMIN/GLOB SERPL: 1 G/DL
ALP SERPL-CCNC: 372 U/L (ref 39–117)
ALT SERPL W P-5'-P-CCNC: 34 U/L (ref 1–33)
ANION GAP SERPL CALCULATED.3IONS-SCNC: 13.3 MMOL/L (ref 5–15)
AST SERPL-CCNC: 51 U/L (ref 1–32)
BASOPHILS # BLD AUTO: 0.05 10*3/MM3 (ref 0–0.2)
BASOPHILS NFR BLD AUTO: 0.5 % (ref 0–1.5)
BILIRUB SERPL-MCNC: 0.3 MG/DL (ref 0–1.2)
BUN SERPL-MCNC: 42 MG/DL (ref 8–23)
BUN/CREAT SERPL: 23 (ref 7–25)
CALCIUM SPEC-SCNC: 9.8 MG/DL (ref 8.6–10.5)
CHLORIDE SERPL-SCNC: 105 MMOL/L (ref 98–107)
CO2 SERPL-SCNC: 20.7 MMOL/L (ref 22–29)
CREAT SERPL-MCNC: 1.83 MG/DL (ref 0.57–1)
CRP SERPL-MCNC: 1.7 MG/DL (ref 0–0.5)
DEPRECATED RDW RBC AUTO: 45.3 FL (ref 37–54)
EOSINOPHIL # BLD AUTO: 0.76 10*3/MM3 (ref 0–0.4)
EOSINOPHIL NFR BLD AUTO: 7.8 % (ref 0.3–6.2)
ERYTHROCYTE [DISTWIDTH] IN BLOOD BY AUTOMATED COUNT: 15.6 % (ref 12.3–15.4)
ERYTHROCYTE [SEDIMENTATION RATE] IN BLOOD: >100 MM/HR (ref 0–30)
FERRITIN SERPL-MCNC: 160.2 NG/ML (ref 13–150)
FOLATE SERPL-MCNC: 12.6 NG/ML (ref 4.78–24.2)
GFR SERPL CREATININE-BSD FRML MDRD: 27 ML/MIN/1.73
GLOBULIN UR ELPH-MCNC: 4 GM/DL
GLUCOSE SERPL-MCNC: 58 MG/DL (ref 65–99)
HAPTOGLOB SERPL-MCNC: 256 MG/DL (ref 30–200)
HCT VFR BLD AUTO: 29.1 % (ref 34–46.6)
HGB BLD-MCNC: 8.6 G/DL (ref 12–15.9)
IMM GRANULOCYTES # BLD AUTO: 0.04 10*3/MM3 (ref 0–0.05)
IMM GRANULOCYTES NFR BLD AUTO: 0.4 % (ref 0–0.5)
IRON 24H UR-MRATE: 36 MCG/DL (ref 37–145)
IRON SATN MFR SERPL: 9 % (ref 20–50)
LDH SERPL-CCNC: 234 U/L (ref 135–214)
LYMPHOCYTES # BLD AUTO: 1.4 10*3/MM3 (ref 0.7–3.1)
LYMPHOCYTES NFR BLD AUTO: 14.3 % (ref 19.6–45.3)
MCH RBC QN AUTO: 23.8 PG (ref 26.6–33)
MCHC RBC AUTO-ENTMCNC: 29.6 G/DL (ref 31.5–35.7)
MCV RBC AUTO: 80.6 FL (ref 79–97)
MONOCYTES # BLD AUTO: 0.45 10*3/MM3 (ref 0.1–0.9)
MONOCYTES NFR BLD AUTO: 4.6 % (ref 5–12)
NEUTROPHILS NFR BLD AUTO: 7.09 10*3/MM3 (ref 1.7–7)
NEUTROPHILS NFR BLD AUTO: 72.4 % (ref 42.7–76)
NRBC BLD AUTO-RTO: 0 /100 WBC (ref 0–0.2)
PLATELET # BLD AUTO: 286 10*3/MM3 (ref 140–450)
PMV BLD AUTO: 9 FL (ref 6–12)
POTASSIUM SERPL-SCNC: 4.1 MMOL/L (ref 3.5–5.2)
PROT SERPL-MCNC: 8.1 G/DL (ref 6–8.5)
RBC # BLD AUTO: 3.61 10*6/MM3 (ref 3.77–5.28)
RETICS # AUTO: 0.09 10*6/MM3 (ref 0.02–0.13)
RETICS/RBC NFR AUTO: 2.49 % (ref 0.7–1.9)
SODIUM SERPL-SCNC: 139 MMOL/L (ref 136–145)
TIBC SERPL-MCNC: 402 MCG/DL (ref 298–536)
TRANSFERRIN SERPL-MCNC: 270 MG/DL (ref 200–360)
TSH SERPL DL<=0.05 MIU/L-ACNC: 6.43 UIU/ML (ref 0.27–4.2)
VIT B12 BLD-MCNC: 736 PG/ML (ref 211–946)
WBC # BLD AUTO: 9.79 10*3/MM3 (ref 3.4–10.8)

## 2021-03-26 PROCEDURE — 80053 COMPREHEN METABOLIC PANEL: CPT | Performed by: NURSE PRACTITIONER

## 2021-03-26 PROCEDURE — 84466 ASSAY OF TRANSFERRIN: CPT | Performed by: NURSE PRACTITIONER

## 2021-03-26 PROCEDURE — 99214 OFFICE O/P EST MOD 30 MIN: CPT | Performed by: NURSE PRACTITIONER

## 2021-03-26 PROCEDURE — 83010 ASSAY OF HAPTOGLOBIN QUANT: CPT | Performed by: NURSE PRACTITIONER

## 2021-03-26 PROCEDURE — 85025 COMPLETE CBC W/AUTO DIFF WBC: CPT | Performed by: NURSE PRACTITIONER

## 2021-03-26 PROCEDURE — 84630 ASSAY OF ZINC: CPT | Performed by: NURSE PRACTITIONER

## 2021-03-26 PROCEDURE — 84443 ASSAY THYROID STIM HORMONE: CPT | Performed by: NURSE PRACTITIONER

## 2021-03-26 PROCEDURE — 85060 BLOOD SMEAR INTERPRETATION: CPT | Performed by: NURSE PRACTITIONER

## 2021-03-26 PROCEDURE — 86140 C-REACTIVE PROTEIN: CPT | Performed by: NURSE PRACTITIONER

## 2021-03-26 PROCEDURE — 82607 VITAMIN B-12: CPT | Performed by: NURSE PRACTITIONER

## 2021-03-26 PROCEDURE — 85652 RBC SED RATE AUTOMATED: CPT | Performed by: NURSE PRACTITIONER

## 2021-03-26 PROCEDURE — 82746 ASSAY OF FOLIC ACID SERUM: CPT | Performed by: NURSE PRACTITIONER

## 2021-03-26 PROCEDURE — 82728 ASSAY OF FERRITIN: CPT | Performed by: NURSE PRACTITIONER

## 2021-03-26 PROCEDURE — 82525 ASSAY OF COPPER: CPT | Performed by: NURSE PRACTITIONER

## 2021-03-26 PROCEDURE — 36415 COLL VENOUS BLD VENIPUNCTURE: CPT | Performed by: NURSE PRACTITIONER

## 2021-03-26 PROCEDURE — 83615 LACTATE (LD) (LDH) ENZYME: CPT | Performed by: NURSE PRACTITIONER

## 2021-03-26 PROCEDURE — 83540 ASSAY OF IRON: CPT | Performed by: NURSE PRACTITIONER

## 2021-03-26 PROCEDURE — 83516 IMMUNOASSAY NONANTIBODY: CPT | Performed by: NURSE PRACTITIONER

## 2021-03-26 PROCEDURE — 85045 AUTOMATED RETICULOCYTE COUNT: CPT | Performed by: NURSE PRACTITIONER

## 2021-03-26 RX ORDER — SODIUM CHLORIDE 9 MG/ML
250 INJECTION, SOLUTION INTRAVENOUS ONCE
Status: CANCELLED | OUTPATIENT
Start: 2021-04-07

## 2021-03-26 RX ORDER — SODIUM CHLORIDE 9 MG/ML
250 INJECTION, SOLUTION INTRAVENOUS ONCE
Status: CANCELLED | OUTPATIENT
Start: 2021-03-29

## 2021-03-26 NOTE — PROGRESS NOTES
DATE OF CONSULTATION:  3/26/2021    REASON FOR REFERRAL: Anemia    REFERRING PHYSICIAN:  Jackson Johnson MD    CHIEF COMPLAINT:  Fatigue    HISTORY OF PRESENT ILLNESS:   Britta Lee is a  75 y.o. female with multiple medical problems including CKD, diabetes mellitus type 2, CHF, CAD, hypertension, GERD and OA, who is being seen today at the request of Jackson Johnson MD for evaluation and treatment of anemia. Ms. Lee reports following with her PCP and nephrology routinely with blood testing every ~3 months. She says she has struggled with anemia for several years. Previous available CBCs were reviewed and patient has had chronic, normocytic anemia since at least December 2016 with Hg most recently ranging ~ 8.6-9.8. Her WBC and platelets have been normal. Iron panel and ferritin levels have been most c/w AOCD/inflammation since at least May 2017.  She reports receiving IV iron infusions several years ago. At present, she is taking ferrous sulfate BID which she is tolerating well. She says this was started per nephrology ~2 months ago. She denies obvious bleeding from any source. She reports having screening colonoscopy with Dr. Zamora last year which was unremarkable and negative for bleeding. She complains of chronic fatigue which is stable. Also complains of occasional lower extremity edema. She denies any other complaints today.     PAST MEDICAL HISTORY:  Past Medical History:   Diagnosis Date   • Anemia    • Arthritis    • Carpal tunnel syndrome    • CHF (congestive heart failure) (CMS/HCC)    • Coronary artery disease    • Diabetes mellitus (CMS/HCC)    • Elevated cholesterol    • GERD (gastroesophageal reflux disease)    • Heart disease    • High cholesterol    • History of pneumonia    • History of unsteady gait     OCASSIONALLY   • Hypertension    • Insomnia    • Kidney disease    • LENNY (obstructive sleep apnea) 12/1/2020   • Osteoarthritis    • Renal insufficiency    • Sciatica        PAST SURGICAL  HISTORY:  Past Surgical History:   Procedure Laterality Date   • ABDOMINAL SURGERY     • APPENDECTOMY     • CARDIAC CATHETERIZATION      1 STENT  ---  2000   • CARDIAC SURGERY     • CAROTID STENT     • CARPAL TUNNEL RELEASE Right 10/8/2019    Procedure: CARPAL TUNNEL RELEASE;  Surgeon: Feroz Johnson MD;  Location: [facility];  Service: Orthopedics   • CATARACT EXTRACTION     • CHOLECYSTECTOMY     • COLONOSCOPY     • CORONARY ANGIOPLASTY WITH STENT PLACEMENT     • ENDOSCOPY     • ENDOSCOPY N/A 3/5/2020    Procedure: ESOPHAGOGASTRODUODENOSCOPY;  Surgeon: Alexandru Bagley MD;  Location: [facility];  Service: Gastroenterology;  Laterality: N/A;   • ENDOSCOPY AND COLONOSCOPY     • GALLBLADDER SURGERY     • JOINT REPLACEMENT Left 05/02/2017    [facility]  DR JOHNSON  LEFT TOTAL KNEE   • KNEE ARTHROSCOPY W/ MENISCECTOMY Right    • LAPAROSCOPIC TUBAL LIGATION     • WA TOTAL KNEE ARTHROPLASTY Left 5/2/2017    Procedure: TOTAL KNEE ARTHROPLASTY;  Surgeon: Feroz Johnson MD;  Location: [facility];  Service: Orthopedics   • STERILIZATION     • TONSILLECTOMY         FAMILY HISTORY:  Family History   Problem Relation Age of Onset   • Arthritis Mother    • Diabetes Mother    • Cancer Mother    • Heart disease Father    • Diabetes Daughter    • Diabetes Son    • Diabetes Maternal Aunt    • Heart disease Maternal Grandmother    • Breast cancer Neg Hx        SOCIAL HISTORY:  Social History     Socioeconomic History   • Marital status:      Spouse name: natalie   • Number of children: 3   • Years of education: 12   • Highest education level: Not on file   Tobacco Use   • Smoking status: Never Smoker   • Smokeless tobacco: Never Used   Vaping Use   • Vaping Use: Never used   Substance and Sexual Activity   • Alcohol use: Yes     Comment: socially   • Drug use: No   • Sexual activity: Defer     Birth control/protection: Surgical       MEDICATIONS:  The current medication list was reviewed in the EMR    Current Outpatient  Medications:   •  albuterol sulfate  (90 Base) MCG/ACT inhaler, Inhale 2 puffs Every 4 (Four) Hours As Needed for Shortness of Air., Disp: 18 g, Rfl: 0  •  amLODIPine (NORVASC) 10 MG tablet, Take 1 tablet by mouth Every Night., Disp: 90 tablet, Rfl: 1  •  aspirin 81 MG tablet, Take 1 tablet by mouth Daily., Disp: 30 tablet, Rfl: 5  •  atorvastatin (LIPITOR) 40 MG tablet, Take 1 tablet by mouth Every Night., Disp: 90 tablet, Rfl: 1  •  BD Pen Needle Evon U/F 32G X 4 MM misc, , Disp: , Rfl:   •  bumetanide (BUMEX) 1 MG tablet, Take 1 tablet by mouth Daily. May take an extra 0.5 mg in the evening for swelling or weight gain, Disp: 60 tablet, Rfl: 1  •  cloNIDine (CATAPRES) 0.2 MG tablet, Take 1 tablet by mouth 3 (Three) Times a Day., Disp: 270 tablet, Rfl: 1  •  Continuous Blood Gluc  (Dexcom G6 ) device, 1 Device Daily., Disp: 1 Device, Rfl: 0  •  Continuous Blood Gluc Sensor (Dexcom G6 Sensor), Every 10 (Ten) Days., Disp: 9 each, Rfl: 3  •  Continuous Blood Gluc Transmit (Dexcom G6 Transmitter) misc, 1 Device Daily., Disp: 1 each, Rfl: 0  •  escitalopram (LEXAPRO) 10 MG tablet, Take 1 tablet by mouth Daily., Disp: 90 tablet, Rfl: 3  •  ferrous sulfate 325 (65 Fe) MG tablet, Take 1 tablet by mouth 2 (Two) Times a Day., Disp: 180 tablet, Rfl: 3  •  glucose blood test strip, 1 each by Other route 3 (Three) Times a Day., Disp: 100 each, Rfl: 12  •  hydrALAZINE (APRESOLINE) 50 MG tablet, Take 1 tablet by mouth Every 8 (Eight) Hours., Disp: 270 tablet, Rfl: 1  •  Insulin Glargine, 2 Unit Dial, (Toujeo Max SoloStar) 300 UNIT/ML solution pen-injector injection, Inject 60 Units under the skin into the appropriate area as directed Daily., Disp: , Rfl:   •  insulin lispro (humaLOG) 100 UNIT/ML injection, Inject 5 Units under the skin into the appropriate area as directed 3 (Three) Times a Day Before Meals., Disp: , Rfl:   •  Insulin Pen Needle 32G X 5 MM misc, 1 each 4 (Four) Times a Day., Disp: 120 each,  "Rfl: 5  •  isosorbide mononitrate (IMDUR) 60 MG 24 hr tablet, Take 1 tablet by mouth Daily., Disp: 90 tablet, Rfl: 1  •  metoprolol succinate XL (TOPROL-XL) 25 MG 24 hr tablet, Take 1 tablet by mouth Daily. Hold if HR less than 60 bpm., Disp: 30 tablet, Rfl: 1  •  nystatin (MYCOSTATIN) 916588 UNIT/GM powder, Apply  one application topically to the appropriate area as directed Every 12 (Twelve) Hours. (Patient taking differently: Apply  topically to the appropriate area as directed 2 (Two) Times a Day As Needed (itching).), Disp: 60 g, Rfl: 0  •  vitamin D (ERGOCALCIFEROL) 1.25 MG (65423 UT) capsule capsule, Take 50,000 Units by mouth 1 (One) Time Per Week., Disp: , Rfl:   •  Vitamin D, Cholecalciferol, (CHOLECALCIFEROL) 10 MCG (400 UNIT) tablet, Take 400 Units by mouth 1 (One) Time Per Week., Disp: , Rfl:     ALLERGIES:  No Known Allergies      REVIEW OF SYSTEMS:    A comprehensive 14 point review of systems was performed.  Significant findings as mentioned above.  All other systems reviewed and are negative.        Physical Exam   Vital Signs: /60   Pulse 98   Temp 97.3 °F (36.3 °C) (Temporal)   Resp 18   Ht 157.5 cm (62\")   Wt 103 kg (227 lb)   SpO2 95%   BMI 41.52 kg/m²    General: Well developed, well nourished, alert and oriented x 3, in no acute distress.   Head: ATNC   Eyes: PERRL, No evidence of conjunctivitis.   Nose: No nasal discharge.   Mouth: Oral mucosal membranes moist. No oral ulceration or hemorrhages.   Neck: Neck supple. No thyromegaly. No JVD.   Lungs: Clear in all fields to A&P without rales, rhonchi or wheezing.   Heart: S1, S2. Regular rate and rhythm. No murmurs, rubs, or gallops.   Abdomen: Soft. Bowel sounds are normoactive. Nontender with palpation. No Hepatosplenomegaly can be appreciated.   Extremities: No cyanosis or edema.   Neurologic: Grossly non-focal exam    Pain Score:  Pain Score    03/26/21 0901   PainSc: 0-No pain       RECENT LABS:  Lab Results   Component Value " Date    WBC 9.79 03/26/2021    HGB 8.6 (L) 03/26/2021    HCT 29.1 (L) 03/26/2021    MCV 80.6 03/26/2021    RDW 15.6 (H) 03/26/2021     03/26/2021    NEUTRORELPCT 72.4 03/26/2021    LYMPHORELPCT 14.3 (L) 03/26/2021    MONORELPCT 4.6 (L) 03/26/2021    EOSRELPCT 7.8 (H) 03/26/2021    BASORELPCT 0.5 03/26/2021    NEUTROABS 7.09 (H) 03/26/2021    LYMPHSABS 1.40 03/26/2021       Lab Results   Component Value Date     03/26/2021    K 4.1 03/26/2021    CO2 20.7 (L) 03/26/2021     03/26/2021    BUN 42 (H) 03/26/2021    CREATININE 1.83 (H) 03/26/2021    EGFRIFNONA 27 (L) 03/26/2021    EGFRIFAFRI 41 (L) 07/30/2018    GLUCOSE 58 (L) 03/26/2021    CALCIUM 9.8 03/26/2021    ALKPHOS 372 (H) 03/26/2021    AST 51 (H) 03/26/2021    ALT 34 (H) 03/26/2021    BILITOT 0.3 03/26/2021    ALBUMIN 4.09 03/26/2021    PROTEINTOT 8.1 03/26/2021    MG 2.0 02/24/2021    PHOS 3.9 03/12/2021     Lab Results   Component Value Date    FERRITIN 160.20 (H) 03/26/2021    IRON 36 (L) 03/26/2021    TIBC 402 03/26/2021    LABIRON 9 (L) 03/26/2021    YFMYOYLB61 485 05/17/2017    RETICCTPCT 2.49 (H) 03/26/2021    RETIC 0.0899 03/26/2021       ASSESSMENT & PLAN:  Britta Lee is a very pleasant 75 y.o. female with    1.  Normocytic anemia:  2. CKD  3. Iron deficiency anemia    -Patient follows with PCP and nephrology routinely with blood testing every ~3 months.   -Previous available CBCs were reviewed and patient has had chronic, normocytic anemia since at least December 2016 with Hg most recently ranging ~ 8.6-9.8. Her WBC and platelets have been normal. Iron panel and ferritin levels have been most c/w AOCD/inflammation since at least May 2017.    -She reports receiving IV iron infusions several years ago. At present, she is taking ferrous sulfate BID which she is tolerating well. She says this was started per nephrology ~2 months ago.   -Denies obvious bleeding from any source. Reportedly had screening colonoscopy with Dr. Sera padilla  year which was unremarkable and negative for bleeding. Records unavailable, will request.   -Obtained repeat CBC today which again shows normocytic anemia with Hg of 8.6 which has been trending down. WBC and platelets were again normal. CMP showing Cr of 1.83.   -Repeat iron panel and ferritin from today show low serum iron and iron saturation with normal TIBC and elevated ferritin of 160.20 which has also been trending down. Patient likely has AOCD/inflammation with also a component of Iron deficiency. As above, she has been taking ferrous sulfate. Therefore, will discontinue and replace with IV Feraheme for apparent malabsorption of iron.  -Will follow up in 2 months with repeat labs.    -In the meantime, recommended continued close follow up with PCP for management of diabetes and hypertension and nephology for CKD. Would defer decision regarding epogen to nephrology. Would also recommend repeat GI evaluation and will discuss with next follow up.   -To further evaluate anemia, also sent additional labs including PBS, B12, folate, retic, ldh, haptoglobin, TSH, CRP, ESR, copper, zinc and tissue transglutaminase which are pending.   -Suggest to transfuse PRBCs for Hg <7 or <8 if symptomatic.     ACO / MILES/Other  Quality measures  -  Britta Lee received 2020 flu vaccine.  -  Britta Lee reports a pain score of 0.    -  Current outpatient and discharge medications have been reconciled for the patient.  Reviewed by: ERNIE De Leon    The patient was in agreement with the plan and all questions were answered to her satisfaction.     Thank you so much for allowing us to participate in the care of Britta Lee . Please do not hesitate to contact us with any questions or concerns.     A total of 45 minutes were spent coordinating this patient’s care in clinic today; more than 50% of this time was face-to-face with the patient, reviewing her medical history and counseling on the current treatment and  followup plan. All questions were answered to her satisfaction.      Electronically Signed by: ERNIE De Leon , March 26, 2021 09:08 EDT       CC:   MD Magdaleno Jack, RAFIQ [place]

## 2021-03-27 LAB — TTG IGA SER-ACNC: 2 U/ML (ref 0–3)

## 2021-03-28 LAB — COPPER SERPL-MCNC: 189 UG/DL (ref 80–158)

## 2021-03-29 ENCOUNTER — TELEPHONE (OUTPATIENT)
Dept: FAMILY MEDICINE CLINIC | Facility: CLINIC | Age: 76
End: 2021-03-29

## 2021-03-29 LAB
CYTOLOGIST CVX/VAG CYTO: NORMAL
PATH INTERP BLD-IMP: NORMAL

## 2021-03-30 LAB — ZINC SERPL-MCNC: 67 UG/DL (ref 44–115)

## 2021-04-01 ENCOUNTER — INFUSION (OUTPATIENT)
Dept: ONCOLOGY | Facility: HOSPITAL | Age: 76
End: 2021-04-01

## 2021-04-01 VITALS
TEMPERATURE: 97.1 F | SYSTOLIC BLOOD PRESSURE: 118 MMHG | DIASTOLIC BLOOD PRESSURE: 62 MMHG | RESPIRATION RATE: 18 BRPM | HEART RATE: 81 BPM | WEIGHT: 224.2 LBS | BODY MASS INDEX: 41.01 KG/M2 | OXYGEN SATURATION: 96 %

## 2021-04-01 DIAGNOSIS — K90.9 MALABSORPTION OF IRON: ICD-10-CM

## 2021-04-01 DIAGNOSIS — D50.9 IRON DEFICIENCY ANEMIA, UNSPECIFIED IRON DEFICIENCY ANEMIA TYPE: Primary | ICD-10-CM

## 2021-04-01 PROCEDURE — 96374 THER/PROPH/DIAG INJ IV PUSH: CPT

## 2021-04-01 PROCEDURE — 96365 THER/PROPH/DIAG IV INF INIT: CPT

## 2021-04-01 PROCEDURE — 25010000002 FERUMOXYTOL 510 MG/17ML SOLUTION 510 MG VIAL: Performed by: NURSE PRACTITIONER

## 2021-04-01 RX ORDER — SODIUM CHLORIDE 9 MG/ML
250 INJECTION, SOLUTION INTRAVENOUS ONCE
Status: COMPLETED | OUTPATIENT
Start: 2021-04-01 | End: 2021-04-01

## 2021-04-01 RX ADMIN — FERUMOXYTOL 510 MG: 510 INJECTION INTRAVENOUS at 13:37

## 2021-04-01 RX ADMIN — SODIUM CHLORIDE 250 ML: 9 INJECTION, SOLUTION INTRAVENOUS at 13:37

## 2021-04-07 ENCOUNTER — INFUSION (OUTPATIENT)
Dept: ONCOLOGY | Facility: HOSPITAL | Age: 76
End: 2021-04-07

## 2021-04-07 VITALS
DIASTOLIC BLOOD PRESSURE: 63 MMHG | OXYGEN SATURATION: 98 % | BODY MASS INDEX: 41.13 KG/M2 | HEART RATE: 65 BPM | RESPIRATION RATE: 18 BRPM | TEMPERATURE: 97.1 F | WEIGHT: 224.9 LBS | SYSTOLIC BLOOD PRESSURE: 141 MMHG

## 2021-04-07 DIAGNOSIS — K90.9 MALABSORPTION OF IRON: ICD-10-CM

## 2021-04-07 DIAGNOSIS — D50.9 IRON DEFICIENCY ANEMIA, UNSPECIFIED IRON DEFICIENCY ANEMIA TYPE: Primary | ICD-10-CM

## 2021-04-07 DIAGNOSIS — IMO0002 TYPE 2 DIABETES MELLITUS, UNCONTROLLED, WITH NEUROPATHY: Primary | ICD-10-CM

## 2021-04-07 PROCEDURE — 96365 THER/PROPH/DIAG IV INF INIT: CPT

## 2021-04-07 PROCEDURE — 25010000002 FERUMOXYTOL 510 MG/17ML SOLUTION 510 MG VIAL: Performed by: NURSE PRACTITIONER

## 2021-04-07 PROCEDURE — 96374 THER/PROPH/DIAG INJ IV PUSH: CPT

## 2021-04-07 RX ORDER — SODIUM CHLORIDE 9 MG/ML
250 INJECTION, SOLUTION INTRAVENOUS ONCE
Status: COMPLETED | OUTPATIENT
Start: 2021-04-07 | End: 2021-04-07

## 2021-04-07 RX ORDER — INSULIN GLARGINE 300 U/ML
60 INJECTION, SOLUTION SUBCUTANEOUS DAILY
Qty: 6 PEN | Refills: 3 | Status: SHIPPED | OUTPATIENT
Start: 2021-04-07 | End: 2021-09-07 | Stop reason: SDUPTHER

## 2021-04-07 RX ADMIN — FERUMOXYTOL 510 MG: 510 INJECTION INTRAVENOUS at 12:30

## 2021-04-07 RX ADMIN — SODIUM CHLORIDE 250 ML: 9 INJECTION, SOLUTION INTRAVENOUS at 12:30

## 2021-04-12 ENCOUNTER — LAB (OUTPATIENT)
Dept: FAMILY MEDICINE CLINIC | Facility: CLINIC | Age: 76
End: 2021-04-12

## 2021-04-12 DIAGNOSIS — N18.4 CHRONIC RENAL DISEASE, STAGE IV (HCC): ICD-10-CM

## 2021-04-12 DIAGNOSIS — E78.5 DYSLIPIDEMIA: ICD-10-CM

## 2021-04-12 DIAGNOSIS — E11.319 DIABETIC RETINOPATHY OF LEFT EYE ASSOCIATED WITH TYPE 2 DIABETES MELLITUS, MACULAR EDEMA PRESENCE UNSPECIFIED, UNSPECIFIED RETINOPATHY SEVERITY (HCC): ICD-10-CM

## 2021-04-12 DIAGNOSIS — E11.22 TYPE 2 DIABETES MELLITUS WITH CHRONIC KIDNEY DISEASE, WITH LONG-TERM CURRENT USE OF INSULIN, UNSPECIFIED CKD STAGE (HCC): ICD-10-CM

## 2021-04-12 DIAGNOSIS — I10 ESSENTIAL HYPERTENSION: ICD-10-CM

## 2021-04-12 DIAGNOSIS — D50.8 IRON DEFICIENCY ANEMIA DUE TO DIETARY CAUSES: ICD-10-CM

## 2021-04-12 DIAGNOSIS — E55.9 VITAMIN D DEFICIENCY: ICD-10-CM

## 2021-04-12 DIAGNOSIS — Z79.4 TYPE 2 DIABETES MELLITUS WITH CHRONIC KIDNEY DISEASE, WITH LONG-TERM CURRENT USE OF INSULIN, UNSPECIFIED CKD STAGE (HCC): ICD-10-CM

## 2021-04-12 DIAGNOSIS — N18.4 CKD (CHRONIC KIDNEY DISEASE) STAGE 4, GFR 15-29 ML/MIN (HCC): ICD-10-CM

## 2021-04-12 DIAGNOSIS — N25.81 HYPERPARATHYROIDISM DUE TO RENAL INSUFFICIENCY (HCC): ICD-10-CM

## 2021-04-12 LAB
25(OH)D3 SERPL-MCNC: 39.6 NG/ML
ALBUMIN SERPL-MCNC: 4.1 G/DL (ref 3.5–5.2)
ALBUMIN UR-MCNC: 44.3 MG/DL
ALBUMIN/GLOB SERPL: 1.1 G/DL
ALP SERPL-CCNC: 374 U/L (ref 39–117)
ALT SERPL W P-5'-P-CCNC: 31 U/L (ref 1–33)
ANION GAP SERPL CALCULATED.3IONS-SCNC: 12.8 MMOL/L (ref 5–15)
AST SERPL-CCNC: 35 U/L (ref 1–32)
BILIRUB SERPL-MCNC: 0.3 MG/DL (ref 0–1.2)
BUN SERPL-MCNC: 33 MG/DL (ref 8–23)
BUN/CREAT SERPL: 18.5 (ref 7–25)
CALCIUM SPEC-SCNC: 9.6 MG/DL (ref 8.6–10.5)
CHLORIDE SERPL-SCNC: 106 MMOL/L (ref 98–107)
CO2 SERPL-SCNC: 22.2 MMOL/L (ref 22–29)
CREAT SERPL-MCNC: 1.78 MG/DL (ref 0.57–1)
GFR SERPL CREATININE-BSD FRML MDRD: 28 ML/MIN/1.73
GLOBULIN UR ELPH-MCNC: 3.8 GM/DL
GLUCOSE SERPL-MCNC: 85 MG/DL (ref 65–99)
HBA1C MFR BLD: 6.54 % (ref 4.8–5.6)
POTASSIUM SERPL-SCNC: 4.4 MMOL/L (ref 3.5–5.2)
PROT SERPL-MCNC: 7.9 G/DL (ref 6–8.5)
SODIUM SERPL-SCNC: 141 MMOL/L (ref 136–145)
TSH SERPL DL<=0.05 MIU/L-ACNC: 5.33 UIU/ML (ref 0.27–4.2)

## 2021-04-12 PROCEDURE — 84443 ASSAY THYROID STIM HORMONE: CPT | Performed by: PHYSICIAN ASSISTANT

## 2021-04-12 PROCEDURE — 82043 UR ALBUMIN QUANTITATIVE: CPT | Performed by: PHYSICIAN ASSISTANT

## 2021-04-12 PROCEDURE — 82306 VITAMIN D 25 HYDROXY: CPT | Performed by: PHYSICIAN ASSISTANT

## 2021-04-12 PROCEDURE — 83036 HEMOGLOBIN GLYCOSYLATED A1C: CPT | Performed by: PHYSICIAN ASSISTANT

## 2021-04-12 PROCEDURE — 80053 COMPREHEN METABOLIC PANEL: CPT | Performed by: PHYSICIAN ASSISTANT

## 2021-04-12 PROCEDURE — 36415 COLL VENOUS BLD VENIPUNCTURE: CPT | Performed by: PHYSICIAN ASSISTANT

## 2021-04-14 ENCOUNTER — HOSPITAL ENCOUNTER (OUTPATIENT)
Dept: CARDIOLOGY | Facility: HOSPITAL | Age: 76
Discharge: HOME | End: 2021-04-14
Admitting: NURSE PRACTITIONER

## 2021-04-14 VITALS
SYSTOLIC BLOOD PRESSURE: 110 MMHG | HEIGHT: 63 IN | HEART RATE: 59 BPM | OXYGEN SATURATION: 97 % | BODY MASS INDEX: 39.16 KG/M2 | DIASTOLIC BLOOD PRESSURE: 60 MMHG | TEMPERATURE: 97.5 F | WEIGHT: 221 LBS | RESPIRATION RATE: 22 BRPM

## 2021-04-14 DIAGNOSIS — R60.0 PEDAL EDEMA: ICD-10-CM

## 2021-04-14 DIAGNOSIS — E66.09 CLASS 2 OBESITY DUE TO EXCESS CALORIES WITH BODY MASS INDEX (BMI) OF 39.0 TO 39.9 IN ADULT, UNSPECIFIED WHETHER SERIOUS COMORBIDITY PRESENT: ICD-10-CM

## 2021-04-14 DIAGNOSIS — G47.33 OSA (OBSTRUCTIVE SLEEP APNEA): ICD-10-CM

## 2021-04-14 DIAGNOSIS — I50.32 CHRONIC DIASTOLIC CONGESTIVE HEART FAILURE (HCC): Primary | ICD-10-CM

## 2021-04-14 DIAGNOSIS — N18.4 CKD (CHRONIC KIDNEY DISEASE) STAGE 4, GFR 15-29 ML/MIN (HCC): ICD-10-CM

## 2021-04-14 DIAGNOSIS — I10 ESSENTIAL HYPERTENSION: ICD-10-CM

## 2021-04-14 DIAGNOSIS — I50.33 ACUTE ON CHRONIC DIASTOLIC CONGESTIVE HEART FAILURE (HCC): ICD-10-CM

## 2021-04-14 LAB
ABSOLUTE LUNG FLUID CONTENT: 32 % (ref 20–35)
ANION GAP SERPL CALCULATED.3IONS-SCNC: 12.1 MMOL/L (ref 5–15)
BUN SERPL-MCNC: 35 MG/DL (ref 8–23)
BUN/CREAT SERPL: 19.9 (ref 7–25)
CALCIUM SPEC-SCNC: 9.1 MG/DL (ref 8.6–10.5)
CHLORIDE SERPL-SCNC: 107 MMOL/L (ref 98–107)
CO2 SERPL-SCNC: 22.9 MMOL/L (ref 22–29)
CREAT SERPL-MCNC: 1.76 MG/DL (ref 0.57–1)
GFR SERPL CREATININE-BSD FRML MDRD: 28 ML/MIN/1.73
GLUCOSE SERPL-MCNC: 100 MG/DL (ref 65–99)
MAGNESIUM SERPL-MCNC: 2.2 MG/DL (ref 1.6–2.4)
NT-PROBNP SERPL-MCNC: 1075 PG/ML (ref 0–1800)
POTASSIUM SERPL-SCNC: 4.5 MMOL/L (ref 3.5–5.2)
SODIUM SERPL-SCNC: 142 MMOL/L (ref 136–145)

## 2021-04-14 PROCEDURE — 94726 PLETHYSMOGRAPHY LUNG VOLUMES: CPT | Performed by: NURSE PRACTITIONER

## 2021-04-14 PROCEDURE — 80048 BASIC METABOLIC PNL TOTAL CA: CPT | Performed by: NURSE PRACTITIONER

## 2021-04-14 PROCEDURE — 99214 OFFICE O/P EST MOD 30 MIN: CPT | Performed by: NURSE PRACTITIONER

## 2021-04-14 PROCEDURE — 83735 ASSAY OF MAGNESIUM: CPT | Performed by: NURSE PRACTITIONER

## 2021-04-14 PROCEDURE — 83880 ASSAY OF NATRIURETIC PEPTIDE: CPT | Performed by: NURSE PRACTITIONER

## 2021-04-14 NOTE — PROGRESS NOTES
[facility] CHF CLINIC OFFICE VISIT    Subjective:   Congestive Heart Failure  Pertinent negatives include no abdominal pain, chest pain, claudication, near-syncope, palpitations or shortness of breath.      Britta Lee is a 75 y.o.  female who presents to the clinic today for follow up on heart failure. She has a hx of diastolic congestive heart failure. Her EF was > 70% from echocardiogram on 11/4/2020. She is currently taking Bumex 1 mg daily. She has been unable to tolerate Spironolactone due to abnormal kidney function.    Concerned she may have overeaten around easter which caused her labs to be abnormal.   She is a little blue she was recently advised from her primary that her tsh, liver enzymes and kidney fx was elevated   Due to see Jayeshni next month, 5/21/2021. She reports that her last kidney checkup she was advised that her kidneys were doing much better  Due to see cardiology first of May - Dr. Cruz  Breathing is stable   Abdominal and leg swelling has improved   Urine output good   Overall doing well with sodium intake except maybe for easter dinner  Seen hematology and had iron infusion. She is little confused if she is to continue her iron supplement or not  Bumex 1 mg daily   Home weight - stable   BP - 129-140/70-80  HR - 70-80 bpm   Feels like she is in the green zone  Denies chest discomfort, palpitations or tachycardia.   She seems to be tolerating her Metoprolol well without any bradycardia   She has been unable to tolerate spironolactone due to kidney function  She continues on Hydralazine 50 mg TID, Imdur 60 mg daily, Metorpolol XL 25 mg daily, Norvasc 10 mg daily and Clonidine 0.2 mg TID for HTN.  She is confused if she is taking quinapril or not. She had been advised in the past to stop however she feels like she may have gotten her bottle back out and been taking it recently.     Past medical history significant for HTN, DM type 2, CKD III, iron deficiency anemia, hyperlipidemia,  obesity.     PCP: Lexie Dolan  Cardiologist: Dr. Cruz  Nephrologist: Dr. Johnson     Hospitalizations: Discharged on 11/8/2020  ER visit: 12/4/2020  Past Medical History:   Diagnosis Date   • Anemia    • Arthritis    • Carpal tunnel syndrome    • CHF (congestive heart failure) (CMS/[facility])    • Coronary artery disease    • Diabetes mellitus (CMS/[facility])    • Elevated cholesterol    • GERD (gastroesophageal reflux disease)    • Heart disease    • High cholesterol    • History of pneumonia    • History of unsteady gait     OCASSIONALLY   • Hypertension    • Insomnia    • Kidney disease    • LENNY (obstructive sleep apnea) 12/1/2020   • Osteoarthritis    • Renal insufficiency    • Sciatica      Past Surgical History:   Procedure Laterality Date   • ABDOMINAL SURGERY     • APPENDECTOMY     • CARDIAC CATHETERIZATION      1 STENT  ---  2000   • CARDIAC SURGERY     • CAROTID STENT     • CARPAL TUNNEL RELEASE Right 10/8/2019    Procedure: CARPAL TUNNEL RELEASE;  Surgeon: Feroz Levin MD;  Location: [facility];  Service: Orthopedics   • CATARACT EXTRACTION     • CHOLECYSTECTOMY     • COLONOSCOPY     • CORONARY ANGIOPLASTY WITH STENT PLACEMENT     • ENDOSCOPY     • ENDOSCOPY N/A 3/5/2020    Procedure: ESOPHAGOGASTRODUODENOSCOPY;  Surgeon: Alexandru Bagley MD;  Location: [facility];  Service: Gastroenterology;  Laterality: N/A;   • ENDOSCOPY AND COLONOSCOPY     • GALLBLADDER SURGERY     • JOINT REPLACEMENT Left 05/02/2017    [facility]  DR LEVIN  LEFT TOTAL KNEE   • KNEE ARTHROSCOPY W/ MENISCECTOMY Right    • LAPAROSCOPIC TUBAL LIGATION     • [place] TOTAL KNEE ARTHROPLASTY Left 5/2/2017    Procedure: TOTAL KNEE ARTHROPLASTY;  Surgeon: Feroz Levin MD;  Location: [facility];  Service: Orthopedics   • STERILIZATION     • TONSILLECTOMY       Social History     Socioeconomic History   • Marital status:      Spouse name: natalie   • Number of children: 3   • Years of education: 12   • Highest education level: Not on file    Tobacco Use   • Smoking status: Never Smoker   • Smokeless tobacco: Never Used   Vaping Use   • Vaping Use: Never used   Substance and Sexual Activity   • Alcohol use: Yes     Comment: socially   • Drug use: No   • Sexual activity: Defer     Birth control/protection: Surgical     Family History   Problem Relation Age of Onset   • Arthritis Mother    • Diabetes Mother    • Cancer Mother    • Heart disease Father    • Diabetes Daughter    • Diabetes Son    • Diabetes Maternal Aunt    • Heart disease Maternal Grandmother    • Breast cancer Neg Hx      Allergies:  No Known Allergies    Review of Systems   Constitutional: Negative for chills, fever, weight gain and weight loss.   HENT: Negative for ear discharge, hoarse voice and sore throat.    Eyes: Negative for discharge, double vision, pain, redness and visual disturbance.   Cardiovascular: Negative for chest pain, claudication, cyanosis, dyspnea on exertion, irregular heartbeat, leg swelling, near-syncope, orthopnea, palpitations and syncope.   Respiratory: Negative for cough, shortness of breath and wheezing.    Endocrine: Negative for cold intolerance, heat intolerance and polyuria.   Hematologic/Lymphatic: Negative for bleeding problem. Does not bruise/bleed easily.   Skin: Negative for color change, flushing and rash.   Musculoskeletal: Negative for arthritis, back pain, joint pain, joint swelling and muscle cramps.   Gastrointestinal: Negative for abdominal pain, constipation, diarrhea, nausea and vomiting.   Genitourinary: Negative for dysuria, flank pain, frequency, hesitancy and urgency.   Neurological: Negative for difficulty with concentration, dizziness, light-headedness, sensory change, vertigo and weakness.   Psychiatric/Behavioral: Negative for depression. The patient does not have insomnia and is not nervous/anxious.      Current Outpatient Medications   Medication Sig Dispense Refill   • albuterol sulfate  (90 Base) MCG/ACT inhaler Inhale 2  puffs Every 4 (Four) Hours As Needed for Shortness of Air. 18 g 0   • amLODIPine (NORVASC) 10 MG tablet Take 1 tablet by mouth Every Night. 90 tablet 1   • aspirin 81 MG tablet Take 1 tablet by mouth Daily. 30 tablet 5   • atorvastatin (LIPITOR) 40 MG tablet Take 1 tablet by mouth Every Night. 90 tablet 1   • BD Pen Needle Evon U/F 32G X 4 MM misc      • bumetanide (BUMEX) 1 MG tablet Take 1 tablet by mouth Daily. May take an extra 0.5 mg in the evening for swelling or weight gain 60 tablet 1   • cloNIDine (CATAPRES) 0.2 MG tablet Take 1 tablet by mouth 3 (Three) Times a Day. 270 tablet 1   • Continuous Blood Gluc  (Dexcom G6 ) device 1 Device Daily. 1 Device 0   • Continuous Blood Gluc Sensor (Dexcom G6 Sensor) Every 10 (Ten) Days. 9 each 3   • Continuous Blood Gluc Transmit (Dexcom G6 Transmitter) misc 1 Device Daily. 1 each 0   • escitalopram (LEXAPRO) 10 MG tablet Take 1 tablet by mouth Daily. 90 tablet 3   • glucose blood test strip 1 each by Other route 3 (Three) Times a Day. 100 each 12   • hydrALAZINE (APRESOLINE) 50 MG tablet Take 1 tablet by mouth Every 8 (Eight) Hours. 270 tablet 1   • Insulin Glargine, 2 Unit Dial, (Toujeo Max SoloStar) 300 UNIT/ML solution pen-injector injection Inject 60 Units under the skin into the appropriate area as directed Daily. 6 pen 3   • insulin lispro (humaLOG) 100 UNIT/ML injection Inject 5 Units under the skin into the appropriate area as directed 3 (Three) Times a Day Before Meals.     • Insulin Pen Needle 32G X 5 MM misc 1 each 4 (Four) Times a Day. 120 each 5   • isosorbide mononitrate (IMDUR) 60 MG 24 hr tablet Take 1 tablet by mouth Daily. 90 tablet 1   • metoprolol succinate XL (TOPROL-XL) 25 MG 24 hr tablet Take 1 tablet by mouth Daily. Hold if HR less than 60 bpm. 30 tablet 1   • nystatin (MYCOSTATIN) 765583 UNIT/GM powder Apply  one application topically to the appropriate area as directed Every 12 (Twelve) Hours. (Patient taking differently: Apply   topically to the appropriate area as directed 2 (Two) Times a Day As Needed (itching).) 60 g 0   • vitamin D (ERGOCALCIFEROL) 1.25 MG (67814 UT) capsule capsule Take 50,000 Units by mouth 1 (One) Time Per Week.     • Vitamin D, Cholecalciferol, (CHOLECALCIFEROL) 10 MCG (400 UNIT) tablet Take 400 Units by mouth 1 (One) Time Per Week.       No current facility-administered medications for this encounter.     Objective:     Vitals:    04/14/21 0852   BP: 110/60   Pulse: 59   Resp: 22   Temp: 97.5 °F (36.4 °C)   SpO2: 97%     Body mass index is 39.15 kg/m².    Wt Readings from Last 3 Encounters:   04/07/21 102 kg (224 lb 14.4 oz)   04/01/21 102 kg (224 lb 3.2 oz)   03/26/21 103 kg (227 lb)       ReDs - 32% (4/14/2021)    Vitals reviewed.   Constitutional:       Appearance: Normal appearance. Well-developed.      Comments: Wears a mask during encounter   Eyes:      Conjunctiva/sclera: Conjunctivae normal.   HENT:      Head: Normocephalic.   Neck:      Vascular: No JVD or JVR.   Pulmonary:      Effort: Pulmonary effort is normal.      Breath sounds: Normal breath sounds.   Cardiovascular:      Normal rate. Regular rhythm.   Pulses:     Intact distal pulses.   Edema:     Peripheral edema present.     Ankle: bilateral 1+ edema of the ankle.     Feet: bilateral 1+ edema of the feet.  Abdominal:      General: Bowel sounds are normal.      Palpations: Abdomen is soft. There is no hepatomegaly or splenomegaly.   Musculoskeletal: Normal range of motion.      Cervical back: Normal range of motion and neck supple. Skin:     General: Skin is warm and dry.   Neurological:      Mental Status: Alert and oriented to person, place, and time.   Psychiatric:         Attention and Perception: Attention normal.         Mood and Affect: Mood normal.         Speech: Speech normal.         Behavior: Behavior normal. Behavior is cooperative.         Cognition and Memory: Cognition normal.     Cardiographics  Results for orders placed during  the hospital encounter of 20    Adult Transthoracic Echo Complete W/ Cont if Necessary Per Protocol    Interpretation Summary  · Left ventricular wall thickness is consistent with concentric hypertrophy.  · Left ventricular ejection fraction appears to be greater than 70%. Left ventricular systolic function is hyperdynamic (EF > 70%).  · Left ventricular diastolic function is consistent with (grade II w/high LAP) pseudonormalization.  · The left atrial cavity is moderate to severely dilated.  · The right atrial cavity is mildly dilated.  · Moderate mitral annular calcification is present.  · Mild mitral valve regurgitation is present.  · Mild tricuspid valve regurgitation is present.  · Estimated right ventricular systolic pressure from tricuspid regurgitation is markedly elevated (>55 mmHg).    EK/3/2020    Chest x-ray: 11/3/2020    IMPRESSION:  CHF/edema with small effusions    Lab Review   Lab Results   Component Value Date    TSH 5.330 (H) 2021     Lab Results   Component Value Date    GLUCOSE 85 2021    BUN 33 (H) 2021    CREATININE 1.78 (H) 2021    EGFRIFNONA 28 (L) 2021    EGFRIFAFRI 41 (L) 2018    BCR 18.5 2021    K 4.4 2021    CO2 22.2 2021    CALCIUM 9.6 2021    PROTENTOTREF 7.7 2018    ALBUMIN 4.10 2021    LABIL2 1.1 (L) 2018    AST 35 (H) 2021    ALT 31 2021     Lab Results   Component Value Date    WBC 9.79 2021    HGB 8.6 (L) 2021    HCT 29.1 (L) 2021    MCV 80.6 2021     2021     Lab Results   Component Value Date    CKTOTAL 67 2020    CKMB 2.92 2018    TROPONINI 0.012 2018    TROPONINT <0.010 2020     Lab Results   Component Value Date    PROBNP 1,634.0 2021     The following portions of the patient's history were reviewed and updated as appropriate: allergies, current medications, past family history, past medical history, past social  history, past surgical history and problem list.     Old records reviewed and pertinent information is included in the above objective data.     Assessment/Plan:   1. Chronic Diastolic Congestive Heart Failure, EF > 70%   2. HTN  3. Pedal edema   3. CKD, stage IV  4.  LENNY  5. Obesity, Class II    Pro-BNP, BMP, magnesium today  Discussed and reviewed labs with patient today  ReDs reviewed and discussed with patient today   Reassurance as her kidney function seems stable today  Try decreasing Bumex to 0.5 mg daily to help with serum creatinine as long as her symptoms are stable.  May take extra 0.5 to 1 mg if needed for swelling or weight gain     Continue on current medications   Monitor BP and HR  Await sleep study results from home sleep study - she should further discuss with cardiology at follow up    Weight loss discussed and healthy lifestyle recommended   She should clarify with hematology and nephrology if she should be on her iron supplement.  She expresses understanding  Advise she needs to bring her medicine bottles at next visit as she should not be taking the quinapril and she should check her bottles at home.   Counseling patient extensively on dietary Na+ intake, importance of activity, weight monitoring, compliance with medications and follow up appointments.  Follow up in 6 weeks, sooner if needed.

## 2021-04-14 NOTE — PROGRESS NOTES
Heart Failure Clinic    Date: 04/14/21     Vitals:    04/14/21 0852   BP: 110/60   Pulse: 59   Resp: 22   Temp: 97.5 °F (36.4 °C)   SpO2: 97%      Mask Worn: Yes     Method of arrival: Ambulatory    Weighing self daily: Yes    Monitoring Heart Failure Zones: Yes    Today's HF Zone: Green    Taking medications as prescribed: Yes    Edema Yes    Shortness of Air: Yes only when moving around a lot exerting self.     Number of pillows used at night:<2    Educational Materials given:                                                                           ReDS Value: 32%  25-35 Optimal Value Status      Jackie Jasmine MA 04/14/21 08:53 EDT

## 2021-04-14 NOTE — PROGRESS NOTES
Heart Failure Pharmacy Note  Patient Name: Britta Lee   Referring Provider: Priscila Holland  Primary Cardiologist: Dr. Cruz    Medication Use:   Adherence: No issues  Hx of med intolerances: bradycardia with metoprolol 100mg BID  Affordability: No issues reported   Retail Rx Management: none    Past Medical History:   Diagnosis Date   • Anemia    • Arthritis    • Carpal tunnel syndrome    • CHF (congestive heart failure) (CMS/[facility])    • Coronary artery disease    • Diabetes mellitus (CMS/[facility])    • Elevated cholesterol    • GERD (gastroesophageal reflux disease)    • Heart disease    • High cholesterol    • History of pneumonia    • History of unsteady gait     OCASSIONALLY   • Hypertension    • Insomnia    • Kidney disease    • LENNY (obstructive sleep apnea) 12/1/2020   • Osteoarthritis    • Renal insufficiency    • Sciatica      ALLERGIES: Patient has no known allergies.  Current Outpatient Medications   Medication Sig Dispense Refill   • albuterol sulfate  (90 Base) MCG/ACT inhaler Inhale 2 puffs Every 4 (Four) Hours As Needed for Shortness of Air. 18 g 0   • amLODIPine (NORVASC) 10 MG tablet Take 1 tablet by mouth Every Night. 90 tablet 1   • aspirin 81 MG tablet Take 1 tablet by mouth Daily. 30 tablet 5   • atorvastatin (LIPITOR) 40 MG tablet Take 1 tablet by mouth Every Night. 90 tablet 1   • BD Pen Needle Evon U/F 32G X 4 MM misc      • bumetanide (BUMEX) 1 MG tablet Take 1 tablet by mouth Daily. May take an extra 0.5 mg in the evening for swelling or weight gain 60 tablet 1   • cloNIDine (CATAPRES) 0.2 MG tablet Take 1 tablet by mouth 3 (Three) Times a Day. 270 tablet 1   • Continuous Blood Gluc  (Dexcom G6 ) device 1 Device Daily. 1 Device 0   • Continuous Blood Gluc Sensor (Dexcom G6 Sensor) Every 10 (Ten) Days. 9 each 3   • Continuous Blood Gluc Transmit (Dexcom G6 Transmitter) misc 1 Device Daily. 1 each 0   • escitalopram (LEXAPRO) 10 MG tablet Take 1 tablet by mouth Daily. 90  "tablet 3   • glucose blood test strip 1 each by Other route 3 (Three) Times a Day. 100 each 12   • hydrALAZINE (APRESOLINE) 50 MG tablet Take 1 tablet by mouth Every 8 (Eight) Hours. (Patient taking differently: Take 50 mg by mouth 2 (two) times a day.) 270 tablet 1   • Insulin Glargine, 2 Unit Dial, (Toujeo Max SoloStar) 300 UNIT/ML solution pen-injector injection Inject 60 Units under the skin into the appropriate area as directed Daily. (Patient taking differently: Inject 40-60 Units under the skin into the appropriate area as directed Daily.) 6 pen 3   • insulin lispro (humaLOG) 100 UNIT/ML injection Inject 5 Units under the skin into the appropriate area as directed 3 (Three) Times a Day Before Meals.     • Insulin Pen Needle 32G X 5 MM misc 1 each 4 (Four) Times a Day. 120 each 5   • isosorbide mononitrate (IMDUR) 60 MG 24 hr tablet Take 1 tablet by mouth Daily. 90 tablet 1   • metoprolol succinate XL (TOPROL-XL) 25 MG 24 hr tablet Take 1 tablet by mouth Daily. Hold if HR less than 60 bpm. 30 tablet 1   • nystatin (MYCOSTATIN) 233525 UNIT/GM powder Apply  one application topically to the appropriate area as directed Every 12 (Twelve) Hours. (Patient taking differently: Apply  topically to the appropriate area as directed 2 (Two) Times a Day As Needed (itching).) 60 g 0   • vitamin D (ERGOCALCIFEROL) 1.25 MG (99031 UT) capsule capsule Take 50,000 Units by mouth 1 (One) Time Per Week.       No current facility-administered medications for this encounter.       Vaccination History:   Pneumonia: Reports UTD  Annual Influenza: Reports UTD for 2020-21 Season    Objective  Vitals:    04/14/21 0852   BP: 110/60   BP Location: Right arm   Patient Position: Sitting   Cuff Size: Large Adult   Pulse: 59   Resp: 22   Temp: 97.5 °F (36.4 °C)   TempSrc: Temporal   SpO2: 97%   Weight: 100 kg (221 lb)   Height: 160 cm (63\")     Wt Readings from Last 3 Encounters:   04/14/21 100 kg (221 lb)   04/07/21 102 kg (224 lb 14.4 oz) "   04/01/21 102 kg (224 lb 3.2 oz)         04/14/21  0852   Weight: 100 kg (221 lb)     Lab Results   Component Value Date    GLUCOSE 100 (H) 04/14/2021    BUN 35 (H) 04/14/2021    CREATININE 1.76 (H) 04/14/2021    EGFRIFNONA 28 (L) 04/14/2021    EGFRIFAFRI 41 (L) 07/30/2018    BCR 19.9 04/14/2021    K 4.5 04/14/2021    CO2 22.9 04/14/2021    CALCIUM 9.1 04/14/2021    PROTENTOTREF 7.7 07/30/2018    ALBUMIN 4.10 04/12/2021    LABIL2 1.1 (L) 07/30/2018    AST 35 (H) 04/12/2021    ALT 31 04/12/2021     Lab Results   Component Value Date    WBC 9.79 03/26/2021    HGB 8.6 (L) 03/26/2021    HCT 29.1 (L) 03/26/2021    MCV 80.6 03/26/2021     03/26/2021     Lab Results   Component Value Date    CKTOTAL 67 12/04/2020    CKMB 2.92 04/16/2018    TROPONINI 0.012 04/16/2018    TROPONINT <0.010 12/04/2020     Lab Results   Component Value Date    PROBNP 1,075.0 04/14/2021     Results for orders placed during the hospital encounter of 11/03/20    Adult Transthoracic Echo Complete W/ Cont if Necessary Per Protocol    Interpretation Summary  · Left ventricular wall thickness is consistent with concentric hypertrophy.  · Left ventricular ejection fraction appears to be greater than 70%. Left ventricular systolic function is hyperdynamic (EF > 70%).  · Left ventricular diastolic function is consistent with (grade II w/high LAP) pseudonormalization.  · The left atrial cavity is moderate to severely dilated.  · The right atrial cavity is mildly dilated.  · Moderate mitral annular calcification is present.  · Mild mitral valve regurgitation is present.  · Mild tricuspid valve regurgitation is present.  · Estimated right ventricular systolic pressure from tricuspid regurgitation is markedly elevated (>55 mmHg).               Class   Drug   Dose Last Dose Adjustment   Notes   ACEi/ARB/ARNI    Worsening renal fxn 1/22/21   Beta Blocker Toprol XL  25mg 1/22/21    MRA --------------   eGFR needs to be >30mL/min       Drug Therapy  Problems:     1.  Hypoglycemia episodes: Pt reports BG readings in the 60's/70's in the AM  sometimes but running high in the 300's in the lat evenings   2. Self monitoring home logs: No logs brought to clinic today     Recommendations:    1. Encouraged patient to follow up with PCP (Lexie Dolan or Parvin Ta in which patients reports she has follow-ups with in the next week) for adjustment of insulins and to keep a close blood glucose log to take to them.  According to patient self-reported blood glucose , patient might benefit from increasing meal time insulin dose and decreasing basal insulin dose before bed to minimize/eliminate hypoglycemia in the AM and hyperglycemia in the PM.   2. Patient reported she didn't have any more sheets that we provided to her previously to keep logs of weight, BP/HR. Provided patient another copy and encouraged logging to bring to clinic. Patient denies SBP less than 100 at home, HR 68 and above, and weight fluctuating around 4 lbs (220-224 lbs).    Patient was educated on heart failure medications and the importance of medication adherence.  All questions were addressed and patient expressed understanding.     Thank you for allowing me to participate in the care of your patient,    Shanta Jelani. Dylan, PharmD  04/14/21  09:30 EDT

## 2021-04-20 ENCOUNTER — OFFICE VISIT (OUTPATIENT)
Dept: FAMILY MEDICINE CLINIC | Facility: CLINIC | Age: 76
End: 2021-04-20

## 2021-04-20 VITALS
OXYGEN SATURATION: 98 % | HEART RATE: 90 BPM | WEIGHT: 222 LBS | HEIGHT: 63 IN | BODY MASS INDEX: 39.34 KG/M2 | TEMPERATURE: 96.8 F | DIASTOLIC BLOOD PRESSURE: 64 MMHG | SYSTOLIC BLOOD PRESSURE: 138 MMHG

## 2021-04-20 DIAGNOSIS — F33.1 MODERATE EPISODE OF RECURRENT MAJOR DEPRESSIVE DISORDER (HCC): Chronic | ICD-10-CM

## 2021-04-20 DIAGNOSIS — N18.4 TYPE 2 DIABETES MELLITUS WITH STAGE 4 CHRONIC KIDNEY DISEASE, WITH LONG-TERM CURRENT USE OF INSULIN (HCC): Chronic | ICD-10-CM

## 2021-04-20 DIAGNOSIS — R79.89 ABNORMAL TSH: Chronic | ICD-10-CM

## 2021-04-20 DIAGNOSIS — Z79.4 TYPE 2 DIABETES MELLITUS WITH STAGE 4 CHRONIC KIDNEY DISEASE, WITH LONG-TERM CURRENT USE OF INSULIN (HCC): Chronic | ICD-10-CM

## 2021-04-20 DIAGNOSIS — N18.4 STAGE 4 CHRONIC KIDNEY DISEASE (HCC): Primary | ICD-10-CM

## 2021-04-20 DIAGNOSIS — D50.9 IRON DEFICIENCY ANEMIA, UNSPECIFIED IRON DEFICIENCY ANEMIA TYPE: ICD-10-CM

## 2021-04-20 DIAGNOSIS — E11.22 TYPE 2 DIABETES MELLITUS WITH STAGE 4 CHRONIC KIDNEY DISEASE, WITH LONG-TERM CURRENT USE OF INSULIN (HCC): Chronic | ICD-10-CM

## 2021-04-20 PROCEDURE — 85025 COMPLETE CBC W/AUTO DIFF WBC: CPT | Performed by: PHYSICIAN ASSISTANT

## 2021-04-20 PROCEDURE — 81001 URINALYSIS AUTO W/SCOPE: CPT | Performed by: PHYSICIAN ASSISTANT

## 2021-04-20 PROCEDURE — 84439 ASSAY OF FREE THYROXINE: CPT | Performed by: PHYSICIAN ASSISTANT

## 2021-04-20 PROCEDURE — 80053 COMPREHEN METABOLIC PANEL: CPT | Performed by: PHYSICIAN ASSISTANT

## 2021-04-20 PROCEDURE — 99214 OFFICE O/P EST MOD 30 MIN: CPT | Performed by: PHYSICIAN ASSISTANT

## 2021-04-20 PROCEDURE — 82570 ASSAY OF URINE CREATININE: CPT | Performed by: PHYSICIAN ASSISTANT

## 2021-04-20 PROCEDURE — 84156 ASSAY OF PROTEIN URINE: CPT | Performed by: PHYSICIAN ASSISTANT

## 2021-04-20 PROCEDURE — 84481 FREE ASSAY (FT-3): CPT | Performed by: PHYSICIAN ASSISTANT

## 2021-04-20 PROCEDURE — 84443 ASSAY THYROID STIM HORMONE: CPT | Performed by: PHYSICIAN ASSISTANT

## 2021-04-20 RX ORDER — FERROUS SULFATE 325(65) MG
1 TABLET ORAL 2 TIMES DAILY
COMMUNITY
Start: 2021-04-02 | End: 2021-10-22 | Stop reason: SDUPTHER

## 2021-04-20 RX ORDER — HYDROCHLOROTHIAZIDE 25 MG/1
25 TABLET ORAL DAILY
COMMUNITY
Start: 2021-04-02 | End: 2021-08-06

## 2021-04-20 RX ORDER — ESCITALOPRAM OXALATE 10 MG/1
10 TABLET ORAL DAILY
Qty: 90 TABLET | Refills: 3 | Status: SHIPPED | OUTPATIENT
Start: 2021-04-20 | End: 2021-12-20 | Stop reason: SDUPTHER

## 2021-04-20 NOTE — PATIENT INSTRUCTIONS
Carbohydrate Counting for Diabetes Mellitus, Adult  Carbohydrate counting is a method of keeping track of how many carbohydrates you eat. Eating carbohydrates naturally increases the amount of sugar (glucose) in the blood. Counting how many carbohydrates you eat improves your blood glucose control, which helps you manage your diabetes.  It is important to know how many carbohydrates you can safely have in each meal. This is different for every person. A dietitian can help you make a meal plan and calculate how many carbohydrates you should have at each meal and snack.  What foods contain carbohydrates?  Carbohydrates are found in the following foods:  · Grains, such as breads and cereals.  · Dried beans and soy products.  · Starchy vegetables, such as potatoes, peas, and corn.  · Fruit and fruit juices.  · Milk and yogurt.  · Sweets and snack foods, such as cake, cookies, candy, chips, and soft drinks.  How do I count carbohydrates in foods?  There are two ways to count carbohydrates in food. You can read food labels or learn standard serving sizes of foods. You can use either of the methods or a combination of both.  Using the Nutrition Facts label  The Nutrition Facts list is included on the labels of almost all packaged foods and beverages in the [place] It includes:  · The serving size.  · Information about nutrients in each serving, including the grams (g) of carbohydrate per serving.  To use the Nutrition Facts:  · Decide how many servings you will have.  · Multiply the number of servings by the number of carbohydrates per serving.  · The resulting number is the total amount of carbohydrates that you will be having.  Learning the standard serving sizes of foods  When you eat carbohydrate foods that are not packaged or do not include Nutrition Facts on the label, you need to measure the servings in order to count the amount of carbohydrates.  · Measure the foods that you will eat with a food scale or measuring  cup, if needed.  · Decide how many standard-size servings you will eat.  · Multiply the number of servings by 15. For foods that contain carbohydrates, one serving equals 15 g of carbohydrates.  ? For example, if you eat 2 cups or 10 oz (300 g) of strawberries, you will have eaten 2 servings and 30 g of carbohydrates (2 servings x 15 g = 30 g).  · For foods that have more than one food mixed, such as soups and casseroles, you must count the carbohydrates in each food that is included.  The following list contains standard serving sizes of common carbohydrate-rich foods. Each of these servings has about 15 g of carbohydrates:  · 1 slice of bread.  · 1 six-inch (15 cm) tortilla.  · ? cup or 2 oz (53 g) cooked rice or pasta.  · ½ cup or 3 oz (85 g) cooked or canned, drained and rinsed beans or lentils.  · ½ cup or 3 oz (85 g) starchy vegetable, such as peas, corn, or squash.  · ½ cup or 4 oz (120 g) hot cereal.  · ½ cup or 3 oz (85 g) boiled or mashed potatoes, or ¼ or 3 oz (85 g) of a large baked potato.  · ½ cup or 4 fl oz (118 mL) fruit juice.  · 1 cup or 8 fl oz (237 mL) milk.  · 1 small or 4 oz (106 g) apple.  · ½ or 2 oz (63 g) of a medium banana.  · 1 cup or 5 oz (150 g) strawberries.  · 3 cups or 1 oz (24 g) popped popcorn.  What is an example of carbohydrate counting?  To calculate the number of carbohydrates in this sample meal, follow the steps shown below.  Sample meal  · 3 oz (85 g) chicken breast.  · ? cup or 4 oz (106 g) brown rice.  · ½ cup or 3 oz (85 g) corn.  · 1 cup or 8 fl oz (237 mL) milk.  · 1 cup or 5 oz (150 g) strawberries with sugar-free whipped topping.  Carbohydrate calculation  1. Identify the foods that contain carbohydrates:  ? Rice.  ? Corn.  ? Milk.  ? Strawberries.  2. Calculate how many servings you have of each food:  ? 2 servings rice.  ? 1 serving corn.  ? 1 serving milk.  ? 1 serving strawberries.  3. Multiply each number of servings by 15 g:  ? 2 servings rice x 15 g = 30  g.  ? 1 serving corn x 15 g = 15 g.  ? 1 serving milk x 15 g = 15 g.  ? 1 serving strawberries x 15 g = 15 g.  4. Add together all of the amounts to find the total grams of carbohydrates eaten:  ? 30 g + 15 g + 15 g + 15 g = 75 g of carbohydrates total.  What are tips for following this plan?  Shopping  · Develop a meal plan and then make a shopping list.  · Buy fresh and frozen vegetables, fresh and frozen fruit, dairy, eggs, beans, lentils, and whole grains.  · Look at food labels. Choose foods that have more fiber and less sugar.  · Avoid processed foods and foods with added sugars.  Meal planning  · Aim to have the same amount of carbohydrates at each meal and for each snack time.  · Plan to have regular, balanced meals and snacks.  Where to find more information  · American Diabetes Association: www.diabetes.org  · Centers for Disease Control and Prevention: www.cdc.gov  Summary  · Carbohydrate counting is a method of keeping track of how many carbohydrates you eat.  · Eating carbohydrates naturally increases the amount of sugar (glucose) in the blood.  · Counting how many carbohydrates you eat improves your blood glucose control, which helps you manage your diabetes.  · A dietitian can help you make a meal plan and calculate how many carbohydrates you should have at each meal and snack.  This information is not intended to replace advice given to you by your health care provider. Make sure you discuss any questions you have with your health care provider.  Document Revised: 12/18/2020 Document Reviewed: 12/18/2020  [employer] Patient Education © 2021 [employer]

## 2021-04-20 NOTE — PROGRESS NOTES
"Alison Lee is a 75 y.o. female.       Chief Complaint -chronic kidney disease    History of Present Illness -    ROS    Chronic kidney disease-  Renal function is stable although not at goal.  Patient continues to be followed by nephrology, Dr. Johnson.    Iron deficiency anemia-stable with iron supplementation    Abnormal TSH-  Her TSH has been elevated with the last 2 lab draws so this needs to be further evaluated.  Patient states she has never had a history of thyroid problems.    Diabetes mellitus type 2-stable with Toujeo and Humalog    Depression-controlled with Lexapro.  She denies any SI or HI.    The following portions of the patient's history were reviewed and updated as appropriate: allergies, current medications, past family history, past medical history, past social history, past surgical history and problem list.    Review of Systems   Constitutional: Negative for activity change, appetite change, fatigue and fever.   HENT: Negative for ear pain, sinus pressure and sore throat.    Eyes: Negative for pain and visual disturbance.   Respiratory: Negative for cough and chest tightness.    Cardiovascular: Negative for chest pain and palpitations.   Gastrointestinal: Negative for abdominal pain, constipation, diarrhea, nausea and vomiting.   Endocrine: Negative for polydipsia and polyuria.   Genitourinary: Negative for dysuria and frequency.   Musculoskeletal: Negative for back pain and myalgias.   Skin: Negative for color change and rash.   Allergic/Immunologic: Negative for food allergies and immunocompromised state.   Neurological: Negative for dizziness, syncope and headaches.   Hematological: Negative for adenopathy. Does not bruise/bleed easily.   Psychiatric/Behavioral: Negative for hallucinations and suicidal ideas. The patient is not nervous/anxious.        Objective  Vital signs:  /64   Pulse 90   Temp 96.8 °F (36 °C) (Temporal)   Ht 160 cm (63\")   Wt 101 kg (222 lb)   " SpO2 98%   BMI 39.33 kg/m²     Physical Exam  Vitals and nursing note reviewed.   Constitutional:       Appearance: Normal appearance. She is well-developed. She is obese.   HENT:      Head: Normocephalic and atraumatic.      Right Ear: Tympanic membrane normal.      Left Ear: Tympanic membrane normal.      Nose: Nose normal.      Mouth/Throat:      Mouth: Mucous membranes are moist.      Pharynx: No oropharyngeal exudate or posterior oropharyngeal erythema.   Eyes:      Extraocular Movements: Extraocular movements intact.      Conjunctiva/sclera: Conjunctivae normal.   Neck:      Thyroid: No thyromegaly.      Trachea: No tracheal deviation.   Cardiovascular:      Rate and Rhythm: Normal rate and regular rhythm.      Heart sounds: Normal heart sounds. No murmur heard.     Pulmonary:      Effort: Pulmonary effort is normal. No respiratory distress.      Breath sounds: Normal breath sounds. No wheezing.   Abdominal:      General: Bowel sounds are normal.      Palpations: Abdomen is soft.      Tenderness: There is no abdominal tenderness. There is no guarding.   Musculoskeletal:         General: No tenderness. Normal range of motion.      Cervical back: Normal range of motion and neck supple.   Lymphadenopathy:      Cervical: No cervical adenopathy.   Skin:     General: Skin is warm and dry.      Findings: No rash.   Neurological:      General: No focal deficit present.      Mental Status: She is alert and oriented to person, place, and time.   Psychiatric:         Mood and Affect: Mood normal.         Behavior: Behavior normal.         Thought Content: Thought content normal.         The following data was reviewed by: RAFIQ Montanez on 04/20/2021:  CMP    CMP 4/12/21 4/14/21 4/20/21   Glucose 85 100 (A) 82   BUN 33 (A) 35 (A) 39 (A)   Creatinine 1.78 (A) 1.76 (A) 1.79 (A)   eGFR Non African Am 28 (A) 28 (A) 27 (A)   Sodium 141 142 140   Potassium 4.4 4.5 4.5   Chloride 106 107 103   Calcium 9.6 9.1 9.4    Albumin 4.10  3.90   Total Bilirubin 0.3  0.2   Alkaline Phosphatase 374 (A)  388 (A)   AST (SGOT) 35 (A)  53 (A)   ALT (SGPT) 31  33   (A) Abnormal value            CBC w/diff    CBC w/Diff 1/22/21 3/12/21 3/26/21   WBC 8.68 10.48 9.79   RBC 3.84 3.58 (A) 3.61 (A)   Hemoglobin 9.7 (A) 8.9 (A) 8.6 (A)   Hematocrit 31.1 (A) 29.0 (A) 29.1 (A)   MCV 81.0 81.0 80.6   MCH 25.3 (A) 24.9 (A) 23.8 (A)   MCHC 31.2 (A) 30.7 (A) 29.6 (A)   RDW 14.3 14.6 15.6 (A)   Platelets 204 297 286   Neutrophil Rel % 69.1 58.4 72.4   Immature Granulocyte Rel % 0.3 0.3 0.4   Lymphocyte Rel % 17.9 (A) 26.0 14.3 (A)   Monocyte Rel % 7.1 6.2 4.6 (A)   Eosinophil Rel % 5.1 8.6 (A) 7.8 (A)   Basophil Rel % 0.5 0.5 0.5   (A) Abnormal value            Lipid Panel    Lipid Panel 9/4/20   Total Cholesterol 125   Triglycerides 128   HDL Cholesterol 36 (A)   VLDL Cholesterol 25.6   LDL Cholesterol  63   LDL/HDL Ratio 1.76   (A) Abnormal value            TSH    TSH 3/26/21 4/12/21 4/20/21   TSH 6.430 (A) 5.330 (A) 4.240 (A)   (A) Abnormal value            Most Recent A1C    HGBA1C Most Recent 4/12/21   Hemoglobin A1C 6.54 (A)   (A) Abnormal value                   Assessment/Plan     Diagnoses and all orders for this visit:    1. Stage 4 chronic kidney disease (CMS/HCC) (Primary)  Comments:  Continue to monitor  Encouraged adequate fluid intake  Orders:  -     Urinalysis With Microscopic If Indicated (No Culture) - Urine, Clean Catch; Future  -     CBC & Differential  -     Protein / Creatinine Ratio, Urine - Urine, Clean Catch; Future  -     Comprehensive metabolic panel; Future  -     Urinalysis With Microscopic If Indicated (No Culture) - Urine, Random Void  -     Protein / Creatinine Ratio, Urine - Urine, Random Void  -     Comprehensive metabolic panel  -     Urinalysis, Microscopic Only - Urine, Random Void    2. Iron deficiency anemia, unspecified iron deficiency anemia type  Comments:  Continue iron supplementation  Orders:  -     Urinalysis  With Microscopic If Indicated (No Culture) - Urine, Clean Catch; Future  -     CBC & Differential  -     Protein / Creatinine Ratio, Urine - Urine, Clean Catch; Future  -     Comprehensive metabolic panel; Future  -     Urinalysis With Microscopic If Indicated (No Culture) - Urine, Random Void  -     Protein / Creatinine Ratio, Urine - Urine, Random Void  -     Comprehensive metabolic panel  -     Urinalysis, Microscopic Only - Urine, Random Void    3. Abnormal TSH  Comments:  Lab work ordered for further evaluation  Orders:  -     TSH  -     T4, Free  -     T3, Free    4. Type 2 diabetes mellitus with stage 4 chronic kidney disease, with long-term current use of insulin (CMS/[facility])  Comments:  Continue Toujeo and lispro insulin  Continue to monitor fasting and 2-hour PP blood glucose regularly  Orders:  -     TSH  -     T4, Free  -     T3, Free    5. Moderate episode of recurrent major depressive disorder (CMS/[facility])  Comments:  Continue Lexapro  Conservative measures for dealing with stress were advised  Orders:  -     escitalopram (LEXAPRO) 10 MG tablet; Take 1 tablet by mouth Daily.  Dispense: 90 tablet; Refill: 3            Patient was given instructions and counseling regarding his condition or for health maintenance advice. Please see specific information pulled into the AVS if appropriate      This document has been electronically signed by:  Lexie Dolan PA-C

## 2021-04-21 LAB
ALBUMIN SERPL-MCNC: 3.9 G/DL (ref 3.5–5.2)
ALBUMIN/GLOB SERPL: 1 G/DL
ALP SERPL-CCNC: 388 U/L (ref 39–117)
ALT SERPL W P-5'-P-CCNC: 33 U/L (ref 1–33)
ANION GAP SERPL CALCULATED.3IONS-SCNC: 13.8 MMOL/L (ref 5–15)
AST SERPL-CCNC: 53 U/L (ref 1–32)
BACTERIA UR QL AUTO: NORMAL /HPF
BASOPHILS # BLD AUTO: 0.06 10*3/MM3 (ref 0–0.2)
BASOPHILS NFR BLD AUTO: 0.6 % (ref 0–1.5)
BILIRUB SERPL-MCNC: 0.2 MG/DL (ref 0–1.2)
BILIRUB UR QL STRIP: NEGATIVE
BUN SERPL-MCNC: 39 MG/DL (ref 8–23)
BUN/CREAT SERPL: 21.8 (ref 7–25)
CALCIUM SPEC-SCNC: 9.4 MG/DL (ref 8.6–10.5)
CHLORIDE SERPL-SCNC: 103 MMOL/L (ref 98–107)
CLARITY UR: CLEAR
CO2 SERPL-SCNC: 23.2 MMOL/L (ref 22–29)
COLOR UR: YELLOW
CREAT SERPL-MCNC: 1.79 MG/DL (ref 0.57–1)
CREAT UR-MCNC: 94.7 MG/DL
DEPRECATED RDW RBC AUTO: 45.7 FL (ref 37–54)
EOSINOPHIL # BLD AUTO: 0.97 10*3/MM3 (ref 0–0.4)
EOSINOPHIL NFR BLD AUTO: 9.6 % (ref 0.3–6.2)
ERYTHROCYTE [DISTWIDTH] IN BLOOD BY AUTOMATED COUNT: 15.7 % (ref 12.3–15.4)
GFR SERPL CREATININE-BSD FRML MDRD: 27 ML/MIN/1.73
GLOBULIN UR ELPH-MCNC: 3.8 GM/DL
GLUCOSE SERPL-MCNC: 82 MG/DL (ref 65–99)
GLUCOSE UR STRIP-MCNC: NEGATIVE MG/DL
HCT VFR BLD AUTO: 32.2 % (ref 34–46.6)
HGB BLD-MCNC: 10.1 G/DL (ref 12–15.9)
HGB UR QL STRIP.AUTO: ABNORMAL
HYALINE CASTS UR QL AUTO: NORMAL /LPF
IMM GRANULOCYTES # BLD AUTO: 0.05 10*3/MM3 (ref 0–0.05)
IMM GRANULOCYTES NFR BLD AUTO: 0.5 % (ref 0–0.5)
KETONES UR QL STRIP: NEGATIVE
LEUKOCYTE ESTERASE UR QL STRIP.AUTO: NEGATIVE
LYMPHOCYTES # BLD AUTO: 2.36 10*3/MM3 (ref 0.7–3.1)
LYMPHOCYTES NFR BLD AUTO: 23.4 % (ref 19.6–45.3)
MCH RBC QN AUTO: 25.2 PG (ref 26.6–33)
MCHC RBC AUTO-ENTMCNC: 31.4 G/DL (ref 31.5–35.7)
MCV RBC AUTO: 80.3 FL (ref 79–97)
MONOCYTES # BLD AUTO: 0.58 10*3/MM3 (ref 0.1–0.9)
MONOCYTES NFR BLD AUTO: 5.8 % (ref 5–12)
NEUTROPHILS NFR BLD AUTO: 6.05 10*3/MM3 (ref 1.7–7)
NEUTROPHILS NFR BLD AUTO: 60.1 % (ref 42.7–76)
NITRITE UR QL STRIP: NEGATIVE
NRBC BLD AUTO-RTO: 0 /100 WBC (ref 0–0.2)
PH UR STRIP.AUTO: 5.5 [PH] (ref 5–8)
PLATELET # BLD AUTO: 275 10*3/MM3 (ref 140–450)
PMV BLD AUTO: 10.1 FL (ref 6–12)
POTASSIUM SERPL-SCNC: 4.5 MMOL/L (ref 3.5–5.2)
PROT SERPL-MCNC: 7.7 G/DL (ref 6–8.5)
PROT UR QL STRIP: ABNORMAL
PROT UR-MCNC: 196.3 MG/DL
PROT/CREAT UR: 2072.9 MG/G CREA (ref 0–200)
RBC # BLD AUTO: 4.01 10*6/MM3 (ref 3.77–5.28)
RBC # UR: NORMAL /HPF
REF LAB TEST METHOD: NORMAL
SODIUM SERPL-SCNC: 140 MMOL/L (ref 136–145)
SP GR UR STRIP: 1.02 (ref 1–1.03)
SQUAMOUS #/AREA URNS HPF: NORMAL /HPF
T3FREE SERPL-MCNC: 1.96 PG/ML (ref 2–4.4)
T4 FREE SERPL-MCNC: 0.92 NG/DL (ref 0.93–1.7)
TSH SERPL DL<=0.05 MIU/L-ACNC: 4.24 UIU/ML (ref 0.27–4.2)
UROBILINOGEN UR QL STRIP: ABNORMAL
WBC # BLD AUTO: 10.07 10*3/MM3 (ref 3.4–10.8)
WBC UR QL AUTO: NORMAL /HPF

## 2021-04-22 DIAGNOSIS — E03.9 ACQUIRED HYPOTHYROIDISM: Primary | ICD-10-CM

## 2021-04-22 DIAGNOSIS — D50.9 HYPOCHROMIC MICROCYTIC ANEMIA: ICD-10-CM

## 2021-04-22 RX ORDER — LEVOTHYROXINE SODIUM 0.03 MG/1
25 TABLET ORAL DAILY
Qty: 30 TABLET | Refills: 5 | Status: SHIPPED | OUTPATIENT
Start: 2021-04-22 | End: 2021-09-30 | Stop reason: SDUPTHER

## 2021-05-04 ENCOUNTER — OFFICE VISIT (OUTPATIENT)
Dept: FAMILY MEDICINE CLINIC | Facility: CLINIC | Age: 76
End: 2021-05-04

## 2021-05-04 VITALS
TEMPERATURE: 96.9 F | HEIGHT: 63 IN | OXYGEN SATURATION: 98 % | WEIGHT: 221 LBS | RESPIRATION RATE: 14 BRPM | SYSTOLIC BLOOD PRESSURE: 130 MMHG | HEART RATE: 50 BPM | DIASTOLIC BLOOD PRESSURE: 70 MMHG | BODY MASS INDEX: 39.16 KG/M2

## 2021-05-04 DIAGNOSIS — N18.4 TYPE 2 DIABETES MELLITUS WITH STAGE 4 CHRONIC KIDNEY DISEASE, WITH LONG-TERM CURRENT USE OF INSULIN (HCC): Primary | Chronic | ICD-10-CM

## 2021-05-04 DIAGNOSIS — I50.32 CHRONIC DIASTOLIC CONGESTIVE HEART FAILURE (HCC): Chronic | ICD-10-CM

## 2021-05-04 DIAGNOSIS — Z79.4 TYPE 2 DIABETES MELLITUS WITH STAGE 4 CHRONIC KIDNEY DISEASE, WITH LONG-TERM CURRENT USE OF INSULIN (HCC): Primary | Chronic | ICD-10-CM

## 2021-05-04 DIAGNOSIS — I10 ESSENTIAL HYPERTENSION: Chronic | ICD-10-CM

## 2021-05-04 DIAGNOSIS — E11.22 TYPE 2 DIABETES MELLITUS WITH STAGE 4 CHRONIC KIDNEY DISEASE, WITH LONG-TERM CURRENT USE OF INSULIN (HCC): Primary | Chronic | ICD-10-CM

## 2021-05-04 DIAGNOSIS — E78.5 DYSLIPIDEMIA: Chronic | ICD-10-CM

## 2021-05-04 PROCEDURE — 99214 OFFICE O/P EST MOD 30 MIN: CPT | Performed by: NURSE PRACTITIONER

## 2021-05-04 NOTE — PROGRESS NOTES
History of Present Illness  Britta Lee is a 76 yo female who presents to the clinic today pertaining to her  DM, type 2 which is complicated by peripheral neuropathy, diabetic nephropathy and retinopathy. In addition, Britta has HTN, Dyslipidemia,Venous Insufficiency of Lower Extremities and CHF. She is being followed by the [facility].    Diabetes   She has type 2 diabetes mellitus. The initial diagnosis of diabetes was made 20 years ago.  Diabetic complications include heart disease, nephropathy, peripheral neuropathy, PVD and retinopathy. Current diabetic treatment includes insulin injections (Toujeo and Novolog). Compliance with diabetes treatment: Dependent on available of insulin. She has been using a DexCom to monitor her blood sugars.  When asked about meal planning, she is really trying to follow her recommended nutrition plan which does include a combination Healthy renal and cardiovascular plan.  She has had a previous visit with a dietitian. She rarely participates in exercise. She is typically taking  60 units of Toujeo.   Lab Results   Component Value Date    HGBA1C 7.30 (H) 11/03/2020     Lab Results   Component Value Date    HGBA1C 8.40 (H) 09/04/2020     Lab Results   Component Value Date    HGBA1C 6.54 (H) 04/12/2021     Hypertension  Compliance with treatment has been good. Well controlled with Norvasc, clonidine, metoprolol (Hold If pulse less than 60), and Hydralazine She does report lower extremity swelling throughout the day which is improving with Bumex. She does have compression stockings but find them very difficult to apply.     Lab Results   Component Value Date    GLUCOSE 131 (H) 01/27/2021    BUN 43 (H) 01/27/2021    CREATININE 1.91 (H) 01/27/2021    EGFRIFNONA 25 (L) 01/27/2021    BCR 22.5 01/27/2021    K 4.8 01/27/2021    CO2 19.1 (L) 01/27/2021    CALCIUM 9.5 01/27/2021    ALBUMIN 3.60 01/22/2021    AST 38 (H) 01/22/2021    ALT 27 01/22/2021     Lab Results   Component Value  Date    GLUCOSE 82 04/20/2021    BUN 39 (H) 04/20/2021    CREATININE 1.79 (H) 04/20/2021    EGFRIFNONA 27 (L) 04/20/2021    BCR 21.8 04/20/2021    K 4.5 04/20/2021    CO2 23.2 04/20/2021    CALCIUM 9.4 04/20/2021    PROTENTOTREF 7.7 07/30/2018    ALBUMIN 3.90 04/20/2021    AST 53 (H) 04/20/2021    ALT 33 04/20/2021       Dyslipidemia  Compliance with treatment has been good. The patient exercises seldom due to her Osteoarthritis.  She is currently being prescribed the following medication for her dyslipidemia - atorvastatin. Patient denies side effects associated with her medications. Most recent lipids include    Lab Results   Component Value Date    CHOL 125 09/04/2020    TRIG 128 09/04/2020    HDL 36 (L) 09/04/2020    LDL 63 09/04/2020          The following portions of the patient's history were reviewed and updated as appropriate: allergies, current medications, past family history, past medical history, past social history, past surgical history and problem list.    Review of Systems   Constitutional: Positive for fatigue. Negative for activity change, appetite change, chills, fever and unexpected weight change.   HENT: Negative.    Eyes: Negative for visual disturbance.   Respiratory: Negative for cough, chest tightness, shortness of breath and wheezing.    Cardiovascular: Positive for leg swelling. Negative for chest pain and palpitations.   Gastrointestinal: Negative for abdominal pain, constipation, diarrhea, nausea and vomiting.   Endocrine: Negative for cold intolerance, heat intolerance, polydipsia, polyphagia and polyuria.   Skin: Negative for color change and rash.   Neurological: Negative for dizziness, tremors, speech difficulty, weakness, light-headedness and headaches.   Hematological: Negative for adenopathy. Bruises/bleeds easily.   Psychiatric/Behavioral: Negative for confusion, decreased concentration and suicidal ideas. The patient is not nervous/anxious.    All other systems reviewed and are  "negative.    Vital signs:  /70 (BP Location: Left arm, Patient Position: Sitting, Cuff Size: Adult)   Pulse 50   Temp 96.9 °F (36.1 °C) (Temporal)   Resp 14   Ht 160 cm (62.99\")   Wt 100 kg (221 lb)   SpO2 98%   BMI 39.16 kg/m²     Physical Exam  Vitals and nursing note reviewed.   Constitutional:       General: She is not in acute distress.     Appearance: She is well-developed.      Comments: Pleasant; wearing appropriate face covering   HENT:      Head: Normocephalic.      Nose: Nose normal.   Eyes:      General: No scleral icterus.        Right eye: No discharge.         Left eye: No discharge.      Conjunctiva/sclera: Conjunctivae normal.   Neck:      Thyroid: No thyromegaly.      Vascular: No JVD.   Cardiovascular:      Rate and Rhythm: Normal rate and regular rhythm.      Heart sounds: Normal heart sounds. No murmur heard.   No friction rub.   Pulmonary:      Effort: Pulmonary effort is normal. No respiratory distress.      Breath sounds: Normal breath sounds. No wheezing or rales.   Abdominal:      General: Bowel sounds are normal. There is no distension.      Palpations: Abdomen is soft.      Tenderness: There is no abdominal tenderness. There is no guarding or rebound.   Musculoskeletal:      Cervical back: Neck supple.      Right lower leg: Edema present.      Left lower leg: Edema present.   Lymphadenopathy:      Cervical: No cervical adenopathy.   Skin:     General: Skin is warm and dry.      Capillary Refill: Capillary refill takes less than 2 seconds.      Findings: No rash.      Comments: Bilateral lower extremity discoloration    Neurological:      Mental Status: She is alert and oriented to person, place, and time.      Cranial Nerves: Cranial nerves are intact.      Gait: Gait is intact.   Psychiatric:         Mood and Affect: Mood normal.         Speech: Speech normal.         Behavior: Behavior is cooperative.         Thought Content: Thought content normal.         Cognition and " Memory: Cognition normal.       Assessment/Plan     Diagnoses and all orders for this visit:    1. Type 2 diabetes mellitus with stage 4 chronic kidney disease, with long-term current use of insulin (CMS/[facility]) (Primary)  Comments:  Findings and recommendations discussed with Britta. Reviewed lab results which included an A1C of 6.54 without hypoglycemia..Great job!    2. Essential hypertension  Comments:   Well controlled with Norvasc, clonidine,  and Hydralazine Metoprolol to not be taken if pulse is less than 60,    3. Dyslipidemia  Comments:  Continue cardiovascular risk reduction     4. Chronic diastolic congestive heart failure (CMS/[facility])  Comments:  Encouraged to continue HF Clinic and home management of her CHF      Follow Up   Findings and recommendations discussed with Britta. Reviewed lab results. Cardiovascular risk reduction modifications reinforced including nutrition and activity recommendations. She will follow up with me in September sooner if problems/concerns occur.  Britta was given instructions and counseling regarding her condition or for health maintenance advice. Please see specific information pulled into the AVS if appropriate      This document has been electronically signed by:

## 2021-05-04 NOTE — PATIENT INSTRUCTIONS
Type 2 Diabetes Mellitus, Self Care, Adult  When you have type 2 diabetes (type 2 diabetes mellitus), you must make sure your blood sugar (glucose) stays in a healthy range. You can do this with:  · Nutrition.  · Exercise.  · Lifestyle changes.  · Medicines or insulin, if needed.  · Support from your doctors and others.  How to stay aware of blood sugar    · Check your blood sugar level every day, as often as told.  · Have your A1c (hemoglobin A1c) level checked two or more times a year. Have it checked more often if your doctor tells you to.  Your doctor will set personal treatment goals for you. Generally, you should have these blood sugar levels:  · Before meals (preprandial):  mg/dL (4.4-7.2 mmol/L).  · After meals (postprandial): below 180 mg/dL (10 mmol/L).  · A1c level: less than 7%.  How to manage high and low blood sugar  Signs of high blood sugar  High blood sugar is called hyperglycemia. Know the signs of high blood sugar. Signs may include:  · Feeling:  ? Thirsty.  ? Hungry.  ? Very tired.  · Needing to pee (urinate) more than usual.  · Blurry vision.  Signs of low blood sugar  Low blood sugar is called hypoglycemia. This is when blood sugar is at or below 70 mg/dL (3.9 mmol/L). Signs may include:  · Feeling:  ? Hungry.  ? Worried or nervous (anxious).  ? Sweaty and clammy.  ? Confused.  ? Dizzy.  ? Sleepy.  ? Sick to your stomach (nauseous).  · Having:  ? A fast heartbeat.  ? A headache.  ? A change in your vision.  ? Jerky movements that you cannot control (seizure).  ? Tingling or no feeling (numbness) around your mouth, lips, or tongue.  · Having trouble with:  ? Moving (coordination).  ? Sleeping.  ? Passing out (fainting).  ? Getting upset easily (irritability).  Treating low blood sugar  To treat low blood sugar, eat or drink something sugary right away. If you can think clearly and swallow safely, follow the 15:15 rule:  · Take 15 grams of a fast-acting carb (carbohydrate). Talk with your  doctor about how much you should take.  · Some fast-acting carbs are:  ? Sugar tablets (glucose pills). Take 3-4 pills.  ? 6-8 pieces of hard candy.  ? 4-6 oz (120-150 mL) of fruit juice.  ? 4-6 oz (120-150 mL) of regular (not diet) soda.  ? 1 Tbsp (15 mL) honey or sugar.  · Check your blood sugar 15 minutes after you take the carb.  · If your blood sugar is still at or below 70 mg/dL (3.9 mmol/L), take 15 grams of a carb again.  · If your blood sugar does not go above 70 mg/dL (3.9 mmol/L) after 3 tries, get help right away.  · After your blood sugar goes back to normal, eat a meal or a snack within 1 hour.  Treating very low blood sugar  If your blood sugar is at or below 54 mg/dL (3 mmol/L), you have very low blood sugar (severe hypoglycemia). This is an emergency. Do not wait to see if the symptoms will go away. Get medical help right away. Call your local emergency services (911 in the [place]  If you have very low blood sugar and you cannot eat or drink, you may need a glucagon shot (injection). A family member or friend should learn how to check your blood sugar and how to give you a glucagon shot. Ask your doctor if you need to have a glucagon shot kit at home.  Follow these instructions at home:  Medicine  · Take insulin and diabetes medicines as told.  · If your doctor says you should take more or less insulin and medicines, do this exactly as told.  · Do not run out of insulin or medicines.  Having diabetes can raise your risk for other long-term conditions. These include heart disease and kidney disease. Your doctor may prescribe medicines to help you not have these problems.  Food    · Make healthy food choices. These include:  ? Chicken, fish, egg whites, and beans.  ? Oats, whole wheat, bulgur, brown rice, quinoa, and millet.  ? Fresh fruits and vegetables.  ? Low-fat dairy products.  ? Nuts, avocado, olive oil, and canola oil.  · Meet with a  (dietitian). He or she can help you make an  eating plan that is right for you.  · Follow instructions from your doctor about what you cannot eat or drink.  · Drink enough fluid to keep your pee (urine) pale yellow.  · Keep track of carbs that you eat. Do this by reading food labels and learning food serving sizes.  · Follow your sick day plan when you cannot eat or drink normally. Make this plan with your doctor so it is ready to use.  Activity  · Exercise 3 or more times a week.  · Do not go more than 2 days without exercising.  · Talk with your doctor before you start a new exercise. Your doctor may need to tell you to change:  ? How much insulin or medicines you take.  ? How much food you eat.  Lifestyle  · Do not use any tobacco products. These include cigarettes, chewing tobacco, and e-cigarettes. If you need help quitting, ask your doctor.  · Ask your doctor how much alcohol is safe for you.  · Learn to deal with stress. If you need help with this, ask your doctor.  Body care    · Stay up to date with your shots (immunizations).  · Have your eyes and feet checked by a doctor as often as told.  · Check your skin and feet every day. Check for cuts, bruises, redness, blisters, or sores.  · Brush your teeth and gums two times a day. Floss one or more times a day.  · Go to the dentist one or more times every 6 months.  · Stay at a healthy weight.  General instructions  · Take over-the-counter and prescription medicines only as told by your doctor.  · Share your diabetes care plan with:  ? Your work or school.  ? People you live with.  · Carry a card or wear jewelry that says you have diabetes.  · Keep all follow-up visits as told by your doctor. This is important.  Questions to ask your doctor  · Do I need to meet with a diabetes educator?  · Where can I find a support group for people with diabetes?  Where to find more information  To learn more about diabetes, visit:  · American Diabetes Association: www.diabetes.org  · American Association of Diabetes  "Educators: www.diabeteseducator.org  Summary  · When you have type 2 diabetes, you must make sure your blood sugar (glucose) stays in a healthy range.  · Check your blood sugar every day, as often as told.  · Having diabetes can raise your risk for other conditions. Your doctor may prescribe medicines to help you not have these problems.  · Keep all follow-up visits as told by your doctor. This is important.  This information is not intended to replace advice given to you by your health care provider. Make sure you discuss any questions you have with your health care provider.  Document Revised: 06/10/2019 Document Reviewed: 01/20/2017  [employer] Patient Education © 2020 [employer]  HOLD METOPROLOL IF PULSE IS 60 OR BELOW  Textbook of Physical Diagnosis: History and Examination (8th ed.). RAFIQ Presley: Adarsh.\">   How to Take a Pulse  Your pulse is the increase in pressure inside the blood vessels that carry blood from the heart to the rest of the body (arteries). Every time the heart beats, you can feel your pulse in an artery near the surface of your skin.  You can easily feel your pulse in the artery in your wrist (radial artery) and in the artery in your neck (carotid artery). Taking your pulse can tell you how fast your heart is beating and whether it has a normal rhythm. You can also tell whether your heart is beating strongly or weakly.  What you need to know about pulse rates  Your pulse is the same as your heart rate. Both are measured in beats per minute (bpm). A normal resting heart rate varies depending on a person's age.  · Infants under 1 year of age: Normal heart rate of 100-180 bpm.  · Toddler (1-2 years of age): Normal heart rate of  bpm.  ·  (3-5 years of age): Normal heart rate of  bpm.  · School-age children (6-11 years of age): Normal heart rate of 75 -118 bpm.  · Everyone over 12 years of age: Normal heart rate of  bpm.  What things can affect your pulse " rate?  There can be a lot of variation in your pulse. It can be different depending on the time of day or the amount of exercise that you get. It changes with your fitness level. Many things can change the speed and regularity of your pulse. These include:  · Exercise.  · Fever.  · Stress.  · Heart problems.  · Poor circulation.  · Smoking.  · Medicines.  How to take your pulse  To take your pulse, all you need is a digital stopwatch or a clock or watch that has a second hand. The best time to measure your resting pulse is in the morning before you start moving around. Take it as soon as you wake up or after resting for about 10 minutes. There are no firm rules about how often to check your pulse. In general, it is a good idea to check your pulse at least once a month. Measuring your pulse is a good way to check your heart health.  Checking your pulse before and after exercise can tell you if you are getting the right amount of exercise. This is called finding your target heart rate. Your target heart rate depends on your age, fitness, and health. Ask your health care provider what would be a safe target heart rate for you during exercise.  Radial pulse  To check the pulse in your radial artery:  1. Turn one hand palm-up and relax your arm.  2. Place two fingers of your other hand gently over your wrist, just below the base of your thumb.  3. Place your fingertips just inside the bone that runs along the outside of your arm.  4. Slowly increase pressure until you feel a pulsing beneath your fingers. You may need to move your fingers slightly.  5. Do not press too hard. Too much pressure may cut off blood supply.  6. Count how many pulse beats you feel in 1 minute. Or, count how many pulse beats you feel in 30 seconds and double that number.  7. Pay attention to the rhythm of the pulse. It should be steady and even.    Carotid pulse  To check the pulse in your carotid artery:  1. Place two fingers just to one side of  your Iglesia's apple so that you feel a pulsing beneath your fingers.  2. Do not press too hard. Too much pressure may cut off blood supply and can make you dizzy.  3. Count how many pulse beats you feel in 1 minute. Or, count how many pulse beats you feel in 30 seconds and double that number.  4. Pay attention to the rhythm of the pulse. It should be steady and even.    Where to find more information  American Heart Association: www.heart.org  Contact a health care provider if:  · Your pulse is too slow or too fast.  · Your pulse is weak or hard to find.  Get help right away if:  · You have skipped beats or extra beats.  · Your pulse has an irregular rhythm.  · You have an abnormal pulse along with dizziness, fatigue, or shortness of breath.  These symptoms may represent a serious problem that is an emergency. Do not wait to see if the symptoms will go away. Get medical help right away. Call your local emergency services (911 in the [place] Do not drive yourself to the hospital.  Summary  · Your pulse is the same as your heart rate. Both are measured in beats per minute (bpm).  · Taking your pulse can tell you how fast your heart is beating and whether it has a normal rhythm.  · The best time to measure your resting pulse is in the morning before you start moving around.  · Measuring your pulse is a good way to check your heart health.  This information is not intended to replace advice given to you by your health care provider. Make sure you discuss any questions you have with your health care provider.  Document Revised: 11/30/2020 Document Reviewed: 11/30/2020  [employer] Patient Education © 2021 [employer]

## 2021-05-07 ENCOUNTER — TELEPHONE (OUTPATIENT)
Dept: CARDIOLOGY | Facility: HOSPITAL | Age: 76
End: 2021-05-07

## 2021-05-07 NOTE — TELEPHONE ENCOUNTER
Spoke to Britta to see how her heart rate has been doing. She stated that sometimes it's below 70 but often ranges. She saw Bhanu Ta on 5/4/21 and she told her that if her HR was below 70, not to take her Metoprolol. This morning her BP was 140/80 and HR 90. Denies any recent dizziness or lightheadedness. Did state that she had a headache a few days ago, but only lasted one day. No further problems or symptoms at this time.     ----- Message from ERNIE Brady sent at 5/6/2021 11:12 AM EDT -----  Call to see how patient's heart rate is doing at home?  How is she taking her metoprolol?  Is she having any dizziness lightheadedness or near syncope symptoms?

## 2021-05-17 ENCOUNTER — OFFICE VISIT (OUTPATIENT)
Dept: ONCOLOGY | Facility: CLINIC | Age: 76
End: 2021-05-17

## 2021-05-17 ENCOUNTER — LAB (OUTPATIENT)
Dept: ONCOLOGY | Facility: CLINIC | Age: 76
End: 2021-05-17

## 2021-05-17 VITALS
WEIGHT: 215 LBS | TEMPERATURE: 97.1 F | HEART RATE: 90 BPM | DIASTOLIC BLOOD PRESSURE: 72 MMHG | RESPIRATION RATE: 16 BRPM | OXYGEN SATURATION: 100 % | HEIGHT: 63 IN | SYSTOLIC BLOOD PRESSURE: 149 MMHG | BODY MASS INDEX: 38.09 KG/M2

## 2021-05-17 VITALS
OXYGEN SATURATION: 100 % | WEIGHT: 215 LBS | HEIGHT: 63 IN | RESPIRATION RATE: 16 BRPM | SYSTOLIC BLOOD PRESSURE: 149 MMHG | BODY MASS INDEX: 38.09 KG/M2 | HEART RATE: 90 BPM | TEMPERATURE: 97.1 F | DIASTOLIC BLOOD PRESSURE: 72 MMHG

## 2021-05-17 DIAGNOSIS — D50.9 IRON DEFICIENCY ANEMIA, UNSPECIFIED IRON DEFICIENCY ANEMIA TYPE: ICD-10-CM

## 2021-05-17 DIAGNOSIS — E03.9 HYPOTHYROIDISM, UNSPECIFIED TYPE: ICD-10-CM

## 2021-05-17 DIAGNOSIS — N18.9 CHRONIC KIDNEY DISEASE, UNSPECIFIED CKD STAGE: ICD-10-CM

## 2021-05-17 DIAGNOSIS — K90.9 MALABSORPTION OF IRON: ICD-10-CM

## 2021-05-17 DIAGNOSIS — D50.9 IRON DEFICIENCY ANEMIA, UNSPECIFIED IRON DEFICIENCY ANEMIA TYPE: Primary | ICD-10-CM

## 2021-05-17 LAB
ALBUMIN SERPL-MCNC: 3.76 G/DL (ref 3.5–5.2)
ALBUMIN/GLOB SERPL: 1 G/DL
ALP SERPL-CCNC: 392 U/L (ref 39–117)
ALT SERPL W P-5'-P-CCNC: 46 U/L (ref 1–33)
ANION GAP SERPL CALCULATED.3IONS-SCNC: 12 MMOL/L (ref 5–15)
AST SERPL-CCNC: 68 U/L (ref 1–32)
BASOPHILS # BLD AUTO: 0.04 10*3/MM3 (ref 0–0.2)
BASOPHILS NFR BLD AUTO: 0.4 % (ref 0–1.5)
BILIRUB SERPL-MCNC: 0.2 MG/DL (ref 0–1.2)
BUN SERPL-MCNC: 38 MG/DL (ref 8–23)
BUN/CREAT SERPL: 21 (ref 7–25)
CALCIUM SPEC-SCNC: 9.6 MG/DL (ref 8.6–10.5)
CHLORIDE SERPL-SCNC: 106 MMOL/L (ref 98–107)
CO2 SERPL-SCNC: 22 MMOL/L (ref 22–29)
CREAT SERPL-MCNC: 1.81 MG/DL (ref 0.57–1)
DEPRECATED RDW RBC AUTO: 51.3 FL (ref 37–54)
EOSINOPHIL # BLD AUTO: 0.81 10*3/MM3 (ref 0–0.4)
EOSINOPHIL NFR BLD AUTO: 8.1 % (ref 0.3–6.2)
ERYTHROCYTE [DISTWIDTH] IN BLOOD BY AUTOMATED COUNT: 17 % (ref 12.3–15.4)
FERRITIN SERPL-MCNC: 359.3 NG/ML (ref 13–150)
GFR SERPL CREATININE-BSD FRML MDRD: 27 ML/MIN/1.73
GLOBULIN UR ELPH-MCNC: 3.9 GM/DL
GLUCOSE SERPL-MCNC: 124 MG/DL (ref 65–99)
HCT VFR BLD AUTO: 30.9 % (ref 34–46.6)
HGB BLD-MCNC: 9.2 G/DL (ref 12–15.9)
IMM GRANULOCYTES # BLD AUTO: 0.02 10*3/MM3 (ref 0–0.05)
IMM GRANULOCYTES NFR BLD AUTO: 0.2 % (ref 0–0.5)
IRON 24H UR-MRATE: 44 MCG/DL (ref 37–145)
IRON SATN MFR SERPL: 12 % (ref 20–50)
LYMPHOCYTES # BLD AUTO: 1.89 10*3/MM3 (ref 0.7–3.1)
LYMPHOCYTES NFR BLD AUTO: 18.9 % (ref 19.6–45.3)
MCH RBC QN AUTO: 24.5 PG (ref 26.6–33)
MCHC RBC AUTO-ENTMCNC: 29.8 G/DL (ref 31.5–35.7)
MCV RBC AUTO: 82.4 FL (ref 79–97)
MONOCYTES # BLD AUTO: 0.6 10*3/MM3 (ref 0.1–0.9)
MONOCYTES NFR BLD AUTO: 6 % (ref 5–12)
NEUTROPHILS NFR BLD AUTO: 6.63 10*3/MM3 (ref 1.7–7)
NEUTROPHILS NFR BLD AUTO: 66.4 % (ref 42.7–76)
NRBC BLD AUTO-RTO: 0 /100 WBC (ref 0–0.2)
PLATELET # BLD AUTO: 245 10*3/MM3 (ref 140–450)
PMV BLD AUTO: 8.9 FL (ref 6–12)
POTASSIUM SERPL-SCNC: 4.5 MMOL/L (ref 3.5–5.2)
PROT SERPL-MCNC: 7.7 G/DL (ref 6–8.5)
RBC # BLD AUTO: 3.75 10*6/MM3 (ref 3.77–5.28)
SODIUM SERPL-SCNC: 140 MMOL/L (ref 136–145)
TIBC SERPL-MCNC: 359 MCG/DL (ref 298–536)
TRANSFERRIN SERPL-MCNC: 241 MG/DL (ref 200–360)
WBC # BLD AUTO: 9.99 10*3/MM3 (ref 3.4–10.8)

## 2021-05-17 PROCEDURE — 85025 COMPLETE CBC W/AUTO DIFF WBC: CPT | Performed by: NURSE PRACTITIONER

## 2021-05-17 PROCEDURE — 83540 ASSAY OF IRON: CPT | Performed by: NURSE PRACTITIONER

## 2021-05-17 PROCEDURE — 99214 OFFICE O/P EST MOD 30 MIN: CPT | Performed by: NURSE PRACTITIONER

## 2021-05-17 PROCEDURE — 80053 COMPREHEN METABOLIC PANEL: CPT | Performed by: NURSE PRACTITIONER

## 2021-05-17 PROCEDURE — 84466 ASSAY OF TRANSFERRIN: CPT | Performed by: NURSE PRACTITIONER

## 2021-05-17 PROCEDURE — 82728 ASSAY OF FERRITIN: CPT | Performed by: NURSE PRACTITIONER

## 2021-05-17 NOTE — PROGRESS NOTES
DATE:  5/17/2021    REASON FOR FOLLOW UP: Anemia    REFERRING PHYSICIAN:   Jackson Johnson MD    TREATMENT:   1. Oral iron-discontinued for apparent malabsorption    2.       CHIEF COMPLAINT:  Follow up of anemia    HISTORY OF PRESENT ILLNESS:   Britta Lee is a  75 y.o. female with multiple medical problems including CKD, diabetes mellitus type 2, CHF, CAD, hypertension, GERD and OA, who is being seen today at the request of  Jackson Johnson MD for evaluation and treatment of anemia. Ms. Lee reports following with her PCP and nephrology routinely with blood testing every ~3 months. She says she has struggled with anemia for several years. Previous available CBCs were reviewed and patient has had chronic, normocytic anemia since at least December 2016 with Hg most recently ranging ~ 8.6-9.8. Her WBC and platelets have been normal. Iron panel and ferritin levels have been most c/w AOCD/inflammation since at least May 2017.  She reports receiving IV iron infusions several years ago. At present, she is taking ferrous sulfate BID which she is tolerating well. She says this was started per nephrology ~2 months ago. She denies obvious bleeding from any source. She reports having screening colonoscopy with Dr. Zamora last year which was unremarkable and negative for bleeding. She complains of chronic fatigue which is stable. Also complains of occasional lower extremity edema. She denies any other complaints today.     INTERVAL HISTORY:  Ms. Lee presents today for follow up of anemia. Since her last visit, overall she has been doing well. She was replaced with IV Feraheme for malabsorption of iron which she completed on 4/7/21. She complains of chronic fatigue which somewhat improved following IV iron and she has been trying to do more household chores during the day. She followed up with her PCP for hypothyroidism and was started on synthroid 25 mcg daily. She continues to follow closely with nephrology. She  again denies bleeding from any source. She denies any other complaints today.     PAST MEDICAL HISTORY:  Past Medical History:   Diagnosis Date   • Acquired hypothyroidism 4/22/2021   • Anemia    • Arthritis    • Carpal tunnel syndrome    • Coronary artery disease    • Elevated cholesterol    • GERD (gastroesophageal reflux disease)    • History of pneumonia    • History of unsteady gait     OCASSIONALLY   • Insomnia    • Kidney disease    • LENNY (obstructive sleep apnea) 12/1/2020   • Osteoarthritis    • Renal insufficiency    • Sciatica        PAST SURGICAL HISTORY:  Past Surgical History:   Procedure Laterality Date   • ABDOMINAL SURGERY     • APPENDECTOMY     • CARDIAC CATHETERIZATION      1 STENT  ---  2000   • CARDIAC SURGERY     • CAROTID STENT     • CARPAL TUNNEL RELEASE Right 10/8/2019    Procedure: CARPAL TUNNEL RELEASE;  Surgeon: Feroz Johnson MD;  Location: [facility];  Service: Orthopedics   • CATARACT EXTRACTION     • CHOLECYSTECTOMY     • COLONOSCOPY     • CORONARY ANGIOPLASTY WITH STENT PLACEMENT     • ENDOSCOPY     • ENDOSCOPY N/A 3/5/2020    Procedure: ESOPHAGOGASTRODUODENOSCOPY;  Surgeon: Alexandru Bagley MD;  Location: [facility];  Service: Gastroenterology;  Laterality: N/A;   • ENDOSCOPY AND COLONOSCOPY     • GALLBLADDER SURGERY     • JOINT REPLACEMENT Left 05/02/2017    [facility]  DR JOHNSON  LEFT TOTAL KNEE   • KNEE ARTHROSCOPY W/ MENISCECTOMY Right    • LAPAROSCOPIC TUBAL LIGATION     • ND TOTAL KNEE ARTHROPLASTY Left 5/2/2017    Procedure: TOTAL KNEE ARTHROPLASTY;  Surgeon: Feroz Johnson MD;  Location: [facility];  Service: Orthopedics   • STERILIZATION     • TONSILLECTOMY         FAMILY HISTORY:  Family History   Problem Relation Age of Onset   • Arthritis Mother    • Diabetes Mother    • Cancer Mother    • Heart disease Father    • Diabetes Daughter    • Diabetes Son    • Diabetes Maternal Aunt    • Heart disease Maternal Grandmother    • Breast cancer Neg Hx        SOCIAL  HISTORY:  Social History     Socioeconomic History   • Marital status:      Spouse name: natalie   • Number of children: 3   • Years of education: 12   • Highest education level: Not on file   Tobacco Use   • Smoking status: Never Smoker   • Smokeless tobacco: Never Used   Vaping Use   • Vaping Use: Never used   Substance and Sexual Activity   • Alcohol use: Yes     Comment: socially   • Drug use: No   • Sexual activity: Defer     Birth control/protection: Surgical       MEDICATIONS:  The current medication list was reviewed in the EMR    Current Outpatient Medications:   •  albuterol sulfate  (90 Base) MCG/ACT inhaler, Inhale 2 puffs Every 4 (Four) Hours As Needed for Shortness of Air., Disp: 18 g, Rfl: 0  •  amLODIPine (NORVASC) 10 MG tablet, Take 1 tablet by mouth Every Night., Disp: 90 tablet, Rfl: 1  •  aspirin 81 MG tablet, Take 1 tablet by mouth Daily., Disp: 30 tablet, Rfl: 5  •  atorvastatin (LIPITOR) 40 MG tablet, Take 1 tablet by mouth Every Night., Disp: 90 tablet, Rfl: 1  •  BD Pen Needle Evon U/F 32G X 4 MM misc, , Disp: , Rfl:   •  bumetanide (BUMEX) 1 MG tablet, Take 1 tablet by mouth Daily. May take an extra 0.5 mg in the evening for swelling or weight gain, Disp: 60 tablet, Rfl: 1  •  cloNIDine (CATAPRES) 0.2 MG tablet, Take 1 tablet by mouth 3 (Three) Times a Day., Disp: 270 tablet, Rfl: 1  •  Continuous Blood Gluc  (Dexcom G6 ) device, 1 Device Daily., Disp: 1 Device, Rfl: 0  •  Continuous Blood Gluc Sensor (Dexcom G6 Sensor), Every 10 (Ten) Days., Disp: 9 each, Rfl: 3  •  Continuous Blood Gluc Transmit (Dexcom G6 Transmitter) misc, 1 Device Daily., Disp: 1 each, Rfl: 0  •  escitalopram (LEXAPRO) 10 MG tablet, Take 1 tablet by mouth Daily., Disp: 90 tablet, Rfl: 3  •  FeroSul 325 (65 Fe) MG tablet, Take 1 tablet by mouth 2 (Two) Times a Day., Disp: , Rfl:   •  glucose blood test strip, 1 each by Other route 3 (Three) Times a Day., Disp: 100 each, Rfl: 12  •   hydrALAZINE (APRESOLINE) 50 MG tablet, Take 1 tablet by mouth Every 8 (Eight) Hours. (Patient taking differently: Take 50 mg by mouth 2 (two) times a day.), Disp: 270 tablet, Rfl: 1  •  hydroCHLOROthiazide (HYDRODIURIL) 25 MG tablet, Take 25 mg by mouth Daily., Disp: , Rfl:   •  Insulin Glargine, 2 Unit Dial, (Toujeo Farhat SoloStar) 300 UNIT/ML solution pen-injector injection, Inject 60 Units under the skin into the appropriate area as directed Daily. (Patient taking differently: Inject 40-60 Units under the skin into the appropriate area as directed Daily.), Disp: 6 pen, Rfl: 3  •  insulin lispro (humaLOG) 100 UNIT/ML injection, Inject 5 Units under the skin into the appropriate area as directed 3 (Three) Times a Day Before Meals., Disp: , Rfl:   •  Insulin Pen Needle 32G X 5 MM misc, 1 each 4 (Four) Times a Day., Disp: 120 each, Rfl: 5  •  isosorbide mononitrate (IMDUR) 60 MG 24 hr tablet, Take 1 tablet by mouth Daily., Disp: 90 tablet, Rfl: 1  •  levothyroxine (SYNTHROID, LEVOTHROID) 25 MCG tablet, Take 1 tablet by mouth Daily., Disp: 30 tablet, Rfl: 5  •  metoprolol succinate XL (TOPROL-XL) 25 MG 24 hr tablet, Take 1 tablet by mouth Daily. Hold if HR less than 60 bpm., Disp: 30 tablet, Rfl: 1  •  nystatin (MYCOSTATIN) 031721 UNIT/GM powder, Apply  one application topically to the appropriate area as directed Every 12 (Twelve) Hours. (Patient taking differently: Apply  topically to the appropriate area as directed 2 (Two) Times a Day As Needed (itching).), Disp: 60 g, Rfl: 0  •  vitamin D (ERGOCALCIFEROL) 1.25 MG (33329 UT) capsule capsule, Take 50,000 Units by mouth 1 (One) Time Per Week., Disp: , Rfl:     ALLERGIES:  No Known Allergies      REVIEW OF SYSTEMS:    A comprehensive 14 point review of systems was performed.  Significant findings as mentioned above.  All other systems reviewed and are negative.        Physical Exam   Vital Signs: /72   Pulse 90   Temp 97.1 °F (36.2 °C) (Temporal)   Resp 16    "Ht 160 cm (63\")   Wt 97.5 kg (215 lb)   SpO2 100%   BMI 38.09 kg/m²    General: Well developed, well nourished, alert and oriented x 3, in no acute distress.   Head: ATNC   Eyes: PERRL, No evidence of conjunctivitis.   Nose: No nasal discharge.   Mouth: Oral mucosal membranes moist. No oral ulceration or hemorrhages.   Neck: Neck supple. No thyromegaly. No JVD.   Lungs: Clear in all fields to A&P without rales, rhonchi or wheezing.   Heart: S1, S2. Regular rate and rhythm. No murmurs, rubs, or gallops.   Abdomen: Soft. Bowel sounds are normoactive. Nontender with palpation. No Hepatosplenomegaly can be appreciated.   Extremities: No cyanosis or edema.   Neurologic: Grossly non-focal exam    Pain Score:  Pain Score    05/17/21 0913   PainSc: 0-No pain       RECENT LABS:  Lab Results   Component Value Date    WBC 9.99 05/17/2021    HGB 9.2 (L) 05/17/2021    HCT 30.9 (L) 05/17/2021    MCV 82.4 05/17/2021    RDW 17.0 (H) 05/17/2021     05/17/2021    NEUTRORELPCT 66.4 05/17/2021    LYMPHORELPCT 18.9 (L) 05/17/2021    MONORELPCT 6.0 05/17/2021    EOSRELPCT 8.1 (H) 05/17/2021    BASORELPCT 0.4 05/17/2021    NEUTROABS 6.63 05/17/2021    LYMPHSABS 1.89 05/17/2021       Lab Results   Component Value Date     05/17/2021    K 4.5 05/17/2021    CO2 22.0 05/17/2021     05/17/2021    BUN 38 (H) 05/17/2021    CREATININE 1.81 (H) 05/17/2021    EGFRIFNONA 27 (L) 05/17/2021    EGFRIFAFRI 41 (L) 07/30/2018    GLUCOSE 124 (H) 05/17/2021    CALCIUM 9.6 05/17/2021    ALKPHOS 392 (H) 05/17/2021    AST 68 (H) 05/17/2021    ALT 46 (H) 05/17/2021    BILITOT 0.2 05/17/2021    ALBUMIN 3.76 05/17/2021    PROTEINTOT 7.7 05/17/2021    MG 2.2 04/14/2021    PHOS 3.9 03/12/2021     Lab Results   Component Value Date    FERRITIN 359.30 (H) 05/17/2021    IRON 44 05/17/2021    TIBC 359 05/17/2021    LABIRON 12 (L) 05/17/2021    UNOLNWWI73 736 03/26/2021    FOLATE 12.60 03/26/2021    HAPTOGLOBIN 256 (H) 03/26/2021    RETICCTPCT 2.49 " (H) 03/26/2021    RETIC 0.0899 03/26/2021     Workup 3/26/21    Ferritin   13.00 - 150.00 ng/mL 160.20High       Iron   37 - 145 mcg/dL 36Low     Iron Saturation   20 - 50 % 9Low     Transferrin   200 - 360 mg/dL 270    TIBC   298 - 536 mcg/dL 402      Vitamin B-12   211 - 946 pg/mL 736      Folate   4.78 - 24.20 ng/mL 12.60      Reticulocyte %   0.70 - 1.90 % 2.49High     Reticulocyte Absolute   0.0200 - 0.1300 10*6/mm3 0.0899      & Units 1 mo ago   LDH   135 - 214 U/L 234High       Haptoglobin   30 - 200 mg/dL 256High       TSH   0.270 - 4.200 uIU/mL 6.430High       C-Reactive Protein   0.00 - 0.50 mg/dL 1.70High       Sed Rate   0 - 30 mm/hr >100High       Copper   80 - 158 ug/dL 189High       Zinc   44 - 115 ug/dL 67      Tissue Transglutaminase IgA   0 - 3 U/mL 2          ASSESSMENT & PLAN:  Britta Lee is a very pleasant 75 y.o. female with    1.  Normocytic anemia:  2. CKD  3. Iron deficiency anemia  4. Hypothyroidism    -Patient follows with PCP and nephrology routinely with blood testing every ~3 months.   -Previous available CBCs were reviewed and patient has had chronic, normocytic anemia since at least December 2016 with Hg most recently ranging ~ 8.6-9.8. Her WBC and platelets have been normal. Iron panel and ferritin levels have been most c/w AOCD/inflammation since at least May 2017.    -She reports receiving IV iron infusions several years ago. She has been taking ferrous sulfate BID and tolerating well.   -Denies obvious bleeding from any source. Reportedly had screening colonoscopy with Dr. Zamora last year which was unremarkable and negative for bleeding. Records unavailable, will request.   -Obtained repeat CBC on initial consultation which showed normocytic anemia with Hg of 8.6 which has been trending down. WBC and platelets were again normal. CMP showing Cr of 1.83.   -Repeat iron panel and ferritin showed low serum iron and iron saturation with normal TIBC and elevated ferritin of 160.20  which has also been trending down. Patient likely has AOCD/inflammation with also a component of Iron deficiency. As above, she has been taking ferrous sulfate. Therefore, discontinued oral iron and replaced with IV Feraheme for apparent malabsorption of iron which she completed on 4/7/21.    -In the meantime, recommended continued close follow up with PCP for management of diabetes, hypothyroidism, hypertension and nephology for CKD which she has been doing. Would defer decision regarding epogen to nephrology. Also recommend repeat GI evaluation and patient was agreeable. Will place referral today.   -Repeat CBC today shows Hg 9.2 which is stable/improved.  Repeat iron panel and ferritin improved and most c/w AOCD/Inflammation.   -To further evaluate anemia, also previously sent additional labs including PBS which showed normocytic, hypochromic anemia with no schistocytes, normal WBC with borderline neutrophilia and mild eosinophilia, no dysplasia or blasts, adequate platelets.  B12 and folate were normal. No evidence of hemolysis, TSH was elevated and she was advised to follow up with PCP who started her on synthroid 25 mcg daily, ESR and CRP were elevated and suggestive of underlying inflammation, copper and zinc were both replete, tissue transglutaminase was normal.    -Will continue to monitor. Will check labs in 6 weeks and follow up in 3 months with repeat labs. Will replace with IV Feraheme if needed.     ACO / MILES/Other  Quality measures  -  Britta Lee received 2020 flu vaccine.  -  Britta Lee reports a pain score of 0.    -  Current outpatient and discharge medications have been reconciled for the patient.  Reviewed by: ERNIE De Leon    The patient was in agreement with the plan and all questions were answered to her satisfaction.     Thank you so much for allowing us to participate in the care of Britta Lee . Please do not hesitate to contact us with any questions or concerns.      A total of 25 minutes were spent coordinating this patient’s care in clinic today; more than 50% of this time was face-to-face with the patient, reviewing her medical history and counseling on the current treatment and followup plan. All questions were answered to her satisfaction.      Electronically Signed by: ERNIE De Leon , May 17, 2021 08:50 EDT       CC:   Lexie Dolan PA

## 2021-05-26 ENCOUNTER — HOSPITAL ENCOUNTER (OUTPATIENT)
Dept: CARDIOLOGY | Facility: HOSPITAL | Age: 76
Discharge: HOME | End: 2021-05-26
Admitting: NURSE PRACTITIONER

## 2021-05-26 VITALS
SYSTOLIC BLOOD PRESSURE: 118 MMHG | DIASTOLIC BLOOD PRESSURE: 58 MMHG | RESPIRATION RATE: 22 BRPM | BODY MASS INDEX: 38.51 KG/M2 | HEART RATE: 68 BPM | WEIGHT: 217.4 LBS | OXYGEN SATURATION: 98 %

## 2021-05-26 DIAGNOSIS — I50.32 CHRONIC DIASTOLIC CONGESTIVE HEART FAILURE (HCC): Primary | ICD-10-CM

## 2021-05-26 DIAGNOSIS — I10 ESSENTIAL HYPERTENSION: ICD-10-CM

## 2021-05-26 DIAGNOSIS — G47.33 OSA (OBSTRUCTIVE SLEEP APNEA): ICD-10-CM

## 2021-05-26 DIAGNOSIS — E66.09 CLASS 2 OBESITY DUE TO EXCESS CALORIES WITH BODY MASS INDEX (BMI) OF 39.0 TO 39.9 IN ADULT, UNSPECIFIED WHETHER SERIOUS COMORBIDITY PRESENT: ICD-10-CM

## 2021-05-26 DIAGNOSIS — N18.4 CKD (CHRONIC KIDNEY DISEASE) STAGE 4, GFR 15-29 ML/MIN (HCC): ICD-10-CM

## 2021-05-26 DIAGNOSIS — R60.0 PEDAL EDEMA: ICD-10-CM

## 2021-05-26 LAB
ABSOLUTE LUNG FLUID CONTENT: 29 % (ref 20–35)
ANION GAP SERPL CALCULATED.3IONS-SCNC: 13.1 MMOL/L (ref 5–15)
BUN SERPL-MCNC: 50 MG/DL (ref 8–23)
BUN/CREAT SERPL: 23.3 (ref 7–25)
CALCIUM SPEC-SCNC: 9.6 MG/DL (ref 8.6–10.5)
CHLORIDE SERPL-SCNC: 106 MMOL/L (ref 98–107)
CO2 SERPL-SCNC: 20.9 MMOL/L (ref 22–29)
CREAT SERPL-MCNC: 2.15 MG/DL (ref 0.57–1)
GFR SERPL CREATININE-BSD FRML MDRD: 22 ML/MIN/1.73
GLUCOSE SERPL-MCNC: 107 MG/DL (ref 65–99)
MAGNESIUM SERPL-MCNC: 2.4 MG/DL (ref 1.6–2.4)
NT-PROBNP SERPL-MCNC: 1783 PG/ML (ref 0–1800)
POTASSIUM SERPL-SCNC: 4.7 MMOL/L (ref 3.5–5.2)
SODIUM SERPL-SCNC: 140 MMOL/L (ref 136–145)

## 2021-05-26 PROCEDURE — 83880 ASSAY OF NATRIURETIC PEPTIDE: CPT

## 2021-05-26 PROCEDURE — 99213 OFFICE O/P EST LOW 20 MIN: CPT | Performed by: NURSE PRACTITIONER

## 2021-05-26 PROCEDURE — 80048 BASIC METABOLIC PNL TOTAL CA: CPT | Performed by: NURSE PRACTITIONER

## 2021-05-26 PROCEDURE — 94726 PLETHYSMOGRAPHY LUNG VOLUMES: CPT | Performed by: NURSE PRACTITIONER

## 2021-05-26 PROCEDURE — 83735 ASSAY OF MAGNESIUM: CPT | Performed by: NURSE PRACTITIONER

## 2021-05-26 PROCEDURE — 36415 COLL VENOUS BLD VENIPUNCTURE: CPT | Performed by: NURSE PRACTITIONER

## 2021-05-26 RX ORDER — BUMETANIDE 0.5 MG/1
0.5 TABLET ORAL DAILY
Qty: 30 TABLET | Refills: 1 | Status: SHIPPED | OUTPATIENT
Start: 2021-05-26 | End: 2021-09-01 | Stop reason: SDUPTHER

## 2021-05-26 NOTE — PROGRESS NOTES
Heart Failure Pharmacy Note  Patient Name: Britta Lee   Referring Provider: Priscila Holland  Primary Cardiologist: Dr. Cruz  Type of CHF: diastolic     Medication Use:   Adherence: No issues  Hx of med intolerances: bradycardia with metoprolol 100mg BID  Affordability: No issues reported   Retail Rx Management: none    Past Medical History:   Diagnosis Date   • Acquired hypothyroidism 4/22/2021   • Anemia    • Arthritis    • Carpal tunnel syndrome    • Coronary artery disease    • Diabetes mellitus (CMS/HCC)    • Elevated cholesterol    • GERD (gastroesophageal reflux disease)    • History of pneumonia    • History of unsteady gait     OCASSIONALLY   • Insomnia    • Kidney disease    • LENNY (obstructive sleep apnea) 12/1/2020   • Osteoarthritis    • Renal insufficiency    • Sciatica      ALLERGIES: Patient has no known allergies.  Current Outpatient Medications   Medication Sig Dispense Refill   • albuterol sulfate  (90 Base) MCG/ACT inhaler Inhale 2 puffs Every 4 (Four) Hours As Needed for Shortness of Air. 18 g 0   • amLODIPine (NORVASC) 10 MG tablet Take 1 tablet by mouth Every Night. 90 tablet 1   • aspirin 81 MG tablet Take 1 tablet by mouth Daily. 30 tablet 5   • atorvastatin (LIPITOR) 40 MG tablet Take 1 tablet by mouth Every Night. 90 tablet 1   • BD Pen Needle Evon U/F 32G X 4 MM misc      • bumetanide (BUMEX) 0.5 MG tablet Take 1 tablet by mouth Daily. 30 tablet 1   • cloNIDine (CATAPRES) 0.2 MG tablet Take 1 tablet by mouth 3 (Three) Times a Day. 270 tablet 1   • Continuous Blood Gluc  (Dexcom G6 ) device 1 Device Daily. 1 Device 0   • Continuous Blood Gluc Sensor (Dexcom G6 Sensor) Every 10 (Ten) Days. 9 each 3   • Continuous Blood Gluc Transmit (Dexcom G6 Transmitter) misc 1 Device Daily. 1 each 0   • escitalopram (LEXAPRO) 10 MG tablet Take 1 tablet by mouth Daily. 90 tablet 3   • glucose blood test strip 1 each by Other route 3 (Three) Times a Day. 100 each 12   • hydrALAZINE  (APRESOLINE) 50 MG tablet Take 1 tablet by mouth Every 8 (Eight) Hours. (Patient taking differently: Take 50 mg by mouth 3 (Three) Times a Day.) 270 tablet 1   • hydroCHLOROthiazide (HYDRODIURIL) 25 MG tablet Take 25 mg by mouth Daily.     • Insulin Glargine, 2 Unit Dial, (Toujeo Max SoloStar) 300 UNIT/ML solution pen-injector injection Inject 60 Units under the skin into the appropriate area as directed Daily. (Patient taking differently: Inject 40-60 Units under the skin into the appropriate area as directed Daily.) 6 pen 3   • insulin lispro (humaLOG) 100 UNIT/ML injection Inject 5 Units under the skin into the appropriate area as directed 3 (Three) Times a Day Before Meals.     • Insulin Pen Needle 32G X 5 MM misc 1 each 4 (Four) Times a Day. 120 each 5   • isosorbide mononitrate (IMDUR) 60 MG 24 hr tablet Take 1 tablet by mouth Daily. 90 tablet 1   • levothyroxine (SYNTHROID, LEVOTHROID) 25 MCG tablet Take 1 tablet by mouth Daily. 30 tablet 5   • metoprolol succinate XL (TOPROL-XL) 25 MG 24 hr tablet Take 1 tablet by mouth Daily. Hold if HR less than 60 bpm. 30 tablet 1   • nystatin (MYCOSTATIN) 040983 UNIT/GM powder Apply  one application topically to the appropriate area as directed Every 12 (Twelve) Hours. (Patient taking differently: Apply  topically to the appropriate area as directed 2 (Two) Times a Day As Needed (itching).) 60 g 0   • vitamin D (ERGOCALCIFEROL) 1.25 MG (68708 UT) capsule capsule Take 50,000 Units by mouth 1 (One) Time Per Week.     • FeroSul 325 (65 Fe) MG tablet Take 1 tablet by mouth 2 (Two) Times a Day.       No current facility-administered medications for this encounter.       Vaccination History:   Pneumonia: Reports UTD  Annual Influenza: Reports UTD for 2020-21 Season    Objective  Vitals:    05/26/21 0823   BP: 118/58   BP Location: Right arm   Patient Position: Sitting   Cuff Size: Large Adult   Pulse: 68   Resp: 22   SpO2: 98%   Weight: 98.6 kg (217 lb 6.4 oz)     Wt Readings  from Last 3 Encounters:   05/26/21 98.6 kg (217 lb 6.4 oz)   05/17/21 97.5 kg (215 lb)   05/17/21 97.5 kg (215 lb)         05/26/21  0823   Weight: 98.6 kg (217 lb 6.4 oz)     Lab Results   Component Value Date    GLUCOSE 107 (H) 05/26/2021    BUN 50 (H) 05/26/2021    CREATININE 2.15 (H) 05/26/2021    EGFRIFNONA 22 (L) 05/26/2021    EGFRIFAFRI 41 (L) 07/30/2018    BCR 23.3 05/26/2021    K 4.7 05/26/2021    CO2 20.9 (L) 05/26/2021    CALCIUM 9.6 05/26/2021    PROTENTOTREF 7.7 07/30/2018    ALBUMIN 3.76 05/17/2021    LABIL2 1.1 (L) 07/30/2018    AST 68 (H) 05/17/2021    ALT 46 (H) 05/17/2021     Lab Results   Component Value Date    WBC 9.99 05/17/2021    HGB 9.2 (L) 05/17/2021    HCT 30.9 (L) 05/17/2021    MCV 82.4 05/17/2021     05/17/2021     Lab Results   Component Value Date    CKTOTAL 67 12/04/2020    CKMB 2.92 04/16/2018    TROPONINI 0.012 04/16/2018    TROPONINT <0.010 12/04/2020     Lab Results   Component Value Date    PROBNP 1,783.0 05/26/2021     Results for orders placed during the hospital encounter of 11/03/20    Adult Transthoracic Echo Complete W/ Cont if Necessary Per Protocol    Interpretation Summary  · Left ventricular wall thickness is consistent with concentric hypertrophy.  · Left ventricular ejection fraction appears to be greater than 70%. Left ventricular systolic function is hyperdynamic (EF > 70%).  · Left ventricular diastolic function is consistent with (grade II w/high LAP) pseudonormalization.  · The left atrial cavity is moderate to severely dilated.  · The right atrial cavity is mildly dilated.  · Moderate mitral annular calcification is present.  · Mild mitral valve regurgitation is present.  · Mild tricuspid valve regurgitation is present.  · Estimated right ventricular systolic pressure from tricuspid regurgitation is markedly elevated (>55 mmHg).               Class   Drug   Dose Last Dose Adjustment   Notes   ACEi/ARB/ARNI    Worsening renal fxn 1/22/21   Beta Blocker  Toprol XL  25mg 1/22/21    MRA --------------   eGFR needs to be >30mL/min       Drug Therapy Problems:     1. Hypoglycemia episodes: Pt reports BG readings in the 50-70's in the AM  sometimes but running high in the 300's in the late evenings   2. Self monitoring home logs: No logs brought to clinic today     Recommendations:    1. Encouraged patient to follow up with PCP (Lexie Dolan or Parvin Ta) for adjustment of insulins and to keep a close blood glucose log to take to them.  According to patient self-reported blood glucose, patient might benefit from increasing meal time insulin dose and decreasing basal insulin dose before bed to minimize/eliminate hypoglycemia in the AM and hyperglycemia in the PM. Patient would also benefit from a nutrition consult to help plan out her meals.  2. Pt reported being better about tracking her BP/HR. She was instructed to hold her beta blocker if HR <70. Pt reports not having to do so and that her BP/HR have been stable. Encouraged pt to continue to keep a log and to bring it with her at her next appt.       Patient was educated on heart failure medications and the importance of medication adherence.  All questions were addressed and patient expressed understanding. Patient would benefit from further education. Encouraged her to bring her medication bottles to her next visit.     Thank you for allowing me to participate in the care of your patient,    Linda Guardado, PharmDEISY  05/26/21  09:07 EDT

## 2021-05-26 NOTE — PROGRESS NOTES
[facility] CHF CLINIC OFFICE VISIT    Subjective:   Congestive Heart Failure  Pertinent negatives include no abdominal pain, chest pain, claudication, near-syncope, palpitations or shortness of breath.      Britta Lee is a 75 y.o.  female who presents to the clinic today for follow up on heart failure. She has a hx of diastolic congestive heart failure. Her EF was > 70% from echocardiogram on 11/4/2020. She is currently taking Bumex 1 mg daily. She has been unable to tolerate Spironolactone due to abnormal kidney function.    Breathing is stable   Abdominal and leg swelling stable   Urine output good   Feels she is becoming more active and feels better   Feels she is in the green zone   Overall doing well with sodium intake   Home weight - stable   BP - 120-130/70-80  HR - 70-80 bpm   Due to see Alex next month. She reports that her last kidney checkup she was advised that her kidneys were doing much better  Anemia - following with hematologists and going to see GI  Due to see cardiology first of June - Dr. Cruz  Denies chest discomfort, dyspnea, dizziness, lightheadedness, syncope,  palpitations or tachycardia.   She seems to be tolerating her Metoprolol well without any bradycardia   She continues on Hydralazine 50 mg TID, Imdur 60 mg daily, Metorpolol XL 25 mg daily, Norvasc 10 mg daily and Clonidine 0.2 mg TID for HTN.  She is confused if she is taking quinapril or not. She had been advised in the past to stop however she feels like she may have gotten her bottle back out and been taking it recently.     Past medical history significant for HTN, DM type 2, CKD III, iron deficiency anemia, hyperlipidemia, obesity.     PCP: Lexie Dolan  Cardiologist: Dr. Cruz  Nephrologist: Dr. Johnson     Hospitalizations: Discharged on 11/8/2020  ER visit: 12/4/2020  Past Medical History:   Diagnosis Date   • Acquired hypothyroidism 4/22/2021   • Anemia    • Arthritis    • Carpal tunnel syndrome    • Coronary artery  disease    • Diabetes mellitus (CMS/HCC)    • Elevated cholesterol    • GERD (gastroesophageal reflux disease)    • History of pneumonia    • History of unsteady gait     OCASSIONALLY   • Insomnia    • Kidney disease    • LENNY (obstructive sleep apnea) 12/1/2020   • Osteoarthritis    • Renal insufficiency    • Sciatica      Past Surgical History:   Procedure Laterality Date   • ABDOMINAL SURGERY     • APPENDECTOMY     • CARDIAC CATHETERIZATION      1 STENT  ---  2000   • CARDIAC SURGERY     • CAROTID STENT     • CARPAL TUNNEL RELEASE Right 10/8/2019    Procedure: CARPAL TUNNEL RELEASE;  Surgeon: Feroz Johnson MD;  Location: [facility];  Service: Orthopedics   • CATARACT EXTRACTION     • CHOLECYSTECTOMY     • COLONOSCOPY     • CORONARY ANGIOPLASTY WITH STENT PLACEMENT     • ENDOSCOPY     • ENDOSCOPY N/A 3/5/2020    Procedure: ESOPHAGOGASTRODUODENOSCOPY;  Surgeon: Alexandru Bagley MD;  Location: [facility];  Service: Gastroenterology;  Laterality: N/A;   • ENDOSCOPY AND COLONOSCOPY     • GALLBLADDER SURGERY     • JOINT REPLACEMENT Left 05/02/2017    [facility]  DR JOHNSON  LEFT TOTAL KNEE   • KNEE ARTHROSCOPY W/ MENISCECTOMY Right    • LAPAROSCOPIC TUBAL LIGATION     • ID TOTAL KNEE ARTHROPLASTY Left 5/2/2017    Procedure: TOTAL KNEE ARTHROPLASTY;  Surgeon: Feroz Johnson MD;  Location: [facility];  Service: Orthopedics   • STERILIZATION     • TONSILLECTOMY       Social History     Socioeconomic History   • Marital status:      Spouse name: natalie   • Number of children: 3   • Years of education: 12   • Highest education level: Not on file   Tobacco Use   • Smoking status: Never Smoker   • Smokeless tobacco: Never Used   Vaping Use   • Vaping Use: Never used   Substance and Sexual Activity   • Alcohol use: Yes     Comment: socially   • Drug use: No   • Sexual activity: Defer     Birth control/protection: Surgical     Family History   Problem Relation Age of Onset   • Arthritis Mother    • Diabetes Mother     • Cancer Mother    • Heart disease Father    • Diabetes Daughter    • Diabetes Son    • Diabetes Maternal Aunt    • Heart disease Maternal Grandmother    • Breast cancer Neg Hx      Allergies:  No Known Allergies    Review of Systems   Constitutional: Negative for chills, fever, weight gain and weight loss.   HENT: Negative for ear discharge, hoarse voice and sore throat.    Eyes: Negative for discharge, double vision, pain, redness and visual disturbance.   Cardiovascular: Negative for chest pain, claudication, cyanosis, dyspnea on exertion, irregular heartbeat, leg swelling, near-syncope, orthopnea, palpitations and syncope.   Respiratory: Negative for cough, shortness of breath and wheezing.    Endocrine: Negative for cold intolerance, heat intolerance and polyuria.   Hematologic/Lymphatic: Negative for bleeding problem. Does not bruise/bleed easily.   Skin: Negative for color change, flushing and rash.   Musculoskeletal: Negative for arthritis, back pain, joint pain, joint swelling and muscle cramps.   Gastrointestinal: Negative for abdominal pain, constipation, diarrhea, nausea and vomiting.   Genitourinary: Negative for dysuria, flank pain, frequency, hesitancy and urgency.   Neurological: Negative for difficulty with concentration, dizziness, light-headedness, sensory change, vertigo and weakness.   Psychiatric/Behavioral: Negative for depression. The patient does not have insomnia and is not nervous/anxious.      Current Outpatient Medications   Medication Sig Dispense Refill   • albuterol sulfate  (90 Base) MCG/ACT inhaler Inhale 2 puffs Every 4 (Four) Hours As Needed for Shortness of Air. 18 g 0   • amLODIPine (NORVASC) 10 MG tablet Take 1 tablet by mouth Every Night. 90 tablet 1   • aspirin 81 MG tablet Take 1 tablet by mouth Daily. 30 tablet 5   • atorvastatin (LIPITOR) 40 MG tablet Take 1 tablet by mouth Every Night. 90 tablet 1   • BD Pen Needle Evon U/F 32G X 4 MM misc      • bumetanide  (BUMEX) 0.5 MG tablet Take 1 tablet by mouth Daily. 30 tablet 1   • cloNIDine (CATAPRES) 0.2 MG tablet Take 1 tablet by mouth 3 (Three) Times a Day. 270 tablet 1   • Continuous Blood Gluc  (Dexcom G6 ) device 1 Device Daily. 1 Device 0   • Continuous Blood Gluc Sensor (Dexcom G6 Sensor) Every 10 (Ten) Days. 9 each 3   • Continuous Blood Gluc Transmit (Dexcom G6 Transmitter) misc 1 Device Daily. 1 each 0   • escitalopram (LEXAPRO) 10 MG tablet Take 1 tablet by mouth Daily. 90 tablet 3   • glucose blood test strip 1 each by Other route 3 (Three) Times a Day. 100 each 12   • hydrALAZINE (APRESOLINE) 50 MG tablet Take 1 tablet by mouth Every 8 (Eight) Hours. (Patient taking differently: Take 50 mg by mouth 3 (Three) Times a Day.) 270 tablet 1   • hydroCHLOROthiazide (HYDRODIURIL) 25 MG tablet Take 25 mg by mouth Daily.     • Insulin Glargine, 2 Unit Dial, (Toujeo Max SoloStar) 300 UNIT/ML solution pen-injector injection Inject 60 Units under the skin into the appropriate area as directed Daily. (Patient taking differently: Inject 40-60 Units under the skin into the appropriate area as directed Daily.) 6 pen 3   • insulin lispro (humaLOG) 100 UNIT/ML injection Inject 5 Units under the skin into the appropriate area as directed 3 (Three) Times a Day Before Meals.     • Insulin Pen Needle 32G X 5 MM misc 1 each 4 (Four) Times a Day. 120 each 5   • isosorbide mononitrate (IMDUR) 60 MG 24 hr tablet Take 1 tablet by mouth Daily. 90 tablet 1   • levothyroxine (SYNTHROID, LEVOTHROID) 25 MCG tablet Take 1 tablet by mouth Daily. 30 tablet 5   • metoprolol succinate XL (TOPROL-XL) 25 MG 24 hr tablet Take 1 tablet by mouth Daily. Hold if HR less than 60 bpm. 30 tablet 1   • nystatin (MYCOSTATIN) 873077 UNIT/GM powder Apply  one application topically to the appropriate area as directed Every 12 (Twelve) Hours. (Patient taking differently: Apply  topically to the appropriate area as directed 2 (Two) Times a Day As  Needed (itching).) 60 g 0   • vitamin D (ERGOCALCIFEROL) 1.25 MG (54441 UT) capsule capsule Take 50,000 Units by mouth 1 (One) Time Per Week.     • FeroSul 325 (65 Fe) MG tablet Take 1 tablet by mouth 2 (Two) Times a Day.       No current facility-administered medications for this encounter.     Objective:     Vitals:    05/26/21 0823   BP: 118/58   Pulse: 68   Resp: 22   SpO2: 98%     Body mass index is 38.51 kg/m².    Wt Readings from Last 3 Encounters:   05/26/21 98.6 kg (217 lb 6.4 oz)   05/17/21 97.5 kg (215 lb)   05/17/21 97.5 kg (215 lb)       ReDs - 32% (4/14/2021)  Lab Results   Component Value Date    ABSOLUTELUNG 29 05/26/2021     Vitals reviewed.   Constitutional:       Appearance: Normal appearance. Well-developed.      Comments: Wears a mask during encounter   Eyes:      Conjunctiva/sclera: Conjunctivae normal.   HENT:      Head: Normocephalic.   Neck:      Vascular: No JVD or JVR.   Pulmonary:      Effort: Pulmonary effort is normal.      Breath sounds: Normal breath sounds.   Cardiovascular:      Normal rate. Regular rhythm.   Pulses:     Intact distal pulses.   Edema:     Peripheral edema present.     Ankle: bilateral 1+ edema of the ankle.     Feet: bilateral 1+ edema of the feet.  Abdominal:      General: Bowel sounds are normal.      Palpations: Abdomen is soft. There is no hepatomegaly or splenomegaly.   Musculoskeletal: Normal range of motion.      Cervical back: Normal range of motion and neck supple. Skin:     General: Skin is warm and dry.   Neurological:      Mental Status: Alert and oriented to person, place, and time.   Psychiatric:         Attention and Perception: Attention normal.         Mood and Affect: Mood normal.         Speech: Speech normal.         Behavior: Behavior normal. Behavior is cooperative.         Cognition and Memory: Cognition normal.     Cardiographics  Results for orders placed during the hospital encounter of 11/03/20    Adult Transthoracic Echo Complete W/ Cont  if Necessary Per Protocol    Interpretation Summary  · Left ventricular wall thickness is consistent with concentric hypertrophy.  · Left ventricular ejection fraction appears to be greater than 70%. Left ventricular systolic function is hyperdynamic (EF > 70%).  · Left ventricular diastolic function is consistent with (grade II w/high LAP) pseudonormalization.  · The left atrial cavity is moderate to severely dilated.  · The right atrial cavity is mildly dilated.  · Moderate mitral annular calcification is present.  · Mild mitral valve regurgitation is present.  · Mild tricuspid valve regurgitation is present.  · Estimated right ventricular systolic pressure from tricuspid regurgitation is markedly elevated (>55 mmHg).    EK/3/2020    Chest x-ray: 11/3/2020    IMPRESSION:  CHF/edema with small effusions    Lab Review   Lab Results   Component Value Date    TSH 4.240 (H) 2021     Lab Results   Component Value Date    GLUCOSE 107 (H) 2021    BUN 50 (H) 2021    CREATININE 2.15 (H) 2021    EGFRIFNONA 22 (L) 2021    EGFRIFAFRI 41 (L) 2018    BCR 23.3 2021    K 4.7 2021    CO2 20.9 (L) 2021    CALCIUM 9.6 2021    PROTENTOTREF 7.7 2018    ALBUMIN 3.76 2021    LABIL2 1.1 (L) 2018    AST 68 (H) 2021    ALT 46 (H) 2021     Lab Results   Component Value Date    WBC 9.99 2021    HGB 9.2 (L) 2021    HCT 30.9 (L) 2021    MCV 82.4 2021     2021     Lab Results   Component Value Date    CKTOTAL 67 2020    CKMB 2.92 2018    TROPONINI 0.012 2018    TROPONINT <0.010 2020     Lab Results   Component Value Date    PROBNP 1,783.0 2021     The following portions of the patient's history were reviewed and updated as appropriate: allergies, current medications, past family history, past medical history, past social history, past surgical history and problem list.     Old records  reviewed and pertinent information is included in the above objective data.     Assessment/Plan:   1. Chronic Diastolic Congestive Heart Failure, EF > 70%   2. HTN  3. Pedal edema   3. CKD, stage IV  4.  LENNY  5. Obesity, Class II    Pro-BNP, BMP, magnesium today  Discussed and reviewed labs with patient today  ReDs reviewed and discussed with patient today   Decrease Bumex from 1 mg to 0.5 mg daily to help with serum creatinine as long as her symptoms are stable.    Continue on current medications   Monitor BP and HR  Await sleep study results from home sleep study - she should further discuss with cardiology at follow up    Weight loss discussed and healthy lifestyle recommended   Advise she needs to bring her medicine bottles at next visit   Counseling patient extensively on dietary Na+ intake, importance of activity, weight monitoring, compliance with medications and follow up appointments.  Follow up in 2 months, sooner if needed.

## 2021-05-26 NOTE — ADDENDUM NOTE
Encounter addended by: Emily Figueroa APRN on: 5/26/2021 9:26 AM   Actions taken: Clinical Note Signed

## 2021-05-26 NOTE — PROGRESS NOTES
Heart Failure Clinic    Date: 05/26/21     Vitals:    05/26/21 0823   BP: 118/58   Pulse: 68   Resp: 22   SpO2: 98%      Mask Worn: Yes   Method of arrival: Ambulatory    Weighing self daily: Yes    Monitoring Heart Failure Zones: Yes    Today's HF Zone: Green    Taking medications as prescribed: Yes    Edema No    Shortness of Air: Yes    Number of pillows used at night:<2    Educational Materials given:                                                                           ReDS Value: 29%  25-35 Optimal Value Status      Jackie Jasmine MA 05/26/21 08:23 EDT

## 2021-06-03 ENCOUNTER — OFFICE VISIT (OUTPATIENT)
Dept: CARDIOLOGY | Facility: CLINIC | Age: 76
End: 2021-06-03

## 2021-06-03 VITALS
WEIGHT: 217 LBS | DIASTOLIC BLOOD PRESSURE: 70 MMHG | HEIGHT: 63 IN | OXYGEN SATURATION: 99 % | BODY MASS INDEX: 38.45 KG/M2 | HEART RATE: 64 BPM | SYSTOLIC BLOOD PRESSURE: 156 MMHG | TEMPERATURE: 97.5 F

## 2021-06-03 DIAGNOSIS — E11.22 TYPE 2 DIABETES MELLITUS WITH STAGE 4 CHRONIC KIDNEY DISEASE, WITH LONG-TERM CURRENT USE OF INSULIN (HCC): ICD-10-CM

## 2021-06-03 DIAGNOSIS — N18.4 TYPE 2 DIABETES MELLITUS WITH STAGE 4 CHRONIC KIDNEY DISEASE, WITH LONG-TERM CURRENT USE OF INSULIN (HCC): ICD-10-CM

## 2021-06-03 DIAGNOSIS — I25.10 ATHEROSCLEROSIS OF NATIVE CORONARY ARTERY OF NATIVE HEART WITHOUT ANGINA PECTORIS: ICD-10-CM

## 2021-06-03 DIAGNOSIS — E78.5 DYSLIPIDEMIA: ICD-10-CM

## 2021-06-03 DIAGNOSIS — I50.32 CHRONIC DIASTOLIC CONGESTIVE HEART FAILURE (HCC): ICD-10-CM

## 2021-06-03 DIAGNOSIS — I73.9 PVD (PERIPHERAL VASCULAR DISEASE) WITH CLAUDICATION (HCC): ICD-10-CM

## 2021-06-03 DIAGNOSIS — I10 ESSENTIAL HYPERTENSION: Primary | ICD-10-CM

## 2021-06-03 DIAGNOSIS — Z79.4 TYPE 2 DIABETES MELLITUS WITH STAGE 4 CHRONIC KIDNEY DISEASE, WITH LONG-TERM CURRENT USE OF INSULIN (HCC): ICD-10-CM

## 2021-06-03 DIAGNOSIS — R06.83 SNORING: ICD-10-CM

## 2021-06-03 DIAGNOSIS — E66.01 MORBID OBESITY WITH BMI OF 40.0-44.9, ADULT (HCC): ICD-10-CM

## 2021-06-03 PROCEDURE — 99214 OFFICE O/P EST MOD 30 MIN: CPT | Performed by: SPECIALIST

## 2021-06-03 NOTE — PROGRESS NOTES
Subjective   Follow up, hypertension  Britta Lee is a 75 y.o. female who presents to day for Med Management, Chest Pain, and Edema.    CHIEF COMPLIANT  Chief Complaint   Patient presents with   • Med Management   • Chest Pain   • Edema       Active Problems:  Problem List Items Addressed This Visit        Cardiac and Vasculature    Essential hypertension - Primary    Overview     The patient was advised to keep a daily blood pressure and pulse log. They were advised contact our office if consistently running over 120/80 and bring the log to the next follow up visit.          Atherosclerosis of native coronary artery of native heart    Overview     S/P one stent placement followed by [facility] in [place] - Dr. Alexandru Martines          Dyslipidemia    Overview     Lipid panel done 10/20/15 TC: 166 Tri HDL: 34 LDL: 104         Relevant Orders    Comprehensive Metabolic Panel    Lipid Panel    PVD (peripheral vascular disease) with claudication (CMS/[facility])    Chronic diastolic congestive heart failure (CMS/[facility])       Endocrine and Metabolic    Type 2 diabetes mellitus with chronic kidney disease, with long-term current use of insulin (CMS/[facility])    Morbid obesity with BMI of 40.0-44.9, adult (CMS/[facility])       Sleep    Snoring          HPI  HPI  Has not been feeling well, with dysphagia to solid and liquids, and recurrent vomiting, she is referred to GI for endoscopy by her PCP, she denies chest pain, stable shortness of breath on moderate exertion, trace edema on and off, no palpitations, no dizziness, denies pain in legs with walking  PRIOR MEDS  Current Outpatient Medications on File Prior to Visit   Medication Sig Dispense Refill   • albuterol sulfate  (90 Base) MCG/ACT inhaler Inhale 2 puffs Every 4 (Four) Hours As Needed for Shortness of Air. 18 g 0   • amLODIPine (NORVASC) 10 MG tablet Take 1 tablet by mouth Every Night. 90 tablet 1   • aspirin 81 MG tablet Take 1 tablet by mouth Daily. 30 tablet 5    • atorvastatin (LIPITOR) 40 MG tablet Take 1 tablet by mouth Every Night. 90 tablet 1   • BD Pen Needle Evon U/F 32G X 4 MM misc      • bumetanide (BUMEX) 0.5 MG tablet Take 1 tablet by mouth Daily. 30 tablet 1   • cloNIDine (CATAPRES) 0.2 MG tablet Take 1 tablet by mouth 3 (Three) Times a Day. 270 tablet 1   • Continuous Blood Gluc  (Dexcom G6 ) device 1 Device Daily. 1 Device 0   • Continuous Blood Gluc Sensor (Dexcom G6 Sensor) Every 10 (Ten) Days. 9 each 3   • Continuous Blood Gluc Transmit (Dexcom G6 Transmitter) misc 1 Device Daily. 1 each 0   • escitalopram (LEXAPRO) 10 MG tablet Take 1 tablet by mouth Daily. 90 tablet 3   • FeroSul 325 (65 Fe) MG tablet Take 1 tablet by mouth 2 (Two) Times a Day.     • glucose blood test strip 1 each by Other route 3 (Three) Times a Day. 100 each 12   • hydrALAZINE (APRESOLINE) 50 MG tablet Take 1 tablet by mouth Every 8 (Eight) Hours. (Patient taking differently: Take 50 mg by mouth 3 (Three) Times a Day.) 270 tablet 1   • hydroCHLOROthiazide (HYDRODIURIL) 25 MG tablet Take 25 mg by mouth Daily.     • Insulin Glargine, 2 Unit Dial, (Toujeo Max SoloStar) 300 UNIT/ML solution pen-injector injection Inject 60 Units under the skin into the appropriate area as directed Daily. (Patient taking differently: Inject 40-60 Units under the skin into the appropriate area as directed Daily.) 6 pen 3   • insulin lispro (humaLOG) 100 UNIT/ML injection Inject 5 Units under the skin into the appropriate area as directed 3 (Three) Times a Day Before Meals.     • Insulin Pen Needle 32G X 5 MM misc 1 each 4 (Four) Times a Day. 120 each 5   • isosorbide mononitrate (IMDUR) 60 MG 24 hr tablet Take 1 tablet by mouth Daily. 90 tablet 1   • levothyroxine (SYNTHROID, LEVOTHROID) 25 MCG tablet Take 1 tablet by mouth Daily. 30 tablet 5   • metoprolol succinate XL (TOPROL-XL) 25 MG 24 hr tablet Take 1 tablet by mouth Daily. Hold if HR less than 60 bpm. 30 tablet 1   • nystatin  (MYCOSTATIN) 738287 UNIT/GM powder Apply  one application topically to the appropriate area as directed Every 12 (Twelve) Hours. (Patient taking differently: Apply  topically to the appropriate area as directed 2 (Two) Times a Day As Needed (itching).) 60 g 0   • vitamin D (ERGOCALCIFEROL) 1.25 MG (36838 UT) capsule capsule Take 50,000 Units by mouth 1 (One) Time Per Week.       No current facility-administered medications on file prior to visit.       ALLERGIES  Patient has no known allergies.    HISTORY  Past Medical History:   Diagnosis Date   • Acquired hypothyroidism 4/22/2021   • Anemia    • Arthritis    • Carpal tunnel syndrome    • Coronary artery disease    • Diabetes mellitus (CMS/HCC)    • Elevated cholesterol    • GERD (gastroesophageal reflux disease)    • History of pneumonia    • History of unsteady gait     OCASSIONALLY   • Insomnia    • Kidney disease    • LENNY (obstructive sleep apnea) 12/1/2020   • Osteoarthritis    • Renal insufficiency    • Sciatica        Social History     Socioeconomic History   • Marital status:      Spouse name: natalie   • Number of children: 3   • Years of education: 12   • Highest education level: Not on file   Tobacco Use   • Smoking status: Never Smoker   • Smokeless tobacco: Never Used   Vaping Use   • Vaping Use: Never used   Substance and Sexual Activity   • Alcohol use: Yes     Comment: socially   • Drug use: No   • Sexual activity: Defer     Birth control/protection: Surgical       Family History   Problem Relation Age of Onset   • Arthritis Mother    • Diabetes Mother    • Cancer Mother    • Heart disease Father    • Diabetes Daughter    • Diabetes Son    • Diabetes Maternal Aunt    • Heart disease Maternal Grandmother    • Breast cancer Neg Hx        Review of Systems   Respiratory: Negative for shortness of breath.    Cardiovascular: Positive for chest pain and leg swelling. Negative for palpitations.   Gastrointestinal: Positive for nausea and vomiting.  "Negative for abdominal distention and abdominal pain.   Neurological: Negative for dizziness and syncope.       Objective     VITALS: /70 (BP Location: Left arm, Patient Position: Sitting, Cuff Size: Adult)   Pulse 64   Temp 97.5 °F (36.4 °C) (Infrared)   Ht 160 cm (63\")   Wt 98.4 kg (217 lb)   SpO2 99%   BMI 38.44 kg/m²     LABS:   Lab Results (most recent)     None          IMAGING:   No Images in the past 120 days found..    EXAM:  Physical Exam  Vitals reviewed.   Constitutional:       Appearance: She is well-developed.   HENT:      Head: Normocephalic and atraumatic.   Eyes:      Pupils: Pupils are equal, round, and reactive to light.   Neck:      Thyroid: No thyromegaly.      Vascular: No JVD.   Cardiovascular:      Rate and Rhythm: Normal rate and regular rhythm.      Heart sounds: Normal heart sounds. No murmur heard.   No friction rub. No gallop.    Pulmonary:      Effort: Pulmonary effort is normal. No respiratory distress.      Breath sounds: Normal breath sounds. No stridor. No wheezing or rales.   Chest:      Chest wall: No tenderness.   Musculoskeletal:         General: No tenderness or deformity.      Cervical back: Neck supple.   Skin:     General: Skin is warm and dry.   Neurological:      Mental Status: She is alert and oriented to person, place, and time.      Cranial Nerves: No cranial nerve deficit.      Coordination: Coordination normal.         Procedure   Procedures       Assessment/Plan     Diagnoses and all orders for this visit:    1. Essential hypertension (Primary)    2. Atherosclerosis of native coronary artery of native heart without angina pectoris    3. Dyslipidemia  -     Comprehensive Metabolic Panel; Future  -     Lipid Panel; Future    4. PVD (peripheral vascular disease) with claudication (CMS/HCC)    5. Chronic diastolic congestive heart failure (CMS/HCC)    6. Morbid obesity with BMI of 40.0-44.9, adult (CMS/HCC)    7. Type 2 diabetes mellitus with stage 4 chronic " kidney disease, with long-term current use of insulin (CMS/[facility])    8. Snoring    1. Her blood pressure is elevated today but she says she is having a tough day because she has been vomiting last night she says that her blood pressure at home is good usually about 120s 130 mm systolic so we will monitor for now  2. Currently she is compensated with no CHF continue current medications her creatinine has increased from 1.812 weeks ago to 2.15 8 days ago we will hold Bumex Bumex for 2 days and then restart she is going to see the nephrology soon who is monitoring her  3. She is not candidate for ACE inhibitor's or ARB because of elevated creatinine and renal dysfunction we will continue with nitroglycerin and hydralazine  4. No recent lipid profile we will get lipid profile prior to next visit  5. No claudications  6. Sleep apnea was negative for sleep study sleep hygiene discussed      Return in about 3 months (around 9/3/2021).               MEDS ORDERED DURING VISIT:  No orders of the defined types were placed in this encounter.      As always, Lexie Dolan PA  I appreciate very much the opportunity to participate in the cardiovascular care of your patients. Please do not hesitate to call me with any questions with regards to Britta Lee evaluation and management.         This document has been electronically signed by Hever Cruz MD  Lynn 3, 2021 10:25 EDT

## 2021-06-28 ENCOUNTER — LAB (OUTPATIENT)
Dept: ONCOLOGY | Facility: CLINIC | Age: 76
End: 2021-06-28

## 2021-06-28 VITALS
HEART RATE: 81 BPM | SYSTOLIC BLOOD PRESSURE: 151 MMHG | TEMPERATURE: 97.1 F | OXYGEN SATURATION: 98 % | RESPIRATION RATE: 18 BRPM | DIASTOLIC BLOOD PRESSURE: 68 MMHG

## 2021-06-28 DIAGNOSIS — D50.9 IRON DEFICIENCY ANEMIA, UNSPECIFIED IRON DEFICIENCY ANEMIA TYPE: ICD-10-CM

## 2021-06-28 DIAGNOSIS — K90.9 MALABSORPTION OF IRON: ICD-10-CM

## 2021-06-28 DIAGNOSIS — D50.9 IRON DEFICIENCY ANEMIA, UNSPECIFIED IRON DEFICIENCY ANEMIA TYPE: Primary | ICD-10-CM

## 2021-06-28 LAB
BASOPHILS # BLD AUTO: 0.05 10*3/MM3 (ref 0–0.2)
BASOPHILS NFR BLD AUTO: 0.5 % (ref 0–1.5)
DEPRECATED RDW RBC AUTO: 46 FL (ref 37–54)
EOSINOPHIL # BLD AUTO: 0.6 10*3/MM3 (ref 0–0.4)
EOSINOPHIL NFR BLD AUTO: 6.1 % (ref 0.3–6.2)
ERYTHROCYTE [DISTWIDTH] IN BLOOD BY AUTOMATED COUNT: 15.6 % (ref 12.3–15.4)
FERRITIN SERPL-MCNC: 110.6 NG/ML (ref 13–150)
HCT VFR BLD AUTO: 26.7 % (ref 34–46.6)
HGB BLD-MCNC: 7.9 G/DL (ref 12–15.9)
IMM GRANULOCYTES # BLD AUTO: 0.03 10*3/MM3 (ref 0–0.05)
IMM GRANULOCYTES NFR BLD AUTO: 0.3 % (ref 0–0.5)
IRON 24H UR-MRATE: 30 MCG/DL (ref 37–145)
IRON SATN MFR SERPL: 8 % (ref 20–50)
LYMPHOCYTES # BLD AUTO: 2.09 10*3/MM3 (ref 0.7–3.1)
LYMPHOCYTES NFR BLD AUTO: 21.2 % (ref 19.6–45.3)
MCH RBC QN AUTO: 24 PG (ref 26.6–33)
MCHC RBC AUTO-ENTMCNC: 29.6 G/DL (ref 31.5–35.7)
MCV RBC AUTO: 81.2 FL (ref 79–97)
MONOCYTES # BLD AUTO: 0.56 10*3/MM3 (ref 0.1–0.9)
MONOCYTES NFR BLD AUTO: 5.7 % (ref 5–12)
NEUTROPHILS NFR BLD AUTO: 6.54 10*3/MM3 (ref 1.7–7)
NEUTROPHILS NFR BLD AUTO: 66.2 % (ref 42.7–76)
NRBC BLD AUTO-RTO: 0 /100 WBC (ref 0–0.2)
PLATELET # BLD AUTO: 296 10*3/MM3 (ref 140–450)
PMV BLD AUTO: 9.3 FL (ref 6–12)
RBC # BLD AUTO: 3.29 10*6/MM3 (ref 3.77–5.28)
TIBC SERPL-MCNC: 375 MCG/DL (ref 298–536)
TRANSFERRIN SERPL-MCNC: 252 MG/DL (ref 200–360)
WBC # BLD AUTO: 9.87 10*3/MM3 (ref 3.4–10.8)

## 2021-06-28 PROCEDURE — 84466 ASSAY OF TRANSFERRIN: CPT | Performed by: NURSE PRACTITIONER

## 2021-06-28 PROCEDURE — 82728 ASSAY OF FERRITIN: CPT | Performed by: NURSE PRACTITIONER

## 2021-06-28 PROCEDURE — 85025 COMPLETE CBC W/AUTO DIFF WBC: CPT | Performed by: NURSE PRACTITIONER

## 2021-06-28 PROCEDURE — 83540 ASSAY OF IRON: CPT | Performed by: NURSE PRACTITIONER

## 2021-06-28 RX ORDER — SODIUM CHLORIDE 9 MG/ML
250 INJECTION, SOLUTION INTRAVENOUS ONCE
Status: CANCELLED | OUTPATIENT
Start: 2021-07-19

## 2021-06-28 RX ORDER — SODIUM CHLORIDE 9 MG/ML
250 INJECTION, SOLUTION INTRAVENOUS ONCE
Status: CANCELLED | OUTPATIENT
Start: 2021-07-26

## 2021-06-29 ENCOUNTER — OFFICE VISIT (OUTPATIENT)
Dept: FAMILY MEDICINE CLINIC | Facility: CLINIC | Age: 76
End: 2021-06-29

## 2021-06-29 VITALS
HEIGHT: 63 IN | OXYGEN SATURATION: 98 % | TEMPERATURE: 96.8 F | DIASTOLIC BLOOD PRESSURE: 60 MMHG | SYSTOLIC BLOOD PRESSURE: 122 MMHG | WEIGHT: 210 LBS | BODY MASS INDEX: 37.21 KG/M2 | HEART RATE: 71 BPM

## 2021-06-29 DIAGNOSIS — N18.4 TYPE 2 DIABETES MELLITUS WITH STAGE 4 CHRONIC KIDNEY DISEASE, WITH LONG-TERM CURRENT USE OF INSULIN (HCC): Chronic | ICD-10-CM

## 2021-06-29 DIAGNOSIS — E11.69 DIABETES MELLITUS TYPE 2 IN OBESE (HCC): Chronic | ICD-10-CM

## 2021-06-29 DIAGNOSIS — N18.4 CKD (CHRONIC KIDNEY DISEASE) STAGE 4, GFR 15-29 ML/MIN (HCC): Chronic | ICD-10-CM

## 2021-06-29 DIAGNOSIS — E66.9 DIABETES MELLITUS TYPE 2 IN OBESE (HCC): Chronic | ICD-10-CM

## 2021-06-29 DIAGNOSIS — E11.22 TYPE 2 DIABETES MELLITUS WITH STAGE 4 CHRONIC KIDNEY DISEASE, WITH LONG-TERM CURRENT USE OF INSULIN (HCC): Chronic | ICD-10-CM

## 2021-06-29 DIAGNOSIS — E03.9 ACQUIRED HYPOTHYROIDISM: Chronic | ICD-10-CM

## 2021-06-29 DIAGNOSIS — R13.19 ESOPHAGEAL DYSPHAGIA: Primary | ICD-10-CM

## 2021-06-29 DIAGNOSIS — Z79.4 TYPE 2 DIABETES MELLITUS WITH STAGE 4 CHRONIC KIDNEY DISEASE, WITH LONG-TERM CURRENT USE OF INSULIN (HCC): Chronic | ICD-10-CM

## 2021-06-29 PROCEDURE — 99214 OFFICE O/P EST MOD 30 MIN: CPT | Performed by: PHYSICIAN ASSISTANT

## 2021-06-29 RX ORDER — PROCHLORPERAZINE 25 MG/1
1 SUPPOSITORY RECTAL DAILY
Qty: 1 EACH | Refills: 0 | Status: SHIPPED | OUTPATIENT
Start: 2021-06-29 | End: 2021-10-06 | Stop reason: SDUPTHER

## 2021-06-29 RX ORDER — PROCHLORPERAZINE 25 MG/1
1 SUPPOSITORY RECTAL DAILY
Qty: 3 EACH | Refills: 3 | Status: SHIPPED | OUTPATIENT
Start: 2021-06-29 | End: 2021-12-20 | Stop reason: SDUPTHER

## 2021-06-29 RX ORDER — PROCHLORPERAZINE 25 MG/1
SUPPOSITORY RECTAL
Qty: 9 EACH | Refills: 3 | Status: SHIPPED | OUTPATIENT
Start: 2021-06-29 | End: 2021-12-20 | Stop reason: SDUPTHER

## 2021-06-29 NOTE — PATIENT INSTRUCTIONS
"Fat and Cholesterol Restricted Eating Plan  Getting too much fat and cholesterol in your diet may cause health problems. Choosing the right foods helps keep your fat and cholesterol at normal levels. This can keep you from getting certain diseases.  Your doctor may recommend an eating plan that includes:  · Total fat: ______% or less of total calories a day.  · Saturated fat: ______% or less of total calories a day.  · Cholesterol: less than _________mg a day.  · Fiber: ______g a day.  What are tips for following this plan?  Meal planning  · At meals, divide your plate into four equal parts:  ? Fill one-half of your plate with vegetables and green salads.  ? Fill one-fourth of your plate with whole grains.  ? Fill one-fourth of your plate with low-fat (lean) protein foods.  · Eat fish that is high in omega-3 fats at least two times a week. This includes mackerel, tuna, sardines, and salmon.  · Eat foods that are high in fiber, such as whole grains, beans, apples, broccoli, carrots, peas, and barley.  General tips    · Work with your doctor to lose weight if you need to.  · Avoid:  ? Foods with added sugar.  ? Fried foods.  ? Foods with partially hydrogenated oils.  · Limit alcohol intake to no more than 1 drink a day for nonpregnant women and 2 drinks a day for men. One drink equals 12 oz of beer, 5 oz of wine, or 1½ oz of hard liquor.  Reading food labels  · Check food labels for:  ? Trans fats.  ? Partially hydrogenated oils.  ? Saturated fat (g) in each serving.  ? Cholesterol (mg) in each serving.  ? Fiber (g) in each serving.  · Choose foods with healthy fats, such as:  ? Monounsaturated fats.  ? Polyunsaturated fats.  ? Omega-3 fats.  · Choose grain products that have whole grains. Look for the word \"whole\" as the first word in the ingredient list.  Cooking  · Cook foods using low-fat methods. These include baking, boiling, grilling, and broiling.  · Eat more home-cooked foods. Eat at restaurants and buffets " less often.  · Avoid cooking using saturated fats, such as butter, cream, palm oil, palm kernel oil, and coconut oil.  Recommended foods    Fruits  · All fresh, canned (in natural juice), or frozen fruits.  Vegetables  · Fresh or frozen vegetables (raw, steamed, roasted, or grilled). Green salads.  Grains  · Whole grains, such as whole wheat or whole grain breads, crackers, cereals, and pasta. Unsweetened oatmeal, bulgur, barley, quinoa, or brown rice. Corn or whole wheat flour tortillas.  Meats and other protein foods  · Ground beef (85% or leaner), grass-fed beef, or beef trimmed of fat. Skinless chicken or turkey. Ground chicken or turkey. Pork trimmed of fat. All fish and seafood. Egg whites. Dried beans, peas, or lentils. Unsalted nuts or seeds. Unsalted canned beans. Nut butters without added sugar or oil.  Dairy  · Low-fat or nonfat dairy products, such as skim or 1% milk, 2% or reduced-fat cheeses, low-fat and fat-free ricotta or cottage cheese, or plain low-fat and nonfat yogurt.  Fats and oils  · Tub margarine without trans fats. Light or reduced-fat mayonnaise and salad dressings. Avocado. Olive, canola, sesame, or safflower oils.  The items listed above may not be a complete list of foods and beverages you can eat. Contact a dietitian for more information.  Foods to avoid  Fruits  · Canned fruit in heavy syrup. Fruit in cream or butter sauce. Fried fruit.  Vegetables  · Vegetables cooked in cheese, cream, or butter sauce. Fried vegetables.  Grains  · White bread. White pasta. White rice. Cornbread. Bagels, pastries, and croissants. Crackers and snack foods that contain trans fat and hydrogenated oils.  Meats and other protein foods  · Fatty cuts of meat. Ribs, chicken wings, dixon, sausage, bologna, salami, chitterlings, fatback, hot dogs, bratwurst, and packaged lunch meats. Liver and organ meats. Whole eggs and egg yolks. Chicken and turkey with skin. Fried meat.  Dairy  · Whole or 2% milk, cream,  half-and-half, and cream cheese. Whole milk cheeses. Whole-fat or sweetened yogurt. Full-fat cheeses. Nondairy creamers and whipped toppings. Processed cheese, cheese spreads, and cheese curds.  Beverages  · Alcohol. Sugar-sweetened drinks such as sodas, lemonade, and fruit drinks.  Fats and oils  · Butter, stick margarine, lard, shortening, ghee, or dixon fat. Coconut, palm kernel, and palm oils.  Sweets and desserts  · Corn syrup, sugars, honey, and molasses. Candy. Jam and jelly. Syrup. Sweetened cereals. Cookies, pies, cakes, donuts, muffins, and ice cream.  The items listed above may not be a complete list of foods and beverages you should avoid. Contact a dietitian for more information.  Summary  · Choosing the right foods helps keep your fat and cholesterol at normal levels. This can keep you from getting certain diseases.  · At meals, fill one-half of your plate with vegetables and green salads.  · Eat high-fiber foods, like whole grains, beans, apples, carrots, peas, and barley.  · Limit added sugar, saturated fats, alcohol, and fried foods.  This information is not intended to replace advice given to you by your health care provider. Make sure you discuss any questions you have with your health care provider.  Document Revised: 08/21/2019 Document Reviewed: 09/04/2018  [employer] Patient Education © 2021 [employer].

## 2021-06-29 NOTE — PROGRESS NOTES
Subjective   Britta Lee is a 75 y.o. female.       Chief Complaint -dysphagia    History of Present Illness -    ROS    Dysphagia-  Patient complains of dysphagia when eating solid foods.  She states sometimes foods feel like they are getting stuck.  She has altered her diet due to this issue.  She has lost 17 pounds in the past 3 months but she has also significantly decreased sodium and glucose in her diet due to chronic kidney disease and diabetes.  She states that she has also been trying to eat healthier and lose weight as her grandson is getting  in July and she wants to look nice to go to the wedding.    Diabetes mellitus type 2-  Significantly improved with dietary changes.  Her most recent hemoglobin A1c was 6.5.  She states that she is following a low-sodium low carbohydrate diet.  Controlled with Toujeo and Humalog.    Chronic kidney disease-  Not at goal.  Patient states she has been altering her diet and continues to see her nephrologist regularly.  She is also started iron infusions as she continues to have some issues with her hemoglobin being low.    Hypothyroidism-stable with Synthroid 25 mcg daily    The following portions of the patient's history were reviewed and updated as appropriate: allergies, current medications, past family history, past medical history, past social history, past surgical history and problem list.    Review of Systems   Constitutional: Positive for appetite change. Negative for activity change, fatigue and fever.   HENT: Negative for ear pain, sinus pressure and sore throat.    Eyes: Negative for pain and visual disturbance.   Respiratory: Negative for cough and chest tightness.    Cardiovascular: Negative for chest pain and palpitations.   Gastrointestinal: Negative for abdominal pain, constipation, diarrhea, nausea and vomiting.        Dysphagia   Endocrine: Negative for polydipsia and polyuria.   Genitourinary: Negative for dysuria and frequency.  "  Musculoskeletal: Negative for back pain and myalgias.   Skin: Negative for color change and rash.   Allergic/Immunologic: Negative for food allergies and immunocompromised state.   Neurological: Negative for dizziness, syncope and headaches.   Hematological: Negative for adenopathy. Does not bruise/bleed easily.   Psychiatric/Behavioral: Negative for hallucinations and suicidal ideas. The patient is not nervous/anxious.        Objective  Vital signs:  /60   Pulse 71   Temp 96.8 °F (36 °C) (Temporal)   Ht 160 cm (63\")   Wt 95.3 kg (210 lb)   SpO2 98%   BMI 37.20 kg/m²     Physical Exam  Vitals and nursing note reviewed.   Constitutional:       Appearance: Normal appearance. She is well-developed. She is obese.   HENT:      Head: Normocephalic and atraumatic.      Right Ear: Tympanic membrane normal.      Left Ear: Tympanic membrane normal.      Nose: Nose normal.      Mouth/Throat:      Mouth: Mucous membranes are moist.      Pharynx: No oropharyngeal exudate or posterior oropharyngeal erythema.   Eyes:      Extraocular Movements: Extraocular movements intact.      Conjunctiva/sclera: Conjunctivae normal.   Neck:      Thyroid: No thyromegaly.      Trachea: No tracheal deviation.   Cardiovascular:      Rate and Rhythm: Normal rate and regular rhythm.      Heart sounds: Normal heart sounds. No murmur heard.     Pulmonary:      Effort: Pulmonary effort is normal. No respiratory distress.      Breath sounds: Normal breath sounds. No wheezing.   Abdominal:      General: Bowel sounds are normal.      Palpations: Abdomen is soft.      Tenderness: There is no abdominal tenderness. There is no guarding.   Musculoskeletal:         General: No tenderness. Normal range of motion.      Cervical back: Normal range of motion and neck supple.   Lymphadenopathy:      Cervical: No cervical adenopathy.   Skin:     General: Skin is warm and dry.      Findings: No rash.   Neurological:      General: No focal deficit " present.      Mental Status: She is alert and oriented to person, place, and time.   Psychiatric:         Mood and Affect: Mood normal.         Behavior: Behavior normal.         Thought Content: Thought content normal.         The following data was reviewed by: RAFIQ Montanez on 06/29/2021:  CMP    CMP 4/20/21 5/17/21 5/26/21   Glucose 82 124 (A) 107 (A)   BUN 39 (A) 38 (A) 50 (A)   Creatinine 1.79 (A) 1.81 (A) 2.15 (A)   eGFR Non African Am 27 (A) 27 (A) 22 (A)   Sodium 140 140 140   Potassium 4.5 4.5 4.7   Chloride 103 106 106   Calcium 9.4 9.6 9.6   Albumin 3.90 3.76    Total Bilirubin 0.2 0.2    Alkaline Phosphatase 388 (A) 392 (A)    AST (SGOT) 53 (A) 68 (A)    ALT (SGPT) 33 46 (A)    (A) Abnormal value            Lipid Panel    Lipid Panel 9/4/20   Total Cholesterol 125   Triglycerides 128   HDL Cholesterol 36 (A)   VLDL Cholesterol 25.6   LDL Cholesterol  63   LDL/HDL Ratio 1.76   (A) Abnormal value            TSH    TSH 3/26/21 4/12/21 4/20/21   TSH 6.430 (A) 5.330 (A) 4.240 (A)   (A) Abnormal value            Most Recent A1C    HGBA1C Most Recent 4/12/21   Hemoglobin A1C 6.54 (A)   (A) Abnormal value                   Assessment/Plan     Diagnoses and all orders for this visit:    1. Esophageal dysphagia (Primary)  Comments:  We discussed that the patient likely needs an EGD for further evaluation  Refer to surgery for this issue  Orders:  -     Ambulatory Referral to General Surgery    2. Diabetes mellitus type 2 in obese (CMS/[facility])  Comments:  Advised low carbohydrate diabetic diet  Continue Toujeo and Humalog  Orders:  -     Continuous Blood Gluc  (Dexcom G6 ) device; 1 Device Daily.  Dispense: 3 each; Refill: 3  -     Continuous Blood Gluc Sensor (Dexcom G6 Sensor); Every 10 (Ten) Days.  Dispense: 9 each; Refill: 3  -     Continuous Blood Gluc Transmit (Dexcom G6 Transmitter) misc; 1 Device Daily.  Dispense: 1 each; Refill: 0  -     Hemoglobin A1c; Future  -     Lipid Panel;  Future    3. CKD (chronic kidney disease) stage 4, GFR 15-29 ml/min (CMS/[facility])  Comments:  Continue to follow with nephrology and oncology with regards to CKD and low hemoglobin/iron deficiency anemia  Orders:  -     Comprehensive Metabolic Panel; Future    4. Type 2 diabetes mellitus with stage 4 chronic kidney disease, with long-term current use of insulin (CMS/[facility])  Comments:  Continue Toujeo and Humalog  Continue to monitor    5. Acquired hypothyroidism  Comments:  Continue Synthroid 25 mcg daily  Orders:  -     TSH; Future            Patient was given instructions and counseling regarding his condition or for health maintenance advice. Please see specific information pulled into the AVS if appropriate      This document has been electronically signed by:  Lexie Dolan PA-C

## 2021-07-13 DIAGNOSIS — I50.32 CHRONIC DIASTOLIC CONGESTIVE HEART FAILURE (HCC): ICD-10-CM

## 2021-07-13 DIAGNOSIS — I10 ESSENTIAL HYPERTENSION: ICD-10-CM

## 2021-07-13 RX ORDER — ISOSORBIDE MONONITRATE 60 MG/1
60 TABLET, EXTENDED RELEASE ORAL DAILY
Qty: 90 TABLET | Refills: 1 | Status: SHIPPED | OUTPATIENT
Start: 2021-07-13 | End: 2022-03-16

## 2021-07-13 NOTE — TELEPHONE ENCOUNTER
Patient requests we refill Isorbide to [facility] pharmacy. Patient states pharmacy has already requested this medication.

## 2021-07-19 ENCOUNTER — OFFICE VISIT (OUTPATIENT)
Dept: SURGERY | Facility: CLINIC | Age: 76
End: 2021-07-19

## 2021-07-19 ENCOUNTER — HOSPITAL ENCOUNTER (OUTPATIENT)
Dept: INFUSION THERAPY | Facility: HOSPITAL | Age: 76
Setting detail: INFUSION SERIES
Discharge: HOME | End: 2021-07-19

## 2021-07-19 VITALS
BODY MASS INDEX: 37.92 KG/M2 | SYSTOLIC BLOOD PRESSURE: 120 MMHG | WEIGHT: 214 LBS | DIASTOLIC BLOOD PRESSURE: 60 MMHG | HEIGHT: 63 IN

## 2021-07-19 VITALS
TEMPERATURE: 97.1 F | DIASTOLIC BLOOD PRESSURE: 48 MMHG | RESPIRATION RATE: 18 BRPM | HEART RATE: 54 BPM | SYSTOLIC BLOOD PRESSURE: 137 MMHG

## 2021-07-19 DIAGNOSIS — D50.9 IRON DEFICIENCY ANEMIA, UNSPECIFIED IRON DEFICIENCY ANEMIA TYPE: Primary | ICD-10-CM

## 2021-07-19 DIAGNOSIS — K44.9 HIATAL HERNIA: ICD-10-CM

## 2021-07-19 DIAGNOSIS — K90.9 MALABSORPTION OF IRON: ICD-10-CM

## 2021-07-19 DIAGNOSIS — E66.01 MORBID OBESITY WITH BMI OF 40.0-44.9, ADULT (HCC): Primary | ICD-10-CM

## 2021-07-19 PROCEDURE — 99213 OFFICE O/P EST LOW 20 MIN: CPT | Performed by: SURGERY

## 2021-07-19 PROCEDURE — 25010000002 FERUMOXYTOL 510 MG/17ML SOLUTION 510 MG VIAL: Performed by: NURSE PRACTITIONER

## 2021-07-19 PROCEDURE — 96374 THER/PROPH/DIAG INJ IV PUSH: CPT

## 2021-07-19 RX ORDER — PANTOPRAZOLE SODIUM 40 MG/1
40 TABLET, DELAYED RELEASE ORAL DAILY
Qty: 30 TABLET | Refills: 1 | Status: SHIPPED | OUTPATIENT
Start: 2021-07-19 | End: 2021-09-30 | Stop reason: SDUPTHER

## 2021-07-19 RX ORDER — METOCLOPRAMIDE 10 MG/1
10 TABLET ORAL
Qty: 90 TABLET | Refills: 1 | Status: SHIPPED | OUTPATIENT
Start: 2021-07-19 | End: 2021-09-17

## 2021-07-19 RX ADMIN — FERUMOXYTOL 510 MG: 510 INJECTION INTRAVENOUS at 10:39

## 2021-07-19 NOTE — PROGRESS NOTES
Subjective   Britta Lee is a 75 y.o. female.     Chief Complaint: Hiatal hernia, reflux    History of Present Illness She had a EGD in March 2021 showing only a small hiatal hernia with no gastritis, strictures or esophagitis. She still has reflux symptoms. She is diabetic and her sugars run 120 to in the 200s. It is usually over 200. She has not been on any antacid.      The following portions of the patient's history were reviewed and updated as appropriate: current medications, past family history, past medical history, past social history, past surgical history and problem list.    Review of Systems   Constitutional: Negative for activity change, appetite change, chills, fever and unexpected weight change.   HENT: Positive for trouble swallowing. Negative for congestion, facial swelling and sore throat.    Eyes: Negative for photophobia and visual disturbance.   Respiratory: Negative for chest tightness, shortness of breath and wheezing.    Cardiovascular: Negative for chest pain, palpitations and leg swelling.   Gastrointestinal: Positive for abdominal pain. Negative for abdominal distention, anal bleeding, blood in stool, constipation, diarrhea, nausea, rectal pain and vomiting.   Endocrine: Negative for cold intolerance, heat intolerance, polydipsia and polyuria.   Genitourinary: Negative for difficulty urinating, dysuria, flank pain and urgency.   Musculoskeletal: Negative for back pain and myalgias.   Skin: Negative for rash and wound.   Allergic/Immunologic: Negative for immunocompromised state.   Neurological: Negative for dizziness, seizures, syncope, light-headedness, numbness and headaches.   Hematological: Negative for adenopathy. Does not bruise/bleed easily.   Psychiatric/Behavioral: Negative for behavioral problems and confusion. The patient is not nervous/anxious.        Objective   Physical Exam  Vitals reviewed.   Constitutional:       General: She is not in acute distress.     Appearance:  She is well-developed. She is not ill-appearing.   HENT:      Head: Normocephalic. No laceration. Hair is normal.      Right Ear: Hearing and ear canal normal.      Left Ear: Hearing and ear canal normal.      Nose: Nose normal.      Right Sinus: No maxillary sinus tenderness or frontal sinus tenderness.      Left Sinus: No maxillary sinus tenderness or frontal sinus tenderness.   Eyes:      General: Lids are normal.      Conjunctiva/sclera: Conjunctivae normal.      Pupils: Pupils are equal, round, and reactive to light.   Neck:      Thyroid: No thyroid mass or thyromegaly.      Vascular: No JVD.      Trachea: No tracheal tenderness or tracheal deviation.   Cardiovascular:      Rate and Rhythm: Normal rate and regular rhythm.      Heart sounds: No murmur heard.   No gallop.    Pulmonary:      Effort: Pulmonary effort is normal.      Breath sounds: Normal breath sounds. No stridor. No wheezing.   Chest:      Chest wall: No tenderness.   Abdominal:      General: Bowel sounds are normal. There is no distension.      Palpations: Abdomen is soft. There is no mass.      Tenderness: There is no abdominal tenderness. There is no guarding or rebound.      Hernia: No hernia is present.   Musculoskeletal:         General: No deformity.      Cervical back: Normal range of motion.   Lymphadenopathy:      Cervical: No cervical adenopathy.      Upper Body:      Right upper body: No supraclavicular adenopathy.      Left upper body: No supraclavicular adenopathy.   Skin:     General: Skin is warm and dry.      Coloration: Skin is not pale.      Findings: No erythema or rash.   Neurological:      Mental Status: She is alert and oriented to person, place, and time.      Motor: No abnormal muscle tone.   Psychiatric:         Behavior: Behavior normal.         Thought Content: Thought content normal.         Past Medical History:   Diagnosis Date   • Acquired hypothyroidism 4/22/2021   • Anemia    • Arthritis    • Carpal tunnel  syndrome    • Coronary artery disease    • Diabetes mellitus (CMS/HCC)    • Elevated cholesterol    • GERD (gastroesophageal reflux disease)    • History of pneumonia    • History of unsteady gait     OCASSIONALLY   • Insomnia    • Kidney disease    • LENNY (obstructive sleep apnea) 12/1/2020   • Osteoarthritis    • Renal insufficiency    • Sciatica        Family History   Problem Relation Age of Onset   • Arthritis Mother    • Diabetes Mother    • Cancer Mother    • Heart disease Father    • Diabetes Daughter    • Diabetes Son    • Diabetes Maternal Aunt    • Heart disease Maternal Grandmother    • Breast cancer Neg Hx        Social History     Tobacco Use   • Smoking status: Never Smoker   • Smokeless tobacco: Never Used   Vaping Use   • Vaping Use: Never used   Substance Use Topics   • Alcohol use: Yes     Comment: socially   • Drug use: No       Past Surgical History:   Procedure Laterality Date   • ABDOMINAL SURGERY     • APPENDECTOMY     • CARDIAC CATHETERIZATION      1 STENT  ---  2000   • CARDIAC SURGERY     • CAROTID STENT     • CARPAL TUNNEL RELEASE Right 10/8/2019    Procedure: CARPAL TUNNEL RELEASE;  Surgeon: Feroz Johnson MD;  Location: [facility];  Service: Orthopedics   • CATARACT EXTRACTION     • CHOLECYSTECTOMY     • COLONOSCOPY     • CORONARY ANGIOPLASTY WITH STENT PLACEMENT     • ENDOSCOPY     • ENDOSCOPY N/A 3/5/2020    Procedure: ESOPHAGOGASTRODUODENOSCOPY;  Surgeon: Alexandru Bagley MD;  Location: [facility];  Service: Gastroenterology;  Laterality: N/A;   • ENDOSCOPY AND COLONOSCOPY     • GALLBLADDER SURGERY     • JOINT REPLACEMENT Left 05/02/2017    [facility]  DR JOHNSON  LEFT TOTAL KNEE   • KNEE ARTHROSCOPY W/ MENISCECTOMY Right    • LAPAROSCOPIC TUBAL LIGATION     • NH TOTAL KNEE ARTHROPLASTY Left 5/2/2017    Procedure: TOTAL KNEE ARTHROPLASTY;  Surgeon: Feroz Johnson MD;  Location: [facility];  Service: Orthopedics   • STERILIZATION     • TONSILLECTOMY         Current Outpatient  Medications   Medication Instructions   • albuterol sulfate  (90 Base) MCG/ACT inhaler 2 puffs, Inhalation, Every 4 Hours PRN   • amLODIPine (NORVASC) 10 mg, Oral, Nightly   • aspirin 81 mg, Oral, Daily   • atorvastatin (LIPITOR) 40 mg, Oral, Nightly   • BD Pen Needle Evon U/F 32G X 4 MM misc No dose, route, or frequency recorded.   • bumetanide (BUMEX) 0.5 mg, Oral, Daily   • cloNIDine (CATAPRES) 0.2 mg, Oral, 3 Times Daily   • Continuous Blood Gluc  (Dexcom G6 ) device 1 Device, Does not apply, Daily   • Continuous Blood Gluc Sensor (Dexcom G6 Sensor) Does not apply, Every 10 Days   • Continuous Blood Gluc Transmit (Dexcom G6 Transmitter) misc 1 Device, Does not apply, Daily   • escitalopram (LEXAPRO) 10 mg, Oral, Daily   • FeroSul 325 (65 Fe) MG tablet 1 tablet, Oral, 2 Times Daily   • glucose blood test strip 1 each, Other, 3 Times Daily   • hydrALAZINE (APRESOLINE) 50 mg, Oral, Every 8 Hours Scheduled   • hydroCHLOROthiazide (HYDRODIURIL) 25 mg, Oral, Daily   • insulin lispro (HUMALOG) 5 Units, Subcutaneous, 3 Times Daily Before Meals   • Insulin Pen Needle 32G X 5 MM misc 1 each, Does not apply, 4 Times Daily   • isosorbide mononitrate (IMDUR) 60 mg, Oral, Daily   • levothyroxine (SYNTHROID, LEVOTHROID) 25 mcg, Oral, Daily   • metoprolol succinate XL (TOPROL-XL) 25 MG 24 hr tablet Take 1 tablet by mouth Daily. Hold if HR less than 60 bpm.   • nystatin (MYCOSTATIN) 671072 UNIT/GM powder Apply  one application topically to the appropriate area as directed Every 12 (Twelve) Hours.   • Toujeo Max SoloStar 60 Units, Subcutaneous, Daily   • vitamin D (ERGOCALCIFEROL) 50,000 Units, Oral, Weekly         Assessment/Plan   Diagnoses and all orders for this visit:    1. Morbid obesity with BMI of 40.0-44.9, adult (CMS/[facility]) (Primary)    2. Hiatal hernia    Her issues are most likely due to her diabetes and slow motility. She can work on blood sugar control and try protonix and reglan.

## 2021-07-19 NOTE — PATIENT INSTRUCTIONS
Ferumoxytol injection  What is this medicine?  FERUMOXYTOL is an iron complex. Iron is used to make healthy red blood cells, which carry oxygen and nutrients throughout the body. This medicine is used to treat iron deficiency anemia.  This medicine may be used for other purposes; ask your health care provider or pharmacist if you have questions.  COMMON BRAND NAME(S): Feraheme  What should I tell my health care provider before I take this medicine?  They need to know if you have any of these conditions:  · anemia not caused by low iron levels  · high levels of iron in the blood  · magnetic resonance imaging (MRI) test scheduled  · an unusual or allergic reaction to iron, other medicines, foods, dyes, or preservatives  · pregnant or trying to get pregnant  · breast-feeding  How should I use this medicine?  This medicine is for injection into a vein. It is given by a health care professional in a hospital or clinic setting.  Talk to your pediatrician regarding the use of this medicine in children. Special care may be needed.  Overdosage: If you think you have taken too much of this medicine contact a poison control center or emergency room at once.  NOTE: This medicine is only for you. Do not share this medicine with others.  What if I miss a dose?  It is important not to miss your dose. Call your doctor or health care professional if you are unable to keep an appointment.  What may interact with this medicine?  This medicine may interact with the following medications:  · other iron products  This list may not describe all possible interactions. Give your health care provider a list of all the medicines, herbs, non-prescription drugs, or dietary supplements you use. Also tell them if you smoke, drink alcohol, or use illegal drugs. Some items may interact with your medicine.  What should I watch for while using this medicine?  Visit your doctor or healthcare professional regularly. Tell your doctor or healthcare  professional if your symptoms do not start to get better or if they get worse. You may need blood work done while you are taking this medicine.  You may need to follow a special diet. Talk to your doctor. Foods that contain iron include: whole grains/cereals, dried fruits, beans, or peas, leafy green vegetables, and organ meats (liver, kidney).  What side effects may I notice from receiving this medicine?  Side effects that you should report to your doctor or health care professional as soon as possible:  · allergic reactions like skin rash, itching or hives, swelling of the face, lips, or tongue  · breathing problems  · changes in blood pressure  · feeling faint or lightheaded, falls  · fever or chills  · flushing, sweating, or hot feelings  · swelling of the ankles or feet  Side effects that usually do not require medical attention (report to your doctor or health care professional if they continue or are bothersome):  · diarrhea  · headache  · nausea, vomiting  · stomach pain  This list may not describe all possible side effects. Call your doctor for medical advice about side effects. You may report side effects to FDA at 6-049-FDA-9306.  Where should I keep my medicine?  This drug is given in a hospital or clinic and will not be stored at home.  NOTE: This sheet is a summary. It may not cover all possible information. If you have questions about this medicine, talk to your doctor, pharmacist, or health care provider.  © 2021 [employer]vier/[employer] (2018-02-05 20:21:10)

## 2021-07-28 ENCOUNTER — APPOINTMENT (OUTPATIENT)
Dept: CARDIOLOGY | Facility: HOSPITAL | Age: 76
End: 2021-07-28

## 2021-07-29 ENCOUNTER — TELEPHONE (OUTPATIENT)
Dept: FAMILY MEDICINE CLINIC | Facility: CLINIC | Age: 76
End: 2021-07-29

## 2021-07-29 ENCOUNTER — READMISSION MANAGEMENT (OUTPATIENT)
Dept: CALL CENTER | Facility: HOSPITAL | Age: 76
End: 2021-07-29

## 2021-07-29 NOTE — OUTREACH NOTE
Prep Survey      Responses   [facility] facility patient discharged from?  Non-BH   Is LACE score < 7 ?  Non-BH Discharge   Emergency Room discharge w/ pulse ox?  No   Eligibility  TCM   Hospital  [facility]   Date of Discharge  07/29/21   Discharge diagnosis  CHF   Does the patient have one of the following disease processes/diagnoses(primary or secondary)?  CHF   Prep survey completed?  Yes          Nettie Peña RN

## 2021-07-29 NOTE — TELEPHONE ENCOUNTER
Caller: Britta Lee    Relationship to patient: Self    Best call back number: [phone]     New or established patient?  [] New  [x] Established    Date of discharge: 07/29/2021    Facility discharged from: [facility]    Diagnosis/Symptoms: CHS EXACERBATION, ST    Length of stay (If applicable):     Specialty Only: Did you see a [facility] provider?    [] Yes  [x] No  If so, who?

## 2021-07-30 ENCOUNTER — TRANSITIONAL CARE MANAGEMENT TELEPHONE ENCOUNTER (OUTPATIENT)
Dept: CALL CENTER | Facility: HOSPITAL | Age: 76
End: 2021-07-30

## 2021-07-30 NOTE — OUTREACH NOTE
Call Center TCM Note      Responses   [facility] patient discharged from?  Non-BH   Does the patient have one of the following disease processes/diagnoses(primary or secondary)?  CHF   TCM attempt successful?  No [Verbal release on file]   Unsuccessful attempts  Attempt 1          Hanna Culp RN    7/30/2021, 14:16 EDT

## 2021-07-30 NOTE — OUTREACH NOTE
Call Center TCM Note      Responses   [facility] patient discharged from?  Non-BH   Does the patient have one of the following disease processes/diagnoses(primary or secondary)?  CHF   TCM attempt successful?  No [Attempted both patient and  per verbal release]   Unsuccessful attempts  Attempt 2          Hanna Culp RN    7/30/2021, 16:00 EDT

## 2021-08-02 ENCOUNTER — OFFICE VISIT (OUTPATIENT)
Dept: SURGERY | Facility: CLINIC | Age: 76
End: 2021-08-02

## 2021-08-02 ENCOUNTER — TRANSITIONAL CARE MANAGEMENT TELEPHONE ENCOUNTER (OUTPATIENT)
Dept: CALL CENTER | Facility: HOSPITAL | Age: 76
End: 2021-08-02

## 2021-08-02 VITALS — WEIGHT: 214 LBS | HEIGHT: 63 IN | BODY MASS INDEX: 37.92 KG/M2

## 2021-08-02 DIAGNOSIS — K44.9 HIATAL HERNIA: Primary | ICD-10-CM

## 2021-08-02 DIAGNOSIS — D50.9 IRON DEFICIENCY ANEMIA, UNSPECIFIED IRON DEFICIENCY ANEMIA TYPE: ICD-10-CM

## 2021-08-02 PROCEDURE — 99212 OFFICE O/P EST SF 10 MIN: CPT | Performed by: SURGERY

## 2021-08-02 NOTE — OUTREACH NOTE
Call Center TCM Note      Responses   [facility] patient discharged from?  Non-BH   Does the patient have one of the following disease processes/diagnoses(primary or secondary)?  CHF   TCM attempt successful?  No   Unsuccessful attempts  Attempt 3          Angela Gamez RN    8/2/2021, 11:56 EDT

## 2021-08-02 NOTE — PROGRESS NOTES
Subjective   Britta Lee is a 75 y.o. female.     Chief Complaint:     History of Present Illness She was recently had admission in [place] for CHF. She has an appointment to see GI for a pill cam in September. She is wanting to see if it could be done sooner. She has had no change in GI symptoms. Her EGD and colonoscopy done March 2020 did not show any definite sign of bleeding. She has not had to be transfused.     The following portions of the patient's history were reviewed and updated as appropriate: current medications, past family history, past medical history, past social history, past surgical history and problem list.    Review of Systems    Objective   Physical Exam abdomen soft and not tender.     Past Medical History:   Diagnosis Date   • Acquired hypothyroidism 4/22/2021   • Anemia    • Arthritis    • Carpal tunnel syndrome    • Coronary artery disease    • Diabetes mellitus (CMS/HCC)    • Elevated cholesterol    • GERD (gastroesophageal reflux disease)    • History of pneumonia    • History of unsteady gait     OCASSIONALLY   • Insomnia    • Kidney disease    • LENNY (obstructive sleep apnea) 12/1/2020   • Osteoarthritis    • Renal insufficiency    • Sciatica        Family History   Problem Relation Age of Onset   • Arthritis Mother    • Diabetes Mother    • Cancer Mother    • Heart disease Father    • Diabetes Daughter    • Diabetes Son    • Diabetes Maternal Aunt    • Heart disease Maternal Grandmother    • Breast cancer Neg Hx        Social History     Tobacco Use   • Smoking status: Never Smoker   • Smokeless tobacco: Never Used   Vaping Use   • Vaping Use: Never used   Substance Use Topics   • Alcohol use: Yes     Comment: socially   • Drug use: No       Past Surgical History:   Procedure Laterality Date   • ABDOMINAL SURGERY     • APPENDECTOMY     • CARDIAC CATHETERIZATION      1 STENT  ---  2000   • CARDIAC SURGERY     • CAROTID STENT     • CARPAL TUNNEL RELEASE Right 10/8/2019    Procedure:  CARPAL TUNNEL RELEASE;  Surgeon: Feroz Johnson MD;  Location: [facility] OR;  Service: Orthopedics   • CATARACT EXTRACTION     • CHOLECYSTECTOMY     • COLONOSCOPY     • CORONARY ANGIOPLASTY WITH STENT PLACEMENT     • ENDOSCOPY     • ENDOSCOPY N/A 3/5/2020    Procedure: ESOPHAGOGASTRODUODENOSCOPY;  Surgeon: Alexandru Bagley MD;  Location: [facility] OR;  Service: Gastroenterology;  Laterality: N/A;   • ENDOSCOPY AND COLONOSCOPY     • GALLBLADDER SURGERY     • JOINT REPLACEMENT Left 05/02/2017    [facility]  DR JOHNSON  LEFT TOTAL KNEE   • KNEE ARTHROSCOPY W/ MENISCECTOMY Right    • LAPAROSCOPIC TUBAL LIGATION     • MA TOTAL KNEE ARTHROPLASTY Left 5/2/2017    Procedure: TOTAL KNEE ARTHROPLASTY;  Surgeon: Feroz Johnson MD;  Location: [facility] OR;  Service: Orthopedics   • STERILIZATION     • TONSILLECTOMY         Current Outpatient Medications   Medication Instructions   • albuterol sulfate  (90 Base) MCG/ACT inhaler 2 puffs, Inhalation, Every 4 Hours PRN   • amLODIPine (NORVASC) 10 mg, Oral, Nightly   • aspirin 81 mg, Oral, Daily   • atorvastatin (LIPITOR) 40 mg, Oral, Nightly   • BD Pen Needle Evon U/F 32G X 4 MM misc No dose, route, or frequency recorded.   • bumetanide (BUMEX) 0.5 mg, Oral, Daily   • cloNIDine (CATAPRES) 0.2 mg, Oral, 3 Times Daily   • Continuous Blood Gluc  (Dexcom G6 ) device 1 Device, Does not apply, Daily   • Continuous Blood Gluc Sensor (Dexcom G6 Sensor) Does not apply, Every 10 Days   • Continuous Blood Gluc Transmit (Dexcom G6 Transmitter) misc 1 Device, Does not apply, Daily   • escitalopram (LEXAPRO) 10 mg, Oral, Daily   • FeroSul 325 (65 Fe) MG tablet 1 tablet, Oral, 2 Times Daily   • glucose blood test strip 1 each, Other, 3 Times Daily   • hydrALAZINE (APRESOLINE) 50 mg, Oral, Every 8 Hours Scheduled   • hydroCHLOROthiazide (HYDRODIURIL) 25 mg, Oral, Daily   • insulin lispro (HUMALOG) 5 Units, Subcutaneous, 3 Times Daily Before Meals   • Insulin Pen Needle 32G  X 5 MM misc 1 each, Does not apply, 4 Times Daily   • isosorbide mononitrate (IMDUR) 60 mg, Oral, Daily   • levothyroxine (SYNTHROID, LEVOTHROID) 25 mcg, Oral, Daily   • metoclopramide (REGLAN) 10 mg, Oral, 3 Times Daily With Meals   • metoprolol succinate XL (TOPROL-XL) 25 MG 24 hr tablet Take 1 tablet by mouth Daily. Hold if HR less than 60 bpm.   • nystatin (MYCOSTATIN) 208235 UNIT/GM powder Apply  one application topically to the appropriate area as directed Every 12 (Twelve) Hours.   • pantoprazole (PROTONIX) 40 mg, Oral, Daily   • Toujeo Max SoloStar 60 Units, Subcutaneous, Daily   • vitamin D (ERGOCALCIFEROL) 50,000 Units, Oral, Weekly         Assessment/Plan   Diagnoses and all orders for this visit:    1. Hiatal hernia (Primary)    2. Iron deficiency anemia, unspecified iron deficiency anemia type    contact GI to see if the pill cam could be done earlier

## 2021-08-06 ENCOUNTER — OFFICE VISIT (OUTPATIENT)
Dept: FAMILY MEDICINE CLINIC | Facility: CLINIC | Age: 76
End: 2021-08-06

## 2021-08-06 VITALS
WEIGHT: 203 LBS | HEIGHT: 63 IN | TEMPERATURE: 97.1 F | BODY MASS INDEX: 35.97 KG/M2 | OXYGEN SATURATION: 97 % | HEART RATE: 83 BPM | SYSTOLIC BLOOD PRESSURE: 158 MMHG | DIASTOLIC BLOOD PRESSURE: 70 MMHG

## 2021-08-06 DIAGNOSIS — E66.01 MORBID (SEVERE) OBESITY DUE TO EXCESS CALORIES (HCC): Chronic | ICD-10-CM

## 2021-08-06 DIAGNOSIS — D64.9 ANEMIA, UNSPECIFIED TYPE: Chronic | ICD-10-CM

## 2021-08-06 DIAGNOSIS — E11.22 TYPE 2 DIABETES MELLITUS WITH STAGE 4 CHRONIC KIDNEY DISEASE, WITH LONG-TERM CURRENT USE OF INSULIN (HCC): Chronic | ICD-10-CM

## 2021-08-06 DIAGNOSIS — I50.33 ACUTE ON CHRONIC DIASTOLIC CONGESTIVE HEART FAILURE (HCC): Chronic | ICD-10-CM

## 2021-08-06 DIAGNOSIS — I10 ESSENTIAL HYPERTENSION: Primary | Chronic | ICD-10-CM

## 2021-08-06 DIAGNOSIS — Z79.4 TYPE 2 DIABETES MELLITUS WITH STAGE 4 CHRONIC KIDNEY DISEASE, WITH LONG-TERM CURRENT USE OF INSULIN (HCC): Chronic | ICD-10-CM

## 2021-08-06 DIAGNOSIS — N18.4 TYPE 2 DIABETES MELLITUS WITH STAGE 4 CHRONIC KIDNEY DISEASE, WITH LONG-TERM CURRENT USE OF INSULIN (HCC): Chronic | ICD-10-CM

## 2021-08-06 PROCEDURE — 99495 TRANSJ CARE MGMT MOD F2F 14D: CPT | Performed by: PHYSICIAN ASSISTANT

## 2021-08-06 PROCEDURE — 1111F DSCHRG MED/CURRENT MED MERGE: CPT | Performed by: PHYSICIAN ASSISTANT

## 2021-08-06 RX ORDER — AMLODIPINE BESYLATE 5 MG/1
5 TABLET ORAL DAILY
Qty: 30 TABLET | Refills: 5 | Status: SHIPPED | OUTPATIENT
Start: 2021-08-06 | End: 2021-09-07

## 2021-08-06 RX ORDER — ASPIRIN 81 MG/1
81 TABLET, CHEWABLE ORAL DAILY
COMMUNITY
Start: 2021-07-29 | End: 2021-08-06 | Stop reason: SDUPTHER

## 2021-08-08 NOTE — PATIENT INSTRUCTIONS
"Fat and Cholesterol Restricted Eating Plan  Getting too much fat and cholesterol in your diet may cause health problems. Choosing the right foods helps keep your fat and cholesterol at normal levels. This can keep you from getting certain diseases.  Your doctor may recommend an eating plan that includes:  · Total fat: ______% or less of total calories a day.  · Saturated fat: ______% or less of total calories a day.  · Cholesterol: less than _________mg a day.  · Fiber: ______g a day.  What are tips for following this plan?  Meal planning  · At meals, divide your plate into four equal parts:  ? Fill one-half of your plate with vegetables and green salads.  ? Fill one-fourth of your plate with whole grains.  ? Fill one-fourth of your plate with low-fat (lean) protein foods.  · Eat fish that is high in omega-3 fats at least two times a week. This includes mackerel, tuna, sardines, and salmon.  · Eat foods that are high in fiber, such as whole grains, beans, apples, broccoli, carrots, peas, and barley.  General tips    · Work with your doctor to lose weight if you need to.  · Avoid:  ? Foods with added sugar.  ? Fried foods.  ? Foods with partially hydrogenated oils.  · Limit alcohol intake to no more than 1 drink a day for nonpregnant women and 2 drinks a day for men. One drink equals 12 oz of beer, 5 oz of wine, or 1½ oz of hard liquor.  Reading food labels  · Check food labels for:  ? Trans fats.  ? Partially hydrogenated oils.  ? Saturated fat (g) in each serving.  ? Cholesterol (mg) in each serving.  ? Fiber (g) in each serving.  · Choose foods with healthy fats, such as:  ? Monounsaturated fats.  ? Polyunsaturated fats.  ? Omega-3 fats.  · Choose grain products that have whole grains. Look for the word \"whole\" as the first word in the ingredient list.  Cooking  · Cook foods using low-fat methods. These include baking, boiling, grilling, and broiling.  · Eat more home-cooked foods. Eat at restaurants and buffets " less often.  · Avoid cooking using saturated fats, such as butter, cream, palm oil, palm kernel oil, and coconut oil.  Recommended foods    Fruits  · All fresh, canned (in natural juice), or frozen fruits.  Vegetables  · Fresh or frozen vegetables (raw, steamed, roasted, or grilled). Green salads.  Grains  · Whole grains, such as whole wheat or whole grain breads, crackers, cereals, and pasta. Unsweetened oatmeal, bulgur, barley, quinoa, or brown rice. Corn or whole wheat flour tortillas.  Meats and other protein foods  · Ground beef (85% or leaner), grass-fed beef, or beef trimmed of fat. Skinless chicken or turkey. Ground chicken or turkey. Pork trimmed of fat. All fish and seafood. Egg whites. Dried beans, peas, or lentils. Unsalted nuts or seeds. Unsalted canned beans. Nut butters without added sugar or oil.  Dairy  · Low-fat or nonfat dairy products, such as skim or 1% milk, 2% or reduced-fat cheeses, low-fat and fat-free ricotta or cottage cheese, or plain low-fat and nonfat yogurt.  Fats and oils  · Tub margarine without trans fats. Light or reduced-fat mayonnaise and salad dressings. Avocado. Olive, canola, sesame, or safflower oils.  The items listed above may not be a complete list of foods and beverages you can eat. Contact a dietitian for more information.  Foods to avoid  Fruits  · Canned fruit in heavy syrup. Fruit in cream or butter sauce. Fried fruit.  Vegetables  · Vegetables cooked in cheese, cream, or butter sauce. Fried vegetables.  Grains  · White bread. White pasta. White rice. Cornbread. Bagels, pastries, and croissants. Crackers and snack foods that contain trans fat and hydrogenated oils.  Meats and other protein foods  · Fatty cuts of meat. Ribs, chicken wings, dixon, sausage, bologna, salami, chitterlings, fatback, hot dogs, bratwurst, and packaged lunch meats. Liver and organ meats. Whole eggs and egg yolks. Chicken and turkey with skin. Fried meat.  Dairy  · Whole or 2% milk, cream,  half-and-half, and cream cheese. Whole milk cheeses. Whole-fat or sweetened yogurt. Full-fat cheeses. Nondairy creamers and whipped toppings. Processed cheese, cheese spreads, and cheese curds.  Beverages  · Alcohol. Sugar-sweetened drinks such as sodas, lemonade, and fruit drinks.  Fats and oils  · Butter, stick margarine, lard, shortening, ghee, or dixon fat. Coconut, palm kernel, and palm oils.  Sweets and desserts  · Corn syrup, sugars, honey, and molasses. Candy. Jam and jelly. Syrup. Sweetened cereals. Cookies, pies, cakes, donuts, muffins, and ice cream.  The items listed above may not be a complete list of foods and beverages you should avoid. Contact a dietitian for more information.  Summary  · Choosing the right foods helps keep your fat and cholesterol at normal levels. This can keep you from getting certain diseases.  · At meals, fill one-half of your plate with vegetables and green salads.  · Eat high-fiber foods, like whole grains, beans, apples, carrots, peas, and barley.  · Limit added sugar, saturated fats, alcohol, and fried foods.  This information is not intended to replace advice given to you by your health care provider. Make sure you discuss any questions you have with your health care provider.  Document Revised: 08/21/2019 Document Reviewed: 09/04/2018  [employer] Patient Education © 2021 [employer]      Heart Failure, Diagnosis    Heart failure means that your heart is not able to pump blood in the right way. This makes it hard for your body to work well. Heart failure is usually a long-term (chronic) condition. You must take good care of yourself and follow your treatment plan from your doctor.  What are the causes?  This condition may be caused by:  · High blood pressure.  · Build up of cholesterol and fat in the arteries.  · Heart attack. This injures the heart muscle.  · Heart valves that do not open and close properly.  · Damage of the heart muscle. This is also called  cardiomyopathy.  · Lung disease.  · Abnormal heart rhythms.  What increases the risk?  The risk of heart failure goes up as a person ages. This condition is also more likely to develop in people who:  · Are overweight.  · Are male.  · Smoke or chew tobacco.  · Abuse alcohol or illegal drugs.  · Have taken medicines that can damage the heart.  · Have diabetes.  · Have abnormal heart rhythms.  · Have thyroid problems.  · Have low blood counts (anemia).  What are the signs or symptoms?  Symptoms of this condition include:  · Shortness of breath.  · Coughing.  · Swelling of the feet, ankles, legs, or belly.  · Losing weight for no reason.  · Trouble breathing.  · Waking from sleep because of the need to sit up and get more air.  · Rapid heartbeat.  · Being very tired.  · Feeling dizzy, or feeling like you may pass out (faint).  · Having no desire to eat.  · Feeling like you may vomit (nauseous).  · Peeing (urinating) more at night.  · Feeling confused.  How is this treated?         This condition may be treated with:  · Medicines. These can be given to treat blood pressure and to make the heart muscles stronger.  · Changes in your daily life. These may include eating a healthy diet, staying at a healthy body weight, quitting tobacco and illegal drug use, or doing exercises.  · Surgery. Surgery can be done to open blocked valves, or to put devices in the heart, such as pacemakers.  · A donor heart (heart transplant). You will receive a healthy heart from a donor.  Follow these instructions at home:  · Treat other conditions as told by your doctor. These may include high blood pressure, diabetes, thyroid disease, or abnormal heart rhythms.  · Learn as much as you can about heart failure.  · Get support as you need it.  · Keep all follow-up visits as told by your doctor. This is important.  Summary  · Heart failure means that your heart is not able to pump blood in the right way.  · This condition is caused by high blood  pressure, heart attack, or damage of the heart muscle.  · Symptoms of this condition include shortness of breath and swelling of the feet, ankles, legs, or belly. You may also feel very tired or feel like you may vomit.  · You may be treated with medicines, surgery, or changes in your daily life.  · Treat other health conditions as told by your doctor.  This information is not intended to replace advice given to you by your health care provider. Make sure you discuss any questions you have with your health care provider.  Document Revised: 03/06/2020 Document Reviewed: 03/06/2020  [employer] Patient Education © 2021 [employer]

## 2021-08-08 NOTE — PROGRESS NOTES
Transitional Care Follow Up Visit  Subjective     Britta Lee is a 75 y.o. female who presents for a transitional care management visit.    Within 48 business hours after discharge our office attempted to contact the patient for 3 unsuccessful attempts  via telephone to coordinate her care and needs.      I reviewed and discussed the details of that call along with the discharge summary, hospital problems, inpatient lab results, inpatient diagnostic studies, and consultation reports with Britta.     Current outpatient and discharge medications have been reconciled for the patient.  Reviewed by: RAFIQ Montanez      Date of TCM Phone Call 7/29/2021   [facility]   Date of Admission 7/24/2021   Date of Discharge 7/29/2021   Discharge Disposition -     Risk for Readmission (LACE) No data recorded    History of Present Illness   Course During Hospital Stay:      On 7/24/2021 patient states she developed chest tightness after long car ride back from [place].  She was admitted to [facility] via transfer from [facility].  Her admitting diagnoses were congestive heart failure exacerbation, anemia, hypoxic respiratory failure, acute on chronic renal failure, and hyperglycemia secondary to diabetes.  Patient was seen by nephrology on 7/25/2021 who ordered strict I's and O's and urine studies and started the patient on IV Bumex.  Patient complained of intermittent shortness of breath but was doing well on 4 L/min of oxygen per nasal cannula.  Dr. Lovell with cardiology saw the patient on 7/25/2021 and 2D echocardiogram showed ejection fraction 70% with trace of mild mitral and tricuspid regurgitation.  Other diagnostic studies included a VQ scan with no evidence of PE but suggested congestive heart failure and/or pulmonary edema and an unremarkable renal ultrasound.  A stroke alert was called on 7/25/2021 when the patient developed left-sided weakness.  A CT of the head showed chronic  changes but nothing acute.  Nephrologist saw the patient on 7/25/2021 and order more studies with consult to PT/OT.  He started her on aspirin and statin therapy.  On 7/26/2021 the patient neurologically returned to baseline.  On 7/27/2021 patient's shortness of breath improved and her creatinine was improving.  MRI of the brain suggested small old right basal ganglia lacunar infarct and chronic changes but nothing acute.  MRA of head showed poor visualization of the left MCA and posterior left cerebral artery but was basically normal otherwise.  MRA of the neck was suboptimal due to motion artifact but all major vessels appeared patent.  The patient continued to improve and was cleared for discharge 7/25/2021.  Discharge diagnosis and current status are as follows:    CHF exacerbation-stable with the Bumex, clonidine and metoprolol.  Patient reports shortness of breath and peripheral edema are significantly improved    Acute on chronic renal failure-stable at discharge.  Patient does see nephrology and has an appointment in the near future    Hyperglycemia due to diabetes mellitus type 2-stable with Toujeo and Humalog    Acute and chronic respiratory failure with hypoxia-  Stable with vision therapy    Hypertension-not at goal as patient states that she was advised to take an extra 1/2 tablet of Bumex but is unable to cut the tablets as they are very small.  Not at goal with the clonidine 0.2 mg 3 times daily, Bumex 0.5 mg daily and metoprolol 25 mg daily.    Sepsis-resolved    UTI-resolved    Morbid obesity-not at goal with current diet and exercise    Anemia-stable    The following portions of the patient's history were reviewed and updated as appropriate: allergies, current medications, past family history, past medical history, past social history, past surgical history and problem list.    Review of Systems   Constitutional: Positive for fatigue. Negative for activity change, appetite change and fever.   HENT:  Negative for ear pain, sinus pressure and sore throat.    Eyes: Negative for pain and visual disturbance.   Respiratory: Negative for cough and chest tightness.    Cardiovascular: Negative for chest pain and palpitations.   Gastrointestinal: Negative for abdominal pain, constipation, diarrhea, nausea and vomiting.   Endocrine: Negative for polydipsia and polyuria.   Genitourinary: Negative for dysuria and frequency.   Musculoskeletal: Negative for back pain and myalgias.   Skin: Negative for color change and rash.   Allergic/Immunologic: Negative for food allergies and immunocompromised state.   Neurological: Negative for dizziness, syncope and headaches.   Hematological: Negative for adenopathy. Does not bruise/bleed easily.   Psychiatric/Behavioral: Negative for hallucinations and suicidal ideas. The patient is not nervous/anxious.        Objective   Physical Exam  Vitals and nursing note reviewed.   Constitutional:       Appearance: Normal appearance. She is well-developed. She is obese.   HENT:      Head: Normocephalic and atraumatic.      Right Ear: Tympanic membrane normal.      Left Ear: Tympanic membrane normal.      Nose: Nose normal.      Mouth/Throat:      Mouth: Mucous membranes are moist.      Pharynx: No oropharyngeal exudate or posterior oropharyngeal erythema.   Eyes:      Extraocular Movements: Extraocular movements intact.      Conjunctiva/sclera: Conjunctivae normal.   Neck:      Thyroid: No thyromegaly.      Trachea: No tracheal deviation.   Cardiovascular:      Rate and Rhythm: Normal rate and regular rhythm.      Heart sounds: Normal heart sounds. No murmur heard.     Pulmonary:      Effort: Pulmonary effort is normal. No respiratory distress.      Breath sounds: Normal breath sounds. No wheezing.   Abdominal:      General: Bowel sounds are normal.      Palpations: Abdomen is soft.      Tenderness: There is no abdominal tenderness. There is no guarding.   Musculoskeletal:         General: No  tenderness. Normal range of motion.      Cervical back: Normal range of motion and neck supple.      Right lower leg: No edema.      Left lower leg: No edema.   Lymphadenopathy:      Cervical: No cervical adenopathy.   Skin:     General: Skin is warm and dry.      Findings: No rash.   Neurological:      General: No focal deficit present.      Mental Status: She is alert and oriented to person, place, and time.   Psychiatric:         Mood and Affect: Mood normal.         Behavior: Behavior normal.         Thought Content: Thought content normal.         Assessment/Plan   Diagnoses and all orders for this visit:    1. Essential hypertension (Primary)  Comments:  Start amlodipine  Advise daily BP and pulse log  Orders:  -     amLODIPine (NORVASC) 5 MG tablet; Take 1 tablet by mouth Daily.  Dispense: 30 tablet; Refill: 5    2. Acute on chronic diastolic congestive heart failure (CMS/HCC)  Comments:  Continue clonidine, Bumex, and metoprolol  Advised to monitor symptoms including shortness of breath or leg edema and report weight gain over 3 pounds   Advised patient to see cardiology for CHF and other cardiac issues    3. Type 2 diabetes mellitus with stage 4 chronic kidney disease, with long-term current use of insulin (CMS/HCC)  Comments:  Advised to follow-up with nephrology  Continue Toujeo and Humalog  Advised low-carb diabetic diet    4. Morbid (severe) obesity due to excess calories (CMS/HCC)  Comments:  Advised 30 minutes CV activity daily  Advised low carbohydrate diabetic diet    5. Anemia, unspecified type  Comments:  Continue to monitor  Advised patient to report any abnormal bleeding bruising or blood in stool

## 2021-08-09 ENCOUNTER — TELEPHONE (OUTPATIENT)
Dept: FAMILY MEDICINE CLINIC | Facility: CLINIC | Age: 76
End: 2021-08-09

## 2021-08-09 NOTE — TELEPHONE ENCOUNTER
Pharmacy Name:  [facility]     Pharmacy representative name:  BHUMIKA     Pharmacy representative phone number: [phone]    What question does the pharmacy have: DEXCOM  - THEY NEED TO KNOW WHY THE PATIENT NEEDS THIS     Who is the provider that prescribed the medication: CHARLOTTE RUIZ     Additional notes: PLEASE CALL

## 2021-08-11 ENCOUNTER — HOSPITAL ENCOUNTER (OUTPATIENT)
Dept: CARDIOLOGY | Facility: HOSPITAL | Age: 76
Discharge: HOME | End: 2021-08-11
Admitting: NURSE PRACTITIONER

## 2021-08-11 VITALS
BODY MASS INDEX: 35.96 KG/M2 | DIASTOLIC BLOOD PRESSURE: 70 MMHG | HEART RATE: 65 BPM | WEIGHT: 203 LBS | SYSTOLIC BLOOD PRESSURE: 116 MMHG | OXYGEN SATURATION: 97 % | RESPIRATION RATE: 22 BRPM

## 2021-08-11 DIAGNOSIS — G47.33 OSA (OBSTRUCTIVE SLEEP APNEA): ICD-10-CM

## 2021-08-11 DIAGNOSIS — D50.9 IRON DEFICIENCY ANEMIA, UNSPECIFIED IRON DEFICIENCY ANEMIA TYPE: ICD-10-CM

## 2021-08-11 DIAGNOSIS — I10 ESSENTIAL HYPERTENSION: ICD-10-CM

## 2021-08-11 DIAGNOSIS — N18.4 CKD (CHRONIC KIDNEY DISEASE) STAGE 4, GFR 15-29 ML/MIN (HCC): ICD-10-CM

## 2021-08-11 DIAGNOSIS — K90.9 MALABSORPTION OF IRON: ICD-10-CM

## 2021-08-11 DIAGNOSIS — I50.32 CHRONIC DIASTOLIC CONGESTIVE HEART FAILURE (HCC): Primary | ICD-10-CM

## 2021-08-11 DIAGNOSIS — R60.0 PEDAL EDEMA: ICD-10-CM

## 2021-08-11 DIAGNOSIS — E66.09 CLASS 2 OBESITY DUE TO EXCESS CALORIES WITH BODY MASS INDEX (BMI) OF 35.0 TO 35.9 IN ADULT, UNSPECIFIED WHETHER SERIOUS COMORBIDITY PRESENT: ICD-10-CM

## 2021-08-11 LAB
ABSOLUTE LUNG FLUID CONTENT: 23 % (ref 20–35)
ALBUMIN SERPL-MCNC: 3.75 G/DL (ref 3.5–5.2)
ALBUMIN/GLOB SERPL: 1 G/DL
ALP SERPL-CCNC: 411 U/L (ref 39–117)
ALT SERPL W P-5'-P-CCNC: 22 U/L (ref 1–33)
ANION GAP SERPL CALCULATED.3IONS-SCNC: 11.2 MMOL/L (ref 5–15)
ANION GAP SERPL CALCULATED.3IONS-SCNC: 12 MMOL/L (ref 5–15)
ANISOCYTOSIS BLD QL: NORMAL
AST SERPL-CCNC: 35 U/L (ref 1–32)
BASOPHILS # BLD AUTO: 0.06 10*3/MM3 (ref 0–0.2)
BASOPHILS NFR BLD AUTO: 0.8 % (ref 0–1.5)
BILIRUB SERPL-MCNC: 0.2 MG/DL (ref 0–1.2)
BUN SERPL-MCNC: 43 MG/DL (ref 8–23)
BUN SERPL-MCNC: 43 MG/DL (ref 8–23)
BUN/CREAT SERPL: 20.6 (ref 7–25)
BUN/CREAT SERPL: 20.8 (ref 7–25)
CALCIUM SPEC-SCNC: 9.1 MG/DL (ref 8.6–10.5)
CALCIUM SPEC-SCNC: 9.2 MG/DL (ref 8.6–10.5)
CHLORIDE SERPL-SCNC: 103 MMOL/L (ref 98–107)
CHLORIDE SERPL-SCNC: 105 MMOL/L (ref 98–107)
CO2 SERPL-SCNC: 21.8 MMOL/L (ref 22–29)
CO2 SERPL-SCNC: 22 MMOL/L (ref 22–29)
CREAT SERPL-MCNC: 2.07 MG/DL (ref 0.57–1)
CREAT SERPL-MCNC: 2.09 MG/DL (ref 0.57–1)
DEPRECATED RDW RBC AUTO: 58.3 FL (ref 37–54)
EOSINOPHIL # BLD AUTO: 0.49 10*3/MM3 (ref 0–0.4)
EOSINOPHIL NFR BLD AUTO: 6.2 % (ref 0.3–6.2)
ERYTHROCYTE [DISTWIDTH] IN BLOOD BY AUTOMATED COUNT: 18.7 % (ref 12.3–15.4)
FERRITIN SERPL-MCNC: 240.1 NG/ML (ref 13–150)
GFR SERPL CREATININE-BSD FRML MDRD: 23 ML/MIN/1.73
GFR SERPL CREATININE-BSD FRML MDRD: 23 ML/MIN/1.73
GLOBULIN UR ELPH-MCNC: 3.8 GM/DL
GLUCOSE SERPL-MCNC: 292 MG/DL (ref 65–99)
GLUCOSE SERPL-MCNC: 297 MG/DL (ref 65–99)
HCT VFR BLD AUTO: 28.3 % (ref 34–46.6)
HGB BLD-MCNC: 8.1 G/DL (ref 12–15.9)
HYPOCHROMIA BLD QL: NORMAL
IMM GRANULOCYTES # BLD AUTO: 0.03 10*3/MM3 (ref 0–0.05)
IMM GRANULOCYTES NFR BLD AUTO: 0.4 % (ref 0–0.5)
IRON 24H UR-MRATE: 41 MCG/DL (ref 37–145)
IRON SATN MFR SERPL: 12 % (ref 20–50)
LYMPHOCYTES # BLD AUTO: 1.51 10*3/MM3 (ref 0.7–3.1)
LYMPHOCYTES NFR BLD AUTO: 19.2 % (ref 19.6–45.3)
MAGNESIUM SERPL-MCNC: 2 MG/DL (ref 1.6–2.4)
MCH RBC QN AUTO: 24.3 PG (ref 26.6–33)
MCHC RBC AUTO-ENTMCNC: 28.6 G/DL (ref 31.5–35.7)
MCV RBC AUTO: 85 FL (ref 79–97)
MONOCYTES # BLD AUTO: 0.43 10*3/MM3 (ref 0.1–0.9)
MONOCYTES NFR BLD AUTO: 5.5 % (ref 5–12)
NEUTROPHILS NFR BLD AUTO: 5.33 10*3/MM3 (ref 1.7–7)
NEUTROPHILS NFR BLD AUTO: 67.9 % (ref 42.7–76)
NRBC BLD AUTO-RTO: 0 /100 WBC (ref 0–0.2)
NT-PROBNP SERPL-MCNC: 2085 PG/ML (ref 0–1800)
PLAT MORPH BLD: NORMAL
PLATELET # BLD AUTO: 315 10*3/MM3 (ref 140–450)
PMV BLD AUTO: 9.5 FL (ref 6–12)
POLYCHROMASIA BLD QL SMEAR: NORMAL
POTASSIUM SERPL-SCNC: 4.6 MMOL/L (ref 3.5–5.2)
POTASSIUM SERPL-SCNC: 4.7 MMOL/L (ref 3.5–5.2)
PROT SERPL-MCNC: 7.5 G/DL (ref 6–8.5)
RBC # BLD AUTO: 3.33 10*6/MM3 (ref 3.77–5.28)
SODIUM SERPL-SCNC: 137 MMOL/L (ref 136–145)
SODIUM SERPL-SCNC: 138 MMOL/L (ref 136–145)
TIBC SERPL-MCNC: 340 MCG/DL (ref 298–536)
TRANSFERRIN SERPL-MCNC: 228 MG/DL (ref 200–360)
WBC # BLD AUTO: 7.85 10*3/MM3 (ref 3.4–10.8)

## 2021-08-11 PROCEDURE — 83735 ASSAY OF MAGNESIUM: CPT | Performed by: NURSE PRACTITIONER

## 2021-08-11 PROCEDURE — 99213 OFFICE O/P EST LOW 20 MIN: CPT | Performed by: NURSE PRACTITIONER

## 2021-08-11 PROCEDURE — 82728 ASSAY OF FERRITIN: CPT | Performed by: NURSE PRACTITIONER

## 2021-08-11 PROCEDURE — 84466 ASSAY OF TRANSFERRIN: CPT | Performed by: NURSE PRACTITIONER

## 2021-08-11 PROCEDURE — 83540 ASSAY OF IRON: CPT | Performed by: NURSE PRACTITIONER

## 2021-08-11 PROCEDURE — 94726 PLETHYSMOGRAPHY LUNG VOLUMES: CPT | Performed by: NURSE PRACTITIONER

## 2021-08-11 PROCEDURE — 80053 COMPREHEN METABOLIC PANEL: CPT | Performed by: NURSE PRACTITIONER

## 2021-08-11 PROCEDURE — 36415 COLL VENOUS BLD VENIPUNCTURE: CPT | Performed by: NURSE PRACTITIONER

## 2021-08-11 PROCEDURE — 83880 ASSAY OF NATRIURETIC PEPTIDE: CPT

## 2021-08-11 PROCEDURE — 85025 COMPLETE CBC W/AUTO DIFF WBC: CPT | Performed by: NURSE PRACTITIONER

## 2021-08-11 PROCEDURE — 85007 BL SMEAR W/DIFF WBC COUNT: CPT | Performed by: NURSE PRACTITIONER

## 2021-08-11 NOTE — PROGRESS NOTES
[facility] CHF CLINIC OFFICE VISIT    Subjective:   Congestive Heart Failure  Pertinent negatives include no abdominal pain, chest pain, claudication, near-syncope, palpitations or shortness of breath.      Britta Lee is a 75 y.o.  female who presents to the clinic today for follow up on heart failure. She has a hx of diastolic congestive heart failure. Her EF was > 70% from echocardiogram on 11/4/2020. She is currently taking Bumex 0.5 mg daily. She has been unable to tolerate Spironolactone or  ACE/ARB due to abnormal kidney function.    Was hospitalized at [facility] for chest tightness and HF exacerbation. Doing well since discharged.   Shortness of air stable   LE swelling stable   Anemia - following with hematology and is due to see GI for further evaluation   Urine output good   Overall doing well with sodium intake   Home weight - stable   BP - 120-130/70-80  HR - 70-80 bpm   Due to see Alex in September   Due to see cardiology in September   Denies chest discomfort, dyspnea, dizziness, lightheadedness, syncope,  palpitations or tachycardia.   She seems to be tolerating her Metoprolol well without any bradycardia   She continues on Hydralazine 50 mg TID, Imdur 60 mg daily, Metorpolol tartrate 25 mg BID (was on XL dosing but changed during hospitalization) Norvasc 5 mg daily and Clonidine 0.1 mg TID for HTN.  She is confused if she is taking quinapril or not. She had been advised in the past to stop however she feels like she may have gotten her bottle back out and been taking it recently.     Past medical history significant for HTN, DM type 2, CKD III, iron deficiency anemia, hyperlipidemia, obesity.     PCP: Lexie Dolan  Cardiologist: Dr. Cruz  Nephrologist: Dr. Johnson     Hospitalizations: Discharged on 11/8/2020  ER visit: 12/4/2020  Past Medical History:   Diagnosis Date   • Acquired hypothyroidism 4/22/2021   • Anemia    • Arthritis    • Carpal tunnel syndrome    • Coronary artery disease    •  Diabetes mellitus (CMS/HCC)    • Elevated cholesterol    • GERD (gastroesophageal reflux disease)    • History of pneumonia    • History of unsteady gait     OCASSIONALLY   • Insomnia    • Kidney disease    • LENNY (obstructive sleep apnea) 12/1/2020   • Osteoarthritis    • Renal insufficiency    • Sciatica      Past Surgical History:   Procedure Laterality Date   • ABDOMINAL SURGERY     • APPENDECTOMY     • CARDIAC CATHETERIZATION      1 STENT  ---  2000   • CARDIAC SURGERY     • CAROTID STENT     • CARPAL TUNNEL RELEASE Right 10/8/2019    Procedure: CARPAL TUNNEL RELEASE;  Surgeon: Feroz Johnson MD;  Location: [facility];  Service: Orthopedics   • CATARACT EXTRACTION     • CHOLECYSTECTOMY     • COLONOSCOPY     • CORONARY ANGIOPLASTY WITH STENT PLACEMENT     • ENDOSCOPY     • ENDOSCOPY N/A 3/5/2020    Procedure: ESOPHAGOGASTRODUODENOSCOPY;  Surgeon: Alexandru Bagley MD;  Location: [facility];  Service: Gastroenterology;  Laterality: N/A;   • ENDOSCOPY AND COLONOSCOPY     • GALLBLADDER SURGERY     • JOINT REPLACEMENT Left 05/02/2017    [facility]  DR JOHNSON  LEFT TOTAL KNEE   • KNEE ARTHROSCOPY W/ MENISCECTOMY Right    • LAPAROSCOPIC TUBAL LIGATION     • NE TOTAL KNEE ARTHROPLASTY Left 5/2/2017    Procedure: TOTAL KNEE ARTHROPLASTY;  Surgeon: Feroz Johnson MD;  Location: [facility];  Service: Orthopedics   • STERILIZATION     • TONSILLECTOMY       Social History     Socioeconomic History   • Marital status:      Spouse name: natalie   • Number of children: 3   • Years of education: 12   • Highest education level: Not on file   Tobacco Use   • Smoking status: Never Smoker   • Smokeless tobacco: Never Used   Vaping Use   • Vaping Use: Never used   Substance and Sexual Activity   • Alcohol use: Yes     Comment: socially   • Drug use: No   • Sexual activity: Defer     Birth control/protection: Surgical     Family History   Problem Relation Age of Onset   • Arthritis Mother    • Diabetes Mother    • Cancer  Mother    • Heart disease Father    • Diabetes Daughter    • Diabetes Son    • Diabetes Maternal Aunt    • Heart disease Maternal Grandmother    • Breast cancer Neg Hx      Allergies:  No Known Allergies    Review of Systems   Constitutional: Negative for chills, fever, weight gain and weight loss.   HENT: Negative for ear discharge, hoarse voice and sore throat.    Eyes: Negative for discharge, double vision, pain, redness and visual disturbance.   Cardiovascular: Negative for chest pain, claudication, cyanosis, dyspnea on exertion, irregular heartbeat, leg swelling, near-syncope, orthopnea, palpitations and syncope.   Respiratory: Negative for cough, shortness of breath and wheezing.    Endocrine: Negative for cold intolerance, heat intolerance and polyuria.   Hematologic/Lymphatic: Negative for bleeding problem. Does not bruise/bleed easily.   Skin: Negative for color change, flushing and rash.   Musculoskeletal: Negative for arthritis, back pain, joint pain, joint swelling and muscle cramps.   Gastrointestinal: Negative for abdominal pain, constipation, diarrhea, nausea and vomiting.   Genitourinary: Negative for dysuria, flank pain, frequency, hesitancy and urgency.   Neurological: Negative for difficulty with concentration, dizziness, light-headedness, sensory change, vertigo and weakness.   Psychiatric/Behavioral: Negative for depression. The patient does not have insomnia and is not nervous/anxious.      Current Outpatient Medications   Medication Sig Dispense Refill   • albuterol sulfate  (90 Base) MCG/ACT inhaler Inhale 2 puffs Every 4 (Four) Hours As Needed for Shortness of Air. 18 g 0   • amLODIPine (NORVASC) 5 MG tablet Take 1 tablet by mouth Daily. 30 tablet 5   • aspirin 81 MG tablet Take 1 tablet by mouth Daily. 30 tablet 5   • atorvastatin (LIPITOR) 40 MG tablet Take 1 tablet by mouth Every Night. 90 tablet 1   • BD Pen Needle Evon U/F 32G X 4 MM misc      • bumetanide (BUMEX) 0.5 MG tablet  Take 1 tablet by mouth Daily. 30 tablet 1   • cloNIDine (CATAPRES) 0.2 MG tablet Take 1 tablet by mouth 3 (Three) Times a Day. (Patient taking differently: Take 0.1 mg by mouth 3 (Three) Times a Day.) 270 tablet 1   • Continuous Blood Gluc  (Dexcom G6 ) device 1 Device Daily. 3 each 3   • Continuous Blood Gluc Sensor (Dexcom G6 Sensor) Every 10 (Ten) Days. 9 each 3   • Continuous Blood Gluc Transmit (Dexcom G6 Transmitter) misc 1 Device Daily. 1 each 0   • escitalopram (LEXAPRO) 10 MG tablet Take 1 tablet by mouth Daily. 90 tablet 3   • glucose blood test strip 1 each by Other route 3 (Three) Times a Day. 100 each 12   • hydrALAZINE (APRESOLINE) 50 MG tablet Take 1 tablet by mouth Every 8 (Eight) Hours. (Patient taking differently: Take 50 mg by mouth 3 (Three) Times a Day.) 270 tablet 1   • Insulin Glargine, 2 Unit Dial, (Toujeo Max SoloStar) 300 UNIT/ML solution pen-injector injection Inject 60 Units under the skin into the appropriate area as directed Daily. (Patient taking differently: Inject 40-60 Units under the skin into the appropriate area as directed Daily.) 6 pen 3   • insulin lispro (humaLOG) 100 UNIT/ML injection Inject 5 Units under the skin into the appropriate area as directed 3 (Three) Times a Day Before Meals.     • Insulin Pen Needle 32G X 5 MM misc 1 each 4 (Four) Times a Day. 120 each 5   • isosorbide mononitrate (IMDUR) 60 MG 24 hr tablet Take 1 tablet by mouth Daily. 90 tablet 1   • levothyroxine (SYNTHROID, LEVOTHROID) 25 MCG tablet Take 1 tablet by mouth Daily. 30 tablet 5   • metoclopramide (REGLAN) 10 MG tablet Take 1 tablet by mouth 3 (Three) Times a Day With Meals for 60 days. 90 tablet 1   • metoprolol tartrate (LOPRESSOR) 25 MG tablet Take 25 mg by mouth 2 (Two) Times a Day.     • nystatin (MYCOSTATIN) 590619 UNIT/GM powder Apply  one application topically to the appropriate area as directed Every 12 (Twelve) Hours. (Patient taking differently: Apply  topically to the  appropriate area as directed 2 (Two) Times a Day As Needed (itching).) 60 g 0   • pantoprazole (Protonix) 40 MG EC tablet Take 1 tablet by mouth Daily. 30 tablet 1   • vitamin D (ERGOCALCIFEROL) 1.25 MG (44287 UT) capsule capsule Take 50,000 Units by mouth 1 (One) Time Per Week.     • FeroSul 325 (65 Fe) MG tablet Take 1 tablet by mouth 2 (Two) Times a Day.       No current facility-administered medications for this encounter.     Objective:     Vitals:    08/11/21 1100   BP: 116/70   Pulse: 65   Resp: 22   SpO2: 97%     Body mass index is 35.96 kg/m².    Wt Readings from Last 3 Encounters:   08/11/21 92.1 kg (203 lb)   08/06/21 92.1 kg (203 lb)   08/02/21 97.1 kg (214 lb)       ReDs - 32% (4/14/2021)  Lab Results   Component Value Date    ABSOLUTELUNG 23 08/11/2021     Vitals reviewed.   Constitutional:       Appearance: Normal appearance. Well-developed.      Comments: Wears a mask during encounter   Eyes:      Conjunctiva/sclera: Conjunctivae normal.   HENT:      Head: Normocephalic.   Neck:      Vascular: No JVD or JVR.   Pulmonary:      Effort: Pulmonary effort is normal.      Breath sounds: Normal breath sounds.   Cardiovascular:      Normal rate. Regular rhythm.   Pulses:     Intact distal pulses.   Edema:     Peripheral edema present.     Ankle: bilateral 1+ edema of the ankle.     Feet: bilateral 1+ edema of the feet.  Abdominal:      General: Bowel sounds are normal.      Palpations: Abdomen is soft. There is no hepatomegaly or splenomegaly.   Musculoskeletal: Normal range of motion.      Cervical back: Normal range of motion and neck supple. Skin:     General: Skin is warm and dry.   Neurological:      Mental Status: Alert and oriented to person, place, and time.   Psychiatric:         Attention and Perception: Attention normal.         Mood and Affect: Mood normal.         Speech: Speech normal.         Behavior: Behavior normal. Behavior is cooperative.         Cognition and Memory: Cognition normal.      Cardiographics  Results for orders placed during the hospital encounter of 20    Adult Transthoracic Echo Complete W/ Cont if Necessary Per Protocol    Interpretation Summary  · Left ventricular wall thickness is consistent with concentric hypertrophy.  · Left ventricular ejection fraction appears to be greater than 70%. Left ventricular systolic function is hyperdynamic (EF > 70%).  · Left ventricular diastolic function is consistent with (grade II w/high LAP) pseudonormalization.  · The left atrial cavity is moderate to severely dilated.  · The right atrial cavity is mildly dilated.  · Moderate mitral annular calcification is present.  · Mild mitral valve regurgitation is present.  · Mild tricuspid valve regurgitation is present.  · Estimated right ventricular systolic pressure from tricuspid regurgitation is markedly elevated (>55 mmHg).    EK/3/2020    Chest x-ray: 11/3/2020    IMPRESSION:  CHF/edema with small effusions    Lab Review   Lab Results   Component Value Date    TSH 4.240 (H) 2021     Lab Results   Component Value Date    GLUCOSE 292 (H) 2021    BUN 43 (H) 2021    CREATININE 2.09 (H) 2021    EGFRIFNONA 23 (L) 2021    EGFRIFAFRI 41 (L) 2018    BCR 20.6 2021    K 4.7 2021    CO2 21.8 (L) 2021    CALCIUM 9.1 2021    PROTENTOTREF 7.7 2018    ALBUMIN 3.75 2021    LABIL2 1.1 (L) 2018    AST 35 (H) 2021    ALT 22 2021     Lab Results   Component Value Date    WBC 7.85 2021    HGB 8.1 (L) 2021    HCT 28.3 (L) 2021    MCV 85.0 2021     2021     Lab Results   Component Value Date    CKTOTAL 67 2020    CKMB 2.92 2018    TROPONINI 0.012 2018    TROPONINT <0.010 2020     Lab Results   Component Value Date    PROBNP 2,085.0 (H) 2021     The following portions of the patient's history were reviewed and updated as appropriate: allergies, current  medications, past family history, past medical history, past social history, past surgical history and problem list.     Old records reviewed and pertinent information is included in the above objective data.     Assessment/Plan:   1. Chronic Diastolic Congestive Heart Failure, EF > 70%   2. HTN  3. Pedal edema   4. CKD, stage IV  5.  LENNY  6. Obesity, Class II  7. Iron deficiency anemia  8. Malabsorption of iron      Pro-BNP, BMP, magnesium today  Discussed and reviewed labs with patient today  ReDs reviewed and discussed with patient today   From a HF standpoint she is euvolemic  - Continue on Bumex 0.5 mg daily   Continue on current medications   Monitor BP and HR  Weight loss discussed and healthy lifestyle recommended   Follow up with nephrology, cardiology, hematology and GI.   Counseling patient extensively on dietary Na+ intake, importance of activity, weight monitoring, compliance with medications and follow up appointments.  Follow up in 1 month, sooner if needed.

## 2021-08-11 NOTE — PROGRESS NOTES
Heart Failure Clinic    Date: 08/11/21     Vitals:    08/11/21 1100   BP: 116/70   Pulse: 65   Resp: 22   SpO2: 97%        Method of arrival: Ambulatory    Weighing self daily: Yes    Monitoring Heart Failure Zones: Yes    Today's HF Zone: Green    Taking medications as prescribed: Yes    Edema No    Shortness of Air: No    Number of pillows used at night:<2    Educational Materials given:                                                                           ReDS Value: 23%  21-24 Low Normal      Jackie Jasmine MA 08/11/21 12:17 EDT

## 2021-08-11 NOTE — PROGRESS NOTES
Heart Failure Pharmacy Note  Patient Name: Britta Lee   Referring Provider: Priscila Holland  Primary Cardiologist: Dr. Cruz  Type of CHF: diastolic     Medication Use:   Adherence: No issues  Hx of med intolerances: bradycardia with metoprolol 100mg BID  Affordability: No issues reported   Retail Rx Management: none    Past Medical History:   Diagnosis Date   • Acquired hypothyroidism 4/22/2021   • Anemia    • Arthritis    • Carpal tunnel syndrome    • Coronary artery disease    • Diabetes mellitus (CMS/HCC)    • Elevated cholesterol    • GERD (gastroesophageal reflux disease)    • History of pneumonia    • History of unsteady gait     OCASSIONALLY   • Insomnia    • Kidney disease    • LENNY (obstructive sleep apnea) 12/1/2020   • Osteoarthritis    • Renal insufficiency    • Sciatica      ALLERGIES: Patient has no known allergies.  Current Outpatient Medications   Medication Sig Dispense Refill   • albuterol sulfate  (90 Base) MCG/ACT inhaler Inhale 2 puffs Every 4 (Four) Hours As Needed for Shortness of Air. 18 g 0   • amLODIPine (NORVASC) 5 MG tablet Take 1 tablet by mouth Daily. 30 tablet 5   • aspirin 81 MG tablet Take 1 tablet by mouth Daily. 30 tablet 5   • atorvastatin (LIPITOR) 40 MG tablet Take 1 tablet by mouth Every Night. 90 tablet 1   • BD Pen Needle Evon U/F 32G X 4 MM misc      • bumetanide (BUMEX) 0.5 MG tablet Take 1 tablet by mouth Daily. 30 tablet 1   • cloNIDine (CATAPRES) 0.2 MG tablet Take 1 tablet by mouth 3 (Three) Times a Day. (Patient taking differently: Take 0.1 mg by mouth 3 (Three) Times a Day.) 270 tablet 1   • Continuous Blood Gluc  (Dexcom G6 ) device 1 Device Daily. 3 each 3   • Continuous Blood Gluc Sensor (Dexcom G6 Sensor) Every 10 (Ten) Days. 9 each 3   • Continuous Blood Gluc Transmit (Dexcom G6 Transmitter) misc 1 Device Daily. 1 each 0   • escitalopram (LEXAPRO) 10 MG tablet Take 1 tablet by mouth Daily. 90 tablet 3   • glucose blood test strip 1 each  by Other route 3 (Three) Times a Day. 100 each 12   • hydrALAZINE (APRESOLINE) 50 MG tablet Take 1 tablet by mouth Every 8 (Eight) Hours. (Patient taking differently: Take 50 mg by mouth 3 (Three) Times a Day.) 270 tablet 1   • Insulin Glargine, 2 Unit Dial, (Toujeo Max SoloStar) 300 UNIT/ML solution pen-injector injection Inject 60 Units under the skin into the appropriate area as directed Daily. (Patient taking differently: Inject 40-60 Units under the skin into the appropriate area as directed Daily.) 6 pen 3   • insulin lispro (humaLOG) 100 UNIT/ML injection Inject 5 Units under the skin into the appropriate area as directed 3 (Three) Times a Day Before Meals.     • Insulin Pen Needle 32G X 5 MM misc 1 each 4 (Four) Times a Day. 120 each 5   • isosorbide mononitrate (IMDUR) 60 MG 24 hr tablet Take 1 tablet by mouth Daily. 90 tablet 1   • levothyroxine (SYNTHROID, LEVOTHROID) 25 MCG tablet Take 1 tablet by mouth Daily. 30 tablet 5   • metoclopramide (REGLAN) 10 MG tablet Take 1 tablet by mouth 3 (Three) Times a Day With Meals for 60 days. 90 tablet 1   • metoprolol tartrate (LOPRESSOR) 25 MG tablet Take 25 mg by mouth 2 (Two) Times a Day.     • nystatin (MYCOSTATIN) 643812 UNIT/GM powder Apply  one application topically to the appropriate area as directed Every 12 (Twelve) Hours. (Patient taking differently: Apply  topically to the appropriate area as directed 2 (Two) Times a Day As Needed (itching).) 60 g 0   • pantoprazole (Protonix) 40 MG EC tablet Take 1 tablet by mouth Daily. 30 tablet 1   • vitamin D (ERGOCALCIFEROL) 1.25 MG (16721 UT) capsule capsule Take 50,000 Units by mouth 1 (One) Time Per Week.     • FeroSul 325 (65 Fe) MG tablet Take 1 tablet by mouth 2 (Two) Times a Day.       No current facility-administered medications for this encounter.       Vaccination History:   Pneumonia: Reports UTD  Annual Influenza: Reports UTD for 2020-21 Season    Objective  Vitals:    08/11/21 1100   BP: 116/70   BP  Location: Right arm   Patient Position: Sitting   Cuff Size: Large Adult   Pulse: 65   Resp: 22   SpO2: 97%   Weight: 92.1 kg (203 lb)     Wt Readings from Last 3 Encounters:   08/11/21 92.1 kg (203 lb)   08/06/21 92.1 kg (203 lb)   08/02/21 97.1 kg (214 lb)         08/11/21  1100   Weight: 92.1 kg (203 lb)     Lab Results   Component Value Date    GLUCOSE 292 (H) 08/11/2021    BUN 43 (H) 08/11/2021    CREATININE 2.09 (H) 08/11/2021    EGFRIFNONA 23 (L) 08/11/2021    EGFRIFAFRI 41 (L) 07/30/2018    BCR 20.6 08/11/2021    K 4.7 08/11/2021    CO2 21.8 (L) 08/11/2021    CALCIUM 9.1 08/11/2021    PROTENTOTREF 7.7 07/30/2018    ALBUMIN 3.75 08/11/2021    LABIL2 1.1 (L) 07/30/2018    AST 35 (H) 08/11/2021    ALT 22 08/11/2021     Lab Results   Component Value Date    WBC 7.85 08/11/2021    HGB 8.1 (L) 08/11/2021    HCT 28.3 (L) 08/11/2021    MCV 85.0 08/11/2021     08/11/2021     Lab Results   Component Value Date    CKTOTAL 67 12/04/2020    CKMB 2.92 04/16/2018    TROPONINI 0.012 04/16/2018    TROPONINT <0.010 12/04/2020     Lab Results   Component Value Date    PROBNP 2,085.0 (H) 08/11/2021     Results for orders placed during the hospital encounter of 11/03/20    Adult Transthoracic Echo Complete W/ Cont if Necessary Per Protocol    Interpretation Summary  · Left ventricular wall thickness is consistent with concentric hypertrophy.  · Left ventricular ejection fraction appears to be greater than 70%. Left ventricular systolic function is hyperdynamic (EF > 70%).  · Left ventricular diastolic function is consistent with (grade II w/high LAP) pseudonormalization.  · The left atrial cavity is moderate to severely dilated.  · The right atrial cavity is mildly dilated.  · Moderate mitral annular calcification is present.  · Mild mitral valve regurgitation is present.  · Mild tricuspid valve regurgitation is present.  · Estimated right ventricular systolic pressure from tricuspid regurgitation is markedly elevated  (>55 mmHg).               Class   Drug   Dose Last Dose Adjustment   Notes   ACEi/ARB/ARNI    Worsening renal fxn 1/22/21   Beta Blocker Metoprolol tart 25 BID 7/29/21    MRA --------------   eGFR needs to be >30mL/min         Recommendations: None at this time       Patient was educated on heart failure medications and the importance of medication adherence.  All questions were addressed and patient expressed understanding. Patient would benefit from further education. Encouraged her to bring her medication bottles to her next visit.     Thank you for allowing me to participate in the care of your patient,    Melisa Leahy, PharmD  08/11/21  12:32 EDT

## 2021-08-12 ENCOUNTER — HOSPITAL ENCOUNTER (OUTPATIENT)
Dept: MAMMOGRAPHY | Facility: HOSPITAL | Age: 76
Discharge: HOME | End: 2021-08-12
Admitting: PHYSICIAN ASSISTANT

## 2021-08-12 ENCOUNTER — TELEPHONE (OUTPATIENT)
Dept: FAMILY MEDICINE CLINIC | Facility: CLINIC | Age: 76
End: 2021-08-12

## 2021-08-12 ENCOUNTER — LAB (OUTPATIENT)
Dept: ONCOLOGY | Facility: CLINIC | Age: 76
End: 2021-08-12

## 2021-08-12 ENCOUNTER — OFFICE VISIT (OUTPATIENT)
Dept: ONCOLOGY | Facility: CLINIC | Age: 76
End: 2021-08-12

## 2021-08-12 VITALS
WEIGHT: 206 LBS | TEMPERATURE: 96.8 F | HEIGHT: 63 IN | RESPIRATION RATE: 18 BRPM | OXYGEN SATURATION: 98 % | HEART RATE: 70 BPM | DIASTOLIC BLOOD PRESSURE: 57 MMHG | SYSTOLIC BLOOD PRESSURE: 125 MMHG | BODY MASS INDEX: 36.5 KG/M2

## 2021-08-12 VITALS
WEIGHT: 206 LBS | HEIGHT: 63 IN | DIASTOLIC BLOOD PRESSURE: 57 MMHG | BODY MASS INDEX: 36.5 KG/M2 | OXYGEN SATURATION: 98 % | HEART RATE: 70 BPM | RESPIRATION RATE: 18 BRPM | SYSTOLIC BLOOD PRESSURE: 125 MMHG | TEMPERATURE: 96.8 F

## 2021-08-12 DIAGNOSIS — D50.9 IRON DEFICIENCY ANEMIA, UNSPECIFIED IRON DEFICIENCY ANEMIA TYPE: Primary | ICD-10-CM

## 2021-08-12 DIAGNOSIS — D63.8 ANEMIA OF CHRONIC DISEASE: ICD-10-CM

## 2021-08-12 DIAGNOSIS — Z12.31 VISIT FOR SCREENING MAMMOGRAM: ICD-10-CM

## 2021-08-12 DIAGNOSIS — E03.9 HYPOTHYROIDISM, UNSPECIFIED TYPE: ICD-10-CM

## 2021-08-12 DIAGNOSIS — K90.9 MALABSORPTION OF IRON: ICD-10-CM

## 2021-08-12 DIAGNOSIS — N18.9 CHRONIC KIDNEY DISEASE, UNSPECIFIED CKD STAGE: ICD-10-CM

## 2021-08-12 PROCEDURE — 77067 SCR MAMMO BI INCL CAD: CPT

## 2021-08-12 PROCEDURE — 77067 SCR MAMMO BI INCL CAD: CPT | Performed by: RADIOLOGY

## 2021-08-12 PROCEDURE — 99214 OFFICE O/P EST MOD 30 MIN: CPT | Performed by: NURSE PRACTITIONER

## 2021-08-12 PROCEDURE — 77063 BREAST TOMOSYNTHESIS BI: CPT | Performed by: RADIOLOGY

## 2021-08-12 PROCEDURE — 77063 BREAST TOMOSYNTHESIS BI: CPT

## 2021-08-12 NOTE — PROGRESS NOTES
DATE:  8/12/2021    REASON FOR FOLLOW UP: Anemia    REFERRING PHYSICIAN:   Jackson Johnson MD    TREATMENT:   1. Oral iron-discontinued for apparent malabsorption    2.       CHIEF COMPLAINT:  Follow up of anemia    HISTORY OF PRESENT ILLNESS:   Britta Lee is a  75 y.o. female with multiple medical problems including CKD, diabetes mellitus type 2, CHF, CAD, hypertension, GERD and OA, who is being seen today at the request of  Jackson Johnson MD for evaluation and treatment of anemia. Ms. Lee reports following with her PCP and nephrology routinely with blood testing every ~3 months. She says she has struggled with anemia for several years. Previous available CBCs were reviewed and patient has had chronic, normocytic anemia since at least December 2016 with Hg most recently ranging ~ 8.6-9.8. Her WBC and platelets have been normal. Iron panel and ferritin levels have been most c/w AOCD/inflammation since at least May 2017.  She reports receiving IV iron infusions several years ago. At present, she is taking ferrous sulfate BID which she is tolerating well. She says this was started per nephrology ~2 months ago. She denies obvious bleeding from any source. She reports having screening colonoscopy with Dr. Zamora last year which was unremarkable and negative for bleeding. She complains of chronic fatigue which is stable. Also complains of occasional lower extremity edema. She denies any other complaints today.     INTERVAL HISTORY:  Ms. Lee presents today for follow up of anemia. Repeat iron studies from end of June showed iron was low. Therefore, ordered IV Feraheme for replacement. She received her first dose on 7/19/21 and was later admitted to [facility] reportedly with CHF exacerbation and also received two units of PRBCs for worsening anemia. Records unavailable. She has followed up with PCP and has upcoming follow up again in September. She also follows closely with nephrology with appointment  in September. She was referred to GI and is scheduled with Dr. Simmons on 9/1 and patient says planning for pill cam study. She has chronic fatigue and shortness of breath on exertion, recently improved. She again denies obvious bleeding from any source. She denies any other complaints today.     PAST MEDICAL HISTORY:  Past Medical History:   Diagnosis Date   • Acquired hypothyroidism 4/22/2021   • Anemia    • Arthritis    • Carpal tunnel syndrome    • Coronary artery disease    • Diabetes mellitus (CMS/HCC)    • Elevated cholesterol    • GERD (gastroesophageal reflux disease)    • History of pneumonia    • History of unsteady gait     OCASSIONALLY   • Insomnia    • Kidney disease    • LENNY (obstructive sleep apnea) 12/1/2020   • Osteoarthritis    • Renal insufficiency    • Sciatica        PAST SURGICAL HISTORY:  Past Surgical History:   Procedure Laterality Date   • ABDOMINAL SURGERY     • APPENDECTOMY     • CARDIAC CATHETERIZATION      1 STENT  ---  2000   • CARDIAC SURGERY     • CAROTID STENT     • CARPAL TUNNEL RELEASE Right 10/8/2019    Procedure: CARPAL TUNNEL RELEASE;  Surgeon: Feroz Johnson MD;  Location: [facility];  Service: Orthopedics   • CATARACT EXTRACTION     • CHOLECYSTECTOMY     • COLONOSCOPY     • CORONARY ANGIOPLASTY WITH STENT PLACEMENT     • ENDOSCOPY     • ENDOSCOPY N/A 3/5/2020    Procedure: ESOPHAGOGASTRODUODENOSCOPY;  Surgeon: Alexandru Bagley MD;  Location: [facility];  Service: Gastroenterology;  Laterality: N/A;   • ENDOSCOPY AND COLONOSCOPY     • GALLBLADDER SURGERY     • JOINT REPLACEMENT Left 05/02/2017    [facility]  DR JOHNSON  LEFT TOTAL KNEE   • KNEE ARTHROSCOPY W/ MENISCECTOMY Right    • LAPAROSCOPIC TUBAL LIGATION     • GA TOTAL KNEE ARTHROPLASTY Left 5/2/2017    Procedure: TOTAL KNEE ARTHROPLASTY;  Surgeon: Feroz Johnson MD;  Location: [facility];  Service: Orthopedics   • STERILIZATION     • TONSILLECTOMY         FAMILY HISTORY:  Family History   Problem Relation Age  of Onset   • Arthritis Mother    • Diabetes Mother    • Cancer Mother    • Heart disease Father    • Diabetes Daughter    • Diabetes Son    • Diabetes Maternal Aunt    • Heart disease Maternal Grandmother    • Breast cancer Neg Hx        SOCIAL HISTORY:  Social History     Socioeconomic History   • Marital status:      Spouse name: natalie   • Number of children: 3   • Years of education: 12   • Highest education level: Not on file   Tobacco Use   • Smoking status: Never Smoker   • Smokeless tobacco: Never Used   Vaping Use   • Vaping Use: Never used   Substance and Sexual Activity   • Alcohol use: Yes     Comment: socially   • Drug use: No   • Sexual activity: Defer     Birth control/protection: Surgical       MEDICATIONS:  The current medication list was reviewed in the EMR    Current Outpatient Medications:   •  albuterol sulfate  (90 Base) MCG/ACT inhaler, Inhale 2 puffs Every 4 (Four) Hours As Needed for Shortness of Air., Disp: 18 g, Rfl: 0  •  amLODIPine (NORVASC) 5 MG tablet, Take 1 tablet by mouth Daily., Disp: 30 tablet, Rfl: 5  •  aspirin 81 MG tablet, Take 1 tablet by mouth Daily., Disp: 30 tablet, Rfl: 5  •  atorvastatin (LIPITOR) 40 MG tablet, Take 1 tablet by mouth Every Night., Disp: 90 tablet, Rfl: 1  •  BD Pen Needle Evon U/F 32G X 4 MM misc, , Disp: , Rfl:   •  bumetanide (BUMEX) 0.5 MG tablet, Take 1 tablet by mouth Daily., Disp: 30 tablet, Rfl: 1  •  cloNIDine (CATAPRES) 0.2 MG tablet, Take 1 tablet by mouth 3 (Three) Times a Day. (Patient taking differently: Take 0.1 mg by mouth 3 (Three) Times a Day.), Disp: 270 tablet, Rfl: 1  •  Continuous Blood Gluc  (Dexcom G6 ) device, 1 Device Daily., Disp: 3 each, Rfl: 3  •  Continuous Blood Gluc Sensor (Dexcom G6 Sensor), Every 10 (Ten) Days., Disp: 9 each, Rfl: 3  •  Continuous Blood Gluc Transmit (Dexcom G6 Transmitter) misc, 1 Device Daily., Disp: 1 each, Rfl: 0  •  escitalopram (LEXAPRO) 10 MG tablet, Take 1 tablet by  mouth Daily., Disp: 90 tablet, Rfl: 3  •  FeroSul 325 (65 Fe) MG tablet, Take 1 tablet by mouth 2 (Two) Times a Day., Disp: , Rfl:   •  glucose blood test strip, 1 each by Other route 3 (Three) Times a Day., Disp: 100 each, Rfl: 12  •  hydrALAZINE (APRESOLINE) 50 MG tablet, Take 1 tablet by mouth Every 8 (Eight) Hours. (Patient taking differently: Take 50 mg by mouth 3 (Three) Times a Day.), Disp: 270 tablet, Rfl: 1  •  Insulin Glargine, 2 Unit Dial, (Toujeo Max SoloStar) 300 UNIT/ML solution pen-injector injection, Inject 60 Units under the skin into the appropriate area as directed Daily. (Patient taking differently: Inject 40-60 Units under the skin into the appropriate area as directed Daily.), Disp: 6 pen, Rfl: 3  •  insulin lispro (humaLOG) 100 UNIT/ML injection, Inject 5 Units under the skin into the appropriate area as directed 3 (Three) Times a Day Before Meals., Disp: , Rfl:   •  Insulin Pen Needle 32G X 5 MM misc, 1 each 4 (Four) Times a Day., Disp: 120 each, Rfl: 5  •  isosorbide mononitrate (IMDUR) 60 MG 24 hr tablet, Take 1 tablet by mouth Daily., Disp: 90 tablet, Rfl: 1  •  levothyroxine (SYNTHROID, LEVOTHROID) 25 MCG tablet, Take 1 tablet by mouth Daily., Disp: 30 tablet, Rfl: 5  •  metoclopramide (REGLAN) 10 MG tablet, Take 1 tablet by mouth 3 (Three) Times a Day With Meals for 60 days., Disp: 90 tablet, Rfl: 1  •  metoprolol tartrate (LOPRESSOR) 25 MG tablet, Take 25 mg by mouth 2 (Two) Times a Day., Disp: , Rfl:   •  nystatin (MYCOSTATIN) 042553 UNIT/GM powder, Apply  one application topically to the appropriate area as directed Every 12 (Twelve) Hours. (Patient taking differently: Apply  topically to the appropriate area as directed 2 (Two) Times a Day As Needed (itching).), Disp: 60 g, Rfl: 0  •  pantoprazole (Protonix) 40 MG EC tablet, Take 1 tablet by mouth Daily., Disp: 30 tablet, Rfl: 1  •  vitamin D (ERGOCALCIFEROL) 1.25 MG (76548 UT) capsule capsule, Take 50,000 Units by mouth 1 (One) Time  "Per Week., Disp: , Rfl:     ALLERGIES:  No Known Allergies      REVIEW OF SYSTEMS:    A comprehensive 14 point review of systems was performed.  Significant findings as mentioned above.  All other systems reviewed and are negative.        Physical Exam   Vital Signs: /57   Pulse 70   Temp 96.8 °F (36 °C) (Temporal)   Resp 18   Ht 160 cm (63\")   Wt 93.4 kg (206 lb)   SpO2 98%   BMI 36.49 kg/m²    General: Well developed, well nourished, alert and oriented x 3, in no acute distress.   Head: ATNC   Eyes: PERRL, No evidence of conjunctivitis.   Nose: No nasal discharge.   Mouth: Oral mucosal membranes moist. No oral ulceration or hemorrhages.   Neck: Neck supple. No thyromegaly. No JVD.   Lungs: CTAB  Heart: Regular rate and rhythm. No murmurs, rubs, or gallops.   Abdomen: Soft. Bowel sounds are normoactive. Nontender with palpation.   Extremities: No cyanosis or edema.   Neurologic: Grossly non-focal exam    Pain Score:  Pain Score    08/12/21 0920   PainSc: 0-No pain       RECENT LABS:  Lab Results   Component Value Date    WBC 7.85 08/11/2021    HGB 8.1 (L) 08/11/2021    HCT 28.3 (L) 08/11/2021    MCV 85.0 08/11/2021    RDW 18.7 (H) 08/11/2021     08/11/2021    NEUTRORELPCT 67.9 08/11/2021    LYMPHORELPCT 19.2 (L) 08/11/2021    MONORELPCT 5.5 08/11/2021    EOSRELPCT 6.2 08/11/2021    BASORELPCT 0.8 08/11/2021    NEUTROABS 5.33 08/11/2021    LYMPHSABS 1.51 08/11/2021       Lab Results   Component Value Date     08/11/2021    K 4.7 08/11/2021    CO2 21.8 (L) 08/11/2021     08/11/2021    BUN 43 (H) 08/11/2021    CREATININE 2.09 (H) 08/11/2021    EGFRIFNONA 23 (L) 08/11/2021    EGFRIFAFRI 41 (L) 07/30/2018    GLUCOSE 292 (H) 08/11/2021    CALCIUM 9.1 08/11/2021    ALKPHOS 411 (H) 08/11/2021    AST 35 (H) 08/11/2021    ALT 22 08/11/2021    BILITOT 0.2 08/11/2021    ALBUMIN 3.75 08/11/2021    PROTEINTOT 7.5 08/11/2021    MG 2.0 08/11/2021    PHOS 3.9 03/12/2021     Lab Results   Component " Value Date    FERRITIN 240.10 (H) 08/11/2021    IRON 41 08/11/2021    TIBC 340 08/11/2021    LABIRON 12 (L) 08/11/2021    [id number] 736 03/26/2021    FOLATE 12.60 03/26/2021    HAPTOGLOBIN 256 (H) 03/26/2021    RETICCTPCT 2.49 (H) 03/26/2021    RETIC 0.0899 03/26/2021     Workup 3/26/21    Ferritin   13.00 - 150.00 ng/mL 160.20High       Iron   37 - 145 mcg/dL 36Low     Iron Saturation   20 - 50 % 9Low     Transferrin   200 - 360 mg/dL 270    TIBC   298 - 536 mcg/dL 402      Vitamin B-12   211 - 946 pg/mL 736      Folate   4.78 - 24.20 ng/mL 12.60      Reticulocyte %   0.70 - 1.90 % 2.49High     Reticulocyte Absolute   0.0200 - 0.1300 10*6/mm3 0.0899      & Units 1 mo ago   LDH   135 - 214 U/L 234High       Haptoglobin   30 - 200 mg/dL 256High       TSH   0.270 - 4.200 uIU/mL 6.430High       C-Reactive Protein   0.00 - 0.50 mg/dL 1.70High       Sed Rate   0 - 30 mm/hr >100High       Copper   80 - 158 ug/dL 189High       Zinc   44 - 115 ug/dL 67      Tissue Transglutaminase IgA   0 - 3 U/mL 2          ASSESSMENT & PLAN:  Britta Lee is a very pleasant 75 y.o. female with    1.  Normocytic anemia:  2. CKD/AOCD/inflammation  3. Iron deficiency anemia  4. Hypothyroidism    -Patient follows with PCP and nephrology routinely with blood testing every ~3 months.   -Previous available CBCs were reviewed and patient has had chronic, normocytic anemia since at least December 2016 with Hg most recently ranging ~ 8.6-9.8. Her WBC and platelets have been normal. Iron panel and ferritin levels have been most c/w AOCD/inflammation since at least May 2017.    -She reports receiving IV iron infusions several years ago. At first presentation, she was taking ferrous sulfate BID and tolerating well.   -Denies obvious bleeding from any source. Reportedly had screening colonoscopy with Dr. Zamora last year which was unremarkable and negative for bleeding. Records unavailable.  -Obtained repeat CBC on initial consultation which showed  normocytic anemia with Hg of 8.6 which has been trending down. WBC and platelets were again normal. CMP showing Cr of 1.83.   -Repeat iron panel and ferritin showed low serum iron and iron saturation with normal TIBC and elevated ferritin of 160.20 which has also been trending down. Patient likely has AOCD/inflammation with also a component of Iron deficiency. As above, she has been taking ferrous sulfate. Therefore, discontinued oral iron and have been replacing with IV Feraheme for apparent malabsorption of iron, most recently on 7/19/21.  -She was recently admitted to [facility] reportedly with CHF exacerbation and also received two units of PRBCs for worsening anemia.  -Repeat CBC today showing Hg 8.1. Repeat iron panel and ferritin most c/w AOCD/inflammation, ferritin ~240.10 ng/mL. Will continue to monitor. Will plan to check CBC, iron panel/ferritin in 6 weeks and replace with IV Feraheme if needed. Will follow up in 3 months with repeat labs.   -In the meantime, recommended continued close follow up with PCP for management of diabetes, hypothyroidism, hypertension and nephology for CKD which is largely contributing to worsening anemia. Would defer decision regarding epogen to nephrology which she would likely benefit from.   -Recommend transfusion support, would transfuse for Hg <7 or <8 if symptomatic.   Also recommended repeat GI evaluation and patient was agreeable. Referral was placed and has appointment with Dr. Simmons on 9/1/21.   -To further evaluate anemia, also previously sent additional labs including PBS which showed normocytic, hypochromic anemia with no schistocytes, normal WBC with borderline neutrophilia and mild eosinophilia, no dysplasia or blasts, adequate platelets.  B12 and folate were normal. No evidence of hemolysis, TSH was elevated and she was advised to follow up with PCP who started her on synthroid 25 mcg daily, ESR and CRP were elevated and suggestive of underlying inflammation,  copper and zinc were both replete, tissue transglutaminase was normal.    -She knows to call in the interim if she were to have any worsening symptoms (fatigue, weakness, shortness of breath) and we would be happy to check her labs sooner.    ACO / MILES/Other  Quality measures  -  Britta Lee received 2020 flu vaccine.  -  Britta Lee reports a pain score of 0.    -  Current outpatient and discharge medications have been reconciled for the patient.  Reviewed by: ERNIE De Leon    The patient was in agreement with the plan and all questions were answered to her satisfaction.     Thank you so much for allowing us to participate in the care of Britta Lee . Please do not hesitate to contact us with any questions or concerns.     A total of 30 minutes were spent coordinating this patient’s care in clinic today; more than 50% of this time was face-to-face with the patient, reviewing her medical history and counseling on the current treatment and followup plan. All questions were answered to her satisfaction.      Electronically Signed by: ERNIE De Leon , August 12, 2021 09:24 EDT       CC:   Lexie Dolan PA

## 2021-08-12 NOTE — TELEPHONE ENCOUNTER
Pharmacy Name:  [facility]    Pharmacy representative name: HERMAN    Pharmacy representative phone number: [phone]    What medication are you calling in regards to: DEXCOM G6     Additional notes: REFERENCE NO [id number]

## 2021-08-26 ENCOUNTER — TELEPHONE (OUTPATIENT)
Dept: FAMILY MEDICINE CLINIC | Facility: CLINIC | Age: 76
End: 2021-08-26

## 2021-08-26 NOTE — TELEPHONE ENCOUNTER
Caller: HERMAN     Relationship: [employer]    Best call back number: [phone]    What is the best time to reach you: ANYTIME    Who are you requesting to speak with (clinical staff, provider,  specific staff member): CLINICAL STAFF    Do you know the name of the person who called: HERMAN    What was the call regarding: HERMAN IS CALLING TO SPEAK WITH SOMEONE ABOUT A PRIOR AUTHORIZATION ON THE [employer] DEXCOM G6 SENSOR.    Do you require a callback: YES

## 2021-08-31 ENCOUNTER — TELEPHONE (OUTPATIENT)
Dept: FAMILY MEDICINE CLINIC | Facility: CLINIC | Age: 76
End: 2021-08-31

## 2021-08-31 ENCOUNTER — OFFICE VISIT (OUTPATIENT)
Dept: FAMILY MEDICINE CLINIC | Facility: CLINIC | Age: 76
End: 2021-08-31

## 2021-08-31 ENCOUNTER — LAB (OUTPATIENT)
Dept: FAMILY MEDICINE CLINIC | Facility: CLINIC | Age: 76
End: 2021-08-31

## 2021-08-31 VITALS — BODY MASS INDEX: 36.5 KG/M2 | WEIGHT: 206 LBS | HEIGHT: 63 IN

## 2021-08-31 DIAGNOSIS — G44.89 OTHER HEADACHE SYNDROME: ICD-10-CM

## 2021-08-31 DIAGNOSIS — G44.89 OTHER HEADACHE SYNDROME: Primary | ICD-10-CM

## 2021-08-31 LAB — SARS-COV-2 RNA PNL SPEC NAA+PROBE: NOT DETECTED

## 2021-08-31 PROCEDURE — 99442 PR PHYS/QHP TELEPHONE EVALUATION 11-20 MIN: CPT | Performed by: PHYSICIAN ASSISTANT

## 2021-08-31 PROCEDURE — U0004 COV-19 TEST NON-CDC HGH THRU: HCPCS | Performed by: PHYSICIAN ASSISTANT

## 2021-08-31 RX ORDER — PROMETHAZINE HYDROCHLORIDE 25 MG/1
25 TABLET ORAL EVERY 6 HOURS PRN
Qty: 60 TABLET | Refills: 5 | Status: SHIPPED | OUTPATIENT
Start: 2021-08-31 | End: 2022-03-07

## 2021-08-31 RX ORDER — BUTALBITAL, ACETAMINOPHEN AND CAFFEINE 50; 325; 40 MG/1; MG/1; MG/1
1 TABLET ORAL EVERY 4 HOURS PRN
Qty: 6 TABLET | Refills: 0 | Status: SHIPPED | OUTPATIENT
Start: 2021-08-31 | End: 2021-10-18

## 2021-08-31 NOTE — TELEPHONE ENCOUNTER
PT CALLED STATED THAT SHE HAS BEEN HAVING A SPLITTING HEADACHE FOR 3 DAYS NOW AND WOULD LIKE T KNOW WHAT DR SUGGEST FOR HER TO TAKE AS FAR AS MEDICATION.    PLEASE ADVISE.  CALL BACK:[phone]    [address] Landa [address] -

## 2021-09-01 ENCOUNTER — OFFICE VISIT (OUTPATIENT)
Dept: GASTROENTEROLOGY | Facility: CLINIC | Age: 76
End: 2021-09-01

## 2021-09-01 VITALS — WEIGHT: 209.4 LBS | BODY MASS INDEX: 37.1 KG/M2 | HEIGHT: 63 IN

## 2021-09-01 DIAGNOSIS — D50.9 IRON DEFICIENCY ANEMIA, UNSPECIFIED IRON DEFICIENCY ANEMIA TYPE: Primary | ICD-10-CM

## 2021-09-01 DIAGNOSIS — N18.4 CHRONIC RENAL DISEASE, STAGE IV (HCC): ICD-10-CM

## 2021-09-01 DIAGNOSIS — R13.19 ESOPHAGEAL DYSPHAGIA: ICD-10-CM

## 2021-09-01 PROCEDURE — 99204 OFFICE O/P NEW MOD 45 MIN: CPT | Performed by: INTERNAL MEDICINE

## 2021-09-01 RX ORDER — BUMETANIDE 0.5 MG/1
0.5 TABLET ORAL DAILY
Qty: 90 TABLET | Refills: 0 | Status: SHIPPED | OUTPATIENT
Start: 2021-09-01 | End: 2021-12-13 | Stop reason: SDUPTHER

## 2021-09-01 NOTE — PROGRESS NOTES
Subjective     Britta Lee is a 76 y.o. female who presents to the office today as a consultation from [facility]* for evaluation of Anemia        History of Present Illness:  The patient presents today for evaluation of YUAN.  She denies BRBPR or melena.  She denies hematemesis or hematochezia.  No family history of colon cancer.  She had a colonoscopy last year by Dr. Zamora which was normal per her report.  The patient does have chronic kidney disease but she is not on hemodialysis.  She has had weight loss.  She has lost 43 lbs in a year.  She states she is not trying to lose weight.  She does complain of meats lodging in her esophagus.  She feels fatigued.  She was hospitalized a few weeks ago for about a week for chest pain. She only takes ASA.  No other blood thinners.        Review of Systems:  Review of Systems   Constitutional: Negative for fever.   HENT: Positive for trouble swallowing.    Eyes: Negative.    Respiratory: Negative for chest tightness.    Cardiovascular: Positive for leg swelling. Negative for chest pain.   Gastrointestinal: Negative for abdominal distention, abdominal pain, anal bleeding, blood in stool, constipation, diarrhea, nausea and vomiting.   Endocrine: Negative.    Genitourinary: Negative for difficulty urinating and hematuria.   Musculoskeletal: Positive for back pain.   Skin: Negative.    Allergic/Immunologic: Negative.    Neurological: Negative for headaches.   Hematological: Bruises/bleeds easily.   Psychiatric/Behavioral: Negative.        Past Medical History:  Past Medical History:   Diagnosis Date   • Acquired hypothyroidism 4/22/2021   • Anemia    • Arthritis    • Carpal tunnel syndrome    • Coronary artery disease    • Diabetes mellitus (CMS/HCC)    • Elevated cholesterol    • GERD (gastroesophageal reflux disease)    • History of pneumonia    • History of unsteady gait     OCASSIONALLY   • Insomnia    • Kidney disease    • LENNY (obstructive sleep apnea)  12/1/2020   • Osteoarthritis    • Renal insufficiency    • Sciatica        Past Surgical History:  Past Surgical History:   Procedure Laterality Date   • ABDOMINAL SURGERY     • APPENDECTOMY     • CARDIAC CATHETERIZATION      1 STENT  ---  2000   • CARDIAC SURGERY     • CAROTID STENT     • CARPAL TUNNEL RELEASE Right 10/8/2019    Procedure: CARPAL TUNNEL RELEASE;  Surgeon: Feroz Johnson MD;  Location: [facility];  Service: Orthopedics   • CATARACT EXTRACTION     • CHOLECYSTECTOMY     • COLONOSCOPY     • CORONARY ANGIOPLASTY WITH STENT PLACEMENT     • ENDOSCOPY     • ENDOSCOPY N/A 3/5/2020    Procedure: ESOPHAGOGASTRODUODENOSCOPY;  Surgeon: Alexandru Bagley MD;  Location: [facility];  Service: Gastroenterology;  Laterality: N/A;   • ENDOSCOPY AND COLONOSCOPY     • GALLBLADDER SURGERY     • JOINT REPLACEMENT Left 05/02/2017    [facility]  DR JOHNSON  LEFT TOTAL KNEE   • KNEE ARTHROSCOPY W/ MENISCECTOMY Right    • LAPAROSCOPIC TUBAL LIGATION     • HI TOTAL KNEE ARTHROPLASTY Left 5/2/2017    Procedure: TOTAL KNEE ARTHROPLASTY;  Surgeon: Feroz Johnson MD;  Location: [facility];  Service: Orthopedics   • STERILIZATION     • TONSILLECTOMY         Family History:  Family History   Problem Relation Age of Onset   • Arthritis Mother    • Diabetes Mother    • Cancer Mother    • Heart disease Father    • Diabetes Daughter    • Diabetes Son    • Diabetes Maternal Aunt    • Heart disease Maternal Grandmother    • Breast cancer Neg Hx        Social History:  Social History     Socioeconomic History   • Marital status:      Spouse name: natalie   • Number of children: 3   • Years of education: 12   • Highest education level: Not on file   Tobacco Use   • Smoking status: Never Smoker   • Smokeless tobacco: Never Used   Vaping Use   • Vaping Use: Never used   Substance and Sexual Activity   • Alcohol use: Yes     Comment: socially   • Drug use: No   • Sexual activity: Defer     Birth control/protection: Surgical        Current Medication List:    Current Outpatient Medications:   •  albuterol sulfate  (90 Base) MCG/ACT inhaler, Inhale 2 puffs Every 4 (Four) Hours As Needed for Shortness of Air., Disp: 18 g, Rfl: 0  •  amLODIPine (NORVASC) 5 MG tablet, Take 1 tablet by mouth Daily., Disp: 30 tablet, Rfl: 5  •  aspirin 81 MG tablet, Take 1 tablet by mouth Daily., Disp: 30 tablet, Rfl: 5  •  atorvastatin (LIPITOR) 40 MG tablet, Take 1 tablet by mouth Every Night., Disp: 90 tablet, Rfl: 1  •  BD Pen Needle Evon U/F 32G X 4 MM misc, , Disp: , Rfl:   •  bumetanide (BUMEX) 0.5 MG tablet, Take 1 tablet by mouth Daily., Disp: 90 tablet, Rfl: 0  •  butalbital-acetaminophen-caffeine (Esgic) -40 MG per tablet, Take 1 tablet by mouth Every 4 (Four) Hours As Needed for Headache., Disp: 6 tablet, Rfl: 0  •  cloNIDine (CATAPRES) 0.2 MG tablet, Take 1 tablet by mouth 3 (Three) Times a Day. (Patient taking differently: Take 0.1 mg by mouth 3 (Three) Times a Day.), Disp: 270 tablet, Rfl: 1  •  Continuous Blood Gluc  (Dexcom G6 ) device, 1 Device Daily., Disp: 3 each, Rfl: 3  •  Continuous Blood Gluc Sensor (Dexcom G6 Sensor), Every 10 (Ten) Days., Disp: 9 each, Rfl: 3  •  Continuous Blood Gluc Transmit (Dexcom G6 Transmitter) misc, 1 Device Daily., Disp: 1 each, Rfl: 0  •  escitalopram (LEXAPRO) 10 MG tablet, Take 1 tablet by mouth Daily., Disp: 90 tablet, Rfl: 3  •  FeroSul 325 (65 Fe) MG tablet, Take 1 tablet by mouth 2 (Two) Times a Day., Disp: , Rfl:   •  glucose blood test strip, 1 each by Other route 3 (Three) Times a Day., Disp: 100 each, Rfl: 12  •  hydrALAZINE (APRESOLINE) 50 MG tablet, Take 1 tablet by mouth Every 8 (Eight) Hours. (Patient taking differently: Take 50 mg by mouth 3 (Three) Times a Day.), Disp: 270 tablet, Rfl: 1  •  Insulin Glargine, 2 Unit Dial, (Toujeo Max SoloStar) 300 UNIT/ML solution pen-injector injection, Inject 60 Units under the skin into the appropriate area as directed Daily.  "(Patient taking differently: Inject 40-60 Units under the skin into the appropriate area as directed Daily.), Disp: 6 pen, Rfl: 3  •  insulin lispro (humaLOG) 100 UNIT/ML injection, Inject 5 Units under the skin into the appropriate area as directed 3 (Three) Times a Day Before Meals., Disp: , Rfl:   •  Insulin Pen Needle 32G X 5 MM misc, 1 each 4 (Four) Times a Day., Disp: 120 each, Rfl: 5  •  isosorbide mononitrate (IMDUR) 60 MG 24 hr tablet, Take 1 tablet by mouth Daily., Disp: 90 tablet, Rfl: 1  •  levothyroxine (SYNTHROID, LEVOTHROID) 25 MCG tablet, Take 1 tablet by mouth Daily., Disp: 30 tablet, Rfl: 5  •  metoclopramide (REGLAN) 10 MG tablet, Take 1 tablet by mouth 3 (Three) Times a Day With Meals for 60 days., Disp: 90 tablet, Rfl: 1  •  metoprolol tartrate (LOPRESSOR) 25 MG tablet, Take 25 mg by mouth 2 (Two) Times a Day., Disp: , Rfl:   •  nystatin (MYCOSTATIN) 251798 UNIT/GM powder, Apply  one application topically to the appropriate area as directed Every 12 (Twelve) Hours. (Patient taking differently: Apply  topically to the appropriate area as directed 2 (Two) Times a Day As Needed (itching).), Disp: 60 g, Rfl: 0  •  pantoprazole (Protonix) 40 MG EC tablet, Take 1 tablet by mouth Daily., Disp: 30 tablet, Rfl: 1  •  promethazine (PHENERGAN) 25 MG tablet, Take 1 tablet by mouth Every 6 (Six) Hours As Needed for Nausea or Vomiting., Disp: 60 tablet, Rfl: 5  •  vitamin D (ERGOCALCIFEROL) 1.25 MG (90893 UT) capsule capsule, Take 50,000 Units by mouth 1 (One) Time Per Week., Disp: , Rfl:     Allergies:   Patient has no known allergies.    Vitals:  Ht 160 cm (63\")   Wt 95 kg (209 lb 6.4 oz)   BMI 37.09 kg/m²     Physical Exam:  Physical Exam  Constitutional:       Appearance: She is obese.   HENT:      Head: Normocephalic and atraumatic.      Nose: Nose normal. No congestion or rhinorrhea.   Eyes:      General: No scleral icterus.     Extraocular Movements: Extraocular movements intact.      " Conjunctiva/sclera: Conjunctivae normal.      Pupils: Pupils are equal, round, and reactive to light.   Cardiovascular:      Rate and Rhythm: Normal rate and regular rhythm.      Pulses: Normal pulses.      Heart sounds: Normal heart sounds.   Pulmonary:      Effort: Pulmonary effort is normal.      Breath sounds: Normal breath sounds.   Abdominal:      General: Abdomen is flat. Bowel sounds are normal. There is no distension.      Palpations: Abdomen is soft. There is no shifting dullness, fluid wave, hepatomegaly, splenomegaly, mass or pulsatile mass.      Tenderness: There is no abdominal tenderness. There is no guarding or rebound.      Hernia: No hernia is present.   Musculoskeletal:         General: No swelling or tenderness.      Cervical back: Normal range of motion and neck supple.      Right lower leg: Edema present.      Left lower leg: Edema present.      Comments: Trace to 1+ LE edema bilaterally   Skin:     General: Skin is warm and dry.      Coloration: Skin is not jaundiced.   Neurological:      General: No focal deficit present.      Mental Status: She is alert and oriented to person, place, and time.   Psychiatric:         Mood and Affect: Mood normal.         Behavior: Behavior normal.         Results Review:  Lab Results:   Lab on 08/31/2021   Component Date Value Ref Range Status   • COVID19 08/31/2021 Not Detected  Not Detected - Ref. Range Final   Hospital Outpatient Visit on 08/11/2021   Component Date Value Ref Range Status   • proBNP 08/11/2021 2,085.0* 0.0-1,800.0 pg/mL Final   • Absolute Lung Fluid Content 08/11/2021 23  20 - 35 % Final   • Glucose 08/11/2021 292* 65 - 99 mg/dL Final   • BUN 08/11/2021 43* 8 - 23 mg/dL Final   • Creatinine 08/11/2021 2.09* 0.57 - 1.00 mg/dL Final   • Sodium 08/11/2021 138  136 - 145 mmol/L Final   • Potassium 08/11/2021 4.7  3.5 - 5.2 mmol/L Final   • Chloride 08/11/2021 105  98 - 107 mmol/L Final   • CO2 08/11/2021 21.8* 22.0 - 29.0 mmol/L Final   •  Calcium 08/11/2021 9.1  8.6 - 10.5 mg/dL Final   • eGFR Non African Amer 08/11/2021 23* >60 mL/min/1.73 Final   • BUN/Creatinine Ratio 08/11/2021 20.6  7.0 - 25.0 Final   • Anion Gap 08/11/2021 11.2  5.0 - 15.0 mmol/L Final   • Magnesium 08/11/2021 2.0  1.6 - 2.4 mg/dL Final   • Glucose 08/11/2021 297* 65 - 99 mg/dL Final   • BUN 08/11/2021 43* 8 - 23 mg/dL Final   • Creatinine 08/11/2021 2.07* 0.57 - 1.00 mg/dL Final   • Sodium 08/11/2021 137  136 - 145 mmol/L Final   • Potassium 08/11/2021 4.6  3.5 - 5.2 mmol/L Final   • Chloride 08/11/2021 103  98 - 107 mmol/L Final   • CO2 08/11/2021 22.0  22.0 - 29.0 mmol/L Final   • Calcium 08/11/2021 9.2  8.6 - 10.5 mg/dL Final   • Total Protein 08/11/2021 7.5  6.0 - 8.5 g/dL Final   • Albumin 08/11/2021 3.75  3.50 - 5.20 g/dL Final   • ALT (SGPT) 08/11/2021 22  1 - 33 U/L Final   • AST (SGOT) 08/11/2021 35* 1 - 32 U/L Final   • Alkaline Phosphatase 08/11/2021 411* 39 - 117 U/L Final   • Total Bilirubin 08/11/2021 0.2  0.0 - 1.2 mg/dL Final   • eGFR Non African Amer 08/11/2021 23* >60 mL/min/1.73 Final   • Globulin 08/11/2021 3.8  gm/dL Final   • A/G Ratio 08/11/2021 1.0  g/dL Final   • BUN/Creatinine Ratio 08/11/2021 20.8  7.0 - 25.0 Final   • Anion Gap 08/11/2021 12.0  5.0 - 15.0 mmol/L Final   • Ferritin 08/11/2021 240.10* 13.00 - 150.00 ng/mL Final   • Iron 08/11/2021 41  37 - 145 mcg/dL Final   • Iron Saturation 08/11/2021 12* 20 - 50 % Final   • Transferrin 08/11/2021 228  200 - 360 mg/dL Final   • TIBC 08/11/2021 340  298 - 536 mcg/dL Final   • WBC 08/11/2021 7.85  3.40 - 10.80 10*3/mm3 Final   • RBC 08/11/2021 3.33* 3.77 - 5.28 10*6/mm3 Final   • Hemoglobin 08/11/2021 8.1* 12.0 - 15.9 g/dL Final   • Hematocrit 08/11/2021 28.3* 34.0 - 46.6 % Final   • MCV 08/11/2021 85.0  79.0 - 97.0 fL Final   • MCH 08/11/2021 24.3* 26.6 - 33.0 pg Final   • MCHC 08/11/2021 28.6* 31.5 - 35.7 g/dL Final   • RDW 08/11/2021 18.7* 12.3 - 15.4 % Final   • RDW-SD 08/11/2021 58.3* 37.0 - 54.0  fl Final   • MPV 08/11/2021 9.5  6.0 - 12.0 fL Final   • Platelets 08/11/2021 315  140 - 450 10*3/mm3 Final   • Neutrophil % 08/11/2021 67.9  42.7 - 76.0 % Final   • Lymphocyte % 08/11/2021 19.2* 19.6 - 45.3 % Final   • Monocyte % 08/11/2021 5.5  5.0 - 12.0 % Final   • Eosinophil % 08/11/2021 6.2  0.3 - 6.2 % Final   • Basophil % 08/11/2021 0.8  0.0 - 1.5 % Final   • Immature Grans % 08/11/2021 0.4  0.0 - 0.5 % Final   • Neutrophils, Absolute 08/11/2021 5.33  1.70 - 7.00 10*3/mm3 Final   • Lymphocytes, Absolute 08/11/2021 1.51  0.70 - 3.10 10*3/mm3 Final   • Monocytes, Absolute 08/11/2021 0.43  0.10 - 0.90 10*3/mm3 Final   • Eosinophils, Absolute 08/11/2021 0.49* 0.00 - 0.40 10*3/mm3 Final   • Basophils, Absolute 08/11/2021 0.06  0.00 - 0.20 10*3/mm3 Final   • Immature Grans, Absolute 08/11/2021 0.03  0.00 - 0.05 10*3/mm3 Final   • nRBC 08/11/2021 0.0  0.0 - 0.2 /100 WBC Final   • Anisocytosis 08/11/2021 Mod/2+  None Seen Final   • Hypochromia 08/11/2021 Mod/2+  None Seen Final   • Polychromasia 08/11/2021 Slight/1+  None Seen Final   • Platelet Morphology 08/11/2021 Normal  Normal Final       Assessment/Plan     Visit Diagnoses:    ICD-10-CM ICD-9-CM   1. Iron deficiency anemia, unspecified iron deficiency anemia type  D50.9 280.9   2. Chronic renal disease, stage IV (CMS/HCC)  N18.4 585.4   3. Esophageal dysphagia  R13.10 787.20       Plan:  I will set the patient up for EGD once this COVID-19 search has passed and it is safe to get back into the patient surgery department.  She will follow-up after her procedure.  In the meantime, I will attempt to get her colonoscopy report that was performed last year by Dr. Rowell.    ESOPHAGOGASTRODUODENOSCOPY WITH BIOPSY (N/A)      MEDICATION ISSUES:  Discussed medication options and treatment plan of prescribed medication as well as the risks, benefits, and side effects including potential falls, possible impaired driving and metabolic adversities among others. Patient is  agreeable to call the office with any worsening of symptoms or onset of side effects. Patient is agreeable to call 911 or go to the nearest ER should he/she begin having SI/HI.     MEDS ORDERED DURING VISIT:  No orders of the defined types were placed in this encounter.    Follow-up after EGD.             This document has been electronically signed by Palmira Pate MD   September 1, 2021 16:35 EDT        Part of this note may be an electronic transcription/translation of spoken language to printed text using the Dragon Dictation System.

## 2021-09-02 ENCOUNTER — HOSPITAL ENCOUNTER (EMERGENCY)
Facility: HOSPITAL | Age: 76
Discharge: HOME | End: 2021-09-02
Attending: STUDENT IN AN ORGANIZED HEALTH CARE EDUCATION/TRAINING PROGRAM | Admitting: STUDENT IN AN ORGANIZED HEALTH CARE EDUCATION/TRAINING PROGRAM

## 2021-09-02 ENCOUNTER — APPOINTMENT (OUTPATIENT)
Dept: GENERAL RADIOLOGY | Facility: HOSPITAL | Age: 76
End: 2021-09-02

## 2021-09-02 ENCOUNTER — OFFICE VISIT (OUTPATIENT)
Dept: CARDIOLOGY | Facility: CLINIC | Age: 76
End: 2021-09-02

## 2021-09-02 VITALS
WEIGHT: 210 LBS | DIASTOLIC BLOOD PRESSURE: 85 MMHG | HEIGHT: 63 IN | SYSTOLIC BLOOD PRESSURE: 210 MMHG | TEMPERATURE: 98 F | BODY MASS INDEX: 37.21 KG/M2 | HEART RATE: 88 BPM

## 2021-09-02 VITALS
TEMPERATURE: 98.6 F | SYSTOLIC BLOOD PRESSURE: 187 MMHG | RESPIRATION RATE: 16 BRPM | DIASTOLIC BLOOD PRESSURE: 85 MMHG | HEART RATE: 87 BPM | WEIGHT: 210 LBS | HEIGHT: 63 IN | BODY MASS INDEX: 37.21 KG/M2 | OXYGEN SATURATION: 100 %

## 2021-09-02 DIAGNOSIS — I10 HYPERTENSION, UNSPECIFIED TYPE: ICD-10-CM

## 2021-09-02 DIAGNOSIS — R73.9 HYPERGLYCEMIA: Primary | ICD-10-CM

## 2021-09-02 DIAGNOSIS — E78.5 DYSLIPIDEMIA: ICD-10-CM

## 2021-09-02 DIAGNOSIS — I50.32 CHRONIC DIASTOLIC CONGESTIVE HEART FAILURE (HCC): ICD-10-CM

## 2021-09-02 DIAGNOSIS — R06.83 SNORING: ICD-10-CM

## 2021-09-02 DIAGNOSIS — I51.7 LVH (LEFT VENTRICULAR HYPERTROPHY): ICD-10-CM

## 2021-09-02 DIAGNOSIS — I73.9 PVD (PERIPHERAL VASCULAR DISEASE) WITH CLAUDICATION (HCC): ICD-10-CM

## 2021-09-02 DIAGNOSIS — I10 ESSENTIAL HYPERTENSION: Primary | ICD-10-CM

## 2021-09-02 DIAGNOSIS — I25.10 ATHEROSCLEROSIS OF NATIVE CORONARY ARTERY OF NATIVE HEART WITHOUT ANGINA PECTORIS: ICD-10-CM

## 2021-09-02 LAB
ALBUMIN SERPL-MCNC: 3.61 G/DL (ref 3.5–5.2)
ALBUMIN/GLOB SERPL: 0.9 G/DL
ALP SERPL-CCNC: 435 U/L (ref 39–117)
ALT SERPL W P-5'-P-CCNC: 21 U/L (ref 1–33)
ANION GAP SERPL CALCULATED.3IONS-SCNC: 13.2 MMOL/L (ref 5–15)
AST SERPL-CCNC: 41 U/L (ref 1–32)
BACTERIA UR QL AUTO: ABNORMAL /HPF
BASOPHILS # BLD AUTO: 0.05 10*3/MM3 (ref 0–0.2)
BASOPHILS NFR BLD AUTO: 0.4 % (ref 0–1.5)
BILIRUB SERPL-MCNC: 0.3 MG/DL (ref 0–1.2)
BILIRUB UR QL STRIP: NEGATIVE
BUN SERPL-MCNC: 27 MG/DL (ref 8–23)
BUN/CREAT SERPL: 16 (ref 7–25)
CALCIUM SPEC-SCNC: 9.3 MG/DL (ref 8.6–10.5)
CHLORIDE SERPL-SCNC: 106 MMOL/L (ref 98–107)
CLARITY UR: CLEAR
CO2 SERPL-SCNC: 22.8 MMOL/L (ref 22–29)
COLOR UR: YELLOW
CREAT SERPL-MCNC: 1.69 MG/DL (ref 0.57–1)
CRP SERPL-MCNC: 1.44 MG/DL (ref 0–0.5)
D-LACTATE SERPL-SCNC: 1.4 MMOL/L (ref 0.5–2)
DEPRECATED RDW RBC AUTO: 53.3 FL (ref 37–54)
EOSINOPHIL # BLD AUTO: 0.43 10*3/MM3 (ref 0–0.4)
EOSINOPHIL NFR BLD AUTO: 3.3 % (ref 0.3–6.2)
ERYTHROCYTE [DISTWIDTH] IN BLOOD BY AUTOMATED COUNT: 18.3 % (ref 12.3–15.4)
GFR SERPL CREATININE-BSD FRML MDRD: 29 ML/MIN/1.73
GLOBULIN UR ELPH-MCNC: 4.2 GM/DL
GLUCOSE SERPL-MCNC: 247 MG/DL (ref 65–99)
GLUCOSE UR STRIP-MCNC: NEGATIVE MG/DL
HCT VFR BLD AUTO: 27.4 % (ref 34–46.6)
HGB BLD-MCNC: 7.9 G/DL (ref 12–15.9)
HGB UR QL STRIP.AUTO: ABNORMAL
HOLD SPECIMEN: NORMAL
HOLD SPECIMEN: NORMAL
HYALINE CASTS UR QL AUTO: ABNORMAL /LPF
HYPOCHROMIA BLD QL: NORMAL
IMM GRANULOCYTES # BLD AUTO: 0.04 10*3/MM3 (ref 0–0.05)
IMM GRANULOCYTES NFR BLD AUTO: 0.3 % (ref 0–0.5)
KETONES UR QL STRIP: NEGATIVE
LARGE PLATELETS: NORMAL
LEUKOCYTE ESTERASE UR QL STRIP.AUTO: NEGATIVE
LIPASE SERPL-CCNC: 22 U/L (ref 13–60)
LYMPHOCYTES # BLD AUTO: 2.06 10*3/MM3 (ref 0.7–3.1)
LYMPHOCYTES NFR BLD AUTO: 16 % (ref 19.6–45.3)
MACROCYTES BLD QL SMEAR: NORMAL
MCH RBC QN AUTO: 23.3 PG (ref 26.6–33)
MCHC RBC AUTO-ENTMCNC: 28.8 G/DL (ref 31.5–35.7)
MCV RBC AUTO: 80.8 FL (ref 79–97)
MONOCYTES # BLD AUTO: 0.56 10*3/MM3 (ref 0.1–0.9)
MONOCYTES NFR BLD AUTO: 4.4 % (ref 5–12)
NEUTROPHILS NFR BLD AUTO: 75.6 % (ref 42.7–76)
NEUTROPHILS NFR BLD AUTO: 9.72 10*3/MM3 (ref 1.7–7)
NITRITE UR QL STRIP: NEGATIVE
NRBC BLD AUTO-RTO: 0 /100 WBC (ref 0–0.2)
PH UR STRIP.AUTO: 6 [PH] (ref 5–8)
PLATELET # BLD AUTO: 316 10*3/MM3 (ref 140–450)
PMV BLD AUTO: 8.8 FL (ref 6–12)
POIKILOCYTOSIS BLD QL SMEAR: NORMAL
POTASSIUM SERPL-SCNC: 4.2 MMOL/L (ref 3.5–5.2)
PROT SERPL-MCNC: 7.8 G/DL (ref 6–8.5)
PROT UR QL STRIP: ABNORMAL
RBC # BLD AUTO: 3.39 10*6/MM3 (ref 3.77–5.28)
RBC # UR: ABNORMAL /HPF
REF LAB TEST METHOD: ABNORMAL
SODIUM SERPL-SCNC: 142 MMOL/L (ref 136–145)
SP GR UR STRIP: 1.02 (ref 1–1.03)
SQUAMOUS #/AREA URNS HPF: ABNORMAL /HPF
TROPONIN T SERPL-MCNC: <0.01 NG/ML (ref 0–0.03)
UROBILINOGEN UR QL STRIP: ABNORMAL
WBC # BLD AUTO: 12.86 10*3/MM3 (ref 3.4–10.8)
WBC UR QL AUTO: ABNORMAL /HPF
WHOLE BLOOD HOLD SPECIMEN: NORMAL
WHOLE BLOOD HOLD SPECIMEN: NORMAL

## 2021-09-02 PROCEDURE — 83690 ASSAY OF LIPASE: CPT | Performed by: PHYSICIAN ASSISTANT

## 2021-09-02 PROCEDURE — 85007 BL SMEAR W/DIFF WBC COUNT: CPT | Performed by: PHYSICIAN ASSISTANT

## 2021-09-02 PROCEDURE — 87040 BLOOD CULTURE FOR BACTERIA: CPT | Performed by: PHYSICIAN ASSISTANT

## 2021-09-02 PROCEDURE — 84484 ASSAY OF TROPONIN QUANT: CPT | Performed by: PHYSICIAN ASSISTANT

## 2021-09-02 PROCEDURE — 81001 URINALYSIS AUTO W/SCOPE: CPT | Performed by: PHYSICIAN ASSISTANT

## 2021-09-02 PROCEDURE — 80053 COMPREHEN METABOLIC PANEL: CPT | Performed by: PHYSICIAN ASSISTANT

## 2021-09-02 PROCEDURE — 85025 COMPLETE CBC W/AUTO DIFF WBC: CPT | Performed by: PHYSICIAN ASSISTANT

## 2021-09-02 PROCEDURE — 93000 ELECTROCARDIOGRAM COMPLETE: CPT | Performed by: SPECIALIST

## 2021-09-02 PROCEDURE — 71045 X-RAY EXAM CHEST 1 VIEW: CPT

## 2021-09-02 PROCEDURE — 86140 C-REACTIVE PROTEIN: CPT | Performed by: PHYSICIAN ASSISTANT

## 2021-09-02 PROCEDURE — 99214 OFFICE O/P EST MOD 30 MIN: CPT | Performed by: SPECIALIST

## 2021-09-02 PROCEDURE — 99283 EMERGENCY DEPT VISIT LOW MDM: CPT

## 2021-09-02 PROCEDURE — 71045 X-RAY EXAM CHEST 1 VIEW: CPT | Performed by: RADIOLOGY

## 2021-09-02 PROCEDURE — 83605 ASSAY OF LACTIC ACID: CPT | Performed by: PHYSICIAN ASSISTANT

## 2021-09-02 NOTE — PROGRESS NOTES
Subjective   Follow up, hypertension  Britta Lee is a 76 y.o. female who presents to day for Follow-up (ROUTINE).    CHIEF COMPLIANT  Chief Complaint   Patient presents with   • Follow-up     ROUTINE       Active Problems:  Problem List Items Addressed This Visit        Cardiac and Vasculature    Essential hypertension - Primary    Overview     The patient was advised to keep a daily blood pressure and pulse log. They were advised contact our office if consistently running over 120/80 and bring the log to the next follow up visit.          Atherosclerosis of native coronary artery of native heart    Overview     S/P one stent placement followed by [facility] in [place] - Dr. Alexandru Martines          Relevant Orders    ECG 12 Lead    Dyslipidemia    Overview     Lipid panel done 10/20/15 TC: 166 Tri HDL: 34 LDL: 104         PVD (peripheral vascular disease) with claudication (CMS/HCC)    LVH (left ventricular hypertrophy)    Chronic diastolic congestive heart failure (CMS/HCC)       Sleep    Snoring          HPI  HPI  Patient was seen at the [facility] hazard earlier last month with some chest pain is not clear what exactly happened at the time but no cardiac work-up was done according to her and she has some physical therapy and discharged home but her since being discharged her main problem with a headache she has been checking her blood pressure at home but she cannot remember the numbers her shortness of breath is stable to mild to moderate exertion with also stable lower extremity edema no palpitations, however she has bilateral calves pain if she walks about one mile  PRIOR MEDS  Current Outpatient Medications on File Prior to Visit   Medication Sig Dispense Refill   • albuterol sulfate  (90 Base) MCG/ACT inhaler Inhale 2 puffs Every 4 (Four) Hours As Needed for Shortness of Air. 18 g 0   • amLODIPine (NORVASC) 5 MG tablet Take 1 tablet by mouth Daily. 30 tablet 5   • aspirin 81 MG tablet  Take 1 tablet by mouth Daily. 30 tablet 5   • atorvastatin (LIPITOR) 40 MG tablet Take 1 tablet by mouth Every Night. 90 tablet 1   • BD Pen Needle Evon U/F 32G X 4 MM misc      • bumetanide (BUMEX) 0.5 MG tablet Take 1 tablet by mouth Daily. 90 tablet 0   • butalbital-acetaminophen-caffeine (Esgic) -40 MG per tablet Take 1 tablet by mouth Every 4 (Four) Hours As Needed for Headache. 6 tablet 0   • cloNIDine (CATAPRES) 0.2 MG tablet Take 1 tablet by mouth 3 (Three) Times a Day. (Patient taking differently: Take 0.1 mg by mouth 3 (Three) Times a Day.) 270 tablet 1   • Continuous Blood Gluc  (Dexcom G6 ) device 1 Device Daily. 3 each 3   • Continuous Blood Gluc Sensor (Dexcom G6 Sensor) Every 10 (Ten) Days. 9 each 3   • Continuous Blood Gluc Transmit (Dexcom G6 Transmitter) misc 1 Device Daily. 1 each 0   • escitalopram (LEXAPRO) 10 MG tablet Take 1 tablet by mouth Daily. 90 tablet 3   • FeroSul 325 (65 Fe) MG tablet Take 1 tablet by mouth 2 (Two) Times a Day.     • glucose blood test strip 1 each by Other route 3 (Three) Times a Day. 100 each 12   • hydrALAZINE (APRESOLINE) 50 MG tablet Take 1 tablet by mouth Every 8 (Eight) Hours. (Patient taking differently: Take 50 mg by mouth 3 (Three) Times a Day.) 270 tablet 1   • Insulin Glargine, 2 Unit Dial, (Toulana Palacios) 300 UNIT/ML solution pen-injector injection Inject 60 Units under the skin into the appropriate area as directed Daily. (Patient taking differently: Inject 40-60 Units under the skin into the appropriate area as directed Daily.) 6 pen 3   • insulin lispro (humaLOG) 100 UNIT/ML injection Inject 5 Units under the skin into the appropriate area as directed 3 (Three) Times a Day Before Meals.     • Insulin Pen Needle 32G X 5 MM misc 1 each 4 (Four) Times a Day. 120 each 5   • isosorbide mononitrate (IMDUR) 60 MG 24 hr tablet Take 1 tablet by mouth Daily. 90 tablet 1   • levothyroxine (SYNTHROID, LEVOTHROID) 25 MCG tablet Take 1  tablet by mouth Daily. 30 tablet 5   • metoclopramide (REGLAN) 10 MG tablet Take 1 tablet by mouth 3 (Three) Times a Day With Meals for 60 days. 90 tablet 1   • metoprolol tartrate (LOPRESSOR) 25 MG tablet Take 25 mg by mouth 2 (Two) Times a Day.     • nystatin (MYCOSTATIN) 800477 UNIT/GM powder Apply  one application topically to the appropriate area as directed Every 12 (Twelve) Hours. (Patient taking differently: Apply  topically to the appropriate area as directed 2 (Two) Times a Day As Needed (itching).) 60 g 0   • pantoprazole (Protonix) 40 MG EC tablet Take 1 tablet by mouth Daily. 30 tablet 1   • promethazine (PHENERGAN) 25 MG tablet Take 1 tablet by mouth Every 6 (Six) Hours As Needed for Nausea or Vomiting. 60 tablet 5   • vitamin D (ERGOCALCIFEROL) 1.25 MG (82597 UT) capsule capsule Take 50,000 Units by mouth 1 (One) Time Per Week.       No current facility-administered medications on file prior to visit.       ALLERGIES  Patient has no known allergies.    HISTORY  Past Medical History:   Diagnosis Date   • Acquired hypothyroidism 4/22/2021   • Anemia    • Arthritis    • Carpal tunnel syndrome    • Coronary artery disease    • Diabetes mellitus (CMS/HCC)    • Elevated cholesterol    • GERD (gastroesophageal reflux disease)    • History of pneumonia    • History of unsteady gait     OCASSIONALLY   • Insomnia    • Kidney disease    • LENNY (obstructive sleep apnea) 12/1/2020   • Osteoarthritis    • Renal insufficiency    • Sciatica        Social History     Socioeconomic History   • Marital status:      Spouse name: natalie   • Number of children: 3   • Years of education: 12   • Highest education level: Not on file   Tobacco Use   • Smoking status: Never Smoker   • Smokeless tobacco: Never Used   Vaping Use   • Vaping Use: Never used   Substance and Sexual Activity   • Alcohol use: Yes     Comment: socially   • Drug use: No   • Sexual activity: Defer     Birth control/protection: Surgical       Family  "History   Problem Relation Age of Onset   • Arthritis Mother    • Diabetes Mother    • Cancer Mother    • Heart disease Father    • Diabetes Daughter    • Diabetes Son    • Diabetes Maternal Aunt    • Heart disease Maternal Grandmother    • Breast cancer Neg Hx        Review of Systems   Respiratory: Positive for shortness of breath. Negative for apnea, cough, choking, chest tightness, wheezing and stridor.    Cardiovascular: Positive for chest pain and leg swelling. Negative for palpitations.       Objective     VITALS: BP (!) 210/85 Comment: MANUAL  Pulse 88   Temp 98 °F (36.7 °C)   Ht 160 cm (63\")   Wt 95.3 kg (210 lb)   BMI 37.20 kg/m²     LABS:   Lab Results (most recent)     None          IMAGING:   Mammo Screening Digital Tomosynthesis Bilateral With CAD    Result Date: 8/12/2021  No findings suspicious for malignancy.  BI-RADS CATEGORY:  2, BENIGN  RECOMMENDATION: Yearly mammogram, yearly clinical breast exam, and encourage self breast awareness.  CAD was used.  The standard false negative rate of mammography is between 10% and 25%. Complex patterns or increased breast density will markedly elevate the false negative rate of mammography.  A letter, in lay terminology, with the results of this exam will be mailed to the patient.   If there is a palpable area of concern, biopsy should be considered regardless of imaging findings.  This report was finalized on 8/12/2021 9:36 AM by Dr. Vaishali Benson MD.        EXAM:  Physical Exam  Vitals reviewed.   Constitutional:       Appearance: She is well-developed.   HENT:      Head: Normocephalic and atraumatic.   Eyes:      Pupils: Pupils are equal, round, and reactive to light.   Neck:      Thyroid: No thyromegaly.      Vascular: No JVD.   Cardiovascular:      Rate and Rhythm: Normal rate and regular rhythm.      Heart sounds: Normal heart sounds. No murmur heard.   No friction rub. No gallop.       Comments: Bilateral leg edema  Pulmonary:      Effort: " Pulmonary effort is normal. No respiratory distress.      Breath sounds: Normal breath sounds. No stridor. No wheezing or rales.   Chest:      Chest wall: No tenderness.   Musculoskeletal:         General: No tenderness or deformity.      Cervical back: Neck supple.   Skin:     General: Skin is warm and dry.   Neurological:      Mental Status: She is alert and oriented to person, place, and time.      Cranial Nerves: No cranial nerve deficit.      Coordination: Coordination normal.         Procedure     ECG 12 Lead    Date/Time: 9/2/2021 9:37 AM  Performed by: Hever Cruz MD  Authorized by: Hever Cruz MD           EKG: Normal sinus rhythm, left axis deviation, left ventricle hypertrophy pattern with strain pattern, compared with EKG 12/4/2020 LV strain pattern is new       Assessment/Plan     Diagnoses and all orders for this visit:    1. Essential hypertension (Primary)    2. Atherosclerosis of native coronary artery of native heart without angina pectoris  -     ECG 12 Lead    3. Dyslipidemia    4. LVH (left ventricular hypertrophy)    5. Chronic diastolic congestive heart failure (CMS/HCC)    6. PVD (peripheral vascular disease) with claudication (CMS/HCC)    7. Snoring    1.  Her blood pressure is very high, she did not take her medications today she says she is checking her blood pressure at home but she cannot remember the numbers, and despite 02mg of clonidine, her systolic BP remains 211 mmHg, so, will refer patient to ER now  2.  I reviewed reviewed the labs recently with poorly controlled diabetes was fasting glucose 292 and stable elevated creatinine [she is being followed by nephrology], she also has normocytic anemia, she is being referred to GI for possible EGD but this has not been scheduled yet  3.  No lipid profile is available since last year September and that showed low HDL eder try to get lipid profile  4.  Regarding her CHF she is more or less stable but she still having edema has not  been taking her diuretics today  5.  Sleep apnea was negative she has snoring  6.  Stable peripheral vascular disease claudication which she says she can walk a mile  7.  No further chest pain her stress test back in April last year was negative I suspect the pain possibly was related to severe hypertension  8.  Poor compliance we talked about importance of taking the medicine every day    Return ER.             MEDS ORDERED DURING VISIT:  No orders of the defined types were placed in this encounter.      As always, Lexie Dolan PA  I appreciate very much the opportunity to participate in the cardiovascular care of your patients. Please do not hesitate to call me with any questions with regards to Britta Lee evaluation and management.         This document has been electronically signed by Hever Cruz MD  September 2, 2021 11:29 EDT

## 2021-09-02 NOTE — ED PROVIDER NOTES
Subjective   76-year-old female who presents to the emergency department chief complaint hypertension, headache.  Patient reports she was found to have elevated blood pressure at doctor's office of 220/100.  Patient was sent to the emergency department to be further evaluated.  Patient has history of coronary artery disease, diabetes, hyperlipidemia.  Patient was given 0.2 clonidine at 's office.      History provided by:  Patient   used: No    Hypertension  Severity:  Moderate  Onset quality:  Gradual  Duration:  1 day  Timing:  Intermittent  Progression:  Worsening  Chronicity:  New  Time since last dose of antihypertensive:  2 days  Notable PTA blood pressures:  200/75  Context: normal sodium, not caffeine, not drug abuse, not herbal remedies, not medication change, not noncompliance and not OTC medications used    Relieved by:  Nothing  Worsened by:  Nothing  Ineffective treatments:  None tried  Associated symptoms: headaches    Associated symptoms: no abdominal pain, no anxiety, no blurred vision, no chest pain, no confusion, no dizziness, no ear pain, no epistaxis, no hematuria, no hypokalemia, no loss of consciousness, no neck pain, no palpitations, no peripheral edema, no shortness of breath, no syncope, not vomiting and no weakness    Risk factors: no cardiac disease, no cocaine use, no family history of hypertension, no kidney disease, no obesity, no prior aneurysm, no prior stroke, no PVD and no tobacco use        Review of Systems   Constitutional: Negative.    HENT: Negative for ear pain and nosebleeds.    Eyes: Negative.  Negative for blurred vision, photophobia, pain, discharge, redness and itching.   Respiratory: Negative.  Negative for shortness of breath.    Cardiovascular: Negative.  Negative for chest pain, palpitations, leg swelling and syncope.   Gastrointestinal: Negative for abdominal pain and vomiting.   Genitourinary: Negative.  Negative for dysuria, flank pain,  frequency, genital sores and hematuria.   Musculoskeletal: Negative.  Negative for arthralgias, back pain, gait problem, myalgias and neck pain.   Skin: Negative.  Negative for color change and rash.   Neurological: Positive for headaches. Negative for dizziness, loss of consciousness and weakness.   Hematological: Negative.  Negative for adenopathy. Does not bruise/bleed easily.   Psychiatric/Behavioral: Negative.  Negative for confusion and hallucinations. The patient is not nervous/anxious.    All other systems reviewed and are negative.      Past Medical History:   Diagnosis Date   • Acquired hypothyroidism 4/22/2021   • Anemia    • Arthritis    • Carpal tunnel syndrome    • Coronary artery disease    • Diabetes mellitus (CMS/HCC)    • Elevated cholesterol    • GERD (gastroesophageal reflux disease)    • History of pneumonia    • History of unsteady gait     OCASSIONALLY   • Insomnia    • Kidney disease    • LENNY (obstructive sleep apnea) 12/1/2020   • Osteoarthritis    • Renal insufficiency    • Sciatica        No Known Allergies    Past Surgical History:   Procedure Laterality Date   • ABDOMINAL SURGERY     • APPENDECTOMY     • CARDIAC CATHETERIZATION      1 STENT  ---  2000   • CARDIAC SURGERY     • CAROTID STENT     • CARPAL TUNNEL RELEASE Right 10/8/2019    Procedure: CARPAL TUNNEL RELEASE;  Surgeon: Feroz Johnson MD;  Location: [facility];  Service: Orthopedics   • CATARACT EXTRACTION     • CHOLECYSTECTOMY     • COLONOSCOPY     • CORONARY ANGIOPLASTY WITH STENT PLACEMENT     • ENDOSCOPY     • ENDOSCOPY N/A 3/5/2020    Procedure: ESOPHAGOGASTRODUODENOSCOPY;  Surgeon: Alexandru Bagley MD;  Location: [facility];  Service: Gastroenterology;  Laterality: N/A;   • ENDOSCOPY AND COLONOSCOPY     • GALLBLADDER SURGERY     • JOINT REPLACEMENT Left 05/02/2017    [facility], [place]  DR JOHNSON  LEFT TOTAL KNEE   • KNEE ARTHROSCOPY W/ MENISCECTOMY Right    • LAPAROSCOPIC TUBAL LIGATION     • IL TOTAL KNEE ARTHROPLASTY Left  5/2/2017    Procedure: TOTAL KNEE ARTHROPLASTY;  Surgeon: Feroz Johnson MD;  Location: [facility];  Service: Orthopedics   • STERILIZATION     • TONSILLECTOMY         Family History   Problem Relation Age of Onset   • Arthritis Mother    • Diabetes Mother    • Cancer Mother    • Heart disease Father    • Diabetes Daughter    • Diabetes Son    • Diabetes Maternal Aunt    • Heart disease Maternal Grandmother    • Breast cancer Neg Hx        Social History     Socioeconomic History   • Marital status:      Spouse name: natalie   • Number of children: 3   • Years of education: 12   • Highest education level: Not on file   Tobacco Use   • Smoking status: Never Smoker   • Smokeless tobacco: Never Used   Vaping Use   • Vaping Use: Never used   Substance and Sexual Activity   • Alcohol use: Yes     Comment: socially   • Drug use: No   • Sexual activity: Defer     Birth control/protection: Surgical           Objective   Physical Exam  Vitals and nursing note reviewed.   Constitutional:       General: She is not in acute distress.     Appearance: Normal appearance. She is normal weight. She is not ill-appearing, toxic-appearing or diaphoretic.   HENT:      Head: Normocephalic and atraumatic.      Right Ear: Tympanic membrane, ear canal and external ear normal. There is no impacted cerumen.      Left Ear: Tympanic membrane, ear canal and external ear normal. There is no impacted cerumen.      Nose: Nose normal. No congestion.      Mouth/Throat:      Mouth: Mucous membranes are moist.      Pharynx: Oropharynx is clear. No oropharyngeal exudate or posterior oropharyngeal erythema.   Eyes:      General: No scleral icterus.        Right eye: No discharge.         Left eye: No discharge.      Extraocular Movements: Extraocular movements intact.      Conjunctiva/sclera: Conjunctivae normal.      Pupils: Pupils are equal, round, and reactive to light.   Cardiovascular:      Rate and Rhythm: Normal rate and regular  rhythm.      Pulses: Normal pulses.      Heart sounds: Normal heart sounds. No murmur heard.   No friction rub. No gallop.    Pulmonary:      Effort: Pulmonary effort is normal. No respiratory distress.      Breath sounds: Normal breath sounds. No stridor. No wheezing, rhonchi or rales.   Chest:      Chest wall: No tenderness.   Abdominal:      General: Abdomen is flat. Bowel sounds are normal. There is no distension.      Palpations: There is no mass.      Tenderness: There is no abdominal tenderness. There is no right CVA tenderness, left CVA tenderness, guarding or rebound.      Hernia: No hernia is present.   Musculoskeletal:         General: No swelling, tenderness, deformity or signs of injury. Normal range of motion.      Cervical back: Normal range of motion and neck supple. No rigidity or tenderness.      Right lower leg: No edema.      Left lower leg: No edema.   Lymphadenopathy:      Cervical: No cervical adenopathy.   Skin:     General: Skin is warm and dry.      Capillary Refill: Capillary refill takes less than 2 seconds.      Coloration: Skin is not jaundiced or pale.      Findings: No bruising, erythema or lesion.   Neurological:      General: No focal deficit present.      Mental Status: She is alert and oriented to person, place, and time. Mental status is at baseline.      Cranial Nerves: No cranial nerve deficit.      Sensory: No sensory deficit.      Motor: No weakness.      Coordination: Coordination normal.      Gait: Gait normal.      Deep Tendon Reflexes: Reflexes normal.   Psychiatric:         Mood and Affect: Mood normal.         Behavior: Behavior normal.         Thought Content: Thought content normal.         Judgment: Judgment normal.         Procedures           ED Course  ED Course as of Sep 02 1825   Thu Sep 02, 2021   1822 Patient states headache is improved.    []   1822 Patient's labs are within reasonable findings of her typical labs.  BUN and creatinine are slightly improved  from her baseline.    []      ED Course User Index  [] Yosi Zazueta PA-C                                           [facility]    Final diagnoses:   Hyperglycemia   Hypertension, unspecified type       ED Disposition  ED Disposition     ED Disposition Condition Comment    Discharge Stable           Lexie Dolan PA  [address] [facility] [address] [phone]    Call in 1 day           Medication List      Changed    cloNIDine 0.2 MG tablet  Commonly known as: CATAPRES  Take 1 tablet by mouth 3 (Three) Times a Day.  What changed: how much to take     hydrALAZINE 50 MG tablet  Commonly known as: APRESOLINE  Take 1 tablet by mouth Every 8 (Eight) Hours.  What changed: when to take this     nystatin 053534 UNIT/GM powder  Commonly known as: MYCOSTATIN  Apply  one application topically to the appropriate area as directed Every 12 (Twelve) Hours.  What changed:   · when to take this  · reasons to take this     Toulana Max SoloStar 300 UNIT/ML solution pen-injector injection  Generic drug: Insulin Glargine (2 Unit Dial)  Inject 60 Units under the skin into the appropriate area as directed Daily.  What changed: how much to take             Yosi Zazueta PA-C  09/02/21 [id number]

## 2021-09-02 NOTE — ED NOTES
MEDICAL SCREENING:    Reason for Visit: high blood pressure, headache. Sent by PCP, given 2 Clonidine in his office. Denies visual changes, CP, back pain, or SOA.    Patient initially seen in triage.  The patient was advised further evaluation and diagnostic testing will be needed, some of the treatment and testing will be initiated in the lobby in order to begin the process.  The patient will be returned to the waiting area for the time being and possibly be re-assessed by a subsequent ED provider.  The patient will be brought back to the treatment area in as timely manner as possible.         Delaney Harden, IRA  09/02/21 1200

## 2021-09-03 ENCOUNTER — TELEPHONE (OUTPATIENT)
Dept: FAMILY MEDICINE CLINIC | Facility: CLINIC | Age: 76
End: 2021-09-03

## 2021-09-05 NOTE — PROGRESS NOTES
"Alison Lee is a 76 y.o. female.     Telephone visit    You have chosen to receive care through a telephone visit. Do you consent to use a telephone visit for your medical care today? Yes    This visit has been rescheduled as a phone visit to comply with patient safety concerns in accordance with [facility] recommendations. Total time of discussion was 13 minutes.      Chief Complaint -headache    History of Present Illness -    ROS    Headache-  Patient complains of generalized fatigue and headache that starts at the base of her skull in the back of her head.  Some relief with TheraFlu.  Onset 3 days.  Patient has been vaccinated against Covid 19.  Patient denies any nausea vomiting diarrhea or loss of taste or smell.  She does not have a history of recurrent migraines.    The following portions of the patient's history were reviewed and updated as appropriate: allergies, current medications, past family history, past medical history, past social history, past surgical history and problem list.    Review of Systems   Constitutional: Positive for fatigue.   Neurological: Positive for headaches.       Objective  Vital signs:  Ht 160 cm (62.99\")   Wt 93.4 kg (206 lb)   BMI 36.50 kg/m²     Physical Exam  Vitals and nursing note reviewed.   Pulmonary:      Effort: Pulmonary effort is normal.      Breath sounds: Normal breath sounds. No wheezing.      Comments: No wheezing or heavy breathing noted on phone today  Neurological:      Mental Status: She is alert and oriented to person, place, and time.   Psychiatric:         Thought Content: Thought content normal.      Comments: Mood is concerned                    Assessment/Plan     Diagnoses and all orders for this visit:    1. Other headache syndrome (Primary)  -     promethazine (PHENERGAN) 25 MG tablet; Take 1 tablet by mouth Every 6 (Six) Hours As Needed for Nausea or Vomiting.  Dispense: 60 tablet; Refill: 5  -     COVID PRE-OP / PRE-PROCEDURE SCREENING " ORDER (NO ISOLATION) - Swab, Nasopharynx; Future    Advised symptomatic care including Fioricet as needed and starting Phenergan.  Plan to test for Covid due to current Covid pandemic.  Further assessment pending Covid results.  Patient advised that if symptoms should persist or worsen that she should go to the emergency room or return to clinic for further evaluation.        Patient was given instructions and counseling regarding his condition or for health maintenance advice. Please see specific information pulled into the AVS if appropriate      This document has been electronically signed by:  Lexie Dolan PA-C

## 2021-09-07 ENCOUNTER — OFFICE VISIT (OUTPATIENT)
Dept: FAMILY MEDICINE CLINIC | Facility: CLINIC | Age: 76
End: 2021-09-07

## 2021-09-07 VITALS
HEIGHT: 63 IN | WEIGHT: 201 LBS | TEMPERATURE: 97.1 F | DIASTOLIC BLOOD PRESSURE: 64 MMHG | OXYGEN SATURATION: 100 % | HEART RATE: 71 BPM | BODY MASS INDEX: 35.61 KG/M2 | SYSTOLIC BLOOD PRESSURE: 158 MMHG

## 2021-09-07 DIAGNOSIS — I10 ESSENTIAL HYPERTENSION: Primary | Chronic | ICD-10-CM

## 2021-09-07 DIAGNOSIS — E03.9 ACQUIRED HYPOTHYROIDISM: Chronic | ICD-10-CM

## 2021-09-07 DIAGNOSIS — IMO0002 TYPE 2 DIABETES MELLITUS, UNCONTROLLED, WITH NEUROPATHY: Chronic | ICD-10-CM

## 2021-09-07 LAB
BACTERIA SPEC AEROBE CULT: NORMAL
BACTERIA SPEC AEROBE CULT: NORMAL

## 2021-09-07 PROCEDURE — 99214 OFFICE O/P EST MOD 30 MIN: CPT | Performed by: PHYSICIAN ASSISTANT

## 2021-09-07 RX ORDER — AMLODIPINE BESYLATE 10 MG/1
10 TABLET ORAL DAILY
Qty: 30 TABLET | Refills: 5 | Status: SHIPPED | OUTPATIENT
Start: 2021-09-07 | End: 2021-12-20 | Stop reason: SDUPTHER

## 2021-09-07 RX ORDER — INSULIN GLARGINE 300 U/ML
40-60 INJECTION, SOLUTION SUBCUTANEOUS DAILY
Qty: 3 PEN | Refills: 5 | Status: SHIPPED | OUTPATIENT
Start: 2021-09-07 | End: 2021-11-30 | Stop reason: SDUPTHER

## 2021-09-07 NOTE — PATIENT INSTRUCTIONS
"https://www.diabeteseducator.org/docs/default-source/living-with-diabetes/conquering-the-grocery-store-v1.pdf?sfvrsn=4\">   Carbohydrate Counting for Diabetes Mellitus, Adult  Carbohydrate counting is a method of keeping track of how many carbohydrates you eat. Eating carbohydrates naturally increases the amount of sugar (glucose) in the blood. Counting how many carbohydrates you eat improves your blood glucose control, which helps you manage your diabetes.  It is important to know how many carbohydrates you can safely have in each meal. This is different for every person. A dietitian can help you make a meal plan and calculate how many carbohydrates you should have at each meal and snack.  What foods contain carbohydrates?  Carbohydrates are found in the following foods:  · Grains, such as breads and cereals.  · Dried beans and soy products.  · Starchy vegetables, such as potatoes, peas, and corn.  · Fruit and fruit juices.  · Milk and yogurt.  · Sweets and snack foods, such as cake, cookies, candy, chips, and soft drinks.  How do I count carbohydrates in foods?  There are two ways to count carbohydrates in food. You can read food labels or learn standard serving sizes of foods. You can use either of the methods or a combination of both.  Using the Nutrition Facts label  The Nutrition Facts list is included on the labels of almost all packaged foods and beverages in the [place] It includes:  · The serving size.  · Information about nutrients in each serving, including the grams (g) of carbohydrate per serving.  To use the Nutrition Facts:  · Decide how many servings you will have.  · Multiply the number of servings by the number of carbohydrates per serving.  · The resulting number is the total amount of carbohydrates that you will be having.  Learning the standard serving sizes of foods  When you eat carbohydrate foods that are not packaged or do not include Nutrition Facts on the label, you need to measure the " servings in order to count the amount of carbohydrates.  · Measure the foods that you will eat with a food scale or measuring cup, if needed.  · Decide how many standard-size servings you will eat.  · Multiply the number of servings by 15. For foods that contain carbohydrates, one serving equals 15 g of carbohydrates.  ? For example, if you eat 2 cups or 10 oz (300 g) of strawberries, you will have eaten 2 servings and 30 g of carbohydrates (2 servings x 15 g = 30 g).  · For foods that have more than one food mixed, such as soups and casseroles, you must count the carbohydrates in each food that is included.  The following list contains standard serving sizes of common carbohydrate-rich foods. Each of these servings has about 15 g of carbohydrates:  · 1 slice of bread.  · 1 six-inch (15 cm) tortilla.  · ? cup or 2 oz (53 g) cooked rice or pasta.  · ½ cup or 3 oz (85 g) cooked or canned, drained and rinsed beans or lentils.  · ½ cup or 3 oz (85 g) starchy vegetable, such as peas, corn, or squash.  · ½ cup or 4 oz (120 g) hot cereal.  · ½ cup or 3 oz (85 g) boiled or mashed potatoes, or ¼ or 3 oz (85 g) of a large baked potato.  · ½ cup or 4 fl oz (118 mL) fruit juice.  · 1 cup or 8 fl oz (237 mL) milk.  · 1 small or 4 oz (106 g) apple.  · ½ or 2 oz (63 g) of a medium banana.  · 1 cup or 5 oz (150 g) strawberries.  · 3 cups or 1 oz (24 g) popped popcorn.  What is an example of carbohydrate counting?  To calculate the number of carbohydrates in this sample meal, follow the steps shown below.  Sample meal  · 3 oz (85 g) chicken breast.  · ? cup or 4 oz (106 g) brown rice.  · ½ cup or 3 oz (85 g) corn.  · 1 cup or 8 fl oz (237 mL) milk.  · 1 cup or 5 oz (150 g) strawberries with sugar-free whipped topping.  Carbohydrate calculation  1. Identify the foods that contain carbohydrates:  ? Rice.  ? Corn.  ? Milk.  ? Strawberries.  2. Calculate how many servings you have of each food:  ? 2 servings rice.  ? 1 serving  corn.  ? 1 serving milk.  ? 1 serving strawberries.  3. Multiply each number of servings by 15 g:  ? 2 servings rice x 15 g = 30 g.  ? 1 serving corn x 15 g = 15 g.  ? 1 serving milk x 15 g = 15 g.  ? 1 serving strawberries x 15 g = 15 g.  4. Add together all of the amounts to find the total grams of carbohydrates eaten:  ? 30 g + 15 g + 15 g + 15 g = 75 g of carbohydrates total.  What are tips for following this plan?  Shopping  · Develop a meal plan and then make a shopping list.  · Buy fresh and frozen vegetables, fresh and frozen fruit, dairy, eggs, beans, lentils, and whole grains.  · Look at food labels. Choose foods that have more fiber and less sugar.  · Avoid processed foods and foods with added sugars.  Meal planning  · Aim to have the same amount of carbohydrates at each meal and for each snack time.  · Plan to have regular, balanced meals and snacks.  Where to find more information  · American Diabetes Association: www.diabetes.org  · Centers for Disease Control and Prevention: www.cdc.gov  Summary  · Carbohydrate counting is a method of keeping track of how many carbohydrates you eat.  · Eating carbohydrates naturally increases the amount of sugar (glucose) in the blood.  · Counting how many carbohydrates you eat improves your blood glucose control, which helps you manage your diabetes.  · A dietitian can help you make a meal plan and calculate how many carbohydrates you should have at each meal and snack.  This information is not intended to replace advice given to you by your health care provider. Make sure you discuss any questions you have with your health care provider.  Document Revised: 12/17/2020 Document Reviewed: 12/18/2020  [employer] Patient Education © 2021 [employer]      Hypertension, Adult  Hypertension is another name for high blood pressure. High blood pressure forces your heart to work harder to pump blood. This can cause problems over time.  There are two numbers in a blood pressure  reading. There is a top number (systolic) over a bottom number (diastolic). It is best to have a blood pressure that is below 120/80. Healthy choices can help lower your blood pressure, or you may need medicine to help lower it.  What are the causes?  The cause of this condition is not known. Some conditions may be related to high blood pressure.  What increases the risk?  · Smoking.  · Having type 2 diabetes mellitus, high cholesterol, or both.  · Not getting enough exercise or physical activity.  · Being overweight.  · Having too much fat, sugar, calories, or salt (sodium) in your diet.  · Drinking too much alcohol.  · Having long-term (chronic) kidney disease.  · Having a family history of high blood pressure.  · Age. Risk increases with age.  · Race. You may be at higher risk if you are .  · Gender. Men are at higher risk than women before age 45. After age 65, women are at higher risk than men.  · Having obstructive sleep apnea.  · Stress.  What are the signs or symptoms?  · High blood pressure may not cause symptoms. Very high blood pressure (hypertensive crisis) may cause:  ? Headache.  ? Feelings of worry or nervousness (anxiety).  ? Shortness of breath.  ? Nosebleed.  ? A feeling of being sick to your stomach (nausea).  ? Throwing up (vomiting).  ? Changes in how you see.  ? Very bad chest pain.  ? Seizures.  How is this treated?  · This condition is treated by making healthy lifestyle changes, such as:  ? Eating healthy foods.  ? Exercising more.  ? Drinking less alcohol.  · Your health care provider may prescribe medicine if lifestyle changes are not enough to get your blood pressure under control, and if:  ? Your top number is above 130.  ? Your bottom number is above 80.  · Your personal target blood pressure may vary.  Follow these instructions at home:  Eating and drinking    · If told, follow the DASH eating plan. To follow this plan:  ? Fill one half of your plate at each meal with  fruits and vegetables.  ? Fill one fourth of your plate at each meal with whole grains. Whole grains include whole-wheat pasta, brown rice, and whole-grain bread.  ? Eat or drink low-fat dairy products, such as skim milk or low-fat yogurt.  ? Fill one fourth of your plate at each meal with low-fat (lean) proteins. Low-fat proteins include fish, chicken without skin, eggs, beans, and tofu.  ? Avoid fatty meat, cured and processed meat, or chicken with skin.  ? Avoid pre-made or processed food.  · Eat less than 1,500 mg of salt each day.  · Do not drink alcohol if:  ? Your doctor tells you not to drink.  ? You are pregnant, may be pregnant, or are planning to become pregnant.  · If you drink alcohol:  ? Limit how much you use to:  § 0-1 drink a day for women.  § 0-2 drinks a day for men.  ? Be aware of how much alcohol is in your drink. In the [place] one drink equals one 12 oz bottle of beer (355 mL), one 5 oz glass of wine (148 mL), or one 1½ oz glass of hard liquor (44 mL).  Lifestyle    · Work with your doctor to stay at a healthy weight or to lose weight. Ask your doctor what the best weight is for you.  · Get at least 30 minutes of exercise most days of the week. This may include walking, swimming, or biking.  · Get at least 30 minutes of exercise that strengthens your muscles (resistance exercise) at least 3 days a week. This may include lifting weights or doing Pilates.  · Do not use any products that contain nicotine or tobacco, such as cigarettes, e-cigarettes, and chewing tobacco. If you need help quitting, ask your doctor.  · Check your blood pressure at home as told by your doctor.  · Keep all follow-up visits as told by your doctor. This is important.  Medicines  · Take over-the-counter and prescription medicines only as told by your doctor. Follow directions carefully.  · Do not skip doses of blood pressure medicine. The medicine does not work as well if you skip doses. Skipping doses also puts you at  risk for problems.  · Ask your doctor about side effects or reactions to medicines that you should watch for.  Contact a doctor if you:  · Think you are having a reaction to the medicine you are taking.  · Have headaches that keep coming back (recurring).  · Feel dizzy.  · Have swelling in your ankles.  · Have trouble with your vision.  Get help right away if you:  · Get a very bad headache.  · Start to feel mixed up (confused).  · Feel weak or numb.  · Feel faint.  · Have very bad pain in your:  ? Chest.  ? Belly (abdomen).  · Throw up more than once.  · Have trouble breathing.  Summary  · Hypertension is another name for high blood pressure.  · High blood pressure forces your heart to work harder to pump blood.  · For most people, a normal blood pressure is less than 120/80.  · Making healthy choices can help lower blood pressure. If your blood pressure does not get lower with healthy choices, you may need to take medicine.  This information is not intended to replace advice given to you by your health care provider. Make sure you discuss any questions you have with your health care provider.  Document Revised: 08/28/2019 Document Reviewed: 08/28/2019  [employer] Patient Education © 2021 [employer]

## 2021-09-07 NOTE — PROGRESS NOTES
"Alison Lee is a 76 y.o. female.       Chief Complaint -hypertension    History of Present Illness -    ROS    Hypertension-  Not at goal.  Patient was seen by cardiologist and blood pressure noted to be over 200 systolic.  She was sent to emergency room at [facility] where she was treated and released.  Patient is currently taking amlodipine 5 mg daily, Bumex 0.5 mg daily, clonidine 0.1 mg 3 times daily, and Metformin 25 mg twice daily.  She denies any headache or strokelike symptoms today.    Diabetes mellitus-stable with use of Toujeo, Humalog and diet    Hypothyroidism-stable with Synthroid 25 mcg daily    The following portions of the patient's history were reviewed and updated as appropriate: allergies, current medications, past family history, past medical history, past social history, past surgical history and problem list.    Review of Systems    Objective  Vital signs:  /64   Pulse 71   Temp 97.1 °F (36.2 °C) (Temporal)   Ht 160 cm (63\")   Wt 91.2 kg (201 lb)   SpO2 100%   BMI 35.61 kg/m²     Physical Exam  Vitals and nursing note reviewed.   Constitutional:       Appearance: Normal appearance. She is well-developed. She is obese.   HENT:      Head: Normocephalic and atraumatic.      Right Ear: Tympanic membrane normal.      Left Ear: Tympanic membrane normal.      Nose: Nose normal.      Mouth/Throat:      Mouth: Mucous membranes are moist.      Pharynx: No oropharyngeal exudate or posterior oropharyngeal erythema.   Eyes:      Extraocular Movements: Extraocular movements intact.      Conjunctiva/sclera: Conjunctivae normal.   Neck:      Thyroid: No thyromegaly.      Trachea: No tracheal deviation.   Cardiovascular:      Rate and Rhythm: Normal rate and regular rhythm.      Heart sounds: Normal heart sounds. No murmur heard.     Pulmonary:      Effort: Pulmonary effort is normal. No respiratory distress.      Breath sounds: Normal breath sounds. No wheezing. "   Abdominal:      General: Bowel sounds are normal.      Palpations: Abdomen is soft.      Tenderness: There is no abdominal tenderness. There is no guarding.   Musculoskeletal:         General: No tenderness. Normal range of motion.      Cervical back: Normal range of motion and neck supple.   Lymphadenopathy:      Cervical: No cervical adenopathy.   Skin:     General: Skin is warm and dry.      Findings: No rash.   Neurological:      General: No focal deficit present.      Mental Status: She is alert and oriented to person, place, and time.   Psychiatric:         Mood and Affect: Mood normal.         Behavior: Behavior normal.         Thought Content: Thought content normal.         The following data was reviewed by: RAFIQ Montanez on 09/07/2021:  CMP    CMP 5/26/21 8/11/21 8/11/21 9/2/21     [id number]    Glucose 107 (A) 297 (A) 292 (A) 247 (A)   BUN 50 (A) 43 (A) 43 (A) 27 (A)   Creatinine 2.15 (A) 2.07 (A) 2.09 (A) 1.69 (A)   eGFR Non African Am 22 (A) 23 (A) 23 (A) 29 (A)   Sodium 140 137 138 142   Potassium 4.7 4.6 4.7 4.2   Chloride 106 103 105 106   Calcium 9.6 9.2 9.1 9.3   Albumin  3.75  3.61   Total Bilirubin  0.2  0.3   Alkaline Phosphatase  411 (A)  435 (A)   AST (SGOT)  35 (A)  41 (A)   ALT (SGPT)  22  21   (A) Abnormal value            CBC w/diff    CBC w/Diff 6/28/21 8/11/21 9/2/21   WBC 9.87 7.85 12.86 (A)   RBC 3.29 (A) 3.33 (A) 3.39 (A)   Hemoglobin 7.9 (A) 8.1 (A) 7.9 (A)   Hematocrit 26.7 (A) 28.3 (A) 27.4 (A)   MCV 81.2 85.0 80.8   MCH 24.0 (A) 24.3 (A) 23.3 (A)   MCHC 29.6 (A) 28.6 (A) 28.8 (A)   RDW 15.6 (A) 18.7 (A) 18.3 (A)   Platelets 296 315 316   Neutrophil Rel % 66.2 67.9 75.6   Immature Granulocyte Rel % 0.3 0.4 0.3   Lymphocyte Rel % 21.2 19.2 (A) 16.0 (A)   Monocyte Rel % 5.7 5.5 4.4 (A)   Eosinophil Rel % 6.1 6.2 3.3   Basophil Rel % 0.5 0.8 0.4   (A) Abnormal value                TSH    TSH 3/26/21 4/12/21 4/20/21   TSH 6.430 (A) 5.330 (A) 4.240 (A)   (A) Abnormal value             Most Recent A1C    HGBA1C Most Recent 4/12/21   Hemoglobin A1C 6.54 (A)   (A) Abnormal value            Data reviewed: Recent hospitalization notes [facility] ER records reviewed       Assessment/Plan     Diagnoses and all orders for this visit:    1. Essential hypertension (Primary)  Comments:  Increase amlodipine 10 mg daily  Continue Bumex clonidine and metoprolol  Advised daily BP/pulse log and follow-up later this month  Orders:  -     amLODIPine (NORVASC) 10 MG tablet; Take 1 tablet by mouth Daily.  Dispense: 30 tablet; Refill: 5    2. Type 2 diabetes mellitus, uncontrolled, with neuropathy (CMS/HCC)  Comments:  Continue Toujeo and Humalog and advised low carbohydrate diet  Continue to monitor  Orders:  -     Insulin Glargine, 2 Unit Dial, (Toujeo Max SoloStar) 300 UNIT/ML solution pen-injector injection; Inject 40-60 Units under the skin into the appropriate area as directed Daily.  Dispense: 3 pen; Refill: 5    3. Acquired hypothyroidism  Comments:  Continue Synthroid 25 mcg daily            Patient was given instructions and counseling regarding his condition or for health maintenance advice. Please see specific information pulled into the AVS if appropriate      This document has been electronically signed by:  Lexie Dolan PA-C

## 2021-09-10 ENCOUNTER — OFFICE VISIT (OUTPATIENT)
Dept: FAMILY MEDICINE CLINIC | Facility: CLINIC | Age: 76
End: 2021-09-10

## 2021-09-10 DIAGNOSIS — I10 ESSENTIAL HYPERTENSION: Primary | ICD-10-CM

## 2021-09-10 DIAGNOSIS — Z79.4 TYPE 2 DIABETES MELLITUS WITH STAGE 4 CHRONIC KIDNEY DISEASE, WITH LONG-TERM CURRENT USE OF INSULIN (HCC): Primary | Chronic | ICD-10-CM

## 2021-09-10 DIAGNOSIS — Z79.4 TYPE 2 DIABETES MELLITUS WITH STAGE 4 CHRONIC KIDNEY DISEASE, WITH LONG-TERM CURRENT USE OF INSULIN (HCC): ICD-10-CM

## 2021-09-10 DIAGNOSIS — N18.4 TYPE 2 DIABETES MELLITUS WITH STAGE 4 CHRONIC KIDNEY DISEASE, WITH LONG-TERM CURRENT USE OF INSULIN (HCC): Primary | Chronic | ICD-10-CM

## 2021-09-10 DIAGNOSIS — I10 ESSENTIAL HYPERTENSION: Chronic | ICD-10-CM

## 2021-09-10 DIAGNOSIS — E78.5 DYSLIPIDEMIA: Chronic | ICD-10-CM

## 2021-09-10 DIAGNOSIS — K90.9 MALABSORPTION OF IRON: ICD-10-CM

## 2021-09-10 DIAGNOSIS — D50.9 IRON DEFICIENCY ANEMIA, UNSPECIFIED IRON DEFICIENCY ANEMIA TYPE: ICD-10-CM

## 2021-09-10 DIAGNOSIS — E11.22 TYPE 2 DIABETES MELLITUS WITH STAGE 4 CHRONIC KIDNEY DISEASE, WITH LONG-TERM CURRENT USE OF INSULIN (HCC): ICD-10-CM

## 2021-09-10 DIAGNOSIS — E11.22 TYPE 2 DIABETES MELLITUS WITH STAGE 4 CHRONIC KIDNEY DISEASE, WITH LONG-TERM CURRENT USE OF INSULIN (HCC): Primary | Chronic | ICD-10-CM

## 2021-09-10 DIAGNOSIS — N18.4 TYPE 2 DIABETES MELLITUS WITH STAGE 4 CHRONIC KIDNEY DISEASE, WITH LONG-TERM CURRENT USE OF INSULIN (HCC): ICD-10-CM

## 2021-09-10 DIAGNOSIS — I50.33 ACUTE ON CHRONIC DIASTOLIC CONGESTIVE HEART FAILURE (HCC): Chronic | ICD-10-CM

## 2021-09-10 DIAGNOSIS — E03.9 ACQUIRED HYPOTHYROIDISM: ICD-10-CM

## 2021-09-10 PROCEDURE — 99214 OFFICE O/P EST MOD 30 MIN: CPT | Performed by: NURSE PRACTITIONER

## 2021-09-10 NOTE — PATIENT INSTRUCTIONS
Heart Failure, Self Care  Heart failure is a serious condition. This sheet explains things you need to do to take care of yourself at home. To help you stay as healthy as possible, you may be asked to change your diet, take certain medicines, and make other changes in your life. Your doctor may also give you more specific instructions. If you have problems or questions, call your doctor.  What are the risks?  Having heart failure makes it more likely for you to have some problems. These problems can get worse if you do not take good care of yourself. Problems may include:  · Blood clotting problems. This may cause a stroke.  · Damage to the kidneys, liver, or lungs.  · Abnormal heart rhythms.  Supplies needed:  · Scale for weighing yourself.  · Blood pressure monitor.  · Notebook.  · Medicines.  How to care for yourself when you have heart failure  Medicines  Take over-the-counter and prescription medicines only as told by your doctor. Take your medicines every day.  · Do not stop taking your medicine unless your doctor tells you to do so.  · Do not skip any medicines.  · Get your prescriptions refilled before you run out of medicine. This is important.  Eating and drinking    · Eat heart-healthy foods. Talk with a diet specialist (dietitian) to create an eating plan.  · Choose foods that:  ? Have no trans fat.  ? Are low in saturated fat and cholesterol.  · Choose healthy foods, such as:  ? Fresh or frozen fruits and vegetables.  ? Fish.  ? Low-fat (lean) meats.  ? Legumes, such as beans, peas, and lentils.  ? Fat-free or low-fat dairy products.  ? Whole-grain foods.  ? High-fiber foods.  · Limit salt (sodium) if told by your doctor. Ask your diet specialist to tell you which seasonings are healthy for your heart.  · Cook in healthy ways instead of frying. Healthy ways of cooking include roasting, grilling, broiling, baking, poaching, steaming, and stir-frying.  · Limit how much fluid you drink, if told by your  doctor.    Alcohol use  · Do not drink alcohol if:  ? Your doctor tells you not to drink.  ? Your heart was damaged by alcohol, or you have very bad heart failure.  ? You are pregnant, may be pregnant, or are planning to become pregnant.  · If you drink alcohol:  ? Limit how much you use to:  § 0-1 drink a day for women.  § 0-2 drinks a day for men.  ? Be aware of how much alcohol is in your drink. In the [place] one drink equals one 12 oz bottle of beer (355 mL), one 5 oz glass of wine (148 mL), or one 1½ oz glass of hard liquor (44 mL).  Lifestyle    · Do not use any products that contain nicotine or tobacco, such as cigarettes, e-cigarettes, and chewing tobacco. If you need help quitting, ask your doctor.  ? Do not use nicotine gum or patches before talking to your doctor.  · Do not use illegal drugs.  · Lose weight if told by your doctor.  · Do physical activity if told by your doctor. Talk to your doctor before you begin an exercise if:  ? You are an older adult.  ? You have very bad heart failure.  · Learn to manage stress. If you need help, ask your doctor.  · Get rehab (rehabilitation) to help you stay independent and to help with your quality of life.  · Plan time to rest when you get tired.    Check weight and blood pressure    · Weigh yourself every day. This will help you to know if fluid is building up in your body.  ? Weigh yourself every morning after you pee (urinate) and before you eat breakfast.  ? Wear the same amount of clothing each time.  ? Write down your daily weight. Give your record to your doctor.  · Check and write down your blood pressure as told by your doctor.  · Check your pulse as told by your doctor.    Dealing with very hot and very cold weather  · If it is very hot:  ? Avoid activities that take a lot of energy.  ? Use air conditioning or fans, or find a cooler place.  ? Avoid caffeine and alcohol.  ? Wear clothing that is loose-fitting, lightweight, and light-colored.  · If it is  very cold:  ? Avoid activities that take a lot of energy.  ? Layer your clothes.  ? Wear mittens or gloves, a hat, and a scarf when you go outside.  ? Avoid alcohol.  Follow these instructions at home:  · Stay up to date with shots (vaccines). Get pneumococcal and flu (influenza) shots.  · Keep all follow-up visits as told by your doctor. This is important.  Contact a doctor if:  · You gain weight quickly.  · You have increasing shortness of breath.  · You cannot do your normal activities.  · You get tired easily.  · You cough a lot.  · You don't feel like eating or feel like you may vomit (nauseous).  · You become puffy (swell) in your hands, feet, ankles, or belly (abdomen).  · You cannot sleep well because it is hard to breathe.  · You feel like your heart is beating fast (palpitations).  · You get dizzy when you stand up.  Get help right away if:  · You have trouble breathing.  · You or someone else notices a change in your behavior, such as having trouble staying awake.  · You have chest pain or discomfort.  · You pass out (faint).  These symptoms may be an emergency. Do not wait to see if the symptoms will go away. Get medical help right away. Call your local emergency services (911 in the [place] Do not drive yourself to the hospital.  Summary  · Heart failure is a serious condition. To care for yourself, you may have to change your diet, take medicines, and make other lifestyle changes.  · Take your medicines every day. Do not stop taking them unless your doctor tells you to do so.  · Eat heart-healthy foods, such as fresh or frozen fruits and vegetables, fish, lean meats, legumes, fat-free or low-fat dairy products, and whole-grain or high-fiber foods.  · Ask your doctor if you can drink alcohol. You may have to stop alcohol use if you have very bad heart failure.  · Contact your doctor if you gain weight quickly or feel that your heart is beating too fast. Get help right away if you pass out, or have chest  pain or trouble breathing.  This information is not intended to replace advice given to you by your health care provider. Make sure you discuss any questions you have with your health care provider.  Document Revised: 03/31/2020 Document Reviewed: 04/01/2020  [employer]vier Patient Education © 2021 [employer]

## 2021-09-10 NOTE — PROGRESS NOTES
History of Present Illness   Britta Lee is a 75 yo female who presents to the clinic today pertaining to her  DM, type 2 which is complicated by peripheral neuropathy, diabetic nephropathy and retinopathy. In addition, Britta has HTN, Dyslipidemia,Venous Insufficiency of Lower Extremities and CHF. She is being followed by the [facility].    Diabetes   She has type 2 diabetes mellitus. The initial diagnosis of diabetes was made 20 years ago.  Diabetic complications include heart disease, nephropathy, peripheral neuropathy, PVD and retinopathy. Current diabetic treatment includes insulin injections (Toujeo and Novolog). Compliance with diabetes treatment: Dependent on available of insulin. She has been using a DexCom to monitor her blood sugars.  When asked about meal planning, she is really trying to follow her recommended nutrition plan which does include a combination Healthy renal and cardiovascular plan.  She has had a previous visit with a dietitian. She rarely participates in exercise. She is typically taking 40- 60 units of Toujeo.   Lab Results   Component Value Date    HGBA1C 7.30 (H) 11/03/2020     Lab Results   Component Value Date    HGBA1C 8.40 (H) 09/04/2020     Lab Results   Component Value Date    HGBA1C 6.54 (H) 04/12/2021       Hypertension  Compliance with treatment has been good. Controlled with Norvasc, clonidine, metoprolol (Hold If pulse less than 60), and Hydralazine She does report lower extremity swelling throughout the day which is improving with Bumex. She does have compression stockings but find them very difficult to apply.     Lab Results   Component Value Date    GLUCOSE 247 (H) 09/02/2021    BUN 27 (H) 09/02/2021    CREATININE 1.69 (H) 09/02/2021    EGFRIFNONA 29 (L) 09/02/2021    BCR 16.0 09/02/2021    K 4.2 09/02/2021    CO2 22.8 09/02/2021    CALCIUM 9.3 09/02/2021    ALBUMIN 3.61 09/02/2021    AST 41 (H) 09/02/2021    ALT 21 09/02/2021       Dyslipidemia  Compliance with  "treatment has been good. The patient exercises seldom due to her Osteoarthritis.  She is currently being prescribed the following medication for her dyslipidemia - atorvastatin. Patient denies side effects associated with her medications. Most recent lipids include    Lab Results   Component Value Date    CHOL 125 09/04/2020    TRIG 128 09/04/2020    HDL 36 (L) 09/04/2020    LDL 63 09/04/2020     The following portions of the patient's history were reviewed and updated as appropriate: allergies, current medications, past family history, past medical history, past social history, past surgical history and problem list.    Review of Systems   Constitutional: Positive for fatigue. Negative for activity change, appetite change, chills, fever and unexpected weight change.   HENT: Negative.    Eyes: Negative for visual disturbance.   Respiratory: Negative for cough, chest tightness, shortness of breath and wheezing.    Cardiovascular: Positive for leg swelling. Negative for chest pain and palpitations.   Gastrointestinal: Negative for abdominal pain, constipation, diarrhea, nausea and vomiting.   Endocrine: Negative for cold intolerance, heat intolerance, polydipsia, polyphagia and polyuria.   Skin: Negative for color change and rash.   Neurological: Negative for dizziness, tremors, speech difficulty, weakness, light-headedness and headaches.   Hematological: Negative for adenopathy. Bruises/bleeds easily.   Psychiatric/Behavioral: Negative for confusion, decreased concentration and suicidal ideas. The patient is not nervous/anxious.    All other systems reviewed and are negative.    Vital signs:  /60 (BP Location: Left arm, Patient Position: Sitting, Cuff Size: Adult)   Pulse 72   Temp 96.9 °F (36.1 °C) (Temporal)   Resp 14   Ht 160 cm (63\")   Wt 91.6 kg (202 lb)   SpO2 99%   BMI 35.78 kg/m²     Physical Exam  Vitals and nursing note reviewed.   Constitutional:       General: She is not in acute distress.    "  Appearance: She is well-developed.   HENT:      Head: Normocephalic.      Nose: Nose normal.   Eyes:      General: No scleral icterus.        Right eye: No discharge.         Left eye: No discharge.      Conjunctiva/sclera: Conjunctivae normal.   Neck:      Thyroid: No thyromegaly.      Vascular: No JVD.   Cardiovascular:      Rate and Rhythm: Normal rate and regular rhythm.      Heart sounds: Normal heart sounds. No murmur heard.   No friction rub.   Pulmonary:      Effort: Pulmonary effort is normal. No respiratory distress.      Breath sounds: Normal breath sounds. No wheezing or rales.   Abdominal:      General: There is no distension.      Palpations: Abdomen is soft.      Tenderness: There is no abdominal tenderness. There is no guarding or rebound.   Musculoskeletal:      Cervical back: Neck supple.      Right lower leg: Edema present.      Left lower leg: Edema present.   Lymphadenopathy:      Cervical: No cervical adenopathy.   Skin:     General: Skin is warm and dry.      Capillary Refill: Capillary refill takes less than 2 seconds.      Findings: No erythema or rash.   Neurological:      Mental Status: She is alert and oriented to person, place, and time.      Cranial Nerves: Cranial nerves are intact.   Psychiatric:         Mood and Affect: Mood and affect normal.         Speech: Speech normal.         Behavior: Behavior is cooperative.         Thought Content: Thought content normal.         Cognition and Memory: Cognition and memory normal.         Judgment: Judgment normal.       Patient's Body mass index is 35.78 kg/m². indicating that she is obese (BMI >30). Obesity-related health conditions include the following: hypertension, diabetes mellitus, dyslipidemias and osteoarthritis. Obesity is improving with lifestyle modifications. BMI is is above average; BMI management plan is completed. Provided information on  portion control and increasing exercise..     Assessment/Plan     Diagnoses and all  orders for this visit:    1. Type 2 diabetes mellitus with stage 4 chronic kidney disease, with long-term current use of insulin (CMS/[facility]) (Primary)  Comments:  Diabetes self care information provided    2. Essential hypertension  Comments:  Controlled at this time. Continue  Norvasc, clonidine, metoprolol (Hold If pulse less than 60), and Hydralazine     3. Dyslipidemia  Comments:  Continue Atorvastatin and cardiovascular risk reduction modifications    4. Acute on chronic diastolic congestive heart failure (CMS/HCC)  Comments:  F/U with HF Clinic     Findings and recommendations discussed with Britta. Lifestyle modifications reinforced including nutrition and activity recommendations. She will f/u with her PCP on 9/30/2021. She will f/u with me according to the results of her labs later on this month.  Britta was given instructions and counseling regarding her condition or for health maintenance advice. Please see specific information pulled into the AVS if appropriate    This document has been electronically signed by:

## 2021-09-12 VITALS
DIASTOLIC BLOOD PRESSURE: 60 MMHG | RESPIRATION RATE: 14 BRPM | SYSTOLIC BLOOD PRESSURE: 110 MMHG | BODY MASS INDEX: 35.79 KG/M2 | HEART RATE: 72 BPM | TEMPERATURE: 96.9 F | HEIGHT: 63 IN | OXYGEN SATURATION: 99 % | WEIGHT: 202 LBS

## 2021-09-13 ENCOUNTER — HOSPITAL ENCOUNTER (OUTPATIENT)
Dept: CARDIOLOGY | Facility: HOSPITAL | Age: 76
Discharge: HOME | End: 2021-09-13
Admitting: NURSE PRACTITIONER

## 2021-09-13 VITALS
SYSTOLIC BLOOD PRESSURE: 142 MMHG | HEART RATE: 86 BPM | DIASTOLIC BLOOD PRESSURE: 70 MMHG | BODY MASS INDEX: 34.99 KG/M2 | WEIGHT: 197.5 LBS | OXYGEN SATURATION: 96 % | RESPIRATION RATE: 22 BRPM

## 2021-09-13 DIAGNOSIS — E66.09 CLASS 2 OBESITY DUE TO EXCESS CALORIES WITH BODY MASS INDEX (BMI) OF 39.0 TO 39.9 IN ADULT, UNSPECIFIED WHETHER SERIOUS COMORBIDITY PRESENT: ICD-10-CM

## 2021-09-13 DIAGNOSIS — I10 ESSENTIAL HYPERTENSION: ICD-10-CM

## 2021-09-13 DIAGNOSIS — I50.32 CHRONIC DIASTOLIC CONGESTIVE HEART FAILURE (HCC): Primary | ICD-10-CM

## 2021-09-13 DIAGNOSIS — R60.0 PEDAL EDEMA: ICD-10-CM

## 2021-09-13 DIAGNOSIS — N18.4 CKD (CHRONIC KIDNEY DISEASE) STAGE 4, GFR 15-29 ML/MIN (HCC): ICD-10-CM

## 2021-09-13 LAB
ABSOLUTE LUNG FLUID CONTENT: 21 % (ref 20–35)
ANION GAP SERPL CALCULATED.3IONS-SCNC: 8.4 MMOL/L (ref 5–15)
BUN SERPL-MCNC: 42 MG/DL (ref 8–23)
BUN/CREAT SERPL: 21.3 (ref 7–25)
CALCIUM SPEC-SCNC: 9.3 MG/DL (ref 8.6–10.5)
CHLORIDE SERPL-SCNC: 103 MMOL/L (ref 98–107)
CO2 SERPL-SCNC: 23.6 MMOL/L (ref 22–29)
CREAT SERPL-MCNC: 1.97 MG/DL (ref 0.57–1)
GFR SERPL CREATININE-BSD FRML MDRD: 25 ML/MIN/1.73
GLUCOSE SERPL-MCNC: 245 MG/DL (ref 65–99)
MAGNESIUM SERPL-MCNC: 2.1 MG/DL (ref 1.6–2.4)
NT-PROBNP SERPL-MCNC: 1549 PG/ML (ref 0–1800)
POTASSIUM SERPL-SCNC: 4.5 MMOL/L (ref 3.5–5.2)
SODIUM SERPL-SCNC: 135 MMOL/L (ref 136–145)

## 2021-09-13 PROCEDURE — 83735 ASSAY OF MAGNESIUM: CPT | Performed by: NURSE PRACTITIONER

## 2021-09-13 PROCEDURE — 99214 OFFICE O/P EST MOD 30 MIN: CPT | Performed by: NURSE PRACTITIONER

## 2021-09-13 PROCEDURE — 94726 PLETHYSMOGRAPHY LUNG VOLUMES: CPT | Performed by: NURSE PRACTITIONER

## 2021-09-13 PROCEDURE — 83880 ASSAY OF NATRIURETIC PEPTIDE: CPT

## 2021-09-13 PROCEDURE — 80048 BASIC METABOLIC PNL TOTAL CA: CPT | Performed by: NURSE PRACTITIONER

## 2021-09-13 PROCEDURE — 36415 COLL VENOUS BLD VENIPUNCTURE: CPT | Performed by: NURSE PRACTITIONER

## 2021-09-13 RX ORDER — HYDRALAZINE HYDROCHLORIDE 25 MG/1
75 TABLET, FILM COATED ORAL 3 TIMES DAILY
Qty: 270 TABLET | Refills: 1 | Status: SHIPPED | OUTPATIENT
Start: 2021-09-13 | End: 2021-12-13 | Stop reason: SDUPTHER

## 2021-09-13 NOTE — PROGRESS NOTES
Heart Failure Pharmacy Note  Patient Name: Britta Lee   Referring Provider: Priscila Holland  Primary Cardiologist: Dr. Cruz  Type of CHF: HFpEF    Medication Use:   Adherence: No issues  Hx of med intolerances: bradycardia with metoprolol 100mg BID  Affordability: No issues reported   Retail Rx Management: none    Past Medical History:   Diagnosis Date   • Acquired hypothyroidism 4/22/2021   • Anemia    • Arthritis    • Carpal tunnel syndrome    • Coronary artery disease    • Diabetes mellitus (CMS/HCC)    • Elevated cholesterol    • GERD (gastroesophageal reflux disease)    • History of pneumonia    • History of unsteady gait     OCASSIONALLY   • Insomnia    • Kidney disease    • LENNY (obstructive sleep apnea) 12/1/2020   • Osteoarthritis    • Renal insufficiency    • Sciatica      ALLERGIES: Patient has no known allergies.  Current Outpatient Medications   Medication Sig Dispense Refill   • albuterol sulfate  (90 Base) MCG/ACT inhaler Inhale 2 puffs Every 4 (Four) Hours As Needed for Shortness of Air. 18 g 0   • amLODIPine (NORVASC) 10 MG tablet Take 1 tablet by mouth Daily. 30 tablet 5   • aspirin 81 MG tablet Take 1 tablet by mouth Daily. 30 tablet 5   • atorvastatin (LIPITOR) 40 MG tablet Take 1 tablet by mouth Every Night. 90 tablet 1   • BD Pen Needle Evon U/F 32G X 4 MM misc      • bumetanide (BUMEX) 0.5 MG tablet Take 1 tablet by mouth Daily. 90 tablet 0   • butalbital-acetaminophen-caffeine (Esgic) -40 MG per tablet Take 1 tablet by mouth Every 4 (Four) Hours As Needed for Headache. 6 tablet 0   • cloNIDine (CATAPRES) 0.2 MG tablet Take 1 tablet by mouth 3 (Three) Times a Day. (Patient taking differently: Take 0.1 mg by mouth 3 (Three) Times a Day.) 270 tablet 1   • Continuous Blood Gluc  (Dexcom G6 ) device 1 Device Daily. 3 each 3   • Continuous Blood Gluc Sensor (Dexcom G6 Sensor) Every 10 (Ten) Days. 9 each 3   • Continuous Blood Gluc Transmit (Dexcom G6 Transmitter)  misc 1 Device Daily. 1 each 0   • escitalopram (LEXAPRO) 10 MG tablet Take 1 tablet by mouth Daily. 90 tablet 3   • FeroSul 325 (65 Fe) MG tablet Take 1 tablet by mouth 2 (Two) Times a Day.     • glucose blood test strip 1 each by Other route 3 (Three) Times a Day. 100 each 12   • hydrALAZINE (APRESOLINE) 25 MG tablet Take 3 tablets by mouth 3 (Three) Times a Day. 270 tablet 1   • Insulin Glargine, 2 Unit Dial, (Toujeo Max SoloStar) 300 UNIT/ML solution pen-injector injection Inject 40-60 Units under the skin into the appropriate area as directed Daily. 3 pen 5   • insulin lispro (humaLOG) 100 UNIT/ML injection Inject 5 Units under the skin into the appropriate area as directed 3 (Three) Times a Day Before Meals. Sliding scale:4-6 units     • Insulin Pen Needle 32G X 5 MM misc 1 each 4 (Four) Times a Day. 120 each 5   • isosorbide mononitrate (IMDUR) 60 MG 24 hr tablet Take 1 tablet by mouth Daily. 90 tablet 1   • levothyroxine (SYNTHROID, LEVOTHROID) 25 MCG tablet Take 1 tablet by mouth Daily. 30 tablet 5   • metoclopramide (REGLAN) 10 MG tablet Take 1 tablet by mouth 3 (Three) Times a Day With Meals for 60 days. 90 tablet 1   • metoprolol tartrate (LOPRESSOR) 25 MG tablet Take 25 mg by mouth 2 (Two) Times a Day.     • nystatin (MYCOSTATIN) 703358 UNIT/GM powder Apply  one application topically to the appropriate area as directed Every 12 (Twelve) Hours. (Patient taking differently: Apply  topically to the appropriate area as directed 2 (Two) Times a Day As Needed (itching).) 60 g 0   • pantoprazole (Protonix) 40 MG EC tablet Take 1 tablet by mouth Daily. 30 tablet 1   • promethazine (PHENERGAN) 25 MG tablet Take 1 tablet by mouth Every 6 (Six) Hours As Needed for Nausea or Vomiting. 60 tablet 5   • vitamin D (ERGOCALCIFEROL) 1.25 MG (68483 UT) capsule capsule Take 50,000 Units by mouth 1 (One) Time Per Week.       No current facility-administered medications for this encounter.       Vaccination History:    Pneumonia: Reports UTD  Annual Influenza: Reports UTD for 2020-21 Season    Objective  Vitals:    09/13/21 0855   BP: 142/70   BP Location: Left arm   Patient Position: Sitting   Cuff Size: Large Adult   Pulse: 86   Resp: 22   SpO2: 96%   Weight: 89.6 kg (197 lb 8 oz)     Wt Readings from Last 3 Encounters:   09/13/21 89.6 kg (197 lb 8 oz)   09/10/21 91.6 kg (202 lb)   09/07/21 91.2 kg (201 lb)         09/13/21  0855   Weight: 89.6 kg (197 lb 8 oz)     Lab Results   Component Value Date    GLUCOSE 245 (H) 09/13/2021    BUN 42 (H) 09/13/2021    CREATININE 1.97 (H) 09/13/2021    EGFRIFNONA 25 (L) 09/13/2021    EGFRIFAFRI 41 (L) 07/30/2018    BCR 21.3 09/13/2021    K 4.5 09/13/2021    CO2 23.6 09/13/2021    CALCIUM 9.3 09/13/2021    PROTENTOTREF 7.7 07/30/2018    ALBUMIN 3.61 09/02/2021    LABIL2 1.1 (L) 07/30/2018    AST 41 (H) 09/02/2021    ALT 21 09/02/2021     Lab Results   Component Value Date    WBC 12.86 (H) 09/02/2021    HGB 7.9 (L) 09/02/2021    HCT 27.4 (L) 09/02/2021    MCV 80.8 09/02/2021     09/02/2021     Lab Results   Component Value Date    CKTOTAL 67 12/04/2020    CKMB 2.92 04/16/2018    TROPONINI 0.012 04/16/2018    TROPONINT <0.010 09/02/2021     Lab Results   Component Value Date    PROBNP 1,549.0 09/13/2021     Results for orders placed during the hospital encounter of 11/03/20    Adult Transthoracic Echo Complete W/ Cont if Necessary Per Protocol    Interpretation Summary  · Left ventricular wall thickness is consistent with concentric hypertrophy.  · Left ventricular ejection fraction appears to be greater than 70%. Left ventricular systolic function is hyperdynamic (EF > 70%).  · Left ventricular diastolic function is consistent with (grade II w/high LAP) pseudonormalization.  · The left atrial cavity is moderate to severely dilated.  · The right atrial cavity is mildly dilated.  · Moderate mitral annular calcification is present.  · Mild mitral valve regurgitation is present.  · Mild  tricuspid valve regurgitation is present.  · Estimated right ventricular systolic pressure from tricuspid regurgitation is markedly elevated (>55 mmHg).               Class   Drug   Dose Last Dose Adjustment   Notes   ACEi/ARB/ARNI    Worsening renal fxn 1/22/21   Beta Blocker Metoprolol tart 25 BID 7/29/21    MRA --------------   eGFR needs to be >30mL/min     Drug Therapy Problems:  1. HTN: pt reports home SBPs usually 160 and up to 192 (max SBP recently), however, no log available today. Pt recently sent from Dr. Cruz's office to ED due to high BP at his office.   2. Confusion with medications     Recommendations:    1. HTN. Pt reports being out of hydralazine since Thursday. Called [facility] and pt did have one refill left for hydralazine 50 mg TID. Increased BP may be contributed today from being out of hydralazine,however, patient may benefit from tighter BP control. Unfortunately, patient kidney function does not allow for addition of MRA and history of intolerance to ACEi/ARB and metoprolol does not allow for addition or increase of these medications at this time. Could possibly consider increasing hydralazine dose slightly for better BP control with close monitoring by patient at home and follow-up in clinic. Patient would need to be compliant in taking BP and calling with any low BP's for further instruction.   Emily did ask that I call [facility] and cancel Rx for hydralazine 50 mg TID.   2 Recent notes from patients PCP on 9/7/21 had to increased Norvasc 5 mg ( which is dose the clinic had on file as well) to 10 mg daily. Today at clinic, the patient reports she has always been taking 10 mg and that it wasn't an increase. Encouraged patient to bring in medication bottles at next visit.         Patient was educated on heart failure medications and the importance of medication adherence.  All questions were addressed and patient expressed understanding. Patient would benefit from further  education. Encouraged her to bring her medication bottles to her next visit.     Thank you for allowing me to participate in the care of your patient,    Shanta Palomares. Dylan, PharmD  09/13/21  10:12 EDT

## 2021-09-13 NOTE — PROGRESS NOTES
[facility] CHF CLINIC OFFICE VISIT    Subjective:   Congestive Heart Failure  Pertinent negatives include no abdominal pain, chest pain, claudication, near-syncope, palpitations or shortness of breath.      Britta Lee is a 75 y.o.  female who presents to the clinic today for follow up on heart failure. She is accompanied by her daughter today. She has a hx of diastolic congestive heart failure. Her EF was > 70% from echocardiogram on 11/4/2020. She is currently taking Bumex 0.5 mg daily. She has been unable to tolerate Spironolactone or  ACE/ARB due to abnormal kidney function.    Shortness of air stable   LE swelling stable   Anemia - following with hematology. Has seen GI and is awaiting further evaluation but has been delayed due to pandemic   Urine output good   Overall doing well with sodium intake   Home weight - stable   BP - variable but generally high   HR - 70-80 bpm   Due to see nephrology this month   Recently seen cardiology who recommended ER visit for HTN  Recent evaluation by PCP who increased Norvasc for BP control however pt reports she was already taking Norvasc 10 mg daily     Denies chest discomfort, dyspnea, dizziness, lightheadedness, syncope,  palpitations or tachycardia.   She seems to be tolerating her Metoprolol well without any bradycardia   She continues on Hydralazine 50 mg TID, Imdur 60 mg daily, Metorpolol tartrate 25 mg BID (was on XL dosing but changed during hospitalization) Norvasc 10 mg daily and Clonidine 0.2 mg TID for HTN.    Past medical history significant for HTN, DM type 2, CKD III, iron deficiency anemia, hyperlipidemia, obesity.     PCP: Lexie Dolan  Cardiologist: Dr. Cruz  Nephrologist: Dr. Johnson     Hospitalizations: Discharged on 11/8/2020  ER visit: 12/4/2020  Past Medical History:   Diagnosis Date   • Acquired hypothyroidism 4/22/2021   • Anemia    • Arthritis    • Carpal tunnel syndrome    • Coronary artery disease    • Diabetes mellitus (CMS/[facility])    •  Elevated cholesterol    • GERD (gastroesophageal reflux disease)    • History of pneumonia    • History of unsteady gait     OCASSIONALLY   • Insomnia    • Kidney disease    • LENNY (obstructive sleep apnea) 12/1/2020   • Osteoarthritis    • Renal insufficiency    • Sciatica      Past Surgical History:   Procedure Laterality Date   • ABDOMINAL SURGERY     • APPENDECTOMY     • CARDIAC CATHETERIZATION      1 STENT  ---  2000   • CARDIAC SURGERY     • CAROTID STENT     • CARPAL TUNNEL RELEASE Right 10/8/2019    Procedure: CARPAL TUNNEL RELEASE;  Surgeon: Feroz Johnson MD;  Location: [facility];  Service: Orthopedics   • CATARACT EXTRACTION     • CHOLECYSTECTOMY     • COLONOSCOPY     • CORONARY ANGIOPLASTY WITH STENT PLACEMENT     • ENDOSCOPY     • ENDOSCOPY N/A 3/5/2020    Procedure: ESOPHAGOGASTRODUODENOSCOPY;  Surgeon: Alexandru Bagley MD;  Location: [facility];  Service: Gastroenterology;  Laterality: N/A;   • ENDOSCOPY AND COLONOSCOPY     • GALLBLADDER SURGERY     • JOINT REPLACEMENT Left 05/02/2017    [facility]  DR JOHNSON  LEFT TOTAL KNEE   • KNEE ARTHROSCOPY W/ MENISCECTOMY Right    • LAPAROSCOPIC TUBAL LIGATION     • LA TOTAL KNEE ARTHROPLASTY Left 5/2/2017    Procedure: TOTAL KNEE ARTHROPLASTY;  Surgeon: Feroz Johnson MD;  Location: [facility];  Service: Orthopedics   • STERILIZATION     • TONSILLECTOMY       Social History     Socioeconomic History   • Marital status:      Spouse name: natalie   • Number of children: 3   • Years of education: 12   • Highest education level: Not on file   Tobacco Use   • Smoking status: Never Smoker   • Smokeless tobacco: Never Used   Vaping Use   • Vaping Use: Never used   Substance and Sexual Activity   • Alcohol use: Yes     Comment: socially   • Drug use: No   • Sexual activity: Defer     Birth control/protection: Surgical     Family History   Problem Relation Age of Onset   • Arthritis Mother    • Diabetes Mother    • Cancer Mother    • Heart disease Father     • Diabetes Daughter    • Diabetes Son    • Diabetes Maternal Aunt    • Heart disease Maternal Grandmother    • Breast cancer Neg Hx      Allergies:  No Known Allergies    Review of Systems   Constitutional: Negative for chills, fever, weight gain and weight loss.   HENT: Negative for ear discharge, hoarse voice and sore throat.    Eyes: Negative for discharge, double vision, pain, redness and visual disturbance.   Cardiovascular: Negative for chest pain, claudication, cyanosis, dyspnea on exertion, irregular heartbeat, leg swelling, near-syncope, orthopnea, palpitations and syncope.   Respiratory: Negative for cough, shortness of breath and wheezing.    Endocrine: Negative for cold intolerance, heat intolerance and polyuria.   Hematologic/Lymphatic: Negative for bleeding problem. Does not bruise/bleed easily.   Skin: Negative for color change, flushing and rash.   Musculoskeletal: Negative for arthritis, back pain, joint pain, joint swelling and muscle cramps.   Gastrointestinal: Negative for abdominal pain, constipation, diarrhea, nausea and vomiting.   Genitourinary: Negative for dysuria, flank pain, frequency, hesitancy and urgency.   Neurological: Negative for difficulty with concentration, dizziness, light-headedness, sensory change, vertigo and weakness.   Psychiatric/Behavioral: Negative for depression. The patient does not have insomnia and is not nervous/anxious.      Current Outpatient Medications   Medication Sig Dispense Refill   • albuterol sulfate  (90 Base) MCG/ACT inhaler Inhale 2 puffs Every 4 (Four) Hours As Needed for Shortness of Air. 18 g 0   • amLODIPine (NORVASC) 10 MG tablet Take 1 tablet by mouth Daily. 30 tablet 5   • aspirin 81 MG tablet Take 1 tablet by mouth Daily. 30 tablet 5   • atorvastatin (LIPITOR) 40 MG tablet Take 1 tablet by mouth Every Night. 90 tablet 1   • BD Pen Needle Evon U/F 32G X 4 MM misc      • bumetanide (BUMEX) 0.5 MG tablet Take 1 tablet by mouth Daily. 90  tablet 0   • butalbital-acetaminophen-caffeine (Esgic) -40 MG per tablet Take 1 tablet by mouth Every 4 (Four) Hours As Needed for Headache. 6 tablet 0   • cloNIDine (CATAPRES) 0.2 MG tablet Take 1 tablet by mouth 3 (Three) Times a Day. (Patient taking differently: Take 0.1 mg by mouth 3 (Three) Times a Day.) 270 tablet 1   • Continuous Blood Gluc  (Dexcom G6 ) device 1 Device Daily. 3 each 3   • Continuous Blood Gluc Sensor (Dexcom G6 Sensor) Every 10 (Ten) Days. 9 each 3   • Continuous Blood Gluc Transmit (Dexcom G6 Transmitter) misc 1 Device Daily. 1 each 0   • escitalopram (LEXAPRO) 10 MG tablet Take 1 tablet by mouth Daily. 90 tablet 3   • FeroSul 325 (65 Fe) MG tablet Take 1 tablet by mouth 2 (Two) Times a Day.     • glucose blood test strip 1 each by Other route 3 (Three) Times a Day. 100 each 12   • hydrALAZINE (APRESOLINE) 25 MG tablet Take 3 tablets by mouth 3 (Three) Times a Day. 270 tablet 1   • Insulin Glargine, 2 Unit Dial, (Toujeo Max SoloStar) 300 UNIT/ML solution pen-injector injection Inject 40-60 Units under the skin into the appropriate area as directed Daily. 3 pen 5   • insulin lispro (humaLOG) 100 UNIT/ML injection Inject 5 Units under the skin into the appropriate area as directed 3 (Three) Times a Day Before Meals. Sliding scale:4-6 units     • Insulin Pen Needle 32G X 5 MM misc 1 each 4 (Four) Times a Day. 120 each 5   • isosorbide mononitrate (IMDUR) 60 MG 24 hr tablet Take 1 tablet by mouth Daily. 90 tablet 1   • levothyroxine (SYNTHROID, LEVOTHROID) 25 MCG tablet Take 1 tablet by mouth Daily. 30 tablet 5   • metoclopramide (REGLAN) 10 MG tablet Take 1 tablet by mouth 3 (Three) Times a Day With Meals for 60 days. 90 tablet 1   • metoprolol tartrate (LOPRESSOR) 25 MG tablet Take 25 mg by mouth 2 (Two) Times a Day.     • nystatin (MYCOSTATIN) 155403 UNIT/GM powder Apply  one application topically to the appropriate area as directed Every 12 (Twelve) Hours. (Patient  taking differently: Apply  topically to the appropriate area as directed 2 (Two) Times a Day As Needed (itching).) 60 g 0   • pantoprazole (Protonix) 40 MG EC tablet Take 1 tablet by mouth Daily. 30 tablet 1   • promethazine (PHENERGAN) 25 MG tablet Take 1 tablet by mouth Every 6 (Six) Hours As Needed for Nausea or Vomiting. 60 tablet 5   • vitamin D (ERGOCALCIFEROL) 1.25 MG (26881 UT) capsule capsule Take 50,000 Units by mouth 1 (One) Time Per Week.       No current facility-administered medications for this encounter.     Objective:     Vitals:    09/13/21 0855   BP: 142/70   Pulse: 86   Resp: 22   SpO2: 96%     Body mass index is 34.99 kg/m².    Wt Readings from Last 3 Encounters:   09/13/21 89.6 kg (197 lb 8 oz)   09/10/21 91.6 kg (202 lb)   09/07/21 91.2 kg (201 lb)       ReDs - 32% (4/14/2021)  Lab Results   Component Value Date    ABSOLUTELUNG 21 09/13/2021     Vitals reviewed.   Constitutional:       Appearance: Normal appearance. Well-developed.      Comments: Wears a mask during encounter   Eyes:      Conjunctiva/sclera: Conjunctivae normal.   HENT:      Head: Normocephalic.   Neck:      Vascular: No JVD or JVR.   Pulmonary:      Effort: Pulmonary effort is normal.      Breath sounds: Normal breath sounds.   Cardiovascular:      Normal rate. Regular rhythm.   Pulses:     Intact distal pulses.   Edema:     Peripheral edema present.     Ankle: bilateral 1+ edema of the ankle.     Feet: bilateral 1+ edema of the feet.  Abdominal:      General: Bowel sounds are normal.      Palpations: Abdomen is soft. There is no hepatomegaly or splenomegaly.   Musculoskeletal: Normal range of motion.      Cervical back: Normal range of motion and neck supple. Skin:     General: Skin is warm and dry.   Neurological:      Mental Status: Alert and oriented to person, place, and time.   Psychiatric:         Attention and Perception: Attention normal.         Mood and Affect: Mood normal.         Speech: Speech normal.          Behavior: Behavior normal. Behavior is cooperative.         Cognition and Memory: Cognition normal.     Cardiographics  Results for orders placed during the hospital encounter of 11/03/20    Adult Transthoracic Echo Complete W/ Cont if Necessary Per Protocol    Interpretation Summary  · Left ventricular wall thickness is consistent with concentric hypertrophy.  · Left ventricular ejection fraction appears to be greater than 70%. Left ventricular systolic function is hyperdynamic (EF > 70%).  · Left ventricular diastolic function is consistent with (grade II w/high LAP) pseudonormalization.  · The left atrial cavity is moderate to severely dilated.  · The right atrial cavity is mildly dilated.  · Moderate mitral annular calcification is present.  · Mild mitral valve regurgitation is present.  · Mild tricuspid valve regurgitation is present.  · Estimated right ventricular systolic pressure from tricuspid regurgitation is markedly elevated (>55 mmHg).    EKG:       Lab Review   Lab Results   Component Value Date    TSH 4.240 (H) 04/20/2021     Lab Results   Component Value Date    GLUCOSE 245 (H) 09/13/2021    BUN 42 (H) 09/13/2021    CREATININE 1.97 (H) 09/13/2021    EGFRIFNONA 25 (L) 09/13/2021    EGFRIFAFRI 41 (L) 07/30/2018    BCR 21.3 09/13/2021    K 4.5 09/13/2021    CO2 23.6 09/13/2021    CALCIUM 9.3 09/13/2021    PROTENTOTREF 7.7 07/30/2018    ALBUMIN 3.61 09/02/2021    LABIL2 1.1 (L) 07/30/2018    AST 41 (H) 09/02/2021    ALT 21 09/02/2021     Lab Results   Component Value Date    WBC 12.86 (H) 09/02/2021    HGB 7.9 (L) 09/02/2021    HCT 27.4 (L) 09/02/2021    MCV 80.8 09/02/2021     09/02/2021     Lab Results   Component Value Date    CKTOTAL 67 12/04/2020    CKMB 2.92 04/16/2018    TROPONINI 0.012 04/16/2018    TROPONINT <0.010 09/02/2021     Lab Results   Component Value Date    PROBNP 1,549.0 09/13/2021     The following portions of the patient's history were reviewed and updated as appropriate:  allergies, current medications, past family history, past medical history, past social history, past surgical history and problem list.     Old records reviewed and pertinent information is included in the above objective data.     Assessment/Plan:   1. Chronic Diastolic Congestive Heart Failure, EF > 70%   2. HTN  3. Pedal edema   4. CKD, stage IV  5. Obesity, Class II    Pro-BNP, BMP and magnesium today  Discussed and reviewed labs with patient today  ReDs reviewed and discussed with patient today   From a HF standpoint she is euvolemic and appears stable - continue on Bumex 0.5 mg daily   Increase hydralazine from 50 mg TID to 75 mg TID for HTN, new Rx sent to pharmacy. Continue on current medications   Monitor BP and HR  Weight loss discussed and healthy lifestyle recommended   Counseling patient extensively on dietary Na+ intake, importance of activity, weight monitoring, compliance with medications and follow up appointments.  Follow up in 1 month, sooner if needed.

## 2021-09-13 NOTE — PROGRESS NOTES
Heart Failure Clinic    Date: 09/13/21     Vitals:    09/13/21 0855   BP: 142/70   Pulse: 86   Resp: 22   SpO2: 96%        Method of arrival: Ambulatory    Weighing self daily: Yes    Monitoring Heart Failure Zones: Yes    Today's HF Zone: Yellow /Green    Taking medications as prescribed: Yes    Edema Yes mild    Shortness of Air: No States has some occasionally when exerting self.     Number of pillows used at night:<2    Educational Materials given:                                                                           ReDS Value: 21%  21-24 Low Normal      Jackie Jasmine MA 09/13/21 09:04 EDT

## 2021-09-23 ENCOUNTER — LAB (OUTPATIENT)
Dept: FAMILY MEDICINE CLINIC | Facility: CLINIC | Age: 76
End: 2021-09-23

## 2021-09-23 ENCOUNTER — LAB (OUTPATIENT)
Dept: ONCOLOGY | Facility: CLINIC | Age: 76
End: 2021-09-23

## 2021-09-23 VITALS
DIASTOLIC BLOOD PRESSURE: 61 MMHG | TEMPERATURE: 96.9 F | SYSTOLIC BLOOD PRESSURE: 108 MMHG | HEART RATE: 88 BPM | RESPIRATION RATE: 18 BRPM | OXYGEN SATURATION: 99 %

## 2021-09-23 DIAGNOSIS — K90.9 MALABSORPTION OF IRON: ICD-10-CM

## 2021-09-23 DIAGNOSIS — Z79.4 TYPE 2 DIABETES MELLITUS WITH STAGE 4 CHRONIC KIDNEY DISEASE, WITH LONG-TERM CURRENT USE OF INSULIN (HCC): ICD-10-CM

## 2021-09-23 DIAGNOSIS — D50.9 IRON DEFICIENCY ANEMIA, UNSPECIFIED IRON DEFICIENCY ANEMIA TYPE: ICD-10-CM

## 2021-09-23 DIAGNOSIS — E03.9 ACQUIRED HYPOTHYROIDISM: ICD-10-CM

## 2021-09-23 DIAGNOSIS — I10 ESSENTIAL HYPERTENSION: ICD-10-CM

## 2021-09-23 DIAGNOSIS — D50.9 IRON DEFICIENCY ANEMIA, UNSPECIFIED IRON DEFICIENCY ANEMIA TYPE: Primary | ICD-10-CM

## 2021-09-23 DIAGNOSIS — N18.4 TYPE 2 DIABETES MELLITUS WITH STAGE 4 CHRONIC KIDNEY DISEASE, WITH LONG-TERM CURRENT USE OF INSULIN (HCC): ICD-10-CM

## 2021-09-23 DIAGNOSIS — E11.22 TYPE 2 DIABETES MELLITUS WITH STAGE 4 CHRONIC KIDNEY DISEASE, WITH LONG-TERM CURRENT USE OF INSULIN (HCC): ICD-10-CM

## 2021-09-23 LAB
ALBUMIN SERPL-MCNC: 3.44 G/DL (ref 3.5–5.2)
ALBUMIN/GLOB SERPL: 0.9 G/DL
ALP SERPL-CCNC: 385 U/L (ref 39–117)
ALT SERPL W P-5'-P-CCNC: 35 U/L (ref 1–33)
ANION GAP SERPL CALCULATED.3IONS-SCNC: 10.3 MMOL/L (ref 5–15)
AST SERPL-CCNC: 69 U/L (ref 1–32)
BASOPHILS # BLD AUTO: 0.06 10*3/MM3 (ref 0–0.2)
BASOPHILS NFR BLD AUTO: 0.6 % (ref 0–1.5)
BILIRUB SERPL-MCNC: <0.2 MG/DL (ref 0–1.2)
BUN SERPL-MCNC: 44 MG/DL (ref 8–23)
BUN/CREAT SERPL: 19.9 (ref 7–25)
CALCIUM SPEC-SCNC: 8.9 MG/DL (ref 8.6–10.5)
CHLORIDE SERPL-SCNC: 101 MMOL/L (ref 98–107)
CO2 SERPL-SCNC: 23.7 MMOL/L (ref 22–29)
CREAT SERPL-MCNC: 2.21 MG/DL (ref 0.57–1)
DEPRECATED RDW RBC AUTO: 47.3 FL (ref 37–54)
EOSINOPHIL # BLD AUTO: 0.86 10*3/MM3 (ref 0–0.4)
EOSINOPHIL NFR BLD AUTO: 8.2 % (ref 0.3–6.2)
ERYTHROCYTE [DISTWIDTH] IN BLOOD BY AUTOMATED COUNT: 16.4 % (ref 12.3–15.4)
FERRITIN SERPL-MCNC: 74.28 NG/ML (ref 13–150)
GFR SERPL CREATININE-BSD FRML MDRD: 21 ML/MIN/1.73
GLOBULIN UR ELPH-MCNC: 3.9 GM/DL
GLUCOSE SERPL-MCNC: 137 MG/DL (ref 65–99)
HCT VFR BLD AUTO: 26.3 % (ref 34–46.6)
HGB BLD-MCNC: 7.7 G/DL (ref 12–15.9)
IMM GRANULOCYTES # BLD AUTO: 0.02 10*3/MM3 (ref 0–0.05)
IMM GRANULOCYTES NFR BLD AUTO: 0.2 % (ref 0–0.5)
IRON 24H UR-MRATE: 25 MCG/DL (ref 37–145)
IRON SATN MFR SERPL: 7 % (ref 20–50)
LYMPHOCYTES # BLD AUTO: 2.21 10*3/MM3 (ref 0.7–3.1)
LYMPHOCYTES NFR BLD AUTO: 21 % (ref 19.6–45.3)
MCH RBC QN AUTO: 22.9 PG (ref 26.6–33)
MCHC RBC AUTO-ENTMCNC: 29.3 G/DL (ref 31.5–35.7)
MCV RBC AUTO: 78.3 FL (ref 79–97)
MONOCYTES # BLD AUTO: 0.58 10*3/MM3 (ref 0.1–0.9)
MONOCYTES NFR BLD AUTO: 5.5 % (ref 5–12)
NEUTROPHILS NFR BLD AUTO: 6.8 10*3/MM3 (ref 1.7–7)
NEUTROPHILS NFR BLD AUTO: 64.5 % (ref 42.7–76)
NRBC BLD AUTO-RTO: 0 /100 WBC (ref 0–0.2)
PLATELET # BLD AUTO: 313 10*3/MM3 (ref 140–450)
PMV BLD AUTO: 9.3 FL (ref 6–12)
POTASSIUM SERPL-SCNC: 4.3 MMOL/L (ref 3.5–5.2)
PROT SERPL-MCNC: 7.3 G/DL (ref 6–8.5)
RBC # BLD AUTO: 3.36 10*6/MM3 (ref 3.77–5.28)
SODIUM SERPL-SCNC: 135 MMOL/L (ref 136–145)
TIBC SERPL-MCNC: 346 MCG/DL (ref 298–536)
TRANSFERRIN SERPL-MCNC: 232 MG/DL (ref 200–360)
WBC # BLD AUTO: 10.53 10*3/MM3 (ref 3.4–10.8)

## 2021-09-23 PROCEDURE — 82043 UR ALBUMIN QUANTITATIVE: CPT | Performed by: PHYSICIAN ASSISTANT

## 2021-09-23 PROCEDURE — 84466 ASSAY OF TRANSFERRIN: CPT | Performed by: NURSE PRACTITIONER

## 2021-09-23 PROCEDURE — 84443 ASSAY THYROID STIM HORMONE: CPT | Performed by: PHYSICIAN ASSISTANT

## 2021-09-23 PROCEDURE — 83036 HEMOGLOBIN GLYCOSYLATED A1C: CPT | Performed by: PHYSICIAN ASSISTANT

## 2021-09-23 PROCEDURE — 80053 COMPREHEN METABOLIC PANEL: CPT | Performed by: NURSE PRACTITIONER

## 2021-09-23 PROCEDURE — 83540 ASSAY OF IRON: CPT | Performed by: NURSE PRACTITIONER

## 2021-09-23 PROCEDURE — 85025 COMPLETE CBC W/AUTO DIFF WBC: CPT | Performed by: NURSE PRACTITIONER

## 2021-09-23 PROCEDURE — 80061 LIPID PANEL: CPT | Performed by: PHYSICIAN ASSISTANT

## 2021-09-23 PROCEDURE — 82728 ASSAY OF FERRITIN: CPT | Performed by: NURSE PRACTITIONER

## 2021-09-23 PROCEDURE — 84439 ASSAY OF FREE THYROXINE: CPT | Performed by: PHYSICIAN ASSISTANT

## 2021-09-23 RX ORDER — SODIUM CHLORIDE 9 MG/ML
250 INJECTION, SOLUTION INTRAVENOUS ONCE
Status: CANCELLED | OUTPATIENT
Start: 2021-10-04

## 2021-09-23 RX ORDER — SODIUM CHLORIDE 9 MG/ML
250 INJECTION, SOLUTION INTRAVENOUS ONCE
Status: CANCELLED | OUTPATIENT
Start: 2021-10-07

## 2021-09-24 LAB
ALBUMIN UR-MCNC: 227 MG/DL
CHOLEST SERPL-MCNC: 117 MG/DL (ref 0–200)
HBA1C MFR BLD: 6.86 % (ref 4.8–5.6)
HDLC SERPL-MCNC: 42 MG/DL (ref 40–60)
LDLC SERPL CALC-MCNC: 58 MG/DL (ref 0–100)
LDLC/HDLC SERPL: 1.36 {RATIO}
T4 FREE SERPL-MCNC: 1.02 NG/DL (ref 0.93–1.7)
TRIGL SERPL-MCNC: 90 MG/DL (ref 0–150)
TSH SERPL DL<=0.05 MIU/L-ACNC: 3.76 UIU/ML (ref 0.27–4.2)
VLDLC SERPL-MCNC: 17 MG/DL (ref 5–40)

## 2021-09-27 ENCOUNTER — IMMUNIZATION (OUTPATIENT)
Dept: VACCINE CLINIC | Facility: HOSPITAL | Age: 76
End: 2021-09-27

## 2021-09-27 DIAGNOSIS — E78.2 MIXED HYPERLIPIDEMIA: ICD-10-CM

## 2021-09-27 PROCEDURE — 0003A: CPT | Performed by: INTERNAL MEDICINE

## 2021-09-27 PROCEDURE — 91300 HC SARSCOV02 VAC 30MCG/0.3ML IM: CPT | Performed by: INTERNAL MEDICINE

## 2021-09-28 RX ORDER — ATORVASTATIN CALCIUM 40 MG/1
TABLET, FILM COATED ORAL
Qty: 90 TABLET | Refills: 1 | Status: SHIPPED | OUTPATIENT
Start: 2021-09-28 | End: 2021-12-20 | Stop reason: SDUPTHER

## 2021-09-30 ENCOUNTER — OFFICE VISIT (OUTPATIENT)
Dept: FAMILY MEDICINE CLINIC | Facility: CLINIC | Age: 76
End: 2021-09-30

## 2021-09-30 VITALS
TEMPERATURE: 97.1 F | BODY MASS INDEX: 35.08 KG/M2 | DIASTOLIC BLOOD PRESSURE: 58 MMHG | HEIGHT: 63 IN | HEART RATE: 97 BPM | SYSTOLIC BLOOD PRESSURE: 128 MMHG | WEIGHT: 198 LBS | OXYGEN SATURATION: 100 %

## 2021-09-30 DIAGNOSIS — K21.9 GASTROESOPHAGEAL REFLUX DISEASE WITHOUT ESOPHAGITIS: Chronic | ICD-10-CM

## 2021-09-30 DIAGNOSIS — D50.8 IRON DEFICIENCY ANEMIA SECONDARY TO INADEQUATE DIETARY IRON INTAKE: Chronic | ICD-10-CM

## 2021-09-30 DIAGNOSIS — E55.9 VITAMIN D DEFICIENCY: Chronic | ICD-10-CM

## 2021-09-30 DIAGNOSIS — E03.9 ACQUIRED HYPOTHYROIDISM: Primary | Chronic | ICD-10-CM

## 2021-09-30 PROCEDURE — 99214 OFFICE O/P EST MOD 30 MIN: CPT | Performed by: PHYSICIAN ASSISTANT

## 2021-09-30 RX ORDER — LEVOTHYROXINE SODIUM 0.03 MG/1
25 TABLET ORAL DAILY
Qty: 30 TABLET | Refills: 5 | Status: SHIPPED | OUTPATIENT
Start: 2021-09-30 | End: 2021-12-20 | Stop reason: SDUPTHER

## 2021-09-30 RX ORDER — PANTOPRAZOLE SODIUM 40 MG/1
40 TABLET, DELAYED RELEASE ORAL DAILY
Qty: 30 TABLET | Refills: 1 | Status: SHIPPED | OUTPATIENT
Start: 2021-09-30 | End: 2021-12-20 | Stop reason: SDUPTHER

## 2021-09-30 RX ORDER — ERGOCALCIFEROL 1.25 MG/1
50000 CAPSULE ORAL WEEKLY
Qty: 5 CAPSULE | Refills: 5 | Status: SHIPPED | OUTPATIENT
Start: 2021-09-30 | End: 2022-03-07 | Stop reason: SDUPTHER

## 2021-09-30 NOTE — PROGRESS NOTES
"Alison Lee is a 76 y.o. female.       Chief Complaint -hypothyroidism    History of Present Illness -    ROS    Hypothyroidism-  Stable with Synthroid 25 mcg daily    Vitamin D deficiency-stable with vitamin D supplementation    Gastroesophageal reflux disease-stable with pantoprazole    Anemia-  Her iron levels continue to be low as well as microcytic hypochromic anemia.  She has been followed by hematology and has remained at an adequate levels including most recent hemoglobin of 7.7 despite using iron orally.  Patient states that she is discussing iron infusions with her hematologist.  She was scheduled for EGD/colonoscopy by surgeon at [facility] but those elective procedures are currently on hold due to the Covid pandemic.  She denies any known active bleeding, bright red blood per rectum, or black stool.    The following portions of the patient's history were reviewed and updated as appropriate: allergies, current medications, past family history, past medical history, past social history, past surgical history and problem list.    Review of Systems   Constitutional: Positive for fatigue (chronic).       Objective  Vital signs:  /58   Pulse 97   Temp 97.1 °F (36.2 °C) (Temporal)   Ht 160 cm (63\")   Wt 89.8 kg (198 lb)   SpO2 100%   BMI 35.07 kg/m²     Physical Exam  Vitals and nursing note reviewed.   Constitutional:       Appearance: Normal appearance. She is well-developed. She is obese.   HENT:      Head: Normocephalic and atraumatic.      Right Ear: Tympanic membrane normal.      Left Ear: Tympanic membrane normal.      Nose: Nose normal.      Mouth/Throat:      Mouth: Mucous membranes are moist.      Pharynx: No oropharyngeal exudate or posterior oropharyngeal erythema.   Eyes:      Extraocular Movements: Extraocular movements intact.      Conjunctiva/sclera: Conjunctivae normal.   Neck:      Thyroid: No thyromegaly.      Trachea: No tracheal deviation.   Cardiovascular:      Rate " and Rhythm: Normal rate and regular rhythm.      Heart sounds: Normal heart sounds. No murmur heard.     Pulmonary:      Effort: Pulmonary effort is normal. No respiratory distress.      Breath sounds: Normal breath sounds. No wheezing.   Abdominal:      General: Bowel sounds are normal.      Palpations: Abdomen is soft.      Tenderness: There is no abdominal tenderness. There is no guarding.   Musculoskeletal:         General: No tenderness. Normal range of motion.      Cervical back: Normal range of motion and neck supple.   Lymphadenopathy:      Cervical: No cervical adenopathy.   Skin:     General: Skin is warm and dry.      Findings: No rash.   Neurological:      General: No focal deficit present.      Mental Status: She is alert and oriented to person, place, and time.   Psychiatric:         Mood and Affect: Mood normal.         Behavior: Behavior normal.         Thought Content: Thought content normal.         The following data was reviewed by: RAFIQ Montanez on 09/30/2021:  CMP    CMP 9/2/21 9/13/21 9/23/21   Glucose 247 (A) 245 (A) 137 (A)   BUN 27 (A) 42 (A) 44 (A)   Creatinine 1.69 (A) 1.97 (A) 2.21 (A)   eGFR Non African Am 29 (A) 25 (A) 21 (A)   Sodium 142 135 (A) 135 (A)   Potassium 4.2 4.5 4.3   Chloride 106 103 101   Calcium 9.3 9.3 8.9   Albumin 3.61  3.44 (A)   Total Bilirubin 0.3  <0.2   Alkaline Phosphatase 435 (A)  385 (A)   AST (SGOT) 41 (A)  69 (A)   ALT (SGPT) 21  35 (A)   (A) Abnormal value            CBC w/diff    CBC w/Diff 8/11/21 9/2/21 9/23/21   WBC 7.85 12.86 (A) 10.53   RBC 3.33 (A) 3.39 (A) 3.36 (A)   Hemoglobin 8.1 (A) 7.9 (A) 7.7 (A)   Hematocrit 28.3 (A) 27.4 (A) 26.3 (A)   MCV 85.0 80.8 78.3 (A)   MCH 24.3 (A) 23.3 (A) 22.9 (A)   MCHC 28.6 (A) 28.8 (A) 29.3 (A)   RDW 18.7 (A) 18.3 (A) 16.4 (A)   Platelets 315 316 313   Neutrophil Rel % 67.9 75.6 64.5   Immature Granulocyte Rel % 0.4 0.3 0.2   Lymphocyte Rel % 19.2 (A) 16.0 (A) 21.0   Monocyte Rel % 5.5 4.4 (A) 5.5    Eosinophil Rel % 6.2 3.3 8.2 (A)   Basophil Rel % 0.8 0.4 0.6   (A) Abnormal value            Lipid Panel    Lipid Panel 9/23/21   Total Cholesterol 117   Triglycerides 90   HDL Cholesterol 42   VLDL Cholesterol 17   LDL Cholesterol  58   LDL/HDL Ratio 1.36           TSH    TSH 4/12/21 4/20/21 9/23/21   TSH 5.330 (A) 4.240 (A) 3.760   (A) Abnormal value            Most Recent A1C    HGBA1C Most Recent 9/23/21   Hemoglobin A1C 6.86 (A)   (A) Abnormal value                   Assessment/Plan     Diagnoses and all orders for this visit:    1. Acquired hypothyroidism (Primary)  Comments:  Continue Synthroid 25 mcg daily  Orders:  -     levothyroxine (SYNTHROID, LEVOTHROID) 25 MCG tablet; Take 1 tablet by mouth Daily.  Dispense: 30 tablet; Refill: 5    2. Vitamin D deficiency  Comments:  Continue vitamin D supplementation  Orders:  -     vitamin D (ERGOCALCIFEROL) 1.25 MG (66280 UT) capsule capsule; Take 1 capsule by mouth 1 (One) Time Per Week.  Dispense: 5 capsule; Refill: 5    3. Gastroesophageal reflux disease without esophagitis  Comments:  Continue pantoprazole  Advised to avoid known trigger foods  Orders:  -     pantoprazole (Protonix) 40 MG EC tablet; Take 1 tablet by mouth Daily.  Dispense: 30 tablet; Refill: 1    4. Iron deficiency anemia secondary to inadequate dietary iron intake  Comments:  Continue to monitor  Advised to discuss iron infusions if needed with hematology  Advised to have EGD/colonoscopy when elective surgery opens back up            Patient was given instructions and counseling regarding his condition or for health maintenance advice. Please see specific information pulled into the AVS if appropriate      This document has been electronically signed by:  Lexie Dolan PA-C

## 2021-10-04 ENCOUNTER — TELEPHONE (OUTPATIENT)
Dept: ONCOLOGY | Facility: OTHER | Age: 76
End: 2021-10-04

## 2021-10-04 ENCOUNTER — INFUSION (OUTPATIENT)
Dept: ONCOLOGY | Facility: HOSPITAL | Age: 76
End: 2021-10-04

## 2021-10-04 VITALS
SYSTOLIC BLOOD PRESSURE: 166 MMHG | WEIGHT: 205.9 LBS | RESPIRATION RATE: 18 BRPM | OXYGEN SATURATION: 99 % | DIASTOLIC BLOOD PRESSURE: 70 MMHG | TEMPERATURE: 97.5 F | BODY MASS INDEX: 36.47 KG/M2 | HEART RATE: 80 BPM

## 2021-10-04 DIAGNOSIS — K90.9 MALABSORPTION OF IRON: ICD-10-CM

## 2021-10-04 DIAGNOSIS — D50.9 IRON DEFICIENCY ANEMIA, UNSPECIFIED IRON DEFICIENCY ANEMIA TYPE: Primary | ICD-10-CM

## 2021-10-04 PROCEDURE — 96374 THER/PROPH/DIAG INJ IV PUSH: CPT

## 2021-10-04 PROCEDURE — 25010000002 FERUMOXYTOL 510 MG/17ML SOLUTION: Performed by: NURSE PRACTITIONER

## 2021-10-04 PROCEDURE — 96365 THER/PROPH/DIAG IV INF INIT: CPT

## 2021-10-04 RX ORDER — SODIUM CHLORIDE 9 MG/ML
250 INJECTION, SOLUTION INTRAVENOUS ONCE
Status: COMPLETED | OUTPATIENT
Start: 2021-10-04 | End: 2021-10-04

## 2021-10-04 RX ADMIN — SODIUM CHLORIDE 250 ML: 9 INJECTION, SOLUTION INTRAVENOUS at 13:45

## 2021-10-04 RX ADMIN — FERUMOXYTOL 510 MG: 510 INJECTION INTRAVENOUS at 13:45

## 2021-10-06 DIAGNOSIS — E11.69 DIABETES MELLITUS TYPE 2 IN OBESE (HCC): Chronic | ICD-10-CM

## 2021-10-06 DIAGNOSIS — E66.9 DIABETES MELLITUS TYPE 2 IN OBESE (HCC): Chronic | ICD-10-CM

## 2021-10-07 ENCOUNTER — INFUSION (OUTPATIENT)
Dept: ONCOLOGY | Facility: HOSPITAL | Age: 76
End: 2021-10-07

## 2021-10-07 VITALS
TEMPERATURE: 97.3 F | DIASTOLIC BLOOD PRESSURE: 68 MMHG | HEART RATE: 93 BPM | OXYGEN SATURATION: 97 % | SYSTOLIC BLOOD PRESSURE: 123 MMHG | WEIGHT: 205.2 LBS | BODY MASS INDEX: 36.35 KG/M2 | RESPIRATION RATE: 18 BRPM

## 2021-10-07 DIAGNOSIS — K90.9 MALABSORPTION OF IRON: ICD-10-CM

## 2021-10-07 DIAGNOSIS — D50.9 IRON DEFICIENCY ANEMIA, UNSPECIFIED IRON DEFICIENCY ANEMIA TYPE: Primary | ICD-10-CM

## 2021-10-07 PROCEDURE — 96374 THER/PROPH/DIAG INJ IV PUSH: CPT

## 2021-10-07 PROCEDURE — 25010000002 FERUMOXYTOL 510 MG/17ML SOLUTION: Performed by: NURSE PRACTITIONER

## 2021-10-07 PROCEDURE — 96365 THER/PROPH/DIAG IV INF INIT: CPT

## 2021-10-07 RX ORDER — PROCHLORPERAZINE 25 MG/1
1 SUPPOSITORY RECTAL DAILY
Qty: 1 EACH | Refills: 0 | Status: SHIPPED | OUTPATIENT
Start: 2021-10-07 | End: 2021-12-20 | Stop reason: SDUPTHER

## 2021-10-07 RX ORDER — SODIUM CHLORIDE 9 MG/ML
250 INJECTION, SOLUTION INTRAVENOUS ONCE
Status: COMPLETED | OUTPATIENT
Start: 2021-10-07 | End: 2021-10-07

## 2021-10-07 RX ADMIN — SODIUM CHLORIDE 250 ML: 9 INJECTION, SOLUTION INTRAVENOUS at 12:20

## 2021-10-07 RX ADMIN — FERUMOXYTOL 510 MG: 510 INJECTION INTRAVENOUS at 12:20

## 2021-10-13 ENCOUNTER — APPOINTMENT (OUTPATIENT)
Dept: CARDIOLOGY | Facility: HOSPITAL | Age: 76
End: 2021-10-13

## 2021-10-14 ENCOUNTER — APPOINTMENT (OUTPATIENT)
Dept: ONCOLOGY | Facility: HOSPITAL | Age: 76
End: 2021-10-14

## 2021-10-18 RX ORDER — METOCLOPRAMIDE 10 MG/1
TABLET ORAL
Qty: 90 TABLET | Refills: 1 | OUTPATIENT
Start: 2021-10-18

## 2021-10-19 RX ORDER — BUTALBITAL, ACETAMINOPHEN AND CAFFEINE 50; 325; 40 MG/1; MG/1; MG/1
TABLET ORAL
Qty: 6 TABLET | Refills: 1 | Status: SHIPPED | OUTPATIENT
Start: 2021-10-19 | End: 2021-12-20

## 2021-10-20 ENCOUNTER — OFFICE VISIT (OUTPATIENT)
Dept: GASTROENTEROLOGY | Facility: CLINIC | Age: 76
End: 2021-10-20

## 2021-10-20 VITALS — WEIGHT: 198.8 LBS | HEIGHT: 63 IN | BODY MASS INDEX: 35.22 KG/M2

## 2021-10-20 DIAGNOSIS — D50.9 IRON DEFICIENCY ANEMIA, UNSPECIFIED IRON DEFICIENCY ANEMIA TYPE: Primary | ICD-10-CM

## 2021-10-20 DIAGNOSIS — R13.19 ESOPHAGEAL DYSPHAGIA: ICD-10-CM

## 2021-10-20 DIAGNOSIS — R63.4 WEIGHT LOSS, ABNORMAL: ICD-10-CM

## 2021-10-20 PROCEDURE — 85025 COMPLETE CBC W/AUTO DIFF WBC: CPT | Performed by: INTERNAL MEDICINE

## 2021-10-20 PROCEDURE — 83540 ASSAY OF IRON: CPT | Performed by: INTERNAL MEDICINE

## 2021-10-20 PROCEDURE — 99213 OFFICE O/P EST LOW 20 MIN: CPT | Performed by: INTERNAL MEDICINE

## 2021-10-20 PROCEDURE — 84466 ASSAY OF TRANSFERRIN: CPT | Performed by: INTERNAL MEDICINE

## 2021-10-20 RX ORDER — POLYETHYLENE GLYCOL 3350 17 G/17G
POWDER, FOR SOLUTION ORAL
Qty: 510 G | Refills: 0 | Status: SHIPPED | OUTPATIENT
Start: 2021-10-20 | End: 2021-12-13

## 2021-10-20 NOTE — PROGRESS NOTES
Subjective   Britta Lee is a 76 y.o. female who presents to the office today as a follow up appointment regarding Anemia      History of Present Illness:  The patient presents today for evaluation of YUAN.  She denies BRBPR or melena.  She denies hematemesis or hematochezia.  No family history of colon cancer.  She had a colonoscopy last year by Dr. Zamora which was normal per her report.  The patient does have chronic kidney disease but she is not on hemodialysis.  She has had weight loss.  She has lost 43 lbs in a year.  She states she is not trying to lose weight.  She does complain of meats lodging in her esophagus.  She feels fatigued.  She states that she has had a recent fall at home.  Her daughter and son-in-law were in the house asleep and did not hear her.  Her  was away.  She did not require an ER visit for this but did have a bump on her head.. She only takes ASA.  No other blood thinners.  She has received an iron infusions x 2 since her last visit. She does have poor kidney function.  She feels fatigued.  No abdominal pain.  She has lost additional 7 lbs since her last visit.  She states she is losing her appetite. On Sept 23,  her iron sat was 7% and hgb 7.7            Review of Systems:  Review of Systems   Constitutional: Negative for fever.   HENT: Negative for trouble swallowing.    Eyes: Negative.    Respiratory: Negative for chest tightness.    Cardiovascular: Positive for leg swelling. Negative for chest pain.   Gastrointestinal: Negative for abdominal distention, abdominal pain, anal bleeding, blood in stool, constipation, diarrhea, nausea and vomiting.   Endocrine: Negative.    Genitourinary: Negative for difficulty urinating and hematuria.   Musculoskeletal: Positive for back pain.   Skin: Negative.    Allergic/Immunologic: Negative.    Neurological: Negative for headaches.   Hematological: Bruises/bleeds easily.   Psychiatric/Behavioral: Negative.        Past Medical History:  Past  Medical History:   Diagnosis Date   • Acquired hypothyroidism 4/22/2021   • Anemia    • Arthritis    • Carpal tunnel syndrome    • Coronary artery disease    • Diabetes mellitus (HCC)    • Elevated cholesterol    • GERD (gastroesophageal reflux disease)    • History of pneumonia    • History of unsteady gait     OCASSIONALLY   • Insomnia    • Kidney disease    • LENNY (obstructive sleep apnea) 12/1/2020   • Osteoarthritis    • Renal insufficiency    • Sciatica        Past Surgical History:  Past Surgical History:   Procedure Laterality Date   • ABDOMINAL SURGERY     • APPENDECTOMY     • CARDIAC CATHETERIZATION      1 STENT  ---  2000   • CARDIAC SURGERY     • CAROTID STENT     • CARPAL TUNNEL RELEASE Right 10/8/2019    Procedure: CARPAL TUNNEL RELEASE;  Surgeon: Feroz Johnson MD;  Location: [facility];  Service: Orthopedics   • CATARACT EXTRACTION     • CHOLECYSTECTOMY     • COLONOSCOPY     • CORONARY ANGIOPLASTY WITH STENT PLACEMENT     • ENDOSCOPY     • ENDOSCOPY N/A 3/5/2020    Procedure: ESOPHAGOGASTRODUODENOSCOPY;  Surgeon: Alexandru Bagley MD;  Location: [facility];  Service: Gastroenterology;  Laterality: N/A;   • ENDOSCOPY AND COLONOSCOPY     • GALLBLADDER SURGERY     • JOINT REPLACEMENT Left 05/02/2017    [facility]  DR JOHNSON  LEFT TOTAL KNEE   • KNEE ARTHROSCOPY W/ MENISCECTOMY Right    • LAPAROSCOPIC TUBAL LIGATION     • TX TOTAL KNEE ARTHROPLASTY Left 5/2/2017    Procedure: TOTAL KNEE ARTHROPLASTY;  Surgeon: Feroz Johnson MD;  Location: [facility];  Service: Orthopedics   • STERILIZATION     • TONSILLECTOMY         Family History:  Family History   Problem Relation Age of Onset   • Arthritis Mother    • Diabetes Mother    • Cancer Mother    • Heart disease Father    • Diabetes Daughter    • Diabetes Son    • Diabetes Maternal Aunt    • Heart disease Maternal Grandmother    • Breast cancer Neg Hx        Social History:  Social History     Socioeconomic History   • Marital status:       Spouse name: natalie   • Number of children: 3   • Years of education: 12   Tobacco Use   • Smoking status: Never Smoker   • Smokeless tobacco: Never Used   Vaping Use   • Vaping Use: Never used   Substance and Sexual Activity   • Alcohol use: Yes     Comment: socially   • Drug use: No   • Sexual activity: Defer     Birth control/protection: Surgical       Current Medication List:    Current Outpatient Medications:   •  albuterol sulfate  (90 Base) MCG/ACT inhaler, Inhale 2 puffs Every 4 (Four) Hours As Needed for Shortness of Air., Disp: 18 g, Rfl: 0  •  amLODIPine (NORVASC) 10 MG tablet, Take 1 tablet by mouth Daily., Disp: 30 tablet, Rfl: 5  •  aspirin 81 MG tablet, Take 1 tablet by mouth Daily., Disp: 30 tablet, Rfl: 5  •  atorvastatin (LIPITOR) 40 MG tablet, TAKE ONE Tablet BY MOUTH EACH NIGHT AT BEDTIME, Disp: 90 tablet, Rfl: 1  •  BD Pen Needle Evon U/F 32G X 4 MM misc, , Disp: , Rfl:   •  bumetanide (BUMEX) 0.5 MG tablet, Take 1 tablet by mouth Daily., Disp: 90 tablet, Rfl: 0  •  butalbital-acetaminophen-caffeine (FIORICET, ESGIC) -40 MG per tablet, TAKE ONE Tablet BY MOUTH EVERY 4 HOURS AS NEEDED FOR HEADACHE, Disp: 6 tablet, Rfl: 1  •  cloNIDine (CATAPRES) 0.2 MG tablet, Take 1 tablet by mouth 3 (Three) Times a Day. (Patient taking differently: Take 0.1 mg by mouth 3 (Three) Times a Day.), Disp: 270 tablet, Rfl: 1  •  Continuous Blood Gluc  (Dexcom G6 ) device, 1 Device Daily., Disp: 3 each, Rfl: 3  •  Continuous Blood Gluc Sensor (Dexcom G6 Sensor), Every 10 (Ten) Days., Disp: 9 each, Rfl: 3  •  Continuous Blood Gluc Transmit (Dexcom G6 Transmitter) misc, 1 Device Daily., Disp: 1 each, Rfl: 0  •  escitalopram (LEXAPRO) 10 MG tablet, Take 1 tablet by mouth Daily., Disp: 90 tablet, Rfl: 3  •  FeroSul 325 (65 Fe) MG tablet, Take 1 tablet by mouth 2 (Two) Times a Day., Disp: , Rfl:   •  glucose blood test strip, 1 each by Other route 3 (Three) Times a Day., Disp: 100 each, Rfl:  "12  •  hydrALAZINE (APRESOLINE) 25 MG tablet, Take 3 tablets by mouth 3 (Three) Times a Day., Disp: 270 tablet, Rfl: 1  •  Insulin Glargine, 2 Unit Dial, (Toujeo Max SoloStar) 300 UNIT/ML solution pen-injector injection, Inject 40-60 Units under the skin into the appropriate area as directed Daily., Disp: 3 pen, Rfl: 5  •  insulin lispro (humaLOG) 100 UNIT/ML injection, Inject 5 Units under the skin into the appropriate area as directed 3 (Three) Times a Day Before Meals. Sliding scale:4-6 units, Disp: , Rfl:   •  Insulin Pen Needle 32G X 5 MM misc, 1 each 4 (Four) Times a Day., Disp: 120 each, Rfl: 5  •  isosorbide mononitrate (IMDUR) 60 MG 24 hr tablet, Take 1 tablet by mouth Daily., Disp: 90 tablet, Rfl: 1  •  levothyroxine (SYNTHROID, LEVOTHROID) 25 MCG tablet, Take 1 tablet by mouth Daily., Disp: 30 tablet, Rfl: 5  •  metoprolol tartrate (LOPRESSOR) 25 MG tablet, Take 25 mg by mouth 2 (Two) Times a Day., Disp: , Rfl:   •  nystatin (MYCOSTATIN) 615639 UNIT/GM powder, Apply  one application topically to the appropriate area as directed Every 12 (Twelve) Hours. (Patient taking differently: Apply  topically to the appropriate area as directed 2 (Two) Times a Day As Needed (itching).), Disp: 60 g, Rfl: 0  •  pantoprazole (Protonix) 40 MG EC tablet, Take 1 tablet by mouth Daily., Disp: 30 tablet, Rfl: 1  •  promethazine (PHENERGAN) 25 MG tablet, Take 1 tablet by mouth Every 6 (Six) Hours As Needed for Nausea or Vomiting., Disp: 60 tablet, Rfl: 5  •  vitamin D (ERGOCALCIFEROL) 1.25 MG (63525 UT) capsule capsule, Take 1 capsule by mouth 1 (One) Time Per Week., Disp: 5 capsule, Rfl: 5  •  polyethylene glycol (MIRALAX) 17 GM/SCOOP powder, Take 255 g of MiraLAX with 32 ounces liquid then repeat 8 hours later for MiraLAX cleanout., Disp: 510 g, Rfl: 0    Allergies:   Patient has no known allergies.    Vitals:  Ht 160 cm (63\")   Wt 90.2 kg (198 lb 12.8 oz)   BMI 35.22 kg/m²     Physical Exam:  Physical " Exam  Constitutional:       Appearance: She is normal weight.   HENT:      Head: Normocephalic and atraumatic.      Nose: Nose normal. No congestion or rhinorrhea.   Eyes:      General: No scleral icterus.     Extraocular Movements: Extraocular movements intact.      Conjunctiva/sclera: Conjunctivae normal.      Pupils: Pupils are equal, round, and reactive to light.   Cardiovascular:      Rate and Rhythm: Normal rate and regular rhythm.      Pulses: Normal pulses.      Heart sounds: Normal heart sounds.   Pulmonary:      Effort: Pulmonary effort is normal.      Breath sounds: Normal breath sounds.   Abdominal:      General: Abdomen is flat. Bowel sounds are normal. There is no distension.      Palpations: Abdomen is soft. There is no shifting dullness, fluid wave, hepatomegaly, splenomegaly, mass or pulsatile mass.      Tenderness: There is no abdominal tenderness. There is no guarding or rebound.      Hernia: No hernia is present.   Musculoskeletal:         General: No swelling or tenderness.      Cervical back: Normal range of motion and neck supple.   Skin:     General: Skin is warm and dry.      Coloration: Skin is not jaundiced.   Neurological:      General: No focal deficit present.      Mental Status: She is alert and oriented to person, place, and time.   Psychiatric:         Mood and Affect: Mood normal.         Behavior: Behavior normal.         Results Review:  Lab Results:   Lab on 09/23/2021   Component Date Value Ref Range Status   • TSH 09/23/2021 3.760  0.270 - 4.200 uIU/mL Final   • Total Cholesterol 09/23/2021 117  0 - 200 mg/dL Final   • Triglycerides 09/23/2021 90  0 - 150 mg/dL Final   • HDL Cholesterol 09/23/2021 42  40 - 60 mg/dL Final   • LDL Cholesterol  09/23/2021 58  0 - 100 mg/dL Final   • VLDL Cholesterol 09/23/2021 17  5 - 40 mg/dL Final   • LDL/HDL Ratio 09/23/2021 1.36   Final   • Microalbumin, Urine 09/23/2021 227.0  mg/dL Final   • Free T4 09/23/2021 1.02  0.93 - 1.70 ng/dL Final    • Hemoglobin A1C 09/23/2021 6.86* 4.80 - 5.60 % Final   Lab on 09/23/2021   Component Date Value Ref Range Status   • Ferritin 09/23/2021 74.28  13.00 - 150.00 ng/mL Final   • Iron 09/23/2021 25* 37 - 145 mcg/dL Final   • Iron Saturation 09/23/2021 7* 20 - 50 % Final   • Transferrin 09/23/2021 232  200 - 360 mg/dL Final   • TIBC 09/23/2021 346  298 - 536 mcg/dL Final   • Glucose 09/23/2021 137* 65 - 99 mg/dL Final   • BUN 09/23/2021 44* 8 - 23 mg/dL Final   • Creatinine 09/23/2021 2.21* 0.57 - 1.00 mg/dL Final   • Sodium 09/23/2021 135* 136 - 145 mmol/L Final   • Potassium 09/23/2021 4.3  3.5 - 5.2 mmol/L Final   • Chloride 09/23/2021 101  98 - 107 mmol/L Final   • CO2 09/23/2021 23.7  22.0 - 29.0 mmol/L Final   • Calcium 09/23/2021 8.9  8.6 - 10.5 mg/dL Final   • Total Protein 09/23/2021 7.3  6.0 - 8.5 g/dL Final   • Albumin 09/23/2021 3.44* 3.50 - 5.20 g/dL Final   • ALT (SGPT) 09/23/2021 35* 1 - 33 U/L Final   • AST (SGOT) 09/23/2021 69* 1 - 32 U/L Final   • Alkaline Phosphatase 09/23/2021 385* 39 - 117 U/L Final   • Total Bilirubin 09/23/2021 <0.2  0.0 - 1.2 mg/dL Final   • eGFR Non African Amer 09/23/2021 21* >60 mL/min/1.73 Final   • Globulin 09/23/2021 3.9  gm/dL Final   • A/G Ratio 09/23/2021 0.9  g/dL Final   • BUN/Creatinine Ratio 09/23/2021 19.9  7.0 - 25.0 Final   • Anion Gap 09/23/2021 10.3  5.0 - 15.0 mmol/L Final   • WBC 09/23/2021 10.53  3.40 - 10.80 10*3/mm3 Final   • RBC 09/23/2021 3.36* 3.77 - 5.28 10*6/mm3 Final   • Hemoglobin 09/23/2021 7.7* 12.0 - 15.9 g/dL Final   • Hematocrit 09/23/2021 26.3* 34.0 - 46.6 % Final   • MCV 09/23/2021 78.3* 79.0 - 97.0 fL Final   • MCH 09/23/2021 22.9* 26.6 - 33.0 pg Final   • MCHC 09/23/2021 29.3* 31.5 - 35.7 g/dL Final   • RDW 09/23/2021 16.4* 12.3 - 15.4 % Final   • RDW-SD 09/23/2021 47.3  37.0 - 54.0 fl Final   • MPV 09/23/2021 9.3  6.0 - 12.0 fL Final   • Platelets 09/23/2021 313  140 - 450 10*3/mm3 Final   • Neutrophil % 09/23/2021 64.5  42.7 - 76.0 %  Final   • Lymphocyte % 09/23/2021 21.0  19.6 - 45.3 % Final   • Monocyte % 09/23/2021 5.5  5.0 - 12.0 % Final   • Eosinophil % 09/23/2021 8.2* 0.3 - 6.2 % Final   • Basophil % 09/23/2021 0.6  0.0 - 1.5 % Final   • Immature Grans % 09/23/2021 0.2  0.0 - 0.5 % Final   • Neutrophils, Absolute 09/23/2021 6.80  1.70 - 7.00 10*3/mm3 Final   • Lymphocytes, Absolute 09/23/2021 2.21  0.70 - 3.10 10*3/mm3 Final   • Monocytes, Absolute 09/23/2021 0.58  0.10 - 0.90 10*3/mm3 Final   • Eosinophils, Absolute 09/23/2021 0.86* 0.00 - 0.40 10*3/mm3 Final   • Basophils, Absolute 09/23/2021 0.06  0.00 - 0.20 10*3/mm3 Final   • Immature Grans, Absolute 09/23/2021 0.02  0.00 - 0.05 10*3/mm3 Final   • nRBC 09/23/2021 0.0  0.0 - 0.2 /100 WBC Final       Assessment/Plan     Visit Diagnoses:    ICD-10-CM ICD-9-CM   1. Iron deficiency anemia, unspecified iron deficiency anemia type  D50.9 280.9   2. Weight loss, abnormal  R63.4 783.21   3. Esophageal dysphagia  R13.19 787.29       Plan:  Orders Placed This Encounter   Procedures   • Iron Profile   • CBC Auto Differential   • CBC & Differential   Due to the patient's iron deficiency anemia and weight loss, I will have her undergo both EGD and repeat colonoscopy.  She did have a colonoscopy about a year or more ago by Dr. Zamora which she states was normal.  However, in light of the weight loss and continued anemia I will proceed with repeat colonoscopy.  If these 2 tests are normal and do not explain her anemia, I will have her undergo capsule endoscopy for further evaluation.  She does have an elevated creatinine which may explain the anemia.  I will obtain a CBC with iron/TIBC today.    ESOPHAGOGASTRODUODENOSCOPY WITH BIOPSY (N/A), COLONOSCOPY FOR SCREENING (N/A)      MEDS ORDERED DURING VISIT:  New Medications Ordered This Visit   Medications   • polyethylene glycol (MIRALAX) 17 GM/SCOOP powder     Sig: Take 255 g of MiraLAX with 32 ounces liquid then repeat 8 hours later for MiraLAX  cleanout.     Dispense:  510 g     Refill:  0       Return in about 3 months (around 1/20/2022).             This document has been electronically signed by Palmira Pate MD   October 20, 2021 16:52 EDT      Part of this note may be an electronic transcription/translation of spoken language to printed text using the Dragon Dictation System.

## 2021-10-21 LAB
BASOPHILS # BLD AUTO: 0.05 10*3/MM3 (ref 0–0.2)
BASOPHILS NFR BLD AUTO: 0.5 % (ref 0–1.5)
DEPRECATED RDW RBC AUTO: 55.1 FL (ref 37–54)
EOSINOPHIL # BLD AUTO: 0.34 10*3/MM3 (ref 0–0.4)
EOSINOPHIL NFR BLD AUTO: 3.2 % (ref 0.3–6.2)
ERYTHROCYTE [DISTWIDTH] IN BLOOD BY AUTOMATED COUNT: 18.1 % (ref 12.3–15.4)
HCT VFR BLD AUTO: 31.2 % (ref 34–46.6)
HGB BLD-MCNC: 9 G/DL (ref 12–15.9)
IMM GRANULOCYTES # BLD AUTO: 0.03 10*3/MM3 (ref 0–0.05)
IMM GRANULOCYTES NFR BLD AUTO: 0.3 % (ref 0–0.5)
IRON 24H UR-MRATE: 42 MCG/DL (ref 37–145)
IRON SATN MFR SERPL: 11 % (ref 20–50)
LYMPHOCYTES # BLD AUTO: 2.02 10*3/MM3 (ref 0.7–3.1)
LYMPHOCYTES NFR BLD AUTO: 19 % (ref 19.6–45.3)
MCH RBC QN AUTO: 24.2 PG (ref 26.6–33)
MCHC RBC AUTO-ENTMCNC: 28.8 G/DL (ref 31.5–35.7)
MCV RBC AUTO: 83.9 FL (ref 79–97)
MONOCYTES # BLD AUTO: 0.6 10*3/MM3 (ref 0.1–0.9)
MONOCYTES NFR BLD AUTO: 5.6 % (ref 5–12)
NEUTROPHILS NFR BLD AUTO: 7.6 10*3/MM3 (ref 1.7–7)
NEUTROPHILS NFR BLD AUTO: 71.4 % (ref 42.7–76)
NRBC BLD AUTO-RTO: 0 /100 WBC (ref 0–0.2)
PLATELET # BLD AUTO: 391 10*3/MM3 (ref 140–450)
PMV BLD AUTO: 10.9 FL (ref 6–12)
RBC # BLD AUTO: 3.72 10*6/MM3 (ref 3.77–5.28)
TIBC SERPL-MCNC: 367 MCG/DL (ref 298–536)
TRANSFERRIN SERPL-MCNC: 246 MG/DL (ref 200–360)
WBC # BLD AUTO: 10.64 10*3/MM3 (ref 3.4–10.8)

## 2021-10-21 NOTE — PROGRESS NOTES
Your iron saturation and your hemoglobin and hematocrit are still low but improved since your iron infusion.  We will proceed with your EGD and colonoscopy.

## 2021-10-22 ENCOUNTER — TELEPHONE (OUTPATIENT)
Dept: GASTROENTEROLOGY | Facility: CLINIC | Age: 76
End: 2021-10-22

## 2021-10-22 ENCOUNTER — HOSPITAL ENCOUNTER (OUTPATIENT)
Dept: CARDIOLOGY | Facility: HOSPITAL | Age: 76
Discharge: HOME | End: 2021-10-22
Admitting: NURSE PRACTITIONER

## 2021-10-22 VITALS
OXYGEN SATURATION: 98 % | WEIGHT: 192.4 LBS | BODY MASS INDEX: 34.08 KG/M2 | RESPIRATION RATE: 22 BRPM | HEART RATE: 107 BPM | DIASTOLIC BLOOD PRESSURE: 78 MMHG | SYSTOLIC BLOOD PRESSURE: 162 MMHG

## 2021-10-22 DIAGNOSIS — D50.9 IRON DEFICIENCY ANEMIA, UNSPECIFIED IRON DEFICIENCY ANEMIA TYPE: ICD-10-CM

## 2021-10-22 DIAGNOSIS — I50.32 CHRONIC DIASTOLIC CONGESTIVE HEART FAILURE (HCC): Primary | ICD-10-CM

## 2021-10-22 DIAGNOSIS — I10 ESSENTIAL HYPERTENSION: ICD-10-CM

## 2021-10-22 DIAGNOSIS — N18.4 CKD (CHRONIC KIDNEY DISEASE) STAGE 4, GFR 15-29 ML/MIN (HCC): ICD-10-CM

## 2021-10-22 DIAGNOSIS — R60.0 PEDAL EDEMA: ICD-10-CM

## 2021-10-22 DIAGNOSIS — E66.9 OBESITY (BMI 30.0-34.9): ICD-10-CM

## 2021-10-22 LAB
ABSOLUTE LUNG FLUID CONTENT: 23 % (ref 20–35)
ANION GAP SERPL CALCULATED.3IONS-SCNC: 14.7 MMOL/L (ref 5–15)
BUN SERPL-MCNC: 36 MG/DL (ref 8–23)
BUN/CREAT SERPL: 18.6 (ref 7–25)
CALCIUM SPEC-SCNC: 9.1 MG/DL (ref 8.6–10.5)
CHLORIDE SERPL-SCNC: 103 MMOL/L (ref 98–107)
CO2 SERPL-SCNC: 20.3 MMOL/L (ref 22–29)
CREAT SERPL-MCNC: 1.94 MG/DL (ref 0.57–1)
GFR SERPL CREATININE-BSD FRML MDRD: 25 ML/MIN/1.73
GLUCOSE SERPL-MCNC: 333 MG/DL (ref 65–99)
MAGNESIUM SERPL-MCNC: 2.1 MG/DL (ref 1.6–2.4)
NT-PROBNP SERPL-MCNC: 1854 PG/ML (ref 0–1800)
POTASSIUM SERPL-SCNC: 4.8 MMOL/L (ref 3.5–5.2)
SODIUM SERPL-SCNC: 138 MMOL/L (ref 136–145)

## 2021-10-22 PROCEDURE — 99213 OFFICE O/P EST LOW 20 MIN: CPT | Performed by: NURSE PRACTITIONER

## 2021-10-22 PROCEDURE — 83735 ASSAY OF MAGNESIUM: CPT | Performed by: NURSE PRACTITIONER

## 2021-10-22 PROCEDURE — 83880 ASSAY OF NATRIURETIC PEPTIDE: CPT

## 2021-10-22 PROCEDURE — 36415 COLL VENOUS BLD VENIPUNCTURE: CPT | Performed by: NURSE PRACTITIONER

## 2021-10-22 PROCEDURE — 94726 PLETHYSMOGRAPHY LUNG VOLUMES: CPT | Performed by: NURSE PRACTITIONER

## 2021-10-22 PROCEDURE — 80048 BASIC METABOLIC PNL TOTAL CA: CPT | Performed by: NURSE PRACTITIONER

## 2021-10-22 RX ORDER — BUMETANIDE 0.5 MG/1
0.5 TABLET ORAL EVERY OTHER DAY
COMMUNITY
End: 2022-03-28

## 2021-10-22 NOTE — PROGRESS NOTES
[facility] Failure [facility]    Date: 10/22/21     Vitals:    10/22/21 0904   BP: 162/78   Pulse: 107   Resp: 22   SpO2: 98%        Method of arrival: Ambulatory    Weighing self daily: Yes    Monitoring Heart Failure Zones: Yes    Today's HF Zone: Yellow     Taking medications as prescribed: Yes    Edema Yes mild    Shortness of Air: Yes    Number of pillows used at night:<2    Educational Materials given:                                                                           ReDS Value: 23%  21-24 Low Normal      Jackie Jasmine MA 10/22/21 09:04 EDT

## 2021-10-22 NOTE — PROGRESS NOTES
Heart Failure Pharmacy Note  Patient Name: Britta Lee   Referring Provider: Priscila Holland  Primary Cardiologist: Dr. Cruz  Type of CHF: HFpEF    Medication Use:   Adherence: No issues  Hx of med intolerances: bradycardia with metoprolol 100mg BID  Affordability: No issues reported   Retail Rx Management: none    Past Medical History:   Diagnosis Date   • Acquired hypothyroidism 4/22/2021   • Anemia    • Arthritis    • Carpal tunnel syndrome    • Coronary artery disease    • Diabetes mellitus (HCC)    • Elevated cholesterol    • GERD (gastroesophageal reflux disease)    • History of pneumonia    • History of unsteady gait     OCASSIONALLY   • Insomnia    • Kidney disease    • LENNY (obstructive sleep apnea) 12/1/2020   • Osteoarthritis    • Renal insufficiency    • Sciatica      ALLERGIES: Patient has no known allergies.  Current Outpatient Medications   Medication Sig Dispense Refill   • albuterol sulfate  (90 Base) MCG/ACT inhaler Inhale 2 puffs Every 4 (Four) Hours As Needed for Shortness of Air. 18 g 0   • amLODIPine (NORVASC) 10 MG tablet Take 1 tablet by mouth Daily. 30 tablet 5   • aspirin 81 MG tablet Take 1 tablet by mouth Daily. 30 tablet 5   • atorvastatin (LIPITOR) 40 MG tablet TAKE ONE Tablet BY MOUTH EACH NIGHT AT BEDTIME 90 tablet 1   • BD Pen Needle Evon U/F 32G X 4 MM misc      • bumetanide (BUMEX) 0.5 MG tablet Take 0.5 mg by mouth Every Other Day.     • butalbital-acetaminophen-caffeine (FIORICET, ESGIC) -40 MG per tablet TAKE ONE Tablet BY MOUTH EVERY 4 HOURS AS NEEDED FOR HEADACHE 6 tablet 1   • cloNIDine (CATAPRES) 0.2 MG tablet Take 1 tablet by mouth 3 (Three) Times a Day. (Patient taking differently: Take 0.1 mg by mouth 3 (Three) Times a Day.) 270 tablet 1   • Continuous Blood Gluc  (Dexcom G6 ) device 1 Device Daily. 3 each 3   • Continuous Blood Gluc Sensor (Dexcom G6 Sensor) Every 10 (Ten) Days. 9 each 3   • Continuous Blood Gluc Transmit (Dexcom G6  Transmitter) misc 1 Device Daily. 1 each 0   • escitalopram (LEXAPRO) 10 MG tablet Take 1 tablet by mouth Daily. 90 tablet 3   • glucose blood test strip 1 each by Other route 3 (Three) Times a Day. 100 each 12   • hydrALAZINE (APRESOLINE) 25 MG tablet Take 3 tablets by mouth 3 (Three) Times a Day. 270 tablet 1   • Insulin Glargine, 2 Unit Dial, (Toujeo Max SoloStar) 300 UNIT/ML solution pen-injector injection Inject 40-60 Units under the skin into the appropriate area as directed Daily. 3 pen 5   • insulin lispro (humaLOG) 100 UNIT/ML injection Inject 5 Units under the skin into the appropriate area as directed 3 (Three) Times a Day Before Meals. Sliding scale:4-6 units     • Insulin Pen Needle 32G X 5 MM misc 1 each 4 (Four) Times a Day. 120 each 5   • isosorbide mononitrate (IMDUR) 60 MG 24 hr tablet Take 1 tablet by mouth Daily. 90 tablet 1   • levothyroxine (SYNTHROID, LEVOTHROID) 25 MCG tablet Take 1 tablet by mouth Daily. 30 tablet 5   • metoprolol tartrate (LOPRESSOR) 25 MG tablet Take 25 mg by mouth 2 (Two) Times a Day.     • pantoprazole (Protonix) 40 MG EC tablet Take 1 tablet by mouth Daily. 30 tablet 1   • polyethylene glycol (MIRALAX) 17 GM/SCOOP powder Take 255 g of MiraLAX with 32 ounces liquid then repeat 8 hours later for MiraLAX cleanout. 510 g 0   • promethazine (PHENERGAN) 25 MG tablet Take 1 tablet by mouth Every 6 (Six) Hours As Needed for Nausea or Vomiting. 60 tablet 5   • vitamin D (ERGOCALCIFEROL) 1.25 MG (46566 UT) capsule capsule Take 1 capsule by mouth 1 (One) Time Per Week. 5 capsule 5   • bumetanide (BUMEX) 0.5 MG tablet Take 1 tablet by mouth Daily. 90 tablet 0   • nystatin (MYCOSTATIN) 871195 UNIT/GM powder Apply  one application topically to the appropriate area as directed Every 12 (Twelve) Hours. (Patient taking differently: Apply  topically to the appropriate area as directed 2 (Two) Times a Day As Needed (itching).) 60 g 0     No current facility-administered medications for  this encounter.       Vaccination History:   Pneumonia: Reports UTD  Annual Influenza: Reports UTD for 2020-21 Season    Objective  Vitals:    10/22/21 0904   BP: 162/78   BP Location: Left arm   Patient Position: Sitting   Cuff Size: Adult   Pulse: 107   Resp: 22   SpO2: 98%   Weight: 87.3 kg (192 lb 6.4 oz)     Wt Readings from Last 3 Encounters:   10/22/21 87.3 kg (192 lb 6.4 oz)   10/20/21 90.2 kg (198 lb 12.8 oz)   10/07/21 93.1 kg (205 lb 3.2 oz)         10/22/21  0904   Weight: 87.3 kg (192 lb 6.4 oz)     Lab Results   Component Value Date    GLUCOSE 333 (H) 10/22/2021    BUN 36 (H) 10/22/2021    CREATININE 1.94 (H) 10/22/2021    EGFRIFNONA 25 (L) 10/22/2021    EGFRIFAFRI 41 (L) 07/30/2018    BCR 18.6 10/22/2021    K 4.8 10/22/2021    CO2 20.3 (L) 10/22/2021    CALCIUM 9.1 10/22/2021    PROTENTOTREF 7.7 07/30/2018    ALBUMIN 3.44 (L) 09/23/2021    LABIL2 1.1 (L) 07/30/2018    AST 69 (H) 09/23/2021    ALT 35 (H) 09/23/2021     Lab Results   Component Value Date    WBC 10.64 10/20/2021    HGB 9.0 (L) 10/20/2021    HCT 31.2 (L) 10/20/2021    MCV 83.9 10/20/2021     10/20/2021     Lab Results   Component Value Date    CKTOTAL 67 12/04/2020    CKMB 2.92 04/16/2018    TROPONINI 0.012 04/16/2018    TROPONINT <0.010 09/02/2021     Lab Results   Component Value Date    PROBNP 1,854.0 (H) 10/22/2021     Results for orders placed during the hospital encounter of 11/03/20    Adult Transthoracic Echo Complete W/ Cont if Necessary Per Protocol    Interpretation Summary  · Left ventricular wall thickness is consistent with concentric hypertrophy.  · Left ventricular ejection fraction appears to be greater than 70%. Left ventricular systolic function is hyperdynamic (EF > 70%).  · Left ventricular diastolic function is consistent with (grade II w/high LAP) pseudonormalization.  · The left atrial cavity is moderate to severely dilated.  · The right atrial cavity is mildly dilated.  · Moderate mitral annular  calcification is present.  · Mild mitral valve regurgitation is present.  · Mild tricuspid valve regurgitation is present.  · Estimated right ventricular systolic pressure from tricuspid regurgitation is markedly elevated (>55 mmHg).               Class   Drug   Dose Last Dose Adjustment   Notes   ACEi/ARB/ARNI    Worsening renal fxn 1/22/21   Beta Blocker Metoprolol tart 25 BID 7/29/21    MRA --------------   eGFR needs to be >30mL/min     Drug Therapy Problems:  1. HTN: pt reports home SBPs to be high at times- seen 180s twice in the past month (max SBP recently), however, no log available today. Pt recently sent from Dr. Cruz's office to ED due to high BP at his office.   2. Bumex prescription.     Recommendations:    1. HTN. Unfortunately, patient kidney function does not allow for addition of MRA and history of intolerance to ACEi/ARB and metoprolol does not allow for addition or increase of these medications at this time. Pt has been out of metoprolol as well, which could be contributing to high BP. I called [facility], and they reported they have not filled this medication for her in close to a year, so I called home delivery to see if it was with them. They denied having a prescription, but said [facility] had the rx. Pt instructed to  from [facility].   2. Bumex- pt reports being out of for the last week. This was supposed to be coming from a home delivery pharmacy, but she's been out. After calling [facility], they had a new rx from 10/18 for pt to be taking 0.5 mg QOD. Made pt aware to  this prescription.       Patient was educated on heart failure medications and the importance of medication adherence.  All questions were addressed and patient expressed understanding. Patient would benefit from further education. Encouraged her to bring her medication bottles to her next visit.     Thank you for allowing me to participate in the care of your patient,    Mag Zelaya,  ASH  10/22/21  09:57 EDT

## 2021-10-22 NOTE — PROGRESS NOTES
[facility] CHF CLINIC OFFICE VISIT    Subjective:   Congestive Heart Failure  Pertinent negatives include no abdominal pain, chest pain, claudication, near-syncope, palpitations or shortness of breath.      Britta Lee is a 75 y.o.  female who presents to the clinic today for follow up on heart failure. She is accompanied by her  today. She has a hx of diastolic congestive heart failure. Her EF was > 70% from echocardiogram on 11/4/2020. She is currently taking Bumex 0.5 mg daily. She has been unable to tolerate Spironolactone or  ACE/ARB due to abnormal kidney function. She continues on Hydralazine 75 mg TID, Imdur 60 mg daily, Metorpolol tartrate 25 mg BID, Norvasc 10 mg daily and Clonidine 0.2 mg TID for HTN.    Shortness of air stable   LE swelling stable   Anemia - following with hematology and GI.  She has recently seen GI who has done several iron infusions.  She reports she continues to feel tired and is now noticing some unintentional weight loss.  GI plans to schedule for upper endoscopy and colonoscopy and then near future.  Urine output good   Overall doing well with sodium intake   Home weight gradually decline  BP - variable but generally high, however when reviewing and discussing medications it was noted that she has been out of her metoprolol for some time.  HR - 70-80 bpm   Seen Dr. Espinal this week who recommended to decrease her Bumex from every day to every other day.  She reports she has been out of the Bumex and has not taken any this week.  Requesting a refill to [facility] pharmacy.   She states she had a fall at home recently and did not seek further medical evaluation.  Her  reports she slipped and fell in some cat urine in the floor  Denies chest discomfort, dyspnea, dizziness, lightheadedness, syncope,  palpitations or tachycardia.     Past medical history significant for HTN, DM type 2, CKD III, iron deficiency anemia, hyperlipidemia, obesity.     PCP: Lexie  Magdaleno  Cardiologist: Dr. Cruz  Nephrologist: Dr. Johnson     Hospitalizations: Discharged on 11/8/2020  ER visit: 12/4/2020  Past Medical History:   Diagnosis Date   • Acquired hypothyroidism 4/22/2021   • Anemia    • Arthritis    • Carpal tunnel syndrome    • Coronary artery disease    • Diabetes mellitus (HCC)    • Elevated cholesterol    • GERD (gastroesophageal reflux disease)    • History of pneumonia    • History of unsteady gait     OCASSIONALLY   • Insomnia    • Kidney disease    • LENNY (obstructive sleep apnea) 12/1/2020   • Osteoarthritis    • Renal insufficiency    • Sciatica      Past Surgical History:   Procedure Laterality Date   • ABDOMINAL SURGERY     • APPENDECTOMY     • CARDIAC CATHETERIZATION      1 STENT  ---  2000   • CARDIAC SURGERY     • CAROTID STENT     • CARPAL TUNNEL RELEASE Right 10/8/2019    Procedure: CARPAL TUNNEL RELEASE;  Surgeon: Feroz Levin MD;  Location: [facility];  Service: Orthopedics   • CATARACT EXTRACTION     • CHOLECYSTECTOMY     • COLONOSCOPY     • CORONARY ANGIOPLASTY WITH STENT PLACEMENT     • ENDOSCOPY     • ENDOSCOPY N/A 3/5/2020    Procedure: ESOPHAGOGASTRODUODENOSCOPY;  Surgeon: Alexandru Bagley MD;  Location: [facility];  Service: Gastroenterology;  Laterality: N/A;   • ENDOSCOPY AND COLONOSCOPY     • GALLBLADDER SURGERY     • JOINT REPLACEMENT Left 05/02/2017    [facility]  DR LEVIN  LEFT TOTAL KNEE   • KNEE ARTHROSCOPY W/ MENISCECTOMY Right    • LAPAROSCOPIC TUBAL LIGATION     • DC TOTAL KNEE ARTHROPLASTY Left 5/2/2017    Procedure: TOTAL KNEE ARTHROPLASTY;  Surgeon: Feroz Levin MD;  Location: [facility];  Service: Orthopedics   • STERILIZATION     • TONSILLECTOMY       Social History     Socioeconomic History   • Marital status:      Spouse name: natalie   • Number of children: 3   • Years of education: 12   Tobacco Use   • Smoking status: Never Smoker   • Smokeless tobacco: Never Used   Vaping Use   • Vaping Use: Never used   Substance and  Sexual Activity   • Alcohol use: Yes     Comment: socially   • Drug use: No   • Sexual activity: Defer     Birth control/protection: Surgical     Family History   Problem Relation Age of Onset   • Arthritis Mother    • Diabetes Mother    • Cancer Mother    • Heart disease Father    • Diabetes Daughter    • Diabetes Son    • Diabetes Maternal Aunt    • Heart disease Maternal Grandmother    • Breast cancer Neg Hx      Allergies:  No Known Allergies    Review of Systems   Constitutional: Positive for malaise/fatigue. Negative for chills, fever, weight gain and weight loss.   HENT: Negative for ear discharge, hoarse voice and sore throat.    Eyes: Negative for discharge, double vision, pain, redness and visual disturbance.   Cardiovascular: Negative for chest pain, claudication, cyanosis, dyspnea on exertion, irregular heartbeat, leg swelling, near-syncope, orthopnea, palpitations and syncope.   Respiratory: Negative for cough, shortness of breath and wheezing.    Endocrine: Negative for cold intolerance, heat intolerance and polyuria.   Hematologic/Lymphatic: Negative for bleeding problem. Does not bruise/bleed easily.   Skin: Negative for color change, flushing and rash.   Musculoskeletal: Negative for arthritis, back pain, joint pain, joint swelling and muscle cramps.   Gastrointestinal: Negative for abdominal pain, constipation, diarrhea, nausea and vomiting.   Genitourinary: Negative for dysuria, flank pain, frequency, hesitancy and urgency.   Neurological: Negative for difficulty with concentration, dizziness, light-headedness, sensory change, vertigo and weakness.   Psychiatric/Behavioral: Negative for depression. The patient does not have insomnia and is not nervous/anxious.      Current Outpatient Medications   Medication Sig Dispense Refill   • albuterol sulfate  (90 Base) MCG/ACT inhaler Inhale 2 puffs Every 4 (Four) Hours As Needed for Shortness of Air. 18 g 0   • amLODIPine (NORVASC) 10 MG tablet  Take 1 tablet by mouth Daily. 30 tablet 5   • aspirin 81 MG tablet Take 1 tablet by mouth Daily. 30 tablet 5   • atorvastatin (LIPITOR) 40 MG tablet TAKE ONE Tablet BY MOUTH EACH NIGHT AT BEDTIME 90 tablet 1   • BD Pen Needle Evon U/F 32G X 4 MM misc      • bumetanide (BUMEX) 0.5 MG tablet Take 0.5 mg by mouth Every Other Day.     • butalbital-acetaminophen-caffeine (FIORICET, ESGIC) -40 MG per tablet TAKE ONE Tablet BY MOUTH EVERY 4 HOURS AS NEEDED FOR HEADACHE 6 tablet 1   • cloNIDine (CATAPRES) 0.2 MG tablet Take 1 tablet by mouth 3 (Three) Times a Day. (Patient taking differently: Take 0.1 mg by mouth 3 (Three) Times a Day.) 270 tablet 1   • Continuous Blood Gluc  (Dexcom G6 ) device 1 Device Daily. 3 each 3   • Continuous Blood Gluc Sensor (Dexcom G6 Sensor) Every 10 (Ten) Days. 9 each 3   • Continuous Blood Gluc Transmit (Dexcom G6 Transmitter) misc 1 Device Daily. 1 each 0   • escitalopram (LEXAPRO) 10 MG tablet Take 1 tablet by mouth Daily. 90 tablet 3   • glucose blood test strip 1 each by Other route 3 (Three) Times a Day. 100 each 12   • hydrALAZINE (APRESOLINE) 25 MG tablet Take 3 tablets by mouth 3 (Three) Times a Day. 270 tablet 1   • Insulin Glargine, 2 Unit Dial, (Toujeo Max SoloStar) 300 UNIT/ML solution pen-injector injection Inject 40-60 Units under the skin into the appropriate area as directed Daily. 3 pen 5   • insulin lispro (humaLOG) 100 UNIT/ML injection Inject 5 Units under the skin into the appropriate area as directed 3 (Three) Times a Day Before Meals. Sliding scale:4-6 units     • Insulin Pen Needle 32G X 5 MM misc 1 each 4 (Four) Times a Day. 120 each 5   • isosorbide mononitrate (IMDUR) 60 MG 24 hr tablet Take 1 tablet by mouth Daily. 90 tablet 1   • levothyroxine (SYNTHROID, LEVOTHROID) 25 MCG tablet Take 1 tablet by mouth Daily. 30 tablet 5   • metoprolol tartrate (LOPRESSOR) 25 MG tablet Take 1 tablet by mouth 2 (Two) Times a Day. 60 tablet 1   • pantoprazole  (Protonix) 40 MG EC tablet Take 1 tablet by mouth Daily. 30 tablet 1   • polyethylene glycol (MIRALAX) 17 GM/SCOOP powder Take 255 g of MiraLAX with 32 ounces liquid then repeat 8 hours later for MiraLAX cleanout. 510 g 0   • promethazine (PHENERGAN) 25 MG tablet Take 1 tablet by mouth Every 6 (Six) Hours As Needed for Nausea or Vomiting. 60 tablet 5   • vitamin D (ERGOCALCIFEROL) 1.25 MG (38281 UT) capsule capsule Take 1 capsule by mouth 1 (One) Time Per Week. 5 capsule 5   • bumetanide (BUMEX) 0.5 MG tablet Take 1 tablet by mouth Daily. 90 tablet 0   • nystatin (MYCOSTATIN) 397416 UNIT/GM powder Apply  one application topically to the appropriate area as directed Every 12 (Twelve) Hours. (Patient taking differently: Apply  topically to the appropriate area as directed 2 (Two) Times a Day As Needed (itching).) 60 g 0     No current facility-administered medications for this encounter.     Objective:     Vitals:    10/22/21 0904   BP: 162/78   Pulse: 107   Resp: 22   SpO2: 98%     Body mass index is 34.08 kg/m².    Wt Readings from Last 3 Encounters:   10/22/21 87.3 kg (192 lb 6.4 oz)   10/20/21 90.2 kg (198 lb 12.8 oz)   10/07/21 93.1 kg (205 lb 3.2 oz)       ReDs - 32% (4/14/2021)  Lab Results   Component Value Date    ABSOLUTELUNG 23 10/22/2021     Vitals reviewed.   Constitutional:       Appearance: Normal appearance. Well-developed.      Comments: Wears a mask during encounter   Eyes:      Conjunctiva/sclera: Conjunctivae normal.   HENT:      Head: Normocephalic.   Neck:      Vascular: No JVD or JVR.   Pulmonary:      Effort: Pulmonary effort is normal.      Breath sounds: Normal breath sounds.   Cardiovascular:      Normal rate. Regular rhythm.   Pulses:     Intact distal pulses.   Edema:     Peripheral edema present.     Ankle: bilateral 1+ edema of the ankle.     Feet: bilateral 1+ edema of the feet.  Abdominal:      General: Bowel sounds are normal.      Palpations: Abdomen is soft. There is no hepatomegaly  or splenomegaly.   Musculoskeletal: Normal range of motion.      Cervical back: Normal range of motion and neck supple. Skin:     General: Skin is warm and dry.   Neurological:      Mental Status: Alert and oriented to person, place, and time.   Psychiatric:         Attention and Perception: Attention normal.         Mood and Affect: Mood normal.         Speech: Speech normal.         Behavior: Behavior normal. Behavior is cooperative.         Cognition and Memory: Cognition normal.     Cardiographics  Results for orders placed during the hospital encounter of 11/03/20    Adult Transthoracic Echo Complete W/ Cont if Necessary Per Protocol    Interpretation Summary  · Left ventricular wall thickness is consistent with concentric hypertrophy.  · Left ventricular ejection fraction appears to be greater than 70%. Left ventricular systolic function is hyperdynamic (EF > 70%).  · Left ventricular diastolic function is consistent with (grade II w/high LAP) pseudonormalization.  · The left atrial cavity is moderate to severely dilated.  · The right atrial cavity is mildly dilated.  · Moderate mitral annular calcification is present.  · Mild mitral valve regurgitation is present.  · Mild tricuspid valve regurgitation is present.  · Estimated right ventricular systolic pressure from tricuspid regurgitation is markedly elevated (>55 mmHg).    EKG:       Lab Review   Lab Results   Component Value Date    TSH 3.760 09/23/2021     Lab Results   Component Value Date    GLUCOSE 333 (H) 10/22/2021    BUN 36 (H) 10/22/2021    CREATININE 1.94 (H) 10/22/2021    EGFRIFNONA 25 (L) 10/22/2021    EGFRIFAFRI 41 (L) 07/30/2018    BCR 18.6 10/22/2021    K 4.8 10/22/2021    CO2 20.3 (L) 10/22/2021    CALCIUM 9.1 10/22/2021    PROTENTOTREF 7.7 07/30/2018    ALBUMIN 3.44 (L) 09/23/2021    LABIL2 1.1 (L) 07/30/2018    AST 69 (H) 09/23/2021    ALT 35 (H) 09/23/2021     Lab Results   Component Value Date    WBC 10.64 10/20/2021    HGB 9.0 (L)  10/20/2021    HCT 31.2 (L) 10/20/2021    MCV 83.9 10/20/2021     10/20/2021     Lab Results   Component Value Date    CKTOTAL 67 12/04/2020    CKMB 2.92 04/16/2018    TROPONINI 0.012 04/16/2018    TROPONINT <0.010 09/02/2021     Lab Results   Component Value Date    PROBNP 1,854.0 (H) 10/22/2021     The following portions of the patient's history were reviewed and updated as appropriate: allergies, current medications, past family history, past medical history, past social history, past surgical history and problem list.     Old records reviewed and pertinent information is included in the above objective data.     Assessment/Plan:   1. Chronic Diastolic Congestive Heart Failure, EF > 70%   2. HTN  3. Pedal edema   4. CKD, stage IV  5. Anemia, iron deficency   6. Obesity     Pro-BNP, BMP and magnesium today  Discussed and reviewed labs with patient today  ReDs reviewed and discussed with patient today   From a HF standpoint she is euvolemic and appears stable - continue on Bumex 0.5 mg every other day at recommendation of nephrology.   Refill Metoprolol to [facility] pharmacy.  Pharmacy verified that [facility] had not filled metoprolol since July and mail order did not have a prescription on file.   Continue on current medications and encourage in compliance  Keep follow-up with GI and hematology for anemia  Monitor BP and HR  Weight loss discussed and healthy lifestyle recommended   Advised she needs follow-up with PCP after her recent fall.  Reviewed and discussed red flag symptoms that would require immediate evaluation.  She and her  expresses understanding  Counseling patient extensively on dietary Na+ intake, importance of activity, weight monitoring, compliance with medications and follow up appointments.  Follow up in 2 months, sooner if needed.

## 2021-10-26 DIAGNOSIS — Z01.818 PREOPERATIVE CLEARANCE: ICD-10-CM

## 2021-10-26 DIAGNOSIS — R13.19 ESOPHAGEAL DYSPHAGIA: ICD-10-CM

## 2021-10-26 DIAGNOSIS — N18.4 CHRONIC RENAL DISEASE, STAGE IV (HCC): Primary | ICD-10-CM

## 2021-10-26 DIAGNOSIS — D50.9 IRON DEFICIENCY ANEMIA, UNSPECIFIED IRON DEFICIENCY ANEMIA TYPE: ICD-10-CM

## 2021-10-26 DIAGNOSIS — R63.4 WEIGHT LOSS, ABNORMAL: ICD-10-CM

## 2021-11-01 ENCOUNTER — LAB (OUTPATIENT)
Dept: FAMILY MEDICINE CLINIC | Facility: CLINIC | Age: 76
End: 2021-11-01

## 2021-11-01 DIAGNOSIS — N25.81 SECONDARY HYPERPARATHYROIDISM OF RENAL ORIGIN (HCC): Primary | ICD-10-CM

## 2021-11-01 DIAGNOSIS — D50.9 IRON DEFICIENCY ANEMIA, UNSPECIFIED IRON DEFICIENCY ANEMIA TYPE: ICD-10-CM

## 2021-11-01 DIAGNOSIS — N18.32 CHRONIC KIDNEY DISEASE (CKD) STAGE G3B/A1, MODERATELY DECREASED GLOMERULAR FILTRATION RATE (GFR) BETWEEN 30-44 ML/MIN/1.73 SQUARE METER AND ALBUMINURIA CREATININE RATIO LESS THAN 30 MG/G (CMS/H* (HCC): ICD-10-CM

## 2021-11-01 LAB
25(OH)D3 SERPL-MCNC: 40 NG/ML
ALBUMIN SERPL-MCNC: 3.8 G/DL (ref 3.5–5.2)
ALBUMIN/GLOB SERPL: 1.3 G/DL
ALP SERPL-CCNC: 442 U/L (ref 39–117)
ALT SERPL W P-5'-P-CCNC: 38 U/L (ref 1–33)
ANION GAP SERPL CALCULATED.3IONS-SCNC: 16.1 MMOL/L (ref 5–15)
AST SERPL-CCNC: 58 U/L (ref 1–32)
BASOPHILS # BLD AUTO: 0.06 10*3/MM3 (ref 0–0.2)
BASOPHILS NFR BLD AUTO: 0.6 % (ref 0–1.5)
BILIRUB SERPL-MCNC: <0.2 MG/DL (ref 0–1.2)
BUN SERPL-MCNC: 40 MG/DL (ref 8–23)
BUN/CREAT SERPL: 23.8 (ref 7–25)
CALCIUM SPEC-SCNC: 9.1 MG/DL (ref 8.6–10.5)
CHLORIDE SERPL-SCNC: 105 MMOL/L (ref 98–107)
CO2 SERPL-SCNC: 17.9 MMOL/L (ref 22–29)
CREAT SERPL-MCNC: 1.68 MG/DL (ref 0.57–1)
DEPRECATED RDW RBC AUTO: 49.4 FL (ref 37–54)
EOSINOPHIL # BLD AUTO: 0.45 10*3/MM3 (ref 0–0.4)
EOSINOPHIL NFR BLD AUTO: 4.4 % (ref 0.3–6.2)
ERYTHROCYTE [DISTWIDTH] IN BLOOD BY AUTOMATED COUNT: 17 % (ref 12.3–15.4)
GFR SERPL CREATININE-BSD FRML MDRD: 29 ML/MIN/1.73
GLOBULIN UR ELPH-MCNC: 2.9 GM/DL
GLUCOSE SERPL-MCNC: 130 MG/DL (ref 65–99)
HCT VFR BLD AUTO: 27.4 % (ref 34–46.6)
HGB BLD-MCNC: 8.3 G/DL (ref 12–15.9)
IMM GRANULOCYTES # BLD AUTO: 0.04 10*3/MM3 (ref 0–0.05)
IMM GRANULOCYTES NFR BLD AUTO: 0.4 % (ref 0–0.5)
LYMPHOCYTES # BLD AUTO: 2.59 10*3/MM3 (ref 0.7–3.1)
LYMPHOCYTES NFR BLD AUTO: 25.5 % (ref 19.6–45.3)
MCH RBC QN AUTO: 24.2 PG (ref 26.6–33)
MCHC RBC AUTO-ENTMCNC: 30.3 G/DL (ref 31.5–35.7)
MCV RBC AUTO: 79.9 FL (ref 79–97)
MONOCYTES # BLD AUTO: 0.53 10*3/MM3 (ref 0.1–0.9)
MONOCYTES NFR BLD AUTO: 5.2 % (ref 5–12)
NEUTROPHILS NFR BLD AUTO: 6.49 10*3/MM3 (ref 1.7–7)
NEUTROPHILS NFR BLD AUTO: 63.9 % (ref 42.7–76)
NRBC BLD AUTO-RTO: 0 /100 WBC (ref 0–0.2)
PHOSPHATE SERPL-MCNC: 3.6 MG/DL (ref 2.5–4.5)
PLATELET # BLD AUTO: 386 10*3/MM3 (ref 140–450)
PMV BLD AUTO: 10.6 FL (ref 6–12)
POTASSIUM SERPL-SCNC: 4.5 MMOL/L (ref 3.5–5.2)
PROT SERPL-MCNC: 6.7 G/DL (ref 6–8.5)
PTH-INTACT SERPL-MCNC: 59.5 PG/ML (ref 15–65)
RBC # BLD AUTO: 3.43 10*6/MM3 (ref 3.77–5.28)
SODIUM SERPL-SCNC: 139 MMOL/L (ref 136–145)
WBC # BLD AUTO: 10.16 10*3/MM3 (ref 3.4–10.8)

## 2021-11-01 PROCEDURE — 85025 COMPLETE CBC W/AUTO DIFF WBC: CPT | Performed by: INTERNAL MEDICINE

## 2021-11-01 PROCEDURE — 82306 VITAMIN D 25 HYDROXY: CPT | Performed by: INTERNAL MEDICINE

## 2021-11-01 PROCEDURE — 84100 ASSAY OF PHOSPHORUS: CPT | Performed by: INTERNAL MEDICINE

## 2021-11-01 PROCEDURE — 80053 COMPREHEN METABOLIC PANEL: CPT | Performed by: INTERNAL MEDICINE

## 2021-11-01 PROCEDURE — 83970 ASSAY OF PARATHORMONE: CPT | Performed by: INTERNAL MEDICINE

## 2021-11-02 LAB
BACTERIA UR QL AUTO: ABNORMAL /HPF
BILIRUB UR QL STRIP: NEGATIVE
CLARITY UR: CLEAR
COLOR UR: YELLOW
GLUCOSE UR STRIP-MCNC: ABNORMAL MG/DL
HGB UR QL STRIP.AUTO: NEGATIVE
HYALINE CASTS UR QL AUTO: ABNORMAL /LPF
KETONES UR QL STRIP: NEGATIVE
LEUKOCYTE ESTERASE UR QL STRIP.AUTO: NEGATIVE
NITRITE UR QL STRIP: NEGATIVE
PH UR STRIP.AUTO: 5.5 [PH] (ref 5–8)
PROT UR QL STRIP: ABNORMAL
RBC # UR: ABNORMAL /HPF
REF LAB TEST METHOD: ABNORMAL
SP GR UR STRIP: 1.02 (ref 1–1.03)
SQUAMOUS #/AREA URNS HPF: ABNORMAL /HPF
UROBILINOGEN UR QL STRIP: ABNORMAL
WBC UR QL AUTO: ABNORMAL /HPF

## 2021-11-02 PROCEDURE — 81001 URINALYSIS AUTO W/SCOPE: CPT | Performed by: INTERNAL MEDICINE

## 2021-11-02 PROCEDURE — 82570 ASSAY OF URINE CREATININE: CPT | Performed by: INTERNAL MEDICINE

## 2021-11-02 PROCEDURE — 84156 ASSAY OF PROTEIN URINE: CPT | Performed by: INTERNAL MEDICINE

## 2021-11-03 LAB
CREAT UR-MCNC: 121.4 MG/DL
PROT UR-MCNC: 519 MG/DL
PROT/CREAT UR: 4275.1 MG/G CREA (ref 0–200)

## 2021-11-15 ENCOUNTER — FLU SHOT (OUTPATIENT)
Dept: FAMILY MEDICINE CLINIC | Facility: CLINIC | Age: 76
End: 2021-11-15

## 2021-11-15 DIAGNOSIS — Z23 ENCOUNTER FOR IMMUNIZATION: Primary | ICD-10-CM

## 2021-11-15 PROCEDURE — 90662 IIV NO PRSV INCREASED AG IM: CPT | Performed by: PHYSICIAN ASSISTANT

## 2021-11-15 PROCEDURE — G0008 ADMIN INFLUENZA VIRUS VAC: HCPCS | Performed by: PHYSICIAN ASSISTANT

## 2021-11-15 NOTE — PROGRESS NOTES
Injection  Injection performed in left deltoid by Liyah Chandler MA. Patient tolerated the procedure well without complications.  11/15/21   Liyah Chandler MA

## 2021-11-16 PROBLEM — R63.4 WEIGHT LOSS, ABNORMAL: Status: ACTIVE | Noted: 2021-11-16

## 2021-11-17 ENCOUNTER — TELEPHONE (OUTPATIENT)
Dept: ONCOLOGY | Facility: CLINIC | Age: 76
End: 2021-11-17

## 2021-11-17 ENCOUNTER — TELEPHONE (OUTPATIENT)
Dept: GASTROENTEROLOGY | Facility: CLINIC | Age: 76
End: 2021-11-17

## 2021-11-17 NOTE — TELEPHONE ENCOUNTER
Caller: Britta Lee    Relationship: Self    Best call back number: [phone]    What is the best time to reach you: ASAP    Who are you requesting to speak with (clinical staff, provider,  specific staff member): NICOLA GALVEZ    Do you know the name of the person who called:     What was the call regarding: PT REQ TO SPEAK WITH NICOLA GALVEZ    Do you require a callback: YES

## 2021-11-18 ENCOUNTER — TELEPHONE (OUTPATIENT)
Dept: GASTROENTEROLOGY | Facility: CLINIC | Age: 76
End: 2021-11-18

## 2021-11-19 ENCOUNTER — LAB (OUTPATIENT)
Dept: LAB | Facility: HOSPITAL | Age: 76
End: 2021-11-19

## 2021-11-19 DIAGNOSIS — R13.19 ESOPHAGEAL DYSPHAGIA: ICD-10-CM

## 2021-11-19 DIAGNOSIS — N18.4 CHRONIC RENAL DISEASE, STAGE IV (HCC): ICD-10-CM

## 2021-11-19 DIAGNOSIS — Z01.818 PREOPERATIVE CLEARANCE: ICD-10-CM

## 2021-11-19 DIAGNOSIS — D50.9 IRON DEFICIENCY ANEMIA, UNSPECIFIED IRON DEFICIENCY ANEMIA TYPE: ICD-10-CM

## 2021-11-19 DIAGNOSIS — R63.4 WEIGHT LOSS, ABNORMAL: ICD-10-CM

## 2021-11-19 LAB — SARS-COV-2 RNA PNL SPEC NAA+PROBE: NOT DETECTED

## 2021-11-19 PROCEDURE — U0004 COV-19 TEST NON-CDC HGH THRU: HCPCS

## 2021-11-19 PROCEDURE — C9803 HOPD COVID-19 SPEC COLLECT: HCPCS

## 2021-11-23 ENCOUNTER — ANESTHESIA (OUTPATIENT)
Dept: PERIOP | Facility: HOSPITAL | Age: 76
End: 2021-11-23

## 2021-11-23 ENCOUNTER — ANESTHESIA EVENT (OUTPATIENT)
Dept: PERIOP | Facility: HOSPITAL | Age: 76
End: 2021-11-23

## 2021-11-23 ENCOUNTER — HOSPITAL ENCOUNTER (OUTPATIENT)
Facility: HOSPITAL | Age: 76
Setting detail: HOSPITAL OUTPATIENT SURGERY
Discharge: HOME | End: 2021-11-23
Attending: INTERNAL MEDICINE | Admitting: INTERNAL MEDICINE

## 2021-11-23 VITALS
SYSTOLIC BLOOD PRESSURE: 130 MMHG | HEART RATE: 62 BPM | WEIGHT: 197 LBS | OXYGEN SATURATION: 97 % | HEIGHT: 63 IN | BODY MASS INDEX: 34.91 KG/M2 | TEMPERATURE: 97 F | RESPIRATION RATE: 18 BRPM | DIASTOLIC BLOOD PRESSURE: 62 MMHG

## 2021-11-23 DIAGNOSIS — R63.4 WEIGHT LOSS, ABNORMAL: ICD-10-CM

## 2021-11-23 DIAGNOSIS — D50.9 IRON DEFICIENCY ANEMIA, UNSPECIFIED IRON DEFICIENCY ANEMIA TYPE: ICD-10-CM

## 2021-11-23 PROCEDURE — 25010000002 PROPOFOL 10 MG/ML EMULSION: Performed by: NURSE ANESTHETIST, CERTIFIED REGISTERED

## 2021-11-23 PROCEDURE — 45385 COLONOSCOPY W/LESION REMOVAL: CPT | Performed by: INTERNAL MEDICINE

## 2021-11-23 PROCEDURE — 45380 COLONOSCOPY AND BIOPSY: CPT | Performed by: INTERNAL MEDICINE

## 2021-11-23 PROCEDURE — 43239 EGD BIOPSY SINGLE/MULTIPLE: CPT | Performed by: INTERNAL MEDICINE

## 2021-11-23 PROCEDURE — 88305 TISSUE EXAM BY PATHOLOGIST: CPT | Performed by: INTERNAL MEDICINE

## 2021-11-23 RX ORDER — OXYCODONE HYDROCHLORIDE AND ACETAMINOPHEN 5; 325 MG/1; MG/1
1 TABLET ORAL ONCE AS NEEDED
Status: DISCONTINUED | OUTPATIENT
Start: 2021-11-23 | End: 2021-11-23 | Stop reason: HOSPADM

## 2021-11-23 RX ORDER — IPRATROPIUM BROMIDE AND ALBUTEROL SULFATE 2.5; .5 MG/3ML; MG/3ML
3 SOLUTION RESPIRATORY (INHALATION) ONCE AS NEEDED
Status: DISCONTINUED | OUTPATIENT
Start: 2021-11-23 | End: 2021-11-23 | Stop reason: HOSPADM

## 2021-11-23 RX ORDER — DROPERIDOL 2.5 MG/ML
0.62 INJECTION, SOLUTION INTRAMUSCULAR; INTRAVENOUS ONCE AS NEEDED
Status: DISCONTINUED | OUTPATIENT
Start: 2021-11-23 | End: 2021-11-23 | Stop reason: HOSPADM

## 2021-11-23 RX ORDER — SODIUM CHLORIDE, SODIUM LACTATE, POTASSIUM CHLORIDE, CALCIUM CHLORIDE 600; 310; 30; 20 MG/100ML; MG/100ML; MG/100ML; MG/100ML
100 INJECTION, SOLUTION INTRAVENOUS ONCE AS NEEDED
Status: DISCONTINUED | OUTPATIENT
Start: 2021-11-23 | End: 2021-11-23 | Stop reason: HOSPADM

## 2021-11-23 RX ORDER — FENTANYL CITRATE 50 UG/ML
50 INJECTION, SOLUTION INTRAMUSCULAR; INTRAVENOUS
Status: DISCONTINUED | OUTPATIENT
Start: 2021-11-23 | End: 2021-11-23 | Stop reason: HOSPADM

## 2021-11-23 RX ORDER — PROPOFOL 10 MG/ML
VIAL (ML) INTRAVENOUS AS NEEDED
Status: DISCONTINUED | OUTPATIENT
Start: 2021-11-23 | End: 2021-11-23 | Stop reason: SURG

## 2021-11-23 RX ORDER — LIDOCAINE HYDROCHLORIDE 20 MG/ML
INJECTION, SOLUTION INFILTRATION; PERINEURAL AS NEEDED
Status: DISCONTINUED | OUTPATIENT
Start: 2021-11-23 | End: 2021-11-23 | Stop reason: SURG

## 2021-11-23 RX ORDER — SODIUM CHLORIDE, SODIUM LACTATE, POTASSIUM CHLORIDE, CALCIUM CHLORIDE 600; 310; 30; 20 MG/100ML; MG/100ML; MG/100ML; MG/100ML
125 INJECTION, SOLUTION INTRAVENOUS ONCE
Status: COMPLETED | OUTPATIENT
Start: 2021-11-23 | End: 2021-11-23

## 2021-11-23 RX ORDER — SODIUM CHLORIDE 0.9 % (FLUSH) 0.9 %
10 SYRINGE (ML) INJECTION EVERY 12 HOURS SCHEDULED
Status: DISCONTINUED | OUTPATIENT
Start: 2021-11-23 | End: 2021-11-23 | Stop reason: HOSPADM

## 2021-11-23 RX ORDER — MEPERIDINE HYDROCHLORIDE 25 MG/ML
12.5 INJECTION INTRAMUSCULAR; INTRAVENOUS; SUBCUTANEOUS
Status: DISCONTINUED | OUTPATIENT
Start: 2021-11-23 | End: 2021-11-23 | Stop reason: HOSPADM

## 2021-11-23 RX ORDER — SODIUM CHLORIDE 0.9 % (FLUSH) 0.9 %
10 SYRINGE (ML) INJECTION AS NEEDED
Status: DISCONTINUED | OUTPATIENT
Start: 2021-11-23 | End: 2021-11-23 | Stop reason: HOSPADM

## 2021-11-23 RX ORDER — MIDAZOLAM HYDROCHLORIDE 1 MG/ML
0.5 INJECTION INTRAMUSCULAR; INTRAVENOUS
Status: DISCONTINUED | OUTPATIENT
Start: 2021-11-23 | End: 2021-11-23 | Stop reason: HOSPADM

## 2021-11-23 RX ORDER — ONDANSETRON 2 MG/ML
4 INJECTION INTRAMUSCULAR; INTRAVENOUS AS NEEDED
Status: DISCONTINUED | OUTPATIENT
Start: 2021-11-23 | End: 2021-11-23 | Stop reason: HOSPADM

## 2021-11-23 RX ORDER — KETOROLAC TROMETHAMINE 30 MG/ML
15 INJECTION, SOLUTION INTRAMUSCULAR; INTRAVENOUS EVERY 6 HOURS PRN
Status: DISCONTINUED | OUTPATIENT
Start: 2021-11-23 | End: 2021-11-23 | Stop reason: HOSPADM

## 2021-11-23 RX ORDER — SODIUM CHLORIDE, SODIUM LACTATE, POTASSIUM CHLORIDE, CALCIUM CHLORIDE 600; 310; 30; 20 MG/100ML; MG/100ML; MG/100ML; MG/100ML
125 INJECTION, SOLUTION INTRAVENOUS ONCE
Status: DISCONTINUED | OUTPATIENT
Start: 2021-11-23 | End: 2021-11-23 | Stop reason: HOSPADM

## 2021-11-23 RX ADMIN — PROPOFOL 200 MCG/KG/MIN: 10 INJECTION, EMULSION INTRAVENOUS at 08:51

## 2021-11-23 RX ADMIN — SODIUM CHLORIDE, POTASSIUM CHLORIDE, SODIUM LACTATE AND CALCIUM CHLORIDE: 600; 310; 30; 20 INJECTION, SOLUTION INTRAVENOUS at 08:51

## 2021-11-23 RX ADMIN — PROPOFOL 50 MG: 10 INJECTION, EMULSION INTRAVENOUS at 08:51

## 2021-11-23 RX ADMIN — LIDOCAINE HYDROCHLORIDE 60 MG: 20 INJECTION, SOLUTION INFILTRATION; PERINEURAL at 08:51

## 2021-11-23 NOTE — OP NOTE
COLONOSCOPY PROCEDURE NOTE    Britta Lee  11/23/2021    GASTROENTEROLOGIST:  Palmira Pate MD      PRE-PROCEDURE DIAGNOSIS:   Iron deficiency anemia, unspecified iron deficiency anemia type [D50.9]  Weight loss, abnormal [R63.4]    POST-PROCEDURE DIAGNOSIS:  1.  Colon polyps  2.  Diverticulosis  3.  Internal hemorrhoids    PROCEDURE:  COLONOSCOPY with snare polypectomy and cold biopsy polypectomy    ANESTHESIA:  Propofol administered by anesthesia.  See anesthesia notes for ASA classification    STAFF  Circulator: Bre Ny RN; Danelle Mccray RN  Endo Technician: Omari Lewis    Findings:  1.  A 5 mm polyp was found in the ascending colon.  This was removed with cold forceps polypectomy.  2.  A 5 mm polyp was removed from the transverse colon with cold forceps biopsy.  3.  Two 6 mm polyps were found in the descending colon.  Both polyps were removed with cold snare polypectomy.  4.  A 7 mm polyp was removed from the sigmoid colon with cold snare polypectomy.  5.  Diverticulosis involving left colon.  6.  Small internal hemorrhoids.  7.  Normal-appearing terminal ileum.    OPERATIVE PROCEDURE   After proper informed consent was obtained, the patient was taken the operating suite and placed in left lateral decubitus position.  An Olympus video colonoscope 180 series was inserted in the rectum and advanced to the terminal ileum under direct visualization.  Cecum and terminal ileum were identified by visualization of the appendiceal orifice and ileocecal valve.  The colonoscope was then slowly withdrawn from the cecum to the rectum and passed a second time from rectum to cecum.  The colonoscope was retroflexed in the cecum and rectum. Scope was then withdrawn. Patient tolerated the procedure well. There were no immediate complications. Cecal withdrawal time was 15 minutes.    ESTIMATED BLOOD LOSS  None     COMPLICATIONS  None    RECOMMENDATIONS:  1.  Follow-up pathology.  2.  Repeat  colonoscopy in 3 years due to personal history of colon polyps.  3.  Proceed with capsule endoscopy if anemia persists.  Sign 4.  Follow-up in GI clinic in 6 to 8 weeks.    Palmira Pate MD  11/23/21 09:25 EST

## 2021-11-23 NOTE — ANESTHESIA PREPROCEDURE EVALUATION
Anesthesia Evaluation     Patient summary reviewed and Nursing notes reviewed   no history of anesthetic complications:  NPO Solid Status: > 8 hours  NPO Liquid Status: > 8 hours           Airway   Mallampati: III  TM distance: >3 FB  Possible difficult intubation and Large neck circumference  Dental    (+) poor dentition        Pulmonary - negative pulmonary ROS and normal exam   Cardiovascular     ECG reviewed    (+) hypertension 2 medications or greater, CAD, cardiac stents more than 12 months ago CHF , hyperlipidemia,     ROS comment: Followed by cardiologist Dr. Martines in [place], last saw about one year ago    Neuro/Psych  (+) psychiatric history Anxiety,     GI/Hepatic/Renal/Endo    (+) obesity,  GERD,  renal disease CRI, diabetes mellitus type 1 using insulin,     Musculoskeletal     Abdominal  - normal exam   Substance History      OB/GYN          Other   arthritis,                        Anesthesia Plan    ASA 3     general     intravenous induction     Anesthetic plan, all risks, benefits, and alternatives have been provided, discussed and informed consent has been obtained with: patient.

## 2021-11-23 NOTE — H&P
Chief complaint  Anemia and swallowing problems    Subjective     Patient is a 76 y.o. female who presents today for an EGD and colonoscopy.   She was referred for evaluation of anemia.  Patient reports a 43 lb weight loss in the past year that is unintentional.  She also reports fatigue and trouble swallowing meats and other solids.  Patient denies BRBPR and black stools.  Family history is negative for GI disease.  Medical, surgical, and social histories were reviewed and are listed below.      Review of Systems  Review of Systems - General ROS: negative for - weight loss  Psychological ROS: negative for - behavioral disorder  Ophthalmic ROS: negative for - dry eyes  ENT ROS: negative for - vertigo or vocal changes  Hematological and Lymphatic ROS: negative for - jaundice or swollen lymph nodes  Respiratory ROS: negative for - sputum changes or stridor  Cardiovascular ROS: negative for - irregular heartbeat or murmur  Gastrointestinal ROS: positive for-dysphagia symptoms  ;negative for - blood in stools or change in stools  Genito-Urinary ROS: negative for - hematuria or incontinence  Musculoskeletal ROS: negative for - gait disturbance      History  Past Medical History:   Diagnosis Date   • Acquired hypothyroidism 4/22/2021   • Anemia    • Arthritis    • Carpal tunnel syndrome    • Coronary artery disease    • Diabetes mellitus (HCC)    • Elevated cholesterol    • GERD (gastroesophageal reflux disease)    • History of pneumonia    • History of unsteady gait     OCASSIONALLY   • Hypertension    • Insomnia    • Kidney disease    • LENNY (obstructive sleep apnea) 12/1/2020   • Osteoarthritis    • Renal insufficiency    • Sciatica      Past Surgical History:   Procedure Laterality Date   • ABDOMINAL SURGERY     • APPENDECTOMY     • CARDIAC CATHETERIZATION      1 STENT  ---  2000   • CARDIAC SURGERY     • CAROTID STENT     • CARPAL TUNNEL RELEASE Right 10/8/2019    Procedure: CARPAL TUNNEL RELEASE;  Surgeon:  Feroz Johnson MD;  Location: [facility];  Service: Orthopedics   • CATARACT EXTRACTION     • CHOLECYSTECTOMY     • COLONOSCOPY     • CORONARY ANGIOPLASTY WITH STENT PLACEMENT     • ENDOSCOPY     • ENDOSCOPY N/A 3/5/2020    Procedure: ESOPHAGOGASTRODUODENOSCOPY;  Surgeon: Alexandru Bagley MD;  Location: [facility];  Service: Gastroenterology;  Laterality: N/A;   • ENDOSCOPY AND COLONOSCOPY     • GALLBLADDER SURGERY     • JOINT REPLACEMENT Left 05/02/2017    [facility]  DR JOHNSON  LEFT TOTAL KNEE   • KNEE ARTHROSCOPY W/ MENISCECTOMY Right    • LAPAROSCOPIC TUBAL LIGATION     • NE TOTAL KNEE ARTHROPLASTY Left 5/2/2017    Procedure: TOTAL KNEE ARTHROPLASTY;  Surgeon: Feroz Johnson MD;  Location: [facility];  Service: Orthopedics   • STERILIZATION     • TONSILLECTOMY       Family History   Problem Relation Age of Onset   • Arthritis Mother    • Diabetes Mother    • Cancer Mother    • Heart disease Father    • Diabetes Daughter    • Diabetes Son    • Diabetes Maternal Aunt    • Heart disease Maternal Grandmother    • Breast cancer Neg Hx      Social History     Tobacco Use   • Smoking status: Never Smoker   • Smokeless tobacco: Never Used   Vaping Use   • Vaping Use: Never used   Substance Use Topics   • Alcohol use: Yes     Comment: socially   • Drug use: No     Medications Prior to Admission   Medication Sig Dispense Refill Last Dose   • albuterol sulfate  (90 Base) MCG/ACT inhaler Inhale 2 puffs Every 4 (Four) Hours As Needed for Shortness of Air. 18 g 0 Past Month at Unknown time   • amLODIPine (NORVASC) 10 MG tablet Take 1 tablet by mouth Daily. 30 tablet 5 11/23/2021 at 0630   • atorvastatin (LIPITOR) 40 MG tablet TAKE ONE Tablet BY MOUTH EACH NIGHT AT BEDTIME 90 tablet 1 11/22/2021 at Unknown time   • bumetanide (BUMEX) 0.5 MG tablet Take 1 tablet by mouth Daily. 90 tablet 0 Past Week at Unknown time   • cloNIDine (CATAPRES) 0.2 MG tablet Take 1 tablet by mouth 3 (Three) Times a Day. (Patient  taking differently: Take 0.1 mg by mouth 3 (Three) Times a Day.) 270 tablet 1 11/23/2021 at 0630   • escitalopram (LEXAPRO) 10 MG tablet Take 1 tablet by mouth Daily. 90 tablet 3 11/22/2021 at Unknown time   • hydrALAZINE (APRESOLINE) 25 MG tablet Take 3 tablets by mouth 3 (Three) Times a Day. 270 tablet 1 11/22/2021 at Unknown time   • Insulin Glargine, 2 Unit Dial, (Toujeo Max SoloStar) 300 UNIT/ML solution pen-injector injection Inject 40-60 Units under the skin into the appropriate area as directed Daily. 3 pen 5 Past Week at Unknown time   • insulin lispro (humaLOG) 100 UNIT/ML injection Inject 5 Units under the skin into the appropriate area as directed 3 (Three) Times a Day Before Meals. Sliding scale:4-6 units   Past Week at Unknown time   • isosorbide mononitrate (IMDUR) 60 MG 24 hr tablet Take 1 tablet by mouth Daily. 90 tablet 1 11/23/2021 at 0630   • levothyroxine (SYNTHROID, LEVOTHROID) 25 MCG tablet Take 1 tablet by mouth Daily. 30 tablet 5 11/22/2021 at Unknown time   • metoprolol tartrate (LOPRESSOR) 25 MG tablet Take 1 tablet by mouth 2 (Two) Times a Day. 60 tablet 1 11/23/2021 at 0630   • pantoprazole (Protonix) 40 MG EC tablet Take 1 tablet by mouth Daily. 30 tablet 1 Past Week at Unknown time   • polyethylene glycol (MIRALAX) 17 GM/SCOOP powder Take 255 g of MiraLAX with 32 ounces liquid then repeat 8 hours later for MiraLAX cleanout. 510 g 0 11/22/2021 at Unknown time   • vitamin D (ERGOCALCIFEROL) 1.25 MG (03876 UT) capsule capsule Take 1 capsule by mouth 1 (One) Time Per Week. 5 capsule 5 Past Week at Unknown time   • aspirin 81 MG tablet Take 1 tablet by mouth Daily. 30 tablet 5 11/16/2021   • BD Pen Needle Evon U/F 32G X 4 MM misc       • bumetanide (BUMEX) 0.5 MG tablet Take 0.5 mg by mouth Every Other Day.      • butalbital-acetaminophen-caffeine (FIORICET, ESGIC) -40 MG per tablet TAKE ONE Tablet BY MOUTH EVERY 4 HOURS AS NEEDED FOR HEADACHE 6 tablet 1 Unknown at Unknown time   •  Continuous Blood Gluc  (Dexcom G6 ) device 1 Device Daily. 3 each 3    • Continuous Blood Gluc Sensor (Dexcom G6 Sensor) Every 10 (Ten) Days. 9 each 3    • Continuous Blood Gluc Transmit (Dexcom G6 Transmitter) misc 1 Device Daily. 1 each 0    • glucose blood test strip 1 each by Other route 3 (Three) Times a Day. 100 each 12    • Insulin Pen Needle 32G X 5 MM misc 1 each 4 (Four) Times a Day. 120 each 5    • nystatin (MYCOSTATIN) 345735 UNIT/GM powder Apply  one application topically to the appropriate area as directed Every 12 (Twelve) Hours. (Patient taking differently: Apply  topically to the appropriate area as directed 2 (Two) Times a Day As Needed (itching).) 60 g 0 Unknown at Unknown time   • promethazine (PHENERGAN) 25 MG tablet Take 1 tablet by mouth Every 6 (Six) Hours As Needed for Nausea or Vomiting. 60 tablet 5 Unknown at Unknown time     Allergies:  Patient has no known allergies.    Objective     Vital Signs       Physical Exam  General:  This is a WD pleasant female in no acute distress  Vital signs stable, afebrile  HEENT exam:  WNL. Sclera are anicteric.  EOMI  Neck:  supple, FROM.  No JVD.  Trachea midline  Lungs:  Respiratory effort normal. Auscultation: Clear, without wheezes, rhonchi, rales  Heart:  Regular rate and rhythm, without murmur, gallop, rub.  No pedal edema  Abdomen:  No tenderness or palpable masses;  Bowel sounds normal; scar on midline from past appendectomy  Musculoskeletal:  muscle strength/tone is normal.    Psyc:  alert, oriented x 3.  Mood and affect are appropriate  skin:  Warm with good turgor.  Without rash or lesion  extremities:  Examination of the extremities revealed no cyanosis, clubbing or edema.    Results Review:                     Invalid input(s): PROTCrCl cannot be calculated (Unknown ideal weight.).  No results found for: AMMONIA      No results found for: BLOODCX  No results found for: URINECX  No results found for: WOUNDCX  No results found  for: STOOLCX    Imaging:  Imaging Results (Last 24 Hours)     ** No results found for the last 24 hours. **                Impression:  Patient Active Problem List   Diagnosis Code   • Type 2 diabetes mellitus, uncontrolled, with neuropathy ([facility]) E11.40, E11.65   • Essential hypertension I10   • Atherosclerosis of native coronary artery of native heart I25.10   • Bilateral carpal tunnel syndrome G56.03   • Iron deficiency anemia due to dietary causes D50.8   • Diabetic retinopathy associated with type 2 diabetes mellitus ([facility]) E11.319   • Dyslipidemia E78.5   • Sciatic neuropathy G57.00   • DDD (degenerative disc disease), lumbar M51.36   • Primary osteoarthritis of left knee M17.12   • Status post total left knee replacement Z96.652   • Type 2 diabetes mellitus with chronic kidney disease, with long-term current use of insulin ([facility]) E11.22, Z79.4   • Hyperparathyroidism due to renal insufficiency ([facility]) N25.81   • Venous insufficiency (chronic) (peripheral) I87.2   • Class 2 obesity due to excess calories with body mass index (BMI) of 39.0 to 39.9 in adult E66.09, Z68.39   • PVD (peripheral vascular disease) with claudication ([facility]) I73.9   • LVH (left ventricular hypertrophy) I51.7   • Chronic renal disease, stage IV ([facility]) N18.4   • Acute on chronic congestive heart failure ([facility]) I50.9   • Chronic diastolic congestive heart failure ([facility]) I50.32   • LENNY (obstructive sleep apnea) G47.33   • CKD (chronic kidney disease) stage 4, GFR 15-29 ml/min ([facility]) N18.4   • Morbid obesity with BMI of 40.0-44.9, adult ([facility]) E66.01, Z68.41   • Hx of bronchitis Z87.09   • Pedal edema R60.0   • Iron deficiency anemia D50.9   • Malabsorption of iron K90.9   • Acquired hypothyroidism E03.9   • Snoring R06.83   • Hiatal hernia K44.9   • Weight loss, abnormal R63.4       Assessment:  1.  Iron deficiency anemia  2.  Esophageal dysphagia    Plan:  The patient will have an EGD and colonoscopy today.  Procedures were explained to the patient  who voiced understanding and agreement.    RAFIQ Borges  11/23/21  07:58 EST

## 2021-11-23 NOTE — OP NOTE
ESOPHAGOGASTRODUODENOSCOPY PROCEDURE REPORT    Britta Lee  11/23/2021    GASTROENTEROLOGIST:  Palmira Pate MD     PRE-PROCEDURE DIAGNOSIS:  Iron deficiency anemia, unspecified iron deficiency anemia type [D50.9]  Weight loss, abnormal [R63.4]    POST-PROCEDURE DIAGNOSIS:  1.  Gastritis  2.  Angiectasias    Procedure(s):  ESOPHAGOGASTRODUODENOSCOPY WITH BIOPSY  COLONOSCOPY FOR SCREENING    ANESTHESIA:  Propofol administered by anesthesia.  See anesthesia notes for ASA classification    STAFF:  Circulator: Bre Ny RN; Danelle Mccray RN  Endo Technician: Omari Lewis    FINDINGS:  1.  Normal-appearing esophagus.  Biopsies were taken  2.  Mild erythema in the antrum and stomach.  Biopsies taken for H. pylori.  3.  One angiectasia was found in the second portion of the duodenum.  This was nonbleeding.  Biopsies were taken of the duodenum to rule out celiac disease as a source of her anemia.    OPERATIVE PROCEDURE:  After proper informed consent was obtained, patient was transferred to the OR/endoscopy suite.  Patient was then placed in left lateral decubitus position. The Olympus 180 series video gastroscope was inserted orally under direct visualization.  Esophagus, stomach, and duodenum were inspected.  The endoscope was passed to the third portion of the duodenum.  Scope was retroflexed for visualization of the cardia and incisuraThe endoscope was then withdrawn. Patient tolerated the procedure well. There were no immediate complications.    ESTIMATED BLOOD LOSS:  None    COMPLICATIONS;  None    RECOMMENDATIONS/ PLAN:  1.  Follow-up biopsy results.  2.  Proceed with colonoscopy.    Palmira Pate MD     11/23/21 08:58 EST

## 2021-11-23 NOTE — ANESTHESIA POSTPROCEDURE EVALUATION
Patient: Britta Lee    Procedure Summary     Date: 11/23/21 Room / Location: [facility] OR [address] [place] [address] COR OR    Anesthesia Start: 0849 Anesthesia Stop: 0925    Procedures:       ESOPHAGOGASTRODUODENOSCOPY WITH BIOPSY (N/A Esophagus)      COLONOSCOPY FOR SCREENING (N/A ) Diagnosis:       Iron deficiency anemia, unspecified iron deficiency anemia type      Weight loss, abnormal      (Iron deficiency anemia, unspecified iron deficiency anemia type [D50.9])      (Weight loss, abnormal [R63.4])    Surgeons: Palmira Pate MD Provider: Edilson Jacobo MD    Anesthesia Type: general ASA Status: 3          Anesthesia Type: general    Vitals  Vitals Value Taken Time   /62 11/23/21 0956   Temp 97 °F (36.1 °C) 11/23/21 0926   Pulse 62 11/23/21 0956   Resp 18 11/23/21 0956   SpO2 97 % 11/23/21 0956           Post Anesthesia Care and Evaluation    Patient location during evaluation: PACU  Patient participation: complete - patient participated  Level of consciousness: awake  Pain score: 0  Pain management: adequate  Airway patency: patent  Anesthetic complications: No anesthetic complications  PONV Status: none  Cardiovascular status: acceptable  Respiratory status: acceptable  Hydration status: acceptable

## 2021-11-24 LAB
LAB AP CASE REPORT: NORMAL
PATH REPORT.FINAL DX SPEC: NORMAL

## 2021-11-29 ENCOUNTER — OFFICE VISIT (OUTPATIENT)
Dept: ONCOLOGY | Facility: CLINIC | Age: 76
End: 2021-11-29

## 2021-11-29 ENCOUNTER — LAB (OUTPATIENT)
Dept: ONCOLOGY | Facility: CLINIC | Age: 76
End: 2021-11-29

## 2021-11-29 VITALS
DIASTOLIC BLOOD PRESSURE: 61 MMHG | RESPIRATION RATE: 18 BRPM | OXYGEN SATURATION: 97 % | HEART RATE: 71 BPM | WEIGHT: 201.6 LBS | BODY MASS INDEX: 35.72 KG/M2 | SYSTOLIC BLOOD PRESSURE: 100 MMHG | HEIGHT: 63 IN | TEMPERATURE: 96.8 F

## 2021-11-29 DIAGNOSIS — K90.9 MALABSORPTION OF IRON: ICD-10-CM

## 2021-11-29 DIAGNOSIS — N18.9 CHRONIC KIDNEY DISEASE, UNSPECIFIED CKD STAGE: ICD-10-CM

## 2021-11-29 DIAGNOSIS — D50.9 IRON DEFICIENCY ANEMIA, UNSPECIFIED IRON DEFICIENCY ANEMIA TYPE: Primary | ICD-10-CM

## 2021-11-29 DIAGNOSIS — D63.8 ANEMIA OF CHRONIC DISEASE: ICD-10-CM

## 2021-11-29 DIAGNOSIS — E03.9 HYPOTHYROIDISM, UNSPECIFIED TYPE: ICD-10-CM

## 2021-11-29 LAB
ALBUMIN SERPL-MCNC: 3.45 G/DL (ref 3.5–5.2)
ALBUMIN/GLOB SERPL: 0.9 G/DL
ALP SERPL-CCNC: 361 U/L (ref 39–117)
ALT SERPL W P-5'-P-CCNC: 18 U/L (ref 1–33)
ANION GAP SERPL CALCULATED.3IONS-SCNC: 13.1 MMOL/L (ref 5–15)
ANISOCYTOSIS BLD QL: NORMAL
AST SERPL-CCNC: 37 U/L (ref 1–32)
BASOPHILS # BLD AUTO: 0.05 10*3/MM3 (ref 0–0.2)
BASOPHILS NFR BLD AUTO: 0.6 % (ref 0–1.5)
BILIRUB SERPL-MCNC: <0.2 MG/DL (ref 0–1.2)
BUN SERPL-MCNC: 33 MG/DL (ref 8–23)
BUN/CREAT SERPL: 13.8 (ref 7–25)
CALCIUM SPEC-SCNC: 8.7 MG/DL (ref 8.6–10.5)
CHLORIDE SERPL-SCNC: 104 MMOL/L (ref 98–107)
CO2 SERPL-SCNC: 21.9 MMOL/L (ref 22–29)
CREAT SERPL-MCNC: 2.39 MG/DL (ref 0.57–1)
DEPRECATED RDW RBC AUTO: 50.5 FL (ref 37–54)
EOSINOPHIL # BLD AUTO: 0.62 10*3/MM3 (ref 0–0.4)
EOSINOPHIL NFR BLD AUTO: 7 % (ref 0.3–6.2)
ERYTHROCYTE [DISTWIDTH] IN BLOOD BY AUTOMATED COUNT: 16.7 % (ref 12.3–15.4)
FERRITIN SERPL-MCNC: 63.64 NG/ML (ref 13–150)
GFR SERPL CREATININE-BSD FRML MDRD: 20 ML/MIN/1.73
GLOBULIN UR ELPH-MCNC: 3.7 GM/DL
GLUCOSE SERPL-MCNC: 151 MG/DL (ref 65–99)
HCT VFR BLD AUTO: 27.2 % (ref 34–46.6)
HGB BLD-MCNC: 7.8 G/DL (ref 12–15.9)
HYPOCHROMIA BLD QL: NORMAL
IMM GRANULOCYTES # BLD AUTO: 0.02 10*3/MM3 (ref 0–0.05)
IMM GRANULOCYTES NFR BLD AUTO: 0.2 % (ref 0–0.5)
IRON 24H UR-MRATE: 20 MCG/DL (ref 37–145)
IRON SATN MFR SERPL: 6 % (ref 20–50)
LYMPHOCYTES # BLD AUTO: 1.57 10*3/MM3 (ref 0.7–3.1)
LYMPHOCYTES NFR BLD AUTO: 17.7 % (ref 19.6–45.3)
MCH RBC QN AUTO: 23.8 PG (ref 26.6–33)
MCHC RBC AUTO-ENTMCNC: 28.7 G/DL (ref 31.5–35.7)
MCV RBC AUTO: 82.9 FL (ref 79–97)
MONOCYTES # BLD AUTO: 0.44 10*3/MM3 (ref 0.1–0.9)
MONOCYTES NFR BLD AUTO: 5 % (ref 5–12)
NEUTROPHILS NFR BLD AUTO: 6.17 10*3/MM3 (ref 1.7–7)
NEUTROPHILS NFR BLD AUTO: 69.5 % (ref 42.7–76)
NRBC BLD AUTO-RTO: 0 /100 WBC (ref 0–0.2)
PLAT MORPH BLD: NORMAL
PLATELET # BLD AUTO: 279 10*3/MM3 (ref 140–450)
PMV BLD AUTO: 9.5 FL (ref 6–12)
POTASSIUM SERPL-SCNC: 4.6 MMOL/L (ref 3.5–5.2)
PROT SERPL-MCNC: 7.1 G/DL (ref 6–8.5)
RBC # BLD AUTO: 3.28 10*6/MM3 (ref 3.77–5.28)
SODIUM SERPL-SCNC: 139 MMOL/L (ref 136–145)
TIBC SERPL-MCNC: 332 MCG/DL (ref 298–536)
TRANSFERRIN SERPL-MCNC: 223 MG/DL (ref 200–360)
WBC NRBC COR # BLD: 8.87 10*3/MM3 (ref 3.4–10.8)

## 2021-11-29 PROCEDURE — 82728 ASSAY OF FERRITIN: CPT | Performed by: NURSE PRACTITIONER

## 2021-11-29 PROCEDURE — 85025 COMPLETE CBC W/AUTO DIFF WBC: CPT | Performed by: NURSE PRACTITIONER

## 2021-11-29 PROCEDURE — 84466 ASSAY OF TRANSFERRIN: CPT | Performed by: NURSE PRACTITIONER

## 2021-11-29 PROCEDURE — 99214 OFFICE O/P EST MOD 30 MIN: CPT | Performed by: NURSE PRACTITIONER

## 2021-11-29 PROCEDURE — 83540 ASSAY OF IRON: CPT | Performed by: NURSE PRACTITIONER

## 2021-11-29 PROCEDURE — 80053 COMPREHEN METABOLIC PANEL: CPT | Performed by: NURSE PRACTITIONER

## 2021-11-29 PROCEDURE — 85007 BL SMEAR W/DIFF WBC COUNT: CPT | Performed by: NURSE PRACTITIONER

## 2021-11-29 RX ORDER — SODIUM CHLORIDE 9 MG/ML
250 INJECTION, SOLUTION INTRAVENOUS ONCE
Status: CANCELLED | OUTPATIENT
Start: 2021-12-06

## 2021-11-29 RX ORDER — SODIUM CHLORIDE 9 MG/ML
250 INJECTION, SOLUTION INTRAVENOUS ONCE
Status: CANCELLED | OUTPATIENT
Start: 2021-12-13

## 2021-11-29 NOTE — PROGRESS NOTES
"DATE:  11/29/2021    REASON FOR FOLLOW UP: Anemia    REFERRING PHYSICIAN:   Jackson Johnson MD    TREATMENT:   1. Oral iron-discontinued for apparent malabsorption    2.       CHIEF COMPLAINT:  Follow up of anemia    HISTORY OF PRESENT ILLNESS:   Britta Lee is a  76 y.o. female with multiple medical problems including CKD, diabetes mellitus type 2, CHF, CAD, hypertension, GERD and OA, who is being seen today at the request of  Jackson Johnson MD for evaluation and treatment of anemia. Ms. Lee reports following with her PCP and nephrology routinely with blood testing every ~3 months. She says she has struggled with anemia for several years. Previous available CBCs were reviewed and patient has had chronic, normocytic anemia since at least December 2016 with Hg most recently ranging ~ 8.6-9.8. Her WBC and platelets have been normal. Iron panel and ferritin levels have been most c/w AOCD/inflammation since at least May 2017.  She reports receiving IV iron infusions several years ago. At present, she is taking ferrous sulfate BID which she is tolerating well. She says this was started per nephrology ~2 months ago. She denies obvious bleeding from any source. She reports having screening colonoscopy with Dr. Zamora last year which was unremarkable and negative for bleeding. She complains of chronic fatigue which is stable. Also complains of occasional lower extremity edema. She denies any other complaints today.     INTERVAL HISTORY:  Ms. Lee presents today for follow up of anemia. Since her last visit, overall, she has been doing well. She was again replaced with IV Feraheme which she completed on 10/7/21. She recently underwent EGD/Colonoscopy with Dr. Simmons on 11/23/21 and will follow up again on 1/13/21. She again denies obvious bleeding from any source. She denies any recent blood transfusions. She also follows closely with nephrology with appointment this month. She has recently been feeling \"wobbly\" " and reports falling a couple of weeks ago but was not injured. She also has chronic fatigue but denies any worsening. She denies any other complaints today.     PAST MEDICAL HISTORY:  Past Medical History:   Diagnosis Date   • Acquired hypothyroidism 4/22/2021   • Anemia    • Arthritis    • Carpal tunnel syndrome    • Coronary artery disease    • Diabetes mellitus (HCC)    • Elevated cholesterol    • GERD (gastroesophageal reflux disease)    • History of pneumonia    • History of unsteady gait     OCASSIONALLY   • Hypertension    • Insomnia    • Kidney disease    • LENNY (obstructive sleep apnea) 12/1/2020   • Osteoarthritis    • Renal insufficiency    • Sciatica        PAST SURGICAL HISTORY:  Past Surgical History:   Procedure Laterality Date   • ABDOMINAL SURGERY     • APPENDECTOMY     • CARDIAC CATHETERIZATION      1 STENT  ---  2000   • CARDIAC SURGERY     • CAROTID STENT     • CARPAL TUNNEL RELEASE Right 10/8/2019    Procedure: CARPAL TUNNEL RELEASE;  Surgeon: Feroz Levin MD;  Location: [facility];  Service: Orthopedics   • CATARACT EXTRACTION     • CHOLECYSTECTOMY     • COLONOSCOPY     • COLONOSCOPY N/A 11/23/2021    Procedure: COLONOSCOPY FOR SCREENING;  Surgeon: Palmira Pate MD;  Location: [facility];  Service: Gastroenterology;  Laterality: N/A;   • CORONARY ANGIOPLASTY WITH STENT PLACEMENT     • ENDOSCOPY     • ENDOSCOPY N/A 3/5/2020    Procedure: ESOPHAGOGASTRODUODENOSCOPY;  Surgeon: Alexandru Bagley MD;  Location: [facility];  Service: Gastroenterology;  Laterality: N/A;   • ENDOSCOPY N/A 11/23/2021    Procedure: ESOPHAGOGASTRODUODENOSCOPY WITH BIOPSY;  Surgeon: Palmira Pate MD;  Location: [facility];  Service: Gastroenterology;  Laterality: N/A;   • ENDOSCOPY AND COLONOSCOPY     • GALLBLADDER SURGERY     • JOINT REPLACEMENT Left 05/02/2017    [facility] [place]  DR LEVIN  LEFT TOTAL KNEE   • KNEE ARTHROSCOPY W/ MENISCECTOMY Right    • LAPAROSCOPIC TUBAL LIGATION     • MI TOTAL KNEE  ARTHROPLASTY Left 5/2/2017    Procedure: TOTAL KNEE ARTHROPLASTY;  Surgeon: Feroz Johnson MD;  Location: [facility];  Service: Orthopedics   • STERILIZATION     • TONSILLECTOMY         FAMILY HISTORY:  Family History   Problem Relation Age of Onset   • Arthritis Mother    • Diabetes Mother    • Cancer Mother    • Heart disease Father    • Diabetes Daughter    • Diabetes Son    • Diabetes Maternal Aunt    • Heart disease Maternal Grandmother    • Breast cancer Neg Hx        SOCIAL HISTORY:  Social History     Socioeconomic History   • Marital status:      Spouse name: natalie   • Number of children: 3   • Years of education: 12   Tobacco Use   • Smoking status: Never Smoker   • Smokeless tobacco: Never Used   Vaping Use   • Vaping Use: Never used   Substance and Sexual Activity   • Alcohol use: Yes     Comment: socially   • Drug use: No   • Sexual activity: Defer     Birth control/protection: Surgical       MEDICATIONS:  The current medication list was reviewed in the EMR    Current Outpatient Medications:   •  albuterol sulfate  (90 Base) MCG/ACT inhaler, Inhale 2 puffs Every 4 (Four) Hours As Needed for Shortness of Air., Disp: 18 g, Rfl: 0  •  amLODIPine (NORVASC) 10 MG tablet, Take 1 tablet by mouth Daily., Disp: 30 tablet, Rfl: 5  •  aspirin 81 MG tablet, Take 1 tablet by mouth Daily., Disp: 30 tablet, Rfl: 5  •  atorvastatin (LIPITOR) 40 MG tablet, TAKE ONE Tablet BY MOUTH EACH NIGHT AT BEDTIME, Disp: 90 tablet, Rfl: 1  •  BD Pen Needle Evon U/F 32G X 4 MM misc, , Disp: , Rfl:   •  bumetanide (BUMEX) 0.5 MG tablet, Take 1 tablet by mouth Daily., Disp: 90 tablet, Rfl: 0  •  bumetanide (BUMEX) 0.5 MG tablet, Take 0.5 mg by mouth Every Other Day., Disp: , Rfl:   •  butalbital-acetaminophen-caffeine (FIORICET, ESGIC) -40 MG per tablet, TAKE ONE Tablet BY MOUTH EVERY 4 HOURS AS NEEDED FOR HEADACHE, Disp: 6 tablet, Rfl: 1  •  cloNIDine (CATAPRES) 0.2 MG tablet, Take 1 tablet by mouth 3  (Three) Times a Day. (Patient taking differently: Take 0.1 mg by mouth 3 (Three) Times a Day.), Disp: 270 tablet, Rfl: 1  •  Continuous Blood Gluc  (Dexcom G6 ) device, 1 Device Daily., Disp: 3 each, Rfl: 3  •  Continuous Blood Gluc Sensor (Dexcom G6 Sensor), Every 10 (Ten) Days., Disp: 9 each, Rfl: 3  •  Continuous Blood Gluc Transmit (Dexcom G6 Transmitter) misc, 1 Device Daily., Disp: 1 each, Rfl: 0  •  escitalopram (LEXAPRO) 10 MG tablet, Take 1 tablet by mouth Daily., Disp: 90 tablet, Rfl: 3  •  glucose blood test strip, 1 each by Other route 3 (Three) Times a Day., Disp: 100 each, Rfl: 12  •  hydrALAZINE (APRESOLINE) 25 MG tablet, Take 3 tablets by mouth 3 (Three) Times a Day., Disp: 270 tablet, Rfl: 1  •  Insulin Glargine, 2 Unit Dial, (Toujeo Max SoloStar) 300 UNIT/ML solution pen-injector injection, Inject 40-60 Units under the skin into the appropriate area as directed Daily., Disp: 3 pen, Rfl: 5  •  insulin lispro (humaLOG) 100 UNIT/ML injection, Inject 5 Units under the skin into the appropriate area as directed 3 (Three) Times a Day Before Meals. Sliding scale:4-6 units, Disp: , Rfl:   •  Insulin Pen Needle 32G X 5 MM misc, 1 each 4 (Four) Times a Day., Disp: 120 each, Rfl: 5  •  isosorbide mononitrate (IMDUR) 60 MG 24 hr tablet, Take 1 tablet by mouth Daily., Disp: 90 tablet, Rfl: 1  •  levothyroxine (SYNTHROID, LEVOTHROID) 25 MCG tablet, Take 1 tablet by mouth Daily., Disp: 30 tablet, Rfl: 5  •  metoprolol tartrate (LOPRESSOR) 25 MG tablet, Take 1 tablet by mouth 2 (Two) Times a Day., Disp: 60 tablet, Rfl: 1  •  nystatin (MYCOSTATIN) 259150 UNIT/GM powder, Apply  one application topically to the appropriate area as directed Every 12 (Twelve) Hours. (Patient taking differently: Apply  topically to the appropriate area as directed 2 (Two) Times a Day As Needed (itching).), Disp: 60 g, Rfl: 0  •  pantoprazole (Protonix) 40 MG EC tablet, Take 1 tablet by mouth Daily., Disp: 30 tablet, Rfl:  "1  •  polyethylene glycol (MIRALAX) 17 GM/SCOOP powder, Take 255 g of MiraLAX with 32 ounces liquid then repeat 8 hours later for MiraLAX cleanout., Disp: 510 g, Rfl: 0  •  promethazine (PHENERGAN) 25 MG tablet, Take 1 tablet by mouth Every 6 (Six) Hours As Needed for Nausea or Vomiting., Disp: 60 tablet, Rfl: 5  •  vitamin D (ERGOCALCIFEROL) 1.25 MG (25698 UT) capsule capsule, Take 1 capsule by mouth 1 (One) Time Per Week., Disp: 5 capsule, Rfl: 5    ALLERGIES:  No Known Allergies      REVIEW OF SYSTEMS:    A comprehensive 14 point review of systems was performed.  Significant findings as mentioned above.  All other systems reviewed and are negative.      Physical Exam   Vital Signs: /61   Pulse 71   Temp 96.8 °F (36 °C) (Temporal)   Resp 18   Ht 160 cm (63\")   Wt 91.4 kg (201 lb 9.6 oz)   SpO2 97%   BMI 35.71 kg/m²    General: Well developed, well nourished, alert and oriented x 3, in no acute distress.   Head: ATNC   Eyes: PERRL, No evidence of conjunctivitis.   Nose: No nasal discharge.   Mouth: Oral mucosal membranes moist. No oral ulceration or hemorrhages.   Neck: Neck supple. No thyromegaly. No JVD.   Lungs: CTAB, no wheezing  Heart: Regular rate and rhythm. No murmurs, rubs, or gallops.   Abdomen: Soft. Bowel sounds are normoactive. Nontender with palpation.   Extremities: No cyanosis or edema. Using cane for assistance with ambulation.   Neurologic: Grossly non-focal exam    Pain Score:  Pain Score    11/29/21 1019   PainSc: 0-No pain     ENDOSCOPY:  11/23/21  ESOPHAGOGASTRODUODENOSCOPY PROCEDURE REPORT     Britta Lee  11/23/2021     GASTROENTEROLOGIST:  Palmira Pate MD      PRE-PROCEDURE DIAGNOSIS:  Iron deficiency anemia, unspecified iron deficiency anemia type [D50.9]  Weight loss, abnormal [R63.4]     POST-PROCEDURE DIAGNOSIS:  1.  Gastritis  2.  Angiectasias     Procedure(s):  ESOPHAGOGASTRODUODENOSCOPY WITH BIOPSY  COLONOSCOPY FOR SCREENING     ANESTHESIA:  Propofol " administered by anesthesia.  See anesthesia notes for ASA classification     STAFF:  Circulator: Bre Ny RN; Danelle Mccray RN  Endo Technician: Omari Lewis     FINDINGS:  1.  Normal-appearing esophagus.  Biopsies were taken  2.  Mild erythema in the antrum and stomach.  Biopsies taken for H. pylori.  3.  One angiectasia was found in the second portion of the duodenum.  This was nonbleeding.  Biopsies were taken of the duodenum to rule out celiac disease as a source of her anemia.     OPERATIVE PROCEDURE:  After proper informed consent was obtained, patient was transferred to the OR/endoscopy suite.  Patient was then placed in left lateral decubitus position. The Olympus 180 series video gastroscope was inserted orally under direct visualization.  Esophagus, stomach, and duodenum were inspected.  The endoscope was passed to the third portion of the duodenum.  Scope was retroflexed for visualization of the cardia and incisuraThe endoscope was then withdrawn. Patient tolerated the procedure well. There were no immediate complications.     ESTIMATED BLOOD LOSS:  None     COMPLICATIONS;  None     RECOMMENDATIONS/ PLAN:  1.  Follow-up biopsy results.  2.  Proceed with colonoscopy.     Palmira Pate MD      11/23/21 08:58 EST    COLONOSCOPY PROCEDURE NOTE     Britta Jesus  11/23/2021     GASTROENTEROLOGIST:  Palmira Pate MD        PRE-PROCEDURE DIAGNOSIS:   Iron deficiency anemia, unspecified iron deficiency anemia type [D50.9]  Weight loss, abnormal [R63.4]     POST-PROCEDURE DIAGNOSIS:  1.  Colon polyps  2.  Diverticulosis  3.  Internal hemorrhoids     PROCEDURE:  COLONOSCOPY with snare polypectomy and cold biopsy polypectomy     ANESTHESIA:  Propofol administered by anesthesia.  See anesthesia notes for ASA classification     STAFF  Circulator: Bre Ny RN; Danelle Mccray RN  Endo Technician: Omari Lewis     Findings:  1.  A 5 mm polyp was found in  the ascending colon.  This was removed with cold forceps polypectomy.  2.  A 5 mm polyp was removed from the transverse colon with cold forceps biopsy.  3.  Two 6 mm polyps were found in the descending colon.  Both polyps were removed with cold snare polypectomy.  4.  A 7 mm polyp was removed from the sigmoid colon with cold snare polypectomy.  5.  Diverticulosis involving left colon.  6.  Small internal hemorrhoids.  7.  Normal-appearing terminal ileum.     OPERATIVE PROCEDURE   After proper informed consent was obtained, the patient was taken the operating suite and placed in left lateral decubitus position.  An Olympus video colonoscope 180 series was inserted in the rectum and advanced to the terminal ileum under direct visualization.  Cecum and terminal ileum were identified by visualization of the appendiceal orifice and ileocecal valve.  The colonoscope was then slowly withdrawn from the cecum to the rectum and passed a second time from rectum to cecum.  The colonoscope was retroflexed in the cecum and rectum. Scope was then withdrawn. Patient tolerated the procedure well. There were no immediate complications. Cecal withdrawal time was 15 minutes.     ESTIMATED BLOOD LOSS  None      COMPLICATIONS  None     RECOMMENDATIONS:  1.  Follow-up pathology.  2.  Repeat colonoscopy in 3 years due to personal history of colon polyps.  3.  Proceed with capsule endoscopy if anemia persists.  Sign 4.  Follow-up in GI clinic in 6 to 8 weeks.     Palmira Pate MD  11/23/21 09:25 EST    RECENT LABS:  Lab Results   Component Value Date    WBC 8.87 11/29/2021    HGB 7.8 (L) 11/29/2021    HCT 27.2 (L) 11/29/2021    MCV 82.9 11/29/2021    RDW 16.7 (H) 11/29/2021     11/29/2021    NEUTRORELPCT 69.5 11/29/2021    LYMPHORELPCT 17.7 (L) 11/29/2021    MONORELPCT 5.0 11/29/2021    EOSRELPCT 7.0 (H) 11/29/2021    BASORELPCT 0.6 11/29/2021    NEUTROABS 6.17 11/29/2021    LYMPHSABS 1.57 11/29/2021       Lab Results    Component Value Date     11/29/2021    K 4.6 11/29/2021    CO2 21.9 (L) 11/29/2021     11/29/2021    BUN 33 (H) 11/29/2021    CREATININE 2.39 (H) 11/29/2021    EGFRIFNONA 20 (L) 11/29/2021    EGFRIFAFRI 41 (L) 07/30/2018    GLUCOSE 151 (H) 11/29/2021    CALCIUM 8.7 11/29/2021    ALKPHOS 361 (H) 11/29/2021    AST 37 (H) 11/29/2021    ALT 18 11/29/2021    BILITOT <0.2 11/29/2021    ALBUMIN 3.45 (L) 11/29/2021    PROTEINTOT 7.1 11/29/2021    MG 2.1 10/22/2021    PHOS 3.6 11/01/2021     Lab Results   Component Value Date    FERRITIN 63.64 11/29/2021    IRON 20 (L) 11/29/2021    TIBC 332 11/29/2021    LABIRON 6 (L) 11/29/2021    WQIDPYWD21 736 03/26/2021    FOLATE 12.60 03/26/2021    HAPTOGLOBIN 256 (H) 03/26/2021    RETICCTPCT 2.49 (H) 03/26/2021    RETIC 0.0899 03/26/2021     Workup 3/26/21    Ferritin   13.00 - 150.00 ng/mL 160.20High       Iron   37 - 145 mcg/dL 36Low     Iron Saturation   20 - 50 % 9Low     Transferrin   200 - 360 mg/dL 270    TIBC   298 - 536 mcg/dL 402      Vitamin B-12   211 - 946 pg/mL 736      Folate   4.78 - 24.20 ng/mL 12.60      Reticulocyte %   0.70 - 1.90 % 2.49High     Reticulocyte Absolute   0.0200 - 0.1300 10*6/mm3 0.0899      & Units 1 mo ago   LDH   135 - 214 U/L 234High       Haptoglobin   30 - 200 mg/dL 256High       TSH   0.270 - 4.200 uIU/mL 6.430High       C-Reactive Protein   0.00 - 0.50 mg/dL 1.70High       Sed Rate   0 - 30 mm/hr >100High       Copper   80 - 158 ug/dL 189High       Zinc   44 - 115 ug/dL 67      Tissue Transglutaminase IgA   0 - 3 U/mL 2          ASSESSMENT & PLAN:  Britta Lee is a very pleasant 76 y.o. female with    1.  Normocytic anemia:  2. CKD/AOCD/inflammation  3. Iron deficiency anemia  4. Hypothyroidism    -Patient follows with PCP and nephrology routinely with blood testing every ~3 months.   -Previous available CBCs were reviewed and patient has had chronic, normocytic anemia since at least December 2016 with Hg most recently ranging  ~ 8.6-9.8. Her WBC and platelets have been normal. Iron panel and ferritin levels have been most c/w AOCD/inflammation since at least May 2017.    -She reports receiving IV iron infusions several years ago. At first presentation, she was taking ferrous sulfate BID and tolerating well.   -Denies obvious bleeding from any source. Reportedly had screening colonoscopy with Dr. Zamora last year which was unremarkable and negative for bleeding. Records unavailable.  - Patient likely has AOCD/inflammation with also a component of Iron deficiency. As above, she has been taking ferrous sulfate. Therefore, discontinued oral iron and have been replacing with IV Feraheme for apparent malabsorption of iron, most recently on 10/7/21.   -Recommended repeat GI evaluation and patient was agreeable. Referral was placed and patient underwent EGD/colonoscopy with Dr. Simmons on 11/23/21 (see full reports above). She did have several polyps removed with path pending and small internal hemorrhoids, otherwise unremarkable and negative for bleeding. EGD showed one angiectasia in the second portion of the duodenum which was nonbleeding.  Biopsies were taken of the duodenum to rule out celiac disease as a source of her anemia. She will follow up with GI on 1/13/21. Plan is to proceed with capsule study if anemia persists.   -Repeat CBC from today shows Hg has dropped to 7.8. Iron panel and ferritin show iron stores remain low. Therefore, will again replace with IV Feraheme pending insurance approval.  At present, denies obvious bleeding from any source. CMP from today also showed worsening renal function, Cr ~2.39 which is contributing to her worsening anemia.    -Will plan to follow up in ~2 months with repeat labs. In the meantime, recommended continued close follow up with PCP for management of diabetes, hypothyroidism, hypertension and nephology for CKD. Will defer decision regarding epogen to nephrology which she would likely benefit  from. Recommended follow up with GI as planned for possible capsule study.   -Recommend transfusion support, would transfuse for Hg <7 or <8 if symptomatic. Hg 7.8 today but not symptomatic at present and replacing with Feraheme as above.   -To further evaluate anemia, also previously sent additional labs including PBS which showed normocytic, hypochromic anemia with no schistocytes, normal WBC with borderline neutrophilia and mild eosinophilia, no dysplasia or blasts, adequate platelets.  B12 and folate were normal. No evidence of hemolysis, TSH was elevated and she was advised to follow up with PCP who started her on synthroid 25 mcg daily, ESR and CRP were elevated and suggestive of underlying inflammation, copper and zinc were both replete, tissue transglutaminase was normal.    -She knows to call in the interim if she were to have any worsening symptoms (fatigue, weakness, shortness of breath) and we would be happy to check her labs sooner.    ACO / MILES/Other  Quality measures  -  Britta Lee received 2021 flu vaccine.  -  Britta Lee reports a pain score of 0.   -  Current outpatient and discharge medications have been reconciled for the patient.  Reviewed by: ERNIE De Leon    The patient was in agreement with the plan and all questions were answered to her satisfaction.     Thank you so much for allowing us to participate in the care of Britta Lee . Please do not hesitate to contact us with any questions or concerns.     A total of 30 minutes were spent coordinating this patient’s care in clinic today; more than 50% of this time was face-to-face with the patient, reviewing her medical history and counseling on the current treatment and followup plan. All questions were answered to her satisfaction.      Electronically Signed by: ERNIE De Leon , November 29, 2021 10:24 EST       CC:   Lexie Dolan PA

## 2021-11-30 ENCOUNTER — TELEPHONE (OUTPATIENT)
Dept: FAMILY MEDICINE CLINIC | Facility: CLINIC | Age: 76
End: 2021-11-30

## 2021-11-30 DIAGNOSIS — IMO0002 TYPE 2 DIABETES MELLITUS, UNCONTROLLED, WITH NEUROPATHY: Chronic | ICD-10-CM

## 2021-11-30 RX ORDER — INSULIN GLARGINE 300 U/ML
40-60 INJECTION, SOLUTION SUBCUTANEOUS DAILY
Qty: 3 PEN | Refills: 0 | COMMUNITY
Start: 2021-11-30 | End: 2021-12-20 | Stop reason: SDUPTHER

## 2021-11-30 NOTE — PROGRESS NOTES
The polyps removed from your colon were precancerous.  You will need repeat colonoscopy in 3 years.  Biopsies of the stomach were negative for H. pylori.  Biopsies of the small bowel were negative for celiac disease.

## 2021-11-30 NOTE — TELEPHONE ENCOUNTER
Called patient and let her know that we got some Toujeo samples in for her. She said she will have her  come by and pick them up for her.

## 2021-12-01 ENCOUNTER — TELEPHONE (OUTPATIENT)
Dept: ONCOLOGY | Facility: HOSPITAL | Age: 76
End: 2021-12-01

## 2021-12-01 NOTE — TELEPHONE ENCOUNTER
----- Message from ERNIE De Leon sent at 12/1/2021 10:49 AM EST -----  Can we schedule this this week? Hg 7.8 and iron very low . Thank you.   ----- Message -----  From: Fabiola Amaral, RegSched Rep  Sent: 11/30/2021   3:07 PM EST  To: ERNIE De Leon, #    Good to go.  ----- Message -----  From: Lauren Lang APRN  Sent: 11/29/2021  11:37 AM EST  To: Hanna De Luna RN, Christine Francis    Ms. Lee needs Feraheme. Orders placed. Hg 7.8 , need to schedule as soon as can.     THank you

## 2021-12-01 NOTE — TELEPHONE ENCOUNTER
Insurance authorization obtained for Desiree. Attempted to call pt to schedule infusion for Friday afternoon. Mailbox is full, unable to leave a message. Will try to contact patient again later. LG

## 2021-12-06 ENCOUNTER — INFUSION (OUTPATIENT)
Dept: ONCOLOGY | Facility: HOSPITAL | Age: 76
End: 2021-12-06

## 2021-12-06 VITALS
RESPIRATION RATE: 18 BRPM | DIASTOLIC BLOOD PRESSURE: 68 MMHG | TEMPERATURE: 97.3 F | HEART RATE: 80 BPM | BODY MASS INDEX: 35.25 KG/M2 | WEIGHT: 199 LBS | OXYGEN SATURATION: 99 % | SYSTOLIC BLOOD PRESSURE: 122 MMHG

## 2021-12-06 DIAGNOSIS — K90.9 MALABSORPTION OF IRON: ICD-10-CM

## 2021-12-06 DIAGNOSIS — D50.9 IRON DEFICIENCY ANEMIA, UNSPECIFIED IRON DEFICIENCY ANEMIA TYPE: Primary | ICD-10-CM

## 2021-12-06 PROCEDURE — 25010000002 FERUMOXYTOL 510 MG/17ML SOLUTION: Performed by: NURSE PRACTITIONER

## 2021-12-06 PROCEDURE — 96374 THER/PROPH/DIAG INJ IV PUSH: CPT

## 2021-12-06 PROCEDURE — 96365 THER/PROPH/DIAG IV INF INIT: CPT

## 2021-12-06 RX ORDER — SODIUM CHLORIDE 9 MG/ML
250 INJECTION, SOLUTION INTRAVENOUS ONCE
Status: COMPLETED | OUTPATIENT
Start: 2021-12-06 | End: 2021-12-06

## 2021-12-06 RX ADMIN — SODIUM CHLORIDE 250 ML: 9 INJECTION, SOLUTION INTRAVENOUS at 14:20

## 2021-12-06 RX ADMIN — FERUMOXYTOL 510 MG: 510 INJECTION INTRAVENOUS at 14:22

## 2021-12-13 ENCOUNTER — HOSPITAL ENCOUNTER (OUTPATIENT)
Dept: CARDIOLOGY | Facility: HOSPITAL | Age: 76
Discharge: HOME | End: 2021-12-13
Admitting: NURSE PRACTITIONER

## 2021-12-13 ENCOUNTER — INFUSION (OUTPATIENT)
Dept: ONCOLOGY | Facility: HOSPITAL | Age: 76
End: 2021-12-13

## 2021-12-13 VITALS
HEART RATE: 54 BPM | BODY MASS INDEX: 35.62 KG/M2 | OXYGEN SATURATION: 98 % | SYSTOLIC BLOOD PRESSURE: 177 MMHG | TEMPERATURE: 96.6 F | RESPIRATION RATE: 18 BRPM | DIASTOLIC BLOOD PRESSURE: 62 MMHG | WEIGHT: 201.1 LBS

## 2021-12-13 VITALS
DIASTOLIC BLOOD PRESSURE: 88 MMHG | HEART RATE: 88 BPM | WEIGHT: 201.4 LBS | OXYGEN SATURATION: 95 % | BODY MASS INDEX: 35.68 KG/M2 | SYSTOLIC BLOOD PRESSURE: 152 MMHG | RESPIRATION RATE: 20 BRPM

## 2021-12-13 DIAGNOSIS — I10 ESSENTIAL HYPERTENSION: ICD-10-CM

## 2021-12-13 DIAGNOSIS — E66.9 OBESITY (BMI 30-39.9): ICD-10-CM

## 2021-12-13 DIAGNOSIS — D50.9 IRON DEFICIENCY ANEMIA, UNSPECIFIED IRON DEFICIENCY ANEMIA TYPE: ICD-10-CM

## 2021-12-13 DIAGNOSIS — N18.4 CKD (CHRONIC KIDNEY DISEASE) STAGE 4, GFR 15-29 ML/MIN (HCC): ICD-10-CM

## 2021-12-13 DIAGNOSIS — K90.9 MALABSORPTION OF IRON: ICD-10-CM

## 2021-12-13 DIAGNOSIS — I50.32 CHRONIC DIASTOLIC CONGESTIVE HEART FAILURE (HCC): Primary | ICD-10-CM

## 2021-12-13 DIAGNOSIS — D50.9 IRON DEFICIENCY ANEMIA, UNSPECIFIED IRON DEFICIENCY ANEMIA TYPE: Primary | ICD-10-CM

## 2021-12-13 DIAGNOSIS — R60.0 PEDAL EDEMA: ICD-10-CM

## 2021-12-13 LAB
ABSOLUTE LUNG FLUID CONTENT: 30 % (ref 20–35)
ANION GAP SERPL CALCULATED.3IONS-SCNC: 12.5 MMOL/L (ref 5–15)
BUN SERPL-MCNC: 32 MG/DL (ref 8–23)
BUN/CREAT SERPL: 17.7 (ref 7–25)
CALCIUM SPEC-SCNC: 9 MG/DL (ref 8.6–10.5)
CHLORIDE SERPL-SCNC: 109 MMOL/L (ref 98–107)
CO2 SERPL-SCNC: 20.5 MMOL/L (ref 22–29)
CREAT SERPL-MCNC: 1.81 MG/DL (ref 0.57–1)
GFR SERPL CREATININE-BSD FRML MDRD: 27 ML/MIN/1.73
GLUCOSE SERPL-MCNC: 127 MG/DL (ref 65–99)
MAGNESIUM SERPL-MCNC: 1.8 MG/DL (ref 1.6–2.4)
NT-PROBNP SERPL-MCNC: 2506 PG/ML (ref 0–1800)
POTASSIUM SERPL-SCNC: 3.8 MMOL/L (ref 3.5–5.2)
SODIUM SERPL-SCNC: 142 MMOL/L (ref 136–145)

## 2021-12-13 PROCEDURE — 25010000002 FERUMOXYTOL 510 MG/17ML SOLUTION: Performed by: NURSE PRACTITIONER

## 2021-12-13 PROCEDURE — 94726 PLETHYSMOGRAPHY LUNG VOLUMES: CPT | Performed by: NURSE PRACTITIONER

## 2021-12-13 PROCEDURE — 80048 BASIC METABOLIC PNL TOTAL CA: CPT | Performed by: NURSE PRACTITIONER

## 2021-12-13 PROCEDURE — 83735 ASSAY OF MAGNESIUM: CPT | Performed by: NURSE PRACTITIONER

## 2021-12-13 PROCEDURE — 96374 THER/PROPH/DIAG INJ IV PUSH: CPT

## 2021-12-13 PROCEDURE — 36415 COLL VENOUS BLD VENIPUNCTURE: CPT | Performed by: NURSE PRACTITIONER

## 2021-12-13 PROCEDURE — 99213 OFFICE O/P EST LOW 20 MIN: CPT | Performed by: NURSE PRACTITIONER

## 2021-12-13 PROCEDURE — 83880 ASSAY OF NATRIURETIC PEPTIDE: CPT

## 2021-12-13 RX ORDER — HYDRALAZINE HYDROCHLORIDE 25 MG/1
75 TABLET, FILM COATED ORAL 3 TIMES DAILY
Qty: 270 TABLET | Refills: 0 | Status: SHIPPED | OUTPATIENT
Start: 2021-12-13 | End: 2022-03-07 | Stop reason: SDUPTHER

## 2021-12-13 RX ORDER — SODIUM CHLORIDE 9 MG/ML
250 INJECTION, SOLUTION INTRAVENOUS ONCE
Status: COMPLETED | OUTPATIENT
Start: 2021-12-13 | End: 2021-12-13

## 2021-12-13 RX ADMIN — FERUMOXYTOL 510 MG: 510 INJECTION INTRAVENOUS at 14:18

## 2021-12-13 RX ADMIN — SODIUM CHLORIDE 250 ML: 9 INJECTION, SOLUTION INTRAVENOUS at 14:17

## 2021-12-13 NOTE — PROGRESS NOTES
[facility]    Date: 12/13/21     Vitals:    12/13/21 0920   BP: 152/88   Pulse: 88   Resp: 20   SpO2: 95%        Method of arrival: Ambulatory with cane    Weighing self daily: Yes    Monitoring Heart Failure Zones: Yes    Today's HF Zone: Green    Taking medications as prescribed: Yes    Edema No    Shortness of Air: No    Number of pillows used at night:<2    Educational Materials given:                                                                           ReDS Value: 30%  25-35 Optimal Value Status      Jackie aJsmine MA 12/13/21 09:21 EST

## 2021-12-13 NOTE — PROGRESS NOTES
[facility] CHF CLINIC OFFICE VISIT    Subjective:   Congestive Heart Failure  Pertinent negatives include no abdominal pain, chest pain, claudication, near-syncope, palpitations or shortness of breath.      Britta Lee is a 75 y.o.  female who presents to the clinic today for follow up on heart failure. She has a hx of diastolic congestive heart failure. Her EF was > 70% from echocardiogram on 11/4/2020. She is currently taking Bumex 0.5 mg every other daily. She has been unable to tolerate Spironolactone or  ACE/ARB due to abnormal kidney function. She continues on Hydralazine 75 mg TID, Imdur 60 mg daily, Metorpolol tartrate 25 mg BID, Norvasc 10 mg daily and Clonidine 0.2 mg TID for HTN.    Patient reports she has been out of of Hydralazine for about a week.  She reports financial hardship currently and is unable to afford her medication.  Blood pressures at home have been running mildly elevated  Shortness of air stable   LE swelling stable   She seems to be tolerating the Bumex every other day well and has not felt the need to take additional Bumex.  Anemia - following with hematology and GI.  She recently had a colonoscopy and EGD with no real identifiable cause for her anemia.  Several biopsies were taken during her procedure.  She is recently followed with hematology who has her scheduled to undergo infusions.    Urine output good   Overall doing well with sodium intake   Home weight stable  BP -stable (no log available for review)  HR - 70-80 bpm   Due to follow-up with nephrology, PCP, GI and hematology within the next month.  Overall she feels stable  Denies chest discomfort, dyspnea, dizziness, lightheadedness, syncope,  palpitations or tachycardia.     Past medical history significant for HTN, DM type 2, CKD III, iron deficiency anemia, hyperlipidemia, obesity.     PCP: Lexie Dolan  Cardiologist: Dr. Cruz  Nephrologist: Dr. Johnson     Hospitalizations: Discharged on 11/8/2020  ER visit:  12/4/2020  Past Medical History:   Diagnosis Date   • Acquired hypothyroidism 4/22/2021   • Anemia    • Arthritis    • Carpal tunnel syndrome    • Coronary artery disease    • Diabetes mellitus (HCC)    • Elevated cholesterol    • GERD (gastroesophageal reflux disease)    • History of pneumonia    • History of unsteady gait     OCASSIONALLY   • Hypertension    • Insomnia    • Kidney disease    • LENNY (obstructive sleep apnea) 12/1/2020   • Osteoarthritis    • Renal insufficiency    • Sciatica      Past Surgical History:   Procedure Laterality Date   • ABDOMINAL SURGERY     • APPENDECTOMY     • CARDIAC CATHETERIZATION      1 STENT  ---  2000   • CARDIAC SURGERY     • CAROTID STENT     • CARPAL TUNNEL RELEASE Right 10/8/2019    Procedure: CARPAL TUNNEL RELEASE;  Surgeon: Feroz Levin MD;  Location: [facility];  Service: Orthopedics   • CATARACT EXTRACTION     • CHOLECYSTECTOMY     • COLONOSCOPY     • COLONOSCOPY N/A 11/23/2021    Procedure: COLONOSCOPY FOR SCREENING;  Surgeon: Palmira Pate MD;  Location: [facility];  Service: Gastroenterology;  Laterality: N/A;   • CORONARY ANGIOPLASTY WITH STENT PLACEMENT     • ENDOSCOPY     • ENDOSCOPY N/A 3/5/2020    Procedure: ESOPHAGOGASTRODUODENOSCOPY;  Surgeon: Alexandru Bagley MD;  Location: [facility];  Service: Gastroenterology;  Laterality: N/A;   • ENDOSCOPY N/A 11/23/2021    Procedure: ESOPHAGOGASTRODUODENOSCOPY WITH BIOPSY;  Surgeon: Palmira Pate MD;  Location: [facility];  Service: Gastroenterology;  Laterality: N/A;   • ENDOSCOPY AND COLONOSCOPY     • GALLBLADDER SURGERY     • JOINT REPLACEMENT Left 05/02/2017    [facility]  DR LEVIN  LEFT TOTAL KNEE   • KNEE ARTHROSCOPY W/ MENISCECTOMY Right    • LAPAROSCOPIC TUBAL LIGATION     • MO TOTAL KNEE ARTHROPLASTY Left 5/2/2017    Procedure: TOTAL KNEE ARTHROPLASTY;  Surgeon: Feroz Levin MD;  Location: [facility];  Service: Orthopedics   • STERILIZATION     • TONSILLECTOMY       Social  History     Socioeconomic History   • Marital status:      Spouse name: natalie   • Number of children: 3   • Years of education: 12   Tobacco Use   • Smoking status: Never Smoker   • Smokeless tobacco: Never Used   Vaping Use   • Vaping Use: Never used   Substance and Sexual Activity   • Alcohol use: Yes     Comment: socially   • Drug use: No   • Sexual activity: Defer     Birth control/protection: Surgical     Family History   Problem Relation Age of Onset   • Arthritis Mother    • Diabetes Mother    • Cancer Mother    • Heart disease Father    • Diabetes Daughter    • Diabetes Son    • Diabetes Maternal Aunt    • Heart disease Maternal Grandmother    • Breast cancer Neg Hx      Allergies:  No Known Allergies    Review of Systems   Constitutional: Positive for malaise/fatigue. Negative for chills, fever, weight gain and weight loss.   HENT: Negative for ear discharge, hoarse voice and sore throat.    Eyes: Negative for discharge, double vision, pain, redness and visual disturbance.   Cardiovascular: Negative for chest pain, claudication, cyanosis, dyspnea on exertion, irregular heartbeat, leg swelling, near-syncope, orthopnea, palpitations and syncope.   Respiratory: Negative for cough, shortness of breath and wheezing.    Endocrine: Negative for cold intolerance, heat intolerance and polyuria.   Hematologic/Lymphatic: Negative for bleeding problem. Does not bruise/bleed easily.   Skin: Negative for color change, flushing and rash.   Musculoskeletal: Negative for arthritis, back pain, joint pain, joint swelling and muscle cramps.   Gastrointestinal: Negative for abdominal pain, constipation, diarrhea, nausea and vomiting.   Genitourinary: Negative for dysuria, flank pain, frequency, hesitancy and urgency.   Neurological: Negative for difficulty with concentration, dizziness, light-headedness, sensory change, vertigo and weakness.   Psychiatric/Behavioral: Negative for depression. The patient does not have  insomnia and is not nervous/anxious.      Current Outpatient Medications   Medication Sig Dispense Refill   • albuterol sulfate  (90 Base) MCG/ACT inhaler Inhale 2 puffs Every 4 (Four) Hours As Needed for Shortness of Air. 18 g 0   • amLODIPine (NORVASC) 10 MG tablet Take 1 tablet by mouth Daily. (Patient taking differently: Take 10 mg by mouth Every Night.) 30 tablet 5   • aspirin 81 MG tablet Take 1 tablet by mouth Daily. 30 tablet 5   • atorvastatin (LIPITOR) 40 MG tablet TAKE ONE Tablet BY MOUTH EACH NIGHT AT BEDTIME 90 tablet 1   • BD Pen Needle Evon U/F 32G X 4 MM misc      • bumetanide (BUMEX) 0.5 MG tablet Take 0.5 mg by mouth Every Other Day.     • butalbital-acetaminophen-caffeine (FIORICET, ESGIC) -40 MG per tablet TAKE ONE Tablet BY MOUTH EVERY 4 HOURS AS NEEDED FOR HEADACHE 6 tablet 1   • cloNIDine (CATAPRES) 0.2 MG tablet Take 1 tablet by mouth 3 (Three) Times a Day. 270 tablet 1   • Continuous Blood Gluc  (Dexcom G6 ) device 1 Device Daily. 3 each 3   • Continuous Blood Gluc Sensor (Dexcom G6 Sensor) Every 10 (Ten) Days. 9 each 3   • Continuous Blood Gluc Transmit (Dexcom G6 Transmitter) misc 1 Device Daily. 1 each 0   • escitalopram (LEXAPRO) 10 MG tablet Take 1 tablet by mouth Daily. 90 tablet 3   • glucose blood test strip 1 each by Other route 3 (Three) Times a Day. 100 each 12   • Insulin Glargine, 2 Unit Dial, (Toujeo Max SoloStar) 300 UNIT/ML solution pen-injector injection Inject 40-60 Units under the skin into the appropriate area as directed Daily. (Patient taking differently: Inject 40 Units under the skin into the appropriate area as directed Every Night. And takes up to 60) 3 pen 0   • insulin lispro (humaLOG) 100 UNIT/ML injection Inject 0-6 Units under the skin into the appropriate area as directed 3 (Three) Times a Day Before Meals. Sliding scale:4-6 units only if blood sugar is greater than 180     • Insulin Pen Needle 32G X 5 MM misc 1 each 4 (Four) Times  a Day. 120 each 5   • isosorbide mononitrate (IMDUR) 60 MG 24 hr tablet Take 1 tablet by mouth Daily. 90 tablet 1   • levothyroxine (SYNTHROID, LEVOTHROID) 25 MCG tablet Take 1 tablet by mouth Daily. 30 tablet 5   • metoprolol tartrate (LOPRESSOR) 25 MG tablet Take 1 tablet by mouth 2 (Two) Times a Day. 60 tablet 1   • nystatin (MYCOSTATIN) 009542 UNIT/GM powder Apply  one application topically to the appropriate area as directed Every 12 (Twelve) Hours. (Patient taking differently: Apply  topically to the appropriate area as directed 2 (Two) Times a Day As Needed (itching).) 60 g 0   • pantoprazole (Protonix) 40 MG EC tablet Take 1 tablet by mouth Daily. 30 tablet 1   • promethazine (PHENERGAN) 25 MG tablet Take 1 tablet by mouth Every 6 (Six) Hours As Needed for Nausea or Vomiting. 60 tablet 5   • vitamin D (ERGOCALCIFEROL) 1.25 MG (50088 UT) capsule capsule Take 1 capsule by mouth 1 (One) Time Per Week. 5 capsule 5   • hydrALAZINE (APRESOLINE) 25 MG tablet Take 3 tablets by mouth 3 (Three) Times a Day. 270 tablet 0     No current facility-administered medications for this encounter.     Objective:     Vitals:    12/13/21 0920   BP: 152/88   Pulse: 88   Resp: 20   SpO2: 95%     Body mass index is 35.68 kg/m².    Wt Readings from Last 3 Encounters:   12/13/21 91.4 kg (201 lb 6.4 oz)   12/06/21 90.3 kg (199 lb)   11/29/21 91.4 kg (201 lb 9.6 oz)       ReDs - 32% (4/14/2021)  Lab Results   Component Value Date    ABSOLUTELUNG 30 12/13/2021     Vitals reviewed.   Constitutional:       Appearance: Normal appearance. Well-developed.      Comments: Wears a mask during encounter, uses a cane    Eyes:      Conjunctiva/sclera: Conjunctivae normal.   HENT:      Head: Normocephalic.   Neck:      Vascular: No JVD or JVR.   Pulmonary:      Effort: Pulmonary effort is normal.      Breath sounds: Normal breath sounds.   Cardiovascular:      Normal rate. Regular rhythm.   Pulses:     Intact distal pulses.   Edema:     Peripheral  edema present.     Ankle: bilateral 1+ edema of the ankle.     Feet: bilateral 1+ edema of the feet.  Abdominal:      General: Bowel sounds are normal.      Palpations: Abdomen is soft. There is no hepatomegaly or splenomegaly.   Musculoskeletal: Normal range of motion.      Cervical back: Normal range of motion and neck supple. Skin:     General: Skin is warm and dry.   Neurological:      Mental Status: Alert and oriented to person, place, and time.   Psychiatric:         Attention and Perception: Attention normal.         Mood and Affect: Mood normal.         Speech: Speech normal.         Behavior: Behavior normal. Behavior is cooperative.         Cognition and Memory: Cognition normal.     Cardiographics  Results for orders placed during the hospital encounter of 11/03/20    Adult Transthoracic Echo Complete W/ Cont if Necessary Per Protocol    Interpretation Summary  · Left ventricular wall thickness is consistent with concentric hypertrophy.  · Left ventricular ejection fraction appears to be greater than 70%. Left ventricular systolic function is hyperdynamic (EF > 70%).  · Left ventricular diastolic function is consistent with (grade II w/high LAP) pseudonormalization.  · The left atrial cavity is moderate to severely dilated.  · The right atrial cavity is mildly dilated.  · Moderate mitral annular calcification is present.  · Mild mitral valve regurgitation is present.  · Mild tricuspid valve regurgitation is present.  · Estimated right ventricular systolic pressure from tricuspid regurgitation is markedly elevated (>55 mmHg).    EKG:       Lab Review   Lab Results   Component Value Date    TSH 3.760 09/23/2021     Lab Results   Component Value Date    GLUCOSE 127 (H) 12/13/2021    BUN 32 (H) 12/13/2021    CREATININE 1.81 (H) 12/13/2021    EGFRIFNONA 27 (L) 12/13/2021    EGFRIFAFRI 41 (L) 07/30/2018    BCR 17.7 12/13/2021    K 3.8 12/13/2021    CO2 20.5 (L) 12/13/2021    CALCIUM 9.0 12/13/2021     PROTENTOTREF 7.7 07/30/2018    ALBUMIN 3.45 (L) 11/29/2021    LABIL2 1.1 (L) 07/30/2018    AST 37 (H) 11/29/2021    ALT 18 11/29/2021     Lab Results   Component Value Date    WBC 8.87 11/29/2021    HGB 7.8 (L) 11/29/2021    HCT 27.2 (L) 11/29/2021    MCV 82.9 11/29/2021     11/29/2021     Lab Results   Component Value Date    CKTOTAL 67 12/04/2020    CKMB 2.92 04/16/2018    TROPONINI 0.012 04/16/2018    TROPONINT <0.010 09/02/2021     Lab Results   Component Value Date    PROBNP 2,506.0 (H) 12/13/2021     The following portions of the patient's history were reviewed and updated as appropriate: allergies, current medications, past family history, past medical history, past social history, past surgical history and problem list.     Old records reviewed and pertinent information is included in the above objective data.     Assessment/Plan:   1. Chronic Diastolic Congestive Heart Failure, EF > 70%   2. HTN  3. Pedal edema   4. CKD, stage IV  5. Anemia, iron deficency   6. Obesity     Pro-BNP, BMP and magnesium today  Discussed and reviewed labs with patient today  ReDs reviewed and discussed with patient today   From a HF standpoint she appears stable - continue on Bumex 0.5 mg every other day at recommendation of nephrology.    Refill Hydralazine thru pharmacy today   Continue on current medications and encourage in compliance  Keep follow-up with GI and hematology for anemia  Monitor BP and HR  Weight loss discussed and healthy lifestyle recommended   Counseling patient extensively on dietary Na+ intake, importance of activity, weight monitoring, compliance with medications and follow up appointments.  Follow up in 3 months, sooner if needed.

## 2021-12-13 NOTE — PROGRESS NOTES
Heart Failure Pharmacy Note  Patient Name: Britta Lee   Referring Provider: Priscila Holland  Primary Cardiologist: Dr. Cruz  Type of CHF: HFpEF    Medication Use:   Adherence: Missed hydralazine doses since last Tuesday/Wednesday   Hx of med intolerances: bradycardia with metoprolol 100mg BID  Affordability: Cannot afford hydralazine at this time    Retail Rx Management: none    Past Medical History:   Diagnosis Date   • Acquired hypothyroidism 4/22/2021   • Anemia    • Arthritis    • Carpal tunnel syndrome    • Coronary artery disease    • Diabetes mellitus (HCC)    • Elevated cholesterol    • GERD (gastroesophageal reflux disease)    • History of pneumonia    • History of unsteady gait     OCASSIONALLY   • Hypertension    • Insomnia    • Kidney disease    • LENNY (obstructive sleep apnea) 12/1/2020   • Osteoarthritis    • Renal insufficiency    • Sciatica      ALLERGIES: Patient has no known allergies.  Current Outpatient Medications   Medication Sig Dispense Refill   • albuterol sulfate  (90 Base) MCG/ACT inhaler Inhale 2 puffs Every 4 (Four) Hours As Needed for Shortness of Air. 18 g 0   • amLODIPine (NORVASC) 10 MG tablet Take 1 tablet by mouth Daily. (Patient taking differently: Take 10 mg by mouth Every Night.) 30 tablet 5   • aspirin 81 MG tablet Take 1 tablet by mouth Daily. 30 tablet 5   • atorvastatin (LIPITOR) 40 MG tablet TAKE ONE Tablet BY MOUTH EACH NIGHT AT BEDTIME 90 tablet 1   • BD Pen Needle Evon U/F 32G X 4 MM misc      • bumetanide (BUMEX) 0.5 MG tablet Take 0.5 mg by mouth Every Other Day.     • butalbital-acetaminophen-caffeine (FIORICET, ESGIC) -40 MG per tablet TAKE ONE Tablet BY MOUTH EVERY 4 HOURS AS NEEDED FOR HEADACHE 6 tablet 1   • cloNIDine (CATAPRES) 0.2 MG tablet Take 1 tablet by mouth 3 (Three) Times a Day. 270 tablet 1   • Continuous Blood Gluc  (Dexcom G6 ) device 1 Device Daily. 3 each 3   • Continuous Blood Gluc Sensor (Dexcom G6 Sensor) Every 10  (Ten) Days. 9 each 3   • Continuous Blood Gluc Transmit (Dexcom G6 Transmitter) misc 1 Device Daily. 1 each 0   • escitalopram (LEXAPRO) 10 MG tablet Take 1 tablet by mouth Daily. 90 tablet 3   • glucose blood test strip 1 each by Other route 3 (Three) Times a Day. 100 each 12   • Insulin Glargine, 2 Unit Dial, (Toujeo Max SoloStar) 300 UNIT/ML solution pen-injector injection Inject 40-60 Units under the skin into the appropriate area as directed Daily. (Patient taking differently: Inject 40 Units under the skin into the appropriate area as directed Every Night. And takes up to 60) 3 pen 0   • insulin lispro (humaLOG) 100 UNIT/ML injection Inject 0-6 Units under the skin into the appropriate area as directed 3 (Three) Times a Day Before Meals. Sliding scale:4-6 units only if blood sugar is greater than 180     • Insulin Pen Needle 32G X 5 MM misc 1 each 4 (Four) Times a Day. 120 each 5   • isosorbide mononitrate (IMDUR) 60 MG 24 hr tablet Take 1 tablet by mouth Daily. 90 tablet 1   • levothyroxine (SYNTHROID, LEVOTHROID) 25 MCG tablet Take 1 tablet by mouth Daily. 30 tablet 5   • metoprolol tartrate (LOPRESSOR) 25 MG tablet Take 1 tablet by mouth 2 (Two) Times a Day. 60 tablet 1   • nystatin (MYCOSTATIN) 132277 UNIT/GM powder Apply  one application topically to the appropriate area as directed Every 12 (Twelve) Hours. (Patient taking differently: Apply  topically to the appropriate area as directed 2 (Two) Times a Day As Needed (itching).) 60 g 0   • pantoprazole (Protonix) 40 MG EC tablet Take 1 tablet by mouth Daily. 30 tablet 1   • promethazine (PHENERGAN) 25 MG tablet Take 1 tablet by mouth Every 6 (Six) Hours As Needed for Nausea or Vomiting. 60 tablet 5   • vitamin D (ERGOCALCIFEROL) 1.25 MG (07424 UT) capsule capsule Take 1 capsule by mouth 1 (One) Time Per Week. 5 capsule 5   • hydrALAZINE (APRESOLINE) 25 MG tablet Take 3 tablets by mouth 3 (Three) Times a Day. 270 tablet 0     No current  facility-administered medications for this encounter.       Vaccination History:   Pneumonia: Reports UTD  Annual Influenza: UTD for 2021-22 Season    Objective  Vitals:    12/13/21 0920   BP: 152/88   BP Location: Left arm   Patient Position: Sitting   Cuff Size: Adult   Pulse: 88   Resp: 20   SpO2: 95%   Weight: 91.4 kg (201 lb 6.4 oz)     Wt Readings from Last 3 Encounters:   12/13/21 91.4 kg (201 lb 6.4 oz)   12/06/21 90.3 kg (199 lb)   11/29/21 91.4 kg (201 lb 9.6 oz)         12/13/21 0920   Weight: 91.4 kg (201 lb 6.4 oz)     Lab Results   Component Value Date    GLUCOSE 127 (H) 12/13/2021    BUN 32 (H) 12/13/2021    CREATININE 1.81 (H) 12/13/2021    EGFRIFNONA 27 (L) 12/13/2021    EGFRIFAFRI 41 (L) 07/30/2018    BCR 17.7 12/13/2021    K 3.8 12/13/2021    CO2 20.5 (L) 12/13/2021    CALCIUM 9.0 12/13/2021    PROTENTOTREF 7.7 07/30/2018    ALBUMIN 3.45 (L) 11/29/2021    LABIL2 1.1 (L) 07/30/2018    AST 37 (H) 11/29/2021    ALT 18 11/29/2021     Lab Results   Component Value Date    WBC 8.87 11/29/2021    HGB 7.8 (L) 11/29/2021    HCT 27.2 (L) 11/29/2021    MCV 82.9 11/29/2021     11/29/2021     Lab Results   Component Value Date    CKTOTAL 67 12/04/2020    CKMB 2.92 04/16/2018    TROPONINI 0.012 04/16/2018    TROPONINT <0.010 09/02/2021     Lab Results   Component Value Date    PROBNP 2,506.0 (H) 12/13/2021     Results for orders placed during the hospital encounter of 11/03/20    Adult Transthoracic Echo Complete W/ Cont if Necessary Per Protocol    Interpretation Summary  · Left ventricular wall thickness is consistent with concentric hypertrophy.  · Left ventricular ejection fraction appears to be greater than 70%. Left ventricular systolic function is hyperdynamic (EF > 70%).  · Left ventricular diastolic function is consistent with (grade II w/high LAP) pseudonormalization.  · The left atrial cavity is moderate to severely dilated.  · The right atrial cavity is mildly dilated.  · Moderate mitral  annular calcification is present.  · Mild mitral valve regurgitation is present.  · Mild tricuspid valve regurgitation is present.  · Estimated right ventricular systolic pressure from tricuspid regurgitation is markedly elevated (>55 mmHg).               Class   Drug   Dose Last Dose Adjustment   Notes   ACEi/ARB/ARNI    Worsening renal fxn 1/22/21   Beta Blocker Metoprolol tart 25 BID 10/22/21    MRA --------------   eGFR needs to be >30mL/min     Drug Therapy Problems:    1. Medication Non-adherance due to cost       Recommendations:      1. Will refer to pharmacy patient assistance to see if help can be provided for hydralazine.     Patient was educated on heart failure medications and the importance of medication adherence.  All questions were addressed and patient expressed understanding. Patient would benefit from further education.     Thank you for allowing me to participate in the care of your patient,    Melisa Leahy, PharmD  12/13/21  09:47 EST

## 2021-12-20 ENCOUNTER — LAB (OUTPATIENT)
Dept: FAMILY MEDICINE CLINIC | Facility: CLINIC | Age: 76
End: 2021-12-20

## 2021-12-20 ENCOUNTER — OFFICE VISIT (OUTPATIENT)
Dept: FAMILY MEDICINE CLINIC | Facility: CLINIC | Age: 76
End: 2021-12-20

## 2021-12-20 VITALS
SYSTOLIC BLOOD PRESSURE: 128 MMHG | WEIGHT: 204 LBS | HEIGHT: 63 IN | OXYGEN SATURATION: 99 % | HEART RATE: 57 BPM | DIASTOLIC BLOOD PRESSURE: 74 MMHG | TEMPERATURE: 96.8 F | BODY MASS INDEX: 36.14 KG/M2

## 2021-12-20 DIAGNOSIS — D63.1 ANEMIA DUE TO STAGE 5 CHRONIC KIDNEY DISEASE, NOT ON CHRONIC DIALYSIS (HCC): ICD-10-CM

## 2021-12-20 DIAGNOSIS — Z00.00 MEDICARE ANNUAL WELLNESS VISIT, SUBSEQUENT: Primary | ICD-10-CM

## 2021-12-20 DIAGNOSIS — N25.81 SECONDARY HYPERPARATHYROIDISM OF RENAL ORIGIN (HCC): ICD-10-CM

## 2021-12-20 DIAGNOSIS — H60.311 ACUTE DIFFUSE OTITIS EXTERNA OF RIGHT EAR: ICD-10-CM

## 2021-12-20 DIAGNOSIS — I25.10 ATHEROSCLEROSIS OF NATIVE CORONARY ARTERY OF NATIVE HEART WITHOUT ANGINA PECTORIS: Chronic | ICD-10-CM

## 2021-12-20 DIAGNOSIS — Z96.652 STATUS POST TOTAL LEFT KNEE REPLACEMENT: Chronic | ICD-10-CM

## 2021-12-20 DIAGNOSIS — E11.22 TYPE 2 DIABETES MELLITUS WITH STAGE 4 CHRONIC KIDNEY DISEASE, WITH LONG-TERM CURRENT USE OF INSULIN (HCC): ICD-10-CM

## 2021-12-20 DIAGNOSIS — I50.32 CHRONIC DIASTOLIC CONGESTIVE HEART FAILURE (HCC): Chronic | ICD-10-CM

## 2021-12-20 DIAGNOSIS — N18.4 TYPE 2 DIABETES MELLITUS WITH STAGE 4 CHRONIC KIDNEY DISEASE, WITH LONG-TERM CURRENT USE OF INSULIN (HCC): ICD-10-CM

## 2021-12-20 DIAGNOSIS — K21.9 GASTROESOPHAGEAL REFLUX DISEASE WITHOUT ESOPHAGITIS: Chronic | ICD-10-CM

## 2021-12-20 DIAGNOSIS — E11.42 TYPE 2 DIABETES MELLITUS WITH DIABETIC POLYNEUROPATHY, WITH LONG-TERM CURRENT USE OF INSULIN (HCC): Chronic | ICD-10-CM

## 2021-12-20 DIAGNOSIS — G56.03 BILATERAL CARPAL TUNNEL SYNDROME: Chronic | ICD-10-CM

## 2021-12-20 DIAGNOSIS — G47.33 OSA (OBSTRUCTIVE SLEEP APNEA): Chronic | ICD-10-CM

## 2021-12-20 DIAGNOSIS — D50.9 IRON DEFICIENCY ANEMIA, UNSPECIFIED IRON DEFICIENCY ANEMIA TYPE: Chronic | ICD-10-CM

## 2021-12-20 DIAGNOSIS — I87.2 VENOUS INSUFFICIENCY (CHRONIC) (PERIPHERAL): Chronic | ICD-10-CM

## 2021-12-20 DIAGNOSIS — E66.01 CLASS 2 SEVERE OBESITY DUE TO EXCESS CALORIES WITH SERIOUS COMORBIDITY AND BODY MASS INDEX (BMI) OF 36.0 TO 36.9 IN ADULT (HCC): Chronic | ICD-10-CM

## 2021-12-20 DIAGNOSIS — I73.9 PVD (PERIPHERAL VASCULAR DISEASE) WITH CLAUDICATION (HCC): Chronic | ICD-10-CM

## 2021-12-20 DIAGNOSIS — E78.5 DYSLIPIDEMIA: ICD-10-CM

## 2021-12-20 DIAGNOSIS — E03.9 ACQUIRED HYPOTHYROIDISM: Chronic | ICD-10-CM

## 2021-12-20 DIAGNOSIS — I10 ESSENTIAL HYPERTENSION: Chronic | ICD-10-CM

## 2021-12-20 DIAGNOSIS — F33.1 MODERATE EPISODE OF RECURRENT MAJOR DEPRESSIVE DISORDER (HCC): Chronic | ICD-10-CM

## 2021-12-20 DIAGNOSIS — M51.36 DDD (DEGENERATIVE DISC DISEASE), LUMBAR: ICD-10-CM

## 2021-12-20 DIAGNOSIS — G57.02 NEUROPATHY OF LEFT SCIATIC NERVE: Chronic | ICD-10-CM

## 2021-12-20 DIAGNOSIS — N25.81 HYPERPARATHYROIDISM DUE TO RENAL INSUFFICIENCY (HCC): Chronic | ICD-10-CM

## 2021-12-20 DIAGNOSIS — Z79.4 TYPE 2 DIABETES MELLITUS WITH STAGE 4 CHRONIC KIDNEY DISEASE, WITH LONG-TERM CURRENT USE OF INSULIN (HCC): ICD-10-CM

## 2021-12-20 DIAGNOSIS — M17.12 PRIMARY OSTEOARTHRITIS OF LEFT KNEE: Chronic | ICD-10-CM

## 2021-12-20 DIAGNOSIS — E78.2 MIXED HYPERLIPIDEMIA: Chronic | ICD-10-CM

## 2021-12-20 DIAGNOSIS — E11.69 DIABETES MELLITUS TYPE 2 IN OBESE (HCC): Chronic | ICD-10-CM

## 2021-12-20 DIAGNOSIS — N18.4 CKD (CHRONIC KIDNEY DISEASE) STAGE 4, GFR 15-29 ML/MIN (HCC): ICD-10-CM

## 2021-12-20 DIAGNOSIS — N18.5 ANEMIA DUE TO STAGE 5 CHRONIC KIDNEY DISEASE, NOT ON CHRONIC DIALYSIS (HCC): ICD-10-CM

## 2021-12-20 DIAGNOSIS — Z79.4 TYPE 2 DIABETES MELLITUS WITH DIABETIC POLYNEUROPATHY, WITH LONG-TERM CURRENT USE OF INSULIN (HCC): Chronic | ICD-10-CM

## 2021-12-20 DIAGNOSIS — N18.32 CHRONIC KIDNEY DISEASE (CKD) STAGE G3B/A1, MODERATELY DECREASED GLOMERULAR FILTRATION RATE (GFR) BETWEEN 30-44 ML/MIN/1.73 SQUARE METER AND ALBUMINURIA CREATININE RATIO LESS THAN 30 MG/G (CMS/H* (HCC): Primary | ICD-10-CM

## 2021-12-20 DIAGNOSIS — E11.319 DIABETIC RETINOPATHY OF LEFT EYE ASSOCIATED WITH TYPE 2 DIABETES MELLITUS, MACULAR EDEMA PRESENCE UNSPECIFIED, UNSPECIFIED RETINOPATHY SEVERITY (HCC): ICD-10-CM

## 2021-12-20 DIAGNOSIS — E66.9 DIABETES MELLITUS TYPE 2 IN OBESE (HCC): Chronic | ICD-10-CM

## 2021-12-20 DIAGNOSIS — K90.9 MALABSORPTION OF IRON: ICD-10-CM

## 2021-12-20 PROBLEM — I50.9 ACUTE ON CHRONIC CONGESTIVE HEART FAILURE: Status: RESOLVED | Noted: 2020-11-03 | Resolved: 2021-12-20

## 2021-12-20 PROBLEM — Z87.09 HX OF BRONCHITIS: Status: RESOLVED | Noted: 2021-01-21 | Resolved: 2021-12-20

## 2021-12-20 PROBLEM — R60.0 PEDAL EDEMA: Status: RESOLVED | Noted: 2021-01-22 | Resolved: 2021-12-20

## 2021-12-20 PROBLEM — R06.83 SNORING: Status: RESOLVED | Noted: 2021-06-03 | Resolved: 2021-12-20

## 2021-12-20 PROCEDURE — G0439 PPPS, SUBSEQ VISIT: HCPCS | Performed by: PHYSICIAN ASSISTANT

## 2021-12-20 PROCEDURE — 1125F AMNT PAIN NOTED PAIN PRSNT: CPT | Performed by: PHYSICIAN ASSISTANT

## 2021-12-20 PROCEDURE — 80053 COMPREHEN METABOLIC PANEL: CPT | Performed by: INTERNAL MEDICINE

## 2021-12-20 PROCEDURE — 1170F FXNL STATUS ASSESSED: CPT | Performed by: PHYSICIAN ASSISTANT

## 2021-12-20 PROCEDURE — 84100 ASSAY OF PHOSPHORUS: CPT | Performed by: INTERNAL MEDICINE

## 2021-12-20 PROCEDURE — 1159F MED LIST DOCD IN RCRD: CPT | Performed by: PHYSICIAN ASSISTANT

## 2021-12-20 PROCEDURE — 83970 ASSAY OF PARATHORMONE: CPT | Performed by: INTERNAL MEDICINE

## 2021-12-20 PROCEDURE — 82306 VITAMIN D 25 HYDROXY: CPT | Performed by: INTERNAL MEDICINE

## 2021-12-20 RX ORDER — LEVOTHYROXINE SODIUM 0.03 MG/1
25 TABLET ORAL DAILY
Qty: 30 TABLET | Refills: 5 | Status: SHIPPED | OUTPATIENT
Start: 2021-12-20 | End: 2022-03-07 | Stop reason: SDUPTHER

## 2021-12-20 RX ORDER — AMLODIPINE BESYLATE 10 MG/1
10 TABLET ORAL DAILY
Qty: 30 TABLET | Refills: 5 | Status: SHIPPED | OUTPATIENT
Start: 2021-12-20 | End: 2022-03-07 | Stop reason: SDUPTHER

## 2021-12-20 RX ORDER — ATORVASTATIN CALCIUM 40 MG/1
40 TABLET, FILM COATED ORAL DAILY
Qty: 90 TABLET | Refills: 3 | Status: SHIPPED | OUTPATIENT
Start: 2021-12-20 | End: 2022-03-07 | Stop reason: SDUPTHER

## 2021-12-20 RX ORDER — PROCHLORPERAZINE 25 MG/1
1 SUPPOSITORY RECTAL DAILY
Qty: 1 EACH | Refills: 5 | Status: ON HOLD | OUTPATIENT
Start: 2021-12-20 | End: 2022-11-02

## 2021-12-20 RX ORDER — INSULIN GLARGINE 300 U/ML
40-60 INJECTION, SOLUTION SUBCUTANEOUS DAILY
Qty: 3 PEN | Refills: 5 | COMMUNITY
Start: 2021-12-20 | End: 2022-03-07 | Stop reason: SDUPTHER

## 2021-12-20 RX ORDER — PROCHLORPERAZINE 25 MG/1
SUPPOSITORY RECTAL
Qty: 9 EACH | Refills: 3 | Status: ON HOLD | OUTPATIENT
Start: 2021-12-20 | End: 2022-11-02

## 2021-12-20 RX ORDER — PANTOPRAZOLE SODIUM 40 MG/1
40 TABLET, DELAYED RELEASE ORAL DAILY
Qty: 30 TABLET | Refills: 5 | Status: SHIPPED | OUTPATIENT
Start: 2021-12-20 | End: 2022-03-07 | Stop reason: SDUPTHER

## 2021-12-20 RX ORDER — PROCHLORPERAZINE 25 MG/1
1 SUPPOSITORY RECTAL DAILY
Qty: 3 EACH | Refills: 3 | Status: ON HOLD | OUTPATIENT
Start: 2021-12-20 | End: 2022-11-02

## 2021-12-20 RX ORDER — ESCITALOPRAM OXALATE 10 MG/1
10 TABLET ORAL DAILY
Qty: 90 TABLET | Refills: 3 | Status: SHIPPED | OUTPATIENT
Start: 2021-12-20 | End: 2023-01-17 | Stop reason: SDUPTHER

## 2021-12-20 NOTE — PROGRESS NOTES
The ABCs of the Annual Wellness Visit  Subsequent Medicare Wellness Visit    Chief Complaint   Patient presents with   • Medicare Wellness-subsequent      Subjective    History of Present Illness:  Britta Lee is a 76 y.o. female who presents for a Subsequent Medicare Wellness Visit.    Depression-  Not at goal as patient states that she ran out of her Lexapro.  She states that she has had labile emotions and is being overwhelmed with the [place] holiday and financial responsibilities.  She denies any hallucinations SI or HI.    Hypertension-  Controlled with amlodipine, Bumex, clonidine, and metoprolol    Hyperlipidemia-  Stable with atorvastatin and diet    Diabetes mellitus-  Type II with associated obesity, peripheral neuropathy, and renal disease.  Controlled with Toujeo and Humalog    Hypothyroidism-stable with Synthroid 25 mcg daily    Earache-  Patient complains of right-sided earache x2 days.  She does report associated drainage.    Chronic kidney disease-  Chronic kidney disease is not at goal as she does have stage V CKD but is not currently on dialysis.  She is followed by nephrology.    Coronary artery disease-stable with risk factor modification including aspirin and atorvastatin    Chronic congestive heart failure-stable with Bumex    Peripheral vascular disease/chronic venous insufficiency-  Stable with Bumex and aspirin    Obesity-  Not at goal with current sedentary lifestyle and no specific diet.    Hyperparathyroidism-  Stable and likely due to renal insufficiency.    Diabetic retinopathy-  Patient advised to keep regular appointments with ophthalmology with regards to her diabetic retinopathy.    Anemia/iron malabsorption-  Stable and patient is under the care of hematology.  She does get iron infusions when needed.  She states that she has had colonoscopy and EGD earlier in 2021 with no abnormalities noted to explain anemia.    Left knee osteoarthritis-  Stable and at status post left knee  total replacement.    Carpal tunnel syndrome-stable with conservative measures    Lumbar degenerative disc disease-stable with conservative measures.  She does have intermittent left sciatic neuropathy.    Obstructive sleep apnea-stable      The following portions of the patient's history were reviewed and   updated as appropriate: allergies, current medications, past family history, past medical history, past social history, past surgical history and problem list.    Compared to one year ago, the patient feels her physical   health is worse.    Compared to one year ago, the patient feels her mental   health is worse.    Recent Hospitalizations:  She was not admitted to the hospital during the last year.       Current Medical Providers:  Patient Care Team:  Lexie Dolan PA as PCP - General (Family Medicine)    Outpatient Medications Prior to Visit   Medication Sig Dispense Refill   • albuterol sulfate  (90 Base) MCG/ACT inhaler Inhale 2 puffs Every 4 (Four) Hours As Needed for Shortness of Air. 18 g 0   • aspirin 81 MG tablet Take 1 tablet by mouth Daily. 30 tablet 5   • BD Pen Needle Evon U/F 32G X 4 MM misc      • bumetanide (BUMEX) 0.5 MG tablet Take 0.5 mg by mouth Every Other Day.     • cloNIDine (CATAPRES) 0.2 MG tablet Take 1 tablet by mouth 3 (Three) Times a Day. 270 tablet 1   • glucose blood test strip 1 each by Other route 3 (Three) Times a Day. 100 each 12   • hydrALAZINE (APRESOLINE) 25 MG tablet Take 3 tablets by mouth 3 (Three) Times a Day. 270 tablet 0   • Insulin Pen Needle 32G X 5 MM misc 1 each 4 (Four) Times a Day. 120 each 5   • isosorbide mononitrate (IMDUR) 60 MG 24 hr tablet Take 1 tablet by mouth Daily. 90 tablet 1   • metoprolol tartrate (LOPRESSOR) 25 MG tablet Take 1 tablet by mouth 2 (Two) Times a Day. 60 tablet 1   • nystatin (MYCOSTATIN) 649242 UNIT/GM powder Apply  one application topically to the appropriate area as directed Every 12 (Twelve) Hours. (Patient taking  differently: Apply  topically to the appropriate area as directed 2 (Two) Times a Day As Needed (itching).) 60 g 0   • promethazine (PHENERGAN) 25 MG tablet Take 1 tablet by mouth Every 6 (Six) Hours As Needed for Nausea or Vomiting. 60 tablet 5   • vitamin D (ERGOCALCIFEROL) 1.25 MG (94977 UT) capsule capsule Take 1 capsule by mouth 1 (One) Time Per Week. 5 capsule 5   • amLODIPine (NORVASC) 10 MG tablet Take 1 tablet by mouth Daily. (Patient taking differently: Take 10 mg by mouth Every Night.) 30 tablet 5   • atorvastatin (LIPITOR) 40 MG tablet TAKE ONE Tablet BY MOUTH EACH NIGHT AT BEDTIME 90 tablet 1   • Continuous Blood Gluc  (Dexcom G6 ) device 1 Device Daily. 3 each 3   • Continuous Blood Gluc Sensor (Dexcom G6 Sensor) Every 10 (Ten) Days. 9 each 3   • Continuous Blood Gluc Transmit (Dexcom G6 Transmitter) misc 1 Device Daily. 1 each 0   • escitalopram (LEXAPRO) 10 MG tablet Take 1 tablet by mouth Daily. 90 tablet 3   • Insulin Glargine, 2 Unit Dial, (Toujeo Max SoloStar) 300 UNIT/ML solution pen-injector injection Inject 40-60 Units under the skin into the appropriate area as directed Daily. (Patient taking differently: Inject 40 Units under the skin into the appropriate area as directed Every Night. And takes up to 60) 3 pen 0   • insulin lispro (humaLOG) 100 UNIT/ML injection Inject 0-6 Units under the skin into the appropriate area as directed 3 (Three) Times a Day Before Meals. Sliding scale:4-6 units only if blood sugar is greater than 180     • levothyroxine (SYNTHROID, LEVOTHROID) 25 MCG tablet Take 1 tablet by mouth Daily. 30 tablet 5   • pantoprazole (Protonix) 40 MG EC tablet Take 1 tablet by mouth Daily. 30 tablet 1   • butalbital-acetaminophen-caffeine (FIORICET, ESGIC) -40 MG per tablet TAKE ONE Tablet BY MOUTH EVERY 4 HOURS AS NEEDED FOR HEADACHE 6 tablet 1     No facility-administered medications prior to visit.       No opioid medication identified on active medication  list. I have reviewed chart for other potential  high risk medication/s and harmful drug interactions in the elderly.          Aspirin is on active medication list. Aspirin use is indicated based on review of current medical condition/s. Pros and cons of this therapy have been discussed today. Benefits of this medication outweigh potential harm.  Patient has been encouraged to continue taking this medication.  .      Patient Active Problem List   Diagnosis   • Type 2 diabetes mellitus, uncontrolled, with neuropathy ([facility])   • Essential hypertension   • Atherosclerosis of native coronary artery of native heart   • Bilateral carpal tunnel syndrome   • Iron deficiency anemia due to dietary causes   • Diabetic retinopathy associated with type 2 diabetes mellitus ([facility])   • Dyslipidemia   • Sciatic neuropathy   • DDD (degenerative disc disease), lumbar   • Primary osteoarthritis of left knee   • Status post total left knee replacement   • Type 2 diabetes mellitus with chronic kidney disease, with long-term current use of insulin ([facility])   • Hyperparathyroidism due to renal insufficiency ([facility])   • Venous insufficiency (chronic) (peripheral)   • Class 2 obesity due to excess calories with body mass index (BMI) of 36.0 to 36.9 in adult   • PVD (peripheral vascular disease) with claudication ([facility])   • LVH (left ventricular hypertrophy)   • Chronic diastolic congestive heart failure ([facility])   • LENNY (obstructive sleep apnea)   • CKD (chronic kidney disease) stage 4, GFR 15-29 ml/min ([facility])   • Iron deficiency anemia   • Malabsorption of iron   • Acquired hypothyroidism   • Hiatal hernia   • Weight loss, abnormal     Advance Care Planning  Advance Directive is not on file.  ACP discussion was held with the patient during this visit. Patient has an advance directive (not in EMR), copy requested.          Objective    Vitals:    12/20/21 1011   BP: 128/74   Pulse: 57   Temp: 96.8 °F (36 °C)   TempSrc: Temporal   SpO2: 99%   Weight: 92.5  "kg (204 lb)   Height: 160 cm (63\")   PainSc:   4     BMI Readings from Last 1 Encounters:   12/20/21 36.14 kg/m²   BMI is above normal parameters. Recommendations include: educational material    Does the patient have evidence of cognitive impairment? No    Physical Exam  Vitals and nursing note reviewed.   Constitutional:       Appearance: Normal appearance. She is well-developed.   HENT:      Head: Normocephalic and atraumatic.      Right Ear: Tympanic membrane normal.      Left Ear: Tympanic membrane normal.      Ears:      Comments: Yellowish discharge noted right external ear canal with minimal swelling     Nose: Nose normal.      Mouth/Throat:      Mouth: Mucous membranes are moist.      Pharynx: No oropharyngeal exudate or posterior oropharyngeal erythema.   Eyes:      Extraocular Movements: Extraocular movements intact.      Conjunctiva/sclera: Conjunctivae normal.   Neck:      Thyroid: No thyromegaly.      Trachea: No tracheal deviation.   Cardiovascular:      Rate and Rhythm: Normal rate and regular rhythm.      Heart sounds: Normal heart sounds. No murmur heard.      Pulmonary:      Effort: Pulmonary effort is normal. No respiratory distress.      Breath sounds: Normal breath sounds. No wheezing.   Abdominal:      General: Bowel sounds are normal.      Palpations: Abdomen is soft.      Tenderness: There is no abdominal tenderness. There is no guarding.   Musculoskeletal:         General: No tenderness. Normal range of motion.      Cervical back: Normal range of motion and neck supple.   Lymphadenopathy:      Cervical: No cervical adenopathy.   Skin:     General: Skin is warm and dry.      Findings: No rash.   Neurological:      General: No focal deficit present.      Mental Status: She is alert and oriented to person, place, and time.   Psychiatric:         Mood and Affect: Mood normal.         Behavior: Behavior normal.         Thought Content: Thought content normal.       Lab Results   Component Value " Date    TRIG 90 09/23/2021    HDL 42 09/23/2021    LDL 58 09/23/2021    VLDL 17 09/23/2021    HGBA1C 6.86 (H) 09/23/2021            HEALTH RISK ASSESSMENT    Smoking Status:  Social History     Tobacco Use   Smoking Status Never Smoker   Smokeless Tobacco Never Used     Alcohol Consumption:  Social History     Substance and Sexual Activity   Alcohol Use Yes    Comment: socially     Fall Risk Screen:    CONCETTA Fall Risk Assessment was completed, and patient is at HIGH risk for falls. Assessment completed on:12/20/2021    Depression Screening:  PHQ-2/PHQ-9 Depression Screening 12/20/2021   Little interest or pleasure in doing things 0   Feeling down, depressed, or hopeless 0   Trouble falling or staying asleep, or sleeping too much -   Feeling tired or having little energy -   Poor appetite or overeating -   Feeling bad about yourself - or that you are a failure or have let yourself or your family down -   Trouble concentrating on things, such as reading the newspaper or watching television -   Moving or speaking so slowly that other people could have noticed. Or the opposite - being so fidgety or restless that you have been moving around a lot more than usual -   Thoughts that you would be better off dead, or of hurting yourself in some way -   Total Score 0   If you checked off any problems, how difficult have these problems made it for you to do your work, take care of things at home, or get along with other people? -       Health Habits and Functional and Cognitive Screening:  Functional & Cognitive Status 12/20/2021   Do you have difficulty preparing food and eating? No   Do you have difficulty bathing yourself, getting dressed or grooming yourself? No   Do you have difficulty using the toilet? No   Do you have difficulty moving around from place to place? No   Do you have trouble with steps or getting out of a bed or a chair? No   Current Diet Well Balanced Diet   Dental Exam Up to date   Eye Exam Up to date    Exercise (times per week) 3 times per week   Current Exercises Include Walking;House Cleaning   Current Exercise Activities Include -   Do you need help using the phone?  No   Are you deaf or do you have serious difficulty hearing?  No   Do you need help with transportation? Yes   Do you need help shopping? No   Do you need help preparing meals?  No   Do you need help with housework?  No   Do you need help with laundry? No   Do you need help taking your medications? No   Do you need help managing money? No   Do you ever drive or ride in a car without wearing a seat belt? No   Have you felt unusual stress, anger or loneliness in the last month? No   Who do you live with? Spouse   If you need help, do you have trouble finding someone available to you? No   Have you been bothered in the last four weeks by sexual problems? No   Do you have difficulty concentrating, remembering or making decisions? No       Age-appropriate Screening Schedule:  Refer to the list below for future screening recommendations based on patient's age, sex and/or medical conditions. Orders for these recommended tests are listed in the plan section. The patient has been provided with a written plan.    Health Maintenance   Topic Date Due   • ZOSTER VACCINE (3 of 3) 09/14/2016   • DXA SCAN  07/11/2022 (Originally 7/19/2021)   • HEMOGLOBIN A1C  03/23/2022   • MAMMOGRAM  08/12/2022   • LIPID PANEL  09/23/2022   • URINE MICROALBUMIN  11/02/2022   • DIABETIC EYE EXAM  12/20/2022   • TDAP/TD VACCINES (2 - Td or Tdap) 03/20/2027   • INFLUENZA VACCINE  Completed   • PAP SMEAR  Discontinued              Assessment/Plan   CMS Preventative Services Quick Reference  Risk Factors Identified During Encounter  Cardiovascular Disease  Depression/Dysphoria  Fall Risk-High or Moderate  Inactivity/Sedentary  Obesity/Overweight   Polypharmacy  The above risks/problems have been discussed with the patient.  Follow up actions/plans if indicated are seen below in the  Assessment/Plan Section.  Pertinent information has been shared with the patient in the After Visit Summary.    Diagnoses and all orders for this visit:    1. Medicare annual wellness visit, subsequent (Primary)  Comments:  Performed and documented today    2. Moderate episode of recurrent major depressive disorder (HCC)  Comments:  Restart Lexapro and refills were sent to the pharmacy  Conservative measures for dealing with stress were advised  Orders:  -     escitalopram (LEXAPRO) 10 MG tablet; Take 1 tablet by mouth Daily.  Dispense: 90 tablet; Refill: 3    3. Essential hypertension  Comments:  Continue amlodipine  Continue Bumex clonidine and metoprolol  Advised daily BP/pulse log and follow-up later this month  Orders:  -     amLODIPine (NORVASC) 10 MG tablet; Take 1 tablet by mouth Daily.  Dispense: 30 tablet; Refill: 5    4. Mixed hyperlipidemia  Comments:  Advised low-cholesterol diet  Continue atorvastatin  Orders:  -     atorvastatin (LIPITOR) 40 MG tablet; Take 1 tablet by mouth Daily.  Dispense: 90 tablet; Refill: 3  -     Lipid Panel    5. Diabetes mellitus type 2 in obese ([facility])  Comments:  Advised low carbohydrate diabetic diet  Continue Toujeo and Humalog  Orders:  -     Continuous Blood Gluc  (Dexcom G6 ) device; 1 Device Daily.  Dispense: 3 each; Refill: 3  -     Continuous Blood Gluc Sensor (Dexcom G6 Sensor); Every 10 (Ten) Days.  Dispense: 9 each; Refill: 3  -     Continuous Blood Gluc Transmit (Dexcom G6 Transmitter) misc; 1 Device Daily.  Dispense: 1 each; Refill: 5  -     insulin lispro (humaLOG) 100 UNIT/ML injection; Inject 0-6 Units under the skin into the appropriate area as directed 3 (Three) Times a Day Before Meals. Sliding scale 4-6 units if BG > 180  Dispense: 9 mL; Refill: 5    6. Type 2 diabetes mellitus with diabetic polyneuropathy, with long-term current use of insulin ([facility])  Comments:  Continue Toujeo and Humalog and advised low carbohydrate diet  Continue to  monitor  Orders:  -     Insulin Glargine, 2 Unit Dial, (Toujeo Max SoloStar) 300 UNIT/ML solution pen-injector injection; Inject 40-60 Units under the skin into the appropriate area as directed Daily.  Dispense: 3 pen; Refill: 5  -     Hemoglobin A1c  -     MicroAlbumin, Urine, Random - Urine, Clean Catch    7. Acquired hypothyroidism  Comments:  Continue Synthroid 25 mcg daily  Orders:  -     levothyroxine (SYNTHROID, LEVOTHROID) 25 MCG tablet; Take 1 tablet by mouth Daily.  Dispense: 30 tablet; Refill: 5    8. Gastroesophageal reflux disease without esophagitis  Comments:  Continue pantoprazole  Advised to avoid known trigger foods  Orders:  -     pantoprazole (Protonix) 40 MG EC tablet; Take 1 tablet by mouth Daily.  Dispense: 30 tablet; Refill: 5    9. Acute diffuse otitis externa of right ear  Comments:  Start polymycin eardrops  Advised use of cotton in ear to decrease wind exposure  Orders:  -     neomycin-polymyxin-hydrocortisone (CORTISPORIN) 3.5-90402-2 otic solution; Administer 3 drops to the right ear 4 (Four) Times a Day.  Dispense: 10 mL; Refill: 0    10. Anemia due to stage 5 chronic kidney disease, not on chronic dialysis (HCC)  Comments:  Advised to continue to be followed by nephrology  Discussed lab work today including renal function  Orders:  -     CBC & Differential  -     Iron Profile  -     Methylmalonic Acid, Serum  -     Folate RBC  -     Ferritin    11. Atherosclerosis of native coronary artery of native heart without angina pectoris  Comments:  Advise risk factor modification including aspirin and atorvastatin    12. Chronic diastolic congestive heart failure (HCC)  Comments:  Continue Bumex  Advised to monitor weight daily and report any weight change greater than 3 pounds    13. Dyslipidemia    14. PVD (peripheral vascular disease) with claudication (HCC)  Comments:  Continue aspirin  Symptomatic care advised    15. Venous insufficiency (chronic) (peripheral)  Comments:  Continue  Bumex  Conservative management advised including low-sodium diet, leg elevation and compression socks    16. Class 2 severe obesity due to excess calories with serious comorbidity and body mass index (BMI) of 36.0 to 36.9 in adult ([facility])  Comments:  Advised low carbohydrate diabetic diet  Advised 30 minutes CV activity as tolerated    17. Hyperparathyroidism due to renal insufficiency ([facility])  Comments:  Continue to monitor    18. Type 2 diabetes mellitus with stage 4 chronic kidney disease, with long-term current use of insulin ([facility])    19. Diabetic retinopathy of left eye associated with type 2 diabetes mellitus, macular edema presence unspecified, unspecified retinopathy severity ([facility])    20. Malabsorption of iron    21. CKD (chronic kidney disease) stage 4, GFR 15-29 ml/min ([facility])    22. Iron deficiency anemia, unspecified iron deficiency anemia type  Comments:  Advised to keep regular appointments with hematology  Discussed iron infusions as needed going forward    23. Primary osteoarthritis of left knee  Comments:  Advised activity as tolerated and daily stretching    24. Status post total left knee replacement  Comments:  Fall risk precautions advised    25. Bilateral carpal tunnel syndrome  Comments:  Conservative measures advised    26. DDD (degenerative disc disease), lumbar    27. Neuropathy of left sciatic nerve  Comments:  Continue conservative measures and stretches as needed    28. LENNY (obstructive sleep apnea)  Comments:  Conservative measures advised        Follow Up:   Return in about 3 months (around 3/20/2022) for Followup with Loc Owen just prior to return.     An After Visit Summary and PPPS were made available to the patient.

## 2021-12-20 NOTE — PATIENT INSTRUCTIONS
Medicare Wellness  Personal Prevention Plan of Service     Date of Office Visit:  2021  Encounter Provider:  RAFIQ Montanez  Place of Service:  [facility]  Patient Name: Britta Lee  :  1945    As part of the Medicare Wellness portion of your visit today, we are providing you with this personalized preventive plan of services (PPPS). This plan is based upon recommendations of the [place] Preventive Services Task Force (USPSTF) and the Advisory Committee on Immunization Practices (ACIP).    This lists the preventive care services that should be considered, and provides dates of when you are due. Items listed as completed are up-to-date and do not require any further intervention.    Health Maintenance   Topic Date Due   • ZOSTER VACCINE (3 of 3) 2016   • DXA SCAN  2022 (Originally 2021)   • HEMOGLOBIN A1C  2022   • MAMMOGRAM  2022   • LIPID PANEL  2022   • URINE MICROALBUMIN  2022   • ANNUAL WELLNESS VISIT  2022   • DIABETIC EYE EXAM  2022   • COLORECTAL CANCER SCREENING  2024   • TDAP/TD VACCINES (2 - Td or Tdap) 2027   • HEPATITIS C SCREENING  Completed   • COVID-19 Vaccine  Completed   • INFLUENZA VACCINE  Completed   • Pneumococcal Vaccine 65+  Completed   • PAP SMEAR  Discontinued       No orders of the defined types were placed in this encounter.      No follow-ups on file.          Fall Prevention in the Home, Adult  Falls can cause injuries. They can happen to people of all ages. There are many things you can do to make your home safe and to help prevent falls. Ask for help when making these changes, if needed.  What actions can I take to prevent falls?  General Instructions  · Use good lighting in all rooms. Replace any light bulbs that burn out.  · Turn on the lights when you go into a dark area. Use night-lights.  · Keep items that you use often in easy-to-reach places. Lower  the shelves around your home if necessary.  · Set up your furniture so you have a clear path. Avoid moving your furniture around.  · Do not have throw rugs and other things on the floor that can make you trip.  · Avoid walking on wet floors.  · If any of your floors are uneven, fix them.  · Add color or contrast paint or tape to clearly eder and help you see:  ? Any grab bars or handrails.  ? First and last steps of stairways.  ? Where the edge of each step is.  · If you use a stepladder:  ? Make sure that it is fully opened. Do not climb a closed stepladder.  ? Make sure that both sides of the stepladder are locked into place.  ? Ask someone to hold the stepladder for you while you use it.  · If there are any pets around you, be aware of where they are.  What can I do in the bathroom?         · Keep the floor dry. Clean up any water that spills onto the floor as soon as it happens.  · Remove soap buildup in the tub or shower regularly.  · Use non-skid mats or decals on the floor of the tub or shower.  · Attach bath mats securely with double-sided, non-slip rug tape.  · If you need to sit down in the shower, use a plastic, non-slip stool.  · Install grab bars by the toilet and in the tub and shower. Do not use towel bars as grab bars.  What can I do in the bedroom?  · Make sure that you have a light by your bed that is easy to reach.  · Do not use any sheets or blankets that are too big for your bed. They should not hang down onto the floor.  · Have a firm chair that has side arms. You can use this for support while you get dressed.  What can I do in the kitchen?  · Clean up any spills right away.  · If you need to reach something above you, use a strong step stool that has a grab bar.  · Keep electrical cords out of the way.  · Do not use floor polish or wax that makes floors slippery. If you must use wax, use non-skid floor wax.  What can I do with my stairs?  · Do not leave any items on the stairs.  · Make sure  that you have a light switch at the top of the stairs and the bottom of the stairs. If you do not have them, ask someone to add them for you.  · Make sure that there are handrails on both sides of the stairs, and use them. Fix handrails that are broken or loose. Make sure that handrails are as long as the stairways.  · Install non-slip stair treads on all stairs in your home.  · Avoid having throw rugs at the top or bottom of the stairs. If you do have throw rugs, attach them to the floor with carpet tape.  · Choose a carpet that does not hide the edge of the steps on the stairway.  · Check any carpeting to make sure that it is firmly attached to the stairs. Fix any carpet that is loose or worn.  What can I do on the outside of my home?  · Use bright outdoor lighting.  · Regularly fix the edges of walkways and driveways and fix any cracks.  · Remove anything that might make you trip as you walk through a door, such as a raised step or threshold.  · Trim any bushes or trees on the path to your home.  · Regularly check to see if handrails are loose or broken. Make sure that both sides of any steps have handrails.  · Install guardrails along the edges of any raised decks and porches.  · Clear walking paths of anything that might make someone trip, such as tools or rocks.  · Have any leaves, snow, or ice cleared regularly.  · Use sand or salt on walking paths during winter.  · Clean up any spills in your garage right away. This includes grease or oil spills.  What other actions can I take?  · Wear shoes that:  ? Have a low heel. Do not wear high heels.  ? Have rubber bottoms.  ? Are comfortable and fit you well.  ? Are closed at the toe. Do not wear open-toe sandals.  · Use tools that help you move around (mobility aids) if they are needed. These include:  ? Canes.  ? Walkers.  ? Scooters.  ? Crutches.  · Review your medicines with your doctor. Some medicines can make you feel dizzy. This can increase your chance of  "falling.  Ask your doctor what other things you can do to help prevent falls.  Where to find more information  · Centers for Disease Control and Prevention, [place]: https://cdc.gov  · National [place] on Aging: https://ms0idmj.verenice.nih.gov  Contact a doctor if:  · You are afraid of falling at home.  · You feel weak, drowsy, or dizzy at home.  · You fall at home.  Summary  · There are many simple things that you can do to make your home safe and to help prevent falls.  · Ways to make your home safe include removing tripping hazards and installing grab bars in the bathroom.  · Ask for help when making these changes in your home.  This information is not intended to replace advice given to you by your health care provider. Make sure you discuss any questions you have with your health care provider.  Document Revised: 04/09/2020 Document Reviewed: 08/02/2018  [employer] Patient Education © 2021 [employer]      https://www.diabeteseducator.org/docs/default-source/living-with-diabetes/conquering-the-grocery-store-v1.pdf?sfvrsn=4\">   Carbohydrate Counting for Diabetes Mellitus, Adult  Carbohydrate counting is a method of keeping track of how many carbohydrates you eat. Eating carbohydrates naturally increases the amount of sugar (glucose) in the blood. Counting how many carbohydrates you eat improves your blood glucose control, which helps you manage your diabetes.  It is important to know how many carbohydrates you can safely have in each meal. This is different for every person. A dietitian can help you make a meal plan and calculate how many carbohydrates you should have at each meal and snack.  What foods contain carbohydrates?  Carbohydrates are found in the following foods:  · Grains, such as breads and cereals.  · Dried beans and soy products.  · Starchy vegetables, such as potatoes, peas, and corn.  · Fruit and fruit juices.  · Milk and yogurt.  · Sweets and snack foods, such as cake, cookies, candy, chips, and " soft drinks.  How do I count carbohydrates in foods?  There are two ways to count carbohydrates in food. You can read food labels or learn standard serving sizes of foods. You can use either of the methods or a combination of both.  Using the Nutrition Facts label  The Nutrition Facts list is included on the labels of almost all packaged foods and beverages in the [place] It includes:  · The serving size.  · Information about nutrients in each serving, including the grams (g) of carbohydrate per serving.  To use the Nutrition Facts:  · Decide how many servings you will have.  · Multiply the number of servings by the number of carbohydrates per serving.  · The resulting number is the total amount of carbohydrates that you will be having.  Learning the standard serving sizes of foods  When you eat carbohydrate foods that are not packaged or do not include Nutrition Facts on the label, you need to measure the servings in order to count the amount of carbohydrates.  · Measure the foods that you will eat with a food scale or measuring cup, if needed.  · Decide how many standard-size servings you will eat.  · Multiply the number of servings by 15. For foods that contain carbohydrates, one serving equals 15 g of carbohydrates.  ? For example, if you eat 2 cups or 10 oz (300 g) of strawberries, you will have eaten 2 servings and 30 g of carbohydrates (2 servings x 15 g = 30 g).  · For foods that have more than one food mixed, such as soups and casseroles, you must count the carbohydrates in each food that is included.  The following list contains standard serving sizes of common carbohydrate-rich foods. Each of these servings has about 15 g of carbohydrates:  · 1 slice of bread.  · 1 six-inch (15 cm) tortilla.  · ? cup or 2 oz (53 g) cooked rice or pasta.  · ½ cup or 3 oz (85 g) cooked or canned, drained and rinsed beans or lentils.  · ½ cup or 3 oz (85 g) starchy vegetable, such as peas, corn, or squash.  · ½ cup or 4 oz (120  g) hot cereal.  · ½ cup or 3 oz (85 g) boiled or mashed potatoes, or ¼ or 3 oz (85 g) of a large baked potato.  · ½ cup or 4 fl oz (118 mL) fruit juice.  · 1 cup or 8 fl oz (237 mL) milk.  · 1 small or 4 oz (106 g) apple.  · ½ or 2 oz (63 g) of a medium banana.  · 1 cup or 5 oz (150 g) strawberries.  · 3 cups or 1 oz (24 g) popped popcorn.  What is an example of carbohydrate counting?  To calculate the number of carbohydrates in this sample meal, follow the steps shown below.  Sample meal  · 3 oz (85 g) chicken breast.  · ? cup or 4 oz (106 g) brown rice.  · ½ cup or 3 oz (85 g) corn.  · 1 cup or 8 fl oz (237 mL) milk.  · 1 cup or 5 oz (150 g) strawberries with sugar-free whipped topping.  Carbohydrate calculation  1. Identify the foods that contain carbohydrates:  ? Rice.  ? Corn.  ? Milk.  ? Strawberries.  2. Calculate how many servings you have of each food:  ? 2 servings rice.  ? 1 serving corn.  ? 1 serving milk.  ? 1 serving strawberries.  3. Multiply each number of servings by 15 g:  ? 2 servings rice x 15 g = 30 g.  ? 1 serving corn x 15 g = 15 g.  ? 1 serving milk x 15 g = 15 g.  ? 1 serving strawberries x 15 g = 15 g.  4. Add together all of the amounts to find the total grams of carbohydrates eaten:  ? 30 g + 15 g + 15 g + 15 g = 75 g of carbohydrates total.  What are tips for following this plan?  Shopping  · Develop a meal plan and then make a shopping list.  · Buy fresh and frozen vegetables, fresh and frozen fruit, dairy, eggs, beans, lentils, and whole grains.  · Look at food labels. Choose foods that have more fiber and less sugar.  · Avoid processed foods and foods with added sugars.  Meal planning  · Aim to have the same amount of carbohydrates at each meal and for each snack time.  · Plan to have regular, balanced meals and snacks.  Where to find more information  · American Diabetes Association: www.diabetes.org  · Centers for Disease Control and Prevention: www.cdc.gov  Summary  · Carbohydrate  counting is a method of keeping track of how many carbohydrates you eat.  · Eating carbohydrates naturally increases the amount of sugar (glucose) in the blood.  · Counting how many carbohydrates you eat improves your blood glucose control, which helps you manage your diabetes.  · A dietitian can help you make a meal plan and calculate how many carbohydrates you should have at each meal and snack.  This information is not intended to replace advice given to you by your health care provider. Make sure you discuss any questions you have with your health care provider.  Document Revised: 12/17/2020 Document Reviewed: 12/18/2020  [employer] Patient Education © 2021 [employer]      Fat and Cholesterol Restricted Eating Plan  Getting too much fat and cholesterol in your diet may cause health problems. Choosing the right foods helps keep your fat and cholesterol at normal levels. This can keep you from getting certain diseases.  Your doctor may recommend an eating plan that includes:  · Total fat: ______% or less of total calories a day.  · Saturated fat: ______% or less of total calories a day.  · Cholesterol: less than _________mg a day.  · Fiber: ______g a day.  What are tips for following this plan?  Meal planning  · At meals, divide your plate into four equal parts:  ? Fill one-half of your plate with vegetables and green salads.  ? Fill one-fourth of your plate with whole grains.  ? Fill one-fourth of your plate with low-fat (lean) protein foods.  · Eat fish that is high in omega-3 fats at least two times a week. This includes mackerel, tuna, sardines, and salmon.  · Eat foods that are high in fiber, such as whole grains, beans, apples, broccoli, carrots, peas, and barley.  General tips    · Work with your doctor to lose weight if you need to.  · Avoid:  ? Foods with added sugar.  ? Fried foods.  ? Foods with partially hydrogenated oils.  · Limit alcohol intake to no more than 1 drink a day for nonpregnant women and  "2 drinks a day for men. One drink equals 12 oz of beer, 5 oz of wine, or 1½ oz of hard liquor.    Reading food labels  · Check food labels for:  ? Trans fats.  ? Partially hydrogenated oils.  ? Saturated fat (g) in each serving.  ? Cholesterol (mg) in each serving.  ? Fiber (g) in each serving.  · Choose foods with healthy fats, such as:  ? Monounsaturated fats.  ? Polyunsaturated fats.  ? Omega-3 fats.  · Choose grain products that have whole grains. Look for the word \"whole\" as the first word in the ingredient list.  Cooking  · Cook foods using low-fat methods. These include baking, boiling, grilling, and broiling.  · Eat more home-cooked foods. Eat at restaurants and buffets less often.  · Avoid cooking using saturated fats, such as butter, cream, palm oil, palm kernel oil, and coconut oil.  Recommended foods    Fruits  · All fresh, canned (in natural juice), or frozen fruits.  Vegetables  · Fresh or frozen vegetables (raw, steamed, roasted, or grilled). Green salads.  Grains  · Whole grains, such as whole wheat or whole grain breads, crackers, cereals, and pasta. Unsweetened oatmeal, bulgur, barley, quinoa, or brown rice. Corn or whole wheat flour tortillas.  Meats and other protein foods  · Ground beef (85% or leaner), grass-fed beef, or beef trimmed of fat. Skinless chicken or turkey. Ground chicken or turkey. Pork trimmed of fat. All fish and seafood. Egg whites. Dried beans, peas, or lentils. Unsalted nuts or seeds. Unsalted canned beans. Nut butters without added sugar or oil.  Dairy  · Low-fat or nonfat dairy products, such as skim or 1% milk, 2% or reduced-fat cheeses, low-fat and fat-free ricotta or cottage cheese, or plain low-fat and nonfat yogurt.  Fats and oils  · Tub margarine without trans fats. Light or reduced-fat mayonnaise and salad dressings. Avocado. Olive, canola, sesame, or safflower oils.  The items listed above may not be a complete list of foods and beverages you can eat. Contact a " dietitian for more information.  Foods to avoid  Fruits  · Canned fruit in heavy syrup. Fruit in cream or butter sauce. Fried fruit.  Vegetables  · Vegetables cooked in cheese, cream, or butter sauce. Fried vegetables.  Grains  · White bread. White pasta. White rice. Cornbread. Bagels, pastries, and croissants. Crackers and snack foods that contain trans fat and hydrogenated oils.  Meats and other protein foods  · Fatty cuts of meat. Ribs, chicken wings, dixon, sausage, bologna, salami, chitterlings, fatback, hot dogs, bratwurst, and packaged lunch meats. Liver and organ meats. Whole eggs and egg yolks. Chicken and turkey with skin. Fried meat.  Dairy  · Whole or 2% milk, cream, half-and-half, and cream cheese. Whole milk cheeses. Whole-fat or sweetened yogurt. Full-fat cheeses. Nondairy creamers and whipped toppings. Processed cheese, cheese spreads, and cheese curds.  Beverages  · Alcohol. Sugar-sweetened drinks such as sodas, lemonade, and fruit drinks.  Fats and oils  · Butter, stick margarine, lard, shortening, ghee, or dixon fat. Coconut, palm kernel, and palm oils.  Sweets and desserts  · Corn syrup, sugars, honey, and molasses. Candy. Jam and jelly. Syrup. Sweetened cereals. Cookies, pies, cakes, donuts, muffins, and ice cream.  The items listed above may not be a complete list of foods and beverages you should avoid. Contact a dietitian for more information.  Summary  · Choosing the right foods helps keep your fat and cholesterol at normal levels. This can keep you from getting certain diseases.  · At meals, fill one-half of your plate with vegetables and green salads.  · Eat high-fiber foods, like whole grains, beans, apples, carrots, peas, and barley.  · Limit added sugar, saturated fats, alcohol, and fried foods.  This information is not intended to replace advice given to you by your health care provider. Make sure you discuss any questions you have with your health care provider.  Document Revised:  04/21/2021 Document Reviewed: 04/21/2021  [employer] Patient Education © 2021 [employer]      Fall Prevention in the Home, Adult  Falls can cause injuries. They can happen to people of all ages. There are many things you can do to make your home safe and to help prevent falls. Ask for help when making these changes, if needed.  What actions can I take to prevent falls?  General Instructions  · Use good lighting in all rooms. Replace any light bulbs that burn out.  · Turn on the lights when you go into a dark area. Use night-lights.  · Keep items that you use often in easy-to-reach places. Lower the shelves around your home if necessary.  · Set up your furniture so you have a clear path. Avoid moving your furniture around.  · Do not have throw rugs and other things on the floor that can make you trip.  · Avoid walking on wet floors.  · If any of your floors are uneven, fix them.  · Add color or contrast paint or tape to clearly eder and help you see:  ? Any grab bars or handrails.  ? First and last steps of stairways.  ? Where the edge of each step is.  · If you use a stepladder:  ? Make sure that it is fully opened. Do not climb a closed stepladder.  ? Make sure that both sides of the stepladder are locked into place.  ? Ask someone to hold the stepladder for you while you use it.  · If there are any pets around you, be aware of where they are.  What can I do in the bathroom?         · Keep the floor dry. Clean up any water that spills onto the floor as soon as it happens.  · Remove soap buildup in the tub or shower regularly.  · Use non-skid mats or decals on the floor of the tub or shower.  · Attach bath mats securely with double-sided, non-slip rug tape.  · If you need to sit down in the shower, use a plastic, non-slip stool.  · Install grab bars by the toilet and in the tub and shower. Do not use towel bars as grab bars.  What can I do in the bedroom?  · Make sure that you have a light by your bed that is easy  to reach.  · Do not use any sheets or blankets that are too big for your bed. They should not hang down onto the floor.  · Have a firm chair that has side arms. You can use this for support while you get dressed.  What can I do in the kitchen?  · Clean up any spills right away.  · If you need to reach something above you, use a strong step stool that has a grab bar.  · Keep electrical cords out of the way.  · Do not use floor polish or wax that makes floors slippery. If you must use wax, use non-skid floor wax.  What can I do with my stairs?  · Do not leave any items on the stairs.  · Make sure that you have a light switch at the top of the stairs and the bottom of the stairs. If you do not have them, ask someone to add them for you.  · Make sure that there are handrails on both sides of the stairs, and use them. Fix handrails that are broken or loose. Make sure that handrails are as long as the stairways.  · Install non-slip stair treads on all stairs in your home.  · Avoid having throw rugs at the top or bottom of the stairs. If you do have throw rugs, attach them to the floor with carpet tape.  · Choose a carpet that does not hide the edge of the steps on the stairway.  · Check any carpeting to make sure that it is firmly attached to the stairs. Fix any carpet that is loose or worn.  What can I do on the outside of my home?  · Use bright outdoor lighting.  · Regularly fix the edges of walkways and driveways and fix any cracks.  · Remove anything that might make you trip as you walk through a door, such as a raised step or threshold.  · Trim any bushes or trees on the path to your home.  · Regularly check to see if handrails are loose or broken. Make sure that both sides of any steps have handrails.  · Install guardrails along the edges of any raised decks and porches.  · Clear walking paths of anything that might make someone trip, such as tools or rocks.  · Have any leaves, snow, or ice cleared regularly.  · Use  sand or salt on walking paths during winter.  · Clean up any spills in your garage right away. This includes grease or oil spills.  What other actions can I take?  · Wear shoes that:  ? Have a low heel. Do not wear high heels.  ? Have rubber bottoms.  ? Are comfortable and fit you well.  ? Are closed at the toe. Do not wear open-toe sandals.  · Use tools that help you move around (mobility aids) if they are needed. These include:  ? Canes.  ? Walkers.  ? Scooters.  ? Crutches.  · Review your medicines with your doctor. Some medicines can make you feel dizzy. This can increase your chance of falling.  Ask your doctor what other things you can do to help prevent falls.  Where to find more information  · Centers for Disease Control and Prevention, [place]: https://cdc.gov  · National [place] on Aging: https://wk1imcj.verenice.nih.gov  Contact a doctor if:  · You are afraid of falling at home.  · You feel weak, drowsy, or dizzy at home.  · You fall at home.  Summary  · There are many simple things that you can do to make your home safe and to help prevent falls.  · Ways to make your home safe include removing tripping hazards and installing grab bars in the bathroom.  · Ask for help when making these changes in your home.  This information is not intended to replace advice given to you by your health care provider. Make sure you discuss any questions you have with your health care provider.  Document Revised: 04/09/2020 Document Reviewed: 08/02/2018  Elsevier Patient Education © 2021 [employer] Inc.

## 2021-12-21 LAB
25(OH)D3 SERPL-MCNC: 32 NG/ML (ref 30–100)
ALBUMIN SERPL-MCNC: 3.4 G/DL (ref 3.5–5.2)
ALBUMIN/GLOB SERPL: 0.9 G/DL
ALP SERPL-CCNC: 326 U/L (ref 39–117)
ALT SERPL W P-5'-P-CCNC: 23 U/L (ref 1–33)
ANION GAP SERPL CALCULATED.3IONS-SCNC: 12.7 MMOL/L (ref 5–15)
AST SERPL-CCNC: 37 U/L (ref 1–32)
BILIRUB SERPL-MCNC: <0.2 MG/DL (ref 0–1.2)
BUN SERPL-MCNC: 40 MG/DL (ref 8–23)
BUN/CREAT SERPL: 20.2 (ref 7–25)
CALCIUM SPEC-SCNC: 9.2 MG/DL (ref 8.6–10.5)
CHLORIDE SERPL-SCNC: 104 MMOL/L (ref 98–107)
CO2 SERPL-SCNC: 20.3 MMOL/L (ref 22–29)
CREAT SERPL-MCNC: 1.98 MG/DL (ref 0.57–1)
GFR SERPL CREATININE-BSD FRML MDRD: 24 ML/MIN/1.73
GLOBULIN UR ELPH-MCNC: 3.8 GM/DL
GLUCOSE SERPL-MCNC: 179 MG/DL (ref 65–99)
PHOSPHATE SERPL-MCNC: 4.4 MG/DL (ref 2.5–4.5)
POTASSIUM SERPL-SCNC: 5.1 MMOL/L (ref 3.5–5.2)
PROT SERPL-MCNC: 7.2 G/DL (ref 6–8.5)
PTH-INTACT SERPL-MCNC: 68.1 PG/ML (ref 15–65)
SODIUM SERPL-SCNC: 137 MMOL/L (ref 136–145)

## 2021-12-22 ENCOUNTER — LAB (OUTPATIENT)
Dept: FAMILY MEDICINE CLINIC | Facility: CLINIC | Age: 76
End: 2021-12-22

## 2021-12-22 DIAGNOSIS — N25.81 SECONDARY HYPERPARATHYROIDISM OF RENAL ORIGIN (HCC): ICD-10-CM

## 2021-12-22 DIAGNOSIS — N18.32 CHRONIC KIDNEY DISEASE (CKD) STAGE G3B/A1, MODERATELY DECREASED GLOMERULAR FILTRATION RATE (GFR) BETWEEN 30-44 ML/MIN/1.73 SQUARE METER AND ALBUMINURIA CREATININE RATIO LESS THAN 30 MG/G (CMS/H* (HCC): ICD-10-CM

## 2021-12-22 PROCEDURE — 82570 ASSAY OF URINE CREATININE: CPT | Performed by: INTERNAL MEDICINE

## 2021-12-22 PROCEDURE — 81050 URINALYSIS VOLUME MEASURE: CPT | Performed by: INTERNAL MEDICINE

## 2021-12-22 PROCEDURE — 82043 UR ALBUMIN QUANTITATIVE: CPT | Performed by: PHYSICIAN ASSISTANT

## 2021-12-22 PROCEDURE — 84156 ASSAY OF PROTEIN URINE: CPT | Performed by: INTERNAL MEDICINE

## 2021-12-23 LAB
ALBUMIN UR-MCNC: 138.9 MG/DL
COLLECT DURATION TIME UR: 24 HRS
CREAT UR-MCNC: 92.5 MG/DL
PROT 24H UR-MRATE: 868 MG/24HOURS (ref 0–150)
PROT ?TM UR-MCNC: 213 MG/DL
PROT/CREAT UR: 2302.7 MG/G CREA (ref 0–200)
SPECIMEN VOL 24H UR: 400 ML

## 2022-01-04 ENCOUNTER — TELEPHONE (OUTPATIENT)
Dept: ONCOLOGY | Facility: CLINIC | Age: 77
End: 2022-01-04

## 2022-01-04 NOTE — TELEPHONE ENCOUNTER
Caller: Britta Mccormick    Relationship: Self    Best call back number: [phone]    What form or medical record are you requesting:  RELEASE FROM [facility] [employer] [facility]IENTS  - CATHERINE MCCORMICK AS HE NEEDS TO BE HOME TO CARE FOR HER.    Who is requesting this form or medical record from you: BRITTA     How would you like to receive the form or medical records (pick-up, mail, fax): CATHERINE WILL .    Timeframe paperwork needed: ASAP    Additional notes: CATHERINE IS AT THE STORE SO BRITTA IS NOT SURE OF THE DATE HE NEEDS TO BE THERE BUT IF YOU NEED AN EXACT DATE, PLEASE CALL HER.  SHE KNOWS IT IS IN January.

## 2022-01-05 NOTE — TELEPHONE ENCOUNTER
Caller: Britta Lee    Relationship: Self    Best call back number: [phone]    What is the best time to reach you:  ANYTIME       Who are you requesting to speak with (clinical staff, provider,  specific staff member): NICOLA KLEIN OR ASSISTANT    Do you know the name of the person who called: BRITTA    What was the call regarding:   CALLED YESTERDAY FOR PAPER OR LETTER TO BE SIGNED FOR  CATHERINE TO NOT DO JURY DUTY SINCE HE IS NEEDED TO BE AT HOME TO HELP TAKE CARE OF HER., HAD NOT HEARD BACK FOLLOWING UP   NEEDING TO KNOW SINCE HE HAS TO GET THIS LETTER IN SOON .    Do you require a callback: YES    ADDITIONAL NOTES: PATIENT IS A PATIENT OF NICOLA KLEIN.

## 2022-01-13 ENCOUNTER — OFFICE VISIT (OUTPATIENT)
Dept: GASTROENTEROLOGY | Facility: CLINIC | Age: 77
End: 2022-01-13

## 2022-01-13 ENCOUNTER — LAB (OUTPATIENT)
Dept: LAB | Facility: HOSPITAL | Age: 77
End: 2022-01-13

## 2022-01-13 VITALS
DIASTOLIC BLOOD PRESSURE: 52 MMHG | WEIGHT: 204 LBS | HEART RATE: 78 BPM | HEIGHT: 63 IN | RESPIRATION RATE: 18 BRPM | SYSTOLIC BLOOD PRESSURE: 135 MMHG | BODY MASS INDEX: 36.14 KG/M2

## 2022-01-13 DIAGNOSIS — D50.9 IRON DEFICIENCY ANEMIA, UNSPECIFIED IRON DEFICIENCY ANEMIA TYPE: Primary | ICD-10-CM

## 2022-01-13 DIAGNOSIS — R63.4 WEIGHT LOSS, ABNORMAL: ICD-10-CM

## 2022-01-13 DIAGNOSIS — D50.9 IRON DEFICIENCY ANEMIA, UNSPECIFIED IRON DEFICIENCY ANEMIA TYPE: ICD-10-CM

## 2022-01-13 PROCEDURE — 99213 OFFICE O/P EST LOW 20 MIN: CPT | Performed by: INTERNAL MEDICINE

## 2022-01-13 PROCEDURE — 84466 ASSAY OF TRANSFERRIN: CPT

## 2022-01-13 PROCEDURE — 85025 COMPLETE CBC W/AUTO DIFF WBC: CPT | Performed by: INTERNAL MEDICINE

## 2022-01-13 PROCEDURE — 36415 COLL VENOUS BLD VENIPUNCTURE: CPT

## 2022-01-13 PROCEDURE — 83540 ASSAY OF IRON: CPT

## 2022-01-13 NOTE — PROGRESS NOTES
Subjective   Britta Lee is a 76 y.o. female who presents to the office today as a follow up appointment regarding Follow-up (procedure follow up )      History of Present Illness:  The patient presents today for follow-up of YUAN.  She denies BRBPR or melena.  She denies hematemesis or hematochezia.  No family history of colon cancer.  She had a colonoscopy last year by Dr. Zamora which was normal per her report.  She underwent repeat EGD colonoscopy in November 2021.  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  She did have diverticulosis and multiple polyps that were removed at the time of her colonoscopy last November.  The patient does have chronic kidney disease but she is not on hemodialysis.  She has had weight loss.  She has lost 43 lbs in a year.  Currently, her weight is stable.  In fact, she has gained a couple of pounds.   She only takes ASA.  No other blood thinners.  She has received iron infusions in the past.  She does have poor kidney function.  She feels fatigued.  No abdominal pain.  She denies BRBPR or melena or hematemesis.  Recent lab work approximately month ago show that she continues to be iron deficient.      Review of Systems:  Review of Systems   Constitutional: Negative.  Negative for activity change, appetite change, chills, fatigue, fever and unexpected weight change.   HENT: Negative.  Negative for mouth sores and trouble swallowing.    Eyes: Negative.  Negative for photophobia, pain and redness.   Respiratory: Negative.  Negative for cough and choking.    Cardiovascular: Negative.  Negative for chest pain and leg swelling.   Gastrointestinal: Negative.  Negative for constipation, diarrhea, rectal pain and vomiting.   Endocrine: Negative.  Negative for cold intolerance, heat intolerance and polyphagia.   Genitourinary: Negative.  Negative for difficulty urinating and dysuria.   Musculoskeletal: Negative.  Negative for arthralgias, joint  swelling and myalgias.   Skin: Negative.  Negative for rash and wound.   Allergic/Immunologic: Negative.  Negative for environmental allergies and food allergies.   Neurological: Negative.  Negative for dizziness and light-headedness.   Hematological: Negative.  Negative for adenopathy. Does not bruise/bleed easily.   Psychiatric/Behavioral: Negative.  Negative for sleep disturbance. The patient is not nervous/anxious.        Past Medical History:  Past Medical History:   Diagnosis Date   • Acquired hypothyroidism 4/22/2021   • Anemia    • Arthritis    • Carpal tunnel syndrome    • Coronary artery disease    • Diabetes mellitus (HCC)    • Elevated cholesterol    • GERD (gastroesophageal reflux disease)    • History of pneumonia    • History of unsteady gait     OCASSIONALLY   • Hypertension    • Insomnia    • Kidney disease    • LENNY (obstructive sleep apnea) 12/1/2020   • Osteoarthritis    • Renal insufficiency    • Sciatica        Past Surgical History:  Past Surgical History:   Procedure Laterality Date   • ABDOMINAL SURGERY     • APPENDECTOMY     • CARDIAC CATHETERIZATION      1 STENT  ---  2000   • CARDIAC SURGERY     • CAROTID STENT     • CARPAL TUNNEL RELEASE Right 10/8/2019    Procedure: CARPAL TUNNEL RELEASE;  Surgeon: Feroz Johnson MD;  Location: [facility];  Service: Orthopedics   • CATARACT EXTRACTION     • CHOLECYSTECTOMY     • COLONOSCOPY     • COLONOSCOPY N/A 11/23/2021    Procedure: COLONOSCOPY FOR SCREENING;  Surgeon: Palmira Pate MD;  Location: [facility];  Service: Gastroenterology;  Laterality: N/A;   • CORONARY ANGIOPLASTY WITH STENT PLACEMENT     • ENDOSCOPY     • ENDOSCOPY N/A 3/5/2020    Procedure: ESOPHAGOGASTRODUODENOSCOPY;  Surgeon: Alexandru Bagley MD;  Location: [facility] OR;  Service: Gastroenterology;  Laterality: N/A;   • ENDOSCOPY N/A 11/23/2021    Procedure: ESOPHAGOGASTRODUODENOSCOPY WITH BIOPSY;  Surgeon: Palmira Pate MD;  Location: [facility] OR;   Service: Gastroenterology;  Laterality: N/A;   • ENDOSCOPY AND COLONOSCOPY     • GALLBLADDER SURGERY     • JOINT REPLACEMENT Left 05/02/2017    [facility]  DR JOHNSON  LEFT TOTAL KNEE   • KNEE ARTHROSCOPY W/ MENISCECTOMY Right    • LAPAROSCOPIC TUBAL LIGATION     • WA TOTAL KNEE ARTHROPLASTY Left 5/2/2017    Procedure: TOTAL KNEE ARTHROPLASTY;  Surgeon: Feroz Johnson MD;  Location[facility];  Service: Orthopedics   • STERILIZATION     • TONSILLECTOMY         Family History:  Family History   Problem Relation Age of Onset   • Arthritis Mother    • Diabetes Mother    • Cancer Mother    • Heart disease Father    • Diabetes Daughter    • Diabetes Son    • Diabetes Maternal Aunt    • Heart disease Maternal Grandmother    • Breast cancer Neg Hx        Social History:  Social History     Socioeconomic History   • Marital status:      Spouse name: natalie   • Number of children: 3   • Years of education: 12   Tobacco Use   • Smoking status: Never Smoker   • Smokeless tobacco: Never Used   Vaping Use   • Vaping Use: Never used   Substance and Sexual Activity   • Alcohol use: Yes     Comment: socially   • Drug use: No   • Sexual activity: Defer     Birth control/protection: Surgical       Current Medication List:    Current Outpatient Medications:   •  albuterol sulfate  (90 Base) MCG/ACT inhaler, Inhale 2 puffs Every 4 (Four) Hours As Needed for Shortness of Air., Disp: 18 g, Rfl: 0  •  amLODIPine (NORVASC) 10 MG tablet, Take 1 tablet by mouth Daily., Disp: 30 tablet, Rfl: 5  •  aspirin 81 MG tablet, Take 1 tablet by mouth Daily., Disp: 30 tablet, Rfl: 5  •  atorvastatin (LIPITOR) 40 MG tablet, Take 1 tablet by mouth Daily., Disp: 90 tablet, Rfl: 3  •  BD Pen Needle Evon U/F 32G X 4 MM misc, , Disp: , Rfl:   •  bumetanide (BUMEX) 0.5 MG tablet, Take 0.5 mg by mouth Every Other Day., Disp: , Rfl:   •  cloNIDine (CATAPRES) 0.2 MG tablet, Take 1 tablet by mouth 3 (Three) Times a Day., Disp: 270 tablet, Rfl: 1  •   Continuous Blood Gluc  (Dexcom G6 ) device, 1 Device Daily., Disp: 3 each, Rfl: 3  •  Continuous Blood Gluc Sensor (Dexcom G6 Sensor), Every 10 (Ten) Days., Disp: 9 each, Rfl: 3  •  Continuous Blood Gluc Transmit (Dexcom G6 Transmitter) misc, 1 Device Daily., Disp: 1 each, Rfl: 5  •  escitalopram (LEXAPRO) 10 MG tablet, Take 1 tablet by mouth Daily., Disp: 90 tablet, Rfl: 3  •  glucose blood test strip, 1 each by Other route 3 (Three) Times a Day., Disp: 100 each, Rfl: 12  •  hydrALAZINE (APRESOLINE) 25 MG tablet, Take 3 tablets by mouth 3 (Three) Times a Day., Disp: 270 tablet, Rfl: 0  •  Insulin Glargine, 2 Unit Dial, (Toujeo Max SoloStar) 300 UNIT/ML solution pen-injector injection, Inject 40-60 Units under the skin into the appropriate area as directed Daily., Disp: 3 pen, Rfl: 5  •  insulin lispro (humaLOG) 100 UNIT/ML injection, Inject 0-6 Units under the skin into the appropriate area as directed 3 (Three) Times a Day Before Meals. Sliding scale 4-6 units if BG > 180, Disp: 9 mL, Rfl: 5  •  Insulin Pen Needle 32G X 5 MM misc, 1 each 4 (Four) Times a Day., Disp: 120 each, Rfl: 5  •  isosorbide mononitrate (IMDUR) 60 MG 24 hr tablet, Take 1 tablet by mouth Daily., Disp: 90 tablet, Rfl: 1  •  levothyroxine (SYNTHROID, LEVOTHROID) 25 MCG tablet, Take 1 tablet by mouth Daily., Disp: 30 tablet, Rfl: 5  •  metoprolol tartrate (LOPRESSOR) 25 MG tablet, Take 1 tablet by mouth 2 (Two) Times a Day., Disp: 60 tablet, Rfl: 1  •  neomycin-polymyxin-hydrocortisone (CORTISPORIN) 3.5-08529-5 otic solution, Administer 3 drops to the right ear 4 (Four) Times a Day., Disp: 10 mL, Rfl: 0  •  nystatin (MYCOSTATIN) 616830 UNIT/GM powder, Apply  one application topically to the appropriate area as directed Every 12 (Twelve) Hours. (Patient taking differently: Apply  topically to the appropriate area as directed 2 (Two) Times a Day As Needed (itching).), Disp: 60 g, Rfl: 0  •  pantoprazole (Protonix) 40 MG EC tablet,  "Take 1 tablet by mouth Daily., Disp: 30 tablet, Rfl: 5  •  promethazine (PHENERGAN) 25 MG tablet, Take 1 tablet by mouth Every 6 (Six) Hours As Needed for Nausea or Vomiting., Disp: 60 tablet, Rfl: 5  •  vitamin D (ERGOCALCIFEROL) 1.25 MG (12011 UT) capsule capsule, Take 1 capsule by mouth 1 (One) Time Per Week., Disp: 5 capsule, Rfl: 5    Allergies:   Patient has no known allergies.    Vitals:  /52 (BP Location: Right arm, Patient Position: Sitting, Cuff Size: Adult)   Pulse 78   Resp 18   Ht 160 cm (62.99\")   Wt 92.5 kg (204 lb)   BMI 36.15 kg/m²     Physical Exam:  Physical Exam  Constitutional:       Appearance: She is obese.   HENT:      Head: Normocephalic and atraumatic.      Nose: Nose normal. No congestion or rhinorrhea.   Eyes:      General: No scleral icterus.     Extraocular Movements: Extraocular movements intact.      Conjunctiva/sclera: Conjunctivae normal.      Pupils: Pupils are equal, round, and reactive to light.   Cardiovascular:      Rate and Rhythm: Normal rate and regular rhythm.      Pulses: Normal pulses.      Heart sounds: Normal heart sounds.   Pulmonary:      Effort: Pulmonary effort is normal.      Breath sounds: Normal breath sounds.   Abdominal:      General: Abdomen is flat. Bowel sounds are normal. There is no distension.      Palpations: Abdomen is soft. There is no shifting dullness, fluid wave, hepatomegaly, splenomegaly, mass or pulsatile mass.      Tenderness: There is no abdominal tenderness. There is no guarding or rebound.      Hernia: No hernia is present.   Musculoskeletal:         General: No swelling or tenderness.      Cervical back: Normal range of motion and neck supple.   Skin:     General: Skin is warm and dry.      Coloration: Skin is not jaundiced.   Neurological:      General: No focal deficit present.      Mental Status: She is alert and oriented to person, place, and time.   Psychiatric:         Mood and Affect: Mood normal.         Behavior: Behavior " normal.         Results Review:  Lab Results:   Lab on 12/22/2021   Component Date Value Ref Range Status   • Protein/Creatinine Ratio, Urine 12/22/2021 2,302.7* 0.0 - 200.0 mg/G Crea Final   • Creatinine, Urine 12/22/2021 92.5  mg/dL Final   • Total Protein, Urine 12/22/2021 213.0  mg/dL Final   • Protein, 24H Urine 12/22/2021 868.0* 0.0 - 150.0 mg/24hours Final   • 24H Urine Volume 12/22/2021 400  mL Final   • Time (Hours) 12/22/2021 24  hrs Final   Lab on 12/20/2021   Component Date Value Ref Range Status   • 25 Hydroxy, Vitamin D 12/20/2021 32.0  30.0 - 100.0 ng/ml Final   • PTH, Intact 12/20/2021 68.1* 15.0 - 65.0 pg/mL Final   • Glucose 12/20/2021 179* 65 - 99 mg/dL Final   • BUN 12/20/2021 40* 8 - 23 mg/dL Final   • Creatinine 12/20/2021 1.98* 0.57 - 1.00 mg/dL Final   • Sodium 12/20/2021 137  136 - 145 mmol/L Final   • Potassium 12/20/2021 5.1  3.5 - 5.2 mmol/L Final   • Chloride 12/20/2021 104  98 - 107 mmol/L Final   • CO2 12/20/2021 20.3* 22.0 - 29.0 mmol/L Final   • Calcium 12/20/2021 9.2  8.6 - 10.5 mg/dL Final   • Total Protein 12/20/2021 7.2  6.0 - 8.5 g/dL Final   • Albumin 12/20/2021 3.40* 3.50 - 5.20 g/dL Final   • ALT (SGPT) 12/20/2021 23  1 - 33 U/L Final   • AST (SGOT) 12/20/2021 37* 1 - 32 U/L Final   • Alkaline Phosphatase 12/20/2021 326* 39 - 117 U/L Final   • Total Bilirubin 12/20/2021 <0.2  0.0 - 1.2 mg/dL Final   • eGFR Non  Amer 12/20/2021 24* >60 mL/min/1.73 Final   • Globulin 12/20/2021 3.8  gm/dL Final   • A/G Ratio 12/20/2021 0.9  g/dL Final   • BUN/Creatinine Ratio 12/20/2021 20.2  7.0 - 25.0 Final   • Anion Gap 12/20/2021 12.7  5.0 - 15.0 mmol/L Final   • Phosphorus 12/20/2021 4.4  2.5 - 4.5 mg/dL Final   Office Visit on 12/20/2021   Component Date Value Ref Range Status   • Microalbumin, Urine 12/22/2021 138.9  mg/dL Final       Assessment/Plan     Visit Diagnoses:    ICD-10-CM ICD-9-CM   1. Iron deficiency anemia, unspecified iron deficiency anemia type  D50.9 280.9   2.  Weight loss, abnormal  R63.4 783.21       Plan:  Orders Placed This Encounter   Procedures   • FL Upper GI Single Contrast SBFT   • US Abdomen Complete   • Iron Profile   • CBC Auto Differential   • CBC & Differential       I am going to have the patient undergo a small bowel follow-through.  If this is normal I will have her undergo capsule endoscopy to take a look at her small bowel for the source of her iron deficiency anemia.  She will follow-up after her capsule endoscopy.      MEDS ORDERED DURING VISIT:  No orders of the defined types were placed in this encounter.      Return in about 8 weeks (around 3/10/2022).             This document has been electronically signed by Palmira Pate MD   January 13, 2022 14:48 EST      Part of this note may be an electronic transcription/translation of spoken language to printed text using the Dragon Dictation System.

## 2022-01-14 LAB
BASOPHILS # BLD AUTO: 0.06 10*3/MM3 (ref 0–0.2)
BASOPHILS NFR BLD AUTO: 0.6 % (ref 0–1.5)
DEPRECATED RDW RBC AUTO: 48.6 FL (ref 37–54)
EOSINOPHIL # BLD AUTO: 0.38 10*3/MM3 (ref 0–0.4)
EOSINOPHIL NFR BLD AUTO: 3.9 % (ref 0.3–6.2)
ERYTHROCYTE [DISTWIDTH] IN BLOOD BY AUTOMATED COUNT: 16.3 % (ref 12.3–15.4)
HCT VFR BLD AUTO: 25.4 % (ref 34–46.6)
HGB BLD-MCNC: 7.7 G/DL (ref 12–15.9)
IMM GRANULOCYTES # BLD AUTO: 0.05 10*3/MM3 (ref 0–0.05)
IMM GRANULOCYTES NFR BLD AUTO: 0.5 % (ref 0–0.5)
IRON 24H UR-MRATE: 34 MCG/DL (ref 37–145)
IRON SATN MFR SERPL: 9 % (ref 20–50)
LYMPHOCYTES # BLD AUTO: 2.08 10*3/MM3 (ref 0.7–3.1)
LYMPHOCYTES NFR BLD AUTO: 21.6 % (ref 19.6–45.3)
MCH RBC QN AUTO: 25 PG (ref 26.6–33)
MCHC RBC AUTO-ENTMCNC: 30.3 G/DL (ref 31.5–35.7)
MCV RBC AUTO: 82.5 FL (ref 79–97)
MONOCYTES # BLD AUTO: 0.55 10*3/MM3 (ref 0.1–0.9)
MONOCYTES NFR BLD AUTO: 5.7 % (ref 5–12)
NEUTROPHILS NFR BLD AUTO: 6.53 10*3/MM3 (ref 1.7–7)
NEUTROPHILS NFR BLD AUTO: 67.7 % (ref 42.7–76)
NRBC BLD AUTO-RTO: 0 /100 WBC (ref 0–0.2)
PLATELET # BLD AUTO: 364 10*3/MM3 (ref 140–450)
PMV BLD AUTO: 10.7 FL (ref 6–12)
RBC # BLD AUTO: 3.08 10*6/MM3 (ref 3.77–5.28)
TIBC SERPL-MCNC: 362 MCG/DL (ref 298–536)
TRANSFERRIN SERPL-MCNC: 243 MG/DL (ref 200–360)
WBC NRBC COR # BLD: 9.65 10*3/MM3 (ref 3.4–10.8)

## 2022-01-17 NOTE — PROGRESS NOTES
Your recent labs show that your iron levels have improved slowly.  Continue your oral iron intake.  Your hemoglobin and hematocrit are also improving.  We will keep a close eye on your anemia.

## 2022-01-17 NOTE — PROGRESS NOTES
In your previous note, I meant to say that your hemoglobin and hematocrit are stable.  We will need to recheck this in the near future and provide possible blood transfusion if indicated.  However, your iron levels are improving.  I will repeat your CBC, iron/CBC at the time of your next office visit.  If you should become lightheaded or feel weak, please let our office know and we will repeat these labs sooner.

## 2022-01-27 ENCOUNTER — LAB (OUTPATIENT)
Dept: ONCOLOGY | Facility: CLINIC | Age: 77
End: 2022-01-27

## 2022-01-27 ENCOUNTER — OFFICE VISIT (OUTPATIENT)
Dept: ONCOLOGY | Facility: CLINIC | Age: 77
End: 2022-01-27

## 2022-01-27 VITALS
DIASTOLIC BLOOD PRESSURE: 60 MMHG | HEART RATE: 74 BPM | HEIGHT: 63 IN | WEIGHT: 199 LBS | BODY MASS INDEX: 35.26 KG/M2 | SYSTOLIC BLOOD PRESSURE: 133 MMHG | RESPIRATION RATE: 18 BRPM | OXYGEN SATURATION: 98 % | TEMPERATURE: 97.9 F

## 2022-01-27 DIAGNOSIS — K90.9 MALABSORPTION OF IRON: ICD-10-CM

## 2022-01-27 DIAGNOSIS — E03.9 HYPOTHYROIDISM, UNSPECIFIED TYPE: ICD-10-CM

## 2022-01-27 DIAGNOSIS — D50.9 IRON DEFICIENCY ANEMIA, UNSPECIFIED IRON DEFICIENCY ANEMIA TYPE: ICD-10-CM

## 2022-01-27 DIAGNOSIS — D50.9 IRON DEFICIENCY ANEMIA, UNSPECIFIED IRON DEFICIENCY ANEMIA TYPE: Primary | ICD-10-CM

## 2022-01-27 DIAGNOSIS — N18.9 CHRONIC KIDNEY DISEASE, UNSPECIFIED CKD STAGE: ICD-10-CM

## 2022-01-27 DIAGNOSIS — D63.8 ANEMIA OF CHRONIC DISEASE: ICD-10-CM

## 2022-01-27 LAB
ALBUMIN SERPL-MCNC: 3.55 G/DL (ref 3.5–5.2)
ALBUMIN/GLOB SERPL: 1 G/DL
ALP SERPL-CCNC: 450 U/L (ref 39–117)
ALT SERPL W P-5'-P-CCNC: 22 U/L (ref 1–33)
ANION GAP SERPL CALCULATED.3IONS-SCNC: 12.8 MMOL/L (ref 5–15)
AST SERPL-CCNC: 47 U/L (ref 1–32)
BASOPHILS # BLD AUTO: 0.04 10*3/MM3 (ref 0–0.2)
BASOPHILS NFR BLD AUTO: 0.5 % (ref 0–1.5)
BILIRUB SERPL-MCNC: 0.2 MG/DL (ref 0–1.2)
BUN SERPL-MCNC: 33 MG/DL (ref 8–23)
BUN/CREAT SERPL: 15.6 (ref 7–25)
CALCIUM SPEC-SCNC: 8.9 MG/DL (ref 8.6–10.5)
CHLORIDE SERPL-SCNC: 107 MMOL/L (ref 98–107)
CO2 SERPL-SCNC: 20.2 MMOL/L (ref 22–29)
CREAT SERPL-MCNC: 2.12 MG/DL (ref 0.57–1)
DEPRECATED RDW RBC AUTO: 50.1 FL (ref 37–54)
EOSINOPHIL # BLD AUTO: 0.53 10*3/MM3 (ref 0–0.4)
EOSINOPHIL NFR BLD AUTO: 6.3 % (ref 0.3–6.2)
ERYTHROCYTE [DISTWIDTH] IN BLOOD BY AUTOMATED COUNT: 16.6 % (ref 12.3–15.4)
FERRITIN SERPL-MCNC: 113.8 NG/ML (ref 13–150)
GFR SERPL CREATININE-BSD FRML MDRD: 23 ML/MIN/1.73
GLOBULIN UR ELPH-MCNC: 3.7 GM/DL
GLUCOSE SERPL-MCNC: 179 MG/DL (ref 65–99)
HCT VFR BLD AUTO: 26.8 % (ref 34–46.6)
HGB BLD-MCNC: 8 G/DL (ref 12–15.9)
IMM GRANULOCYTES # BLD AUTO: 0.01 10*3/MM3 (ref 0–0.05)
IMM GRANULOCYTES NFR BLD AUTO: 0.1 % (ref 0–0.5)
IRON 24H UR-MRATE: 28 MCG/DL (ref 37–145)
IRON SATN MFR SERPL: 8 % (ref 20–50)
LYMPHOCYTES # BLD AUTO: 1.58 10*3/MM3 (ref 0.7–3.1)
LYMPHOCYTES NFR BLD AUTO: 18.8 % (ref 19.6–45.3)
MCH RBC QN AUTO: 24.7 PG (ref 26.6–33)
MCHC RBC AUTO-ENTMCNC: 29.9 G/DL (ref 31.5–35.7)
MCV RBC AUTO: 82.7 FL (ref 79–97)
MONOCYTES # BLD AUTO: 0.51 10*3/MM3 (ref 0.1–0.9)
MONOCYTES NFR BLD AUTO: 6.1 % (ref 5–12)
NEUTROPHILS NFR BLD AUTO: 5.74 10*3/MM3 (ref 1.7–7)
NEUTROPHILS NFR BLD AUTO: 68.2 % (ref 42.7–76)
NRBC BLD AUTO-RTO: 0 /100 WBC (ref 0–0.2)
PLATELET # BLD AUTO: 268 10*3/MM3 (ref 140–450)
PMV BLD AUTO: 9.2 FL (ref 6–12)
POTASSIUM SERPL-SCNC: 4.1 MMOL/L (ref 3.5–5.2)
PROT SERPL-MCNC: 7.2 G/DL (ref 6–8.5)
RBC # BLD AUTO: 3.24 10*6/MM3 (ref 3.77–5.28)
SODIUM SERPL-SCNC: 140 MMOL/L (ref 136–145)
TIBC SERPL-MCNC: 338 MCG/DL (ref 298–536)
TRANSFERRIN SERPL-MCNC: 227 MG/DL (ref 200–360)
WBC NRBC COR # BLD: 8.41 10*3/MM3 (ref 3.4–10.8)

## 2022-01-27 PROCEDURE — 83540 ASSAY OF IRON: CPT | Performed by: NURSE PRACTITIONER

## 2022-01-27 PROCEDURE — 85025 COMPLETE CBC W/AUTO DIFF WBC: CPT | Performed by: NURSE PRACTITIONER

## 2022-01-27 PROCEDURE — 82728 ASSAY OF FERRITIN: CPT | Performed by: NURSE PRACTITIONER

## 2022-01-27 PROCEDURE — 80053 COMPREHEN METABOLIC PANEL: CPT | Performed by: NURSE PRACTITIONER

## 2022-01-27 PROCEDURE — 99214 OFFICE O/P EST MOD 30 MIN: CPT | Performed by: NURSE PRACTITIONER

## 2022-01-27 PROCEDURE — 84466 ASSAY OF TRANSFERRIN: CPT | Performed by: NURSE PRACTITIONER

## 2022-01-27 RX ORDER — SODIUM CHLORIDE 9 MG/ML
250 INJECTION, SOLUTION INTRAVENOUS ONCE
Status: CANCELLED | OUTPATIENT
Start: 2022-01-31

## 2022-01-27 RX ORDER — SODIUM CHLORIDE 9 MG/ML
250 INJECTION, SOLUTION INTRAVENOUS ONCE
Status: CANCELLED | OUTPATIENT
Start: 2022-02-07

## 2022-01-27 RX ORDER — SODIUM CHLORIDE 9 MG/ML
250 INJECTION, SOLUTION INTRAVENOUS ONCE
Status: CANCELLED | OUTPATIENT
Start: 2022-02-11

## 2022-01-27 NOTE — PROGRESS NOTES
DATE:  1/27/2022    REASON FOR FOLLOW UP: Anemia    REFERRING PHYSICIAN:   Jackson Johnson MD    TREATMENT:   1. Oral iron-discontinued for apparent malabsorption    2.       CHIEF COMPLAINT:  Follow up of anemia    HISTORY OF PRESENT ILLNESS:   Britta Lee is a  76 y.o. female with multiple medical problems including CKD, diabetes mellitus type 2, CHF, CAD, hypertension, GERD and OA, who is being seen today at the request of  Jackson Johnson MD for evaluation and treatment of anemia. Ms. Lee reports following with her PCP and nephrology routinely with blood testing every ~3 months. She says she has struggled with anemia for several years. Previous available CBCs were reviewed and patient has had chronic, normocytic anemia since at least December 2016 with Hg most recently ranging ~ 8.6-9.8. Her WBC and platelets have been normal. Iron panel and ferritin levels have been most c/w AOCD/inflammation since at least May 2017.  She reports receiving IV iron infusions several years ago. At present, she is taking ferrous sulfate BID which she is tolerating well. She says this was started per nephrology ~2 months ago. She denies obvious bleeding from any source. She reports having screening colonoscopy with Dr. Zamora last year which was unremarkable and negative for bleeding. She complains of chronic fatigue which is stable. Also complains of occasional lower extremity edema. She denies any other complaints today.     INTERVAL HISTORY:  Ms. Lee presents today for follow up.  Since her last visit, she was again replaced with IV Feraheme which she completed on 12/13/21. She again denies obvious bleeding from any source. She continues to follow with GI who is planning for capsule endoscopy on 2/2. She is also following with nephrology for CKD with upcoming follow up on 2/29. She has chronic fatigue which is stable but overall feels stronger and has been able to move around more easily. She denies any other  complaints today.     PAST MEDICAL HISTORY:  Past Medical History:   Diagnosis Date   • Acquired hypothyroidism 4/22/2021   • Anemia    • Arthritis    • Carpal tunnel syndrome    • Coronary artery disease    • Diabetes mellitus (HCC)    • Elevated cholesterol    • GERD (gastroesophageal reflux disease)    • History of pneumonia    • History of unsteady gait     OCASSIONALLY   • Hypertension    • Insomnia    • Kidney disease    • LENNY (obstructive sleep apnea) 12/1/2020   • Osteoarthritis    • Renal insufficiency    • Sciatica        PAST SURGICAL HISTORY:  Past Surgical History:   Procedure Laterality Date   • ABDOMINAL SURGERY     • APPENDECTOMY     • CARDIAC CATHETERIZATION      1 STENT  ---  2000   • CARDIAC SURGERY     • CAROTID STENT     • CARPAL TUNNEL RELEASE Right 10/8/2019    Procedure: CARPAL TUNNEL RELEASE;  Surgeon: Feroz Levin MD;  Location: [facility];  Service: Orthopedics   • CATARACT EXTRACTION     • CHOLECYSTECTOMY     • COLONOSCOPY     • COLONOSCOPY N/A 11/23/2021    Procedure: COLONOSCOPY FOR SCREENING;  Surgeon: Palmira Pate MD;  Location: [facility];  Service: Gastroenterology;  Laterality: N/A;   • CORONARY ANGIOPLASTY WITH STENT PLACEMENT     • ENDOSCOPY     • ENDOSCOPY N/A 3/5/2020    Procedure: ESOPHAGOGASTRODUODENOSCOPY;  Surgeon: Alexandru Bagley MD;  Location: [facility];  Service: Gastroenterology;  Laterality: N/A;   • ENDOSCOPY N/A 11/23/2021    Procedure: ESOPHAGOGASTRODUODENOSCOPY WITH BIOPSY;  Surgeon: Palmira Pate MD;  Location: [facility] OR;  Service: Gastroenterology;  Laterality: N/A;   • ENDOSCOPY AND COLONOSCOPY     • GALLBLADDER SURGERY     • JOINT REPLACEMENT Left 05/02/2017    [facility]  DR LEVIN  LEFT TOTAL KNEE   • KNEE ARTHROSCOPY W/ MENISCECTOMY Right    • LAPAROSCOPIC TUBAL LIGATION     • IA TOTAL KNEE ARTHROPLASTY Left 5/2/2017    Procedure: TOTAL KNEE ARTHROPLASTY;  Surgeon: Feroz Levin MD;  Location: [facility];  Service:  Orthopedics   • STERILIZATION     • TONSILLECTOMY         FAMILY HISTORY:  Family History   Problem Relation Age of Onset   • Arthritis Mother    • Diabetes Mother    • Cancer Mother    • Heart disease Father    • Diabetes Daughter    • Diabetes Son    • Diabetes Maternal Aunt    • Heart disease Maternal Grandmother    • Breast cancer Neg Hx        SOCIAL HISTORY:  Social History     Socioeconomic History   • Marital status:      Spouse name: natalie   • Number of children: 3   • Years of education: 12   Tobacco Use   • Smoking status: Never Smoker   • Smokeless tobacco: Never Used   Vaping Use   • Vaping Use: Never used   Substance and Sexual Activity   • Alcohol use: Yes     Comment: socially   • Drug use: No   • Sexual activity: Defer     Birth control/protection: Surgical       MEDICATIONS:  The current medication list was reviewed in the EMR    Current Outpatient Medications:   •  albuterol sulfate  (90 Base) MCG/ACT inhaler, Inhale 2 puffs Every 4 (Four) Hours As Needed for Shortness of Air., Disp: 18 g, Rfl: 0  •  amLODIPine (NORVASC) 10 MG tablet, Take 1 tablet by mouth Daily., Disp: 30 tablet, Rfl: 5  •  aspirin 81 MG tablet, Take 1 tablet by mouth Daily., Disp: 30 tablet, Rfl: 5  •  atorvastatin (LIPITOR) 40 MG tablet, Take 1 tablet by mouth Daily., Disp: 90 tablet, Rfl: 3  •  BD Pen Needle Evon U/F 32G X 4 MM misc, , Disp: , Rfl:   •  bumetanide (BUMEX) 0.5 MG tablet, Take 0.5 mg by mouth Every Other Day., Disp: , Rfl:   •  cloNIDine (CATAPRES) 0.2 MG tablet, Take 1 tablet by mouth 3 (Three) Times a Day., Disp: 270 tablet, Rfl: 1  •  Continuous Blood Gluc  (Dexcom G6 ) device, 1 Device Daily., Disp: 3 each, Rfl: 3  •  Continuous Blood Gluc Sensor (Dexcom G6 Sensor), Every 10 (Ten) Days., Disp: 9 each, Rfl: 3  •  Continuous Blood Gluc Transmit (Dexcom G6 Transmitter) misc, 1 Device Daily., Disp: 1 each, Rfl: 5  •  escitalopram (LEXAPRO) 10 MG tablet, Take 1 tablet by mouth  Daily., Disp: 90 tablet, Rfl: 3  •  glucose blood test strip, 1 each by Other route 3 (Three) Times a Day., Disp: 100 each, Rfl: 12  •  hydrALAZINE (APRESOLINE) 25 MG tablet, Take 3 tablets by mouth 3 (Three) Times a Day., Disp: 270 tablet, Rfl: 0  •  Insulin Glargine, 2 Unit Dial, (Toujeo Max SoloStar) 300 UNIT/ML solution pen-injector injection, Inject 40-60 Units under the skin into the appropriate area as directed Daily., Disp: 3 pen, Rfl: 5  •  insulin lispro (humaLOG) 100 UNIT/ML injection, Inject 0-6 Units under the skin into the appropriate area as directed 3 (Three) Times a Day Before Meals. Sliding scale 4-6 units if BG > 180, Disp: 9 mL, Rfl: 5  •  Insulin Pen Needle 32G X 5 MM misc, 1 each 4 (Four) Times a Day., Disp: 120 each, Rfl: 5  •  isosorbide mononitrate (IMDUR) 60 MG 24 hr tablet, Take 1 tablet by mouth Daily., Disp: 90 tablet, Rfl: 1  •  levothyroxine (SYNTHROID, LEVOTHROID) 25 MCG tablet, Take 1 tablet by mouth Daily., Disp: 30 tablet, Rfl: 5  •  metoprolol tartrate (LOPRESSOR) 25 MG tablet, Take 1 tablet by mouth 2 (Two) Times a Day., Disp: 60 tablet, Rfl: 1  •  neomycin-polymyxin-hydrocortisone (CORTISPORIN) 3.5-80360-9 otic solution, Administer 3 drops to the right ear 4 (Four) Times a Day., Disp: 10 mL, Rfl: 0  •  nystatin (MYCOSTATIN) 806007 UNIT/GM powder, Apply  one application topically to the appropriate area as directed Every 12 (Twelve) Hours. (Patient taking differently: Apply  topically to the appropriate area as directed 2 (Two) Times a Day As Needed (itching).), Disp: 60 g, Rfl: 0  •  pantoprazole (Protonix) 40 MG EC tablet, Take 1 tablet by mouth Daily., Disp: 30 tablet, Rfl: 5  •  promethazine (PHENERGAN) 25 MG tablet, Take 1 tablet by mouth Every 6 (Six) Hours As Needed for Nausea or Vomiting., Disp: 60 tablet, Rfl: 5  •  vitamin D (ERGOCALCIFEROL) 1.25 MG (73748 UT) capsule capsule, Take 1 capsule by mouth 1 (One) Time Per Week., Disp: 5 capsule, Rfl: 5    ALLERGIES:  No  "Known Allergies    REVIEW OF SYSTEMS:    A comprehensive 14 point review of systems was performed.  Significant findings as mentioned above.  All other systems reviewed and are negative.      Physical Exam   Vital Signs: /60   Pulse 74   Temp 97.9 °F (36.6 °C) (Temporal)   Resp 18   Ht 160 cm (63\")   Wt 90.3 kg (199 lb)   SpO2 98%   BMI 35.25 kg/m²    General: Well developed, well nourished, alert and oriented x 3, in no acute distress.   Head: ATNC   Eyes: PERRL, No evidence of conjunctivitis.   Nose: No nasal discharge.   Mouth: Oral mucosal membranes moist. No oral ulceration or hemorrhages.   Neck: Neck supple. No thyromegaly. No JVD.   Lungs: CTAB  Heart: RRR. No murmurs, rubs, or gallops.   Abdomen: Soft. Bowel sounds are normoactive. Nontender with palpation.   Extremities: No cyanosis or edema. Using cane for assistance with ambulation.   Neurologic: Grossly non-focal exam    Pain Score:  Pain Score    01/27/22 0852   PainSc: 0-No pain     ENDOSCOPY:  11/23/21  ESOPHAGOGASTRODUODENOSCOPY PROCEDURE REPORT     Britta Lee  11/23/2021     GASTROENTEROLOGIST:  Palmira Pate MD      PRE-PROCEDURE DIAGNOSIS:  Iron deficiency anemia, unspecified iron deficiency anemia type [D50.9]  Weight loss, abnormal [R63.4]     POST-PROCEDURE DIAGNOSIS:  1.  Gastritis  2.  Angiectasias     Procedure(s):  ESOPHAGOGASTRODUODENOSCOPY WITH BIOPSY  COLONOSCOPY FOR SCREENING     ANESTHESIA:  Propofol administered by anesthesia.  See anesthesia notes for ASA classification     STAFF:  Circulator: Bre Ny RN; Danelle Mccray RN  Endo Technician: Omari Lewis     FINDINGS:  1.  Normal-appearing esophagus.  Biopsies were taken  2.  Mild erythema in the antrum and stomach.  Biopsies taken for H. pylori.  3.  One angiectasia was found in the second portion of the duodenum.  This was nonbleeding.  Biopsies were taken of the duodenum to rule out celiac disease as a source of her " anemia.     OPERATIVE PROCEDURE:  After proper informed consent was obtained, patient was transferred to the OR/endoscopy suite.  Patient was then placed in left lateral decubitus position. The Olympus 180 series video gastroscope was inserted orally under direct visualization.  Esophagus, stomach, and duodenum were inspected.  The endoscope was passed to the third portion of the duodenum.  Scope was retroflexed for visualization of the cardia and incisuraThe endoscope was then withdrawn. Patient tolerated the procedure well. There were no immediate complications.     ESTIMATED BLOOD LOSS:  None     COMPLICATIONS;  None     RECOMMENDATIONS/ PLAN:  1.  Follow-up biopsy results.  2.  Proceed with colonoscopy.     Palmira Pate MD      11/23/21 08:58 EST    COLONOSCOPY PROCEDURE NOTE     Brittaramón Lee  11/23/2021     GASTROENTEROLOGIST:  Palmira Pate MD        PRE-PROCEDURE DIAGNOSIS:   Iron deficiency anemia, unspecified iron deficiency anemia type [D50.9]  Weight loss, abnormal [R63.4]     POST-PROCEDURE DIAGNOSIS:  1.  Colon polyps  2.  Diverticulosis  3.  Internal hemorrhoids     PROCEDURE:  COLONOSCOPY with snare polypectomy and cold biopsy polypectomy     ANESTHESIA:  Propofol administered by anesthesia.  See anesthesia notes for ASA classification     STAFF  Circulator: Bre Ny RN; Danelle Mccray RN  Endo Technician: Omari Lewis     Findings:  1.  A 5 mm polyp was found in the ascending colon.  This was removed with cold forceps polypectomy.  2.  A 5 mm polyp was removed from the transverse colon with cold forceps biopsy.  3.  Two 6 mm polyps were found in the descending colon.  Both polyps were removed with cold snare polypectomy.  4.  A 7 mm polyp was removed from the sigmoid colon with cold snare polypectomy.  5.  Diverticulosis involving left colon.  6.  Small internal hemorrhoids.  7.  Normal-appearing terminal ileum.     OPERATIVE PROCEDURE   After proper  informed consent was obtained, the patient was taken the operating suite and placed in left lateral decubitus position.  An Olympus video colonoscope 180 series was inserted in the rectum and advanced to the terminal ileum under direct visualization.  Cecum and terminal ileum were identified by visualization of the appendiceal orifice and ileocecal valve.  The colonoscope was then slowly withdrawn from the cecum to the rectum and passed a second time from rectum to cecum.  The colonoscope was retroflexed in the cecum and rectum. Scope was then withdrawn. Patient tolerated the procedure well. There were no immediate complications. Cecal withdrawal time was 15 minutes.     ESTIMATED BLOOD LOSS  None      COMPLICATIONS  None     RECOMMENDATIONS:  1.  Follow-up pathology.  2.  Repeat colonoscopy in 3 years due to personal history of colon polyps.  3.  Proceed with capsule endoscopy if anemia persists.  Sign 4.  Follow-up in GI clinic in 6 to 8 weeks.     Palmira Pate MD  11/23/21 09:25 EST    RECENT LABS:  Lab Results   Component Value Date    WBC 8.41 01/27/2022    HGB 8.0 (L) 01/27/2022    HCT 26.8 (L) 01/27/2022    MCV 82.7 01/27/2022    RDW 16.6 (H) 01/27/2022     01/27/2022    NEUTRORELPCT 68.2 01/27/2022    LYMPHORELPCT 18.8 (L) 01/27/2022    MONORELPCT 6.1 01/27/2022    EOSRELPCT 6.3 (H) 01/27/2022    BASORELPCT 0.5 01/27/2022    NEUTROABS 5.74 01/27/2022    LYMPHSABS 1.58 01/27/2022       Lab Results   Component Value Date     01/27/2022    K 4.1 01/27/2022    CO2 20.2 (L) 01/27/2022     01/27/2022    BUN 33 (H) 01/27/2022    CREATININE 2.12 (H) 01/27/2022    EGFRIFNONA 23 (L) 01/27/2022    EGFRIFAFRI 41 (L) 07/30/2018    GLUCOSE 179 (H) 01/27/2022    CALCIUM 8.9 01/27/2022    ALKPHOS 450 (H) 01/27/2022    AST 47 (H) 01/27/2022    ALT 22 01/27/2022    BILITOT 0.2 01/27/2022    ALBUMIN 3.55 01/27/2022    PROTEINTOT 7.2 01/27/2022    MG 1.8 12/13/2021    PHOS 4.4 12/20/2021     Lab  Results   Component Value Date    FERRITIN 113.80 01/27/2022    IRON 28 (L) 01/27/2022    TIBC 338 01/27/2022    LABIRON 8 (L) 01/27/2022    IOQBGKKI50 736 03/26/2021    FOLATE 12.60 03/26/2021    HAPTOGLOBIN 256 (H) 03/26/2021    RETICCTPCT 2.49 (H) 03/26/2021    RETIC 0.0899 03/26/2021     Workup 3/26/21    Ferritin   13.00 - 150.00 ng/mL 160.20High       Iron   37 - 145 mcg/dL 36Low     Iron Saturation   20 - 50 % 9Low     Transferrin   200 - 360 mg/dL 270    TIBC   298 - 536 mcg/dL 402      Vitamin B-12   211 - 946 pg/mL 736      Folate   4.78 - 24.20 ng/mL 12.60      Reticulocyte %   0.70 - 1.90 % 2.49High     Reticulocyte Absolute   0.0200 - 0.1300 10*6/mm3 0.0899      & Units 1 mo ago   LDH   135 - 214 U/L 234High       Haptoglobin   30 - 200 mg/dL 256High       TSH   0.270 - 4.200 uIU/mL 6.430High       C-Reactive Protein   0.00 - 0.50 mg/dL 1.70High       Sed Rate   0 - 30 mm/hr >100High       Copper   80 - 158 ug/dL 189High       Zinc   44 - 115 ug/dL 67      Tissue Transglutaminase IgA   0 - 3 U/mL 2          ASSESSMENT & PLAN:  Britta Lee is a very pleasant 76 y.o. female with    1.  Normocytic anemia:  2. CKD/AOCD/inflammation  3. Iron deficiency anemia  4. Hypothyroidism    -Patient follows with PCP and nephrology routinely with blood testing every ~3 months.   -Previous available CBCs were reviewed and patient has had chronic, normocytic anemia since at least December 2016 with Hg most recently ranging ~ 8.6-9.8. Her WBC and platelets have been normal. Iron panel and ferritin levels have been most c/w AOCD/inflammation since at least May 2017.    -She reports receiving IV iron infusions several years ago. At first presentation, she was taking ferrous sulfate BID and tolerating well but did not have improvement in iron stores.   -Denies obvious bleeding from any source. Reportedly had previous screening colonoscopy with Dr. Zamora which was unremarkable and negative for bleeding. Records  unavailable.  - Patient likely has AOCD/inflammation with also a component of Iron deficiency. As above, she has been taking ferrous sulfate. Therefore, discontinued oral iron and have been replacing with IV Feraheme for apparent malabsorption of iron, most recently on 12/13/21.   -Recommended repeat GI evaluation and patient was agreeable. Referral was placed and patient underwent EGD/colonoscopy with Dr. Simmons on 11/23/21 (see full reports above). She did have several benign polyps removed with small internal hemorrhoids, otherwise unremarkable and negative for bleeding. EGD showed one angiectasia in the second portion of the duodenum which was nonbleeding.  Biopsies were taken of the duodenum to rule out celiac disease as a source of her anemia. Given ongoing YUAN, Dr. Simmons is planning for capsule endoscopy which is scheduled for 2/2.   -Repeat CBC from today shows Hg 8.0 which remains stable. Iron panel and ferritin show iron stores remain low. Therefore, will again replace with IV Feraheme pending insurance approval. CMP from today also showed worsening renal function, Cr ~2.12 which is contributing to her anemia.    -Will plan to follow up in ~2 months with repeat labs. In the meantime, recommended continued close follow up with PCP for management of diabetes, hypothyroidism, hypertension and nephology for CKD. Will defer decision regarding epogen to nephrology which she would likely benefit from. Recommended follow up with GI as planned for capsule endoscopy.    -Recommend transfusion support, would transfuse for Hg <7 or <8 if symptomatic.   -To further evaluate anemia, also previously sent additional labs including PBS which showed normocytic, hypochromic anemia with no schistocytes, normal WBC with borderline neutrophilia and mild eosinophilia, no dysplasia or blasts, adequate platelets.  B12 and folate were normal. No evidence of hemolysis, TSH was elevated and she was advised to follow up with PCP  who started her on synthroid 25 mcg daily, ESR and CRP were elevated and suggestive of underlying inflammation, copper and zinc were both replete, tissue transglutaminase was normal.        ACO / MILES/Other  Quality measures  -  Britta Lee received 2021 flu vaccine. She has had Covid vaccine x 3.   -  Britta Lee reports a pain score of 0.   -  Current outpatient and discharge medications have been reconciled for the patient.  Reviewed by: ERNIE De Leon    The patient was in agreement with the plan and all questions were answered to her satisfaction.     Thank you so much for allowing us to participate in the care of Britta Lee . Please do not hesitate to contact us with any questions or concerns.     A total of 30 minutes were spent coordinating this patient’s care in clinic today; more than 50% of this time was face-to-face with the patient, reviewing her medical history and counseling on the current treatment and followup plan. All questions were answered to her satisfaction.      Electronically Signed by: ERNIE De Leon , January 27, 2022 09:37 EST       CC:   Lexie Dolan PA

## 2022-02-02 ENCOUNTER — HOSPITAL ENCOUNTER (OUTPATIENT)
Dept: GENERAL RADIOLOGY | Facility: HOSPITAL | Age: 77
Discharge: HOME | End: 2022-02-02

## 2022-02-02 ENCOUNTER — HOSPITAL ENCOUNTER (OUTPATIENT)
Dept: ULTRASOUND IMAGING | Facility: HOSPITAL | Age: 77
Discharge: HOME | End: 2022-02-02

## 2022-02-02 DIAGNOSIS — D50.9 IRON DEFICIENCY ANEMIA, UNSPECIFIED IRON DEFICIENCY ANEMIA TYPE: ICD-10-CM

## 2022-02-02 DIAGNOSIS — R63.4 WEIGHT LOSS, ABNORMAL: ICD-10-CM

## 2022-02-02 PROCEDURE — 76700 US EXAM ABDOM COMPLETE: CPT

## 2022-02-02 PROCEDURE — 76700 US EXAM ABDOM COMPLETE: CPT | Performed by: RADIOLOGY

## 2022-02-02 PROCEDURE — 74018 RADEX ABDOMEN 1 VIEW: CPT | Performed by: RADIOLOGY

## 2022-02-02 PROCEDURE — 74018 RADEX ABDOMEN 1 VIEW: CPT

## 2022-02-02 NOTE — PROGRESS NOTES
Your recent abdominal exam and ultrasound were negative for the etiology of your abnormal weight loss and iron deficiency anemia.  Please keep your follow-up appointment.

## 2022-02-04 ENCOUNTER — INFUSION (OUTPATIENT)
Dept: ONCOLOGY | Facility: HOSPITAL | Age: 77
End: 2022-02-04

## 2022-02-04 VITALS
RESPIRATION RATE: 18 BRPM | HEART RATE: 81 BPM | SYSTOLIC BLOOD PRESSURE: 151 MMHG | BODY MASS INDEX: 34.28 KG/M2 | TEMPERATURE: 96.9 F | DIASTOLIC BLOOD PRESSURE: 65 MMHG | WEIGHT: 193.5 LBS | OXYGEN SATURATION: 97 %

## 2022-02-04 DIAGNOSIS — K90.9 MALABSORPTION OF IRON: ICD-10-CM

## 2022-02-04 DIAGNOSIS — D50.9 IRON DEFICIENCY ANEMIA, UNSPECIFIED IRON DEFICIENCY ANEMIA TYPE: Primary | ICD-10-CM

## 2022-02-04 PROCEDURE — 96374 THER/PROPH/DIAG INJ IV PUSH: CPT

## 2022-02-04 PROCEDURE — 25010000002 FERUMOXYTOL 510 MG/17ML SOLUTION: Performed by: NURSE PRACTITIONER

## 2022-02-04 RX ORDER — SODIUM CHLORIDE 9 MG/ML
250 INJECTION, SOLUTION INTRAVENOUS ONCE
Status: COMPLETED | OUTPATIENT
Start: 2022-02-04 | End: 2022-02-04

## 2022-02-04 RX ADMIN — FERUMOXYTOL 510 MG: 510 INJECTION INTRAVENOUS at 10:10

## 2022-02-04 RX ADMIN — SODIUM CHLORIDE 250 ML: 9 INJECTION, SOLUTION INTRAVENOUS at 10:10

## 2022-02-11 ENCOUNTER — INFUSION (OUTPATIENT)
Dept: ONCOLOGY | Facility: HOSPITAL | Age: 77
End: 2022-02-11

## 2022-02-11 VITALS
HEART RATE: 74 BPM | DIASTOLIC BLOOD PRESSURE: 67 MMHG | OXYGEN SATURATION: 94 % | SYSTOLIC BLOOD PRESSURE: 144 MMHG | WEIGHT: 197.1 LBS | BODY MASS INDEX: 34.91 KG/M2 | TEMPERATURE: 97.3 F | RESPIRATION RATE: 18 BRPM

## 2022-02-11 DIAGNOSIS — D50.9 IRON DEFICIENCY ANEMIA, UNSPECIFIED IRON DEFICIENCY ANEMIA TYPE: Primary | ICD-10-CM

## 2022-02-11 DIAGNOSIS — K90.9 MALABSORPTION OF IRON: ICD-10-CM

## 2022-02-11 PROCEDURE — 96374 THER/PROPH/DIAG INJ IV PUSH: CPT

## 2022-02-11 PROCEDURE — 25010000002 FERUMOXYTOL 510 MG/17ML SOLUTION: Performed by: NURSE PRACTITIONER

## 2022-02-11 RX ORDER — SODIUM CHLORIDE 9 MG/ML
250 INJECTION, SOLUTION INTRAVENOUS ONCE
Status: COMPLETED | OUTPATIENT
Start: 2022-02-11 | End: 2022-02-11

## 2022-02-11 RX ADMIN — SODIUM CHLORIDE 250 ML: 9 INJECTION, SOLUTION INTRAVENOUS at 09:06

## 2022-02-11 RX ADMIN — FERUMOXYTOL 510 MG: 510 INJECTION INTRAVENOUS at 09:07

## 2022-02-21 ENCOUNTER — LAB (OUTPATIENT)
Dept: FAMILY MEDICINE CLINIC | Facility: CLINIC | Age: 77
End: 2022-02-21

## 2022-02-21 DIAGNOSIS — N18.32 CHRONIC KIDNEY DISEASE (CKD) STAGE G3B/A1, MODERATELY DECREASED GLOMERULAR FILTRATION RATE (GFR) BETWEEN 30-44 ML/MIN/1.73 SQUARE METER AND ALBUMINURIA CREATININE RATIO LESS THAN 30 MG/G (CMS/H*: Primary | ICD-10-CM

## 2022-02-21 DIAGNOSIS — I10 ESSENTIAL HYPERTENSION: ICD-10-CM

## 2022-02-21 DIAGNOSIS — I10 BENIGN ESSENTIAL HYPERTENSION: ICD-10-CM

## 2022-02-21 DIAGNOSIS — N25.81 SECONDARY HYPERPARATHYROIDISM OF RENAL ORIGIN: ICD-10-CM

## 2022-02-21 LAB
25(OH)D3 SERPL-MCNC: 30.1 NG/ML
ALBUMIN SERPL-MCNC: 3.7 G/DL (ref 3.5–5.2)
ALBUMIN/GLOB SERPL: 1.2 G/DL
ALP SERPL-CCNC: 521 U/L (ref 39–117)
ALT SERPL W P-5'-P-CCNC: 36 U/L (ref 1–33)
ANION GAP SERPL CALCULATED.3IONS-SCNC: 12.4 MMOL/L (ref 5–15)
AST SERPL-CCNC: 45 U/L (ref 1–32)
BACTERIA UR QL AUTO: ABNORMAL /HPF
BILIRUB SERPL-MCNC: 0.2 MG/DL (ref 0–1.2)
BILIRUB UR QL STRIP: NEGATIVE
BUN SERPL-MCNC: 36 MG/DL (ref 8–23)
BUN/CREAT SERPL: 17.5 (ref 7–25)
CALCIUM SPEC-SCNC: 9.2 MG/DL (ref 8.6–10.5)
CHLORIDE SERPL-SCNC: 105 MMOL/L (ref 98–107)
CLARITY UR: ABNORMAL
CO2 SERPL-SCNC: 20.6 MMOL/L (ref 22–29)
COLOR UR: YELLOW
CREAT SERPL-MCNC: 2.06 MG/DL (ref 0.57–1)
GFR SERPL CREATININE-BSD FRML MDRD: 23 ML/MIN/1.73
GLOBULIN UR ELPH-MCNC: 3.1 GM/DL
GLUCOSE SERPL-MCNC: 196 MG/DL (ref 65–99)
GLUCOSE UR STRIP-MCNC: ABNORMAL MG/DL
GRAN CASTS URNS QL MICRO: ABNORMAL /LPF
HGB UR QL STRIP.AUTO: ABNORMAL
HYALINE CASTS UR QL AUTO: ABNORMAL /LPF
KETONES UR QL STRIP: ABNORMAL
LEUKOCYTE ESTERASE UR QL STRIP.AUTO: ABNORMAL
NITRITE UR QL STRIP: NEGATIVE
PH UR STRIP.AUTO: 6 [PH] (ref 5–8)
PHOSPHATE SERPL-MCNC: 4.7 MG/DL (ref 2.5–4.5)
POTASSIUM SERPL-SCNC: 4.5 MMOL/L (ref 3.5–5.2)
PROT SERPL-MCNC: 6.8 G/DL (ref 6–8.5)
PROT UR QL STRIP: ABNORMAL
PTH-INTACT SERPL-MCNC: 94.4 PG/ML (ref 15–65)
RBC # UR STRIP: ABNORMAL /HPF
REF LAB TEST METHOD: ABNORMAL
SODIUM SERPL-SCNC: 138 MMOL/L (ref 136–145)
SP GR UR STRIP: 1.02 (ref 1–1.03)
SQUAMOUS #/AREA URNS HPF: ABNORMAL /HPF
TRANS CELLS #/AREA URNS HPF: ABNORMAL /HPF
UNIDENT CRYS URNS QL MICRO: ABNORMAL /HPF
UROBILINOGEN UR QL STRIP: ABNORMAL
WBC # UR STRIP: ABNORMAL /HPF

## 2022-02-21 PROCEDURE — 83970 ASSAY OF PARATHORMONE: CPT | Performed by: INTERNAL MEDICINE

## 2022-02-21 PROCEDURE — 82306 VITAMIN D 25 HYDROXY: CPT | Performed by: INTERNAL MEDICINE

## 2022-02-21 PROCEDURE — 80053 COMPREHEN METABOLIC PANEL: CPT | Performed by: INTERNAL MEDICINE

## 2022-02-21 PROCEDURE — 84100 ASSAY OF PHOSPHORUS: CPT | Performed by: INTERNAL MEDICINE

## 2022-02-21 PROCEDURE — 81001 URINALYSIS AUTO W/SCOPE: CPT | Performed by: INTERNAL MEDICINE

## 2022-02-21 RX ORDER — CLONIDINE HYDROCHLORIDE 0.2 MG/1
TABLET ORAL
Qty: 270 TABLET | Refills: 1 | Status: SHIPPED | OUTPATIENT
Start: 2022-02-21 | End: 2022-08-11 | Stop reason: SDUPTHER

## 2022-02-23 ENCOUNTER — CLINICAL SUPPORT (OUTPATIENT)
Dept: FAMILY MEDICINE CLINIC | Facility: CLINIC | Age: 77
End: 2022-02-23

## 2022-02-23 DIAGNOSIS — N18.32 CHRONIC KIDNEY DISEASE (CKD) STAGE G3B/A1, MODERATELY DECREASED GLOMERULAR FILTRATION RATE (GFR) BETWEEN 30-44 ML/MIN/1.73 SQUARE METER AND ALBUMINURIA CREATININE RATIO LESS THAN 30 MG/G (CMS/H*: ICD-10-CM

## 2022-02-23 DIAGNOSIS — N25.81 SECONDARY HYPERPARATHYROIDISM OF RENAL ORIGIN: ICD-10-CM

## 2022-02-23 PROCEDURE — 81050 URINALYSIS VOLUME MEASURE: CPT | Performed by: INTERNAL MEDICINE

## 2022-02-23 PROCEDURE — 84156 ASSAY OF PROTEIN URINE: CPT | Performed by: INTERNAL MEDICINE

## 2022-02-24 LAB
COLLECT DURATION TIME UR: 24 HRS
PROT 24H UR-MRATE: 4429.9 MG/24HOURS (ref 0–150)
SPECIMEN VOL 24H UR: 950 ML

## 2022-03-07 ENCOUNTER — OFFICE VISIT (OUTPATIENT)
Dept: FAMILY MEDICINE CLINIC | Facility: CLINIC | Age: 77
End: 2022-03-07

## 2022-03-07 VITALS
TEMPERATURE: 97.3 F | BODY MASS INDEX: 31.89 KG/M2 | HEIGHT: 63 IN | SYSTOLIC BLOOD PRESSURE: 132 MMHG | HEART RATE: 88 BPM | DIASTOLIC BLOOD PRESSURE: 68 MMHG | WEIGHT: 180 LBS | OXYGEN SATURATION: 99 %

## 2022-03-07 DIAGNOSIS — I10 ESSENTIAL HYPERTENSION: Primary | Chronic | ICD-10-CM

## 2022-03-07 DIAGNOSIS — E11.69 DIABETES MELLITUS TYPE 2 IN OBESE: Chronic | ICD-10-CM

## 2022-03-07 DIAGNOSIS — E66.9 DIABETES MELLITUS TYPE 2 IN OBESE: Chronic | ICD-10-CM

## 2022-03-07 DIAGNOSIS — IMO0002 TYPE 2 DIABETES MELLITUS, UNCONTROLLED, WITH NEUROPATHY: Chronic | ICD-10-CM

## 2022-03-07 DIAGNOSIS — E55.9 VITAMIN D DEFICIENCY: Chronic | ICD-10-CM

## 2022-03-07 DIAGNOSIS — E11.42 TYPE 2 DIABETES MELLITUS WITH DIABETIC POLYNEUROPATHY, WITH LONG-TERM CURRENT USE OF INSULIN: Chronic | ICD-10-CM

## 2022-03-07 DIAGNOSIS — I50.32 CHRONIC DIASTOLIC CONGESTIVE HEART FAILURE: Chronic | ICD-10-CM

## 2022-03-07 DIAGNOSIS — E03.9 ACQUIRED HYPOTHYROIDISM: Chronic | ICD-10-CM

## 2022-03-07 DIAGNOSIS — E78.2 MIXED HYPERLIPIDEMIA: Chronic | ICD-10-CM

## 2022-03-07 DIAGNOSIS — K21.9 GASTROESOPHAGEAL REFLUX DISEASE WITHOUT ESOPHAGITIS: Chronic | ICD-10-CM

## 2022-03-07 DIAGNOSIS — E66.09 CLASS 1 OBESITY DUE TO EXCESS CALORIES WITH SERIOUS COMORBIDITY AND BODY MASS INDEX (BMI) OF 31.0 TO 31.9 IN ADULT: Chronic | ICD-10-CM

## 2022-03-07 DIAGNOSIS — Z79.4 TYPE 2 DIABETES MELLITUS WITH DIABETIC POLYNEUROPATHY, WITH LONG-TERM CURRENT USE OF INSULIN: Chronic | ICD-10-CM

## 2022-03-07 PROBLEM — E66.811 CLASS 1 OBESITY DUE TO EXCESS CALORIES WITH SERIOUS COMORBIDITY AND BODY MASS INDEX (BMI) OF 31.0 TO 31.9 IN ADULT: Status: ACTIVE | Noted: 2020-02-25

## 2022-03-07 LAB
BASOPHILS # BLD AUTO: 0.04 10*3/MM3 (ref 0–0.2)
BASOPHILS NFR BLD AUTO: 0.4 % (ref 0–1.5)
CHOLEST SERPL-MCNC: 137 MG/DL (ref 0–200)
DEPRECATED RDW RBC AUTO: 46.2 FL (ref 37–54)
EOSINOPHIL # BLD AUTO: 0.57 10*3/MM3 (ref 0–0.4)
EOSINOPHIL NFR BLD AUTO: 5.7 % (ref 0.3–6.2)
ERYTHROCYTE [DISTWIDTH] IN BLOOD BY AUTOMATED COUNT: 15.4 % (ref 12.3–15.4)
FERRITIN SERPL-MCNC: 435 NG/ML (ref 13–150)
HBA1C MFR BLD: 5.9 % (ref 4.8–5.6)
HCT VFR BLD AUTO: 30.4 % (ref 34–46.6)
HDLC SERPL-MCNC: 40 MG/DL (ref 40–60)
HGB BLD-MCNC: 9.2 G/DL (ref 12–15.9)
IMM GRANULOCYTES # BLD AUTO: 0.03 10*3/MM3 (ref 0–0.05)
IMM GRANULOCYTES NFR BLD AUTO: 0.3 % (ref 0–0.5)
IRON 24H UR-MRATE: 33 MCG/DL (ref 37–145)
IRON SATN MFR SERPL: 12 % (ref 20–50)
LDLC SERPL CALC-MCNC: 79 MG/DL (ref 0–100)
LDLC/HDLC SERPL: 1.97 {RATIO}
LYMPHOCYTES # BLD AUTO: 2.73 10*3/MM3 (ref 0.7–3.1)
LYMPHOCYTES NFR BLD AUTO: 27.4 % (ref 19.6–45.3)
MCH RBC QN AUTO: 25.1 PG (ref 26.6–33)
MCHC RBC AUTO-ENTMCNC: 30.3 G/DL (ref 31.5–35.7)
MCV RBC AUTO: 82.8 FL (ref 79–97)
MONOCYTES # BLD AUTO: 0.61 10*3/MM3 (ref 0.1–0.9)
MONOCYTES NFR BLD AUTO: 6.1 % (ref 5–12)
NEUTROPHILS NFR BLD AUTO: 5.97 10*3/MM3 (ref 1.7–7)
NEUTROPHILS NFR BLD AUTO: 60.1 % (ref 42.7–76)
NRBC BLD AUTO-RTO: 0.1 /100 WBC (ref 0–0.2)
PLATELET # BLD AUTO: 347 10*3/MM3 (ref 140–450)
PMV BLD AUTO: 10.8 FL (ref 6–12)
RBC # BLD AUTO: 3.67 10*6/MM3 (ref 3.77–5.28)
TIBC SERPL-MCNC: 277 MCG/DL (ref 298–536)
TRANSFERRIN SERPL-MCNC: 186 MG/DL (ref 200–360)
TRIGL SERPL-MCNC: 92 MG/DL (ref 0–150)
VLDLC SERPL-MCNC: 18 MG/DL (ref 5–40)
WBC NRBC COR # BLD: 9.95 10*3/MM3 (ref 3.4–10.8)

## 2022-03-07 PROCEDURE — 84466 ASSAY OF TRANSFERRIN: CPT | Performed by: PHYSICIAN ASSISTANT

## 2022-03-07 PROCEDURE — 85014 HEMATOCRIT: CPT | Performed by: PHYSICIAN ASSISTANT

## 2022-03-07 PROCEDURE — 99214 OFFICE O/P EST MOD 30 MIN: CPT | Performed by: PHYSICIAN ASSISTANT

## 2022-03-07 PROCEDURE — 82728 ASSAY OF FERRITIN: CPT | Performed by: PHYSICIAN ASSISTANT

## 2022-03-07 PROCEDURE — 83540 ASSAY OF IRON: CPT | Performed by: PHYSICIAN ASSISTANT

## 2022-03-07 PROCEDURE — 82747 ASSAY OF FOLIC ACID RBC: CPT | Performed by: PHYSICIAN ASSISTANT

## 2022-03-07 PROCEDURE — 85025 COMPLETE CBC W/AUTO DIFF WBC: CPT | Performed by: PHYSICIAN ASSISTANT

## 2022-03-07 PROCEDURE — 83921 ORGANIC ACID SINGLE QUANT: CPT | Performed by: PHYSICIAN ASSISTANT

## 2022-03-07 PROCEDURE — 80061 LIPID PANEL: CPT | Performed by: PHYSICIAN ASSISTANT

## 2022-03-07 PROCEDURE — 83036 HEMOGLOBIN GLYCOSYLATED A1C: CPT | Performed by: PHYSICIAN ASSISTANT

## 2022-03-07 RX ORDER — INSULIN GLARGINE 300 U/ML
40-60 INJECTION, SOLUTION SUBCUTANEOUS DAILY
Qty: 3 PEN | Refills: 5 | Status: ON HOLD | COMMUNITY
Start: 2022-03-07 | End: 2022-11-02

## 2022-03-07 RX ORDER — HYDRALAZINE HYDROCHLORIDE 25 MG/1
75 TABLET, FILM COATED ORAL 3 TIMES DAILY
Qty: 270 TABLET | Refills: 0 | Status: SHIPPED | OUTPATIENT
Start: 2022-03-07 | End: 2022-04-25 | Stop reason: SDUPTHER

## 2022-03-07 RX ORDER — ATORVASTATIN CALCIUM 40 MG/1
40 TABLET, FILM COATED ORAL DAILY
Qty: 90 TABLET | Refills: 3 | Status: SHIPPED | OUTPATIENT
Start: 2022-03-07 | End: 2022-08-11 | Stop reason: SDUPTHER

## 2022-03-07 RX ORDER — METOPROLOL SUCCINATE 25 MG/1
25 TABLET, EXTENDED RELEASE ORAL DAILY
COMMUNITY
Start: 2021-12-27 | End: 2022-03-07 | Stop reason: SDUPTHER

## 2022-03-07 RX ORDER — LEVOTHYROXINE SODIUM 0.03 MG/1
25 TABLET ORAL DAILY
Qty: 90 TABLET | Refills: 3 | Status: SHIPPED | OUTPATIENT
Start: 2022-03-07 | End: 2023-01-17 | Stop reason: SDUPTHER

## 2022-03-07 RX ORDER — AMLODIPINE BESYLATE 10 MG/1
10 TABLET ORAL DAILY
Qty: 90 TABLET | Refills: 3 | Status: SHIPPED | OUTPATIENT
Start: 2022-03-07 | End: 2022-08-11 | Stop reason: SDUPTHER

## 2022-03-07 RX ORDER — PANTOPRAZOLE SODIUM 40 MG/1
40 TABLET, DELAYED RELEASE ORAL DAILY
Qty: 90 TABLET | Refills: 3 | Status: SHIPPED | OUTPATIENT
Start: 2022-03-07 | End: 2023-01-17 | Stop reason: SDUPTHER

## 2022-03-07 RX ORDER — ERGOCALCIFEROL 1.25 MG/1
50000 CAPSULE ORAL WEEKLY
Qty: 15 CAPSULE | Refills: 3 | Status: SHIPPED | OUTPATIENT
Start: 2022-03-07 | End: 2023-01-17 | Stop reason: SDUPTHER

## 2022-03-07 NOTE — PROGRESS NOTES
"Subjective   Britta Lee is a 76 y.o. female.       Chief Complaint -hypertension    History of Present Illness -    ROS    Hypertension-  Controlled with amlodipine, Bumex clonidine and lower dose of metoprolol.  Patient states that Dr. Johnson, nephrologist, decreased her metoprolol to 25 mg daily at her last visit.  She is tolerating this new regimen well    Hyperlipidemia-stable with atorvastatin and low-cholesterol diet    Congestive heart failure-stable with conservative measures including Bumex    Diabetes mellitus type 2-stable with mealtime and basal insulin    Hypothyroidism-stable with Synthroid 25 mcg daily    Gastroesophageal reflux disease-stable with pantoprazole    Vitamin D deficiency-stable with vitamin D supplementation    The following portions of the patient's history were reviewed and updated as appropriate: allergies, current medications, past family history, past medical history, past social history, past surgical history and problem list.    Review of Systems    Objective  Vital signs:  /68   Pulse 88   Temp 97.3 °F (36.3 °C) (Temporal)   Ht 160 cm (63\")   Wt 81.6 kg (180 lb)   SpO2 99%   BMI 31.89 kg/m²     Physical Exam  Vitals and nursing note reviewed.   Constitutional:       Appearance: Normal appearance. She is well-developed. She is obese.   Eyes:      Extraocular Movements: Extraocular movements intact.      Conjunctiva/sclera: Conjunctivae normal.   Cardiovascular:      Rate and Rhythm: Normal rate and regular rhythm.      Heart sounds: Normal heart sounds. No murmur heard.  Pulmonary:      Effort: Pulmonary effort is normal. No respiratory distress.      Breath sounds: Normal breath sounds. No wheezing.   Musculoskeletal:         General: No tenderness.   Skin:     General: Skin is warm and dry.      Findings: No rash.   Neurological:      Mental Status: She is alert and oriented to person, place, and time.   Psychiatric:         Mood and Affect: Mood normal.         " Behavior: Behavior normal.         Thought Content: Thought content normal.         The following data was reviewed by: RAFIQ Montanez on 03/07/2022:  CMP    CMP 12/20/21 1/27/22 2/21/22   Glucose 179 (A) 179 (A) 196 (A)   BUN 40 (A) 33 (A) 36 (A)   Creatinine 1.98 (A) 2.12 (A) 2.06 (A)   eGFR Non African Am 24 (A) 23 (A) 23 (A)   Sodium 137 140 138   Potassium 5.1 4.1 4.5   Chloride 104 107 105   Calcium 9.2 8.9 9.2   Albumin 3.40 (A) 3.55 3.70   Total Bilirubin <0.2 0.2 0.2   Alkaline Phosphatase 326 (A) 450 (A) 521 (A)   AST (SGOT) 37 (A) 47 (A) 45 (A)   ALT (SGPT) 23 22 36 (A)   (A) Abnormal value            CBC w/diff    CBC w/Diff 11/29/21 1/13/22 1/27/22   WBC 8.87 9.65 8.41   RBC 3.28 (A) 3.08 (A) 3.24 (A)   Hemoglobin 7.8 (A) 7.7 (A) 8.0 (A)   Hematocrit 27.2 (A) 25.4 (A) 26.8 (A)   MCV 82.9 82.5 82.7   MCH 23.8 (A) 25.0 (A) 24.7 (A)   MCHC 28.7 (A) 30.3 (A) 29.9 (A)   RDW 16.7 (A) 16.3 (A) 16.6 (A)   Platelets 279 364 268   Neutrophil Rel % 69.5 67.7 68.2   Immature Granulocyte Rel % 0.2 0.5 0.1   Lymphocyte Rel % 17.7 (A) 21.6 18.8 (A)   Monocyte Rel % 5.0 5.7 6.1   Eosinophil Rel % 7.0 (A) 3.9 6.3 (A)   Basophil Rel % 0.6 0.6 0.5   (A) Abnormal value            Lipid Panel    Lipid Panel 9/23/21   Total Cholesterol 117   Triglycerides 90   HDL Cholesterol 42   VLDL Cholesterol 17   LDL Cholesterol  58   LDL/HDL Ratio 1.36           TSH    TSH 4/12/21 4/20/21 9/23/21   TSH 5.330 (A) 4.240 (A) 3.760   (A) Abnormal value            Most Recent A1C    HGBA1C Most Recent 9/23/21   Hemoglobin A1C 6.86 (A)   (A) Abnormal value                   Assessment/Plan     Diagnoses and all orders for this visit:    1. Essential hypertension (Primary)  Comments:  Continue amlodipine  Continue Bumex clonidine and metoprolol  Advised daily BP/pulse log and follow-up later this month  Orders:  -     amLODIPine (NORVASC) 10 MG tablet; Take 1 tablet by mouth Daily.  Dispense: 90 tablet; Refill: 3    2. Mixed  hyperlipidemia  Comments:  Advised low-cholesterol diet  Continue atorvastatin  Orders:  -     atorvastatin (LIPITOR) 40 MG tablet; Take 1 tablet by mouth Daily.  Dispense: 90 tablet; Refill: 3    3. Chronic diastolic congestive heart failure (HCC)  Comments:  Continue Bumex  Orders:  -     hydrALAZINE (APRESOLINE) 25 MG tablet; Take 3 tablets by mouth 3 (Three) Times a Day.  Dispense: 270 tablet; Refill: 0    4. Type 2 diabetes mellitus with diabetic polyneuropathy, with long-term current use of insulin ([facility])  Comments:  Continue Toujeo and Humalog and advised low carbohydrate diet  Continue to monitor  Orders:  -     Insulin Glargine, 2 Unit Dial, (Toujeo Max SoloStar) 300 UNIT/ML solution pen-injector injection; Inject 40-60 Units under the skin into the appropriate area as directed Daily.  Dispense: 3 pen; Refill: 5    5. Diabetes mellitus type 2 in obese ([facility])  Comments:  Advised low carbohydrate diabetic diet  Continue Toujeo and Humalog  Orders:  -     insulin lispro (humaLOG) 100 UNIT/ML injection; Inject 0-6 Units under the skin into the appropriate area as directed 3 (Three) Times a Day Before Meals. Sliding scale 4-6 units if BG > 180  Dispense: 9 mL; Refill: 5    6. Type 2 diabetes mellitus, uncontrolled, with neuropathy ([facility])  Comments:  Encouraged her to phone if her family does not have success with getting her DexCom restarted.   Orders:  -     Insulin Pen Needle 32G X 5 MM misc; 1 each 4 (Four) Times a Day.  Dispense: 120 each; Refill: 5    7. Acquired hypothyroidism  Comments:  Continue Synthroid 25 mcg daily  Orders:  -     levothyroxine (SYNTHROID, LEVOTHROID) 25 MCG tablet; Take 1 tablet by mouth Daily.  Dispense: 90 tablet; Refill: 3    8. Gastroesophageal reflux disease without esophagitis  Comments:  Continue pantoprazole  Advised to avoid known trigger foods  Orders:  -     pantoprazole (Protonix) 40 MG EC tablet; Take 1 tablet by mouth Daily.  Dispense: 90 tablet; Refill: 3    9. Vitamin D  deficiency  Comments:  Continue vitamin D supplementation  Orders:  -     vitamin D (ERGOCALCIFEROL) 1.25 MG (29005 UT) capsule capsule; Take 1 capsule by mouth 1 (One) Time Per Week.  Dispense: 15 capsule; Refill: 3    10. Class 1 obesity due to excess calories with serious comorbidity and body mass index (BMI) of 31.0 to 31.9 in adult  Comments:  Patient advised on low carbohydrate, low-cholesterol diet  Advised 30 minutes CV activity daily as tolerated    Other orders  -     metoprolol tartrate (LOPRESSOR) 25 MG tablet; Take 1 tablet by mouth Daily.  Dispense: 1 tablet; Refill: 0            Patient was given instructions and counseling regarding his condition or for health maintenance advice. Please see specific information pulled into the AVS if appropriate      This document has been electronically signed by:  Lexie Dolan PA-C

## 2022-03-07 NOTE — EXTERNAL PATIENT INSTRUCTIONS
Patient Education   Table of Contents       10 Things You Can Do to Manage Your COVID-19 Symptoms at Home       Calorie Counting for Weight Loss       COVID-19: What to Do if You Are Sick       Diabetes Mellitus and Nutrition, Adult       High Cholesterol       Preventing Unintended Injuries, Adult       Sutured Wound Care       Tobacco Use Disorder     To view videos and all your education online visit,   [email or web address]   or scan this QR code with your smartphone.                  10 Things You Can Do to Manage Your COVID-19 Symptoms at Home     If you have possible or confirmed COVID-19:      Stay home  except to get medical care.      Monitor your symptoms  carefully. If your symptoms get worse, call your healthcare provider immediately.      Get rest and stay hydrated.       If you have a medical appointment, call the healthcare provider  ahead of time and tell them that you have or may have COVID-19.       For medical emergencies, call 911 and notify the dispatch personnel  that you have or may have COVID-19.      Cover your cough and sneezes  with a tissue or use the inside of your elbow.      Wash your hands often  with soap and water for at least 20 seconds or clean your hands with an alcohol-based hand  that contains at least 60% alcohol.       As much as possible, stay  in a specific room and away from other people  in your home. Also, you should use a separate bathroom, if available. If you need to be around other people in or outside of the home, wear a mask.      Avoid sharing personal items  with other people in your household, like dishes, towels, and bedding.      Clean all surfaces  that are touched often, like counters, tabletops, and doorknobs. Use household cleaning sprays or wipes according to the label instructions.     cdc.gov/coronavirus     07/16/2021   This information is not intended to replace advice given to you by your health care provider. Make sure you  discuss any questions you have with your health care provider.     Document Released: 12/03/2020Document Revised: 11/01/2021Document Reviewed: 11/01/2021     Elsevier Patient Education ? 2021 [email or web address]         Calorie Counting for Weight Loss     Calories are units of energy. Your body needs a certain number of calories from food to keep going throughout the day. When you eat or drink more calories than your body needs, your body stores the extra calories mostly as fat. When you eat or drink fewer calories than your body needs, your body burns fat to get the energy it needs.   Calorie counting means keeping track of how many calories you eat and drink each day. Calorie counting can be helpful if you need to lose weight. If you eat fewer calories than your body needs, you should lose weight. Ask your health care provider what a healthy weight is for you.    For calorie counting to work, you will need to eat the right number of calories each day to lose a healthy amount of weight per week. A dietitian can help you figure out how many calories you need in a day and will suggest ways to reach your calorie goal.       A healthy amount of weight to lose each week is usually 1?2 lb (0.5?0.9 kg). This usually means that your daily calorie intake should be reduced by 500?750 calories.       Eating 1,200?1,500 calories a day can help most women lose weight.       Eating 1,500?1,800 calories a day can help most men lose weight.     What do I need to know about calorie counting?    Work with your health care provider or dietitian to determine how many calories you should get each day. To meet your daily calorie goal, you will need to:       Find out how many calories are in each food that you would like to eat. Try to do this before you eat.       Decide how much of the food you plan to eat.       Keep a food log. Do this by writing down what you ate and how many calories it had.     To successfully lose weight, it is  important to balance calorie counting with a healthy lifestyle that includes regular activity.   Where do I find calorie information?      The number of calories in a food can be found on a Nutrition Facts label. If a food does not have a Nutrition Facts label, try to look up the calories online or ask your dietitian for help.   Remember that calories are listed per serving. If you choose to have more than one serving of a food, you will have to multiply the calories per serving by the number of servings you plan to eat. For example, the label on a package of bread might say that a serving size is 1 slice and that there are 90 calories in a serving. If you eat 1 slice, you will have eaten 90 calories. If you eat 2 slices, you will have eaten 180 calories.     How do I keep a food log?    After each time that you eat, record the following in your food log as soon as possible:       What you ate. Be sure to include toppings, sauces, and other extras on the food.       How much you ate. This can be measured in cups, ounces, or number of items.       How many calories were in each food and drink.       The total number of calories in the food you ate.     Keep your food log near you, such as in a pocket-sized notebook or on an wilfrid or website on your mobile phone. Some programs will calculate calories for you and show you how many calories you have left to meet your daily goal.   What are some portion-control tips?         Know how many calories are in a serving. This will help you know how many servings you can have of a certain food.       Use a measuring cup to measure serving sizes. You could also try weighing out portions on a kitchen scale. With time, you will be able to estimate serving sizes for some foods.       Take time to put servings of different foods on your favorite plates or in your favorite bowls and cups so you know what a serving looks like.       Try not to eat straight from a food's packaging, such  as from a bag or box. Eating straight from the package makes it hard to see how much you are eating and can lead to overeating. Put the amount you would like to eat in a cup or on a plate to make sure you are eating the right portion.       Use smaller plates, glasses, and bowls for smaller portions and to prevent overeating.       Try not to multitask. For example, avoid watching TV or using your computer while eating. If it is time to eat, sit down at a table and enjoy your food. This will help you recognize when you are full. It will also help you be more mindful of what and how much you are eating.     What are tips for following this plan?   Reading food labels         Check the calorie count compared with the serving size. The serving size may be smaller than what you are used to eating.       Check the source of the calories. Try to choose foods that are high in protein, fiber, and vitamins, and low in saturated fat, trans fat, and sodium.     Shopping         Read nutrition labels while you shop. This will help you make healthy decisions about which foods to buy.       Pay attention to nutrition labels for low-fat or fat-free foods. These foods sometimes have the same number of calories or more calories than the full-fat versions. They also often have added sugar, starch, or salt to make up for flavor that was removed with the fat.       Make a grocery list of lower-calorie foods and stick to it.     Cooking         Try to cook your favorite foods in a healthier way. For example, try baking instead of frying.       Use low-fat dairy products.     Meal planning         Use more fruits and vegetables. One-half of your plate should be fruits and vegetables.       Include lean proteins, such as chicken, turkey, and fish.     Lifestyle    Each week, aim to do one of the followin minutes of moderate exercise, such as walking.       75 minutes of vigorous exercise, such as running.     General  information         Know how many calories are in the foods you eat most often. This will help you calculate calorie counts faster.       Find a way of tracking calories that works for you. Get creative. Try different apps or programs if writing down calories does not work for you.     What foods should I eat?            Eat nutritious foods. It is better to have a nutritious, high-calorie food, such as an avocado, than a food with few nutrients, such as a bag of potato chips.      Use your calories on foods and drinks that will fill you up and will not leave you hungry soon after eating.       Examples of foods that fill you up are nuts and nut butters, vegetables, lean proteins, and high-fiber foods such as whole grains. High-fiber foods are foods with more than 5 g of fiber per serving.       Pay attention to calories in drinks. Low-calorie drinks include water and unsweetened drinks.     The items listed above may not be a complete list of foods and beverages you can eat. Contact a dietitian for more information.     What foods should I limit?    Limit foods or drinks that are not good sources of vitamins, minerals, or protein or that are high in unhealthy fats. These include:       Candy.       Other sweets.       Sodas, specialty coffee drinks, alcohol, and juice.     The items listed above may not be a complete list of foods and beverages you should avoid. Contact a dietitian for more information.   How do I count calories when eating out?        Pay attention to portions. Often, portions are much larger when eating out. Try these tips to keep portions smaller:       Consider sharing a meal instead of getting your own.       If you get your own meal, eat only half of it. Before you start eating, ask for a container and put half of your meal into it.       When available, consider ordering smaller portions from the menu instead of full portions.      Pay attention to your food and drink choices. Knowing the  way food is cooked and what is included with the meal can help you eat fewer calories.       If calories are listed on the menu, choose the lower-calorie options.       Choose dishes that include vegetables, fruits, whole grains, low-fat dairy products, and lean proteins.       Choose items that are boiled, broiled, grilled, or steamed. Avoid items that are buttered, battered, fried, or served with cream sauce. Items labeled as crispy are usually fried, unless stated otherwise.       Choose water, low-fat milk, unsweetened iced tea, or other drinks without added sugar. If you want an alcoholic beverage, choose a lower-calorie option, such as a glass of wine or light beer.       Ask for dressings, sauces, and syrups on the side. These are usually high in calories, so you should limit the amount you eat.       If you want a salad, choose a garden salad and ask for grilled meats. Avoid extra toppings such as dixon, cheese, or fried items. Ask for the dressing on the side, or ask for olive oil and vinegar or lemon to use as dressing.       Estimate how many servings of a food you are given. Knowing serving sizes will help you be aware of how much food you are eating at restaurants.     Where to find more information         Centers for Disease Control and Prevention: www.cdc.gov         [place] Department of Agriculture: myplate.gov       Summary         Calorie counting means keeping track of how many calories you eat and drink each day. If you eat fewer calories than your body needs, you should lose weight.       A healthy amount of weight to lose per week is usually 1?2 lb (0.5?0.9 kg). This usually means reducing your daily calorie intake by 500?750 calories.       The number of calories in a food can be found on a Nutrition Facts label. If a food does not have a Nutrition Facts label, try to look up the calories online or ask your dietitian for help.       Use smaller plates, glasses, and bowls for smaller portions  and to prevent overeating.       Use your calories on foods and drinks that will fill you up and not leave you hungry shortly after a meal.     This information is not intended to replace advice given to you by your health care provider. Make sure you discuss any questions you have with your health care provider.     Document Released: 12/18/2006Document Revised: 01/28/2021Document Reviewed: 01/28/2021     [employer] Patient Education ? 2021 [email or web address]         COVID-19: What to Do if You Are Sick     If you have a fever, cough or other symptoms    , you might have COVID-19. Most people have mild illness and are able to recover at home. If you are sick:       Keep track of your symptoms.      If you have an emergency warning sign    (including trouble breathing),  call 911.     Steps to help prevent the spread of COVID-19 if you are sick     If you are sick with COVID-19 or think you might have COVID-19    , follow the steps below to care for yourself and to help protect other people in your home and community.   Stay home except to get medical care        Stay home.  Most people with COVID-19 have mild illness and can recover at home without medical care. Do not leave your home, except to get medical care. Do not visit public areas.      Take care of yourself  . Get rest and stay hydrated. Take over-the-counter medicines, such as acetaminophen, to help you feel better.      Stay in touch with your doctor.  Call before you get medical care. Be sure to get care if you have trouble breathing, or have any other emergency warning signs    , or if you think it is an emergency    .      Avoid public transportation,  ride-sharing, or taxis.     Separate yourself from other people   As much as possible, stay in a specific room  and away from other people and pets in your home. If possible, you should use a separate bathroom. If you need to be around other people or animals in or outside of the home, wear a mask.   Tell your  close contacts    that they may have been exposed to COVID-19. An infected person can spread COVID-19 starting 48 hours (or 2 days) before the person has any symptoms or tests positive. By letting your close contacts know they may have been exposed to COVID-19, you are helping to protect everyone.       Additional guidance is available for those living in close quarters    and shared housing    .       See COVID-19 and Animals    if you have questions about pets.       If you are diagnosed with COVID-19, someone from the health department may call you. Answer the call    to slow the spread.     Monitor your symptoms        Symptoms    of COVID-19 include fever, cough, or other symptoms.      Follow care instructions from your healthcare provider and local health department  . Your local health authorities may give instructions on checking your symptoms and reporting information.     When to seek emergency medical attention    Look for emergency warning signs*  for COVID-19. If someone is showing any of these signs, seek emergency medical care immediately  :       Trouble breathing       Persistent pain or pressure in the chest       New confusion       Inability to wake or stay awake       Pale, gray, or blue-colored skin, lips, or nail beds, depending on skin tone     *This list is not all possible symptoms. Please call your medical provider for any other symptoms that are severe or concerning to you.   Call 911 or call ahead to your local emergency facility:  Notify the  that you are seeking care for someone who has or may have COVID-19.   Call ahead before visiting your doctor        Call ahead.  Many medical visits for routine care are being postponed or done by phone or telemedicine.      If you have a medical appointment that cannot be postponed, call your doctor's office,  and tell them you have or may have COVID-19. This will help the office protect themselves and other patients.     Get   tested          If you have symptoms of COVID-19    , get tested    . While waiting for test results    , you stay away from others, including staying apart from those living in your household.       You can visit your state    , [facility]    , local    , and [facility]   health department's website    to look for the latest local information on testing sites.     If you are sick, wear a mask over your nose and mouth        You should wear a mask    over your nose and mouth  if you must be around other people or animals, including pets (even at home).       You don't need to wear the mask if you are alone. If you can't put on a mask (because of trouble breathing, for example), cover your coughs and sneezes in some other way. Try to stay at least 6 feet away from other people. This will help protect the people around you.       Masks should not be placed on young children under age 2 years, anyone who has trouble breathing, or anyone who is not able to remove the mask without help.     Note:  During the COVID-19 pandemic, medical grade facemasks are reserved for healthcare workers and some first responders.   Cover your coughs and sneezes        Cover your mouth and nose  with a tissue when you cough or sneeze.      Throw away used tissues  in a lined trash can.      Immediately wash your hands  with soap and water for at least 20 seconds. If soap and water are not available, clean your hands with an alcohol-based hand  that contains at least 60% alcohol.     Clean your hands often        Wash your hands  often with soap and water for at least 20 seconds. This is especially important after blowing your nose, coughing, or sneezing; going to the bathroom; and before eating or preparing food.      Use hand   if soap and water are not available. Use an alcohol-based hand  with at least 60% alcohol, covering all surfaces of your hands and rubbing them together until they feel dry.      Soap and water  are  "the best option, especially if hands are visibly dirty.      Avoid touching  your eyes, nose, and mouth with unwashed hands.      Handwashing Tips       Avoid sharing personal household items        Do not share  dishes, drinking glasses, cups, eating utensils, towels, or bedding with other people in your home.      Wash these items thoroughly after using them  with soap and water or put in the .     Clean all \"high-touch\" surfaces everyday        Clean and disinfect  high-touch surfaces in your \"sick room\" and bathroom; wear disposable gloves. Let someone else clean and disinfect surfaces in common areas, but you should clean your bedroom and bathroom, if possible.      If a caregiver or other person needs to clean and disinfect  a sick person's bedroom or bathroom, they should do so on an as-needed basis. The caregiver/other person should wear a mask and disposable gloves prior to cleaning. They should wait as long as possible after the person who is sick has used the bathroom before coming in to clean and use the bathroom.       High-touch surfaces include phones, remote controls, counters, tabletops, doorknobs, bathroom fixtures, toilets, keyboards, tablets, and bedside tables.      Clean and disinfect areas that may have blood, stool, or body fluids on them.      Use household  and disinfectants.  Clean the area or item with soap and water or another detergent if it is dirty. Then, use a household disinfectant.       Be sure to follow the instructions on the label to ensure safe and effective use of the product. Many products recommend keeping the surface wet for several minutes to ensure germs are killed. Many also recommend precautions such as wearing gloves and making sure you have good ventilation during use of the product.       Use a product from EPA's List N: Disinfectants for Coronavirus (COVID-19)    .      Complete Disinfection Guidance       When you can be around others after being " sick with COVID-19   Deciding when you can be around others is different for different situations. Find out when you can   safely end home isolation    .   For any additional questions about your care,  contact your healthcare provider or state or local health department.   03/17/2021   Content source:   National Center for Immunization and Respiratory Diseases (NCIRD)    , Division of Viral Diseases     This information is not intended to replace advice given to you by your health care provider. Make sure you discuss any questions you have with your health care provider.     Document Released: 04/14/2020Document Revised: 11/01/2021Document Reviewed: 11/01/2021     Elsevier Patient Education ? 2021 [employer]         Diabetes Mellitus and Nutrition, Adult     When you have diabetes, or diabetes mellitus, it is very important to have healthy eating habits because your blood sugar (glucose) levels are greatly affected by what you eat and drink. Eating healthy foods in the right amounts, at about the same times every day, can help you:       Control your blood glucose.       Lower your risk of heart disease.       Improve your blood pressure.       Reach or maintain a healthy weight.     What can affect my meal plan?    Every person with diabetes is different, and each person has different needs for a meal plan. Your health care provider may recommend that you work with a dietitian to make a meal plan that is best for you. Your meal plan may vary depending on factors such as:       The calories you need.       The medicines you take.       Your weight.       Your blood glucose, blood pressure, and cholesterol levels.       Your activity level.       Other health conditions you have, such as heart or kidney disease.     How do carbohydrates affect me?   Carbohydrates, also called carbs, affect your blood glucose level more than any other type of food. Eating carbs naturally raises the amount of glucose in your blood.  "Carb counting is a method for keeping track of how many carbs you eat. Counting carbs is important to keep your blood glucose at a healthy level, especially if you use insulin or take certain oral diabetes medicines.   It is important to know how many carbs you can safely have in each meal. This is different for every person. Your dietitian can help you calculate how many carbs you should have at each meal and for each snack.   How does alcohol affect me?    Alcohol can cause a sudden decrease in blood glucose (hypoglycemia), especially if you use insulin or take certain oral diabetes medicines. Hypoglycemia can be a life-threatening condition. Symptoms of hypoglycemia, such as sleepiness, dizziness, and confusion, are similar to symptoms of having too much alcohol.      Do not  drink alcohol if:       Your health care provider tells you not to drink.       You are pregnant, may be pregnant, or are planning to become pregnant.      If you drink alcohol:      Do not  drink on an empty stomach.      Limit how much you use to:       0?1 drink a day for women.       0?2 drinks a day for men.       Be aware of how much alcohol is in your drink. In the [place] one drink equals one 12 oz bottle of beer (355 mL), one 5 oz glass of wine (148 mL), or one 1? oz glass of hard liquor (44 mL).      Keep yourself hydrated with water, diet soda, or unsweetened iced tea.       Keep in mind that regular soda, juice, and other mixers may contain a lot of sugar and must be counted as carbs.         What are tips for following this plan?      Reading food labels         Start by checking the serving size on the \"Nutrition Facts\" label of packaged foods and drinks. The amount of calories, carbs, fats, and other nutrients listed on the label is based on one serving of the item. Many items contain more than one serving per package.       Check the total grams (g) of carbs in one serving. You can calculate the number of servings of carbs in " "one serving by dividing the total carbs by 15. For example, if a food has 30 g of total carbs per serving, it would be equal to 2 servings of carbs.       Check the number of grams (g) of saturated fats and trans fats in one serving. Choose foods that have a low amount or none of these fats.       Check the number of milligrams (mg) of salt (sodium) in one serving. Most people should limit total sodium intake to less than 2,300 mg per day.       Always check the nutrition information of foods labeled as \"low-fat\" or \"nonfat.\" These foods may be higher in added sugar or refined carbs and should be avoided.       Talk to your dietitian to identify your daily goals for nutrients listed on the label.     Shopping         Avoid buying canned, pre-made, or processed foods. These foods tend to be high in fat, sodium, and added sugar.       Shop around the outside edge of the grocery store. This is where you will most often find fresh fruits and vegetables, bulk grains, fresh meats, and fresh dairy.     Cooking         Use low-heat cooking methods, such as baking, instead of high-heat cooking methods like deep frying.       Cook using healthy oils, such as olive, canola, or sunflower oil.       Avoid cooking with butter, cream, or high-fat meats.     Meal planning         Eat meals and snacks regularly, preferably at the same times every day. Avoid going long periods of time without eating.       Eat foods that are high in fiber, such as fresh fruits, vegetables, beans, and whole grains. Talk with your dietitian about how many servings of carbs you can eat at each meal.      Eat 4?6 oz (112?168 g) of lean protein each day, such as lean meat, chicken, fish, eggs, or tofu. One ounce (oz) of lean protein is equal to:       1 oz (28 g) of meat, chicken, or fish.       1 egg.       ? cup (62 g) of tofu.       Eat some foods each day that contain healthy fats, such as avocado, nuts, seeds, and fish.       What foods should I eat? "   Fruits   Berries. Apples. Oranges. Peaches. Apricots. Plums. Grapes. [place]. Papaya. Pomegranate. Kiwi. Cherries.   Vegetables   Lettuce. Spinach. Leafy greens, including kale, chard, renato greens, and mustard greens. Beets. Cauliflower. Cabbage. Broccoli. Carrots. Green beans. Tomatoes. Peppers. Onions. Cucumbers. [place] sprouts.   Grains   Whole grains, such as whole-wheat or whole-grain bread, crackers, tortillas, cereal, and pasta. Unsweetened oatmeal. Quinoa. Brown or wild rice.   Meats and other proteins   Seafood. Poultry without skin. Lean cuts of poultry and beef. Tofu. Nuts. Seeds.   Dairy   Low-fat or fat-free dairy products such as milk, yogurt, and cheese.   The items listed above may not be a complete list of foods and beverages you can eat. Contact a dietitian for more information.   What foods should I avoid?   Fruits   Fruits canned with syrup.   Vegetables   Canned vegetables. Frozen vegetables with butter or cream sauce.   Grains   Refined white flour and flour products such as bread, pasta, snack foods, and cereals. Avoid all processed foods.   Meats and other proteins   Fatty cuts of meat. Poultry with skin. Breaded or fried meats. Processed meat. Avoid saturated fats.   Dairy   Full-fat yogurt, cheese, or milk.   Beverages   Sweetened drinks, such as soda or iced tea.   The items listed above may not be a complete list of foods and beverages you should avoid. Contact a dietitian for more information.   Questions to ask a health care provider         Do I need to meet with a diabetes educator?       Do I need to meet with a dietitian?       What number can I call if I have questions?       When are the best times to check my blood glucose?     Where to find more information:         American Diabetes Association: diabetes.org         Academy of Nutrition and Dietetics: www.eatright.org         National [place] of Diabetes and Digestive and Kidney Diseases: www.niddk.nih.gov          Association of Diabetes Care and Education Specialists: www.diabeteseducator.org       Summary         It is important to have healthy eating habits because your blood sugar (glucose) levels are greatly affected by what you eat and drink.       A healthy meal plan will help you control your blood glucose and maintain a healthy lifestyle.       Your health care provider may recommend that you work with a dietitian to make a meal plan that is best for you.       Keep in mind that carbohydrates (carbs) and alcohol have immediate effects on your blood glucose levels. It is important to count carbs and to use alcohol carefully.     This information is not intended to replace advice given to you by your health care provider. Make sure you discuss any questions you have with your health care provider.     Document Released: 09/14/2006Document Revised: 11/24/2020Document Reviewed: 11/24/2020     Elsevier Patient Education ? 2021 [employer]         High Cholesterol        High cholesterol is a condition in which the blood has high levels of a white, waxy substance similar to fat (cholesterol). The liver makes all the cholesterol that the body needs. The human body needs small amounts of cholesterol to help build cells. A person gets extra or excess cholesterol from the food that he or she eats.   The blood carries cholesterol from the liver to the rest of the body. If you have high cholesterol, deposits (plaques) may build up on the walls of your arteries. Arteries are the blood vessels that carry blood away from your heart. These plaques make the arteries narrow and stiff.   Cholesterol plaques increase your risk for heart attack and stroke. Work with your health care provider to keep your cholesterol levels in a healthy range.     What increases the risk?    The following factors may make you more likely to develop this condition:       Eating foods that are high in animal fat (saturated fat) or cholesterol.       Being  "overweight.       Not getting enough exercise.       A family history of high cholesterol (familial hypercholesterolemia).       Use of tobacco products.       Having diabetes.     What are the signs or symptoms?   There are no symptoms of this condition.   How is this diagnosed?    This condition may be diagnosed based on the results of a blood test.       If you are older than 20 years of age, your health care provider may check your cholesterol levels every 4?6 years.       You may be checked more often if you have high cholesterol or other risk factors for heart disease.      The blood test for cholesterol measures:       \"Bad\" cholesterol, or LDL cholesterol. This is the main type of cholesterol that causes heart disease. The desired level is less than 100 mg/dL.       \"Good\" cholesterol, or HDL cholesterol. HDL helps protect against heart disease by cleaning the arteries and carrying the LDL to the liver for processing. The desired level for HDL is 60 mg/dL or higher.       Triglycerides. These are fats that your body can store or burn for energy. The desired level is less than 150 mg/dL.       Total cholesterol. This measures the total amount of cholesterol in your blood and includes LDL, HDL, and triglycerides. The desired level is less than 200 mg/dL.     How is this treated?    This condition may be treated with:       Diet changes. You may be asked to eat foods that have more fiber and less saturated fats or added sugar.       Lifestyle changes. These may include regular exercise, maintaining a healthy weight, and quitting use of tobacco products.       Medicines. These are given when diet and lifestyle changes have not worked. You may be prescribed a statin medicine to help lower your cholesterol levels.     Follow these instructions at home:   Eating and drinking           Eat a healthy, balanced diet. This diet includes:       Daily servings of a variety of fresh, frozen, or canned fruits and " "vegetables.       Daily servings of whole grain foods that are rich in fiber.       Foods that are low in saturated fats and trans fats. These include poultry and fish without skin, lean cuts of meat, and low-fat dairy products.       A variety of fish, especially oily fish that contain omega-3 fatty acids. Aim to eat fish at least 2 times a week.       Avoid foods and drinks that have added sugar.       Use healthy cooking methods, such as roasting, grilling, broiling, baking, poaching, steaming, and stir-frying. Do not  nice your food except for stir-frying.     Lifestyle            Get regular exercise. Aim to exercise for a total of 150 minutes a week. Increase your activity level by doing activities such as gardening, walking, and taking the stairs.      Do not  use any products that contain nicotine or tobacco, such as cigarettes, e-cigarettes, and chewing tobacco. If you need help quitting, ask your health care provider.       General instructions         Take over-the-counter and prescription medicines only as told by your health care provider.       Keep all follow-up visits as told by your health care provider. This is important.       Where to find more information         American Heart Association: www.heart.org         National Heart, Lung, and Blood [place]: www.nhlbi.nih.gov       Contact a health care provider if:         You have trouble achieving or maintaining a healthy diet or weight.       You are starting an exercise program.       You are unable to stop smoking.     Get help right away if:         You have chest pain.       You have trouble breathing.      You have any symptoms of a stroke. \"BE FAST\"  is an easy way to remember the main warning signs of a stroke:      B - Balance  . Signs are dizziness, sudden trouble walking, or loss of balance.      E - Eyes  . Signs are trouble seeing or a sudden change in vision.      F - Face  . Signs are sudden weakness or numbness of the face, or the " face or eyelid drooping on one side.      A - Arms  . Signs are weakness or numbness in an arm. This happens suddenly and usually on one side of the body.      S - Speech  . Signs are sudden trouble speaking, slurred speech, or trouble understanding what people say.      T - Time  . Time to call emergency services. Write down what time symptoms started.      You have other signs of a stroke, such as:       A sudden, severe headache with no known cause.       Nausea or vomiting.       Seizure.     These symptoms may represent a serious problem that is an emergency. Do not wait to see if the symptoms will go away. Get medical help right away. Call your local emergency services (911 in the [place] Do not drive yourself to the hospital.   Summary         Cholesterol plaques increase your risk for heart attack and stroke. Work with your health care provider to keep your cholesterol levels in a healthy range.       Eat a healthy, balanced diet, get regular exercise, and maintain a healthy weight.      Do not  use any products that contain nicotine or tobacco, such as cigarettes, e-cigarettes, and chewing tobacco.       Get help right away if you have any symptoms of a stroke.     This information is not intended to replace advice given to you by your health care provider. Make sure you discuss any questions you have with your health care provider.     Document Released: 12/18/2006Document Revised: 11/16/2020Document Reviewed: 11/16/2020     Elsevier Patient Education ? 2021 [employer]         Preventing Unintended Injuries, Adult     Unintended injuries are accidents that result in harm. They are very common and can happen almost anywhere. Most occur at home or in the car. Examples include motor vehicle accidents, drownings, machinery accidents, drug overdoses, falls, and fires. Unintended injuries can be serious.   Most unintended injuries are preventable. Taking some simple steps in your daily life and developing safe  habits can reduce your risk of unintended injury.   Why are these safety measures and habits important?    Taking steps to improve safety can:       Reduce your chance of having an accident.       Reduce the likelihood of injury if you do have an accident.       Lower your chance of severe injury in an accident, including the risk for disability, traumatic brain injury, or even death.       Help prevent others from having accidents in your home.       Help you avoid high medical costs.     What actions can I take to prevent injuries?      Home and workplace         Look for tripping hazards around your home, such as loose rugs and cords where people walk. Use no-slip mats and install grab bars in the bathroom.       Make sure you do not  plug too many things into an outlet at one time.      Always be safe with tools and equipment:       Be careful when using ladders or power tools at home and work. Put these items away as soon as you are done using them.      Do not  use power tools when you are sleepy or impaired by alcohol or drugs.       Consider using night-lights to help prevent falls when it is dark.       Store kitchen knives in a safe place. Use them carefully.       Install smoke detectors and carbon monoxide detectors in your home. Check the batteries yearly.      Learn safety guidelines and techniques:       Make sure that you and everyone in your home knows basic first aid, including what to do when someone is choking.       Make sure that your workplace is safe and follows guidelines from the Occupational Safety and Health Administration (OSHA).       If you have firearms in your home, store them in a locked safe.      Do not  misuse medicines. This means that you should not take a medicine other than how it is prescribed, and you should not take someone else's medicine.     Activity         When driving or riding in a car, always wear your seat belt.      If you drive:       Obey the speed limit and  traffic laws.      Do not  text, talk, or use your phone or other mobile devices while driving.      Do not  drive when you are tired. If you feel like you may fall asleep while driving, pull over at a safe location and take a break or switch drivers.      Do not  drive after drinking or using drugs. Plan for a designated  or another way to go home. Do not  ride in a car with someone who has been using drugs or alcohol.      Do not  use drugs or alcohol while doing activities that require a lot of energy or concentration. Limit the amount of alcohol you drink before or during recreational activities.       Use a handrail when walking up and down stairs.       When walking outside, walk on a sidewalk if possible.       Have your eyes checked regularly.       Take swimming lessons to prevent water-related accidents. Wear a life jacket for water activities such as boating and water sports.       Wear appropriate safety gear when doing activities or participating in sports. This includes wearing a helmet when riding a bicycle, skiing, or snowboarding.       What actions can I take to make my home safer for children?    Take these steps to help prevent children from being injured in your home:       Keep medicines and cleaning products secured and out of the reach of children.       Put plug covers on all electrical outlets.       Install window guards on upper levels of your home.       Keep small items out of the reach of young children. A child could choke on anything that is small enough to fit inside a toilet paper tube.       Practice kitchen safety. Handles of pots and pans should always be turned inward when cooking on the stove.     Where to find more information         [place] Department of Health and Human Services: www.hhs.gov         Office of Disease Prevention and Health Promotion: www.healthypeople.gov         Centers for Disease Control and Prevention: www.cdc.gov/injury       Summary         Most  unintended injuries are preventable.       Making simple changes, such as changing your home environment and your driving habits, can prevent many unintended injuries.       Take precautions at work and during recreational activities to avoid unintended injuries.       You can take many actions to make your home safer for children, including keeping medicines and cleaning products secured and out of their reach.     This information is not intended to replace advice given to you by your health care provider. Make sure you discuss any questions you have with your health care provider.     Document Released: 09/03/2017Document Revised: 08/03/2020Document Reviewed: 08/03/2020     [employer] Patient Education ? 2021 [employer]         Sutured Wound Care     Sutures are stitches that can be used to close wounds. Taking care of your wound properly can help to prevent pain and infection. It can also help your wound to heal more quickly. Follow instructions from your health care provider about how to care for your sutured wound.   Supplies needed:         Soap and water.       A clean bandage (dressing), if needed.       Antibiotic ointment.       A clean towel.     How to care for your sutured wound            Keep the wound completely dry for the first 24 hours, or for as long as directed by your health care provider. After 24?48 hours, you may shower or bathe as directed by your health care provider. Do not  soak or submerge the wound in water until the sutures have been removed.      After the first 24 hours, clean the wound once a day, or as often as directed by your health care provider, using the following steps:       Wash the wound with soap and water.       Rinse the wound with water to remove all soap.       Pat the wound dry with a clean towel. Do not  rub the wound.       After cleaning the wound, apply a thin layer of antibiotic ointment as directed by your health care provider. This will prevent infection  and keep the dressing from sticking to the wound.      Follow instructions from your health care provider about how to change your dressing:       Wash your hands with soap and water. If soap and water are not available, use hand .       Change your dressing at least once a day, or as often as told by your health care provider. If your dressing gets wet or dirty, change it.       Leave sutures and other skin closures, such as adhesive tape or skin glue, in place. These skin closures may need to stay in place for 2 weeks or longer. If adhesive strip edges start to loosen and curl up, you may trim the loose edges. Do not  remove adhesive strips completely unless your health care provider tells you to do that.      Check your wound every day for signs of infection. Watch for:       Redness, swelling, or pain.       Fluid or blood.       Warmth.       Pus or a bad smell.       Have the sutures removed as directed by your health care provider.       Follow these instructions at home:   Medicines         Take or apply over-the-counter and prescription medicines only as told by your health care provider.       If you were prescribed an antibiotic medicine or ointment, take or apply it as told by your health care provider. Do not  stop using the antibiotic even if your condition improves.     General instructions         To help reduce scarring after your wound heals, cover your wound with clothing or apply sunscreen of at least 30 SPF whenever you are outside.      Do not  scratch or pick at your wound.       Avoid stretching your wound.       Raise (elevate) the injured area above the level of your heart while you are sitting or lying down, if possible.       Drink enough fluids to keep your urine clear or pale yellow.       Keep all follow-up visits as told by your health care provider. This is important.     Contact a health care provider if:         You received a tetanus shot and you have swelling, severe  pain, redness, or bleeding at the injection site.       Your wound breaks open.       You have redness, swelling, or pain around your wound.       You have fluid or blood coming from your wound.       Your wound feels warm to the touch.       You have a fever.       You notice something coming out of your wound, such as wood or glass.       You have pain that does not get better with medicine.       The skin near your wound changes color.       You need to change your dressing very frequently due to a lot of fluid, blood, or pus draining from the wound.       You develop a new rash.       You develop numbness around the wound.     Get help right away if:         You develop severe swelling around your wound.       You have pus or a bad smell coming from your wound.       Your pain suddenly gets worse and is severe.       You develop painful lumps near your wound or anywhere on your body.       You have a red streak going away from your wound.      The wound is on your hand or foot and:       You cannot properly move a finger or toe.       Your fingers or toes look pale or bluish.       You have numbness that is spreading down your hand, foot, fingers, or toes.     Summary         Sutures are stitches that can be used to close wounds.       Taking care of your wound properly can help to prevent pain and infection.       Keep the wound completely dry for the first 24 hours, or for as long as directed by your health care provider. After 24?48 hours, you may shower or bathe as directed by your health care provider.     This information is not intended to replace advice given to you by your health care provider. Make sure you discuss any questions you have with your health care provider.     Document Released: 01/25/2006Document Revised: 02/25/2021Document Reviewed: 01/23/2018     [employer] Patient Education ? 2021 [employer]         Tobacco Use Disorder     Tobacco use disorder (TUD) occurs when a person craves,  seeks, and uses tobacco, regardless of the consequences. This disorder can cause problems with mental and physical health. It can affect your ability to have healthy relationships, and it can keep you from meeting your responsibilities at work, home, or school.    Tobacco may be:       Smoked as a cigarette or cigar.       Inhaled using e-cigarettes.       Smoked in a pipe or hookah.       Chewed as smokeless tobacco.       Inhaled into the nostrils as snuff.     Tobacco products contain a dangerous chemical called nicotine, which is very addictive. Nicotine triggers hormones that make the body feel stimulated and works on areas of the brain that make you feel good. These effects can make it hard for people to quit nicotine.   Tobacco contains many other unsafe chemicals that can damage almost every organ in the body. Smoking tobacco also puts others in danger due to fire risk and possible health problems caused by breathing in secondhand smoke.   What are the signs or symptoms?    Symptoms of TUD may include:       Being unable to slow down or stop your tobacco use.       Spending an abnormal amount of time getting or using tobacco.       Craving tobacco. Cravings may last for up to 6 months after quitting.      Tobacco use that:       Interferes with your work, school, or home life.       Interferes with your personal and social relationships.       Makes you give up activities that you once enjoyed or found important.      Using tobacco even though you know that it is:       Dangerous or bad for your health or someone else's health.       Causing problems in your life.       Needing more and more of the substance to get the same effect (developing tolerance).      Experiencing unpleasant symptoms if you do not use the substance (withdrawal). Withdrawal symptoms may include:       Depressed, anxious, or irritable mood.       Difficulty concentrating.       Increased appetite.       Restlessness or trouble  sleeping.       Using the substance to avoid withdrawal.     How is this diagnosed?    This condition may be diagnosed based on:       Your current and past tobacco use. Your health care provider may ask questions about how your tobacco use affects your life.       A physical exam.     You may be diagnosed with TUD if you have at least two symptoms within a 12-month period.   How is this treated?    This condition is treated by stopping tobacco use. Many people are unable to quit on their own and need help. Treatment may include:      Nicotine replacement therapy (NRT). NRT provides nicotine without the other harmful chemicals in tobacco. NRT gradually lowers the dosage of nicotine in the body and reduces withdrawal symptoms. NRT is available as:       Over-the-counter gums, lozenges, and skin patches.       Prescription mouth inhalers and nasal sprays.       Medicine that acts on the brain to reduce cravings and withdrawal symptoms.       A type of talk therapy that examines your triggers for tobacco use, how to avoid them, and how to cope with cravings (behavioral therapy).       Hypnosis. This may help with withdrawal symptoms.       Joining a support group for others coping with TUD.     The best treatment for TUD is usually a combination of medicine, talk therapy, and support groups. Recovery can be a long process. Many people start using tobacco again after stopping (relapse). If you relapse, it does not mean that treatment will not work.   Follow these instructions at home:      Lifestyle        Do not  use any products that contain nicotine or tobacco, such as cigarettes and e-cigarettes.       Avoid things that trigger tobacco use as much as you can. Triggers include people and situations that usually cause you to use tobacco.       Avoid drinks that contain caffeine, including coffee. These may worsen some withdrawal symptoms.       Find ways to manage stress. Wanting to smoke may cause stress, and stress  can make you want to smoke. Relaxation techniques such as deep breathing, meditation, and yoga may help.       Attend support groups as needed. These groups are an important part of long-term recovery for many people.     General instructions         Take over-the-counter and prescription medicines only as told by your health care provider.       Check with your health care provider before taking any new prescription or over-the-counter medicines.       Decide on a friend, family member, or smoking quit-line (such as 1-800-QUIT-NOW in the [place] that you can call or text when you feel the urge to smoke or when you need help coping with cravings.       Keep all follow-up visits as told by your health care provider and therapist. This is important.       Contact a health care provider if:         You are not able to take your medicines as prescribed.       Your symptoms get worse, even with treatment.     Summary         Tobacco use disorder (TUD) occurs when a person craves, seeks, and uses tobacco regardless of the consequences.       This condition may be diagnosed based on your current and past tobacco use and a physical exam.       Many people are unable to quit on their own and need help. Recovery can be a long process.       The most effective treatment for TUD is usually a combination of medicine, talk therapy, and support groups.     This information is not intended to replace advice given to you by your health care provider. Make sure you discuss any questions you have with your health care provider.     Document Released: 08/23/2005Document Revised: 12/05/2018Document Reviewed: 12/05/2018     [employer] Patient Education ? 2021 [employer]

## 2022-03-08 LAB
FOLATE BLD-MCNC: 385 NG/ML
FOLATE RBC-MCNC: 1275 NG/ML
HCT VFR BLD AUTO: 30.2 % (ref 34–46.6)

## 2022-03-10 ENCOUNTER — OFFICE VISIT (OUTPATIENT)
Dept: GASTROENTEROLOGY | Facility: CLINIC | Age: 77
End: 2022-03-10

## 2022-03-10 VITALS
HEIGHT: 63 IN | BODY MASS INDEX: 31.89 KG/M2 | HEART RATE: 59 BPM | DIASTOLIC BLOOD PRESSURE: 67 MMHG | SYSTOLIC BLOOD PRESSURE: 140 MMHG | OXYGEN SATURATION: 99 %

## 2022-03-10 DIAGNOSIS — R63.4 WEIGHT LOSS, ABNORMAL: Primary | ICD-10-CM

## 2022-03-10 DIAGNOSIS — R14.0 ABDOMINAL DISTENSION: ICD-10-CM

## 2022-03-10 LAB — METHYLMALONATE SERPL-SCNC: 494 NMOL/L (ref 0–378)

## 2022-03-10 PROCEDURE — 99214 OFFICE O/P EST MOD 30 MIN: CPT | Performed by: INTERNAL MEDICINE

## 2022-03-10 NOTE — PROGRESS NOTES
Subjective   Britta Lee is a 76 y.o. female who presents to the office today as a follow up appointment regarding YUAN      History of Present Illness:  The patient presents today for follow-up iron deficiency anemia and weight loss.  She denies bright red blood per rectum, melena or hematemesis.  She states that she has no abdominal pain.  She is losing weight despite eating per her report.  She states over the past many years she has lost about 70 pounds.  She denies family history of colon cancer.  She has had a colonoscopy and an EGD last year which revealed gastritis and angiectasias.  She was also found to have colon polyps at the time of her colonoscopy.  She has lost about 30 lbs per her report in the past year.  She is very concerned about this because she has not been dieting.  She is going to get an iron infusion per her report in.  She does have iron deficiency anemia.  She is followed by hematology.  I have been wanting to do a capsule endoscopy but she could not tolerate doing a small bowel follow through to make sure there is no stricturing in the small bowel that would entrap the capsule.         Review of Systems:  Review of Systems   Constitutional: Negative.  Negative for activity change, appetite change, chills, fatigue, fever and unexpected weight change.   HENT: Negative.  Negative for mouth sores and trouble swallowing.    Eyes: Negative.  Negative for photophobia, pain and redness.   Respiratory: Negative.  Negative for cough and choking.    Cardiovascular: Negative.  Negative for chest pain and leg swelling.   Gastrointestinal: Negative.  Negative for abdominal distention, abdominal pain, anal bleeding, blood in stool, constipation, diarrhea, nausea, rectal pain and vomiting.   Endocrine: Negative.  Negative for cold intolerance, heat intolerance and polyphagia.   Genitourinary: Negative.  Negative for difficulty urinating and dysuria.   Musculoskeletal: Negative.  Negative for  arthralgias, joint swelling and myalgias.   Skin: Negative.  Negative for rash and wound.   Allergic/Immunologic: Negative.  Negative for environmental allergies and food allergies.   Neurological: Negative.  Negative for dizziness and light-headedness.   Hematological: Negative.  Negative for adenopathy. Does not bruise/bleed easily.   Psychiatric/Behavioral: Negative.  Negative for sleep disturbance. The patient is not nervous/anxious.        Past Medical History:  Past Medical History:   Diagnosis Date   • Acquired hypothyroidism 4/22/2021   • Anemia    • Arthritis    • Carpal tunnel syndrome    • Coronary artery disease    • Diabetes mellitus (HCC)    • Elevated cholesterol    • GERD (gastroesophageal reflux disease)    • History of pneumonia    • History of unsteady gait     OCASSIONALLY   • Hypertension    • Insomnia    • Kidney disease    • LENNY (obstructive sleep apnea) 12/1/2020   • Osteoarthritis    • Renal insufficiency    • Sciatica        Past Surgical History:  Past Surgical History:   Procedure Laterality Date   • ABDOMINAL SURGERY     • APPENDECTOMY     • CARDIAC CATHETERIZATION      1 STENT  ---  2000   • CARDIAC SURGERY     • CAROTID STENT     • CARPAL TUNNEL RELEASE Right 10/8/2019    Procedure: CARPAL TUNNEL RELEASE;  Surgeon: Feroz Johnson MD;  Location: [facility];  Service: Orthopedics   • CATARACT EXTRACTION     • CHOLECYSTECTOMY     • COLONOSCOPY     • COLONOSCOPY N/A 11/23/2021    Procedure: COLONOSCOPY FOR SCREENING;  Surgeon: Plamira Pate MD;  Location: [facility];  Service: Gastroenterology;  Laterality: N/A;   • CORONARY ANGIOPLASTY WITH STENT PLACEMENT     • ENDOSCOPY     • ENDOSCOPY N/A 3/5/2020    Procedure: ESOPHAGOGASTRODUODENOSCOPY;  Surgeon: Alexandru Bagley MD;  Location: [facility];  Service: Gastroenterology;  Laterality: N/A;   • ENDOSCOPY N/A 11/23/2021    Procedure: ESOPHAGOGASTRODUODENOSCOPY WITH BIOPSY;  Surgeon: Palmira Pate MD;   Location: [facility];  Service: Gastroenterology;  Laterality: N/A;   • ENDOSCOPY AND COLONOSCOPY     • GALLBLADDER SURGERY     • JOINT REPLACEMENT Left 05/02/2017    [facility]  DR JOHNSON  LEFT TOTAL KNEE   • KNEE ARTHROSCOPY W/ MENISCECTOMY Right    • LAPAROSCOPIC TUBAL LIGATION     • OR TOTAL KNEE ARTHROPLASTY Left 5/2/2017    Procedure: TOTAL KNEE ARTHROPLASTY;  Surgeon: Feroz Johnson MD;  Location: [facility];  Service: Orthopedics   • STERILIZATION     • TONSILLECTOMY         Family History:  Family History   Problem Relation Age of Onset   • Arthritis Mother    • Diabetes Mother    • Cancer Mother    • Heart disease Father    • Diabetes Daughter    • Diabetes Son    • Diabetes Maternal Aunt    • Heart disease Maternal Grandmother    • Breast cancer Neg Hx        Social History:  Social History     Socioeconomic History   • Marital status:      Spouse name: natalie   • Number of children: 3   • Years of education: 12   Tobacco Use   • Smoking status: Never Smoker   • Smokeless tobacco: Never Used   Vaping Use   • Vaping Use: Never used   Substance and Sexual Activity   • Alcohol use: Yes     Comment: socially   • Drug use: No   • Sexual activity: Defer     Birth control/protection: Surgical       Current Medication List:    Current Outpatient Medications:   •  amLODIPine (NORVASC) 10 MG tablet, Take 1 tablet by mouth Daily., Disp: 90 tablet, Rfl: 3  •  aspirin 81 MG tablet, Take 1 tablet by mouth Daily., Disp: 30 tablet, Rfl: 5  •  atorvastatin (LIPITOR) 40 MG tablet, Take 1 tablet by mouth Daily., Disp: 90 tablet, Rfl: 3  •  bumetanide (BUMEX) 0.5 MG tablet, Take 0.5 mg by mouth Every Other Day., Disp: , Rfl:   •  cloNIDine (CATAPRES) 0.2 MG tablet, TAKE ONE Tablet BY MOUTH THREE TIMES DAILY, Disp: 270 tablet, Rfl: 1  •  Continuous Blood Gluc  (Dexcom G6 ) device, 1 Device Daily., Disp: 3 each, Rfl: 3  •  Continuous Blood Gluc Sensor (Dexcom G6 Sensor), Every 10 (Ten) Days., Disp: 9  each, Rfl: 3  •  Continuous Blood Gluc Transmit (Dexcom G6 Transmitter) misc, 1 Device Daily., Disp: 1 each, Rfl: 5  •  escitalopram (LEXAPRO) 10 MG tablet, Take 1 tablet by mouth Daily., Disp: 90 tablet, Rfl: 3  •  glucose blood test strip, 1 each by Other route 3 (Three) Times a Day., Disp: 100 each, Rfl: 12  •  hydrALAZINE (APRESOLINE) 25 MG tablet, Take 3 tablets by mouth 3 (Three) Times a Day., Disp: 270 tablet, Rfl: 0  •  Insulin Glargine, 2 Unit Dial, (Toujeo Max SoloStar) 300 UNIT/ML solution pen-injector injection, Inject 40-60 Units under the skin into the appropriate area as directed Daily., Disp: 3 pen, Rfl: 5  •  insulin lispro (humaLOG) 100 UNIT/ML injection, Inject 0-6 Units under the skin into the appropriate area as directed 3 (Three) Times a Day Before Meals. Sliding scale 4-6 units if BG > 180, Disp: 9 mL, Rfl: 5  •  Insulin Pen Needle 32G X 5 MM misc, 1 each 4 (Four) Times a Day., Disp: 120 each, Rfl: 5  •  isosorbide mononitrate (IMDUR) 60 MG 24 hr tablet, Take 1 tablet by mouth Daily., Disp: 90 tablet, Rfl: 1  •  levothyroxine (SYNTHROID, LEVOTHROID) 25 MCG tablet, Take 1 tablet by mouth Daily., Disp: 90 tablet, Rfl: 3  •  metoprolol tartrate (LOPRESSOR) 25 MG tablet, Take 1 tablet by mouth Daily., Disp: 1 tablet, Rfl: 0  •  neomycin-polymyxin-hydrocortisone (CORTISPORIN) 3.5-39697-2 otic solution, Administer 3 drops to the right ear 4 (Four) Times a Day., Disp: 10 mL, Rfl: 0  •  nystatin (MYCOSTATIN) 596063 UNIT/GM powder, Apply  one application topically to the appropriate area as directed Every 12 (Twelve) Hours. (Patient taking differently: Apply  topically to the appropriate area as directed 2 (Two) Times a Day As Needed (itching).), Disp: 60 g, Rfl: 0  •  pantoprazole (Protonix) 40 MG EC tablet, Take 1 tablet by mouth Daily., Disp: 90 tablet, Rfl: 3  •  vitamin D (ERGOCALCIFEROL) 1.25 MG (93309 UT) capsule capsule, Take 1 capsule by mouth 1 (One) Time Per Week., Disp: 15 capsule, Rfl:  "3    Allergies:   Patient has no known allergies.    Vitals:  /67   Pulse 59   Ht 160 cm (63\")   SpO2 99%   BMI 31.89 kg/m²     Physical Exam:  Physical Exam  Constitutional:       Appearance: She is obese.   HENT:      Head: Normocephalic and atraumatic.      Nose: Nose normal. No congestion or rhinorrhea.   Eyes:      General: No scleral icterus.     Extraocular Movements: Extraocular movements intact.      Conjunctiva/sclera: Conjunctivae normal.      Pupils: Pupils are equal, round, and reactive to light.   Cardiovascular:      Rate and Rhythm: Normal rate and regular rhythm.      Pulses: Normal pulses.      Heart sounds: Normal heart sounds.   Pulmonary:      Effort: Pulmonary effort is normal.      Breath sounds: Normal breath sounds.   Abdominal:      General: Abdomen is flat. Bowel sounds are normal. There is no distension.      Palpations: Abdomen is soft. There is no shifting dullness, fluid wave, hepatomegaly, splenomegaly, mass or pulsatile mass.      Tenderness: There is no abdominal tenderness. There is no guarding or rebound.      Hernia: No hernia is present.   Musculoskeletal:         General: No swelling or tenderness.      Cervical back: Normal range of motion and neck supple.      Comments: 1+ LE edema bilaterally   Skin:     General: Skin is warm and dry.      Coloration: Skin is not jaundiced.   Neurological:      General: No focal deficit present.      Mental Status: She is alert and oriented to person, place, and time.   Psychiatric:         Mood and Affect: Mood normal.         Behavior: Behavior normal.         Results Review:  Lab Results:   Clinical Support on 02/23/2022   Component Date Value Ref Range Status   • Protein, 24H Urine 02/23/2022 4,429.9 (A) 0.0 - 150.0 mg/24hours Final   • 24H Urine Volume 02/23/2022 950  mL Final   • Time (Hours) 02/23/2022 24  hrs Final   Lab on 02/21/2022   Component Date Value Ref Range Status   • 25 Hydroxy, Vitamin D 02/21/2022 30.1  ng/ml " Final   • Glucose 02/21/2022 196 (A) 65 - 99 mg/dL Final   • BUN 02/21/2022 36 (A) 8 - 23 mg/dL Final   • Creatinine 02/21/2022 2.06 (A) 0.57 - 1.00 mg/dL Final   • Sodium 02/21/2022 138  136 - 145 mmol/L Final   • Potassium 02/21/2022 4.5  3.5 - 5.2 mmol/L Final   • Chloride 02/21/2022 105  98 - 107 mmol/L Final   • CO2 02/21/2022 20.6 (A) 22.0 - 29.0 mmol/L Final   • Calcium 02/21/2022 9.2  8.6 - 10.5 mg/dL Final   • Total Protein 02/21/2022 6.8  6.0 - 8.5 g/dL Final   • Albumin 02/21/2022 3.70  3.50 - 5.20 g/dL Final   • ALT (SGPT) 02/21/2022 36 (A) 1 - 33 U/L Final   • AST (SGOT) 02/21/2022 45 (A) 1 - 32 U/L Final   • Alkaline Phosphatase 02/21/2022 521 (A) 39 - 117 U/L Final   • Total Bilirubin 02/21/2022 0.2  0.0 - 1.2 mg/dL Final   • eGFR Non  Amer 02/21/2022 23 (A) >60 mL/min/1.73 Final   • Globulin 02/21/2022 3.1  gm/dL Final   • A/G Ratio 02/21/2022 1.2  g/dL Final   • BUN/Creatinine Ratio 02/21/2022 17.5  7.0 - 25.0 Final   • Anion Gap 02/21/2022 12.4  5.0 - 15.0 mmol/L Final   • PTH, Intact 02/21/2022 94.4 (A) 15.0 - 65.0 pg/mL Final   • Phosphorus 02/21/2022 4.7 (A) 2.5 - 4.5 mg/dL Final   • Color, UA 02/21/2022 Yellow  Yellow, Straw Final   • Appearance, UA 02/21/2022 Cloudy (A) Clear Final   • pH, UA 02/21/2022 6.0  5.0 - 8.0 Final   • Specific Gravity, UA 02/21/2022 1.021  1.005 - 1.030 Final   • Glucose, UA 02/21/2022 100 mg/dL (Trace) (A) Negative Final   • Ketones, UA 02/21/2022 Trace (A) Negative Final   • Bilirubin, UA 02/21/2022 Negative  Negative Final   • Blood, UA 02/21/2022 Trace (A) Negative Final   • Protein, UA 02/21/2022 >=300 mg/dL (3+) (A) Negative Final   • Leuk Esterase, UA 02/21/2022 Trace (A) Negative Final   • Nitrite, UA 02/21/2022 Negative  Negative Final   • Urobilinogen, UA 02/21/2022 1.0 E.U./dL  0.2 - 1.0 E.U./dL Final   • RBC, UA 02/21/2022 3-5 (A) None Seen, 0-2 /HPF Final   • WBC, UA 02/21/2022 6-12 (A) None Seen, 0-2 /HPF Final   • Bacteria, UA 02/21/2022 4+ (A)  None Seen /HPF Final   • Squamous Epithelial Cells, UA 02/21/2022 13-20 (A) None Seen, 0-2 /HPF Final   • Transitional Epithelial Cells, UA 02/21/2022 3-6 (A) 0 - 2 /HPF Final   • Hyaline Casts, UA 02/21/2022 7-12  None Seen /LPF Final   • Granular Casts, UA 02/21/2022 7-12  None Seen /LPF Final   • Amorphous Urate Crystals, UA 02/21/2022 Large/3+  None Seen /HPF Final   • Methodology 02/21/2022 Manual Light Microscopy   Final       Assessment/Plan     Visit Diagnoses:    ICD-10-CM ICD-9-CM   1. Weight loss, abnormal  R63.4 783.21   2. Abdominal distension  R14.0 787.3       Plan:  Orders Placed This Encounter   Procedures   • CT Abdomen Pelvis Without Contrast   • XR Chest 2 View       Due to the patient's recent weight loss and iron deficiency anemia, I am going to have her undergo CT scan of the abdomen and pelvis.  The patient does have chronic kidney disease and therefore I will not be able to use IV contrast.  Her anemia may be multifactorial.  She does have an elevated creatinine which tends to be creeping up more and more over the past 6 months.  She is followed by nephrology.  I will also get a chest x-ray.  She will follow-up in 8 weeks.      MEDS ORDERED DURING VISIT:  No orders of the defined types were placed in this encounter.      Return in about 8 weeks (around 5/5/2022).             This document has been electronically signed by Palmira Pate MD   March 10, 2022 13:13 EST      Part of this note may be an electronic transcription/translation of spoken language to printed text using the Dragon Dictation System.

## 2022-03-15 ENCOUNTER — OFFICE VISIT (OUTPATIENT)
Dept: ONCOLOGY | Facility: CLINIC | Age: 77
End: 2022-03-15

## 2022-03-15 ENCOUNTER — LAB (OUTPATIENT)
Dept: ONCOLOGY | Facility: CLINIC | Age: 77
End: 2022-03-15

## 2022-03-15 VITALS
DIASTOLIC BLOOD PRESSURE: 75 MMHG | HEIGHT: 63 IN | WEIGHT: 197 LBS | RESPIRATION RATE: 18 BRPM | SYSTOLIC BLOOD PRESSURE: 191 MMHG | BODY MASS INDEX: 34.91 KG/M2 | TEMPERATURE: 97.1 F | HEART RATE: 97 BPM | OXYGEN SATURATION: 98 %

## 2022-03-15 DIAGNOSIS — D50.9 IRON DEFICIENCY ANEMIA, UNSPECIFIED IRON DEFICIENCY ANEMIA TYPE: ICD-10-CM

## 2022-03-15 DIAGNOSIS — N18.4 CKD (CHRONIC KIDNEY DISEASE) STAGE 4, GFR 15-29 ML/MIN: ICD-10-CM

## 2022-03-15 DIAGNOSIS — K90.9 MALABSORPTION OF IRON: ICD-10-CM

## 2022-03-15 DIAGNOSIS — D63.8 ANEMIA OF CHRONIC DISEASE: ICD-10-CM

## 2022-03-15 DIAGNOSIS — D50.9 IRON DEFICIENCY ANEMIA, UNSPECIFIED IRON DEFICIENCY ANEMIA TYPE: Primary | ICD-10-CM

## 2022-03-15 DIAGNOSIS — E03.9 HYPOTHYROIDISM, UNSPECIFIED TYPE: ICD-10-CM

## 2022-03-15 LAB
ALBUMIN SERPL-MCNC: 3 G/DL (ref 3.5–5.2)
ALBUMIN/GLOB SERPL: 0.7 G/DL
ALP SERPL-CCNC: 625 U/L (ref 39–117)
ALT SERPL W P-5'-P-CCNC: 32 U/L (ref 1–33)
ANION GAP SERPL CALCULATED.3IONS-SCNC: 11.6 MMOL/L (ref 5–15)
AST SERPL-CCNC: 59 U/L (ref 1–32)
BASOPHILS # BLD AUTO: 0.03 10*3/MM3 (ref 0–0.2)
BASOPHILS NFR BLD AUTO: 0.4 % (ref 0–1.5)
BILIRUB SERPL-MCNC: 0.2 MG/DL (ref 0–1.2)
BUN SERPL-MCNC: 31 MG/DL (ref 8–23)
BUN/CREAT SERPL: 17.2 (ref 7–25)
CALCIUM SPEC-SCNC: 8.9 MG/DL (ref 8.6–10.5)
CHLORIDE SERPL-SCNC: 105 MMOL/L (ref 98–107)
CO2 SERPL-SCNC: 21.4 MMOL/L (ref 22–29)
CREAT SERPL-MCNC: 1.8 MG/DL (ref 0.57–1)
DEPRECATED RDW RBC AUTO: 50.4 FL (ref 37–54)
EGFRCR SERPLBLD CKD-EPI 2021: 28.5 ML/MIN/1.73
EOSINOPHIL # BLD AUTO: 0.35 10*3/MM3 (ref 0–0.4)
EOSINOPHIL NFR BLD AUTO: 4.6 % (ref 0.3–6.2)
ERYTHROCYTE [DISTWIDTH] IN BLOOD BY AUTOMATED COUNT: 16.6 % (ref 12.3–15.4)
FERRITIN SERPL-MCNC: 422.6 NG/ML (ref 13–150)
GLOBULIN UR ELPH-MCNC: 4.1 GM/DL
GLUCOSE SERPL-MCNC: 235 MG/DL (ref 65–99)
HCT VFR BLD AUTO: 32.1 % (ref 34–46.6)
HGB BLD-MCNC: 9.8 G/DL (ref 12–15.9)
IMM GRANULOCYTES # BLD AUTO: 0.02 10*3/MM3 (ref 0–0.05)
IMM GRANULOCYTES NFR BLD AUTO: 0.3 % (ref 0–0.5)
IRON 24H UR-MRATE: 38 MCG/DL (ref 37–145)
IRON SATN MFR SERPL: 14 % (ref 20–50)
LYMPHOCYTES # BLD AUTO: 1.41 10*3/MM3 (ref 0.7–3.1)
LYMPHOCYTES NFR BLD AUTO: 18.6 % (ref 19.6–45.3)
MCH RBC QN AUTO: 25.3 PG (ref 26.6–33)
MCHC RBC AUTO-ENTMCNC: 30.5 G/DL (ref 31.5–35.7)
MCV RBC AUTO: 82.7 FL (ref 79–97)
MONOCYTES # BLD AUTO: 0.42 10*3/MM3 (ref 0.1–0.9)
MONOCYTES NFR BLD AUTO: 5.5 % (ref 5–12)
NEUTROPHILS NFR BLD AUTO: 5.37 10*3/MM3 (ref 1.7–7)
NEUTROPHILS NFR BLD AUTO: 70.6 % (ref 42.7–76)
NRBC BLD AUTO-RTO: 0 /100 WBC (ref 0–0.2)
PLATELET # BLD AUTO: 258 10*3/MM3 (ref 140–450)
PMV BLD AUTO: 9.4 FL (ref 6–12)
POTASSIUM SERPL-SCNC: 4.4 MMOL/L (ref 3.5–5.2)
PROT SERPL-MCNC: 7.1 G/DL (ref 6–8.5)
RBC # BLD AUTO: 3.88 10*6/MM3 (ref 3.77–5.28)
SODIUM SERPL-SCNC: 138 MMOL/L (ref 136–145)
TIBC SERPL-MCNC: 279 MCG/DL (ref 298–536)
TRANSFERRIN SERPL-MCNC: 187 MG/DL (ref 200–360)
WBC NRBC COR # BLD: 7.6 10*3/MM3 (ref 3.4–10.8)

## 2022-03-15 PROCEDURE — 85025 COMPLETE CBC W/AUTO DIFF WBC: CPT | Performed by: NURSE PRACTITIONER

## 2022-03-15 PROCEDURE — 82728 ASSAY OF FERRITIN: CPT | Performed by: NURSE PRACTITIONER

## 2022-03-15 PROCEDURE — 99214 OFFICE O/P EST MOD 30 MIN: CPT | Performed by: NURSE PRACTITIONER

## 2022-03-15 PROCEDURE — 84466 ASSAY OF TRANSFERRIN: CPT | Performed by: NURSE PRACTITIONER

## 2022-03-15 PROCEDURE — 83540 ASSAY OF IRON: CPT | Performed by: NURSE PRACTITIONER

## 2022-03-15 PROCEDURE — 80053 COMPREHEN METABOLIC PANEL: CPT | Performed by: NURSE PRACTITIONER

## 2022-03-15 NOTE — PROGRESS NOTES
DATE:  3/15/2022    REASON FOR FOLLOW UP: Anemia    REFERRING PHYSICIAN:   Jackson Johnson MD    TREATMENT:   1. Oral iron-discontinued for apparent malabsorption    2.       CHIEF COMPLAINT:  Follow up of anemia    HISTORY OF PRESENT ILLNESS:   Britta Lee is a  76 y.o. female with multiple medical problems including CKD, diabetes mellitus type 2, CHF, CAD, hypertension, GERD and OA, who is being seen today at the request of  Jackson Johnson MD for evaluation and treatment of anemia. Ms. Lee reports following with her PCP and nephrology routinely with blood testing every ~3 months. She says she has struggled with anemia for several years. Previous available CBCs were reviewed and patient has had chronic, normocytic anemia since at least December 2016 with Hg most recently ranging ~ 8.6-9.8. Her WBC and platelets have been normal. Iron panel and ferritin levels have been most c/w AOCD/inflammation since at least May 2017.  She reports receiving IV iron infusions several years ago. At present, she is taking ferrous sulfate BID which she is tolerating well. She says this was started per nephrology ~2 months ago. She denies obvious bleeding from any source. She reports having screening colonoscopy with Dr. Zamora last year which was unremarkable and negative for bleeding. She complains of chronic fatigue which is stable. Also complains of occasional lower extremity edema. She denies any other complaints today.     INTERVAL HISTORY:  Ms. Lee presents today for follow up.  Since her last visit, she was again replaced with IV Feraheme which she completed on 2/11/22. She again denies obvious bleeding from any source. She continues to follow with GI. She says she tried to proceed with capsule endoscopy but could not tolerate. Therefore, planning for CT A/P without contrast which is scheduled for 4/12/22. She continues with chronic fatigue which has somewhat improved following recent IV iron replacement. She also  continues with generalized weakness. She reports having her BP medications dose reduced recently. She continues to follow with nephrology with upcoming appointment in April. She has no other complaints today.       PAST MEDICAL HISTORY:  Past Medical History:   Diagnosis Date   • Acquired hypothyroidism 4/22/2021   • Anemia    • Arthritis    • Carpal tunnel syndrome    • Coronary artery disease    • Diabetes mellitus (HCC)    • Elevated cholesterol    • GERD (gastroesophageal reflux disease)    • History of pneumonia    • History of unsteady gait     OCASSIONALLY   • Hypertension    • Insomnia    • Kidney disease    • LENNY (obstructive sleep apnea) 12/1/2020   • Osteoarthritis    • Renal insufficiency    • Sciatica        PAST SURGICAL HISTORY:  Past Surgical History:   Procedure Laterality Date   • ABDOMINAL SURGERY     • APPENDECTOMY     • CARDIAC CATHETERIZATION      1 STENT  ---  2000   • CARDIAC SURGERY     • CAROTID STENT     • CARPAL TUNNEL RELEASE Right 10/8/2019    Procedure: CARPAL TUNNEL RELEASE;  Surgeon: Feroz Levin MD;  Location: [facility];  Service: Orthopedics   • CATARACT EXTRACTION     • CHOLECYSTECTOMY     • COLONOSCOPY     • COLONOSCOPY N/A 11/23/2021    Procedure: COLONOSCOPY FOR SCREENING;  Surgeon: Palmira Pate MD;  Location: [facility];  Service: Gastroenterology;  Laterality: N/A;   • CORONARY ANGIOPLASTY WITH STENT PLACEMENT     • ENDOSCOPY     • ENDOSCOPY N/A 3/5/2020    Procedure: ESOPHAGOGASTRODUODENOSCOPY;  Surgeon: Alexandru Bagley MD;  Location: [facility];  Service: Gastroenterology;  Laterality: N/A;   • ENDOSCOPY N/A 11/23/2021    Procedure: ESOPHAGOGASTRODUODENOSCOPY WITH BIOPSY;  Surgeon: Palmira Pate MD;  Location: [facility];  Service: Gastroenterology;  Laterality: N/A;   • ENDOSCOPY AND COLONOSCOPY     • GALLBLADDER SURGERY     • JOINT REPLACEMENT Left 05/02/2017    [facility]  DR LEVIN  LEFT TOTAL KNEE   • KNEE ARTHROSCOPY W/ MENISCECTOMY  Right    • LAPAROSCOPIC TUBAL LIGATION     • NJ TOTAL KNEE ARTHROPLASTY Left 5/2/2017    Procedure: TOTAL KNEE ARTHROPLASTY;  Surgeon: Feroz Johnson MD;  Location: [facility];  Service: Orthopedics   • STERILIZATION     • TONSILLECTOMY         FAMILY HISTORY:  Family History   Problem Relation Age of Onset   • Arthritis Mother    • Diabetes Mother    • Cancer Mother    • Heart disease Father    • Diabetes Daughter    • Diabetes Son    • Diabetes Maternal Aunt    • Heart disease Maternal Grandmother    • Breast cancer Neg Hx        SOCIAL HISTORY:  Social History     Socioeconomic History   • Marital status:      Spouse name: natalie   • Number of children: 3   • Years of education: 12   Tobacco Use   • Smoking status: Never Smoker   • Smokeless tobacco: Never Used   Vaping Use   • Vaping Use: Never used   Substance and Sexual Activity   • Alcohol use: Yes     Comment: socially   • Drug use: No   • Sexual activity: Defer     Birth control/protection: Surgical       MEDICATIONS:  The current medication list was reviewed in the EMR    Current Outpatient Medications:   •  amLODIPine (NORVASC) 10 MG tablet, Take 1 tablet by mouth Daily., Disp: 90 tablet, Rfl: 3  •  aspirin 81 MG tablet, Take 1 tablet by mouth Daily., Disp: 30 tablet, Rfl: 5  •  atorvastatin (LIPITOR) 40 MG tablet, Take 1 tablet by mouth Daily., Disp: 90 tablet, Rfl: 3  •  bumetanide (BUMEX) 0.5 MG tablet, Take 0.5 mg by mouth Every Other Day., Disp: , Rfl:   •  cloNIDine (CATAPRES) 0.2 MG tablet, TAKE ONE Tablet BY MOUTH THREE TIMES DAILY, Disp: 270 tablet, Rfl: 1  •  Continuous Blood Gluc  (Dexcom G6 ) device, 1 Device Daily., Disp: 3 each, Rfl: 3  •  Continuous Blood Gluc Sensor (Dexcom G6 Sensor), Every 10 (Ten) Days., Disp: 9 each, Rfl: 3  •  Continuous Blood Gluc Transmit (Dexcom G6 Transmitter) misc, 1 Device Daily., Disp: 1 each, Rfl: 5  •  escitalopram (LEXAPRO) 10 MG tablet, Take 1 tablet by mouth Daily., Disp: 90  tablet, Rfl: 3  •  glucose blood test strip, 1 each by Other route 3 (Three) Times a Day., Disp: 100 each, Rfl: 12  •  hydrALAZINE (APRESOLINE) 25 MG tablet, Take 3 tablets by mouth 3 (Three) Times a Day., Disp: 270 tablet, Rfl: 0  •  Insulin Glargine, 2 Unit Dial, (Toujeo Max SoloStar) 300 UNIT/ML solution pen-injector injection, Inject 40-60 Units under the skin into the appropriate area as directed Daily., Disp: 3 pen, Rfl: 5  •  insulin lispro (humaLOG) 100 UNIT/ML injection, Inject 0-6 Units under the skin into the appropriate area as directed 3 (Three) Times a Day Before Meals. Sliding scale 4-6 units if BG > 180, Disp: 9 mL, Rfl: 5  •  Insulin Pen Needle 32G X 5 MM misc, 1 each 4 (Four) Times a Day., Disp: 120 each, Rfl: 5  •  isosorbide mononitrate (IMDUR) 60 MG 24 hr tablet, Take 1 tablet by mouth Daily., Disp: 90 tablet, Rfl: 1  •  levothyroxine (SYNTHROID, LEVOTHROID) 25 MCG tablet, Take 1 tablet by mouth Daily., Disp: 90 tablet, Rfl: 3  •  metoprolol tartrate (LOPRESSOR) 25 MG tablet, Take 1 tablet by mouth Daily., Disp: 1 tablet, Rfl: 0  •  neomycin-polymyxin-hydrocortisone (CORTISPORIN) 3.5-12384-4 otic solution, Administer 3 drops to the right ear 4 (Four) Times a Day., Disp: 10 mL, Rfl: 0  •  nystatin (MYCOSTATIN) 076116 UNIT/GM powder, Apply  one application topically to the appropriate area as directed Every 12 (Twelve) Hours. (Patient taking differently: Apply  topically to the appropriate area as directed 2 (Two) Times a Day As Needed (itching).), Disp: 60 g, Rfl: 0  •  pantoprazole (Protonix) 40 MG EC tablet, Take 1 tablet by mouth Daily., Disp: 90 tablet, Rfl: 3  •  vitamin D (ERGOCALCIFEROL) 1.25 MG (20917 UT) capsule capsule, Take 1 capsule by mouth 1 (One) Time Per Week., Disp: 15 capsule, Rfl: 3    ALLERGIES:  No Known Allergies    REVIEW OF SYSTEMS:    A comprehensive 14 point review of systems was performed.  Significant findings as mentioned above.  All other systems reviewed and are  "negative.      Physical Exam   Vital Signs: BP (!) 191/75   Pulse 97   Temp 97.1 °F (36.2 °C) (Temporal)   Resp 18   Ht 160 cm (63\")   Wt 89.4 kg (197 lb)   SpO2 98%   BMI 34.90 kg/m²    General: Well developed, well nourished, alert and oriented x 3, in no acute distress.   Head: ATNC   Eyes: PERRL, No evidence of conjunctivitis.   Nose: No nasal discharge.   Mouth: Oral mucosal membranes moist. No oral ulceration or hemorrhages.   Neck: Neck supple. No thyromegaly. No JVD.   Lungs: CTAB, no wheezing   Heart: RRR. No murmurs, rubs, or gallops.   Abdomen: Soft. Bowel sounds are normoactive. Nontender with palpation.   Extremities: No cyanosis or edema. Using cane for assistance with ambulation.   Neurologic: Grossly non-focal exam    Pain Score:  Pain Score    03/15/22 0942   PainSc: 0-No pain     ENDOSCOPY:  11/23/21  ESOPHAGOGASTRODUODENOSCOPY PROCEDURE REPORT     Britta Lee  11/23/2021     GASTROENTEROLOGIST:  Palmira Pate MD      PRE-PROCEDURE DIAGNOSIS:  Iron deficiency anemia, unspecified iron deficiency anemia type [D50.9]  Weight loss, abnormal [R63.4]     POST-PROCEDURE DIAGNOSIS:  1.  Gastritis  2.  Angiectasias     Procedure(s):  ESOPHAGOGASTRODUODENOSCOPY WITH BIOPSY  COLONOSCOPY FOR SCREENING     ANESTHESIA:  Propofol administered by anesthesia.  See anesthesia notes for ASA classification     STAFF:  Circulator: Bre Ny RN; Danelle Mccray RN  Endo Technician: Omari Lewis     FINDINGS:  1.  Normal-appearing esophagus.  Biopsies were taken  2.  Mild erythema in the antrum and stomach.  Biopsies taken for H. pylori.  3.  One angiectasia was found in the second portion of the duodenum.  This was nonbleeding.  Biopsies were taken of the duodenum to rule out celiac disease as a source of her anemia.     OPERATIVE PROCEDURE:  After proper informed consent was obtained, patient was transferred to the OR/endoscopy suite.  Patient was then placed in left lateral " decubitus position. The Olympus 180 series video gastroscope was inserted orally under direct visualization.  Esophagus, stomach, and duodenum were inspected.  The endoscope was passed to the third portion of the duodenum.  Scope was retroflexed for visualization of the cardia and incisuraThe endoscope was then withdrawn. Patient tolerated the procedure well. There were no immediate complications.     ESTIMATED BLOOD LOSS:  None     COMPLICATIONS;  None     RECOMMENDATIONS/ PLAN:  1.  Follow-up biopsy results.  2.  Proceed with colonoscopy.     Palmira Pate MD      11/23/21 08:58 EST    COLONOSCOPY PROCEDURE NOTE     Britta Jesus  11/23/2021     GASTROENTEROLOGIST:  Palmira Pate MD        PRE-PROCEDURE DIAGNOSIS:   Iron deficiency anemia, unspecified iron deficiency anemia type [D50.9]  Weight loss, abnormal [R63.4]     POST-PROCEDURE DIAGNOSIS:  1.  Colon polyps  2.  Diverticulosis  3.  Internal hemorrhoids     PROCEDURE:  COLONOSCOPY with snare polypectomy and cold biopsy polypectomy     ANESTHESIA:  Propofol administered by anesthesia.  See anesthesia notes for ASA classification     STAFF  Circulator: Bre Ny RN; Danelle Mccray RN  Endo Technician: Omari Lewis     Findings:  1.  A 5 mm polyp was found in the ascending colon.  This was removed with cold forceps polypectomy.  2.  A 5 mm polyp was removed from the transverse colon with cold forceps biopsy.  3.  Two 6 mm polyps were found in the descending colon.  Both polyps were removed with cold snare polypectomy.  4.  A 7 mm polyp was removed from the sigmoid colon with cold snare polypectomy.  5.  Diverticulosis involving left colon.  6.  Small internal hemorrhoids.  7.  Normal-appearing terminal ileum.     OPERATIVE PROCEDURE   After proper informed consent was obtained, the patient was taken the operating suite and placed in left lateral decubitus position.  An Olympus video colonoscope 180 series was inserted  in the rectum and advanced to the terminal ileum under direct visualization.  Cecum and terminal ileum were identified by visualization of the appendiceal orifice and ileocecal valve.  The colonoscope was then slowly withdrawn from the cecum to the rectum and passed a second time from rectum to cecum.  The colonoscope was retroflexed in the cecum and rectum. Scope was then withdrawn. Patient tolerated the procedure well. There were no immediate complications. Cecal withdrawal time was 15 minutes.     ESTIMATED BLOOD LOSS  None      COMPLICATIONS  None     RECOMMENDATIONS:  1.  Follow-up pathology.  2.  Repeat colonoscopy in 3 years due to personal history of colon polyps.  3.  Proceed with capsule endoscopy if anemia persists.  Sign 4.  Follow-up in GI clinic in 6 to 8 weeks.     Palmira Pate MD  11/23/21 09:25 EST    RECENT LABS:  Lab Results   Component Value Date    WBC 7.60 03/15/2022    HGB 9.8 (L) 03/15/2022    HCT 32.1 (L) 03/15/2022    MCV 82.7 03/15/2022    RDW 16.6 (H) 03/15/2022     03/15/2022    NEUTRORELPCT 70.6 03/15/2022    LYMPHORELPCT 18.6 (L) 03/15/2022    MONORELPCT 5.5 03/15/2022    EOSRELPCT 4.6 03/15/2022    BASORELPCT 0.4 03/15/2022    NEUTROABS 5.37 03/15/2022    LYMPHSABS 1.41 03/15/2022       Lab Results   Component Value Date     03/15/2022    K 4.4 03/15/2022    CO2 21.4 (L) 03/15/2022     03/15/2022    BUN 31 (H) 03/15/2022    CREATININE 1.80 (H) 03/15/2022    EGFRIFNONA 23 (L) 02/21/2022    EGFRIFAFRI 41 (L) 07/30/2018    GLUCOSE 235 (H) 03/15/2022    CALCIUM 8.9 03/15/2022    ALKPHOS 625 (H) 03/15/2022    AST 59 (H) 03/15/2022    ALT 32 03/15/2022    BILITOT 0.2 03/15/2022    ALBUMIN 3.00 (L) 03/15/2022    PROTEINTOT 7.1 03/15/2022    MG 1.8 12/13/2021    PHOS 4.7 (H) 02/21/2022     Lab Results   Component Value Date    FERRITIN 422.60 (H) 03/15/2022    IRON 38 03/15/2022    TIBC 279 (L) 03/15/2022    LABIRON 14 (L) 03/15/2022    YIQNVKSS84 736  03/26/2021    FOLATE 12.60 03/26/2021    HAPTOGLOBIN 256 (H) 03/26/2021    RETICCTPCT 2.49 (H) 03/26/2021    RETIC 0.0899 03/26/2021     Workup 3/26/21    Ferritin   13.00 - 150.00 ng/mL 160.20High       Iron   37 - 145 mcg/dL 36Low     Iron Saturation   20 - 50 % 9Low     Transferrin   200 - 360 mg/dL 270    TIBC   298 - 536 mcg/dL 402      Vitamin B-12   211 - 946 pg/mL 736      Folate   4.78 - 24.20 ng/mL 12.60      Reticulocyte %   0.70 - 1.90 % 2.49High     Reticulocyte Absolute   0.0200 - 0.1300 10*6/mm3 0.0899      & Units 1 mo ago   LDH   135 - 214 U/L 234High       Haptoglobin   30 - 200 mg/dL 256High       TSH   0.270 - 4.200 uIU/mL 6.430High       C-Reactive Protein   0.00 - 0.50 mg/dL 1.70High       Sed Rate   0 - 30 mm/hr >100High       Copper   80 - 158 ug/dL 189High       Zinc   44 - 115 ug/dL 67      Tissue Transglutaminase IgA   0 - 3 U/mL 2          ASSESSMENT & PLAN:  Britta Lee is a very pleasant 76 y.o. female with    1.  Normocytic anemia:  2. CKD/AOCD/inflammation  3. Iron deficiency anemia  4. Hypothyroidism    -Patient follows with PCP and nephrology routinely with blood testing every ~3 months.   -Previous available CBCs were reviewed and patient has had chronic, normocytic anemia since at least December 2016 with Hg most recently ranging ~ 8.6-9.8. Her WBC and platelets have been normal. Iron panel and ferritin levels have been c/w AOCD/inflammation and also has intermittent component of YUAN.     -She reports receiving IV iron infusions several years ago. At first presentation, she was taking ferrous sulfate BID and tolerating well but did not have improvement in iron stores.   -Denies obvious bleeding from any source. Reportedly had previous screening colonoscopy with Dr. Zamora which was unremarkable and negative for bleeding. Records unavailable.  - As above, she was taking ferrous sulfate. Therefore, discontinued oral iron and have been replacing with IV Feraheme for apparent  malabsorption of iron, most recently on 2/11/22.  -Recommended repeat GI evaluation and patient was agreeable. Referral was placed and patient underwent EGD/colonoscopy with Dr. Simmons on 11/23/21 (see full reports above). She did have several benign polyps removed with small internal hemorrhoids, otherwise unremarkable and negative for bleeding. EGD showed one angiectasia in the second portion of the duodenum which was nonbleeding.  Biopsies were taken of the duodenum to rule out celiac disease as a source of her anemia. Given ongoing YUAN, Dr. Simmons planned for capsule endoscopy, however, she says she could not tolerate. GI now planning for CT A/P without contrast which is scheduled for 4/12/22 and will follow up with GI in May.    -Repeat CBC from today shows improved Hg 9.8. Repeat iron panel and ferritin now c/w AOCD/inflammation. CMP from today also showed Cr ~1.80 which is contributing to her anemia but currently stable/improved.   -Will continue to monitor. Will check labs in 6 weeks and follow up in 3 months with labs. Will replace with IV Feraheme as needed. In the meantime, recommended continued close follow up with PCP for management of diabetes, hypothyroidism, hypertension and nephology for CKD. Will defer decision regarding epogen to nephrology which she would likely benefit from. Recommended follow up with GI as scheduled.   -Recommend transfusion support, would transfuse for Hg <7 or <8 if symptomatic.   -To further evaluate anemia, also previously sent additional labs including PBS which showed normocytic, hypochromic anemia with no schistocytes, normal WBC with borderline neutrophilia and mild eosinophilia, no dysplasia or blasts, adequate platelets.  B12 and folate were normal. No evidence of hemolysis, TSH was elevated and she was advised to follow up with PCP who started her on synthroid, ESR and CRP were elevated and suggestive of underlying inflammation, copper and zinc were both replete,  tissue transglutaminase was normal.        ACO / MILES/Other  Quality measures  -  Britta Lee received 2021 flu vaccine. She has had Covid vaccine x 3.   -  Britta Lee reports a pain score of 0.   -  Current outpatient and discharge medications have been reconciled for the patient.  Reviewed by: ERNIE De Leon    The patient was in agreement with the plan and all questions were answered to her satisfaction.     Thank you so much for allowing us to participate in the care of Britta Lee . Please do not hesitate to contact us with any questions or concerns.     A total of 30 minutes were spent coordinating this patient’s care in clinic today; more than 50% of this time was face-to-face with the patient, reviewing her medical history and counseling on the current treatment and followup plan. All questions were answered to her satisfaction.      Electronically Signed by: ERNIE De Leon , March 15, 2022 10:39 EDT       CC:   Lexie Dolan PA

## 2022-03-16 DIAGNOSIS — I10 ESSENTIAL HYPERTENSION: ICD-10-CM

## 2022-03-16 DIAGNOSIS — I50.32 CHRONIC DIASTOLIC CONGESTIVE HEART FAILURE: ICD-10-CM

## 2022-03-16 RX ORDER — ISOSORBIDE MONONITRATE 60 MG/1
TABLET, EXTENDED RELEASE ORAL
Qty: 90 TABLET | Refills: 0 | Status: SHIPPED | OUTPATIENT
Start: 2022-03-16 | End: 2022-06-27 | Stop reason: SDUPTHER

## 2022-03-24 ENCOUNTER — LAB (OUTPATIENT)
Dept: FAMILY MEDICINE CLINIC | Facility: CLINIC | Age: 77
End: 2022-03-24

## 2022-03-24 DIAGNOSIS — N25.81 SECONDARY HYPERPARATHYROIDISM OF RENAL ORIGIN: ICD-10-CM

## 2022-03-24 DIAGNOSIS — R31.9 URINARY TRACT INFECTION WITH HEMATURIA, SITE UNSPECIFIED: ICD-10-CM

## 2022-03-24 DIAGNOSIS — N18.32 CHRONIC KIDNEY DISEASE (CKD) STAGE G3B/A1, MODERATELY DECREASED GLOMERULAR FILTRATION RATE (GFR) BETWEEN 30-44 ML/MIN/1.73 SQUARE METER AND ALBUMINURIA CREATININE RATIO LESS THAN 30 MG/G (CMS/H*: Primary | ICD-10-CM

## 2022-03-24 DIAGNOSIS — N39.0 URINARY TRACT INFECTION WITH HEMATURIA, SITE UNSPECIFIED: ICD-10-CM

## 2022-03-24 LAB
25(OH)D3 SERPL-MCNC: 29.1 NG/ML (ref 30–100)
ALBUMIN SERPL-MCNC: 3.5 G/DL (ref 3.5–5.2)
ALBUMIN/GLOB SERPL: 0.9 G/DL
ALP SERPL-CCNC: 648 U/L (ref 39–117)
ALT SERPL W P-5'-P-CCNC: 28 U/L (ref 1–33)
ANION GAP SERPL CALCULATED.3IONS-SCNC: 12 MMOL/L (ref 5–15)
AST SERPL-CCNC: 46 U/L (ref 1–32)
BILIRUB SERPL-MCNC: 0.2 MG/DL (ref 0–1.2)
BUN SERPL-MCNC: 37 MG/DL (ref 8–23)
BUN/CREAT SERPL: 15.4 (ref 7–25)
CALCIUM SPEC-SCNC: 9.2 MG/DL (ref 8.6–10.5)
CHLORIDE SERPL-SCNC: 103 MMOL/L (ref 98–107)
CO2 SERPL-SCNC: 23 MMOL/L (ref 22–29)
CREAT SERPL-MCNC: 2.41 MG/DL (ref 0.57–1)
EGFRCR SERPLBLD CKD-EPI 2021: 20.1 ML/MIN/1.73
GLOBULIN UR ELPH-MCNC: 3.7 GM/DL
GLUCOSE SERPL-MCNC: 110 MG/DL (ref 65–99)
PHOSPHATE SERPL-MCNC: 4 MG/DL (ref 2.5–4.5)
POTASSIUM SERPL-SCNC: 4.4 MMOL/L (ref 3.5–5.2)
PROT SERPL-MCNC: 7.2 G/DL (ref 6–8.5)
PTH-INTACT SERPL-MCNC: 83.3 PG/ML (ref 15–65)
SODIUM SERPL-SCNC: 138 MMOL/L (ref 136–145)

## 2022-03-24 PROCEDURE — 81003 URINALYSIS AUTO W/O SCOPE: CPT | Performed by: PHYSICIAN ASSISTANT

## 2022-03-24 PROCEDURE — 80053 COMPREHEN METABOLIC PANEL: CPT | Performed by: INTERNAL MEDICINE

## 2022-03-24 PROCEDURE — 82570 ASSAY OF URINE CREATININE: CPT | Performed by: PHYSICIAN ASSISTANT

## 2022-03-24 PROCEDURE — 82306 VITAMIN D 25 HYDROXY: CPT | Performed by: INTERNAL MEDICINE

## 2022-03-24 PROCEDURE — 87086 URINE CULTURE/COLONY COUNT: CPT | Performed by: INTERNAL MEDICINE

## 2022-03-24 PROCEDURE — 84156 ASSAY OF PROTEIN URINE: CPT | Performed by: PHYSICIAN ASSISTANT

## 2022-03-24 PROCEDURE — 83970 ASSAY OF PARATHORMONE: CPT | Performed by: INTERNAL MEDICINE

## 2022-03-24 PROCEDURE — 84100 ASSAY OF PHOSPHORUS: CPT | Performed by: INTERNAL MEDICINE

## 2022-03-25 LAB
BACTERIA SPEC AEROBE CULT: NORMAL
BILIRUB UR QL STRIP: NEGATIVE
CLARITY UR: CLEAR
COLOR UR: YELLOW
CREAT UR-MCNC: 107.5 MG/DL
GLUCOSE UR STRIP-MCNC: ABNORMAL MG/DL
HGB UR QL STRIP.AUTO: NEGATIVE
KETONES UR QL STRIP: NEGATIVE
LEUKOCYTE ESTERASE UR QL STRIP.AUTO: NEGATIVE
NITRITE UR QL STRIP: NEGATIVE
PH UR STRIP.AUTO: 6 [PH] (ref 5–8)
PROT ?TM UR-MCNC: 578.2 MG/DL
PROT UR QL STRIP: ABNORMAL
PROT/CREAT UR: 5378.6 MG/G CREA (ref 0–200)
SP GR UR STRIP: 1.02 (ref 1–1.03)
UROBILINOGEN UR QL STRIP: ABNORMAL

## 2022-03-28 ENCOUNTER — HOSPITAL ENCOUNTER (OUTPATIENT)
Dept: CARDIOLOGY | Facility: HOSPITAL | Age: 77
Discharge: HOME | End: 2022-03-28
Admitting: NURSE PRACTITIONER

## 2022-03-28 ENCOUNTER — APPOINTMENT (OUTPATIENT)
Dept: CARDIOLOGY | Facility: HOSPITAL | Age: 77
End: 2022-03-28

## 2022-03-28 VITALS
DIASTOLIC BLOOD PRESSURE: 72 MMHG | HEIGHT: 63 IN | WEIGHT: 195.4 LBS | BODY MASS INDEX: 34.62 KG/M2 | OXYGEN SATURATION: 98 % | HEART RATE: 68 BPM | SYSTOLIC BLOOD PRESSURE: 156 MMHG

## 2022-03-28 DIAGNOSIS — D50.9 IRON DEFICIENCY ANEMIA, UNSPECIFIED IRON DEFICIENCY ANEMIA TYPE: ICD-10-CM

## 2022-03-28 DIAGNOSIS — I50.32 CHRONIC DIASTOLIC CONGESTIVE HEART FAILURE: Primary | ICD-10-CM

## 2022-03-28 DIAGNOSIS — I10 ESSENTIAL HYPERTENSION: ICD-10-CM

## 2022-03-28 DIAGNOSIS — E66.9 OBESITY (BMI 30.0-34.9): ICD-10-CM

## 2022-03-28 DIAGNOSIS — N18.4 CKD (CHRONIC KIDNEY DISEASE) STAGE 4, GFR 15-29 ML/MIN: ICD-10-CM

## 2022-03-28 DIAGNOSIS — R60.0 PEDAL EDEMA: ICD-10-CM

## 2022-03-28 LAB
ABSOLUTE LUNG FLUID CONTENT: 22 % (ref 20–35)
ANION GAP SERPL CALCULATED.3IONS-SCNC: 12.7 MMOL/L (ref 5–15)
BUN SERPL-MCNC: 45 MG/DL (ref 8–23)
BUN/CREAT SERPL: 14.9 (ref 7–25)
CALCIUM SPEC-SCNC: 9.2 MG/DL (ref 8.6–10.5)
CHLORIDE SERPL-SCNC: 104 MMOL/L (ref 98–107)
CO2 SERPL-SCNC: 21.3 MMOL/L (ref 22–29)
CREAT SERPL-MCNC: 3.03 MG/DL (ref 0.57–1)
EGFRCR SERPLBLD CKD-EPI 2021: 15.3 ML/MIN/1.73
GLUCOSE SERPL-MCNC: 149 MG/DL (ref 65–99)
MAGNESIUM SERPL-MCNC: 2.2 MG/DL (ref 1.6–2.4)
NT-PROBNP SERPL-MCNC: 3780 PG/ML (ref 0–1800)
POTASSIUM SERPL-SCNC: 4.5 MMOL/L (ref 3.5–5.2)
SODIUM SERPL-SCNC: 138 MMOL/L (ref 136–145)

## 2022-03-28 PROCEDURE — 99214 OFFICE O/P EST MOD 30 MIN: CPT | Performed by: NURSE PRACTITIONER

## 2022-03-28 PROCEDURE — 94726 PLETHYSMOGRAPHY LUNG VOLUMES: CPT | Performed by: NURSE PRACTITIONER

## 2022-03-28 PROCEDURE — 36415 COLL VENOUS BLD VENIPUNCTURE: CPT | Performed by: NURSE PRACTITIONER

## 2022-03-28 PROCEDURE — 83735 ASSAY OF MAGNESIUM: CPT | Performed by: NURSE PRACTITIONER

## 2022-03-28 PROCEDURE — 83880 ASSAY OF NATRIURETIC PEPTIDE: CPT

## 2022-03-28 PROCEDURE — 80048 BASIC METABOLIC PNL TOTAL CA: CPT | Performed by: NURSE PRACTITIONER

## 2022-03-28 RX ORDER — METOPROLOL SUCCINATE 50 MG/1
25 TABLET, EXTENDED RELEASE ORAL DAILY
COMMUNITY
End: 2022-08-11 | Stop reason: SDUPTHER

## 2022-03-28 NOTE — PROGRESS NOTES
[facility] CHF CLINIC OFFICE VISIT    Subjective:   Congestive Heart Failure  Pertinent negatives include no abdominal pain, chest pain, claudication, near-syncope, palpitations or shortness of breath.      Britta Lee is a 75 y.o.  female who presents to the clinic today for follow up on heart failure. She is accompanied by her . She has a hx of diastolic congestive heart failure. Her LVEF was > 70% from echocardiogram on 11/4/2020. She is currently taking Bumex 0.5 mg every other daily. She has been unable to tolerate Spironolactone or  ACE/ARB due to abnormal kidney function. She continues on Hydralazine 75 mg TID, Imdur 60 mg daily, Metoprolol XL 50 mg daily (last rx written by Dr. Johnson), Norvasc 10 mg daily and Clonidine 0.2 mg TID for HTN.    Home blood pressures, systolic at 140 (no log available for review today)   Shortness of air stable   LE swelling stable   She seems to be tolerating the Bumex every other day well and has not felt the need to take additional Bumex.  Anemia and weight loss following with hematology and GI. She reports she has received several iron infusions which have helped her symptoms  Urine output good   Compliance with sodium and fluid restrictions   Home weight stable and down several pounds  Home HR - 60-70 bpm   Due to follow-up with nephrology next week   Overall she feels stable  Denies chest discomfort, dyspnea, dizziness, lightheadedness, syncope,  palpitations or tachycardia.     Past medical history significant for HTN, DM type 2, CKD III, iron deficiency anemia, hyperlipidemia, obesity.     PCP: Lexie Dolan  Cardiologist: Dr. Cruz  Nephrologist: Dr. Johnson     Hospitalizations: Discharged on 11/8/2020  ER visit: 12/4/2020  Past Medical History:   Diagnosis Date   • Acquired hypothyroidism 4/22/2021   • Anemia    • Arthritis    • Carpal tunnel syndrome    • Coronary artery disease    • Diabetes mellitus (HCC)    • Elevated cholesterol    • GERD (gastroesophageal  reflux disease)    • History of pneumonia    • History of unsteady gait     OCASSIONALLY   • Hypertension    • Insomnia    • Kidney disease    • LENNY (obstructive sleep apnea) 12/1/2020   • Osteoarthritis    • Renal insufficiency    • Sciatica      Past Surgical History:   Procedure Laterality Date   • ABDOMINAL SURGERY     • APPENDECTOMY     • CARDIAC CATHETERIZATION      1 STENT  ---  2000   • CARDIAC SURGERY     • CAROTID STENT     • CARPAL TUNNEL RELEASE Right 10/8/2019    Procedure: CARPAL TUNNEL RELEASE;  Surgeon: Feroz Levin MD;  Location: [facility];  Service: Orthopedics   • CATARACT EXTRACTION     • CHOLECYSTECTOMY     • COLONOSCOPY     • COLONOSCOPY N/A 11/23/2021    Procedure: COLONOSCOPY FOR SCREENING;  Surgeon: Palmira Pate MD;  Location: [facility] OR;  Service: Gastroenterology;  Laterality: N/A;   • CORONARY ANGIOPLASTY WITH STENT PLACEMENT     • ENDOSCOPY     • ENDOSCOPY N/A 3/5/2020    Procedure: ESOPHAGOGASTRODUODENOSCOPY;  Surgeon: Alexandru Bagley MD;  Location: [facility];  Service: Gastroenterology;  Laterality: N/A;   • ENDOSCOPY N/A 11/23/2021    Procedure: ESOPHAGOGASTRODUODENOSCOPY WITH BIOPSY;  Surgeon: Palmira Pate MD;  Location: [facility];  Service: Gastroenterology;  Laterality: N/A;   • ENDOSCOPY AND COLONOSCOPY     • GALLBLADDER SURGERY     • JOINT REPLACEMENT Left 05/02/2017    [facility]  DR LEVIN  LEFT TOTAL KNEE   • KNEE ARTHROSCOPY W/ MENISCECTOMY Right    • LAPAROSCOPIC TUBAL LIGATION     • GA TOTAL KNEE ARTHROPLASTY Left 5/2/2017    Procedure: TOTAL KNEE ARTHROPLASTY;  Surgeon: Feroz Levin MD;  Location: [facility];  Service: Orthopedics   • STERILIZATION     • TONSILLECTOMY       Social History     Socioeconomic History   • Marital status:      Spouse name: natalie   • Number of children: 3   • Years of education: 12   Tobacco Use   • Smoking status: Never Smoker   • Smokeless tobacco: Never Used   Vaping Use   • Vaping Use: Never  used   Substance and Sexual Activity   • Alcohol use: Yes     Comment: socially   • Drug use: No   • Sexual activity: Defer     Birth control/protection: Surgical     Family History   Problem Relation Age of Onset   • Arthritis Mother    • Diabetes Mother    • Cancer Mother    • Heart disease Father    • Diabetes Daughter    • Diabetes Son    • Diabetes Maternal Aunt    • Heart disease Maternal Grandmother    • Breast cancer Neg Hx      Allergies:  No Known Allergies    Review of Systems   Constitutional: Negative for chills, fever, malaise/fatigue, weight gain and weight loss.   HENT: Negative for ear discharge, hoarse voice and sore throat.    Eyes: Negative for discharge, double vision, pain, redness and visual disturbance.   Cardiovascular: Negative for chest pain, claudication, cyanosis, dyspnea on exertion, irregular heartbeat, leg swelling, near-syncope, orthopnea, palpitations and syncope.   Respiratory: Negative for cough, shortness of breath and wheezing.    Endocrine: Negative for cold intolerance, heat intolerance and polyuria.   Hematologic/Lymphatic: Negative for bleeding problem. Does not bruise/bleed easily.   Skin: Negative for color change, flushing and rash.   Musculoskeletal: Negative for arthritis, back pain, joint pain, joint swelling and muscle cramps.   Gastrointestinal: Negative for abdominal pain, constipation, diarrhea, nausea and vomiting.   Genitourinary: Negative for dysuria, flank pain, frequency, hesitancy and urgency.   Neurological: Negative for difficulty with concentration, dizziness, light-headedness, sensory change, vertigo and weakness.   Psychiatric/Behavioral: Negative for depression. The patient does not have insomnia and is not nervous/anxious.      Current Outpatient Medications   Medication Sig Dispense Refill   • amLODIPine (NORVASC) 10 MG tablet Take 1 tablet by mouth Daily. 90 tablet 3   • aspirin 81 MG tablet Take 1 tablet by mouth Daily. 30 tablet 5   • atorvastatin  (LIPITOR) 40 MG tablet Take 1 tablet by mouth Daily. 90 tablet 3   • cloNIDine (CATAPRES) 0.2 MG tablet TAKE ONE Tablet BY MOUTH THREE TIMES DAILY 270 tablet 1   • Continuous Blood Gluc  (Dexcom G6 ) device 1 Device Daily. 3 each 3   • Continuous Blood Gluc Sensor (Dexcom G6 Sensor) Every 10 (Ten) Days. 9 each 3   • Continuous Blood Gluc Transmit (Dexcom G6 Transmitter) misc 1 Device Daily. 1 each 5   • escitalopram (LEXAPRO) 10 MG tablet Take 1 tablet by mouth Daily. 90 tablet 3   • glucose blood test strip 1 each by Other route 3 (Three) Times a Day. 100 each 12   • hydrALAZINE (APRESOLINE) 25 MG tablet Take 3 tablets by mouth 3 (Three) Times a Day. 270 tablet 0   • Insulin Glargine, 2 Unit Dial, (Toujeo Max SoloStar) 300 UNIT/ML solution pen-injector injection Inject 40-60 Units under the skin into the appropriate area as directed Daily. 3 pen 5   • insulin lispro (humaLOG) 100 UNIT/ML injection Inject 0-6 Units under the skin into the appropriate area as directed 3 (Three) Times a Day Before Meals. Sliding scale 4-6 units if BG > 180 9 mL 5   • Insulin Pen Needle 32G X 5 MM misc 1 each 4 (Four) Times a Day. 120 each 5   • isosorbide mononitrate (IMDUR) 60 MG 24 hr tablet TAKE 1 Tablet BY MOUTH EVERY DAY 90 tablet 0   • levothyroxine (SYNTHROID, LEVOTHROID) 25 MCG tablet Take 1 tablet by mouth Daily. 90 tablet 3   • metoprolol succinate XL (TOPROL-XL) 50 MG 24 hr tablet Take 50 mg by mouth Daily.     • nystatin (MYCOSTATIN) 084686 UNIT/GM powder Apply  one application topically to the appropriate area as directed Every 12 (Twelve) Hours. (Patient taking differently: Apply  topically to the appropriate area as directed 2 (Two) Times a Day As Needed (itching).) 60 g 0   • pantoprazole (Protonix) 40 MG EC tablet Take 1 tablet by mouth Daily. 90 tablet 3   • vitamin D (ERGOCALCIFEROL) 1.25 MG (44774 UT) capsule capsule Take 1 capsule by mouth 1 (One) Time Per Week. 15 capsule 3   •  neomycin-polymyxin-hydrocortisone (CORTISPORIN) 3.5-81723-0 otic solution Administer 3 drops to the right ear 4 (Four) Times a Day. 10 mL 0     No current facility-administered medications for this encounter.     Objective:     Vitals:    03/28/22 1322   BP: 156/72   Pulse: 68   SpO2: 98%     Body mass index is 34.61 kg/m².    Wt Readings from Last 3 Encounters:   03/28/22 88.6 kg (195 lb 6.4 oz)   03/15/22 89.4 kg (197 lb)   03/07/22 81.6 kg (180 lb)       ReDs - 32% (4/14/2021)  Lab Results   Component Value Date    ABSOLUTELUNG 22 03/28/2022     Vitals reviewed.   Constitutional:       Appearance: Normal appearance. Well-developed.      Comments: Wears a mask during encounter   Eyes:      Conjunctiva/sclera: Conjunctivae normal.   HENT:      Head: Normocephalic.   Neck:      Vascular: No JVD or JVR.   Pulmonary:      Effort: Pulmonary effort is normal.      Breath sounds: Normal breath sounds.   Cardiovascular:      Normal rate. Regular rhythm.   Pulses:     Intact distal pulses.   Edema:     Peripheral edema (overall swelling has improved ) present.     Ankle: bilateral 1+ edema of the ankle.     Feet: bilateral 1+ edema of the feet.  Abdominal:      General: Bowel sounds are normal.      Palpations: Abdomen is soft. There is no hepatomegaly or splenomegaly.   Musculoskeletal: Normal range of motion.      Cervical back: Normal range of motion and neck supple. Skin:     General: Skin is warm and dry.   Neurological:      Mental Status: Alert and oriented to person, place, and time.   Psychiatric:         Attention and Perception: Attention normal.         Mood and Affect: Mood normal.         Speech: Speech normal.         Behavior: Behavior normal. Behavior is cooperative.         Cognition and Memory: Cognition normal.     Cardiographics  Results for orders placed during the hospital encounter of 11/03/20    Adult Transthoracic Echo Complete W/ Cont if Necessary Per Protocol    Interpretation Summary  · Left  ventricular wall thickness is consistent with concentric hypertrophy.  · Left ventricular ejection fraction appears to be greater than 70%. Left ventricular systolic function is hyperdynamic (EF > 70%).  · Left ventricular diastolic function is consistent with (grade II w/high LAP) pseudonormalization.  · The left atrial cavity is moderate to severely dilated.  · The right atrial cavity is mildly dilated.  · Moderate mitral annular calcification is present.  · Mild mitral valve regurgitation is present.  · Mild tricuspid valve regurgitation is present.  · Estimated right ventricular systolic pressure from tricuspid regurgitation is markedly elevated (>55 mmHg).    EKG:       Lab Review   Lab Results   Component Value Date    TSH 3.760 09/23/2021     Lab Results   Component Value Date    GLUCOSE 149 (H) 03/28/2022    BUN 45 (H) 03/28/2022    CREATININE 3.03 (H) 03/28/2022    EGFRIFNONA 23 (L) 02/21/2022    EGFRIFAFRI 41 (L) 07/30/2018    BCR 14.9 03/28/2022    K 4.5 03/28/2022    CO2 21.3 (L) 03/28/2022    CALCIUM 9.2 03/28/2022    PROTENTOTREF 7.7 07/30/2018    ALBUMIN 3.50 03/24/2022    LABIL2 1.1 (L) 07/30/2018    AST 46 (H) 03/24/2022    ALT 28 03/24/2022     Lab Results   Component Value Date    WBC 7.60 03/15/2022    HGB 9.8 (L) 03/15/2022    HCT 32.1 (L) 03/15/2022    MCV 82.7 03/15/2022     03/15/2022     Lab Results   Component Value Date    CKTOTAL 67 12/04/2020    CKMB 2.92 04/16/2018    TROPONINI 0.012 04/16/2018    TROPONINT <0.010 09/02/2021     Lab Results   Component Value Date    PROBNP 3,780.0 (H) 03/28/2022     The following portions of the patient's history were reviewed and updated as appropriate: allergies, current medications, past family history, past medical history, past social history, past surgical history and problem list.     Old records reviewed and pertinent information is included in the above objective data.     Assessment/Plan:   1. Chronic Diastolic Congestive Heart Failure,  EF > 70%   2. HTN  3. Pedal edema   4. CKD, stage IV  5. Anemia, iron deficency   6. Obesity     Pro-BNP, BMP and magnesium today  Discussed and reviewed labs with patient today  ReDs reviewed and discussed with patient today   From a HF standpoint she appears stable   Due to worsening renal function, Stop Bumex   Keep follow up with nephrology   Continue on current medications   Keep follow-up with GI and hematology for anemia and weight loss   Monitor BP and HR, encourage pt in BP and HR log   Weight loss discussed and healthy lifestyle recommended   Counseling patient extensively on dietary Na+ intake, importance of activity, weight monitoring, compliance with medications and follow up appointments.  Prior to next visit she is due to see the hematology nurse for labs, will place our lab orders and hopefully they can collect what is needed for HF at that time.   Follow up in 4 weeks, sooner if needed.

## 2022-03-28 NOTE — PROGRESS NOTES
Heart Failure Clinic    Date: 03/28/22     Vitals:    03/28/22 1322   BP: 156/72   Pulse: 68   SpO2: 98%    weight 195.4    Method of arrival: Ambulatory    Weighing self daily: Yes    Monitoring Heart Failure Zones: Yes    Today's HF Zone: Green    Taking medications as prescribed: Yes    Edema No    Shortness of Air: No    Number of pillows used at night:<2    Educational Materials given:  avs                                                                         ReDS Value: 22  21-24 Low Normal      Katy Mccray RN 03/28/22 13:26 EDT

## 2022-03-28 NOTE — PROGRESS NOTES
Heart Failure Pharmacy Note  Patient Name: Britta Lee   Referring Provider: Priscila Holland  Primary Cardiologist: Dr. Cruz  Type of CHF: HFpEF    Medication Use:   Adherence: patient states she leaves medications in a bag. She has tried a medication planner in the past and felt it wasn't helpful.   Hx of med intolerances: bradycardia with metoprolol 100mg BID  Affordability: none at this time   Retail Rx Management: none    Past Medical History:   Diagnosis Date   • Acquired hypothyroidism 4/22/2021   • Anemia    • Arthritis    • Carpal tunnel syndrome    • Coronary artery disease    • Diabetes mellitus (HCC)    • Elevated cholesterol    • GERD (gastroesophageal reflux disease)    • History of pneumonia    • History of unsteady gait     OCASSIONALLY   • Hypertension    • Insomnia    • Kidney disease    • LENNY (obstructive sleep apnea) 12/1/2020   • Osteoarthritis    • Renal insufficiency    • Sciatica      ALLERGIES: Patient has no known allergies.  Current Outpatient Medications   Medication Sig Dispense Refill   • amLODIPine (NORVASC) 10 MG tablet Take 1 tablet by mouth Daily. 90 tablet 3   • aspirin 81 MG tablet Take 1 tablet by mouth Daily. 30 tablet 5   • atorvastatin (LIPITOR) 40 MG tablet Take 1 tablet by mouth Daily. 90 tablet 3   • cloNIDine (CATAPRES) 0.2 MG tablet TAKE ONE Tablet BY MOUTH THREE TIMES DAILY 270 tablet 1   • Continuous Blood Gluc  (Dexcom G6 ) device 1 Device Daily. 3 each 3   • Continuous Blood Gluc Sensor (Dexcom G6 Sensor) Every 10 (Ten) Days. 9 each 3   • Continuous Blood Gluc Transmit (Dexcom G6 Transmitter) misc 1 Device Daily. 1 each 5   • escitalopram (LEXAPRO) 10 MG tablet Take 1 tablet by mouth Daily. 90 tablet 3   • glucose blood test strip 1 each by Other route 3 (Three) Times a Day. 100 each 12   • hydrALAZINE (APRESOLINE) 25 MG tablet Take 3 tablets by mouth 3 (Three) Times a Day. 270 tablet 0   • Insulin Glargine, 2 Unit Dial, (Toujeo Max SoloStar) 300  "UNIT/ML solution pen-injector injection Inject 40-60 Units under the skin into the appropriate area as directed Daily. 3 pen 5   • insulin lispro (humaLOG) 100 UNIT/ML injection Inject 0-6 Units under the skin into the appropriate area as directed 3 (Three) Times a Day Before Meals. Sliding scale 4-6 units if BG > 180 9 mL 5   • Insulin Pen Needle 32G X 5 MM misc 1 each 4 (Four) Times a Day. 120 each 5   • isosorbide mononitrate (IMDUR) 60 MG 24 hr tablet TAKE 1 Tablet BY MOUTH EVERY DAY 90 tablet 0   • levothyroxine (SYNTHROID, LEVOTHROID) 25 MCG tablet Take 1 tablet by mouth Daily. 90 tablet 3   • metoprolol succinate XL (TOPROL-XL) 50 MG 24 hr tablet Take 50 mg by mouth Daily.     • nystatin (MYCOSTATIN) 181274 UNIT/GM powder Apply  one application topically to the appropriate area as directed Every 12 (Twelve) Hours. (Patient taking differently: Apply  topically to the appropriate area as directed 2 (Two) Times a Day As Needed (itching).) 60 g 0   • pantoprazole (Protonix) 40 MG EC tablet Take 1 tablet by mouth Daily. 90 tablet 3   • vitamin D (ERGOCALCIFEROL) 1.25 MG (88097 UT) capsule capsule Take 1 capsule by mouth 1 (One) Time Per Week. 15 capsule 3   • neomycin-polymyxin-hydrocortisone (CORTISPORIN) 3.5-79260-1 otic solution Administer 3 drops to the right ear 4 (Four) Times a Day. 10 mL 0     No current facility-administered medications for this encounter.       Vaccination History:   Pneumonia: Reports UTD  Annual Influenza: UTD for 2021-22 Season    Objective  Vitals:    03/28/22 1322   BP: 156/72   BP Location: Left arm   Patient Position: Sitting   Cuff Size: Large Adult   Pulse: 68   SpO2: 98%   Weight: 88.6 kg (195 lb 6.4 oz)   Height: 160 cm (63\")     Wt Readings from Last 3 Encounters:   03/28/22 88.6 kg (195 lb 6.4 oz)   03/15/22 89.4 kg (197 lb)   03/07/22 81.6 kg (180 lb)         03/28/22  1322   Weight: 88.6 kg (195 lb 6.4 oz)     Lab Results   Component Value Date    GLUCOSE 149 (H) 03/28/2022 "    BUN 45 (H) 03/28/2022    CREATININE 3.03 (H) 03/28/2022    EGFRIFNONA 23 (L) 02/21/2022    EGFRIFAFRI 41 (L) 07/30/2018    BCR 14.9 03/28/2022    K 4.5 03/28/2022    CO2 21.3 (L) 03/28/2022    CALCIUM 9.2 03/28/2022    PROTENTOTREF 7.7 07/30/2018    ALBUMIN 3.50 03/24/2022    LABIL2 1.1 (L) 07/30/2018    AST 46 (H) 03/24/2022    ALT 28 03/24/2022     Lab Results   Component Value Date    WBC 7.60 03/15/2022    HGB 9.8 (L) 03/15/2022    HCT 32.1 (L) 03/15/2022    MCV 82.7 03/15/2022     03/15/2022     Lab Results   Component Value Date    CKTOTAL 67 12/04/2020    CKMB 2.92 04/16/2018    TROPONINI 0.012 04/16/2018    TROPONINT <0.010 09/02/2021     Lab Results   Component Value Date    PROBNP 3,780.0 (H) 03/28/2022     Results for orders placed during the hospital encounter of 11/03/20    Adult Transthoracic Echo Complete W/ Cont if Necessary Per Protocol    Interpretation Summary  · Left ventricular wall thickness is consistent with concentric hypertrophy.  · Left ventricular ejection fraction appears to be greater than 70%. Left ventricular systolic function is hyperdynamic (EF > 70%).  · Left ventricular diastolic function is consistent with (grade II w/high LAP) pseudonormalization.  · The left atrial cavity is moderate to severely dilated.  · The right atrial cavity is mildly dilated.  · Moderate mitral annular calcification is present.  · Mild mitral valve regurgitation is present.  · Mild tricuspid valve regurgitation is present.  · Estimated right ventricular systolic pressure from tricuspid regurgitation is markedly elevated (>55 mmHg).               Class   Drug   Dose Last Dose Adjustment   Notes   ACEi/ARB/ARNI    Worsening renal fxn 1/22/21   Beta Blocker Metoprolol succ 50 QD 3/1/22    MRA --------------   eGFR needs to be >30mL/min     Drug Therapy Problems:    1. Blood pressure monitoring  2. Renal dose adjustment      Recommendations:      1. Patient didn't provide blood pressure log,  however did provide estimates. Advised patient to bring in blood pressure log to next appointment.   2. Patients SCr increased to 3.03 mg/dL (previously 2.41 mg/dL), monitor renal function closely for potential renal dose adjustments. Also recommend transitioning bumex to prn or holding.      Patient was educated on heart failure medications and the importance of medication adherence.  All questions were addressed and patient expressed understanding. Patient would benefit from further education.     Thank you for allowing me to participate in the care of your patient,    Liyah Diaz, PharmD  03/28/22  14:32 EDT

## 2022-04-12 ENCOUNTER — HOSPITAL ENCOUNTER (OUTPATIENT)
Dept: CT IMAGING | Facility: HOSPITAL | Age: 77
Discharge: HOME | End: 2022-04-12
Admitting: INTERNAL MEDICINE

## 2022-04-12 DIAGNOSIS — R14.0 ABDOMINAL DISTENSION: ICD-10-CM

## 2022-04-12 DIAGNOSIS — R63.4 WEIGHT LOSS, ABNORMAL: ICD-10-CM

## 2022-04-12 PROCEDURE — 74176 CT ABD & PELVIS W/O CONTRAST: CPT | Performed by: RADIOLOGY

## 2022-04-12 PROCEDURE — 74176 CT ABD & PELVIS W/O CONTRAST: CPT

## 2022-04-13 NOTE — PROGRESS NOTES
Your recent CT scan of the abdomen and pelvis revealed cirrhosis of the liver.  You do have a small volume of ascites (fluid in the abdomen).  There is a small right pleural effusion which can be seen with ascites related to cirrhosis.  Otherwise,  the CT scan of the abdomen pelvis was normal.  Please keep your follow-up appointments.

## 2022-04-20 ENCOUNTER — LAB (OUTPATIENT)
Dept: FAMILY MEDICINE CLINIC | Facility: CLINIC | Age: 77
End: 2022-04-20

## 2022-04-20 DIAGNOSIS — N25.81 SECONDARY HYPERPARATHYROIDISM OF RENAL ORIGIN: ICD-10-CM

## 2022-04-20 DIAGNOSIS — N18.32 STAGE 3B CHRONIC KIDNEY DISEASE: Primary | ICD-10-CM

## 2022-04-20 LAB
25(OH)D3 SERPL-MCNC: 31.3 NG/ML (ref 30–100)
ALBUMIN SERPL-MCNC: 3.2 G/DL (ref 3.5–5.2)
ALBUMIN/GLOB SERPL: 0.9 G/DL
ALP SERPL-CCNC: 484 U/L (ref 39–117)
ALT SERPL W P-5'-P-CCNC: 19 U/L (ref 1–33)
ANION GAP SERPL CALCULATED.3IONS-SCNC: 13.8 MMOL/L (ref 5–15)
AST SERPL-CCNC: 34 U/L (ref 1–32)
BILIRUB SERPL-MCNC: 0.2 MG/DL (ref 0–1.2)
BUN SERPL-MCNC: 33 MG/DL (ref 8–23)
BUN/CREAT SERPL: 14.2 (ref 7–25)
CALCIUM SPEC-SCNC: 9 MG/DL (ref 8.6–10.5)
CHLORIDE SERPL-SCNC: 108 MMOL/L (ref 98–107)
CO2 SERPL-SCNC: 19.2 MMOL/L (ref 22–29)
CREAT SERPL-MCNC: 2.32 MG/DL (ref 0.57–1)
EGFRCR SERPLBLD CKD-EPI 2021: 21 ML/MIN/1.73
GLOBULIN UR ELPH-MCNC: 3.6 GM/DL
GLUCOSE SERPL-MCNC: 173 MG/DL (ref 65–99)
PHOSPHATE SERPL-MCNC: 3.6 MG/DL (ref 2.5–4.5)
POTASSIUM SERPL-SCNC: 4.4 MMOL/L (ref 3.5–5.2)
PROT SERPL-MCNC: 6.8 G/DL (ref 6–8.5)
PTH-INTACT SERPL-MCNC: 81.1 PG/ML (ref 15–65)
SODIUM SERPL-SCNC: 141 MMOL/L (ref 136–145)

## 2022-04-20 PROCEDURE — 81001 URINALYSIS AUTO W/SCOPE: CPT | Performed by: INTERNAL MEDICINE

## 2022-04-20 PROCEDURE — 80053 COMPREHEN METABOLIC PANEL: CPT | Performed by: INTERNAL MEDICINE

## 2022-04-20 PROCEDURE — 84100 ASSAY OF PHOSPHORUS: CPT | Performed by: INTERNAL MEDICINE

## 2022-04-20 PROCEDURE — 82570 ASSAY OF URINE CREATININE: CPT | Performed by: INTERNAL MEDICINE

## 2022-04-20 PROCEDURE — 82306 VITAMIN D 25 HYDROXY: CPT | Performed by: INTERNAL MEDICINE

## 2022-04-20 PROCEDURE — 84156 ASSAY OF PROTEIN URINE: CPT | Performed by: INTERNAL MEDICINE

## 2022-04-20 PROCEDURE — 83970 ASSAY OF PARATHORMONE: CPT | Performed by: INTERNAL MEDICINE

## 2022-04-20 PROCEDURE — 36415 COLL VENOUS BLD VENIPUNCTURE: CPT | Performed by: PHYSICIAN ASSISTANT

## 2022-04-21 ENCOUNTER — LAB (OUTPATIENT)
Dept: FAMILY MEDICINE CLINIC | Facility: CLINIC | Age: 77
End: 2022-04-21

## 2022-04-21 DIAGNOSIS — N18.32 STAGE 3B CHRONIC KIDNEY DISEASE: ICD-10-CM

## 2022-04-21 LAB
BACTERIA UR QL AUTO: ABNORMAL /HPF
BILIRUB UR QL STRIP: NEGATIVE
CLARITY UR: CLEAR
COLOR UR: YELLOW
CREAT UR-MCNC: 123.2 MG/DL
GLUCOSE UR STRIP-MCNC: ABNORMAL MG/DL
HGB UR QL STRIP.AUTO: NEGATIVE
HYALINE CASTS UR QL AUTO: ABNORMAL /LPF
KETONES UR QL STRIP: NEGATIVE
LEUKOCYTE ESTERASE UR QL STRIP.AUTO: NEGATIVE
NITRITE UR QL STRIP: NEGATIVE
PH UR STRIP.AUTO: 5.5 [PH] (ref 5–8)
PROT ?TM UR-MCNC: 569 MG/DL
PROT UR QL STRIP: ABNORMAL
PROT/CREAT UR: 4618.5 MG/G CREA (ref 0–200)
RBC # UR STRIP: ABNORMAL /HPF
REF LAB TEST METHOD: ABNORMAL
SP GR UR STRIP: 1.02 (ref 1–1.03)
SQUAMOUS #/AREA URNS HPF: ABNORMAL /HPF
UROBILINOGEN UR QL STRIP: ABNORMAL
WBC # UR STRIP: ABNORMAL /HPF

## 2022-04-21 PROCEDURE — 81050 URINALYSIS VOLUME MEASURE: CPT | Performed by: INTERNAL MEDICINE

## 2022-04-21 PROCEDURE — 84156 ASSAY OF PROTEIN URINE: CPT | Performed by: INTERNAL MEDICINE

## 2022-04-22 LAB
COLLECT DURATION TIME UR: 24 HRS
PROT 24H UR-MRATE: 3497.6 MG/24HOURS (ref 0–150)
SPECIMEN VOL 24H UR: 800 ML

## 2022-04-25 ENCOUNTER — HOSPITAL ENCOUNTER (OUTPATIENT)
Dept: CARDIOLOGY | Facility: HOSPITAL | Age: 77
Discharge: HOME | End: 2022-04-25
Admitting: NURSE PRACTITIONER

## 2022-04-25 ENCOUNTER — LAB (OUTPATIENT)
Dept: ONCOLOGY | Facility: CLINIC | Age: 77
End: 2022-04-25

## 2022-04-25 VITALS
RESPIRATION RATE: 18 BRPM | DIASTOLIC BLOOD PRESSURE: 80 MMHG | OXYGEN SATURATION: 96 % | TEMPERATURE: 97.3 F | HEART RATE: 72 BPM | SYSTOLIC BLOOD PRESSURE: 175 MMHG

## 2022-04-25 VITALS
WEIGHT: 197.6 LBS | BODY MASS INDEX: 35.01 KG/M2 | OXYGEN SATURATION: 94 % | HEART RATE: 78 BPM | DIASTOLIC BLOOD PRESSURE: 78 MMHG | HEIGHT: 63 IN | SYSTOLIC BLOOD PRESSURE: 142 MMHG

## 2022-04-25 DIAGNOSIS — I10 ESSENTIAL HYPERTENSION: ICD-10-CM

## 2022-04-25 DIAGNOSIS — N18.4 CKD (CHRONIC KIDNEY DISEASE) STAGE 4, GFR 15-29 ML/MIN: ICD-10-CM

## 2022-04-25 DIAGNOSIS — D50.8 IRON DEFICIENCY ANEMIA DUE TO DIETARY CAUSES: ICD-10-CM

## 2022-04-25 DIAGNOSIS — I50.32 CHRONIC DIASTOLIC CONGESTIVE HEART FAILURE: Primary | ICD-10-CM

## 2022-04-25 DIAGNOSIS — D50.9 IRON DEFICIENCY ANEMIA, UNSPECIFIED IRON DEFICIENCY ANEMIA TYPE: Primary | ICD-10-CM

## 2022-04-25 DIAGNOSIS — I50.32 CHRONIC DIASTOLIC CONGESTIVE HEART FAILURE: ICD-10-CM

## 2022-04-25 DIAGNOSIS — K90.9 MALABSORPTION OF IRON: ICD-10-CM

## 2022-04-25 DIAGNOSIS — L98.9 SKIN LESION OF LEFT LEG: ICD-10-CM

## 2022-04-25 DIAGNOSIS — R60.0 PEDAL EDEMA: ICD-10-CM

## 2022-04-25 DIAGNOSIS — E66.9 OBESITY (BMI 30-39.9): ICD-10-CM

## 2022-04-25 DIAGNOSIS — D50.9 IRON DEFICIENCY ANEMIA, UNSPECIFIED IRON DEFICIENCY ANEMIA TYPE: ICD-10-CM

## 2022-04-25 LAB
ABSOLUTE LUNG FLUID CONTENT: 27 % (ref 20–35)
ANION GAP SERPL CALCULATED.3IONS-SCNC: 13.7 MMOL/L (ref 5–15)
BASOPHILS # BLD AUTO: 0.03 10*3/MM3 (ref 0–0.2)
BASOPHILS NFR BLD AUTO: 0.4 % (ref 0–1.5)
BUN SERPL-MCNC: 43 MG/DL (ref 8–23)
BUN/CREAT SERPL: 18.3 (ref 7–25)
CALCIUM SPEC-SCNC: 9.3 MG/DL (ref 8.6–10.5)
CHLORIDE SERPL-SCNC: 105 MMOL/L (ref 98–107)
CO2 SERPL-SCNC: 19.3 MMOL/L (ref 22–29)
CREAT SERPL-MCNC: 2.35 MG/DL (ref 0.57–1)
DEPRECATED RDW RBC AUTO: 46.3 FL (ref 37–54)
EGFRCR SERPLBLD CKD-EPI 2021: 20.7 ML/MIN/1.73
EOSINOPHIL # BLD AUTO: 0.2 10*3/MM3 (ref 0–0.4)
EOSINOPHIL NFR BLD AUTO: 2.4 % (ref 0.3–6.2)
ERYTHROCYTE [DISTWIDTH] IN BLOOD BY AUTOMATED COUNT: 15.5 % (ref 12.3–15.4)
FERRITIN SERPL-MCNC: 105.7 NG/ML (ref 13–150)
GLUCOSE SERPL-MCNC: 241 MG/DL (ref 65–99)
HCT VFR BLD AUTO: 24 % (ref 34–46.6)
HGB BLD-MCNC: 7.3 G/DL (ref 12–15.9)
IMM GRANULOCYTES # BLD AUTO: 0.02 10*3/MM3 (ref 0–0.05)
IMM GRANULOCYTES NFR BLD AUTO: 0.2 % (ref 0–0.5)
IRON 24H UR-MRATE: 22 MCG/DL (ref 37–145)
IRON SATN MFR SERPL: 7 % (ref 20–50)
LYMPHOCYTES # BLD AUTO: 1.08 10*3/MM3 (ref 0.7–3.1)
LYMPHOCYTES NFR BLD AUTO: 12.9 % (ref 19.6–45.3)
MAGNESIUM SERPL-MCNC: 2.3 MG/DL (ref 1.6–2.4)
MCH RBC QN AUTO: 24.8 PG (ref 26.6–33)
MCHC RBC AUTO-ENTMCNC: 30.4 G/DL (ref 31.5–35.7)
MCV RBC AUTO: 81.6 FL (ref 79–97)
MONOCYTES # BLD AUTO: 0.5 10*3/MM3 (ref 0.1–0.9)
MONOCYTES NFR BLD AUTO: 6 % (ref 5–12)
NEUTROPHILS NFR BLD AUTO: 6.52 10*3/MM3 (ref 1.7–7)
NEUTROPHILS NFR BLD AUTO: 78.1 % (ref 42.7–76)
NRBC BLD AUTO-RTO: 0 /100 WBC (ref 0–0.2)
NT-PROBNP SERPL-MCNC: 4079 PG/ML (ref 0–1800)
PLATELET # BLD AUTO: 298 10*3/MM3 (ref 140–450)
PMV BLD AUTO: 9.2 FL (ref 6–12)
POTASSIUM SERPL-SCNC: 4.3 MMOL/L (ref 3.5–5.2)
RBC # BLD AUTO: 2.94 10*6/MM3 (ref 3.77–5.28)
SODIUM SERPL-SCNC: 138 MMOL/L (ref 136–145)
TIBC SERPL-MCNC: 332 MCG/DL (ref 298–536)
TRANSFERRIN SERPL-MCNC: 223 MG/DL (ref 200–360)
WBC NRBC COR # BLD: 8.35 10*3/MM3 (ref 3.4–10.8)

## 2022-04-25 PROCEDURE — 83880 ASSAY OF NATRIURETIC PEPTIDE: CPT | Performed by: NURSE PRACTITIONER

## 2022-04-25 PROCEDURE — 94726 PLETHYSMOGRAPHY LUNG VOLUMES: CPT | Performed by: NURSE PRACTITIONER

## 2022-04-25 PROCEDURE — 99214 OFFICE O/P EST MOD 30 MIN: CPT | Performed by: NURSE PRACTITIONER

## 2022-04-25 PROCEDURE — 82728 ASSAY OF FERRITIN: CPT | Performed by: NURSE PRACTITIONER

## 2022-04-25 PROCEDURE — 84466 ASSAY OF TRANSFERRIN: CPT | Performed by: NURSE PRACTITIONER

## 2022-04-25 PROCEDURE — 80048 BASIC METABOLIC PNL TOTAL CA: CPT | Performed by: NURSE PRACTITIONER

## 2022-04-25 PROCEDURE — 83540 ASSAY OF IRON: CPT | Performed by: NURSE PRACTITIONER

## 2022-04-25 PROCEDURE — 85025 COMPLETE CBC W/AUTO DIFF WBC: CPT | Performed by: NURSE PRACTITIONER

## 2022-04-25 PROCEDURE — 83735 ASSAY OF MAGNESIUM: CPT | Performed by: NURSE PRACTITIONER

## 2022-04-25 RX ORDER — HYDRALAZINE HYDROCHLORIDE 25 MG/1
75 TABLET, FILM COATED ORAL 3 TIMES DAILY
Qty: 270 TABLET | Refills: 1 | Status: SHIPPED | OUTPATIENT
Start: 2022-04-25 | End: 2022-08-11 | Stop reason: SDUPTHER

## 2022-04-25 RX ORDER — SODIUM CHLORIDE 9 MG/ML
250 INJECTION, SOLUTION INTRAVENOUS ONCE
Status: CANCELLED | OUTPATIENT
Start: 2022-05-05

## 2022-04-25 RX ORDER — SODIUM CHLORIDE 9 MG/ML
250 INJECTION, SOLUTION INTRAVENOUS ONCE
Status: CANCELLED | OUTPATIENT
Start: 2022-04-29

## 2022-04-25 NOTE — PROGRESS NOTES
"                                                                                                                                                                             Heart Failure Clinic    Date: 04/25/22     Vitals:    04/25/22 1029   BP: 160/77   Pulse: 78   SpO2: 94%    weight 197.6    Method of arrival: Other wheelchair    Weighing self daily: Most days    Monitoring Heart Failure Zones: Most days    Today's HF Zone: Yellow     Taking medications as prescribed: Yes    Edema \"Not much\"    Shortness of Air: Yes with activity    Number of pillows used at night:<2    Educational Materials given:  avs                                                                         ReDS Value: 27  25-35 Optimal Value Status      Katy Mccray RN 04/25/22 10:38 EDT     "

## 2022-04-25 NOTE — PROGRESS NOTES
[facility], [place] CHF CLINIC OFFICE VISIT  Subjective:   Congestive Heart Failure  Pertinent negatives include no abdominal pain, chest pain, claudication, near-syncope, palpitations or shortness of breath.      Britta Lee is a 75 y.o.  female who presents to the clinic today for follow up on heart failure. She is accompanied by her . She has a hx of diastolic congestive heart failure with an LVEF > 70% from echocardiogram on 11/4/2020. She is currently not taking Bumex 0.5 mg every other daily due to worsening kidney function. She has been unable to tolerate Spironolactone or  ACE/ARB due to abnormal kidney function. She continues on Hydralazine 75 mg TID, Imdur 60 mg daily, Metoprolol XL 50 mg daily (she is confused on dosing. She reports at last visit nephrology told her to decrease) Norvasc 10 mg daily and Clonidine 0.2 mg TID for HTN.    At last visit due to worsening kidney function it was recommended she hold her Bumex she is due to see nephrology today in follow-up she denies any need for additional Bumex until the last couple of days she has noticed some wheezing.  She denies dyspnea, orthopnea or lower extremity swelling seems stable.  Since discontinuing several weeks ago.  Home blood pressures stable at 135-140/85  Shortness of air stable   LE swelling stable   Anemia and weight loss following with hematology and GI. She reports she has received several iron infusions which have helped her symptoms. Recent diagnosis of cirrhosis from GI.  Urine output good   Compliance with sodium and fluid restrictions   Home weight stable  Home HR - 75 bpm   Due to follow-up with nephrology today   Overall she feels stable  Needs refill of hydralazine   Denies chest discomfort, dyspnea, dizziness, lightheadedness, syncope,  palpitations or tachycardia.     Past medical history significant for HTN, DM type 2, CKD III, iron deficiency anemia, hyperlipidemia, obesity.     PCP: Lexie Dolan  Cardiologist:   Anthony  Nephrologist: Dr. Johnson     Hospitalizations: Discharged on 11/8/2020  ER visit: 12/4/2020  Past Medical History:   Diagnosis Date   • Acquired hypothyroidism 4/22/2021   • Anemia    • Arthritis    • Carpal tunnel syndrome    • Coronary artery disease    • Diabetes mellitus (HCC)    • Elevated cholesterol    • GERD (gastroesophageal reflux disease)    • History of pneumonia    • History of unsteady gait     OCASSIONALLY   • Hypertension    • Insomnia    • Kidney disease    • LENNY (obstructive sleep apnea) 12/1/2020   • Osteoarthritis    • Renal insufficiency    • Sciatica      Past Surgical History:   Procedure Laterality Date   • ABDOMINAL SURGERY     • APPENDECTOMY     • CARDIAC CATHETERIZATION      1 STENT  ---  2000   • CARDIAC SURGERY     • CAROTID STENT     • CARPAL TUNNEL RELEASE Right 10/8/2019    Procedure: CARPAL TUNNEL RELEASE;  Surgeon: Feroz Levin MD;  Location: [facility];  Service: Orthopedics   • CATARACT EXTRACTION     • CHOLECYSTECTOMY     • COLONOSCOPY     • COLONOSCOPY N/A 11/23/2021    Procedure: COLONOSCOPY FOR SCREENING;  Surgeon: Palmira Pate MD;  Location: [facility];  Service: Gastroenterology;  Laterality: N/A;   • CORONARY ANGIOPLASTY WITH STENT PLACEMENT     • ENDOSCOPY     • ENDOSCOPY N/A 3/5/2020    Procedure: ESOPHAGOGASTRODUODENOSCOPY;  Surgeon: Alexandru Bagley MD;  Location: [facility];  Service: Gastroenterology;  Laterality: N/A;   • ENDOSCOPY N/A 11/23/2021    Procedure: ESOPHAGOGASTRODUODENOSCOPY WITH BIOPSY;  Surgeon: Palmira Pate MD;  Location: [facility];  Service: Gastroenterology;  Laterality: N/A;   • ENDOSCOPY AND COLONOSCOPY     • GALLBLADDER SURGERY     • JOINT REPLACEMENT Left 05/02/2017    [facility]  DR LEVIN  LEFT TOTAL KNEE   • KNEE ARTHROSCOPY W/ MENISCECTOMY Right    • LAPAROSCOPIC TUBAL LIGATION     • GA TOTAL KNEE ARTHROPLASTY Left 5/2/2017    Procedure: TOTAL KNEE ARTHROPLASTY;  Surgeon: Feroz Levin MD;   Location: [facility];  Service: Orthopedics   • STERILIZATION     • TONSILLECTOMY       Social History     Socioeconomic History   • Marital status:      Spouse name: natalie   • Number of children: 3   • Years of education: 12   Tobacco Use   • Smoking status: Never Smoker   • Smokeless tobacco: Never Used   Vaping Use   • Vaping Use: Never used   Substance and Sexual Activity   • Alcohol use: Yes     Comment: socially   • Drug use: No   • Sexual activity: Defer     Birth control/protection: Surgical     Family History   Problem Relation Age of Onset   • Arthritis Mother    • Diabetes Mother    • Cancer Mother    • Heart disease Father    • Diabetes Daughter    • Diabetes Son    • Diabetes Maternal Aunt    • Heart disease Maternal Grandmother    • Breast cancer Neg Hx      Allergies:  No Known Allergies    Review of Systems   Constitutional: Negative for chills, fever, malaise/fatigue, weight gain and weight loss.   HENT: Negative for ear discharge, hoarse voice and sore throat.    Eyes: Negative for discharge, double vision, pain, redness and visual disturbance.   Cardiovascular: Negative for chest pain, claudication, cyanosis, dyspnea on exertion, irregular heartbeat, leg swelling, near-syncope, orthopnea, palpitations and syncope.   Respiratory: Negative for cough, shortness of breath and wheezing.    Endocrine: Negative for cold intolerance, heat intolerance and polyuria.   Hematologic/Lymphatic: Negative for bleeding problem. Does not bruise/bleed easily.   Skin: Negative for color change, flushing and rash.   Musculoskeletal: Negative for arthritis, back pain, joint pain, joint swelling and muscle cramps.   Gastrointestinal: Negative for abdominal pain, constipation, diarrhea, nausea and vomiting.   Genitourinary: Negative for dysuria, flank pain, frequency, hesitancy and urgency.   Neurological: Negative for difficulty with concentration, dizziness, light-headedness, sensory change, vertigo and weakness.    Psychiatric/Behavioral: Negative for depression. The patient does not have insomnia and is not nervous/anxious.      Current Outpatient Medications   Medication Sig Dispense Refill   • amLODIPine (NORVASC) 10 MG tablet Take 1 tablet by mouth Daily. (Patient taking differently: Take 10 mg by mouth Every Night.) 90 tablet 3   • atorvastatin (LIPITOR) 40 MG tablet Take 1 tablet by mouth Daily. (Patient taking differently: Take 40 mg by mouth Every Night.) 90 tablet 3   • cloNIDine (CATAPRES) 0.2 MG tablet TAKE ONE Tablet BY MOUTH THREE TIMES DAILY 270 tablet 1   • escitalopram (LEXAPRO) 10 MG tablet Take 1 tablet by mouth Daily. 90 tablet 3   • hydrALAZINE (APRESOLINE) 25 MG tablet Take 3 tablets by mouth 3 (Three) Times a Day. 270 tablet 1   • Insulin Glargine, 2 Unit Dial, (Toujeo Max SoloStar) 300 UNIT/ML solution pen-injector injection Inject 40-60 Units under the skin into the appropriate area as directed Daily. 3 pen 5   • insulin lispro (humaLOG) 100 UNIT/ML injection Inject 0-6 Units under the skin into the appropriate area as directed 3 (Three) Times a Day Before Meals. Sliding scale 4-6 units if BG > 180 9 mL 5   • isosorbide mononitrate (IMDUR) 60 MG 24 hr tablet TAKE 1 Tablet BY MOUTH EVERY DAY 90 tablet 0   • levothyroxine (SYNTHROID, LEVOTHROID) 25 MCG tablet Take 1 tablet by mouth Daily. 90 tablet 3   • metoprolol succinate XL (TOPROL-XL) 50 MG 24 hr tablet Take 50 mg by mouth Daily.     • Multiple Vitamins-Minerals (PRESERVISION AREDS 2 PO) Take 1 capsule by mouth Daily.     • nystatin (MYCOSTATIN) 923029 UNIT/GM powder Apply  one application topically to the appropriate area as directed Every 12 (Twelve) Hours. (Patient taking differently: Apply  topically to the appropriate area as directed 2 (Two) Times a Day As Needed (itching).) 60 g 0   • pantoprazole (Protonix) 40 MG EC tablet Take 1 tablet by mouth Daily. 90 tablet 3   • vitamin D (ERGOCALCIFEROL) 1.25 MG (21053 UT) capsule capsule Take 1  "capsule by mouth 1 (One) Time Per Week. 15 capsule 3   • Continuous Blood Gluc  (Dexcom G6 ) device 1 Device Daily. 3 each 3   • Continuous Blood Gluc Sensor (Dexcom G6 Sensor) Every 10 (Ten) Days. 9 each 3   • Continuous Blood Gluc Transmit (Dexcom G6 Transmitter) misc 1 Device Daily. 1 each 5   • glucose blood test strip 1 each by Other route 3 (Three) Times a Day. 100 each 12   • Insulin Pen Needle 32G X 5 MM misc 1 each 4 (Four) Times a Day. 120 each 5     No current facility-administered medications for this encounter.     Objective:     Body mass index is 35 kg/m².  Vitals:    04/25/22 1029 04/25/22 1300   BP: 160/77 142/78  Comment: manual   BP Location: Left arm Left arm   Patient Position: Sitting Sitting   Cuff Size: Adult    Pulse: 78    SpO2: 94%    Weight: 89.6 kg (197 lb 9.6 oz)    Height: 160 cm (63\")      Wt Readings from Last 3 Encounters:   04/25/22 89.6 kg (197 lb 9.6 oz)   03/28/22 88.6 kg (195 lb 6.4 oz)   03/15/22 89.4 kg (197 lb)       ReDs - 32% (4/14/2021)  Lab Results   Component Value Date    ABSOLUTELUNG 27 04/25/2022     Vitals reviewed.   Constitutional:       Appearance: Normal appearance. Well-developed.      Comments: Wears a mask during encounter   Eyes:      Conjunctiva/sclera: Conjunctivae normal.   HENT:      Head: Normocephalic.   Neck:      Vascular: No JVD or JVR.   Pulmonary:      Effort: Pulmonary effort is normal.      Breath sounds: Wheezing (diffuse wheezing noted thru out all lung fields ) present.   Cardiovascular:      Normal rate. Regular rhythm.   Pulses:     Intact distal pulses.   Edema:     Peripheral edema (overall swelling has improved ) present.     Ankle: bilateral 1+ edema of the ankle.     Feet: bilateral 1+ edema of the feet.  Abdominal:      General: Bowel sounds are normal.      Palpations: Abdomen is soft. There is no hepatomegaly or splenomegaly.   Musculoskeletal: Normal range of motion.      Cervical back: Normal range of motion and " neck supple. Skin:     General: Skin is warm and dry.      Comments: On L lower shin she has a soft erythematous nodular area that she reports is secondary to a fall. No exudate noted. She reports the area is tender    Neurological:      Mental Status: Alert and oriented to person, place, and time.   Psychiatric:         Attention and Perception: Attention normal.         Mood and Affect: Mood normal.         Speech: Speech normal.         Behavior: Behavior normal. Behavior is cooperative.         Cognition and Memory: Cognition normal.     Cardiographics  Results for orders placed during the hospital encounter of 11/03/20    Adult Transthoracic Echo Complete W/ Cont if Necessary Per Protocol    Interpretation Summary  · Left ventricular wall thickness is consistent with concentric hypertrophy.  · Left ventricular ejection fraction appears to be greater than 70%. Left ventricular systolic function is hyperdynamic (EF > 70%).  · Left ventricular diastolic function is consistent with (grade II w/high LAP) pseudonormalization.  · The left atrial cavity is moderate to severely dilated.  · The right atrial cavity is mildly dilated.  · Moderate mitral annular calcification is present.  · Mild mitral valve regurgitation is present.  · Mild tricuspid valve regurgitation is present.  · Estimated right ventricular systolic pressure from tricuspid regurgitation is markedly elevated (>55 mmHg).    EKG:       Lab Review   Lab Results   Component Value Date    TSH 3.760 09/23/2021     Lab Results   Component Value Date    GLUCOSE 241 (H) 04/25/2022    BUN 43 (H) 04/25/2022    CREATININE 2.35 (H) 04/25/2022    EGFRIFNONA 23 (L) 02/21/2022    EGFRIFAFRI 41 (L) 07/30/2018    BCR 18.3 04/25/2022    K 4.3 04/25/2022    CO2 19.3 (L) 04/25/2022    CALCIUM 9.3 04/25/2022    PROTENTOTREF 7.7 07/30/2018    ALBUMIN 3.20 (L) 04/20/2022    LABIL2 1.1 (L) 07/30/2018    AST 34 (H) 04/20/2022    ALT 19 04/20/2022     Lab Results   Component  Value Date    WBC 8.35 04/25/2022    HGB 7.3 (L) 04/25/2022    HCT 24.0 (L) 04/25/2022    MCV 81.6 04/25/2022     04/25/2022     Lab Results   Component Value Date    CKTOTAL 67 12/04/2020    CKMB 2.92 04/16/2018    TROPONINI 0.012 04/16/2018    TROPONINT <0.010 09/02/2021     Lab Results   Component Value Date    PROBNP 4,079.0 (H) 04/25/2022     The following portions of the patient's history were reviewed and updated as appropriate: allergies, current medications, past family history, past medical history, past social history, past surgical history and problem list.     Old records reviewed and pertinent information is included in the above objective data.     Assessment/Plan:   1. Chronic Diastolic Congestive Heart Failure, EF > 70%   2. HTN  3. Pedal edema   4. CKD, stage IV  5. Anemia, iron deficency   6. Obesity   7. Skin lesion of left leg     Pro-BNP, BMP and magnesium today  Discussed and reviewed labs with patient today  ReDs reviewed and discussed with patient today   Kidney function is improved, due to recent wheezing would recommend 3 days of Bumex then use prn however recommend she discuss this further with nephrology today.   Refill hydralazine  Will need to clarify pt dosing of Metoprolol XL  Keep follow up with nephrology and recommend routine follow up with cardiology   Continue on current medications   Keep follow-up with GI and hematology for anemia and weight loss   Recommend routine monitoring of BP and HR log   Defer jury duty note to PCP  Recommend she follow up with PCP for skin lesion of L leg secondary to fall. She expresses understanding.   Weight loss discussed and healthy lifestyle recommended   Counseling patient extensively on dietary Na+ intake, importance of activity, weight monitoring, compliance with medications and follow up appointments.  Follow up in 4-6  weeks, sooner if needed.

## 2022-04-25 NOTE — PROGRESS NOTES
Heart Failure Pharmacy Note  Patient Name: Britta Lee   Referring Provider: Priscila Holland  Primary Cardiologist: Dr. Cruz  Type of CHF: HFpEF    Medication Use:   Adherence: patient states she leaves medications in a bag. She has tried a medication planner in the past and felt it wasn't helpful.   Hx of med intolerances: bradycardia with metoprolol 100mg BID  Affordability: none at this time   Retail Rx Management: none    Past Medical History:   Diagnosis Date   • Acquired hypothyroidism 4/22/2021   • Anemia    • Arthritis    • Carpal tunnel syndrome    • Coronary artery disease    • Diabetes mellitus (HCC)    • Elevated cholesterol    • GERD (gastroesophageal reflux disease)    • History of pneumonia    • History of unsteady gait     OCASSIONALLY   • Hypertension    • Insomnia    • Kidney disease    • LENNY (obstructive sleep apnea) 12/1/2020   • Osteoarthritis    • Renal insufficiency    • Sciatica      ALLERGIES: Patient has no known allergies.  Current Outpatient Medications   Medication Sig Dispense Refill   • amLODIPine (NORVASC) 10 MG tablet Take 1 tablet by mouth Daily. (Patient taking differently: Take 10 mg by mouth Every Night.) 90 tablet 3   • atorvastatin (LIPITOR) 40 MG tablet Take 1 tablet by mouth Daily. (Patient taking differently: Take 40 mg by mouth Every Night.) 90 tablet 3   • cloNIDine (CATAPRES) 0.2 MG tablet TAKE ONE Tablet BY MOUTH THREE TIMES DAILY 270 tablet 1   • escitalopram (LEXAPRO) 10 MG tablet Take 1 tablet by mouth Daily. 90 tablet 3   • hydrALAZINE (APRESOLINE) 25 MG tablet Take 3 tablets by mouth 3 (Three) Times a Day. 270 tablet 0   • Insulin Glargine, 2 Unit Dial, (Toujeo Max SoloStar) 300 UNIT/ML solution pen-injector injection Inject 40-60 Units under the skin into the appropriate area as directed Daily. 3 pen 5   • insulin lispro (humaLOG) 100 UNIT/ML injection Inject 0-6 Units under the skin into the appropriate area as directed 3 (Three) Times a Day Before Meals.  "Sliding scale 4-6 units if BG > 180 9 mL 5   • isosorbide mononitrate (IMDUR) 60 MG 24 hr tablet TAKE 1 Tablet BY MOUTH EVERY DAY 90 tablet 0   • levothyroxine (SYNTHROID, LEVOTHROID) 25 MCG tablet Take 1 tablet by mouth Daily. 90 tablet 3   • metoprolol succinate XL (TOPROL-XL) 50 MG 24 hr tablet Take 50 mg by mouth Daily.     • Multiple Vitamins-Minerals (PRESERVISION AREDS 2 PO) Take 1 capsule by mouth Daily.     • nystatin (MYCOSTATIN) 863269 UNIT/GM powder Apply  one application topically to the appropriate area as directed Every 12 (Twelve) Hours. (Patient taking differently: Apply  topically to the appropriate area as directed 2 (Two) Times a Day As Needed (itching).) 60 g 0   • pantoprazole (Protonix) 40 MG EC tablet Take 1 tablet by mouth Daily. 90 tablet 3   • vitamin D (ERGOCALCIFEROL) 1.25 MG (66463 UT) capsule capsule Take 1 capsule by mouth 1 (One) Time Per Week. 15 capsule 3   • Continuous Blood Gluc  (Dexcom G6 ) device 1 Device Daily. 3 each 3   • Continuous Blood Gluc Sensor (Dexcom G6 Sensor) Every 10 (Ten) Days. 9 each 3   • Continuous Blood Gluc Transmit (Dexcom G6 Transmitter) misc 1 Device Daily. 1 each 5   • glucose blood test strip 1 each by Other route 3 (Three) Times a Day. 100 each 12   • Insulin Pen Needle 32G X 5 MM misc 1 each 4 (Four) Times a Day. 120 each 5     No current facility-administered medications for this encounter.       Vaccination History:   Pneumonia: Reports UTD  Annual Influenza: UTD for 2021-22 Season    Objective  Vitals:    04/25/22 1029   BP: 160/77   BP Location: Left arm   Patient Position: Sitting   Cuff Size: Adult   Pulse: 78   SpO2: 94%   Weight: 89.6 kg (197 lb 9.6 oz)   Height: 160 cm (63\")     Wt Readings from Last 3 Encounters:   04/25/22 89.6 kg (197 lb 9.6 oz)   03/28/22 88.6 kg (195 lb 6.4 oz)   03/15/22 89.4 kg (197 lb)         04/25/22  1029   Weight: 89.6 kg (197 lb 9.6 oz)     Lab Results   Component Value Date    GLUCOSE 241 (H) " 04/25/2022    BUN 43 (H) 04/25/2022    CREATININE 2.35 (H) 04/25/2022    EGFRIFNONA 23 (L) 02/21/2022    EGFRIFAFRI 41 (L) 07/30/2018    BCR 18.3 04/25/2022    K 4.3 04/25/2022    CO2 19.3 (L) 04/25/2022    CALCIUM 9.3 04/25/2022    PROTENTOTREF 7.7 07/30/2018    ALBUMIN 3.20 (L) 04/20/2022    LABIL2 1.1 (L) 07/30/2018    AST 34 (H) 04/20/2022    ALT 19 04/20/2022     Lab Results   Component Value Date    WBC 8.35 04/25/2022    HGB 7.3 (L) 04/25/2022    HCT 24.0 (L) 04/25/2022    MCV 81.6 04/25/2022     04/25/2022     Lab Results   Component Value Date    CKTOTAL 67 12/04/2020    CKMB 2.92 04/16/2018    TROPONINI 0.012 04/16/2018    TROPONINT <0.010 09/02/2021     Lab Results   Component Value Date    PROBNP 4,079.0 (H) 04/25/2022     Results for orders placed during the hospital encounter of 11/03/20    Adult Transthoracic Echo Complete W/ Cont if Necessary Per Protocol    Interpretation Summary  · Left ventricular wall thickness is consistent with concentric hypertrophy.  · Left ventricular ejection fraction appears to be greater than 70%. Left ventricular systolic function is hyperdynamic (EF > 70%).  · Left ventricular diastolic function is consistent with (grade II w/high LAP) pseudonormalization.  · The left atrial cavity is moderate to severely dilated.  · The right atrial cavity is mildly dilated.  · Moderate mitral annular calcification is present.  · Mild mitral valve regurgitation is present.  · Mild tricuspid valve regurgitation is present.  · Estimated right ventricular systolic pressure from tricuspid regurgitation is markedly elevated (>55 mmHg).               Class   Drug   Dose Last Dose Adjustment   Notes   ACEi/ARB/ARNI    Worsening renal fxn 1/22/21   Beta Blocker Metoprolol succ 50 QD 3/1/22    MRA --------------   eGFR needs to be >30mL/min     Drug Therapy Problems:    1. Some confusion on metoprolol dosing; patient reports Dr Johnson told her to decrease metoprolol dose to 25 mg once  daily but she hasn't because insurance won't pay for new script yet.  2. GDMT  3. Pt requested hydralazine refills    Recommendations:      1. Pt has appt w/ Dr Johnson today. Emily asked pt to clarify with him about dosing. Could cut the Toprol 50mg in half if 25 mg dose is needed.  2. Could consider addition of Jardiance for DM/HF benefit  if eGFR remains >20 . Farxiga initiation not recommended for HFpEF for eGFR < 25.  Either SGLT2i would be $47 per patient's insurance.  3. Refills sent by Emily.    Patient was educated on heart failure medications and the importance of medication adherence.  All questions were addressed and patient expressed understanding. Patient would benefit from further education.     Thank you for allowing me to participate in the care of your patient,    Milagros Pina, PharmD  04/25/22  11:41 EDT

## 2022-04-29 ENCOUNTER — INFUSION (OUTPATIENT)
Dept: ONCOLOGY | Facility: HOSPITAL | Age: 77
End: 2022-04-29

## 2022-04-29 VITALS
DIASTOLIC BLOOD PRESSURE: 68 MMHG | OXYGEN SATURATION: 98 % | WEIGHT: 201.8 LBS | TEMPERATURE: 97.3 F | BODY MASS INDEX: 35.75 KG/M2 | HEART RATE: 85 BPM | RESPIRATION RATE: 18 BRPM | SYSTOLIC BLOOD PRESSURE: 164 MMHG

## 2022-04-29 DIAGNOSIS — K90.9 MALABSORPTION OF IRON: ICD-10-CM

## 2022-04-29 DIAGNOSIS — D50.9 IRON DEFICIENCY ANEMIA, UNSPECIFIED IRON DEFICIENCY ANEMIA TYPE: Primary | ICD-10-CM

## 2022-04-29 LAB
BASOPHILS # BLD AUTO: 0.05 10*3/MM3 (ref 0–0.2)
BASOPHILS NFR BLD AUTO: 0.7 % (ref 0–1.5)
DEPRECATED RDW RBC AUTO: 45.8 FL (ref 37–54)
EOSINOPHIL # BLD AUTO: 0.41 10*3/MM3 (ref 0–0.4)
EOSINOPHIL NFR BLD AUTO: 5.4 % (ref 0.3–6.2)
ERYTHROCYTE [DISTWIDTH] IN BLOOD BY AUTOMATED COUNT: 15.5 % (ref 12.3–15.4)
HCT VFR BLD AUTO: 24.9 % (ref 34–46.6)
HGB BLD-MCNC: 7.4 G/DL (ref 12–15.9)
IMM GRANULOCYTES # BLD AUTO: 0.01 10*3/MM3 (ref 0–0.05)
IMM GRANULOCYTES NFR BLD AUTO: 0.1 % (ref 0–0.5)
LYMPHOCYTES # BLD AUTO: 1.36 10*3/MM3 (ref 0.7–3.1)
LYMPHOCYTES NFR BLD AUTO: 18.1 % (ref 19.6–45.3)
MCH RBC QN AUTO: 24 PG (ref 26.6–33)
MCHC RBC AUTO-ENTMCNC: 29.7 G/DL (ref 31.5–35.7)
MCV RBC AUTO: 80.8 FL (ref 79–97)
MONOCYTES # BLD AUTO: 0.42 10*3/MM3 (ref 0.1–0.9)
MONOCYTES NFR BLD AUTO: 5.6 % (ref 5–12)
NEUTROPHILS NFR BLD AUTO: 5.28 10*3/MM3 (ref 1.7–7)
NEUTROPHILS NFR BLD AUTO: 70.1 % (ref 42.7–76)
NRBC BLD AUTO-RTO: 0 /100 WBC (ref 0–0.2)
PLATELET # BLD AUTO: 316 10*3/MM3 (ref 140–450)
PMV BLD AUTO: 9.4 FL (ref 6–12)
RBC # BLD AUTO: 3.08 10*6/MM3 (ref 3.77–5.28)
WBC NRBC COR # BLD: 7.53 10*3/MM3 (ref 3.4–10.8)

## 2022-04-29 PROCEDURE — 85025 COMPLETE CBC W/AUTO DIFF WBC: CPT

## 2022-04-29 PROCEDURE — 25010000002 FERUMOXYTOL 510 MG/17ML SOLUTION: Performed by: NURSE PRACTITIONER

## 2022-04-29 PROCEDURE — 96365 THER/PROPH/DIAG IV INF INIT: CPT

## 2022-04-29 PROCEDURE — 96374 THER/PROPH/DIAG INJ IV PUSH: CPT

## 2022-04-29 RX ORDER — SODIUM CHLORIDE 9 MG/ML
250 INJECTION, SOLUTION INTRAVENOUS ONCE
Status: COMPLETED | OUTPATIENT
Start: 2022-04-29 | End: 2022-04-29

## 2022-04-29 RX ADMIN — FERUMOXYTOL 510 MG: 510 INJECTION INTRAVENOUS at 09:17

## 2022-04-29 RX ADMIN — SODIUM CHLORIDE 250 ML: 9 INJECTION, SOLUTION INTRAVENOUS at 09:17

## 2022-05-02 ENCOUNTER — LAB (OUTPATIENT)
Dept: ONCOLOGY | Facility: CLINIC | Age: 77
End: 2022-05-02

## 2022-05-02 ENCOUNTER — INFUSION (OUTPATIENT)
Dept: ONCOLOGY | Facility: HOSPITAL | Age: 77
End: 2022-05-02

## 2022-05-02 VITALS
TEMPERATURE: 97.1 F | SYSTOLIC BLOOD PRESSURE: 158 MMHG | DIASTOLIC BLOOD PRESSURE: 66 MMHG | OXYGEN SATURATION: 97 % | RESPIRATION RATE: 18 BRPM | HEART RATE: 78 BPM

## 2022-05-02 VITALS
HEART RATE: 54 BPM | TEMPERATURE: 97.5 F | DIASTOLIC BLOOD PRESSURE: 74 MMHG | RESPIRATION RATE: 18 BRPM | SYSTOLIC BLOOD PRESSURE: 177 MMHG | OXYGEN SATURATION: 97 %

## 2022-05-02 DIAGNOSIS — D64.9 SYMPTOMATIC ANEMIA: Primary | ICD-10-CM

## 2022-05-02 DIAGNOSIS — D50.9 IRON DEFICIENCY ANEMIA, UNSPECIFIED IRON DEFICIENCY ANEMIA TYPE: ICD-10-CM

## 2022-05-02 DIAGNOSIS — D64.9 SYMPTOMATIC ANEMIA: ICD-10-CM

## 2022-05-02 LAB
ABO GROUP BLD: NORMAL
ALBUMIN SERPL-MCNC: 3.16 G/DL (ref 3.5–5.2)
ALBUMIN/GLOB SERPL: 0.8 G/DL
ALP SERPL-CCNC: 519 U/L (ref 39–117)
ALT SERPL W P-5'-P-CCNC: 18 U/L (ref 1–33)
ANION GAP SERPL CALCULATED.3IONS-SCNC: 11.6 MMOL/L (ref 5–15)
ANISOCYTOSIS BLD QL: NORMAL
AST SERPL-CCNC: 42 U/L (ref 1–32)
BASOPHILS # BLD AUTO: 0.06 10*3/MM3 (ref 0–0.2)
BASOPHILS NFR BLD AUTO: 0.7 % (ref 0–1.5)
BILIRUB SERPL-MCNC: 0.2 MG/DL (ref 0–1.2)
BLD GP AB SCN SERPL QL: NEGATIVE
BUN SERPL-MCNC: 28 MG/DL (ref 8–23)
BUN/CREAT SERPL: 13.7 (ref 7–25)
CALCIUM SPEC-SCNC: 9 MG/DL (ref 8.6–10.5)
CHLORIDE SERPL-SCNC: 108 MMOL/L (ref 98–107)
CO2 SERPL-SCNC: 21.4 MMOL/L (ref 22–29)
CREAT SERPL-MCNC: 2.04 MG/DL (ref 0.57–1)
DEPRECATED RDW RBC AUTO: 48.6 FL (ref 37–54)
EGFRCR SERPLBLD CKD-EPI 2021: 24.6 ML/MIN/1.73
EOSINOPHIL # BLD AUTO: 0.46 10*3/MM3 (ref 0–0.4)
EOSINOPHIL NFR BLD AUTO: 5.6 % (ref 0.3–6.2)
ERYTHROCYTE [DISTWIDTH] IN BLOOD BY AUTOMATED COUNT: 15.9 % (ref 12.3–15.4)
GLOBULIN UR ELPH-MCNC: 3.8 GM/DL
GLUCOSE SERPL-MCNC: 173 MG/DL (ref 65–99)
HCT VFR BLD AUTO: 24.9 % (ref 34–46.6)
HGB BLD-MCNC: 7 G/DL (ref 12–15.9)
HYPOCHROMIA BLD QL: NORMAL
IMM GRANULOCYTES # BLD AUTO: 0.03 10*3/MM3 (ref 0–0.05)
IMM GRANULOCYTES NFR BLD AUTO: 0.4 % (ref 0–0.5)
LARGE PLATELETS: NORMAL
LYMPHOCYTES # BLD AUTO: 1.61 10*3/MM3 (ref 0.7–3.1)
LYMPHOCYTES NFR BLD AUTO: 19.8 % (ref 19.6–45.3)
MCH RBC QN AUTO: 24 PG (ref 26.6–33)
MCHC RBC AUTO-ENTMCNC: 28.1 G/DL (ref 31.5–35.7)
MCV RBC AUTO: 85.3 FL (ref 79–97)
MONOCYTES # BLD AUTO: 0.59 10*3/MM3 (ref 0.1–0.9)
MONOCYTES NFR BLD AUTO: 7.2 % (ref 5–12)
NEUTROPHILS NFR BLD AUTO: 5.4 10*3/MM3 (ref 1.7–7)
NEUTROPHILS NFR BLD AUTO: 66.3 % (ref 42.7–76)
NRBC BLD AUTO-RTO: 0 /100 WBC (ref 0–0.2)
PLATELET # BLD AUTO: 269 10*3/MM3 (ref 140–450)
PMV BLD AUTO: 9.2 FL (ref 6–12)
POTASSIUM SERPL-SCNC: 4.2 MMOL/L (ref 3.5–5.2)
PROT SERPL-MCNC: 7 G/DL (ref 6–8.5)
RBC # BLD AUTO: 2.92 10*6/MM3 (ref 3.77–5.28)
RH BLD: POSITIVE
SODIUM SERPL-SCNC: 141 MMOL/L (ref 136–145)
T&S EXPIRATION DATE: NORMAL
WBC NRBC COR # BLD: 8.15 10*3/MM3 (ref 3.4–10.8)

## 2022-05-02 PROCEDURE — 86850 RBC ANTIBODY SCREEN: CPT | Performed by: NURSE PRACTITIONER

## 2022-05-02 PROCEDURE — 80053 COMPREHEN METABOLIC PANEL: CPT | Performed by: NURSE PRACTITIONER

## 2022-05-02 PROCEDURE — P9016 RBC LEUKOCYTES REDUCED: HCPCS

## 2022-05-02 PROCEDURE — 86923 COMPATIBILITY TEST ELECTRIC: CPT

## 2022-05-02 PROCEDURE — 85025 COMPLETE CBC W/AUTO DIFF WBC: CPT | Performed by: NURSE PRACTITIONER

## 2022-05-02 PROCEDURE — 36430 TRANSFUSION BLD/BLD COMPNT: CPT

## 2022-05-02 PROCEDURE — 86900 BLOOD TYPING SEROLOGIC ABO: CPT

## 2022-05-02 PROCEDURE — 86900 BLOOD TYPING SEROLOGIC ABO: CPT | Performed by: NURSE PRACTITIONER

## 2022-05-02 PROCEDURE — 86901 BLOOD TYPING SEROLOGIC RH(D): CPT | Performed by: NURSE PRACTITIONER

## 2022-05-02 PROCEDURE — 85007 BL SMEAR W/DIFF WBC COUNT: CPT | Performed by: NURSE PRACTITIONER

## 2022-05-02 PROCEDURE — 63710000001 DIPHENHYDRAMINE PER 50 MG: Performed by: NURSE PRACTITIONER

## 2022-05-02 RX ORDER — SODIUM CHLORIDE 9 MG/ML
250 INJECTION, SOLUTION INTRAVENOUS AS NEEDED
Status: DISCONTINUED | OUTPATIENT
Start: 2022-05-02 | End: 2022-05-02 | Stop reason: HOSPADM

## 2022-05-02 RX ORDER — ACETAMINOPHEN 325 MG/1
650 TABLET ORAL ONCE
Status: CANCELLED | OUTPATIENT
Start: 2022-05-02 | End: 2022-05-02

## 2022-05-02 RX ORDER — DIPHENHYDRAMINE HCL 25 MG
25 CAPSULE ORAL ONCE
Status: COMPLETED | OUTPATIENT
Start: 2022-05-02 | End: 2022-05-02

## 2022-05-02 RX ORDER — SODIUM CHLORIDE 9 MG/ML
250 INJECTION, SOLUTION INTRAVENOUS AS NEEDED
Status: CANCELLED | OUTPATIENT
Start: 2022-05-02

## 2022-05-02 RX ORDER — DIPHENHYDRAMINE HCL 25 MG
25 CAPSULE ORAL ONCE
Status: CANCELLED | OUTPATIENT
Start: 2022-05-02 | End: 2022-05-02

## 2022-05-02 RX ORDER — ACETAMINOPHEN 325 MG/1
650 TABLET ORAL ONCE
Status: COMPLETED | OUTPATIENT
Start: 2022-05-02 | End: 2022-05-02

## 2022-05-02 RX ADMIN — ACETAMINOPHEN 650 MG: 325 TABLET ORAL at 09:23

## 2022-05-02 RX ADMIN — DIPHENHYDRAMINE HYDROCHLORIDE 25 MG: 25 CAPSULE ORAL at 09:23

## 2022-05-03 LAB
BH BB BLOOD EXPIRATION DATE: NORMAL
BH BB BLOOD TYPE BARCODE: 7300
BH BB DISPENSE STATUS: NORMAL
BH BB PRODUCT CODE: NORMAL
BH BB UNIT NUMBER: NORMAL
CROSSMATCH INTERPRETATION: NORMAL
UNIT  ABO: NORMAL
UNIT  RH: NORMAL

## 2022-05-05 ENCOUNTER — OFFICE VISIT (OUTPATIENT)
Dept: GASTROENTEROLOGY | Facility: CLINIC | Age: 77
End: 2022-05-05

## 2022-05-05 ENCOUNTER — INFUSION (OUTPATIENT)
Dept: ONCOLOGY | Facility: HOSPITAL | Age: 77
End: 2022-05-05

## 2022-05-05 VITALS
DIASTOLIC BLOOD PRESSURE: 89 MMHG | BODY MASS INDEX: 34.02 KG/M2 | OXYGEN SATURATION: 95 % | WEIGHT: 192 LBS | HEIGHT: 63 IN | SYSTOLIC BLOOD PRESSURE: 217 MMHG | HEART RATE: 55 BPM

## 2022-05-05 VITALS
DIASTOLIC BLOOD PRESSURE: 69 MMHG | WEIGHT: 194.6 LBS | OXYGEN SATURATION: 98 % | RESPIRATION RATE: 18 BRPM | SYSTOLIC BLOOD PRESSURE: 157 MMHG | BODY MASS INDEX: 34.47 KG/M2 | HEART RATE: 66 BPM | TEMPERATURE: 97.1 F

## 2022-05-05 DIAGNOSIS — E11.649 TYPE 2 DIABETES MELLITUS WITH HYPOGLYCEMIA WITHOUT COMA, WITH LONG-TERM CURRENT USE OF INSULIN: ICD-10-CM

## 2022-05-05 DIAGNOSIS — D50.9 IRON DEFICIENCY ANEMIA, UNSPECIFIED IRON DEFICIENCY ANEMIA TYPE: Primary | ICD-10-CM

## 2022-05-05 DIAGNOSIS — R18.8 CIRRHOSIS OF LIVER WITH ASCITES, UNSPECIFIED HEPATIC CIRRHOSIS TYPE: Primary | ICD-10-CM

## 2022-05-05 DIAGNOSIS — K74.60 CIRRHOSIS OF LIVER WITH ASCITES, UNSPECIFIED HEPATIC CIRRHOSIS TYPE: Primary | ICD-10-CM

## 2022-05-05 DIAGNOSIS — N18.4 CKD (CHRONIC KIDNEY DISEASE) STAGE 4, GFR 15-29 ML/MIN: ICD-10-CM

## 2022-05-05 DIAGNOSIS — Z79.4 TYPE 2 DIABETES MELLITUS WITH HYPOGLYCEMIA WITHOUT COMA, WITH LONG-TERM CURRENT USE OF INSULIN: ICD-10-CM

## 2022-05-05 DIAGNOSIS — D50.9 IRON DEFICIENCY ANEMIA, UNSPECIFIED IRON DEFICIENCY ANEMIA TYPE: ICD-10-CM

## 2022-05-05 DIAGNOSIS — K90.9 MALABSORPTION OF IRON: ICD-10-CM

## 2022-05-05 PROCEDURE — 25010000002 FERUMOXYTOL 510 MG/17ML SOLUTION: Performed by: NURSE PRACTITIONER

## 2022-05-05 PROCEDURE — 96375 TX/PRO/DX INJ NEW DRUG ADDON: CPT

## 2022-05-05 PROCEDURE — 96374 THER/PROPH/DIAG INJ IV PUSH: CPT

## 2022-05-05 PROCEDURE — 99213 OFFICE O/P EST LOW 20 MIN: CPT | Performed by: INTERNAL MEDICINE

## 2022-05-05 RX ORDER — SODIUM CHLORIDE 9 MG/ML
250 INJECTION, SOLUTION INTRAVENOUS ONCE
Status: COMPLETED | OUTPATIENT
Start: 2022-05-05 | End: 2022-05-05

## 2022-05-05 RX ADMIN — SODIUM CHLORIDE 250 ML: 9 INJECTION, SOLUTION INTRAVENOUS at 09:46

## 2022-05-05 RX ADMIN — FERUMOXYTOL 510 MG: 510 INJECTION INTRAVENOUS at 09:47

## 2022-05-05 NOTE — PROGRESS NOTES
Subjective   Britta Lee is a 76 y.o. female who presents to the office today as a follow up appointment regarding Weight Loss and YUAN      History of Present Illness:  The patient was recently diagnosed with cirrhosis of the liver.  This is likely due to JANE.  The patient states that she never drank alcohol.  She has carried most of her weight in her midsection.  She has weighed as much as 250 pounds in the past.  She is diabetic.  She has an YUAN and recently has had a unit of blood and iron infusions.  She has heart disease and kidney disease.  She has been falling at home.  She recently fell and hit her leg on the bed.  She has a large hematoma there.  She is complaining of a day of diarrhea.  This is better today.  She has not been able to tolerate the oral contrast for SBFT in order to prepare her for capsule endoscopy.  She denies confusion.  Her  confirms this.  There has been  no abdominal swelling.  No hematemesis or melena.  She did have an EGD in Nov 2021 which did not show esophageal varices.        Review of Systems:  Review of Systems   Constitutional: Positive for activity change, appetite change, fatigue and unexpected weight change. Negative for chills and fever.   HENT: Negative for mouth sores and trouble swallowing.    Eyes: Negative for photophobia, pain and redness.   Respiratory: Negative for cough and choking.    Cardiovascular: Positive for leg swelling.   Gastrointestinal: Negative for anal bleeding, constipation, diarrhea, nausea, rectal pain and vomiting.   Endocrine: Negative for cold intolerance, heat intolerance and polyphagia.   Genitourinary: Negative for difficulty urinating and dysuria.   Musculoskeletal: Positive for arthralgias. Negative for joint swelling.   Skin: Negative for rash and wound.   Allergic/Immunologic: Negative for environmental allergies and food allergies.   Neurological: Positive for dizziness. Negative for light-headedness.   Hematological: Negative  for adenopathy. Bruises/bleeds easily.   Psychiatric/Behavioral: Negative for sleep disturbance. The patient is not nervous/anxious.        Past Medical History:  Past Medical History:   Diagnosis Date   • Acquired hypothyroidism 4/22/2021   • Anemia    • Arthritis    • Carpal tunnel syndrome    • Coronary artery disease    • Diabetes mellitus (HCC)    • Elevated cholesterol    • GERD (gastroesophageal reflux disease)    • History of pneumonia    • History of unsteady gait     OCASSIONALLY   • Hypertension    • Insomnia    • Kidney disease    • LENNY (obstructive sleep apnea) 12/1/2020   • Osteoarthritis    • Renal insufficiency    • Sciatica        Past Surgical History:  Past Surgical History:   Procedure Laterality Date   • ABDOMINAL SURGERY     • APPENDECTOMY     • CARDIAC CATHETERIZATION      1 STENT  ---  2000   • CARDIAC SURGERY     • CAROTID STENT     • CARPAL TUNNEL RELEASE Right 10/8/2019    Procedure: CARPAL TUNNEL RELEASE;  Surgeon: Feroz Levin MD;  Location: [facility];  Service: Orthopedics   • CATARACT EXTRACTION     • CHOLECYSTECTOMY     • COLONOSCOPY     • COLONOSCOPY N/A 11/23/2021    Procedure: COLONOSCOPY FOR SCREENING;  Surgeon: Palmira Pate MD;  Location: [facility];  Service: Gastroenterology;  Laterality: N/A;   • CORONARY ANGIOPLASTY WITH STENT PLACEMENT     • ENDOSCOPY     • ENDOSCOPY N/A 3/5/2020    Procedure: ESOPHAGOGASTRODUODENOSCOPY;  Surgeon: Alexandru Bagley MD;  Location: [facility];  Service: Gastroenterology;  Laterality: N/A;   • ENDOSCOPY N/A 11/23/2021    Procedure: ESOPHAGOGASTRODUODENOSCOPY WITH BIOPSY;  Surgeon: Palmira Pate MD;  Location: [facility];  Service: Gastroenterology;  Laterality: N/A;   • ENDOSCOPY AND COLONOSCOPY     • GALLBLADDER SURGERY     • JOINT REPLACEMENT Left 05/02/2017    [facility]  DR LEVIN  LEFT TOTAL KNEE   • KNEE ARTHROSCOPY W/ MENISCECTOMY Right    • LAPAROSCOPIC TUBAL LIGATION     • FL TOTAL KNEE ARTHROPLASTY Left  5/2/2017    Procedure: TOTAL KNEE ARTHROPLASTY;  Surgeon: Feroz Johnson MD;  Location: [facility];  Service: Orthopedics   • STERILIZATION     • TONSILLECTOMY         Family History:  Family History   Problem Relation Age of Onset   • Arthritis Mother    • Diabetes Mother    • Cancer Mother    • Heart disease Father    • Diabetes Daughter    • Diabetes Son    • Diabetes Maternal Aunt    • Heart disease Maternal Grandmother    • Breast cancer Neg Hx        Social History:  Social History     Socioeconomic History   • Marital status:      Spouse name: natalie   • Number of children: 3   • Years of education: 12   Tobacco Use   • Smoking status: Never Smoker   • Smokeless tobacco: Never Used   Vaping Use   • Vaping Use: Never used   Substance and Sexual Activity   • Alcohol use: Yes     Comment: socially   • Drug use: No   • Sexual activity: Defer     Birth control/protection: Surgical       Current Medication List:    Current Outpatient Medications:   •  amLODIPine (NORVASC) 10 MG tablet, Take 1 tablet by mouth Daily. (Patient taking differently: Take 10 mg by mouth Every Night.), Disp: 90 tablet, Rfl: 3  •  atorvastatin (LIPITOR) 40 MG tablet, Take 1 tablet by mouth Daily. (Patient taking differently: Take 40 mg by mouth Every Night.), Disp: 90 tablet, Rfl: 3  •  cloNIDine (CATAPRES) 0.2 MG tablet, TAKE ONE Tablet BY MOUTH THREE TIMES DAILY, Disp: 270 tablet, Rfl: 1  •  Continuous Blood Gluc  (Dexcom G6 ) device, 1 Device Daily., Disp: 3 each, Rfl: 3  •  Continuous Blood Gluc Sensor (Dexcom G6 Sensor), Every 10 (Ten) Days., Disp: 9 each, Rfl: 3  •  Continuous Blood Gluc Transmit (Dexcom G6 Transmitter) misc, 1 Device Daily., Disp: 1 each, Rfl: 5  •  escitalopram (LEXAPRO) 10 MG tablet, Take 1 tablet by mouth Daily., Disp: 90 tablet, Rfl: 3  •  glucose blood test strip, 1 each by Other route 3 (Three) Times a Day., Disp: 100 each, Rfl: 12  •  hydrALAZINE (APRESOLINE) 25 MG tablet, Take 3  "tablets by mouth 3 (Three) Times a Day., Disp: 270 tablet, Rfl: 1  •  Insulin Glargine, 2 Unit Dial, (Toujeo Max SoloStar) 300 UNIT/ML solution pen-injector injection, Inject 40-60 Units under the skin into the appropriate area as directed Daily., Disp: 3 pen, Rfl: 5  •  insulin lispro (humaLOG) 100 UNIT/ML injection, Inject 0-6 Units under the skin into the appropriate area as directed 3 (Three) Times a Day Before Meals. Sliding scale 4-6 units if BG > 180, Disp: 9 mL, Rfl: 5  •  Insulin Pen Needle 32G X 5 MM misc, 1 each 4 (Four) Times a Day., Disp: 120 each, Rfl: 5  •  isosorbide mononitrate (IMDUR) 60 MG 24 hr tablet, TAKE 1 Tablet BY MOUTH EVERY DAY, Disp: 90 tablet, Rfl: 0  •  levothyroxine (SYNTHROID, LEVOTHROID) 25 MCG tablet, Take 1 tablet by mouth Daily., Disp: 90 tablet, Rfl: 3  •  metoprolol succinate XL (TOPROL-XL) 50 MG 24 hr tablet, Take 50 mg by mouth Daily., Disp: , Rfl:   •  Multiple Vitamins-Minerals (PRESERVISION AREDS 2 PO), Take 1 capsule by mouth Daily., Disp: , Rfl:   •  nystatin (MYCOSTATIN) 903072 UNIT/GM powder, Apply  one application topically to the appropriate area as directed Every 12 (Twelve) Hours. (Patient taking differently: Apply  topically to the appropriate area as directed 2 (Two) Times a Day As Needed (itching).), Disp: 60 g, Rfl: 0  •  pantoprazole (Protonix) 40 MG EC tablet, Take 1 tablet by mouth Daily., Disp: 90 tablet, Rfl: 3  •  vitamin D (ERGOCALCIFEROL) 1.25 MG (78529 UT) capsule capsule, Take 1 capsule by mouth 1 (One) Time Per Week., Disp: 15 capsule, Rfl: 3  No current facility-administered medications for this visit.    Allergies:   Patient has no known allergies.    Vitals:  BP (!) 217/89   Pulse 55   Ht 160 cm (63\")   Wt 87.1 kg (192 lb)   SpO2 95%   BMI 34.01 kg/m²     Physical Exam:  Physical Exam  Constitutional:       Appearance: She is obese.   HENT:      Head: Normocephalic and atraumatic.      Nose: Nose normal. No congestion or rhinorrhea.   Eyes:    "   General: No scleral icterus.     Extraocular Movements: Extraocular movements intact.      Conjunctiva/sclera: Conjunctivae normal.      Pupils: Pupils are equal, round, and reactive to light.   Cardiovascular:      Rate and Rhythm: Normal rate and regular rhythm.      Pulses: Normal pulses.      Heart sounds: Normal heart sounds.   Pulmonary:      Effort: Pulmonary effort is normal.      Breath sounds: Normal breath sounds.   Abdominal:      General: Abdomen is flat. Bowel sounds are normal. There is no distension.      Palpations: Abdomen is soft. There is no shifting dullness, fluid wave, hepatomegaly, splenomegaly, mass or pulsatile mass.      Tenderness: There is no abdominal tenderness. There is no guarding or rebound.      Hernia: No hernia is present.   Musculoskeletal:         General: Signs of injury (left shin with swelling) present. No swelling or tenderness.      Cervical back: Normal range of motion and neck supple.      Right lower leg: Edema present.      Left lower leg: Edema present.   Skin:     General: Skin is warm and dry.      Coloration: Skin is not jaundiced.   Neurological:      General: No focal deficit present.      Mental Status: She is alert and oriented to person, place, and time.   Psychiatric:         Mood and Affect: Mood normal.         Behavior: Behavior normal.         Results Review:  Lab Results:   Infusion on 05/02/2022   Component Date Value Ref Range Status   • Product Code 05/03/2022 C7396A96   Final   • Unit Number 05/03/2022 [id number]   Final   • UNIT  ABO 05/03/2022 B   Final   • UNIT  RH 05/03/2022 POS   Final   • Crossmatch Interpretation 05/03/2022 Compatible   Final   • Dispense Status 05/03/2022 PT   Final   • Blood Expiration Date 05/03/2022 [id number]   Final   • Blood Type Barcode 05/03/2022 7300   Final   Lab on 05/02/2022   Component Date Value Ref Range Status   • Glucose 05/02/2022 173 (A) 65 - 99 mg/dL Final   • BUN 05/02/2022 28 (A) 8 - 23 mg/dL  Final   • Creatinine 05/02/2022 2.04 (A) 0.57 - 1.00 mg/dL Final   • Sodium 05/02/2022 141  136 - 145 mmol/L Final   • Potassium 05/02/2022 4.2  3.5 - 5.2 mmol/L Final   • Chloride 05/02/2022 108 (A) 98 - 107 mmol/L Final   • CO2 05/02/2022 21.4 (A) 22.0 - 29.0 mmol/L Final   • Calcium 05/02/2022 9.0  8.6 - 10.5 mg/dL Final   • Total Protein 05/02/2022 7.0  6.0 - 8.5 g/dL Final   • Albumin 05/02/2022 3.16 (A) 3.50 - 5.20 g/dL Final   • ALT (SGPT) 05/02/2022 18  1 - 33 U/L Final   • AST (SGOT) 05/02/2022 42 (A) 1 - 32 U/L Final   • Alkaline Phosphatase 05/02/2022 519 (A) 39 - 117 U/L Final   • Total Bilirubin 05/02/2022 0.2  0.0 - 1.2 mg/dL Final   • Globulin 05/02/2022 3.8  gm/dL Final   • A/G Ratio 05/02/2022 0.8  g/dL Final   • BUN/Creatinine Ratio 05/02/2022 13.7  7.0 - 25.0 Final   • Anion Gap 05/02/2022 11.6  5.0 - 15.0 mmol/L Final   • eGFR 05/02/2022 24.6 (A) >60.0 mL/min/1.73 Final    National Kidney Foundation and American Society of Nephrology (ASN) Task Force recommended calculation based on the Chronic Kidney Disease Epidemiology Collaboration (CKD-EPI) equation refit without adjustment for race.   • WBC 05/02/2022 8.15  3.40 - 10.80 10*3/mm3 Final   • RBC 05/02/2022 2.92 (A) 3.77 - 5.28 10*6/mm3 Final   • Hemoglobin 05/02/2022 7.0 (A) 12.0 - 15.9 g/dL Final   • Hematocrit 05/02/2022 24.9 (A) 34.0 - 46.6 % Final   • MCV 05/02/2022 85.3  79.0 - 97.0 fL Final   • MCH 05/02/2022 24.0 (A) 26.6 - 33.0 pg Final   • MCHC 05/02/2022 28.1 (A) 31.5 - 35.7 g/dL Final   • RDW 05/02/2022 15.9 (A) 12.3 - 15.4 % Final   • RDW-SD 05/02/2022 48.6  37.0 - 54.0 fl Final   • MPV 05/02/2022 9.2  6.0 - 12.0 fL Final   • Platelets 05/02/2022 269  140 - 450 10*3/mm3 Final   • Neutrophil % 05/02/2022 66.3  42.7 - 76.0 % Final   • Lymphocyte % 05/02/2022 19.8  19.6 - 45.3 % Final   • Monocyte % 05/02/2022 7.2  5.0 - 12.0 % Final   • Eosinophil % 05/02/2022 5.6  0.3 - 6.2 % Final   • Basophil % 05/02/2022 0.7  0.0 - 1.5 % Final    • Immature Grans % 05/02/2022 0.4  0.0 - 0.5 % Final   • Neutrophils, Absolute 05/02/2022 5.40  1.70 - 7.00 10*3/mm3 Final   • Lymphocytes, Absolute 05/02/2022 1.61  0.70 - 3.10 10*3/mm3 Final   • Monocytes, Absolute 05/02/2022 0.59  0.10 - 0.90 10*3/mm3 Final   • Eosinophils, Absolute 05/02/2022 0.46 (A) 0.00 - 0.40 10*3/mm3 Final   • Basophils, Absolute 05/02/2022 0.06  0.00 - 0.20 10*3/mm3 Final   • Immature Grans, Absolute 05/02/2022 0.03  0.00 - 0.05 10*3/mm3 Final   • nRBC 05/02/2022 0.0  0.0 - 0.2 /100 WBC Final   • Anisocytosis 05/02/2022 Slight/1+  None Seen Final   • Hypochromia 05/02/2022 Large/3+  None Seen Final   • Large Platelets 05/02/2022 Slight/1+  None Seen Final   • ABO Type 05/02/2022 B   Final   • RH type 05/02/2022 Positive   Final   • Antibody Screen 05/02/2022 Negative   Final   • T&S Expiration Date 05/02/2022 5/5/2022 11:59:59 PM   Final   Infusion on 04/29/2022   Component Date Value Ref Range Status   • WBC 04/29/2022 7.53  3.40 - 10.80 10*3/mm3 Final   • RBC 04/29/2022 3.08 (A) 3.77 - 5.28 10*6/mm3 Final   • Hemoglobin 04/29/2022 7.4 (A) 12.0 - 15.9 g/dL Final   • Hematocrit 04/29/2022 24.9 (A) 34.0 - 46.6 % Final   • MCV 04/29/2022 80.8  79.0 - 97.0 fL Final   • MCH 04/29/2022 24.0 (A) 26.6 - 33.0 pg Final   • MCHC 04/29/2022 29.7 (A) 31.5 - 35.7 g/dL Final   • RDW 04/29/2022 15.5 (A) 12.3 - 15.4 % Final   • RDW-SD 04/29/2022 45.8  37.0 - 54.0 fl Final   • MPV 04/29/2022 9.4  6.0 - 12.0 fL Final   • Platelets 04/29/2022 316  140 - 450 10*3/mm3 Final   • Neutrophil % 04/29/2022 70.1  42.7 - 76.0 % Final   • Lymphocyte % 04/29/2022 18.1 (A) 19.6 - 45.3 % Final   • Monocyte % 04/29/2022 5.6  5.0 - 12.0 % Final   • Eosinophil % 04/29/2022 5.4  0.3 - 6.2 % Final   • Basophil % 04/29/2022 0.7  0.0 - 1.5 % Final   • Immature Grans % 04/29/2022 0.1  0.0 - 0.5 % Final   • Neutrophils, Absolute 04/29/2022 5.28  1.70 - 7.00 10*3/mm3 Final   • Lymphocytes, Absolute 04/29/2022 1.36  0.70 - 3.10  10*3/mm3 Final   • Monocytes, Absolute 04/29/2022 0.42  0.10 - 0.90 10*3/mm3 Final   • Eosinophils, Absolute 04/29/2022 0.41 (A) 0.00 - 0.40 10*3/mm3 Final   • Basophils, Absolute 04/29/2022 0.05  0.00 - 0.20 10*3/mm3 Final   • Immature Grans, Absolute 04/29/2022 0.01  0.00 - 0.05 10*3/mm3 Final   • nRBC 04/29/2022 0.0  0.0 - 0.2 /100 WBC Final   Hospital Outpatient Visit on 04/25/2022   Component Date Value Ref Range Status   • Absolute Lung Fluid Content 04/25/2022 27  20 - 35 % Final   Lab on 04/25/2022   Component Date Value Ref Range Status   • Ferritin 04/25/2022 105.70  13.00 - 150.00 ng/mL Final   • Iron 04/25/2022 22 (A) 37 - 145 mcg/dL Final   • Iron Saturation 04/25/2022 7 (A) 20 - 50 % Final   • Transferrin 04/25/2022 223  200 - 360 mg/dL Final   • TIBC 04/25/2022 332  298 - 536 mcg/dL Final   • Glucose 04/25/2022 241 (A) 65 - 99 mg/dL Final   • BUN 04/25/2022 43 (A) 8 - 23 mg/dL Final   • Creatinine 04/25/2022 2.35 (A) 0.57 - 1.00 mg/dL Final   • Sodium 04/25/2022 138  136 - 145 mmol/L Final   • Potassium 04/25/2022 4.3  3.5 - 5.2 mmol/L Final   • Chloride 04/25/2022 105  98 - 107 mmol/L Final   • CO2 04/25/2022 19.3 (A) 22.0 - 29.0 mmol/L Final   • Calcium 04/25/2022 9.3  8.6 - 10.5 mg/dL Final   • BUN/Creatinine Ratio 04/25/2022 18.3  7.0 - 25.0 Final   • Anion Gap 04/25/2022 13.7  5.0 - 15.0 mmol/L Final   • eGFR 04/25/2022 20.7 (A) >60.0 mL/min/1.73 Final    National Kidney Foundation and American Society of Nephrology (ASN) Task Force recommended calculation based on the Chronic Kidney Disease Epidemiology Collaboration (CKD-EPI) equation refit without adjustment for race.   • Magnesium 04/25/2022 2.3  1.6 - 2.4 mg/dL Final   • proBNP 04/25/2022 4,079.0 (A) 0.0 - 1,800.0 pg/mL Final   • WBC 04/25/2022 8.35  3.40 - 10.80 10*3/mm3 Final   • RBC 04/25/2022 2.94 (A) 3.77 - 5.28 10*6/mm3 Final   • Hemoglobin 04/25/2022 7.3 (A) 12.0 - 15.9 g/dL Final   • Hematocrit 04/25/2022 24.0 (A) 34.0 - 46.6 % Final    • MCV 04/25/2022 81.6  79.0 - 97.0 fL Final   • MCH 04/25/2022 24.8 (A) 26.6 - 33.0 pg Final   • MCHC 04/25/2022 30.4 (A) 31.5 - 35.7 g/dL Final   • RDW 04/25/2022 15.5 (A) 12.3 - 15.4 % Final   • RDW-SD 04/25/2022 46.3  37.0 - 54.0 fl Final   • MPV 04/25/2022 9.2  6.0 - 12.0 fL Final   • Platelets 04/25/2022 298  140 - 450 10*3/mm3 Final   • Neutrophil % 04/25/2022 78.1 (A) 42.7 - 76.0 % Final   • Lymphocyte % 04/25/2022 12.9 (A) 19.6 - 45.3 % Final   • Monocyte % 04/25/2022 6.0  5.0 - 12.0 % Final   • Eosinophil % 04/25/2022 2.4  0.3 - 6.2 % Final   • Basophil % 04/25/2022 0.4  0.0 - 1.5 % Final   • Immature Grans % 04/25/2022 0.2  0.0 - 0.5 % Final   • Neutrophils, Absolute 04/25/2022 6.52  1.70 - 7.00 10*3/mm3 Final   • Lymphocytes, Absolute 04/25/2022 1.08  0.70 - 3.10 10*3/mm3 Final   • Monocytes, Absolute 04/25/2022 0.50  0.10 - 0.90 10*3/mm3 Final   • Eosinophils, Absolute 04/25/2022 0.20  0.00 - 0.40 10*3/mm3 Final   • Basophils, Absolute 04/25/2022 0.03  0.00 - 0.20 10*3/mm3 Final   • Immature Grans, Absolute 04/25/2022 0.02  0.00 - 0.05 10*3/mm3 Final   • nRBC 04/25/2022 0.0  0.0 - 0.2 /100 WBC Final   Lab on 04/21/2022   Component Date Value Ref Range Status   • Protein, 24H Urine 04/21/2022 3,497.6 (A) 0.0 - 150.0 mg/24hours Final   • 24H Urine Volume 04/21/2022 800  mL Final   • Time (Hours) 04/21/2022 24  hrs Final   Lab on 04/20/2022   Component Date Value Ref Range Status   • 25 Hydroxy, Vitamin D 04/20/2022 31.3  30.0 - 100.0 ng/ml Final   • Glucose 04/20/2022 173 (A) 65 - 99 mg/dL Final   • BUN 04/20/2022 33 (A) 8 - 23 mg/dL Final   • Creatinine 04/20/2022 2.32 (A) 0.57 - 1.00 mg/dL Final   • Sodium 04/20/2022 141  136 - 145 mmol/L Final   • Potassium 04/20/2022 4.4  3.5 - 5.2 mmol/L Final   • Chloride 04/20/2022 108 (A) 98 - 107 mmol/L Final   • CO2 04/20/2022 19.2 (A) 22.0 - 29.0 mmol/L Final   • Calcium 04/20/2022 9.0  8.6 - 10.5 mg/dL Final   • Total Protein 04/20/2022 6.8  6.0 - 8.5 g/dL  Final   • Albumin 04/20/2022 3.20 (A) 3.50 - 5.20 g/dL Final   • ALT (SGPT) 04/20/2022 19  1 - 33 U/L Final   • AST (SGOT) 04/20/2022 34 (A) 1 - 32 U/L Final   • Alkaline Phosphatase 04/20/2022 484 (A) 39 - 117 U/L Final   • Total Bilirubin 04/20/2022 0.2  0.0 - 1.2 mg/dL Final   • Globulin 04/20/2022 3.6  gm/dL Final   • A/G Ratio 04/20/2022 0.9  g/dL Final   • BUN/Creatinine Ratio 04/20/2022 14.2  7.0 - 25.0 Final   • Anion Gap 04/20/2022 13.8  5.0 - 15.0 mmol/L Final   • eGFR 04/20/2022 21.0 (A) >60.0 mL/min/1.73 Final    National Kidney Foundation and American Society of Nephrology (ASN) Task Force recommended calculation based on the Chronic Kidney Disease Epidemiology Collaboration (CKD-EPI) equation refit without adjustment for race.   • PTH, Intact 04/20/2022 81.1 (A) 15.0 - 65.0 pg/mL Final   • Phosphorus 04/20/2022 3.6  2.5 - 4.5 mg/dL Final   • Color, UA 04/20/2022 Yellow  Yellow, Straw Final   • Appearance, UA 04/20/2022 Clear  Clear Final   • pH, UA 04/20/2022 5.5  5.0 - 8.0 Final   • Specific Gravity, UA 04/20/2022 1.019  1.005 - 1.030 Final   • Glucose, UA 04/20/2022 250 mg/dL (1+) (A) Negative Final   • Ketones, UA 04/20/2022 Negative  Negative Final   • Bilirubin, UA 04/20/2022 Negative  Negative Final   • Blood, UA 04/20/2022 Negative  Negative Final   • Protein, UA 04/20/2022 >=300 mg/dL (3+) (A) Negative Final   • Leuk Esterase, UA 04/20/2022 Negative  Negative Final   • Nitrite, UA 04/20/2022 Negative  Negative Final   • Urobilinogen, UA 04/20/2022 0.2 E.U./dL  0.2 - 1.0 E.U./dL Final   • Protein/Creatinine Ratio, Urine 04/20/2022 4,618.5 (A) 0.0 - 200.0 mg/G Crea Final   • Creatinine, Urine 04/20/2022 123.2  mg/dL Final   • Total Protein, Urine 04/20/2022 569.0  mg/dL Final   • RBC, UA 04/20/2022 0-2  None Seen, 0-2 /HPF Final   • WBC, UA 04/20/2022 3-5 (A) None Seen, 0-2 /HPF Final   • Bacteria, UA 04/20/2022 None Seen  None Seen /HPF Final   • Squamous Epithelial Cells, UA 04/20/2022 7-12 (A)  None Seen, 0-2 /HPF Final   • Hyaline Casts, UA 04/20/2022 3-6  None Seen /LPF Final   • Methodology 04/20/2022 Automated Microscopy   Final       Assessment/Plan     Visit Diagnoses:    ICD-10-CM ICD-9-CM   1. Cirrhosis of liver with ascites, unspecified hepatic cirrhosis type (HCC)  K74.60 571.5    R18.8    2. Iron deficiency anemia, unspecified iron deficiency anemia type  D50.9 280.9   3. Type 2 diabetes mellitus with hypoglycemia without coma, with long-term current use of insulin (HCC)  E11.649 250.80    Z79.4 251.2     V58.67   4. CKD (chronic kidney disease) stage 4, GFR 15-29 ml/min (HCC)  N18.4 585.4       Plan:  Orders Placed This Encounter   Procedures   • Haptoglobin   • Iron Profile   • Comprehensive Metabolic Panel   • AFP Tumor Marker   • CBC & Differential       The patient's anemia is likely multifactorial.  The patient has cirrhosis and history of stage IV kidney disease.  I will obtain an iron profile and haptoglobin today as well as a CBC.  Because of cirrhosis, I will obtain an AFP and CMP today.  I have discussed limiting sugar in her diet and focusing on eating lean meats and vegetables.  She will follow-up in 3 months.      MEDS ORDERED DURING VISIT:  No orders of the defined types were placed in this encounter.      No follow-ups on file.             This document has been electronically signed by Palmira Pate MD   May 5, 2022 12:38 EDT      Part of this note may be an electronic transcription/translation of spoken language to printed text using the Dragon Dictation System.

## 2022-05-05 NOTE — PROGRESS NOTES
Chief Complaint   Patient presents with   • Weight Loss   • YUAN       Britta Lee is a 76 y.o. female who presents to the office today for follow up appointment for Weight Loss and YUAN  .    HPI          Review of Systems   Constitutional: Negative.  Negative for activity change, appetite change, chills, fatigue, fever and unexpected weight change.   HENT: Negative.  Negative for mouth sores and trouble swallowing.    Eyes: Negative.  Negative for photophobia, pain and redness.   Respiratory: Negative.  Negative for cough and choking.    Cardiovascular: Negative.  Negative for chest pain and leg swelling.   Gastrointestinal: Negative.  Negative for abdominal distention, abdominal pain, anal bleeding, blood in stool, constipation, diarrhea, nausea, rectal pain and vomiting.   Endocrine: Negative.  Negative for cold intolerance, heat intolerance and polyphagia.   Genitourinary: Negative.  Negative for difficulty urinating and dysuria.   Musculoskeletal: Negative.  Negative for arthralgias, joint swelling and myalgias.   Skin: Negative.  Negative for rash and wound.   Allergic/Immunologic: Negative.  Negative for environmental allergies and food allergies.   Neurological: Negative.  Negative for dizziness and light-headedness.   Hematological: Negative.  Negative for adenopathy. Does not bruise/bleed easily.   Psychiatric/Behavioral: Negative.  Negative for sleep disturbance. The patient is not nervous/anxious.        ACTIVE PROBLEMS:   Specialty Problems        Gastroenterology Problems    Malabsorption of iron              PAST MEDICAL HISTORY:  Past Medical History:   Diagnosis Date   • Acquired hypothyroidism 4/22/2021   • Anemia    • Arthritis    • Carpal tunnel syndrome    • Coronary artery disease    • Diabetes mellitus (HCC)    • Elevated cholesterol    • GERD (gastroesophageal reflux disease)    • History of pneumonia    • History of unsteady gait     OCASSIONALLY   • Hypertension    • Insomnia    • Kidney  disease    • LENNY (obstructive sleep apnea) 12/1/2020   • Osteoarthritis    • Renal insufficiency    • Sciatica        SURGICAL HISTORY:  Past Surgical History:   Procedure Laterality Date   • ABDOMINAL SURGERY     • APPENDECTOMY     • CARDIAC CATHETERIZATION      1 STENT  ---  2000   • CARDIAC SURGERY     • CAROTID STENT     • CARPAL TUNNEL RELEASE Right 10/8/2019    Procedure: CARPAL TUNNEL RELEASE;  Surgeon: Feroz Levin MD;  Location: [facility];  Service: Orthopedics   • CATARACT EXTRACTION     • CHOLECYSTECTOMY     • COLONOSCOPY     • COLONOSCOPY N/A 11/23/2021    Procedure: COLONOSCOPY FOR SCREENING;  Surgeon: Palmira Pate MD;  Location: [facility];  Service: Gastroenterology;  Laterality: N/A;   • CORONARY ANGIOPLASTY WITH STENT PLACEMENT     • ENDOSCOPY     • ENDOSCOPY N/A 3/5/2020    Procedure: ESOPHAGOGASTRODUODENOSCOPY;  Surgeon: Alexandru Bagley MD;  Location: [facility];  Service: Gastroenterology;  Laterality: N/A;   • ENDOSCOPY N/A 11/23/2021    Procedure: ESOPHAGOGASTRODUODENOSCOPY WITH BIOPSY;  Surgeon: Palmira Pate MD;  Location: [facility];  Service: Gastroenterology;  Laterality: N/A;   • ENDOSCOPY AND COLONOSCOPY     • GALLBLADDER SURGERY     • JOINT REPLACEMENT Left 05/02/2017    [facility]  DR LEVIN  LEFT TOTAL KNEE   • KNEE ARTHROSCOPY W/ MENISCECTOMY Right    • LAPAROSCOPIC TUBAL LIGATION     • AK TOTAL KNEE ARTHROPLASTY Left 5/2/2017    Procedure: TOTAL KNEE ARTHROPLASTY;  Surgeon: Feroz Levin MD;  Location: [facility];  Service: Orthopedics   • STERILIZATION     • TONSILLECTOMY         FAMILY HISTORY:  Family History   Problem Relation Age of Onset   • Arthritis Mother    • Diabetes Mother    • Cancer Mother    • Heart disease Father    • Diabetes Daughter    • Diabetes Son    • Diabetes Maternal Aunt    • Heart disease Maternal Grandmother    • Breast cancer Neg Hx        SOCIAL HISTORY:  Social History     Tobacco Use   • Smoking status: Never  Smoker   • Smokeless tobacco: Never Used   Substance Use Topics   • Alcohol use: Yes     Comment: socially       CURRENT MEDICATION:    Current Outpatient Medications:   •  amLODIPine (NORVASC) 10 MG tablet, Take 1 tablet by mouth Daily. (Patient taking differently: Take 10 mg by mouth Every Night.), Disp: 90 tablet, Rfl: 3  •  atorvastatin (LIPITOR) 40 MG tablet, Take 1 tablet by mouth Daily. (Patient taking differently: Take 40 mg by mouth Every Night.), Disp: 90 tablet, Rfl: 3  •  cloNIDine (CATAPRES) 0.2 MG tablet, TAKE ONE Tablet BY MOUTH THREE TIMES DAILY, Disp: 270 tablet, Rfl: 1  •  Continuous Blood Gluc  (Dexcom G6 ) device, 1 Device Daily., Disp: 3 each, Rfl: 3  •  Continuous Blood Gluc Sensor (Dexcom G6 Sensor), Every 10 (Ten) Days., Disp: 9 each, Rfl: 3  •  Continuous Blood Gluc Transmit (Dexcom G6 Transmitter) misc, 1 Device Daily., Disp: 1 each, Rfl: 5  •  escitalopram (LEXAPRO) 10 MG tablet, Take 1 tablet by mouth Daily., Disp: 90 tablet, Rfl: 3  •  glucose blood test strip, 1 each by Other route 3 (Three) Times a Day., Disp: 100 each, Rfl: 12  •  hydrALAZINE (APRESOLINE) 25 MG tablet, Take 3 tablets by mouth 3 (Three) Times a Day., Disp: 270 tablet, Rfl: 1  •  Insulin Glargine, 2 Unit Dial, (Toujeo Max SoloStar) 300 UNIT/ML solution pen-injector injection, Inject 40-60 Units under the skin into the appropriate area as directed Daily., Disp: 3 pen, Rfl: 5  •  insulin lispro (humaLOG) 100 UNIT/ML injection, Inject 0-6 Units under the skin into the appropriate area as directed 3 (Three) Times a Day Before Meals. Sliding scale 4-6 units if BG > 180, Disp: 9 mL, Rfl: 5  •  Insulin Pen Needle 32G X 5 MM misc, 1 each 4 (Four) Times a Day., Disp: 120 each, Rfl: 5  •  isosorbide mononitrate (IMDUR) 60 MG 24 hr tablet, TAKE 1 Tablet BY MOUTH EVERY DAY, Disp: 90 tablet, Rfl: 0  •  levothyroxine (SYNTHROID, LEVOTHROID) 25 MCG tablet, Take 1 tablet by mouth Daily., Disp: 90 tablet, Rfl: 3  •   "metoprolol succinate XL (TOPROL-XL) 50 MG 24 hr tablet, Take 50 mg by mouth Daily., Disp: , Rfl:   •  Multiple Vitamins-Minerals (PRESERVISION AREDS 2 PO), Take 1 capsule by mouth Daily., Disp: , Rfl:   •  nystatin (MYCOSTATIN) 552677 UNIT/GM powder, Apply  one application topically to the appropriate area as directed Every 12 (Twelve) Hours. (Patient taking differently: Apply  topically to the appropriate area as directed 2 (Two) Times a Day As Needed (itching).), Disp: 60 g, Rfl: 0  •  pantoprazole (Protonix) 40 MG EC tablet, Take 1 tablet by mouth Daily., Disp: 90 tablet, Rfl: 3  •  vitamin D (ERGOCALCIFEROL) 1.25 MG (60155 UT) capsule capsule, Take 1 capsule by mouth 1 (One) Time Per Week., Disp: 15 capsule, Rfl: 3  No current facility-administered medications for this visit.    ALLERGIES:  Patient has no known allergies.    VISIT VITALS:  Pulse 55   Ht 160 cm (63\")   Wt 87.1 kg (192 lb)   SpO2 95%   BMI 34.01 kg/m²     Physical Exam    Assessment/Plan     No diagnosis found.    No follow-ups on file.         {BMI is >= 30 and <= 34.9 (Class 1 obesity). The following options were offered after discussion (Optional):[id number]}      Palmira Valadez May  "

## 2022-06-06 ENCOUNTER — APPOINTMENT (OUTPATIENT)
Dept: CARDIOLOGY | Facility: HOSPITAL | Age: 77
End: 2022-06-06

## 2022-06-09 ENCOUNTER — OFFICE VISIT (OUTPATIENT)
Dept: FAMILY MEDICINE CLINIC | Facility: CLINIC | Age: 77
End: 2022-06-09

## 2022-06-09 VITALS
WEIGHT: 195 LBS | HEART RATE: 76 BPM | HEIGHT: 63 IN | TEMPERATURE: 98.6 F | BODY MASS INDEX: 34.55 KG/M2 | DIASTOLIC BLOOD PRESSURE: 80 MMHG | OXYGEN SATURATION: 99 % | SYSTOLIC BLOOD PRESSURE: 160 MMHG

## 2022-06-09 DIAGNOSIS — N25.81 SECONDARY HYPERPARATHYROIDISM OF RENAL ORIGIN: ICD-10-CM

## 2022-06-09 DIAGNOSIS — E78.5 DYSLIPIDEMIA: Chronic | ICD-10-CM

## 2022-06-09 DIAGNOSIS — E03.9 ACQUIRED HYPOTHYROIDISM: ICD-10-CM

## 2022-06-09 DIAGNOSIS — E11.22 TYPE 2 DIABETES MELLITUS WITH STAGE 4 CHRONIC KIDNEY DISEASE, WITH LONG-TERM CURRENT USE OF INSULIN: ICD-10-CM

## 2022-06-09 DIAGNOSIS — N18.4 STAGE 4 CHRONIC KIDNEY DISEASE: ICD-10-CM

## 2022-06-09 DIAGNOSIS — N18.4 TYPE 2 DIABETES MELLITUS WITH STAGE 4 CHRONIC KIDNEY DISEASE, WITH LONG-TERM CURRENT USE OF INSULIN: ICD-10-CM

## 2022-06-09 DIAGNOSIS — I10 ESSENTIAL HYPERTENSION: Primary | Chronic | ICD-10-CM

## 2022-06-09 DIAGNOSIS — Z79.4 TYPE 2 DIABETES MELLITUS WITH STAGE 4 CHRONIC KIDNEY DISEASE, WITH LONG-TERM CURRENT USE OF INSULIN: ICD-10-CM

## 2022-06-09 LAB
25(OH)D3 SERPL-MCNC: 41.9 NG/ML (ref 30–100)
ALBUMIN SERPL-MCNC: 3.9 G/DL (ref 3.5–5.2)
ALBUMIN/GLOB SERPL: 1.1 G/DL
ALP SERPL-CCNC: 426 U/L (ref 39–117)
ALT SERPL W P-5'-P-CCNC: 17 U/L (ref 1–33)
ANION GAP SERPL CALCULATED.3IONS-SCNC: 15.2 MMOL/L (ref 5–15)
AST SERPL-CCNC: 36 U/L (ref 1–32)
BACTERIA UR QL AUTO: ABNORMAL /HPF
BILIRUB SERPL-MCNC: 0.3 MG/DL (ref 0–1.2)
BILIRUB UR QL STRIP: NEGATIVE
BUN SERPL-MCNC: 26 MG/DL (ref 8–23)
BUN/CREAT SERPL: 11.6 (ref 7–25)
CALCIUM SPEC-SCNC: 9.4 MG/DL (ref 8.6–10.5)
CHLORIDE SERPL-SCNC: 102 MMOL/L (ref 98–107)
CHOLEST SERPL-MCNC: 126 MG/DL (ref 0–200)
CLARITY UR: CLEAR
CO2 SERPL-SCNC: 18.8 MMOL/L (ref 22–29)
COLOR UR: YELLOW
CREAT SERPL-MCNC: 2.24 MG/DL (ref 0.57–1)
EGFRCR SERPLBLD CKD-EPI 2021: 22 ML/MIN/1.73
GLOBULIN UR ELPH-MCNC: 3.4 GM/DL
GLUCOSE SERPL-MCNC: 151 MG/DL (ref 65–99)
GLUCOSE UR STRIP-MCNC: ABNORMAL MG/DL
HBA1C MFR BLD: 6.3 % (ref 4.8–5.6)
HDLC SERPL-MCNC: 43 MG/DL (ref 40–60)
HGB UR QL STRIP.AUTO: NEGATIVE
HYALINE CASTS UR QL AUTO: ABNORMAL /LPF
KETONES UR QL STRIP: NEGATIVE
LDLC SERPL CALC-MCNC: 55 MG/DL (ref 0–100)
LDLC/HDLC SERPL: 1.17 {RATIO}
LEUKOCYTE ESTERASE UR QL STRIP.AUTO: NEGATIVE
NITRITE UR QL STRIP: NEGATIVE
PH UR STRIP.AUTO: 6 [PH] (ref 5–8)
PHOSPHATE SERPL-MCNC: 3.5 MG/DL (ref 2.5–4.5)
POTASSIUM SERPL-SCNC: 4 MMOL/L (ref 3.5–5.2)
PROT SERPL-MCNC: 7.3 G/DL (ref 6–8.5)
PROT UR QL STRIP: ABNORMAL
PTH-INTACT SERPL-MCNC: 53.8 PG/ML (ref 15–65)
RBC # UR STRIP: ABNORMAL /HPF
REF LAB TEST METHOD: ABNORMAL
SODIUM SERPL-SCNC: 136 MMOL/L (ref 136–145)
SP GR UR STRIP: 1.02 (ref 1–1.03)
SQUAMOUS #/AREA URNS HPF: ABNORMAL /HPF
T4 FREE SERPL-MCNC: 1.28 NG/DL (ref 0.93–1.7)
TRIGL SERPL-MCNC: 163 MG/DL (ref 0–150)
TSH SERPL DL<=0.05 MIU/L-ACNC: 9.67 UIU/ML (ref 0.27–4.2)
UROBILINOGEN UR QL STRIP: ABNORMAL
VLDLC SERPL-MCNC: 28 MG/DL (ref 5–40)
WBC # UR STRIP: ABNORMAL /HPF

## 2022-06-09 PROCEDURE — 99214 OFFICE O/P EST MOD 30 MIN: CPT | Performed by: PHYSICIAN ASSISTANT

## 2022-06-09 PROCEDURE — 83970 ASSAY OF PARATHORMONE: CPT | Performed by: INTERNAL MEDICINE

## 2022-06-09 PROCEDURE — 84439 ASSAY OF FREE THYROXINE: CPT | Performed by: PHYSICIAN ASSISTANT

## 2022-06-09 PROCEDURE — 84156 ASSAY OF PROTEIN URINE: CPT | Performed by: INTERNAL MEDICINE

## 2022-06-09 PROCEDURE — 81001 URINALYSIS AUTO W/SCOPE: CPT | Performed by: INTERNAL MEDICINE

## 2022-06-09 PROCEDURE — 84100 ASSAY OF PHOSPHORUS: CPT | Performed by: PHYSICIAN ASSISTANT

## 2022-06-09 PROCEDURE — 82570 ASSAY OF URINE CREATININE: CPT | Performed by: INTERNAL MEDICINE

## 2022-06-09 PROCEDURE — 82306 VITAMIN D 25 HYDROXY: CPT | Performed by: INTERNAL MEDICINE

## 2022-06-09 PROCEDURE — 80061 LIPID PANEL: CPT | Performed by: PHYSICIAN ASSISTANT

## 2022-06-09 PROCEDURE — 83036 HEMOGLOBIN GLYCOSYLATED A1C: CPT | Performed by: PHYSICIAN ASSISTANT

## 2022-06-09 PROCEDURE — 84443 ASSAY THYROID STIM HORMONE: CPT | Performed by: PHYSICIAN ASSISTANT

## 2022-06-09 PROCEDURE — 80053 COMPREHEN METABOLIC PANEL: CPT | Performed by: PHYSICIAN ASSISTANT

## 2022-06-09 RX ORDER — BUMETANIDE 1 MG/1
1 TABLET ORAL EVERY OTHER DAY
COMMUNITY
Start: 2022-04-25 | End: 2022-08-11 | Stop reason: SDUPTHER

## 2022-06-09 RX ORDER — CHOLECALCIFEROL (VITAMIN D3) 1250 MCG
50000 CAPSULE ORAL WEEKLY
COMMUNITY
Start: 2022-04-11 | End: 2022-06-13

## 2022-06-09 NOTE — PATIENT INSTRUCTIONS
"https://www.diabeteseducator.org/docs/default-source/living-with-diabetes/conquering-the-grocery-store-v1.pdf?sfvrsn=4\">   Carbohydrate Counting for Diabetes Mellitus, Adult  Carbohydrate counting is a method of keeping track of how many carbohydrates you eat. Eating carbohydrates naturally increases the amount of sugar (glucose) in the blood. Counting how many carbohydrates you eat improves your blood glucose control, which helps you manage your diabetes.  It is important to know how many carbohydrates you can safely have in each meal. This is different for every person. A dietitian can help you make a meal plan and calculate how many carbohydrates you should have at each meal and snack.  What foods contain carbohydrates?  Carbohydrates are found in the following foods:  Grains, such as breads and cereals.  Dried beans and soy products.  Starchy vegetables, such as potatoes, peas, and corn.  Fruit and fruit juices.  Milk and yogurt.  Sweets and snack foods, such as cake, cookies, candy, chips, and soft drinks.  How do I count carbohydrates in foods?  There are two ways to count carbohydrates in food. You can read food labels or learn standard serving sizes of foods. You can use either of the methods or a combination of both.  Using the Nutrition Facts label  The Nutrition Facts list is included on the labels of almost all packaged foods and beverages in the [place] It includes:  The serving size.  Information about nutrients in each serving, including the grams (g) of carbohydrate per serving.  To use the Nutrition Facts:  Decide how many servings you will have.  Multiply the number of servings by the number of carbohydrates per serving.  The resulting number is the total amount of carbohydrates that you will be having.  Learning the standard serving sizes of foods  When you eat carbohydrate foods that are not packaged or do not include Nutrition Facts on the label, you need to measure the servings in order to count " the amount of carbohydrates.  Measure the foods that you will eat with a food scale or measuring cup, if needed.  Decide how many standard-size servings you will eat.  Multiply the number of servings by 15. For foods that contain carbohydrates, one serving equals 15 g of carbohydrates.  For example, if you eat 2 cups or 10 oz (300 g) of strawberries, you will have eaten 2 servings and 30 g of carbohydrates (2 servings x 15 g = 30 g).  For foods that have more than one food mixed, such as soups and casseroles, you must count the carbohydrates in each food that is included.  The following list contains standard serving sizes of common carbohydrate-rich foods. Each of these servings has about 15 g of carbohydrates:  1 slice of bread.  1 six-inch (15 cm) tortilla.  ? cup or 2 oz (53 g) cooked rice or pasta.  ½ cup or 3 oz (85 g) cooked or canned, drained and rinsed beans or lentils.  ½ cup or 3 oz (85 g) starchy vegetable, such as peas, corn, or squash.  ½ cup or 4 oz (120 g) hot cereal.  ½ cup or 3 oz (85 g) boiled or mashed potatoes, or ¼ or 3 oz (85 g) of a large baked potato.  ½ cup or 4 fl oz (118 mL) fruit juice.  1 cup or 8 fl oz (237 mL) milk.  1 small or 4 oz (106 g) apple.  ½ or 2 oz (63 g) of a medium banana.  1 cup or 5 oz (150 g) strawberries.  3 cups or 1 oz (24 g) popped popcorn.  What is an example of carbohydrate counting?  To calculate the number of carbohydrates in this sample meal, follow the steps shown below.  Sample meal  3 oz (85 g) chicken breast.  ? cup or 4 oz (106 g) brown rice.  ½ cup or 3 oz (85 g) corn.  1 cup or 8 fl oz (237 mL) milk.  1 cup or 5 oz (150 g) strawberries with sugar-free whipped topping.  Carbohydrate calculation  Identify the foods that contain carbohydrates:  Rice.  Corn.  Milk.  Strawberries.  Calculate how many servings you have of each food:  2 servings rice.  1 serving corn.  1 serving milk.  1 serving strawberries.  Multiply each number of servings by 15   servings rice x 15 g = 30 g.  1 serving corn x 15 g = 15 g.  1 serving milk x 15 g = 15 g.  1 serving strawberries x 15 g = 15 g.  Add together all of the amounts to find the total grams of carbohydrates eaten:  30 g + 15 g + 15 g + 15 g = 75 g of carbohydrates total.  What are tips for following this plan?  Shopping  Develop a meal plan and then make a shopping list.  Buy fresh and frozen vegetables, fresh and frozen fruit, dairy, eggs, beans, lentils, and whole grains.  Look at food labels. Choose foods that have more fiber and less sugar.  Avoid processed foods and foods with added sugars.  Meal planning  Aim to have the same amount of carbohydrates at each meal and for each snack time.  Plan to have regular, balanced meals and snacks.  Where to find more information  American Diabetes Association: www.diabetes.org  Centers for Disease Control and Prevention: www.cdc.gov  Summary  Carbohydrate counting is a method of keeping track of how many carbohydrates you eat.  Eating carbohydrates naturally increases the amount of sugar (glucose) in the blood.  Counting how many carbohydrates you eat improves your blood glucose control, which helps you manage your diabetes.  A dietitian can help you make a meal plan and calculate how many carbohydrates you should have at each meal and snack.  This information is not intended to replace advice given to you by your health care provider. Make sure you discuss any questions you have with your health care provider.  Document Revised: 12/17/2020 Document Reviewed: 12/18/2020  [employer] Patient Education © 2021 [employer]  Fat and Cholesterol Restricted Eating Plan  Getting too much fat and cholesterol in your diet may cause health problems. Choosing the right foods helps keep your fat and cholesterol at normal levels. This can keep you from getting certain diseases.  Your doctor may recommend an eating plan that includes:  Total fat: ______% or less of total calories a  "day.  Saturated fat: ______% or less of total calories a day.  Cholesterol: less than _________mg a day.  Fiber: ______g a day.  What are tips for following this plan?  Meal planning  At meals, divide your plate into four equal parts:  Fill one-half of your plate with vegetables and green salads.  Fill one-fourth of your plate with whole grains.  Fill one-fourth of your plate with low-fat (lean) protein foods.  Eat fish that is high in omega-3 fats at least two times a week. This includes mackerel, tuna, sardines, and salmon.  Eat foods that are high in fiber, such as whole grains, beans, apples, broccoli, carrots, peas, and barley.  General tips    Work with your doctor to lose weight if you need to.  Avoid:  Foods with added sugar.  Fried foods.  Foods with partially hydrogenated oils.  Limit alcohol intake to no more than 1 drink a day for nonpregnant women and 2 drinks a day for men. One drink equals 12 oz of beer, 5 oz of wine, or 1½ oz of hard liquor.    Reading food labels  Check food labels for:  Trans fats.  Partially hydrogenated oils.  Saturated fat (g) in each serving.  Cholesterol (mg) in each serving.  Fiber (g) in each serving.  Choose foods with healthy fats, such as:  Monounsaturated fats.  Polyunsaturated fats.  Omega-3 fats.  Choose grain products that have whole grains. Look for the word \"whole\" as the first word in the ingredient list.  Cooking  Cook foods using low-fat methods. These include baking, boiling, grilling, and broiling.  Eat more home-cooked foods. Eat at restaurants and buffets less often.  Avoid cooking using saturated fats, such as butter, cream, palm oil, palm kernel oil, and coconut oil.  Recommended foods    Fruits  All fresh, canned (in natural juice), or frozen fruits.  Vegetables  Fresh or frozen vegetables (raw, steamed, roasted, or grilled). Green salads.  Grains  Whole grains, such as whole wheat or whole grain breads, crackers, cereals, and pasta. Unsweetened oatmeal, " bulgur, barley, quinoa, or brown rice. Corn or whole wheat flour tortillas.  Meats and other protein foods  Ground beef (85% or leaner), grass-fed beef, or beef trimmed of fat. Skinless chicken or turkey. Ground chicken or turkey. Pork trimmed of fat. All fish and seafood. Egg whites. Dried beans, peas, or lentils. Unsalted nuts or seeds. Unsalted canned beans. Nut butters without added sugar or oil.  Dairy  Low-fat or nonfat dairy products, such as skim or 1% milk, 2% or reduced-fat cheeses, low-fat and fat-free ricotta or cottage cheese, or plain low-fat and nonfat yogurt.  Fats and oils  Tub margarine without trans fats. Light or reduced-fat mayonnaise and salad dressings. Avocado. Olive, canola, sesame, or safflower oils.  The items listed above may not be a complete list of foods and beverages you can eat. Contact a dietitian for more information.  Foods to avoid  Fruits  Canned fruit in heavy syrup. Fruit in cream or butter sauce. Fried fruit.  Vegetables  Vegetables cooked in cheese, cream, or butter sauce. Fried vegetables.  Grains  White bread. White pasta. White rice. Cornbread. Bagels, pastries, and croissants. Crackers and snack foods that contain trans fat and hydrogenated oils.  Meats and other protein foods  Fatty cuts of meat. Ribs, chicken wings, dixon, sausage, bologna, salami, chitterlings, fatback, hot dogs, bratwurst, and packaged lunch meats. Liver and organ meats. Whole eggs and egg yolks. Chicken and turkey with skin. Fried meat.  Dairy  Whole or 2% milk, cream, half-and-half, and cream cheese. Whole milk cheeses. Whole-fat or sweetened yogurt. Full-fat cheeses. Nondairy creamers and whipped toppings. Processed cheese, cheese spreads, and cheese curds.  Beverages  Alcohol. Sugar-sweetened drinks such as sodas, lemonade, and fruit drinks.  Fats and oils  Butter, stick margarine, lard, shortening, ghee, or dixon fat. Coconut, palm kernel, and palm oils.  Sweets and desserts  Corn syrup, sugars,  honey, and molasses. Candy. Jam and jelly. Syrup. Sweetened cereals. Cookies, pies, cakes, donuts, muffins, and ice cream.  The items listed above may not be a complete list of foods and beverages you should avoid. Contact a dietitian for more information.  Summary  Choosing the right foods helps keep your fat and cholesterol at normal levels. This can keep you from getting certain diseases.  At meals, fill one-half of your plate with vegetables and green salads.  Eat high-fiber foods, like whole grains, beans, apples, carrots, peas, and barley.  Limit added sugar, saturated fats, alcohol, and fried foods.  This information is not intended to replace advice given to you by your health care provider. Make sure you discuss any questions you have with your health care provider.  Document Revised: 04/21/2021 Document Reviewed: 04/21/2021  [employer] Patient Education © 2021 [employer]

## 2022-06-09 NOTE — PROGRESS NOTES
"Alison Lee is a 76 y.o. female.       Chief Complaint -hypertension    History of Present Illness -    ROS    Hypertension-  Not at goal but is improved from previous BP readings.  Patient reports that she ran out of her amlodipine and did not get to take her nighttime dose last night.  Patient plans to get amlodipine refilled today.    Dyslipidemia-stable with atorvastatin    Diabetes mellitus type 2- stable with Humalog and low carbohydrate diabetic diet.  She does have associated chronic kidney disease for which she is being followed by nephrology.    Hypothyroidism-stable with Synthroid 25 mcg daily    The following portions of the patient's history were reviewed and updated as appropriate: allergies, current medications, past family history, past medical history, past social history, past surgical history and problem list.    Review of Systems    Objective  Vital signs:  /80   Pulse 76   Temp 98.6 °F (37 °C) (Temporal)   Ht 160 cm (63\")   Wt 88.5 kg (195 lb)   SpO2 99%   BMI 34.54 kg/m²     Physical Exam  Vitals and nursing note reviewed.   Constitutional:       Appearance: Normal appearance. She is well-developed.   Eyes:      Extraocular Movements: Extraocular movements intact.      Conjunctiva/sclera: Conjunctivae normal.   Cardiovascular:      Rate and Rhythm: Normal rate and regular rhythm.      Heart sounds: Normal heart sounds. No murmur heard.  Pulmonary:      Effort: Pulmonary effort is normal. No respiratory distress.      Breath sounds: Normal breath sounds. No wheezing.   Musculoskeletal:         General: No tenderness.   Skin:     General: Skin is warm and dry.      Findings: No rash.   Neurological:      Mental Status: She is alert and oriented to person, place, and time.   Psychiatric:         Mood and Affect: Mood normal.         Behavior: Behavior normal.         Thought Content: Thought content normal.         The following data was reviewed by: Lexie Dolan, " PA on 06/09/2022:  CMP    CMP 6/9/22 6/13/22 6/16/22   Glucose 151 (A) 184 (A) 216 (A)   BUN 26 (A) 24 (A) 30 (A)   Creatinine 2.24 (A) 2.08 (A) 2.71 (A)   Sodium 136 139 141   Potassium 4.0 4.0 4.2   Chloride 102 102 104   Calcium 9.4 9.0 8.7   Albumin 3.90  3.12 (A)   Total Bilirubin 0.3  0.2   Alkaline Phosphatase 426 (A)  407 (A)   AST (SGOT) 36 (A)  37 (A)   ALT (SGPT) 17  15   (A) Abnormal value            CBC w/diff    CBC w/Diff 4/25/22 4/29/22 5/2/22   WBC 8.35 7.53 8.15   RBC 2.94 (A) 3.08 (A) 2.92 (A)   Hemoglobin 7.3 (A) 7.4 (A) 7.0 (A)   Hematocrit 24.0 (A) 24.9 (A) 24.9 (A)   MCV 81.6 80.8 85.3   MCH 24.8 (A) 24.0 (A) 24.0 (A)   MCHC 30.4 (A) 29.7 (A) 28.1 (A)   RDW 15.5 (A) 15.5 (A) 15.9 (A)   Platelets 298 316 269   Neutrophil Rel % 78.1 (A) 70.1 66.3   Immature Granulocyte Rel % 0.2 0.1 0.4   Lymphocyte Rel % 12.9 (A) 18.1 (A) 19.8   Monocyte Rel % 6.0 5.6 7.2   Eosinophil Rel % 2.4 5.4 5.6   Basophil Rel % 0.4 0.7 0.7   (A) Abnormal value            Lipid Panel    Lipid Panel 9/23/21 3/7/22 6/9/22   Total Cholesterol 117 137 126   Triglycerides 90 92 163 (A)   HDL Cholesterol 42 40 43   VLDL Cholesterol 17 18 28   LDL Cholesterol  58 79 55   LDL/HDL Ratio 1.36 1.97 1.17   (A) Abnormal value            TSH    TSH 9/23/21 6/9/22   TSH 3.760 9.670 (A)   (A) Abnormal value            Lab Results   Component Value Date    HGBA1C 6.30 (H) 06/09/2022              Assessment & Plan     Diagnoses and all orders for this visit:    1. Essential hypertension (Primary)  Comments:  Advised to restart her amlodipine  Continue to monitor  Orders:  -     Cancel: Comprehensive Metabolic Panel  -     Comprehensive Metabolic Panel    2. Dyslipidemia  Comments:  Advised low-cholesterol diet  Continue atorvastatin  Orders:  -     Lipid Panel    3. Type 2 diabetes mellitus with stage 4 chronic kidney disease, with long-term current use of insulin (HCC)  -     Hemoglobin A1c    4. Acquired hypothyroidism  -     TSH  -      T4, Free    5. Secondary hyperparathyroidism of renal origin (HCC)  -     Vitamin D 25 hydroxy; Future  -     Cancel: Urinalysis With Microscopic If Indicated (No Culture) - Urine, Clean Catch; Future  -     PTH, Intact; Future  -     Protein / Creatinine Ratio, Urine - Urine, Clean Catch; Future  -     Phosphorus; Future  -     Comprehensive metabolic panel; Future  -     Vitamin D 25 hydroxy  -     PTH, Intact  -     Protein / Creatinine Ratio, Urine - Urine, Clean Catch  -     Phosphorus  -     Cancel: Comprehensive metabolic panel    6. Stage 4 chronic kidney disease (HCC)  -     Vitamin D 25 hydroxy; Future  -     Cancel: Urinalysis With Microscopic If Indicated (No Culture) - Urine, Clean Catch; Future  -     PTH, Intact; Future  -     Protein / Creatinine Ratio, Urine - Urine, Clean Catch; Future  -     Phosphorus; Future  -     Comprehensive metabolic panel; Future  -     Urinalysis With Culture If Indicated -; Future  -     Vitamin D 25 hydroxy  -     PTH, Intact  -     Protein / Creatinine Ratio, Urine - Urine, Clean Catch  -     Phosphorus  -     Cancel: Comprehensive metabolic panel  -     Urinalysis With Culture If Indicated - Urine, Clean Catch  -     Urinalysis, Microscopic Only - Urine, Clean Catch            Patient was given instructions and counseling regarding his condition or for health maintenance advice. Please see specific information pulled into the AVS if appropriate      This document has been electronically signed by:  Lexie Dolan PA-C

## 2022-06-10 LAB
CREAT UR-MCNC: 85 MG/DL
PROT ?TM UR-MCNC: 926 MG/DL
PROT/CREAT UR: ABNORMAL MG/G CREA (ref 0–200)

## 2022-06-13 ENCOUNTER — HOSPITAL ENCOUNTER (OUTPATIENT)
Dept: CARDIOLOGY | Facility: HOSPITAL | Age: 77
Discharge: HOME | End: 2022-06-13
Admitting: NURSE PRACTITIONER

## 2022-06-13 VITALS
HEART RATE: 63 BPM | OXYGEN SATURATION: 97 % | WEIGHT: 189.4 LBS | HEIGHT: 63 IN | DIASTOLIC BLOOD PRESSURE: 74 MMHG | SYSTOLIC BLOOD PRESSURE: 182 MMHG | BODY MASS INDEX: 33.56 KG/M2

## 2022-06-13 DIAGNOSIS — R60.0 PEDAL EDEMA: ICD-10-CM

## 2022-06-13 DIAGNOSIS — N18.4 CKD (CHRONIC KIDNEY DISEASE) STAGE 4, GFR 15-29 ML/MIN: ICD-10-CM

## 2022-06-13 DIAGNOSIS — I50.32 CHRONIC DIASTOLIC CONGESTIVE HEART FAILURE: Primary | ICD-10-CM

## 2022-06-13 DIAGNOSIS — I10 ESSENTIAL HYPERTENSION: ICD-10-CM

## 2022-06-13 LAB
ABSOLUTE LUNG FLUID CONTENT: 32 % (ref 20–35)
ANION GAP SERPL CALCULATED.3IONS-SCNC: 13.9 MMOL/L (ref 5–15)
BUN SERPL-MCNC: 24 MG/DL (ref 8–23)
BUN/CREAT SERPL: 11.5 (ref 7–25)
CALCIUM SPEC-SCNC: 9 MG/DL (ref 8.6–10.5)
CHLORIDE SERPL-SCNC: 102 MMOL/L (ref 98–107)
CO2 SERPL-SCNC: 23.1 MMOL/L (ref 22–29)
CREAT SERPL-MCNC: 2.08 MG/DL (ref 0.57–1)
EGFRCR SERPLBLD CKD-EPI 2021: 24 ML/MIN/1.73
GLUCOSE SERPL-MCNC: 184 MG/DL (ref 65–99)
MAGNESIUM SERPL-MCNC: 1.7 MG/DL (ref 1.6–2.4)
NT-PROBNP SERPL-MCNC: 3920 PG/ML (ref 0–1800)
POTASSIUM SERPL-SCNC: 4 MMOL/L (ref 3.5–5.2)
SODIUM SERPL-SCNC: 139 MMOL/L (ref 136–145)

## 2022-06-13 PROCEDURE — 83880 ASSAY OF NATRIURETIC PEPTIDE: CPT

## 2022-06-13 PROCEDURE — 83735 ASSAY OF MAGNESIUM: CPT | Performed by: NURSE PRACTITIONER

## 2022-06-13 PROCEDURE — 80048 BASIC METABOLIC PNL TOTAL CA: CPT | Performed by: NURSE PRACTITIONER

## 2022-06-13 PROCEDURE — 99213 OFFICE O/P EST LOW 20 MIN: CPT | Performed by: NURSE PRACTITIONER

## 2022-06-13 PROCEDURE — 36415 COLL VENOUS BLD VENIPUNCTURE: CPT | Performed by: NURSE PRACTITIONER

## 2022-06-13 PROCEDURE — 94726 PLETHYSMOGRAPHY LUNG VOLUMES: CPT | Performed by: NURSE PRACTITIONER

## 2022-06-13 NOTE — PROGRESS NOTES
[facility], [place] CHF CLINIC OFFICE VISIT  Subjective:   Congestive Heart Failure  Pertinent negatives include no abdominal pain, chest pain, claudication, near-syncope, palpitations or shortness of breath.      Britta Lee is a 75 y.o.  female who presents to the clinic today for follow up on heart failure. She is accompanied by her . She has a hx of diastolic congestive heart failure with an LVEF > 70% from echocardiogram on 11/4/2020. She is currently taking Bumex 1 mg every other daily. She has been unable to tolerate Spironolactone or  ACE/ARB due to abnormal kidney function. She continues on Hydralazine 75 mg TID, Imdur 60 mg daily, Metoprolol XL 25 mg daily, Norvasc 10 mg daily and Clonidine 0.2 mg TID for HTN.    Home BP have been running high, pt is out of home Norvasc and plans to pick it up at the pharmacy today   Shortness of air stable   LE swelling stable   Anemia following with hematology and GI. She reports she has received several iron infusions which have helped her symptoms.   Urine output good   Compliance with sodium and fluid restrictions   Home weight stable  Home HR stable   Overall she feels stable  Seen PCP recently with no new recommendations, due to see nephrology at the end of this month.   Denies chest discomfort, dyspnea, dizziness, lightheadedness, syncope,  palpitations or tachycardia.     Past medical history significant for HTN, DM type 2, CKD III, iron deficiency anemia, hyperlipidemia, obesity.     PCP: Lexie Dolan  Cardiologist: Dr. Cruz  Nephrologist: Dr. Johnson     Hospitalizations: Discharged on 11/8/2020  ER visit: 12/4/2020  Past Medical History:   Diagnosis Date   • Acquired hypothyroidism 4/22/2021   • Anemia    • Arthritis    • Carpal tunnel syndrome    • Coronary artery disease    • Diabetes mellitus (HCC)    • Elevated cholesterol    • GERD (gastroesophageal reflux disease)    • History of pneumonia    • History of unsteady gait     OCASSIONALLY   • Hypertension     • Insomnia    • Kidney disease    • LENNY (obstructive sleep apnea) 12/1/2020   • Osteoarthritis    • Renal insufficiency    • Sciatica      Past Surgical History:   Procedure Laterality Date   • ABDOMINAL SURGERY     • APPENDECTOMY     • CARDIAC CATHETERIZATION      1 STENT  ---  2000   • CARDIAC SURGERY     • CAROTID STENT     • CARPAL TUNNEL RELEASE Right 10/8/2019    Procedure: CARPAL TUNNEL RELEASE;  Surgeon: Feroz Levin MD;  Location: [facility];  Service: Orthopedics   • CATARACT EXTRACTION     • CHOLECYSTECTOMY     • COLONOSCOPY     • COLONOSCOPY N/A 11/23/2021    Procedure: COLONOSCOPY FOR SCREENING;  Surgeon: Palmira Pate MD;  Location: [facility];  Service: Gastroenterology;  Laterality: N/A;   • CORONARY ANGIOPLASTY WITH STENT PLACEMENT     • ENDOSCOPY     • ENDOSCOPY N/A 3/5/2020    Procedure: ESOPHAGOGASTRODUODENOSCOPY;  Surgeon: Alexandru Bagley MD;  Location: [facility];  Service: Gastroenterology;  Laterality: N/A;   • ENDOSCOPY N/A 11/23/2021    Procedure: ESOPHAGOGASTRODUODENOSCOPY WITH BIOPSY;  Surgeon: Palmira Pate MD;  Location: [facility];  Service: Gastroenterology;  Laterality: N/A;   • ENDOSCOPY AND COLONOSCOPY     • GALLBLADDER SURGERY     • JOINT REPLACEMENT Left 05/02/2017    [facility]  DR LEVIN  LEFT TOTAL KNEE   • KNEE ARTHROSCOPY W/ MENISCECTOMY Right    • LAPAROSCOPIC TUBAL LIGATION     • SD TOTAL KNEE ARTHROPLASTY Left 5/2/2017    Procedure: TOTAL KNEE ARTHROPLASTY;  Surgeon: Feroz Levin MD;  Location: [facility];  Service: Orthopedics   • STERILIZATION     • TONSILLECTOMY       Social History     Socioeconomic History   • Marital status:      Spouse name: natalie   • Number of children: 3   • Years of education: 12   Tobacco Use   • Smoking status: Never Smoker   • Smokeless tobacco: Never Used   Vaping Use   • Vaping Use: Never used   Substance and Sexual Activity   • Alcohol use: Yes     Comment: socially   • Drug use: No   • Sexual  activity: Defer     Birth control/protection: Surgical     Family History   Problem Relation Age of Onset   • Arthritis Mother    • Diabetes Mother    • Cancer Mother    • Heart disease Father    • Diabetes Daughter    • Diabetes Son    • Diabetes Maternal Aunt    • Heart disease Maternal Grandmother    • Breast cancer Neg Hx      Allergies:  No Known Allergies    Review of Systems   Constitutional: Negative for chills, fever, malaise/fatigue, weight gain and weight loss.   HENT: Negative for ear discharge, hoarse voice and sore throat.    Eyes: Negative for discharge, double vision, pain, redness and visual disturbance.   Cardiovascular: Negative for chest pain, claudication, cyanosis, dyspnea on exertion, irregular heartbeat, leg swelling, near-syncope, orthopnea, palpitations and syncope.   Respiratory: Negative for cough, shortness of breath and wheezing.    Endocrine: Negative for cold intolerance, heat intolerance and polyuria.   Hematologic/Lymphatic: Negative for bleeding problem. Does not bruise/bleed easily.   Skin: Negative for color change, flushing and rash.   Musculoskeletal: Negative for arthritis, back pain, joint pain, joint swelling and muscle cramps.   Gastrointestinal: Negative for abdominal pain, constipation, diarrhea, nausea and vomiting.   Genitourinary: Negative for dysuria, flank pain, frequency, hesitancy and urgency.   Neurological: Negative for difficulty with concentration, dizziness, light-headedness, sensory change, vertigo and weakness.   Psychiatric/Behavioral: Negative for depression. The patient does not have insomnia and is not nervous/anxious.      Current Outpatient Medications   Medication Sig Dispense Refill   • amLODIPine (NORVASC) 10 MG tablet Take 1 tablet by mouth Daily. (Patient taking differently: Take 10 mg by mouth Every Night.) 90 tablet 3   • atorvastatin (LIPITOR) 40 MG tablet Take 1 tablet by mouth Daily. (Patient taking differently: Take 40 mg by mouth Every  Night.) 90 tablet 3   • bumetanide (BUMEX) 1 MG tablet Take 1 mg by mouth Every Other Day.     • cloNIDine (CATAPRES) 0.2 MG tablet TAKE ONE Tablet BY MOUTH THREE TIMES DAILY 270 tablet 1   • Continuous Blood Gluc  (Dexcom G6 ) device 1 Device Daily. 3 each 3   • Continuous Blood Gluc Sensor (Dexcom G6 Sensor) Every 10 (Ten) Days. 9 each 3   • Continuous Blood Gluc Transmit (Dexcom G6 Transmitter) misc 1 Device Daily. 1 each 5   • escitalopram (LEXAPRO) 10 MG tablet Take 1 tablet by mouth Daily. 90 tablet 3   • glucose blood test strip 1 each by Other route 3 (Three) Times a Day. 100 each 12   • hydrALAZINE (APRESOLINE) 25 MG tablet Take 3 tablets by mouth 3 (Three) Times a Day. 270 tablet 1   • Insulin Glargine, 2 Unit Dial, (Toujeo Max SoloStar) 300 UNIT/ML solution pen-injector injection Inject 40-60 Units under the skin into the appropriate area as directed Daily. 3 pen 5   • insulin lispro (humaLOG) 100 UNIT/ML injection Inject 0-6 Units under the skin into the appropriate area as directed 3 (Three) Times a Day Before Meals. Sliding scale 4-6 units if BG > 180 9 mL 5   • Insulin Pen Needle 32G X 5 MM misc 1 each 4 (Four) Times a Day. 120 each 5   • isosorbide mononitrate (IMDUR) 60 MG 24 hr tablet TAKE 1 Tablet BY MOUTH EVERY DAY 90 tablet 0   • levothyroxine (SYNTHROID, LEVOTHROID) 25 MCG tablet Take 1 tablet by mouth Daily. 90 tablet 3   • metoprolol succinate XL (TOPROL-XL) 50 MG 24 hr tablet Take 25 mg by mouth Daily.     • nystatin (MYCOSTATIN) 006367 UNIT/GM powder Apply  one application topically to the appropriate area as directed Every 12 (Twelve) Hours. (Patient taking differently: Apply  topically to the appropriate area as directed 2 (Two) Times a Day As Needed (itching).) 60 g 0   • pantoprazole (Protonix) 40 MG EC tablet Take 1 tablet by mouth Daily. 90 tablet 3   • vitamin D (ERGOCALCIFEROL) 1.25 MG (14010 UT) capsule capsule Take 1 capsule by mouth 1 (One) Time Per Week. 15  "capsule 3     No current facility-administered medications for this encounter.     Objective:     Body mass index is 33.55 kg/m².  Vitals:    06/13/22 1026 06/13/22 1047   BP: (!) 190/75 (!) 182/74   BP Location: Left arm Left arm   Patient Position: Sitting Sitting   Cuff Size: Large Adult Large Adult   Pulse: 63    SpO2: 97%    Weight: 85.9 kg (189 lb 6.4 oz)    Height: 160 cm (63\")    BP without Norvasc. Patient has been out of medications for about a week     Wt Readings from Last 3 Encounters:   06/13/22 85.9 kg (189 lb 6.4 oz)   06/09/22 88.5 kg (195 lb)   05/05/22 87.1 kg (192 lb)       ReDs - 32% (4/14/2021)  Lab Results   Component Value Date    ABSOLUTELUNG 32 06/13/2022     Vitals reviewed.   Constitutional:       Appearance: Normal appearance. Well-developed.      Comments: Wears a mask during encounter   Eyes:      Conjunctiva/sclera: Conjunctivae normal.   HENT:      Head: Normocephalic.   Neck:      Vascular: No JVD or JVR.   Pulmonary:      Effort: Pulmonary effort is normal.      Breath sounds: Normal breath sounds. No wheezing.   Cardiovascular:      Normal rate. Regular rhythm.   Pulses:     Intact distal pulses.   Edema:     Peripheral edema (overall swelling has improved ) present.     Ankle: bilateral 1+ edema of the ankle.     Feet: bilateral 1+ edema of the feet.  Abdominal:      General: Bowel sounds are normal.      Palpations: Abdomen is soft. There is no hepatomegaly or splenomegaly.   Musculoskeletal: Normal range of motion.      Cervical back: Normal range of motion and neck supple. Skin:     General: Skin is warm and dry.   Neurological:      Mental Status: Alert and oriented to person, place, and time.   Psychiatric:         Attention and Perception: Attention normal.         Mood and Affect: Mood normal.         Speech: Speech normal.         Behavior: Behavior normal. Behavior is cooperative.         Cognition and Memory: Cognition normal.     Cardiographics  Results for orders " placed during the hospital encounter of 11/03/20    Adult Transthoracic Echo Complete W/ Cont if Necessary Per Protocol    Interpretation Summary  · Left ventricular wall thickness is consistent with concentric hypertrophy.  · Left ventricular ejection fraction appears to be greater than 70%. Left ventricular systolic function is hyperdynamic (EF > 70%).  · Left ventricular diastolic function is consistent with (grade II w/high LAP) pseudonormalization.  · The left atrial cavity is moderate to severely dilated.  · The right atrial cavity is mildly dilated.  · Moderate mitral annular calcification is present.  · Mild mitral valve regurgitation is present.  · Mild tricuspid valve regurgitation is present.  · Estimated right ventricular systolic pressure from tricuspid regurgitation is markedly elevated (>55 mmHg).    EKG:       Lab Review   Lab Results   Component Value Date    TSH 9.670 (H) 06/09/2022     Lab Results   Component Value Date    GLUCOSE 184 (H) 06/13/2022    BUN 24 (H) 06/13/2022    CREATININE 2.08 (H) 06/13/2022    EGFRIFNONA 23 (L) 02/21/2022    EGFRIFAFRI 41 (L) 07/30/2018    BCR 11.5 06/13/2022    K 4.0 06/13/2022    CO2 23.1 06/13/2022    CALCIUM 9.0 06/13/2022    PROTENTOTREF 7.7 07/30/2018    ALBUMIN 3.90 06/09/2022    LABIL2 1.1 (L) 07/30/2018    AST 36 (H) 06/09/2022    ALT 17 06/09/2022     Lab Results   Component Value Date    WBC 8.15 05/02/2022    HGB 7.0 (L) 05/02/2022    HCT 24.9 (L) 05/02/2022    MCV 85.3 05/02/2022     05/02/2022     Lab Results   Component Value Date    CKTOTAL 67 12/04/2020    CKMB 2.92 04/16/2018    TROPONINI 0.012 04/16/2018    TROPONINT <0.010 09/02/2021     Lab Results   Component Value Date    PROBNP 3,920.0 (H) 06/13/2022     The following portions of the patient's history were reviewed and updated as appropriate: allergies, current medications, past family history, past medical history, past social history, past surgical history and problem list.     Old  records reviewed and pertinent information is included in the above objective data.     Assessment/Plan:   1. Chronic Diastolic Congestive Heart Failure, EF > 70%   2. HTN  3. CKD, stage IV  4. Pedal edema     Pro-BNP, BMP and magnesium today  Discussed and reviewed labs with patient today  ReDs reviewed and discussed with patient today   Kidney function has improved, continue with current medications   Keep follow-up with GI and hematology for anemia and weight loss   Recommend routine monitoring of BP and HR   Strongly encouraged pt in compliance with BP meds and close monitoring   Unable to tolerate MRA due to CKD. Defer SGLT2 initiation to nephrology as her kidney function has been unstable.   Weight loss discussed and healthy lifestyle recommended   Counseling patient extensively on dietary Na+ intake, importance of activity, weight monitoring, compliance with medications and follow up appointments.  Follow up in 4 months, sooner if needed.

## 2022-06-13 NOTE — PROGRESS NOTES
Heart Failure Pharmacy Note  Patient Name: Britta Lee   Referring Provider: Priscila Holland  Primary Cardiologist: Dr. Cruz  Type of CHF: HFpEF    Medication Use:   Adherence: patient states she leaves medications in a bag. She has tried a medication planner in the past and felt it wasn't helpful.   Hx of med intolerances: bradycardia with metoprolol 100mg BID  Affordability: none at this time   Retail Rx Management: none    Past Medical History:   Diagnosis Date   • Acquired hypothyroidism 4/22/2021   • Anemia    • Arthritis    • Carpal tunnel syndrome    • Coronary artery disease    • Diabetes mellitus (HCC)    • Elevated cholesterol    • GERD (gastroesophageal reflux disease)    • History of pneumonia    • History of unsteady gait     OCASSIONALLY   • Hypertension    • Insomnia    • Kidney disease    • LENNY (obstructive sleep apnea) 12/1/2020   • Osteoarthritis    • Renal insufficiency    • Sciatica      ALLERGIES: Patient has no known allergies.  Current Outpatient Medications   Medication Sig Dispense Refill   • amLODIPine (NORVASC) 10 MG tablet Take 1 tablet by mouth Daily. (Patient taking differently: Take 10 mg by mouth Every Night.) 90 tablet 3   • atorvastatin (LIPITOR) 40 MG tablet Take 1 tablet by mouth Daily. (Patient taking differently: Take 40 mg by mouth Every Night.) 90 tablet 3   • bumetanide (BUMEX) 1 MG tablet Take 1 mg by mouth Every Other Day.     • cloNIDine (CATAPRES) 0.2 MG tablet TAKE ONE Tablet BY MOUTH THREE TIMES DAILY 270 tablet 1   • Continuous Blood Gluc  (Dexcom G6 ) device 1 Device Daily. 3 each 3   • Continuous Blood Gluc Sensor (Dexcom G6 Sensor) Every 10 (Ten) Days. 9 each 3   • Continuous Blood Gluc Transmit (Dexcom G6 Transmitter) misc 1 Device Daily. 1 each 5   • escitalopram (LEXAPRO) 10 MG tablet Take 1 tablet by mouth Daily. 90 tablet 3   • glucose blood test strip 1 each by Other route 3 (Three) Times a Day. 100 each 12   • hydrALAZINE (APRESOLINE) 25 MG  "tablet Take 3 tablets by mouth 3 (Three) Times a Day. 270 tablet 1   • Insulin Glargine, 2 Unit Dial, (Toujeo Max SoloStar) 300 UNIT/ML solution pen-injector injection Inject 40-60 Units under the skin into the appropriate area as directed Daily. 3 pen 5   • insulin lispro (humaLOG) 100 UNIT/ML injection Inject 0-6 Units under the skin into the appropriate area as directed 3 (Three) Times a Day Before Meals. Sliding scale 4-6 units if BG > 180 9 mL 5   • Insulin Pen Needle 32G X 5 MM misc 1 each 4 (Four) Times a Day. 120 each 5   • isosorbide mononitrate (IMDUR) 60 MG 24 hr tablet TAKE 1 Tablet BY MOUTH EVERY DAY 90 tablet 0   • levothyroxine (SYNTHROID, LEVOTHROID) 25 MCG tablet Take 1 tablet by mouth Daily. 90 tablet 3   • metoprolol succinate XL (TOPROL-XL) 50 MG 24 hr tablet Take 25 mg by mouth Daily.     • nystatin (MYCOSTATIN) 175891 UNIT/GM powder Apply  one application topically to the appropriate area as directed Every 12 (Twelve) Hours. (Patient taking differently: Apply  topically to the appropriate area as directed 2 (Two) Times a Day As Needed (itching).) 60 g 0   • pantoprazole (Protonix) 40 MG EC tablet Take 1 tablet by mouth Daily. 90 tablet 3   • vitamin D (ERGOCALCIFEROL) 1.25 MG (21908 UT) capsule capsule Take 1 capsule by mouth 1 (One) Time Per Week. 15 capsule 3     No current facility-administered medications for this encounter.       Vaccination History:   Pneumonia: Reports UTD  Annual Influenza: UTD for 2021-22 Season    Objective  Vitals:    06/13/22 1026 06/13/22 1047   BP: (!) 190/75 (!) 182/74   BP Location: Left arm Left arm   Patient Position: Sitting Sitting   Cuff Size: Large Adult Large Adult   Pulse: 63    SpO2: 97%    Weight: 85.9 kg (189 lb 6.4 oz)    Height: 160 cm (63\")      Wt Readings from Last 3 Encounters:   06/13/22 85.9 kg (189 lb 6.4 oz)   06/09/22 88.5 kg (195 lb)   05/05/22 87.1 kg (192 lb)         06/13/22  1026   Weight: 85.9 kg (189 lb 6.4 oz)     Lab Results "   Component Value Date    GLUCOSE 184 (H) 06/13/2022    BUN 24 (H) 06/13/2022    CREATININE 2.08 (H) 06/13/2022    EGFRIFNONA 23 (L) 02/21/2022    EGFRIFAFRI 41 (L) 07/30/2018    BCR 11.5 06/13/2022    K 4.0 06/13/2022    CO2 23.1 06/13/2022    CALCIUM 9.0 06/13/2022    PROTENTOTREF 7.7 07/30/2018    ALBUMIN 3.90 06/09/2022    LABIL2 1.1 (L) 07/30/2018    AST 36 (H) 06/09/2022    ALT 17 06/09/2022     Lab Results   Component Value Date    WBC 8.15 05/02/2022    HGB 7.0 (L) 05/02/2022    HCT 24.9 (L) 05/02/2022    MCV 85.3 05/02/2022     05/02/2022     Lab Results   Component Value Date    CKTOTAL 67 12/04/2020    CKMB 2.92 04/16/2018    TROPONINI 0.012 04/16/2018    TROPONINT <0.010 09/02/2021     Lab Results   Component Value Date    PROBNP 3,920.0 (H) 06/13/2022     Results for orders placed during the hospital encounter of 11/03/20    Adult Transthoracic Echo Complete W/ Cont if Necessary Per Protocol    Interpretation Summary  · Left ventricular wall thickness is consistent with concentric hypertrophy.  · Left ventricular ejection fraction appears to be greater than 70%. Left ventricular systolic function is hyperdynamic (EF > 70%).  · Left ventricular diastolic function is consistent with (grade II w/high LAP) pseudonormalization.  · The left atrial cavity is moderate to severely dilated.  · The right atrial cavity is mildly dilated.  · Moderate mitral annular calcification is present.  · Mild mitral valve regurgitation is present.  · Mild tricuspid valve regurgitation is present.  · Estimated right ventricular systolic pressure from tricuspid regurgitation is markedly elevated (>55 mmHg).               Class   Drug   Dose Last Dose Adjustment   Notes   ACEi/ARB/ARNI    Worsening renal fxn 1/22/21   Beta Blocker Metoprolol succ 25mg qday ~4/25/22    MRA --------------   eGFR needs to be >30mL/min     Drug Therapy Problems:    1. Some confusion at last visit about metoprolol dosing; pt reported Dr Johnson  told her to decrease the dose but she hadn't yet at that time and she was to clarify that at her nephrology appt that day (4/25).  2. Pt reported having resumed Bumex but unsure what dose  3. GDMT  4. Elevated BP in clinic today      Recommendations:      1. Pt confirmed she is taking half of a 50 mg Toprol.   2. Per [facility], Dr Jhonson sent in Bumex 1 mg every other day on 4/25. Pt confirmed she is taking 1 tablet every other day with no extra doses.  3. Could consider addition of Jardiance for DM/HF benefit  if eGFR remains >20 . Farxiga initiation not recommended for HFpEF for eGFR < 25.  Either SGLT2i would be $47 per patient's insurance.  4. Pt reports being out of amlodipine; appears to have been out for ~4 days. Reports she has a script ready to be picked up. Would recommend resuming amlodipine and reassess home BP readings at next visit. Would be hesitant to increase Toprol dose at this time based on reported home HR readings in 50s-60s.      Patient was educated on heart failure medications and the importance of medication adherence.  All questions were addressed and patient expressed understanding. Patient would benefit from further education.     Thank you for allowing me to participate in the care of your patient,    Milagros Pina, PharmD  06/13/22  11:44 EDT

## 2022-06-13 NOTE — PROGRESS NOTES
Heart Failure Clinic    Date: 06/13/22     Vitals:    06/13/22 1026   BP: (!) 190/75   Pulse: 63   SpO2: 97%    weight 189.4    Method of arrival: Ambulatory    Weighing self daily: Yes    Monitoring Heart Failure Zones: Yes    Today's HF Zone: Green    Taking medications as prescribed: Yes    Edema No    Shortness of Air: No    Number of pillows used at night:<2    Educational Materials given:  avs                                                                         ReDS Value: 32  25-35 Optimal Value Status      Katy Mccray RN 06/13/22 10:29 EDT

## 2022-06-16 ENCOUNTER — OFFICE VISIT (OUTPATIENT)
Dept: ONCOLOGY | Facility: CLINIC | Age: 77
End: 2022-06-16

## 2022-06-16 ENCOUNTER — LAB (OUTPATIENT)
Dept: ONCOLOGY | Facility: CLINIC | Age: 77
End: 2022-06-16

## 2022-06-16 VITALS
BODY MASS INDEX: 33.23 KG/M2 | HEART RATE: 66 BPM | DIASTOLIC BLOOD PRESSURE: 61 MMHG | SYSTOLIC BLOOD PRESSURE: 123 MMHG | WEIGHT: 187.6 LBS | TEMPERATURE: 97.3 F | OXYGEN SATURATION: 99 % | RESPIRATION RATE: 18 BRPM

## 2022-06-16 DIAGNOSIS — D50.9 IRON DEFICIENCY ANEMIA, UNSPECIFIED IRON DEFICIENCY ANEMIA TYPE: Primary | ICD-10-CM

## 2022-06-16 DIAGNOSIS — K74.60 HEPATIC CIRRHOSIS, UNSPECIFIED HEPATIC CIRRHOSIS TYPE, UNSPECIFIED WHETHER ASCITES PRESENT: ICD-10-CM

## 2022-06-16 DIAGNOSIS — E03.9 HYPOTHYROIDISM, UNSPECIFIED TYPE: ICD-10-CM

## 2022-06-16 DIAGNOSIS — D63.8 ANEMIA OF CHRONIC DISEASE: ICD-10-CM

## 2022-06-16 DIAGNOSIS — K90.9 MALABSORPTION OF IRON: ICD-10-CM

## 2022-06-16 DIAGNOSIS — N18.4 CKD (CHRONIC KIDNEY DISEASE) STAGE 4, GFR 15-29 ML/MIN: ICD-10-CM

## 2022-06-16 LAB
ALBUMIN SERPL-MCNC: 3.12 G/DL (ref 3.5–5.2)
ALBUMIN/GLOB SERPL: 0.9 G/DL
ALP SERPL-CCNC: 407 U/L (ref 39–117)
ALT SERPL W P-5'-P-CCNC: 15 U/L (ref 1–33)
ANION GAP SERPL CALCULATED.3IONS-SCNC: 14.3 MMOL/L (ref 5–15)
AST SERPL-CCNC: 37 U/L (ref 1–32)
BASOPHILS # BLD AUTO: 0.03 10*3/MM3 (ref 0–0.2)
BASOPHILS NFR BLD AUTO: 0.4 % (ref 0–1.5)
BILIRUB SERPL-MCNC: 0.2 MG/DL (ref 0–1.2)
BUN SERPL-MCNC: 30 MG/DL (ref 8–23)
BUN/CREAT SERPL: 11.1 (ref 7–25)
CALCIUM SPEC-SCNC: 8.7 MG/DL (ref 8.6–10.5)
CHLORIDE SERPL-SCNC: 104 MMOL/L (ref 98–107)
CO2 SERPL-SCNC: 22.7 MMOL/L (ref 22–29)
CREAT SERPL-MCNC: 2.71 MG/DL (ref 0.57–1)
DEPRECATED RDW RBC AUTO: 51.2 FL (ref 37–54)
EGFRCR SERPLBLD CKD-EPI 2021: 17.5 ML/MIN/1.73
EOSINOPHIL # BLD AUTO: 0.31 10*3/MM3 (ref 0–0.4)
EOSINOPHIL NFR BLD AUTO: 4.5 % (ref 0.3–6.2)
ERYTHROCYTE [DISTWIDTH] IN BLOOD BY AUTOMATED COUNT: 16.3 % (ref 12.3–15.4)
FERRITIN SERPL-MCNC: 305 NG/ML (ref 13–150)
GLOBULIN UR ELPH-MCNC: 3.4 GM/DL
GLUCOSE SERPL-MCNC: 216 MG/DL (ref 65–99)
HCT VFR BLD AUTO: 30.7 % (ref 34–46.6)
HGB BLD-MCNC: 9.3 G/DL (ref 12–15.9)
IMM GRANULOCYTES # BLD AUTO: 0.02 10*3/MM3 (ref 0–0.05)
IMM GRANULOCYTES NFR BLD AUTO: 0.3 % (ref 0–0.5)
IRON 24H UR-MRATE: 44 MCG/DL (ref 37–145)
IRON SATN MFR SERPL: 17 % (ref 20–50)
LYMPHOCYTES # BLD AUTO: 1.41 10*3/MM3 (ref 0.7–3.1)
LYMPHOCYTES NFR BLD AUTO: 20.3 % (ref 19.6–45.3)
MCH RBC QN AUTO: 25.7 PG (ref 26.6–33)
MCHC RBC AUTO-ENTMCNC: 30.3 G/DL (ref 31.5–35.7)
MCV RBC AUTO: 84.8 FL (ref 79–97)
MONOCYTES # BLD AUTO: 0.43 10*3/MM3 (ref 0.1–0.9)
MONOCYTES NFR BLD AUTO: 6.2 % (ref 5–12)
NEUTROPHILS NFR BLD AUTO: 4.76 10*3/MM3 (ref 1.7–7)
NEUTROPHILS NFR BLD AUTO: 68.3 % (ref 42.7–76)
NRBC BLD AUTO-RTO: 0 /100 WBC (ref 0–0.2)
PLATELET # BLD AUTO: 212 10*3/MM3 (ref 140–450)
PMV BLD AUTO: 9.7 FL (ref 6–12)
POTASSIUM SERPL-SCNC: 4.2 MMOL/L (ref 3.5–5.2)
PROT SERPL-MCNC: 6.5 G/DL (ref 6–8.5)
RBC # BLD AUTO: 3.62 10*6/MM3 (ref 3.77–5.28)
SODIUM SERPL-SCNC: 141 MMOL/L (ref 136–145)
TIBC SERPL-MCNC: 258 MCG/DL (ref 298–536)
TRANSFERRIN SERPL-MCNC: 173 MG/DL (ref 200–360)
WBC NRBC COR # BLD: 6.96 10*3/MM3 (ref 3.4–10.8)

## 2022-06-16 PROCEDURE — 99214 OFFICE O/P EST MOD 30 MIN: CPT | Performed by: NURSE PRACTITIONER

## 2022-06-16 PROCEDURE — 80053 COMPREHEN METABOLIC PANEL: CPT | Performed by: NURSE PRACTITIONER

## 2022-06-16 PROCEDURE — 82728 ASSAY OF FERRITIN: CPT | Performed by: NURSE PRACTITIONER

## 2022-06-16 PROCEDURE — 83540 ASSAY OF IRON: CPT | Performed by: NURSE PRACTITIONER

## 2022-06-16 PROCEDURE — 85025 COMPLETE CBC W/AUTO DIFF WBC: CPT | Performed by: NURSE PRACTITIONER

## 2022-06-16 PROCEDURE — 84466 ASSAY OF TRANSFERRIN: CPT | Performed by: NURSE PRACTITIONER

## 2022-06-16 NOTE — PROGRESS NOTES
DATE:  6/16/2022    REASON FOR FOLLOW UP: Anemia    REFERRING PHYSICIAN:   Jackson Johnson MD    TREATMENT:   1. Oral iron-discontinued for apparent malabsorption    2.       CHIEF COMPLAINT:  Follow up of anemia    HISTORY OF PRESENT ILLNESS:   Britta Lee is a  76 y.o. female with multiple medical problems including CKD, diabetes mellitus type 2, CHF, CAD, hypertension, GERD and OA, who is being seen today at the request of  Jackson Johnson MD for evaluation and treatment of anemia. Ms. Lee reports following with her PCP and nephrology routinely with blood testing every ~3 months. She says she has struggled with anemia for several years. Previous available CBCs were reviewed and patient has had chronic, normocytic anemia since at least December 2016 with Hg most recently ranging ~ 8.6-9.8. Her WBC and platelets have been normal. Iron panel and ferritin levels have been most c/w AOCD/inflammation since at least May 2017.  She reports receiving IV iron infusions several years ago. At present, she is taking ferrous sulfate BID which she is tolerating well. She says this was started per nephrology ~2 months ago. She denies obvious bleeding from any source. She reports having screening colonoscopy with Dr. Zamora last year which was unremarkable and negative for bleeding. She complains of chronic fatigue which is stable. Also complains of occasional lower extremity edema. She denies any other complaints today.     INTERVAL HISTORY:  Ms. Lee presents today for follow up.  Since her last visit, she was again replaced with IV Feraheme which she completed on 5/5/22. Following replacement, she reports improvement in fatigue and weakness. She underwent CT A/P in April and found to have cirrhosis of liver and continues to follow with GI. She says she has been trying to eat less and has lost weight. She again denies obvious bleeding from any source. She is following closely with nephrology with upcoming appointment  next week. She has no other complaints today.     PAST MEDICAL HISTORY:  Past Medical History:   Diagnosis Date   • Acquired hypothyroidism 4/22/2021   • Anemia    • Arthritis    • Carpal tunnel syndrome    • Coronary artery disease    • Diabetes mellitus (HCC)    • Elevated cholesterol    • GERD (gastroesophageal reflux disease)    • History of pneumonia    • History of unsteady gait     OCASSIONALLY   • Hypertension    • Insomnia    • Kidney disease    • LENNY (obstructive sleep apnea) 12/1/2020   • Osteoarthritis    • Renal insufficiency    • Sciatica        PAST SURGICAL HISTORY:  Past Surgical History:   Procedure Laterality Date   • ABDOMINAL SURGERY     • APPENDECTOMY     • CARDIAC CATHETERIZATION      1 STENT  ---  2000   • CARDIAC SURGERY     • CAROTID STENT     • CARPAL TUNNEL RELEASE Right 10/8/2019    Procedure: CARPAL TUNNEL RELEASE;  Surgeon: Feroz Levin MD;  Location: [facility];  Service: Orthopedics   • CATARACT EXTRACTION     • CHOLECYSTECTOMY     • COLONOSCOPY     • COLONOSCOPY N/A 11/23/2021    Procedure: COLONOSCOPY FOR SCREENING;  Surgeon: Palmira Pate MD;  Location: [facility];  Service: Gastroenterology;  Laterality: N/A;   • CORONARY ANGIOPLASTY WITH STENT PLACEMENT     • ENDOSCOPY     • ENDOSCOPY N/A 3/5/2020    Procedure: ESOPHAGOGASTRODUODENOSCOPY;  Surgeon: Alexandru Bagley MD;  Location: [facility] OR;  Service: Gastroenterology;  Laterality: N/A;   • ENDOSCOPY N/A 11/23/2021    Procedure: ESOPHAGOGASTRODUODENOSCOPY WITH BIOPSY;  Surgeon: Palmira Pate MD;  Location: [facility];  Service: Gastroenterology;  Laterality: N/A;   • ENDOSCOPY AND COLONOSCOPY     • GALLBLADDER SURGERY     • JOINT REPLACEMENT Left 05/02/2017    [facility]  DR LEVIN  LEFT TOTAL KNEE   • KNEE ARTHROSCOPY W/ MENISCECTOMY Right    • LAPAROSCOPIC TUBAL LIGATION     • KS TOTAL KNEE ARTHROPLASTY Left 5/2/2017    Procedure: TOTAL KNEE ARTHROPLASTY;  Surgeon: Feroz Levin MD;   Location: [facility] OR;  Service: Orthopedics   • STERILIZATION     • TONSILLECTOMY         FAMILY HISTORY:  Family History   Problem Relation Age of Onset   • Arthritis Mother    • Diabetes Mother    • Cancer Mother    • Heart disease Father    • Diabetes Daughter    • Diabetes Son    • Diabetes Maternal Aunt    • Heart disease Maternal Grandmother    • Breast cancer Neg Hx        SOCIAL HISTORY:  Social History     Socioeconomic History   • Marital status:      Spouse name: natalie   • Number of children: 3   • Years of education: 12   Tobacco Use   • Smoking status: Never Smoker   • Smokeless tobacco: Never Used   Vaping Use   • Vaping Use: Never used   Substance and Sexual Activity   • Alcohol use: Yes     Comment: socially   • Drug use: No   • Sexual activity: Defer     Birth control/protection: Surgical       MEDICATIONS:  The current medication list was reviewed in the EMR    Current Outpatient Medications:   •  amLODIPine (NORVASC) 10 MG tablet, Take 1 tablet by mouth Daily. (Patient taking differently: Take 10 mg by mouth Every Night.), Disp: 90 tablet, Rfl: 3  •  atorvastatin (LIPITOR) 40 MG tablet, Take 1 tablet by mouth Daily. (Patient taking differently: Take 40 mg by mouth Every Night.), Disp: 90 tablet, Rfl: 3  •  bumetanide (BUMEX) 1 MG tablet, Take 1 mg by mouth Every Other Day., Disp: , Rfl:   •  cloNIDine (CATAPRES) 0.2 MG tablet, TAKE ONE Tablet BY MOUTH THREE TIMES DAILY, Disp: 270 tablet, Rfl: 1  •  Continuous Blood Gluc  (Dexcom G6 ) device, 1 Device Daily., Disp: 3 each, Rfl: 3  •  Continuous Blood Gluc Sensor (Dexcom G6 Sensor), Every 10 (Ten) Days., Disp: 9 each, Rfl: 3  •  Continuous Blood Gluc Transmit (Dexcom G6 Transmitter) misc, 1 Device Daily., Disp: 1 each, Rfl: 5  •  escitalopram (LEXAPRO) 10 MG tablet, Take 1 tablet by mouth Daily., Disp: 90 tablet, Rfl: 3  •  glucose blood test strip, 1 each by Other route 3 (Three) Times a Day., Disp: 100 each, Rfl: 12  •   hydrALAZINE (APRESOLINE) 25 MG tablet, Take 3 tablets by mouth 3 (Three) Times a Day., Disp: 270 tablet, Rfl: 1  •  Insulin Glargine, 2 Unit Dial, (Toujeo Farhat SoloStar) 300 UNIT/ML solution pen-injector injection, Inject 40-60 Units under the skin into the appropriate area as directed Daily., Disp: 3 pen, Rfl: 5  •  insulin lispro (humaLOG) 100 UNIT/ML injection, Inject 0-6 Units under the skin into the appropriate area as directed 3 (Three) Times a Day Before Meals. Sliding scale 4-6 units if BG > 180, Disp: 9 mL, Rfl: 5  •  Insulin Pen Needle 32G X 5 MM misc, 1 each 4 (Four) Times a Day., Disp: 120 each, Rfl: 5  •  isosorbide mononitrate (IMDUR) 60 MG 24 hr tablet, TAKE 1 Tablet BY MOUTH EVERY DAY, Disp: 90 tablet, Rfl: 0  •  levothyroxine (SYNTHROID, LEVOTHROID) 25 MCG tablet, Take 1 tablet by mouth Daily., Disp: 90 tablet, Rfl: 3  •  metoprolol succinate XL (TOPROL-XL) 50 MG 24 hr tablet, Take 25 mg by mouth Daily., Disp: , Rfl:   •  nystatin (MYCOSTATIN) 032026 UNIT/GM powder, Apply  one application topically to the appropriate area as directed Every 12 (Twelve) Hours. (Patient taking differently: Apply  topically to the appropriate area as directed 2 (Two) Times a Day As Needed (itching).), Disp: 60 g, Rfl: 0  •  pantoprazole (Protonix) 40 MG EC tablet, Take 1 tablet by mouth Daily., Disp: 90 tablet, Rfl: 3  •  vitamin D (ERGOCALCIFEROL) 1.25 MG (01907 UT) capsule capsule, Take 1 capsule by mouth 1 (One) Time Per Week., Disp: 15 capsule, Rfl: 3    ALLERGIES:  No Known Allergies    REVIEW OF SYSTEMS:    A comprehensive 14 point review of systems was performed.  Significant findings as mentioned above.  All other systems reviewed and are negative.      Physical Exam   Vital Signs: /61   Pulse 66   Temp 97.3 °F (36.3 °C) (Temporal)   Resp 18   Wt 85.1 kg (187 lb 9.6 oz)   SpO2 99%   BMI 33.23 kg/m²    General: Well developed, well nourished, alert and oriented x 3, in no acute distress. Appears well  today.   Head: ATNC   Eyes: PERRL, No evidence of conjunctivitis.   Nose: No nasal discharge.   Mouth: Oral mucosal membranes moist. No oral ulceration or hemorrhages.   Neck: Neck supple. No thyromegaly. No JVD.   Lungs: CTAB  Heart: RRR. No murmurs, rubs, or gallops.   Abdomen: Soft. Bowel sounds are normoactive. Nontender with palpation.   Extremities: No cyanosis or edema. Using cane for assistance with ambulation.   Neurologic: Grossly non-focal exam    Pain Score:  Pain Score    06/16/22 1150   PainSc: 0-No pain     ENDOSCOPY:  11/23/21  ESOPHAGOGASTRODUODENOSCOPY PROCEDURE REPORT     Britta Lee  11/23/2021     GASTROENTEROLOGIST:  Palmira Pate MD      PRE-PROCEDURE DIAGNOSIS:  Iron deficiency anemia, unspecified iron deficiency anemia type [D50.9]  Weight loss, abnormal [R63.4]     POST-PROCEDURE DIAGNOSIS:  1.  Gastritis  2.  Angiectasias     Procedure(s):  ESOPHAGOGASTRODUODENOSCOPY WITH BIOPSY  COLONOSCOPY FOR SCREENING     ANESTHESIA:  Propofol administered by anesthesia.  See anesthesia notes for ASA classification     STAFF:  Circulator: Bre Ny RN; Danelle Mccray RN  Endo Technician: Omari Lewis     FINDINGS:  1.  Normal-appearing esophagus.  Biopsies were taken  2.  Mild erythema in the antrum and stomach.  Biopsies taken for H. pylori.  3.  One angiectasia was found in the second portion of the duodenum.  This was nonbleeding.  Biopsies were taken of the duodenum to rule out celiac disease as a source of her anemia.     OPERATIVE PROCEDURE:  After proper informed consent was obtained, patient was transferred to the OR/endoscopy suite.  Patient was then placed in left lateral decubitus position. The Olympus 180 series video gastroscope was inserted orally under direct visualization.  Esophagus, stomach, and duodenum were inspected.  The endoscope was passed to the third portion of the duodenum.  Scope was retroflexed for visualization of the cardia and  incisuraThe endoscope was then withdrawn. Patient tolerated the procedure well. There were no immediate complications.     ESTIMATED BLOOD LOSS:  None     COMPLICATIONS;  None     RECOMMENDATIONS/ PLAN:  1.  Follow-up biopsy results.  2.  Proceed with colonoscopy.     Palmira Pate MD      11/23/21 08:58 EST    COLONOSCOPY PROCEDURE NOTE     Britta Lee  11/23/2021     GASTROENTEROLOGIST:  Palmira Pate MD        PRE-PROCEDURE DIAGNOSIS:   Iron deficiency anemia, unspecified iron deficiency anemia type [D50.9]  Weight loss, abnormal [R63.4]     POST-PROCEDURE DIAGNOSIS:  1.  Colon polyps  2.  Diverticulosis  3.  Internal hemorrhoids     PROCEDURE:  COLONOSCOPY with snare polypectomy and cold biopsy polypectomy     ANESTHESIA:  Propofol administered by anesthesia.  See anesthesia notes for ASA classification     STAFF  Circulator: Bre Ny RN; Danelle Mccray RN  Endo Technician: Omari Lewis     Findings:  1.  A 5 mm polyp was found in the ascending colon.  This was removed with cold forceps polypectomy.  2.  A 5 mm polyp was removed from the transverse colon with cold forceps biopsy.  3.  Two 6 mm polyps were found in the descending colon.  Both polyps were removed with cold snare polypectomy.  4.  A 7 mm polyp was removed from the sigmoid colon with cold snare polypectomy.  5.  Diverticulosis involving left colon.  6.  Small internal hemorrhoids.  7.  Normal-appearing terminal ileum.     OPERATIVE PROCEDURE   After proper informed consent was obtained, the patient was taken the operating suite and placed in left lateral decubitus position.  An Olympus video colonoscope 180 series was inserted in the rectum and advanced to the terminal ileum under direct visualization.  Cecum and terminal ileum were identified by visualization of the appendiceal orifice and ileocecal valve.  The colonoscope was then slowly withdrawn from the cecum to the rectum and passed a second time  from rectum to cecum.  The colonoscope was retroflexed in the cecum and rectum. Scope was then withdrawn. Patient tolerated the procedure well. There were no immediate complications. Cecal withdrawal time was 15 minutes.     ESTIMATED BLOOD LOSS  None      COMPLICATIONS  None     RECOMMENDATIONS:  1.  Follow-up pathology.  2.  Repeat colonoscopy in 3 years due to personal history of colon polyps.  3.  Proceed with capsule endoscopy if anemia persists.  Sign 4.  Follow-up in GI clinic in 6 to 8 weeks.     Palmira Pate MD  11/23/21 09:25 EST    IMAGING:  CT Abdomen Pelvis Without Contrast    Result Date: 4/12/2022  1. Cirrhotic appearance of the liver and small volume ascites. 2. Small right pleural effusion and minimal right basilar consolidation. 3. Sigmoid diverticuli. Cannot exclude a very mild degree of diverticulitis.  This report was finalized on 4/12/2022 4:16 PM by Dr. Silvio Mcdonald MD.      RECENT LABS:  Lab Results   Component Value Date    WBC 6.96 06/16/2022    HGB 9.3 (L) 06/16/2022    HCT 30.7 (L) 06/16/2022    MCV 84.8 06/16/2022    RDW 16.3 (H) 06/16/2022     06/16/2022    NEUTRORELPCT 68.3 06/16/2022    LYMPHORELPCT 20.3 06/16/2022    MONORELPCT 6.2 06/16/2022    EOSRELPCT 4.5 06/16/2022    BASORELPCT 0.4 06/16/2022    NEUTROABS 4.76 06/16/2022    LYMPHSABS 1.41 06/16/2022       Lab Results   Component Value Date     06/16/2022    K 4.2 06/16/2022    CO2 22.7 06/16/2022     06/16/2022    BUN 30 (H) 06/16/2022    CREATININE 2.71 (H) 06/16/2022    EGFRIFNONA 23 (L) 02/21/2022    EGFRIFAFRI 41 (L) 07/30/2018    GLUCOSE 216 (H) 06/16/2022    CALCIUM 8.7 06/16/2022    ALKPHOS 407 (H) 06/16/2022    AST 37 (H) 06/16/2022    ALT 15 06/16/2022    BILITOT 0.2 06/16/2022    ALBUMIN 3.12 (L) 06/16/2022    PROTEINTOT 6.5 06/16/2022    MG 1.7 06/13/2022    PHOS 3.5 06/09/2022     Lab Results   Component Value Date    FERRITIN 305.00 (H) 06/16/2022    IRON 44 06/16/2022    TIBC 258  (L) 06/16/2022    LABIRON 17 (L) 06/16/2022    RBTGOBND24 736 03/26/2021    FOLATE 12.60 03/26/2021    HAPTOGLOBIN 256 (H) 03/26/2021    RETICCTPCT 2.49 (H) 03/26/2021    RETIC 0.0899 03/26/2021     Workup 3/26/21    Ferritin   13.00 - 150.00 ng/mL 160.20High       Iron   37 - 145 mcg/dL 36Low     Iron Saturation   20 - 50 % 9Low     Transferrin   200 - 360 mg/dL 270    TIBC   298 - 536 mcg/dL 402      Vitamin B-12   211 - 946 pg/mL 736      Folate   4.78 - 24.20 ng/mL 12.60      Reticulocyte %   0.70 - 1.90 % 2.49High     Reticulocyte Absolute   0.0200 - 0.1300 10*6/mm3 0.0899      & Units 1 mo ago   LDH   135 - 214 U/L 234High       Haptoglobin   30 - 200 mg/dL 256High       TSH   0.270 - 4.200 uIU/mL 6.430High       C-Reactive Protein   0.00 - 0.50 mg/dL 1.70High       Sed Rate   0 - 30 mm/hr >100High       Copper   80 - 158 ug/dL 189High       Zinc   44 - 115 ug/dL 67      Tissue Transglutaminase IgA   0 - 3 U/mL 2          ASSESSMENT & PLAN:  Britta Lee is a very pleasant 76 y.o. female with    1.  Normocytic anemia:  2. CKD/AOCD/inflammation  3. Iron deficiency anemia  4. Hypothyroidism    -Patient follows with PCP and nephrology routinely with blood testing every ~3 months.   -Previous available CBCs were reviewed and patient has had chronic, normocytic anemia since at least December 2016 with Hg most recently ranging ~ 8.6-9.8. Her WBC and platelets have been normal. Iron panel and ferritin levels have been c/w AOCD/inflammation and also has intermittent component of YUAN.     -She reports receiving IV iron infusions several years ago. At first presentation, she was taking ferrous sulfate BID and tolerating well but did not have improvement in iron stores.   -Denies obvious bleeding from any source. Reportedly had previous screening colonoscopy with Dr. Zamora which was unremarkable and negative for bleeding. Records unavailable.  - As above, she was taking ferrous sulfate. Therefore, discontinued oral iron  and have been replacing with IV Feraheme for apparent malabsorption of iron, most recently on 5/5/22.  -Recommended repeat GI evaluation and patient was agreeable. Referral was placed and patient underwent EGD/colonoscopy with Dr. Simmons on 11/23/21 (see full reports above). She did have several benign polyps removed with small internal hemorrhoids, otherwise unremarkable and negative for bleeding. EGD showed one angiectasia in the second portion of the duodenum which was nonbleeding.  Biopsies were taken of the duodenum to rule out celiac disease as a source of her anemia. Given ongoing YUAN, Dr. Simmons planned for capsule endoscopy, however, she says she could not tolerate. GI obtained CT A/P without contrast on 4/12/22 which showed cirrhosis of liver with small volume ascites and sigmoid diverticulitis.   -She was again replaced with IV Iron in May as above. Repeat CBC from today shows Hg 9.3 which has improved. Repeat iron panel and ferritin are c/w AOCD/inflammation. CMP from today also showed worsening Cr ~2.70  which is largely contributing to her anemia.  -Will continue to monitor. Will check labs in 6 weeks and follow up in 3 months with labs. Will replace with IV Feraheme as needed. In the meantime, recommended continued close follow up with PCP for management of diabetes, hypothyroidism, hypertension and nephology for CKD. Will defer decision regarding epogen to nephrology which she would likely benefit from. Given worsening Cr today, will fax labs to nephrology and patient will notify them.  Recommended follow up with GI as scheduled.   -Recommend transfusion support, would transfuse for Hg <7 or <8 if symptomatic.   -To further evaluate anemia, also previously sent additional labs including PBS which showed normocytic, hypochromic anemia with no schistocytes, normal WBC with borderline neutrophilia and mild eosinophilia, no dysplasia or blasts, adequate platelets.  B12 and folate were normal. No  evidence of hemolysis, TSH was elevated and she was advised to follow up with PCP who started her on synthroid, ESR and CRP were elevated and suggestive of underlying inflammation, copper and zinc were both replete, tissue transglutaminase was normal.      5. Cirrhosis:  -Noted on CT A/P from 4/12/22 without splenomegaly. Ongoing management per PCP and GI.     ACO / MILES/Other  Quality measures  -  Britta Lee received 2021 flu vaccine. She has had Covid vaccine x 3.   -  Britta Lee reports a pain score of 0.   -  Current outpatient and discharge medications have been reconciled for the patient.  Reviewed by: ERNIE De Leon    The patient was in agreement with the plan and all questions were answered to her satisfaction.     Thank you so much for allowing us to participate in the care of Britta Lee . Please do not hesitate to contact us with any questions or concerns.     A total of 30 minutes were spent coordinating this patient’s care in clinic today; more than 50% of this time was face-to-face with the patient, reviewing her medical history and counseling on the current treatment and followup plan. All questions were answered to her satisfaction.      Electronically Signed by: ERNIE De Leon , June 16, 2022 12:06 EDT       CC:   Lexie Dolan PA

## 2022-06-27 DIAGNOSIS — I50.32 CHRONIC DIASTOLIC CONGESTIVE HEART FAILURE: ICD-10-CM

## 2022-06-27 DIAGNOSIS — I50.33 ACUTE ON CHRONIC DIASTOLIC CONGESTIVE HEART FAILURE: Primary | ICD-10-CM

## 2022-06-27 DIAGNOSIS — I10 ESSENTIAL HYPERTENSION: ICD-10-CM

## 2022-06-27 RX ORDER — ISOSORBIDE MONONITRATE 60 MG/1
60 TABLET, EXTENDED RELEASE ORAL DAILY
Qty: 90 TABLET | Refills: 0 | Status: SHIPPED | OUTPATIENT
Start: 2022-06-27 | End: 2022-08-11 | Stop reason: SDUPTHER

## 2022-07-21 ENCOUNTER — LAB (OUTPATIENT)
Dept: FAMILY MEDICINE CLINIC | Facility: CLINIC | Age: 77
End: 2022-07-21

## 2022-07-21 DIAGNOSIS — N18.32 CHRONIC KIDNEY DISEASE (CKD) STAGE G3B/A1, MODERATELY DECREASED GLOMERULAR FILTRATION RATE (GFR) BETWEEN 30-44 ML/MIN/1.73 SQUARE METER AND ALBUMINURIA CREATININE RATIO LESS THAN 30 MG/G (CMS/H*: Primary | ICD-10-CM

## 2022-07-21 DIAGNOSIS — N25.81 SECONDARY HYPERPARATHYROIDISM OF RENAL ORIGIN: ICD-10-CM

## 2022-07-21 DIAGNOSIS — D50.9 IRON DEFICIENCY ANEMIA, UNSPECIFIED IRON DEFICIENCY ANEMIA TYPE: ICD-10-CM

## 2022-07-21 LAB
ALBUMIN SERPL-MCNC: 3.3 G/DL (ref 3.5–5.2)
ALBUMIN/GLOB SERPL: 1 G/DL
ALP SERPL-CCNC: 427 U/L (ref 39–117)
ALT SERPL W P-5'-P-CCNC: 18 U/L (ref 1–33)
ANION GAP SERPL CALCULATED.3IONS-SCNC: 11 MMOL/L (ref 5–15)
AST SERPL-CCNC: 35 U/L (ref 1–32)
BACTERIA UR QL AUTO: ABNORMAL /HPF
BILIRUB SERPL-MCNC: 0.3 MG/DL (ref 0–1.2)
BILIRUB UR QL STRIP: NEGATIVE
BUN SERPL-MCNC: 32 MG/DL (ref 8–23)
BUN/CREAT SERPL: 15.4 (ref 7–25)
CALCIUM SPEC-SCNC: 9 MG/DL (ref 8.6–10.5)
CHLORIDE SERPL-SCNC: 106 MMOL/L (ref 98–107)
CLARITY UR: CLEAR
CO2 SERPL-SCNC: 22 MMOL/L (ref 22–29)
COLOR UR: YELLOW
CREAT SERPL-MCNC: 2.08 MG/DL (ref 0.57–1)
EGFRCR SERPLBLD CKD-EPI 2021: 24 ML/MIN/1.73
FERRITIN SERPL-MCNC: 182 NG/ML (ref 13–150)
GLOBULIN UR ELPH-MCNC: 3.4 GM/DL
GLUCOSE SERPL-MCNC: 147 MG/DL (ref 65–99)
GLUCOSE UR STRIP-MCNC: ABNORMAL MG/DL
HGB UR QL STRIP.AUTO: NEGATIVE
HYALINE CASTS UR QL AUTO: ABNORMAL /LPF
IRON 24H UR-MRATE: 31 MCG/DL (ref 37–145)
IRON SATN MFR SERPL: 10 % (ref 20–50)
KETONES UR QL STRIP: NEGATIVE
LEUKOCYTE ESTERASE UR QL STRIP.AUTO: NEGATIVE
NITRITE UR QL STRIP: NEGATIVE
PH UR STRIP.AUTO: 7 [PH] (ref 5–8)
PHOSPHATE SERPL-MCNC: 3.7 MG/DL (ref 2.5–4.5)
POTASSIUM SERPL-SCNC: 4.5 MMOL/L (ref 3.5–5.2)
PROT SERPL-MCNC: 6.7 G/DL (ref 6–8.5)
PROT UR QL STRIP: ABNORMAL
PTH-INTACT SERPL-MCNC: 56.5 PG/ML (ref 15–65)
RBC # UR STRIP: ABNORMAL /HPF
REF LAB TEST METHOD: ABNORMAL
SODIUM SERPL-SCNC: 139 MMOL/L (ref 136–145)
SP GR UR STRIP: 1.02 (ref 1–1.03)
SQUAMOUS #/AREA URNS HPF: ABNORMAL /HPF
TIBC SERPL-MCNC: 307 MCG/DL (ref 298–536)
TRANSFERRIN SERPL-MCNC: 206 MG/DL (ref 200–360)
UROBILINOGEN UR QL STRIP: ABNORMAL
WBC # UR STRIP: ABNORMAL /HPF

## 2022-07-21 PROCEDURE — 80053 COMPREHEN METABOLIC PANEL: CPT | Performed by: INTERNAL MEDICINE

## 2022-07-21 PROCEDURE — 83540 ASSAY OF IRON: CPT | Performed by: INTERNAL MEDICINE

## 2022-07-21 PROCEDURE — 83970 ASSAY OF PARATHORMONE: CPT | Performed by: INTERNAL MEDICINE

## 2022-07-21 PROCEDURE — 84466 ASSAY OF TRANSFERRIN: CPT | Performed by: INTERNAL MEDICINE

## 2022-07-21 PROCEDURE — 84100 ASSAY OF PHOSPHORUS: CPT | Performed by: INTERNAL MEDICINE

## 2022-07-21 PROCEDURE — 81001 URINALYSIS AUTO W/SCOPE: CPT | Performed by: INTERNAL MEDICINE

## 2022-07-21 PROCEDURE — 82728 ASSAY OF FERRITIN: CPT | Performed by: INTERNAL MEDICINE

## 2022-07-22 ENCOUNTER — LAB (OUTPATIENT)
Dept: FAMILY MEDICINE CLINIC | Facility: CLINIC | Age: 77
End: 2022-07-22

## 2022-07-22 DIAGNOSIS — D50.9 IRON DEFICIENCY ANEMIA, UNSPECIFIED IRON DEFICIENCY ANEMIA TYPE: ICD-10-CM

## 2022-07-22 DIAGNOSIS — N18.32 CHRONIC KIDNEY DISEASE (CKD) STAGE G3B/A1, MODERATELY DECREASED GLOMERULAR FILTRATION RATE (GFR) BETWEEN 30-44 ML/MIN/1.73 SQUARE METER AND ALBUMINURIA CREATININE RATIO LESS THAN 30 MG/G (CMS/H*: ICD-10-CM

## 2022-07-22 DIAGNOSIS — N25.81 SECONDARY HYPERPARATHYROIDISM OF RENAL ORIGIN: ICD-10-CM

## 2022-07-22 LAB
COLLECT DURATION TIME UR: 24 HRS
PROT 24H UR-MRATE: 3957 MG/24HOURS (ref 0–150)
SPECIMEN VOL 24H UR: 1000 ML

## 2022-07-22 PROCEDURE — 81050 URINALYSIS VOLUME MEASURE: CPT | Performed by: INTERNAL MEDICINE

## 2022-07-22 PROCEDURE — 84156 ASSAY OF PROTEIN URINE: CPT | Performed by: INTERNAL MEDICINE

## 2022-07-28 ENCOUNTER — LAB (OUTPATIENT)
Dept: ONCOLOGY | Facility: CLINIC | Age: 77
End: 2022-07-28

## 2022-07-28 VITALS
TEMPERATURE: 97.3 F | SYSTOLIC BLOOD PRESSURE: 128 MMHG | RESPIRATION RATE: 18 BRPM | HEART RATE: 85 BPM | DIASTOLIC BLOOD PRESSURE: 65 MMHG | OXYGEN SATURATION: 97 %

## 2022-07-28 DIAGNOSIS — D50.9 IRON DEFICIENCY ANEMIA, UNSPECIFIED IRON DEFICIENCY ANEMIA TYPE: ICD-10-CM

## 2022-07-28 LAB
ALBUMIN SERPL-MCNC: 3.31 G/DL (ref 3.5–5.2)
ALBUMIN/GLOB SERPL: 1.1 G/DL
ALP SERPL-CCNC: 434 U/L (ref 39–117)
ALT SERPL W P-5'-P-CCNC: 12 U/L (ref 1–33)
ANION GAP SERPL CALCULATED.3IONS-SCNC: 13.8 MMOL/L (ref 5–15)
AST SERPL-CCNC: 33 U/L (ref 1–32)
BASOPHILS # BLD AUTO: 0.04 10*3/MM3 (ref 0–0.2)
BASOPHILS NFR BLD AUTO: 0.6 % (ref 0–1.5)
BILIRUB SERPL-MCNC: 0.2 MG/DL (ref 0–1.2)
BUN SERPL-MCNC: 31 MG/DL (ref 8–23)
BUN/CREAT SERPL: 12 (ref 7–25)
CALCIUM SPEC-SCNC: 8.6 MG/DL (ref 8.6–10.5)
CHLORIDE SERPL-SCNC: 107 MMOL/L (ref 98–107)
CO2 SERPL-SCNC: 22.2 MMOL/L (ref 22–29)
CREAT SERPL-MCNC: 2.59 MG/DL (ref 0.57–1)
DEPRECATED RDW RBC AUTO: 46.3 FL (ref 37–54)
EGFRCR SERPLBLD CKD-EPI 2021: 18.4 ML/MIN/1.73
EOSINOPHIL # BLD AUTO: 0.3 10*3/MM3 (ref 0–0.4)
EOSINOPHIL NFR BLD AUTO: 4.4 % (ref 0.3–6.2)
ERYTHROCYTE [DISTWIDTH] IN BLOOD BY AUTOMATED COUNT: 14.6 % (ref 12.3–15.4)
FERRITIN SERPL-MCNC: 160.6 NG/ML (ref 13–150)
GLOBULIN UR ELPH-MCNC: 3.1 GM/DL
GLUCOSE SERPL-MCNC: 212 MG/DL (ref 65–99)
HCT VFR BLD AUTO: 27.3 % (ref 34–46.6)
HGB BLD-MCNC: 8.3 G/DL (ref 12–15.9)
IMM GRANULOCYTES # BLD AUTO: 0.02 10*3/MM3 (ref 0–0.05)
IMM GRANULOCYTES NFR BLD AUTO: 0.3 % (ref 0–0.5)
IRON 24H UR-MRATE: 33 MCG/DL (ref 37–145)
IRON SATN MFR SERPL: 11 % (ref 20–50)
LYMPHOCYTES # BLD AUTO: 1.04 10*3/MM3 (ref 0.7–3.1)
LYMPHOCYTES NFR BLD AUTO: 15.2 % (ref 19.6–45.3)
MCH RBC QN AUTO: 26.3 PG (ref 26.6–33)
MCHC RBC AUTO-ENTMCNC: 30.4 G/DL (ref 31.5–35.7)
MCV RBC AUTO: 86.4 FL (ref 79–97)
MONOCYTES # BLD AUTO: 0.46 10*3/MM3 (ref 0.1–0.9)
MONOCYTES NFR BLD AUTO: 6.7 % (ref 5–12)
NEUTROPHILS NFR BLD AUTO: 5 10*3/MM3 (ref 1.7–7)
NEUTROPHILS NFR BLD AUTO: 72.8 % (ref 42.7–76)
NRBC BLD AUTO-RTO: 0 /100 WBC (ref 0–0.2)
PLATELET # BLD AUTO: 212 10*3/MM3 (ref 140–450)
PMV BLD AUTO: 9.5 FL (ref 6–12)
POTASSIUM SERPL-SCNC: 4.7 MMOL/L (ref 3.5–5.2)
PROT SERPL-MCNC: 6.4 G/DL (ref 6–8.5)
RBC # BLD AUTO: 3.16 10*6/MM3 (ref 3.77–5.28)
SODIUM SERPL-SCNC: 143 MMOL/L (ref 136–145)
TIBC SERPL-MCNC: 295 MCG/DL (ref 298–536)
TRANSFERRIN SERPL-MCNC: 198 MG/DL (ref 200–360)
WBC NRBC COR # BLD: 6.86 10*3/MM3 (ref 3.4–10.8)

## 2022-07-28 PROCEDURE — 84466 ASSAY OF TRANSFERRIN: CPT | Performed by: NURSE PRACTITIONER

## 2022-07-28 PROCEDURE — 85025 COMPLETE CBC W/AUTO DIFF WBC: CPT | Performed by: NURSE PRACTITIONER

## 2022-07-28 PROCEDURE — 80053 COMPREHEN METABOLIC PANEL: CPT | Performed by: NURSE PRACTITIONER

## 2022-07-28 PROCEDURE — 83540 ASSAY OF IRON: CPT | Performed by: NURSE PRACTITIONER

## 2022-07-28 PROCEDURE — 82728 ASSAY OF FERRITIN: CPT | Performed by: NURSE PRACTITIONER

## 2022-08-04 ENCOUNTER — LAB (OUTPATIENT)
Dept: LAB | Facility: HOSPITAL | Age: 77
End: 2022-08-04

## 2022-08-04 ENCOUNTER — OFFICE VISIT (OUTPATIENT)
Dept: GASTROENTEROLOGY | Facility: CLINIC | Age: 77
End: 2022-08-04

## 2022-08-04 VITALS
SYSTOLIC BLOOD PRESSURE: 175 MMHG | BODY MASS INDEX: 33.13 KG/M2 | HEIGHT: 63 IN | HEART RATE: 82 BPM | DIASTOLIC BLOOD PRESSURE: 77 MMHG | OXYGEN SATURATION: 95 % | WEIGHT: 187 LBS

## 2022-08-04 DIAGNOSIS — D50.9 IRON DEFICIENCY ANEMIA, UNSPECIFIED IRON DEFICIENCY ANEMIA TYPE: ICD-10-CM

## 2022-08-04 DIAGNOSIS — K74.60 CIRRHOSIS OF LIVER WITH ASCITES, UNSPECIFIED HEPATIC CIRRHOSIS TYPE: Primary | ICD-10-CM

## 2022-08-04 DIAGNOSIS — N18.4 CKD (CHRONIC KIDNEY DISEASE) STAGE 4, GFR 15-29 ML/MIN: ICD-10-CM

## 2022-08-04 DIAGNOSIS — K74.60 CIRRHOSIS OF LIVER WITH ASCITES, UNSPECIFIED HEPATIC CIRRHOSIS TYPE: ICD-10-CM

## 2022-08-04 DIAGNOSIS — R18.8 CIRRHOSIS OF LIVER WITH ASCITES, UNSPECIFIED HEPATIC CIRRHOSIS TYPE: Primary | ICD-10-CM

## 2022-08-04 DIAGNOSIS — R18.8 CIRRHOSIS OF LIVER WITH ASCITES, UNSPECIFIED HEPATIC CIRRHOSIS TYPE: ICD-10-CM

## 2022-08-04 DIAGNOSIS — R63.4 WEIGHT LOSS, ABNORMAL: ICD-10-CM

## 2022-08-04 LAB
ALBUMIN SERPL-MCNC: 3.6 G/DL (ref 3.5–5.2)
ALBUMIN/GLOB SERPL: 0.9 G/DL
ALP SERPL-CCNC: 462 U/L (ref 39–117)
ALPHA-FETOPROTEIN: <2 NG/ML (ref 0–8.3)
ALT SERPL W P-5'-P-CCNC: 22 U/L (ref 1–33)
ANION GAP SERPL CALCULATED.3IONS-SCNC: 14 MMOL/L (ref 5–15)
AST SERPL-CCNC: 53 U/L (ref 1–32)
BASOPHILS # BLD AUTO: 0.04 10*3/MM3 (ref 0–0.2)
BASOPHILS NFR BLD AUTO: 0.4 % (ref 0–1.5)
BILIRUB SERPL-MCNC: 0.2 MG/DL (ref 0–1.2)
BUN SERPL-MCNC: 34 MG/DL (ref 8–23)
BUN/CREAT SERPL: 13.7 (ref 7–25)
CALCIUM SPEC-SCNC: 9.3 MG/DL (ref 8.6–10.5)
CHLORIDE SERPL-SCNC: 104 MMOL/L (ref 98–107)
CO2 SERPL-SCNC: 22 MMOL/L (ref 22–29)
CREAT SERPL-MCNC: 2.48 MG/DL (ref 0.57–1)
DEPRECATED RDW RBC AUTO: 38.3 FL (ref 37–54)
EGFRCR SERPLBLD CKD-EPI 2021: 19.4 ML/MIN/1.73
EOSINOPHIL # BLD AUTO: 0.49 10*3/MM3 (ref 0–0.4)
EOSINOPHIL NFR BLD AUTO: 4.9 % (ref 0.3–6.2)
ERYTHROCYTE [DISTWIDTH] IN BLOOD BY AUTOMATED COUNT: 13.1 % (ref 12.3–15.4)
GLOBULIN UR ELPH-MCNC: 3.8 GM/DL
GLUCOSE SERPL-MCNC: 105 MG/DL (ref 65–99)
HAPTOGLOB SERPL-MCNC: 279 MG/DL (ref 30–200)
HCT VFR BLD AUTO: 28.3 % (ref 34–46.6)
HGB BLD-MCNC: 8.8 G/DL (ref 12–15.9)
IMM GRANULOCYTES # BLD AUTO: 0.02 10*3/MM3 (ref 0–0.05)
IMM GRANULOCYTES NFR BLD AUTO: 0.2 % (ref 0–0.5)
INR PPP: 1.21 (ref 0.9–1.1)
IRON 24H UR-MRATE: 28 MCG/DL (ref 37–145)
IRON SATN MFR SERPL: 8 % (ref 20–50)
LYMPHOCYTES # BLD AUTO: 2.19 10*3/MM3 (ref 0.7–3.1)
LYMPHOCYTES NFR BLD AUTO: 22 % (ref 19.6–45.3)
MCH RBC QN AUTO: 25.4 PG (ref 26.6–33)
MCHC RBC AUTO-ENTMCNC: 31.1 G/DL (ref 31.5–35.7)
MCV RBC AUTO: 81.8 FL (ref 79–97)
MONOCYTES # BLD AUTO: 0.66 10*3/MM3 (ref 0.1–0.9)
MONOCYTES NFR BLD AUTO: 6.6 % (ref 5–12)
NEUTROPHILS NFR BLD AUTO: 6.56 10*3/MM3 (ref 1.7–7)
NEUTROPHILS NFR BLD AUTO: 65.9 % (ref 42.7–76)
NRBC BLD AUTO-RTO: 0 /100 WBC (ref 0–0.2)
PLATELET # BLD AUTO: 327 10*3/MM3 (ref 140–450)
PMV BLD AUTO: 9.9 FL (ref 6–12)
POTASSIUM SERPL-SCNC: 4.7 MMOL/L (ref 3.5–5.2)
PROT SERPL-MCNC: 7.4 G/DL (ref 6–8.5)
PROTHROMBIN TIME: 15.6 SECONDS (ref 12.1–14.7)
RBC # BLD AUTO: 3.46 10*6/MM3 (ref 3.77–5.28)
SODIUM SERPL-SCNC: 140 MMOL/L (ref 136–145)
TIBC SERPL-MCNC: 334 MCG/DL (ref 298–536)
TRANSFERRIN SERPL-MCNC: 224 MG/DL (ref 200–360)
WBC NRBC COR # BLD: 9.96 10*3/MM3 (ref 3.4–10.8)

## 2022-08-04 PROCEDURE — 83010 ASSAY OF HAPTOGLOBIN QUANT: CPT

## 2022-08-04 PROCEDURE — 85610 PROTHROMBIN TIME: CPT

## 2022-08-04 PROCEDURE — 85025 COMPLETE CBC W/AUTO DIFF WBC: CPT | Performed by: INTERNAL MEDICINE

## 2022-08-04 PROCEDURE — 83540 ASSAY OF IRON: CPT

## 2022-08-04 PROCEDURE — 80053 COMPREHEN METABOLIC PANEL: CPT

## 2022-08-04 PROCEDURE — 99213 OFFICE O/P EST LOW 20 MIN: CPT | Performed by: INTERNAL MEDICINE

## 2022-08-04 PROCEDURE — 36415 COLL VENOUS BLD VENIPUNCTURE: CPT | Performed by: INTERNAL MEDICINE

## 2022-08-04 PROCEDURE — 82105 ALPHA-FETOPROTEIN SERUM: CPT | Performed by: INTERNAL MEDICINE

## 2022-08-04 PROCEDURE — 84466 ASSAY OF TRANSFERRIN: CPT

## 2022-08-04 RX ORDER — FERROUS SULFATE 325(65) MG
325 TABLET ORAL
COMMUNITY
End: 2022-10-10

## 2022-08-04 NOTE — PROGRESS NOTES
Subjective   Britta Lee is a 76 y.o. female who presents to the office today as a follow up appointment regarding Cirrhosis      History of Present Illness:    The patient presents for follow up JANE cirrhosis of the liver and anemia.  She has lost weight.  She is now 187 lbs.  She does not eat as much as she did in the past.  She is eating healthier foods than in the past. Her  does the cooking.  She has avoided candy but admits that she does eat sweets.  She went to have her UGI/SBFT in preparation for capsule endoscopy but this was unable to be performed because she passed out.  She is not sure why she passed out but thought it might be due to low blood sugar.  Her stool is dark now due to oral iron.  No hematemesis.  Her EGD did not reveal esophageal varices.  No BRBPR. She denies abdominal pain.  No confusion.  No abdominal swelling.  Overall, she has been stable.  She is followed by hemology.  She has not been able to do the capsule endoscopy because she has not been able to do the UGI/SBFT to clear the small bowel for the study.  We need to make sure she does not have a stricture in the small bowel prior to performing the capsule endoscopy as the patient is likely not a very good surgical candidate for removal of the capsule if it were to become lodged in the small bowel.  Recent hgb/hct 8.3/27.3 and iron saturation 11%.      Review of Systems:  Review of Systems   Constitutional: Positive for activity change, appetite change, fatigue and unexpected weight change. Negative for chills and fever.   HENT: Negative for mouth sores and trouble swallowing.    Eyes: Negative for photophobia, pain and redness.   Respiratory: Negative for cough and choking.    Cardiovascular: Positive for leg swelling.   Gastrointestinal: Negative for abdominal distention, anal bleeding, blood in stool, constipation, diarrhea, nausea, rectal pain and vomiting.   Endocrine: Negative for cold intolerance, heat intolerance and  polyphagia.   Genitourinary: Negative for difficulty urinating and dysuria.   Musculoskeletal: Positive for arthralgias. Negative for joint swelling.   Skin: Negative for rash and wound.   Allergic/Immunologic: Negative for environmental allergies and food allergies.   Neurological: Positive for dizziness. Negative for light-headedness.   Hematological: Negative for adenopathy. Bruises/bleeds easily.   Psychiatric/Behavioral: Negative for sleep disturbance. The patient is not nervous/anxious.        Past Medical History:  Past Medical History:   Diagnosis Date   • Acquired hypothyroidism 4/22/2021   • Anemia    • Arthritis    • Carpal tunnel syndrome    • Coronary artery disease    • Diabetes mellitus (HCC)    • Elevated cholesterol    • GERD (gastroesophageal reflux disease)    • History of pneumonia    • History of unsteady gait     OCASSIONALLY   • Hypertension    • Insomnia    • Kidney disease    • LENNY (obstructive sleep apnea) 12/1/2020   • Osteoarthritis    • Renal insufficiency    • Sciatica        Past Surgical History:  Past Surgical History:   Procedure Laterality Date   • ABDOMINAL SURGERY     • APPENDECTOMY     • CARDIAC CATHETERIZATION      1 STENT  ---  2000   • CARDIAC SURGERY     • CAROTID STENT     • CARPAL TUNNEL RELEASE Right 10/8/2019    Procedure: CARPAL TUNNEL RELEASE;  Surgeon: Feroz Johnson MD;  Location: [facility];  Service: Orthopedics   • CATARACT EXTRACTION     • CHOLECYSTECTOMY     • COLONOSCOPY     • COLONOSCOPY N/A 11/23/2021    Procedure: COLONOSCOPY FOR SCREENING;  Surgeon: Palmira Pate MD;  Location: [facility];  Service: Gastroenterology;  Laterality: N/A;   • CORONARY ANGIOPLASTY WITH STENT PLACEMENT     • ENDOSCOPY     • ENDOSCOPY N/A 3/5/2020    Procedure: ESOPHAGOGASTRODUODENOSCOPY;  Surgeon: Alexandru Bagley MD;  Location: [facility];  Service: Gastroenterology;  Laterality: N/A;   • ENDOSCOPY N/A 11/23/2021    Procedure: ESOPHAGOGASTRODUODENOSCOPY WITH  BIOPSY;  Surgeon: Palmira Pate MD;  Location: [facility];  Service: Gastroenterology;  Laterality: N/A;   • ENDOSCOPY AND COLONOSCOPY     • GALLBLADDER SURGERY     • JOINT REPLACEMENT Left 05/02/2017    [facility]  DR JOHNSON  LEFT TOTAL KNEE   • KNEE ARTHROSCOPY W/ MENISCECTOMY Right    • LAPAROSCOPIC TUBAL LIGATION     • AR TOTAL KNEE ARTHROPLASTY Left 5/2/2017    Procedure: TOTAL KNEE ARTHROPLASTY;  Surgeon: Feroz Johnson MD;  Location: [facility];  Service: Orthopedics   • STERILIZATION     • TONSILLECTOMY         Family History:  Family History   Problem Relation Age of Onset   • Arthritis Mother    • Diabetes Mother    • Cancer Mother    • Heart disease Father    • Diabetes Daughter    • Diabetes Son    • Diabetes Maternal Aunt    • Heart disease Maternal Grandmother    • Breast cancer Neg Hx        Social History:  Social History     Socioeconomic History   • Marital status:      Spouse name: natalie   • Number of children: 3   • Years of education: 12   Tobacco Use   • Smoking status: Never Smoker   • Smokeless tobacco: Never Used   Vaping Use   • Vaping Use: Never used   Substance and Sexual Activity   • Alcohol use: Yes     Comment: socially   • Drug use: No   • Sexual activity: Defer     Birth control/protection: Surgical       Current Medication List:    Current Outpatient Medications:   •  amLODIPine (NORVASC) 10 MG tablet, Take 1 tablet by mouth Daily. (Patient taking differently: Take 10 mg by mouth Every Night.), Disp: 90 tablet, Rfl: 3  •  atorvastatin (LIPITOR) 40 MG tablet, Take 1 tablet by mouth Daily. (Patient taking differently: Take 40 mg by mouth Every Night.), Disp: 90 tablet, Rfl: 3  •  bumetanide (BUMEX) 1 MG tablet, Take 1 mg by mouth Every Other Day., Disp: , Rfl:   •  cloNIDine (CATAPRES) 0.2 MG tablet, TAKE ONE Tablet BY MOUTH THREE TIMES DAILY, Disp: 270 tablet, Rfl: 1  •  Continuous Blood Gluc  (Dexcom G6 ) device, 1 Device Daily., Disp: 3 each,  Rfl: 3  •  Continuous Blood Gluc Sensor (Dexcom G6 Sensor), Every 10 (Ten) Days., Disp: 9 each, Rfl: 3  •  Continuous Blood Gluc Transmit (Dexcom G6 Transmitter) misc, 1 Device Daily., Disp: 1 each, Rfl: 5  •  escitalopram (LEXAPRO) 10 MG tablet, Take 1 tablet by mouth Daily., Disp: 90 tablet, Rfl: 3  •  ferrous sulfate 325 (65 FE) MG tablet, Take 325 mg by mouth Daily With Breakfast., Disp: , Rfl:   •  glucose blood test strip, 1 each by Other route 3 (Three) Times a Day., Disp: 100 each, Rfl: 12  •  hydrALAZINE (APRESOLINE) 25 MG tablet, Take 3 tablets by mouth 3 (Three) Times a Day., Disp: 270 tablet, Rfl: 1  •  Insulin Glargine, 2 Unit Dial, (Toujeo Max SoloStar) 300 UNIT/ML solution pen-injector injection, Inject 40-60 Units under the skin into the appropriate area as directed Daily., Disp: 3 pen, Rfl: 5  •  insulin lispro (humaLOG) 100 UNIT/ML injection, Inject 0-6 Units under the skin into the appropriate area as directed 3 (Three) Times a Day Before Meals. Sliding scale 4-6 units if BG > 180, Disp: 9 mL, Rfl: 5  •  Insulin Pen Needle 32G X 5 MM misc, 1 each 4 (Four) Times a Day., Disp: 120 each, Rfl: 5  •  isosorbide mononitrate (IMDUR) 60 MG 24 hr tablet, Take 1 tablet by mouth Daily., Disp: 90 tablet, Rfl: 0  •  levothyroxine (SYNTHROID, LEVOTHROID) 25 MCG tablet, Take 1 tablet by mouth Daily., Disp: 90 tablet, Rfl: 3  •  metoprolol succinate XL (TOPROL-XL) 50 MG 24 hr tablet, Take 25 mg by mouth Daily., Disp: , Rfl:   •  nystatin (MYCOSTATIN) 185731 UNIT/GM powder, Apply  one application topically to the appropriate area as directed Every 12 (Twelve) Hours. (Patient taking differently: Apply  topically to the appropriate area as directed 2 (Two) Times a Day As Needed (itching).), Disp: 60 g, Rfl: 0  •  pantoprazole (Protonix) 40 MG EC tablet, Take 1 tablet by mouth Daily., Disp: 90 tablet, Rfl: 3  •  vitamin D (ERGOCALCIFEROL) 1.25 MG (68615 UT) capsule capsule, Take 1 capsule by mouth 1 (One) Time Per  "Week., Disp: 15 capsule, Rfl: 3    Allergies:   Patient has no known allergies.    Vitals:  /77   Pulse 82   Ht 160 cm (63\")   Wt 84.8 kg (187 lb)   SpO2 95%   BMI 33.13 kg/m²     Physical Exam:  Physical Exam  Constitutional:       Appearance: She is obese.   HENT:      Head: Normocephalic and atraumatic.      Nose: Nose normal. No congestion or rhinorrhea.   Eyes:      General: No scleral icterus.     Extraocular Movements: Extraocular movements intact.      Conjunctiva/sclera: Conjunctivae normal.      Pupils: Pupils are equal, round, and reactive to light.   Cardiovascular:      Rate and Rhythm: Normal rate and regular rhythm.      Pulses: Normal pulses.      Heart sounds: Normal heart sounds.   Pulmonary:      Effort: Pulmonary effort is normal.      Breath sounds: Normal breath sounds.   Abdominal:      General: Abdomen is flat. Bowel sounds are normal. There is no distension.      Palpations: Abdomen is soft. There is no shifting dullness, fluid wave, hepatomegaly, splenomegaly, mass or pulsatile mass.      Tenderness: There is no abdominal tenderness. There is no guarding or rebound.      Hernia: No hernia is present.   Musculoskeletal:         General: No swelling or tenderness.      Cervical back: Normal range of motion and neck supple.      Right lower leg: Edema present.      Left lower leg: Edema present.   Skin:     General: Skin is warm and dry.      Coloration: Skin is not jaundiced.   Neurological:      General: No focal deficit present.      Mental Status: She is alert and oriented to person, place, and time.   Psychiatric:         Mood and Affect: Mood normal.         Behavior: Behavior normal.         Results Review:  Lab Results:   Lab on 07/28/2022   Component Date Value Ref Range Status   • Ferritin 07/28/2022 160.60 (A) 13.00 - 150.00 ng/mL Final   • Glucose 07/28/2022 212 (A) 65 - 99 mg/dL Final   • BUN 07/28/2022 31 (A) 8 - 23 mg/dL Final   • Creatinine 07/28/2022 2.59 (A) 0.57 - " 1.00 mg/dL Final   • Sodium 07/28/2022 143  136 - 145 mmol/L Final   • Potassium 07/28/2022 4.7  3.5 - 5.2 mmol/L Final   • Chloride 07/28/2022 107  98 - 107 mmol/L Final   • CO2 07/28/2022 22.2  22.0 - 29.0 mmol/L Final   • Calcium 07/28/2022 8.6  8.6 - 10.5 mg/dL Final   • Total Protein 07/28/2022 6.4  6.0 - 8.5 g/dL Final   • Albumin 07/28/2022 3.31 (A) 3.50 - 5.20 g/dL Final   • ALT (SGPT) 07/28/2022 12  1 - 33 U/L Final   • AST (SGOT) 07/28/2022 33 (A) 1 - 32 U/L Final   • Alkaline Phosphatase 07/28/2022 434 (A) 39 - 117 U/L Final   • Total Bilirubin 07/28/2022 0.2  0.0 - 1.2 mg/dL Final   • Globulin 07/28/2022 3.1  gm/dL Final   • A/G Ratio 07/28/2022 1.1  g/dL Final   • BUN/Creatinine Ratio 07/28/2022 12.0  7.0 - 25.0 Final   • Anion Gap 07/28/2022 13.8  5.0 - 15.0 mmol/L Final   • eGFR 07/28/2022 18.4 (A) >60.0 mL/min/1.73 Final    National Kidney Foundation and American Society of Nephrology (ASN) Task Force recommended calculation based on the Chronic Kidney Disease Epidemiology Collaboration (CKD-EPI) equation refit without adjustment for race.   • Iron 07/28/2022 33 (A) 37 - 145 mcg/dL Final   • Iron Saturation 07/28/2022 11 (A) 20 - 50 % Final   • Transferrin 07/28/2022 198 (A) 200 - 360 mg/dL Final   • TIBC 07/28/2022 295 (A) 298 - 536 mcg/dL Final   • WBC 07/28/2022 6.86  3.40 - 10.80 10*3/mm3 Final   • RBC 07/28/2022 3.16 (A) 3.77 - 5.28 10*6/mm3 Final   • Hemoglobin 07/28/2022 8.3 (A) 12.0 - 15.9 g/dL Final   • Hematocrit 07/28/2022 27.3 (A) 34.0 - 46.6 % Final   • MCV 07/28/2022 86.4  79.0 - 97.0 fL Final   • MCH 07/28/2022 26.3 (A) 26.6 - 33.0 pg Final   • MCHC 07/28/2022 30.4 (A) 31.5 - 35.7 g/dL Final   • RDW 07/28/2022 14.6  12.3 - 15.4 % Final   • RDW-SD 07/28/2022 46.3  37.0 - 54.0 fl Final   • MPV 07/28/2022 9.5  6.0 - 12.0 fL Final   • Platelets 07/28/2022 212  140 - 450 10*3/mm3 Final   • Neutrophil % 07/28/2022 72.8  42.7 - 76.0 % Final   • Lymphocyte % 07/28/2022 15.2 (A) 19.6 - 45.3 %  Final   • Monocyte % 07/28/2022 6.7  5.0 - 12.0 % Final   • Eosinophil % 07/28/2022 4.4  0.3 - 6.2 % Final   • Basophil % 07/28/2022 0.6  0.0 - 1.5 % Final   • Immature Grans % 07/28/2022 0.3  0.0 - 0.5 % Final   • Neutrophils, Absolute 07/28/2022 5.00  1.70 - 7.00 10*3/mm3 Final   • Lymphocytes, Absolute 07/28/2022 1.04  0.70 - 3.10 10*3/mm3 Final   • Monocytes, Absolute 07/28/2022 0.46  0.10 - 0.90 10*3/mm3 Final   • Eosinophils, Absolute 07/28/2022 0.30  0.00 - 0.40 10*3/mm3 Final   • Basophils, Absolute 07/28/2022 0.04  0.00 - 0.20 10*3/mm3 Final   • Immature Grans, Absolute 07/28/2022 0.02  0.00 - 0.05 10*3/mm3 Final   • nRBC 07/28/2022 0.0  0.0 - 0.2 /100 WBC Final   Lab on 07/22/2022   Component Date Value Ref Range Status   • Protein, 24H Urine 07/22/2022 3,957.0 (A) 0.0 - 150.0 mg/24hours Final   • 24H Urine Volume 07/22/2022 1,000  mL Final   • Time (Hours) 07/22/2022 24  hrs Final   Lab on 07/21/2022   Component Date Value Ref Range Status   • PTH, Intact 07/21/2022 56.5  15.0 - 65.0 pg/mL Final   • Phosphorus 07/21/2022 3.7  2.5 - 4.5 mg/dL Final   • Glucose 07/21/2022 147 (A) 65 - 99 mg/dL Final   • BUN 07/21/2022 32 (A) 8 - 23 mg/dL Final   • Creatinine 07/21/2022 2.08 (A) 0.57 - 1.00 mg/dL Final   • Sodium 07/21/2022 139  136 - 145 mmol/L Final   • Potassium 07/21/2022 4.5  3.5 - 5.2 mmol/L Final   • Chloride 07/21/2022 106  98 - 107 mmol/L Final   • CO2 07/21/2022 22.0  22.0 - 29.0 mmol/L Final   • Calcium 07/21/2022 9.0  8.6 - 10.5 mg/dL Final   • Total Protein 07/21/2022 6.7  6.0 - 8.5 g/dL Final   • Albumin 07/21/2022 3.30 (A) 3.50 - 5.20 g/dL Final   • ALT (SGPT) 07/21/2022 18  1 - 33 U/L Final   • AST (SGOT) 07/21/2022 35 (A) 1 - 32 U/L Final   • Alkaline Phosphatase 07/21/2022 427 (A) 39 - 117 U/L Final   • Total Bilirubin 07/21/2022 0.3  0.0 - 1.2 mg/dL Final   • Globulin 07/21/2022 3.4  gm/dL Final   • A/G Ratio 07/21/2022 1.0  g/dL Final   • BUN/Creatinine Ratio 07/21/2022 15.4  7.0 - 25.0  Final   • Anion Gap 07/21/2022 11.0  5.0 - 15.0 mmol/L Final   • eGFR 07/21/2022 24.0 (A) >60.0 mL/min/1.73 Final    National Kidney Foundation and American Society of Nephrology (ASN) Task Force recommended calculation based on the Chronic Kidney Disease Epidemiology Collaboration (CKD-EPI) equation refit without adjustment for race.   • Iron 07/21/2022 31 (A) 37 - 145 mcg/dL Final   • Iron Saturation 07/21/2022 10 (A) 20 - 50 % Final   • Transferrin 07/21/2022 206  200 - 360 mg/dL Final   • TIBC 07/21/2022 307  298 - 536 mcg/dL Final   • Ferritin 07/21/2022 182.00 (A) 13.00 - 150.00 ng/mL Final   • Color, UA 07/21/2022 Yellow  Yellow, Straw Final   • Appearance, UA 07/21/2022 Clear  Clear Final   • pH, UA 07/21/2022 7.0  5.0 - 8.0 Final   • Specific Gravity, UA 07/21/2022 1.016  1.005 - 1.030 Final   • Glucose, UA 07/21/2022 100 mg/dL (Trace) (A) Negative Final   • Ketones, UA 07/21/2022 Negative  Negative Final   • Bilirubin, UA 07/21/2022 Negative  Negative Final   • Blood, UA 07/21/2022 Negative  Negative Final   • Protein, UA 07/21/2022 >=300 mg/dL (3+) (A) Negative Final   • Leuk Esterase, UA 07/21/2022 Negative  Negative Final   • Nitrite, UA 07/21/2022 Negative  Negative Final   • Urobilinogen, UA 07/21/2022 0.2 E.U./dL  0.2 - 1.0 E.U./dL Final   • RBC, UA 07/21/2022 0-2  None Seen, 0-2 /HPF Final   • WBC, UA 07/21/2022 0-2  None Seen, 0-2 /HPF Final   • Bacteria, UA 07/21/2022 None Seen  None Seen /HPF Final   • Squamous Epithelial Cells, UA 07/21/2022 3-6 (A) None Seen, 0-2 /HPF Final   • Hyaline Casts, UA 07/21/2022 0-2  None Seen /LPF Final   • Methodology 07/21/2022 Automated Microscopy   Final       Assessment & Plan     Visit Diagnoses:    ICD-10-CM ICD-9-CM   1. Iron deficiency anemia, unspecified iron deficiency anemia type  D50.9 280.9   2. Cirrhosis of liver with ascites, unspecified hepatic cirrhosis type (HCC)  K74.60 571.5    R18.8    3. CKD (chronic kidney disease) stage 4, GFR 15-29 ml/min  (HCC)  N18.4 585.4   4. Weight loss, abnormal  R63.4 783.21       Plan:  Orders Placed This Encounter   Procedures   • [place] Upper GI Single Contrast SBFT       The patient's anemia is likely multifactorial.  She has cirrhosis and chronic kidney disease.  We will reattempt UGI/SBFT then capsule endoscopy.  The patient will follow up after her upper GI series.  I will try to get her capsule endoscopy scheduled once I get the results of the upper GI small bowel follow-through.      MEDS ORDERED DURING VISIT:  No orders of the defined types were placed in this encounter.      No follow-ups on file.             This document has been electronically signed by Palmira Pate MD   August 4, 2022 10:50 EDT      Part of this note may be an electronic transcription/translation of spoken language to printed text using the Dragon Dictation System.

## 2022-08-08 ENCOUNTER — LAB (OUTPATIENT)
Dept: ONCOLOGY | Facility: CLINIC | Age: 77
End: 2022-08-08

## 2022-08-08 VITALS
TEMPERATURE: 97.1 F | OXYGEN SATURATION: 98 % | DIASTOLIC BLOOD PRESSURE: 62 MMHG | RESPIRATION RATE: 18 BRPM | SYSTOLIC BLOOD PRESSURE: 108 MMHG | HEART RATE: 80 BPM

## 2022-08-08 DIAGNOSIS — D50.9 IRON DEFICIENCY ANEMIA, UNSPECIFIED IRON DEFICIENCY ANEMIA TYPE: ICD-10-CM

## 2022-08-08 DIAGNOSIS — D50.9 IRON DEFICIENCY ANEMIA, UNSPECIFIED IRON DEFICIENCY ANEMIA TYPE: Primary | ICD-10-CM

## 2022-08-08 DIAGNOSIS — K90.9 MALABSORPTION OF IRON: ICD-10-CM

## 2022-08-08 LAB
BASOPHILS # BLD AUTO: 0.03 10*3/MM3 (ref 0–0.2)
BASOPHILS NFR BLD AUTO: 0.4 % (ref 0–1.5)
DEPRECATED RDW RBC AUTO: 43.9 FL (ref 37–54)
EOSINOPHIL # BLD AUTO: 0.27 10*3/MM3 (ref 0–0.4)
EOSINOPHIL NFR BLD AUTO: 3.5 % (ref 0.3–6.2)
ERYTHROCYTE [DISTWIDTH] IN BLOOD BY AUTOMATED COUNT: 14 % (ref 12.3–15.4)
FERRITIN SERPL-MCNC: 147.4 NG/ML (ref 13–150)
HCT VFR BLD AUTO: 26.3 % (ref 34–46.6)
HGB BLD-MCNC: 8 G/DL (ref 12–15.9)
IMM GRANULOCYTES # BLD AUTO: 0.02 10*3/MM3 (ref 0–0.05)
IMM GRANULOCYTES NFR BLD AUTO: 0.3 % (ref 0–0.5)
IRON 24H UR-MRATE: 23 MCG/DL (ref 37–145)
IRON SATN MFR SERPL: 7 % (ref 20–50)
LYMPHOCYTES # BLD AUTO: 1.17 10*3/MM3 (ref 0.7–3.1)
LYMPHOCYTES NFR BLD AUTO: 15 % (ref 19.6–45.3)
MCH RBC QN AUTO: 26.1 PG (ref 26.6–33)
MCHC RBC AUTO-ENTMCNC: 30.4 G/DL (ref 31.5–35.7)
MCV RBC AUTO: 85.9 FL (ref 79–97)
MONOCYTES # BLD AUTO: 0.56 10*3/MM3 (ref 0.1–0.9)
MONOCYTES NFR BLD AUTO: 7.2 % (ref 5–12)
NEUTROPHILS NFR BLD AUTO: 5.76 10*3/MM3 (ref 1.7–7)
NEUTROPHILS NFR BLD AUTO: 73.6 % (ref 42.7–76)
NRBC BLD AUTO-RTO: 0 /100 WBC (ref 0–0.2)
PLATELET # BLD AUTO: 267 10*3/MM3 (ref 140–450)
PMV BLD AUTO: 9.4 FL (ref 6–12)
RBC # BLD AUTO: 3.06 10*6/MM3 (ref 3.77–5.28)
TIBC SERPL-MCNC: 314 MCG/DL (ref 298–536)
TRANSFERRIN SERPL-MCNC: 211 MG/DL (ref 200–360)
WBC NRBC COR # BLD: 7.81 10*3/MM3 (ref 3.4–10.8)

## 2022-08-08 PROCEDURE — 82728 ASSAY OF FERRITIN: CPT | Performed by: NURSE PRACTITIONER

## 2022-08-08 PROCEDURE — 83540 ASSAY OF IRON: CPT | Performed by: NURSE PRACTITIONER

## 2022-08-08 PROCEDURE — 84466 ASSAY OF TRANSFERRIN: CPT | Performed by: NURSE PRACTITIONER

## 2022-08-08 PROCEDURE — 85025 COMPLETE CBC W/AUTO DIFF WBC: CPT | Performed by: NURSE PRACTITIONER

## 2022-08-08 RX ORDER — SODIUM CHLORIDE 9 MG/ML
250 INJECTION, SOLUTION INTRAVENOUS ONCE
Status: CANCELLED | OUTPATIENT
Start: 2022-08-23

## 2022-08-08 RX ORDER — SODIUM CHLORIDE 9 MG/ML
250 INJECTION, SOLUTION INTRAVENOUS ONCE
Status: CANCELLED | OUTPATIENT
Start: 2022-08-30

## 2022-08-11 ENCOUNTER — OFFICE VISIT (OUTPATIENT)
Dept: CARDIOLOGY | Facility: CLINIC | Age: 77
End: 2022-08-11

## 2022-08-11 VITALS
HEIGHT: 63 IN | WEIGHT: 191.4 LBS | BODY MASS INDEX: 33.91 KG/M2 | SYSTOLIC BLOOD PRESSURE: 163 MMHG | HEART RATE: 62 BPM | DIASTOLIC BLOOD PRESSURE: 69 MMHG

## 2022-08-11 DIAGNOSIS — G47.33 OSA (OBSTRUCTIVE SLEEP APNEA): ICD-10-CM

## 2022-08-11 DIAGNOSIS — N18.4 TYPE 2 DIABETES MELLITUS WITH STAGE 4 CHRONIC KIDNEY DISEASE, WITH LONG-TERM CURRENT USE OF INSULIN: ICD-10-CM

## 2022-08-11 DIAGNOSIS — I10 ESSENTIAL HYPERTENSION: Chronic | ICD-10-CM

## 2022-08-11 DIAGNOSIS — G56.03 BILATERAL CARPAL TUNNEL SYNDROME: ICD-10-CM

## 2022-08-11 DIAGNOSIS — E11.22 TYPE 2 DIABETES MELLITUS WITH STAGE 4 CHRONIC KIDNEY DISEASE, WITH LONG-TERM CURRENT USE OF INSULIN: ICD-10-CM

## 2022-08-11 DIAGNOSIS — L98.9 SKIN LESION OF LEFT LEG: ICD-10-CM

## 2022-08-11 DIAGNOSIS — I50.32 CHRONIC DIASTOLIC CONGESTIVE HEART FAILURE: ICD-10-CM

## 2022-08-11 DIAGNOSIS — E78.5 DYSLIPIDEMIA: ICD-10-CM

## 2022-08-11 DIAGNOSIS — I25.10 ATHEROSCLEROSIS OF NATIVE CORONARY ARTERY OF NATIVE HEART WITHOUT ANGINA PECTORIS: ICD-10-CM

## 2022-08-11 DIAGNOSIS — I10 ESSENTIAL HYPERTENSION: Primary | ICD-10-CM

## 2022-08-11 DIAGNOSIS — E78.2 MIXED HYPERLIPIDEMIA: Chronic | ICD-10-CM

## 2022-08-11 DIAGNOSIS — I87.2 VENOUS INSUFFICIENCY (CHRONIC) (PERIPHERAL): ICD-10-CM

## 2022-08-11 DIAGNOSIS — I10 BENIGN ESSENTIAL HYPERTENSION: ICD-10-CM

## 2022-08-11 DIAGNOSIS — Z79.4 TYPE 2 DIABETES MELLITUS WITH STAGE 4 CHRONIC KIDNEY DISEASE, WITH LONG-TERM CURRENT USE OF INSULIN: ICD-10-CM

## 2022-08-11 DIAGNOSIS — I73.9 PVD (PERIPHERAL VASCULAR DISEASE) WITH CLAUDICATION: ICD-10-CM

## 2022-08-11 PROCEDURE — 99214 OFFICE O/P EST MOD 30 MIN: CPT | Performed by: SPECIALIST

## 2022-08-11 PROCEDURE — 93000 ELECTROCARDIOGRAM COMPLETE: CPT | Performed by: SPECIALIST

## 2022-08-11 RX ORDER — AMLODIPINE BESYLATE 10 MG/1
10 TABLET ORAL DAILY
Qty: 90 TABLET | Refills: 3 | Status: SHIPPED | OUTPATIENT
Start: 2022-08-11

## 2022-08-11 RX ORDER — METOPROLOL SUCCINATE 50 MG/1
25 TABLET, EXTENDED RELEASE ORAL DAILY
Qty: 90 TABLET | Refills: 1 | Status: SHIPPED | OUTPATIENT
Start: 2022-08-11 | End: 2022-09-08 | Stop reason: SDUPTHER

## 2022-08-11 RX ORDER — HYDRALAZINE HYDROCHLORIDE 25 MG/1
75 TABLET, FILM COATED ORAL 3 TIMES DAILY
Qty: 270 TABLET | Refills: 1 | Status: SHIPPED | OUTPATIENT
Start: 2022-08-11 | End: 2023-01-25

## 2022-08-11 RX ORDER — ATORVASTATIN CALCIUM 40 MG/1
40 TABLET, FILM COATED ORAL DAILY
Qty: 90 TABLET | Refills: 3 | Status: ON HOLD | OUTPATIENT
Start: 2022-08-11 | End: 2022-11-30 | Stop reason: SDUPTHER

## 2022-08-11 RX ORDER — ASPIRIN 81 MG/1
81 TABLET ORAL DAILY
COMMUNITY
End: 2022-11-30 | Stop reason: HOSPADM

## 2022-08-11 RX ORDER — BUMETANIDE 1 MG/1
1 TABLET ORAL EVERY OTHER DAY
Qty: 90 TABLET | Refills: 1 | Status: SHIPPED | OUTPATIENT
Start: 2022-08-11 | End: 2022-10-10 | Stop reason: SDUPTHER

## 2022-08-11 RX ORDER — CLONIDINE HYDROCHLORIDE 0.2 MG/1
0.2 TABLET ORAL 3 TIMES DAILY
Qty: 270 TABLET | Refills: 1 | Status: ON HOLD | OUTPATIENT
Start: 2022-08-11 | End: 2022-11-02

## 2022-08-11 RX ORDER — ISOSORBIDE MONONITRATE 60 MG/1
60 TABLET, EXTENDED RELEASE ORAL DAILY
Qty: 90 TABLET | Refills: 0 | Status: SHIPPED | OUTPATIENT
Start: 2022-08-11

## 2022-08-11 NOTE — PROGRESS NOTES
Subjective   Follow up, hypertension, anemia  Britta Lee is a 76 y.o. female who presents to day for Follow-up.    CHIEF COMPLIANT  Chief Complaint   Patient presents with   • Follow-up       Active Problems:  Problem List Items Addressed This Visit        Cardiac and Vasculature    Essential hypertension - Primary    Overview     The patient was advised to keep a daily blood pressure and pulse log. They were advised contact our office if consistently running over 120/80 and bring the log to the next follow up visit.          Relevant Medications    amLODIPine (NORVASC) 10 MG tablet    bumetanide (BUMEX) 1 MG tablet    cloNIDine (CATAPRES) 0.2 MG tablet    metoprolol succinate XL (TOPROL-XL) 50 MG 24 hr tablet    hydrALAZINE (APRESOLINE) 25 MG tablet    isosorbide mononitrate (IMDUR) 60 MG 24 hr tablet    Atherosclerosis of native coronary artery of native heart    Overview     S/P one stent placement followed by [facility] in [place] - Dr. Alexandru Martines          Relevant Medications    amLODIPine (NORVASC) 10 MG tablet    metoprolol succinate XL (TOPROL-XL) 50 MG 24 hr tablet    isosorbide mononitrate (IMDUR) 60 MG 24 hr tablet    Other Relevant Orders    ECG 12 Lead    Dyslipidemia    Overview     Lipid panel done 10/20/15 TC: 166 Tri HDL: 34 LDL: 104         Relevant Medications    atorvastatin (LIPITOR) 40 MG tablet    Venous insufficiency (chronic) (peripheral)    PVD (peripheral vascular disease) with claudication (HCC)    Chronic heart failure with preserved ejection fraction (HFpEF) (HCC)    Relevant Medications    amLODIPine (NORVASC) 10 MG tablet    metoprolol succinate XL (TOPROL-XL) 50 MG 24 hr tablet    hydrALAZINE (APRESOLINE) 25 MG tablet    isosorbide mononitrate (IMDUR) 60 MG 24 hr tablet       Endocrine and Metabolic    Type 2 diabetes mellitus with chronic kidney disease, with long-term current use of insulin (HCC)    Relevant Medications    bumetanide (BUMEX) 1 MG tablet       Neuro     Bilateral carpal tunnel syndrome    Overview     Refer to [facility] -  for L knee and R hand/wrist pain            Sleep    LENNY (obstructive sleep apnea)      Other Visit Diagnoses     Mixed hyperlipidemia  (Chronic)       Advised low-cholesterol diet  Continue atorvastatin    Relevant Medications    atorvastatin (LIPITOR) 40 MG tablet    Benign essential hypertension        Relevant Medications    amLODIPine (NORVASC) 10 MG tablet    bumetanide (BUMEX) 1 MG tablet    cloNIDine (CATAPRES) 0.2 MG tablet    metoprolol succinate XL (TOPROL-XL) 50 MG 24 hr tablet    hydrALAZINE (APRESOLINE) 25 MG tablet    Skin lesion of left leg        Continue Bumex    Relevant Medications    hydrALAZINE (APRESOLINE) 25 MG tablet          HPI  HPI  Getting more anemia and weight loss she is being worked up for because of this so far no previous obvious cause was found patient is being booked for EGD she said that she had colonoscopy in June and that was normal, from the cardiac standpoint she is still having stable shortness of breath moderate exertion with intermittent edema no chest pain no palpitations blood pressure has been a bit labile up-and-down and recently she was told that she may have also liver cirrhosis  PRIOR MEDS  Current Outpatient Medications on File Prior to Visit   Medication Sig Dispense Refill   • aspirin 81 MG EC tablet Take 81 mg by mouth Daily.     • Continuous Blood Gluc  (Dexcom G6 ) device 1 Device Daily. 3 each 3   • escitalopram (LEXAPRO) 10 MG tablet Take 1 tablet by mouth Daily. 90 tablet 3   • ferrous sulfate 325 (65 FE) MG tablet Take 325 mg by mouth Daily With Breakfast.     • glucose blood test strip 1 each by Other route 3 (Three) Times a Day. 100 each 12   • Insulin Glargine, 2 Unit Dial, (Toujeo Max SoloStar) 300 UNIT/ML solution pen-injector injection Inject 40-60 Units under the skin into the appropriate area as directed Daily. 3 pen 5   • insulin lispro  (humaLOG) 100 UNIT/ML injection Inject 0-6 Units under the skin into the appropriate area as directed 3 (Three) Times a Day Before Meals. Sliding scale 4-6 units if BG > 180 9 mL 5   • Insulin Pen Needle 32G X 5 MM misc 1 each 4 (Four) Times a Day. 120 each 5   • levothyroxine (SYNTHROID, LEVOTHROID) 25 MCG tablet Take 1 tablet by mouth Daily. 90 tablet 3   • nystatin (MYCOSTATIN) 850414 UNIT/GM powder Apply  one application topically to the appropriate area as directed Every 12 (Twelve) Hours. (Patient taking differently: Apply  topically to the appropriate area as directed 2 (Two) Times a Day As Needed (itching).) 60 g 0   • pantoprazole (Protonix) 40 MG EC tablet Take 1 tablet by mouth Daily. 90 tablet 3   • vitamin D (ERGOCALCIFEROL) 1.25 MG (63011 UT) capsule capsule Take 1 capsule by mouth 1 (One) Time Per Week. 15 capsule 3   • [DISCONTINUED] amLODIPine (NORVASC) 10 MG tablet Take 1 tablet by mouth Daily. (Patient taking differently: Take 10 mg by mouth Every Night.) 90 tablet 3   • [DISCONTINUED] atorvastatin (LIPITOR) 40 MG tablet Take 1 tablet by mouth Daily. (Patient taking differently: Take 40 mg by mouth Every Night.) 90 tablet 3   • [DISCONTINUED] bumetanide (BUMEX) 1 MG tablet Take 1 mg by mouth Every Other Day.     • [DISCONTINUED] cloNIDine (CATAPRES) 0.2 MG tablet TAKE ONE Tablet BY MOUTH THREE TIMES DAILY 270 tablet 1   • [DISCONTINUED] hydrALAZINE (APRESOLINE) 25 MG tablet Take 3 tablets by mouth 3 (Three) Times a Day. 270 tablet 1   • [DISCONTINUED] isosorbide mononitrate (IMDUR) 60 MG 24 hr tablet Take 1 tablet by mouth Daily. 90 tablet 0   • [DISCONTINUED] metoprolol succinate XL (TOPROL-XL) 50 MG 24 hr tablet Take 25 mg by mouth Daily.     • Continuous Blood Gluc Sensor (Dexcom G6 Sensor) Every 10 (Ten) Days. 9 each 3   • Continuous Blood Gluc Transmit (Dexcom G6 Transmitter) misc 1 Device Daily. 1 each 5     No current facility-administered medications on file prior to visit.  "      ALLERGIES  Patient has no known allergies.    HISTORY  Past Medical History:   Diagnosis Date   • Acquired hypothyroidism 4/22/2021   • Anemia    • Arthritis    • Carpal tunnel syndrome    • Coronary artery disease    • Diabetes mellitus (HCC)    • Elevated cholesterol    • GERD (gastroesophageal reflux disease)    • History of pneumonia    • History of unsteady gait     OCASSIONALLY   • Hypertension    • Insomnia    • Kidney disease    • LENNY (obstructive sleep apnea) 12/1/2020   • Osteoarthritis    • Renal insufficiency    • Sciatica        Social History     Socioeconomic History   • Marital status:      Spouse name: natalie   • Number of children: 3   • Years of education: 12   Tobacco Use   • Smoking status: Never Smoker   • Smokeless tobacco: Never Used   Vaping Use   • Vaping Use: Never used   Substance and Sexual Activity   • Alcohol use: Yes     Comment: socially   • Drug use: No   • Sexual activity: Defer     Birth control/protection: Surgical       Family History   Problem Relation Age of Onset   • Arthritis Mother    • Diabetes Mother    • Cancer Mother    • Heart disease Father    • Diabetes Daughter    • Diabetes Son    • Diabetes Maternal Aunt    • Heart disease Maternal Grandmother    • Breast cancer Neg Hx        Review of Systems   Respiratory: Positive for shortness of breath. Negative for apnea, cough, choking, chest tightness, wheezing and stridor.    Cardiovascular: Positive for leg swelling. Negative for chest pain and palpitations.       Objective     VITALS: /69   Pulse 62   Ht 160 cm (62.99\")   Wt 86.8 kg (191 lb 6.4 oz)   BMI 33.91 kg/m²     LABS:   Lab Results (most recent)     None          IMAGING:   No Images in the past 120 days found..    EXAM:  Physical Exam  Vitals reviewed.   Constitutional:       Appearance: She is well-developed.   HENT:      Head: Normocephalic and atraumatic.   Eyes:      Pupils: Pupils are equal, round, and reactive to light.   Neck:      " Thyroid: No thyromegaly.      Vascular: No JVD.   Cardiovascular:      Rate and Rhythm: Normal rate and regular rhythm.      Heart sounds: Normal heart sounds. No murmur heard.    No friction rub. No gallop.   Pulmonary:      Effort: Pulmonary effort is normal. No respiratory distress.      Breath sounds: Normal breath sounds. No stridor. No wheezing or rales.   Chest:      Chest wall: No tenderness.   Musculoskeletal:         General: No tenderness or deformity.      Cervical back: Neck supple.   Skin:     General: Skin is warm and dry.   Neurological:      Mental Status: She is alert and oriented to person, place, and time.      Cranial Nerves: No cranial nerve deficit.      Coordination: Coordination normal.         Procedure     ECG 12 Lead    Date/Time: 8/11/2022 9:13 AM  Performed by: Hever Cruz MD  Authorized by: Hever Cruz MD           EKG: Normal sinus rhythm, left axis deviation, LVH, otherwise unremarkable EKG compared with EKG on September 2, 2021 no significant change       Assessment & Plan     Diagnoses and all orders for this visit:    1. Essential hypertension (Primary)  -     amLODIPine (NORVASC) 10 MG tablet; Take 1 tablet by mouth Daily.  Dispense: 90 tablet; Refill: 3  -     cloNIDine (CATAPRES) 0.2 MG tablet; Take 1 tablet by mouth 3 (Three) Times a Day.  Dispense: 270 tablet; Refill: 1  -     isosorbide mononitrate (IMDUR) 60 MG 24 hr tablet; Take 1 tablet by mouth Daily.  Dispense: 90 tablet; Refill: 0    2. Atherosclerosis of native coronary artery of native heart without angina pectoris  -     ECG 12 Lead    3. Dyslipidemia    4. Venous insufficiency (chronic) (peripheral)    5. PVD (peripheral vascular disease) with claudication ([facility])    6. Type 2 diabetes mellitus with stage 4 chronic kidney disease, with long-term current use of insulin ([facility])    7. Bilateral carpal tunnel syndrome    8. LENNY (obstructive sleep apnea)    9. Essential hypertension  Comments:  Continue  amlodipine  Continue Bumex clonidine and metoprolol  Advised daily BP/pulse log and follow-up later this month  Orders:  -     amLODIPine (NORVASC) 10 MG tablet; Take 1 tablet by mouth Daily.  Dispense: 90 tablet; Refill: 3  -     cloNIDine (CATAPRES) 0.2 MG tablet; Take 1 tablet by mouth 3 (Three) Times a Day.  Dispense: 270 tablet; Refill: 1  -     isosorbide mononitrate (IMDUR) 60 MG 24 hr tablet; Take 1 tablet by mouth Daily.  Dispense: 90 tablet; Refill: 0    10. Mixed hyperlipidemia  Comments:  Advised low-cholesterol diet  Continue atorvastatin  Orders:  -     atorvastatin (LIPITOR) 40 MG tablet; Take 1 tablet by mouth Daily.  Dispense: 90 tablet; Refill: 3    11. Benign essential hypertension  -     cloNIDine (CATAPRES) 0.2 MG tablet; Take 1 tablet by mouth 3 (Three) Times a Day.  Dispense: 270 tablet; Refill: 1    12. Skin lesion of left leg  Comments:  Continue Bumex  Orders:  -     hydrALAZINE (APRESOLINE) 25 MG tablet; Take 3 tablets by mouth 3 (Three) Times a Day.  Dispense: 270 tablet; Refill: 1    13. Chronic diastolic congestive heart failure (HCC)  -     Adult Transthoracic Echo Complete w/ Color, Spectral and Contrast if necessary per protocol; Future  -     isosorbide mononitrate (IMDUR) 60 MG 24 hr tablet; Take 1 tablet by mouth Daily.  Dispense: 90 tablet; Refill: 0    Other orders  -     bumetanide (BUMEX) 1 MG tablet; Take 1 tablet by mouth Every Other Day.  Dispense: 90 tablet; Refill: 1  -     metoprolol succinate XL (TOPROL-XL) 50 MG 24 hr tablet; Take 0.5 tablets by mouth Daily.  Dispense: 90 tablet; Refill: 1      1.  Her blood pressure is elevated apparently is a little bit labile at home but she is taking amlodipine at night advised her to take it in the morning and will monitor her blood pressure response  2.  Currently she is not in CHF has a history of HFpEF I am concerned about the possibility of cardiac amyloidosis so I am going to repeat the echo to assess for any new mild  suggestive of such if so further work-up may be indicated  3.  No chest pain continue current management  4.  Regarding her peripheral vascular disease no worsening claudication continue current management  5.  I reviewed the labs her lipids from June was acceptable except for mildly elevated triglycerides  6.  Regarding his preoperative cardiac risk for EGD is mild  7.  She may eventually require hematological assessment if no source of blood loss and weight loss is available  Return in about 3 months (around 11/11/2022).                   MEDS ORDERED DURING VISIT:  New Medications Ordered This Visit   Medications   • amLODIPine (NORVASC) 10 MG tablet     Sig: Take 1 tablet by mouth Daily.     Dispense:  90 tablet     Refill:  3   • atorvastatin (LIPITOR) 40 MG tablet     Sig: Take 1 tablet by mouth Daily.     Dispense:  90 tablet     Refill:  3   • bumetanide (BUMEX) 1 MG tablet     Sig: Take 1 tablet by mouth Every Other Day.     Dispense:  90 tablet     Refill:  1   • cloNIDine (CATAPRES) 0.2 MG tablet     Sig: Take 1 tablet by mouth 3 (Three) Times a Day.     Dispense:  270 tablet     Refill:  1   • metoprolol succinate XL (TOPROL-XL) 50 MG 24 hr tablet     Sig: Take 0.5 tablets by mouth Daily.     Dispense:  90 tablet     Refill:  1   • hydrALAZINE (APRESOLINE) 25 MG tablet     Sig: Take 3 tablets by mouth 3 (Three) Times a Day.     Dispense:  270 tablet     Refill:  1   • isosorbide mononitrate (IMDUR) 60 MG 24 hr tablet     Sig: Take 1 tablet by mouth Daily.     Dispense:  90 tablet     Refill:  0     NO MORE REFILLS UNTIL SEEN HERE AT THE OFFICE.       As always, Lexie Dolan PA  I appreciate very much the opportunity to participate in the cardiovascular care of your patients. Please do not hesitate to call me with any questions with regards to Britta Lee evaluation and management.         This document has been electronically signed by Hever Cruz MD  August 11, 2022 10:10 EDT

## 2022-08-18 ENCOUNTER — HOSPITAL ENCOUNTER (OUTPATIENT)
Dept: GENERAL RADIOLOGY | Facility: HOSPITAL | Age: 77
Discharge: HOME | End: 2022-08-18
Admitting: INTERNAL MEDICINE

## 2022-08-18 DIAGNOSIS — D50.9 IRON DEFICIENCY ANEMIA, UNSPECIFIED IRON DEFICIENCY ANEMIA TYPE: ICD-10-CM

## 2022-08-18 PROCEDURE — 74240 X-RAY XM UPR GI TRC 1CNTRST: CPT

## 2022-08-18 PROCEDURE — 74240 X-RAY XM UPR GI TRC 1CNTRST: CPT | Performed by: RADIOLOGY

## 2022-08-18 PROCEDURE — 74248 X-RAY SM INT F-THRU STD: CPT

## 2022-08-18 PROCEDURE — 74248 X-RAY SM INT F-THRU STD: CPT | Performed by: RADIOLOGY

## 2022-08-19 NOTE — PROGRESS NOTES
Your recent upper GI small bowel follow-through was incomplete.  It did show a moderate size hiatal hernia.

## 2022-08-23 ENCOUNTER — INFUSION (OUTPATIENT)
Dept: ONCOLOGY | Facility: HOSPITAL | Age: 77
End: 2022-08-23

## 2022-08-23 VITALS
SYSTOLIC BLOOD PRESSURE: 138 MMHG | DIASTOLIC BLOOD PRESSURE: 49 MMHG | TEMPERATURE: 97.3 F | RESPIRATION RATE: 18 BRPM | HEART RATE: 79 BPM | OXYGEN SATURATION: 96 %

## 2022-08-23 DIAGNOSIS — K90.9 MALABSORPTION OF IRON: ICD-10-CM

## 2022-08-23 DIAGNOSIS — D50.9 IRON DEFICIENCY ANEMIA, UNSPECIFIED IRON DEFICIENCY ANEMIA TYPE: Primary | ICD-10-CM

## 2022-08-23 PROCEDURE — 96365 THER/PROPH/DIAG IV INF INIT: CPT

## 2022-08-23 PROCEDURE — 96374 THER/PROPH/DIAG INJ IV PUSH: CPT

## 2022-08-23 PROCEDURE — 25010000002 FERUMOXYTOL 510 MG/17ML SOLUTION: Performed by: NURSE PRACTITIONER

## 2022-08-23 RX ORDER — SODIUM CHLORIDE 9 MG/ML
250 INJECTION, SOLUTION INTRAVENOUS ONCE
Status: COMPLETED | OUTPATIENT
Start: 2022-08-23 | End: 2022-08-23

## 2022-08-23 RX ADMIN — FERUMOXYTOL 510 MG: 510 INJECTION INTRAVENOUS at 08:54

## 2022-08-23 RX ADMIN — SODIUM CHLORIDE 250 ML: 9 INJECTION, SOLUTION INTRAVENOUS at 08:53

## 2022-08-30 ENCOUNTER — INFUSION (OUTPATIENT)
Dept: ONCOLOGY | Facility: HOSPITAL | Age: 77
End: 2022-08-30

## 2022-08-30 VITALS
SYSTOLIC BLOOD PRESSURE: 193 MMHG | TEMPERATURE: 97.3 F | OXYGEN SATURATION: 97 % | HEART RATE: 95 BPM | DIASTOLIC BLOOD PRESSURE: 75 MMHG | RESPIRATION RATE: 18 BRPM

## 2022-08-30 DIAGNOSIS — D50.9 IRON DEFICIENCY ANEMIA, UNSPECIFIED IRON DEFICIENCY ANEMIA TYPE: Primary | ICD-10-CM

## 2022-08-30 DIAGNOSIS — K90.9 MALABSORPTION OF IRON: ICD-10-CM

## 2022-08-30 PROCEDURE — 96374 THER/PROPH/DIAG INJ IV PUSH: CPT

## 2022-08-30 PROCEDURE — 25010000002 FERUMOXYTOL 510 MG/17ML SOLUTION: Performed by: NURSE PRACTITIONER

## 2022-08-30 PROCEDURE — 96365 THER/PROPH/DIAG IV INF INIT: CPT

## 2022-08-30 RX ORDER — SODIUM CHLORIDE 9 MG/ML
250 INJECTION, SOLUTION INTRAVENOUS ONCE
Status: COMPLETED | OUTPATIENT
Start: 2022-08-30 | End: 2022-08-30

## 2022-08-30 RX ADMIN — SODIUM CHLORIDE 250 ML: 9 INJECTION, SOLUTION INTRAVENOUS at 10:13

## 2022-08-30 RX ADMIN — FERUMOXYTOL 510 MG: 510 INJECTION INTRAVENOUS at 10:13

## 2022-09-08 ENCOUNTER — OFFICE VISIT (OUTPATIENT)
Dept: FAMILY MEDICINE CLINIC | Facility: CLINIC | Age: 77
End: 2022-09-08

## 2022-09-08 VITALS
SYSTOLIC BLOOD PRESSURE: 150 MMHG | HEART RATE: 82 BPM | OXYGEN SATURATION: 96 % | WEIGHT: 192 LBS | TEMPERATURE: 98.1 F | DIASTOLIC BLOOD PRESSURE: 76 MMHG | BODY MASS INDEX: 34.02 KG/M2 | HEIGHT: 63 IN

## 2022-09-08 DIAGNOSIS — E78.5 DYSLIPIDEMIA: Chronic | ICD-10-CM

## 2022-09-08 DIAGNOSIS — Z79.4 TYPE 2 DIABETES MELLITUS WITH STAGE 4 CHRONIC KIDNEY DISEASE, WITH LONG-TERM CURRENT USE OF INSULIN: Chronic | ICD-10-CM

## 2022-09-08 DIAGNOSIS — Z78.0 MENOPAUSE: ICD-10-CM

## 2022-09-08 DIAGNOSIS — I10 ESSENTIAL HYPERTENSION: Primary | Chronic | ICD-10-CM

## 2022-09-08 DIAGNOSIS — N18.4 TYPE 2 DIABETES MELLITUS WITH STAGE 4 CHRONIC KIDNEY DISEASE, WITH LONG-TERM CURRENT USE OF INSULIN: Chronic | ICD-10-CM

## 2022-09-08 DIAGNOSIS — Z12.31 ENCOUNTER FOR SCREENING MAMMOGRAM FOR MALIGNANT NEOPLASM OF BREAST: ICD-10-CM

## 2022-09-08 DIAGNOSIS — D50.8 IRON DEFICIENCY ANEMIA SECONDARY TO INADEQUATE DIETARY IRON INTAKE: Chronic | ICD-10-CM

## 2022-09-08 DIAGNOSIS — E11.22 TYPE 2 DIABETES MELLITUS WITH STAGE 4 CHRONIC KIDNEY DISEASE, WITH LONG-TERM CURRENT USE OF INSULIN: Chronic | ICD-10-CM

## 2022-09-08 LAB
ALBUMIN SERPL-MCNC: 3 G/DL (ref 3.5–5.2)
ALBUMIN/GLOB SERPL: 0.9 G/DL
ALP SERPL-CCNC: 267 U/L (ref 39–117)
ALT SERPL W P-5'-P-CCNC: 10 U/L (ref 1–33)
ANION GAP SERPL CALCULATED.3IONS-SCNC: 11 MMOL/L (ref 5–15)
AST SERPL-CCNC: 19 U/L (ref 1–32)
BASOPHILS # BLD AUTO: 0.02 10*3/MM3 (ref 0–0.2)
BASOPHILS NFR BLD AUTO: 0.3 % (ref 0–1.5)
BILIRUB SERPL-MCNC: <0.2 MG/DL (ref 0–1.2)
BUN SERPL-MCNC: 41 MG/DL (ref 8–23)
BUN/CREAT SERPL: 15.8 (ref 7–25)
CALCIUM SPEC-SCNC: 8.9 MG/DL (ref 8.6–10.5)
CHLORIDE SERPL-SCNC: 111 MMOL/L (ref 98–107)
CHOLEST SERPL-MCNC: 109 MG/DL (ref 0–200)
CO2 SERPL-SCNC: 18 MMOL/L (ref 22–29)
CREAT SERPL-MCNC: 2.6 MG/DL (ref 0.57–1)
DEPRECATED RDW RBC AUTO: 50.6 FL (ref 37–54)
EGFRCR SERPLBLD CKD-EPI 2021: 18.2 ML/MIN/1.73
EOSINOPHIL # BLD AUTO: 0.17 10*3/MM3 (ref 0–0.4)
EOSINOPHIL NFR BLD AUTO: 2.7 % (ref 0.3–6.2)
ERYTHROCYTE [DISTWIDTH] IN BLOOD BY AUTOMATED COUNT: 16.2 % (ref 12.3–15.4)
GLOBULIN UR ELPH-MCNC: 3.4 GM/DL
GLUCOSE SERPL-MCNC: 180 MG/DL (ref 65–99)
HBA1C MFR BLD: 5 % (ref 4.8–5.6)
HCT VFR BLD AUTO: 20.8 % (ref 34–46.6)
HDLC SERPL-MCNC: 31 MG/DL (ref 40–60)
HGB BLD-MCNC: 5.9 G/DL (ref 12–15.9)
IMM GRANULOCYTES # BLD AUTO: 0.01 10*3/MM3 (ref 0–0.05)
IMM GRANULOCYTES NFR BLD AUTO: 0.2 % (ref 0–0.5)
LDLC SERPL CALC-MCNC: 56 MG/DL (ref 0–100)
LDLC/HDLC SERPL: 1.73 {RATIO}
LYMPHOCYTES # BLD AUTO: 0.83 10*3/MM3 (ref 0.7–3.1)
LYMPHOCYTES NFR BLD AUTO: 13.2 % (ref 19.6–45.3)
MCH RBC QN AUTO: 24.8 PG (ref 26.6–33)
MCHC RBC AUTO-ENTMCNC: 28.4 G/DL (ref 31.5–35.7)
MCV RBC AUTO: 87.4 FL (ref 79–97)
MONOCYTES # BLD AUTO: 0.4 10*3/MM3 (ref 0.1–0.9)
MONOCYTES NFR BLD AUTO: 6.3 % (ref 5–12)
NEUTROPHILS NFR BLD AUTO: 4.87 10*3/MM3 (ref 1.7–7)
NEUTROPHILS NFR BLD AUTO: 77.3 % (ref 42.7–76)
NRBC BLD AUTO-RTO: 0 /100 WBC (ref 0–0.2)
PLATELET # BLD AUTO: 304 10*3/MM3 (ref 140–450)
PMV BLD AUTO: 9.6 FL (ref 6–12)
POTASSIUM SERPL-SCNC: 4.9 MMOL/L (ref 3.5–5.2)
PROT SERPL-MCNC: 6.4 G/DL (ref 6–8.5)
RBC # BLD AUTO: 2.38 10*6/MM3 (ref 3.77–5.28)
SODIUM SERPL-SCNC: 140 MMOL/L (ref 136–145)
TRIGL SERPL-MCNC: 122 MG/DL (ref 0–150)
VLDLC SERPL-MCNC: 22 MG/DL (ref 5–40)
WBC NRBC COR # BLD: 6.3 10*3/MM3 (ref 3.4–10.8)

## 2022-09-08 PROCEDURE — 99214 OFFICE O/P EST MOD 30 MIN: CPT | Performed by: PHYSICIAN ASSISTANT

## 2022-09-08 PROCEDURE — 85025 COMPLETE CBC W/AUTO DIFF WBC: CPT | Performed by: PHYSICIAN ASSISTANT

## 2022-09-08 PROCEDURE — 80053 COMPREHEN METABOLIC PANEL: CPT | Performed by: PHYSICIAN ASSISTANT

## 2022-09-08 PROCEDURE — 83036 HEMOGLOBIN GLYCOSYLATED A1C: CPT | Performed by: PHYSICIAN ASSISTANT

## 2022-09-08 PROCEDURE — 80061 LIPID PANEL: CPT | Performed by: PHYSICIAN ASSISTANT

## 2022-09-08 RX ORDER — METOPROLOL SUCCINATE 50 MG/1
100 TABLET, EXTENDED RELEASE ORAL DAILY
Qty: 90 TABLET | Refills: 1 | Status: ON HOLD
Start: 2022-09-08 | End: 2022-11-02

## 2022-09-09 ENCOUNTER — TELEPHONE (OUTPATIENT)
Dept: FAMILY MEDICINE CLINIC | Facility: CLINIC | Age: 77
End: 2022-09-09

## 2022-09-09 ENCOUNTER — APPOINTMENT (OUTPATIENT)
Dept: CT IMAGING | Facility: HOSPITAL | Age: 77
End: 2022-09-09

## 2022-09-09 ENCOUNTER — HOSPITAL ENCOUNTER (EMERGENCY)
Facility: HOSPITAL | Age: 77
Discharge: HOME | End: 2022-09-10
Attending: STUDENT IN AN ORGANIZED HEALTH CARE EDUCATION/TRAINING PROGRAM | Admitting: EMERGENCY MEDICINE

## 2022-09-09 DIAGNOSIS — D50.9 IRON DEFICIENCY ANEMIA, UNSPECIFIED IRON DEFICIENCY ANEMIA TYPE: Primary | ICD-10-CM

## 2022-09-09 LAB
ABO GROUP BLD: NORMAL
ALBUMIN SERPL-MCNC: 2.28 G/DL (ref 3.5–5.2)
ALBUMIN/GLOB SERPL: 0.6 G/DL
ALP SERPL-CCNC: 261 U/L (ref 39–117)
ALT SERPL W P-5'-P-CCNC: 5 U/L (ref 1–33)
ANION GAP SERPL CALCULATED.3IONS-SCNC: 12.6 MMOL/L (ref 5–15)
ANISOCYTOSIS BLD QL: NORMAL
AST SERPL-CCNC: 19 U/L (ref 1–32)
BASOPHILS # BLD AUTO: 0.02 10*3/MM3 (ref 0–0.2)
BASOPHILS NFR BLD AUTO: 0.3 % (ref 0–1.5)
BILIRUB SERPL-MCNC: 0.2 MG/DL (ref 0–1.2)
BLD GP AB SCN SERPL QL: NEGATIVE
BUN SERPL-MCNC: 45 MG/DL (ref 8–23)
BUN/CREAT SERPL: 17.6 (ref 7–25)
CALCIUM SPEC-SCNC: 8.5 MG/DL (ref 8.6–10.5)
CHLORIDE SERPL-SCNC: 109 MMOL/L (ref 98–107)
CO2 SERPL-SCNC: 14.4 MMOL/L (ref 22–29)
CREAT SERPL-MCNC: 2.55 MG/DL (ref 0.57–1)
DEPRECATED RDW RBC AUTO: 61.7 FL (ref 37–54)
EGFRCR SERPLBLD CKD-EPI 2021: 18.7 ML/MIN/1.73
EOSINOPHIL # BLD AUTO: 0.14 10*3/MM3 (ref 0–0.4)
EOSINOPHIL NFR BLD AUTO: 2.2 % (ref 0.3–6.2)
ERYTHROCYTE [DISTWIDTH] IN BLOOD BY AUTOMATED COUNT: 18.6 % (ref 12.3–15.4)
FLUAV RNA RESP QL NAA+PROBE: NOT DETECTED
FLUBV RNA RESP QL NAA+PROBE: NOT DETECTED
GLOBULIN UR ELPH-MCNC: 3.6 GM/DL
GLUCOSE SERPL-MCNC: 175 MG/DL (ref 65–99)
HCT VFR BLD AUTO: 21.3 % (ref 34–46.6)
HGB BLD-MCNC: 6 G/DL (ref 12–15.9)
HOLD SPECIMEN: NORMAL
HOLD SPECIMEN: NORMAL
HYPOCHROMIA BLD QL: NORMAL
IMM GRANULOCYTES # BLD AUTO: 0.02 10*3/MM3 (ref 0–0.05)
IMM GRANULOCYTES NFR BLD AUTO: 0.3 % (ref 0–0.5)
INR PPP: 1.36 (ref 0.9–1.1)
LYMPHOCYTES # BLD AUTO: 1.12 10*3/MM3 (ref 0.7–3.1)
LYMPHOCYTES NFR BLD AUTO: 17.6 % (ref 19.6–45.3)
MCH RBC QN AUTO: 25.6 PG (ref 26.6–33)
MCHC RBC AUTO-ENTMCNC: 28.2 G/DL (ref 31.5–35.7)
MCV RBC AUTO: 91 FL (ref 79–97)
MONOCYTES # BLD AUTO: 0.36 10*3/MM3 (ref 0.1–0.9)
MONOCYTES NFR BLD AUTO: 5.7 % (ref 5–12)
NEUTROPHILS NFR BLD AUTO: 4.69 10*3/MM3 (ref 1.7–7)
NEUTROPHILS NFR BLD AUTO: 73.9 % (ref 42.7–76)
NRBC BLD AUTO-RTO: 0 /100 WBC (ref 0–0.2)
PLAT MORPH BLD: NORMAL
PLATELET # BLD AUTO: 291 10*3/MM3 (ref 140–450)
PMV BLD AUTO: 9.5 FL (ref 6–12)
POTASSIUM SERPL-SCNC: 5.2 MMOL/L (ref 3.5–5.2)
PROT SERPL-MCNC: 5.9 G/DL (ref 6–8.5)
PROTHROMBIN TIME: 17.1 SECONDS (ref 12.1–14.7)
RBC # BLD AUTO: 2.34 10*6/MM3 (ref 3.77–5.28)
RH BLD: POSITIVE
SARS-COV-2 RNA RESP QL NAA+PROBE: NOT DETECTED
SODIUM SERPL-SCNC: 136 MMOL/L (ref 136–145)
T&S EXPIRATION DATE: NORMAL
WBC NRBC COR # BLD: 6.35 10*3/MM3 (ref 3.4–10.8)
WHOLE BLOOD HOLD COAG: NORMAL
WHOLE BLOOD HOLD SPECIMEN: NORMAL

## 2022-09-09 PROCEDURE — 36430 TRANSFUSION BLD/BLD COMPNT: CPT

## 2022-09-09 PROCEDURE — 85007 BL SMEAR W/DIFF WBC COUNT: CPT | Performed by: NURSE PRACTITIONER

## 2022-09-09 PROCEDURE — P9016 RBC LEUKOCYTES REDUCED: HCPCS

## 2022-09-09 PROCEDURE — 86900 BLOOD TYPING SEROLOGIC ABO: CPT | Performed by: NURSE PRACTITIONER

## 2022-09-09 PROCEDURE — 85025 COMPLETE CBC W/AUTO DIFF WBC: CPT | Performed by: NURSE PRACTITIONER

## 2022-09-09 PROCEDURE — 36415 COLL VENOUS BLD VENIPUNCTURE: CPT

## 2022-09-09 PROCEDURE — 86850 RBC ANTIBODY SCREEN: CPT | Performed by: NURSE PRACTITIONER

## 2022-09-09 PROCEDURE — 80053 COMPREHEN METABOLIC PANEL: CPT | Performed by: NURSE PRACTITIONER

## 2022-09-09 PROCEDURE — 86923 COMPATIBILITY TEST ELECTRIC: CPT

## 2022-09-09 PROCEDURE — 85610 PROTHROMBIN TIME: CPT | Performed by: NURSE PRACTITIONER

## 2022-09-09 PROCEDURE — 87636 SARSCOV2 & INF A&B AMP PRB: CPT | Performed by: STUDENT IN AN ORGANIZED HEALTH CARE EDUCATION/TRAINING PROGRAM

## 2022-09-09 PROCEDURE — 99284 EMERGENCY DEPT VISIT MOD MDM: CPT

## 2022-09-09 PROCEDURE — 74176 CT ABD & PELVIS W/O CONTRAST: CPT

## 2022-09-09 PROCEDURE — 86901 BLOOD TYPING SEROLOGIC RH(D): CPT | Performed by: NURSE PRACTITIONER

## 2022-09-09 PROCEDURE — 86900 BLOOD TYPING SEROLOGIC ABO: CPT

## 2022-09-09 RX ORDER — BUMETANIDE 0.25 MG/ML
1 INJECTION INTRAMUSCULAR; INTRAVENOUS ONCE
Status: COMPLETED | OUTPATIENT
Start: 2022-09-09 | End: 2022-09-10

## 2022-09-09 NOTE — ED PROVIDER NOTES
Subjective     Provider in Triage Note           MEDICAL SCREENING:    Reason for Visit: Sent by PCP for Low H&H    Patient initially seen in triage.  The patient was advised further evaluation and diagnostic testing will be needed, some of the treatment and testing will be initiated in the lobby in order to begin the process.  The patient will be returned to the waiting area for the time being and possibly be re-assessed by a subsequent ED provider.  The patient will be brought back to the treatment area in as timely manner as possible.          77-year-old female presented to the ER due to concerns for abnormal laboratory value collected at the PCPs office.  Hemoglobin of 5.9 noted by the patient labs within the last 24 hours.  Patient noted she has been obtaining a work-up for possible lower versus upper GI bleed over the past several weeks.  She noted a upper endoscopy that was completed with no acute abnormalities noted.  Patient has been unable to obtain a colonoscopy.  However, patient does confirm black tarry stools.  Patient does take oral iron supplementation.  No significant fever or chills.  No significant abdominal pain.  Vitals stable          Review of Systems   Constitutional: Positive for fatigue.   Neurological: Positive for weakness.   All other systems reviewed and are negative.      Past Medical History:   Diagnosis Date   • Acquired hypothyroidism 4/22/2021   • Anemia    • Arthritis    • Carpal tunnel syndrome    • Coronary artery disease    • Diabetes mellitus (HCC)    • Elevated cholesterol    • GERD (gastroesophageal reflux disease)    • History of pneumonia    • History of unsteady gait     OCASSIONALLY   • Hypertension    • Insomnia    • Kidney disease    • LENNY (obstructive sleep apnea) 12/1/2020   • Osteoarthritis    • Renal insufficiency    • Sciatica        No Known Allergies    Past Surgical History:   Procedure Laterality Date   • ABDOMINAL SURGERY     • APPENDECTOMY     • CARDIAC  CATHETERIZATION      1 STENT  ---  2000   • CARDIAC SURGERY     • CAROTID STENT     • CARPAL TUNNEL RELEASE Right 10/8/2019    Procedure: CARPAL TUNNEL RELEASE;  Surgeon: Feroz Levin MD;  Location: [facility];  Service: Orthopedics   • CATARACT EXTRACTION     • CHOLECYSTECTOMY     • COLONOSCOPY     • COLONOSCOPY N/A 11/23/2021    Procedure: COLONOSCOPY FOR SCREENING;  Surgeon: Palmira Pate MD;  Location: [facility] OR;  Service: Gastroenterology;  Laterality: N/A;   • CORONARY ANGIOPLASTY WITH STENT PLACEMENT     • ENDOSCOPY     • ENDOSCOPY N/A 3/5/2020    Procedure: ESOPHAGOGASTRODUODENOSCOPY;  Surgeon: Alexandru Bagley MD;  Location: [facility] OR;  Service: Gastroenterology;  Laterality: N/A;   • ENDOSCOPY N/A 11/23/2021    Procedure: ESOPHAGOGASTRODUODENOSCOPY WITH BIOPSY;  Surgeon: Palmira Pate MD;  Location: [facility];  Service: Gastroenterology;  Laterality: N/A;   • ENDOSCOPY AND COLONOSCOPY     • GALLBLADDER SURGERY     • JOINT REPLACEMENT Left 05/02/2017    [facility]  DR LEVIN  LEFT TOTAL KNEE   • KNEE ARTHROSCOPY W/ MENISCECTOMY Right    • LAPAROSCOPIC TUBAL LIGATION     • OK TOTAL KNEE ARTHROPLASTY Left 5/2/2017    Procedure: TOTAL KNEE ARTHROPLASTY;  Surgeon: Feroz Levin MD;  Location: [facility];  Service: Orthopedics   • STERILIZATION     • TONSILLECTOMY         Family History   Problem Relation Age of Onset   • Arthritis Mother    • Diabetes Mother    • Cancer Mother    • Heart disease Father    • Diabetes Daughter    • Diabetes Son    • Diabetes Maternal Aunt    • Heart disease Maternal Grandmother    • Breast cancer Neg Hx        Social History     Socioeconomic History   • Marital status:      Spouse name: natalie   • Number of children: 3   • Years of education: 12   Tobacco Use   • Smoking status: Never Smoker   • Smokeless tobacco: Never Used   Vaping Use   • Vaping Use: Never used   Substance and Sexual Activity   • Alcohol use: Yes     Comment:  socially   • Drug use: No   • Sexual activity: Defer     Birth control/protection: Surgical           Objective   Physical Exam  Constitutional:       General: She is not in acute distress.     Appearance: Normal appearance. She is not ill-appearing.   HENT:      Head: Normocephalic and atraumatic.      Right Ear: External ear normal.      Left Ear: External ear normal.      Nose: Nose normal.      Mouth/Throat:      Mouth: Mucous membranes are moist.   Eyes:      Extraocular Movements: Extraocular movements intact.      Pupils: Pupils are equal, round, and reactive to light.   Cardiovascular:      Rate and Rhythm: Normal rate and regular rhythm.      Heart sounds: No murmur heard.  Pulmonary:      Effort: Pulmonary effort is normal. No respiratory distress.      Breath sounds: Normal breath sounds. No wheezing.   Abdominal:      General: Bowel sounds are normal.      Palpations: Abdomen is soft.      Tenderness: There is no abdominal tenderness.   Musculoskeletal:         General: No deformity or signs of injury. Normal range of motion.      Cervical back: Normal range of motion and neck supple.   Skin:     General: Skin is warm and dry.      Coloration: Skin is pale.      Findings: No erythema.   Neurological:      General: No focal deficit present.      Mental Status: She is alert and oriented to person, place, and time. Mental status is at baseline.      Cranial Nerves: No cranial nerve deficit.   Psychiatric:         Mood and Affect: Mood normal.         Behavior: Behavior normal.         Thought Content: Thought content normal.         Procedures  CT Abdomen Pelvis Without Contrast   Final Result   1.  Advanced hepatic cirrhosis with moderate volume ascites and mild to moderate splenomegaly. Ascites has increased since the prior study.   2.  Extensive soft tissue edema throughout the abdominal mesentery and body wall.   3.  Small to moderate right pleural effusion, slightly increased. Dependent lung base  atelectasis.   4.  Severe lower colonic diverticulosis.      Signer Name: Alex Lepe MD    Signed: 9/9/2022 6:55 PM    Workstation Name: [id number]     [facility]        Results for orders placed or performed during the hospital encounter of 09/09/22   COVID-19 and FLU A/B PCR - Swab, Nasopharynx    Specimen: Nasopharynx; Swab   Result Value Ref Range    COVID19 Not Detected Not Detected - Ref. Range    Influenza A PCR Not Detected Not Detected    Influenza B PCR Not Detected Not Detected   Comprehensive Metabolic Panel    Specimen: Arm, Left; Blood   Result Value Ref Range    Glucose 175 (H) 65 - 99 mg/dL    BUN 45 (H) 8 - 23 mg/dL    Creatinine 2.55 (H) 0.57 - 1.00 mg/dL    Sodium 136 136 - 145 mmol/L    Potassium 5.2 3.5 - 5.2 mmol/L    Chloride 109 (H) 98 - 107 mmol/L    CO2 14.4 (L) 22.0 - 29.0 mmol/L    Calcium 8.5 (L) 8.6 - 10.5 mg/dL    Total Protein 5.9 (L) 6.0 - 8.5 g/dL    Albumin 2.28 (L) 3.50 - 5.20 g/dL    ALT (SGPT) 5 1 - 33 U/L    AST (SGOT) 19 1 - 32 U/L    Alkaline Phosphatase 261 (H) 39 - 117 U/L    Total Bilirubin 0.2 0.0 - 1.2 mg/dL    Globulin 3.6 gm/dL    A/G Ratio 0.6 g/dL    BUN/Creatinine Ratio 17.6 7.0 - 25.0    Anion Gap 12.6 5.0 - 15.0 mmol/L    eGFR 18.7 (L) >60.0 mL/min/1.73   Protime-INR    Specimen: Arm, Left; Blood   Result Value Ref Range    Protime 17.1 (H) 12.1 - 14.7 Seconds    INR 1.36 (H) 0.90 - 1.10   CBC Auto Differential    Specimen: Arm, Left; Blood   Result Value Ref Range    WBC 6.35 3.40 - 10.80 10*3/mm3    RBC 2.34 (L) 3.77 - 5.28 10*6/mm3    Hemoglobin 6.0 (C) 12.0 - 15.9 g/dL    Hematocrit 21.3 (L) 34.0 - 46.6 %    MCV 91.0 79.0 - 97.0 fL    MCH 25.6 (L) 26.6 - 33.0 pg    MCHC 28.2 (L) 31.5 - 35.7 g/dL    RDW 18.6 (H) 12.3 - 15.4 %    RDW-SD 61.7 (H) 37.0 - 54.0 fl    MPV 9.5 6.0 - 12.0 fL    Platelets 291 140 - 450 10*3/mm3    Neutrophil % 73.9 42.7 - 76.0 %    Lymphocyte % 17.6 (L) 19.6 - 45.3 %    Monocyte % 5.7 5.0 - 12.0 %     Eosinophil % 2.2 0.3 - 6.2 %    Basophil % 0.3 0.0 - 1.5 %    Immature Grans % 0.3 0.0 - 0.5 %    Neutrophils, Absolute 4.69 1.70 - 7.00 10*3/mm3    Lymphocytes, Absolute 1.12 0.70 - 3.10 10*3/mm3    Monocytes, Absolute 0.36 0.10 - 0.90 10*3/mm3    Eosinophils, Absolute 0.14 0.00 - 0.40 10*3/mm3    Basophils, Absolute 0.02 0.00 - 0.20 10*3/mm3    Immature Grans, Absolute 0.02 0.00 - 0.05 10*3/mm3    nRBC 0.0 0.0 - 0.2 /100 WBC   Scan Slide    Specimen: Arm, Left; Blood   Result Value Ref Range    Anisocytosis Mod/2+ None Seen    Hypochromia Mod/2+ None Seen    Platelet Morphology Normal Normal   Hemoglobin & Hematocrit, Blood    Specimen: Arm, Right; Blood   Result Value Ref Range    Hemoglobin 9.1 (L) 12.0 - 15.9 g/dL    Hematocrit 29.3 (L) 34.0 - 46.6 %   Type & Screen    Specimen: Arm, Left; Blood   Result Value Ref Range    ABO Type B     RH type Positive     Antibody Screen Negative     T&S Expiration Date 9/12/2022 11:59:59 PM    Prepare RBC, 2 Units   Result Value Ref Range    Product Code F0875H59     Unit Number [id number]     UNIT  ABO B     UNIT  RH POS     Crossmatch Interpretation Compatible     Dispense Status IS     Blood Expiration Date 202209232359     Blood Type Barcode 7300     Product Code Q7099A81     Unit Number [id number]     UNIT  ABO B     UNIT  RH POS     Crossmatch Interpretation Compatible     Dispense Status IS     Blood Expiration Date 202209232359     Blood Type Barcode 7300    Green Top (Gel)   Result Value Ref Range    Extra Tube Hold for add-ons.    Lavender Top   Result Value Ref Range    Extra Tube hold for add-on    Gold Top - SST   Result Value Ref Range    Extra Tube Hold for add-ons.    Light Blue Top   Result Value Ref Range    Extra Tube Hold for add-ons.                 ED Course  ED Course as of 09/10/22 0232   Fri Sep 09, 2022   1812 Anemia noted with hemoglobin of 6.0.  Previous lab work was 5.9.  2 units of packed red blood cells ordered.  Creatinine of 2.55  noted.  This is consistent with her baseline creatinine.  CT imaging pending [SF]   4148 I assumed patient's care from Dr. Hutchison this evening at shift change.  Please see his documentation above.  I examined the patient myself.  Abdomen is soft nontender throughout.  Rectal exam reveals moderately firm, black stool.  This is not definitely melanotic, patient is on chronic iron therapy and states that her stool is always black.  There is no gross blood.  Hospital policy does not allow for occult blood testing, either at bedside or in the lab.  Hospitalist has been paged for admission. [CM]   0024 Case discussed with hospitalist Dr. Nazario.  She is reviewed the patient's chart, advises that she is getting all of the appropriate outpatient work-up and treatment, advises that she can go home after her second unit of blood.  She does recommend that we administer 1 mg of Bumex IV, this has been ordered.  Patient is pleased that she does not have to stay in the hospital.  Second unit is infusing. [CM]   Sat Sep 10, 2022   0227 Posttransfusion H&H is 9.1 and 29.3.  Patient is discharged home in care of family, to follow-up with her team of physicians. [CM]      ED Course User Index  [CM] Ge Kapadia MD  [SF] Leonel Hutchison, DO                                           [facility]    Final diagnoses:   Iron deficiency anemia, unspecified iron deficiency anemia type       ED Disposition  ED Disposition     ED Disposition   Discharge    Condition   Stable    Comment   --             Palmira Pate MD  [address]  [address]  [place] [address]  [phone]    Go to   Call Monday for first available appointment    Lauren Lang, ERNIE  [address] [facility] [address]  [phone]    Go to   2 to 3 days    [facility]  [address] [facility] [address] [phone]  Go to   If symptoms worsen      Please note that portions of this note were completed with a voice  recognition program.        Ge Kapadia MD  09/10/22 1912

## 2022-09-09 NOTE — TELEPHONE ENCOUNTER
Per Lexie CONROY, patient needs to go to the ER as soon as possible for a blood transfusion, from yesterdays lab work. Spoke with patients daughter, Na, she states verbal understanding.

## 2022-09-09 NOTE — TELEPHONE ENCOUNTER
Spoke with patient about this again and let her know that she needs to go to [facility] ER and they will do her transfusion there. I called them and gave them the heads up that she was coming in.

## 2022-09-10 VITALS
DIASTOLIC BLOOD PRESSURE: 73 MMHG | WEIGHT: 192 LBS | TEMPERATURE: 97.5 F | RESPIRATION RATE: 14 BRPM | BODY MASS INDEX: 34.02 KG/M2 | HEIGHT: 63 IN | SYSTOLIC BLOOD PRESSURE: 161 MMHG | OXYGEN SATURATION: 99 % | HEART RATE: 61 BPM

## 2022-09-10 LAB
BH BB BLOOD EXPIRATION DATE: NORMAL
BH BB BLOOD EXPIRATION DATE: NORMAL
BH BB BLOOD TYPE BARCODE: 7300
BH BB BLOOD TYPE BARCODE: 7300
BH BB DISPENSE STATUS: NORMAL
BH BB DISPENSE STATUS: NORMAL
BH BB PRODUCT CODE: NORMAL
BH BB PRODUCT CODE: NORMAL
BH BB UNIT NUMBER: NORMAL
BH BB UNIT NUMBER: NORMAL
CROSSMATCH INTERPRETATION: NORMAL
CROSSMATCH INTERPRETATION: NORMAL
HCT VFR BLD AUTO: 29.3 % (ref 34–46.6)
HGB BLD-MCNC: 9.1 G/DL (ref 12–15.9)
UNIT  ABO: NORMAL
UNIT  ABO: NORMAL
UNIT  RH: NORMAL
UNIT  RH: NORMAL

## 2022-09-10 PROCEDURE — 96374 THER/PROPH/DIAG INJ IV PUSH: CPT

## 2022-09-10 PROCEDURE — 85014 HEMATOCRIT: CPT | Performed by: EMERGENCY MEDICINE

## 2022-09-10 PROCEDURE — 85018 HEMOGLOBIN: CPT | Performed by: EMERGENCY MEDICINE

## 2022-09-10 RX ADMIN — BUMETANIDE 1 MG: 0.25 INJECTION, SOLUTION INTRAMUSCULAR; INTRAVENOUS at 00:34

## 2022-09-10 NOTE — ED NOTES
Attempted to call patient's  for him to  pt, his phone is not in service. Called pt's daughter Na, and she will try to get in reach with him. Na stated she would have to try to get to someone with a car if she needed to  her mother.

## 2022-09-10 NOTE — DISCHARGE INSTRUCTIONS
Home care family.  Follow-up closely with Dr. Simmons and with Lauren Lang.  Return to the emergency department right away if symptoms worsen/any problems.

## 2022-09-11 DIAGNOSIS — D50.8 IRON DEFICIENCY ANEMIA SECONDARY TO INADEQUATE DIETARY IRON INTAKE: Primary | ICD-10-CM

## 2022-09-11 NOTE — PROGRESS NOTES
"Subjective   Britta Lee is a 77 y.o. female.       Chief Complaint -hypertension    History of Present Illness -    ROS    Hypertension-  She reports blood pressure usually goes up when she has to come into the office due to anxiety.  Patient currently using amlodipine, clonidine isosorbide mononitrate and metoprolol 25 mg XL daily.    Dyslipidemia-stable with atorvastatin and low-cholesterol diet    Diabetes mellitus type 2- stable with Toujeo    Iron deficiency anemia-  Not at goal as patient looks very pale in office today.  She is on iron supplementation and followed by hematology.  Patient has had to have blood transfusions in the past secondary to anemia    The following portions of the patient's history were reviewed and updated as appropriate: allergies, current medications, past family history, past medical history, past social history, past surgical history and problem list.    Review of Systems    Objective  Vital signs:  /76   Pulse 82   Temp 98.1 °F (36.7 °C) (Temporal)   Ht 160 cm (62.99\")   Wt 87.1 kg (192 lb)   SpO2 96%   BMI 34.02 kg/m²     Physical Exam  Vitals and nursing note reviewed.   Constitutional:       Appearance: Normal appearance. She is well-developed.   Eyes:      Extraocular Movements: Extraocular movements intact.      Conjunctiva/sclera: Conjunctivae normal.   Cardiovascular:      Rate and Rhythm: Normal rate and regular rhythm.      Heart sounds: Normal heart sounds. No murmur heard.  Pulmonary:      Effort: Pulmonary effort is normal. No respiratory distress.      Breath sounds: Normal breath sounds. No wheezing.   Musculoskeletal:         General: No tenderness.   Skin:     General: Skin is warm and dry.      Findings: No rash.   Neurological:      Mental Status: She is alert and oriented to person, place, and time.   Psychiatric:         Mood and Affect: Mood normal.         Behavior: Behavior normal.         Thought Content: Thought content normal.         The " following data was reviewed by: RAFIQ Montanez on 09/08/2022:  CMP    CMP 8/4/22 9/8/22 9/9/22   Glucose 105 (A) 180 (A) 175 (A)   BUN 34 (A) 41 (A) 45 (A)   Creatinine 2.48 (A) 2.60 (A) 2.55 (A)   Sodium 140 140 136   Potassium 4.7 4.9 5.2   Chloride 104 111 (A) 109 (A)   Calcium 9.3 8.9 8.5 (A)   Albumin 3.60 3.00 (A) 2.28 (A)   Total Bilirubin 0.2 <0.2 0.2   Alkaline Phosphatase 462 (A) 267 (A) 261 (A)   AST (SGOT) 53 (A) 19 19   ALT (SGPT) 22 10 5   (A) Abnormal value       Comments are available for some flowsheets but are not being displayed.           CBC w/diff    CBC w/Diff 9/8/22 9/9/22 9/10/22   WBC 6.30 6.35    RBC 2.38 (A) 2.34 (A)    Hemoglobin 5.9 (A) 6.0 (A) 9.1 (A)   Hematocrit 20.8 (A) 21.3 (A) 29.3 (A)   MCV 87.4 91.0    MCH 24.8 (A) 25.6 (A)    MCHC 28.4 (A) 28.2 (A)    RDW 16.2 (A) 18.6 (A)    Platelets 304 291    Neutrophil Rel % 77.3 (A) 73.9    Immature Granulocyte Rel % 0.2 0.3    Lymphocyte Rel % 13.2 (A) 17.6 (A)    Monocyte Rel % 6.3 5.7    Eosinophil Rel % 2.7 2.2    Basophil Rel % 0.3 0.3    (A) Abnormal value       Comments are available for some flowsheets but are not being displayed.           Lipid Panel    Lipid Panel 3/7/22 6/9/22 9/8/22   Total Cholesterol 137 126 109   Triglycerides 92 163 (A) 122   HDL Cholesterol 40 43 31 (A)   VLDL Cholesterol 18 28 22   LDL Cholesterol  79 55 56   LDL/HDL Ratio 1.97 1.17 1.73   (A) Abnormal value            TSH    TSH 9/23/21 6/9/22   TSH 3.760 9.670 (A)   (A) Abnormal value            Most Recent A1C    HGBA1C Most Recent 9/8/22   Hemoglobin A1C 5.00                  Assessment & Plan     Diagnoses and all orders for this visit:    1. Essential hypertension (Primary)  Comments:  Increase Toprol- mg daily  Continue amlodipine clonidine and Imdur at current doses  Advise daily BP and pulse monitoring.  Orders:  -     metoprolol succinate XL (TOPROL-XL) 50 MG 24 hr tablet; Take 2 tablets by mouth Daily.  Dispense: 90 tablet;  Refill: 1  -     Comprehensive Metabolic Panel    2. Dyslipidemia  Comments:  Advised low-cholesterol diet  Continue atorvastatin  Orders:  -     Lipid Panel    3. Type 2 diabetes mellitus with stage 4 chronic kidney disease, with long-term current use of insulin (HCC)  Comments:  Advised a low carbohydrate diabetic diet  Continue Toujeo  Orders:  -     Hemoglobin A1c    4. Iron deficiency anemia secondary to inadequate dietary iron intake  Comments:  Lab work ordered today as I have concerns due to her paleness that she may need blood transfusion or iron adjustment  Orders:  -     CBC & Differential  -     Cancel: Iron Profile    5. Encounter for screening mammogram for malignant neoplasm of breast  -     Mammo Screening Digital Tomosynthesis Bilateral With CAD; Future    6. Menopause  -     DEXA Bone Density Axial; Future            Patient was given instructions and counseling regarding his condition or for health maintenance advice. Please see specific information pulled into the AVS if appropriate      This document has been electronically signed by:  Lexie Dolan PA-C

## 2022-09-11 NOTE — PATIENT INSTRUCTIONS
"https://www.diabeteseducator.org/docs/default-source/living-with-diabetes/conquering-the-grocery-store-v1.pdf?sfvrsn=4\">   Carbohydrate Counting for Diabetes Mellitus, Adult  Carbohydrate counting is a method of keeping track of how many carbohydrates you eat. Eating carbohydrates naturally increases the amount of sugar (glucose) in the blood. Counting how many carbohydrates you eat improves your blood glucose control, which helps you manage your diabetes.  It is important to know how many carbohydrates you can safely have in each meal. This is different for every person. A dietitian can help you make a meal plan and calculate how many carbohydrates you should have at each meal and snack.  What foods contain carbohydrates?  Carbohydrates are found in the following foods:  Grains, such as breads and cereals.  Dried beans and soy products.  Starchy vegetables, such as potatoes, peas, and corn.  Fruit and fruit juices.  Milk and yogurt.  Sweets and snack foods, such as cake, cookies, candy, chips, and soft drinks.  How do I count carbohydrates in foods?  There are two ways to count carbohydrates in food. You can read food labels or learn standard serving sizes of foods. You can use either of the methods or a combination of both.  Using the Nutrition Facts label  The Nutrition Facts list is included on the labels of almost all packaged foods and beverages in the [place] It includes:  The serving size.  Information about nutrients in each serving, including the grams (g) of carbohydrate per serving.  To use the Nutrition Facts:  Decide how many servings you will have.  Multiply the number of servings by the number of carbohydrates per serving.  The resulting number is the total amount of carbohydrates that you will be having.  Learning the standard serving sizes of foods  When you eat carbohydrate foods that are not packaged or do not include Nutrition Facts on the label, you need to measure the servings in order to count " the amount of carbohydrates.  Measure the foods that you will eat with a food scale or measuring cup, if needed.  Decide how many standard-size servings you will eat.  Multiply the number of servings by 15. For foods that contain carbohydrates, one serving equals 15 g of carbohydrates.  For example, if you eat 2 cups or 10 oz (300 g) of strawberries, you will have eaten 2 servings and 30 g of carbohydrates (2 servings x 15 g = 30 g).  For foods that have more than one food mixed, such as soups and casseroles, you must count the carbohydrates in each food that is included.  The following list contains standard serving sizes of common carbohydrate-rich foods. Each of these servings has about 15 g of carbohydrates:  1 slice of bread.  1 six-inch (15 cm) tortilla.  ? cup or 2 oz (53 g) cooked rice or pasta.  ½ cup or 3 oz (85 g) cooked or canned, drained and rinsed beans or lentils.  ½ cup or 3 oz (85 g) starchy vegetable, such as peas, corn, or squash.  ½ cup or 4 oz (120 g) hot cereal.  ½ cup or 3 oz (85 g) boiled or mashed potatoes, or ¼ or 3 oz (85 g) of a large baked potato.  ½ cup or 4 fl oz (118 mL) fruit juice.  1 cup or 8 fl oz (237 mL) milk.  1 small or 4 oz (106 g) apple.  ½ or 2 oz (63 g) of a medium banana.  1 cup or 5 oz (150 g) strawberries.  3 cups or 1 oz (24 g) popped popcorn.  What is an example of carbohydrate counting?  To calculate the number of carbohydrates in this sample meal, follow the steps shown below.  Sample meal  3 oz (85 g) chicken breast.  ? cup or 4 oz (106 g) brown rice.  ½ cup or 3 oz (85 g) corn.  1 cup or 8 fl oz (237 mL) milk.  1 cup or 5 oz (150 g) strawberries with sugar-free whipped topping.  Carbohydrate calculation  Identify the foods that contain carbohydrates:  Rice.  Corn.  Milk.  Strawberries.  Calculate how many servings you have of each food:  2 servings rice.  1 serving corn.  1 serving milk.  1 serving strawberries.  Multiply each number of servings by 15   servings rice x 15 g = 30 g.  1 serving corn x 15 g = 15 g.  1 serving milk x 15 g = 15 g.  1 serving strawberries x 15 g = 15 g.  Add together all of the amounts to find the total grams of carbohydrates eaten:  30 g + 15 g + 15 g + 15 g = 75 g of carbohydrates total.  What are tips for following this plan?  Shopping  Develop a meal plan and then make a shopping list.  Buy fresh and frozen vegetables, fresh and frozen fruit, dairy, eggs, beans, lentils, and whole grains.  Look at food labels. Choose foods that have more fiber and less sugar.  Avoid processed foods and foods with added sugars.  Meal planning  Aim to have the same amount of carbohydrates at each meal and for each snack time.  Plan to have regular, balanced meals and snacks.  Where to find more information  American Diabetes Association: www.diabetes.org  Centers for Disease Control and Prevention: www.cdc.gov  Summary  Carbohydrate counting is a method of keeping track of how many carbohydrates you eat.  Eating carbohydrates naturally increases the amount of sugar (glucose) in the blood.  Counting how many carbohydrates you eat improves your blood glucose control, which helps you manage your diabetes.  A dietitian can help you make a meal plan and calculate how many carbohydrates you should have at each meal and snack.  This information is not intended to replace advice given to you by your health care provider. Make sure you discuss any questions you have with your health care provider.  Document Revised: 12/17/2020 Document Reviewed: 12/18/2020  [employer] Patient Education © 2021 [employer]

## 2022-09-12 ENCOUNTER — TELEPHONE (OUTPATIENT)
Dept: FAMILY MEDICINE CLINIC | Facility: CLINIC | Age: 77
End: 2022-09-12

## 2022-09-12 NOTE — TELEPHONE ENCOUNTER
Spoke to patient about lab work, She states verbal understanding.      ----- Message from RAFIQ Montanez sent at 9/11/2022 10:10 AM EDT -----   Call the patient and ask her to repeat lab work this week for anemia.  This needs to be reevaluated as patient did require 2 units of blood transfusion last week.  Lab orders are in the computer from her hematologist.

## 2022-09-14 ENCOUNTER — APPOINTMENT (OUTPATIENT)
Dept: CARDIOLOGY | Facility: HOSPITAL | Age: 77
End: 2022-09-14

## 2022-09-16 ENCOUNTER — LAB (OUTPATIENT)
Dept: FAMILY MEDICINE CLINIC | Facility: CLINIC | Age: 77
End: 2022-09-16

## 2022-09-16 DIAGNOSIS — D50.8 IRON DEFICIENCY ANEMIA SECONDARY TO INADEQUATE DIETARY IRON INTAKE: Primary | ICD-10-CM

## 2022-09-16 LAB
BASOPHILS # BLD AUTO: 0.05 10*3/MM3 (ref 0–0.2)
BASOPHILS NFR BLD AUTO: 0.9 % (ref 0–1.5)
DEPRECATED RDW RBC AUTO: 54.4 FL (ref 37–54)
EOSINOPHIL # BLD AUTO: 0.23 10*3/MM3 (ref 0–0.4)
EOSINOPHIL NFR BLD AUTO: 4.1 % (ref 0.3–6.2)
ERYTHROCYTE [DISTWIDTH] IN BLOOD BY AUTOMATED COUNT: 16.3 % (ref 12.3–15.4)
FERRITIN SERPL-MCNC: 543.3 NG/ML (ref 13–150)
HCT VFR BLD AUTO: 31.4 % (ref 34–46.6)
HGB BLD-MCNC: 9.4 G/DL (ref 12–15.9)
IMM GRANULOCYTES # BLD AUTO: 0.02 10*3/MM3 (ref 0–0.05)
IMM GRANULOCYTES NFR BLD AUTO: 0.4 % (ref 0–0.5)
IRON 24H UR-MRATE: 55 MCG/DL (ref 37–145)
IRON SATN MFR SERPL: 19 % (ref 20–50)
LYMPHOCYTES # BLD AUTO: 1.3 10*3/MM3 (ref 0.7–3.1)
LYMPHOCYTES NFR BLD AUTO: 23.1 % (ref 19.6–45.3)
MCH RBC QN AUTO: 26.9 PG (ref 26.6–33)
MCHC RBC AUTO-ENTMCNC: 29.9 G/DL (ref 31.5–35.7)
MCV RBC AUTO: 90 FL (ref 79–97)
MONOCYTES # BLD AUTO: 0.42 10*3/MM3 (ref 0.1–0.9)
MONOCYTES NFR BLD AUTO: 7.5 % (ref 5–12)
NEUTROPHILS NFR BLD AUTO: 3.6 10*3/MM3 (ref 1.7–7)
NEUTROPHILS NFR BLD AUTO: 64 % (ref 42.7–76)
NRBC BLD AUTO-RTO: 0 /100 WBC (ref 0–0.2)
PLATELET # BLD AUTO: 296 10*3/MM3 (ref 140–450)
PMV BLD AUTO: 9.9 FL (ref 6–12)
RBC # BLD AUTO: 3.49 10*6/MM3 (ref 3.77–5.28)
TIBC SERPL-MCNC: 289 MCG/DL (ref 298–536)
TRANSFERRIN SERPL-MCNC: 194 MG/DL (ref 200–360)
WBC NRBC COR # BLD: 5.62 10*3/MM3 (ref 3.4–10.8)

## 2022-09-16 PROCEDURE — 83540 ASSAY OF IRON: CPT | Performed by: PHYSICIAN ASSISTANT

## 2022-09-16 PROCEDURE — 85025 COMPLETE CBC W/AUTO DIFF WBC: CPT | Performed by: PHYSICIAN ASSISTANT

## 2022-09-16 PROCEDURE — 82728 ASSAY OF FERRITIN: CPT | Performed by: PHYSICIAN ASSISTANT

## 2022-09-16 PROCEDURE — 84466 ASSAY OF TRANSFERRIN: CPT | Performed by: PHYSICIAN ASSISTANT

## 2022-09-26 ENCOUNTER — HOSPITAL ENCOUNTER (OUTPATIENT)
Dept: MAMMOGRAPHY | Facility: HOSPITAL | Age: 77
Discharge: HOME | End: 2022-09-26

## 2022-09-26 ENCOUNTER — HOSPITAL ENCOUNTER (OUTPATIENT)
Dept: BONE DENSITY | Facility: HOSPITAL | Age: 77
Discharge: HOME | End: 2022-09-26

## 2022-09-26 DIAGNOSIS — Z12.31 ENCOUNTER FOR SCREENING MAMMOGRAM FOR MALIGNANT NEOPLASM OF BREAST: ICD-10-CM

## 2022-09-26 DIAGNOSIS — Z78.0 MENOPAUSE: ICD-10-CM

## 2022-09-26 PROCEDURE — 77063 BREAST TOMOSYNTHESIS BI: CPT | Performed by: RADIOLOGY

## 2022-09-26 PROCEDURE — 77080 DXA BONE DENSITY AXIAL: CPT

## 2022-09-26 PROCEDURE — 77063 BREAST TOMOSYNTHESIS BI: CPT

## 2022-09-26 PROCEDURE — 77067 SCR MAMMO BI INCL CAD: CPT

## 2022-09-26 PROCEDURE — 77067 SCR MAMMO BI INCL CAD: CPT | Performed by: RADIOLOGY

## 2022-09-26 PROCEDURE — 77080 DXA BONE DENSITY AXIAL: CPT | Performed by: RADIOLOGY

## 2022-09-29 PROBLEM — M85.852 OSTEOPENIA OF NECK OF LEFT FEMUR: Status: ACTIVE | Noted: 2022-09-29

## 2022-10-06 ENCOUNTER — OFFICE VISIT (OUTPATIENT)
Dept: ONCOLOGY | Facility: CLINIC | Age: 77
End: 2022-10-06

## 2022-10-06 VITALS
DIASTOLIC BLOOD PRESSURE: 79 MMHG | TEMPERATURE: 97.5 F | HEART RATE: 73 BPM | OXYGEN SATURATION: 98 % | WEIGHT: 202.6 LBS | SYSTOLIC BLOOD PRESSURE: 156 MMHG | RESPIRATION RATE: 18 BRPM | BODY MASS INDEX: 35.89 KG/M2

## 2022-10-06 DIAGNOSIS — R16.1 SPLENOMEGALY: ICD-10-CM

## 2022-10-06 DIAGNOSIS — E03.9 HYPOTHYROIDISM, UNSPECIFIED TYPE: ICD-10-CM

## 2022-10-06 DIAGNOSIS — D50.9 IRON DEFICIENCY ANEMIA, UNSPECIFIED IRON DEFICIENCY ANEMIA TYPE: Primary | ICD-10-CM

## 2022-10-06 DIAGNOSIS — D63.8 ANEMIA OF CHRONIC DISEASE: ICD-10-CM

## 2022-10-06 DIAGNOSIS — K74.60 HEPATIC CIRRHOSIS, UNSPECIFIED HEPATIC CIRRHOSIS TYPE, UNSPECIFIED WHETHER ASCITES PRESENT: ICD-10-CM

## 2022-10-06 DIAGNOSIS — N18.4 CKD (CHRONIC KIDNEY DISEASE) STAGE 4, GFR 15-29 ML/MIN: ICD-10-CM

## 2022-10-06 DIAGNOSIS — K90.9 MALABSORPTION OF IRON: ICD-10-CM

## 2022-10-06 LAB
ALBUMIN SERPL-MCNC: 3.31 G/DL (ref 3.5–5.2)
ALBUMIN/GLOB SERPL: 0.9 G/DL
ALP SERPL-CCNC: 326 U/L (ref 39–117)
ALT SERPL W P-5'-P-CCNC: 9 U/L (ref 1–33)
ANION GAP SERPL CALCULATED.3IONS-SCNC: 11.7 MMOL/L (ref 5–15)
AST SERPL-CCNC: 21 U/L (ref 1–32)
BILIRUB SERPL-MCNC: 0.2 MG/DL (ref 0–1.2)
BUN SERPL-MCNC: 46 MG/DL (ref 8–23)
BUN/CREAT SERPL: 16.8 (ref 7–25)
CALCIUM SPEC-SCNC: 9.1 MG/DL (ref 8.6–10.5)
CHLORIDE SERPL-SCNC: 109 MMOL/L (ref 98–107)
CO2 SERPL-SCNC: 19.3 MMOL/L (ref 22–29)
CREAT SERPL-MCNC: 2.73 MG/DL (ref 0.57–1)
EGFRCR SERPLBLD CKD-EPI 2021: 17.2 ML/MIN/1.73
FERRITIN SERPL-MCNC: 334.6 NG/ML (ref 13–150)
GLOBULIN UR ELPH-MCNC: 3.9 GM/DL
GLUCOSE SERPL-MCNC: 170 MG/DL (ref 65–99)
IRON 24H UR-MRATE: 28 MCG/DL (ref 37–145)
IRON SATN MFR SERPL: 11 % (ref 20–50)
POTASSIUM SERPL-SCNC: 4.9 MMOL/L (ref 3.5–5.2)
PROT SERPL-MCNC: 7.2 G/DL (ref 6–8.5)
SODIUM SERPL-SCNC: 140 MMOL/L (ref 136–145)
TIBC SERPL-MCNC: 262 MCG/DL (ref 298–536)
TRANSFERRIN SERPL-MCNC: 176 MG/DL (ref 200–360)

## 2022-10-06 PROCEDURE — 84466 ASSAY OF TRANSFERRIN: CPT | Performed by: NURSE PRACTITIONER

## 2022-10-06 PROCEDURE — 99214 OFFICE O/P EST MOD 30 MIN: CPT | Performed by: NURSE PRACTITIONER

## 2022-10-06 PROCEDURE — 82728 ASSAY OF FERRITIN: CPT | Performed by: NURSE PRACTITIONER

## 2022-10-06 PROCEDURE — 83540 ASSAY OF IRON: CPT | Performed by: NURSE PRACTITIONER

## 2022-10-06 PROCEDURE — 80053 COMPREHEN METABOLIC PANEL: CPT | Performed by: NURSE PRACTITIONER

## 2022-10-06 PROCEDURE — 85025 COMPLETE CBC W/AUTO DIFF WBC: CPT | Performed by: NURSE PRACTITIONER

## 2022-10-06 NOTE — PROGRESS NOTES
DATE:  10/6/2022    REASON FOR FOLLOW UP: Anemia    REFERRING PHYSICIAN:   Jackson Johnson MD    TREATMENT:   1. Oral iron-discontinued for apparent malabsorption    2.       CHIEF COMPLAINT:  Follow up of anemia    HISTORY OF PRESENT ILLNESS:   Britta Lee is a  77 y.o. female with multiple medical problems including CKD, diabetes mellitus type 2, CHF, CAD, hypertension, GERD and OA, who is being seen today at the request of  Jackson Johnson MD for evaluation and treatment of anemia. Ms. Lee reports following with her PCP and nephrology routinely with blood testing every ~3 months. She says she has struggled with anemia for several years. Previous available CBCs were reviewed and patient has had chronic, normocytic anemia since at least December 2016 with Hg most recently ranging ~ 8.6-9.8. Her WBC and platelets have been normal. Iron panel and ferritin levels have been most c/w AOCD/inflammation since at least May 2017.  She reports receiving IV iron infusions several years ago. At present, she is taking ferrous sulfate BID which she is tolerating well. She says this was started per nephrology ~2 months ago. She denies obvious bleeding from any source. She reports having screening colonoscopy with Dr. Zamora last year which was unremarkable and negative for bleeding. She complains of chronic fatigue which is stable. Also complains of occasional lower extremity edema. She denies any other complaints today.     INTERVAL HISTORY:  Ms. Lee presents today for follow up.  Since her last visit, she was again replaced with IV Feraheme which she completed on 8/30/22. She followed up with PCP in early September and Hg had dropped to 6.0. Therefore, she was sent to [facility] ED and received 2 units of PRBCs. She says she has not had obvious bleeding from any source. She had repeat CT A/P which showed advanced hepatic cirrhosis with moderate ascites and mild to moderate splenomegaly. She says she continues to follow with  Dr. Iris LE and has an upcoming appointment. She is also following with nephrology for CKD and cardiology for management of CHF. She says nephrology restarted her on oral iron which she is taking. She currently has improvement in fatigue but continues with weakness. She also has swelling of BLE and abdomen. She denies any other complaints at this time.     PAST MEDICAL HISTORY:  Past Medical History:   Diagnosis Date   • Acquired hypothyroidism 4/22/2021   • Anemia    • Arthritis    • Carpal tunnel syndrome    • Coronary artery disease    • Diabetes mellitus (HCC)    • Elevated cholesterol    • GERD (gastroesophageal reflux disease)    • History of pneumonia    • History of unsteady gait     OCASSIONALLY   • Hypertension    • Insomnia    • Kidney disease    • LENNY (obstructive sleep apnea) 12/1/2020   • Osteoarthritis    • Renal insufficiency    • Sciatica        PAST SURGICAL HISTORY:  Past Surgical History:   Procedure Laterality Date   • ABDOMINAL SURGERY     • APPENDECTOMY     • CARDIAC CATHETERIZATION      1 STENT  ---  2000   • CARDIAC SURGERY     • CAROTID STENT     • CARPAL TUNNEL RELEASE Right 10/8/2019    Procedure: CARPAL TUNNEL RELEASE;  Surgeon: Feroz Johnson MD;  Location: [facility] OR;  Service: Orthopedics   • CATARACT EXTRACTION     • CHOLECYSTECTOMY     • COLONOSCOPY     • COLONOSCOPY N/A 11/23/2021    Procedure: COLONOSCOPY FOR SCREENING;  Surgeon: Palmira Pate MD;  Location: [facility] OR;  Service: Gastroenterology;  Laterality: N/A;   • CORONARY ANGIOPLASTY WITH STENT PLACEMENT     • ENDOSCOPY     • ENDOSCOPY N/A 3/5/2020    Procedure: ESOPHAGOGASTRODUODENOSCOPY;  Surgeon: Alexandru Bagley MD;  Location: [facility] OR;  Service: Gastroenterology;  Laterality: N/A;   • ENDOSCOPY N/A 11/23/2021    Procedure: ESOPHAGOGASTRODUODENOSCOPY WITH BIOPSY;  Surgeon: Palmira Pate MD;  Location: [facility] OR;  Service: Gastroenterology;  Laterality: N/A;   • ENDOSCOPY AND  COLONOSCOPY     • GALLBLADDER SURGERY     • JOINT REPLACEMENT Left 05/02/2017    [facility]  DR JOHNSON  LEFT TOTAL KNEE   • KNEE ARTHROSCOPY W/ MENISCECTOMY Right    • LAPAROSCOPIC TUBAL LIGATION     • NH TOTAL KNEE ARTHROPLASTY Left 5/2/2017    Procedure: TOTAL KNEE ARTHROPLASTY;  Surgeon: Feroz Johnson MD;  Location: [facility] OR;  Service: Orthopedics   • STERILIZATION     • TONSILLECTOMY         FAMILY HISTORY:  Family History   Problem Relation Age of Onset   • Arthritis Mother    • Diabetes Mother    • Cancer Mother    • Heart disease Father    • Diabetes Daughter    • Diabetes Son    • Diabetes Maternal Aunt    • Heart disease Maternal Grandmother    • Breast cancer Neg Hx        SOCIAL HISTORY:  Social History     Socioeconomic History   • Marital status:      Spouse name: natalie   • Number of children: 3   • Years of education: 12   Tobacco Use   • Smoking status: Never Smoker   • Smokeless tobacco: Never Used   Vaping Use   • Vaping Use: Never used   Substance and Sexual Activity   • Alcohol use: Yes     Comment: socially   • Drug use: No   • Sexual activity: Defer     Birth control/protection: Surgical       MEDICATIONS:  The current medication list was reviewed in the EMR    Current Outpatient Medications:   •  amLODIPine (NORVASC) 10 MG tablet, Take 1 tablet by mouth Daily., Disp: 90 tablet, Rfl: 3  •  aspirin 81 MG EC tablet, Take 81 mg by mouth Daily., Disp: , Rfl:   •  atorvastatin (LIPITOR) 40 MG tablet, Take 1 tablet by mouth Daily., Disp: 90 tablet, Rfl: 3  •  bumetanide (BUMEX) 1 MG tablet, Take 1 tablet by mouth Every Other Day., Disp: 90 tablet, Rfl: 1  •  cloNIDine (CATAPRES) 0.2 MG tablet, Take 1 tablet by mouth 3 (Three) Times a Day., Disp: 270 tablet, Rfl: 1  •  Continuous Blood Gluc  (Dexcom G6 ) device, 1 Device Daily., Disp: 3 each, Rfl: 3  •  Continuous Blood Gluc Sensor (Dexcom G6 Sensor), Every 10 (Ten) Days., Disp: 9 each, Rfl: 3  •  Continuous Blood Gluc  Transmit (Dexcom G6 Transmitter) misc, 1 Device Daily., Disp: 1 each, Rfl: 5  •  escitalopram (LEXAPRO) 10 MG tablet, Take 1 tablet by mouth Daily., Disp: 90 tablet, Rfl: 3  •  ferrous sulfate 325 (65 FE) MG tablet, Take 325 mg by mouth Daily With Breakfast., Disp: , Rfl:   •  glucose blood test strip, 1 each by Other route 3 (Three) Times a Day., Disp: 100 each, Rfl: 12  •  hydrALAZINE (APRESOLINE) 25 MG tablet, Take 3 tablets by mouth 3 (Three) Times a Day., Disp: 270 tablet, Rfl: 1  •  Insulin Glargine, 2 Unit Dial, (Toujeo Max SoloStar) 300 UNIT/ML solution pen-injector injection, Inject 40-60 Units under the skin into the appropriate area as directed Daily., Disp: 3 pen, Rfl: 5  •  insulin lispro (humaLOG) 100 UNIT/ML injection, Inject 0-6 Units under the skin into the appropriate area as directed 3 (Three) Times a Day Before Meals. Sliding scale 4-6 units if BG > 180, Disp: 9 mL, Rfl: 5  •  Insulin Pen Needle 32G X 5 MM misc, 1 each 4 (Four) Times a Day., Disp: 120 each, Rfl: 5  •  isosorbide mononitrate (IMDUR) 60 MG 24 hr tablet, Take 1 tablet by mouth Daily., Disp: 90 tablet, Rfl: 0  •  levothyroxine (SYNTHROID, LEVOTHROID) 25 MCG tablet, Take 1 tablet by mouth Daily., Disp: 90 tablet, Rfl: 3  •  metoprolol succinate XL (TOPROL-XL) 50 MG 24 hr tablet, Take 2 tablets by mouth Daily., Disp: 90 tablet, Rfl: 1  •  nystatin (MYCOSTATIN) 797236 UNIT/GM powder, Apply  one application topically to the appropriate area as directed Every 12 (Twelve) Hours. (Patient taking differently: Apply  topically to the appropriate area as directed 2 (Two) Times a Day As Needed (itching).), Disp: 60 g, Rfl: 0  •  pantoprazole (Protonix) 40 MG EC tablet, Take 1 tablet by mouth Daily., Disp: 90 tablet, Rfl: 3  •  vitamin D (ERGOCALCIFEROL) 1.25 MG (04651 UT) capsule capsule, Take 1 capsule by mouth 1 (One) Time Per Week., Disp: 15 capsule, Rfl: 3    ALLERGIES:  No Known Allergies    REVIEW OF SYSTEMS:    A comprehensive 14 point  review of systems was performed.  Significant findings as mentioned above.  All other systems reviewed and are negative.      Physical Exam   Vital Signs: /79   Pulse 73   Temp 97.5 °F (36.4 °C) (Temporal)   Resp 18   Wt 91.9 kg (202 lb 9.6 oz)   SpO2 98%   BMI 35.89 kg/m²    General: Well developed, well nourished, alert and oriented x 3, in no acute distress. Appears well today. Sitting in a wheelchair   Head: ATNC   Eyes: PERRL, No evidence of conjunctivitis.   Nose: No nasal discharge.   Mouth: Oral mucosal membranes moist. No oral ulceration or hemorrhages.   Neck: Neck supple. No thyromegaly. No JVD.   Lungs: CTAB, no wheezing   Heart: RRR. No murmurs, rubs, or gallops.   Abdomen: Soft. Bowel sounds are normoactive. Nontender with palpation.   Extremities: No cyanosis. 2+ edema BLE.   Neurologic: Grossly non-focal exam    Pain Score:  Pain Score    10/06/22 1109   PainSc: 0-No pain     ENDOSCOPY:  11/23/21  ESOPHAGOGASTRODUODENOSCOPY PROCEDURE REPORT     Britta Lee  11/23/2021     GASTROENTEROLOGIST:  Palmira Pate MD      PRE-PROCEDURE DIAGNOSIS:  Iron deficiency anemia, unspecified iron deficiency anemia type [D50.9]  Weight loss, abnormal [R63.4]     POST-PROCEDURE DIAGNOSIS:  1.  Gastritis  2.  Angiectasias     Procedure(s):  ESOPHAGOGASTRODUODENOSCOPY WITH BIOPSY  COLONOSCOPY FOR SCREENING     ANESTHESIA:  Propofol administered by anesthesia.  See anesthesia notes for ASA classification     STAFF:  Circulator: Bre Ny RN; Danelle Mccray RN  Endo Technician: Omari Lewis     FINDINGS:  1.  Normal-appearing esophagus.  Biopsies were taken  2.  Mild erythema in the antrum and stomach.  Biopsies taken for H. pylori.  3.  One angiectasia was found in the second portion of the duodenum.  This was nonbleeding.  Biopsies were taken of the duodenum to rule out celiac disease as a source of her anemia.     OPERATIVE PROCEDURE:  After proper informed consent was  obtained, patient was transferred to the OR/endoscopy suite.  Patient was then placed in left lateral decubitus position. The Olympus 180 series video gastroscope was inserted orally under direct visualization.  Esophagus, stomach, and duodenum were inspected.  The endoscope was passed to the third portion of the duodenum.  Scope was retroflexed for visualization of the cardia and incisuraThe endoscope was then withdrawn. Patient tolerated the procedure well. There were no immediate complications.     ESTIMATED BLOOD LOSS:  None     COMPLICATIONS;  None     RECOMMENDATIONS/ PLAN:  1.  Follow-up biopsy results.  2.  Proceed with colonoscopy.     Palmira Pate MD      11/23/21 08:58 EST    COLONOSCOPY PROCEDURE NOTE     Britta Jesus  11/23/2021     GASTROENTEROLOGIST:  Palmira Pate MD        PRE-PROCEDURE DIAGNOSIS:   Iron deficiency anemia, unspecified iron deficiency anemia type [D50.9]  Weight loss, abnormal [R63.4]     POST-PROCEDURE DIAGNOSIS:  1.  Colon polyps  2.  Diverticulosis  3.  Internal hemorrhoids     PROCEDURE:  COLONOSCOPY with snare polypectomy and cold biopsy polypectomy     ANESTHESIA:  Propofol administered by anesthesia.  See anesthesia notes for ASA classification     STAFF  Circulator: Bre Ny RN; Danelle Mccray RN  Endo Technician: Omari Lewis     Findings:  1.  A 5 mm polyp was found in the ascending colon.  This was removed with cold forceps polypectomy.  2.  A 5 mm polyp was removed from the transverse colon with cold forceps biopsy.  3.  Two 6 mm polyps were found in the descending colon.  Both polyps were removed with cold snare polypectomy.  4.  A 7 mm polyp was removed from the sigmoid colon with cold snare polypectomy.  5.  Diverticulosis involving left colon.  6.  Small internal hemorrhoids.  7.  Normal-appearing terminal ileum.     OPERATIVE PROCEDURE   After proper informed consent was obtained, the patient was taken the operating  suite and placed in left lateral decubitus position.  An Olympus video colonoscope 180 series was inserted in the rectum and advanced to the terminal ileum under direct visualization.  Cecum and terminal ileum were identified by visualization of the appendiceal orifice and ileocecal valve.  The colonoscope was then slowly withdrawn from the cecum to the rectum and passed a second time from rectum to cecum.  The colonoscope was retroflexed in the cecum and rectum. Scope was then withdrawn. Patient tolerated the procedure well. There were no immediate complications. Cecal withdrawal time was 15 minutes.     ESTIMATED BLOOD LOSS  None      COMPLICATIONS  None     RECOMMENDATIONS:  1.  Follow-up pathology.  2.  Repeat colonoscopy in 3 years due to personal history of colon polyps.  3.  Proceed with capsule endoscopy if anemia persists.  Sign 4.  Follow-up in GI clinic in 6 to 8 weeks.     Palmira Pate MD  11/23/21 09:25 EST    IMAGING:  CT Abdomen Pelvis Without Contrast    Result Date: 4/12/2022  1. Cirrhotic appearance of the liver and small volume ascites. 2. Small right pleural effusion and minimal right basilar consolidation. 3. Sigmoid diverticuli. Cannot exclude a very mild degree of diverticulitis.  This report was finalized on 4/12/2022 4:16 PM by Dr. Silvio Mcdonald MD.      RECENT LABS:  Lab Results   Component Value Date    WBC 6.52 10/06/2022    HGB 9.1 (L) 10/06/2022    HCT 30.0 (L) 10/06/2022    MCV 88.5 10/06/2022    RDW 15.7 (H) 10/06/2022     10/06/2022    NEUTRORELPCT 74.1 10/06/2022    LYMPHORELPCT 15.5 (L) 10/06/2022    MONORELPCT 6.1 10/06/2022    EOSRELPCT 3.5 10/06/2022    BASORELPCT 0.6 10/06/2022    NEUTROABS 4.83 10/06/2022    LYMPHSABS 1.01 10/06/2022       Lab Results   Component Value Date     10/06/2022    K 4.9 10/06/2022    CO2 19.3 (L) 10/06/2022     (H) 10/06/2022    BUN 46 (H) 10/06/2022    CREATININE 2.73 (H) 10/06/2022    EGFRIFNONA 23 (L) 02/21/2022     EGFRIFAFRI 41 (L) 07/30/2018    GLUCOSE 170 (H) 10/06/2022    CALCIUM 9.1 10/06/2022    ALKPHOS 326 (H) 10/06/2022    AST 21 10/06/2022    ALT 9 10/06/2022    BILITOT 0.2 10/06/2022    ALBUMIN 3.31 (L) 10/06/2022    PROTEINTOT 7.2 10/06/2022    MG 1.7 06/13/2022    PHOS 3.7 07/21/2022     Lab Results   Component Value Date    FERRITIN 334.60 (H) 10/06/2022    IRON 28 (L) 10/06/2022    TIBC 262 (L) 10/06/2022    LABIRON 11 (L) 10/06/2022    BPWCWRXE50 736 03/26/2021    FOLATE 12.60 03/26/2021    HAPTOGLOBIN 279 (H) 08/04/2022    RETICCTPCT 2.49 (H) 03/26/2021    RETIC 0.0899 03/26/2021     Workup 3/26/21    Ferritin   13.00 - 150.00 ng/mL 160.20High       Iron   37 - 145 mcg/dL 36Low     Iron Saturation   20 - 50 % 9Low     Transferrin   200 - 360 mg/dL 270    TIBC   298 - 536 mcg/dL 402      Vitamin B-12   211 - 946 pg/mL 736      Folate   4.78 - 24.20 ng/mL 12.60      Reticulocyte %   0.70 - 1.90 % 2.49High     Reticulocyte Absolute   0.0200 - 0.1300 10*6/mm3 0.0899      & Units 1 mo ago   LDH   135 - 214 U/L 234High       Haptoglobin   30 - 200 mg/dL 256High       TSH   0.270 - 4.200 uIU/mL 6.430High       C-Reactive Protein   0.00 - 0.50 mg/dL 1.70High       Sed Rate   0 - 30 mm/hr >100High       Copper   80 - 158 ug/dL 189High       Zinc   44 - 115 ug/dL 67      Tissue Transglutaminase IgA   0 - 3 U/mL 2          ASSESSMENT & PLAN:  rBitta Lee is a very pleasant 77 y.o. female with    1.  Normocytic anemia:  2. CKD/AOCD/inflammation  3. Iron deficiency anemia  4. Hypothyroidism    -Previous available CBCs were reviewed and patient has had chronic, normocytic anemia since at least December 2016 with Hg most recently ranging ~ 8.6-9.8. Her WBC and platelets have been normal. Iron panel and ferritin levels have been c/w AOCD/inflammation and also has intermittent component of YUAN.     -She reports receiving IV iron infusions several years ago. At first presentation, she was taking ferrous sulfate BID and  tolerating well but did not have improvement in iron stores.   -Reportedly had previous screening colonoscopy with Dr. Zamora which was unremarkable and negative for bleeding. Records unavailable.  - As above, she was taking ferrous sulfate. Therefore, discontinued oral iron and have been replacing with IV Feraheme for apparent malabsorption of iron, most recently on 8/30/22.  -Recommended repeat GI evaluation and patient was agreeable. Referral was placed and patient underwent EGD/colonoscopy with Dr. Simmons on 11/23/21 (see full reports above). She did have several benign polyps removed with small internal hemorrhoids, otherwise unremarkable and negative for bleeding. EGD showed one angiectasia in the second portion of the duodenum which was nonbleeding.  Biopsies were taken of the duodenum to rule out celiac disease as a source of her anemia. Given ongoing YUAN, Dr. Simmons planned for capsule endoscopy, however, she says she could not tolerate. GI obtained CT A/P without contrast on 4/12/22 which showed cirrhosis of liver with small volume ascites and sigmoid diverticulitis. She recently had repeat CT A/P on 9/9/22  which showed advanced hepatic cirrhosis with moderate ascites and mild to moderate splenomegaly.  -Her Hg recently dropped to 6.0 and was transfused 2 units of PRBCs on 9/10/22.   - Repeat CBC from today shows Hg 9.1 which has improved. Repeat iron panel and ferritin are c/w AOCD/inflammation and does not need further IV iron at this time. CMP from today also shows worsening Cr ~2.73 which is largely contributing to her anemia.   -Will continue to monitor. Will check labs monthly and replace with IV Feraheme as needed. Will also continue with prn transfusions for Hg <7 or <8 if symptomatic . She was advised to discontinue oral iron as we are replacing with IV iron for apparent malabsorption. In the meantime, recommended continued close follow up with PCP for management of diabetes, hypothyroidism,  hypertension and nephology for CKD. She will follow up with GI and cardiology as well. Will defer decision regarding epogen to nephrology which she would likely benefit from.   -To further evaluate anemia, also previously sent additional labs including PBS which showed normocytic, hypochromic anemia with no schistocytes, normal WBC with borderline neutrophilia and mild eosinophilia, no dysplasia or blasts, adequate platelets.  B12 and folate were normal. No evidence of hemolysis, TSH was elevated and she was advised to follow up with PCP who started her on synthroid, ESR and CRP were elevated and suggestive of underlying inflammation, copper and zinc were both replete, tissue transglutaminase was normal.      5. Cirrhosis/Splenomegaly:  -Noted on CT A/P from 4/12/22. She recently had repeat CT A/P on 9/9/22  which showed advanced hepatic cirrhosis with moderate ascites and mild to moderate splenomegaly. She has upcoming follow up with GI. Ongoing management per GI    ACO / MILES/Other  Quality measures  -  Britta Lee did not receive 2022 flu vaccine. This was recommended. She has had Covid vaccine x 3.   -  Britta Lee reports a pain score of 0.   -  Current outpatient and discharge medications have been reconciled for the patient.  Reviewed by: ERNIE De Leon    The patient was in agreement with the plan and all questions were answered to her satisfaction.     Thank you so much for allowing us to participate in the care of Britta Lee . Please do not hesitate to contact us with any questions or concerns.     A total of 30 minutes were spent coordinating this patient’s care in clinic today; more than 50% of this time was face-to-face with the patient, reviewing her medical history and counseling on the current treatment and followup plan. All questions were answered to her satisfaction.      Electronically Signed by: ERNIE De Leon , October 6, 2022 13:39 EDT       CC:   Magdaleno  RAFIQ Mansfield

## 2022-10-10 ENCOUNTER — HOSPITAL ENCOUNTER (OUTPATIENT)
Dept: CARDIOLOGY | Facility: HOSPITAL | Age: 77
Discharge: HOME | End: 2022-10-10
Admitting: PHYSICIAN ASSISTANT

## 2022-10-10 VITALS
OXYGEN SATURATION: 100 % | HEIGHT: 63 IN | HEART RATE: 63 BPM | TEMPERATURE: 96.1 F | WEIGHT: 205.2 LBS | SYSTOLIC BLOOD PRESSURE: 139 MMHG | DIASTOLIC BLOOD PRESSURE: 69 MMHG | BODY MASS INDEX: 36.36 KG/M2

## 2022-10-10 DIAGNOSIS — E11.69 TYPE 2 DIABETES MELLITUS WITH OTHER SPECIFIED COMPLICATION, WITH LONG-TERM CURRENT USE OF INSULIN: ICD-10-CM

## 2022-10-10 DIAGNOSIS — D50.9 IRON DEFICIENCY ANEMIA, UNSPECIFIED IRON DEFICIENCY ANEMIA TYPE: ICD-10-CM

## 2022-10-10 DIAGNOSIS — N18.4 CKD (CHRONIC KIDNEY DISEASE), STAGE IV: ICD-10-CM

## 2022-10-10 DIAGNOSIS — I50.33 ACUTE ON CHRONIC HEART FAILURE WITH PRESERVED EJECTION FRACTION (HFPEF): Primary | ICD-10-CM

## 2022-10-10 DIAGNOSIS — Z79.4 TYPE 2 DIABETES MELLITUS WITH OTHER SPECIFIED COMPLICATION, WITH LONG-TERM CURRENT USE OF INSULIN: ICD-10-CM

## 2022-10-10 LAB
ANION GAP SERPL CALCULATED.3IONS-SCNC: 11.7 MMOL/L (ref 5–15)
BUN SERPL-MCNC: 48 MG/DL (ref 8–23)
BUN/CREAT SERPL: 18 (ref 7–25)
CALCIUM SPEC-SCNC: 8.7 MG/DL (ref 8.6–10.5)
CHLORIDE SERPL-SCNC: 109 MMOL/L (ref 98–107)
CO2 SERPL-SCNC: 18.3 MMOL/L (ref 22–29)
CREAT SERPL-MCNC: 2.67 MG/DL (ref 0.57–1)
EGFRCR SERPLBLD CKD-EPI 2021: 17.7 ML/MIN/1.73
GLUCOSE SERPL-MCNC: 152 MG/DL (ref 65–99)
MAGNESIUM SERPL-MCNC: 1.9 MG/DL (ref 1.6–2.4)
NT-PROBNP SERPL-MCNC: 5842 PG/ML (ref 0–1800)
POTASSIUM SERPL-SCNC: 5.1 MMOL/L (ref 3.5–5.2)
SODIUM SERPL-SCNC: 139 MMOL/L (ref 136–145)

## 2022-10-10 PROCEDURE — 80048 BASIC METABOLIC PNL TOTAL CA: CPT | Performed by: PHYSICIAN ASSISTANT

## 2022-10-10 PROCEDURE — 83735 ASSAY OF MAGNESIUM: CPT | Performed by: PHYSICIAN ASSISTANT

## 2022-10-10 PROCEDURE — 36415 COLL VENOUS BLD VENIPUNCTURE: CPT | Performed by: PHYSICIAN ASSISTANT

## 2022-10-10 PROCEDURE — 99215 OFFICE O/P EST HI 40 MIN: CPT | Performed by: PHYSICIAN ASSISTANT

## 2022-10-10 PROCEDURE — 83880 ASSAY OF NATRIURETIC PEPTIDE: CPT

## 2022-10-10 RX ORDER — BUMETANIDE 1 MG/1
1 TABLET ORAL EVERY OTHER DAY
Qty: 90 TABLET | Refills: 1
Start: 2022-10-10 | End: 2022-11-30 | Stop reason: HOSPADM

## 2022-10-10 NOTE — PROGRESS NOTES
Heart Failure Clinic    Date: 10/10/22     Vitals:    10/10/22 0853   BP: 139/69   Pulse: 63   Temp: 96.1 °F (35.6 °C)   SpO2: 100%   weight: 205.2 lbs     Method of arrival: Ambulatory    Weighing self daily: Yes    Monitoring Heart Failure Zones: Yes    Today's HF Zone: Yellow     Taking medications as prescribed: Yes    Edema Yes    Shortness of Air: Yes with activity    Number of pillows used at night:<2    Educational Materials given:  sumit Engle Value:         Christine Lea Rep 10/10/22 08:57 EDT

## 2022-10-10 NOTE — PROGRESS NOTES
[facility]  Pharmacist Note     Britta Lee is a 77 y.o. female seen in the [facility] Failure [facility] for HFpEF.  Britta Lee reports a fair understanding of medications and reports adherence to them unless she runs out of them. She has all of her medications in a bag and fishes them out each day. She reports not calling in refills until out of medications. She reports that Lauren in Oncology stopped her oral iron pill because she now gives it to her via IV. Pt reports swelling in her feet and legs that has been there for about 2 weeks. She is allotted to take Bumex 1mg QOD, but reports that she has not taken it since last week sometime, because she thought it might be causing her swelling, so she backed off of it. She reports that she takes her weight every so often and it has been fluctuating. She reports that her BP usually runs in the 130-140's/60-70 with HR in the 60-70's.       Medication Use:   Adherence: patient states she leaves medications in a bag. She has tried a medication planner in the past and felt it wasn't helpful.   Hx of med intolerances: bradycardia with metoprolol 100mg BID  Affordability: none at this time   Retail Rx Management: none    Past Medical History:   Diagnosis Date   • Acquired hypothyroidism 4/22/2021   • Anemia    • Arthritis    • Carpal tunnel syndrome    • Coronary artery disease    • Diabetes mellitus (HCC)    • Elevated cholesterol    • GERD (gastroesophageal reflux disease)    • History of pneumonia    • History of unsteady gait     OCASSIONALLY   • Hypertension    • Insomnia    • Kidney disease    • LENNY (obstructive sleep apnea) 12/1/2020   • Osteoarthritis    • Renal insufficiency    • Sciatica      ALLERGIES: Patient has no known allergies.  Current Outpatient Medications   Medication Sig Dispense Refill   • amLODIPine (NORVASC) 10 MG tablet Take 1 tablet by mouth Daily. 90 tablet 3   • aspirin 81 MG EC tablet Take 81 mg by mouth Daily.     • atorvastatin  (LIPITOR) 40 MG tablet Take 1 tablet by mouth Daily. 90 tablet 3   • bumetanide (BUMEX) 1 MG tablet Take 1 tablet by mouth Every Other Day. (Patient taking differently: Take 1 tablet by mouth Every Other Day. Taking every few days) 90 tablet 1   • cloNIDine (CATAPRES) 0.2 MG tablet Take 1 tablet by mouth 3 (Three) Times a Day. 270 tablet 1   • escitalopram (LEXAPRO) 10 MG tablet Take 1 tablet by mouth Daily. 90 tablet 3   • glucose blood test strip 1 each by Other route 3 (Three) Times a Day. 100 each 12   • hydrALAZINE (APRESOLINE) 25 MG tablet Take 3 tablets by mouth 3 (Three) Times a Day. 270 tablet 1   • Insulin Glargine, 2 Unit Dial, (Toujeo Max SoloStar) 300 UNIT/ML solution pen-injector injection Inject 40-60 Units under the skin into the appropriate area as directed Daily. 3 pen 5   • insulin lispro (humaLOG) 100 UNIT/ML injection Inject 0-6 Units under the skin into the appropriate area as directed 3 (Three) Times a Day Before Meals. Sliding scale 4-6 units if BG > 180 9 mL 5   • Insulin Pen Needle 32G X 5 MM misc 1 each 4 (Four) Times a Day. 120 each 5   • isosorbide mononitrate (IMDUR) 60 MG 24 hr tablet Take 1 tablet by mouth Daily. 90 tablet 0   • levothyroxine (SYNTHROID, LEVOTHROID) 25 MCG tablet Take 1 tablet by mouth Daily. 90 tablet 3   • metoprolol succinate XL (TOPROL-XL) 50 MG 24 hr tablet Take 2 tablets by mouth Daily. (Patient taking differently: Take 2 tablets by mouth Daily. Pt taking 1 bid) 90 tablet 1   • nystatin (MYCOSTATIN) 190196 UNIT/GM powder Apply  one application topically to the appropriate area as directed Every 12 (Twelve) Hours. (Patient taking differently: Apply  topically to the appropriate area as directed 2 (Two) Times a Day As Needed (itching).) 60 g 0   • pantoprazole (Protonix) 40 MG EC tablet Take 1 tablet by mouth Daily. 90 tablet 3   • vitamin D (ERGOCALCIFEROL) 1.25 MG (51482 UT) capsule capsule Take 1 capsule by mouth 1 (One) Time Per Week. 15 capsule 3   •  "Continuous Blood Gluc  (Dexcom G6 ) device 1 Device Daily. 3 each 3   • Continuous Blood Gluc Sensor (Dexcom G6 Sensor) Every 10 (Ten) Days. 9 each 3   • Continuous Blood Gluc Transmit (Dexcom G6 Transmitter) misc 1 Device Daily. 1 each 5     No current facility-administered medications for this encounter.       Vaccination History:   Pneumonia: Reports UTD  Annual Influenza: UTD for 2021-22 Season  COVID 19: Primary Series + 1 booster 9/27/21    Objective  Vitals:    10/10/22 0853   BP: 139/69   BP Location: Left arm   Patient Position: Sitting   Cuff Size: Adult   Pulse: 63   Temp: 96.1 °F (35.6 °C)   TempSrc: Temporal   SpO2: 100%   Weight: 93.1 kg (205 lb 3.2 oz)   Height: 160 cm (63\")     Wt Readings from Last 3 Encounters:   10/10/22 93.1 kg (205 lb 3.2 oz)   10/06/22 91.9 kg (202 lb 9.6 oz)   09/09/22 87.1 kg (192 lb)         10/10/22  0853   Weight: 93.1 kg (205 lb 3.2 oz)     Lab Results   Component Value Date    GLUCOSE 152 (H) 10/10/2022    BUN 48 (H) 10/10/2022    CREATININE 2.67 (H) 10/10/2022    EGFRIFNONA 23 (L) 02/21/2022    EGFRIFAFRI 41 (L) 07/30/2018    BCR 18.0 10/10/2022    K 5.1 10/10/2022    CO2 18.3 (L) 10/10/2022    CALCIUM 8.7 10/10/2022    PROTENTOTREF 7.7 07/30/2018    ALBUMIN 3.31 (L) 10/06/2022    LABIL2 1.1 (L) 07/30/2018    AST 21 10/06/2022    ALT 9 10/06/2022     Lab Results   Component Value Date    WBC 6.52 10/06/2022    HGB 9.1 (L) 10/06/2022    HCT 30.0 (L) 10/06/2022    MCV 88.5 10/06/2022     10/06/2022     Lab Results   Component Value Date    CKTOTAL 67 12/04/2020    CKMB 2.92 04/16/2018    TROPONINI 0.012 04/16/2018    TROPONINT <0.010 09/02/2021     Lab Results   Component Value Date    PROBNP 5,842.0 (H) 10/10/2022     Results for orders placed during the hospital encounter of 11/03/20    Adult Transthoracic Echo Complete W/ Cont if Necessary Per Protocol    Interpretation Summary  · Left ventricular wall thickness is consistent with concentric " hypertrophy.  · Left ventricular ejection fraction appears to be greater than 70%. Left ventricular systolic function is hyperdynamic (EF > 70%).  · Left ventricular diastolic function is consistent with (grade II w/high LAP) pseudonormalization.  · The left atrial cavity is moderate to severely dilated.  · The right atrial cavity is mildly dilated.  · Moderate mitral annular calcification is present.  · Mild mitral valve regurgitation is present.  · Mild tricuspid valve regurgitation is present.  · Estimated right ventricular systolic pressure from tricuspid regurgitation is markedly elevated (>55 mmHg).               Class   Drug   Dose Last Dose Adjustment   Notes   ACEi/ARB/ARNI    Worsening renal fxn 1/22/21   Beta Blocker Toprol XL 100mg  9/8/22    MRA --------------   eGFR needs to be >30mL/min   SGLT2    eGFR needs to be >20mL/min     Drug Therapy Problems:    1. Levothyroxine administration  2. Bumex adherence  3. eGFR 17.7        Recommendations:      1. Pt reports taking all of her morning medications at the same time, first thing in the morning. Counseled her on taking the levothyroxine first thing in the morning all by itself and then taking the rest of her morning meds at least 30 minutes before food and other medications.   2. Pt thought that her Bumex could possibly be causing her swelling and therefore she had stopped taking it as prescribed. I counseled her on the purpose of her Bumex and explained that due to her kidney function, Dr. Johnson had allowed her to take it QOD to help keep the swelling down. Pt seemed to understand. I alerted Emily about the confusion, so she could talk with her as well.   3. Kidney function limits any additional GDMT at this time    Patient was educated on heart failure medications and the importance of medication adherence.  All questions were addressed and patient expressed understanding. Patient would benefit from further education.     Thank you for allowing me to  participate in the care of your patient,    [facility], located within [facility]  10/10/22  09:54 EDT

## 2022-10-10 NOTE — PROGRESS NOTES
[facility]  IRA Melgoza Susan Marie, PA  [address] [place] [address]    Thank you for asking me to see Britta Lee for congestive heart failure.    HPI:     This is a 77 y.o. female with known past medical history of:  · HFpEF  · TTE from 11/2020 with EF>70% with left ventricular wall thickeness concerning with concentric hypertrophy, grade II diastolic dysfunction, left atrial cavity moderately to severely dilated and moderate mitral annular calcification, mild MVR, mild TVR, and RVSP markedly elevated at >55mmHg  · Severe pulmonary Hypertension  · ASCVD  · prior PCI with stent placement @[facility] by   · Sinus bradycarida  · Cirrhosis   · Iron deficiency anemia with known poor absorption  · IDDM, type II  · CKD IV  · GERD  · OA  · LENNY  · PVD    Britta Lee presents for today for HF follow-up.  The patient is typically seen by Lexie Dolan PA.  Patient's primary cardiologist is Dr. Cruz & team    • Last known EF >70%. Current medications prior to first visit directed toward underlying heart failure.       • Last known hospitalization and/or ED visit: ED visit on 09/09/22 for anemia. She received transfusion and IV diuresis with discharge home from the ED.      • Accompanied by: Self    Initial visit data/details regarding:   • Dyspnea: Dyspnea on exertion with activity  • Lower extremity swelling: Lower extremity swelling  • Abdominal swelling: Denies  • Home weight: Stable  • Home BP: Controlled in the 120s-130s   • Home glucoses running in 100s-200s.    • Home heart rate: 60s  • Daily activities of living: Performing independently.   • Pillows/lying flat: Uses recliner.   • Zone: Yellow  • Repeat echocardiogram scheduled for tomorrow to evaluate for cardiac amyloidosis per primary cardiologist.    • She reports she has not been taking her medications as prescribed as she was worried that maybe her Bumex was causing her swelling.    • She reports  has been buying her frozen meals diet but she hasn't been eating them due to salt intake.    • She reports she has been taking iron infusions and seeing Hem/Onc.    • She reports she does not know what to eat with kidney disease and heart failure combined with diabetes.    Specialists:   • Cardiology: Dr. Cruz & team     Review of Systems - Review of Systems   Constitutional: Negative for chills, diaphoresis and fever.   HENT: Negative for congestion, ear discharge and ear pain.    Eyes: Negative for blurred vision, discharge, double vision, pain and photophobia.   Cardiovascular: Positive for leg swelling. Negative for claudication, cyanosis and dyspnea on exertion.   Respiratory: Negative for cough.    Endocrine: Negative for cold intolerance and heat intolerance.   Hematologic/Lymphatic: Negative for adenopathy.   Skin: Negative for color change and nail changes.   Musculoskeletal: Negative for arthritis and falls.   Gastrointestinal: Negative for bloating and abdominal pain.   Neurological: Negative for aphonia, brief paralysis and difficulty with concentration.   Psychiatric/Behavioral: Negative for altered mental status, depression and hallucinations.         All other systems were reviewed and were negative.    Patient Active Problem List   Diagnosis   • Type 2 diabetes mellitus, uncontrolled, with neuropathy   • Essential hypertension   • Atherosclerosis of native coronary artery of native heart   • Bilateral carpal tunnel syndrome   • Iron deficiency anemia due to dietary causes   • Diabetic retinopathy associated with type 2 diabetes mellitus (HCC)   • Dyslipidemia   • Sciatic neuropathy   • DDD (degenerative disc disease), lumbar   • Primary osteoarthritis of left knee   • Status post total left knee replacement   • Type 2 diabetes mellitus with chronic kidney disease, with long-term current use of insulin ([facility])   • Hyperparathyroidism due to renal insufficiency ([facility])   • Venous  insufficiency (chronic) (peripheral)   • Class 1 obesity due to excess calories with serious comorbidity and body mass index (BMI) of 31.0 to 31.9 in adult   • PVD (peripheral vascular disease) with claudication ([facility])   • LVH (left ventricular hypertrophy)   • Chronic heart failure with preserved ejection fraction (HFpEF) ([facility])   • LENNY (obstructive sleep apnea)   • CKD (chronic kidney disease) stage 4, GFR 15-29 ml/min ([facility])   • Iron deficiency anemia   • Malabsorption of iron   • Acquired hypothyroidism   • Hiatal hernia   • Weight loss, abnormal   • Osteopenia of neck of left femur       family history includes Arthritis in her mother; Cancer in her mother; Diabetes in her daughter, maternal aunt, mother, and son; Heart disease in her father and maternal grandmother.     reports that she has never smoked. She has never used smokeless tobacco. She reports current alcohol use. She reports that she does not use drugs.    No Known Allergies      Current Outpatient Medications:   •  amLODIPine (NORVASC) 10 MG tablet, Take 1 tablet by mouth Daily., Disp: 90 tablet, Rfl: 3  •  aspirin 81 MG EC tablet, Take 81 mg by mouth Daily., Disp: , Rfl:   •  atorvastatin (LIPITOR) 40 MG tablet, Take 1 tablet by mouth Daily., Disp: 90 tablet, Rfl: 3  •  bumetanide (BUMEX) 1 MG tablet, Take 1 tablet by mouth Every Other Day., Disp: 90 tablet, Rfl: 1  •  cloNIDine (CATAPRES) 0.2 MG tablet, Take 1 tablet by mouth 3 (Three) Times a Day., Disp: 270 tablet, Rfl: 1  •  escitalopram (LEXAPRO) 10 MG tablet, Take 1 tablet by mouth Daily., Disp: 90 tablet, Rfl: 3  •  glucose blood test strip, 1 each by Other route 3 (Three) Times a Day., Disp: 100 each, Rfl: 12  •  hydrALAZINE (APRESOLINE) 25 MG tablet, Take 3 tablets by mouth 3 (Three) Times a Day., Disp: 270 tablet, Rfl: 1  •  Insulin Glargine, 2 Unit Dial, (Toujeo Max SoloStar) 300 UNIT/ML solution pen-injector injection, Inject 40-60 Units under the skin into the appropriate area as  directed Daily., Disp: 3 pen, Rfl: 5  •  insulin lispro (humaLOG) 100 UNIT/ML injection, Inject 0-6 Units under the skin into the appropriate area as directed 3 (Three) Times a Day Before Meals. Sliding scale 4-6 units if BG > 180, Disp: 9 mL, Rfl: 5  •  Insulin Pen Needle 32G X 5 MM misc, 1 each 4 (Four) Times a Day., Disp: 120 each, Rfl: 5  •  isosorbide mononitrate (IMDUR) 60 MG 24 hr tablet, Take 1 tablet by mouth Daily., Disp: 90 tablet, Rfl: 0  •  levothyroxine (SYNTHROID, LEVOTHROID) 25 MCG tablet, Take 1 tablet by mouth Daily., Disp: 90 tablet, Rfl: 3  •  metoprolol succinate XL (TOPROL-XL) 50 MG 24 hr tablet, Take 2 tablets by mouth Daily. (Patient taking differently: Take 2 tablets by mouth Daily. Pt taking 1 bid), Disp: 90 tablet, Rfl: 1  •  nystatin (MYCOSTATIN) 001638 UNIT/GM powder, Apply  one application topically to the appropriate area as directed Every 12 (Twelve) Hours. (Patient taking differently: Apply  topically to the appropriate area as directed 2 (Two) Times a Day As Needed (itching).), Disp: 60 g, Rfl: 0  •  pantoprazole (Protonix) 40 MG EC tablet, Take 1 tablet by mouth Daily., Disp: 90 tablet, Rfl: 3  •  vitamin D (ERGOCALCIFEROL) 1.25 MG (95990 UT) capsule capsule, Take 1 capsule by mouth 1 (One) Time Per Week., Disp: 15 capsule, Rfl: 3  •  Continuous Blood Gluc  (Dexcom G6 ) device, 1 Device Daily., Disp: 3 each, Rfl: 3  •  Continuous Blood Gluc Sensor (Dexcom G6 Sensor), Every 10 (Ten) Days., Disp: 9 each, Rfl: 3  •  Continuous Blood Gluc Transmit (Dexcom G6 Transmitter) misc, 1 Device Daily., Disp: 1 each, Rfl: 5      Physical Exam:  I have reviewed the patient's current vital signs as documented in the patient's EMR.   Vitals:    10/10/22 0853   BP: 139/69   Pulse: 63   Temp: 96.1 °F (35.6 °C)   SpO2: 100%     Body mass index is 36.35 kg/m².       10/10/22  0853   Weight: 93.1 kg (205 lb 3.2 oz)      Physical Exam  Vitals and nursing note reviewed.   Constitutional:        General: She is awake.      Appearance: Normal appearance. She is well-developed.   HENT:      Head: Normocephalic and atraumatic.   Eyes:      General: Lids are normal.      Conjunctiva/sclera:      Right eye: Right conjunctiva is not injected.      Left eye: Left conjunctiva is not injected.   Cardiovascular:      Rate and Rhythm: Normal rate and regular rhythm.      Heart sounds: S1 normal and S2 normal.   Pulmonary:      Effort: No tachypnea.   Abdominal:      General: Bowel sounds are normal.      Palpations: Abdomen is soft.      Tenderness: There is no abdominal tenderness.   Musculoskeletal:      Right lower le+ Edema present.      Left lower le+ Edema present.   Skin:     General: Skin is warm and moist.   Neurological:      Mental Status: She is alert and oriented to person, place, and time.   Psychiatric:         Attention and Perception: Attention normal.         Mood and Affect: Mood normal.         Behavior: Behavior is cooperative.        JVP: Volume/Pulsation: Normal.        DATA REVIEWED:     EKG. I personally reviewed and interpreted the EKG.           --------------------------------------------------  TTE/MANI:  Results for orders placed during the hospital encounter of 20    Adult Transthoracic Echo Complete W/ Cont if Necessary Per Protocol    Interpretation Summary  · Left ventricular wall thickness is consistent with concentric hypertrophy.  · Left ventricular ejection fraction appears to be greater than 70%. Left ventricular systolic function is hyperdynamic (EF > 70%).  · Left ventricular diastolic function is consistent with (grade II w/high LAP) pseudonormalization.  · The left atrial cavity is moderate to severely dilated.  · The right atrial cavity is mildly dilated.  · Moderate mitral annular calcification is present.  · Mild mitral valve regurgitation is present.  · Mild tricuspid valve regurgitation is present.  · Estimated right ventricular systolic pressure from  tricuspid regurgitation is markedly elevated (>55 mmHg).        -----------------------------------------------------  CXR/Imaging:   Imaging Results (Most Recent)     None          I personally reviewed and interpreted the CXR.      -----------------------------------------------------  CT:   CT Abdomen Pelvis Without Contrast    Result Date: 9/9/2022  1.  Advanced hepatic cirrhosis with moderate volume ascites and mild to moderate splenomegaly. Ascites has increased since the prior study. 2.  Extensive soft tissue edema throughout the abdominal mesentery and body wall. 3.  Small to moderate right pleural effusion, slightly increased. Dependent lung base atelectasis. 4.  Severe lower colonic diverticulosis. Signer Name: Alex Lepe MD  Signed: 9/9/2022 6:55 PM  Workstation Name: [email or web address]  [facility]    DEXA Bone Density Axial    Result Date: 9/26/2022  Impression: 1. According to the World Health Organization definitions of osteoporosis based on bone density, this patient's bone mineral density is compatible with  osteopenia and the fracture risk is moderate.  2. Osteopenia in the left femoral neck  This report was finalized on 9/26/2022 9:34 AM by Dr. Silvio Mcdonald MD.      Mammo Screening Digital Tomosynthesis Bilateral With CAD    Result Date: 9/26/2022  No findings suspicious for malignancy.  BI-RADS CATEGORY:  2, BENIGN  RECOMMENDATION: Yearly mammogram, yearly clinical breast exam, and encourage self breast awareness.  CAD was used.  The standard false negative rate of mammography is between 10% and 25%. Complex patterns or increased breast density will markedly elevate the false negative rate of mammography.  A letter, in lay terminology, with the results of this exam will be mailed to the patient.   If there is a palpable area of concern, biopsy should be considered regardless of imaging findings.  This report was finalized on 9/26/2022 2:15 PM by Dr. Vaishali Benson,  MD.      I personally reviewed the images of the CT scan.  My personal interpretation is below.      ----------------------------------------------------    PFTs:  None available at present  --------------------------------------------------------------------------------------------------    Laboratory evaluations:    Lab Results   Component Value Date    GLUCOSE 152 (H) 10/10/2022    BUN 48 (H) 10/10/2022    CREATININE 2.67 (H) 10/10/2022    EGFRIFNONA 23 (L) 02/21/2022    EGFRIFAFRI 41 (L) 07/30/2018    BCR 18.0 10/10/2022    K 5.1 10/10/2022    CO2 18.3 (L) 10/10/2022    CALCIUM 8.7 10/10/2022    PROTENTOTREF 7.7 07/30/2018    ALBUMIN 3.31 (L) 10/06/2022    LABIL2 1.1 (L) 07/30/2018    AST 21 10/06/2022    ALT 9 10/06/2022     Lab Results   Component Value Date    WBC 6.52 10/06/2022    HGB 9.1 (L) 10/06/2022    HCT 30.0 (L) 10/06/2022    MCV 88.5 10/06/2022     10/06/2022     Lab Results   Component Value Date    CHOL 109 09/08/2022    CHLPL 128 06/25/2018    TRIG 122 09/08/2022    HDL 31 (L) 09/08/2022    LDL 56 09/08/2022     Lab Results   Component Value Date    TSH 9.670 (H) 06/09/2022     Lab Results   Component Value Date    HGBA1C 5.00 09/08/2022     Lab Results   Component Value Date    ALT 9 10/06/2022     Lab Results   Component Value Date    HGBA1C 5.00 09/08/2022    HGBA1C 6.30 (H) 06/09/2022    HGBA1C 5.90 (H) 03/07/2022     Lab Results   Component Value Date    MICROALBUR 138.9 12/22/2021    CREATININE 2.67 (H) 10/10/2022     Lab Results   Component Value Date    IRON 28 (L) 10/06/2022    TIBC 262 (L) 10/06/2022    FERRITIN 334.60 (H) 10/06/2022     Lab Results   Component Value Date    INR 1.36 (H) 09/09/2022    INR 1.21 (H) 08/04/2022    INR 1.20 (H) 12/04/2020    PROTIME 17.1 (H) 09/09/2022    PROTIME 15.6 (H) 08/04/2022    PROTIME 15.0 (H) 12/04/2020        Lab Results   Component Value Date    ABSOLUTELUNG 32 06/13/2022    ABSOLUTELUNG 27 04/25/2022    ABSOLUTELUNG 22 03/28/2022        PAH RISK ASSESSMENT:      1. Acute on chronic heart failure with preserved ejection fraction (HFpEF) ([facility])    2. CKD (chronic kidney disease), stage IV ([facility])    3. Type 2 diabetes mellitus with other specified complication, with long-term current use of insulin ([facility])    4. Iron deficiency anemia, unspecified iron deficiency anemia type          ORDERS PLACED TODAY:  Orders Placed This Encounter   Procedures   • BNP   • Basic Metabolic Panel   • Magnesium        Diagnoses and all orders for this visit:    1. Acute on chronic heart failure with preserved ejection fraction (HFpEF) ([facility]) (Primary)  -     BNP  -     Basic Metabolic Panel; Standing  -     Magnesium; Standing  -     Basic Metabolic Panel  -     Magnesium    2. CKD (chronic kidney disease), stage IV ([facility])    3. Type 2 diabetes mellitus with other specified complication, with long-term current use of insulin ([facility])    4. Iron deficiency anemia, unspecified iron deficiency anemia type    Other orders  -     bumetanide (BUMEX) 1 MG tablet; Take 1 tablet by mouth Every Other Day.  Dispense: 90 tablet; Refill: 1             MEDS ORDERED TODAY:    New Medications Ordered This Visit   Medications   • bumetanide (BUMEX) 1 MG tablet     Sig: Take 1 tablet by mouth Every Other Day.     Dispense:  90 tablet     Refill:  1        ---------------------------------------------------------------------------------------------------------------------------          ASSESSMENT/PLAN:      Diagnosis Plan   1. Acute on chronic heart failure with preserved ejection fraction (HFpEF) ([facility])  BNP    Basic Metabolic Panel    Magnesium    Basic Metabolic Panel    Magnesium      2. CKD (chronic kidney disease), stage IV ([facility])        3. Type 2 diabetes mellitus with other specified complication, with long-term current use of insulin ([facility])        4. Iron deficiency anemia, unspecified iron deficiency anemia type            acute on chronic diastolic heart failure. CHF.     NYHA  stage Stage C: Structural heart disease is present AND symptoms have occurredFC-Class III: Marked limitation of physical activity. Comfortable at rest. Less than ordinary activity causes fatigue, palpitation, or dyspnea.     Today, Patient is approaching euvolemiaand with  Moderate perfusion. The patient's hemodynamics are currently acceptable. HR is: normal and is at goal. BP/MAP was reviewed and there isroom for medication up-titration.  Clinical trajectory was assessed and hasimproved.     CHF GOAL DIRECTED MEDICAL THERAPY FOR PATIENT ADDRESSED/ADJUSTED:     GDMT: HFpEF with CKD IV    Drug Class   Drug   Dose Last Dose Adjustment Additional Titration   Notes   ACEi/ARB/ARNI CKD IV egfr 17       Beta Blocker  Metoprolol Succinate   100mg      MRA CKD IV egfr 17       SGLT2i CKD IV egfr 17   N/A      -CHF Specific BB:   •  Metoprolol Succinate  100mg qd continued, though she is taking in divided doses  • Continue current medication doses.     -Diuretic regimen:   • ReDS Vest reading for 10/10/22 could not be obtained due to machine being down at present.    • Restart home Bumex every other day 1mg.  She reports she has a large supply and has not had any in several days, as she though this was causing her swelling of her extremities.  She reports she was later told it might be her iron pill.   • BMP, Mag, & ProBNP reviewed with patient.    • Provided with low salt diet education including cookbook, as she reports hurdles in healthcare with figuring out what to eat.     -Fluid restriction/Sodium restriction:   • Requested 2000 ml restriction  • Patient has been asked to weigh daily and was provided with a printed diuretic strategy.  • 1,500 mg Na restriction was discussed.    -Acute vs. Chronic underlying conditions other than HF addressed during visit:   · CKD IV:   o Last nephrology note reviewed with Bumex changed to 1 every third day during 08/2022 visit with Nephrology.  She reports she was later told to take  this every other day.   o Continue every other day for now.    o Sees Dr. Johnson later this month.    o Potassium today is 5.1.  Instructed to avoid potassium sparing foods.     · Iron/Anemia in CHF:  · Iron infusions on board.   · Continue outpatient f/u with Hem/Onc.      • IDDM:   o Continue current insulin regimen.   o Continue f/u with PCP.     • Identifiable barriers to Heart Failure Self-care:   o Medical Barriers: Multiple medical comorbidities  o Social Barriers: None known on day of evaluation      Class 2 Severe Obesity (BMI >=35 and <=39.9). Obesity-related health conditions include the following: obstructive sleep apnea, hypertension, coronary heart disease, diabetes mellitus, dyslipidemias, GERD and osteoarthritis. Obesity is unchanged. BMI is is above average; BMI management plan is completed. We discussed portion control.              45 minutes out of 60 minutes face to face spent counseling patient extensively on dietary Na+ intake, importance of activity, weight monitoring, compliance with medications in addition to importance of titration with goal directed medical therapy and follow up appointments.            This document has been electronically signed by Emily Kincaid PA-C  October 10, 2022 12:03 EDT      Dictated Utilizing Dragon Dictation: Part of this note may be an electronic transcription/translation of spoken language to printed text using the Dragon Dictation System.    Follow-up appointment and medication changes provided in hand delivered After Visit Summary as well as reviewed in the room.

## 2022-10-11 ENCOUNTER — HOSPITAL ENCOUNTER (OUTPATIENT)
Dept: CARDIOLOGY | Facility: HOSPITAL | Age: 77
Discharge: HOME | End: 2022-10-11
Admitting: SPECIALIST

## 2022-10-11 DIAGNOSIS — I50.32 CHRONIC DIASTOLIC CONGESTIVE HEART FAILURE: ICD-10-CM

## 2022-10-11 LAB
BH CV ECHO MEAS - ACS: 1.8 CM
BH CV ECHO MEAS - AO MAX PG: 12.3 MMHG
BH CV ECHO MEAS - AO MEAN PG: 7 MMHG
BH CV ECHO MEAS - AO ROOT DIAM: 2.7 CM
BH CV ECHO MEAS - AO V2 MAX: 175 CM/SEC
BH CV ECHO MEAS - AO V2 VTI: 45.4 CM
BH CV ECHO MEAS - EDV(CUBED): 72 ML
BH CV ECHO MEAS - EDV(MOD-SP4): 85.3 ML
BH CV ECHO MEAS - EF(MOD-SP4): 75.3 %
BH CV ECHO MEAS - ESV(CUBED): 11.1 ML
BH CV ECHO MEAS - ESV(MOD-SP4): 21.1 ML
BH CV ECHO MEAS - FS: 46.4 %
BH CV ECHO MEAS - IVS/LVPW: 0.71 CM
BH CV ECHO MEAS - IVSD: 1.18 CM
BH CV ECHO MEAS - LA DIMENSION: 4.2 CM
BH CV ECHO MEAS - LAT PEAK E' VEL: 9 CM/SEC
BH CV ECHO MEAS - LV DIASTOLIC VOL/BSA (35-75): 43.6 CM2
BH CV ECHO MEAS - LV MASS(C)D: 227.2 GRAMS
BH CV ECHO MEAS - LV SYSTOLIC VOL/BSA (12-30): 10.8 CM2
BH CV ECHO MEAS - LVIDD: 4.2 CM
BH CV ECHO MEAS - LVIDS: 2.23 CM
BH CV ECHO MEAS - LVOT AREA: 2.5 CM2
BH CV ECHO MEAS - LVOT DIAM: 1.8 CM
BH CV ECHO MEAS - LVPWD: 1.4 CM
BH CV ECHO MEAS - MED PEAK E' VEL: 5.2 CM/SEC
BH CV ECHO MEAS - MV A MAX VEL: 144 CM/SEC
BH CV ECHO MEAS - MV E MAX VEL: 120 CM/SEC
BH CV ECHO MEAS - MV E/A: 0.83
BH CV ECHO MEAS - PA ACC TIME: 0.07 SEC
BH CV ECHO MEAS - PA PR(ACCEL): 48.8 MMHG
BH CV ECHO MEAS - RAP SYSTOLE: 10 MMHG
BH CV ECHO MEAS - RVSP: 42.4 MMHG
BH CV ECHO MEAS - SI(MOD-SP4): 32.9 ML/M2
BH CV ECHO MEAS - SV(MOD-SP4): 64.2 ML
BH CV ECHO MEAS - TAPSE (>1.6): 2.6 CM
BH CV ECHO MEAS - TR MAX PG: 32.4 MMHG
BH CV ECHO MEAS - TR MAX VEL: 283 CM/SEC
BH CV ECHO MEASUREMENTS AVERAGE E/E' RATIO: 16.9
LEFT ATRIUM VOLUME INDEX: 27 ML/M2
MAXIMAL PREDICTED HEART RATE: 141 BPM
STRESS TARGET HR: 120 BPM

## 2022-10-11 PROCEDURE — 93306 TTE W/DOPPLER COMPLETE: CPT

## 2022-10-11 PROCEDURE — 93306 TTE W/DOPPLER COMPLETE: CPT | Performed by: SPECIALIST

## 2022-10-17 ENCOUNTER — OFFICE VISIT (OUTPATIENT)
Dept: FAMILY MEDICINE CLINIC | Facility: CLINIC | Age: 77
End: 2022-10-17

## 2022-10-17 VITALS
BODY MASS INDEX: 36.35 KG/M2 | DIASTOLIC BLOOD PRESSURE: 78 MMHG | TEMPERATURE: 98.3 F | SYSTOLIC BLOOD PRESSURE: 138 MMHG | HEIGHT: 63 IN | OXYGEN SATURATION: 99 % | HEART RATE: 78 BPM

## 2022-10-17 DIAGNOSIS — D50.8 IRON DEFICIENCY ANEMIA SECONDARY TO INADEQUATE DIETARY IRON INTAKE: Chronic | ICD-10-CM

## 2022-10-17 DIAGNOSIS — I10 ESSENTIAL HYPERTENSION: Chronic | ICD-10-CM

## 2022-10-17 DIAGNOSIS — I87.2 VENOUS INSUFFICIENCY (CHRONIC) (PERIPHERAL): Chronic | ICD-10-CM

## 2022-10-17 DIAGNOSIS — N18.4 CKD (CHRONIC KIDNEY DISEASE) STAGE 4, GFR 15-29 ML/MIN: Chronic | ICD-10-CM

## 2022-10-17 DIAGNOSIS — R06.02 SHORTNESS OF BREATH ON EXERTION: Primary | ICD-10-CM

## 2022-10-17 PROCEDURE — 85014 HEMATOCRIT: CPT | Performed by: PHYSICIAN ASSISTANT

## 2022-10-17 PROCEDURE — 83540 ASSAY OF IRON: CPT | Performed by: PHYSICIAN ASSISTANT

## 2022-10-17 PROCEDURE — 85025 COMPLETE CBC W/AUTO DIFF WBC: CPT | Performed by: INTERNAL MEDICINE

## 2022-10-17 PROCEDURE — 82747 ASSAY OF FOLIC ACID RBC: CPT | Performed by: PHYSICIAN ASSISTANT

## 2022-10-17 PROCEDURE — 82306 VITAMIN D 25 HYDROXY: CPT | Performed by: INTERNAL MEDICINE

## 2022-10-17 PROCEDURE — 83970 ASSAY OF PARATHORMONE: CPT | Performed by: INTERNAL MEDICINE

## 2022-10-17 PROCEDURE — 84466 ASSAY OF TRANSFERRIN: CPT | Performed by: PHYSICIAN ASSISTANT

## 2022-10-17 PROCEDURE — 81001 URINALYSIS AUTO W/SCOPE: CPT | Performed by: INTERNAL MEDICINE

## 2022-10-17 PROCEDURE — 84156 ASSAY OF PROTEIN URINE: CPT | Performed by: INTERNAL MEDICINE

## 2022-10-17 PROCEDURE — 99214 OFFICE O/P EST MOD 30 MIN: CPT | Performed by: PHYSICIAN ASSISTANT

## 2022-10-17 PROCEDURE — 82570 ASSAY OF URINE CREATININE: CPT | Performed by: INTERNAL MEDICINE

## 2022-10-17 PROCEDURE — 80053 COMPREHEN METABOLIC PANEL: CPT | Performed by: PHYSICIAN ASSISTANT

## 2022-10-17 PROCEDURE — 84100 ASSAY OF PHOSPHORUS: CPT | Performed by: PHYSICIAN ASSISTANT

## 2022-10-17 PROCEDURE — 82728 ASSAY OF FERRITIN: CPT | Performed by: PHYSICIAN ASSISTANT

## 2022-10-17 NOTE — PROGRESS NOTES
"Alison Lee is a 77 y.o. female.       Chief Complaint -shortness of breath    History of Present Illness -    ROS    Shortness of breath-  She complains of shortness of breath that is worse with exertion and associated fatigue.  She states that this has been present for several months.  Some relief with rest.  She denies any chest pain or abdominal pain.    Iron deficiency anemia-lab work ordered today to ensure stability of hematocrit level.  Patient's treatment plan includes iron infusions.    Chronic kidney disease-  Not at goal she has chronic kidney disease but is not yet on dialysis.  She is followed by nephrology.  She reports that Bumex is helping somewhat with lower extremity edema.    Peripheral venous insufficiency-  Not at goal although Bumex does help.    Hypertension-  Stable with amlodipine, Bumex, clonidine, and metoprolol    The following portions of the patient's history were reviewed and updated as appropriate: allergies, current medications, past family history, past medical history, past social history, past surgical history and problem list.    Review of Systems    Objective  Vital signs:  /78   Pulse 78   Temp 98.3 °F (36.8 °C) (Temporal)   Ht 160 cm (63\")   SpO2 99%   BMI 36.35 kg/m²     Physical Exam  Vitals and nursing note reviewed.   Constitutional:       Appearance: Normal appearance. She is well-developed.   Eyes:      Extraocular Movements: Extraocular movements intact.      Conjunctiva/sclera: Conjunctivae normal.   Cardiovascular:      Rate and Rhythm: Normal rate and regular rhythm.      Heart sounds: Normal heart sounds. No murmur heard.  Pulmonary:      Effort: Pulmonary effort is normal. No respiratory distress.      Breath sounds: Normal breath sounds. No wheezing.   Musculoskeletal:         General: No tenderness.      Right lower leg: Edema (1+) present.      Left lower leg: Edema (1+) present.   Skin:     General: Skin is warm and dry.      " Findings: No rash.   Neurological:      Mental Status: She is alert and oriented to person, place, and time.   Psychiatric:         Mood and Affect: Mood normal.         Behavior: Behavior normal.         Thought Content: Thought content normal.         The following data was reviewed by: RAFIQ Montanez on 10/17/2022:  CMP    CMP 10/6/22 10/10/22 10/17/22   Glucose 170 (A) 152 (A) 122 (A)   BUN 46 (A) 48 (A) 53 (A)   Creatinine 2.73 (A) 2.67 (A) 2.90 (A)   Sodium 140 139 138   Potassium 4.9 5.1 4.9   Chloride 109 (A) 109 (A) 107   Calcium 9.1 8.7 9.2   Albumin 3.31 (A)  3.30 (A)   Total Bilirubin 0.2  <0.2   Alkaline Phosphatase 326 (A)  378 (A)   AST (SGOT) 21  45 (A)   ALT (SGPT) 9  17   (A) Abnormal value            CBC w/diff    CBC w/Diff 9/10/22 9/16/22 10/6/22   WBC  5.62 6.52   RBC  3.49 (A) 3.39 (A)   Hemoglobin 9.1 (A) 9.4 (A) 9.1 (A)   Hematocrit 29.3 (A) 31.4 (A) 30.0 (A)   MCV  90.0 88.5   MCH  26.9 26.8   MCHC  29.9 (A) 30.3 (A)   RDW  16.3 (A) 15.7 (A)   Platelets  296 276   Neutrophil Rel %  64.0 74.1   Immature Granulocyte Rel %  0.4 0.2   Lymphocyte Rel %  23.1 15.5 (A)   Monocyte Rel %  7.5 6.1   Eosinophil Rel %  4.1 3.5   Basophil Rel %  0.9 0.6   (A) Abnormal value       Comments are available for some flowsheets but are not being displayed.           Lipid Panel    Lipid Panel 3/7/22 6/9/22 9/8/22   Total Cholesterol 137 126 109   Triglycerides 92 163 (A) 122   HDL Cholesterol 40 43 31 (A)   VLDL Cholesterol 18 28 22   LDL Cholesterol  79 55 56   LDL/HDL Ratio 1.97 1.17 1.73   (A) Abnormal value            TSH    TSH 6/9/22   TSH 9.670 (A)   (A) Abnormal value            Most Recent A1C    HGBA1C Most Recent 9/8/22   Hemoglobin A1C 5.00                  Assessment & Plan     Diagnoses and all orders for this visit:    1. Shortness of breath on exertion (Primary)  Comments:  Discussed differential diagnosis including deconditioning, anemia or PE  Lab work ordered for further  evaluation  We discussed that if her shortness of breath persists or worsens or should she develop any new symptoms including chest pain or abdominal pain that she should immediately go to the emergency room for evaluation.  Orders:  -     CBC & Differential  -     Comprehensive Metabolic Panel  -     D-dimer, Quantitative    2. CKD (chronic kidney disease) stage 4, GFR 15-29 ml/min ([facility])  Comments:  Advised to continue care with nephrology  Continue to monitor  Orders:  -     Comprehensive Metabolic Panel  -     Urinalysis With Microscopic - Urine, Clean Catch; Future  -     CBC & Differential; Future  -     Vitamin D 25 hydroxy; Future  -     Protein / Creatinine Ratio, Urine - Urine, Clean Catch; Future  -     PTH, Intact; Future  -     Comprehensive metabolic panel; Future  -     Phosphorus; Future  -     Urinalysis With Microscopic - Urine, Clean Catch  -     CBC & Differential  -     Vitamin D 25 hydroxy  -     Protein / Creatinine Ratio, Urine - Urine, Clean Catch  -     PTH, Intact  -     Comprehensive metabolic panel  -     Phosphorus    3. Iron deficiency anemia secondary to inadequate dietary iron intake  Comments:  Continue iron infusions and keep hematology appointments  Lab work ordered for further evaluation  Discussed shortness of breath and fatigue may be secondary to  Orders:  -     CBC & Differential  -     Ferritin  -     Iron Profile  -     Folate RBC    4. Venous insufficiency (chronic) (peripheral)  Comments:  Continue Bumex  Advised conservative measures including low-sodium diet, leg elevation and knee-high compression socks    5. Essential hypertension  Comments:  Continue amlodipine Bumex clonidine and metoprolol  Advised to monitor BP and pulse daily and report any consistent readings greater than 130/80            Patient was given instructions and counseling regarding his condition or for health maintenance advice. Please see specific information pulled into the AVS if  appropriate      This document has been electronically signed by:  Lexie Dolan PA-C

## 2022-10-18 LAB
25(OH)D3 SERPL-MCNC: 51.2 NG/ML (ref 30–100)
ALBUMIN SERPL-MCNC: 3.3 G/DL (ref 3.5–5.2)
ALBUMIN/GLOB SERPL: 0.8 G/DL
ALP SERPL-CCNC: 378 U/L (ref 39–117)
ALT SERPL W P-5'-P-CCNC: 17 U/L (ref 1–33)
ANION GAP SERPL CALCULATED.3IONS-SCNC: 13.7 MMOL/L (ref 5–15)
AST SERPL-CCNC: 45 U/L (ref 1–32)
BACTERIA UR QL AUTO: ABNORMAL /HPF
BASOPHILS # BLD AUTO: 0.03 10*3/MM3 (ref 0–0.2)
BASOPHILS NFR BLD AUTO: 0.5 % (ref 0–1.5)
BILIRUB SERPL-MCNC: <0.2 MG/DL (ref 0–1.2)
BILIRUB UR QL STRIP: NEGATIVE
BUN SERPL-MCNC: 53 MG/DL (ref 8–23)
BUN/CREAT SERPL: 18.3 (ref 7–25)
CALCIUM SPEC-SCNC: 9.2 MG/DL (ref 8.6–10.5)
CHLORIDE SERPL-SCNC: 107 MMOL/L (ref 98–107)
CLARITY UR: ABNORMAL
CO2 SERPL-SCNC: 17.3 MMOL/L (ref 22–29)
COLOR UR: YELLOW
CREAT SERPL-MCNC: 2.9 MG/DL (ref 0.57–1)
CREAT UR-MCNC: 110 MG/DL
DEPRECATED RDW RBC AUTO: 45.9 FL (ref 37–54)
EGFRCR SERPLBLD CKD-EPI 2021: 16 ML/MIN/1.73
EOSINOPHIL # BLD AUTO: 0.24 10*3/MM3 (ref 0–0.4)
EOSINOPHIL NFR BLD AUTO: 3.7 % (ref 0.3–6.2)
ERYTHROCYTE [DISTWIDTH] IN BLOOD BY AUTOMATED COUNT: 14.5 % (ref 12.3–15.4)
FERRITIN SERPL-MCNC: 329 NG/ML (ref 13–150)
GLOBULIN UR ELPH-MCNC: 3.9 GM/DL
GLUCOSE SERPL-MCNC: 122 MG/DL (ref 65–99)
GLUCOSE UR STRIP-MCNC: NEGATIVE MG/DL
HCT VFR BLD AUTO: 26.8 % (ref 34–46.6)
HGB BLD-MCNC: 7.9 G/DL (ref 12–15.9)
HGB UR QL STRIP.AUTO: ABNORMAL
HYALINE CASTS UR QL AUTO: ABNORMAL /LPF
IMM GRANULOCYTES # BLD AUTO: 0.02 10*3/MM3 (ref 0–0.05)
IMM GRANULOCYTES NFR BLD AUTO: 0.3 % (ref 0–0.5)
IRON 24H UR-MRATE: 26 MCG/DL (ref 37–145)
IRON SATN MFR SERPL: 8 % (ref 20–50)
KETONES UR QL STRIP: NEGATIVE
LEUKOCYTE ESTERASE UR QL STRIP.AUTO: ABNORMAL
LYMPHOCYTES # BLD AUTO: 1.3 10*3/MM3 (ref 0.7–3.1)
LYMPHOCYTES NFR BLD AUTO: 20.2 % (ref 19.6–45.3)
MCH RBC QN AUTO: 25.6 PG (ref 26.6–33)
MCHC RBC AUTO-ENTMCNC: 29.5 G/DL (ref 31.5–35.7)
MCV RBC AUTO: 86.7 FL (ref 79–97)
MONOCYTES # BLD AUTO: 0.43 10*3/MM3 (ref 0.1–0.9)
MONOCYTES NFR BLD AUTO: 6.7 % (ref 5–12)
NEUTROPHILS NFR BLD AUTO: 4.41 10*3/MM3 (ref 1.7–7)
NEUTROPHILS NFR BLD AUTO: 68.6 % (ref 42.7–76)
NITRITE UR QL STRIP: NEGATIVE
NRBC BLD AUTO-RTO: 0 /100 WBC (ref 0–0.2)
PH UR STRIP.AUTO: 6 [PH] (ref 5–8)
PHOSPHATE SERPL-MCNC: 3.3 MG/DL (ref 2.5–4.5)
PLATELET # BLD AUTO: 298 10*3/MM3 (ref 140–450)
PMV BLD AUTO: 10.5 FL (ref 6–12)
POTASSIUM SERPL-SCNC: 4.9 MMOL/L (ref 3.5–5.2)
PROT ?TM UR-MCNC: 400 MG/DL
PROT SERPL-MCNC: 7.2 G/DL (ref 6–8.5)
PROT UR QL STRIP: ABNORMAL
PROT/CREAT UR: 3636.4 MG/G CREA (ref 0–200)
PTH-INTACT SERPL-MCNC: 67.6 PG/ML (ref 15–65)
RBC # BLD AUTO: 3.09 10*6/MM3 (ref 3.77–5.28)
RBC # UR STRIP: ABNORMAL /HPF
REF LAB TEST METHOD: ABNORMAL
SODIUM SERPL-SCNC: 138 MMOL/L (ref 136–145)
SP GR UR STRIP: 1.02 (ref 1–1.03)
SQUAMOUS #/AREA URNS HPF: ABNORMAL /HPF
TIBC SERPL-MCNC: 311 MCG/DL (ref 298–536)
TRANSFERRIN SERPL-MCNC: 209 MG/DL (ref 200–360)
UROBILINOGEN UR QL STRIP: ABNORMAL
WBC # UR STRIP: ABNORMAL /HPF
WBC NRBC COR # BLD: 6.43 10*3/MM3 (ref 3.4–10.8)

## 2022-10-19 LAB
FOLATE BLD-MCNC: 315 NG/ML
FOLATE RBC-MCNC: 1202 NG/ML
HCT VFR BLD AUTO: 26.2 % (ref 34–46.6)

## 2022-11-02 ENCOUNTER — HOSPITAL ENCOUNTER (INPATIENT)
Facility: HOSPITAL | Age: 77
LOS: 28 days | Discharge: NURSING, REHAB OR LONG-TERM CARE | End: 2022-11-30
Attending: STUDENT IN AN ORGANIZED HEALTH CARE EDUCATION/TRAINING PROGRAM | Admitting: STUDENT IN AN ORGANIZED HEALTH CARE EDUCATION/TRAINING PROGRAM

## 2022-11-02 ENCOUNTER — APPOINTMENT (OUTPATIENT)
Dept: CT IMAGING | Facility: HOSPITAL | Age: 77
End: 2022-11-02

## 2022-11-02 ENCOUNTER — APPOINTMENT (OUTPATIENT)
Dept: GENERAL RADIOLOGY | Facility: HOSPITAL | Age: 77
End: 2022-11-02

## 2022-11-02 DIAGNOSIS — N18.4 CKD (CHRONIC KIDNEY DISEASE) STAGE 4, GFR 15-29 ML/MIN: Primary | ICD-10-CM

## 2022-11-02 DIAGNOSIS — I50.23 ACUTE ON CHRONIC SYSTOLIC CHF (CONGESTIVE HEART FAILURE): ICD-10-CM

## 2022-11-02 DIAGNOSIS — J18.9 COMMUNITY ACQUIRED PNEUMONIA, BILATERAL: ICD-10-CM

## 2022-11-02 DIAGNOSIS — N17.1 ACUTE RENAL FAILURE WITH ACUTE RENAL CORTICAL NECROSIS SUPERIMPOSED ON CHRONIC KIDNEY DISEASE, UNSPECIFIED CKD STAGE: ICD-10-CM

## 2022-11-02 DIAGNOSIS — E87.5 HYPERKALEMIA: ICD-10-CM

## 2022-11-02 DIAGNOSIS — E78.2 MIXED HYPERLIPIDEMIA: Chronic | ICD-10-CM

## 2022-11-02 DIAGNOSIS — N18.9 ACUTE RENAL FAILURE WITH ACUTE RENAL CORTICAL NECROSIS SUPERIMPOSED ON CHRONIC KIDNEY DISEASE, UNSPECIFIED CKD STAGE: ICD-10-CM

## 2022-11-02 PROBLEM — N17.9 AKI (ACUTE KIDNEY INJURY): Status: ACTIVE | Noted: 2022-11-02

## 2022-11-02 LAB
ABO GROUP BLD: NORMAL
ALBUMIN SERPL-MCNC: 3.42 G/DL (ref 3.5–5.2)
ALBUMIN/GLOB SERPL: 0.9 G/DL
ALP SERPL-CCNC: 266 U/L (ref 39–117)
ALT SERPL W P-5'-P-CCNC: 9 U/L (ref 1–33)
ANION GAP SERPL CALCULATED.3IONS-SCNC: 15.8 MMOL/L (ref 5–15)
ANION GAP SERPL CALCULATED.3IONS-SCNC: 17 MMOL/L (ref 5–15)
AST SERPL-CCNC: 22 U/L (ref 1–32)
BACTERIA UR QL AUTO: ABNORMAL /HPF
BASOPHILS # BLD AUTO: 0.01 10*3/MM3 (ref 0–0.2)
BASOPHILS # BLD AUTO: 0.02 10*3/MM3 (ref 0–0.2)
BASOPHILS NFR BLD AUTO: 0.1 % (ref 0–1.5)
BASOPHILS NFR BLD AUTO: 0.2 % (ref 0–1.5)
BILIRUB SERPL-MCNC: 0.3 MG/DL (ref 0–1.2)
BILIRUB UR QL STRIP: NEGATIVE
BLD GP AB SCN SERPL QL: NEGATIVE
BUN SERPL-MCNC: 91 MG/DL (ref 8–23)
BUN SERPL-MCNC: 93 MG/DL (ref 8–23)
BUN/CREAT SERPL: 20.9 (ref 7–25)
BUN/CREAT SERPL: 22.3 (ref 7–25)
CALCIUM SPEC-SCNC: 9.3 MG/DL (ref 8.6–10.5)
CALCIUM SPEC-SCNC: 9.4 MG/DL (ref 8.6–10.5)
CHLORIDE SERPL-SCNC: 105 MMOL/L (ref 98–107)
CHLORIDE SERPL-SCNC: 106 MMOL/L (ref 98–107)
CLARITY UR: CLEAR
CO2 SERPL-SCNC: 17 MMOL/L (ref 22–29)
CO2 SERPL-SCNC: 17.2 MMOL/L (ref 22–29)
COLOR UR: YELLOW
CREAT SERPL-MCNC: 4.17 MG/DL (ref 0.57–1)
CREAT SERPL-MCNC: 4.35 MG/DL (ref 0.57–1)
D-LACTATE SERPL-SCNC: 1.2 MMOL/L (ref 0.5–2)
DEPRECATED RDW RBC AUTO: 51.6 FL (ref 37–54)
DEPRECATED RDW RBC AUTO: 52.1 FL (ref 37–54)
EGFRCR SERPLBLD CKD-EPI 2021: 10.3 ML/MIN/1.73
EGFRCR SERPLBLD CKD-EPI 2021: 9.8 ML/MIN/1.73
EOSINOPHIL # BLD AUTO: 0 10*3/MM3 (ref 0–0.4)
EOSINOPHIL # BLD AUTO: 0.02 10*3/MM3 (ref 0–0.4)
EOSINOPHIL NFR BLD AUTO: 0 % (ref 0.3–6.2)
EOSINOPHIL NFR BLD AUTO: 0.2 % (ref 0.3–6.2)
ERYTHROCYTE [DISTWIDTH] IN BLOOD BY AUTOMATED COUNT: 16.5 % (ref 12.3–15.4)
ERYTHROCYTE [DISTWIDTH] IN BLOOD BY AUTOMATED COUNT: 17.4 % (ref 12.3–15.4)
FLUAV RNA RESP QL NAA+PROBE: NOT DETECTED
FLUBV RNA RESP QL NAA+PROBE: NOT DETECTED
GLOBULIN UR ELPH-MCNC: 3.9 GM/DL
GLUCOSE BLDC GLUCOMTR-MCNC: 193 MG/DL (ref 70–130)
GLUCOSE BLDC GLUCOMTR-MCNC: 206 MG/DL (ref 70–130)
GLUCOSE BLDC GLUCOMTR-MCNC: 209 MG/DL (ref 70–130)
GLUCOSE SERPL-MCNC: 178 MG/DL (ref 65–99)
GLUCOSE SERPL-MCNC: 219 MG/DL (ref 65–99)
GLUCOSE UR STRIP-MCNC: NEGATIVE MG/DL
HCT VFR BLD AUTO: 17.6 % (ref 34–46.6)
HCT VFR BLD AUTO: 20.7 % (ref 34–46.6)
HCT VFR BLD AUTO: 22.9 % (ref 34–46.6)
HGB BLD-MCNC: 5.1 G/DL (ref 12–15.9)
HGB BLD-MCNC: 6 G/DL (ref 12–15.9)
HGB BLD-MCNC: 7 G/DL (ref 12–15.9)
HGB UR QL STRIP.AUTO: NEGATIVE
HOLD SPECIMEN: NORMAL
HOLD SPECIMEN: NORMAL
HYALINE CASTS UR QL AUTO: ABNORMAL /LPF
IMM GRANULOCYTES # BLD AUTO: 0.04 10*3/MM3 (ref 0–0.05)
IMM GRANULOCYTES # BLD AUTO: 0.05 10*3/MM3 (ref 0–0.05)
IMM GRANULOCYTES NFR BLD AUTO: 0.5 % (ref 0–0.5)
IMM GRANULOCYTES NFR BLD AUTO: 0.6 % (ref 0–0.5)
INR PPP: 1.36 (ref 0.9–1.1)
IRON 24H UR-MRATE: 27 MCG/DL (ref 37–145)
IRON SATN MFR SERPL: 8 % (ref 20–50)
KETONES UR QL STRIP: NEGATIVE
LEUKOCYTE ESTERASE UR QL STRIP.AUTO: ABNORMAL
LYMPHOCYTES # BLD AUTO: 0.89 10*3/MM3 (ref 0.7–3.1)
LYMPHOCYTES # BLD AUTO: 1.27 10*3/MM3 (ref 0.7–3.1)
LYMPHOCYTES NFR BLD AUTO: 11.5 % (ref 19.6–45.3)
LYMPHOCYTES NFR BLD AUTO: 15.2 % (ref 19.6–45.3)
MCH RBC QN AUTO: 25.8 PG (ref 26.6–33)
MCH RBC QN AUTO: 25.9 PG (ref 26.6–33)
MCHC RBC AUTO-ENTMCNC: 29 G/DL (ref 31.5–35.7)
MCHC RBC AUTO-ENTMCNC: 29 G/DL (ref 31.5–35.7)
MCV RBC AUTO: 88.9 FL (ref 79–97)
MCV RBC AUTO: 89.2 FL (ref 79–97)
MONOCYTES # BLD AUTO: 0.37 10*3/MM3 (ref 0.1–0.9)
MONOCYTES # BLD AUTO: 0.5 10*3/MM3 (ref 0.1–0.9)
MONOCYTES NFR BLD AUTO: 4.8 % (ref 5–12)
MONOCYTES NFR BLD AUTO: 6 % (ref 5–12)
NEUTROPHILS NFR BLD AUTO: 6.45 10*3/MM3 (ref 1.7–7)
NEUTROPHILS NFR BLD AUTO: 6.49 10*3/MM3 (ref 1.7–7)
NEUTROPHILS NFR BLD AUTO: 77.9 % (ref 42.7–76)
NEUTROPHILS NFR BLD AUTO: 83 % (ref 42.7–76)
NITRITE UR QL STRIP: NEGATIVE
NRBC BLD AUTO-RTO: 0 /100 WBC (ref 0–0.2)
NRBC BLD AUTO-RTO: 0 /100 WBC (ref 0–0.2)
NT-PROBNP SERPL-MCNC: ABNORMAL PG/ML (ref 0–1800)
PH UR STRIP.AUTO: <=5 [PH] (ref 5–8)
PLATELET # BLD AUTO: 335 10*3/MM3 (ref 140–450)
PLATELET # BLD AUTO: 379 10*3/MM3 (ref 140–450)
PMV BLD AUTO: 8.9 FL (ref 6–12)
PMV BLD AUTO: 9.1 FL (ref 6–12)
POTASSIUM SERPL-SCNC: 5.3 MMOL/L (ref 3.5–5.2)
POTASSIUM SERPL-SCNC: 5.7 MMOL/L (ref 3.5–5.2)
PROCALCITONIN SERPL-MCNC: 0.33 NG/ML (ref 0–0.25)
PROT SERPL-MCNC: 7.3 G/DL (ref 6–8.5)
PROT UR QL STRIP: ABNORMAL
PROTHROMBIN TIME: 17.1 SECONDS (ref 12.1–14.7)
QT INTERVAL: 446 MS
QTC INTERVAL: 477 MS
RBC # BLD AUTO: 1.98 10*6/MM3 (ref 3.77–5.28)
RBC # BLD AUTO: 2.32 10*6/MM3 (ref 3.77–5.28)
RBC # UR STRIP: ABNORMAL /HPF
REF LAB TEST METHOD: ABNORMAL
RETICS # AUTO: 0.14 10*6/MM3 (ref 0.02–0.13)
RETICS/RBC NFR AUTO: 7.23 % (ref 0.7–1.9)
RH BLD: POSITIVE
RSV RNA NPH QL NAA+NON-PROBE: NOT DETECTED
SARS-COV-2 RNA RESP QL NAA+PROBE: NOT DETECTED
SODIUM SERPL-SCNC: 139 MMOL/L (ref 136–145)
SODIUM SERPL-SCNC: 139 MMOL/L (ref 136–145)
SP GR UR STRIP: 1.01 (ref 1–1.03)
SQUAMOUS #/AREA URNS HPF: ABNORMAL /HPF
T&S EXPIRATION DATE: NORMAL
TIBC SERPL-MCNC: 340 MCG/DL (ref 298–536)
TRANSFERRIN SERPL-MCNC: 228 MG/DL (ref 200–360)
TROPONIN T SERPL-MCNC: 0.03 NG/ML (ref 0–0.03)
TSH SERPL DL<=0.05 MIU/L-ACNC: 12.79 UIU/ML (ref 0.27–4.2)
UROBILINOGEN UR QL STRIP: ABNORMAL
WBC # UR STRIP: ABNORMAL /HPF
WBC NRBC COR # BLD: 7.77 10*3/MM3 (ref 3.4–10.8)
WBC NRBC COR # BLD: 8.34 10*3/MM3 (ref 3.4–10.8)
WHOLE BLOOD HOLD COAG: NORMAL
WHOLE BLOOD HOLD SPECIMEN: NORMAL

## 2022-11-02 PROCEDURE — 72131 CT LUMBAR SPINE W/O DYE: CPT | Performed by: RADIOLOGY

## 2022-11-02 PROCEDURE — 74176 CT ABD & PELVIS W/O CONTRAST: CPT

## 2022-11-02 PROCEDURE — 63710000001 INSULIN ASPART PER 5 UNITS

## 2022-11-02 PROCEDURE — 83540 ASSAY OF IRON: CPT | Performed by: INTERNAL MEDICINE

## 2022-11-02 PROCEDURE — 82962 GLUCOSE BLOOD TEST: CPT

## 2022-11-02 PROCEDURE — 86923 COMPATIBILITY TEST ELECTRIC: CPT

## 2022-11-02 PROCEDURE — 84439 ASSAY OF FREE THYROXINE: CPT

## 2022-11-02 PROCEDURE — 81001 URINALYSIS AUTO W/SCOPE: CPT | Performed by: INTERNAL MEDICINE

## 2022-11-02 PROCEDURE — 82570 ASSAY OF URINE CREATININE: CPT | Performed by: INTERNAL MEDICINE

## 2022-11-02 PROCEDURE — 71250 CT THORAX DX C-: CPT | Performed by: RADIOLOGY

## 2022-11-02 PROCEDURE — 85018 HEMOGLOBIN: CPT | Performed by: INTERNAL MEDICINE

## 2022-11-02 PROCEDURE — 70450 CT HEAD/BRAIN W/O DYE: CPT | Performed by: RADIOLOGY

## 2022-11-02 PROCEDURE — 36430 TRANSFUSION BLD/BLD COMPNT: CPT

## 2022-11-02 PROCEDURE — 84443 ASSAY THYROID STIM HORMONE: CPT | Performed by: INTERNAL MEDICINE

## 2022-11-02 PROCEDURE — 70450 CT HEAD/BRAIN W/O DYE: CPT

## 2022-11-02 PROCEDURE — 74176 CT ABD & PELVIS W/O CONTRAST: CPT | Performed by: RADIOLOGY

## 2022-11-02 PROCEDURE — 72125 CT NECK SPINE W/O DYE: CPT

## 2022-11-02 PROCEDURE — 83880 ASSAY OF NATRIURETIC PEPTIDE: CPT | Performed by: PHYSICIAN ASSISTANT

## 2022-11-02 PROCEDURE — 86901 BLOOD TYPING SEROLOGIC RH(D): CPT | Performed by: PHYSICIAN ASSISTANT

## 2022-11-02 PROCEDURE — 82607 VITAMIN B-12: CPT | Performed by: INTERNAL MEDICINE

## 2022-11-02 PROCEDURE — 86900 BLOOD TYPING SEROLOGIC ABO: CPT

## 2022-11-02 PROCEDURE — 71045 X-RAY EXAM CHEST 1 VIEW: CPT

## 2022-11-02 PROCEDURE — 99285 EMERGENCY DEPT VISIT HI MDM: CPT

## 2022-11-02 PROCEDURE — 84145 PROCALCITONIN (PCT): CPT | Performed by: PHYSICIAN ASSISTANT

## 2022-11-02 PROCEDURE — 83605 ASSAY OF LACTIC ACID: CPT | Performed by: PHYSICIAN ASSISTANT

## 2022-11-02 PROCEDURE — 85610 PROTHROMBIN TIME: CPT | Performed by: INTERNAL MEDICINE

## 2022-11-02 PROCEDURE — 71045 X-RAY EXAM CHEST 1 VIEW: CPT | Performed by: RADIOLOGY

## 2022-11-02 PROCEDURE — 72125 CT NECK SPINE W/O DYE: CPT | Performed by: RADIOLOGY

## 2022-11-02 PROCEDURE — 93010 ELECTROCARDIOGRAM REPORT: CPT | Performed by: INTERNAL MEDICINE

## 2022-11-02 PROCEDURE — 86850 RBC ANTIBODY SCREEN: CPT | Performed by: PHYSICIAN ASSISTANT

## 2022-11-02 PROCEDURE — 84466 ASSAY OF TRANSFERRIN: CPT | Performed by: INTERNAL MEDICINE

## 2022-11-02 PROCEDURE — 84484 ASSAY OF TROPONIN QUANT: CPT | Performed by: PHYSICIAN ASSISTANT

## 2022-11-02 PROCEDURE — 85045 AUTOMATED RETICULOCYTE COUNT: CPT | Performed by: INTERNAL MEDICINE

## 2022-11-02 PROCEDURE — 72128 CT CHEST SPINE W/O DYE: CPT | Performed by: RADIOLOGY

## 2022-11-02 PROCEDURE — 30233N1 TRANSFUSION OF NONAUTOLOGOUS RED BLOOD CELLS INTO PERIPHERAL VEIN, PERCUTANEOUS APPROACH: ICD-10-PCS | Performed by: STUDENT IN AN ORGANIZED HEALTH CARE EDUCATION/TRAINING PROGRAM

## 2022-11-02 PROCEDURE — 99223 1ST HOSP IP/OBS HIGH 75: CPT

## 2022-11-02 PROCEDURE — 72131 CT LUMBAR SPINE W/O DYE: CPT

## 2022-11-02 PROCEDURE — 93005 ELECTROCARDIOGRAM TRACING: CPT | Performed by: PHYSICIAN ASSISTANT

## 2022-11-02 PROCEDURE — P9016 RBC LEUKOCYTES REDUCED: HCPCS

## 2022-11-02 PROCEDURE — 85025 COMPLETE CBC W/AUTO DIFF WBC: CPT | Performed by: PHYSICIAN ASSISTANT

## 2022-11-02 PROCEDURE — 25010000002 HEPARIN (PORCINE) PER 1000 UNITS: Performed by: INTERNAL MEDICINE

## 2022-11-02 PROCEDURE — 36415 COLL VENOUS BLD VENIPUNCTURE: CPT

## 2022-11-02 PROCEDURE — 72128 CT CHEST SPINE W/O DYE: CPT

## 2022-11-02 PROCEDURE — 87637 SARSCOV2&INF A&B&RSV AMP PRB: CPT | Performed by: PHYSICIAN ASSISTANT

## 2022-11-02 PROCEDURE — 85014 HEMATOCRIT: CPT | Performed by: INTERNAL MEDICINE

## 2022-11-02 PROCEDURE — 80053 COMPREHEN METABOLIC PANEL: CPT | Performed by: PHYSICIAN ASSISTANT

## 2022-11-02 PROCEDURE — 82746 ASSAY OF FOLIC ACID SERUM: CPT | Performed by: INTERNAL MEDICINE

## 2022-11-02 PROCEDURE — 84156 ASSAY OF PROTEIN URINE: CPT | Performed by: INTERNAL MEDICINE

## 2022-11-02 PROCEDURE — 25010000002 CEFTRIAXONE PER 250 MG: Performed by: PHYSICIAN ASSISTANT

## 2022-11-02 PROCEDURE — 86900 BLOOD TYPING SEROLOGIC ABO: CPT | Performed by: PHYSICIAN ASSISTANT

## 2022-11-02 PROCEDURE — 85025 COMPLETE CBC W/AUTO DIFF WBC: CPT | Performed by: INTERNAL MEDICINE

## 2022-11-02 PROCEDURE — 87040 BLOOD CULTURE FOR BACTERIA: CPT | Performed by: PHYSICIAN ASSISTANT

## 2022-11-02 PROCEDURE — 71250 CT THORAX DX C-: CPT

## 2022-11-02 RX ORDER — METOPROLOL SUCCINATE 50 MG/1
75 TABLET, EXTENDED RELEASE ORAL DAILY
COMMUNITY

## 2022-11-02 RX ORDER — ASPIRIN 81 MG/1
81 TABLET ORAL DAILY
Status: CANCELLED | OUTPATIENT
Start: 2022-11-02

## 2022-11-02 RX ORDER — ATORVASTATIN CALCIUM 40 MG/1
40 TABLET, FILM COATED ORAL DAILY
Status: CANCELLED | OUTPATIENT
Start: 2022-11-02

## 2022-11-02 RX ORDER — NICOTINE POLACRILEX 4 MG
15 LOZENGE BUCCAL
Status: DISCONTINUED | OUTPATIENT
Start: 2022-11-02 | End: 2022-11-30 | Stop reason: HOSPADM

## 2022-11-02 RX ORDER — BUMETANIDE 0.25 MG/ML
2 INJECTION INTRAMUSCULAR; INTRAVENOUS ONCE
Status: COMPLETED | OUTPATIENT
Start: 2022-11-02 | End: 2022-11-02

## 2022-11-02 RX ORDER — INSULIN ASPART 100 [IU]/ML
0-7 INJECTION, SOLUTION INTRAVENOUS; SUBCUTANEOUS
Status: CANCELLED | OUTPATIENT
Start: 2022-11-02

## 2022-11-02 RX ORDER — BUMETANIDE 1 MG/1
1 TABLET ORAL EVERY OTHER DAY
Status: CANCELLED | OUTPATIENT
Start: 2022-11-02

## 2022-11-02 RX ORDER — SODIUM CHLORIDE 0.9 % (FLUSH) 0.9 %
10 SYRINGE (ML) INJECTION AS NEEDED
Status: DISCONTINUED | OUTPATIENT
Start: 2022-11-02 | End: 2022-11-30 | Stop reason: HOSPADM

## 2022-11-02 RX ORDER — INSULIN ASPART 100 [IU]/ML
0-7 INJECTION, SOLUTION INTRAVENOUS; SUBCUTANEOUS
Status: DISCONTINUED | OUTPATIENT
Start: 2022-11-02 | End: 2022-11-30 | Stop reason: HOSPADM

## 2022-11-02 RX ORDER — NITROGLYCERIN 0.4 MG/1
0.4 TABLET SUBLINGUAL
Status: DISCONTINUED | OUTPATIENT
Start: 2022-11-02 | End: 2022-11-30 | Stop reason: HOSPADM

## 2022-11-02 RX ORDER — HYDRALAZINE HYDROCHLORIDE 25 MG/1
25 TABLET, FILM COATED ORAL EVERY 8 HOURS SCHEDULED
Status: DISCONTINUED | OUTPATIENT
Start: 2022-11-02 | End: 2022-11-03

## 2022-11-02 RX ORDER — ESCITALOPRAM OXALATE 10 MG/1
10 TABLET ORAL DAILY
Status: CANCELLED | OUTPATIENT
Start: 2022-11-02

## 2022-11-02 RX ORDER — INSULIN GLARGINE 300 U/ML
40-60 INJECTION, SOLUTION SUBCUTANEOUS AS NEEDED
COMMUNITY
End: 2022-11-30 | Stop reason: HOSPADM

## 2022-11-02 RX ORDER — PANTOPRAZOLE SODIUM 40 MG/1
40 TABLET, DELAYED RELEASE ORAL DAILY
Status: CANCELLED | OUTPATIENT
Start: 2022-11-02 | End: 2023-03-07

## 2022-11-02 RX ORDER — AMLODIPINE BESYLATE 10 MG/1
10 TABLET ORAL DAILY
Status: CANCELLED | OUTPATIENT
Start: 2022-11-02

## 2022-11-02 RX ORDER — ATORVASTATIN CALCIUM 40 MG/1
40 TABLET, FILM COATED ORAL NIGHTLY
Status: DISCONTINUED | OUTPATIENT
Start: 2022-11-02 | End: 2022-11-30 | Stop reason: HOSPADM

## 2022-11-02 RX ORDER — HEPARIN SODIUM 5000 [USP'U]/ML
5000 INJECTION, SOLUTION INTRAVENOUS; SUBCUTANEOUS EVERY 8 HOURS SCHEDULED
Status: DISCONTINUED | OUTPATIENT
Start: 2022-11-02 | End: 2022-11-30 | Stop reason: HOSPADM

## 2022-11-02 RX ORDER — CLONIDINE HYDROCHLORIDE 0.1 MG/1
0.2 TABLET ORAL 3 TIMES DAILY PRN
Status: CANCELLED | OUTPATIENT
Start: 2022-11-02

## 2022-11-02 RX ORDER — SODIUM CHLORIDE 0.9 % (FLUSH) 0.9 %
10 SYRINGE (ML) INJECTION EVERY 12 HOURS SCHEDULED
Status: DISCONTINUED | OUTPATIENT
Start: 2022-11-02 | End: 2022-11-30 | Stop reason: HOSPADM

## 2022-11-02 RX ORDER — ISOSORBIDE MONONITRATE 60 MG/1
60 TABLET, EXTENDED RELEASE ORAL DAILY
Status: CANCELLED | OUTPATIENT
Start: 2022-11-02

## 2022-11-02 RX ORDER — AMLODIPINE BESYLATE 5 MG/1
5 TABLET ORAL
Status: DISCONTINUED | OUTPATIENT
Start: 2022-11-02 | End: 2022-11-03

## 2022-11-02 RX ORDER — INSULIN LISPRO 100 [IU]/ML
0-7 INJECTION, SOLUTION INTRAVENOUS; SUBCUTANEOUS
COMMUNITY

## 2022-11-02 RX ORDER — LEVOTHYROXINE SODIUM 0.03 MG/1
25 TABLET ORAL DAILY
Status: CANCELLED | OUTPATIENT
Start: 2022-11-02

## 2022-11-02 RX ORDER — DEXTROSE MONOHYDRATE 25 G/50ML
25 INJECTION, SOLUTION INTRAVENOUS
Status: DISCONTINUED | OUTPATIENT
Start: 2022-11-02 | End: 2022-11-30 | Stop reason: HOSPADM

## 2022-11-02 RX ORDER — PANTOPRAZOLE SODIUM 40 MG/1
40 TABLET, DELAYED RELEASE ORAL
Status: DISCONTINUED | OUTPATIENT
Start: 2022-11-03 | End: 2022-11-05

## 2022-11-02 RX ORDER — CLONIDINE HYDROCHLORIDE 0.2 MG/1
0.2 TABLET ORAL 3 TIMES DAILY PRN
COMMUNITY
End: 2022-11-30 | Stop reason: HOSPADM

## 2022-11-02 RX ADMIN — HYDRALAZINE HYDROCHLORIDE 25 MG: 25 TABLET, FILM COATED ORAL at 22:15

## 2022-11-02 RX ADMIN — AMLODIPINE BESYLATE 5 MG: 5 TABLET ORAL at 15:15

## 2022-11-02 RX ADMIN — INSULIN ASPART 3 UNITS: 100 INJECTION, SOLUTION INTRAVENOUS; SUBCUTANEOUS at 16:50

## 2022-11-02 RX ADMIN — Medication 10 ML: at 22:16

## 2022-11-02 RX ADMIN — SODIUM ZIRCONIUM CYCLOSILICATE 10 G: 10 POWDER, FOR SUSPENSION ORAL at 12:36

## 2022-11-02 RX ADMIN — HYDRALAZINE HYDROCHLORIDE 25 MG: 25 TABLET, FILM COATED ORAL at 15:15

## 2022-11-02 RX ADMIN — HEPARIN SODIUM 5000 UNITS: 5000 INJECTION INTRAVENOUS; SUBCUTANEOUS at 22:15

## 2022-11-02 RX ADMIN — Medication 10 ML: at 15:14

## 2022-11-02 RX ADMIN — ATORVASTATIN CALCIUM 40 MG: 40 TABLET, FILM COATED ORAL at 20:36

## 2022-11-02 RX ADMIN — BUMETANIDE 2 MG: 0.25 INJECTION, SOLUTION INTRAMUSCULAR; INTRAVENOUS at 10:59

## 2022-11-02 RX ADMIN — DOXYCYCLINE 100 MG: 100 INJECTION, POWDER, LYOPHILIZED, FOR SOLUTION INTRAVENOUS at 11:50

## 2022-11-02 RX ADMIN — SODIUM CHLORIDE 500 ML: 9 INJECTION, SOLUTION INTRAVENOUS at 10:01

## 2022-11-02 RX ADMIN — HEPARIN SODIUM 5000 UNITS: 5000 INJECTION INTRAVENOUS; SUBCUTANEOUS at 15:14

## 2022-11-02 RX ADMIN — BUMETANIDE 2 MG: 0.25 INJECTION, SOLUTION INTRAMUSCULAR; INTRAVENOUS at 20:36

## 2022-11-02 RX ADMIN — SODIUM CHLORIDE 2 G: 900 INJECTION INTRAVENOUS at 11:50

## 2022-11-03 ENCOUNTER — APPOINTMENT (OUTPATIENT)
Dept: CARDIOLOGY | Facility: HOSPITAL | Age: 77
End: 2022-11-03

## 2022-11-03 LAB
ALBUMIN SERPL-MCNC: 2.86 G/DL (ref 3.5–5.2)
ALBUMIN/GLOB SERPL: 0.8 G/DL
ALP SERPL-CCNC: 230 U/L (ref 39–117)
ALT SERPL W P-5'-P-CCNC: 8 U/L (ref 1–33)
ANION GAP SERPL CALCULATED.3IONS-SCNC: 15.2 MMOL/L (ref 5–15)
AST SERPL-CCNC: 16 U/L (ref 1–32)
BASOPHILS # BLD AUTO: 0.02 10*3/MM3 (ref 0–0.2)
BASOPHILS NFR BLD AUTO: 0.3 % (ref 0–1.5)
BH BB BLOOD EXPIRATION DATE: NORMAL
BH BB BLOOD EXPIRATION DATE: NORMAL
BH BB BLOOD TYPE BARCODE: 7300
BH BB BLOOD TYPE BARCODE: 7300
BH BB DISPENSE STATUS: NORMAL
BH BB DISPENSE STATUS: NORMAL
BH BB PRODUCT CODE: NORMAL
BH BB PRODUCT CODE: NORMAL
BH BB UNIT NUMBER: NORMAL
BH BB UNIT NUMBER: NORMAL
BH CV ECHO MEAS - ACS: 1.3 CM
BH CV ECHO MEAS - AO MAX PG: 18.1 MMHG
BH CV ECHO MEAS - AO MEAN PG: 9 MMHG
BH CV ECHO MEAS - AO ROOT DIAM: 3 CM
BH CV ECHO MEAS - AO V2 MAX: 213 CM/SEC
BH CV ECHO MEAS - AO V2 VTI: 47.1 CM
BH CV ECHO MEAS - AVA(I,D): 2.06 CM2
BH CV ECHO MEAS - EDV(CUBED): 117.6 ML
BH CV ECHO MEAS - EDV(MOD-SP4): 77.5 ML
BH CV ECHO MEAS - EF(MOD-SP4): 56.4 %
BH CV ECHO MEAS - ESV(CUBED): 21.5 ML
BH CV ECHO MEAS - ESV(MOD-SP4): 33.8 ML
BH CV ECHO MEAS - FS: 43.3 %
BH CV ECHO MEAS - IVS/LVPW: 1 CM
BH CV ECHO MEAS - IVSD: 1.06 CM
BH CV ECHO MEAS - LA DIMENSION: 4.4 CM
BH CV ECHO MEAS - LAT PEAK E' VEL: 7.8 CM/SEC
BH CV ECHO MEAS - LV DIASTOLIC VOL/BSA (35-75): 38.6 CM2
BH CV ECHO MEAS - LV MASS(C)D: 189.3 GRAMS
BH CV ECHO MEAS - LV MAX PG: 5.1 MMHG
BH CV ECHO MEAS - LV MEAN PG: 3 MMHG
BH CV ECHO MEAS - LV SYSTOLIC VOL/BSA (12-30): 16.8 CM2
BH CV ECHO MEAS - LV V1 MAX: 113 CM/SEC
BH CV ECHO MEAS - LV V1 VTI: 34.2 CM
BH CV ECHO MEAS - LVIDD: 4.9 CM
BH CV ECHO MEAS - LVIDS: 2.8 CM
BH CV ECHO MEAS - LVOT AREA: 2.8 CM2
BH CV ECHO MEAS - LVOT DIAM: 1.9 CM
BH CV ECHO MEAS - LVPWD: 1.06 CM
BH CV ECHO MEAS - MED PEAK E' VEL: 4.5 CM/SEC
BH CV ECHO MEAS - MV A MAX VEL: 152 CM/SEC
BH CV ECHO MEAS - MV DEC SLOPE: 720 CM/SEC2
BH CV ECHO MEAS - MV E MAX VEL: 185 CM/SEC
BH CV ECHO MEAS - MV E/A: 1.22
BH CV ECHO MEAS - MV MAX PG: 15.1 MMHG
BH CV ECHO MEAS - MV MEAN PG: 5 MMHG
BH CV ECHO MEAS - MV P1/2T: 79.3 MSEC
BH CV ECHO MEAS - MV V2 VTI: 60.8 CM
BH CV ECHO MEAS - MVA(P1/2T): 2.8 CM2
BH CV ECHO MEAS - MVA(VTI): 1.59 CM2
BH CV ECHO MEAS - PA ACC TIME: 0.06 SEC
BH CV ECHO MEAS - PA PR(ACCEL): 53.8 MMHG
BH CV ECHO MEAS - RAP SYSTOLE: 10 MMHG
BH CV ECHO MEAS - RVSP: 68.4 MMHG
BH CV ECHO MEAS - SI(MOD-SP4): 21.7 ML/M2
BH CV ECHO MEAS - SV(LVOT): 97 ML
BH CV ECHO MEAS - SV(MOD-SP4): 43.7 ML
BH CV ECHO MEAS - TR MAX PG: 58.4 MMHG
BH CV ECHO MEAS - TR MAX VEL: 382 CM/SEC
BH CV ECHO MEASUREMENTS AVERAGE E/E' RATIO: 30.08
BILIRUB SERPL-MCNC: 0.3 MG/DL (ref 0–1.2)
BUN SERPL-MCNC: 90 MG/DL (ref 8–23)
BUN/CREAT SERPL: 22.4 (ref 7–25)
CALCIUM SPEC-SCNC: 9.1 MG/DL (ref 8.6–10.5)
CHLORIDE SERPL-SCNC: 108 MMOL/L (ref 98–107)
CO2 SERPL-SCNC: 17.8 MMOL/L (ref 22–29)
CREAT SERPL-MCNC: 4.02 MG/DL (ref 0.57–1)
CREAT UR-MCNC: 46.8 MG/DL
CROSSMATCH INTERPRETATION: NORMAL
CROSSMATCH INTERPRETATION: NORMAL
DEPRECATED RDW RBC AUTO: 51.1 FL (ref 37–54)
EGFRCR SERPLBLD CKD-EPI 2021: 10.8 ML/MIN/1.73
EOSINOPHIL # BLD AUTO: 0.13 10*3/MM3 (ref 0–0.4)
EOSINOPHIL NFR BLD AUTO: 2.1 % (ref 0.3–6.2)
ERYTHROCYTE [DISTWIDTH] IN BLOOD BY AUTOMATED COUNT: 16.3 % (ref 12.3–15.4)
FOLATE SERPL-MCNC: 7.8 NG/ML (ref 4.78–24.2)
GLOBULIN UR ELPH-MCNC: 3.5 GM/DL
GLUCOSE BLDC GLUCOMTR-MCNC: 146 MG/DL (ref 70–130)
GLUCOSE BLDC GLUCOMTR-MCNC: 148 MG/DL (ref 70–130)
GLUCOSE BLDC GLUCOMTR-MCNC: 185 MG/DL (ref 70–130)
GLUCOSE BLDC GLUCOMTR-MCNC: 192 MG/DL (ref 70–130)
GLUCOSE SERPL-MCNC: 152 MG/DL (ref 65–99)
HCT VFR BLD AUTO: 22 % (ref 34–46.6)
HCT VFR BLD AUTO: 26.9 % (ref 34–46.6)
HGB BLD-MCNC: 6.7 G/DL (ref 12–15.9)
HGB BLD-MCNC: 8.2 G/DL (ref 12–15.9)
IMM GRANULOCYTES # BLD AUTO: 0.04 10*3/MM3 (ref 0–0.05)
IMM GRANULOCYTES NFR BLD AUTO: 0.6 % (ref 0–0.5)
LEFT ATRIUM VOLUME INDEX: 29.4 ML/M2
LYMPHOCYTES # BLD AUTO: 0.85 10*3/MM3 (ref 0.7–3.1)
LYMPHOCYTES NFR BLD AUTO: 13.5 % (ref 19.6–45.3)
MAXIMAL PREDICTED HEART RATE: 141 BPM
MCH RBC QN AUTO: 27.2 PG (ref 26.6–33)
MCHC RBC AUTO-ENTMCNC: 30.5 G/DL (ref 31.5–35.7)
MCV RBC AUTO: 89.4 FL (ref 79–97)
MONOCYTES # BLD AUTO: 0.57 10*3/MM3 (ref 0.1–0.9)
MONOCYTES NFR BLD AUTO: 9 % (ref 5–12)
NEUTROPHILS NFR BLD AUTO: 4.7 10*3/MM3 (ref 1.7–7)
NEUTROPHILS NFR BLD AUTO: 74.5 % (ref 42.7–76)
NRBC BLD AUTO-RTO: 0 /100 WBC (ref 0–0.2)
PLATELET # BLD AUTO: 265 10*3/MM3 (ref 140–450)
PMV BLD AUTO: 8.6 FL (ref 6–12)
POTASSIUM SERPL-SCNC: 5 MMOL/L (ref 3.5–5.2)
PROT ?TM UR-MCNC: 98.7 MG/DL
PROT SERPL-MCNC: 6.4 G/DL (ref 6–8.5)
PROT/CREAT UR: 2109 MG/G CREA (ref 0–200)
RBC # BLD AUTO: 2.46 10*6/MM3 (ref 3.77–5.28)
SODIUM SERPL-SCNC: 141 MMOL/L (ref 136–145)
STRESS TARGET HR: 120 BPM
T4 FREE SERPL-MCNC: 1.18 NG/DL (ref 0.93–1.7)
UNIT  ABO: NORMAL
UNIT  ABO: NORMAL
UNIT  RH: NORMAL
UNIT  RH: NORMAL
VIT B12 BLD-MCNC: 759 PG/ML (ref 211–946)
WBC NRBC COR # BLD: 6.31 10*3/MM3 (ref 3.4–10.8)

## 2022-11-03 PROCEDURE — 93306 TTE W/DOPPLER COMPLETE: CPT

## 2022-11-03 PROCEDURE — 25010000002 HEPARIN (PORCINE) PER 1000 UNITS: Performed by: INTERNAL MEDICINE

## 2022-11-03 PROCEDURE — 25010000002 NA FERRIC GLUC CPLX PER 12.5 MG: Performed by: INTERNAL MEDICINE

## 2022-11-03 PROCEDURE — 97162 PT EVAL MOD COMPLEX 30 MIN: CPT

## 2022-11-03 PROCEDURE — 94799 UNLISTED PULMONARY SVC/PX: CPT

## 2022-11-03 PROCEDURE — 86900 BLOOD TYPING SEROLOGIC ABO: CPT

## 2022-11-03 PROCEDURE — 36430 TRANSFUSION BLD/BLD COMPNT: CPT

## 2022-11-03 PROCEDURE — 99233 SBSQ HOSP IP/OBS HIGH 50: CPT

## 2022-11-03 PROCEDURE — 97166 OT EVAL MOD COMPLEX 45 MIN: CPT

## 2022-11-03 PROCEDURE — 63710000001 INSULIN ASPART PER 5 UNITS

## 2022-11-03 PROCEDURE — 82962 GLUCOSE BLOOD TEST: CPT

## 2022-11-03 PROCEDURE — 94664 DEMO&/EVAL PT USE INHALER: CPT

## 2022-11-03 PROCEDURE — 94640 AIRWAY INHALATION TREATMENT: CPT

## 2022-11-03 PROCEDURE — 85025 COMPLETE CBC W/AUTO DIFF WBC: CPT | Performed by: INTERNAL MEDICINE

## 2022-11-03 PROCEDURE — 80053 COMPREHEN METABOLIC PANEL: CPT | Performed by: INTERNAL MEDICINE

## 2022-11-03 PROCEDURE — P9016 RBC LEUKOCYTES REDUCED: HCPCS

## 2022-11-03 PROCEDURE — 94761 N-INVAS EAR/PLS OXIMETRY MLT: CPT

## 2022-11-03 PROCEDURE — 85014 HEMATOCRIT: CPT | Performed by: INTERNAL MEDICINE

## 2022-11-03 PROCEDURE — 85018 HEMOGLOBIN: CPT | Performed by: INTERNAL MEDICINE

## 2022-11-03 PROCEDURE — 94762 N-INVAS EAR/PLS OXIMTRY CONT: CPT

## 2022-11-03 RX ORDER — CLONIDINE HYDROCHLORIDE 0.1 MG/1
0.2 TABLET ORAL 3 TIMES DAILY PRN
Status: CANCELLED | OUTPATIENT
Start: 2022-11-03

## 2022-11-03 RX ORDER — LEVOTHYROXINE SODIUM 0.03 MG/1
25 TABLET ORAL
Status: DISCONTINUED | OUTPATIENT
Start: 2022-11-03 | End: 2022-11-30 | Stop reason: HOSPADM

## 2022-11-03 RX ORDER — ESCITALOPRAM OXALATE 10 MG/1
10 TABLET ORAL DAILY
Status: DISCONTINUED | OUTPATIENT
Start: 2022-11-03 | End: 2022-11-30 | Stop reason: HOSPADM

## 2022-11-03 RX ORDER — AMLODIPINE BESYLATE 10 MG/1
10 TABLET ORAL
Status: DISCONTINUED | OUTPATIENT
Start: 2022-11-03 | End: 2022-11-30 | Stop reason: HOSPADM

## 2022-11-03 RX ORDER — IPRATROPIUM BROMIDE AND ALBUTEROL SULFATE 2.5; .5 MG/3ML; MG/3ML
3 SOLUTION RESPIRATORY (INHALATION) EVERY 4 HOURS PRN
Status: DISCONTINUED | OUTPATIENT
Start: 2022-11-03 | End: 2022-11-30 | Stop reason: HOSPADM

## 2022-11-03 RX ORDER — BUMETANIDE 0.25 MG/ML
2 INJECTION INTRAMUSCULAR; INTRAVENOUS ONCE
Status: COMPLETED | OUTPATIENT
Start: 2022-11-03 | End: 2022-11-03

## 2022-11-03 RX ORDER — ISOSORBIDE MONONITRATE 60 MG/1
60 TABLET, EXTENDED RELEASE ORAL DAILY
Status: DISCONTINUED | OUTPATIENT
Start: 2022-11-03 | End: 2022-11-30 | Stop reason: HOSPADM

## 2022-11-03 RX ADMIN — HYDRALAZINE HYDROCHLORIDE 75 MG: 50 TABLET, FILM COATED ORAL at 15:04

## 2022-11-03 RX ADMIN — Medication 10 ML: at 20:21

## 2022-11-03 RX ADMIN — ATORVASTATIN CALCIUM 40 MG: 40 TABLET, FILM COATED ORAL at 20:21

## 2022-11-03 RX ADMIN — INSULIN ASPART 2 UNITS: 100 INJECTION, SOLUTION INTRAVENOUS; SUBCUTANEOUS at 14:28

## 2022-11-03 RX ADMIN — HYDRALAZINE HYDROCHLORIDE 25 MG: 25 TABLET, FILM COATED ORAL at 06:10

## 2022-11-03 RX ADMIN — HEPARIN SODIUM 5000 UNITS: 5000 INJECTION INTRAVENOUS; SUBCUTANEOUS at 15:04

## 2022-11-03 RX ADMIN — HYDRALAZINE HYDROCHLORIDE 75 MG: 50 TABLET, FILM COATED ORAL at 20:21

## 2022-11-03 RX ADMIN — AMLODIPINE BESYLATE 10 MG: 10 TABLET ORAL at 09:12

## 2022-11-03 RX ADMIN — Medication 10 ML: at 08:00

## 2022-11-03 RX ADMIN — BUMETANIDE 2 MG: 0.25 INJECTION, SOLUTION INTRAMUSCULAR; INTRAVENOUS at 09:13

## 2022-11-03 RX ADMIN — IPRATROPIUM BROMIDE AND ALBUTEROL SULFATE 3 ML: .5; 3 SOLUTION RESPIRATORY (INHALATION) at 04:29

## 2022-11-03 RX ADMIN — METOPROLOL SUCCINATE 75 MG: 50 TABLET, EXTENDED RELEASE ORAL at 09:12

## 2022-11-03 RX ADMIN — LEVOTHYROXINE SODIUM 25 MCG: 25 TABLET ORAL at 09:11

## 2022-11-03 RX ADMIN — ESCITALOPRAM 10 MG: 10 TABLET, FILM COATED ORAL at 10:54

## 2022-11-03 RX ADMIN — ISOSORBIDE MONONITRATE 60 MG: 60 TABLET, EXTENDED RELEASE ORAL at 10:54

## 2022-11-03 RX ADMIN — PANTOPRAZOLE SODIUM 40 MG: 40 TABLET, DELAYED RELEASE ORAL at 06:10

## 2022-11-03 RX ADMIN — HEPARIN SODIUM 5000 UNITS: 5000 INJECTION INTRAVENOUS; SUBCUTANEOUS at 20:21

## 2022-11-03 RX ADMIN — HEPARIN SODIUM 5000 UNITS: 5000 INJECTION INTRAVENOUS; SUBCUTANEOUS at 06:14

## 2022-11-03 RX ADMIN — IPRATROPIUM BROMIDE AND ALBUTEROL SULFATE 3 ML: .5; 3 SOLUTION RESPIRATORY (INHALATION) at 18:45

## 2022-11-03 RX ADMIN — OFLOXACIN 50000 UNITS: 300 TABLET, COATED ORAL at 10:54

## 2022-11-03 RX ADMIN — INSULIN ASPART 2 UNITS: 100 INJECTION, SOLUTION INTRAVENOUS; SUBCUTANEOUS at 17:11

## 2022-11-03 RX ADMIN — SODIUM CHLORIDE 125 MG: 9 INJECTION, SOLUTION INTRAVENOUS at 10:55

## 2022-11-04 LAB
ALBUMIN SERPL-MCNC: 2.84 G/DL (ref 3.5–5.2)
ALBUMIN/GLOB SERPL: 0.8 G/DL
ALP SERPL-CCNC: 250 U/L (ref 39–117)
ALT SERPL W P-5'-P-CCNC: 7 U/L (ref 1–33)
ANION GAP SERPL CALCULATED.3IONS-SCNC: 13.6 MMOL/L (ref 5–15)
AST SERPL-CCNC: 18 U/L (ref 1–32)
BH BB BLOOD EXPIRATION DATE: NORMAL
BH BB BLOOD TYPE BARCODE: 7300
BH BB DISPENSE STATUS: NORMAL
BH BB PRODUCT CODE: NORMAL
BH BB UNIT NUMBER: NORMAL
BILIRUB SERPL-MCNC: 0.3 MG/DL (ref 0–1.2)
BUN SERPL-MCNC: 87 MG/DL (ref 8–23)
BUN/CREAT SERPL: 21.7 (ref 7–25)
CALCIUM SPEC-SCNC: 8.8 MG/DL (ref 8.6–10.5)
CHLORIDE SERPL-SCNC: 106 MMOL/L (ref 98–107)
CO2 SERPL-SCNC: 17.4 MMOL/L (ref 22–29)
CREAT SERPL-MCNC: 4.01 MG/DL (ref 0.57–1)
CROSSMATCH INTERPRETATION: NORMAL
DEPRECATED RDW RBC AUTO: 52.3 FL (ref 37–54)
EGFRCR SERPLBLD CKD-EPI 2021: 10.8 ML/MIN/1.73
ERYTHROCYTE [DISTWIDTH] IN BLOOD BY AUTOMATED COUNT: 16.4 % (ref 12.3–15.4)
GLOBULIN UR ELPH-MCNC: 3.5 GM/DL
GLUCOSE BLDC GLUCOMTR-MCNC: 159 MG/DL (ref 70–130)
GLUCOSE BLDC GLUCOMTR-MCNC: 176 MG/DL (ref 70–130)
GLUCOSE BLDC GLUCOMTR-MCNC: 177 MG/DL (ref 70–130)
GLUCOSE BLDC GLUCOMTR-MCNC: 192 MG/DL (ref 70–130)
GLUCOSE SERPL-MCNC: 148 MG/DL (ref 65–99)
HCT VFR BLD AUTO: 25.4 % (ref 34–46.6)
HGB BLD-MCNC: 7.5 G/DL (ref 12–15.9)
MCH RBC QN AUTO: 26.8 PG (ref 26.6–33)
MCHC RBC AUTO-ENTMCNC: 29.5 G/DL (ref 31.5–35.7)
MCV RBC AUTO: 90.7 FL (ref 79–97)
PLATELET # BLD AUTO: 270 10*3/MM3 (ref 140–450)
PMV BLD AUTO: 9 FL (ref 6–12)
POTASSIUM SERPL-SCNC: 4.9 MMOL/L (ref 3.5–5.2)
PROT SERPL-MCNC: 6.3 G/DL (ref 6–8.5)
QT INTERVAL: 408 MS
QT INTERVAL: 428 MS
QTC INTERVAL: 467 MS
QTC INTERVAL: 471 MS
RBC # BLD AUTO: 2.8 10*6/MM3 (ref 3.77–5.28)
REF LAB TEST METHOD: NORMAL
SODIUM SERPL-SCNC: 137 MMOL/L (ref 136–145)
TROPONIN T SERPL-MCNC: 0.02 NG/ML (ref 0–0.03)
UNIT  ABO: NORMAL
UNIT  RH: NORMAL
WBC NRBC COR # BLD: 6.32 10*3/MM3 (ref 3.4–10.8)

## 2022-11-04 PROCEDURE — 63710000001 INSULIN ASPART PER 5 UNITS

## 2022-11-04 PROCEDURE — 94799 UNLISTED PULMONARY SVC/PX: CPT

## 2022-11-04 PROCEDURE — 93010 ELECTROCARDIOGRAM REPORT: CPT | Performed by: INTERNAL MEDICINE

## 2022-11-04 PROCEDURE — 80053 COMPREHEN METABOLIC PANEL: CPT

## 2022-11-04 PROCEDURE — 99232 SBSQ HOSP IP/OBS MODERATE 35: CPT

## 2022-11-04 PROCEDURE — 25010000002 NA FERRIC GLUC CPLX PER 12.5 MG: Performed by: INTERNAL MEDICINE

## 2022-11-04 PROCEDURE — 85027 COMPLETE CBC AUTOMATED: CPT

## 2022-11-04 PROCEDURE — 93005 ELECTROCARDIOGRAM TRACING: CPT | Performed by: INTERNAL MEDICINE

## 2022-11-04 PROCEDURE — 82962 GLUCOSE BLOOD TEST: CPT

## 2022-11-04 PROCEDURE — 84484 ASSAY OF TROPONIN QUANT: CPT | Performed by: INTERNAL MEDICINE

## 2022-11-04 PROCEDURE — 25010000002 HEPARIN (PORCINE) PER 1000 UNITS: Performed by: INTERNAL MEDICINE

## 2022-11-04 PROCEDURE — 93005 ELECTROCARDIOGRAM TRACING: CPT

## 2022-11-04 RX ORDER — BUMETANIDE 1 MG/1
2 TABLET ORAL DAILY
Status: DISCONTINUED | OUTPATIENT
Start: 2022-11-04 | End: 2022-11-04

## 2022-11-04 RX ORDER — BUMETANIDE 0.25 MG/ML
2 INJECTION INTRAMUSCULAR; INTRAVENOUS EVERY 6 HOURS
Status: COMPLETED | OUTPATIENT
Start: 2022-11-04 | End: 2022-11-04

## 2022-11-04 RX ORDER — CHOLECALCIFEROL (VITAMIN D3) 125 MCG
10 CAPSULE ORAL NIGHTLY PRN
Status: DISCONTINUED | OUTPATIENT
Start: 2022-11-04 | End: 2022-11-30 | Stop reason: HOSPADM

## 2022-11-04 RX ORDER — HYDROXYZINE HYDROCHLORIDE 25 MG/1
25 TABLET, FILM COATED ORAL 3 TIMES DAILY PRN
Status: DISCONTINUED | OUTPATIENT
Start: 2022-11-04 | End: 2022-11-30 | Stop reason: HOSPADM

## 2022-11-04 RX ADMIN — HYDRALAZINE HYDROCHLORIDE 75 MG: 50 TABLET, FILM COATED ORAL at 20:52

## 2022-11-04 RX ADMIN — ESCITALOPRAM 10 MG: 10 TABLET, FILM COATED ORAL at 08:13

## 2022-11-04 RX ADMIN — BUMETANIDE 2 MG: 0.25 INJECTION, SOLUTION INTRAMUSCULAR; INTRAVENOUS at 09:05

## 2022-11-04 RX ADMIN — Medication 10 ML: at 20:53

## 2022-11-04 RX ADMIN — HEPARIN SODIUM 5000 UNITS: 5000 INJECTION INTRAVENOUS; SUBCUTANEOUS at 20:53

## 2022-11-04 RX ADMIN — PANTOPRAZOLE SODIUM 40 MG: 40 TABLET, DELAYED RELEASE ORAL at 06:32

## 2022-11-04 RX ADMIN — Medication 10 ML: at 09:58

## 2022-11-04 RX ADMIN — Medication 10 MG: at 20:52

## 2022-11-04 RX ADMIN — INSULIN ASPART 2 UNITS: 100 INJECTION, SOLUTION INTRAVENOUS; SUBCUTANEOUS at 12:15

## 2022-11-04 RX ADMIN — AMLODIPINE BESYLATE 10 MG: 10 TABLET ORAL at 08:11

## 2022-11-04 RX ADMIN — IPRATROPIUM BROMIDE AND ALBUTEROL SULFATE 3 ML: .5; 3 SOLUTION RESPIRATORY (INHALATION) at 18:24

## 2022-11-04 RX ADMIN — METOPROLOL SUCCINATE 75 MG: 50 TABLET, EXTENDED RELEASE ORAL at 08:12

## 2022-11-04 RX ADMIN — IPRATROPIUM BROMIDE AND ALBUTEROL SULFATE 3 ML: .5; 3 SOLUTION RESPIRATORY (INHALATION) at 06:27

## 2022-11-04 RX ADMIN — HYDRALAZINE HYDROCHLORIDE 75 MG: 50 TABLET, FILM COATED ORAL at 14:56

## 2022-11-04 RX ADMIN — Medication 10 MG: at 00:49

## 2022-11-04 RX ADMIN — SODIUM CHLORIDE 125 MG: 9 INJECTION, SOLUTION INTRAVENOUS at 08:15

## 2022-11-04 RX ADMIN — HYDRALAZINE HYDROCHLORIDE 75 MG: 50 TABLET, FILM COATED ORAL at 06:32

## 2022-11-04 RX ADMIN — LEVOTHYROXINE SODIUM 25 MCG: 25 TABLET ORAL at 06:32

## 2022-11-04 RX ADMIN — ATORVASTATIN CALCIUM 40 MG: 40 TABLET, FILM COATED ORAL at 20:52

## 2022-11-04 RX ADMIN — BUMETANIDE 2 MG: 0.25 INJECTION, SOLUTION INTRAMUSCULAR; INTRAVENOUS at 16:19

## 2022-11-04 RX ADMIN — ISOSORBIDE MONONITRATE 60 MG: 60 TABLET, EXTENDED RELEASE ORAL at 08:13

## 2022-11-04 RX ADMIN — INSULIN ASPART 2 UNITS: 100 INJECTION, SOLUTION INTRAVENOUS; SUBCUTANEOUS at 17:19

## 2022-11-04 RX ADMIN — HEPARIN SODIUM 5000 UNITS: 5000 INJECTION INTRAVENOUS; SUBCUTANEOUS at 14:33

## 2022-11-04 RX ADMIN — INSULIN ASPART 2 UNITS: 100 INJECTION, SOLUTION INTRAVENOUS; SUBCUTANEOUS at 08:14

## 2022-11-04 RX ADMIN — NITROGLYCERIN 0.4 MG: 0.4 TABLET, ORALLY DISINTEGRATING SUBLINGUAL at 14:32

## 2022-11-04 RX ADMIN — HEPARIN SODIUM 5000 UNITS: 5000 INJECTION INTRAVENOUS; SUBCUTANEOUS at 06:32

## 2022-11-05 ENCOUNTER — APPOINTMENT (OUTPATIENT)
Dept: GENERAL RADIOLOGY | Facility: HOSPITAL | Age: 77
End: 2022-11-05

## 2022-11-05 LAB
ANION GAP SERPL CALCULATED.3IONS-SCNC: 13.7 MMOL/L (ref 5–15)
BASOPHILS # BLD AUTO: 0.02 10*3/MM3 (ref 0–0.2)
BASOPHILS NFR BLD AUTO: 0.2 % (ref 0–1.5)
BUN SERPL-MCNC: 88 MG/DL (ref 8–23)
BUN/CREAT SERPL: 22.9 (ref 7–25)
CALCIUM SPEC-SCNC: 8.4 MG/DL (ref 8.6–10.5)
CHLORIDE SERPL-SCNC: 108 MMOL/L (ref 98–107)
CO2 SERPL-SCNC: 17.3 MMOL/L (ref 22–29)
CREAT SERPL-MCNC: 3.84 MG/DL (ref 0.57–1)
DEPRECATED RDW RBC AUTO: 53.5 FL (ref 37–54)
EGFRCR SERPLBLD CKD-EPI 2021: 11.4 ML/MIN/1.73
EOSINOPHIL # BLD AUTO: 0.17 10*3/MM3 (ref 0–0.4)
EOSINOPHIL NFR BLD AUTO: 2.1 % (ref 0.3–6.2)
ERYTHROCYTE [DISTWIDTH] IN BLOOD BY AUTOMATED COUNT: 17 % (ref 12.3–15.4)
GLUCOSE BLDC GLUCOMTR-MCNC: 139 MG/DL (ref 70–130)
GLUCOSE BLDC GLUCOMTR-MCNC: 179 MG/DL (ref 70–130)
GLUCOSE BLDC GLUCOMTR-MCNC: 192 MG/DL (ref 70–130)
GLUCOSE BLDC GLUCOMTR-MCNC: 203 MG/DL (ref 70–130)
GLUCOSE SERPL-MCNC: 173 MG/DL (ref 65–99)
HCT VFR BLD AUTO: 26.8 % (ref 34–46.6)
HGB BLD-MCNC: 8 G/DL (ref 12–15.9)
IMM GRANULOCYTES # BLD AUTO: 0.04 10*3/MM3 (ref 0–0.05)
IMM GRANULOCYTES NFR BLD AUTO: 0.5 % (ref 0–0.5)
LYMPHOCYTES # BLD AUTO: 0.99 10*3/MM3 (ref 0.7–3.1)
LYMPHOCYTES NFR BLD AUTO: 11.9 % (ref 19.6–45.3)
MAGNESIUM SERPL-MCNC: 2.2 MG/DL (ref 1.6–2.4)
MCH RBC QN AUTO: 26.9 PG (ref 26.6–33)
MCHC RBC AUTO-ENTMCNC: 29.9 G/DL (ref 31.5–35.7)
MCV RBC AUTO: 90.2 FL (ref 79–97)
MONOCYTES # BLD AUTO: 0.62 10*3/MM3 (ref 0.1–0.9)
MONOCYTES NFR BLD AUTO: 7.5 % (ref 5–12)
NEUTROPHILS NFR BLD AUTO: 6.45 10*3/MM3 (ref 1.7–7)
NEUTROPHILS NFR BLD AUTO: 77.8 % (ref 42.7–76)
NRBC BLD AUTO-RTO: 0 /100 WBC (ref 0–0.2)
PLATELET # BLD AUTO: 268 10*3/MM3 (ref 140–450)
PMV BLD AUTO: 8.9 FL (ref 6–12)
POTASSIUM SERPL-SCNC: 5.4 MMOL/L (ref 3.5–5.2)
QT INTERVAL: 422 MS
QTC INTERVAL: 471 MS
RBC # BLD AUTO: 2.97 10*6/MM3 (ref 3.77–5.28)
SODIUM SERPL-SCNC: 139 MMOL/L (ref 136–145)
TROPONIN T SERPL-MCNC: 0.02 NG/ML (ref 0–0.03)
WBC NRBC COR # BLD: 8.29 10*3/MM3 (ref 3.4–10.8)

## 2022-11-05 PROCEDURE — 83735 ASSAY OF MAGNESIUM: CPT | Performed by: INTERNAL MEDICINE

## 2022-11-05 PROCEDURE — 93010 ELECTROCARDIOGRAM REPORT: CPT | Performed by: INTERNAL MEDICINE

## 2022-11-05 PROCEDURE — 71045 X-RAY EXAM CHEST 1 VIEW: CPT | Performed by: RADIOLOGY

## 2022-11-05 PROCEDURE — 25010000002 HEPARIN (PORCINE) PER 1000 UNITS: Performed by: INTERNAL MEDICINE

## 2022-11-05 PROCEDURE — 82962 GLUCOSE BLOOD TEST: CPT

## 2022-11-05 PROCEDURE — 63710000001 INSULIN ASPART PER 5 UNITS

## 2022-11-05 PROCEDURE — 99233 SBSQ HOSP IP/OBS HIGH 50: CPT

## 2022-11-05 PROCEDURE — 94761 N-INVAS EAR/PLS OXIMETRY MLT: CPT

## 2022-11-05 PROCEDURE — 94799 UNLISTED PULMONARY SVC/PX: CPT

## 2022-11-05 PROCEDURE — 85025 COMPLETE CBC W/AUTO DIFF WBC: CPT | Performed by: INTERNAL MEDICINE

## 2022-11-05 PROCEDURE — 25010000002 NA FERRIC GLUC CPLX PER 12.5 MG: Performed by: INTERNAL MEDICINE

## 2022-11-05 PROCEDURE — 84484 ASSAY OF TROPONIN QUANT: CPT | Performed by: INTERNAL MEDICINE

## 2022-11-05 PROCEDURE — 71045 X-RAY EXAM CHEST 1 VIEW: CPT

## 2022-11-05 PROCEDURE — 93005 ELECTROCARDIOGRAM TRACING: CPT | Performed by: INTERNAL MEDICINE

## 2022-11-05 PROCEDURE — 80048 BASIC METABOLIC PNL TOTAL CA: CPT

## 2022-11-05 RX ORDER — BUMETANIDE 0.25 MG/ML
2 INJECTION INTRAMUSCULAR; INTRAVENOUS EVERY 8 HOURS
Status: COMPLETED | OUTPATIENT
Start: 2022-11-05 | End: 2022-11-05

## 2022-11-05 RX ORDER — HYDRALAZINE HYDROCHLORIDE 50 MG/1
100 TABLET, FILM COATED ORAL EVERY 8 HOURS SCHEDULED
Status: DISCONTINUED | OUTPATIENT
Start: 2022-11-05 | End: 2022-11-16

## 2022-11-05 RX ORDER — LIDOCAINE HYDROCHLORIDE 20 MG/ML
15 SOLUTION OROPHARYNGEAL ONCE
Status: COMPLETED | OUTPATIENT
Start: 2022-11-05 | End: 2022-11-05

## 2022-11-05 RX ORDER — PHENOBARBITAL, HYOSCYAMINE SULFATE, ATROPINE SULFATE AND SCOPOLAMINE HYDROBROMIDE .0194; .1037; 16.2; .0065 MG/1; MG/1; MG/1; MG/1
2 TABLET ORAL ONCE
Status: COMPLETED | OUTPATIENT
Start: 2022-11-05 | End: 2022-11-05

## 2022-11-05 RX ORDER — ALUMINA, MAGNESIA, AND SIMETHICONE 2400; 2400; 240 MG/30ML; MG/30ML; MG/30ML
15 SUSPENSION ORAL ONCE
Status: COMPLETED | OUTPATIENT
Start: 2022-11-05 | End: 2022-11-05

## 2022-11-05 RX ORDER — PANTOPRAZOLE SODIUM 40 MG/1
40 TABLET, DELAYED RELEASE ORAL
Status: DISCONTINUED | OUTPATIENT
Start: 2022-11-05 | End: 2022-11-23

## 2022-11-05 RX ADMIN — HYDRALAZINE HYDROCHLORIDE 75 MG: 50 TABLET, FILM COATED ORAL at 06:38

## 2022-11-05 RX ADMIN — Medication 10 ML: at 08:17

## 2022-11-05 RX ADMIN — BUMETANIDE 2 MG: 0.25 INJECTION, SOLUTION INTRAMUSCULAR; INTRAVENOUS at 08:17

## 2022-11-05 RX ADMIN — ALUMINUM HYDROXIDE, MAGNESIUM HYDROXIDE, AND DIMETHICONE 15 ML: 400; 400; 40 SUSPENSION ORAL at 13:04

## 2022-11-05 RX ADMIN — Medication 10 MG: at 20:40

## 2022-11-05 RX ADMIN — PANTOPRAZOLE SODIUM 40 MG: 40 TABLET, DELAYED RELEASE ORAL at 06:38

## 2022-11-05 RX ADMIN — Medication 10 ML: at 20:41

## 2022-11-05 RX ADMIN — IPRATROPIUM BROMIDE AND ALBUTEROL SULFATE 3 ML: .5; 3 SOLUTION RESPIRATORY (INHALATION) at 06:52

## 2022-11-05 RX ADMIN — HEPARIN SODIUM 5000 UNITS: 5000 INJECTION INTRAVENOUS; SUBCUTANEOUS at 13:13

## 2022-11-05 RX ADMIN — BUMETANIDE 2 MG: 0.25 INJECTION, SOLUTION INTRAMUSCULAR; INTRAVENOUS at 16:46

## 2022-11-05 RX ADMIN — HEPARIN SODIUM 5000 UNITS: 5000 INJECTION INTRAVENOUS; SUBCUTANEOUS at 06:38

## 2022-11-05 RX ADMIN — METOPROLOL SUCCINATE 75 MG: 50 TABLET, EXTENDED RELEASE ORAL at 08:14

## 2022-11-05 RX ADMIN — HYDRALAZINE HYDROCHLORIDE 75 MG: 50 TABLET, FILM COATED ORAL at 13:13

## 2022-11-05 RX ADMIN — IPRATROPIUM BROMIDE AND ALBUTEROL SULFATE 3 ML: .5; 3 SOLUTION RESPIRATORY (INHALATION) at 18:50

## 2022-11-05 RX ADMIN — SODIUM ZIRCONIUM CYCLOSILICATE 10 G: 10 POWDER, FOR SUSPENSION ORAL at 11:39

## 2022-11-05 RX ADMIN — LEVOTHYROXINE SODIUM 25 MCG: 25 TABLET ORAL at 06:38

## 2022-11-05 RX ADMIN — ATORVASTATIN CALCIUM 40 MG: 40 TABLET, FILM COATED ORAL at 20:40

## 2022-11-05 RX ADMIN — PANTOPRAZOLE SODIUM 40 MG: 40 TABLET, DELAYED RELEASE ORAL at 16:43

## 2022-11-05 RX ADMIN — INSULIN ASPART 3 UNITS: 100 INJECTION, SOLUTION INTRAVENOUS; SUBCUTANEOUS at 16:43

## 2022-11-05 RX ADMIN — PHENOBARBITAL, HYOSCYAMINE SULFATE, ATROPINE SULFATE, SCOPOLAMINE HYDROBROMIDE 32.4 MG: 16.2; .1037; .0194; .0065 TABLET ORAL at 13:06

## 2022-11-05 RX ADMIN — HYDRALAZINE HYDROCHLORIDE 100 MG: 50 TABLET, FILM COATED ORAL at 20:40

## 2022-11-05 RX ADMIN — LIDOCAINE HYDROCHLORIDE 15 ML: 20 SOLUTION ORAL; TOPICAL at 13:06

## 2022-11-05 RX ADMIN — AMLODIPINE BESYLATE 10 MG: 10 TABLET ORAL at 08:13

## 2022-11-05 RX ADMIN — SODIUM CHLORIDE 125 MG: 9 INJECTION, SOLUTION INTRAVENOUS at 08:16

## 2022-11-05 RX ADMIN — HEPARIN SODIUM 5000 UNITS: 5000 INJECTION INTRAVENOUS; SUBCUTANEOUS at 20:40

## 2022-11-05 RX ADMIN — INSULIN ASPART 2 UNITS: 100 INJECTION, SOLUTION INTRAVENOUS; SUBCUTANEOUS at 13:13

## 2022-11-05 RX ADMIN — HYDROXYZINE HYDROCHLORIDE 25 MG: 25 TABLET ORAL at 01:57

## 2022-11-05 RX ADMIN — ESCITALOPRAM 10 MG: 10 TABLET, FILM COATED ORAL at 08:13

## 2022-11-05 RX ADMIN — ISOSORBIDE MONONITRATE 60 MG: 60 TABLET, EXTENDED RELEASE ORAL at 08:13

## 2022-11-06 LAB
ANION GAP SERPL CALCULATED.3IONS-SCNC: 14.9 MMOL/L (ref 5–15)
BUN SERPL-MCNC: 83 MG/DL (ref 8–23)
BUN/CREAT SERPL: 22.2 (ref 7–25)
CALCIUM SPEC-SCNC: 8.8 MG/DL (ref 8.6–10.5)
CHLORIDE SERPL-SCNC: 105 MMOL/L (ref 98–107)
CO2 SERPL-SCNC: 18.1 MMOL/L (ref 22–29)
CREAT SERPL-MCNC: 3.74 MG/DL (ref 0.57–1)
DEPRECATED RDW RBC AUTO: 54 FL (ref 37–54)
EGFRCR SERPLBLD CKD-EPI 2021: 11.8 ML/MIN/1.73
ERYTHROCYTE [DISTWIDTH] IN BLOOD BY AUTOMATED COUNT: 16.7 % (ref 12.3–15.4)
GLUCOSE BLDC GLUCOMTR-MCNC: 157 MG/DL (ref 70–130)
GLUCOSE BLDC GLUCOMTR-MCNC: 177 MG/DL (ref 70–130)
GLUCOSE BLDC GLUCOMTR-MCNC: 189 MG/DL (ref 70–130)
GLUCOSE BLDC GLUCOMTR-MCNC: 201 MG/DL (ref 70–130)
GLUCOSE SERPL-MCNC: 145 MG/DL (ref 65–99)
HCT VFR BLD AUTO: 28.1 % (ref 34–46.6)
HGB BLD-MCNC: 8.1 G/DL (ref 12–15.9)
MCH RBC QN AUTO: 26.6 PG (ref 26.6–33)
MCHC RBC AUTO-ENTMCNC: 28.8 G/DL (ref 31.5–35.7)
MCV RBC AUTO: 92.4 FL (ref 79–97)
PLATELET # BLD AUTO: 246 10*3/MM3 (ref 140–450)
PMV BLD AUTO: 8.9 FL (ref 6–12)
POTASSIUM SERPL-SCNC: 4.8 MMOL/L (ref 3.5–5.2)
RBC # BLD AUTO: 3.04 10*6/MM3 (ref 3.77–5.28)
SODIUM SERPL-SCNC: 138 MMOL/L (ref 136–145)
WBC NRBC COR # BLD: 7.93 10*3/MM3 (ref 3.4–10.8)

## 2022-11-06 PROCEDURE — 82962 GLUCOSE BLOOD TEST: CPT

## 2022-11-06 PROCEDURE — 63710000001 INSULIN ASPART PER 5 UNITS

## 2022-11-06 PROCEDURE — 25010000002 HEPARIN (PORCINE) PER 1000 UNITS: Performed by: INTERNAL MEDICINE

## 2022-11-06 PROCEDURE — 85027 COMPLETE CBC AUTOMATED: CPT

## 2022-11-06 PROCEDURE — 80048 BASIC METABOLIC PNL TOTAL CA: CPT

## 2022-11-06 PROCEDURE — 25010000002 HYDRALAZINE PER 20 MG: Performed by: PHYSICIAN ASSISTANT

## 2022-11-06 PROCEDURE — 94799 UNLISTED PULMONARY SVC/PX: CPT

## 2022-11-06 PROCEDURE — 94761 N-INVAS EAR/PLS OXIMETRY MLT: CPT

## 2022-11-06 PROCEDURE — 94762 N-INVAS EAR/PLS OXIMTRY CONT: CPT

## 2022-11-06 PROCEDURE — 99233 SBSQ HOSP IP/OBS HIGH 50: CPT

## 2022-11-06 RX ORDER — HYDRALAZINE HYDROCHLORIDE 20 MG/ML
10 INJECTION INTRAMUSCULAR; INTRAVENOUS ONCE
Status: COMPLETED | OUTPATIENT
Start: 2022-11-06 | End: 2022-11-06

## 2022-11-06 RX ORDER — IRON POLYSACCHARIDE COMPLEX 150 MG
150 CAPSULE ORAL DAILY
Status: DISCONTINUED | OUTPATIENT
Start: 2022-11-06 | End: 2022-11-30 | Stop reason: HOSPADM

## 2022-11-06 RX ORDER — BUMETANIDE 0.25 MG/ML
2 INJECTION INTRAMUSCULAR; INTRAVENOUS 3 TIMES DAILY
Status: DISCONTINUED | OUTPATIENT
Start: 2022-11-06 | End: 2022-11-07

## 2022-11-06 RX ORDER — BUMETANIDE 0.25 MG/ML
2 INJECTION INTRAMUSCULAR; INTRAVENOUS EVERY 12 HOURS SCHEDULED
Status: DISCONTINUED | OUTPATIENT
Start: 2022-11-06 | End: 2022-11-06

## 2022-11-06 RX ADMIN — HEPARIN SODIUM 5000 UNITS: 5000 INJECTION INTRAVENOUS; SUBCUTANEOUS at 14:22

## 2022-11-06 RX ADMIN — HYDRALAZINE HYDROCHLORIDE 100 MG: 50 TABLET, FILM COATED ORAL at 06:44

## 2022-11-06 RX ADMIN — LEVOTHYROXINE SODIUM 25 MCG: 25 TABLET ORAL at 06:44

## 2022-11-06 RX ADMIN — ESCITALOPRAM 10 MG: 10 TABLET, FILM COATED ORAL at 08:30

## 2022-11-06 RX ADMIN — INSULIN ASPART 2 UNITS: 100 INJECTION, SOLUTION INTRAVENOUS; SUBCUTANEOUS at 08:31

## 2022-11-06 RX ADMIN — BUMETANIDE 2 MG: 0.25 INJECTION, SOLUTION INTRAMUSCULAR; INTRAVENOUS at 20:44

## 2022-11-06 RX ADMIN — ISOSORBIDE MONONITRATE 60 MG: 60 TABLET, EXTENDED RELEASE ORAL at 08:30

## 2022-11-06 RX ADMIN — METOPROLOL SUCCINATE 75 MG: 50 TABLET, EXTENDED RELEASE ORAL at 08:30

## 2022-11-06 RX ADMIN — AMLODIPINE BESYLATE 10 MG: 10 TABLET ORAL at 08:31

## 2022-11-06 RX ADMIN — INSULIN ASPART 3 UNITS: 100 INJECTION, SOLUTION INTRAVENOUS; SUBCUTANEOUS at 11:05

## 2022-11-06 RX ADMIN — PANTOPRAZOLE SODIUM 40 MG: 40 TABLET, DELAYED RELEASE ORAL at 08:31

## 2022-11-06 RX ADMIN — HEPARIN SODIUM 5000 UNITS: 5000 INJECTION INTRAVENOUS; SUBCUTANEOUS at 06:43

## 2022-11-06 RX ADMIN — HYDRALAZINE HYDROCHLORIDE 100 MG: 50 TABLET, FILM COATED ORAL at 14:22

## 2022-11-06 RX ADMIN — ATORVASTATIN CALCIUM 40 MG: 40 TABLET, FILM COATED ORAL at 20:46

## 2022-11-06 RX ADMIN — Medication 150 MG: at 12:16

## 2022-11-06 RX ADMIN — HYDRALAZINE HYDROCHLORIDE 100 MG: 50 TABLET, FILM COATED ORAL at 20:46

## 2022-11-06 RX ADMIN — PANTOPRAZOLE SODIUM 40 MG: 40 TABLET, DELAYED RELEASE ORAL at 16:56

## 2022-11-06 RX ADMIN — INSULIN ASPART 2 UNITS: 100 INJECTION, SOLUTION INTRAVENOUS; SUBCUTANEOUS at 16:56

## 2022-11-06 RX ADMIN — IPRATROPIUM BROMIDE AND ALBUTEROL SULFATE 3 ML: .5; 3 SOLUTION RESPIRATORY (INHALATION) at 06:49

## 2022-11-06 RX ADMIN — BUMETANIDE 2 MG: 0.25 INJECTION, SOLUTION INTRAMUSCULAR; INTRAVENOUS at 16:55

## 2022-11-06 RX ADMIN — HEPARIN SODIUM 5000 UNITS: 5000 INJECTION INTRAVENOUS; SUBCUTANEOUS at 20:44

## 2022-11-06 RX ADMIN — Medication 10 ML: at 08:31

## 2022-11-06 RX ADMIN — HYDRALAZINE HYDROCHLORIDE 10 MG: 20 INJECTION INTRAMUSCULAR; INTRAVENOUS at 03:27

## 2022-11-07 LAB
ANION GAP SERPL CALCULATED.3IONS-SCNC: 12.9 MMOL/L (ref 5–15)
BACTERIA SPEC AEROBE CULT: NORMAL
BACTERIA SPEC AEROBE CULT: NORMAL
BUN SERPL-MCNC: 89 MG/DL (ref 8–23)
BUN/CREAT SERPL: 24.9 (ref 7–25)
CALCIUM SPEC-SCNC: 9 MG/DL (ref 8.6–10.5)
CHLORIDE SERPL-SCNC: 102 MMOL/L (ref 98–107)
CO2 SERPL-SCNC: 19.1 MMOL/L (ref 22–29)
CREAT SERPL-MCNC: 3.58 MG/DL (ref 0.57–1)
DEPRECATED RDW RBC AUTO: 54.7 FL (ref 37–54)
EGFRCR SERPLBLD CKD-EPI 2021: 12.4 ML/MIN/1.73
ERYTHROCYTE [DISTWIDTH] IN BLOOD BY AUTOMATED COUNT: 16.6 % (ref 12.3–15.4)
GLUCOSE BLDC GLUCOMTR-MCNC: 142 MG/DL (ref 70–130)
GLUCOSE BLDC GLUCOMTR-MCNC: 193 MG/DL (ref 70–130)
GLUCOSE BLDC GLUCOMTR-MCNC: 197 MG/DL (ref 70–130)
GLUCOSE BLDC GLUCOMTR-MCNC: 226 MG/DL (ref 70–130)
GLUCOSE SERPL-MCNC: 145 MG/DL (ref 65–99)
HCT VFR BLD AUTO: 27.7 % (ref 34–46.6)
HGB BLD-MCNC: 8.2 G/DL (ref 12–15.9)
MCH RBC QN AUTO: 27.2 PG (ref 26.6–33)
MCHC RBC AUTO-ENTMCNC: 29.6 G/DL (ref 31.5–35.7)
MCV RBC AUTO: 92 FL (ref 79–97)
PLATELET # BLD AUTO: 226 10*3/MM3 (ref 140–450)
PMV BLD AUTO: 8.8 FL (ref 6–12)
POTASSIUM SERPL-SCNC: 4.9 MMOL/L (ref 3.5–5.2)
RBC # BLD AUTO: 3.01 10*6/MM3 (ref 3.77–5.28)
SODIUM SERPL-SCNC: 134 MMOL/L (ref 136–145)
WBC NRBC COR # BLD: 7.71 10*3/MM3 (ref 3.4–10.8)

## 2022-11-07 PROCEDURE — 99233 SBSQ HOSP IP/OBS HIGH 50: CPT

## 2022-11-07 PROCEDURE — 85027 COMPLETE CBC AUTOMATED: CPT

## 2022-11-07 PROCEDURE — 94762 N-INVAS EAR/PLS OXIMTRY CONT: CPT

## 2022-11-07 PROCEDURE — 80048 BASIC METABOLIC PNL TOTAL CA: CPT

## 2022-11-07 PROCEDURE — 94799 UNLISTED PULMONARY SVC/PX: CPT

## 2022-11-07 PROCEDURE — 94761 N-INVAS EAR/PLS OXIMETRY MLT: CPT

## 2022-11-07 PROCEDURE — 25010000002 HEPARIN (PORCINE) PER 1000 UNITS: Performed by: INTERNAL MEDICINE

## 2022-11-07 PROCEDURE — 82962 GLUCOSE BLOOD TEST: CPT

## 2022-11-07 PROCEDURE — 63710000001 INSULIN ASPART PER 5 UNITS

## 2022-11-07 RX ORDER — TORSEMIDE 20 MG/1
40 TABLET ORAL DAILY
Status: DISCONTINUED | OUTPATIENT
Start: 2022-11-07 | End: 2022-11-09

## 2022-11-07 RX ADMIN — HEPARIN SODIUM 5000 UNITS: 5000 INJECTION INTRAVENOUS; SUBCUTANEOUS at 20:15

## 2022-11-07 RX ADMIN — HYDROXYZINE HYDROCHLORIDE 25 MG: 25 TABLET ORAL at 20:15

## 2022-11-07 RX ADMIN — HYDRALAZINE HYDROCHLORIDE 100 MG: 50 TABLET, FILM COATED ORAL at 14:47

## 2022-11-07 RX ADMIN — ESCITALOPRAM 10 MG: 10 TABLET, FILM COATED ORAL at 08:57

## 2022-11-07 RX ADMIN — AMLODIPINE BESYLATE 10 MG: 10 TABLET ORAL at 08:57

## 2022-11-07 RX ADMIN — IPRATROPIUM BROMIDE AND ALBUTEROL SULFATE 3 ML: .5; 3 SOLUTION RESPIRATORY (INHALATION) at 06:34

## 2022-11-07 RX ADMIN — Medication 10 MG: at 23:14

## 2022-11-07 RX ADMIN — INSULIN ASPART 3 UNITS: 100 INJECTION, SOLUTION INTRAVENOUS; SUBCUTANEOUS at 17:14

## 2022-11-07 RX ADMIN — HEPARIN SODIUM 5000 UNITS: 5000 INJECTION INTRAVENOUS; SUBCUTANEOUS at 05:18

## 2022-11-07 RX ADMIN — Medication 10 ML: at 08:57

## 2022-11-07 RX ADMIN — TORSEMIDE 40 MG: 20 TABLET ORAL at 11:14

## 2022-11-07 RX ADMIN — PANTOPRAZOLE SODIUM 40 MG: 40 TABLET, DELAYED RELEASE ORAL at 05:18

## 2022-11-07 RX ADMIN — IPRATROPIUM BROMIDE AND ALBUTEROL SULFATE 3 ML: .5; 3 SOLUTION RESPIRATORY (INHALATION) at 18:20

## 2022-11-07 RX ADMIN — INSULIN ASPART 2 UNITS: 100 INJECTION, SOLUTION INTRAVENOUS; SUBCUTANEOUS at 11:14

## 2022-11-07 RX ADMIN — HEPARIN SODIUM 5000 UNITS: 5000 INJECTION INTRAVENOUS; SUBCUTANEOUS at 14:48

## 2022-11-07 RX ADMIN — HYDRALAZINE HYDROCHLORIDE 100 MG: 50 TABLET, FILM COATED ORAL at 20:15

## 2022-11-07 RX ADMIN — ATORVASTATIN CALCIUM 40 MG: 40 TABLET, FILM COATED ORAL at 20:15

## 2022-11-07 RX ADMIN — METOPROLOL SUCCINATE 75 MG: 50 TABLET, EXTENDED RELEASE ORAL at 08:57

## 2022-11-07 RX ADMIN — HYDRALAZINE HYDROCHLORIDE 100 MG: 50 TABLET, FILM COATED ORAL at 05:17

## 2022-11-07 RX ADMIN — Medication 150 MG: at 08:57

## 2022-11-07 RX ADMIN — PANTOPRAZOLE SODIUM 40 MG: 40 TABLET, DELAYED RELEASE ORAL at 17:15

## 2022-11-07 RX ADMIN — LEVOTHYROXINE SODIUM 25 MCG: 25 TABLET ORAL at 05:17

## 2022-11-07 RX ADMIN — BUMETANIDE 2 MG: 0.25 INJECTION, SOLUTION INTRAMUSCULAR; INTRAVENOUS at 08:57

## 2022-11-07 RX ADMIN — ISOSORBIDE MONONITRATE 60 MG: 60 TABLET, EXTENDED RELEASE ORAL at 08:57

## 2022-11-08 ENCOUNTER — APPOINTMENT (OUTPATIENT)
Dept: GENERAL RADIOLOGY | Facility: HOSPITAL | Age: 77
End: 2022-11-08

## 2022-11-08 LAB
ANION GAP SERPL CALCULATED.3IONS-SCNC: 12.8 MMOL/L (ref 5–15)
BASOPHILS # BLD AUTO: 0.02 10*3/MM3 (ref 0–0.2)
BASOPHILS NFR BLD AUTO: 0.3 % (ref 0–1.5)
BUN SERPL-MCNC: 92 MG/DL (ref 8–23)
BUN/CREAT SERPL: 23.9 (ref 7–25)
CALCIUM SPEC-SCNC: 9.1 MG/DL (ref 8.6–10.5)
CHLORIDE SERPL-SCNC: 104 MMOL/L (ref 98–107)
CO2 SERPL-SCNC: 18.2 MMOL/L (ref 22–29)
CREAT SERPL-MCNC: 3.85 MG/DL (ref 0.57–1)
DEPRECATED RDW RBC AUTO: 54.8 FL (ref 37–54)
EGFRCR SERPLBLD CKD-EPI 2021: 11.4 ML/MIN/1.73
EOSINOPHIL # BLD AUTO: 0.14 10*3/MM3 (ref 0–0.4)
EOSINOPHIL NFR BLD AUTO: 1.8 % (ref 0.3–6.2)
ERYTHROCYTE [DISTWIDTH] IN BLOOD BY AUTOMATED COUNT: 16.9 % (ref 12.3–15.4)
GLUCOSE BLDC GLUCOMTR-MCNC: 150 MG/DL (ref 70–130)
GLUCOSE BLDC GLUCOMTR-MCNC: 163 MG/DL (ref 70–130)
GLUCOSE BLDC GLUCOMTR-MCNC: 194 MG/DL (ref 70–130)
GLUCOSE BLDC GLUCOMTR-MCNC: 200 MG/DL (ref 70–130)
GLUCOSE SERPL-MCNC: 188 MG/DL (ref 65–99)
HCT VFR BLD AUTO: 28.2 % (ref 34–46.6)
HGB BLD-MCNC: 8.4 G/DL (ref 12–15.9)
IMM GRANULOCYTES # BLD AUTO: 0.02 10*3/MM3 (ref 0–0.05)
IMM GRANULOCYTES NFR BLD AUTO: 0.3 % (ref 0–0.5)
LYMPHOCYTES # BLD AUTO: 0.63 10*3/MM3 (ref 0.7–3.1)
LYMPHOCYTES NFR BLD AUTO: 8.1 % (ref 19.6–45.3)
MCH RBC QN AUTO: 27.1 PG (ref 26.6–33)
MCHC RBC AUTO-ENTMCNC: 29.8 G/DL (ref 31.5–35.7)
MCV RBC AUTO: 91 FL (ref 79–97)
MONOCYTES # BLD AUTO: 0.69 10*3/MM3 (ref 0.1–0.9)
MONOCYTES NFR BLD AUTO: 8.9 % (ref 5–12)
NEUTROPHILS NFR BLD AUTO: 6.26 10*3/MM3 (ref 1.7–7)
NEUTROPHILS NFR BLD AUTO: 80.6 % (ref 42.7–76)
NRBC BLD AUTO-RTO: 0 /100 WBC (ref 0–0.2)
NT-PROBNP SERPL-MCNC: ABNORMAL PG/ML (ref 0–1800)
PLATELET # BLD AUTO: 221 10*3/MM3 (ref 140–450)
PMV BLD AUTO: 9 FL (ref 6–12)
POTASSIUM SERPL-SCNC: 5.1 MMOL/L (ref 3.5–5.2)
RBC # BLD AUTO: 3.1 10*6/MM3 (ref 3.77–5.28)
SODIUM SERPL-SCNC: 135 MMOL/L (ref 136–145)
WBC NRBC COR # BLD: 7.76 10*3/MM3 (ref 3.4–10.8)

## 2022-11-08 PROCEDURE — 97530 THERAPEUTIC ACTIVITIES: CPT

## 2022-11-08 PROCEDURE — 85025 COMPLETE CBC W/AUTO DIFF WBC: CPT | Performed by: FAMILY MEDICINE

## 2022-11-08 PROCEDURE — 94761 N-INVAS EAR/PLS OXIMETRY MLT: CPT

## 2022-11-08 PROCEDURE — 99232 SBSQ HOSP IP/OBS MODERATE 35: CPT

## 2022-11-08 PROCEDURE — 83880 ASSAY OF NATRIURETIC PEPTIDE: CPT | Performed by: INTERNAL MEDICINE

## 2022-11-08 PROCEDURE — 25010000002 HEPARIN (PORCINE) PER 1000 UNITS: Performed by: INTERNAL MEDICINE

## 2022-11-08 PROCEDURE — 94799 UNLISTED PULMONARY SVC/PX: CPT

## 2022-11-08 PROCEDURE — 82962 GLUCOSE BLOOD TEST: CPT

## 2022-11-08 PROCEDURE — 63710000001 INSULIN ASPART PER 5 UNITS

## 2022-11-08 PROCEDURE — 97110 THERAPEUTIC EXERCISES: CPT

## 2022-11-08 PROCEDURE — 71045 X-RAY EXAM CHEST 1 VIEW: CPT | Performed by: RADIOLOGY

## 2022-11-08 PROCEDURE — 71045 X-RAY EXAM CHEST 1 VIEW: CPT

## 2022-11-08 PROCEDURE — 80048 BASIC METABOLIC PNL TOTAL CA: CPT

## 2022-11-08 RX ORDER — POLYETHYLENE GLYCOL 3350 17 G/17G
17 POWDER, FOR SOLUTION ORAL DAILY
Status: DISCONTINUED | OUTPATIENT
Start: 2022-11-09 | End: 2022-11-24

## 2022-11-08 RX ORDER — AMOXICILLIN 250 MG
2 CAPSULE ORAL 2 TIMES DAILY PRN
Status: DISCONTINUED | OUTPATIENT
Start: 2022-11-08 | End: 2022-11-30 | Stop reason: HOSPADM

## 2022-11-08 RX ADMIN — HEPARIN SODIUM 5000 UNITS: 5000 INJECTION INTRAVENOUS; SUBCUTANEOUS at 07:04

## 2022-11-08 RX ADMIN — IPRATROPIUM BROMIDE AND ALBUTEROL SULFATE 3 ML: .5; 3 SOLUTION RESPIRATORY (INHALATION) at 07:04

## 2022-11-08 RX ADMIN — Medication 150 MG: at 08:41

## 2022-11-08 RX ADMIN — HYDRALAZINE HYDROCHLORIDE 100 MG: 50 TABLET, FILM COATED ORAL at 20:36

## 2022-11-08 RX ADMIN — INSULIN ASPART 2 UNITS: 100 INJECTION, SOLUTION INTRAVENOUS; SUBCUTANEOUS at 12:01

## 2022-11-08 RX ADMIN — INSULIN ASPART 2 UNITS: 100 INJECTION, SOLUTION INTRAVENOUS; SUBCUTANEOUS at 08:40

## 2022-11-08 RX ADMIN — IPRATROPIUM BROMIDE AND ALBUTEROL SULFATE 3 ML: .5; 3 SOLUTION RESPIRATORY (INHALATION) at 18:34

## 2022-11-08 RX ADMIN — ISOSORBIDE MONONITRATE 60 MG: 60 TABLET, EXTENDED RELEASE ORAL at 08:41

## 2022-11-08 RX ADMIN — PANTOPRAZOLE SODIUM 40 MG: 40 TABLET, DELAYED RELEASE ORAL at 17:11

## 2022-11-08 RX ADMIN — ESCITALOPRAM 10 MG: 10 TABLET, FILM COATED ORAL at 08:40

## 2022-11-08 RX ADMIN — Medication 10 ML: at 08:41

## 2022-11-08 RX ADMIN — LEVOTHYROXINE SODIUM 25 MCG: 25 TABLET ORAL at 07:04

## 2022-11-08 RX ADMIN — HEPARIN SODIUM 5000 UNITS: 5000 INJECTION INTRAVENOUS; SUBCUTANEOUS at 20:35

## 2022-11-08 RX ADMIN — HYDRALAZINE HYDROCHLORIDE 100 MG: 50 TABLET, FILM COATED ORAL at 15:05

## 2022-11-08 RX ADMIN — INSULIN ASPART 3 UNITS: 100 INJECTION, SOLUTION INTRAVENOUS; SUBCUTANEOUS at 17:11

## 2022-11-08 RX ADMIN — PANTOPRAZOLE SODIUM 40 MG: 40 TABLET, DELAYED RELEASE ORAL at 07:04

## 2022-11-08 RX ADMIN — HEPARIN SODIUM 5000 UNITS: 5000 INJECTION INTRAVENOUS; SUBCUTANEOUS at 15:05

## 2022-11-08 RX ADMIN — AMLODIPINE BESYLATE 10 MG: 10 TABLET ORAL at 08:40

## 2022-11-08 RX ADMIN — TORSEMIDE 40 MG: 20 TABLET ORAL at 08:40

## 2022-11-08 RX ADMIN — ATORVASTATIN CALCIUM 40 MG: 40 TABLET, FILM COATED ORAL at 20:36

## 2022-11-08 RX ADMIN — HYDRALAZINE HYDROCHLORIDE 100 MG: 50 TABLET, FILM COATED ORAL at 07:06

## 2022-11-08 RX ADMIN — METOPROLOL SUCCINATE 75 MG: 50 TABLET, EXTENDED RELEASE ORAL at 08:40

## 2022-11-09 LAB
ANION GAP SERPL CALCULATED.3IONS-SCNC: 13.6 MMOL/L (ref 5–15)
BUN SERPL-MCNC: 95 MG/DL (ref 8–23)
BUN/CREAT SERPL: 24.1 (ref 7–25)
CALCIUM SPEC-SCNC: 8.7 MG/DL (ref 8.6–10.5)
CHLORIDE SERPL-SCNC: 103 MMOL/L (ref 98–107)
CO2 SERPL-SCNC: 17.4 MMOL/L (ref 22–29)
CREAT SERPL-MCNC: 3.95 MG/DL (ref 0.57–1)
DEPRECATED RDW RBC AUTO: 55.2 FL (ref 37–54)
EGFRCR SERPLBLD CKD-EPI 2021: 11 ML/MIN/1.73
ERYTHROCYTE [DISTWIDTH] IN BLOOD BY AUTOMATED COUNT: 16.9 % (ref 12.3–15.4)
GLUCOSE BLDC GLUCOMTR-MCNC: 133 MG/DL (ref 70–130)
GLUCOSE BLDC GLUCOMTR-MCNC: 146 MG/DL (ref 70–130)
GLUCOSE BLDC GLUCOMTR-MCNC: 158 MG/DL (ref 70–130)
GLUCOSE BLDC GLUCOMTR-MCNC: 176 MG/DL (ref 70–130)
GLUCOSE SERPL-MCNC: 154 MG/DL (ref 65–99)
HCT VFR BLD AUTO: 25.5 % (ref 34–46.6)
HGB BLD-MCNC: 7.7 G/DL (ref 12–15.9)
MCH RBC QN AUTO: 27.2 PG (ref 26.6–33)
MCHC RBC AUTO-ENTMCNC: 30.2 G/DL (ref 31.5–35.7)
MCV RBC AUTO: 90.1 FL (ref 79–97)
PLATELET # BLD AUTO: 191 10*3/MM3 (ref 140–450)
PMV BLD AUTO: 9.1 FL (ref 6–12)
POTASSIUM SERPL-SCNC: 5 MMOL/L (ref 3.5–5.2)
RBC # BLD AUTO: 2.83 10*6/MM3 (ref 3.77–5.28)
SODIUM SERPL-SCNC: 134 MMOL/L (ref 136–145)
WBC NRBC COR # BLD: 6.74 10*3/MM3 (ref 3.4–10.8)

## 2022-11-09 PROCEDURE — 94799 UNLISTED PULMONARY SVC/PX: CPT

## 2022-11-09 PROCEDURE — 94761 N-INVAS EAR/PLS OXIMETRY MLT: CPT

## 2022-11-09 PROCEDURE — 25010000002 HEPARIN (PORCINE) PER 1000 UNITS: Performed by: INTERNAL MEDICINE

## 2022-11-09 PROCEDURE — 99231 SBSQ HOSP IP/OBS SF/LOW 25: CPT | Performed by: FAMILY MEDICINE

## 2022-11-09 PROCEDURE — 82962 GLUCOSE BLOOD TEST: CPT

## 2022-11-09 PROCEDURE — 80048 BASIC METABOLIC PNL TOTAL CA: CPT

## 2022-11-09 PROCEDURE — 85027 COMPLETE CBC AUTOMATED: CPT

## 2022-11-09 PROCEDURE — 63710000001 INSULIN ASPART PER 5 UNITS

## 2022-11-09 RX ADMIN — HYDRALAZINE HYDROCHLORIDE 100 MG: 50 TABLET, FILM COATED ORAL at 14:04

## 2022-11-09 RX ADMIN — Medication 150 MG: at 08:24

## 2022-11-09 RX ADMIN — Medication 10 ML: at 21:04

## 2022-11-09 RX ADMIN — INSULIN ASPART 2 UNITS: 100 INJECTION, SOLUTION INTRAVENOUS; SUBCUTANEOUS at 17:50

## 2022-11-09 RX ADMIN — HYDRALAZINE HYDROCHLORIDE 100 MG: 50 TABLET, FILM COATED ORAL at 06:28

## 2022-11-09 RX ADMIN — HEPARIN SODIUM 5000 UNITS: 5000 INJECTION INTRAVENOUS; SUBCUTANEOUS at 21:03

## 2022-11-09 RX ADMIN — ATORVASTATIN CALCIUM 40 MG: 40 TABLET, FILM COATED ORAL at 21:04

## 2022-11-09 RX ADMIN — LEVOTHYROXINE SODIUM 25 MCG: 25 TABLET ORAL at 06:28

## 2022-11-09 RX ADMIN — PANTOPRAZOLE SODIUM 40 MG: 40 TABLET, DELAYED RELEASE ORAL at 06:28

## 2022-11-09 RX ADMIN — METOPROLOL SUCCINATE 75 MG: 50 TABLET, EXTENDED RELEASE ORAL at 08:25

## 2022-11-09 RX ADMIN — ESCITALOPRAM 10 MG: 10 TABLET, FILM COATED ORAL at 08:25

## 2022-11-09 RX ADMIN — PANTOPRAZOLE SODIUM 40 MG: 40 TABLET, DELAYED RELEASE ORAL at 17:52

## 2022-11-09 RX ADMIN — HYDRALAZINE HYDROCHLORIDE 100 MG: 50 TABLET, FILM COATED ORAL at 21:03

## 2022-11-09 RX ADMIN — INSULIN ASPART 2 UNITS: 100 INJECTION, SOLUTION INTRAVENOUS; SUBCUTANEOUS at 11:37

## 2022-11-09 RX ADMIN — ISOSORBIDE MONONITRATE 60 MG: 60 TABLET, EXTENDED RELEASE ORAL at 08:24

## 2022-11-09 RX ADMIN — HEPARIN SODIUM 5000 UNITS: 5000 INJECTION INTRAVENOUS; SUBCUTANEOUS at 06:28

## 2022-11-09 RX ADMIN — AMLODIPINE BESYLATE 10 MG: 10 TABLET ORAL at 08:25

## 2022-11-09 RX ADMIN — HEPARIN SODIUM 5000 UNITS: 5000 INJECTION INTRAVENOUS; SUBCUTANEOUS at 14:04

## 2022-11-09 RX ADMIN — IPRATROPIUM BROMIDE AND ALBUTEROL SULFATE 3 ML: .5; 3 SOLUTION RESPIRATORY (INHALATION) at 18:36

## 2022-11-09 RX ADMIN — POLYETHYLENE GLYCOL (3350) 17 G: 17 POWDER, FOR SOLUTION ORAL at 08:25

## 2022-11-09 RX ADMIN — IPRATROPIUM BROMIDE AND ALBUTEROL SULFATE 3 ML: .5; 3 SOLUTION RESPIRATORY (INHALATION) at 07:00

## 2022-11-10 LAB
ANION GAP SERPL CALCULATED.3IONS-SCNC: 14.4 MMOL/L (ref 5–15)
BASOPHILS # BLD AUTO: 0.01 10*3/MM3 (ref 0–0.2)
BASOPHILS NFR BLD AUTO: 0.1 % (ref 0–1.5)
BUN SERPL-MCNC: 98 MG/DL (ref 8–23)
BUN/CREAT SERPL: 24.3 (ref 7–25)
CALCIUM SPEC-SCNC: 8.9 MG/DL (ref 8.6–10.5)
CHLORIDE SERPL-SCNC: 101 MMOL/L (ref 98–107)
CO2 SERPL-SCNC: 17.6 MMOL/L (ref 22–29)
CREAT SERPL-MCNC: 4.03 MG/DL (ref 0.57–1)
DEPRECATED RDW RBC AUTO: 55.7 FL (ref 37–54)
EGFRCR SERPLBLD CKD-EPI 2021: 10.8 ML/MIN/1.73
EOSINOPHIL # BLD AUTO: 0.15 10*3/MM3 (ref 0–0.4)
EOSINOPHIL NFR BLD AUTO: 2.1 % (ref 0.3–6.2)
ERYTHROCYTE [DISTWIDTH] IN BLOOD BY AUTOMATED COUNT: 16.8 % (ref 12.3–15.4)
GLUCOSE BLDC GLUCOMTR-MCNC: 122 MG/DL (ref 70–130)
GLUCOSE BLDC GLUCOMTR-MCNC: 140 MG/DL (ref 70–130)
GLUCOSE BLDC GLUCOMTR-MCNC: 174 MG/DL (ref 70–130)
GLUCOSE BLDC GLUCOMTR-MCNC: 183 MG/DL (ref 70–130)
GLUCOSE SERPL-MCNC: 133 MG/DL (ref 65–99)
HCT VFR BLD AUTO: 25.9 % (ref 34–46.6)
HGB BLD-MCNC: 7.7 G/DL (ref 12–15.9)
IMM GRANULOCYTES # BLD AUTO: 0.04 10*3/MM3 (ref 0–0.05)
IMM GRANULOCYTES NFR BLD AUTO: 0.6 % (ref 0–0.5)
LYMPHOCYTES # BLD AUTO: 0.79 10*3/MM3 (ref 0.7–3.1)
LYMPHOCYTES NFR BLD AUTO: 11 % (ref 19.6–45.3)
MCH RBC QN AUTO: 27.1 PG (ref 26.6–33)
MCHC RBC AUTO-ENTMCNC: 29.7 G/DL (ref 31.5–35.7)
MCV RBC AUTO: 91.2 FL (ref 79–97)
MONOCYTES # BLD AUTO: 0.74 10*3/MM3 (ref 0.1–0.9)
MONOCYTES NFR BLD AUTO: 10.3 % (ref 5–12)
NEUTROPHILS NFR BLD AUTO: 5.47 10*3/MM3 (ref 1.7–7)
NEUTROPHILS NFR BLD AUTO: 75.9 % (ref 42.7–76)
NRBC BLD AUTO-RTO: 0 /100 WBC (ref 0–0.2)
PLATELET # BLD AUTO: 195 10*3/MM3 (ref 140–450)
PMV BLD AUTO: 9.1 FL (ref 6–12)
POTASSIUM SERPL-SCNC: 5.5 MMOL/L (ref 3.5–5.2)
RBC # BLD AUTO: 2.84 10*6/MM3 (ref 3.77–5.28)
SODIUM SERPL-SCNC: 133 MMOL/L (ref 136–145)
WBC NRBC COR # BLD: 7.2 10*3/MM3 (ref 3.4–10.8)

## 2022-11-10 PROCEDURE — 25010000002 ALBUMIN HUMAN 25% PER 50 ML: Performed by: INTERNAL MEDICINE

## 2022-11-10 PROCEDURE — 94799 UNLISTED PULMONARY SVC/PX: CPT

## 2022-11-10 PROCEDURE — 99232 SBSQ HOSP IP/OBS MODERATE 35: CPT | Performed by: HOSPITALIST

## 2022-11-10 PROCEDURE — 82962 GLUCOSE BLOOD TEST: CPT

## 2022-11-10 PROCEDURE — 97110 THERAPEUTIC EXERCISES: CPT

## 2022-11-10 PROCEDURE — 63710000001 INSULIN ASPART PER 5 UNITS

## 2022-11-10 PROCEDURE — 94664 DEMO&/EVAL PT USE INHALER: CPT

## 2022-11-10 PROCEDURE — 94761 N-INVAS EAR/PLS OXIMETRY MLT: CPT

## 2022-11-10 PROCEDURE — 97530 THERAPEUTIC ACTIVITIES: CPT

## 2022-11-10 PROCEDURE — 80048 BASIC METABOLIC PNL TOTAL CA: CPT | Performed by: FAMILY MEDICINE

## 2022-11-10 PROCEDURE — P9047 ALBUMIN (HUMAN), 25%, 50ML: HCPCS | Performed by: INTERNAL MEDICINE

## 2022-11-10 PROCEDURE — 25010000002 HEPARIN (PORCINE) PER 1000 UNITS: Performed by: INTERNAL MEDICINE

## 2022-11-10 PROCEDURE — 85025 COMPLETE CBC W/AUTO DIFF WBC: CPT | Performed by: FAMILY MEDICINE

## 2022-11-10 RX ORDER — ALBUMIN (HUMAN) 12.5 G/50ML
25 SOLUTION INTRAVENOUS ONCE
Status: COMPLETED | OUTPATIENT
Start: 2022-11-10 | End: 2022-11-10

## 2022-11-10 RX ORDER — SODIUM BICARBONATE 650 MG/1
650 TABLET ORAL 3 TIMES DAILY
Status: DISCONTINUED | OUTPATIENT
Start: 2022-11-10 | End: 2022-11-15

## 2022-11-10 RX ADMIN — IPRATROPIUM BROMIDE AND ALBUTEROL SULFATE 3 ML: .5; 3 SOLUTION RESPIRATORY (INHALATION) at 18:35

## 2022-11-10 RX ADMIN — LEVOTHYROXINE SODIUM 25 MCG: 25 TABLET ORAL at 05:50

## 2022-11-10 RX ADMIN — HYDRALAZINE HYDROCHLORIDE 100 MG: 50 TABLET, FILM COATED ORAL at 21:05

## 2022-11-10 RX ADMIN — INSULIN ASPART 2 UNITS: 100 INJECTION, SOLUTION INTRAVENOUS; SUBCUTANEOUS at 17:22

## 2022-11-10 RX ADMIN — AMLODIPINE BESYLATE 10 MG: 10 TABLET ORAL at 10:32

## 2022-11-10 RX ADMIN — PANTOPRAZOLE SODIUM 40 MG: 40 TABLET, DELAYED RELEASE ORAL at 05:51

## 2022-11-10 RX ADMIN — HYDRALAZINE HYDROCHLORIDE 100 MG: 50 TABLET, FILM COATED ORAL at 14:47

## 2022-11-10 RX ADMIN — POLYETHYLENE GLYCOL (3350) 17 G: 17 POWDER, FOR SOLUTION ORAL at 10:33

## 2022-11-10 RX ADMIN — PANTOPRAZOLE SODIUM 40 MG: 40 TABLET, DELAYED RELEASE ORAL at 17:22

## 2022-11-10 RX ADMIN — OFLOXACIN 50000 UNITS: 300 TABLET, COATED ORAL at 10:31

## 2022-11-10 RX ADMIN — Medication 10 ML: at 21:06

## 2022-11-10 RX ADMIN — ALBUMIN (HUMAN) 25 G: 0.25 INJECTION, SOLUTION INTRAVENOUS at 10:41

## 2022-11-10 RX ADMIN — ISOSORBIDE MONONITRATE 60 MG: 60 TABLET, EXTENDED RELEASE ORAL at 10:30

## 2022-11-10 RX ADMIN — Medication 10 ML: at 10:33

## 2022-11-10 RX ADMIN — ATORVASTATIN CALCIUM 40 MG: 40 TABLET, FILM COATED ORAL at 21:05

## 2022-11-10 RX ADMIN — IPRATROPIUM BROMIDE AND ALBUTEROL SULFATE 3 ML: .5; 3 SOLUTION RESPIRATORY (INHALATION) at 06:34

## 2022-11-10 RX ADMIN — HYDRALAZINE HYDROCHLORIDE 100 MG: 50 TABLET, FILM COATED ORAL at 05:56

## 2022-11-10 RX ADMIN — HEPARIN SODIUM 5000 UNITS: 5000 INJECTION INTRAVENOUS; SUBCUTANEOUS at 21:05

## 2022-11-10 RX ADMIN — METOPROLOL SUCCINATE 75 MG: 50 TABLET, EXTENDED RELEASE ORAL at 10:32

## 2022-11-10 RX ADMIN — Medication 150 MG: at 12:07

## 2022-11-10 RX ADMIN — DOCUSATE SODIUM 50 MG AND SENNOSIDES 8.6 MG 2 TABLET: 8.6; 5 TABLET, FILM COATED ORAL at 21:05

## 2022-11-10 RX ADMIN — ESCITALOPRAM 10 MG: 10 TABLET, FILM COATED ORAL at 10:31

## 2022-11-10 RX ADMIN — SODIUM ZIRCONIUM CYCLOSILICATE 10 G: 10 POWDER, FOR SUSPENSION ORAL at 12:07

## 2022-11-10 RX ADMIN — HEPARIN SODIUM 5000 UNITS: 5000 INJECTION INTRAVENOUS; SUBCUTANEOUS at 14:48

## 2022-11-10 RX ADMIN — HEPARIN SODIUM 5000 UNITS: 5000 INJECTION INTRAVENOUS; SUBCUTANEOUS at 05:50

## 2022-11-11 LAB
ANION GAP SERPL CALCULATED.3IONS-SCNC: 13.8 MMOL/L (ref 5–15)
BUN SERPL-MCNC: 106 MG/DL (ref 8–23)
BUN/CREAT SERPL: 25.7 (ref 7–25)
CALCIUM SPEC-SCNC: 9 MG/DL (ref 8.6–10.5)
CHLORIDE SERPL-SCNC: 98 MMOL/L (ref 98–107)
CO2 SERPL-SCNC: 18.2 MMOL/L (ref 22–29)
CREAT SERPL-MCNC: 4.12 MG/DL (ref 0.57–1)
EGFRCR SERPLBLD CKD-EPI 2021: 10.5 ML/MIN/1.73
GLUCOSE BLDC GLUCOMTR-MCNC: 132 MG/DL (ref 70–130)
GLUCOSE BLDC GLUCOMTR-MCNC: 155 MG/DL (ref 70–130)
GLUCOSE BLDC GLUCOMTR-MCNC: 165 MG/DL (ref 70–130)
GLUCOSE BLDC GLUCOMTR-MCNC: 169 MG/DL (ref 70–130)
GLUCOSE SERPL-MCNC: 140 MG/DL (ref 65–99)
POTASSIUM SERPL-SCNC: 5.5 MMOL/L (ref 3.5–5.2)
SODIUM SERPL-SCNC: 130 MMOL/L (ref 136–145)

## 2022-11-11 PROCEDURE — 99232 SBSQ HOSP IP/OBS MODERATE 35: CPT | Performed by: HOSPITALIST

## 2022-11-11 PROCEDURE — 94761 N-INVAS EAR/PLS OXIMETRY MLT: CPT

## 2022-11-11 PROCEDURE — 94799 UNLISTED PULMONARY SVC/PX: CPT

## 2022-11-11 PROCEDURE — 82962 GLUCOSE BLOOD TEST: CPT

## 2022-11-11 PROCEDURE — 25010000002 FUROSEMIDE PER 20 MG: Performed by: INTERNAL MEDICINE

## 2022-11-11 PROCEDURE — 80048 BASIC METABOLIC PNL TOTAL CA: CPT | Performed by: HOSPITALIST

## 2022-11-11 PROCEDURE — 25010000002 HEPARIN (PORCINE) PER 1000 UNITS: Performed by: INTERNAL MEDICINE

## 2022-11-11 PROCEDURE — 63710000001 INSULIN ASPART PER 5 UNITS

## 2022-11-11 PROCEDURE — 94664 DEMO&/EVAL PT USE INHALER: CPT

## 2022-11-11 RX ORDER — FUROSEMIDE 10 MG/ML
80 INJECTION INTRAMUSCULAR; INTRAVENOUS EVERY 8 HOURS SCHEDULED
Status: COMPLETED | OUTPATIENT
Start: 2022-11-11 | End: 2022-11-11

## 2022-11-11 RX ADMIN — ISOSORBIDE MONONITRATE 60 MG: 60 TABLET, EXTENDED RELEASE ORAL at 09:31

## 2022-11-11 RX ADMIN — HYDRALAZINE HYDROCHLORIDE 100 MG: 50 TABLET, FILM COATED ORAL at 06:28

## 2022-11-11 RX ADMIN — Medication 10 MG: at 00:06

## 2022-11-11 RX ADMIN — LEVOTHYROXINE SODIUM 25 MCG: 25 TABLET ORAL at 06:29

## 2022-11-11 RX ADMIN — Medication 150 MG: at 09:31

## 2022-11-11 RX ADMIN — SODIUM ZIRCONIUM CYCLOSILICATE 10 G: 10 POWDER, FOR SUSPENSION ORAL at 21:38

## 2022-11-11 RX ADMIN — SODIUM BICARBONATE TAB 650 MG 650 MG: 650 TAB at 00:01

## 2022-11-11 RX ADMIN — Medication 10 ML: at 09:31

## 2022-11-11 RX ADMIN — FUROSEMIDE 80 MG: 10 INJECTION, SOLUTION INTRAVENOUS at 12:14

## 2022-11-11 RX ADMIN — POLYETHYLENE GLYCOL (3350) 17 G: 17 POWDER, FOR SOLUTION ORAL at 09:31

## 2022-11-11 RX ADMIN — HYDRALAZINE HYDROCHLORIDE 100 MG: 50 TABLET, FILM COATED ORAL at 15:39

## 2022-11-11 RX ADMIN — INSULIN ASPART 2 UNITS: 100 INJECTION, SOLUTION INTRAVENOUS; SUBCUTANEOUS at 12:14

## 2022-11-11 RX ADMIN — ESCITALOPRAM 10 MG: 10 TABLET, FILM COATED ORAL at 09:31

## 2022-11-11 RX ADMIN — SODIUM BICARBONATE TAB 650 MG 650 MG: 650 TAB at 15:39

## 2022-11-11 RX ADMIN — SODIUM BICARBONATE TAB 650 MG 650 MG: 650 TAB at 21:38

## 2022-11-11 RX ADMIN — FUROSEMIDE 80 MG: 10 INJECTION, SOLUTION INTRAVENOUS at 15:39

## 2022-11-11 RX ADMIN — INSULIN ASPART 2 UNITS: 100 INJECTION, SOLUTION INTRAVENOUS; SUBCUTANEOUS at 18:28

## 2022-11-11 RX ADMIN — FUROSEMIDE 80 MG: 10 INJECTION, SOLUTION INTRAVENOUS at 21:39

## 2022-11-11 RX ADMIN — HEPARIN SODIUM 5000 UNITS: 5000 INJECTION INTRAVENOUS; SUBCUTANEOUS at 21:39

## 2022-11-11 RX ADMIN — ATORVASTATIN CALCIUM 40 MG: 40 TABLET, FILM COATED ORAL at 21:38

## 2022-11-11 RX ADMIN — HEPARIN SODIUM 5000 UNITS: 5000 INJECTION INTRAVENOUS; SUBCUTANEOUS at 15:39

## 2022-11-11 RX ADMIN — AMLODIPINE BESYLATE 10 MG: 10 TABLET ORAL at 09:30

## 2022-11-11 RX ADMIN — PANTOPRAZOLE SODIUM 40 MG: 40 TABLET, DELAYED RELEASE ORAL at 18:28

## 2022-11-11 RX ADMIN — PANTOPRAZOLE SODIUM 40 MG: 40 TABLET, DELAYED RELEASE ORAL at 09:30

## 2022-11-11 RX ADMIN — HEPARIN SODIUM 5000 UNITS: 5000 INJECTION INTRAVENOUS; SUBCUTANEOUS at 06:29

## 2022-11-11 RX ADMIN — IPRATROPIUM BROMIDE AND ALBUTEROL SULFATE 3 ML: .5; 3 SOLUTION RESPIRATORY (INHALATION) at 00:17

## 2022-11-11 RX ADMIN — Medication 10 ML: at 21:39

## 2022-11-11 RX ADMIN — SODIUM BICARBONATE TAB 650 MG 650 MG: 650 TAB at 09:31

## 2022-11-11 RX ADMIN — METOPROLOL SUCCINATE 75 MG: 50 TABLET, EXTENDED RELEASE ORAL at 09:31

## 2022-11-11 RX ADMIN — HYDRALAZINE HYDROCHLORIDE 100 MG: 50 TABLET, FILM COATED ORAL at 21:38

## 2022-11-11 RX ADMIN — SODIUM ZIRCONIUM CYCLOSILICATE 10 G: 10 POWDER, FOR SUSPENSION ORAL at 09:31

## 2022-11-12 LAB
ANION GAP SERPL CALCULATED.3IONS-SCNC: 12.9 MMOL/L (ref 5–15)
BASOPHILS # BLD AUTO: 0.01 10*3/MM3 (ref 0–0.2)
BASOPHILS # BLD AUTO: 0.02 10*3/MM3 (ref 0–0.2)
BASOPHILS NFR BLD AUTO: 0.2 % (ref 0–1.5)
BASOPHILS NFR BLD AUTO: 0.3 % (ref 0–1.5)
BUN SERPL-MCNC: 110 MG/DL (ref 8–23)
BUN/CREAT SERPL: 27 (ref 7–25)
CALCIUM SPEC-SCNC: 8.9 MG/DL (ref 8.6–10.5)
CHLORIDE SERPL-SCNC: 99 MMOL/L (ref 98–107)
CO2 SERPL-SCNC: 19.1 MMOL/L (ref 22–29)
CREAT SERPL-MCNC: 4.07 MG/DL (ref 0.57–1)
DEPRECATED RDW RBC AUTO: 54.5 FL (ref 37–54)
DEPRECATED RDW RBC AUTO: 56.3 FL (ref 37–54)
EGFRCR SERPLBLD CKD-EPI 2021: 10.7 ML/MIN/1.73
EOSINOPHIL # BLD AUTO: 0.09 10*3/MM3 (ref 0–0.4)
EOSINOPHIL # BLD AUTO: 0.09 10*3/MM3 (ref 0–0.4)
EOSINOPHIL NFR BLD AUTO: 1.4 % (ref 0.3–6.2)
EOSINOPHIL NFR BLD AUTO: 1.4 % (ref 0.3–6.2)
ERYTHROCYTE [DISTWIDTH] IN BLOOD BY AUTOMATED COUNT: 16.8 % (ref 12.3–15.4)
ERYTHROCYTE [DISTWIDTH] IN BLOOD BY AUTOMATED COUNT: 16.9 % (ref 12.3–15.4)
GLUCOSE BLDC GLUCOMTR-MCNC: 148 MG/DL (ref 70–130)
GLUCOSE BLDC GLUCOMTR-MCNC: 162 MG/DL (ref 70–130)
GLUCOSE BLDC GLUCOMTR-MCNC: 174 MG/DL (ref 70–130)
GLUCOSE BLDC GLUCOMTR-MCNC: 216 MG/DL (ref 70–130)
GLUCOSE SERPL-MCNC: 148 MG/DL (ref 65–99)
HCT VFR BLD AUTO: 22.7 % (ref 34–46.6)
HCT VFR BLD AUTO: 22.9 % (ref 34–46.6)
HCT VFR BLD AUTO: 24.1 % (ref 34–46.6)
HGB BLD-MCNC: 7 G/DL (ref 12–15.9)
HGB BLD-MCNC: 7.1 G/DL (ref 12–15.9)
HGB BLD-MCNC: 7.2 G/DL (ref 12–15.9)
IMM GRANULOCYTES # BLD AUTO: 0.02 10*3/MM3 (ref 0–0.05)
IMM GRANULOCYTES # BLD AUTO: 0.02 10*3/MM3 (ref 0–0.05)
IMM GRANULOCYTES NFR BLD AUTO: 0.3 % (ref 0–0.5)
IMM GRANULOCYTES NFR BLD AUTO: 0.3 % (ref 0–0.5)
LYMPHOCYTES # BLD AUTO: 0.49 10*3/MM3 (ref 0.7–3.1)
LYMPHOCYTES # BLD AUTO: 0.62 10*3/MM3 (ref 0.7–3.1)
LYMPHOCYTES NFR BLD AUTO: 7.9 % (ref 19.6–45.3)
LYMPHOCYTES NFR BLD AUTO: 9.7 % (ref 19.6–45.3)
MCH RBC QN AUTO: 27.3 PG (ref 26.6–33)
MCH RBC QN AUTO: 27.6 PG (ref 26.6–33)
MCHC RBC AUTO-ENTMCNC: 29.9 G/DL (ref 31.5–35.7)
MCHC RBC AUTO-ENTMCNC: 31 G/DL (ref 31.5–35.7)
MCV RBC AUTO: 89.1 FL (ref 79–97)
MCV RBC AUTO: 91.3 FL (ref 79–97)
MONOCYTES # BLD AUTO: 0.42 10*3/MM3 (ref 0.1–0.9)
MONOCYTES # BLD AUTO: 0.56 10*3/MM3 (ref 0.1–0.9)
MONOCYTES NFR BLD AUTO: 6.8 % (ref 5–12)
MONOCYTES NFR BLD AUTO: 8.8 % (ref 5–12)
NEUTROPHILS NFR BLD AUTO: 5.08 10*3/MM3 (ref 1.7–7)
NEUTROPHILS NFR BLD AUTO: 5.18 10*3/MM3 (ref 1.7–7)
NEUTROPHILS NFR BLD AUTO: 79.5 % (ref 42.7–76)
NEUTROPHILS NFR BLD AUTO: 83.4 % (ref 42.7–76)
NRBC BLD AUTO-RTO: 0 /100 WBC (ref 0–0.2)
NRBC BLD AUTO-RTO: 0 /100 WBC (ref 0–0.2)
PLATELET # BLD AUTO: 163 10*3/MM3 (ref 140–450)
PLATELET # BLD AUTO: 165 10*3/MM3 (ref 140–450)
PMV BLD AUTO: 9.3 FL (ref 6–12)
PMV BLD AUTO: 9.3 FL (ref 6–12)
POTASSIUM SERPL-SCNC: 5 MMOL/L (ref 3.5–5.2)
RBC # BLD AUTO: 2.57 10*6/MM3 (ref 3.77–5.28)
RBC # BLD AUTO: 2.64 10*6/MM3 (ref 3.77–5.28)
SODIUM SERPL-SCNC: 131 MMOL/L (ref 136–145)
WBC NRBC COR # BLD: 6.21 10*3/MM3 (ref 3.4–10.8)
WBC NRBC COR # BLD: 6.39 10*3/MM3 (ref 3.4–10.8)

## 2022-11-12 PROCEDURE — 94799 UNLISTED PULMONARY SVC/PX: CPT

## 2022-11-12 PROCEDURE — 25010000002 HEPARIN (PORCINE) PER 1000 UNITS: Performed by: INTERNAL MEDICINE

## 2022-11-12 PROCEDURE — 80048 BASIC METABOLIC PNL TOTAL CA: CPT | Performed by: HOSPITALIST

## 2022-11-12 PROCEDURE — 83605 ASSAY OF LACTIC ACID: CPT | Performed by: PHYSICIAN ASSISTANT

## 2022-11-12 PROCEDURE — 87040 BLOOD CULTURE FOR BACTERIA: CPT | Performed by: PHYSICIAN ASSISTANT

## 2022-11-12 PROCEDURE — 82962 GLUCOSE BLOOD TEST: CPT

## 2022-11-12 PROCEDURE — 85018 HEMOGLOBIN: CPT | Performed by: PHYSICIAN ASSISTANT

## 2022-11-12 PROCEDURE — 99232 SBSQ HOSP IP/OBS MODERATE 35: CPT | Performed by: HOSPITALIST

## 2022-11-12 PROCEDURE — 25010000002 FUROSEMIDE PER 20 MG: Performed by: INTERNAL MEDICINE

## 2022-11-12 PROCEDURE — 63710000001 INSULIN ASPART PER 5 UNITS

## 2022-11-12 PROCEDURE — 85025 COMPLETE CBC W/AUTO DIFF WBC: CPT | Performed by: HOSPITALIST

## 2022-11-12 PROCEDURE — 85014 HEMATOCRIT: CPT | Performed by: PHYSICIAN ASSISTANT

## 2022-11-12 PROCEDURE — 94761 N-INVAS EAR/PLS OXIMETRY MLT: CPT

## 2022-11-12 RX ORDER — IPRATROPIUM BROMIDE AND ALBUTEROL SULFATE 2.5; .5 MG/3ML; MG/3ML
3 SOLUTION RESPIRATORY (INHALATION)
Status: DISCONTINUED | OUTPATIENT
Start: 2022-11-12 | End: 2022-11-27

## 2022-11-12 RX ORDER — FUROSEMIDE 10 MG/ML
80 INJECTION INTRAMUSCULAR; INTRAVENOUS EVERY 8 HOURS SCHEDULED
Status: COMPLETED | OUTPATIENT
Start: 2022-11-12 | End: 2022-11-12

## 2022-11-12 RX ADMIN — HYDRALAZINE HYDROCHLORIDE 100 MG: 50 TABLET, FILM COATED ORAL at 06:58

## 2022-11-12 RX ADMIN — HEPARIN SODIUM 5000 UNITS: 5000 INJECTION INTRAVENOUS; SUBCUTANEOUS at 13:37

## 2022-11-12 RX ADMIN — INSULIN ASPART 2 UNITS: 100 INJECTION, SOLUTION INTRAVENOUS; SUBCUTANEOUS at 18:24

## 2022-11-12 RX ADMIN — Medication 150 MG: at 08:14

## 2022-11-12 RX ADMIN — AMLODIPINE BESYLATE 10 MG: 10 TABLET ORAL at 08:14

## 2022-11-12 RX ADMIN — Medication 10 ML: at 22:27

## 2022-11-12 RX ADMIN — INSULIN ASPART 3 UNITS: 100 INJECTION, SOLUTION INTRAVENOUS; SUBCUTANEOUS at 13:38

## 2022-11-12 RX ADMIN — ISOSORBIDE MONONITRATE 60 MG: 60 TABLET, EXTENDED RELEASE ORAL at 08:14

## 2022-11-12 RX ADMIN — FUROSEMIDE 80 MG: 10 INJECTION, SOLUTION INTRAVENOUS at 15:43

## 2022-11-12 RX ADMIN — LEVOTHYROXINE SODIUM 25 MCG: 25 TABLET ORAL at 06:59

## 2022-11-12 RX ADMIN — HEPARIN SODIUM 5000 UNITS: 5000 INJECTION INTRAVENOUS; SUBCUTANEOUS at 06:59

## 2022-11-12 RX ADMIN — Medication 10 ML: at 08:13

## 2022-11-12 RX ADMIN — IPRATROPIUM BROMIDE AND ALBUTEROL SULFATE 3 ML: .5; 2.5 SOLUTION RESPIRATORY (INHALATION) at 18:44

## 2022-11-12 RX ADMIN — SODIUM BICARBONATE TAB 650 MG 650 MG: 650 TAB at 22:26

## 2022-11-12 RX ADMIN — POLYETHYLENE GLYCOL (3350) 17 G: 17 POWDER, FOR SOLUTION ORAL at 08:15

## 2022-11-12 RX ADMIN — HYDRALAZINE HYDROCHLORIDE 100 MG: 50 TABLET, FILM COATED ORAL at 15:42

## 2022-11-12 RX ADMIN — SODIUM ZIRCONIUM CYCLOSILICATE 10 G: 10 POWDER, FOR SUSPENSION ORAL at 11:27

## 2022-11-12 RX ADMIN — FUROSEMIDE 80 MG: 10 INJECTION, SOLUTION INTRAVENOUS at 11:26

## 2022-11-12 RX ADMIN — SODIUM BICARBONATE TAB 650 MG 650 MG: 650 TAB at 08:14

## 2022-11-12 RX ADMIN — HEPARIN SODIUM 5000 UNITS: 5000 INJECTION INTRAVENOUS; SUBCUTANEOUS at 22:26

## 2022-11-12 RX ADMIN — SODIUM BICARBONATE TAB 650 MG 650 MG: 650 TAB at 15:43

## 2022-11-12 RX ADMIN — METOPROLOL SUCCINATE 75 MG: 50 TABLET, EXTENDED RELEASE ORAL at 08:14

## 2022-11-12 RX ADMIN — PANTOPRAZOLE SODIUM 40 MG: 40 TABLET, DELAYED RELEASE ORAL at 08:14

## 2022-11-12 RX ADMIN — PANTOPRAZOLE SODIUM 40 MG: 40 TABLET, DELAYED RELEASE ORAL at 18:24

## 2022-11-12 RX ADMIN — HYDRALAZINE HYDROCHLORIDE 100 MG: 50 TABLET, FILM COATED ORAL at 22:26

## 2022-11-12 RX ADMIN — FUROSEMIDE 80 MG: 10 INJECTION, SOLUTION INTRAVENOUS at 22:27

## 2022-11-12 RX ADMIN — ATORVASTATIN CALCIUM 40 MG: 40 TABLET, FILM COATED ORAL at 22:26

## 2022-11-12 RX ADMIN — ESCITALOPRAM 10 MG: 10 TABLET, FILM COATED ORAL at 08:14

## 2022-11-12 RX ADMIN — IPRATROPIUM BROMIDE AND ALBUTEROL SULFATE 3 ML: .5; 3 SOLUTION RESPIRATORY (INHALATION) at 06:28

## 2022-11-13 LAB
A-A DO2: 76.9 MMHG (ref 0–300)
ABO GROUP BLD: NORMAL
ANION GAP SERPL CALCULATED.3IONS-SCNC: 14.2 MMOL/L (ref 5–15)
ARTERIAL PATENCY WRIST A: ABNORMAL
ATMOSPHERIC PRESS: 734 MMHG
BACTERIA UR QL AUTO: ABNORMAL /HPF
BASE EXCESS BLDA CALC-SCNC: -4.1 MMOL/L (ref 0–2)
BDY SITE: ABNORMAL
BILIRUB UR QL STRIP: NEGATIVE
BLD GP AB SCN SERPL QL: NEGATIVE
BODY TEMPERATURE: 0 C
BUN SERPL-MCNC: 106 MG/DL (ref 8–23)
BUN/CREAT SERPL: 24.5 (ref 7–25)
CALCIUM SPEC-SCNC: 8.6 MG/DL (ref 8.6–10.5)
CHLORIDE SERPL-SCNC: 99 MMOL/L (ref 98–107)
CLARITY UR: CLEAR
CO2 BLDA-SCNC: 21.8 MMOL/L (ref 22–33)
CO2 SERPL-SCNC: 17.8 MMOL/L (ref 22–29)
COHGB MFR BLD: 1.7 % (ref 0–5)
COLOR UR: YELLOW
CREAT SERPL-MCNC: 4.33 MG/DL (ref 0.57–1)
D-LACTATE SERPL-SCNC: 1.2 MMOL/L (ref 0.5–2)
EGFRCR SERPLBLD CKD-EPI 2021: 9.9 ML/MIN/1.73
GLUCOSE BLDC GLUCOMTR-MCNC: 117 MG/DL (ref 70–130)
GLUCOSE BLDC GLUCOMTR-MCNC: 136 MG/DL (ref 70–130)
GLUCOSE BLDC GLUCOMTR-MCNC: 163 MG/DL (ref 70–130)
GLUCOSE SERPL-MCNC: 146 MG/DL (ref 65–99)
GLUCOSE UR STRIP-MCNC: NEGATIVE MG/DL
HCO3 BLDA-SCNC: 20.7 MMOL/L (ref 20–26)
HCT VFR BLD AUTO: 22 % (ref 34–46.6)
HCT VFR BLD CALC: 21 % (ref 38–51)
HGB BLD-MCNC: 6.7 G/DL (ref 12–15.9)
HGB BLDA-MCNC: 6.8 G/DL (ref 13.5–17.5)
HGB UR QL STRIP.AUTO: NEGATIVE
HYALINE CASTS UR QL AUTO: ABNORMAL /LPF
INHALED O2 CONCENTRATION: 28 %
KETONES UR QL STRIP: NEGATIVE
LEUKOCYTE ESTERASE UR QL STRIP.AUTO: ABNORMAL
Lab: ABNORMAL
Lab: ABNORMAL
METHGB BLD QL: 0.5 % (ref 0–3)
MODALITY: ABNORMAL
NITRITE UR QL STRIP: NEGATIVE
NOTE: ABNORMAL
NOTIFIED BY: ABNORMAL
NOTIFIED WHO: ABNORMAL
OXYHGB MFR BLDV: 94.2 % (ref 94–99)
PCO2 BLDA: 35.5 MM HG (ref 35–45)
PCO2 TEMP ADJ BLD: ABNORMAL MM[HG]
PH BLDA: 7.37 PH UNITS (ref 7.35–7.45)
PH UR STRIP.AUTO: 5.5 [PH] (ref 5–8)
PH, TEMP CORRECTED: ABNORMAL
PO2 BLDA: 75.6 MM HG (ref 83–108)
PO2 TEMP ADJ BLD: ABNORMAL MM[HG]
POTASSIUM SERPL-SCNC: 4.9 MMOL/L (ref 3.5–5.2)
PROT UR QL STRIP: ABNORMAL
RBC # UR STRIP: ABNORMAL /HPF
REF LAB TEST METHOD: ABNORMAL
RH BLD: POSITIVE
SAO2 % BLDCOA: 96.3 % (ref 94–99)
SODIUM SERPL-SCNC: 131 MMOL/L (ref 136–145)
SP GR UR STRIP: 1.01 (ref 1–1.03)
SQUAMOUS #/AREA URNS HPF: ABNORMAL /HPF
T&S EXPIRATION DATE: NORMAL
UROBILINOGEN UR QL STRIP: ABNORMAL
VENTILATOR MODE: ABNORMAL
WBC # UR STRIP: ABNORMAL /HPF

## 2022-11-13 PROCEDURE — P9047 ALBUMIN (HUMAN), 25%, 50ML: HCPCS | Performed by: INTERNAL MEDICINE

## 2022-11-13 PROCEDURE — 25010000002 ALBUMIN HUMAN 25% PER 50 ML: Performed by: INTERNAL MEDICINE

## 2022-11-13 PROCEDURE — 86900 BLOOD TYPING SEROLOGIC ABO: CPT

## 2022-11-13 PROCEDURE — 82962 GLUCOSE BLOOD TEST: CPT

## 2022-11-13 PROCEDURE — 86850 RBC ANTIBODY SCREEN: CPT | Performed by: HOSPITALIST

## 2022-11-13 PROCEDURE — 85018 HEMOGLOBIN: CPT | Performed by: HOSPITALIST

## 2022-11-13 PROCEDURE — 85014 HEMATOCRIT: CPT | Performed by: HOSPITALIST

## 2022-11-13 PROCEDURE — 82805 BLOOD GASES W/O2 SATURATION: CPT

## 2022-11-13 PROCEDURE — 94761 N-INVAS EAR/PLS OXIMETRY MLT: CPT

## 2022-11-13 PROCEDURE — P9016 RBC LEUKOCYTES REDUCED: HCPCS

## 2022-11-13 PROCEDURE — 83050 HGB METHEMOGLOBIN QUAN: CPT

## 2022-11-13 PROCEDURE — 63710000001 INSULIN ASPART PER 5 UNITS

## 2022-11-13 PROCEDURE — 86900 BLOOD TYPING SEROLOGIC ABO: CPT | Performed by: HOSPITALIST

## 2022-11-13 PROCEDURE — 82375 ASSAY CARBOXYHB QUANT: CPT

## 2022-11-13 PROCEDURE — 36430 TRANSFUSION BLD/BLD COMPNT: CPT

## 2022-11-13 PROCEDURE — 25010000002 HEPARIN (PORCINE) PER 1000 UNITS: Performed by: INTERNAL MEDICINE

## 2022-11-13 PROCEDURE — 94799 UNLISTED PULMONARY SVC/PX: CPT

## 2022-11-13 PROCEDURE — 81001 URINALYSIS AUTO W/SCOPE: CPT | Performed by: HOSPITALIST

## 2022-11-13 PROCEDURE — 25010000002 FUROSEMIDE PER 20 MG: Performed by: INTERNAL MEDICINE

## 2022-11-13 PROCEDURE — 86923 COMPATIBILITY TEST ELECTRIC: CPT

## 2022-11-13 PROCEDURE — 99232 SBSQ HOSP IP/OBS MODERATE 35: CPT | Performed by: HOSPITALIST

## 2022-11-13 PROCEDURE — 25010000002 CHLOROTHIAZIDE PER 500 MG: Performed by: INTERNAL MEDICINE

## 2022-11-13 PROCEDURE — 86901 BLOOD TYPING SEROLOGIC RH(D): CPT | Performed by: HOSPITALIST

## 2022-11-13 PROCEDURE — 36600 WITHDRAWAL OF ARTERIAL BLOOD: CPT

## 2022-11-13 PROCEDURE — 87086 URINE CULTURE/COLONY COUNT: CPT | Performed by: HOSPITALIST

## 2022-11-13 RX ORDER — ALBUMIN (HUMAN) 12.5 G/50ML
25 SOLUTION INTRAVENOUS ONCE
Status: COMPLETED | OUTPATIENT
Start: 2022-11-13 | End: 2022-11-13

## 2022-11-13 RX ORDER — FUROSEMIDE 10 MG/ML
40 INJECTION INTRAMUSCULAR; INTRAVENOUS ONCE
Status: COMPLETED | OUTPATIENT
Start: 2022-11-13 | End: 2022-11-13

## 2022-11-13 RX ORDER — NYSTATIN 100000 [USP'U]/G
POWDER TOPICAL EVERY 12 HOURS SCHEDULED
Status: DISCONTINUED | OUTPATIENT
Start: 2022-11-13 | End: 2022-11-30 | Stop reason: HOSPADM

## 2022-11-13 RX ADMIN — NYSTATIN: 100000 POWDER TOPICAL at 08:42

## 2022-11-13 RX ADMIN — SODIUM BICARBONATE TAB 650 MG 650 MG: 650 TAB at 08:23

## 2022-11-13 RX ADMIN — FUROSEMIDE 120 MG: 10 INJECTION, SOLUTION INTRAMUSCULAR; INTRAVENOUS at 10:34

## 2022-11-13 RX ADMIN — HYDRALAZINE HYDROCHLORIDE 100 MG: 50 TABLET, FILM COATED ORAL at 05:55

## 2022-11-13 RX ADMIN — POLYETHYLENE GLYCOL (3350) 17 G: 17 POWDER, FOR SOLUTION ORAL at 08:23

## 2022-11-13 RX ADMIN — FUROSEMIDE 40 MG: 10 INJECTION, SOLUTION INTRAVENOUS at 10:34

## 2022-11-13 RX ADMIN — SODIUM ZIRCONIUM CYCLOSILICATE 10 G: 10 POWDER, FOR SUSPENSION ORAL at 10:13

## 2022-11-13 RX ADMIN — ATORVASTATIN CALCIUM 40 MG: 40 TABLET, FILM COATED ORAL at 21:03

## 2022-11-13 RX ADMIN — Medication 10 ML: at 21:02

## 2022-11-13 RX ADMIN — Medication 10 MG: at 21:02

## 2022-11-13 RX ADMIN — HEPARIN SODIUM 5000 UNITS: 5000 INJECTION INTRAVENOUS; SUBCUTANEOUS at 21:03

## 2022-11-13 RX ADMIN — AMLODIPINE BESYLATE 10 MG: 10 TABLET ORAL at 08:23

## 2022-11-13 RX ADMIN — PANTOPRAZOLE SODIUM 40 MG: 40 TABLET, DELAYED RELEASE ORAL at 17:22

## 2022-11-13 RX ADMIN — NYSTATIN: 100000 POWDER TOPICAL at 21:03

## 2022-11-13 RX ADMIN — HYDRALAZINE HYDROCHLORIDE 100 MG: 50 TABLET, FILM COATED ORAL at 21:02

## 2022-11-13 RX ADMIN — LEVOTHYROXINE SODIUM 25 MCG: 25 TABLET ORAL at 05:55

## 2022-11-13 RX ADMIN — ESCITALOPRAM 10 MG: 10 TABLET, FILM COATED ORAL at 08:23

## 2022-11-13 RX ADMIN — INSULIN ASPART 2 UNITS: 100 INJECTION, SOLUTION INTRAVENOUS; SUBCUTANEOUS at 12:20

## 2022-11-13 RX ADMIN — HYDROXYZINE HYDROCHLORIDE 25 MG: 25 TABLET ORAL at 16:10

## 2022-11-13 RX ADMIN — PANTOPRAZOLE SODIUM 40 MG: 40 TABLET, DELAYED RELEASE ORAL at 08:23

## 2022-11-13 RX ADMIN — HEPARIN SODIUM 5000 UNITS: 5000 INJECTION INTRAVENOUS; SUBCUTANEOUS at 13:53

## 2022-11-13 RX ADMIN — Medication 10 ML: at 08:23

## 2022-11-13 RX ADMIN — HEPARIN SODIUM 5000 UNITS: 5000 INJECTION INTRAVENOUS; SUBCUTANEOUS at 05:55

## 2022-11-13 RX ADMIN — ISOSORBIDE MONONITRATE 60 MG: 60 TABLET, EXTENDED RELEASE ORAL at 08:23

## 2022-11-13 RX ADMIN — ALBUMIN (HUMAN) 25 G: 0.25 INJECTION, SOLUTION INTRAVENOUS at 08:42

## 2022-11-13 RX ADMIN — HYDRALAZINE HYDROCHLORIDE 100 MG: 50 TABLET, FILM COATED ORAL at 13:53

## 2022-11-13 RX ADMIN — SODIUM BICARBONATE TAB 650 MG 650 MG: 650 TAB at 16:10

## 2022-11-13 RX ADMIN — Medication 150 MG: at 08:23

## 2022-11-13 RX ADMIN — SODIUM BICARBONATE TAB 650 MG 650 MG: 650 TAB at 21:03

## 2022-11-13 RX ADMIN — CHLOROTHIAZIDE SODIUM 500 MG: 500 INJECTION, POWDER, LYOPHILIZED, FOR SOLUTION INTRAVENOUS at 09:38

## 2022-11-13 RX ADMIN — METOPROLOL SUCCINATE 75 MG: 50 TABLET, EXTENDED RELEASE ORAL at 08:23

## 2022-11-14 ENCOUNTER — APPOINTMENT (OUTPATIENT)
Dept: GENERAL RADIOLOGY | Facility: HOSPITAL | Age: 77
End: 2022-11-14

## 2022-11-14 ENCOUNTER — ANESTHESIA EVENT (OUTPATIENT)
Dept: PERIOP | Facility: HOSPITAL | Age: 77
End: 2022-11-14

## 2022-11-14 ENCOUNTER — ANESTHESIA (OUTPATIENT)
Dept: PERIOP | Facility: HOSPITAL | Age: 77
End: 2022-11-14

## 2022-11-14 LAB
A-A DO2: 71.9 MMHG (ref 0–300)
ALBUMIN SERPL-MCNC: 2.94 G/DL (ref 3.5–5.2)
ALP SERPL-CCNC: 507 U/L (ref 39–117)
ALT SERPL W P-5'-P-CCNC: 35 U/L (ref 1–33)
ANION GAP SERPL CALCULATED.3IONS-SCNC: 15.8 MMOL/L (ref 5–15)
ARTERIAL PATENCY WRIST A: ABNORMAL
AST SERPL-CCNC: 63 U/L (ref 1–32)
ATMOSPHERIC PRESS: 735 MMHG
BACTERIA SPEC AEROBE CULT: NO GROWTH
BASE EXCESS BLDA CALC-SCNC: -3.6 MMOL/L (ref 0–2)
BASOPHILS # BLD AUTO: 0.01 10*3/MM3 (ref 0–0.2)
BASOPHILS NFR BLD AUTO: 0.1 % (ref 0–1.5)
BDY SITE: ABNORMAL
BH BB BLOOD EXPIRATION DATE: NORMAL
BH BB BLOOD TYPE BARCODE: 7300
BH BB DISPENSE STATUS: NORMAL
BH BB PRODUCT CODE: NORMAL
BH BB UNIT NUMBER: NORMAL
BILIRUB CONJ SERPL-MCNC: 0.2 MG/DL (ref 0–0.3)
BILIRUB INDIRECT SERPL-MCNC: 0.2 MG/DL
BILIRUB SERPL-MCNC: 0.4 MG/DL (ref 0–1.2)
BODY TEMPERATURE: 0 C
BUN SERPL-MCNC: 112 MG/DL (ref 8–23)
BUN/CREAT SERPL: 25.5 (ref 7–25)
CALCIUM SPEC-SCNC: 8.7 MG/DL (ref 8.6–10.5)
CHLORIDE SERPL-SCNC: 97 MMOL/L (ref 98–107)
CO2 BLDA-SCNC: 22.5 MMOL/L (ref 22–33)
CO2 SERPL-SCNC: 19.2 MMOL/L (ref 22–29)
COHGB MFR BLD: 1.7 % (ref 0–5)
CREAT SERPL-MCNC: 4.4 MG/DL (ref 0.57–1)
CROSSMATCH INTERPRETATION: NORMAL
DEPRECATED RDW RBC AUTO: 51.8 FL (ref 37–54)
EGFRCR SERPLBLD CKD-EPI 2021: 9.7 ML/MIN/1.73
EOSINOPHIL # BLD AUTO: 0.09 10*3/MM3 (ref 0–0.4)
EOSINOPHIL NFR BLD AUTO: 1.3 % (ref 0.3–6.2)
ERYTHROCYTE [DISTWIDTH] IN BLOOD BY AUTOMATED COUNT: 16.3 % (ref 12.3–15.4)
GLUCOSE BLDC GLUCOMTR-MCNC: 111 MG/DL (ref 70–130)
GLUCOSE BLDC GLUCOMTR-MCNC: 122 MG/DL (ref 70–130)
GLUCOSE BLDC GLUCOMTR-MCNC: 122 MG/DL (ref 70–130)
GLUCOSE SERPL-MCNC: 120 MG/DL (ref 65–99)
HAV IGM SERPL QL IA: ABNORMAL
HBV CORE IGM SERPL QL IA: REACTIVE
HBV SURFACE AG SERPL QL IA: ABNORMAL
HCO3 BLDA-SCNC: 21.4 MMOL/L (ref 20–26)
HCT VFR BLD AUTO: 25.9 % (ref 34–46.6)
HCT VFR BLD CALC: 24.9 % (ref 38–51)
HCV AB SER DONR QL: ABNORMAL
HGB BLD-MCNC: 8.1 G/DL (ref 12–15.9)
HGB BLDA-MCNC: 8.1 G/DL (ref 13.5–17.5)
IMM GRANULOCYTES # BLD AUTO: 0.02 10*3/MM3 (ref 0–0.05)
IMM GRANULOCYTES NFR BLD AUTO: 0.3 % (ref 0–0.5)
INHALED O2 CONCENTRATION: 28 %
LYMPHOCYTES # BLD AUTO: 0.86 10*3/MM3 (ref 0.7–3.1)
LYMPHOCYTES NFR BLD AUTO: 12.9 % (ref 19.6–45.3)
Lab: ABNORMAL
MCH RBC QN AUTO: 27.6 PG (ref 26.6–33)
MCHC RBC AUTO-ENTMCNC: 31.3 G/DL (ref 31.5–35.7)
MCV RBC AUTO: 88.1 FL (ref 79–97)
METHGB BLD QL: 0.7 % (ref 0–3)
MODALITY: ABNORMAL
MONOCYTES # BLD AUTO: 0.57 10*3/MM3 (ref 0.1–0.9)
MONOCYTES NFR BLD AUTO: 8.5 % (ref 5–12)
NEUTROPHILS NFR BLD AUTO: 5.12 10*3/MM3 (ref 1.7–7)
NEUTROPHILS NFR BLD AUTO: 76.9 % (ref 42.7–76)
NOTE: ABNORMAL
NOTIFIED WHO: ABNORMAL
NRBC BLD AUTO-RTO: 0 /100 WBC (ref 0–0.2)
OXYHGB MFR BLDV: 94.2 % (ref 94–99)
PCO2 BLDA: 37.2 MM HG (ref 35–45)
PCO2 TEMP ADJ BLD: ABNORMAL MM[HG]
PH BLDA: 7.37 PH UNITS (ref 7.35–7.45)
PH, TEMP CORRECTED: ABNORMAL
PLATELET # BLD AUTO: 169 10*3/MM3 (ref 140–450)
PMV BLD AUTO: 9.2 FL (ref 6–12)
PO2 BLDA: 79.1 MM HG (ref 83–108)
PO2 TEMP ADJ BLD: ABNORMAL MM[HG]
POTASSIUM SERPL-SCNC: 4.6 MMOL/L (ref 3.5–5.2)
PROT SERPL-MCNC: 6.4 G/DL (ref 6–8.5)
RBC # BLD AUTO: 2.94 10*6/MM3 (ref 3.77–5.28)
SAO2 % BLDCOA: 96.5 % (ref 94–99)
SODIUM SERPL-SCNC: 132 MMOL/L (ref 136–145)
UNIT  ABO: NORMAL
UNIT  RH: NORMAL
VENTILATOR MODE: ABNORMAL
WBC NRBC COR # BLD: 6.67 10*3/MM3 (ref 3.4–10.8)

## 2022-11-14 PROCEDURE — 99233 SBSQ HOSP IP/OBS HIGH 50: CPT | Performed by: STUDENT IN AN ORGANIZED HEALTH CARE EDUCATION/TRAINING PROGRAM

## 2022-11-14 PROCEDURE — 85025 COMPLETE CBC W/AUTO DIFF WBC: CPT | Performed by: HOSPITALIST

## 2022-11-14 PROCEDURE — 71045 X-RAY EXAM CHEST 1 VIEW: CPT

## 2022-11-14 PROCEDURE — 77001 FLUOROGUIDE FOR VEIN DEVICE: CPT | Performed by: SURGERY

## 2022-11-14 PROCEDURE — 0 LIDOCAINE 1 % SOLUTION: Performed by: SURGERY

## 2022-11-14 PROCEDURE — 80074 ACUTE HEPATITIS PANEL: CPT | Performed by: INTERNAL MEDICINE

## 2022-11-14 PROCEDURE — 0JH63XZ INSERTION OF TUNNELED VASCULAR ACCESS DEVICE INTO CHEST SUBCUTANEOUS TISSUE AND FASCIA, PERCUTANEOUS APPROACH: ICD-10-PCS | Performed by: SURGERY

## 2022-11-14 PROCEDURE — 82962 GLUCOSE BLOOD TEST: CPT

## 2022-11-14 PROCEDURE — 83050 HGB METHEMOGLOBIN QUAN: CPT

## 2022-11-14 PROCEDURE — 82805 BLOOD GASES W/O2 SATURATION: CPT

## 2022-11-14 PROCEDURE — 25010000002 HEPARIN (PORCINE) PER 1000 UNITS: Performed by: SURGERY

## 2022-11-14 PROCEDURE — 94761 N-INVAS EAR/PLS OXIMETRY MLT: CPT

## 2022-11-14 PROCEDURE — 5A1D70Z PERFORMANCE OF URINARY FILTRATION, INTERMITTENT, LESS THAN 6 HOURS PER DAY: ICD-10-PCS | Performed by: INTERNAL MEDICINE

## 2022-11-14 PROCEDURE — 25010000002 HEPARIN LOCK FLUSH PER 10 UNITS: Performed by: SURGERY

## 2022-11-14 PROCEDURE — C1894 INTRO/SHEATH, NON-LASER: HCPCS | Performed by: SURGERY

## 2022-11-14 PROCEDURE — 36558 INSERT TUNNELED CV CATH: CPT | Performed by: SURGERY

## 2022-11-14 PROCEDURE — 25010000002 PROPOFOL 200 MG/20ML EMULSION: Performed by: NURSE ANESTHETIST, CERTIFIED REGISTERED

## 2022-11-14 PROCEDURE — C1750 CATH, HEMODIALYSIS,LONG-TERM: HCPCS | Performed by: SURGERY

## 2022-11-14 PROCEDURE — 80076 HEPATIC FUNCTION PANEL: CPT | Performed by: STUDENT IN AN ORGANIZED HEALTH CARE EDUCATION/TRAINING PROGRAM

## 2022-11-14 PROCEDURE — 25010000002 HEPARIN (PORCINE) PER 1000 UNITS: Performed by: INTERNAL MEDICINE

## 2022-11-14 PROCEDURE — 36600 WITHDRAWAL OF ARTERIAL BLOOD: CPT

## 2022-11-14 PROCEDURE — 94799 UNLISTED PULMONARY SVC/PX: CPT

## 2022-11-14 PROCEDURE — 76000 FLUOROSCOPY <1 HR PHYS/QHP: CPT | Performed by: RADIOLOGY

## 2022-11-14 PROCEDURE — 99222 1ST HOSP IP/OBS MODERATE 55: CPT

## 2022-11-14 PROCEDURE — 05HM33Z INSERTION OF INFUSION DEVICE INTO RIGHT INTERNAL JUGULAR VEIN, PERCUTANEOUS APPROACH: ICD-10-PCS | Performed by: SURGERY

## 2022-11-14 PROCEDURE — 80048 BASIC METABOLIC PNL TOTAL CA: CPT | Performed by: HOSPITALIST

## 2022-11-14 PROCEDURE — 82375 ASSAY CARBOXYHB QUANT: CPT

## 2022-11-14 PROCEDURE — 76000 FLUOROSCOPY <1 HR PHYS/QHP: CPT

## 2022-11-14 RX ORDER — FENTANYL CITRATE 50 UG/ML
50 INJECTION, SOLUTION INTRAMUSCULAR; INTRAVENOUS
Status: DISCONTINUED | OUTPATIENT
Start: 2022-11-14 | End: 2022-11-14 | Stop reason: HOSPADM

## 2022-11-14 RX ORDER — SODIUM CHLORIDE 9 MG/ML
INJECTION INTRAVENOUS AS NEEDED
Status: DISCONTINUED | OUTPATIENT
Start: 2022-11-14 | End: 2022-11-14 | Stop reason: HOSPADM

## 2022-11-14 RX ORDER — MAGNESIUM HYDROXIDE 1200 MG/15ML
LIQUID ORAL AS NEEDED
Status: DISCONTINUED | OUTPATIENT
Start: 2022-11-14 | End: 2022-11-14 | Stop reason: HOSPADM

## 2022-11-14 RX ORDER — SODIUM CHLORIDE, SODIUM LACTATE, POTASSIUM CHLORIDE, CALCIUM CHLORIDE 600; 310; 30; 20 MG/100ML; MG/100ML; MG/100ML; MG/100ML
125 INJECTION, SOLUTION INTRAVENOUS ONCE
Status: DISCONTINUED | OUTPATIENT
Start: 2022-11-14 | End: 2022-11-14

## 2022-11-14 RX ORDER — IPRATROPIUM BROMIDE AND ALBUTEROL SULFATE 2.5; .5 MG/3ML; MG/3ML
3 SOLUTION RESPIRATORY (INHALATION) ONCE AS NEEDED
Status: DISCONTINUED | OUTPATIENT
Start: 2022-11-14 | End: 2022-11-14 | Stop reason: HOSPADM

## 2022-11-14 RX ORDER — SODIUM CHLORIDE 0.9 % (FLUSH) 0.9 %
10 SYRINGE (ML) INJECTION AS NEEDED
Status: DISCONTINUED | OUTPATIENT
Start: 2022-11-14 | End: 2022-11-14 | Stop reason: HOSPADM

## 2022-11-14 RX ORDER — ONDANSETRON 2 MG/ML
4 INJECTION INTRAMUSCULAR; INTRAVENOUS AS NEEDED
Status: DISCONTINUED | OUTPATIENT
Start: 2022-11-14 | End: 2022-11-14 | Stop reason: HOSPADM

## 2022-11-14 RX ORDER — MIDAZOLAM HYDROCHLORIDE 1 MG/ML
0.5 INJECTION INTRAMUSCULAR; INTRAVENOUS
Status: DISCONTINUED | OUTPATIENT
Start: 2022-11-14 | End: 2022-11-14 | Stop reason: HOSPADM

## 2022-11-14 RX ORDER — SODIUM CHLORIDE 0.9 % (FLUSH) 0.9 %
10 SYRINGE (ML) INJECTION EVERY 12 HOURS SCHEDULED
Status: DISCONTINUED | OUTPATIENT
Start: 2022-11-14 | End: 2022-11-14 | Stop reason: HOSPADM

## 2022-11-14 RX ORDER — HEPARIN SODIUM (PORCINE) LOCK FLUSH IV SOLN 100 UNIT/ML 100 UNIT/ML
SOLUTION INTRAVENOUS AS NEEDED
Status: DISCONTINUED | OUTPATIENT
Start: 2022-11-14 | End: 2022-11-14 | Stop reason: HOSPADM

## 2022-11-14 RX ORDER — LIDOCAINE HYDROCHLORIDE 10 MG/ML
INJECTION, SOLUTION INFILTRATION; PERINEURAL AS NEEDED
Status: DISCONTINUED | OUTPATIENT
Start: 2022-11-14 | End: 2022-11-14 | Stop reason: HOSPADM

## 2022-11-14 RX ORDER — SODIUM CHLORIDE 9 MG/ML
INJECTION, SOLUTION INTRAVENOUS CONTINUOUS PRN
Status: DISCONTINUED | OUTPATIENT
Start: 2022-11-14 | End: 2022-11-14 | Stop reason: SURG

## 2022-11-14 RX ORDER — OXYCODONE HYDROCHLORIDE AND ACETAMINOPHEN 5; 325 MG/1; MG/1
1 TABLET ORAL ONCE AS NEEDED
Status: DISCONTINUED | OUTPATIENT
Start: 2022-11-14 | End: 2022-11-14 | Stop reason: HOSPADM

## 2022-11-14 RX ORDER — PROPOFOL 10 MG/ML
INJECTION, EMULSION INTRAVENOUS AS NEEDED
Status: DISCONTINUED | OUTPATIENT
Start: 2022-11-14 | End: 2022-11-14 | Stop reason: SURG

## 2022-11-14 RX ORDER — SODIUM CHLORIDE, SODIUM LACTATE, POTASSIUM CHLORIDE, CALCIUM CHLORIDE 600; 310; 30; 20 MG/100ML; MG/100ML; MG/100ML; MG/100ML
100 INJECTION, SOLUTION INTRAVENOUS ONCE AS NEEDED
Status: DISCONTINUED | OUTPATIENT
Start: 2022-11-14 | End: 2022-11-14 | Stop reason: HOSPADM

## 2022-11-14 RX ADMIN — Medication 10 ML: at 22:12

## 2022-11-14 RX ADMIN — IPRATROPIUM BROMIDE AND ALBUTEROL SULFATE 3 ML: .5; 2.5 SOLUTION RESPIRATORY (INHALATION) at 06:31

## 2022-11-14 RX ADMIN — HYDRALAZINE HYDROCHLORIDE 100 MG: 50 TABLET, FILM COATED ORAL at 22:11

## 2022-11-14 RX ADMIN — ATORVASTATIN CALCIUM 40 MG: 40 TABLET, FILM COATED ORAL at 22:11

## 2022-11-14 RX ADMIN — SODIUM BICARBONATE TAB 650 MG 650 MG: 650 TAB at 08:48

## 2022-11-14 RX ADMIN — LEVOTHYROXINE SODIUM 25 MCG: 25 TABLET ORAL at 05:37

## 2022-11-14 RX ADMIN — Medication 10 ML: at 08:49

## 2022-11-14 RX ADMIN — PROPOFOL 50 MG: 10 INJECTION, EMULSION INTRAVENOUS at 17:31

## 2022-11-14 RX ADMIN — SODIUM CHLORIDE: 9 INJECTION, SOLUTION INTRAVENOUS at 17:26

## 2022-11-14 RX ADMIN — Medication 150 MG: at 08:49

## 2022-11-14 RX ADMIN — NYSTATIN: 100000 POWDER TOPICAL at 08:49

## 2022-11-14 RX ADMIN — ESCITALOPRAM 10 MG: 10 TABLET, FILM COATED ORAL at 08:48

## 2022-11-14 RX ADMIN — HEPARIN SODIUM 5000 UNITS: 5000 INJECTION INTRAVENOUS; SUBCUTANEOUS at 22:11

## 2022-11-14 RX ADMIN — NYSTATIN: 100000 POWDER TOPICAL at 22:12

## 2022-11-14 RX ADMIN — SODIUM BICARBONATE TAB 650 MG 650 MG: 650 TAB at 22:11

## 2022-11-14 RX ADMIN — HEPARIN SODIUM 5000 UNITS: 5000 INJECTION INTRAVENOUS; SUBCUTANEOUS at 05:37

## 2022-11-14 RX ADMIN — HYDRALAZINE HYDROCHLORIDE 100 MG: 50 TABLET, FILM COATED ORAL at 05:37

## 2022-11-14 RX ADMIN — PANTOPRAZOLE SODIUM 40 MG: 40 TABLET, DELAYED RELEASE ORAL at 08:48

## 2022-11-14 NOTE — ANESTHESIA PREPROCEDURE EVALUATION
Anesthesia Evaluation     Patient summary reviewed and Nursing notes reviewed   no history of anesthetic complications:  NPO Solid Status: > 8 hours  NPO Liquid Status: > 8 hours           Airway   Mallampati: III  TM distance: >3 FB  Possible difficult intubation and Large neck circumference  Dental    (+) poor dentition        Pulmonary - negative pulmonary ROS and normal exam   Cardiovascular     ECG reviewed    (+) hypertension 2 medications or greater, CAD, cardiac stents more than 12 months ago CHF , PVD, hyperlipidemia,     ROS comment: Followed by cardiologist Dr. Martines in [place], last saw about one year ago    Neuro/Psych  (+) numbness, psychiatric history Anxiety,    GI/Hepatic/Renal/Endo    (+) obesity, morbid obesity, hiatal hernia, GERD,  renal disease CRI, diabetes mellitus type 1 using insulin, thyroid problem     Musculoskeletal     Abdominal  - normal exam   Substance History      OB/GYN          Other   arthritis,                        Anesthesia Plan    ASA 3     general     intravenous induction     Anesthetic plan, risks, benefits, and alternatives have been provided, discussed and informed consent has been obtained with: patient.

## 2022-11-14 NOTE — ANESTHESIA PROCEDURE NOTES
Airway  Urgency: elective    Date/Time: 11/14/2022 5:31 PM  Airway not difficult    General Information and Staff    Patient location during procedure: OR  CRNA/CAA: Morenita Mccray CRNA    Indications and Patient Condition  Indications for airway management: airway protection    Preoxygenated: yes  Mask difficulty assessment: 0 - not attempted    Final Airway Details  Final airway type: supraglottic airway      Successful airway: classic and unique  Size 4     Number of attempts at approach: 1  Assessment: lips, teeth, and gum same as pre-op

## 2022-11-14 NOTE — ANESTHESIA POSTPROCEDURE EVALUATION
Patient: Britta Lee    Procedure Summary     Date: 11/14/22 Room / Location: [facility] OR  /  [facility] OR    Anesthesia Start: 1727 Anesthesia Stop: 1809    Procedure: HEMODIALYSIS CATHETER INSERTION Diagnosis:       CKD (chronic kidney disease) stage 4, GFR 15-29 ml/min (HCC)      (CKD (chronic kidney disease) stage 4, GFR 15-29 ml/min (HCC) [N18.4])    Surgeons: Ta Blas MD Provider: Lamberto Sheehan DO    Anesthesia Type: general ASA Status: 3          Anesthesia Type: general    Vitals  Vitals Value Taken Time   /62 11/14/22 1832   Temp 97 °F (36.1 °C) 11/14/22 1832   Pulse 55 11/14/22 1832   Resp 16 11/14/22 1832   SpO2 93 % 11/14/22 1832           Post Anesthesia Care and Evaluation    Patient location during evaluation: PHASE II  Patient participation: complete - patient participated  Level of consciousness: awake and alert  Pain score: 1  Pain management: adequate    Airway patency: patent  Anesthetic complications: No anesthetic complications  PONV Status: controlled  Cardiovascular status: acceptable  Respiratory status: acceptable  Hydration status: acceptable

## 2022-11-15 LAB
ANION GAP SERPL CALCULATED.3IONS-SCNC: 14.5 MMOL/L (ref 5–15)
BASOPHILS # BLD AUTO: 0.02 10*3/MM3 (ref 0–0.2)
BASOPHILS NFR BLD AUTO: 0.3 % (ref 0–1.5)
BUN SERPL-MCNC: 83 MG/DL (ref 8–23)
BUN/CREAT SERPL: 24.1 (ref 7–25)
CALCIUM SPEC-SCNC: 8.5 MG/DL (ref 8.6–10.5)
CHLORIDE SERPL-SCNC: 95 MMOL/L (ref 98–107)
CO2 SERPL-SCNC: 21.5 MMOL/L (ref 22–29)
CREAT SERPL-MCNC: 3.44 MG/DL (ref 0.57–1)
DEPRECATED RDW RBC AUTO: 53.9 FL (ref 37–54)
EGFRCR SERPLBLD CKD-EPI 2021: 13 ML/MIN/1.73
EOSINOPHIL # BLD AUTO: 0.06 10*3/MM3 (ref 0–0.4)
EOSINOPHIL NFR BLD AUTO: 1 % (ref 0.3–6.2)
ERYTHROCYTE [DISTWIDTH] IN BLOOD BY AUTOMATED COUNT: 16.4 % (ref 12.3–15.4)
GLUCOSE BLDC GLUCOMTR-MCNC: 116 MG/DL (ref 70–130)
GLUCOSE BLDC GLUCOMTR-MCNC: 135 MG/DL (ref 70–130)
GLUCOSE BLDC GLUCOMTR-MCNC: 150 MG/DL (ref 70–130)
GLUCOSE SERPL-MCNC: 116 MG/DL (ref 65–99)
HCT VFR BLD AUTO: 28.2 % (ref 34–46.6)
HGB BLD-MCNC: 8.4 G/DL (ref 12–15.9)
IMM GRANULOCYTES # BLD AUTO: 0.03 10*3/MM3 (ref 0–0.05)
IMM GRANULOCYTES NFR BLD AUTO: 0.5 % (ref 0–0.5)
LYMPHOCYTES # BLD AUTO: 0.56 10*3/MM3 (ref 0.7–3.1)
LYMPHOCYTES NFR BLD AUTO: 9.3 % (ref 19.6–45.3)
MCH RBC QN AUTO: 26.5 PG (ref 26.6–33)
MCHC RBC AUTO-ENTMCNC: 29.8 G/DL (ref 31.5–35.7)
MCV RBC AUTO: 89 FL (ref 79–97)
MONOCYTES # BLD AUTO: 0.37 10*3/MM3 (ref 0.1–0.9)
MONOCYTES NFR BLD AUTO: 6.1 % (ref 5–12)
NEUTROPHILS NFR BLD AUTO: 5 10*3/MM3 (ref 1.7–7)
NEUTROPHILS NFR BLD AUTO: 82.8 % (ref 42.7–76)
NRBC BLD AUTO-RTO: 0 /100 WBC (ref 0–0.2)
PLATELET # BLD AUTO: 158 10*3/MM3 (ref 140–450)
PMV BLD AUTO: 9.1 FL (ref 6–12)
POTASSIUM SERPL-SCNC: 3.9 MMOL/L (ref 3.5–5.2)
RBC # BLD AUTO: 3.17 10*6/MM3 (ref 3.77–5.28)
SODIUM SERPL-SCNC: 131 MMOL/L (ref 136–145)
WBC NRBC COR # BLD: 6.04 10*3/MM3 (ref 3.4–10.8)

## 2022-11-15 PROCEDURE — 63710000001 INSULIN ASPART PER 5 UNITS: Performed by: SURGERY

## 2022-11-15 PROCEDURE — 94799 UNLISTED PULMONARY SVC/PX: CPT

## 2022-11-15 PROCEDURE — 82962 GLUCOSE BLOOD TEST: CPT

## 2022-11-15 PROCEDURE — 25010000002 HEPARIN (PORCINE) PER 1000 UNITS: Performed by: SURGERY

## 2022-11-15 PROCEDURE — 99232 SBSQ HOSP IP/OBS MODERATE 35: CPT | Performed by: STUDENT IN AN ORGANIZED HEALTH CARE EDUCATION/TRAINING PROGRAM

## 2022-11-15 PROCEDURE — 85025 COMPLETE CBC W/AUTO DIFF WBC: CPT | Performed by: STUDENT IN AN ORGANIZED HEALTH CARE EDUCATION/TRAINING PROGRAM

## 2022-11-15 PROCEDURE — 80048 BASIC METABOLIC PNL TOTAL CA: CPT | Performed by: STUDENT IN AN ORGANIZED HEALTH CARE EDUCATION/TRAINING PROGRAM

## 2022-11-15 PROCEDURE — 94761 N-INVAS EAR/PLS OXIMETRY MLT: CPT

## 2022-11-15 RX ORDER — ALBUMIN (HUMAN) 12.5 G/50ML
12.5 SOLUTION INTRAVENOUS AS NEEDED
Status: CANCELLED | OUTPATIENT
Start: 2022-11-15 | End: 2022-11-16

## 2022-11-15 RX ADMIN — PANTOPRAZOLE SODIUM 40 MG: 40 TABLET, DELAYED RELEASE ORAL at 10:57

## 2022-11-15 RX ADMIN — PANTOPRAZOLE SODIUM 40 MG: 40 TABLET, DELAYED RELEASE ORAL at 18:07

## 2022-11-15 RX ADMIN — IPRATROPIUM BROMIDE AND ALBUTEROL SULFATE 3 ML: .5; 2.5 SOLUTION RESPIRATORY (INHALATION) at 06:25

## 2022-11-15 RX ADMIN — POLYETHYLENE GLYCOL (3350) 17 G: 17 POWDER, FOR SOLUTION ORAL at 10:57

## 2022-11-15 RX ADMIN — INSULIN ASPART 2 UNITS: 100 INJECTION, SOLUTION INTRAVENOUS; SUBCUTANEOUS at 18:06

## 2022-11-15 RX ADMIN — HEPARIN SODIUM 5000 UNITS: 5000 INJECTION INTRAVENOUS; SUBCUTANEOUS at 13:14

## 2022-11-15 RX ADMIN — AMLODIPINE BESYLATE 10 MG: 10 TABLET ORAL at 10:57

## 2022-11-15 RX ADMIN — HEPARIN SODIUM 5000 UNITS: 5000 INJECTION INTRAVENOUS; SUBCUTANEOUS at 05:17

## 2022-11-15 RX ADMIN — ATORVASTATIN CALCIUM 40 MG: 40 TABLET, FILM COATED ORAL at 21:44

## 2022-11-15 RX ADMIN — NYSTATIN: 100000 POWDER TOPICAL at 10:58

## 2022-11-15 RX ADMIN — ISOSORBIDE MONONITRATE 60 MG: 60 TABLET, EXTENDED RELEASE ORAL at 10:57

## 2022-11-15 RX ADMIN — HYDRALAZINE HYDROCHLORIDE 100 MG: 50 TABLET, FILM COATED ORAL at 13:14

## 2022-11-15 RX ADMIN — HEPARIN SODIUM 5000 UNITS: 5000 INJECTION INTRAVENOUS; SUBCUTANEOUS at 21:44

## 2022-11-15 RX ADMIN — LEVOTHYROXINE SODIUM 25 MCG: 25 TABLET ORAL at 05:18

## 2022-11-15 RX ADMIN — IPRATROPIUM BROMIDE AND ALBUTEROL SULFATE 3 ML: .5; 2.5 SOLUTION RESPIRATORY (INHALATION) at 18:20

## 2022-11-15 RX ADMIN — HYDRALAZINE HYDROCHLORIDE 100 MG: 50 TABLET, FILM COATED ORAL at 05:17

## 2022-11-15 RX ADMIN — SODIUM BICARBONATE TAB 650 MG 650 MG: 650 TAB at 10:57

## 2022-11-15 RX ADMIN — Medication 10 ML: at 10:58

## 2022-11-15 RX ADMIN — Medication 150 MG: at 10:57

## 2022-11-15 RX ADMIN — HYDRALAZINE HYDROCHLORIDE 100 MG: 50 TABLET, FILM COATED ORAL at 21:44

## 2022-11-15 RX ADMIN — ESCITALOPRAM 10 MG: 10 TABLET, FILM COATED ORAL at 10:57

## 2022-11-15 RX ADMIN — VITAMINS A AND D OINTMENT 1 APPLICATION: 15.5; 53.4 OINTMENT TOPICAL at 10:58

## 2022-11-15 RX ADMIN — NYSTATIN: 100000 POWDER TOPICAL at 21:44

## 2022-11-15 RX ADMIN — METOPROLOL SUCCINATE 75 MG: 50 TABLET, EXTENDED RELEASE ORAL at 10:57

## 2022-11-16 LAB
ANION GAP SERPL CALCULATED.3IONS-SCNC: 13.2 MMOL/L (ref 5–15)
BASOPHILS # BLD AUTO: 0.01 10*3/MM3 (ref 0–0.2)
BASOPHILS NFR BLD AUTO: 0.2 % (ref 0–1.5)
BUN SERPL-MCNC: 52 MG/DL (ref 8–23)
BUN/CREAT SERPL: 19.2 (ref 7–25)
CALCIUM SPEC-SCNC: 8.6 MG/DL (ref 8.6–10.5)
CHLORIDE SERPL-SCNC: 94 MMOL/L (ref 98–107)
CO2 SERPL-SCNC: 24.8 MMOL/L (ref 22–29)
CREAT SERPL-MCNC: 2.71 MG/DL (ref 0.57–1)
DEPRECATED RDW RBC AUTO: 52.4 FL (ref 37–54)
EGFRCR SERPLBLD CKD-EPI 2021: 17.4 ML/MIN/1.73
EOSINOPHIL # BLD AUTO: 0.07 10*3/MM3 (ref 0–0.4)
EOSINOPHIL NFR BLD AUTO: 1.2 % (ref 0.3–6.2)
ERYTHROCYTE [DISTWIDTH] IN BLOOD BY AUTOMATED COUNT: 16.2 % (ref 12.3–15.4)
GLUCOSE BLDC GLUCOMTR-MCNC: 128 MG/DL (ref 70–130)
GLUCOSE BLDC GLUCOMTR-MCNC: 141 MG/DL (ref 70–130)
GLUCOSE BLDC GLUCOMTR-MCNC: 146 MG/DL (ref 70–130)
GLUCOSE BLDC GLUCOMTR-MCNC: 152 MG/DL (ref 70–130)
GLUCOSE SERPL-MCNC: 145 MG/DL (ref 65–99)
HCT VFR BLD AUTO: 26.9 % (ref 34–46.6)
HGB BLD-MCNC: 8.4 G/DL (ref 12–15.9)
IMM GRANULOCYTES # BLD AUTO: 0.02 10*3/MM3 (ref 0–0.05)
IMM GRANULOCYTES NFR BLD AUTO: 0.3 % (ref 0–0.5)
LYMPHOCYTES # BLD AUTO: 0.56 10*3/MM3 (ref 0.7–3.1)
LYMPHOCYTES NFR BLD AUTO: 9.7 % (ref 19.6–45.3)
MCH RBC QN AUTO: 27.4 PG (ref 26.6–33)
MCHC RBC AUTO-ENTMCNC: 31.2 G/DL (ref 31.5–35.7)
MCV RBC AUTO: 87.6 FL (ref 79–97)
MONOCYTES # BLD AUTO: 0.44 10*3/MM3 (ref 0.1–0.9)
MONOCYTES NFR BLD AUTO: 7.6 % (ref 5–12)
NEUTROPHILS NFR BLD AUTO: 4.67 10*3/MM3 (ref 1.7–7)
NEUTROPHILS NFR BLD AUTO: 81 % (ref 42.7–76)
NRBC BLD AUTO-RTO: 0 /100 WBC (ref 0–0.2)
PLATELET # BLD AUTO: 142 10*3/MM3 (ref 140–450)
PMV BLD AUTO: 9.1 FL (ref 6–12)
POTASSIUM SERPL-SCNC: 3.8 MMOL/L (ref 3.5–5.2)
RBC # BLD AUTO: 3.07 10*6/MM3 (ref 3.77–5.28)
SODIUM SERPL-SCNC: 132 MMOL/L (ref 136–145)
WBC NRBC COR # BLD: 5.77 10*3/MM3 (ref 3.4–10.8)

## 2022-11-16 PROCEDURE — 94799 UNLISTED PULMONARY SVC/PX: CPT

## 2022-11-16 PROCEDURE — 25010000002 HEPARIN (PORCINE) PER 1000 UNITS: Performed by: SURGERY

## 2022-11-16 PROCEDURE — 85025 COMPLETE CBC W/AUTO DIFF WBC: CPT | Performed by: STUDENT IN AN ORGANIZED HEALTH CARE EDUCATION/TRAINING PROGRAM

## 2022-11-16 PROCEDURE — 63710000001 INSULIN ASPART PER 5 UNITS: Performed by: SURGERY

## 2022-11-16 PROCEDURE — 12001 RPR S/N/AX/GEN/TRNK 2.5CM/<: CPT

## 2022-11-16 PROCEDURE — 80048 BASIC METABOLIC PNL TOTAL CA: CPT | Performed by: INTERNAL MEDICINE

## 2022-11-16 PROCEDURE — 82962 GLUCOSE BLOOD TEST: CPT

## 2022-11-16 PROCEDURE — 99233 SBSQ HOSP IP/OBS HIGH 50: CPT

## 2022-11-16 PROCEDURE — 94761 N-INVAS EAR/PLS OXIMETRY MLT: CPT

## 2022-11-16 PROCEDURE — 0 LIDOCAINE 1 % SOLUTION

## 2022-11-16 RX ORDER — LIDOCAINE HYDROCHLORIDE 10 MG/ML
5 INJECTION, SOLUTION INFILTRATION; PERINEURAL ONCE
Status: COMPLETED | OUTPATIENT
Start: 2022-11-16 | End: 2022-11-16

## 2022-11-16 RX ADMIN — ESCITALOPRAM 10 MG: 10 TABLET, FILM COATED ORAL at 09:07

## 2022-11-16 RX ADMIN — PANTOPRAZOLE SODIUM 40 MG: 40 TABLET, DELAYED RELEASE ORAL at 18:14

## 2022-11-16 RX ADMIN — POLYETHYLENE GLYCOL (3350) 17 G: 17 POWDER, FOR SOLUTION ORAL at 09:07

## 2022-11-16 RX ADMIN — LIDOCAINE HYDROCHLORIDE 5 ML: 10 INJECTION, SOLUTION INFILTRATION; PERINEURAL at 12:23

## 2022-11-16 RX ADMIN — IPRATROPIUM BROMIDE AND ALBUTEROL SULFATE 3 ML: .5; 2.5 SOLUTION RESPIRATORY (INHALATION) at 06:26

## 2022-11-16 RX ADMIN — PANTOPRAZOLE SODIUM 40 MG: 40 TABLET, DELAYED RELEASE ORAL at 05:22

## 2022-11-16 RX ADMIN — HYDRALAZINE HYDROCHLORIDE 100 MG: 50 TABLET, FILM COATED ORAL at 05:21

## 2022-11-16 RX ADMIN — NYSTATIN: 100000 POWDER TOPICAL at 22:06

## 2022-11-16 RX ADMIN — Medication 10 ML: at 09:07

## 2022-11-16 RX ADMIN — HYDRALAZINE HYDROCHLORIDE 75 MG: 25 TABLET ORAL at 22:00

## 2022-11-16 RX ADMIN — Medication 10 ML: at 22:06

## 2022-11-16 RX ADMIN — ATORVASTATIN CALCIUM 40 MG: 40 TABLET, FILM COATED ORAL at 22:00

## 2022-11-16 RX ADMIN — Medication 150 MG: at 09:07

## 2022-11-16 RX ADMIN — AMLODIPINE BESYLATE 10 MG: 10 TABLET ORAL at 09:06

## 2022-11-16 RX ADMIN — INSULIN ASPART 2 UNITS: 100 INJECTION, SOLUTION INTRAVENOUS; SUBCUTANEOUS at 11:04

## 2022-11-16 RX ADMIN — NYSTATIN: 100000 POWDER TOPICAL at 09:07

## 2022-11-16 RX ADMIN — LEVOTHYROXINE SODIUM 25 MCG: 25 TABLET ORAL at 05:21

## 2022-11-16 RX ADMIN — HEPARIN SODIUM 5000 UNITS: 5000 INJECTION INTRAVENOUS; SUBCUTANEOUS at 22:00

## 2022-11-16 RX ADMIN — ISOSORBIDE MONONITRATE 60 MG: 60 TABLET, EXTENDED RELEASE ORAL at 09:07

## 2022-11-16 RX ADMIN — METOPROLOL SUCCINATE 75 MG: 50 TABLET, EXTENDED RELEASE ORAL at 09:07

## 2022-11-17 LAB
ANION GAP SERPL CALCULATED.3IONS-SCNC: 11.8 MMOL/L (ref 5–15)
BACTERIA SPEC AEROBE CULT: NORMAL
BACTERIA SPEC AEROBE CULT: NORMAL
BASOPHILS # BLD AUTO: 0.01 10*3/MM3 (ref 0–0.2)
BASOPHILS NFR BLD AUTO: 0.2 % (ref 0–1.5)
BUN SERPL-MCNC: 31 MG/DL (ref 8–23)
BUN/CREAT SERPL: 15 (ref 7–25)
CALCIUM SPEC-SCNC: 8.5 MG/DL (ref 8.6–10.5)
CHLORIDE SERPL-SCNC: 94 MMOL/L (ref 98–107)
CO2 SERPL-SCNC: 27.2 MMOL/L (ref 22–29)
CREAT SERPL-MCNC: 2.06 MG/DL (ref 0.57–1)
DEPRECATED RDW RBC AUTO: 51.9 FL (ref 37–54)
EGFRCR SERPLBLD CKD-EPI 2021: 24.1 ML/MIN/1.73
EOSINOPHIL # BLD AUTO: 0.08 10*3/MM3 (ref 0–0.4)
EOSINOPHIL NFR BLD AUTO: 1.4 % (ref 0.3–6.2)
ERYTHROCYTE [DISTWIDTH] IN BLOOD BY AUTOMATED COUNT: 16 % (ref 12.3–15.4)
GLUCOSE BLDC GLUCOMTR-MCNC: 145 MG/DL (ref 70–130)
GLUCOSE BLDC GLUCOMTR-MCNC: 153 MG/DL (ref 70–130)
GLUCOSE BLDC GLUCOMTR-MCNC: 194 MG/DL (ref 70–130)
GLUCOSE SERPL-MCNC: 141 MG/DL (ref 65–99)
HCT VFR BLD AUTO: 27.8 % (ref 34–46.6)
HGB BLD-MCNC: 8.5 G/DL (ref 12–15.9)
IMM GRANULOCYTES # BLD AUTO: 0.02 10*3/MM3 (ref 0–0.05)
IMM GRANULOCYTES NFR BLD AUTO: 0.3 % (ref 0–0.5)
LYMPHOCYTES # BLD AUTO: 0.65 10*3/MM3 (ref 0.7–3.1)
LYMPHOCYTES NFR BLD AUTO: 11.3 % (ref 19.6–45.3)
MCH RBC QN AUTO: 26.9 PG (ref 26.6–33)
MCHC RBC AUTO-ENTMCNC: 30.6 G/DL (ref 31.5–35.7)
MCV RBC AUTO: 88 FL (ref 79–97)
MONOCYTES # BLD AUTO: 0.47 10*3/MM3 (ref 0.1–0.9)
MONOCYTES NFR BLD AUTO: 8.2 % (ref 5–12)
NEUTROPHILS NFR BLD AUTO: 4.5 10*3/MM3 (ref 1.7–7)
NEUTROPHILS NFR BLD AUTO: 78.6 % (ref 42.7–76)
NRBC BLD AUTO-RTO: 0 /100 WBC (ref 0–0.2)
PLATELET # BLD AUTO: 134 10*3/MM3 (ref 140–450)
PMV BLD AUTO: 9 FL (ref 6–12)
POTASSIUM SERPL-SCNC: 3.7 MMOL/L (ref 3.5–5.2)
RBC # BLD AUTO: 3.16 10*6/MM3 (ref 3.77–5.28)
SODIUM SERPL-SCNC: 133 MMOL/L (ref 136–145)
WBC NRBC COR # BLD: 5.73 10*3/MM3 (ref 3.4–10.8)

## 2022-11-17 PROCEDURE — 97110 THERAPEUTIC EXERCISES: CPT

## 2022-11-17 PROCEDURE — 80048 BASIC METABOLIC PNL TOTAL CA: CPT | Performed by: INTERNAL MEDICINE

## 2022-11-17 PROCEDURE — 82962 GLUCOSE BLOOD TEST: CPT

## 2022-11-17 PROCEDURE — 99232 SBSQ HOSP IP/OBS MODERATE 35: CPT

## 2022-11-17 PROCEDURE — 94799 UNLISTED PULMONARY SVC/PX: CPT

## 2022-11-17 PROCEDURE — 85025 COMPLETE CBC W/AUTO DIFF WBC: CPT | Performed by: STUDENT IN AN ORGANIZED HEALTH CARE EDUCATION/TRAINING PROGRAM

## 2022-11-17 PROCEDURE — 25010000002 HEPARIN (PORCINE) PER 1000 UNITS: Performed by: SURGERY

## 2022-11-17 PROCEDURE — 63710000001 INSULIN ASPART PER 5 UNITS: Performed by: SURGERY

## 2022-11-17 PROCEDURE — 97530 THERAPEUTIC ACTIVITIES: CPT

## 2022-11-17 RX ADMIN — VITAMINS A AND D OINTMENT 1 APPLICATION: 15.5; 53.4 OINTMENT TOPICAL at 09:33

## 2022-11-17 RX ADMIN — AMLODIPINE BESYLATE 10 MG: 10 TABLET ORAL at 09:33

## 2022-11-17 RX ADMIN — OFLOXACIN 50000 UNITS: 300 TABLET, COATED ORAL at 09:33

## 2022-11-17 RX ADMIN — PANTOPRAZOLE SODIUM 40 MG: 40 TABLET, DELAYED RELEASE ORAL at 17:00

## 2022-11-17 RX ADMIN — ISOSORBIDE MONONITRATE 60 MG: 60 TABLET, EXTENDED RELEASE ORAL at 09:33

## 2022-11-17 RX ADMIN — ATORVASTATIN CALCIUM 40 MG: 40 TABLET, FILM COATED ORAL at 21:10

## 2022-11-17 RX ADMIN — Medication 10 ML: at 09:33

## 2022-11-17 RX ADMIN — NYSTATIN: 100000 POWDER TOPICAL at 09:33

## 2022-11-17 RX ADMIN — ESCITALOPRAM 10 MG: 10 TABLET, FILM COATED ORAL at 09:33

## 2022-11-17 RX ADMIN — Medication 150 MG: at 09:33

## 2022-11-17 RX ADMIN — IPRATROPIUM BROMIDE AND ALBUTEROL SULFATE 3 ML: .5; 2.5 SOLUTION RESPIRATORY (INHALATION) at 06:31

## 2022-11-17 RX ADMIN — METOPROLOL SUCCINATE 75 MG: 50 TABLET, EXTENDED RELEASE ORAL at 09:33

## 2022-11-17 RX ADMIN — LEVOTHYROXINE SODIUM 25 MCG: 25 TABLET ORAL at 05:49

## 2022-11-17 RX ADMIN — HYDRALAZINE HYDROCHLORIDE 75 MG: 25 TABLET ORAL at 14:36

## 2022-11-17 RX ADMIN — INSULIN ASPART 2 UNITS: 100 INJECTION, SOLUTION INTRAVENOUS; SUBCUTANEOUS at 09:34

## 2022-11-17 RX ADMIN — INSULIN ASPART 2 UNITS: 100 INJECTION, SOLUTION INTRAVENOUS; SUBCUTANEOUS at 17:00

## 2022-11-17 RX ADMIN — PANTOPRAZOLE SODIUM 40 MG: 40 TABLET, DELAYED RELEASE ORAL at 09:33

## 2022-11-17 RX ADMIN — HEPARIN SODIUM 5000 UNITS: 5000 INJECTION INTRAVENOUS; SUBCUTANEOUS at 05:49

## 2022-11-17 RX ADMIN — Medication 10 ML: at 21:10

## 2022-11-17 RX ADMIN — HYDRALAZINE HYDROCHLORIDE 75 MG: 25 TABLET ORAL at 05:48

## 2022-11-17 RX ADMIN — HYDRALAZINE HYDROCHLORIDE 75 MG: 25 TABLET ORAL at 21:10

## 2022-11-17 RX ADMIN — POLYETHYLENE GLYCOL (3350) 17 G: 17 POWDER, FOR SOLUTION ORAL at 09:33

## 2022-11-18 LAB
ANION GAP SERPL CALCULATED.3IONS-SCNC: 12.1 MMOL/L (ref 5–15)
BUN SERPL-MCNC: 37 MG/DL (ref 8–23)
BUN/CREAT SERPL: 14.1 (ref 7–25)
CALCIUM SPEC-SCNC: 8.5 MG/DL (ref 8.6–10.5)
CHLORIDE SERPL-SCNC: 93 MMOL/L (ref 98–107)
CO2 SERPL-SCNC: 24.9 MMOL/L (ref 22–29)
CREAT SERPL-MCNC: 2.63 MG/DL (ref 0.57–1)
DEPRECATED RDW RBC AUTO: 52.1 FL (ref 37–54)
EGFRCR SERPLBLD CKD-EPI 2021: 18 ML/MIN/1.73
ERYTHROCYTE [DISTWIDTH] IN BLOOD BY AUTOMATED COUNT: 16 % (ref 12.3–15.4)
GLUCOSE BLDC GLUCOMTR-MCNC: 123 MG/DL (ref 70–130)
GLUCOSE BLDC GLUCOMTR-MCNC: 125 MG/DL (ref 70–130)
GLUCOSE BLDC GLUCOMTR-MCNC: 160 MG/DL (ref 70–130)
GLUCOSE SERPL-MCNC: 133 MG/DL (ref 65–99)
HCT VFR BLD AUTO: 26.2 % (ref 34–46.6)
HGB BLD-MCNC: 7.8 G/DL (ref 12–15.9)
MCH RBC QN AUTO: 26.3 PG (ref 26.6–33)
MCHC RBC AUTO-ENTMCNC: 29.8 G/DL (ref 31.5–35.7)
MCV RBC AUTO: 88.2 FL (ref 79–97)
PLATELET # BLD AUTO: 130 10*3/MM3 (ref 140–450)
PMV BLD AUTO: 9 FL (ref 6–12)
POTASSIUM SERPL-SCNC: 4.1 MMOL/L (ref 3.5–5.2)
RBC # BLD AUTO: 2.97 10*6/MM3 (ref 3.77–5.28)
SODIUM SERPL-SCNC: 130 MMOL/L (ref 136–145)
WBC NRBC COR # BLD: 5.92 10*3/MM3 (ref 3.4–10.8)

## 2022-11-18 PROCEDURE — 85027 COMPLETE CBC AUTOMATED: CPT

## 2022-11-18 PROCEDURE — 25010000002 HEPARIN (PORCINE) PER 1000 UNITS: Performed by: SURGERY

## 2022-11-18 PROCEDURE — 80048 BASIC METABOLIC PNL TOTAL CA: CPT | Performed by: INTERNAL MEDICINE

## 2022-11-18 PROCEDURE — 94761 N-INVAS EAR/PLS OXIMETRY MLT: CPT

## 2022-11-18 PROCEDURE — 94799 UNLISTED PULMONARY SVC/PX: CPT

## 2022-11-18 PROCEDURE — 82962 GLUCOSE BLOOD TEST: CPT

## 2022-11-18 PROCEDURE — 99232 SBSQ HOSP IP/OBS MODERATE 35: CPT

## 2022-11-18 PROCEDURE — 63710000001 INSULIN ASPART PER 5 UNITS: Performed by: SURGERY

## 2022-11-18 RX ADMIN — NYSTATIN: 100000 POWDER TOPICAL at 21:43

## 2022-11-18 RX ADMIN — AMLODIPINE BESYLATE 10 MG: 10 TABLET ORAL at 17:54

## 2022-11-18 RX ADMIN — LEVOTHYROXINE SODIUM 25 MCG: 25 TABLET ORAL at 05:16

## 2022-11-18 RX ADMIN — PANTOPRAZOLE SODIUM 40 MG: 40 TABLET, DELAYED RELEASE ORAL at 17:53

## 2022-11-18 RX ADMIN — Medication 150 MG: at 17:55

## 2022-11-18 RX ADMIN — HEPARIN SODIUM 5000 UNITS: 5000 INJECTION INTRAVENOUS; SUBCUTANEOUS at 21:43

## 2022-11-18 RX ADMIN — ISOSORBIDE MONONITRATE 60 MG: 60 TABLET, EXTENDED RELEASE ORAL at 17:53

## 2022-11-18 RX ADMIN — INSULIN ASPART 2 UNITS: 100 INJECTION, SOLUTION INTRAVENOUS; SUBCUTANEOUS at 11:31

## 2022-11-18 RX ADMIN — Medication 10 ML: at 09:22

## 2022-11-18 RX ADMIN — IPRATROPIUM BROMIDE AND ALBUTEROL SULFATE 3 ML: .5; 2.5 SOLUTION RESPIRATORY (INHALATION) at 07:22

## 2022-11-18 RX ADMIN — POLYETHYLENE GLYCOL (3350) 17 G: 17 POWDER, FOR SOLUTION ORAL at 17:52

## 2022-11-18 RX ADMIN — ATORVASTATIN CALCIUM 40 MG: 40 TABLET, FILM COATED ORAL at 21:43

## 2022-11-18 RX ADMIN — IPRATROPIUM BROMIDE AND ALBUTEROL SULFATE 3 ML: .5; 2.5 SOLUTION RESPIRATORY (INHALATION) at 18:31

## 2022-11-18 RX ADMIN — HYDRALAZINE HYDROCHLORIDE 75 MG: 25 TABLET ORAL at 05:41

## 2022-11-18 RX ADMIN — ESCITALOPRAM 10 MG: 10 TABLET, FILM COATED ORAL at 17:53

## 2022-11-18 RX ADMIN — HYDRALAZINE HYDROCHLORIDE 75 MG: 25 TABLET ORAL at 21:43

## 2022-11-18 RX ADMIN — HEPARIN SODIUM 5000 UNITS: 5000 INJECTION INTRAVENOUS; SUBCUTANEOUS at 05:16

## 2022-11-18 RX ADMIN — METOPROLOL SUCCINATE 75 MG: 50 TABLET, EXTENDED RELEASE ORAL at 17:53

## 2022-11-18 RX ADMIN — Medication 10 ML: at 21:43

## 2022-11-19 LAB
ANION GAP SERPL CALCULATED.3IONS-SCNC: 9.5 MMOL/L (ref 5–15)
BUN SERPL-MCNC: 27 MG/DL (ref 8–23)
BUN/CREAT SERPL: 12.8 (ref 7–25)
CALCIUM SPEC-SCNC: 8.6 MG/DL (ref 8.6–10.5)
CHLORIDE SERPL-SCNC: 89 MMOL/L (ref 98–107)
CO2 SERPL-SCNC: 27.5 MMOL/L (ref 22–29)
CREAT SERPL-MCNC: 2.11 MG/DL (ref 0.57–1)
DEPRECATED RDW RBC AUTO: 51.6 FL (ref 37–54)
EGFRCR SERPLBLD CKD-EPI 2021: 23.4 ML/MIN/1.73
ERYTHROCYTE [DISTWIDTH] IN BLOOD BY AUTOMATED COUNT: 15.7 % (ref 12.3–15.4)
GLUCOSE BLDC GLUCOMTR-MCNC: 132 MG/DL (ref 70–130)
GLUCOSE BLDC GLUCOMTR-MCNC: 139 MG/DL (ref 70–130)
GLUCOSE BLDC GLUCOMTR-MCNC: 148 MG/DL (ref 70–130)
GLUCOSE SERPL-MCNC: 133 MG/DL (ref 65–99)
HCT VFR BLD AUTO: 24.3 % (ref 34–46.6)
HGB BLD-MCNC: 7.4 G/DL (ref 12–15.9)
MCH RBC QN AUTO: 27.3 PG (ref 26.6–33)
MCHC RBC AUTO-ENTMCNC: 30.5 G/DL (ref 31.5–35.7)
MCV RBC AUTO: 89.7 FL (ref 79–97)
PLATELET # BLD AUTO: 126 10*3/MM3 (ref 140–450)
PMV BLD AUTO: 9.6 FL (ref 6–12)
POTASSIUM SERPL-SCNC: 3.7 MMOL/L (ref 3.5–5.2)
RBC # BLD AUTO: 2.71 10*6/MM3 (ref 3.77–5.28)
SODIUM SERPL-SCNC: 126 MMOL/L (ref 136–145)
WBC NRBC COR # BLD: 6.04 10*3/MM3 (ref 3.4–10.8)

## 2022-11-19 PROCEDURE — 80048 BASIC METABOLIC PNL TOTAL CA: CPT

## 2022-11-19 PROCEDURE — 94761 N-INVAS EAR/PLS OXIMETRY MLT: CPT

## 2022-11-19 PROCEDURE — 94799 UNLISTED PULMONARY SVC/PX: CPT

## 2022-11-19 PROCEDURE — 82962 GLUCOSE BLOOD TEST: CPT

## 2022-11-19 PROCEDURE — 85027 COMPLETE CBC AUTOMATED: CPT

## 2022-11-19 PROCEDURE — 99231 SBSQ HOSP IP/OBS SF/LOW 25: CPT

## 2022-11-19 PROCEDURE — 25010000002 HEPARIN (PORCINE) PER 1000 UNITS: Performed by: SURGERY

## 2022-11-19 RX ADMIN — ATORVASTATIN CALCIUM 40 MG: 40 TABLET, FILM COATED ORAL at 21:19

## 2022-11-19 RX ADMIN — PANTOPRAZOLE SODIUM 40 MG: 40 TABLET, DELAYED RELEASE ORAL at 08:17

## 2022-11-19 RX ADMIN — HYDRALAZINE HYDROCHLORIDE 75 MG: 25 TABLET ORAL at 21:19

## 2022-11-19 RX ADMIN — PANTOPRAZOLE SODIUM 40 MG: 40 TABLET, DELAYED RELEASE ORAL at 16:50

## 2022-11-19 RX ADMIN — Medication 10 ML: at 21:19

## 2022-11-19 RX ADMIN — METOPROLOL SUCCINATE 75 MG: 50 TABLET, EXTENDED RELEASE ORAL at 08:16

## 2022-11-19 RX ADMIN — HEPARIN SODIUM 5000 UNITS: 5000 INJECTION INTRAVENOUS; SUBCUTANEOUS at 21:19

## 2022-11-19 RX ADMIN — HYDRALAZINE HYDROCHLORIDE 75 MG: 25 TABLET ORAL at 05:07

## 2022-11-19 RX ADMIN — ESCITALOPRAM 10 MG: 10 TABLET, FILM COATED ORAL at 08:16

## 2022-11-19 RX ADMIN — HEPARIN SODIUM 5000 UNITS: 5000 INJECTION INTRAVENOUS; SUBCUTANEOUS at 05:07

## 2022-11-19 RX ADMIN — ISOSORBIDE MONONITRATE 60 MG: 60 TABLET, EXTENDED RELEASE ORAL at 08:17

## 2022-11-19 RX ADMIN — NYSTATIN: 100000 POWDER TOPICAL at 21:19

## 2022-11-19 RX ADMIN — AMLODIPINE BESYLATE 10 MG: 10 TABLET ORAL at 08:16

## 2022-11-19 RX ADMIN — POLYETHYLENE GLYCOL (3350) 17 G: 17 POWDER, FOR SOLUTION ORAL at 08:16

## 2022-11-19 RX ADMIN — LEVOTHYROXINE SODIUM 25 MCG: 25 TABLET ORAL at 05:07

## 2022-11-19 RX ADMIN — HYDRALAZINE HYDROCHLORIDE 75 MG: 25 TABLET ORAL at 15:22

## 2022-11-19 RX ADMIN — Medication 10 ML: at 08:17

## 2022-11-19 RX ADMIN — IPRATROPIUM BROMIDE AND ALBUTEROL SULFATE 3 ML: .5; 2.5 SOLUTION RESPIRATORY (INHALATION) at 07:17

## 2022-11-19 RX ADMIN — HEPARIN SODIUM 5000 UNITS: 5000 INJECTION INTRAVENOUS; SUBCUTANEOUS at 14:27

## 2022-11-19 RX ADMIN — NYSTATIN: 100000 POWDER TOPICAL at 08:16

## 2022-11-19 RX ADMIN — Medication 150 MG: at 08:16

## 2022-11-19 RX ADMIN — IPRATROPIUM BROMIDE AND ALBUTEROL SULFATE 3 ML: .5; 2.5 SOLUTION RESPIRATORY (INHALATION) at 18:53

## 2022-11-20 LAB
ABO GROUP BLD: NORMAL
ANION GAP SERPL CALCULATED.3IONS-SCNC: 11.3 MMOL/L (ref 5–15)
BASOPHILS # BLD AUTO: 0.02 10*3/MM3 (ref 0–0.2)
BASOPHILS NFR BLD AUTO: 0.3 % (ref 0–1.5)
BLD GP AB SCN SERPL QL: NEGATIVE
BUN SERPL-MCNC: 37 MG/DL (ref 8–23)
BUN/CREAT SERPL: 13.2 (ref 7–25)
CALCIUM SPEC-SCNC: 8.4 MG/DL (ref 8.6–10.5)
CHLORIDE SERPL-SCNC: 93 MMOL/L (ref 98–107)
CO2 SERPL-SCNC: 26.7 MMOL/L (ref 22–29)
CREAT SERPL-MCNC: 2.8 MG/DL (ref 0.57–1)
DEPRECATED RDW RBC AUTO: 51.7 FL (ref 37–54)
EGFRCR SERPLBLD CKD-EPI 2021: 16.7 ML/MIN/1.73
EOSINOPHIL # BLD AUTO: 0.08 10*3/MM3 (ref 0–0.4)
EOSINOPHIL NFR BLD AUTO: 1.3 % (ref 0.3–6.2)
ERYTHROCYTE [DISTWIDTH] IN BLOOD BY AUTOMATED COUNT: 15.7 % (ref 12.3–15.4)
GLUCOSE BLDC GLUCOMTR-MCNC: 127 MG/DL (ref 70–130)
GLUCOSE BLDC GLUCOMTR-MCNC: 131 MG/DL (ref 70–130)
GLUCOSE BLDC GLUCOMTR-MCNC: 174 MG/DL (ref 70–130)
GLUCOSE SERPL-MCNC: 130 MG/DL (ref 65–99)
HCT VFR BLD AUTO: 21.6 % (ref 34–46.6)
HCT VFR BLD AUTO: 26.8 % (ref 34–46.6)
HGB BLD-MCNC: 6.5 G/DL (ref 12–15.9)
HGB BLD-MCNC: 8.4 G/DL (ref 12–15.9)
IMM GRANULOCYTES # BLD AUTO: 0.02 10*3/MM3 (ref 0–0.05)
IMM GRANULOCYTES NFR BLD AUTO: 0.3 % (ref 0–0.5)
LYMPHOCYTES # BLD AUTO: 0.97 10*3/MM3 (ref 0.7–3.1)
LYMPHOCYTES NFR BLD AUTO: 15.5 % (ref 19.6–45.3)
MCH RBC QN AUTO: 26.7 PG (ref 26.6–33)
MCHC RBC AUTO-ENTMCNC: 30.1 G/DL (ref 31.5–35.7)
MCV RBC AUTO: 88.9 FL (ref 79–97)
MONOCYTES # BLD AUTO: 0.71 10*3/MM3 (ref 0.1–0.9)
MONOCYTES NFR BLD AUTO: 11.3 % (ref 5–12)
NEUTROPHILS NFR BLD AUTO: 4.47 10*3/MM3 (ref 1.7–7)
NEUTROPHILS NFR BLD AUTO: 71.3 % (ref 42.7–76)
NRBC BLD AUTO-RTO: 0 /100 WBC (ref 0–0.2)
PLATELET # BLD AUTO: 143 10*3/MM3 (ref 140–450)
PMV BLD AUTO: 9.4 FL (ref 6–12)
POTASSIUM SERPL-SCNC: 4.1 MMOL/L (ref 3.5–5.2)
RBC # BLD AUTO: 2.43 10*6/MM3 (ref 3.77–5.28)
RH BLD: POSITIVE
SODIUM SERPL-SCNC: 131 MMOL/L (ref 136–145)
T&S EXPIRATION DATE: NORMAL
WBC NRBC COR # BLD: 6.27 10*3/MM3 (ref 3.4–10.8)

## 2022-11-20 PROCEDURE — 94799 UNLISTED PULMONARY SVC/PX: CPT

## 2022-11-20 PROCEDURE — 86900 BLOOD TYPING SEROLOGIC ABO: CPT

## 2022-11-20 PROCEDURE — 82962 GLUCOSE BLOOD TEST: CPT

## 2022-11-20 PROCEDURE — 25010000002 HEPARIN (PORCINE) PER 1000 UNITS: Performed by: SURGERY

## 2022-11-20 PROCEDURE — P9016 RBC LEUKOCYTES REDUCED: HCPCS

## 2022-11-20 PROCEDURE — 86923 COMPATIBILITY TEST ELECTRIC: CPT

## 2022-11-20 PROCEDURE — 86900 BLOOD TYPING SEROLOGIC ABO: CPT | Performed by: PHYSICIAN ASSISTANT

## 2022-11-20 PROCEDURE — 85014 HEMATOCRIT: CPT | Performed by: STUDENT IN AN ORGANIZED HEALTH CARE EDUCATION/TRAINING PROGRAM

## 2022-11-20 PROCEDURE — 86901 BLOOD TYPING SEROLOGIC RH(D): CPT | Performed by: PHYSICIAN ASSISTANT

## 2022-11-20 PROCEDURE — 85025 COMPLETE CBC W/AUTO DIFF WBC: CPT | Performed by: STUDENT IN AN ORGANIZED HEALTH CARE EDUCATION/TRAINING PROGRAM

## 2022-11-20 PROCEDURE — 80048 BASIC METABOLIC PNL TOTAL CA: CPT | Performed by: STUDENT IN AN ORGANIZED HEALTH CARE EDUCATION/TRAINING PROGRAM

## 2022-11-20 PROCEDURE — 36430 TRANSFUSION BLD/BLD COMPNT: CPT

## 2022-11-20 PROCEDURE — 99232 SBSQ HOSP IP/OBS MODERATE 35: CPT

## 2022-11-20 PROCEDURE — 63710000001 INSULIN ASPART PER 5 UNITS: Performed by: SURGERY

## 2022-11-20 PROCEDURE — 85018 HEMOGLOBIN: CPT | Performed by: STUDENT IN AN ORGANIZED HEALTH CARE EDUCATION/TRAINING PROGRAM

## 2022-11-20 PROCEDURE — 86850 RBC ANTIBODY SCREEN: CPT | Performed by: PHYSICIAN ASSISTANT

## 2022-11-20 RX ADMIN — LEVOTHYROXINE SODIUM 25 MCG: 25 TABLET ORAL at 06:36

## 2022-11-20 RX ADMIN — IPRATROPIUM BROMIDE AND ALBUTEROL SULFATE 3 ML: .5; 2.5 SOLUTION RESPIRATORY (INHALATION) at 07:35

## 2022-11-20 RX ADMIN — IPRATROPIUM BROMIDE AND ALBUTEROL SULFATE 3 ML: .5; 2.5 SOLUTION RESPIRATORY (INHALATION) at 18:37

## 2022-11-20 RX ADMIN — Medication 150 MG: at 09:13

## 2022-11-20 RX ADMIN — HEPARIN SODIUM 5000 UNITS: 5000 INJECTION INTRAVENOUS; SUBCUTANEOUS at 21:42

## 2022-11-20 RX ADMIN — POLYETHYLENE GLYCOL (3350) 17 G: 17 POWDER, FOR SOLUTION ORAL at 09:12

## 2022-11-20 RX ADMIN — NYSTATIN: 100000 POWDER TOPICAL at 09:14

## 2022-11-20 RX ADMIN — ESCITALOPRAM 10 MG: 10 TABLET, FILM COATED ORAL at 09:13

## 2022-11-20 RX ADMIN — METOPROLOL SUCCINATE 75 MG: 50 TABLET, EXTENDED RELEASE ORAL at 09:13

## 2022-11-20 RX ADMIN — PANTOPRAZOLE SODIUM 40 MG: 40 TABLET, DELAYED RELEASE ORAL at 09:13

## 2022-11-20 RX ADMIN — HEPARIN SODIUM 5000 UNITS: 5000 INJECTION INTRAVENOUS; SUBCUTANEOUS at 15:58

## 2022-11-20 RX ADMIN — HYDRALAZINE HYDROCHLORIDE 75 MG: 25 TABLET ORAL at 21:44

## 2022-11-20 RX ADMIN — Medication 10 ML: at 21:43

## 2022-11-20 RX ADMIN — Medication 10 ML: at 09:14

## 2022-11-20 RX ADMIN — AMLODIPINE BESYLATE 10 MG: 10 TABLET ORAL at 09:13

## 2022-11-20 RX ADMIN — ISOSORBIDE MONONITRATE 60 MG: 60 TABLET, EXTENDED RELEASE ORAL at 09:13

## 2022-11-20 RX ADMIN — PANTOPRAZOLE SODIUM 40 MG: 40 TABLET, DELAYED RELEASE ORAL at 17:02

## 2022-11-20 RX ADMIN — NYSTATIN: 100000 POWDER TOPICAL at 21:43

## 2022-11-20 RX ADMIN — HEPARIN SODIUM 5000 UNITS: 5000 INJECTION INTRAVENOUS; SUBCUTANEOUS at 06:36

## 2022-11-20 RX ADMIN — INSULIN ASPART 2 UNITS: 100 INJECTION, SOLUTION INTRAVENOUS; SUBCUTANEOUS at 11:36

## 2022-11-20 RX ADMIN — ATORVASTATIN CALCIUM 40 MG: 40 TABLET, FILM COATED ORAL at 21:42

## 2022-11-20 RX ADMIN — HYDRALAZINE HYDROCHLORIDE 75 MG: 25 TABLET ORAL at 15:58

## 2022-11-20 RX ADMIN — HYDRALAZINE HYDROCHLORIDE 75 MG: 25 TABLET ORAL at 06:37

## 2022-11-21 LAB
ANION GAP SERPL CALCULATED.3IONS-SCNC: 13.1 MMOL/L (ref 5–15)
BH BB BLOOD EXPIRATION DATE: NORMAL
BH BB BLOOD TYPE BARCODE: 7300
BH BB DISPENSE STATUS: NORMAL
BH BB PRODUCT CODE: NORMAL
BH BB UNIT NUMBER: NORMAL
BUN SERPL-MCNC: 46 MG/DL (ref 8–23)
BUN/CREAT SERPL: 13.9 (ref 7–25)
CALCIUM SPEC-SCNC: 8.6 MG/DL (ref 8.6–10.5)
CHLORIDE SERPL-SCNC: 91 MMOL/L (ref 98–107)
CO2 SERPL-SCNC: 22.9 MMOL/L (ref 22–29)
CREAT SERPL-MCNC: 3.3 MG/DL (ref 0.57–1)
CROSSMATCH INTERPRETATION: NORMAL
DEPRECATED RDW RBC AUTO: 50.1 FL (ref 37–54)
EGFRCR SERPLBLD CKD-EPI 2021: 13.7 ML/MIN/1.73
ERYTHROCYTE [DISTWIDTH] IN BLOOD BY AUTOMATED COUNT: 15.2 % (ref 12.3–15.4)
GLUCOSE BLDC GLUCOMTR-MCNC: 128 MG/DL (ref 70–130)
GLUCOSE BLDC GLUCOMTR-MCNC: 137 MG/DL (ref 70–130)
GLUCOSE BLDC GLUCOMTR-MCNC: 190 MG/DL (ref 70–130)
GLUCOSE SERPL-MCNC: 121 MG/DL (ref 65–99)
HCT VFR BLD AUTO: 26.5 % (ref 34–46.6)
HGB BLD-MCNC: 8.2 G/DL (ref 12–15.9)
MCH RBC QN AUTO: 28.2 PG (ref 26.6–33)
MCHC RBC AUTO-ENTMCNC: 30.9 G/DL (ref 31.5–35.7)
MCV RBC AUTO: 91.1 FL (ref 79–97)
PLATELET # BLD AUTO: 123 10*3/MM3 (ref 140–450)
PMV BLD AUTO: 10.3 FL (ref 6–12)
POTASSIUM SERPL-SCNC: 5.2 MMOL/L (ref 3.5–5.2)
RBC # BLD AUTO: 2.91 10*6/MM3 (ref 3.77–5.28)
SODIUM SERPL-SCNC: 127 MMOL/L (ref 136–145)
UNIT  ABO: NORMAL
UNIT  RH: NORMAL
WBC NRBC COR # BLD: 6.81 10*3/MM3 (ref 3.4–10.8)

## 2022-11-21 PROCEDURE — 85027 COMPLETE CBC AUTOMATED: CPT

## 2022-11-21 PROCEDURE — 63710000001 INSULIN ASPART PER 5 UNITS: Performed by: SURGERY

## 2022-11-21 PROCEDURE — 25010000002 EPOETIN ALFA-EPBX 10000 UNIT/ML SOLUTION: Performed by: INTERNAL MEDICINE

## 2022-11-21 PROCEDURE — 80048 BASIC METABOLIC PNL TOTAL CA: CPT | Performed by: INTERNAL MEDICINE

## 2022-11-21 PROCEDURE — 94799 UNLISTED PULMONARY SVC/PX: CPT

## 2022-11-21 PROCEDURE — 94761 N-INVAS EAR/PLS OXIMETRY MLT: CPT

## 2022-11-21 PROCEDURE — 97530 THERAPEUTIC ACTIVITIES: CPT

## 2022-11-21 PROCEDURE — 99232 SBSQ HOSP IP/OBS MODERATE 35: CPT

## 2022-11-21 PROCEDURE — 82962 GLUCOSE BLOOD TEST: CPT

## 2022-11-21 PROCEDURE — 25010000002 HEPARIN (PORCINE) PER 1000 UNITS: Performed by: SURGERY

## 2022-11-21 RX ADMIN — Medication 10 ML: at 21:39

## 2022-11-21 RX ADMIN — POLYETHYLENE GLYCOL (3350) 17 G: 17 POWDER, FOR SOLUTION ORAL at 12:14

## 2022-11-21 RX ADMIN — PANTOPRAZOLE SODIUM 40 MG: 40 TABLET, DELAYED RELEASE ORAL at 12:12

## 2022-11-21 RX ADMIN — AMLODIPINE BESYLATE 10 MG: 10 TABLET ORAL at 12:12

## 2022-11-21 RX ADMIN — HYDRALAZINE HYDROCHLORIDE 75 MG: 25 TABLET ORAL at 14:28

## 2022-11-21 RX ADMIN — Medication 150 MG: at 12:13

## 2022-11-21 RX ADMIN — INSULIN ASPART 2 UNITS: 100 INJECTION, SOLUTION INTRAVENOUS; SUBCUTANEOUS at 18:29

## 2022-11-21 RX ADMIN — PANTOPRAZOLE SODIUM 40 MG: 40 TABLET, DELAYED RELEASE ORAL at 18:20

## 2022-11-21 RX ADMIN — IPRATROPIUM BROMIDE AND ALBUTEROL SULFATE 3 ML: .5; 2.5 SOLUTION RESPIRATORY (INHALATION) at 07:14

## 2022-11-21 RX ADMIN — HYDRALAZINE HYDROCHLORIDE 75 MG: 25 TABLET ORAL at 05:32

## 2022-11-21 RX ADMIN — HYDRALAZINE HYDROCHLORIDE 75 MG: 25 TABLET ORAL at 21:39

## 2022-11-21 RX ADMIN — HEPARIN SODIUM 5000 UNITS: 5000 INJECTION INTRAVENOUS; SUBCUTANEOUS at 21:39

## 2022-11-21 RX ADMIN — IPRATROPIUM BROMIDE AND ALBUTEROL SULFATE 3 ML: .5; 2.5 SOLUTION RESPIRATORY (INHALATION) at 19:23

## 2022-11-21 RX ADMIN — ISOSORBIDE MONONITRATE 60 MG: 60 TABLET, EXTENDED RELEASE ORAL at 12:13

## 2022-11-21 RX ADMIN — METOPROLOL SUCCINATE 75 MG: 50 TABLET, EXTENDED RELEASE ORAL at 12:13

## 2022-11-21 RX ADMIN — ATORVASTATIN CALCIUM 40 MG: 40 TABLET, FILM COATED ORAL at 21:39

## 2022-11-21 RX ADMIN — LEVOTHYROXINE SODIUM 25 MCG: 25 TABLET ORAL at 05:32

## 2022-11-21 RX ADMIN — NYSTATIN: 100000 POWDER TOPICAL at 21:39

## 2022-11-21 RX ADMIN — HEPARIN SODIUM 5000 UNITS: 5000 INJECTION INTRAVENOUS; SUBCUTANEOUS at 14:28

## 2022-11-21 RX ADMIN — ESCITALOPRAM 10 MG: 10 TABLET, FILM COATED ORAL at 12:13

## 2022-11-21 RX ADMIN — Medication 10 ML: at 12:12

## 2022-11-21 RX ADMIN — EPOETIN ALFA-EPBX 6000 UNITS: 10000 INJECTION, SOLUTION INTRAVENOUS; SUBCUTANEOUS at 14:29

## 2022-11-21 RX ADMIN — DOCUSATE SODIUM 50 MG AND SENNOSIDES 8.6 MG 2 TABLET: 8.6; 5 TABLET, FILM COATED ORAL at 23:25

## 2022-11-21 RX ADMIN — NYSTATIN: 100000 POWDER TOPICAL at 12:14

## 2022-11-21 RX ADMIN — HEPARIN SODIUM 5000 UNITS: 5000 INJECTION INTRAVENOUS; SUBCUTANEOUS at 05:32

## 2022-11-22 LAB
ANION GAP SERPL CALCULATED.3IONS-SCNC: 10.8 MMOL/L (ref 5–15)
BUN SERPL-MCNC: 31 MG/DL (ref 8–23)
BUN/CREAT SERPL: 12.2 (ref 7–25)
CALCIUM SPEC-SCNC: 8.5 MG/DL (ref 8.6–10.5)
CHLORIDE SERPL-SCNC: 93 MMOL/L (ref 98–107)
CO2 SERPL-SCNC: 28.2 MMOL/L (ref 22–29)
CREAT SERPL-MCNC: 2.54 MG/DL (ref 0.57–1)
DEPRECATED RDW RBC AUTO: 49.6 FL (ref 37–54)
EGFRCR SERPLBLD CKD-EPI 2021: 18.8 ML/MIN/1.73
ERYTHROCYTE [DISTWIDTH] IN BLOOD BY AUTOMATED COUNT: 15 % (ref 12.3–15.4)
GLUCOSE BLDC GLUCOMTR-MCNC: 124 MG/DL (ref 70–130)
GLUCOSE BLDC GLUCOMTR-MCNC: 174 MG/DL (ref 70–130)
GLUCOSE BLDC GLUCOMTR-MCNC: 215 MG/DL (ref 70–130)
GLUCOSE SERPL-MCNC: 136 MG/DL (ref 65–99)
HCT VFR BLD AUTO: 24.8 % (ref 34–46.6)
HGB BLD-MCNC: 7.6 G/DL (ref 12–15.9)
MCH RBC QN AUTO: 27.7 PG (ref 26.6–33)
MCHC RBC AUTO-ENTMCNC: 30.6 G/DL (ref 31.5–35.7)
MCV RBC AUTO: 90.5 FL (ref 79–97)
PLATELET # BLD AUTO: 169 10*3/MM3 (ref 140–450)
PMV BLD AUTO: 9 FL (ref 6–12)
POTASSIUM SERPL-SCNC: 4 MMOL/L (ref 3.5–5.2)
RBC # BLD AUTO: 2.74 10*6/MM3 (ref 3.77–5.28)
SODIUM SERPL-SCNC: 132 MMOL/L (ref 136–145)
WBC NRBC COR # BLD: 6.75 10*3/MM3 (ref 3.4–10.8)

## 2022-11-22 PROCEDURE — 94799 UNLISTED PULMONARY SVC/PX: CPT

## 2022-11-22 PROCEDURE — 63710000001 INSULIN ASPART PER 5 UNITS: Performed by: SURGERY

## 2022-11-22 PROCEDURE — 85027 COMPLETE CBC AUTOMATED: CPT

## 2022-11-22 PROCEDURE — 94664 DEMO&/EVAL PT USE INHALER: CPT

## 2022-11-22 PROCEDURE — 97530 THERAPEUTIC ACTIVITIES: CPT

## 2022-11-22 PROCEDURE — 80048 BASIC METABOLIC PNL TOTAL CA: CPT | Performed by: HOSPITALIST

## 2022-11-22 PROCEDURE — 97110 THERAPEUTIC EXERCISES: CPT

## 2022-11-22 PROCEDURE — 25010000002 HEPARIN (PORCINE) PER 1000 UNITS: Performed by: SURGERY

## 2022-11-22 PROCEDURE — 99233 SBSQ HOSP IP/OBS HIGH 50: CPT

## 2022-11-22 PROCEDURE — 82962 GLUCOSE BLOOD TEST: CPT

## 2022-11-22 RX ADMIN — ATORVASTATIN CALCIUM 40 MG: 40 TABLET, FILM COATED ORAL at 21:58

## 2022-11-22 RX ADMIN — PANTOPRAZOLE SODIUM 40 MG: 40 TABLET, DELAYED RELEASE ORAL at 18:08

## 2022-11-22 RX ADMIN — ISOSORBIDE MONONITRATE 60 MG: 60 TABLET, EXTENDED RELEASE ORAL at 09:56

## 2022-11-22 RX ADMIN — NYSTATIN: 100000 POWDER TOPICAL at 21:58

## 2022-11-22 RX ADMIN — HYDRALAZINE HYDROCHLORIDE 75 MG: 25 TABLET ORAL at 15:21

## 2022-11-22 RX ADMIN — HEPARIN SODIUM 5000 UNITS: 5000 INJECTION INTRAVENOUS; SUBCUTANEOUS at 15:21

## 2022-11-22 RX ADMIN — HYDRALAZINE HYDROCHLORIDE 75 MG: 25 TABLET ORAL at 06:22

## 2022-11-22 RX ADMIN — HEPARIN SODIUM 5000 UNITS: 5000 INJECTION INTRAVENOUS; SUBCUTANEOUS at 21:58

## 2022-11-22 RX ADMIN — Medication 10 ML: at 21:58

## 2022-11-22 RX ADMIN — NYSTATIN: 100000 POWDER TOPICAL at 09:57

## 2022-11-22 RX ADMIN — AMLODIPINE BESYLATE 10 MG: 10 TABLET ORAL at 09:56

## 2022-11-22 RX ADMIN — HEPARIN SODIUM 5000 UNITS: 5000 INJECTION INTRAVENOUS; SUBCUTANEOUS at 06:22

## 2022-11-22 RX ADMIN — IPRATROPIUM BROMIDE AND ALBUTEROL SULFATE 3 ML: .5; 2.5 SOLUTION RESPIRATORY (INHALATION) at 19:21

## 2022-11-22 RX ADMIN — HYDRALAZINE HYDROCHLORIDE 75 MG: 25 TABLET ORAL at 21:58

## 2022-11-22 RX ADMIN — IPRATROPIUM BROMIDE AND ALBUTEROL SULFATE 3 ML: .5; 2.5 SOLUTION RESPIRATORY (INHALATION) at 07:21

## 2022-11-22 RX ADMIN — PANTOPRAZOLE SODIUM 40 MG: 40 TABLET, DELAYED RELEASE ORAL at 09:55

## 2022-11-22 RX ADMIN — LEVOTHYROXINE SODIUM 25 MCG: 25 TABLET ORAL at 06:22

## 2022-11-22 RX ADMIN — Medication 10 ML: at 09:55

## 2022-11-22 RX ADMIN — INSULIN ASPART 2 UNITS: 100 INJECTION, SOLUTION INTRAVENOUS; SUBCUTANEOUS at 18:30

## 2022-11-22 RX ADMIN — METOPROLOL SUCCINATE 75 MG: 50 TABLET, EXTENDED RELEASE ORAL at 09:56

## 2022-11-22 RX ADMIN — INSULIN ASPART 2 UNITS: 100 INJECTION, SOLUTION INTRAVENOUS; SUBCUTANEOUS at 12:54

## 2022-11-22 RX ADMIN — Medication 150 MG: at 09:57

## 2022-11-22 RX ADMIN — ESCITALOPRAM 10 MG: 10 TABLET, FILM COATED ORAL at 09:56

## 2022-11-22 RX ADMIN — POLYETHYLENE GLYCOL (3350) 17 G: 17 POWDER, FOR SOLUTION ORAL at 09:56

## 2022-11-23 LAB
ANION GAP SERPL CALCULATED.3IONS-SCNC: 10.7 MMOL/L (ref 5–15)
BASOPHILS # BLD AUTO: 0.02 10*3/MM3 (ref 0–0.2)
BASOPHILS NFR BLD AUTO: 0.3 % (ref 0–1.5)
BUN SERPL-MCNC: 40 MG/DL (ref 8–23)
BUN/CREAT SERPL: 12.3 (ref 7–25)
CALCIUM SPEC-SCNC: 8.5 MG/DL (ref 8.6–10.5)
CHLORIDE SERPL-SCNC: 92 MMOL/L (ref 98–107)
CO2 SERPL-SCNC: 27.3 MMOL/L (ref 22–29)
CREAT SERPL-MCNC: 3.24 MG/DL (ref 0.57–1)
DEPRECATED RDW RBC AUTO: 48.9 FL (ref 37–54)
EGFRCR SERPLBLD CKD-EPI 2021: 14 ML/MIN/1.73
EOSINOPHIL # BLD AUTO: 0.09 10*3/MM3 (ref 0–0.4)
EOSINOPHIL NFR BLD AUTO: 1.4 % (ref 0.3–6.2)
ERYTHROCYTE [DISTWIDTH] IN BLOOD BY AUTOMATED COUNT: 14.7 % (ref 12.3–15.4)
GLUCOSE BLDC GLUCOMTR-MCNC: 119 MG/DL (ref 70–130)
GLUCOSE BLDC GLUCOMTR-MCNC: 149 MG/DL (ref 70–130)
GLUCOSE BLDC GLUCOMTR-MCNC: 209 MG/DL (ref 70–130)
GLUCOSE SERPL-MCNC: 108 MG/DL (ref 65–99)
HCT VFR BLD AUTO: 23.1 % (ref 34–46.6)
HCT VFR BLD AUTO: 25.6 % (ref 34–46.6)
HGB BLD-MCNC: 7 G/DL (ref 12–15.9)
HGB BLD-MCNC: 7.9 G/DL (ref 12–15.9)
IMM GRANULOCYTES # BLD AUTO: 0.02 10*3/MM3 (ref 0–0.05)
IMM GRANULOCYTES NFR BLD AUTO: 0.3 % (ref 0–0.5)
LYMPHOCYTES # BLD AUTO: 0.85 10*3/MM3 (ref 0.7–3.1)
LYMPHOCYTES NFR BLD AUTO: 13.4 % (ref 19.6–45.3)
MCH RBC QN AUTO: 27.5 PG (ref 26.6–33)
MCHC RBC AUTO-ENTMCNC: 30.3 G/DL (ref 31.5–35.7)
MCV RBC AUTO: 90.6 FL (ref 79–97)
MONOCYTES # BLD AUTO: 0.75 10*3/MM3 (ref 0.1–0.9)
MONOCYTES NFR BLD AUTO: 11.8 % (ref 5–12)
NEUTROPHILS NFR BLD AUTO: 4.63 10*3/MM3 (ref 1.7–7)
NEUTROPHILS NFR BLD AUTO: 72.8 % (ref 42.7–76)
NRBC BLD AUTO-RTO: 0 /100 WBC (ref 0–0.2)
PLATELET # BLD AUTO: 199 10*3/MM3 (ref 140–450)
PMV BLD AUTO: 9.1 FL (ref 6–12)
POTASSIUM SERPL-SCNC: 4 MMOL/L (ref 3.5–5.2)
RBC # BLD AUTO: 2.55 10*6/MM3 (ref 3.77–5.28)
SODIUM SERPL-SCNC: 130 MMOL/L (ref 136–145)
WBC NRBC COR # BLD: 6.36 10*3/MM3 (ref 3.4–10.8)

## 2022-11-23 PROCEDURE — 63710000001 INSULIN ASPART PER 5 UNITS: Performed by: SURGERY

## 2022-11-23 PROCEDURE — 94761 N-INVAS EAR/PLS OXIMETRY MLT: CPT

## 2022-11-23 PROCEDURE — 80048 BASIC METABOLIC PNL TOTAL CA: CPT | Performed by: INTERNAL MEDICINE

## 2022-11-23 PROCEDURE — 25010000002 HEPARIN (PORCINE) PER 1000 UNITS: Performed by: SURGERY

## 2022-11-23 PROCEDURE — 85018 HEMOGLOBIN: CPT | Performed by: HOSPITALIST

## 2022-11-23 PROCEDURE — 99232 SBSQ HOSP IP/OBS MODERATE 35: CPT | Performed by: HOSPITALIST

## 2022-11-23 PROCEDURE — 85014 HEMATOCRIT: CPT | Performed by: HOSPITALIST

## 2022-11-23 PROCEDURE — 82962 GLUCOSE BLOOD TEST: CPT

## 2022-11-23 PROCEDURE — 94799 UNLISTED PULMONARY SVC/PX: CPT

## 2022-11-23 PROCEDURE — 25010000002 EPOETIN ALFA-EPBX 10000 UNIT/ML SOLUTION: Performed by: HOSPITALIST

## 2022-11-23 PROCEDURE — 85025 COMPLETE CBC W/AUTO DIFF WBC: CPT | Performed by: STUDENT IN AN ORGANIZED HEALTH CARE EDUCATION/TRAINING PROGRAM

## 2022-11-23 RX ORDER — PANTOPRAZOLE SODIUM 40 MG/1
40 TABLET, DELAYED RELEASE ORAL
Status: DISCONTINUED | OUTPATIENT
Start: 2022-11-23 | End: 2022-11-30 | Stop reason: HOSPADM

## 2022-11-23 RX ORDER — PANTOPRAZOLE SODIUM 40 MG/10ML
40 INJECTION, POWDER, LYOPHILIZED, FOR SOLUTION INTRAVENOUS
Status: DISCONTINUED | OUTPATIENT
Start: 2022-11-23 | End: 2022-11-23

## 2022-11-23 RX ADMIN — ESCITALOPRAM 10 MG: 10 TABLET, FILM COATED ORAL at 12:46

## 2022-11-23 RX ADMIN — HEPARIN SODIUM 5000 UNITS: 5000 INJECTION INTRAVENOUS; SUBCUTANEOUS at 05:21

## 2022-11-23 RX ADMIN — AMLODIPINE BESYLATE 10 MG: 10 TABLET ORAL at 12:45

## 2022-11-23 RX ADMIN — Medication 10 MG: at 23:06

## 2022-11-23 RX ADMIN — HYDRALAZINE HYDROCHLORIDE 75 MG: 25 TABLET ORAL at 15:25

## 2022-11-23 RX ADMIN — NYSTATIN: 100000 POWDER TOPICAL at 22:03

## 2022-11-23 RX ADMIN — LEVOTHYROXINE SODIUM 25 MCG: 25 TABLET ORAL at 05:21

## 2022-11-23 RX ADMIN — HEPARIN SODIUM 5000 UNITS: 5000 INJECTION INTRAVENOUS; SUBCUTANEOUS at 22:02

## 2022-11-23 RX ADMIN — Medication 10 ML: at 22:02

## 2022-11-23 RX ADMIN — NYSTATIN: 100000 POWDER TOPICAL at 12:47

## 2022-11-23 RX ADMIN — EPOETIN ALFA-EPBX 10000 UNITS: 10000 INJECTION, SOLUTION INTRAVENOUS; SUBCUTANEOUS at 12:46

## 2022-11-23 RX ADMIN — METOPROLOL SUCCINATE 75 MG: 50 TABLET, EXTENDED RELEASE ORAL at 12:46

## 2022-11-23 RX ADMIN — IPRATROPIUM BROMIDE AND ALBUTEROL SULFATE 3 ML: .5; 2.5 SOLUTION RESPIRATORY (INHALATION) at 19:15

## 2022-11-23 RX ADMIN — POLYETHYLENE GLYCOL (3350) 17 G: 17 POWDER, FOR SOLUTION ORAL at 12:47

## 2022-11-23 RX ADMIN — ATORVASTATIN CALCIUM 40 MG: 40 TABLET, FILM COATED ORAL at 22:03

## 2022-11-23 RX ADMIN — Medication 10 ML: at 12:48

## 2022-11-23 RX ADMIN — ISOSORBIDE MONONITRATE 60 MG: 60 TABLET, EXTENDED RELEASE ORAL at 12:46

## 2022-11-23 RX ADMIN — INSULIN ASPART 3 UNITS: 100 INJECTION, SOLUTION INTRAVENOUS; SUBCUTANEOUS at 17:43

## 2022-11-23 RX ADMIN — PANTOPRAZOLE SODIUM 40 MG: 40 INJECTION, POWDER, FOR SOLUTION INTRAVENOUS at 17:43

## 2022-11-23 RX ADMIN — IPRATROPIUM BROMIDE AND ALBUTEROL SULFATE 3 ML: .5; 2.5 SOLUTION RESPIRATORY (INHALATION) at 07:27

## 2022-11-23 RX ADMIN — Medication 150 MG: at 12:47

## 2022-11-23 RX ADMIN — HYDRALAZINE HYDROCHLORIDE 75 MG: 25 TABLET ORAL at 05:21

## 2022-11-23 RX ADMIN — HEPARIN SODIUM 5000 UNITS: 5000 INJECTION INTRAVENOUS; SUBCUTANEOUS at 15:25

## 2022-11-24 LAB
ANION GAP SERPL CALCULATED.3IONS-SCNC: 11.2 MMOL/L (ref 5–15)
BUN SERPL-MCNC: 25 MG/DL (ref 8–23)
BUN/CREAT SERPL: 10.8 (ref 7–25)
CALCIUM SPEC-SCNC: 8.3 MG/DL (ref 8.6–10.5)
CHLORIDE SERPL-SCNC: 93 MMOL/L (ref 98–107)
CO2 SERPL-SCNC: 27.8 MMOL/L (ref 22–29)
CREAT SERPL-MCNC: 2.32 MG/DL (ref 0.57–1)
EGFRCR SERPLBLD CKD-EPI 2021: 20.9 ML/MIN/1.73
GLUCOSE BLDC GLUCOMTR-MCNC: 110 MG/DL (ref 70–130)
GLUCOSE BLDC GLUCOMTR-MCNC: 161 MG/DL (ref 70–130)
GLUCOSE BLDC GLUCOMTR-MCNC: 164 MG/DL (ref 70–130)
GLUCOSE SERPL-MCNC: 120 MG/DL (ref 65–99)
HCT VFR BLD AUTO: 21.6 % (ref 34–46.6)
HCT VFR BLD AUTO: 22.7 % (ref 34–46.6)
HCT VFR BLD AUTO: 23.1 % (ref 34–46.6)
HGB BLD-MCNC: 6.8 G/DL (ref 12–15.9)
HGB BLD-MCNC: 7 G/DL (ref 12–15.9)
HGB BLD-MCNC: 7 G/DL (ref 12–15.9)
POTASSIUM SERPL-SCNC: 4 MMOL/L (ref 3.5–5.2)
SODIUM SERPL-SCNC: 132 MMOL/L (ref 136–145)

## 2022-11-24 PROCEDURE — 94761 N-INVAS EAR/PLS OXIMETRY MLT: CPT

## 2022-11-24 PROCEDURE — 80048 BASIC METABOLIC PNL TOTAL CA: CPT | Performed by: INTERNAL MEDICINE

## 2022-11-24 PROCEDURE — 99232 SBSQ HOSP IP/OBS MODERATE 35: CPT | Performed by: HOSPITALIST

## 2022-11-24 PROCEDURE — 82962 GLUCOSE BLOOD TEST: CPT

## 2022-11-24 PROCEDURE — 85014 HEMATOCRIT: CPT | Performed by: HOSPITALIST

## 2022-11-24 PROCEDURE — 94799 UNLISTED PULMONARY SVC/PX: CPT

## 2022-11-24 PROCEDURE — 85014 HEMATOCRIT: CPT | Performed by: PHYSICIAN ASSISTANT

## 2022-11-24 PROCEDURE — 85018 HEMOGLOBIN: CPT | Performed by: PHYSICIAN ASSISTANT

## 2022-11-24 PROCEDURE — 85018 HEMOGLOBIN: CPT | Performed by: HOSPITALIST

## 2022-11-24 PROCEDURE — 63710000001 INSULIN ASPART PER 5 UNITS: Performed by: SURGERY

## 2022-11-24 PROCEDURE — 25010000002 HEPARIN (PORCINE) PER 1000 UNITS: Performed by: SURGERY

## 2022-11-24 RX ORDER — MIRTAZAPINE 15 MG/1
15 TABLET, FILM COATED ORAL NIGHTLY
Status: DISCONTINUED | OUTPATIENT
Start: 2022-11-24 | End: 2022-11-26

## 2022-11-24 RX ORDER — POLYETHYLENE GLYCOL 3350 17 G/17G
17 POWDER, FOR SOLUTION ORAL EVERY OTHER DAY
Status: DISCONTINUED | OUTPATIENT
Start: 2022-11-25 | End: 2022-11-26

## 2022-11-24 RX ADMIN — HYDRALAZINE HYDROCHLORIDE 75 MG: 25 TABLET ORAL at 14:51

## 2022-11-24 RX ADMIN — PANTOPRAZOLE SODIUM 40 MG: 40 TABLET, DELAYED RELEASE ORAL at 08:39

## 2022-11-24 RX ADMIN — IPRATROPIUM BROMIDE AND ALBUTEROL SULFATE 3 ML: .5; 2.5 SOLUTION RESPIRATORY (INHALATION) at 06:48

## 2022-11-24 RX ADMIN — MIRTAZAPINE 15 MG: 15 TABLET, FILM COATED ORAL at 23:50

## 2022-11-24 RX ADMIN — METOPROLOL SUCCINATE 75 MG: 50 TABLET, EXTENDED RELEASE ORAL at 09:42

## 2022-11-24 RX ADMIN — AMLODIPINE BESYLATE 10 MG: 10 TABLET ORAL at 08:38

## 2022-11-24 RX ADMIN — ATORVASTATIN CALCIUM 40 MG: 40 TABLET, FILM COATED ORAL at 22:20

## 2022-11-24 RX ADMIN — NYSTATIN: 100000 POWDER TOPICAL at 08:39

## 2022-11-24 RX ADMIN — PANTOPRAZOLE SODIUM 40 MG: 40 TABLET, DELAYED RELEASE ORAL at 17:40

## 2022-11-24 RX ADMIN — Medication 10 ML: at 08:39

## 2022-11-24 RX ADMIN — HEPARIN SODIUM 5000 UNITS: 5000 INJECTION INTRAVENOUS; SUBCUTANEOUS at 22:20

## 2022-11-24 RX ADMIN — OFLOXACIN 50000 UNITS: 300 TABLET, COATED ORAL at 08:37

## 2022-11-24 RX ADMIN — NYSTATIN: 100000 POWDER TOPICAL at 22:20

## 2022-11-24 RX ADMIN — Medication 150 MG: at 08:38

## 2022-11-24 RX ADMIN — HEPARIN SODIUM 5000 UNITS: 5000 INJECTION INTRAVENOUS; SUBCUTANEOUS at 05:36

## 2022-11-24 RX ADMIN — IPRATROPIUM BROMIDE AND ALBUTEROL SULFATE 3 ML: .5; 2.5 SOLUTION RESPIRATORY (INHALATION) at 19:32

## 2022-11-24 RX ADMIN — INSULIN ASPART 2 UNITS: 100 INJECTION, SOLUTION INTRAVENOUS; SUBCUTANEOUS at 17:40

## 2022-11-24 RX ADMIN — ESCITALOPRAM 10 MG: 10 TABLET, FILM COATED ORAL at 08:38

## 2022-11-24 RX ADMIN — HYDRALAZINE HYDROCHLORIDE 75 MG: 25 TABLET ORAL at 22:23

## 2022-11-24 RX ADMIN — HEPARIN SODIUM 5000 UNITS: 5000 INJECTION INTRAVENOUS; SUBCUTANEOUS at 14:51

## 2022-11-24 RX ADMIN — LEVOTHYROXINE SODIUM 25 MCG: 25 TABLET ORAL at 05:36

## 2022-11-24 RX ADMIN — Medication 10 ML: at 22:20

## 2022-11-24 RX ADMIN — ISOSORBIDE MONONITRATE 60 MG: 60 TABLET, EXTENDED RELEASE ORAL at 08:38

## 2022-11-24 RX ADMIN — INSULIN ASPART 2 UNITS: 100 INJECTION, SOLUTION INTRAVENOUS; SUBCUTANEOUS at 11:07

## 2022-11-25 LAB
ALBUMIN SERPL-MCNC: 2.72 G/DL (ref 3.5–5.2)
ALBUMIN/GLOB SERPL: 0.7 G/DL
ALP SERPL-CCNC: 482 U/L (ref 39–117)
ALT SERPL W P-5'-P-CCNC: 18 U/L (ref 1–33)
ANION GAP SERPL CALCULATED.3IONS-SCNC: 11.8 MMOL/L (ref 5–15)
AST SERPL-CCNC: 43 U/L (ref 1–32)
BASOPHILS # BLD AUTO: 0.04 10*3/MM3 (ref 0–0.2)
BASOPHILS NFR BLD AUTO: 0.6 % (ref 0–1.5)
BILIRUB SERPL-MCNC: 0.3 MG/DL (ref 0–1.2)
BUN SERPL-MCNC: 31 MG/DL (ref 8–23)
BUN/CREAT SERPL: 10.5 (ref 7–25)
CALCIUM SPEC-SCNC: 8.7 MG/DL (ref 8.6–10.5)
CHLORIDE SERPL-SCNC: 93 MMOL/L (ref 98–107)
CO2 SERPL-SCNC: 26.2 MMOL/L (ref 22–29)
CREAT SERPL-MCNC: 2.95 MG/DL (ref 0.57–1)
DEPRECATED RDW RBC AUTO: 48.5 FL (ref 37–54)
EGFRCR SERPLBLD CKD-EPI 2021: 15.7 ML/MIN/1.73
EOSINOPHIL # BLD AUTO: 0.08 10*3/MM3 (ref 0–0.4)
EOSINOPHIL NFR BLD AUTO: 1.2 % (ref 0.3–6.2)
ERYTHROCYTE [DISTWIDTH] IN BLOOD BY AUTOMATED COUNT: 14.6 % (ref 12.3–15.4)
GLOBULIN UR ELPH-MCNC: 3.9 GM/DL
GLUCOSE BLDC GLUCOMTR-MCNC: 133 MG/DL (ref 70–130)
GLUCOSE BLDC GLUCOMTR-MCNC: 149 MG/DL (ref 70–130)
GLUCOSE BLDC GLUCOMTR-MCNC: 195 MG/DL (ref 70–130)
GLUCOSE SERPL-MCNC: 150 MG/DL (ref 65–99)
HCT VFR BLD AUTO: 26.1 % (ref 34–46.6)
HGB BLD-MCNC: 7.7 G/DL (ref 12–15.9)
IMM GRANULOCYTES # BLD AUTO: 0.02 10*3/MM3 (ref 0–0.05)
IMM GRANULOCYTES NFR BLD AUTO: 0.3 % (ref 0–0.5)
LYMPHOCYTES # BLD AUTO: 1.03 10*3/MM3 (ref 0.7–3.1)
LYMPHOCYTES NFR BLD AUTO: 15.3 % (ref 19.6–45.3)
MCH RBC QN AUTO: 26.6 PG (ref 26.6–33)
MCHC RBC AUTO-ENTMCNC: 29.5 G/DL (ref 31.5–35.7)
MCV RBC AUTO: 90.3 FL (ref 79–97)
MONOCYTES # BLD AUTO: 0.69 10*3/MM3 (ref 0.1–0.9)
MONOCYTES NFR BLD AUTO: 10.3 % (ref 5–12)
NEUTROPHILS NFR BLD AUTO: 4.87 10*3/MM3 (ref 1.7–7)
NEUTROPHILS NFR BLD AUTO: 72.3 % (ref 42.7–76)
NRBC BLD AUTO-RTO: 0 /100 WBC (ref 0–0.2)
PLATELET # BLD AUTO: 219 10*3/MM3 (ref 140–450)
PMV BLD AUTO: 9 FL (ref 6–12)
POTASSIUM SERPL-SCNC: 4.2 MMOL/L (ref 3.5–5.2)
PROT SERPL-MCNC: 6.6 G/DL (ref 6–8.5)
RBC # BLD AUTO: 2.89 10*6/MM3 (ref 3.77–5.28)
SODIUM SERPL-SCNC: 131 MMOL/L (ref 136–145)
WBC NRBC COR # BLD: 6.73 10*3/MM3 (ref 3.4–10.8)

## 2022-11-25 PROCEDURE — 82962 GLUCOSE BLOOD TEST: CPT

## 2022-11-25 PROCEDURE — 94799 UNLISTED PULMONARY SVC/PX: CPT

## 2022-11-25 PROCEDURE — 94761 N-INVAS EAR/PLS OXIMETRY MLT: CPT

## 2022-11-25 PROCEDURE — 94664 DEMO&/EVAL PT USE INHALER: CPT

## 2022-11-25 PROCEDURE — 80053 COMPREHEN METABOLIC PANEL: CPT | Performed by: INTERNAL MEDICINE

## 2022-11-25 PROCEDURE — 99232 SBSQ HOSP IP/OBS MODERATE 35: CPT | Performed by: HOSPITALIST

## 2022-11-25 PROCEDURE — 25010000002 EPOETIN ALFA-EPBX 10000 UNIT/ML SOLUTION: Performed by: HOSPITALIST

## 2022-11-25 PROCEDURE — 25010000002 HEPARIN (PORCINE) PER 1000 UNITS: Performed by: SURGERY

## 2022-11-25 PROCEDURE — 63710000001 INSULIN ASPART PER 5 UNITS: Performed by: SURGERY

## 2022-11-25 PROCEDURE — 85025 COMPLETE CBC W/AUTO DIFF WBC: CPT | Performed by: INTERNAL MEDICINE

## 2022-11-25 RX ORDER — ACETAMINOPHEN 325 MG/1
650 TABLET ORAL EVERY 6 HOURS PRN
Status: DISCONTINUED | OUTPATIENT
Start: 2022-11-25 | End: 2022-11-30 | Stop reason: HOSPADM

## 2022-11-25 RX ADMIN — Medication 10 ML: at 12:02

## 2022-11-25 RX ADMIN — HYDRALAZINE HYDROCHLORIDE 75 MG: 25 TABLET ORAL at 13:30

## 2022-11-25 RX ADMIN — HYDRALAZINE HYDROCHLORIDE 75 MG: 25 TABLET ORAL at 22:22

## 2022-11-25 RX ADMIN — EPOETIN ALFA-EPBX 10000 UNITS: 10000 INJECTION, SOLUTION INTRAVENOUS; SUBCUTANEOUS at 12:03

## 2022-11-25 RX ADMIN — INSULIN ASPART 2 UNITS: 100 INJECTION, SOLUTION INTRAVENOUS; SUBCUTANEOUS at 17:15

## 2022-11-25 RX ADMIN — HEPARIN SODIUM 5000 UNITS: 5000 INJECTION INTRAVENOUS; SUBCUTANEOUS at 13:30

## 2022-11-25 RX ADMIN — PANTOPRAZOLE SODIUM 40 MG: 40 TABLET, DELAYED RELEASE ORAL at 17:15

## 2022-11-25 RX ADMIN — Medication 10 ML: at 22:23

## 2022-11-25 RX ADMIN — NYSTATIN: 100000 POWDER TOPICAL at 22:23

## 2022-11-25 RX ADMIN — AMLODIPINE BESYLATE 10 MG: 10 TABLET ORAL at 12:01

## 2022-11-25 RX ADMIN — NYSTATIN: 100000 POWDER TOPICAL at 12:02

## 2022-11-25 RX ADMIN — LEVOTHYROXINE SODIUM 25 MCG: 25 TABLET ORAL at 05:41

## 2022-11-25 RX ADMIN — MIRTAZAPINE 15 MG: 15 TABLET, FILM COATED ORAL at 22:22

## 2022-11-25 RX ADMIN — ATORVASTATIN CALCIUM 40 MG: 40 TABLET, FILM COATED ORAL at 22:22

## 2022-11-25 RX ADMIN — HEPARIN SODIUM 5000 UNITS: 5000 INJECTION INTRAVENOUS; SUBCUTANEOUS at 22:22

## 2022-11-25 RX ADMIN — PANTOPRAZOLE SODIUM 40 MG: 40 TABLET, DELAYED RELEASE ORAL at 12:01

## 2022-11-25 RX ADMIN — IPRATROPIUM BROMIDE AND ALBUTEROL SULFATE 3 ML: .5; 2.5 SOLUTION RESPIRATORY (INHALATION) at 19:34

## 2022-11-25 RX ADMIN — ACETAMINOPHEN 650 MG: 325 TABLET ORAL at 22:22

## 2022-11-25 RX ADMIN — HEPARIN SODIUM 5000 UNITS: 5000 INJECTION INTRAVENOUS; SUBCUTANEOUS at 05:41

## 2022-11-25 RX ADMIN — ESCITALOPRAM 10 MG: 10 TABLET, FILM COATED ORAL at 12:01

## 2022-11-25 RX ADMIN — ACETAMINOPHEN 650 MG: 325 TABLET ORAL at 12:57

## 2022-11-25 RX ADMIN — Medication 150 MG: at 12:02

## 2022-11-25 RX ADMIN — METOPROLOL SUCCINATE 75 MG: 50 TABLET, EXTENDED RELEASE ORAL at 12:01

## 2022-11-25 RX ADMIN — ISOSORBIDE MONONITRATE 60 MG: 60 TABLET, EXTENDED RELEASE ORAL at 12:01

## 2022-11-25 RX ADMIN — HYDRALAZINE HYDROCHLORIDE 75 MG: 25 TABLET ORAL at 05:41

## 2022-11-26 LAB
ANION GAP SERPL CALCULATED.3IONS-SCNC: 12 MMOL/L (ref 5–15)
BASOPHILS # BLD AUTO: 0.04 10*3/MM3 (ref 0–0.2)
BASOPHILS NFR BLD AUTO: 0.6 % (ref 0–1.5)
BUN SERPL-MCNC: 24 MG/DL (ref 8–23)
BUN/CREAT SERPL: 9 (ref 7–25)
CALCIUM SPEC-SCNC: 8.7 MG/DL (ref 8.6–10.5)
CHLORIDE SERPL-SCNC: 93 MMOL/L (ref 98–107)
CO2 SERPL-SCNC: 27 MMOL/L (ref 22–29)
CREAT SERPL-MCNC: 2.66 MG/DL (ref 0.57–1)
DEPRECATED RDW RBC AUTO: 47.9 FL (ref 37–54)
EGFRCR SERPLBLD CKD-EPI 2021: 17.8 ML/MIN/1.73
EOSINOPHIL # BLD AUTO: 0.07 10*3/MM3 (ref 0–0.4)
EOSINOPHIL NFR BLD AUTO: 1 % (ref 0.3–6.2)
ERYTHROCYTE [DISTWIDTH] IN BLOOD BY AUTOMATED COUNT: 14.6 % (ref 12.3–15.4)
GLUCOSE BLDC GLUCOMTR-MCNC: 114 MG/DL (ref 70–130)
GLUCOSE BLDC GLUCOMTR-MCNC: 174 MG/DL (ref 70–130)
GLUCOSE BLDC GLUCOMTR-MCNC: 178 MG/DL (ref 70–130)
GLUCOSE SERPL-MCNC: 118 MG/DL (ref 65–99)
HCT VFR BLD AUTO: 25.5 % (ref 34–46.6)
HGB BLD-MCNC: 7.6 G/DL (ref 12–15.9)
IMM GRANULOCYTES # BLD AUTO: 0.02 10*3/MM3 (ref 0–0.05)
IMM GRANULOCYTES NFR BLD AUTO: 0.3 % (ref 0–0.5)
LYMPHOCYTES # BLD AUTO: 0.97 10*3/MM3 (ref 0.7–3.1)
LYMPHOCYTES NFR BLD AUTO: 13.6 % (ref 19.6–45.3)
MCH RBC QN AUTO: 27 PG (ref 26.6–33)
MCHC RBC AUTO-ENTMCNC: 29.8 G/DL (ref 31.5–35.7)
MCV RBC AUTO: 90.4 FL (ref 79–97)
MONOCYTES # BLD AUTO: 0.8 10*3/MM3 (ref 0.1–0.9)
MONOCYTES NFR BLD AUTO: 11.2 % (ref 5–12)
NEUTROPHILS NFR BLD AUTO: 5.22 10*3/MM3 (ref 1.7–7)
NEUTROPHILS NFR BLD AUTO: 73.3 % (ref 42.7–76)
NRBC BLD AUTO-RTO: 0 /100 WBC (ref 0–0.2)
PLATELET # BLD AUTO: 217 10*3/MM3 (ref 140–450)
PMV BLD AUTO: 9.2 FL (ref 6–12)
POTASSIUM SERPL-SCNC: 4.5 MMOL/L (ref 3.5–5.2)
RBC # BLD AUTO: 2.82 10*6/MM3 (ref 3.77–5.28)
SODIUM SERPL-SCNC: 132 MMOL/L (ref 136–145)
WBC NRBC COR # BLD: 7.12 10*3/MM3 (ref 3.4–10.8)

## 2022-11-26 PROCEDURE — 94799 UNLISTED PULMONARY SVC/PX: CPT

## 2022-11-26 PROCEDURE — 63710000001 INSULIN ASPART PER 5 UNITS: Performed by: SURGERY

## 2022-11-26 PROCEDURE — 85025 COMPLETE CBC W/AUTO DIFF WBC: CPT | Performed by: STUDENT IN AN ORGANIZED HEALTH CARE EDUCATION/TRAINING PROGRAM

## 2022-11-26 PROCEDURE — 99231 SBSQ HOSP IP/OBS SF/LOW 25: CPT | Performed by: HOSPITALIST

## 2022-11-26 PROCEDURE — 25010000002 HEPARIN (PORCINE) PER 1000 UNITS: Performed by: SURGERY

## 2022-11-26 PROCEDURE — 80048 BASIC METABOLIC PNL TOTAL CA: CPT | Performed by: INTERNAL MEDICINE

## 2022-11-26 PROCEDURE — 82962 GLUCOSE BLOOD TEST: CPT

## 2022-11-26 RX ORDER — POLYETHYLENE GLYCOL 3350 17 G/17G
17 POWDER, FOR SOLUTION ORAL DAILY PRN
Status: DISCONTINUED | OUTPATIENT
Start: 2022-11-26 | End: 2022-11-28

## 2022-11-26 RX ORDER — MIRTAZAPINE 15 MG/1
22.5 TABLET, FILM COATED ORAL NIGHTLY
Status: DISCONTINUED | OUTPATIENT
Start: 2022-11-26 | End: 2022-11-30 | Stop reason: HOSPADM

## 2022-11-26 RX ADMIN — HYDRALAZINE HYDROCHLORIDE 75 MG: 25 TABLET ORAL at 21:51

## 2022-11-26 RX ADMIN — INSULIN ASPART 2 UNITS: 100 INJECTION, SOLUTION INTRAVENOUS; SUBCUTANEOUS at 08:47

## 2022-11-26 RX ADMIN — Medication 10 ML: at 21:51

## 2022-11-26 RX ADMIN — IPRATROPIUM BROMIDE AND ALBUTEROL SULFATE 3 ML: .5; 2.5 SOLUTION RESPIRATORY (INHALATION) at 18:45

## 2022-11-26 RX ADMIN — HEPARIN SODIUM 5000 UNITS: 5000 INJECTION INTRAVENOUS; SUBCUTANEOUS at 21:50

## 2022-11-26 RX ADMIN — ESCITALOPRAM 10 MG: 10 TABLET, FILM COATED ORAL at 08:50

## 2022-11-26 RX ADMIN — ISOSORBIDE MONONITRATE 60 MG: 60 TABLET, EXTENDED RELEASE ORAL at 08:52

## 2022-11-26 RX ADMIN — LEVOTHYROXINE SODIUM 25 MCG: 25 TABLET ORAL at 05:28

## 2022-11-26 RX ADMIN — AMLODIPINE BESYLATE 10 MG: 10 TABLET ORAL at 08:49

## 2022-11-26 RX ADMIN — ATORVASTATIN CALCIUM 40 MG: 40 TABLET, FILM COATED ORAL at 21:50

## 2022-11-26 RX ADMIN — ACETAMINOPHEN 650 MG: 325 TABLET ORAL at 15:08

## 2022-11-26 RX ADMIN — METOPROLOL SUCCINATE 75 MG: 50 TABLET, EXTENDED RELEASE ORAL at 08:50

## 2022-11-26 RX ADMIN — HYDRALAZINE HYDROCHLORIDE 75 MG: 25 TABLET ORAL at 05:28

## 2022-11-26 RX ADMIN — HEPARIN SODIUM 5000 UNITS: 5000 INJECTION INTRAVENOUS; SUBCUTANEOUS at 05:28

## 2022-11-26 RX ADMIN — PANTOPRAZOLE SODIUM 40 MG: 40 TABLET, DELAYED RELEASE ORAL at 08:49

## 2022-11-26 RX ADMIN — INSULIN ASPART 2 UNITS: 100 INJECTION, SOLUTION INTRAVENOUS; SUBCUTANEOUS at 17:44

## 2022-11-26 RX ADMIN — NYSTATIN: 100000 POWDER TOPICAL at 21:50

## 2022-11-26 RX ADMIN — MIRTAZAPINE 22.5 MG: 15 TABLET, FILM COATED ORAL at 21:51

## 2022-11-26 RX ADMIN — IPRATROPIUM BROMIDE AND ALBUTEROL SULFATE 3 ML: .5; 2.5 SOLUTION RESPIRATORY (INHALATION) at 07:31

## 2022-11-26 RX ADMIN — Medication 150 MG: at 08:49

## 2022-11-26 RX ADMIN — Medication 10 ML: at 08:53

## 2022-11-26 RX ADMIN — PANTOPRAZOLE SODIUM 40 MG: 40 TABLET, DELAYED RELEASE ORAL at 17:44

## 2022-11-26 RX ADMIN — HYDRALAZINE HYDROCHLORIDE 75 MG: 25 TABLET ORAL at 14:21

## 2022-11-26 RX ADMIN — NYSTATIN: 100000 POWDER TOPICAL at 08:52

## 2022-11-26 RX ADMIN — HEPARIN SODIUM 5000 UNITS: 5000 INJECTION INTRAVENOUS; SUBCUTANEOUS at 14:21

## 2022-11-27 LAB
ALBUMIN SERPL-MCNC: 2.56 G/DL (ref 3.5–5.2)
ALBUMIN/GLOB SERPL: 0.7 G/DL
ALP SERPL-CCNC: 415 U/L (ref 39–117)
ALT SERPL W P-5'-P-CCNC: 11 U/L (ref 1–33)
ANION GAP SERPL CALCULATED.3IONS-SCNC: 10.2 MMOL/L (ref 5–15)
AST SERPL-CCNC: 29 U/L (ref 1–32)
BASOPHILS # BLD AUTO: 0.02 10*3/MM3 (ref 0–0.2)
BASOPHILS NFR BLD AUTO: 0.3 % (ref 0–1.5)
BILIRUB SERPL-MCNC: 0.2 MG/DL (ref 0–1.2)
BUN SERPL-MCNC: 33 MG/DL (ref 8–23)
BUN/CREAT SERPL: 10.2 (ref 7–25)
CALCIUM SPEC-SCNC: 8.6 MG/DL (ref 8.6–10.5)
CHLORIDE SERPL-SCNC: 92 MMOL/L (ref 98–107)
CO2 SERPL-SCNC: 26.8 MMOL/L (ref 22–29)
CREAT SERPL-MCNC: 3.22 MG/DL (ref 0.57–1)
DEPRECATED RDW RBC AUTO: 48.6 FL (ref 37–54)
EGFRCR SERPLBLD CKD-EPI 2021: 14.1 ML/MIN/1.73
EOSINOPHIL # BLD AUTO: 0.11 10*3/MM3 (ref 0–0.4)
EOSINOPHIL NFR BLD AUTO: 1.6 % (ref 0.3–6.2)
ERYTHROCYTE [DISTWIDTH] IN BLOOD BY AUTOMATED COUNT: 14.6 % (ref 12.3–15.4)
GLOBULIN UR ELPH-MCNC: 3.7 GM/DL
GLUCOSE BLDC GLUCOMTR-MCNC: 134 MG/DL (ref 70–130)
GLUCOSE BLDC GLUCOMTR-MCNC: 139 MG/DL (ref 70–130)
GLUCOSE BLDC GLUCOMTR-MCNC: 151 MG/DL (ref 70–130)
GLUCOSE SERPL-MCNC: 135 MG/DL (ref 65–99)
HCT VFR BLD AUTO: 24.4 % (ref 34–46.6)
HGB BLD-MCNC: 7.2 G/DL (ref 12–15.9)
IMM GRANULOCYTES # BLD AUTO: 0.03 10*3/MM3 (ref 0–0.05)
IMM GRANULOCYTES NFR BLD AUTO: 0.4 % (ref 0–0.5)
LYMPHOCYTES # BLD AUTO: 0.83 10*3/MM3 (ref 0.7–3.1)
LYMPHOCYTES NFR BLD AUTO: 11.7 % (ref 19.6–45.3)
MCH RBC QN AUTO: 26.9 PG (ref 26.6–33)
MCHC RBC AUTO-ENTMCNC: 29.5 G/DL (ref 31.5–35.7)
MCV RBC AUTO: 91 FL (ref 79–97)
MONOCYTES # BLD AUTO: 0.69 10*3/MM3 (ref 0.1–0.9)
MONOCYTES NFR BLD AUTO: 9.8 % (ref 5–12)
NEUTROPHILS NFR BLD AUTO: 5.39 10*3/MM3 (ref 1.7–7)
NEUTROPHILS NFR BLD AUTO: 76.2 % (ref 42.7–76)
NRBC BLD AUTO-RTO: 0 /100 WBC (ref 0–0.2)
PLATELET # BLD AUTO: 193 10*3/MM3 (ref 140–450)
PMV BLD AUTO: 8.7 FL (ref 6–12)
POTASSIUM SERPL-SCNC: 4.4 MMOL/L (ref 3.5–5.2)
PROT SERPL-MCNC: 6.3 G/DL (ref 6–8.5)
RBC # BLD AUTO: 2.68 10*6/MM3 (ref 3.77–5.28)
SODIUM SERPL-SCNC: 129 MMOL/L (ref 136–145)
WBC NRBC COR # BLD: 7.07 10*3/MM3 (ref 3.4–10.8)

## 2022-11-27 PROCEDURE — 25010000002 HEPARIN (PORCINE) PER 1000 UNITS: Performed by: SURGERY

## 2022-11-27 PROCEDURE — 63710000001 INSULIN ASPART PER 5 UNITS: Performed by: SURGERY

## 2022-11-27 PROCEDURE — 80053 COMPREHEN METABOLIC PANEL: CPT | Performed by: INTERNAL MEDICINE

## 2022-11-27 PROCEDURE — 82962 GLUCOSE BLOOD TEST: CPT

## 2022-11-27 PROCEDURE — 94799 UNLISTED PULMONARY SVC/PX: CPT

## 2022-11-27 PROCEDURE — 85025 COMPLETE CBC W/AUTO DIFF WBC: CPT | Performed by: INTERNAL MEDICINE

## 2022-11-27 PROCEDURE — 99232 SBSQ HOSP IP/OBS MODERATE 35: CPT | Performed by: HOSPITALIST

## 2022-11-27 RX ADMIN — HEPARIN SODIUM 5000 UNITS: 5000 INJECTION INTRAVENOUS; SUBCUTANEOUS at 21:15

## 2022-11-27 RX ADMIN — AMLODIPINE BESYLATE 10 MG: 10 TABLET ORAL at 08:33

## 2022-11-27 RX ADMIN — Medication 10 ML: at 20:28

## 2022-11-27 RX ADMIN — NYSTATIN: 100000 POWDER TOPICAL at 20:28

## 2022-11-27 RX ADMIN — ATORVASTATIN CALCIUM 40 MG: 40 TABLET, FILM COATED ORAL at 20:27

## 2022-11-27 RX ADMIN — METOPROLOL SUCCINATE 75 MG: 50 TABLET, EXTENDED RELEASE ORAL at 08:33

## 2022-11-27 RX ADMIN — INSULIN ASPART 2 UNITS: 100 INJECTION, SOLUTION INTRAVENOUS; SUBCUTANEOUS at 18:18

## 2022-11-27 RX ADMIN — HEPARIN SODIUM 5000 UNITS: 5000 INJECTION INTRAVENOUS; SUBCUTANEOUS at 05:35

## 2022-11-27 RX ADMIN — Medication 10 MG: at 20:27

## 2022-11-27 RX ADMIN — PANTOPRAZOLE SODIUM 40 MG: 40 TABLET, DELAYED RELEASE ORAL at 18:18

## 2022-11-27 RX ADMIN — MIRTAZAPINE 22.5 MG: 15 TABLET, FILM COATED ORAL at 20:27

## 2022-11-27 RX ADMIN — Medication 10 ML: at 08:36

## 2022-11-27 RX ADMIN — HYDRALAZINE HYDROCHLORIDE 75 MG: 25 TABLET ORAL at 05:35

## 2022-11-27 RX ADMIN — NYSTATIN: 100000 POWDER TOPICAL at 08:36

## 2022-11-27 RX ADMIN — HEPARIN SODIUM 5000 UNITS: 5000 INJECTION INTRAVENOUS; SUBCUTANEOUS at 14:50

## 2022-11-27 RX ADMIN — Medication 150 MG: at 08:36

## 2022-11-27 RX ADMIN — ISOSORBIDE MONONITRATE 60 MG: 60 TABLET, EXTENDED RELEASE ORAL at 08:32

## 2022-11-27 RX ADMIN — HYDRALAZINE HYDROCHLORIDE 75 MG: 25 TABLET ORAL at 21:15

## 2022-11-27 RX ADMIN — PANTOPRAZOLE SODIUM 40 MG: 40 TABLET, DELAYED RELEASE ORAL at 08:33

## 2022-11-27 RX ADMIN — ESCITALOPRAM 10 MG: 10 TABLET, FILM COATED ORAL at 08:33

## 2022-11-27 RX ADMIN — LEVOTHYROXINE SODIUM 25 MCG: 25 TABLET ORAL at 05:35

## 2022-11-27 RX ADMIN — HYDRALAZINE HYDROCHLORIDE 75 MG: 25 TABLET ORAL at 14:50

## 2022-11-28 LAB
ANION GAP SERPL CALCULATED.3IONS-SCNC: 11.6 MMOL/L (ref 5–15)
BASOPHILS # BLD AUTO: 0.03 10*3/MM3 (ref 0–0.2)
BASOPHILS NFR BLD AUTO: 0.5 % (ref 0–1.5)
BUN SERPL-MCNC: 43 MG/DL (ref 8–23)
BUN/CREAT SERPL: 11 (ref 7–25)
CALCIUM SPEC-SCNC: 8.6 MG/DL (ref 8.6–10.5)
CHLORIDE SERPL-SCNC: 94 MMOL/L (ref 98–107)
CO2 SERPL-SCNC: 26.4 MMOL/L (ref 22–29)
CREAT SERPL-MCNC: 3.92 MG/DL (ref 0.57–1)
DEPRECATED RDW RBC AUTO: 46.9 FL (ref 37–54)
EGFRCR SERPLBLD CKD-EPI 2021: 11.1 ML/MIN/1.73
EOSINOPHIL # BLD AUTO: 0.08 10*3/MM3 (ref 0–0.4)
EOSINOPHIL NFR BLD AUTO: 1.3 % (ref 0.3–6.2)
ERYTHROCYTE [DISTWIDTH] IN BLOOD BY AUTOMATED COUNT: 14.4 % (ref 12.3–15.4)
GLUCOSE BLDC GLUCOMTR-MCNC: 115 MG/DL (ref 70–130)
GLUCOSE BLDC GLUCOMTR-MCNC: 192 MG/DL (ref 70–130)
GLUCOSE BLDC GLUCOMTR-MCNC: 97 MG/DL (ref 70–130)
GLUCOSE SERPL-MCNC: 98 MG/DL (ref 65–99)
HCT VFR BLD AUTO: 23.5 % (ref 34–46.6)
HGB BLD-MCNC: 7 G/DL (ref 12–15.9)
IMM GRANULOCYTES # BLD AUTO: 0.03 10*3/MM3 (ref 0–0.05)
IMM GRANULOCYTES NFR BLD AUTO: 0.5 % (ref 0–0.5)
LYMPHOCYTES # BLD AUTO: 1.18 10*3/MM3 (ref 0.7–3.1)
LYMPHOCYTES NFR BLD AUTO: 18.5 % (ref 19.6–45.3)
MCH RBC QN AUTO: 26.7 PG (ref 26.6–33)
MCHC RBC AUTO-ENTMCNC: 29.8 G/DL (ref 31.5–35.7)
MCV RBC AUTO: 89.7 FL (ref 79–97)
MONOCYTES # BLD AUTO: 0.66 10*3/MM3 (ref 0.1–0.9)
MONOCYTES NFR BLD AUTO: 10.3 % (ref 5–12)
NEUTROPHILS NFR BLD AUTO: 4.41 10*3/MM3 (ref 1.7–7)
NEUTROPHILS NFR BLD AUTO: 68.9 % (ref 42.7–76)
NRBC BLD AUTO-RTO: 0 /100 WBC (ref 0–0.2)
PLATELET # BLD AUTO: 196 10*3/MM3 (ref 140–450)
PMV BLD AUTO: 8.8 FL (ref 6–12)
POTASSIUM SERPL-SCNC: 4.7 MMOL/L (ref 3.5–5.2)
RBC # BLD AUTO: 2.62 10*6/MM3 (ref 3.77–5.28)
SODIUM SERPL-SCNC: 132 MMOL/L (ref 136–145)
WBC NRBC COR # BLD: 6.39 10*3/MM3 (ref 3.4–10.8)

## 2022-11-28 PROCEDURE — 99232 SBSQ HOSP IP/OBS MODERATE 35: CPT | Performed by: STUDENT IN AN ORGANIZED HEALTH CARE EDUCATION/TRAINING PROGRAM

## 2022-11-28 PROCEDURE — 85025 COMPLETE CBC W/AUTO DIFF WBC: CPT | Performed by: HOSPITALIST

## 2022-11-28 PROCEDURE — 80048 BASIC METABOLIC PNL TOTAL CA: CPT | Performed by: INTERNAL MEDICINE

## 2022-11-28 PROCEDURE — 63710000001 INSULIN ASPART PER 5 UNITS: Performed by: SURGERY

## 2022-11-28 PROCEDURE — 97110 THERAPEUTIC EXERCISES: CPT

## 2022-11-28 PROCEDURE — 82962 GLUCOSE BLOOD TEST: CPT

## 2022-11-28 PROCEDURE — 25010000002 HEPARIN (PORCINE) PER 1000 UNITS: Performed by: SURGERY

## 2022-11-28 PROCEDURE — 97530 THERAPEUTIC ACTIVITIES: CPT

## 2022-11-28 PROCEDURE — 25010000002 EPOETIN ALFA-EPBX 10000 UNIT/ML SOLUTION: Performed by: HOSPITALIST

## 2022-11-28 PROCEDURE — 94799 UNLISTED PULMONARY SVC/PX: CPT

## 2022-11-28 PROCEDURE — 94761 N-INVAS EAR/PLS OXIMETRY MLT: CPT

## 2022-11-28 RX ORDER — POLYETHYLENE GLYCOL 3350 17 G/17G
17 POWDER, FOR SOLUTION ORAL DAILY
Status: DISCONTINUED | OUTPATIENT
Start: 2022-11-28 | End: 2022-11-30 | Stop reason: HOSPADM

## 2022-11-28 RX ORDER — BISACODYL 10 MG
10 SUPPOSITORY, RECTAL RECTAL ONCE
Status: DISCONTINUED | OUTPATIENT
Start: 2022-11-28 | End: 2022-11-30 | Stop reason: HOSPADM

## 2022-11-28 RX ORDER — BISACODYL 5 MG/1
10 TABLET, DELAYED RELEASE ORAL DAILY
Status: DISCONTINUED | OUTPATIENT
Start: 2022-11-28 | End: 2022-11-28

## 2022-11-28 RX ORDER — LACTULOSE 10 G/15ML
20 SOLUTION ORAL DAILY
Status: DISCONTINUED | OUTPATIENT
Start: 2022-11-28 | End: 2022-11-30 | Stop reason: HOSPADM

## 2022-11-28 RX ORDER — BISACODYL 5 MG/1
10 TABLET, DELAYED RELEASE ORAL DAILY
Status: DISCONTINUED | OUTPATIENT
Start: 2022-11-29 | End: 2022-11-30 | Stop reason: HOSPADM

## 2022-11-28 RX ADMIN — ESCITALOPRAM 10 MG: 10 TABLET, FILM COATED ORAL at 12:32

## 2022-11-28 RX ADMIN — INSULIN ASPART 2 UNITS: 100 INJECTION, SOLUTION INTRAVENOUS; SUBCUTANEOUS at 12:31

## 2022-11-28 RX ADMIN — EPOETIN ALFA-EPBX 10000 UNITS: 10000 INJECTION, SOLUTION INTRAVENOUS; SUBCUTANEOUS at 13:43

## 2022-11-28 RX ADMIN — Medication 10 MG: at 21:32

## 2022-11-28 RX ADMIN — HYDRALAZINE HYDROCHLORIDE 75 MG: 25 TABLET ORAL at 12:38

## 2022-11-28 RX ADMIN — POLYETHYLENE GLYCOL (3350) 17 G: 17 POWDER, FOR SOLUTION ORAL at 12:32

## 2022-11-28 RX ADMIN — AMLODIPINE BESYLATE 10 MG: 10 TABLET ORAL at 12:32

## 2022-11-28 RX ADMIN — PANTOPRAZOLE SODIUM 40 MG: 40 TABLET, DELAYED RELEASE ORAL at 16:44

## 2022-11-28 RX ADMIN — MIRTAZAPINE 22.5 MG: 15 TABLET, FILM COATED ORAL at 21:24

## 2022-11-28 RX ADMIN — PANTOPRAZOLE SODIUM 40 MG: 40 TABLET, DELAYED RELEASE ORAL at 12:32

## 2022-11-28 RX ADMIN — HEPARIN SODIUM 5000 UNITS: 5000 INJECTION INTRAVENOUS; SUBCUTANEOUS at 12:37

## 2022-11-28 RX ADMIN — NYSTATIN: 100000 POWDER TOPICAL at 12:32

## 2022-11-28 RX ADMIN — HEPARIN SODIUM 5000 UNITS: 5000 INJECTION INTRAVENOUS; SUBCUTANEOUS at 06:30

## 2022-11-28 RX ADMIN — HEPARIN SODIUM 5000 UNITS: 5000 INJECTION INTRAVENOUS; SUBCUTANEOUS at 21:24

## 2022-11-28 RX ADMIN — ATORVASTATIN CALCIUM 40 MG: 40 TABLET, FILM COATED ORAL at 21:24

## 2022-11-28 RX ADMIN — Medication 10 ML: at 21:25

## 2022-11-28 RX ADMIN — NYSTATIN: 100000 POWDER TOPICAL at 23:05

## 2022-11-28 RX ADMIN — ISOSORBIDE MONONITRATE 60 MG: 60 TABLET, EXTENDED RELEASE ORAL at 12:31

## 2022-11-28 RX ADMIN — HYDRALAZINE HYDROCHLORIDE 75 MG: 25 TABLET ORAL at 21:25

## 2022-11-28 RX ADMIN — METOPROLOL SUCCINATE 75 MG: 50 TABLET, EXTENDED RELEASE ORAL at 12:32

## 2022-11-28 RX ADMIN — Medication 150 MG: at 12:31

## 2022-11-28 RX ADMIN — LEVOTHYROXINE SODIUM 25 MCG: 25 TABLET ORAL at 06:30

## 2022-11-29 LAB
DEPRECATED RDW RBC AUTO: 46.5 FL (ref 37–54)
ERYTHROCYTE [DISTWIDTH] IN BLOOD BY AUTOMATED COUNT: 14.3 % (ref 12.3–15.4)
GLUCOSE BLDC GLUCOMTR-MCNC: 115 MG/DL (ref 70–130)
GLUCOSE BLDC GLUCOMTR-MCNC: 124 MG/DL (ref 70–130)
GLUCOSE BLDC GLUCOMTR-MCNC: 153 MG/DL (ref 70–130)
HCT VFR BLD AUTO: 22.5 % (ref 34–46.6)
HGB BLD-MCNC: 7.1 G/DL (ref 12–15.9)
MCH RBC QN AUTO: 28 PG (ref 26.6–33)
MCHC RBC AUTO-ENTMCNC: 31.6 G/DL (ref 31.5–35.7)
MCV RBC AUTO: 88.6 FL (ref 79–97)
PLATELET # BLD AUTO: 187 10*3/MM3 (ref 140–450)
PMV BLD AUTO: 8.8 FL (ref 6–12)
RBC # BLD AUTO: 2.54 10*6/MM3 (ref 3.77–5.28)
WBC NRBC COR # BLD: 6.47 10*3/MM3 (ref 3.4–10.8)

## 2022-11-29 PROCEDURE — 25010000002 HEPARIN (PORCINE) PER 1000 UNITS: Performed by: SURGERY

## 2022-11-29 PROCEDURE — 85027 COMPLETE CBC AUTOMATED: CPT | Performed by: STUDENT IN AN ORGANIZED HEALTH CARE EDUCATION/TRAINING PROGRAM

## 2022-11-29 PROCEDURE — 94761 N-INVAS EAR/PLS OXIMETRY MLT: CPT

## 2022-11-29 PROCEDURE — 94799 UNLISTED PULMONARY SVC/PX: CPT

## 2022-11-29 PROCEDURE — 94760 N-INVAS EAR/PLS OXIMETRY 1: CPT

## 2022-11-29 PROCEDURE — 97116 GAIT TRAINING THERAPY: CPT

## 2022-11-29 PROCEDURE — 97110 THERAPEUTIC EXERCISES: CPT

## 2022-11-29 PROCEDURE — 99231 SBSQ HOSP IP/OBS SF/LOW 25: CPT | Performed by: STUDENT IN AN ORGANIZED HEALTH CARE EDUCATION/TRAINING PROGRAM

## 2022-11-29 PROCEDURE — 63710000001 INSULIN ASPART PER 5 UNITS: Performed by: SURGERY

## 2022-11-29 PROCEDURE — 82962 GLUCOSE BLOOD TEST: CPT

## 2022-11-29 RX ADMIN — POLYETHYLENE GLYCOL (3350) 17 G: 17 POWDER, FOR SOLUTION ORAL at 08:15

## 2022-11-29 RX ADMIN — PANTOPRAZOLE SODIUM 40 MG: 40 TABLET, DELAYED RELEASE ORAL at 08:14

## 2022-11-29 RX ADMIN — NYSTATIN: 100000 POWDER TOPICAL at 08:15

## 2022-11-29 RX ADMIN — BISACODYL 10 MG: 5 TABLET, COATED ORAL at 08:14

## 2022-11-29 RX ADMIN — HYDRALAZINE HYDROCHLORIDE 75 MG: 25 TABLET ORAL at 13:51

## 2022-11-29 RX ADMIN — NYSTATIN: 100000 POWDER TOPICAL at 21:09

## 2022-11-29 RX ADMIN — HYDRALAZINE HYDROCHLORIDE 75 MG: 25 TABLET ORAL at 21:08

## 2022-11-29 RX ADMIN — LACTULOSE 20 G: 20 SOLUTION ORAL at 08:15

## 2022-11-29 RX ADMIN — HEPARIN SODIUM 5000 UNITS: 5000 INJECTION INTRAVENOUS; SUBCUTANEOUS at 21:09

## 2022-11-29 RX ADMIN — LEVOTHYROXINE SODIUM 25 MCG: 25 TABLET ORAL at 06:01

## 2022-11-29 RX ADMIN — Medication 10 ML: at 21:10

## 2022-11-29 RX ADMIN — ATORVASTATIN CALCIUM 40 MG: 40 TABLET, FILM COATED ORAL at 21:08

## 2022-11-29 RX ADMIN — ISOSORBIDE MONONITRATE 60 MG: 60 TABLET, EXTENDED RELEASE ORAL at 08:15

## 2022-11-29 RX ADMIN — METOPROLOL SUCCINATE 75 MG: 50 TABLET, EXTENDED RELEASE ORAL at 08:14

## 2022-11-29 RX ADMIN — Medication 150 MG: at 08:14

## 2022-11-29 RX ADMIN — PANTOPRAZOLE SODIUM 40 MG: 40 TABLET, DELAYED RELEASE ORAL at 16:59

## 2022-11-29 RX ADMIN — HEPARIN SODIUM 5000 UNITS: 5000 INJECTION INTRAVENOUS; SUBCUTANEOUS at 06:01

## 2022-11-29 RX ADMIN — HEPARIN SODIUM 5000 UNITS: 5000 INJECTION INTRAVENOUS; SUBCUTANEOUS at 13:51

## 2022-11-29 RX ADMIN — INSULIN ASPART 2 UNITS: 100 INJECTION, SOLUTION INTRAVENOUS; SUBCUTANEOUS at 11:38

## 2022-11-29 RX ADMIN — ESCITALOPRAM 10 MG: 10 TABLET, FILM COATED ORAL at 08:15

## 2022-11-29 RX ADMIN — Medication 10 ML: at 08:15

## 2022-11-29 RX ADMIN — MIRTAZAPINE 22.5 MG: 15 TABLET, FILM COATED ORAL at 21:07

## 2022-11-29 RX ADMIN — AMLODIPINE BESYLATE 10 MG: 10 TABLET ORAL at 08:15

## 2022-11-29 RX ADMIN — HYDRALAZINE HYDROCHLORIDE 75 MG: 25 TABLET ORAL at 06:01

## 2022-11-30 VITALS
HEART RATE: 109 BPM | OXYGEN SATURATION: 95 % | DIASTOLIC BLOOD PRESSURE: 62 MMHG | SYSTOLIC BLOOD PRESSURE: 146 MMHG | WEIGHT: 182.32 LBS | BODY MASS INDEX: 32.3 KG/M2 | RESPIRATION RATE: 20 BRPM | HEIGHT: 63 IN | TEMPERATURE: 98 F

## 2022-11-30 LAB
ANION GAP SERPL CALCULATED.3IONS-SCNC: 12.1 MMOL/L (ref 5–15)
BUN SERPL-MCNC: 38 MG/DL (ref 8–23)
BUN/CREAT SERPL: 9.8 (ref 7–25)
CALCIUM SPEC-SCNC: 8.8 MG/DL (ref 8.6–10.5)
CHLORIDE SERPL-SCNC: 93 MMOL/L (ref 98–107)
CO2 SERPL-SCNC: 26.9 MMOL/L (ref 22–29)
CREAT SERPL-MCNC: 3.86 MG/DL (ref 0.57–1)
EGFRCR SERPLBLD CKD-EPI 2021: 11.4 ML/MIN/1.73
GLUCOSE BLDC GLUCOMTR-MCNC: 126 MG/DL (ref 70–130)
GLUCOSE BLDC GLUCOMTR-MCNC: 136 MG/DL (ref 70–130)
GLUCOSE SERPL-MCNC: 111 MG/DL (ref 65–99)
POTASSIUM SERPL-SCNC: 3.9 MMOL/L (ref 3.5–5.2)
SODIUM SERPL-SCNC: 132 MMOL/L (ref 136–145)

## 2022-11-30 PROCEDURE — 99239 HOSP IP/OBS DSCHRG MGMT >30: CPT | Performed by: STUDENT IN AN ORGANIZED HEALTH CARE EDUCATION/TRAINING PROGRAM

## 2022-11-30 PROCEDURE — 97110 THERAPEUTIC EXERCISES: CPT

## 2022-11-30 PROCEDURE — 25010000002 EPOETIN ALFA-EPBX 10000 UNIT/ML SOLUTION: Performed by: HOSPITALIST

## 2022-11-30 PROCEDURE — 25010000002 HEPARIN (PORCINE) PER 1000 UNITS: Performed by: SURGERY

## 2022-11-30 PROCEDURE — 82962 GLUCOSE BLOOD TEST: CPT

## 2022-11-30 PROCEDURE — 97116 GAIT TRAINING THERAPY: CPT

## 2022-11-30 PROCEDURE — 80048 BASIC METABOLIC PNL TOTAL CA: CPT | Performed by: INTERNAL MEDICINE

## 2022-11-30 RX ORDER — IRON POLYSACCHARIDE COMPLEX 150 MG
150 CAPSULE ORAL DAILY
Qty: 30 CAPSULE | Refills: 0 | Status: SHIPPED | OUTPATIENT
Start: 2022-11-30 | End: 2023-01-17

## 2022-11-30 RX ORDER — MIRTAZAPINE 7.5 MG/1
22.5 TABLET, FILM COATED ORAL NIGHTLY
Qty: 90 TABLET | Refills: 0 | Status: SHIPPED | OUTPATIENT
Start: 2022-11-30 | End: 2022-12-30

## 2022-11-30 RX ORDER — POLYETHYLENE GLYCOL 3350 17 G/17G
17 POWDER, FOR SOLUTION ORAL DAILY
Qty: 30 PACKET | Refills: 0 | Status: SHIPPED | OUTPATIENT
Start: 2022-11-30 | End: 2023-01-17

## 2022-11-30 RX ORDER — LACTULOSE 10 G/15ML
20 SOLUTION ORAL DAILY
Qty: 900 ML | Refills: 0 | Status: SHIPPED | OUTPATIENT
Start: 2022-11-30 | End: 2023-01-17

## 2022-11-30 RX ORDER — ATORVASTATIN CALCIUM 40 MG/1
40 TABLET, FILM COATED ORAL NIGHTLY
Qty: 90 TABLET | Refills: 3 | Status: SHIPPED | OUTPATIENT
Start: 2022-11-30

## 2022-11-30 RX ADMIN — ISOSORBIDE MONONITRATE 60 MG: 60 TABLET, EXTENDED RELEASE ORAL at 12:01

## 2022-11-30 RX ADMIN — ESCITALOPRAM 10 MG: 10 TABLET, FILM COATED ORAL at 11:59

## 2022-11-30 RX ADMIN — NYSTATIN: 100000 POWDER TOPICAL at 12:02

## 2022-11-30 RX ADMIN — EPOETIN ALFA-EPBX 10000 UNITS: 10000 INJECTION, SOLUTION INTRAVENOUS; SUBCUTANEOUS at 13:15

## 2022-11-30 RX ADMIN — AMLODIPINE BESYLATE 10 MG: 10 TABLET ORAL at 12:01

## 2022-11-30 RX ADMIN — HYDRALAZINE HYDROCHLORIDE 75 MG: 25 TABLET ORAL at 05:50

## 2022-11-30 RX ADMIN — PANTOPRAZOLE SODIUM 40 MG: 40 TABLET, DELAYED RELEASE ORAL at 05:49

## 2022-11-30 RX ADMIN — LACTULOSE 20 G: 20 SOLUTION ORAL at 12:08

## 2022-11-30 RX ADMIN — HYDRALAZINE HYDROCHLORIDE 75 MG: 25 TABLET ORAL at 13:15

## 2022-11-30 RX ADMIN — METOPROLOL SUCCINATE 75 MG: 50 TABLET, EXTENDED RELEASE ORAL at 12:00

## 2022-11-30 RX ADMIN — HEPARIN SODIUM 5000 UNITS: 5000 INJECTION INTRAVENOUS; SUBCUTANEOUS at 13:15

## 2022-11-30 RX ADMIN — BISACODYL 10 MG: 5 TABLET, COATED ORAL at 12:01

## 2022-11-30 RX ADMIN — HEPARIN SODIUM 5000 UNITS: 5000 INJECTION INTRAVENOUS; SUBCUTANEOUS at 05:49

## 2022-11-30 RX ADMIN — Medication 10 ML: at 12:03

## 2022-11-30 RX ADMIN — VITAMINS A AND D OINTMENT 1 APPLICATION: 15.5; 53.4 OINTMENT TOPICAL at 12:02

## 2022-11-30 RX ADMIN — POLYETHYLENE GLYCOL (3350) 17 G: 17 POWDER, FOR SOLUTION ORAL at 12:00

## 2022-11-30 RX ADMIN — Medication 150 MG: at 12:00

## 2022-11-30 RX ADMIN — LEVOTHYROXINE SODIUM 25 MCG: 25 TABLET ORAL at 06:55

## 2022-12-01 ENCOUNTER — PATIENT OUTREACH (OUTPATIENT)
Dept: CASE MANAGEMENT | Facility: OTHER | Age: 77
End: 2022-12-01

## 2022-12-01 ENCOUNTER — LAB REQUISITION (OUTPATIENT)
Dept: LAB | Facility: HOSPITAL | Age: 77
End: 2022-12-01

## 2022-12-01 ENCOUNTER — HOSPITAL ENCOUNTER (EMERGENCY)
Facility: HOSPITAL | Age: 77
Discharge: NURSING, REHAB OR LONG-TERM CARE | End: 2022-12-02
Attending: EMERGENCY MEDICINE | Admitting: STUDENT IN AN ORGANIZED HEALTH CARE EDUCATION/TRAINING PROGRAM

## 2022-12-01 ENCOUNTER — APPOINTMENT (OUTPATIENT)
Dept: GENERAL RADIOLOGY | Facility: HOSPITAL | Age: 77
End: 2022-12-01

## 2022-12-01 DIAGNOSIS — R50.9 FEVER, UNSPECIFIED: ICD-10-CM

## 2022-12-01 DIAGNOSIS — R53.83 OTHER FATIGUE: ICD-10-CM

## 2022-12-01 DIAGNOSIS — R55 SYNCOPE, UNSPECIFIED SYNCOPE TYPE: ICD-10-CM

## 2022-12-01 DIAGNOSIS — D64.9 ANEMIA, UNSPECIFIED TYPE: ICD-10-CM

## 2022-12-01 DIAGNOSIS — J18.9 PNEUMONIA OF LEFT LUNG DUE TO INFECTIOUS ORGANISM, UNSPECIFIED PART OF LUNG: Primary | ICD-10-CM

## 2022-12-01 LAB
ABO GROUP BLD: NORMAL
ALBUMIN SERPL-MCNC: 2.88 G/DL (ref 3.5–5.2)
ALBUMIN SERPL-MCNC: 3.2 G/DL (ref 3.5–5.2)
ALBUMIN/GLOB SERPL: 0.8 G/DL
ALBUMIN/GLOB SERPL: 1.1 G/DL
ALP SERPL-CCNC: 382 U/L (ref 39–117)
ALP SERPL-CCNC: 383 U/L (ref 39–117)
ALT SERPL W P-5'-P-CCNC: 11 U/L (ref 1–33)
ALT SERPL W P-5'-P-CCNC: 7 U/L (ref 1–33)
ANION GAP SERPL CALCULATED.3IONS-SCNC: 11.9 MMOL/L (ref 5–15)
ANION GAP SERPL CALCULATED.3IONS-SCNC: 13.9 MMOL/L (ref 5–15)
AST SERPL-CCNC: 25 U/L (ref 1–32)
AST SERPL-CCNC: 30 U/L (ref 1–32)
BASOPHILS # BLD AUTO: 0.01 10*3/MM3 (ref 0–0.2)
BASOPHILS # BLD AUTO: 0.02 10*3/MM3 (ref 0–0.2)
BASOPHILS NFR BLD AUTO: 0.2 % (ref 0–1.5)
BASOPHILS NFR BLD AUTO: 0.4 % (ref 0–1.5)
BILIRUB SERPL-MCNC: 0.2 MG/DL (ref 0–1.2)
BILIRUB SERPL-MCNC: 0.3 MG/DL (ref 0–1.2)
BLD GP AB SCN SERPL QL: NEGATIVE
BUN SERPL-MCNC: 25 MG/DL (ref 8–23)
BUN SERPL-MCNC: 29 MG/DL (ref 8–23)
BUN/CREAT SERPL: 7.6 (ref 7–25)
BUN/CREAT SERPL: 7.6 (ref 7–25)
CALCIUM SPEC-SCNC: 8.5 MG/DL (ref 8.6–10.5)
CALCIUM SPEC-SCNC: 8.9 MG/DL (ref 8.6–10.5)
CHLORIDE SERPL-SCNC: 92 MMOL/L (ref 98–107)
CHLORIDE SERPL-SCNC: 94 MMOL/L (ref 98–107)
CO2 SERPL-SCNC: 27.1 MMOL/L (ref 22–29)
CO2 SERPL-SCNC: 28.1 MMOL/L (ref 22–29)
CREAT SERPL-MCNC: 3.28 MG/DL (ref 0.57–1)
CREAT SERPL-MCNC: 3.82 MG/DL (ref 0.57–1)
D-LACTATE SERPL-SCNC: 1.6 MMOL/L (ref 0.5–2)
DEPRECATED RDW RBC AUTO: 45.9 FL (ref 37–54)
DEPRECATED RDW RBC AUTO: 46.2 FL (ref 37–54)
EGFRCR SERPLBLD CKD-EPI 2021: 11.5 ML/MIN/1.73
EGFRCR SERPLBLD CKD-EPI 2021: 13.8 ML/MIN/1.73
EOSINOPHIL # BLD AUTO: 0.01 10*3/MM3 (ref 0–0.4)
EOSINOPHIL # BLD AUTO: 0.03 10*3/MM3 (ref 0–0.4)
EOSINOPHIL NFR BLD AUTO: 0.2 % (ref 0.3–6.2)
EOSINOPHIL NFR BLD AUTO: 0.5 % (ref 0.3–6.2)
ERYTHROCYTE [DISTWIDTH] IN BLOOD BY AUTOMATED COUNT: 14.4 % (ref 12.3–15.4)
ERYTHROCYTE [DISTWIDTH] IN BLOOD BY AUTOMATED COUNT: 14.5 % (ref 12.3–15.4)
FLUAV RNA RESP QL NAA+PROBE: NOT DETECTED
FLUAV RNA RESP QL NAA+PROBE: NOT DETECTED
FLUBV RNA RESP QL NAA+PROBE: NOT DETECTED
FLUBV RNA RESP QL NAA+PROBE: NOT DETECTED
GLOBULIN UR ELPH-MCNC: 3 GM/DL
GLOBULIN UR ELPH-MCNC: 3.6 GM/DL
GLUCOSE SERPL-MCNC: 134 MG/DL (ref 65–99)
GLUCOSE SERPL-MCNC: 163 MG/DL (ref 65–99)
HCT VFR BLD AUTO: 22.2 % (ref 34–46.6)
HCT VFR BLD AUTO: 23.4 % (ref 34–46.6)
HGB BLD-MCNC: 6.7 G/DL (ref 12–15.9)
HGB BLD-MCNC: 7 G/DL (ref 12–15.9)
IMM GRANULOCYTES # BLD AUTO: 0.02 10*3/MM3 (ref 0–0.05)
IMM GRANULOCYTES # BLD AUTO: 0.03 10*3/MM3 (ref 0–0.05)
IMM GRANULOCYTES NFR BLD AUTO: 0.4 % (ref 0–0.5)
IMM GRANULOCYTES NFR BLD AUTO: 0.5 % (ref 0–0.5)
LYMPHOCYTES # BLD AUTO: 0.49 10*3/MM3 (ref 0.7–3.1)
LYMPHOCYTES # BLD AUTO: 0.53 10*3/MM3 (ref 0.7–3.1)
LYMPHOCYTES NFR BLD AUTO: 8.9 % (ref 19.6–45.3)
LYMPHOCYTES NFR BLD AUTO: 9.6 % (ref 19.6–45.3)
MCH RBC QN AUTO: 26.4 PG (ref 26.6–33)
MCH RBC QN AUTO: 26.7 PG (ref 26.6–33)
MCHC RBC AUTO-ENTMCNC: 29.9 G/DL (ref 31.5–35.7)
MCHC RBC AUTO-ENTMCNC: 30.2 G/DL (ref 31.5–35.7)
MCV RBC AUTO: 88.3 FL (ref 79–97)
MCV RBC AUTO: 88.4 FL (ref 79–97)
MONOCYTES # BLD AUTO: 0.54 10*3/MM3 (ref 0.1–0.9)
MONOCYTES # BLD AUTO: 0.58 10*3/MM3 (ref 0.1–0.9)
MONOCYTES NFR BLD AUTO: 10.5 % (ref 5–12)
MONOCYTES NFR BLD AUTO: 9.7 % (ref 5–12)
NEUTROPHILS NFR BLD AUTO: 4.39 10*3/MM3 (ref 1.7–7)
NEUTROPHILS NFR BLD AUTO: 4.4 10*3/MM3 (ref 1.7–7)
NEUTROPHILS NFR BLD AUTO: 79.3 % (ref 42.7–76)
NEUTROPHILS NFR BLD AUTO: 79.8 % (ref 42.7–76)
NRBC BLD AUTO-RTO: 0 /100 WBC (ref 0–0.2)
NRBC BLD AUTO-RTO: 0 /100 WBC (ref 0–0.2)
PLATELET # BLD AUTO: 181 10*3/MM3 (ref 140–450)
PLATELET # BLD AUTO: 215 10*3/MM3 (ref 140–450)
PMV BLD AUTO: 8.6 FL (ref 6–12)
PMV BLD AUTO: 9.4 FL (ref 6–12)
POTASSIUM SERPL-SCNC: 3.9 MMOL/L (ref 3.5–5.2)
POTASSIUM SERPL-SCNC: 3.9 MMOL/L (ref 3.5–5.2)
PROT SERPL-MCNC: 6.2 G/DL (ref 6–8.5)
PROT SERPL-MCNC: 6.5 G/DL (ref 6–8.5)
RBC # BLD AUTO: 2.51 10*6/MM3 (ref 3.77–5.28)
RBC # BLD AUTO: 2.65 10*6/MM3 (ref 3.77–5.28)
RH BLD: POSITIVE
RSV RNA NPH QL NAA+NON-PROBE: NOT DETECTED
SARS-COV-2 RNA RESP QL NAA+PROBE: NOT DETECTED
SARS-COV-2 RNA RESP QL NAA+PROBE: NOT DETECTED
SODIUM SERPL-SCNC: 133 MMOL/L (ref 136–145)
SODIUM SERPL-SCNC: 134 MMOL/L (ref 136–145)
T&S EXPIRATION DATE: NORMAL
WBC NRBC COR # BLD: 5.51 10*3/MM3 (ref 3.4–10.8)
WBC NRBC COR # BLD: 5.54 10*3/MM3 (ref 3.4–10.8)

## 2022-12-01 PROCEDURE — 87637 SARSCOV2&INF A&B&RSV AMP PRB: CPT | Performed by: INTERNAL MEDICINE

## 2022-12-01 PROCEDURE — 36415 COLL VENOUS BLD VENIPUNCTURE: CPT

## 2022-12-01 PROCEDURE — 83605 ASSAY OF LACTIC ACID: CPT | Performed by: EMERGENCY MEDICINE

## 2022-12-01 PROCEDURE — 85025 COMPLETE CBC W/AUTO DIFF WBC: CPT | Performed by: EMERGENCY MEDICINE

## 2022-12-01 PROCEDURE — 86900 BLOOD TYPING SEROLOGIC ABO: CPT | Performed by: EMERGENCY MEDICINE

## 2022-12-01 PROCEDURE — 93010 ELECTROCARDIOGRAM REPORT: CPT | Performed by: INTERNAL MEDICINE

## 2022-12-01 PROCEDURE — 85025 COMPLETE CBC W/AUTO DIFF WBC: CPT | Performed by: INTERNAL MEDICINE

## 2022-12-01 PROCEDURE — 25010000002 AZITHROMYCIN PER 500 MG: Performed by: EMERGENCY MEDICINE

## 2022-12-01 PROCEDURE — 86901 BLOOD TYPING SEROLOGIC RH(D): CPT | Performed by: EMERGENCY MEDICINE

## 2022-12-01 PROCEDURE — 71045 X-RAY EXAM CHEST 1 VIEW: CPT

## 2022-12-01 PROCEDURE — 80053 COMPREHEN METABOLIC PANEL: CPT | Performed by: EMERGENCY MEDICINE

## 2022-12-01 PROCEDURE — 36430 TRANSFUSION BLD/BLD COMPNT: CPT

## 2022-12-01 PROCEDURE — 80053 COMPREHEN METABOLIC PANEL: CPT | Performed by: INTERNAL MEDICINE

## 2022-12-01 PROCEDURE — 93005 ELECTROCARDIOGRAM TRACING: CPT | Performed by: EMERGENCY MEDICINE

## 2022-12-01 PROCEDURE — 96365 THER/PROPH/DIAG IV INF INIT: CPT

## 2022-12-01 PROCEDURE — P9016 RBC LEUKOCYTES REDUCED: HCPCS

## 2022-12-01 PROCEDURE — 87636 SARSCOV2 & INF A&B AMP PRB: CPT | Performed by: EMERGENCY MEDICINE

## 2022-12-01 PROCEDURE — 86900 BLOOD TYPING SEROLOGIC ABO: CPT

## 2022-12-01 PROCEDURE — 25010000002 CEFTRIAXONE PER 250 MG: Performed by: EMERGENCY MEDICINE

## 2022-12-01 PROCEDURE — 87040 BLOOD CULTURE FOR BACTERIA: CPT | Performed by: EMERGENCY MEDICINE

## 2022-12-01 PROCEDURE — 99284 EMERGENCY DEPT VISIT MOD MDM: CPT

## 2022-12-01 PROCEDURE — 86923 COMPATIBILITY TEST ELECTRIC: CPT

## 2022-12-01 PROCEDURE — 86850 RBC ANTIBODY SCREEN: CPT | Performed by: EMERGENCY MEDICINE

## 2022-12-01 RX ORDER — SODIUM CHLORIDE 0.9 % (FLUSH) 0.9 %
10 SYRINGE (ML) INJECTION AS NEEDED
Status: DISCONTINUED | OUTPATIENT
Start: 2022-12-01 | End: 2022-12-02 | Stop reason: HOSPADM

## 2022-12-01 RX ORDER — ACETAMINOPHEN 500 MG
1000 TABLET ORAL ONCE
Status: COMPLETED | OUTPATIENT
Start: 2022-12-01 | End: 2022-12-01

## 2022-12-01 RX ADMIN — ACETAMINOPHEN 1000 MG: 500 TABLET ORAL at 20:22

## 2022-12-01 RX ADMIN — SODIUM CHLORIDE 1 G: 9 INJECTION, SOLUTION INTRAVENOUS at 21:38

## 2022-12-01 RX ADMIN — AZITHROMYCIN MONOHYDRATE 500 MG: 500 INJECTION, POWDER, LYOPHILIZED, FOR SOLUTION INTRAVENOUS at 20:23

## 2022-12-01 NOTE — OUTREACH NOTE
AMBULATORY CASE MANAGEMENT NOTE    HRCM assigned; patient discharged from acute facility to skilled nursing facility    SNF Follow-up    Questions/Answers    Flowsheet Row Responses   Acute Facility [facility]charged [facility]   Acute Discharge Date 11/30/22   Name of the Skilled Nursing Facility? [facility]   Purpose of SNF Admission PT, OT, SN   Estimated length of stay for the patient? Undetermined   Who is the insurance provider or payor of patient stay? Medicare   Progression of Patient? New SNF admission following an acute 28 day stay at [facility]. ([facility] with dialysis)              SHERRY WHEELER  Ambulatory Case Management    12/1/2022, 08:40 EST

## 2022-12-01 NOTE — ED PROVIDER NOTES
Subjective   History of Present Illness  77-year-old male presents with fever.  This patient was sent from nursing home for fever.  Reportedly, her  has influenza.  They state that the patient's temperature has been up at the nursing home and not responding to meds.  Today she had a reported syncopal or near syncopal episode.  The history on this is unclear.  The patient says she does not remember this episode.  She denied any increase shortness of breath, chest pain, abdominal pain.  She said she has had some nausea and denies any diarrhea.  She had a recent admission for CHF.        Review of Systems   Constitutional: Negative for chills.   Respiratory: Negative for shortness of breath.    Cardiovascular: Negative for chest pain.   Gastrointestinal: Negative for abdominal pain.   All other systems reviewed and are negative.      Past Medical History:   Diagnosis Date   • Acquired hypothyroidism 4/22/2021   • Anemia    • Arthritis    • Carpal tunnel syndrome    • Coronary artery disease    • Diabetes mellitus (HCC)    • Elevated cholesterol    • GERD (gastroesophageal reflux disease)    • History of pneumonia    • History of unsteady gait     OCASSIONALLY   • Hypertension    • Insomnia    • Kidney disease    • LENNY (obstructive sleep apnea) 12/1/2020   • Osteoarthritis    • Renal insufficiency    • Sciatica        No Known Allergies    Past Surgical History:   Procedure Laterality Date   • ABDOMINAL SURGERY     • APPENDECTOMY     • CARDIAC CATHETERIZATION      1 STENT  ---  2000   • CARDIAC SURGERY     • CAROTID STENT     • CARPAL TUNNEL RELEASE Right 10/8/2019    Procedure: CARPAL TUNNEL RELEASE;  Surgeon: Feroz Johnson MD;  [facility]: [facility];  Service: Orthopedics   • CATARACT EXTRACTION     • CHOLECYSTECTOMY     • COLONOSCOPY     • COLONOSCOPY N/A 11/23/2021    Procedure: COLONOSCOPY FOR SCREENING;  Surgeon: Palmira Pate MD;  Location[facility] OR;  Service: Gastroenterology;   Laterality: N/A;   • CORONARY ANGIOPLASTY WITH STENT PLACEMENT     • ENDOSCOPY     • ENDOSCOPY N/A 3/5/2020    Procedure: ESOPHAGOGASTRODUODENOSCOPY;  Surgeon: Alexandru Bagley MD;  Location: [facility];  Service: Gastroenterology;  Laterality: N/A;   • ENDOSCOPY N/A 11/23/2021    Procedure: ESOPHAGOGASTRODUODENOSCOPY WITH BIOPSY;  Surgeon: Palmira Pate MD;  Location: [facility];  Service: Gastroenterology;  Laterality: N/A;   • ENDOSCOPY AND COLONOSCOPY     • GALLBLADDER SURGERY     • INSERTION HEMODIALYSIS CATHETER N/A 11/14/2022    Procedure: HEMODIALYSIS CATHETER INSERTION;  Surgeon: Ta Blas MD;  Location: [facility];  Service: General;  Laterality: N/A;   • JOINT REPLACEMENT Left 05/02/2017    [facility]  DR LEVIN  LEFT TOTAL KNEE   • KNEE ARTHROSCOPY W/ MENISCECTOMY Right    • LAPAROSCOPIC TUBAL LIGATION     • IL TOTAL KNEE ARTHROPLASTY Left 5/2/2017    Procedure: TOTAL KNEE ARTHROPLASTY;  Surgeon: Feroz Levin MD;  Location: [facility];  Service: Orthopedics   • STERILIZATION     • TONSILLECTOMY         Family History   Problem Relation Age of Onset   • Arthritis Mother    • Diabetes Mother    • Cancer Mother    • Heart disease Father    • Diabetes Daughter    • Diabetes Son    • Diabetes Maternal Aunt    • Heart disease Maternal Grandmother    • Breast cancer Neg Hx        Social History     Socioeconomic History   • Marital status:      Spouse name: natalie   • Number of children: 3   • Years of education: 12   Tobacco Use   • Smoking status: Never   • Smokeless tobacco: Never   Vaping Use   • Vaping Use: Never used   Substance and Sexual Activity   • Alcohol use: Yes     Comment: socially   • Drug use: No   • Sexual activity: Defer     Birth control/protection: Surgical           Objective   Physical Exam  Vitals and nursing note reviewed. Exam conducted with a chaperone present (Ally).   Constitutional:       Appearance: Normal appearance. She is normal weight.   HENT:       Head: Normocephalic and atraumatic.      Mouth/Throat:      Mouth: Mucous membranes are moist.      Pharynx: Oropharynx is clear.   Cardiovascular:      Rate and Rhythm: Normal rate and regular rhythm.      Pulses: Normal pulses.      Heart sounds: Normal heart sounds. No murmur heard.    No friction rub. No gallop.   Pulmonary:      Effort: Pulmonary effort is normal. No respiratory distress.      Breath sounds: Normal breath sounds. No wheezing, rhonchi or rales.   Abdominal:      General: Abdomen is flat. Bowel sounds are normal. There is no distension.      Palpations: Abdomen is soft. There is no mass.      Tenderness: There is no abdominal tenderness.   Musculoskeletal:         General: Normal range of motion.      Right lower leg: No edema.      Left lower leg: No edema.   Skin:     General: Skin is warm and dry.   Neurological:      General: No focal deficit present.      Mental Status: She is alert and oriented to person, place, and time.   Psychiatric:         Mood and Affect: Mood normal.         Behavior: Behavior normal.         Procedures           ED Course  ED Course as of 12/02/22 0146   u Dec 01, 2022   1838 ECG 12 Lead Syncope  Sinus rhythm with PACs.  Rate 89.  Left axis deviation.  Prolonged QT.  No acute ST elevation or depression.  Abnormal EKG.  Interpreted by me.  Electronically signed by Sanya Epps MD, 12/01/22, 6:39 PM EST.   [BC]   1902 Patient care transferred to Dr. Germain at change of shift.    Sanya Epps MD  7:02 PM EST   [BC]   2117 Lactate: 1.6 [KP]   2117 Patient reevaluated.  Resting comfortably.  Fully alert and oriented.  Reports feeling completely at baseline.  Reports he did not want to come to the hospital and feels well.  Patient request to be transferred back to nursing facility for home monitoring after blood transfusion and antibiotics.   [KP]      ED Course User Index  [BC] Sanya Epps MD  [KP] Guillermo Germain MD                                            MDM  Number of Diagnoses or Management Options  Anemia, unspecified type: new and requires workup  Pneumonia of left lung due to infectious organism, unspecified part of lung: new and requires workup  Syncope, unspecified syncope type: new and requires workup     Amount and/or Complexity of Data Reviewed  Clinical lab tests: reviewed  Tests in the radiology section of CPT®: reviewed  Tests in the medicine section of CPT®: reviewed  Decide to obtain previous medical records or to obtain history from someone other than the patient: yes        Final diagnoses:   Pneumonia of left lung due to infectious organism, unspecified part of lung   Anemia, unspecified type   Syncope, unspecified syncope type       ED Disposition  ED Disposition     ED Disposition   Discharge    Condition   Stable    Comment   --             Lexie Dolan PA  [address] [facility] [id number] [phone]    Schedule an appointment as soon as possible for a visit       [facility] Emergency Department  [address] [place] [id number] [phone]    If symptoms worsen         Medication List      New Prescriptions    azithromycin 250 MG tablet  Commonly known as: ZITHROMAX  Take 2 tablets by mouth Daily for 4 days.     cefdinir 300 MG capsule  Commonly known as: OMNICEF  Take 1 capsule by mouth Daily for 7 days.           Where to Get Your Medications      These medications were sent to [address] - [phone]  - [phone] [address], [facility] [address]    Phone: [phone]   · azithromycin 250 MG tablet  · cefdinir 300 MG capsule          Guillermo Germain MD  12/02/22 0146

## 2022-12-02 VITALS
DIASTOLIC BLOOD PRESSURE: 90 MMHG | RESPIRATION RATE: 16 BRPM | TEMPERATURE: 98.2 F | HEART RATE: 77 BPM | SYSTOLIC BLOOD PRESSURE: 135 MMHG | BODY MASS INDEX: 30.48 KG/M2 | HEIGHT: 63 IN | WEIGHT: 172 LBS | OXYGEN SATURATION: 100 %

## 2022-12-02 RX ORDER — AZITHROMYCIN 250 MG/1
500 TABLET, FILM COATED ORAL DAILY
Qty: 8 TABLET | Refills: 0 | Status: SHIPPED | OUTPATIENT
Start: 2022-12-02 | End: 2022-12-06

## 2022-12-02 RX ORDER — CEFDINIR 300 MG/1
300 CAPSULE ORAL DAILY
Qty: 7 CAPSULE | Refills: 0 | Status: SHIPPED | OUTPATIENT
Start: 2022-12-02 | End: 2022-12-09

## 2022-12-02 NOTE — DISCHARGE INSTRUCTIONS
You are diagnosed with pneumonia.  Your count/hemoglobin was 6.7 and he received 1 unit of blood..  Take cefdinir and azithromycin as prescribed to treat pneumonia.  Follow-up outpatient for repeat blood work to check your blood count.  Do not hesitate to return for any new onset or worsening symptoms.

## 2022-12-02 NOTE — DISCHARGE SUMMARY
[facility] HOSPITALISTS DISCHARGE SUMMARY    Patient Identification:  Name:  Britta Lee  Age:  77 y.o.  Sex:  female  :  1945  MRN:  [id number]  Visit Number:  [id number]    Date of Admission: 2022  Date of Discharge: 2022.    PCP: Lexie Dolan PA    DISCHARGE DIAGNOSIS    ETHEL on CKD stage IV  Transition to hemodialysis  Anasarca  Acute on chronic HFpEF  Severe pulmonary hypertension  Acute on chronic iron deficiency anemia  Decompensated liver cirrhosis with ascites  Diabetes mellitus type 2  Hypertension  CONSULTS     Nephrology  General surgery  PROCEDURES PERFORMED      HOSPITAL COURSE  Patient is a 77 y.o. female presented to [facility] complaining of generalized weakness and shortness of breath.  Please see the admitting history and physical for further details.      After initial evaluation in the emergency department patient admitted to the hospitalist service for ETHEL on CKD stage IV with hyperkalemia and acute on chronic HFpEF.  Initial creatinine was 4.3 on arrival with baseline creatinine being around 2.3-2.6.  Imaging noted bilateral pleural effusions and patient was aggressively diuresed with IV diuretics and albumin without improvement in symptoms.  CT abdomen pelvis was negative for any obstructive uropathy.  Patient did present initially with a hemoglobin of 5.1 that did improve status post transfusion of 3 units of PRBC.  Patient is reported to receive intermittent IV iron in the outpatient setting.  Patient's urine studies were consistent with nephrotic range proteinuria.  Transthoracic echocardiogram obtained showed severe pulmonary hypertension with diastolic dysfunction.  Unfortunately due to her refractory hypervolemia the case was discussed with nephrology ultimately patient was transition to hemodialysis.  General surgery was consulted and did have tunneled catheter placed successfully and patient initiated on hemodialysis.  Nephrology  continued hemodialysis while in hospital but unfortunately with no significant improvement and resolution in her renal dysfunction.  Patient did remain anemic but goal hemoglobin less than 7 and therefore did not require any further transfusions.  Patient was not a candidate for inpatient rehab and was awaiting skilled nursing facility placement and dialysis chair in the outpatient setting prior to her discharge.  On 11/30/2022 patient was accepted to the [facility] skilled nursing facility as well as having outpatient dialysis chair arranged.  Given this and patient's stable hemoglobin and respiratory status and volume status she was felt to achieve maximal benefit of continued hospitalization and discharged home in stable medical condition.    VITAL SIGNS:        on   ;        Body mass index is 32.3 kg/m².  Wt Readings from Last 3 Encounters:   12/01/22 78 kg (172 lb)   11/30/22 82.7 kg (182 lb 5.1 oz)   10/10/22 93.1 kg (205 lb 3.2 oz)       PHYSICAL EXAM:  Constitutional:  Well-developed and well-nourished.  No acute distress.      HENT:  Head:  Normocephalic and atraumatic.  Mouth:  Moist mucous membranes.    Eyes:  Conjunctivae and EOM are normal. No scleral icterus.    Cardiovascular:  Normal rate, regular rhythm and normal heart sounds with no murmur.  Pulmonary/Chest:  No respiratory distress, no wheezes, no crackles, with normal breath sounds and good air movement.  Abdominal:  Soft.  Bowel sounds are normal.  Mild to moderate distention but no tenderness.   Musculoskeletal:  No tenderness, and no deformity.  No red or swollen joints anywhere.  Functional ROM intact.   Neurological:  Alert and oriented to person, place, and time.  No cranial nerve deficit.  No tongue deviation.  No facial droop.  No slurred speech.  Skin:  Skin is warm and dry. No rash or lesion noted. No pallor.   Peripheral vascular:  Pulses in all 4 extremities with no clubbing, no cyanosis, trace lower extremity edema.  Psychiatric:  Appropriate mood and affect, pleasant.     DISCHARGE DISPOSITION   Stable    DISCHARGE MEDICATIONS:     Discharge Medications      New Medications      Instructions Start Date   iron polysaccharides 150 MG capsule  Commonly known as: NIFEREX   150 mg, Oral, Daily      lactulose 10 GM/15ML solution  Commonly known as: CHRONULAC   20 g, Oral, Daily      mirtazapine 7.5 MG tablet  Commonly known as: REMERON   22.5 mg, Oral, Nightly      polyethylene glycol 17 g packet  Commonly known as: MIRALAX   17 g, Oral, Daily         Changes to Medications      Instructions Start Date   atorvastatin 40 MG tablet  Commonly known as: LIPITOR  What changed: when to take this   40 mg, Oral, Nightly         Continue These Medications      Instructions Start Date   amLODIPine 10 MG tablet  Commonly known as: NORVASC   10 mg, Oral, Daily      escitalopram 10 MG tablet  Commonly known as: LEXAPRO   10 mg, Oral, Daily      hydrALAZINE 25 MG tablet  Commonly known as: APRESOLINE   75 mg, Oral, 3 Times Daily      Insulin Lispro 100 UNIT/ML injection  Commonly known as: humaLOG   0-7 Units, Subcutaneous, 3 Times Daily Before Meals, Admin Instructions: Correction - Low Dose.  Less than 40 units/day total insulin dose or lean, elderly, renal patients  Blood glucose 150-199 mg/dL - 2 units  Blood glucose 200-249 mg/dL - 3 units  Blood glucose 250-299 mg/dL - 4 units  Blood glucose 300-349 mg/dL - 5 units  Blood glucose 350-400 mg/dL - 6 units  Blood glucose greater than 400 mg/dL - 7 units and call provider      isosorbide mononitrate 60 MG 24 hr tablet  Commonly known as: IMDUR   60 mg, Oral, Daily      levothyroxine 25 MCG tablet  Commonly known as: SYNTHROID, LEVOTHROID   25 mcg, Oral, Daily      metoprolol succinate XL 50 MG 24 hr tablet  Commonly known as: TOPROL-XL   75 mg, Oral, Daily      pantoprazole 40 MG EC tablet  Commonly known as: Protonix   40 mg, Oral, Daily      vitamin D 1.25 MG (24280 UT) capsule capsule  Commonly known as:  ERGOCALCIFEROL   50,000 Units, Oral, Weekly         Stop These Medications    aspirin 81 MG EC tablet     bumetanide 1 MG tablet  Commonly known as: BUMEX     cloNIDine 0.2 MG tablet  Commonly known as: CATAPRES     Princeankit Max SoloStar 300 UNIT/ML solution pen-injector injection  Generic drug: Insulin Glargine (2 Unit Dial)              Additional Instructions for the Follow-ups that You Need to Schedule     Discharge Follow-up with Specialty: Nephrology; 3 Weeks   As directed      Specialty: Nephrology    Follow Up: 3 Weeks    Follow Up Details: 3 week follow up with Nephrology            Contact information for follow-up providers     Lexie Dolan PA .    Specialty: Family Medicine  Contact information:  [address] [facility] [address] [phone]                   Contact information for after-discharge care     Destination     [facility] .    Service: Skilled Nursing  Contact information:  [address] [facility] [address]  [phone]                              TEST  RESULTS PENDING AT DISCHARGE       The ASCVD Risk score (Luis DK, et al., 2019) failed to calculate for the following reasons:    The valid total cholesterol range is 130 to 320 mg/dL     CODE STATUS  Code Status and Medical Interventions:   Ordered at: 11/02/22 1727     Code Status (Patient has no pulse and is not breathing):    CPR (Attempt to Resuscitate)     Medical Interventions (Patient has pulse or is breathing):    Full Support       Pieter Molina DO  [facility]  12/01/22  21:00 EST    Please note that this discharge summary required more than 30 minutes to complete.

## 2022-12-03 ENCOUNTER — HOSPITAL ENCOUNTER (EMERGENCY)
Facility: HOSPITAL | Age: 77
Discharge: HOME | End: 2022-12-04
Attending: EMERGENCY MEDICINE | Admitting: EMERGENCY MEDICINE

## 2022-12-03 ENCOUNTER — APPOINTMENT (OUTPATIENT)
Dept: CT IMAGING | Facility: HOSPITAL | Age: 77
End: 2022-12-03

## 2022-12-03 VITALS
WEIGHT: 172 LBS | DIASTOLIC BLOOD PRESSURE: 57 MMHG | BODY MASS INDEX: 30.48 KG/M2 | OXYGEN SATURATION: 99 % | HEART RATE: 75 BPM | SYSTOLIC BLOOD PRESSURE: 117 MMHG | TEMPERATURE: 97.5 F | RESPIRATION RATE: 18 BRPM | HEIGHT: 63 IN

## 2022-12-03 DIAGNOSIS — J10.1 INFLUENZA A: Primary | ICD-10-CM

## 2022-12-03 LAB
ALBUMIN SERPL-MCNC: 2.99 G/DL (ref 3.5–5.2)
ALBUMIN/GLOB SERPL: 0.8 G/DL
ALP SERPL-CCNC: 358 U/L (ref 39–117)
ALT SERPL W P-5'-P-CCNC: 15 U/L (ref 1–33)
ANION GAP SERPL CALCULATED.3IONS-SCNC: 13.5 MMOL/L (ref 5–15)
APTT PPP: 38.8 SECONDS (ref 26.5–34.5)
AST SERPL-CCNC: 45 U/L (ref 1–32)
BASOPHILS # BLD AUTO: 0.01 10*3/MM3 (ref 0–0.2)
BASOPHILS NFR BLD AUTO: 0.3 % (ref 0–1.5)
BILIRUB SERPL-MCNC: 0.2 MG/DL (ref 0–1.2)
BUN SERPL-MCNC: 27 MG/DL (ref 8–23)
BUN/CREAT SERPL: 7.9 (ref 7–25)
CALCIUM SPEC-SCNC: 8.7 MG/DL (ref 8.6–10.5)
CHLORIDE SERPL-SCNC: 95 MMOL/L (ref 98–107)
CO2 SERPL-SCNC: 26.5 MMOL/L (ref 22–29)
CREAT SERPL-MCNC: 3.42 MG/DL (ref 0.57–1)
CRP SERPL-MCNC: 3.8 MG/DL (ref 0–0.5)
DEPRECATED RDW RBC AUTO: 46.2 FL (ref 37–54)
EGFRCR SERPLBLD CKD-EPI 2021: 13.1 ML/MIN/1.73
EOSINOPHIL # BLD AUTO: 0.01 10*3/MM3 (ref 0–0.4)
EOSINOPHIL NFR BLD AUTO: 0.3 % (ref 0.3–6.2)
ERYTHROCYTE [DISTWIDTH] IN BLOOD BY AUTOMATED COUNT: 14.5 % (ref 12.3–15.4)
ERYTHROCYTE [SEDIMENTATION RATE] IN BLOOD: 55 MM/HR (ref 0–30)
FLUAV RNA RESP QL NAA+PROBE: DETECTED
FLUBV RNA RESP QL NAA+PROBE: NOT DETECTED
GLOBULIN UR ELPH-MCNC: 3.7 GM/DL
GLUCOSE SERPL-MCNC: 178 MG/DL (ref 65–99)
HCT VFR BLD AUTO: 24.3 % (ref 34–46.6)
HGB BLD-MCNC: 7.4 G/DL (ref 12–15.9)
IMM GRANULOCYTES # BLD AUTO: 0.02 10*3/MM3 (ref 0–0.05)
IMM GRANULOCYTES NFR BLD AUTO: 0.6 % (ref 0–0.5)
INR PPP: 1.29 (ref 0.9–1.1)
LIPASE SERPL-CCNC: 39 U/L (ref 13–60)
LYMPHOCYTES # BLD AUTO: 0.62 10*3/MM3 (ref 0.7–3.1)
LYMPHOCYTES NFR BLD AUTO: 17.5 % (ref 19.6–45.3)
MAGNESIUM SERPL-MCNC: 2 MG/DL (ref 1.6–2.4)
MCH RBC QN AUTO: 26.9 PG (ref 26.6–33)
MCHC RBC AUTO-ENTMCNC: 30.5 G/DL (ref 31.5–35.7)
MCV RBC AUTO: 88.4 FL (ref 79–97)
MONOCYTES # BLD AUTO: 0.42 10*3/MM3 (ref 0.1–0.9)
MONOCYTES NFR BLD AUTO: 11.9 % (ref 5–12)
NEUTROPHILS NFR BLD AUTO: 2.46 10*3/MM3 (ref 1.7–7)
NEUTROPHILS NFR BLD AUTO: 69.4 % (ref 42.7–76)
NRBC BLD AUTO-RTO: 0 /100 WBC (ref 0–0.2)
NT-PROBNP SERPL-MCNC: ABNORMAL PG/ML (ref 0–1800)
PLATELET # BLD AUTO: 202 10*3/MM3 (ref 140–450)
PMV BLD AUTO: 8.9 FL (ref 6–12)
POTASSIUM SERPL-SCNC: 3.6 MMOL/L (ref 3.5–5.2)
PROT SERPL-MCNC: 6.7 G/DL (ref 6–8.5)
PROTHROMBIN TIME: 16.4 SECONDS (ref 12.1–14.7)
RBC # BLD AUTO: 2.75 10*6/MM3 (ref 3.77–5.28)
RSV RNA NPH QL NAA+NON-PROBE: NOT DETECTED
SARS-COV-2 RNA RESP QL NAA+PROBE: NOT DETECTED
SODIUM SERPL-SCNC: 135 MMOL/L (ref 136–145)
T4 FREE SERPL-MCNC: 1.16 NG/DL (ref 0.93–1.7)
TROPONIN T SERPL-MCNC: 0.07 NG/ML (ref 0–0.03)
TSH SERPL DL<=0.05 MIU/L-ACNC: 8.83 UIU/ML (ref 0.27–4.2)
WBC NRBC COR # BLD: 3.54 10*3/MM3 (ref 3.4–10.8)

## 2022-12-03 PROCEDURE — 85025 COMPLETE CBC W/AUTO DIFF WBC: CPT | Performed by: EMERGENCY MEDICINE

## 2022-12-03 PROCEDURE — 86140 C-REACTIVE PROTEIN: CPT | Performed by: EMERGENCY MEDICINE

## 2022-12-03 PROCEDURE — 83735 ASSAY OF MAGNESIUM: CPT | Performed by: EMERGENCY MEDICINE

## 2022-12-03 PROCEDURE — 99284 EMERGENCY DEPT VISIT MOD MDM: CPT

## 2022-12-03 PROCEDURE — 71250 CT THORAX DX C-: CPT

## 2022-12-03 PROCEDURE — 85652 RBC SED RATE AUTOMATED: CPT | Performed by: EMERGENCY MEDICINE

## 2022-12-03 PROCEDURE — 85730 THROMBOPLASTIN TIME PARTIAL: CPT | Performed by: EMERGENCY MEDICINE

## 2022-12-03 PROCEDURE — 74176 CT ABD & PELVIS W/O CONTRAST: CPT

## 2022-12-03 PROCEDURE — 93010 ELECTROCARDIOGRAM REPORT: CPT | Performed by: INTERNAL MEDICINE

## 2022-12-03 PROCEDURE — 25010000002 ONDANSETRON PER 1 MG: Performed by: EMERGENCY MEDICINE

## 2022-12-03 PROCEDURE — 36415 COLL VENOUS BLD VENIPUNCTURE: CPT

## 2022-12-03 PROCEDURE — 96374 THER/PROPH/DIAG INJ IV PUSH: CPT

## 2022-12-03 PROCEDURE — 80053 COMPREHEN METABOLIC PANEL: CPT | Performed by: EMERGENCY MEDICINE

## 2022-12-03 PROCEDURE — 87637 SARSCOV2&INF A&B&RSV AMP PRB: CPT | Performed by: EMERGENCY MEDICINE

## 2022-12-03 PROCEDURE — 84443 ASSAY THYROID STIM HORMONE: CPT | Performed by: EMERGENCY MEDICINE

## 2022-12-03 PROCEDURE — 83880 ASSAY OF NATRIURETIC PEPTIDE: CPT | Performed by: EMERGENCY MEDICINE

## 2022-12-03 PROCEDURE — 84484 ASSAY OF TROPONIN QUANT: CPT | Performed by: EMERGENCY MEDICINE

## 2022-12-03 PROCEDURE — 83690 ASSAY OF LIPASE: CPT | Performed by: EMERGENCY MEDICINE

## 2022-12-03 PROCEDURE — 84439 ASSAY OF FREE THYROXINE: CPT | Performed by: EMERGENCY MEDICINE

## 2022-12-03 PROCEDURE — 93005 ELECTROCARDIOGRAM TRACING: CPT | Performed by: EMERGENCY MEDICINE

## 2022-12-03 PROCEDURE — 85610 PROTHROMBIN TIME: CPT | Performed by: EMERGENCY MEDICINE

## 2022-12-03 PROCEDURE — 70450 CT HEAD/BRAIN W/O DYE: CPT

## 2022-12-03 RX ORDER — SODIUM CHLORIDE 0.9 % (FLUSH) 0.9 %
10 SYRINGE (ML) INJECTION AS NEEDED
Status: DISCONTINUED | OUTPATIENT
Start: 2022-12-03 | End: 2022-12-04 | Stop reason: HOSPADM

## 2022-12-03 RX ORDER — ONDANSETRON 2 MG/ML
4 INJECTION INTRAMUSCULAR; INTRAVENOUS ONCE
Status: COMPLETED | OUTPATIENT
Start: 2022-12-03 | End: 2022-12-03

## 2022-12-03 RX ADMIN — ONDANSETRON 4 MG: 2 INJECTION INTRAMUSCULAR; INTRAVENOUS at 22:59

## 2022-12-03 RX ADMIN — SODIUM CHLORIDE 1000 ML: 9 INJECTION, SOLUTION INTRAVENOUS at 22:59

## 2022-12-04 LAB
QT INTERVAL: 420 MS
QTC INTERVAL: 511 MS

## 2022-12-05 LAB
QT INTERVAL: 478 MS
QTC INTERVAL: 512 MS

## 2022-12-06 LAB
BACTERIA SPEC AEROBE CULT: NORMAL
BACTERIA SPEC AEROBE CULT: NORMAL

## 2022-12-12 ENCOUNTER — APPOINTMENT (OUTPATIENT)
Dept: CARDIOLOGY | Facility: HOSPITAL | Age: 77
End: 2022-12-12

## 2022-12-12 ENCOUNTER — HOSPITAL ENCOUNTER (EMERGENCY)
Facility: HOSPITAL | Age: 77
Discharge: NURSING, REHAB OR LONG-TERM CARE | End: 2022-12-12
Attending: EMERGENCY MEDICINE | Admitting: EMERGENCY MEDICINE

## 2022-12-12 VITALS
RESPIRATION RATE: 18 BRPM | DIASTOLIC BLOOD PRESSURE: 90 MMHG | HEART RATE: 82 BPM | SYSTOLIC BLOOD PRESSURE: 128 MMHG | OXYGEN SATURATION: 100 % | HEIGHT: 63 IN | BODY MASS INDEX: 30.48 KG/M2 | WEIGHT: 172 LBS | TEMPERATURE: 98 F

## 2022-12-12 DIAGNOSIS — I95.9 TRANSIENT HYPOTENSION: Primary | ICD-10-CM

## 2022-12-12 DIAGNOSIS — N18.5 CHRONIC RENAL FAILURE, STAGE 5: ICD-10-CM

## 2022-12-12 LAB
ALBUMIN SERPL-MCNC: 3.18 G/DL (ref 3.5–5.2)
ALBUMIN/GLOB SERPL: 0.7 G/DL
ALP SERPL-CCNC: 308 U/L (ref 39–117)
ALT SERPL W P-5'-P-CCNC: 13 U/L (ref 1–33)
ANION GAP SERPL CALCULATED.3IONS-SCNC: 15.4 MMOL/L (ref 5–15)
AST SERPL-CCNC: 34 U/L (ref 1–32)
BASOPHILS # BLD AUTO: 0.04 10*3/MM3 (ref 0–0.2)
BASOPHILS NFR BLD AUTO: 0.5 % (ref 0–1.5)
BILIRUB SERPL-MCNC: 0.4 MG/DL (ref 0–1.2)
BUN SERPL-MCNC: 18 MG/DL (ref 8–23)
BUN/CREAT SERPL: 6.3 (ref 7–25)
CALCIUM SPEC-SCNC: 8.8 MG/DL (ref 8.6–10.5)
CHLORIDE SERPL-SCNC: 95 MMOL/L (ref 98–107)
CO2 SERPL-SCNC: 25.6 MMOL/L (ref 22–29)
CREAT SERPL-MCNC: 2.87 MG/DL (ref 0.57–1)
DEPRECATED RDW RBC AUTO: 48.6 FL (ref 37–54)
EGFRCR SERPLBLD CKD-EPI 2021: 16.2 ML/MIN/1.73
EOSINOPHIL # BLD AUTO: 0.05 10*3/MM3 (ref 0–0.4)
EOSINOPHIL NFR BLD AUTO: 0.6 % (ref 0.3–6.2)
ERYTHROCYTE [DISTWIDTH] IN BLOOD BY AUTOMATED COUNT: 14.9 % (ref 12.3–15.4)
GLOBULIN UR ELPH-MCNC: 4.6 GM/DL
GLUCOSE SERPL-MCNC: 170 MG/DL (ref 65–99)
HCT VFR BLD AUTO: 31.4 % (ref 34–46.6)
HGB BLD-MCNC: 9.1 G/DL (ref 12–15.9)
HOLD SPECIMEN: NORMAL
HOLD SPECIMEN: NORMAL
HYPOCHROMIA BLD QL: NORMAL
IMM GRANULOCYTES # BLD AUTO: 0.04 10*3/MM3 (ref 0–0.05)
IMM GRANULOCYTES NFR BLD AUTO: 0.5 % (ref 0–0.5)
LYMPHOCYTES # BLD AUTO: 1.09 10*3/MM3 (ref 0.7–3.1)
LYMPHOCYTES NFR BLD AUTO: 12.6 % (ref 19.6–45.3)
MCH RBC QN AUTO: 26.1 PG (ref 26.6–33)
MCHC RBC AUTO-ENTMCNC: 29 G/DL (ref 31.5–35.7)
MCV RBC AUTO: 90 FL (ref 79–97)
MONOCYTES # BLD AUTO: 0.66 10*3/MM3 (ref 0.1–0.9)
MONOCYTES NFR BLD AUTO: 7.6 % (ref 5–12)
NEUTROPHILS NFR BLD AUTO: 6.77 10*3/MM3 (ref 1.7–7)
NEUTROPHILS NFR BLD AUTO: 78.2 % (ref 42.7–76)
NRBC BLD AUTO-RTO: 0 /100 WBC (ref 0–0.2)
PLAT MORPH BLD: NORMAL
PLATELET # BLD AUTO: 315 10*3/MM3 (ref 140–450)
PMV BLD AUTO: 9.1 FL (ref 6–12)
POTASSIUM SERPL-SCNC: 3.4 MMOL/L (ref 3.5–5.2)
PROT SERPL-MCNC: 7.8 G/DL (ref 6–8.5)
RBC # BLD AUTO: 3.49 10*6/MM3 (ref 3.77–5.28)
SODIUM SERPL-SCNC: 136 MMOL/L (ref 136–145)
WBC NRBC COR # BLD: 8.65 10*3/MM3 (ref 3.4–10.8)
WHOLE BLOOD HOLD COAG: NORMAL
WHOLE BLOOD HOLD SPECIMEN: NORMAL

## 2022-12-12 PROCEDURE — 85007 BL SMEAR W/DIFF WBC COUNT: CPT | Performed by: PHYSICIAN ASSISTANT

## 2022-12-12 PROCEDURE — 99283 EMERGENCY DEPT VISIT LOW MDM: CPT

## 2022-12-12 PROCEDURE — 85025 COMPLETE CBC W/AUTO DIFF WBC: CPT | Performed by: PHYSICIAN ASSISTANT

## 2022-12-12 PROCEDURE — 36415 COLL VENOUS BLD VENIPUNCTURE: CPT

## 2022-12-12 PROCEDURE — 80053 COMPREHEN METABOLIC PANEL: CPT | Performed by: PHYSICIAN ASSISTANT

## 2022-12-12 NOTE — ED NOTES
Calling [facility] at this time for pt. Transfer back to the [facility]

## 2022-12-12 NOTE — ED PROVIDER NOTES
"Subjective   History of Present Illness  77-year-old female presents to the ER secondary to reported low blood pressure from the nursing home.  Patient states \"I do not know why the hell I am here\" she states that she feels fine.  She states that she had dialysis earlier today and when she returned to the nursing home she was sent by ambulance to the ER.  She denies any chest pain pressure tightness or squeezing.  She denies any shortness of breath.  She states that she has been eating and drinking normally.  No recent fever.  No nausea vomiting diarrhea.  No abdominal pain.  No other complaints this time.  Her only complaint is that she would like a blanket.  This was provided.        Review of Systems   Constitutional: Negative.  Negative for fever.   HENT: Negative.    Respiratory: Negative.    Cardiovascular: Negative.  Negative for chest pain.   Gastrointestinal: Negative.  Negative for abdominal pain.   Endocrine: Negative.    Genitourinary: Negative.  Negative for dysuria.   Skin: Negative.    Neurological: Negative.    Psychiatric/Behavioral: Negative.    All other systems reviewed and are negative.      Past Medical History:   Diagnosis Date   • Acquired hypothyroidism 4/22/2021   • Anemia    • Arthritis    • Carpal tunnel syndrome    • Coronary artery disease    • Diabetes mellitus (HCC)    • Elevated cholesterol    • GERD (gastroesophageal reflux disease)    • History of pneumonia    • History of unsteady gait     OCASSIONALLY   • Hypertension    • Insomnia    • Kidney disease    • LENNY (obstructive sleep apnea) 12/1/2020   • Osteoarthritis    • Renal insufficiency    • Sciatica        No Known Allergies    Past Surgical History:   Procedure Laterality Date   • ABDOMINAL SURGERY     • APPENDECTOMY     • CARDIAC CATHETERIZATION      1 STENT  ---  2000   • CARDIAC SURGERY     • CAROTID STENT     • CARPAL TUNNEL RELEASE Right 10/8/2019    Procedure: CARPAL TUNNEL RELEASE;  Surgeon: Feroz Johnson MD; "  Location: [facility];  Service: Orthopedics   • CATARACT EXTRACTION     • CHOLECYSTECTOMY     • COLONOSCOPY     • COLONOSCOPY N/A 11/23/2021    Procedure: COLONOSCOPY FOR SCREENING;  Surgeon: Palmira Pate MD;  Location: [facility];  Service: Gastroenterology;  Laterality: N/A;   • CORONARY ANGIOPLASTY WITH STENT PLACEMENT     • ENDOSCOPY     • ENDOSCOPY N/A 3/5/2020    Procedure: ESOPHAGOGASTRODUODENOSCOPY;  Surgeon: Alexandru Bagley MD;  Location: [facility];  Service: Gastroenterology;  Laterality: N/A;   • ENDOSCOPY N/A 11/23/2021    Procedure: ESOPHAGOGASTRODUODENOSCOPY WITH BIOPSY;  Surgeon: Palmira Pate MD;  Location: [facility];  Service: Gastroenterology;  Laterality: N/A;   • ENDOSCOPY AND COLONOSCOPY     • GALLBLADDER SURGERY     • INSERTION HEMODIALYSIS CATHETER N/A 11/14/2022    Procedure: HEMODIALYSIS CATHETER INSERTION;  Surgeon: Ta Blas MD;  Location: [facility];  Service: General;  Laterality: N/A;   • JOINT REPLACEMENT Left 05/02/2017    [facility]  DR LEVIN  LEFT TOTAL KNEE   • KNEE ARTHROSCOPY W/ MENISCECTOMY Right    • LAPAROSCOPIC TUBAL LIGATION     • CA TOTAL KNEE ARTHROPLASTY Left 5/2/2017    Procedure: TOTAL KNEE ARTHROPLASTY;  Surgeon: Feroz Levin MD;  Location: [facility];  Service: Orthopedics   • STERILIZATION     • TONSILLECTOMY         Family History   Problem Relation Age of Onset   • Arthritis Mother    • Diabetes Mother    • Cancer Mother    • Heart disease Father    • Diabetes Daughter    • Diabetes Son    • Diabetes Maternal Aunt    • Heart disease Maternal Grandmother    • Breast cancer Neg Hx        Social History     Socioeconomic History   • Marital status:      Spouse name: natalie   • Number of children: 3   • Years of education: 12   Tobacco Use   • Smoking status: Never   • Smokeless tobacco: Never   Vaping Use   • Vaping Use: Never used   Substance and Sexual Activity   • Alcohol use: Yes     Comment: socially   • Drug use: No    • Sexual activity: Defer     Birth control/protection: Surgical           Objective   Physical Exam  Vitals and nursing note reviewed.   Constitutional:       General: She is not in acute distress.     Appearance: She is well-developed. She is not diaphoretic.   HENT:      Head: Normocephalic and atraumatic.      Right Ear: External ear normal.      Left Ear: External ear normal.      Nose: Nose normal.   Eyes:      Conjunctiva/sclera: Conjunctivae normal.      Pupils: Pupils are equal, round, and reactive to light.   Neck:      Vascular: No JVD.      Trachea: No tracheal deviation.   Cardiovascular:      Rate and Rhythm: Normal rate and regular rhythm.      Heart sounds: Normal heart sounds. No murmur heard.  Pulmonary:      Effort: Pulmonary effort is normal. No respiratory distress.      Breath sounds: Normal breath sounds. No wheezing.   Abdominal:      General: Bowel sounds are normal.      Palpations: Abdomen is soft.      Tenderness: There is no abdominal tenderness.   Musculoskeletal:         General: No deformity. Normal range of motion.      Cervical back: Normal range of motion and neck supple.   Skin:     General: Skin is warm and dry.      Coloration: Skin is not pale.      Findings: No erythema or rash.   Neurological:      Mental Status: She is alert and oriented to person, place, and time.      Cranial Nerves: No cranial nerve deficit.   Psychiatric:         Behavior: Behavior normal.         Thought Content: Thought content normal.         Procedures           ED Course                                           MDM  Number of Diagnoses or Management Options  Chronic renal failure, stage 5 (HCC): established and worsening  Transient hypotension: new and requires workup     Amount and/or Complexity of Data Reviewed  Clinical lab tests: reviewed and ordered        Final diagnoses:   Transient hypotension   Chronic renal failure, stage 5 (HCC)       ED Disposition  ED Disposition     ED Disposition    Discharge    Condition   Stable    Comment   --             Lexie Dolan PA  [address] [facility] [address]  [phone]    Schedule an appointment as soon as possible for a visit            Medication List      No changes were made to your prescriptions during this visit.          Timothy Maradiaga PA  12/12/22 1501

## 2022-12-14 ENCOUNTER — TELEPHONE (OUTPATIENT)
Dept: FAMILY MEDICINE CLINIC | Facility: CLINIC | Age: 77
End: 2022-12-14

## 2022-12-14 ENCOUNTER — READMISSION MANAGEMENT (OUTPATIENT)
Dept: CALL CENTER | Facility: HOSPITAL | Age: 77
End: 2022-12-14

## 2022-12-14 NOTE — TELEPHONE ENCOUNTER
Caller: SCHEDULING DEPARTMENT    Relationship to patient:     FACILITY REPRESENTATIVE    Best call back number:      [phone]     New or established patient?  [] New  [x] Established    Date of discharge:     12/22/22    Facility discharged from:    [facility]    Diagnosis/Symptoms:     CHRONIC KIDNEY DISEASE    Length of stay (If applicable):     ADMITTED - 11/30/22

## 2022-12-14 NOTE — OUTREACH NOTE
Prep Survey    Flowsheet Row Responses   [facility] facility patient discharged from? Non-BH   Is LACE score < 7 ? Non-BH Discharge   Eligibility [facility]   Date of Admission 11/30/22   Date of Discharge 12/22/22   Discharge Disposition Home or Self Care   Discharge diagnosis Kidney disease   Does the patient have one of the following disease processes/diagnoses(primary or secondary)? Other   Prep survey completed? Yes          MANOJ WHEELER - Registered Nurse

## 2022-12-16 NOTE — ED PROVIDER NOTES
Subjective     History provided by:  Patient   used: No    Nausea  The primary symptoms include nausea and vomiting. Primary symptoms do not include fever, weight loss, fatigue, abdominal pain, diarrhea, melena, hematemesis, jaundice, hematochezia, dysuria, myalgias, arthralgias or rash. The illness began today. The onset was gradual. The problem has been gradually improving.   The illness does not include chills, anorexia, dysphagia, odynophagia, bloating, constipation, tenesmus, back pain or itching. Associated medical issues do not include inflammatory bowel disease, GERD, gallstones, liver disease, alcohol abuse, PUD, gastric bypass, bowel resection, irritable bowel syndrome, hemorrhoids or diverticulitis.       Review of Systems   Constitutional: Negative for activity change, appetite change, chills, diaphoresis, fatigue, fever and weight loss.   HENT: Negative for congestion, ear pain and sore throat.    Eyes: Negative for redness.   Respiratory: Negative for cough, chest tightness, shortness of breath and wheezing.    Cardiovascular: Negative for chest pain, palpitations and leg swelling.   Gastrointestinal: Positive for nausea and vomiting. Negative for abdominal pain, anorexia, bloating, constipation, diarrhea, dysphagia, hematemesis, hematochezia, jaundice and melena.   Genitourinary: Negative for dysuria and urgency.   Musculoskeletal: Negative for arthralgias, back pain, myalgias and neck pain.   Skin: Negative for itching, pallor, rash and wound.   Neurological: Negative for dizziness, speech difficulty, weakness and headaches.   Psychiatric/Behavioral: Negative for agitation, behavioral problems, confusion and decreased concentration.   All other systems reviewed and are negative.      Past Medical History:   Diagnosis Date   • Acquired hypothyroidism 4/22/2021   • Anemia    • Arthritis    • Carpal tunnel syndrome    • Coronary artery disease    • Diabetes mellitus (HCC)    •  Elevated cholesterol    • GERD (gastroesophageal reflux disease)    • History of pneumonia    • History of unsteady gait     OCASSIONALLY   • Hypertension    • Insomnia    • Kidney disease    • LENNY (obstructive sleep apnea) 12/1/2020   • Osteoarthritis    • Renal insufficiency    • Sciatica        No Known Allergies    Past Surgical History:   Procedure Laterality Date   • ABDOMINAL SURGERY     • APPENDECTOMY     • CARDIAC CATHETERIZATION      1 STENT  ---  2000   • CARDIAC SURGERY     • CAROTID STENT     • CARPAL TUNNEL RELEASE Right 10/8/2019    Procedure: CARPAL TUNNEL RELEASE;  Surgeon: Feroz Levin MD;  Location: [facility] OR;  Service: Orthopedics   • CATARACT EXTRACTION     • CHOLECYSTECTOMY     • COLONOSCOPY     • COLONOSCOPY N/A 11/23/2021    Procedure: COLONOSCOPY FOR SCREENING;  Surgeon: Palmira Pate MD;  Location: [facility] OR;  Service: Gastroenterology;  Laterality: N/A;   • CORONARY ANGIOPLASTY WITH STENT PLACEMENT     • ENDOSCOPY     • ENDOSCOPY N/A 3/5/2020    Procedure: ESOPHAGOGASTRODUODENOSCOPY;  Surgeon: Alexandru Bagley MD;  Location: [facility] OR;  Service: Gastroenterology;  Laterality: N/A;   • ENDOSCOPY N/A 11/23/2021    Procedure: ESOPHAGOGASTRODUODENOSCOPY WITH BIOPSY;  Surgeon: Palmira Pate MD;  Location: [facility] OR;  Service: Gastroenterology;  Laterality: N/A;   • ENDOSCOPY AND COLONOSCOPY     • GALLBLADDER SURGERY     • INSERTION HEMODIALYSIS CATHETER N/A 11/14/2022    Procedure: HEMODIALYSIS CATHETER INSERTION;  Surgeon: Ta Blas MD;  Location: [facility] OR;  Service: General;  Laterality: N/A;   • JOINT REPLACEMENT Left 05/02/2017    [facility]  DR LEVIN  LEFT TOTAL KNEE   • KNEE ARTHROSCOPY W/ MENISCECTOMY Right    • LAPAROSCOPIC TUBAL LIGATION     • NH TOTAL KNEE ARTHROPLASTY Left 5/2/2017    Procedure: TOTAL KNEE ARTHROPLASTY;  Surgeon: Feroz Levin MD;  Location: [facility];  Service: Orthopedics   • STERILIZATION     • TONSILLECTOMY          Family History   Problem Relation Age of Onset   • Arthritis Mother    • Diabetes Mother    • Cancer Mother    • Heart disease Father    • Diabetes Daughter    • Diabetes Son    • Diabetes Maternal Aunt    • Heart disease Maternal Grandmother    • Breast cancer Neg Hx        Social History     Socioeconomic History   • Marital status:      Spouse name: natalie   • Number of children: 3   • Years of education: 12   Tobacco Use   • Smoking status: Never   • Smokeless tobacco: Never   Vaping Use   • Vaping Use: Never used   Substance and Sexual Activity   • Alcohol use: Yes     Comment: socially   • Drug use: No   • Sexual activity: Defer     Birth control/protection: Surgical           Objective   Physical Exam  Vitals and nursing note reviewed.   Constitutional:       General: She is not in acute distress.     Appearance: Normal appearance. She is well-developed. She is not toxic-appearing or diaphoretic.   HENT:      Head: Normocephalic and atraumatic.      Right Ear: External ear normal.      Left Ear: External ear normal.      Nose: Nose normal.      Mouth/Throat:      Pharynx: No oropharyngeal exudate.      Tonsils: No tonsillar exudate.   Eyes:      General: Lids are normal.      Conjunctiva/sclera: Conjunctivae normal.      Pupils: Pupils are equal, round, and reactive to light.   Neck:      Thyroid: No thyromegaly.   Cardiovascular:      Rate and Rhythm: Normal rate and regular rhythm.      Pulses: Normal pulses.      Heart sounds: Normal heart sounds, S1 normal and S2 normal.   Pulmonary:      Effort: Pulmonary effort is normal. No tachypnea or respiratory distress.      Breath sounds: Normal breath sounds. No decreased breath sounds, wheezing or rales.   Chest:      Chest wall: No tenderness.   Abdominal:      General: Bowel sounds are normal. There is no distension.      Palpations: Abdomen is soft.      Tenderness: There is no abdominal tenderness. There is no guarding or rebound.    Musculoskeletal:         General: No tenderness or deformity. Normal range of motion.      Cervical back: Full passive range of motion without pain, normal range of motion and neck supple.   Lymphadenopathy:      Cervical: No cervical adenopathy.   Skin:     General: Skin is warm and dry.      Coloration: Skin is not pale.      Findings: No erythema or rash.   Neurological:      Mental Status: She is alert and oriented to person, place, and time.      GCS: GCS eye subscore is 4. GCS verbal subscore is 5. GCS motor subscore is 6.      Cranial Nerves: No cranial nerve deficit.      Sensory: No sensory deficit.   Psychiatric:         Speech: Speech normal.         Behavior: Behavior normal.         Thought Content: Thought content normal.         Judgment: Judgment normal.         Procedures           ED Course  ED Course as of 12/15/22 2106   Sat Dec 03, 2022   2152 CT Head Without Contrast  IMPRESSION:  No acute intracranial abnormality. Stable senescent changes. Partial right mastoid effusion. [ES]   2153 CT Chest Without Contrast Diagnostic  IMPRESSION:  1.  Large area of consolidation in right lung base with right-sided pleural effusion.     2.  Smaller area of atelectatic/infiltrative change in left lung base with left-sided pleural effusion.     3.  Cardiomegaly with trace pericardial fluid.     4.  Extensive amount of ascites within the abdomen. The liver demonstrates cirrhotic morphology.     5.  Comparison to the study of November 2, 2022 shows no significant interval change. [ES]   2153 CT Abdomen Pelvis Without Contrast  IMPRESSION:     1. Cirrhotic morphology of the liver with a large amount of abdominal and pelvic ascites.  2. Mild generalized anasarca.  3. Uncomplicated colonic diverticulosis.  4. Moderate hiatal hernia. [ES]   2210 ECG 12 Lead Chest Pain  Vent. Rate :  69 BPM     Atrial Rate :  69 BPM     P-R Int : 168 ms          QRS Dur : 108 ms      QT Int : 478 ms       P-R-T Axes :  40 -66  56  degrees     QTc Int : 512 ms     Sinus rhythm with marked sinus arrhythmia  Left anterior fascicular block  Minimal voltage criteria for LVH, may be normal variant  Prolonged QT  Abnormal ECG  When compared with ECG of 01-DEC-2022 18:36, (Unconfirmed)  premature atrial complexes are no longer present [ES]      ED Course User Index  [ES] Kervin Solano MD                                           MDM  Number of Diagnoses or Management Options     Amount and/or Complexity of Data Reviewed  Clinical lab tests: reviewed and ordered  Tests in the radiology section of CPT®: reviewed and ordered  Tests in the medicine section of CPT®: reviewed and ordered  Review and summarize past medical records: yes  Discuss the patient with other providers: yes  Independent visualization of images, tracings, or specimens: yes    Risk of Complications, Morbidity, and/or Mortality  Presenting problems: moderate  Diagnostic procedures: moderate  Management options: moderate    Patient Progress  Patient progress: stable      Final diagnoses:   Influenza A       ED Disposition  ED Disposition     ED Disposition   Discharge    Condition   Stable    Comment   --             No follow-up provider specified.       Medication List      ASK your doctor about these medications    azithromycin 250 MG tablet  Commonly known as: ZITHROMAX  Take 2 tablets by mouth Daily for 4 days.  Ask about: Should I take this medication?     cefdinir 300 MG capsule  Commonly known as: OMNICEF  Take 1 capsule by mouth Daily for 7 days.  Ask about: Should I take this medication?             Kervin Solano MD  12/15/22 9161

## 2022-12-23 ENCOUNTER — TRANSITIONAL CARE MANAGEMENT TELEPHONE ENCOUNTER (OUTPATIENT)
Dept: CALL CENTER | Facility: HOSPITAL | Age: 77
End: 2022-12-23

## 2022-12-23 NOTE — OUTREACH NOTE
Call Center TCM Note    Flowsheet Row Responses   [facility] patient discharged from? Non-BH   Does the patient have one of the following disease processes/diagnoses(primary or secondary)? Other   TCM attempt successful? No   Unsuccessful attempts Attempt 2          Rafi Garcia RN    12/23/2022, 15:48 EST

## 2022-12-23 NOTE — OUTREACH NOTE
Call Center TCM Note    Flowsheet Row Responses   [facility] patient discharged from? Non-   Does the patient have one of the following disease processes/diagnoses(primary or secondary)? Other   TCM attempt successful? No   Unsuccessful attempts Attempt 1   Call Status Left message          Rafi Garcia RN    12/23/2022, 15:28 EST

## 2022-12-26 ENCOUNTER — TRANSITIONAL CARE MANAGEMENT TELEPHONE ENCOUNTER (OUTPATIENT)
Dept: CALL CENTER | Facility: HOSPITAL | Age: 77
End: 2022-12-26

## 2022-12-26 NOTE — OUTREACH NOTE
Call Center TCM Note    Flowsheet Row Responses   [facility] [place] facility patient discharged from? Non-BH   Does the patient have one of the following disease processes/diagnoses(primary or secondary)? Other   TCM attempt successful? No   Unsuccessful attempts Attempt 3          Palmira Maradiaga LPN    12/26/2022, 12:20 EST

## 2022-12-29 ENCOUNTER — APPOINTMENT (OUTPATIENT)
Dept: CARDIOLOGY | Facility: HOSPITAL | Age: 77
End: 2022-12-29

## 2022-12-29 DIAGNOSIS — I50.32 CHRONIC HEART FAILURE WITH PRESERVED EJECTION FRACTION (HFPEF): Primary | ICD-10-CM

## 2023-01-11 ENCOUNTER — HOSPITAL ENCOUNTER (EMERGENCY)
Facility: HOSPITAL | Age: 78
Discharge: HOME | End: 2023-01-12
Attending: STUDENT IN AN ORGANIZED HEALTH CARE EDUCATION/TRAINING PROGRAM | Admitting: STUDENT IN AN ORGANIZED HEALTH CARE EDUCATION/TRAINING PROGRAM
Payer: MEDICARE

## 2023-01-11 ENCOUNTER — APPOINTMENT (OUTPATIENT)
Dept: GENERAL RADIOLOGY | Facility: HOSPITAL | Age: 78
End: 2023-01-11
Payer: MEDICARE

## 2023-01-11 VITALS
RESPIRATION RATE: 18 BRPM | OXYGEN SATURATION: 99 % | TEMPERATURE: 98 F | SYSTOLIC BLOOD PRESSURE: 112 MMHG | HEART RATE: 83 BPM | BODY MASS INDEX: 30.48 KG/M2 | HEIGHT: 63 IN | DIASTOLIC BLOOD PRESSURE: 52 MMHG | WEIGHT: 172 LBS

## 2023-01-11 DIAGNOSIS — Z99.2 DIALYSIS PATIENT: ICD-10-CM

## 2023-01-11 DIAGNOSIS — D62 ACUTE ON CHRONIC BLOOD LOSS ANEMIA: Primary | ICD-10-CM

## 2023-01-11 DIAGNOSIS — N30.01 ACUTE CYSTITIS WITH HEMATURIA: ICD-10-CM

## 2023-01-11 DIAGNOSIS — U07.1 SARS-COV-2 POSITIVE: ICD-10-CM

## 2023-01-11 LAB
ABO GROUP BLD: NORMAL
ALBUMIN SERPL-MCNC: 3.1 G/DL (ref 3.5–5.2)
ALBUMIN/GLOB SERPL: 0.7 G/DL
ALP SERPL-CCNC: 189 U/L (ref 39–117)
ALT SERPL W P-5'-P-CCNC: 13 U/L (ref 1–33)
AMMONIA BLD-SCNC: 10 UMOL/L (ref 11–51)
ANION GAP SERPL CALCULATED.3IONS-SCNC: 14.5 MMOL/L (ref 5–15)
AST SERPL-CCNC: 22 U/L (ref 1–32)
BACTERIA UR QL AUTO: ABNORMAL /HPF
BASOPHILS # BLD AUTO: 0.01 10*3/MM3 (ref 0–0.2)
BASOPHILS NFR BLD AUTO: 0.2 % (ref 0–1.5)
BILIRUB CONJ SERPL-MCNC: <0.2 MG/DL (ref 0–0.3)
BILIRUB INDIRECT SERPL-MCNC: NORMAL MG/DL
BILIRUB SERPL-MCNC: 0.2 MG/DL (ref 0–1.2)
BILIRUB SERPL-MCNC: 0.2 MG/DL (ref 0–1.2)
BILIRUB UR QL STRIP: ABNORMAL
BLD GP AB SCN SERPL QL: NEGATIVE
BUN SERPL-MCNC: 7 MG/DL (ref 8–23)
BUN/CREAT SERPL: 3.8 (ref 7–25)
CALCIUM SPEC-SCNC: 8.7 MG/DL (ref 8.6–10.5)
CHLORIDE SERPL-SCNC: 96 MMOL/L (ref 98–107)
CLARITY UR: ABNORMAL
CO2 SERPL-SCNC: 25.5 MMOL/L (ref 22–29)
COLOR UR: ABNORMAL
CREAT SERPL-MCNC: 1.86 MG/DL (ref 0.57–1)
DEPRECATED RDW RBC AUTO: 53.7 FL (ref 37–54)
EGFRCR SERPLBLD CKD-EPI 2021: 27.3 ML/MIN/1.73
EOSINOPHIL # BLD AUTO: 0.08 10*3/MM3 (ref 0–0.4)
EOSINOPHIL NFR BLD AUTO: 1.6 % (ref 0.3–6.2)
ERYTHROCYTE [DISTWIDTH] IN BLOOD BY AUTOMATED COUNT: 17.1 % (ref 12.3–15.4)
FLUAV RNA RESP QL NAA+PROBE: NOT DETECTED
FLUBV RNA ISLT QL NAA+PROBE: NOT DETECTED
GLOBULIN UR ELPH-MCNC: 4.6 GM/DL
GLUCOSE SERPL-MCNC: 132 MG/DL (ref 65–99)
GLUCOSE UR STRIP-MCNC: NEGATIVE MG/DL
HCT VFR BLD AUTO: 22.9 % (ref 34–46.6)
HGB BLD-MCNC: 6.8 G/DL (ref 12–15.9)
HGB UR QL STRIP.AUTO: ABNORMAL
HOLD SPECIMEN: NORMAL
HOLD SPECIMEN: NORMAL
HYALINE CASTS UR QL AUTO: ABNORMAL /LPF
IMM GRANULOCYTES # BLD AUTO: 0.01 10*3/MM3 (ref 0–0.05)
IMM GRANULOCYTES NFR BLD AUTO: 0.2 % (ref 0–0.5)
INR PPP: 1.26 (ref 0.9–1.1)
KETONES UR QL STRIP: ABNORMAL
LEUKOCYTE ESTERASE UR QL STRIP.AUTO: ABNORMAL
LYMPHOCYTES # BLD AUTO: 2.25 10*3/MM3 (ref 0.7–3.1)
LYMPHOCYTES NFR BLD AUTO: 43.7 % (ref 19.6–45.3)
MAGNESIUM SERPL-MCNC: 1.9 MG/DL (ref 1.6–2.4)
MCH RBC QN AUTO: 26 PG (ref 26.6–33)
MCHC RBC AUTO-ENTMCNC: 29.7 G/DL (ref 31.5–35.7)
MCV RBC AUTO: 87.4 FL (ref 79–97)
MONOCYTES # BLD AUTO: 0.4 10*3/MM3 (ref 0.1–0.9)
MONOCYTES NFR BLD AUTO: 7.8 % (ref 5–12)
NEUTROPHILS NFR BLD AUTO: 2.4 10*3/MM3 (ref 1.7–7)
NEUTROPHILS NFR BLD AUTO: 46.5 % (ref 42.7–76)
NITRITE UR QL STRIP: NEGATIVE
NRBC BLD AUTO-RTO: 0 /100 WBC (ref 0–0.2)
PH UR STRIP.AUTO: 6 [PH] (ref 5–8)
PLATELET # BLD AUTO: 248 10*3/MM3 (ref 140–450)
PMV BLD AUTO: 9 FL (ref 6–12)
POTASSIUM SERPL-SCNC: 3.4 MMOL/L (ref 3.5–5.2)
PROT SERPL-MCNC: 7.7 G/DL (ref 6–8.5)
PROT UR QL STRIP: ABNORMAL
PROTHROMBIN TIME: 16.1 SECONDS (ref 12.1–14.7)
QT INTERVAL: 458 MS
QTC INTERVAL: 522 MS
RBC # BLD AUTO: 2.62 10*6/MM3 (ref 3.77–5.28)
RBC # UR STRIP: ABNORMAL /HPF
REF LAB TEST METHOD: ABNORMAL
RH BLD: POSITIVE
SARS-COV-2 RNA PNL SPEC NAA+PROBE: DETECTED
SODIUM SERPL-SCNC: 136 MMOL/L (ref 136–145)
SP GR UR STRIP: 1.02 (ref 1–1.03)
SQUAMOUS #/AREA URNS HPF: ABNORMAL /HPF
T&S EXPIRATION DATE: NORMAL
UROBILINOGEN UR QL STRIP: ABNORMAL
WBC # UR STRIP: ABNORMAL /HPF
WBC NRBC COR # BLD: 5.15 10*3/MM3 (ref 3.4–10.8)
WHOLE BLOOD HOLD COAG: NORMAL
WHOLE BLOOD HOLD SPECIMEN: NORMAL

## 2023-01-11 PROCEDURE — 81001 URINALYSIS AUTO W/SCOPE: CPT | Performed by: STUDENT IN AN ORGANIZED HEALTH CARE EDUCATION/TRAINING PROGRAM

## 2023-01-11 PROCEDURE — 85025 COMPLETE CBC W/AUTO DIFF WBC: CPT | Performed by: STUDENT IN AN ORGANIZED HEALTH CARE EDUCATION/TRAINING PROGRAM

## 2023-01-11 PROCEDURE — 36430 TRANSFUSION BLD/BLD COMPNT: CPT

## 2023-01-11 PROCEDURE — 86850 RBC ANTIBODY SCREEN: CPT | Performed by: STUDENT IN AN ORGANIZED HEALTH CARE EDUCATION/TRAINING PROGRAM

## 2023-01-11 PROCEDURE — 86923 COMPATIBILITY TEST ELECTRIC: CPT

## 2023-01-11 PROCEDURE — 85610 PROTHROMBIN TIME: CPT | Performed by: STUDENT IN AN ORGANIZED HEALTH CARE EDUCATION/TRAINING PROGRAM

## 2023-01-11 PROCEDURE — 83735 ASSAY OF MAGNESIUM: CPT | Performed by: STUDENT IN AN ORGANIZED HEALTH CARE EDUCATION/TRAINING PROGRAM

## 2023-01-11 PROCEDURE — 82247 BILIRUBIN TOTAL: CPT | Performed by: STUDENT IN AN ORGANIZED HEALTH CARE EDUCATION/TRAINING PROGRAM

## 2023-01-11 PROCEDURE — 99284 EMERGENCY DEPT VISIT MOD MDM: CPT

## 2023-01-11 PROCEDURE — 86901 BLOOD TYPING SEROLOGIC RH(D): CPT | Performed by: STUDENT IN AN ORGANIZED HEALTH CARE EDUCATION/TRAINING PROGRAM

## 2023-01-11 PROCEDURE — 86900 BLOOD TYPING SEROLOGIC ABO: CPT

## 2023-01-11 PROCEDURE — 82248 BILIRUBIN DIRECT: CPT | Performed by: STUDENT IN AN ORGANIZED HEALTH CARE EDUCATION/TRAINING PROGRAM

## 2023-01-11 PROCEDURE — 96365 THER/PROPH/DIAG IV INF INIT: CPT

## 2023-01-11 PROCEDURE — 86900 BLOOD TYPING SEROLOGIC ABO: CPT | Performed by: STUDENT IN AN ORGANIZED HEALTH CARE EDUCATION/TRAINING PROGRAM

## 2023-01-11 PROCEDURE — 93005 ELECTROCARDIOGRAM TRACING: CPT | Performed by: STUDENT IN AN ORGANIZED HEALTH CARE EDUCATION/TRAINING PROGRAM

## 2023-01-11 PROCEDURE — 87086 URINE CULTURE/COLONY COUNT: CPT | Performed by: STUDENT IN AN ORGANIZED HEALTH CARE EDUCATION/TRAINING PROGRAM

## 2023-01-11 PROCEDURE — 36415 COLL VENOUS BLD VENIPUNCTURE: CPT

## 2023-01-11 PROCEDURE — 93010 ELECTROCARDIOGRAM REPORT: CPT | Performed by: INTERNAL MEDICINE

## 2023-01-11 PROCEDURE — P9016 RBC LEUKOCYTES REDUCED: HCPCS

## 2023-01-11 PROCEDURE — 80053 COMPREHEN METABOLIC PANEL: CPT | Performed by: STUDENT IN AN ORGANIZED HEALTH CARE EDUCATION/TRAINING PROGRAM

## 2023-01-11 PROCEDURE — 25010000002 CEFTRIAXONE PER 250 MG: Performed by: STUDENT IN AN ORGANIZED HEALTH CARE EDUCATION/TRAINING PROGRAM

## 2023-01-11 PROCEDURE — 87636 SARSCOV2 & INF A&B AMP PRB: CPT | Performed by: STUDENT IN AN ORGANIZED HEALTH CARE EDUCATION/TRAINING PROGRAM

## 2023-01-11 PROCEDURE — 82140 ASSAY OF AMMONIA: CPT | Performed by: STUDENT IN AN ORGANIZED HEALTH CARE EDUCATION/TRAINING PROGRAM

## 2023-01-11 PROCEDURE — P9612 CATHETERIZE FOR URINE SPEC: HCPCS

## 2023-01-11 PROCEDURE — 71045 X-RAY EXAM CHEST 1 VIEW: CPT

## 2023-01-11 RX ADMIN — CEFTRIAXONE 1 G: 1 INJECTION, POWDER, FOR SOLUTION INTRAMUSCULAR; INTRAVENOUS at 18:47

## 2023-01-11 NOTE — ED PROVIDER NOTES
Subjective   History of Present Illness  Patient is a 77-year-old female on hemodialysis Monday, Wednesday and Friday presents to the ED.  Patient appears fused on arrival.  She is unsure how she presented to the ER.  She can intermittently answer questions, but her attention appears to be spacey.         Review of Systems   Constitutional: Negative.  Negative for fever.   HENT: Negative.    Respiratory: Negative.    Cardiovascular: Negative.  Negative for chest pain.   Gastrointestinal: Negative.  Negative for abdominal pain.   Endocrine: Negative.    Genitourinary: Negative.  Negative for dysuria.   Skin: Negative.    Neurological: Negative.    Psychiatric/Behavioral: Negative.    All other systems reviewed and are negative.      Past Medical History:   Diagnosis Date   • Acquired hypothyroidism 4/22/2021   • Anemia    • Arthritis    • Carpal tunnel syndrome    • Coronary artery disease    • Diabetes mellitus (HCC)    • Elevated cholesterol    • GERD (gastroesophageal reflux disease)    • History of pneumonia    • History of unsteady gait     OCASSIONALLY   • Hypertension    • Insomnia    • Kidney disease    • LENNY (obstructive sleep apnea) 12/1/2020   • Osteoarthritis    • Renal insufficiency    • Sciatica        No Known Allergies    Past Surgical History:   Procedure Laterality Date   • ABDOMINAL SURGERY     • APPENDECTOMY     • CARDIAC CATHETERIZATION      1 STENT  ---  2000   • CARDIAC SURGERY     • CAROTID STENT     • CARPAL TUNNEL RELEASE Right 10/8/2019    Procedure: CARPAL TUNNEL RELEASE;  Surgeon: Feroz Johnson MD;  Location: [facility] OR;  Service: Orthopedics   • CATARACT EXTRACTION     • CHOLECYSTECTOMY     • COLONOSCOPY     • COLONOSCOPY N/A 11/23/2021    Procedure: COLONOSCOPY FOR SCREENING;  Surgeon: Palmira Pate MD;  Location: [facility] OR;  Service: Gastroenterology;  Laterality: N/A;   • CORONARY ANGIOPLASTY WITH STENT PLACEMENT     • ENDOSCOPY     • ENDOSCOPY N/A 3/5/2020     Procedure: ESOPHAGOGASTRODUODENOSCOPY;  Surgeon: Alexandru Bagley MD;  Location: [facility] OR;  Service: Gastroenterology;  Laterality: N/A;   • ENDOSCOPY N/A 11/23/2021    Procedure: ESOPHAGOGASTRODUODENOSCOPY WITH BIOPSY;  Surgeon: Palmira Pate MD;  Location: [facility] OR;  Service: Gastroenterology;  Laterality: N/A;   • ENDOSCOPY AND COLONOSCOPY     • GALLBLADDER SURGERY     • INSERTION HEMODIALYSIS CATHETER N/A 11/14/2022    Procedure: HEMODIALYSIS CATHETER INSERTION;  Surgeon: Ta Blas MD;  Location: [facility] OR;  Service: General;  Laterality: N/A;   • JOINT REPLACEMENT Left 05/02/2017    [facility]  DR LEVIN  LEFT TOTAL KNEE   • KNEE ARTHROSCOPY W/ MENISCECTOMY Right    • LAPAROSCOPIC TUBAL LIGATION     • [place] TOTAL KNEE ARTHROPLASTY Left 5/2/2017    Procedure: TOTAL KNEE ARTHROPLASTY;  Surgeon: Feroz Levin MD;  Location: [facility];  Service: Orthopedics   • STERILIZATION     • TONSILLECTOMY         Family History   Problem Relation Age of Onset   • Arthritis Mother    • Diabetes Mother    • Cancer Mother    • Heart disease Father    • Diabetes Daughter    • Diabetes Son    • Diabetes Maternal Aunt    • Heart disease Maternal Grandmother    • Breast cancer Neg Hx        Social History     Socioeconomic History   • Marital status:      Spouse name: natalie   • Number of children: 3   • Years of education: 12   Tobacco Use   • Smoking status: Never   • Smokeless tobacco: Never   Vaping Use   • Vaping Use: Never used   Substance and Sexual Activity   • Alcohol use: Yes     Comment: socially   • Drug use: No   • Sexual activity: Defer     Birth control/protection: Surgical           Objective   Physical Exam  Vitals and nursing note reviewed.   Constitutional:       Appearance: She is ill-appearing.      Comments: Chronically [id number] jaundiced.   HENT:      Head: Normocephalic and atraumatic.      Mouth/Throat:      Mouth: Mucous membranes are dry.   Eyes:      General:  Scleral icterus present.      Extraocular Movements: Extraocular movements intact.      Pupils: Pupils are equal, round, and reactive to light.   Cardiovascular:      Rate and Rhythm: Normal rate and regular rhythm.   Pulmonary:      Effort: Pulmonary effort is normal.      Breath sounds: Normal breath sounds.   Abdominal:      General: There is distension.      Palpations: Abdomen is soft.   Musculoskeletal:         General: Normal range of motion.      Cervical back: Normal range of motion and neck supple.   Skin:     General: Skin is warm and dry.      Comments: Grossly jaundiced   Neurological:      Mental Status: She is alert.      Comments: She is awake and alert and oriented to person place and time.         Critical Care  Performed by: Namrata Escamilla DO  Authorized by: Namrata Escamilla DO     Critical care provider statement:     Critical care time (minutes):  35    Critical care time was exclusive of:  Separately billable procedures and treating other patients and teaching time    Critical care was necessary to treat or prevent imminent or life-threatening deterioration of the following conditions:  Renal failure, dehydration, circulatory failure, endocrine crisis, hepatic failure, metabolic crisis, cardiac failure and CNS failure or compromise    Critical care was time spent personally by me on the following activities:  Blood draw for specimens, ordering and performing treatments and interventions, development of treatment plan with patient or surrogate, evaluation of patient's response to treatment, examination of patient, interpretation of cardiac output measurements, review of old charts, re-evaluation of patient's condition, pulse oximetry, ordering and review of radiographic studies and vascular access procedures    I assumed direction of critical care for this patient from another provider in my specialty: no               Results for orders placed or performed during the hospital encounter of  01/11/23   COVID-19 and FLU A/B PCR - Swab, Nasopharynx    Specimen: Nasopharynx; Swab   Result Value Ref Range    COVID19 Detected (C) Not Detected - Ref. Range    Influenza A PCR Not Detected Not Detected    Influenza B PCR Not Detected Not Detected   Comprehensive Metabolic Panel    Specimen: Blood   Result Value Ref Range    Glucose 132 (H) 65 - 99 mg/dL    BUN 7 (L) 8 - 23 mg/dL    Creatinine 1.86 (H) 0.57 - 1.00 mg/dL    Sodium 136 136 - 145 mmol/L    Potassium 3.4 (L) 3.5 - 5.2 mmol/L    Chloride 96 (L) 98 - 107 mmol/L    CO2 25.5 22.0 - 29.0 mmol/L    Calcium 8.7 8.6 - 10.5 mg/dL    Total Protein 7.7 6.0 - 8.5 g/dL    Albumin 3.1 (L) 3.5 - 5.2 g/dL    ALT (SGPT) 13 1 - 33 U/L    AST (SGOT) 22 1 - 32 U/L    Alkaline Phosphatase 189 (H) 39 - 117 U/L    Total Bilirubin 0.2 0.0 - 1.2 mg/dL    Globulin 4.6 gm/dL    A/G Ratio 0.7 g/dL    BUN/Creatinine Ratio 3.8 (L) 7.0 - 25.0    Anion Gap 14.5 5.0 - 15.0 mmol/L    eGFR 27.3 (L) >60.0 mL/min/1.73   Protime-INR    Specimen: Blood   Result Value Ref Range    Protime 16.1 (H) 12.1 - 14.7 Seconds    INR 1.26 (H) 0.90 - 1.10   CBC Auto Differential    Specimen: Blood   Result Value Ref Range    WBC 5.15 3.40 - 10.80 10*3/mm3    RBC 2.62 (L) 3.77 - 5.28 10*6/mm3    Hemoglobin 6.8 (C) 12.0 - 15.9 g/dL    Hematocrit 22.9 (L) 34.0 - 46.6 %    MCV 87.4 79.0 - 97.0 fL    MCH 26.0 (L) 26.6 - 33.0 pg    MCHC 29.7 (L) 31.5 - 35.7 g/dL    RDW 17.1 (H) 12.3 - 15.4 %    RDW-SD 53.7 37.0 - 54.0 fl    MPV 9.0 6.0 - 12.0 fL    Platelets 248 140 - 450 10*3/mm3    Neutrophil % 46.5 42.7 - 76.0 %    Lymphocyte % 43.7 19.6 - 45.3 %    Monocyte % 7.8 5.0 - 12.0 %    Eosinophil % 1.6 0.3 - 6.2 %    Basophil % 0.2 0.0 - 1.5 %    Immature Grans % 0.2 0.0 - 0.5 %    Neutrophils, Absolute 2.40 1.70 - 7.00 10*3/mm3    Lymphocytes, Absolute 2.25 0.70 - 3.10 10*3/mm3    Monocytes, Absolute 0.40 0.10 - 0.90 10*3/mm3    Eosinophils, Absolute 0.08 0.00 - 0.40 10*3/mm3    Basophils, Absolute 0.01  0.00 - 0.20 10*3/mm3    Immature Grans, Absolute 0.01 0.00 - 0.05 10*3/mm3    nRBC 0.0 0.0 - 0.2 /100 WBC   Ammonia    Specimen: Arm, Right; Blood   Result Value Ref Range    Ammonia 10 (L) 11 - 51 umol/L   Magnesium    Specimen: Blood   Result Value Ref Range    Magnesium 1.9 1.6 - 2.4 mg/dL   Urinalysis With Culture If Indicated - Urine, Catheter    Specimen: Urine, Catheter   Result Value Ref Range    Color, UA Dark Yellow (A) Yellow, Straw    Appearance, UA Turbid (A) Clear    pH, UA 6.0 5.0 - 8.0    Specific Gravity, UA 1.021 1.005 - 1.030    Glucose, UA Negative Negative    Ketones, UA Trace (A) Negative    Bilirubin, UA Small (1+) (A) Negative    Blood, UA Moderate (2+) (A) Negative    Protein, UA >=300 mg/dL (3+) (A) Negative    Leuk Esterase, UA Large (3+) (A) Negative    Nitrite, UA Negative Negative    Urobilinogen, UA 1.0 E.U./dL 0.2 - 1.0 E.U./dL   Bilirubin, Total & Direct    Specimen: Blood   Result Value Ref Range    Total Bilirubin 0.2 0.0 - 1.2 mg/dL    Bilirubin, Direct <0.2 0.0 - 0.3 mg/dL    Bilirubin, Indirect     Urinalysis, Microscopic Only - Urine, Catheter    Specimen: Urine, Catheter   Result Value Ref Range    RBC, UA 21-30 (A) None Seen, 0-2 /HPF    WBC, UA Too Numerous to Count (A) None Seen, 0-2 /HPF    Bacteria, UA 1+ (A) None Seen /HPF    Squamous Epithelial Cells, UA 3-6 (A) None Seen, 0-2 /HPF    Hyaline Casts, UA Unable to determine due to loaded field None Seen /LPF    Methodology Manual Light Microscopy    ECG 12 Lead Other; LOW HEMOGLOBIN   Result Value Ref Range    QT Interval 458 ms    QTC Interval 522 ms   Type & Screen    Specimen: Blood   Result Value Ref Range    ABO Type B     RH type Positive     Antibody Screen Negative     T&S Expiration Date 1/14/2023 11:59:59 PM    Prepare RBC, 2 Units   Result Value Ref Range    Product Code P0009G01     Unit Number [id number]     UNIT  ABO B     UNIT  RH POS     Crossmatch Interpretation Compatible     Dispense Status IS      Blood Expiration Date 202302072359     Blood Type Barcode 7300     Product Code G3482A54     Unit Number [id number]     UNIT  ABO B     UNIT  RH POS     Crossmatch Interpretation Compatible     Dispense Status IS     Blood Expiration Date 202302022359     Blood Type Barcode 7300    Green Top (Gel)   Result Value Ref Range    Extra Tube Hold for add-ons.    Lavender Top   Result Value Ref Range    Extra Tube hold for add-on    Gold Top - SST   Result Value Ref Range    Extra Tube Hold for add-ons.    Light Blue Top   Result Value Ref Range    Extra Tube Hold for add-ons.        ED Course  ED Course as of 01/12/23 0008   Wed Jan 11, 2023   1613 Critical hemoglobin 6.8 mg/dL called from lab  Electronically signed by Namrata Escamilla DO, 01/11/23, 4:14 PM EST.   [LK]   1628 ECG 12 Lead Other; LOW HEMOGLOBIN  Sinus rhythm with premature supraventricular complexes  Left anterior fascicular block  No acute ischemia  Ventricular rate 78    [LK]   1757 Patient will be empirically treated for acute cystitis given large amount of leukocytes too numerous to count white blood cells and hematuria.  Culture has been added on. [LK]   1939 Incidental finding of patient being COVID-positive today.    Patient's bilirubin is within normal range; negative AST ALT.    Patient will receive 2 units of packed red blood cells and then be discharged home today. [LK]   Thu Jan 12, 2023   0002 Tolerated blood transfusions. [LK]   0007 Cefdinir renally dosed for creatinine clearance less than 30.  300 mg once daily for 7 days for urinary tract infection. [LK]      ED Course User Index  [LK] Namrata Escamilla DO                                           [facility]    Final diagnoses:   Acute cystitis with hematuria   Acute on chronic blood loss anemia   Dialysis patient (HCC)   SARS-CoV-2 positive       ED Disposition  ED Disposition     ED Disposition   Discharge    Condition   Stable    Comment   --             No follow-up provider  specified.       Medication List      New Prescriptions    cefdinir 300 MG capsule  Commonly known as: OMNICEF  Take 1 capsule by mouth Every Morning. Urinary tract infection           Where to Get Your Medications      These medications were sent to [address] - [phone] [facility] [phone] [address], [facility] [address]    Phone: [phone]   · cefdinir 300 MG capsule          Namrata Escamilla DO  01/12/23 0008

## 2023-01-11 NOTE — ED NOTES
MEDICAL SCREENING:    Reason for Visit: Dialysis patient low hemoglobin 6.1 mg/dL  Needs blood transfusion    Patient initially seen in triage.  The patient was advised further evaluation and diagnostic testing will be needed, some of the treatment and testing will be initiated in the lobby in order to begin the process.  The patient will be returned to the waiting area for the time being and possibly be re-assessed by a subsequent ED provider.  The patient will be brought back to the treatment area in as timely manner as possible.         Namrata Escamilla DO  01/11/23 1523

## 2023-01-12 LAB
BH BB BLOOD EXPIRATION DATE: NORMAL
BH BB BLOOD EXPIRATION DATE: NORMAL
BH BB BLOOD TYPE BARCODE: 7300
BH BB BLOOD TYPE BARCODE: 7300
BH BB DISPENSE STATUS: NORMAL
BH BB DISPENSE STATUS: NORMAL
BH BB PRODUCT CODE: NORMAL
BH BB PRODUCT CODE: NORMAL
BH BB UNIT NUMBER: NORMAL
BH BB UNIT NUMBER: NORMAL
CROSSMATCH INTERPRETATION: NORMAL
CROSSMATCH INTERPRETATION: NORMAL
UNIT  ABO: NORMAL
UNIT  ABO: NORMAL
UNIT  RH: NORMAL
UNIT  RH: NORMAL

## 2023-01-12 RX ORDER — CEFDINIR 300 MG/1
300 CAPSULE ORAL EVERY MORNING
Qty: 7 CAPSULE | Refills: 0 | Status: SHIPPED | OUTPATIENT
Start: 2023-01-12 | End: 2023-01-25

## 2023-01-12 NOTE — DISCHARGE INSTRUCTIONS
You required 2 units of blood today in the ER due to a low hemoglobin level.    You need to continue to follow-up regularly to get labs due to being high risk for low blood count because of dialysis

## 2023-01-13 ENCOUNTER — PATIENT OUTREACH (OUTPATIENT)
Dept: CASE MANAGEMENT | Facility: OTHER | Age: 78
End: 2023-01-13
Payer: MEDICARE

## 2023-01-13 LAB — BACTERIA SPEC AEROBE CULT: NO GROWTH

## 2023-01-13 NOTE — OUTREACH NOTE
AMBULATORY CASE MANAGEMENT NOTE    Name and Relationship of Patient/Support Person: Brandon Lee - Emergency Contact    Patient Outreach    RN-ACM outreach with patient's spouse.  Patient had an ED visit at [facility] 01/11/23.  Clinical impression noted as acute on chronic blood loss anemia, acute cystitis with hematuria, dialysis patient, and SARS-COV-2 positive.  Patient was treated and discharged to home to follow with primary care.  Medication changes at discharge include the addition of Cefdinir.    Patient has dialysis M-W-F.  Per spouse, patient attended dialysis today, is back to baseline, and is compliant with medications.  Patient has PCP f/u appointment scheduled for 10:30 am on 01/17/23.  Spouse assists as needed.  Other needs/questions/concerns for RN-ACM to address were denied.    Patient does not utilize MyChart.    SHERRY WHEELER  Ambulatory Case Management    1/13/2023, 16:14 EST

## 2023-01-17 ENCOUNTER — OFFICE VISIT (OUTPATIENT)
Dept: FAMILY MEDICINE CLINIC | Facility: CLINIC | Age: 78
End: 2023-01-17
Payer: MEDICARE

## 2023-01-17 VITALS
HEIGHT: 63 IN | BODY MASS INDEX: 30.48 KG/M2 | TEMPERATURE: 98.2 F | DIASTOLIC BLOOD PRESSURE: 52 MMHG | OXYGEN SATURATION: 99 % | HEART RATE: 108 BPM | WEIGHT: 172 LBS | RESPIRATION RATE: 20 BRPM | SYSTOLIC BLOOD PRESSURE: 92 MMHG

## 2023-01-17 DIAGNOSIS — E11.22 TYPE 2 DIABETES MELLITUS WITH STAGE 4 CHRONIC KIDNEY DISEASE, WITH LONG-TERM CURRENT USE OF INSULIN: Primary | Chronic | ICD-10-CM

## 2023-01-17 DIAGNOSIS — F33.1 MODERATE EPISODE OF RECURRENT MAJOR DEPRESSIVE DISORDER: Chronic | ICD-10-CM

## 2023-01-17 DIAGNOSIS — N18.4 TYPE 2 DIABETES MELLITUS WITH STAGE 4 CHRONIC KIDNEY DISEASE, WITH LONG-TERM CURRENT USE OF INSULIN: Primary | Chronic | ICD-10-CM

## 2023-01-17 DIAGNOSIS — E55.9 VITAMIN D DEFICIENCY: Chronic | ICD-10-CM

## 2023-01-17 DIAGNOSIS — K21.9 GASTROESOPHAGEAL REFLUX DISEASE WITHOUT ESOPHAGITIS: Chronic | ICD-10-CM

## 2023-01-17 DIAGNOSIS — Z79.4 TYPE 2 DIABETES MELLITUS WITH STAGE 4 CHRONIC KIDNEY DISEASE, WITH LONG-TERM CURRENT USE OF INSULIN: Primary | Chronic | ICD-10-CM

## 2023-01-17 DIAGNOSIS — E03.9 ACQUIRED HYPOTHYROIDISM: Chronic | ICD-10-CM

## 2023-01-17 PROBLEM — K74.60 CIRRHOSIS: Status: ACTIVE | Noted: 2023-01-17

## 2023-01-17 PROBLEM — N17.9 AKI (ACUTE KIDNEY INJURY): Status: RESOLVED | Noted: 2022-11-02 | Resolved: 2023-01-17

## 2023-01-17 PROCEDURE — 84443 ASSAY THYROID STIM HORMONE: CPT | Performed by: PHYSICIAN ASSISTANT

## 2023-01-17 PROCEDURE — 80053 COMPREHEN METABOLIC PANEL: CPT | Performed by: PHYSICIAN ASSISTANT

## 2023-01-17 PROCEDURE — 83036 HEMOGLOBIN GLYCOSYLATED A1C: CPT | Performed by: PHYSICIAN ASSISTANT

## 2023-01-17 PROCEDURE — 85025 COMPLETE CBC W/AUTO DIFF WBC: CPT | Performed by: PHYSICIAN ASSISTANT

## 2023-01-17 PROCEDURE — 99214 OFFICE O/P EST MOD 30 MIN: CPT | Performed by: PHYSICIAN ASSISTANT

## 2023-01-17 PROCEDURE — 84439 ASSAY OF FREE THYROXINE: CPT | Performed by: PHYSICIAN ASSISTANT

## 2023-01-17 RX ORDER — ERGOCALCIFEROL 1.25 MG/1
50000 CAPSULE ORAL WEEKLY
Qty: 15 CAPSULE | Refills: 3 | Status: SHIPPED | OUTPATIENT
Start: 2023-01-17

## 2023-01-17 RX ORDER — ESCITALOPRAM OXALATE 10 MG/1
10 TABLET ORAL DAILY
Qty: 90 TABLET | Refills: 3 | Status: SHIPPED | OUTPATIENT
Start: 2023-01-17

## 2023-01-17 RX ORDER — PANTOPRAZOLE SODIUM 40 MG/1
40 TABLET, DELAYED RELEASE ORAL DAILY
Qty: 90 TABLET | Refills: 3 | Status: SHIPPED | OUTPATIENT
Start: 2023-01-17 | End: 2024-01-17

## 2023-01-17 RX ORDER — LEVOTHYROXINE SODIUM 0.03 MG/1
25 TABLET ORAL DAILY
Qty: 90 TABLET | Refills: 3 | Status: SHIPPED | OUTPATIENT
Start: 2023-01-17

## 2023-01-17 NOTE — PROGRESS NOTES
"Alison Lee is a 77 y.o. female.       Chief Complaint -diabetes    History of Present Illness -    ROS    Diabetes-  Stable with Humalog sliding scale insulin and recent weight loss.  She has been in hospital since the last visit with date of admission 11/2/2022 and date of discharge 11/30/2022 when she she then went to the [facility] for rehab.  She reports discharge from the [facility] approximately 3 weeks ago.  Her discharge diagnoses from [place] include acute kidney injury on CKD stage IV, she was transition to dialysis 3 times weekly, anasarca, acute on chronic heart failure, severe pulmonary hypertension, acute on chronic iron deficiency anemia, decompensated liver cirrhosis with ascites, diabetes mellitus type 2 and hypertension.  She was noted to have low hemoglobin on 1/12/2023 and went to hospital for 2 units of packed red blood cells.  She states that she feels much better since that time.    Depression-  Stable with Lexapro.  She denies any hallucinations, SI or HI.    Hypothyroidism-stable with Synthroid 25 mcg daily    Gastroesophageal reflux disease-stable with pantoprazole and dietary modification    Vitamin D deficiency-stable with vitamin D supplementation    The following portions of the patient's history were reviewed and updated as appropriate: allergies, current medications, past family history, past medical history, past social history, past surgical history and problem list.    Review of Systems    Objective  Vital signs:  BP 92/52   Pulse 108   Temp 98.2 °F (36.8 °C)   Resp 20   Ht 160 cm (62.99\")   Wt 78 kg (172 lb)   SpO2 99%   BMI 30.48 kg/m²     Physical Exam  Vitals and nursing note reviewed.   Constitutional:       Appearance: Normal appearance. She is well-developed.   Eyes:      Extraocular Movements: Extraocular movements intact.      Conjunctiva/sclera: Conjunctivae normal.   Cardiovascular:      Rate and Rhythm: Normal rate and regular rhythm.      Heart " sounds: Normal heart sounds. No murmur heard.  Pulmonary:      Effort: Pulmonary effort is normal. No respiratory distress.      Breath sounds: Normal breath sounds. No wheezing.   Musculoskeletal:         General: No tenderness.   Skin:     General: Skin is warm and dry.      Findings: No rash.   Neurological:      Mental Status: She is alert and oriented to person, place, and time.   Psychiatric:         Mood and Affect: Mood normal.         Behavior: Behavior normal.         Thought Content: Thought content normal.         The following data was reviewed by: RAFIQ Montanez on 01/17/2023:  CMP    CMP 12/3/22 12/12/22 1/11/23 1/11/23      1600 1600   Glucose 178 (A) 170 (A) 132 (A)    BUN 27 (A) 18 7 (A)    Creatinine 3.42 (A) 2.87 (A) 1.86 (A)    eGFR 13.1 (A) 16.2 (A) 27.3 (A)    Sodium 135 (A) 136 136    Potassium 3.6 3.4 (A) 3.4 (A)    Chloride 95 (A) 95 (A) 96 (A)    Calcium 8.7 8.8 8.7    Total Protein 6.7 7.8 7.7    Albumin 2.99 (A) 3.18 (A) 3.1 (A)    Globulin 3.7 4.6 4.6    Total Bilirubin 0.2 0.4 0.2 0.2   Alkaline Phosphatase 358 (A) 308 (A) 189 (A)    AST (SGOT) 45 (A) 34 (A) 22    ALT (SGPT) 15 13 13    Albumin/Globulin Ratio 0.8 0.7 0.7    BUN/Creatinine Ratio 7.9 6.3 (A) 3.8 (A)    Anion Gap 13.5 15.4 (A) 14.5    (A) Abnormal value       Comments are available for some flowsheets but are not being displayed.           CBC w/diff    CBC w/Diff 12/3/22 12/12/22 1/11/23 1/11/23      1215 1600   WBC 3.54 8.65  5.15   RBC 2.75 (A) 3.49 (A)  2.62 (A)   Hemoglobin 7.4 (A) 9.1 (A) 6.1 (A) 6.8 (A)   Hematocrit 24.3 (A) 31.4 (A) 20.0 (A) 22.9 (A)   MCV 88.4 90.0  87.4   MCH 26.9 26.1 (A)  26.0 (A)   MCHC 30.5 (A) 29.0 (A)  29.7 (A)   RDW 14.5 14.9  17.1 (A)   Platelets 202 315  248   Neutrophil Rel % 69.4 78.2 (A)  46.5   Immature Granulocyte Rel % 0.6 (A) 0.5  0.2   Lymphocyte Rel % 17.5 (A) 12.6 (A)  43.7   Monocyte Rel % 11.9 7.6  7.8   Eosinophil Rel % 0.3 0.6  1.6   Basophil Rel % 0.3 0.5  0.2    (A) Abnormal value            Lipid Panel    Lipid Panel 3/7/22 6/9/22 9/8/22   Total Cholesterol 137 126 109   Triglycerides 92 163 (A) 122   HDL Cholesterol 40 43 31 (A)   VLDL Cholesterol 18 28 22   LDL Cholesterol  79 55 56   LDL/HDL Ratio 1.97 1.17 1.73   (A) Abnormal value            TSH    TSH 6/9/22 11/2/22 12/3/22   TSH 9.670 (A) 12.790 (A) 8.830 (A)   (A) Abnormal value            Most Recent A1C    HGBA1C Most Recent 9/8/22   Hemoglobin A1C 5.00           Data reviewed: Recent hospitalization notes [facility] stay and recent ED visits reviewed   Current outpatient and discharge medications have been reconciled for the patient.  Reviewed by: RAFIQ Montanez      Assessment & Plan     Diagnoses and all orders for this visit:    1. Type 2 diabetes mellitus with stage 4 chronic kidney disease, with long-term current use of insulin (HCC) (Primary)  Comments:  Continue dialysis 3 times weekly  Continue Humalog sliding scale insulin with meals  Lab work ordered for further evaluation and recheck anemia  Orders:  -     CBC & Differential  -     Comprehensive Metabolic Panel  -     Hemoglobin A1c  -     MicroAlbumin, Urine, Random - Urine, Clean Catch    2. Moderate episode of recurrent major depressive disorder (HCC)  Comments:  Continue Lexapro  Conservative measures for dealing with stress were advised  Orders:  -     escitalopram (LEXAPRO) 10 MG tablet; Take 1 tablet by mouth Daily.  Dispense: 90 tablet; Refill: 3    3. Acquired hypothyroidism  Comments:  Continue Synthroid 25 mcg daily  Orders:  -     levothyroxine (SYNTHROID, LEVOTHROID) 25 MCG tablet; Take 1 tablet by mouth Daily.  Dispense: 90 tablet; Refill: 3  -     TSH  -     T4, Free    4. Gastroesophageal reflux disease without esophagitis  Comments:  Continue pantoprazole  Advised to avoid known trigger foods  Orders:  -     pantoprazole (Protonix) 40 MG EC tablet; Take 1 tablet by mouth Daily.  Dispense: 90  tablet; Refill: 3    5. Vitamin D deficiency  Comments:  Continue vitamin D supplementation  Orders:  -     vitamin D (ERGOCALCIFEROL) 1.25 MG (75288 UT) capsule capsule; Take 1 capsule by mouth 1 (One) Time Per Week.  Dispense: 15 capsule; Refill: 3            Patient was given instructions and counseling regarding his condition or for health maintenance advice. Please see specific information pulled into the AVS if appropriate      This document has been electronically signed by:  Lexie Dolan PA-C

## 2023-01-18 ENCOUNTER — CLINICAL SUPPORT (OUTPATIENT)
Dept: FAMILY MEDICINE CLINIC | Facility: CLINIC | Age: 78
End: 2023-01-18
Payer: MEDICARE

## 2023-01-18 LAB
ALBUMIN SERPL-MCNC: 3.2 G/DL (ref 3.5–5.2)
ALBUMIN UR-MCNC: 211 MG/DL
ALBUMIN/GLOB SERPL: 0.8 G/DL
ALP SERPL-CCNC: 187 U/L (ref 39–117)
ALT SERPL W P-5'-P-CCNC: 13 U/L (ref 1–33)
ANION GAP SERPL CALCULATED.3IONS-SCNC: 14.4 MMOL/L (ref 5–15)
AST SERPL-CCNC: 26 U/L (ref 1–32)
BASOPHILS # BLD AUTO: 0.03 10*3/MM3 (ref 0–0.2)
BASOPHILS NFR BLD AUTO: 0.5 % (ref 0–1.5)
BILIRUB SERPL-MCNC: 0.3 MG/DL (ref 0–1.2)
BUN SERPL-MCNC: 19 MG/DL (ref 8–23)
BUN/CREAT SERPL: 4.9 (ref 7–25)
CALCIUM SPEC-SCNC: 9.2 MG/DL (ref 8.6–10.5)
CHLORIDE SERPL-SCNC: 99 MMOL/L (ref 98–107)
CO2 SERPL-SCNC: 24.6 MMOL/L (ref 22–29)
CREAT SERPL-MCNC: 3.84 MG/DL (ref 0.57–1)
DEPRECATED RDW RBC AUTO: 43.6 FL (ref 37–54)
EGFRCR SERPLBLD CKD-EPI 2021: 11.4 ML/MIN/1.73
EOSINOPHIL # BLD AUTO: 0.15 10*3/MM3 (ref 0–0.4)
EOSINOPHIL NFR BLD AUTO: 2.4 % (ref 0.3–6.2)
ERYTHROCYTE [DISTWIDTH] IN BLOOD BY AUTOMATED COUNT: 15.2 % (ref 12.3–15.4)
GLOBULIN UR ELPH-MCNC: 4.1 GM/DL
GLUCOSE SERPL-MCNC: 122 MG/DL (ref 65–99)
HBA1C MFR BLD: 5 % (ref 4.8–5.6)
HCT VFR BLD AUTO: 31.9 % (ref 34–46.6)
HGB BLD-MCNC: 10.3 G/DL (ref 12–15.9)
IMM GRANULOCYTES # BLD AUTO: 0.02 10*3/MM3 (ref 0–0.05)
IMM GRANULOCYTES NFR BLD AUTO: 0.3 % (ref 0–0.5)
LYMPHOCYTES # BLD AUTO: 1.83 10*3/MM3 (ref 0.7–3.1)
LYMPHOCYTES NFR BLD AUTO: 29 % (ref 19.6–45.3)
MCH RBC QN AUTO: 25.7 PG (ref 26.6–33)
MCHC RBC AUTO-ENTMCNC: 32.3 G/DL (ref 31.5–35.7)
MCV RBC AUTO: 79.6 FL (ref 79–97)
MONOCYTES # BLD AUTO: 0.46 10*3/MM3 (ref 0.1–0.9)
MONOCYTES NFR BLD AUTO: 7.3 % (ref 5–12)
NEUTROPHILS NFR BLD AUTO: 3.81 10*3/MM3 (ref 1.7–7)
NEUTROPHILS NFR BLD AUTO: 60.5 % (ref 42.7–76)
NRBC BLD AUTO-RTO: 0 /100 WBC (ref 0–0.2)
PLATELET # BLD AUTO: 213 10*3/MM3 (ref 140–450)
PMV BLD AUTO: 9.8 FL (ref 6–12)
POTASSIUM SERPL-SCNC: 4.3 MMOL/L (ref 3.5–5.2)
PROT SERPL-MCNC: 7.3 G/DL (ref 6–8.5)
RBC # BLD AUTO: 4.01 10*6/MM3 (ref 3.77–5.28)
SODIUM SERPL-SCNC: 138 MMOL/L (ref 136–145)
T4 FREE SERPL-MCNC: 1.18 NG/DL (ref 0.93–1.7)
TSH SERPL DL<=0.05 MIU/L-ACNC: 4.64 UIU/ML (ref 0.27–4.2)
WBC NRBC COR # BLD: 6.3 10*3/MM3 (ref 3.4–10.8)

## 2023-01-18 PROCEDURE — 82043 UR ALBUMIN QUANTITATIVE: CPT | Performed by: PHYSICIAN ASSISTANT

## 2023-01-25 ENCOUNTER — TELEPHONE (OUTPATIENT)
Dept: CARDIOLOGY | Facility: CLINIC | Age: 78
End: 2023-01-25

## 2023-01-25 ENCOUNTER — OFFICE VISIT (OUTPATIENT)
Dept: CARDIOLOGY | Facility: CLINIC | Age: 78
End: 2023-01-25
Payer: MEDICARE

## 2023-01-25 VITALS
SYSTOLIC BLOOD PRESSURE: 99 MMHG | HEIGHT: 63 IN | OXYGEN SATURATION: 98 % | DIASTOLIC BLOOD PRESSURE: 59 MMHG | BODY MASS INDEX: 28.74 KG/M2 | HEART RATE: 96 BPM | WEIGHT: 162.2 LBS

## 2023-01-25 DIAGNOSIS — I10 ESSENTIAL HYPERTENSION: Primary | ICD-10-CM

## 2023-01-25 DIAGNOSIS — I27.20 SEVERE PULMONARY HYPERTENSION: ICD-10-CM

## 2023-01-25 DIAGNOSIS — I25.10 ASCVD (ARTERIOSCLEROTIC CARDIOVASCULAR DISEASE): ICD-10-CM

## 2023-01-25 DIAGNOSIS — I50.32 CHRONIC HEART FAILURE WITH PRESERVED EJECTION FRACTION (HFPEF): ICD-10-CM

## 2023-01-25 DIAGNOSIS — E78.5 DYSLIPIDEMIA: ICD-10-CM

## 2023-01-25 PROCEDURE — 99214 OFFICE O/P EST MOD 30 MIN: CPT | Performed by: NURSE PRACTITIONER

## 2023-01-25 RX ORDER — HYDRALAZINE HYDROCHLORIDE 25 MG/1
50 TABLET, FILM COATED ORAL 3 TIMES DAILY
Qty: 270 TABLET | Refills: 1
Start: 2023-01-25 | End: 2023-01-25

## 2023-01-25 RX ORDER — HYDRALAZINE HYDROCHLORIDE 25 MG/1
50 TABLET, FILM COATED ORAL 3 TIMES DAILY
Qty: 270 TABLET | Refills: 1
Start: 2023-01-25

## 2023-01-25 NOTE — TELEPHONE ENCOUNTER
HUB CAN READ:      ----- Message from ERNIE Jones sent at 1/25/2023  2:47 PM EST -----  Please call patient let her know were going to obtain a VQ scan to rule out PE as cause of her pulmonary hypertension.  Please let her know someone will contact her to schedule this.

## 2023-01-25 NOTE — PROGRESS NOTES
[facility]             PriscilaERNIE Berman Susan Marie, PA  Britta Lee  1945 01/25/2023    Patient Active Problem List   Diagnosis   • Type 2 diabetes mellitus, uncontrolled, with neuropathy   • Essential hypertension   • ASCVD (arteriosclerotic cardiovascular disease)   • Bilateral carpal tunnel syndrome   • Diabetic retinopathy associated with type 2 diabetes mellitus (HCC)   • Dyslipidemia   • Sciatic neuropathy   • DDD (degenerative disc disease), lumbar   • Primary osteoarthritis of left knee   • Status post total left knee replacement   • Type 2 diabetes mellitus with chronic kidney disease, with long-term current use of insulin (HCC)   • Hyperparathyroidism due to renal insufficiency (HCC)   • Venous insufficiency (chronic) (peripheral)   • Class 1 obesity due to excess calories with serious comorbidity and body mass index (BMI) of 31.0 to 31.9 in adult   • PVD (peripheral vascular disease) with claudication ([facility])   • LVH (left ventricular hypertrophy)   • Chronic heart failure with preserved ejection fraction (HFpEF) (HCC)   • LENNY (obstructive sleep apnea)   • CKD (chronic kidney disease) stage 4, GFR 15-29 ml/min (HCC)   • Iron deficiency anemia   • Malabsorption of iron   • Acquired hypothyroidism   • Hiatal hernia   • Weight loss, abnormal   • Osteopenia of neck of left femur   • Cirrhosis (HCC)   • Severe pulmonary hypertension (HCC)       Dear Lexie Dolan PA:    Subjective     Chief Complaint   Patient presents with   • Follow-up     DISCUSS ECHO RESULTS       HPI:     This is a 77 y.o. female with known past medical history of HFpEF, severe pulmonary hypertension, ASCVD status post PCI at [facility] by Dr. Hussein, cirrhosis, iron deficiency anemia, diabetes mellitus type 2, chronic kidney disease on hemodialysis and obstructive sleep apnea.    Britta Lee presents today for routine cardiology follow up.  Patient states she  has been doing overall well however she has been having frequent falls at home due to tripping over things.  She does have a walker but states she has not been using it as she should.  Denies any syncope but states she sometimes gets dizzy when she goes from sitting to standing.  Noted to have borderline low blood pressure in the office today. Patient was recently seen at [facility] ED in January 2023 for acute blood loss anemia for which she received 2 units of packed red blood cells and was discharged home.  Is also found to be COVID-positive at that time.  Hemoglobin noted to be 6.1 during that visit with increase of hemoglobin to 10.3 around 8 days ago.  Patient states she has an appointment with gastroenterology for her anemia of unclear etiology.  She also follows with nephrology for which she has an appointment coming up as creatinine has recently worsened from her baseline.  She currently does dialysis 3 days a week.  Denies any chest pain or shortness of breath.    • Diagnostic Testing  1. Echocardiogram 11/2020: EF greater than 70% with concentric hypertrophy, pseudonormalization and mild mitral valve regurgitation  2. Echocardiogram 10/2022: EF greater than 70% with mild concentric hypertrophy  3. Echocardiogram 11/2022: EF greater than 70% with moderate tricuspid valve regurgitation and severe pulmonary hypertension     All other systems were reviewed and were negative.    Patient Active Problem List   Diagnosis   • Type 2 diabetes mellitus, uncontrolled, with neuropathy   • Essential hypertension   • ASCVD (arteriosclerotic cardiovascular disease)   • Bilateral carpal tunnel syndrome   • Diabetic retinopathy associated with type 2 diabetes mellitus (HCC)   • Dyslipidemia   • Sciatic neuropathy   • DDD (degenerative disc disease), lumbar   • Primary osteoarthritis of left knee   • Status post total left knee replacement   • Type 2 diabetes mellitus with chronic kidney disease, with long-term  current use of insulin (HCC)   • Hyperparathyroidism due to renal insufficiency (HCC)   • Venous insufficiency (chronic) (peripheral)   • Class 1 obesity due to excess calories with serious comorbidity and body mass index (BMI) of 31.0 to 31.9 in adult   • PVD (peripheral vascular disease) with claudication (HCC)   • LVH (left ventricular hypertrophy)   • Chronic heart failure with preserved ejection fraction (HFpEF) ([facility])   • LENNY (obstructive sleep apnea)   • CKD (chronic kidney disease) stage 4, GFR 15-29 ml/min ([facility])   • Iron deficiency anemia   • Malabsorption of iron   • Acquired hypothyroidism   • Hiatal hernia   • Weight loss, abnormal   • Osteopenia of neck of left femur   • Cirrhosis (HCC)   • Severe pulmonary hypertension (HCC)       family history includes Arthritis in her mother; Cancer in her mother; Diabetes in her daughter, maternal aunt, mother, and son; Heart disease in her father and maternal grandmother.     reports that she has never smoked. She has never used smokeless tobacco. She reports current alcohol use. She reports that she does not use drugs.    No Known Allergies      Current Outpatient Medications:   •  amLODIPine (NORVASC) 10 MG tablet, Take 1 tablet by mouth Daily., Disp: 90 tablet, Rfl: 3  •  atorvastatin (LIPITOR) 40 MG tablet, Take 1 tablet by mouth Every Night., Disp: 90 tablet, Rfl: 3  •  escitalopram (LEXAPRO) 10 MG tablet, Take 1 tablet by mouth Daily., Disp: 90 tablet, Rfl: 3  •  hydrALAZINE (APRESOLINE) 25 MG tablet, Take 2 tablets by mouth 3 (Three) Times a Day., Disp: 270 tablet, Rfl: 1  •  Insulin Lispro (humaLOG) 100 UNIT/ML injection, Inject 0-7 Units under the skin into the appropriate area as directed 3 (Three) Times a Day Before Meals. Admin Instructions: Correction - Low Dose.  Less than 40 units/day total insulin dose or lean, elderly, renal patients  Blood glucose 150-199 mg/dL - 2 units  Blood glucose 200-249 mg/dL - 3 units  Blood glucose 250-299 mg/dL - 4 units   Blood glucose 300-349 mg/dL - 5 units  Blood glucose 350-400 mg/dL - 6 units  Blood glucose greater than 400 mg/dL - 7 units and call provider, Disp: , Rfl:   •  isosorbide mononitrate (IMDUR) 60 MG 24 hr tablet, Take 1 tablet by mouth Daily., Disp: 90 tablet, Rfl: 0  •  levothyroxine (SYNTHROID, LEVOTHROID) 25 MCG tablet, Take 1 tablet by mouth Daily., Disp: 90 tablet, Rfl: 3  •  metoprolol succinate XL (TOPROL-XL) 50 MG 24 hr tablet, Take 1.5 tablets by mouth Daily., Disp: , Rfl:   •  pantoprazole (Protonix) 40 MG EC tablet, Take 1 tablet by mouth Daily., Disp: 90 tablet, Rfl: 3  •  vitamin D (ERGOCALCIFEROL) 1.25 MG (40449 UT) capsule capsule, Take 1 capsule by mouth 1 (One) Time Per Week., Disp: 15 capsule, Rfl: 3  •  mirtazapine (REMERON) 7.5 MG tablet, Take 3 tablets by mouth Every Night for 30 days., Disp: 90 tablet, Rfl: 0      Physical Exam:  I have reviewed the patient's current vital signs as documented in the patient's EMR.   Vitals:    01/25/23 0838   BP: 99/59   Pulse: 96   SpO2: 98%     Body mass index is 28.73 kg/m².       01/25/23  0838   Weight: 73.6 kg (162 lb 3.2 oz)      Physical Exam  Constitutional:       General: She is not in acute distress.     Appearance: Normal appearance. She is well-developed and normal weight.   HENT:      Head: Normocephalic and atraumatic.   Eyes:      General: Lids are normal.      Conjunctiva/sclera: Conjunctivae normal.      Pupils: Pupils are equal, round, and reactive to light.   Neck:      Vascular: No carotid bruit or JVD.   Cardiovascular:      Rate and Rhythm: Normal rate and regular rhythm.      Pulses: Normal pulses.      Heart sounds: Normal heart sounds, S1 normal and S2 normal. No murmur heard.  Pulmonary:      Effort: Pulmonary effort is normal. No respiratory distress.      Breath sounds: Normal breath sounds. No wheezing.   Abdominal:      General: Bowel sounds are normal. There is no distension.      Palpations: Abdomen is soft. There is no  hepatomegaly or splenomegaly.      Tenderness: There is no abdominal tenderness.   Musculoskeletal:         General: No swelling. Normal range of motion.      Cervical back: Normal range of motion and neck supple.      Right lower leg: No edema.      Left lower leg: No edema.   Skin:     General: Skin is warm and dry.      Coloration: Skin is not jaundiced.      Findings: No rash.   Neurological:      General: No focal deficit present.      Mental Status: She is alert and oriented to person, place, and time. Mental status is at baseline.   Psychiatric:         Mood and Affect: Mood normal.         Speech: Speech normal.         Behavior: Behavior normal.         Thought Content: Thought content normal.         Judgment: Judgment normal.          DATA REVIEWED:     TTE/MANI:  Results for orders placed during the hospital encounter of 11/02/22    Adult Transthoracic Echo Complete w/ Color, Spectral and Contrast if necessary per protocol    Interpretation Summary  •  Left ventricular systolic function is hyperdynamic (EF > 70%).  •  Left ventricular diastolic function is consistent with (grade II w/high LAP) pseudonormalization.  •  The left atrial cavity is mildly dilated.  •  The aortic valve exhibits sclerosis with no evidence of stenosis or regurgitation.  •  Mild mitral valve regurgitation is present.  •  Moderate tricuspid valve regurgitation is present.  •  Severe pulmonary hypertension is present.  Estimated RV systolic pressure is > 55 mmHg.  •  There is no evidence of pericardial effusion.      Laboratory evaluations:    Lab Results   Component Value Date    GLUCOSE 122 (H) 01/17/2023    BUN 19 01/17/2023    CREATININE 3.84 (H) 01/17/2023    EGFRIFNONA 23 (L) 02/21/2022    EGFRIFAFRI 41 (L) 07/30/2018    BCR 4.9 (L) 01/17/2023    K 4.3 01/17/2023    CO2 24.6 01/17/2023    CALCIUM 9.2 01/17/2023    PROTENTOTREF 7.7 07/30/2018    ALBUMIN 3.2 (L) 01/17/2023    LABIL2 1.1 (L) 07/30/2018    AST 26 01/17/2023    ALT  13 01/17/2023     Lab Results   Component Value Date    WBC 6.30 01/17/2023    HGB 10.3 (L) 01/17/2023    HCT 31.9 (L) 01/17/2023    MCV 79.6 01/17/2023     01/17/2023     Lab Results   Component Value Date    CHOL 109 09/08/2022    CHLPL 128 06/25/2018    TRIG 122 09/08/2022    HDL 31 (L) 09/08/2022    LDL 56 09/08/2022     Lab Results   Component Value Date    TSH 4.640 (H) 01/17/2023     Lab Results   Component Value Date    HGBA1C 5.00 01/17/2023     Lab Results   Component Value Date    ALT 13 01/17/2023     Lab Results   Component Value Date    HGBA1C 5.00 01/17/2023    HGBA1C 5.00 09/08/2022    HGBA1C 6.30 (H) 06/09/2022     Lab Results   Component Value Date    MICROALBUR 211.0 01/18/2023    CREATININE 3.84 (H) 01/17/2023     Lab Results   Component Value Date    IRON 27 (L) 11/02/2022    TIBC 340 11/02/2022    FERRITIN 329.00 (H) 10/17/2022     Lab Results   Component Value Date    INR 1.26 (H) 01/11/2023    INR 1.29 (H) 12/03/2022    INR 1.36 (H) 11/02/2022    PROTIME 16.1 (H) 01/11/2023    PROTIME 16.4 (H) 12/03/2022    PROTIME 17.1 (H) 11/02/2022        Lab Results   Component Value Date    ABSOLUTELUNG 32 06/13/2022    ABSOLUTELUNG 27 04/25/2022    ABSOLUTELUNG 22 03/28/2022       --------------------------------------------------------------------------------------------------------------------------    ASSESSMENT/PLAN:      Diagnosis Plan   1. Essential hypertension  hydrALAZINE (APRESOLINE) 25 MG tablet      2. Dyslipidemia        3. Chronic heart failure with preserved ejection fraction (HFpEF) (HCC)        4. ASCVD (arteriosclerotic cardiovascular disease)        5. Severe pulmonary hypertension (HCC)            1. ASCVD  2. Chronic HFpEF  • Denies any current chest pain.  Recent echocardiogram showed normal LV function.  Appears compensated today.  Continue with medical management with Lipitor, hydralazine, Imdur and metoprolol succinate.  Patient follow-up with heart failure clinic as  needed.    3. Severe pulmonary hypertension  • Recent echocardiogram showed severe pulmonary hypertension.  Will obtain VQ scan to rule out PE.  Also consider PFT.  Likely secondary to left heart disease but will rule out other causes.    4.  Essential hypertension  · Blood pressure borderline low in the office today.  Patient reports frequent falls recently due to some dizziness.  We will decrease hydralazine to 50 mg p.o. 3 times daily to help with her blood pressure that may be contributing to her dizziness.  Advised her to stand up slowly when going from sitting to standing and to use her walker when she does try to walk.      This document has been @Electronically signed by ERNIE Olsen, 01/25/23, 8:36 AM EST.       Dictated Utilizing Dragon Dictation: Part of this note may be an electronic transcription/translation of spoken language to printed text using the Dragon Dictation System.    Follow-up appointment and medication changes provided in hand delivered After Visit Summary as well as reviewed in the room.

## 2023-02-01 ENCOUNTER — LAB (OUTPATIENT)
Dept: ONCOLOGY | Facility: CLINIC | Age: 78
End: 2023-02-01
Payer: MEDICARE

## 2023-02-01 ENCOUNTER — TELEPHONE (OUTPATIENT)
Dept: FAMILY MEDICINE CLINIC | Facility: CLINIC | Age: 78
End: 2023-02-01

## 2023-02-01 ENCOUNTER — OFFICE VISIT (OUTPATIENT)
Dept: ONCOLOGY | Facility: CLINIC | Age: 78
End: 2023-02-01
Payer: MEDICARE

## 2023-02-01 VITALS
RESPIRATION RATE: 18 BRPM | HEART RATE: 88 BPM | SYSTOLIC BLOOD PRESSURE: 99 MMHG | HEIGHT: 63 IN | TEMPERATURE: 97.8 F | WEIGHT: 161.6 LBS | DIASTOLIC BLOOD PRESSURE: 68 MMHG | BODY MASS INDEX: 28.63 KG/M2 | OXYGEN SATURATION: 98 %

## 2023-02-01 DIAGNOSIS — D50.9 IRON DEFICIENCY ANEMIA, UNSPECIFIED IRON DEFICIENCY ANEMIA TYPE: ICD-10-CM

## 2023-02-01 DIAGNOSIS — D50.9 IRON DEFICIENCY ANEMIA, UNSPECIFIED IRON DEFICIENCY ANEMIA TYPE: Primary | ICD-10-CM

## 2023-02-01 LAB
BASOPHILS # BLD AUTO: 0.03 10*3/MM3 (ref 0–0.2)
BASOPHILS NFR BLD AUTO: 0.4 % (ref 0–1.5)
DEPRECATED RDW RBC AUTO: 59.3 FL (ref 37–54)
EOSINOPHIL # BLD AUTO: 0.14 10*3/MM3 (ref 0–0.4)
EOSINOPHIL NFR BLD AUTO: 1.7 % (ref 0.3–6.2)
ERYTHROCYTE [DISTWIDTH] IN BLOOD BY AUTOMATED COUNT: 18.9 % (ref 12.3–15.4)
FERRITIN SERPL-MCNC: 1742 NG/ML (ref 13–150)
FOLATE SERPL-MCNC: 6.77 NG/ML (ref 4.78–24.2)
HAPTOGLOB SERPL-MCNC: 311 MG/DL (ref 30–200)
HCT VFR BLD AUTO: 26.9 % (ref 34–46.6)
HGB BLD-MCNC: 8.3 G/DL (ref 12–15.9)
IMM GRANULOCYTES # BLD AUTO: 0.04 10*3/MM3 (ref 0–0.05)
IMM GRANULOCYTES NFR BLD AUTO: 0.5 % (ref 0–0.5)
IRON 24H UR-MRATE: 55 MCG/DL (ref 37–145)
IRON SATN MFR SERPL: 20 % (ref 20–50)
LDH SERPL-CCNC: 277 U/L (ref 135–214)
LYMPHOCYTES # BLD AUTO: 1.56 10*3/MM3 (ref 0.7–3.1)
LYMPHOCYTES NFR BLD AUTO: 18.7 % (ref 19.6–45.3)
MCH RBC QN AUTO: 26.9 PG (ref 26.6–33)
MCHC RBC AUTO-ENTMCNC: 30.9 G/DL (ref 31.5–35.7)
MCV RBC AUTO: 87.1 FL (ref 79–97)
MONOCYTES # BLD AUTO: 0.69 10*3/MM3 (ref 0.1–0.9)
MONOCYTES NFR BLD AUTO: 8.3 % (ref 5–12)
NEUTROPHILS NFR BLD AUTO: 5.87 10*3/MM3 (ref 1.7–7)
NEUTROPHILS NFR BLD AUTO: 70.4 % (ref 42.7–76)
NRBC BLD AUTO-RTO: 0 /100 WBC (ref 0–0.2)
PLATELET # BLD AUTO: 325 10*3/MM3 (ref 140–450)
PMV BLD AUTO: 8.5 FL (ref 6–12)
RBC # BLD AUTO: 3.09 10*6/MM3 (ref 3.77–5.28)
TIBC SERPL-MCNC: 270 MCG/DL (ref 298–536)
TRANSFERRIN SERPL-MCNC: 181 MG/DL (ref 200–360)
VIT B12 BLD-MCNC: 713 PG/ML (ref 211–946)
WBC NRBC COR # BLD: 8.33 10*3/MM3 (ref 3.4–10.8)

## 2023-02-01 PROCEDURE — 36415 COLL VENOUS BLD VENIPUNCTURE: CPT | Performed by: INTERNAL MEDICINE

## 2023-02-01 PROCEDURE — 82728 ASSAY OF FERRITIN: CPT | Performed by: INTERNAL MEDICINE

## 2023-02-01 PROCEDURE — 84466 ASSAY OF TRANSFERRIN: CPT | Performed by: INTERNAL MEDICINE

## 2023-02-01 PROCEDURE — 82607 VITAMIN B-12: CPT | Performed by: INTERNAL MEDICINE

## 2023-02-01 PROCEDURE — 82746 ASSAY OF FOLIC ACID SERUM: CPT | Performed by: INTERNAL MEDICINE

## 2023-02-01 PROCEDURE — 83010 ASSAY OF HAPTOGLOBIN QUANT: CPT | Performed by: INTERNAL MEDICINE

## 2023-02-01 PROCEDURE — 83615 LACTATE (LD) (LDH) ENZYME: CPT | Performed by: INTERNAL MEDICINE

## 2023-02-01 PROCEDURE — 85025 COMPLETE CBC W/AUTO DIFF WBC: CPT | Performed by: INTERNAL MEDICINE

## 2023-02-01 PROCEDURE — 84238 ASSAY NONENDOCRINE RECEPTOR: CPT | Performed by: INTERNAL MEDICINE

## 2023-02-01 PROCEDURE — 83921 ORGANIC ACID SINGLE QUANT: CPT | Performed by: INTERNAL MEDICINE

## 2023-02-01 PROCEDURE — 83540 ASSAY OF IRON: CPT | Performed by: INTERNAL MEDICINE

## 2023-02-01 PROCEDURE — 99214 OFFICE O/P EST MOD 30 MIN: CPT | Performed by: INTERNAL MEDICINE

## 2023-02-01 NOTE — PROGRESS NOTES
Subjective     Date: 2/6/2023    Referring Provider  Dr. Johnson     Chief Complaint   Iron deficiency anemia       Subjective          Britta Lee is a 77 y.o. female who presents today to [facility] for follow up.    HPI:   77-year-old female with history of hypertension, hyperlipidemia, depression/anxiety, hypothyroidism, coronary artery disease, CKD, congestive heart failure who presents as follow-up for her treatment of anemia.  Previously seen by ERNIE Mohr.    She has struggled with anemia since December 2016 with hemoglobin ranging from 8.6-9.8.  Iron studies at the time consistent with anemia of chronic disease.  Has previously received IV iron infusions, and taken oral iron.  Stop taking oral ferrous sulfate due to malabsorption.  Last IV iron was received August 2022.    Treatment history:      Interval History 02/01/2023   She was started on oral ferrous sulfate by nephrology.  Currently still taking it without any nausea or constipation.  Denies any new complaints.  Denies any melena, hematochezia, hematemesis.  Per chart review patient presented to the ED on 1/11/2023 for reportedly low hemoglobin level in dialysis, hemoglobin of 6.1 mg/dL, required blood transfusion at that time.    The following portions of the patient's history were reviewed and updated as appropriate: allergies, current medications, past family history, past medical history, past social history, past surgical history and problem list.    Objective     Objective     Allergy:   No Known Allergies     Current Medications:   Current Outpatient Medications   Medication Sig Dispense Refill   • amLODIPine (NORVASC) 10 MG tablet Take 1 tablet by mouth Daily. 90 tablet 3   • atorvastatin (LIPITOR) 40 MG tablet Take 1 tablet by mouth Every Night. 90 tablet 3   • escitalopram (LEXAPRO) 10 MG tablet Take 1 tablet by mouth Daily. 90 tablet 3   • hydrALAZINE (APRESOLINE) 25 MG tablet Take  2 tablets by mouth 3 (Three) Times a Day. 270 tablet 1   • Insulin Lispro (humaLOG) 100 UNIT/ML injection Inject 0-7 Units under the skin into the appropriate area as directed 3 (Three) Times a Day Before Meals. Admin Instructions: Correction - Low Dose.  Less than 40 units/day total insulin dose or lean, elderly, renal patients   Blood glucose 150-199 mg/dL - 2 units   Blood glucose 200-249 mg/dL - 3 units   Blood glucose 250-299 mg/dL - 4 units   Blood glucose 300-349 mg/dL - 5 units   Blood glucose 350-400 mg/dL - 6 units   Blood glucose greater than 400 mg/dL - 7 units and call provider     • isosorbide mononitrate (IMDUR) 60 MG 24 hr tablet Take 1 tablet by mouth Daily. 90 tablet 0   • levothyroxine (SYNTHROID, LEVOTHROID) 25 MCG tablet Take 1 tablet by mouth Daily. 90 tablet 3   • metoprolol succinate XL (TOPROL-XL) 50 MG 24 hr tablet Take 1.5 tablets by mouth Daily.     • pantoprazole (Protonix) 40 MG EC tablet Take 1 tablet by mouth Daily. 90 tablet 3   • vitamin D (ERGOCALCIFEROL) 1.25 MG (85648 UT) capsule capsule Take 1 capsule by mouth 1 (One) Time Per Week. 15 capsule 3   • mirtazapine (REMERON) 7.5 MG tablet Take 3 tablets by mouth Every Night for 30 days. 90 tablet 0     No current facility-administered medications for this visit.       Past Medical History:  Past Medical History:   Diagnosis Date   • Acquired hypothyroidism 4/22/2021   • Anemia    • Arthritis    • Carpal tunnel syndrome    • Coronary artery disease    • Diabetes mellitus (HCC)    • Elevated cholesterol    • GERD (gastroesophageal reflux disease)    • History of pneumonia    • History of unsteady gait     OCASSIONALLY   • Hypertension    • Insomnia    • Kidney disease    • LENNY (obstructive sleep apnea) 12/1/2020   • Osteoarthritis    • Renal insufficiency    • Sciatica        Past Surgical History:  Past Surgical History:   Procedure Laterality Date   • ABDOMINAL SURGERY     • APPENDECTOMY     • CARDIAC CATHETERIZATION      1 STENT   ---  2000   • CARDIAC SURGERY     • CAROTID STENT     • CARPAL TUNNEL RELEASE Right 10/8/2019    Procedure: CARPAL TUNNEL RELEASE;  Surgeon: eFroz Levin MD;  Location: [facility];  Service: Orthopedics   • CATARACT EXTRACTION     • CHOLECYSTECTOMY     • COLONOSCOPY     • COLONOSCOPY N/A 11/23/2021    Procedure: COLONOSCOPY FOR SCREENING;  Surgeon: Palmira Pate MD;  Location: [facility] OR;  Service: Gastroenterology;  Laterality: N/A;   • CORONARY ANGIOPLASTY WITH STENT PLACEMENT     • ENDOSCOPY     • ENDOSCOPY N/A 3/5/2020    Procedure: ESOPHAGOGASTRODUODENOSCOPY;  Surgeon: Alexandru Bagley MD;  Location: [facility] OR;  Service: Gastroenterology;  Laterality: N/A;   • ENDOSCOPY N/A 11/23/2021    Procedure: ESOPHAGOGASTRODUODENOSCOPY WITH BIOPSY;  Surgeon: Palmira Pate MD;  Location: [facility];  Service: Gastroenterology;  Laterality: N/A;   • ENDOSCOPY AND COLONOSCOPY     • GALLBLADDER SURGERY     • INSERTION HEMODIALYSIS CATHETER N/A 11/14/2022    Procedure: HEMODIALYSIS CATHETER INSERTION;  Surgeon: Ta Blas MD;  Location: [facility];  Service: General;  Laterality: N/A;   • JOINT REPLACEMENT Left 05/02/2017    [facility] [place]  DR LEVIN  LEFT TOTAL KNEE   • KNEE ARTHROSCOPY W/ MENISCECTOMY Right    • LAPAROSCOPIC TUBAL LIGATION     • AK ARTHRP KNE CONDYLE&PLATU MEDIAL&LAT COMPARTMENTS Left 5/2/2017    Procedure: TOTAL KNEE ARTHROPLASTY;  Surgeon: Feroz Levin MD;  Location: [facility];  Service: Orthopedics   • STERILIZATION     • TONSILLECTOMY         Social History:  Social History     Socioeconomic History   • Marital status:      Spouse name: natalie   • Number of children: 3   • Years of education: 12   Tobacco Use   • Smoking status: Never   • Smokeless tobacco: Never   Vaping Use   • Vaping Use: Never used   Substance and Sexual Activity   • Alcohol use: Yes     Comment: socially   • Drug use: No   • Sexual activity: Defer     Birth control/protection: Surgical  "    Britta Lee  reports that she has never smoked. She has never used smokeless tobacco.      Family History:  Family History   Problem Relation Age of Onset   • Arthritis Mother    • Diabetes Mother    • Cancer Mother    • Heart disease Father    • Diabetes Daughter    • Diabetes Son    • Diabetes Maternal Aunt    • Heart disease Maternal Grandmother    • Breast cancer Neg Hx        Review of Systems:  Review of Systems   All other systems reviewed and are negative.      Vital Signs:   BP 99/68   Pulse 88   Temp 97.8 °F (36.6 °C) (Temporal)   Resp 18   Ht 160 cm (63\")   Wt 73.3 kg (161 lb 9.6 oz)   SpO2 98%   BMI 28.63 kg/m²      Physical Exam:  Physical Exam  Vitals and nursing note reviewed.   Constitutional:       General: She is not in acute distress.     Appearance: Normal appearance. She is not ill-appearing.      Comments: + In a wheelchair   HENT:      Head: Normocephalic and atraumatic.      Nose: Nose normal.      Mouth/Throat:      Mouth: Mucous membranes are moist.      Pharynx: Oropharynx is clear.   Eyes:      Conjunctiva/sclera: Conjunctivae normal.      Pupils: Pupils are equal, round, and reactive to light.   Cardiovascular:      Rate and Rhythm: Normal rate and regular rhythm.      Heart sounds: No murmur heard.  Pulmonary:      Effort: Pulmonary effort is normal. No respiratory distress.      Breath sounds: Normal breath sounds. No wheezing.   Abdominal:      General: Abdomen is flat. Bowel sounds are normal. There is no distension.      Palpations: Abdomen is soft. There is no mass.      Tenderness: There is no abdominal tenderness. There is no guarding.   Musculoskeletal:         General: No swelling. Normal range of motion.      Cervical back: Normal range of motion.   Lymphadenopathy:      Cervical: No cervical adenopathy.   Skin:     General: Skin is warm and dry.      Coloration: Skin is not jaundiced.      Findings: No bruising or rash.   Neurological:      General: No focal " "deficit present.      Mental Status: She is alert and oriented to person, place, and time.      Motor: No weakness.      Coordination: Coordination normal.   Psychiatric:         Mood and Affect: Mood normal.         Behavior: Behavior normal.         Judgment: Judgment normal.         PHQ-9 Score  PHQ-9 Total Score:       Pain Score  Vitals:    02/01/23 0925   BP: 99/68   Pulse: 88   Resp: 18   Temp: 97.8 °F (36.6 °C)   TempSrc: Temporal   SpO2: 98%   Weight: 73.3 kg (161 lb 9.6 oz)   Height: 160 cm (63\")   PainSc: 0-No pain       ECOG score: 1             Lab Review  Lab Results   Component Value Date    WBC 8.33 02/01/2023    HGB 8.3 (L) 02/01/2023    HCT 26.9 (L) 02/01/2023    MCV 87.1 02/01/2023    RDW 18.9 (H) 02/01/2023     02/01/2023    NEUTRORELPCT 70.4 02/01/2023    LYMPHORELPCT 18.7 (L) 02/01/2023    MONORELPCT 8.3 02/01/2023    EOSRELPCT 1.7 02/01/2023    BASORELPCT 0.4 02/01/2023    NEUTROABS 5.87 02/01/2023    LYMPHSABS 1.56 02/01/2023       Lab Results   Component Value Date     01/17/2023    K 4.3 01/17/2023    CO2 24.6 01/17/2023    CL 99 01/17/2023    BUN 19 01/17/2023    CREATININE 3.84 (H) 01/17/2023    EGFRIFNONA 23 (L) 02/21/2022    EGFRIFAFRI 41 (L) 07/30/2018    GLUCOSE 122 (H) 01/17/2023    CALCIUM 9.2 01/17/2023    ALKPHOS 187 (H) 01/17/2023    AST 26 01/17/2023    ALT 13 01/17/2023    BILITOT 0.3 01/17/2023    ALBUMIN 3.2 (L) 01/17/2023    PROTEINTOT 7.3 01/17/2023    MG 1.9 01/11/2023    PHOS 3.3 10/17/2022           Endoscopy:   ENDOSCOPY:  11/23/21  ESOPHAGOGASTRODUODENOSCOPY PROCEDURE REPORT     Britta Lee  11/23/2021     GASTROENTEROLOGIST:  Palmira Pate MD      PRE-PROCEDURE DIAGNOSIS:  Iron deficiency anemia, unspecified iron deficiency anemia type [D50.9]  Weight loss, abnormal [R63.4]     POST-PROCEDURE DIAGNOSIS:  1.  Gastritis  2.  Angiectasias     Procedure(s):  ESOPHAGOGASTRODUODENOSCOPY WITH BIOPSY  COLONOSCOPY FOR " SCREENING     ANESTHESIA:  Propofol administered by anesthesia.  See anesthesia notes for ASA classification     STAFF:  Circulator: Bre Ny RN; Danelle Mccray RN  Endo Technician: Omari Lewis     FINDINGS:  1.  Normal-appearing esophagus.  Biopsies were taken  2.  Mild erythema in the antrum and stomach.  Biopsies taken for H. pylori.  3.  One angiectasia was found in the second portion of the duodenum.  This was nonbleeding.  Biopsies were taken of the duodenum to rule out celiac disease as a source of her anemia.     OPERATIVE PROCEDURE:  After proper informed consent was obtained, patient was transferred to the OR/endoscopy suite.  Patient was then placed in left lateral decubitus position. The Olympus 180 series video gastroscope was inserted orally under direct visualization.  Esophagus, stomach, and duodenum were inspected.  The endoscope was passed to the third portion of the duodenum.  Scope was retroflexed for visualization of the cardia and incisuraThe endoscope was then withdrawn. Patient tolerated the procedure well. There were no immediate complications.     ESTIMATED BLOOD LOSS:  None     COMPLICATIONS;  None     RECOMMENDATIONS/ PLAN:  1.  Follow-up biopsy results.  2.  Proceed with colonoscopy.    COLONOSCOPY PROCEDURE NOTE     Britta Jesus  11/23/2021     GASTROENTEROLOGIST:  Palmira Pate MD        PRE-PROCEDURE DIAGNOSIS:   Iron deficiency anemia, unspecified iron deficiency anemia type [D50.9]  Weight loss, abnormal [R63.4]     POST-PROCEDURE DIAGNOSIS:  1.  Colon polyps  2.  Diverticulosis  3.  Internal hemorrhoids     PROCEDURE:  COLONOSCOPY with snare polypectomy and cold biopsy polypectomy     ANESTHESIA:  Propofol administered by anesthesia.  See anesthesia notes for ASA classification     STAFF  Circulator: Bre Ny RN; Danelle Mccray RN  Endo Technician: Omari Lewis     Findings:  1.  A 5 mm polyp was found in the ascending colon.   This was removed with cold forceps polypectomy.  2.  A 5 mm polyp was removed from the transverse colon with cold forceps biopsy.  3.  Two 6 mm polyps were found in the descending colon.  Both polyps were removed with cold snare polypectomy.  4.  A 7 mm polyp was removed from the sigmoid colon with cold snare polypectomy.  5.  Diverticulosis involving left colon.  6.  Small internal hemorrhoids.  7.  Normal-appearing terminal ileum.    OPERATIVE PROCEDURE   After proper informed consent was obtained, the patient was taken the operating suite and placed in left lateral decubitus position.  An Olympus video colonoscope 180 series was inserted in the rectum and advanced to the terminal ileum under direct visualization.  Cecum and terminal ileum were identified by visualization of the appendiceal orifice and ileocecal valve.  The colonoscope was then slowly withdrawn from the cecum to the rectum and passed a second time from rectum to cecum.  The colonoscope was retroflexed in the cecum and rectum. Scope was then withdrawn. Patient tolerated the procedure well. There were no immediate complications. Cecal withdrawal time was 15 minutes.     ESTIMATED BLOOD LOSS  None      COMPLICATIONS  None     RECOMMENDATIONS:  1.  Follow-up pathology.  2.  Repeat colonoscopy in 3 years due to personal history of colon polyps.  3.  Proceed with capsule endoscopy if anemia persists.  Sign 4.  Follow-up in GI clinic in 6 to 8 weeks.  Assessment / Plan         Assessment and Plan   77 year old F with history of ESRD on HD who presents for normocytic anemia.     #Anemia  #iron deficiency anemia  #Malabsorption  -Patient with history of anemia required multiple transfusions, most recently 1/12/2023.  Previously required IV iron after intolerance to p.o. iron.  Last IV iron received August 2022.  -Most recent EGD and colonoscopy by Dr. Simmons 11/2021.  At that time no source of bleed was discovered, it was recommended patient follow-up  with capsule endoscopy if anemia persist.  -Most recent CBC from 1/17/2023 with hemoglobin 10.3, hematocrit 31.9, MCV 79.6.  Normal WBC and platelets.  -We will order for repeat CBC with peripheral smear today, iron studies, ferritin, soluble transferrin receptor, vitamin B12, folate, LDH, haptoglobin, reticulocyte count, MMA  -If no etiology of anemia is discovered.  Recommend EPO infusion by nephrology and or capsule endoscopy to look for a bleed      Discussed possible differential diagnoses, testing, treatment, recommended non-pharmacological interventions, risks, warning signs to monitor for that would indicate need for follow-up in clinic or ER. If no improvement with these regimens or you have new or worsening symptoms follow-up. Patient verbalizes understanding and agreement with plan of care. Denies further needs or concerns.     Patient was given instructions and counseling regarding her condition and for health maintenance advised.    I spent 30 minutes with Britta Lee today.  In the office today, more than 50% of this time was spent face-to-face with her  in counseling / coordination of care, reviewing her interim medical history and counseling on the current treatment plan.  All questions were answered to her satisfaction.      Meds ordered during this visit  No orders of the defined types were placed in this encounter.      Visit Diagnoses    ICD-10-CM ICD-9-CM   1. Iron deficiency anemia, unspecified iron deficiency anemia type  D50.9 280.9       Follow Up   4 weeks      This document has been electronically signed by Sakina Bang MD   February 6, 2023 12:28 EST    Dictated Utilizing Dragon Dictation: Part of this note may be an electronic transcription/translation of spoken language to printed text using the Dragon Dictation System.

## 2023-02-01 NOTE — TELEPHONE ENCOUNTER
Caller: [facility]    Relationship:     CALLER FROM [facility]    Best call back number:      [phone]     What orders are you requesting (i.e. lab or imaging):     CALLER STATED A REQUEST FOR AN ORDER OF WOUND CARE FOR PATIENT'S PRESSURE ULCER HAD BEEN FAXED TO THE OFFICE IN THE RECENT PAST     CALLER REQUESTED THE ORDER TO BE COMPLETED AND RETURN FAXED TO:    [phone]    In what timeframe would the patient need to come in:     ASAJAYME MONTES DE OCA

## 2023-02-01 NOTE — PROGRESS NOTES
Venipuncture Blood Specimen Collection  Venipuncture performed in right arm by Tu Nazario MA with good hemostasis. Patient tolerated the procedure well without complications.   02/01/23   Tu Nazario MA

## 2023-02-02 LAB — REF LAB TEST METHOD: NORMAL

## 2023-02-02 NOTE — TELEPHONE ENCOUNTER
I called and spoke to [facility]. They just needed a order they had faxed to us signed and sent back. I found it in her chart and faxed it back to them.

## 2023-02-02 NOTE — TELEPHONE ENCOUNTER
Can you get some more information with regards to this pressure ulcer?  What is the location?  When did it occur?

## 2023-02-03 LAB — STFR SERPL-SCNC: 22.9 NMOL/L (ref 12.2–27.3)

## 2023-02-06 LAB — METHYLMALONATE SERPL-SCNC: 933 NMOL/L (ref 0–378)

## 2023-02-08 ENCOUNTER — HOSPITAL ENCOUNTER (EMERGENCY)
Facility: HOSPITAL | Age: 78
Discharge: HOME | End: 2023-02-08
Admitting: EMERGENCY MEDICINE
Payer: MEDICARE

## 2023-02-08 ENCOUNTER — APPOINTMENT (OUTPATIENT)
Dept: CT IMAGING | Facility: HOSPITAL | Age: 78
End: 2023-02-08
Payer: MEDICARE

## 2023-02-08 ENCOUNTER — APPOINTMENT (OUTPATIENT)
Dept: GENERAL RADIOLOGY | Facility: HOSPITAL | Age: 78
End: 2023-02-08
Payer: MEDICARE

## 2023-02-08 VITALS
OXYGEN SATURATION: 98 % | HEART RATE: 72 BPM | DIASTOLIC BLOOD PRESSURE: 74 MMHG | RESPIRATION RATE: 18 BRPM | HEIGHT: 63 IN | WEIGHT: 162 LBS | TEMPERATURE: 98.4 F | BODY MASS INDEX: 28.7 KG/M2 | SYSTOLIC BLOOD PRESSURE: 169 MMHG

## 2023-02-08 DIAGNOSIS — S42.292A OTHER CLOSED DISPLACED FRACTURE OF PROXIMAL END OF LEFT HUMERUS, INITIAL ENCOUNTER: Primary | ICD-10-CM

## 2023-02-08 LAB
ALBUMIN SERPL-MCNC: 3 G/DL (ref 3.5–5.2)
ALBUMIN/GLOB SERPL: 0.7 G/DL
ALP SERPL-CCNC: 365 U/L (ref 39–117)
ALT SERPL W P-5'-P-CCNC: 34 U/L (ref 1–33)
ANION GAP SERPL CALCULATED.3IONS-SCNC: 13.4 MMOL/L (ref 5–15)
AST SERPL-CCNC: 75 U/L (ref 1–32)
BASOPHILS # BLD AUTO: 0.03 10*3/MM3 (ref 0–0.2)
BASOPHILS NFR BLD AUTO: 0.2 % (ref 0–1.5)
BILIRUB SERPL-MCNC: 0.2 MG/DL (ref 0–1.2)
BUN SERPL-MCNC: 23 MG/DL (ref 8–23)
BUN/CREAT SERPL: 7.1 (ref 7–25)
CALCIUM SPEC-SCNC: 8.8 MG/DL (ref 8.6–10.5)
CHLORIDE SERPL-SCNC: 101 MMOL/L (ref 98–107)
CO2 SERPL-SCNC: 23.6 MMOL/L (ref 22–29)
CREAT SERPL-MCNC: 3.25 MG/DL (ref 0.57–1)
DEPRECATED RDW RBC AUTO: 63.5 FL (ref 37–54)
EGFRCR SERPLBLD CKD-EPI 2021: 14 ML/MIN/1.73
EOSINOPHIL # BLD AUTO: 0.3 10*3/MM3 (ref 0–0.4)
EOSINOPHIL NFR BLD AUTO: 2.4 % (ref 0.3–6.2)
ERYTHROCYTE [DISTWIDTH] IN BLOOD BY AUTOMATED COUNT: 19.7 % (ref 12.3–15.4)
GLOBULIN UR ELPH-MCNC: 4.6 GM/DL
GLUCOSE SERPL-MCNC: 194 MG/DL (ref 65–99)
HCT VFR BLD AUTO: 27.9 % (ref 34–46.6)
HGB BLD-MCNC: 8.5 G/DL (ref 12–15.9)
IMM GRANULOCYTES # BLD AUTO: 0.07 10*3/MM3 (ref 0–0.05)
IMM GRANULOCYTES NFR BLD AUTO: 0.6 % (ref 0–0.5)
LYMPHOCYTES # BLD AUTO: 1.13 10*3/MM3 (ref 0.7–3.1)
LYMPHOCYTES NFR BLD AUTO: 9 % (ref 19.6–45.3)
MCH RBC QN AUTO: 27.4 PG (ref 26.6–33)
MCHC RBC AUTO-ENTMCNC: 30.5 G/DL (ref 31.5–35.7)
MCV RBC AUTO: 90 FL (ref 79–97)
MONOCYTES # BLD AUTO: 0.65 10*3/MM3 (ref 0.1–0.9)
MONOCYTES NFR BLD AUTO: 5.2 % (ref 5–12)
NEUTROPHILS NFR BLD AUTO: 10.39 10*3/MM3 (ref 1.7–7)
NEUTROPHILS NFR BLD AUTO: 82.6 % (ref 42.7–76)
NRBC BLD AUTO-RTO: 0 /100 WBC (ref 0–0.2)
PLATELET # BLD AUTO: 254 10*3/MM3 (ref 140–450)
PMV BLD AUTO: 8.5 FL (ref 6–12)
POTASSIUM SERPL-SCNC: 5.1 MMOL/L (ref 3.5–5.2)
PROT SERPL-MCNC: 7.6 G/DL (ref 6–8.5)
RBC # BLD AUTO: 3.1 10*6/MM3 (ref 3.77–5.28)
SODIUM SERPL-SCNC: 138 MMOL/L (ref 136–145)
WBC NRBC COR # BLD: 12.57 10*3/MM3 (ref 3.4–10.8)

## 2023-02-08 PROCEDURE — 85025 COMPLETE CBC W/AUTO DIFF WBC: CPT | Performed by: NURSE PRACTITIONER

## 2023-02-08 PROCEDURE — 96376 TX/PRO/DX INJ SAME DRUG ADON: CPT

## 2023-02-08 PROCEDURE — 25010000002 MORPHINE PER 10 MG: Performed by: NURSE PRACTITIONER

## 2023-02-08 PROCEDURE — 96375 TX/PRO/DX INJ NEW DRUG ADDON: CPT

## 2023-02-08 PROCEDURE — 80053 COMPREHEN METABOLIC PANEL: CPT | Performed by: NURSE PRACTITIONER

## 2023-02-08 PROCEDURE — 96374 THER/PROPH/DIAG INJ IV PUSH: CPT

## 2023-02-08 PROCEDURE — 73030 X-RAY EXAM OF SHOULDER: CPT

## 2023-02-08 PROCEDURE — 70450 CT HEAD/BRAIN W/O DYE: CPT

## 2023-02-08 PROCEDURE — 99284 EMERGENCY DEPT VISIT MOD MDM: CPT

## 2023-02-08 PROCEDURE — 25010000002 ONDANSETRON PER 1 MG: Performed by: NURSE PRACTITIONER

## 2023-02-08 RX ORDER — MORPHINE SULFATE 2 MG/ML
1 INJECTION, SOLUTION INTRAMUSCULAR; INTRAVENOUS ONCE
Status: COMPLETED | OUTPATIENT
Start: 2023-02-08 | End: 2023-02-08

## 2023-02-08 RX ORDER — HYDROCODONE BITARTRATE AND ACETAMINOPHEN 7.5; 325 MG/1; MG/1
1 TABLET ORAL EVERY 6 HOURS PRN
Qty: 12 TABLET | Refills: 0 | Status: SHIPPED | OUTPATIENT
Start: 2023-02-08

## 2023-02-08 RX ORDER — SODIUM CHLORIDE 0.9 % (FLUSH) 0.9 %
10 SYRINGE (ML) INJECTION AS NEEDED
Status: DISCONTINUED | OUTPATIENT
Start: 2023-02-08 | End: 2023-02-09 | Stop reason: HOSPADM

## 2023-02-08 RX ORDER — ONDANSETRON 2 MG/ML
4 INJECTION INTRAMUSCULAR; INTRAVENOUS ONCE
Status: COMPLETED | OUTPATIENT
Start: 2023-02-08 | End: 2023-02-08

## 2023-02-08 RX ADMIN — MORPHINE SULFATE 1 MG: 2 INJECTION, SOLUTION INTRAMUSCULAR; INTRAVENOUS at 22:11

## 2023-02-08 RX ADMIN — MORPHINE SULFATE 1 MG: 2 INJECTION, SOLUTION INTRAMUSCULAR; INTRAVENOUS at 20:46

## 2023-02-08 RX ADMIN — ONDANSETRON 4 MG: 2 INJECTION INTRAMUSCULAR; INTRAVENOUS at 20:45

## 2023-02-09 NOTE — ED PROVIDER NOTES
Subjective   History of Present Illness  Patient is a 77-year-old white female presents emerged today complaint of fall.  Patient reports that she had tripped and fell and now has pain in left shoulder.  Patient reports she hit head. Denies loss of conciousness. Reports she was dazed and does have a swollen area . Patient denies chest pain shortness of breath or abdominal pain.  Patient reports inability to move left shoulder.  Patient reports pain is worse with ambulation or movement.  Patient reports no alleviating factors and reports no treatments have been tried.  Patient was brought in by EMS.    Fall      Review of Systems   Constitutional: Negative.    HENT: Negative.    Eyes: Negative.    Respiratory: Negative.    Cardiovascular: Negative.    Gastrointestinal: Negative.    Endocrine: Negative.    Genitourinary: Negative.    Musculoskeletal: Negative.    Skin: Negative.    Allergic/Immunologic: Negative.    Neurological: Negative.    Hematological: Negative.    Psychiatric/Behavioral: Negative.        Past Medical History:   Diagnosis Date   • Acquired hypothyroidism 4/22/2021   • Anemia    • Arthritis    • Carpal tunnel syndrome    • Coronary artery disease    • Diabetes mellitus (HCC)    • Elevated cholesterol    • GERD (gastroesophageal reflux disease)    • History of pneumonia    • History of unsteady gait     OCASSIONALLY   • Hypertension    • Insomnia    • Kidney disease    • LENNY (obstructive sleep apnea) 12/1/2020   • Osteoarthritis    • Renal insufficiency    • Sciatica        No Known Allergies    Past Surgical History:   Procedure Laterality Date   • ABDOMINAL SURGERY     • APPENDECTOMY     • CARDIAC CATHETERIZATION      1 STENT  ---  2000   • CARDIAC SURGERY     • CAROTID STENT     • CARPAL TUNNEL RELEASE Right 10/8/2019    Procedure: CARPAL TUNNEL RELEASE;  Surgeon: Feroz Johnson MD;  Location[facility];  Service: Orthopedics   • CATARACT EXTRACTION     • CHOLECYSTECTOMY     •  COLONOSCOPY     • COLONOSCOPY N/A 11/23/2021    Procedure: COLONOSCOPY FOR SCREENING;  Surgeon: Palmira Pate MD;  Location: [facility];  Service: Gastroenterology;  Laterality: N/A;   • CORONARY ANGIOPLASTY WITH STENT PLACEMENT     • ENDOSCOPY     • ENDOSCOPY N/A 3/5/2020    Procedure: ESOPHAGOGASTRODUODENOSCOPY;  Surgeon: Alexandru Bagley MD;  Location: [facility] OR;  Service: Gastroenterology;  Laterality: N/A;   • ENDOSCOPY N/A 11/23/2021    Procedure: ESOPHAGOGASTRODUODENOSCOPY WITH BIOPSY;  Surgeon: Palmira Pate MD;  Location: [facility] OR;  Service: Gastroenterology;  Laterality: N/A;   • ENDOSCOPY AND COLONOSCOPY     • GALLBLADDER SURGERY     • INSERTION HEMODIALYSIS CATHETER N/A 11/14/2022    Procedure: HEMODIALYSIS CATHETER INSERTION;  Surgeon: Ta Blas MD;  Location: [facility];  Service: General;  Laterality: N/A;   • JOINT REPLACEMENT Left 05/02/2017    [facility]  DR LEVIN  LEFT TOTAL KNEE   • KNEE ARTHROSCOPY W/ MENISCECTOMY Right    • LAPAROSCOPIC TUBAL LIGATION     • DE ARTHRP KNE CONDYLE&PLATU MEDIAL&LAT COMPARTMENTS Left 5/2/2017    Procedure: TOTAL KNEE ARTHROPLASTY;  Surgeon: Feroz Levin MD;  Location: [facility];  Service: Orthopedics   • STERILIZATION     • TONSILLECTOMY         Family History   Problem Relation Age of Onset   • Arthritis Mother    • Diabetes Mother    • Cancer Mother    • Heart disease Father    • Diabetes Daughter    • Diabetes Son    • Diabetes Maternal Aunt    • Heart disease Maternal Grandmother    • Breast cancer Neg Hx        Social History     Socioeconomic History   • Marital status:      Spouse name: natalie   • Number of children: 3   • Years of education: 12   Tobacco Use   • Smoking status: Never   • Smokeless tobacco: Never   Vaping Use   • Vaping Use: Never used   Substance and Sexual Activity   • Alcohol use: Yes     Comment: socially   • Drug use: No   • Sexual activity: Defer     Birth control/protection: Surgical            Objective   Physical Exam  Vitals and nursing note reviewed.   Constitutional:       Appearance: She is well-developed.   HENT:      Head: Normocephalic.      Right Ear: External ear normal.      Left Ear: External ear normal.   Eyes:      Conjunctiva/sclera: Conjunctivae normal.      Pupils: Pupils are equal, round, and reactive to light.   Cardiovascular:      Rate and Rhythm: Normal rate and regular rhythm.      Heart sounds: Normal heart sounds.   Pulmonary:      Effort: Pulmonary effort is normal.      Breath sounds: Normal breath sounds.   Abdominal:      General: Bowel sounds are normal.      Palpations: Abdomen is soft.   Musculoskeletal:      Left shoulder: Tenderness present. Decreased range of motion.      Cervical back: Normal range of motion and neck supple.   Skin:     General: Skin is warm and dry.      Capillary Refill: Capillary refill takes less than 2 seconds.   Neurological:      Mental Status: She is alert and oriented to person, place, and time.   Psychiatric:         Behavior: Behavior normal.         Thought Content: Thought content normal.         Procedures      Electronically signed by ERNIE Carty, 02/08/23, 9:56 PM EST.        ED Course  ED Course as of 02/08/23 2218 Wed Feb 08, 2023 2129 Images sent to Dr. Salter. Advises to sling and follow up in office.  [JONAS]   2215 CT head rad interpreted:  Senescent changes without acute abnormality. [RB]      ED Course User Index  [JONAS] Praful Mike APRN  [RB] Ashish Mike II, PA                                           Medical Decision Making  77-year-old female with fall.    Other closed displaced fracture of proximal end of left humerus, initial encounter: acute illness or injury     Details: This patient was endorsed to me by Joseph Mike nurse practitioner.  Patient has been set up to see Ortho.  Patient has had pain medicine called into her pharmacy.  Patient has been placed in immobilization sling.  CT of her  head is negative.  Amount and/or Complexity of Data Reviewed  Labs: ordered.  Radiology: ordered. Decision-making details documented in ED Course.      Risk  Prescription drug management.          Final diagnoses:   Other closed displaced fracture of proximal end of left humerus, initial encounter       ED Disposition  ED Disposition     ED Disposition   Discharge    Condition   Stable    Comment   --             Joshua Almeida, DO  [address] [place] [address] [phone]    Go to            Medication List      New Prescriptions    HYDROcodone-acetaminophen 7.5-325 MG per tablet  Commonly known as: NORCO  Take 1 tablet by mouth Every 6 (Six) Hours As Needed for Moderate Pain.           Where to Get Your Medications      These medications were sent to [facility] [address] - [phone]  - [phone] [address], [facility] [address]    Phone: [phone]   · HYDROcodone-acetaminophen 7.5-325 MG per tablet          Ashish Mike II, PA  02/08/23 2214

## 2023-02-10 ENCOUNTER — HOSPITAL ENCOUNTER (OUTPATIENT)
Dept: GENERAL RADIOLOGY | Facility: HOSPITAL | Age: 78
Discharge: HOME | End: 2023-02-10
Admitting: NURSE PRACTITIONER
Payer: MEDICARE

## 2023-02-10 DIAGNOSIS — S42.202A CLOSED FRACTURE OF PROXIMAL END OF LEFT HUMERUS, UNSPECIFIED FRACTURE MORPHOLOGY, INITIAL ENCOUNTER: Primary | ICD-10-CM

## 2023-02-10 PROCEDURE — 73030 X-RAY EXAM OF SHOULDER: CPT

## 2023-02-10 PROCEDURE — 73030 X-RAY EXAM OF SHOULDER: CPT | Performed by: RADIOLOGY

## 2023-02-16 ENCOUNTER — HOSPITAL ENCOUNTER (OUTPATIENT)
Dept: NUCLEAR MEDICINE | Facility: HOSPITAL | Age: 78
Discharge: HOME | End: 2023-02-16
Payer: MEDICARE

## 2023-02-16 ENCOUNTER — HOSPITAL ENCOUNTER (OUTPATIENT)
Dept: GENERAL RADIOLOGY | Facility: HOSPITAL | Age: 78
Discharge: HOME | End: 2023-02-16
Admitting: NURSE PRACTITIONER
Payer: MEDICARE

## 2023-02-16 DIAGNOSIS — I27.20 SEVERE PULMONARY HYPERTENSION: ICD-10-CM

## 2023-02-16 DIAGNOSIS — I27.20 PULMONARY HYPERTENSION: ICD-10-CM

## 2023-02-16 PROCEDURE — 0 TECHNETIUM TC 99M PENTETATE INHALER: Performed by: NURSE PRACTITIONER

## 2023-02-16 PROCEDURE — A9567 TECHNETIUM TC-99M AEROSOL: HCPCS | Performed by: NURSE PRACTITIONER

## 2023-02-16 PROCEDURE — 0 TECHNETIUM ALBUMIN AGGREGATED: Performed by: NURSE PRACTITIONER

## 2023-02-16 PROCEDURE — 78582 LUNG VENTILAT&PERFUS IMAGING: CPT

## 2023-02-16 PROCEDURE — 78582 LUNG VENTILAT&PERFUS IMAGING: CPT | Performed by: RADIOLOGY

## 2023-02-16 PROCEDURE — 71046 X-RAY EXAM CHEST 2 VIEWS: CPT

## 2023-02-16 PROCEDURE — 71046 X-RAY EXAM CHEST 2 VIEWS: CPT | Performed by: RADIOLOGY

## 2023-02-16 PROCEDURE — A9540 TC99M MAA: HCPCS | Performed by: NURSE PRACTITIONER

## 2023-02-16 RX ADMIN — KIT FOR THE PREPARATION OF TECHNETIUM TC 99M ALBUMIN AGGREGATED 1 DOSE: 2.5 INJECTION, POWDER, FOR SOLUTION INTRAVENOUS at 09:08

## 2023-02-16 RX ADMIN — KIT FOR THE PREPARATION OF TECHNETIUM TC 99M PENTETATE 1 DOSE: 20 INJECTION, POWDER, LYOPHILIZED, FOR SOLUTION INTRAVENOUS; RESPIRATORY (INHALATION) at 09:08

## 2023-02-20 NOTE — PROGRESS NOTES
Subjective     Date: 2/21/2023    Referring Provider  Dr. Johnson     Chief Complaint   Iron deficiency anemia       Subjective          Britta Lee is a 77 y.o. female who presents today to [facility] for follow up.    HPI:   77-year-old female with history of hypertension, hyperlipidemia, depression/anxiety, hypothyroidism, coronary artery disease, CKD, congestive heart failure who presents as follow-up for her treatment of anemia.  Previously seen by ERNIE Mohr.    She has struggled with anemia since December 2016 with hemoglobin ranging from 8.6-9.8.  Iron studies at the time consistent with anemia of chronic disease.  Has previously received IV iron infusions, and taken oral iron.  Stopped taking oral ferrous sulfate due to malabsorption.  Last IV iron was received August 2022.    Treatment history:      Interval History 02/01/2023   She was started on oral ferrous sulfate by nephrology.  Currently still taking it without any nausea or constipation.  Denies any new complaints.  Denies any melena, hematochezia, hematemesis.  Per chart review patient presented to the ED on 1/11/2023 for reportedly low hemoglobin level in dialysis, hemoglobin of 6.1 mg/dL, required blood transfusion at that time.    Interval History 02/21/2023   Ms. Lee presents today for follow-up with her .  She had a fall from bed, presented to the ED February 8,  was found to have acute fracture of humeral neck and humeral head.  She reports continued oral ferrous sulfate, denies any GI discomfort with this.    Hemoglobin today is 9.3, hematocrit 31.2 today MCV 89.9.  Labs from previous visit showed vitamin B12 713, methylmalonic acid elevated to 933.  Folate level low/normal at 6.7.    She reports previously taking oral vitamin B tablets, never tried injections.  Denies any fatigue, melena, hematochezia.      The following portions of the patient's history were reviewed and  updated as appropriate: allergies, current medications, past family history, past medical history, past social history, past surgical history and problem list.    Objective     Objective     Allergy:   No Known Allergies     Current Medications:   Current Outpatient Medications   Medication Sig Dispense Refill   • amLODIPine (NORVASC) 10 MG tablet Take 1 tablet by mouth Daily. 90 tablet 3   • atorvastatin (LIPITOR) 40 MG tablet Take 1 tablet by mouth Every Night. 90 tablet 3   • escitalopram (LEXAPRO) 10 MG tablet Take 1 tablet by mouth Daily. 90 tablet 3   • hydrALAZINE (APRESOLINE) 25 MG tablet Take 2 tablets by mouth 3 (Three) Times a Day. 270 tablet 1   • HYDROcodone-acetaminophen (NORCO) 7.5-325 MG per tablet Take 1 tablet by mouth Every 6 (Six) Hours As Needed for Moderate Pain. 12 tablet 0   • Insulin Lispro (humaLOG) 100 UNIT/ML injection Inject 0-7 Units under the skin into the appropriate area as directed 3 (Three) Times a Day Before Meals. Admin Instructions: Correction - Low Dose.  Less than 40 units/day total insulin dose or lean, elderly, renal patients   Blood glucose 150-199 mg/dL - 2 units   Blood glucose 200-249 mg/dL - 3 units   Blood glucose 250-299 mg/dL - 4 units   Blood glucose 300-349 mg/dL - 5 units   Blood glucose 350-400 mg/dL - 6 units   Blood glucose greater than 400 mg/dL - 7 units and call provider     • isosorbide mononitrate (IMDUR) 60 MG 24 hr tablet Take 1 tablet by mouth Daily. 90 tablet 0   • levothyroxine (SYNTHROID, LEVOTHROID) 25 MCG tablet Take 1 tablet by mouth Daily. 90 tablet 3   • metoprolol succinate XL (TOPROL-XL) 50 MG 24 hr tablet Take 1.5 tablets by mouth Daily.     • pantoprazole (Protonix) 40 MG EC tablet Take 1 tablet by mouth Daily. 90 tablet 3   • vitamin D (ERGOCALCIFEROL) 1.25 MG (72554 UT) capsule capsule Take 1 capsule by mouth 1 (One) Time Per Week. 15 capsule 3   • folic acid (FOLVITE) 1 MG tablet Take 1 tablet by mouth Daily. 30 tablet 3   • mirtazapine  (REMERON) 7.5 MG tablet Take 3 tablets by mouth Every Night for 30 days. 90 tablet 0   • vitamin B-12 (cyancobalamin) 100 MCG tablet Take 0.5 tablets by mouth Daily. 30 tablet 3     No current facility-administered medications for this visit.       Past Medical History:  Past Medical History:   Diagnosis Date   • Acquired hypothyroidism 4/22/2021   • Anemia    • Arthritis    • Carpal tunnel syndrome    • Coronary artery disease    • Diabetes mellitus (HCC)    • Elevated cholesterol    • GERD (gastroesophageal reflux disease)    • History of pneumonia    • History of unsteady gait     OCASSIONALLY   • Hypertension    • Insomnia    • Kidney disease    • LENNY (obstructive sleep apnea) 12/1/2020   • Osteoarthritis    • Renal insufficiency    • Sciatica        Past Surgical History:  Past Surgical History:   Procedure Laterality Date   • ABDOMINAL SURGERY     • APPENDECTOMY     • CARDIAC CATHETERIZATION      1 STENT  ---  2000   • CARDIAC SURGERY     • CAROTID STENT     • CARPAL TUNNEL RELEASE Right 10/8/2019    Procedure: CARPAL TUNNEL RELEASE;  Surgeon: Feroz Johnson MD;  Location: [facility];  Service: Orthopedics   • CATARACT EXTRACTION     • CHOLECYSTECTOMY     • COLONOSCOPY     • COLONOSCOPY N/A 11/23/2021    Procedure: COLONOSCOPY FOR SCREENING;  Surgeon: Palmira Pate MD;  Location: [facility];  Service: Gastroenterology;  Laterality: N/A;   • CORONARY ANGIOPLASTY WITH STENT PLACEMENT     • ENDOSCOPY     • ENDOSCOPY N/A 3/5/2020    Procedure: ESOPHAGOGASTRODUODENOSCOPY;  Surgeon: Alexandru Bagley MD;  Location: [facility] OR;  Service: Gastroenterology;  Laterality: N/A;   • ENDOSCOPY N/A 11/23/2021    Procedure: ESOPHAGOGASTRODUODENOSCOPY WITH BIOPSY;  Surgeon: Palmira Pate MD;  Location: [facility];  Service: Gastroenterology;  Laterality: N/A;   • ENDOSCOPY AND COLONOSCOPY     • GALLBLADDER SURGERY     • INSERTION HEMODIALYSIS CATHETER N/A 11/14/2022    Procedure: HEMODIALYSIS  "CATHETER INSERTION;  Surgeon: Ta Blas MD;  Location: [facility];  Service: General;  Laterality: N/A;   • JOINT REPLACEMENT Left 05/02/2017    [facility]  DR JOHNSON  LEFT TOTAL KNEE   • KNEE ARTHROSCOPY W/ MENISCECTOMY Right    • LAPAROSCOPIC TUBAL LIGATION     • DC ARTHRP KNE CONDYLE&PLATU MEDIAL&LAT COMPARTMENTS Left 5/2/2017    Procedure: TOTAL KNEE ARTHROPLASTY;  Surgeon: Feroz Johnson MD;  Location: [facility];  Service: Orthopedics   • STERILIZATION     • TONSILLECTOMY         Social History:  Social History     Socioeconomic History   • Marital status:      Spouse name: natalie   • Number of children: 3   • Years of education: 12   Tobacco Use   • Smoking status: Never   • Smokeless tobacco: Never   Vaping Use   • Vaping Use: Never used   Substance and Sexual Activity   • Alcohol use: Yes     Comment: socially   • Drug use: No   • Sexual activity: Defer     Birth control/protection: Surgical     Britta Lee  reports that she has never smoked. She has never used smokeless tobacco.      Family History:  Family History   Problem Relation Age of Onset   • Arthritis Mother    • Diabetes Mother    • Cancer Mother    • Heart disease Father    • Diabetes Daughter    • Diabetes Son    • Diabetes Maternal Aunt    • Heart disease Maternal Grandmother    • Breast cancer Neg Hx        Review of Systems:  Review of Systems   All other systems reviewed and are negative.      Vital Signs:   /67   Pulse 88   Temp 96.9 °F (36.1 °C) (Temporal)   Resp 18   Ht 160 cm (63\")   Wt 73.1 kg (161 lb 3.2 oz)   SpO2 99%   BMI 28.56 kg/m²      Physical Exam:  Physical Exam  Vitals and nursing note reviewed.   Constitutional:       General: She is not in acute distress.     Appearance: Normal appearance. She is not ill-appearing.      Comments: + In a wheelchair   HENT:      Head: Normocephalic and atraumatic.      Nose: Nose normal.      Mouth/Throat:      Mouth: Mucous membranes are moist.      Pharynx: " "Oropharynx is clear.   Eyes:      Conjunctiva/sclera: Conjunctivae normal.      Pupils: Pupils are equal, round, and reactive to light.   Cardiovascular:      Rate and Rhythm: Normal rate and regular rhythm.      Heart sounds: No murmur heard.  Pulmonary:      Effort: Pulmonary effort is normal. No respiratory distress.      Breath sounds: Normal breath sounds. No wheezing.   Abdominal:      General: Abdomen is flat. Bowel sounds are normal. There is no distension.      Palpations: Abdomen is soft. There is no mass.      Tenderness: There is no abdominal tenderness. There is no guarding.   Musculoskeletal:         General: No swelling. Normal range of motion.      Cervical back: Normal range of motion.      Comments: Left shoulder in a sling   Lymphadenopathy:      Cervical: No cervical adenopathy.   Skin:     General: Skin is warm and dry.      Coloration: Skin is not jaundiced.      Findings: No bruising or rash.   Neurological:      General: No focal deficit present.      Mental Status: She is alert and oriented to person, place, and time.      Motor: No weakness.      Coordination: Coordination normal.   Psychiatric:         Mood and Affect: Mood normal.         Behavior: Behavior normal.         Judgment: Judgment normal.         PHQ-9 Score  PHQ-9 Total Score:       Pain Score  Vitals:    02/21/23 0842   BP: 102/67   Pulse: 88   Resp: 18   Temp: 96.9 °F (36.1 °C)   TempSrc: Temporal   SpO2: 99%   Weight: 73.1 kg (161 lb 3.2 oz)   Height: 160 cm (63\")   PainSc: 0-No pain       ECOG score: 1             Lab Review  Lab Results   Component Value Date    WBC 9.02 02/21/2023    HGB 9.3 (L) 02/21/2023    HCT 31.2 (L) 02/21/2023    MCV 89.9 02/21/2023    RDW 18.1 (H) 02/21/2023     02/21/2023    NEUTRORELPCT 67.1 02/21/2023    LYMPHORELPCT 21.2 02/21/2023    MONORELPCT 7.0 02/21/2023    EOSRELPCT 3.5 02/21/2023    BASORELPCT 0.6 02/21/2023    NEUTROABS 6.06 02/21/2023    LYMPHSABS 1.91 02/21/2023       Lab " Results   Component Value Date     02/08/2023    K 5.1 02/08/2023    CO2 23.6 02/08/2023     02/08/2023    BUN 23 02/08/2023    CREATININE 3.25 (H) 02/08/2023    EGFRIFNONA 23 (L) 02/21/2022    EGFRIFAFRI 41 (L) 07/30/2018    GLUCOSE 194 (H) 02/08/2023    CALCIUM 8.8 02/08/2023    ALKPHOS 365 (H) 02/08/2023    AST 75 (H) 02/08/2023    ALT 34 (H) 02/08/2023    BILITOT 0.2 02/08/2023    ALBUMIN 3.0 (L) 02/08/2023    PROTEINTOT 7.6 02/08/2023    MG 1.9 01/11/2023    PHOS 3.3 10/17/2022           Endoscopy:   ENDOSCOPY:  11/23/21  ESOPHAGOGASTRODUODENOSCOPY PROCEDURE REPORT     Britta Lee  11/23/2021     GASTROENTEROLOGIST:  Palmira Pate MD      PRE-PROCEDURE DIAGNOSIS:  Iron deficiency anemia, unspecified iron deficiency anemia type [D50.9]  Weight loss, abnormal [R63.4]     POST-PROCEDURE DIAGNOSIS:  1.  Gastritis  2.  Angiectasias     Procedure(s):  ESOPHAGOGASTRODUODENOSCOPY WITH BIOPSY  COLONOSCOPY FOR SCREENING     ANESTHESIA:  Propofol administered by anesthesia.  See anesthesia notes for ASA classification     STAFF:  Circulator: Bre Ny RN; Danelle Mccray RN  Endo Technician: Omari Lewis     FINDINGS:  1.  Normal-appearing esophagus.  Biopsies were taken  2.  Mild erythema in the antrum and stomach.  Biopsies taken for H. pylori.  3.  One angiectasia was found in the second portion of the duodenum.  This was nonbleeding.  Biopsies were taken of the duodenum to rule out celiac disease as a source of her anemia.     OPERATIVE PROCEDURE:  After proper informed consent was obtained, patient was transferred to the OR/endoscopy suite.  Patient was then placed in left lateral decubitus position. The Olympus 180 series video gastroscope was inserted orally under direct visualization.  Esophagus, stomach, and duodenum were inspected.  The endoscope was passed to the third portion of the duodenum.  Scope was retroflexed for visualization of the cardia and incisuraThe  endoscope was then withdrawn. Patient tolerated the procedure well. There were no immediate complications.     ESTIMATED BLOOD LOSS:  None     COMPLICATIONS;  None     RECOMMENDATIONS/ PLAN:  1.  Follow-up biopsy results.  2.  Proceed with colonoscopy.    COLONOSCOPY PROCEDURE NOTE     Britta Lee  11/23/2021     GASTROENTEROLOGIST:  Palmira Pate MD        PRE-PROCEDURE DIAGNOSIS:   Iron deficiency anemia, unspecified iron deficiency anemia type [D50.9]  Weight loss, abnormal [R63.4]     POST-PROCEDURE DIAGNOSIS:  1.  Colon polyps  2.  Diverticulosis  3.  Internal hemorrhoids     PROCEDURE:  COLONOSCOPY with snare polypectomy and cold biopsy polypectomy     ANESTHESIA:  Propofol administered by anesthesia.  See anesthesia notes for ASA classification     STAFF  Circulator: Bre Ny RN; Danelle Mccray RN  Endo Technician: Omari Lewis     Findings:  1.  A 5 mm polyp was found in the ascending colon.  This was removed with cold forceps polypectomy.  2.  A 5 mm polyp was removed from the transverse colon with cold forceps biopsy.  3.  Two 6 mm polyps were found in the descending colon.  Both polyps were removed with cold snare polypectomy.  4.  A 7 mm polyp was removed from the sigmoid colon with cold snare polypectomy.  5.  Diverticulosis involving left colon.  6.  Small internal hemorrhoids.  7.  Normal-appearing terminal ileum.    OPERATIVE PROCEDURE   After proper informed consent was obtained, the patient was taken the operating suite and placed in left lateral decubitus position.  An Olympus video colonoscope 180 series was inserted in the rectum and advanced to the terminal ileum under direct visualization.  Cecum and terminal ileum were identified by visualization of the appendiceal orifice and ileocecal valve.  The colonoscope was then slowly withdrawn from the cecum to the rectum and passed a second time from rectum to cecum.  The colonoscope was retroflexed in the cecum  and rectum. Scope was then withdrawn. Patient tolerated the procedure well. There were no immediate complications. Cecal withdrawal time was 15 minutes.     ESTIMATED BLOOD LOSS  None      COMPLICATIONS  None     RECOMMENDATIONS:  1.  Follow-up pathology.  2.  Repeat colonoscopy in 3 years due to personal history of colon polyps.  3.  Proceed with capsule endoscopy if anemia persists.  Sign 4.  Follow-up in GI clinic in 6 to 8 weeks.  Assessment / Plan         Assessment and Plan   77 year old F with history of ESRD on HD who presents for normocytic anemia.     #Anemia  #iron deficiency anemia  #Vitamin B12 deficiency  #Folate deficiency  #Malabsorption  -Patient with history of anemia required multiple transfusions, most recently 1/12/2023.  Previously required IV iron after intolerance to p.o. iron.  Last IV iron received August 2022.  -Most recent EGD and colonoscopy by Dr. Simmons 11/2021.  At that time no source of bleed was discovered, it was recommended patient follow-up with capsule endoscopy if anemia persist.  -Most recent labs from 2/1/2023 with low/normal folate level, normal vitamin B12 however elevated methylmalonic acid.  -Ordered for 1 mg folic acid to be taken daily and 100 mcg of cyanocobalamin tablet to be taken daily  -If no etiology of anemia is discovered.  Recommend EPO infusion by nephrology and or capsule endoscopy to look for a bleed      Discussed possible differential diagnoses, testing, treatment, recommended non-pharmacological interventions, risks, warning signs to monitor for that would indicate need for follow-up in clinic or ER. If no improvement with these regimens or you have new or worsening symptoms follow-up. Patient verbalizes understanding and agreement with plan of care. Denies further needs or concerns.     Patient was given instructions and counseling regarding her condition and for health maintenance advised.    I spent 30 minutes with Britta Lee today.  In the office  today, more than 50% of this time was spent face-to-face with her  in counseling / coordination of care, reviewing her interim medical history and counseling on the current treatment plan.  All questions were answered to her satisfaction.      Meds ordered during this visit  New Medications Ordered This Visit   Medications   • folic acid (FOLVITE) 1 MG tablet     Sig: Take 1 tablet by mouth Daily.     Dispense:  30 tablet     Refill:  3   • vitamin B-12 (cyancobalamin) 100 MCG tablet     Sig: Take 0.5 tablets by mouth Daily.     Dispense:  30 tablet     Refill:  3       Visit Diagnoses    ICD-10-CM ICD-9-CM   1. Iron deficiency anemia, unspecified iron deficiency anemia type  D50.9 280.9       Follow Up   2 months with repeat CBC, folate, B12, MMA, iron studies, ferritin      This document has been electronically signed by Sakina Bang MD   February 21, 2023 09:05 EST    Dictated Utilizing Dragon Dictation: Part of this note may be an electronic transcription/translation of spoken language to printed text using the Dragon Dictation System.

## 2023-02-21 ENCOUNTER — OFFICE VISIT (OUTPATIENT)
Dept: ONCOLOGY | Facility: CLINIC | Age: 78
End: 2023-02-21
Payer: MEDICARE

## 2023-02-21 ENCOUNTER — LAB (OUTPATIENT)
Dept: ONCOLOGY | Facility: CLINIC | Age: 78
End: 2023-02-21
Payer: MEDICARE

## 2023-02-21 VITALS
RESPIRATION RATE: 18 BRPM | WEIGHT: 161.2 LBS | BODY MASS INDEX: 28.56 KG/M2 | SYSTOLIC BLOOD PRESSURE: 102 MMHG | TEMPERATURE: 96.9 F | HEART RATE: 88 BPM | OXYGEN SATURATION: 99 % | DIASTOLIC BLOOD PRESSURE: 67 MMHG | HEIGHT: 63 IN

## 2023-02-21 DIAGNOSIS — D50.9 IRON DEFICIENCY ANEMIA, UNSPECIFIED IRON DEFICIENCY ANEMIA TYPE: ICD-10-CM

## 2023-02-21 DIAGNOSIS — D50.9 IRON DEFICIENCY ANEMIA, UNSPECIFIED IRON DEFICIENCY ANEMIA TYPE: Primary | ICD-10-CM

## 2023-02-21 DIAGNOSIS — E53.8 B12 DEFICIENCY: ICD-10-CM

## 2023-02-21 DIAGNOSIS — E53.8 FOLATE DEFICIENCY: ICD-10-CM

## 2023-02-21 LAB
BASOPHILS # BLD AUTO: 0.05 10*3/MM3 (ref 0–0.2)
BASOPHILS NFR BLD AUTO: 0.6 % (ref 0–1.5)
DEPRECATED RDW RBC AUTO: 59.4 FL (ref 37–54)
EOSINOPHIL # BLD AUTO: 0.32 10*3/MM3 (ref 0–0.4)
EOSINOPHIL NFR BLD AUTO: 3.5 % (ref 0.3–6.2)
ERYTHROCYTE [DISTWIDTH] IN BLOOD BY AUTOMATED COUNT: 18.1 % (ref 12.3–15.4)
FERRITIN SERPL-MCNC: 1489 NG/ML (ref 13–150)
HCT VFR BLD AUTO: 31.2 % (ref 34–46.6)
HGB BLD-MCNC: 9.3 G/DL (ref 12–15.9)
IMM GRANULOCYTES # BLD AUTO: 0.05 10*3/MM3 (ref 0–0.05)
IMM GRANULOCYTES NFR BLD AUTO: 0.6 % (ref 0–0.5)
IRON 24H UR-MRATE: 34 MCG/DL (ref 37–145)
IRON SATN MFR SERPL: 13 % (ref 20–50)
LYMPHOCYTES # BLD AUTO: 1.91 10*3/MM3 (ref 0.7–3.1)
LYMPHOCYTES NFR BLD AUTO: 21.2 % (ref 19.6–45.3)
MCH RBC QN AUTO: 26.8 PG (ref 26.6–33)
MCHC RBC AUTO-ENTMCNC: 29.8 G/DL (ref 31.5–35.7)
MCV RBC AUTO: 89.9 FL (ref 79–97)
MONOCYTES # BLD AUTO: 0.63 10*3/MM3 (ref 0.1–0.9)
MONOCYTES NFR BLD AUTO: 7 % (ref 5–12)
NEUTROPHILS NFR BLD AUTO: 6.06 10*3/MM3 (ref 1.7–7)
NEUTROPHILS NFR BLD AUTO: 67.1 % (ref 42.7–76)
NRBC BLD AUTO-RTO: 0 /100 WBC (ref 0–0.2)
PLATELET # BLD AUTO: 323 10*3/MM3 (ref 140–450)
PMV BLD AUTO: 8.5 FL (ref 6–12)
RBC # BLD AUTO: 3.47 10*6/MM3 (ref 3.77–5.28)
TIBC SERPL-MCNC: 271 MCG/DL (ref 298–536)
TRANSFERRIN SERPL-MCNC: 182 MG/DL (ref 200–360)
WBC NRBC COR # BLD: 9.02 10*3/MM3 (ref 3.4–10.8)

## 2023-02-21 PROCEDURE — 85025 COMPLETE CBC W/AUTO DIFF WBC: CPT | Performed by: INTERNAL MEDICINE

## 2023-02-21 PROCEDURE — 83540 ASSAY OF IRON: CPT | Performed by: INTERNAL MEDICINE

## 2023-02-21 PROCEDURE — 99214 OFFICE O/P EST MOD 30 MIN: CPT | Performed by: INTERNAL MEDICINE

## 2023-02-21 PROCEDURE — 84466 ASSAY OF TRANSFERRIN: CPT | Performed by: INTERNAL MEDICINE

## 2023-02-21 PROCEDURE — 82728 ASSAY OF FERRITIN: CPT | Performed by: INTERNAL MEDICINE

## 2023-02-21 RX ORDER — FOLIC ACID 1 MG/1
1 TABLET ORAL DAILY
Qty: 30 TABLET | Refills: 3 | Status: SHIPPED | OUTPATIENT
Start: 2023-02-21

## 2023-02-21 RX ORDER — UBIDECARENONE 75 MG
50 CAPSULE ORAL DAILY
Qty: 30 TABLET | Refills: 3 | Status: SHIPPED | OUTPATIENT
Start: 2023-02-21

## 2023-02-21 NOTE — PROGRESS NOTES
Venipuncture Blood Specimen Collection  Venipuncture performed in right arm by Sosa Fernandes MA with good hemostasis. Patient tolerated the procedure well without complications.   02/21/23   Sosa Fernandes MA

## 2023-02-23 ENCOUNTER — OFFICE VISIT (OUTPATIENT)
Dept: FAMILY MEDICINE CLINIC | Facility: CLINIC | Age: 78
End: 2023-02-23
Payer: MEDICARE

## 2023-02-23 VITALS
SYSTOLIC BLOOD PRESSURE: 88 MMHG | TEMPERATURE: 97.1 F | DIASTOLIC BLOOD PRESSURE: 58 MMHG | WEIGHT: 161 LBS | OXYGEN SATURATION: 100 % | BODY MASS INDEX: 28.53 KG/M2 | HEIGHT: 63 IN | HEART RATE: 66 BPM

## 2023-02-23 DIAGNOSIS — E78.5 DYSLIPIDEMIA: Chronic | ICD-10-CM

## 2023-02-23 DIAGNOSIS — E11.22 TYPE 2 DIABETES MELLITUS WITH CHRONIC KIDNEY DISEASE ON CHRONIC DIALYSIS, WITH LONG-TERM CURRENT USE OF INSULIN: Primary | Chronic | ICD-10-CM

## 2023-02-23 DIAGNOSIS — I10 ESSENTIAL HYPERTENSION: Chronic | ICD-10-CM

## 2023-02-23 DIAGNOSIS — N18.6 TYPE 2 DIABETES MELLITUS WITH CHRONIC KIDNEY DISEASE ON CHRONIC DIALYSIS, WITH LONG-TERM CURRENT USE OF INSULIN: Primary | Chronic | ICD-10-CM

## 2023-02-23 DIAGNOSIS — Z79.4 TYPE 2 DIABETES MELLITUS WITH CHRONIC KIDNEY DISEASE ON CHRONIC DIALYSIS, WITH LONG-TERM CURRENT USE OF INSULIN: Primary | Chronic | ICD-10-CM

## 2023-02-23 DIAGNOSIS — Z99.2 TYPE 2 DIABETES MELLITUS WITH CHRONIC KIDNEY DISEASE ON CHRONIC DIALYSIS, WITH LONG-TERM CURRENT USE OF INSULIN: Primary | Chronic | ICD-10-CM

## 2023-02-23 PROCEDURE — 99214 OFFICE O/P EST MOD 30 MIN: CPT | Performed by: PHYSICIAN ASSISTANT

## 2023-02-23 RX ORDER — FAMOTIDINE 20 MG/1
1 TABLET, FILM COATED ORAL EVERY 12 HOURS SCHEDULED
COMMUNITY
Start: 2022-12-19

## 2023-02-26 NOTE — PROGRESS NOTES
"Alison Lee is a 77 y.o. female.       Chief Complaint -diabetes    History of Present Illness -    ROS    Diabetes-  Stable with Humalog sliding scale and dietary modification    Chronic kidney disease-  She does have end-stage renal disease that is stable with use of dialysis 3 times weekly.    Hypertension-  Stable with amlodipine and metoprolol.  Blood pressure is purposefully kept low due to chronic kidney disease.    Dyslipidemia-  Stable with atorvastatin and low-cholesterol diet      The following portions of the patient's history were reviewed and updated as appropriate: allergies, current medications, past family history, past medical history, past social history, past surgical history and problem list.    Review of Systems    Objective  Vital signs:  BP (!) 88/58   Pulse 66   Temp 97.1 °F (36.2 °C) (Temporal)   Ht 160 cm (63\")   Wt 73 kg (161 lb)   SpO2 100%   BMI 28.52 kg/m²     Physical Exam  Vitals and nursing note reviewed.   Constitutional:       Appearance: Normal appearance. She is well-developed.   Eyes:      Extraocular Movements: Extraocular movements intact.      Conjunctiva/sclera: Conjunctivae normal.   Cardiovascular:      Rate and Rhythm: Normal rate and regular rhythm.      Heart sounds: Normal heart sounds. No murmur heard.  Pulmonary:      Effort: Pulmonary effort is normal. No respiratory distress.      Breath sounds: Normal breath sounds. No wheezing.   Musculoskeletal:         General: No tenderness.   Skin:     General: Skin is warm and dry.      Findings: No rash.   Neurological:      Mental Status: She is alert and oriented to person, place, and time.   Psychiatric:         Mood and Affect: Mood normal.         Behavior: Behavior normal.         Thought Content: Thought content normal.         The following data was reviewed by: RAFIQ Montanez on 02/23/2023:  CMP    CMP 1/11/23 1/11/23 1/17/23 2/8/23    1600 1600     Glucose 132 (A)  122 (A) 194 (A) "   BUN 7 (A)  19 23   Creatinine 1.86 (A)  3.84 (A) 3.25 (A)   eGFR 27.3 (A)  11.4 (A) 14.0 (A)   Sodium 136  138 138   Potassium 3.4 (A)  4.3 5.1   Chloride 96 (A)  99 101   Calcium 8.7  9.2 8.8   Total Protein 7.7  7.3 7.6   Albumin 3.1 (A)  3.2 (A) 3.0 (A)   Globulin 4.6  4.1 4.6   Total Bilirubin 0.2 0.2 0.3 0.2   Alkaline Phosphatase 189 (A)  187 (A) 365 (A)   AST (SGOT) 22  26 75 (A)   ALT (SGPT) 13  13 34 (A)   Albumin/Globulin Ratio 0.7  0.8 0.7   BUN/Creatinine Ratio 3.8 (A)  4.9 (A) 7.1   Anion Gap 14.5  14.4 13.4   (A) Abnormal value       Comments are available for some flowsheets but are not being displayed.           CBC w/diff    CBC w/Diff 2/1/23 2/8/23 2/21/23   WBC 8.33 12.57 (A) 9.02   RBC 3.09 (A) 3.10 (A) 3.47 (A)   Hemoglobin 8.3 (A) 8.5 (A) 9.3 (A)   Hematocrit 26.9 (A) 27.9 (A) 31.2 (A)   MCV 87.1 90.0 89.9   MCH 26.9 27.4 26.8   MCHC 30.9 (A) 30.5 (A) 29.8 (A)   RDW 18.9 (A) 19.7 (A) 18.1 (A)   Platelets 325 254 323   Neutrophil Rel % 70.4 82.6 (A) 67.1   Immature Granulocyte Rel % 0.5 0.6 (A) 0.6 (A)   Lymphocyte Rel % 18.7 (A) 9.0 (A) 21.2   Monocyte Rel % 8.3 5.2 7.0   Eosinophil Rel % 1.7 2.4 3.5   Basophil Rel % 0.4 0.2 0.6   (A) Abnormal value            Lipid Panel    Lipid Panel 3/7/22 6/9/22 9/8/22   Total Cholesterol 137 126 109   Triglycerides 92 163 (A) 122   HDL Cholesterol 40 43 31 (A)   VLDL Cholesterol 18 28 22   LDL Cholesterol  79 55 56   LDL/HDL Ratio 1.97 1.17 1.73   (A) Abnormal value            TSH    TSH 11/2/22 12/3/22 1/17/23   TSH 12.790 (A) 8.830 (A) 4.640 (A)   (A) Abnormal value            Most Recent A1C    HGBA1C Most Recent 1/17/23   Hemoglobin A1C 5.00                  Assessment & Plan     Diagnoses and all orders for this visit:    1. Type 2 diabetes mellitus with chronic kidney disease on chronic dialysis, with long-term current use of insulin (HCC) (Primary)  Comments:  Continue Humalog sliding scale with meals  Advise daily BG monitoring  Continue  dialysis 3 times weekly    2. Essential hypertension  Comments:  Continue amlodipine and metoprolol  Advise daily BP monitoring    3. Dyslipidemia  Comments:  Continue atorvastatin  Advised low-cholesterol diet            Patient was given instructions and counseling regarding his condition or for health maintenance advice. Please see specific information pulled into the AVS if appropriate      This document has been electronically signed by:  Lexie Dolan PA-C

## 2023-03-02 ENCOUNTER — OFFICE VISIT (OUTPATIENT)
Dept: GASTROENTEROLOGY | Facility: CLINIC | Age: 78
End: 2023-03-02
Payer: MEDICARE

## 2023-03-02 VITALS
HEART RATE: 69 BPM | HEIGHT: 63 IN | OXYGEN SATURATION: 98 % | SYSTOLIC BLOOD PRESSURE: 111 MMHG | DIASTOLIC BLOOD PRESSURE: 62 MMHG | BODY MASS INDEX: 28.52 KG/M2

## 2023-03-02 DIAGNOSIS — Z99.2 ESRD (END STAGE RENAL DISEASE) ON DIALYSIS: ICD-10-CM

## 2023-03-02 DIAGNOSIS — D50.9 IRON DEFICIENCY ANEMIA, UNSPECIFIED IRON DEFICIENCY ANEMIA TYPE: Primary | ICD-10-CM

## 2023-03-02 DIAGNOSIS — D63.8 ANEMIA OF CHRONIC DISEASE: ICD-10-CM

## 2023-03-02 DIAGNOSIS — N18.6 ESRD (END STAGE RENAL DISEASE) ON DIALYSIS: ICD-10-CM

## 2023-03-02 DIAGNOSIS — K74.60 CIRRHOSIS OF LIVER WITHOUT ASCITES, UNSPECIFIED HEPATIC CIRRHOSIS TYPE: ICD-10-CM

## 2023-03-02 PROCEDURE — 99213 OFFICE O/P EST LOW 20 MIN: CPT | Performed by: INTERNAL MEDICINE

## 2023-03-02 NOTE — PROGRESS NOTES
Subjective   Britta Lee is a 77 y.o. female who presents to the office today as a follow up appointment regarding Cirrhosis      History of Present Illness:    The patient presents for follow up JANE cirrhosis of the liver and anemia.  She has lost weight.  She is now 187 lbs.  She does not eat as much as she did in the past.  She is eating healthier foods than in the past. Her  does the cooking.  She has avoided candy but admits that she does eat sweets.  She went to have her UGI/SBFT in preparation for capsule endoscopy but this was unable to be performed because she passed out.  She is not sure why she passed out but thought it might be due to low blood sugar.  Her stool is dark now due to oral iron.  No hematemesis.  Her EGD did not reveal esophageal varices.  No BRBPR. She denies abdominal pain.  No confusion.  No abdominal swelling.  Overall, she has been stable.  She is followed by hemology.  She has not been able to do the capsule endoscopy because she has not been able to do the UGI/SBFT to clear the small bowel for the study.  We need to make sure she does not have a stricture in the small bowel prior to performing the capsule endoscopy as the patient is likely not a very good surgical candidate for removal of the capsule if it were to become lodged in the small bowel.  Recent hgb/hct 8.3/27.3 and iron saturation 11%.    INTERVAL HISTORY:  Ms. Lee presents today for follow up. She continues to follow closely with hematology. She did require 2 units of PRBCs in January. 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 She now has ESRD requiring HD three times weekly which is largely contributing to her anemia. She is following with nephrology. As above, she has not been able to do the capsule endoscopy because she has not been able to do the UGI/SBFT to clear the small bowel for the study.  We need to make sure she does not have a stricture in the small bowel prior to performing the  capsule endoscopy as the patient is likely not a very good surgical candidate for removal of the capsule if it were to become lodged in the small bowel. At present, she denies having melena, BRBPR and hematemesis. She denies having altered mental status. Her BLE swelling has improved since starting hemodialysis. Reviewed recent labs from January and February. Recent CT in December 2022 did not reveal any hepatic lesions. However, she did have ascites and anasarca which seems to have clinically improved since starting HD.     Cirrhosis  Associated symptoms include arthralgias and fatigue. Pertinent negatives include no chills, coughing, fever, joint swelling, nausea, rash or vomiting.       Review of Systems:  Review of Systems   Constitutional: Positive for activity change and fatigue. Negative for chills and fever.   HENT: Negative for mouth sores and trouble swallowing.    Eyes: Negative for photophobia, pain and redness.   Respiratory: Negative for cough and choking.    Cardiovascular: Positive for leg swelling.   Gastrointestinal: Negative for abdominal distention, anal bleeding, blood in stool, constipation, diarrhea, nausea, rectal pain and vomiting.   Endocrine: Negative for cold intolerance, heat intolerance and polyphagia.   Genitourinary: Negative for difficulty urinating and dysuria.   Musculoskeletal: Positive for arthralgias. Negative for joint swelling.   Skin: Negative for rash and wound.   Allergic/Immunologic: Negative for environmental allergies and food allergies.   Neurological: Positive for dizziness. Negative for light-headedness.   Hematological: Negative for adenopathy. Bruises/bleeds easily.   Psychiatric/Behavioral: Negative for sleep disturbance. The patient is not nervous/anxious.        Past Medical History:  Past Medical History:   Diagnosis Date   • Acquired hypothyroidism 4/22/2021   • Anemia    • Arthritis    • Carpal tunnel syndrome    • Coronary artery disease    • Diabetes  mellitus (HCC)    • Elevated cholesterol    • GERD (gastroesophageal reflux disease)    • History of pneumonia    • History of unsteady gait     OCASSIONALLY   • Hypertension    • Insomnia    • Kidney disease    • LENNY (obstructive sleep apnea) 12/1/2020   • Osteoarthritis    • Renal insufficiency    • Sciatica        Past Surgical History:  Past Surgical History:   Procedure Laterality Date   • ABDOMINAL SURGERY     • APPENDECTOMY     • CARDIAC CATHETERIZATION      1 STENT  ---  2000   • CARDIAC SURGERY     • CAROTID STENT     • CARPAL TUNNEL RELEASE Right 10/8/2019    Procedure: CARPAL TUNNEL RELEASE;  Surgeon: Feroz Levin MD;  Location: [facility];  Service: Orthopedics   • CATARACT EXTRACTION     • CHOLECYSTECTOMY     • COLONOSCOPY     • COLONOSCOPY N/A 11/23/2021    Procedure: COLONOSCOPY FOR SCREENING;  Surgeon: Palmira Pate MD;  Location: [facility];  Service: Gastroenterology;  Laterality: N/A;   • CORONARY ANGIOPLASTY WITH STENT PLACEMENT     • ENDOSCOPY     • ENDOSCOPY N/A 3/5/2020    Procedure: ESOPHAGOGASTRODUODENOSCOPY;  Surgeon: Alexandru Bagley MD;  Location: [facility];  Service: Gastroenterology;  Laterality: N/A;   • ENDOSCOPY N/A 11/23/2021    Procedure: ESOPHAGOGASTRODUODENOSCOPY WITH BIOPSY;  Surgeon: Palmira Pate MD;  Location: [facility] OR;  Service: Gastroenterology;  Laterality: N/A;   • ENDOSCOPY AND COLONOSCOPY     • GALLBLADDER SURGERY     • INSERTION HEMODIALYSIS CATHETER N/A 11/14/2022    Procedure: HEMODIALYSIS CATHETER INSERTION;  Surgeon: Ta Blas MD;  Location: [facility];  Service: General;  Laterality: N/A;   • JOINT REPLACEMENT Left 05/02/2017    [facility]  DR LEVIN  LEFT TOTAL KNEE   • KNEE ARTHROSCOPY W/ MENISCECTOMY Right    • LAPAROSCOPIC TUBAL LIGATION     • AL ARTHRP KNE CONDYLE&PLATU MEDIAL&LAT COMPARTMENTS Left 5/2/2017    Procedure: TOTAL KNEE ARTHROPLASTY;  Surgeon: Feroz Levin MD;  Location: [facility];  Service:  Orthopedics   • STERILIZATION     • TONSILLECTOMY         Family History:  Family History   Problem Relation Age of Onset   • Arthritis Mother    • Diabetes Mother    • Cancer Mother    • Heart disease Father    • Diabetes Daughter    • Diabetes Son    • Diabetes Maternal Aunt    • Heart disease Maternal Grandmother    • Breast cancer Neg Hx        Social History:  Social History     Socioeconomic History   • Marital status:      Spouse name: natalie   • Number of children: 3   • Years of education: 12   Tobacco Use   • Smoking status: Never   • Smokeless tobacco: Never   Vaping Use   • Vaping Use: Never used   Substance and Sexual Activity   • Alcohol use: Yes     Comment: socially   • Drug use: No   • Sexual activity: Defer     Birth control/protection: Surgical       Current Medication List:    Current Outpatient Medications:   •  amLODIPine (NORVASC) 10 MG tablet, Take 1 tablet by mouth Daily., Disp: 90 tablet, Rfl: 3  •  atorvastatin (LIPITOR) 40 MG tablet, Take 1 tablet by mouth Every Night., Disp: 90 tablet, Rfl: 3  •  escitalopram (LEXAPRO) 10 MG tablet, Take 1 tablet by mouth Daily., Disp: 90 tablet, Rfl: 3  •  famotidine (PEPCID) 20 MG tablet, Take 1 tablet by mouth Every 12 (Twelve) Hours., Disp: , Rfl:   •  folic acid (FOLVITE) 1 MG tablet, Take 1 tablet by mouth Daily., Disp: 30 tablet, Rfl: 3  •  hydrALAZINE (APRESOLINE) 25 MG tablet, Take 2 tablets by mouth 3 (Three) Times a Day., Disp: 270 tablet, Rfl: 1  •  HYDROcodone-acetaminophen (NORCO) 7.5-325 MG per tablet, Take 1 tablet by mouth Every 6 (Six) Hours As Needed for Moderate Pain., Disp: 12 tablet, Rfl: 0  •  Insulin Lispro (humaLOG) 100 UNIT/ML injection, Inject 0-7 Units under the skin into the appropriate area as directed 3 (Three) Times a Day Before Meals. Admin Instructions: Correction - Low Dose.  Less than 40 units/day total insulin dose or lean, elderly, renal patients  Blood glucose 150-199 mg/dL - 2 units  Blood glucose 200-249  "mg/dL - 3 units  Blood glucose 250-299 mg/dL - 4 units  Blood glucose 300-349 mg/dL - 5 units  Blood glucose 350-400 mg/dL - 6 units  Blood glucose greater than 400 mg/dL - 7 units and call provider, Disp: , Rfl:   •  isosorbide mononitrate (IMDUR) 60 MG 24 hr tablet, Take 1 tablet by mouth Daily., Disp: 90 tablet, Rfl: 0  •  levothyroxine (SYNTHROID, LEVOTHROID) 25 MCG tablet, Take 1 tablet by mouth Daily., Disp: 90 tablet, Rfl: 3  •  metoprolol succinate XL (TOPROL-XL) 50 MG 24 hr tablet, Take 1.5 tablets by mouth Daily., Disp: , Rfl:   •  pantoprazole (Protonix) 40 MG EC tablet, Take 1 tablet by mouth Daily., Disp: 90 tablet, Rfl: 3  •  vitamin B-12 (cyancobalamin) 100 MCG tablet, Take 0.5 tablets by mouth Daily., Disp: 30 tablet, Rfl: 3  •  vitamin D (ERGOCALCIFEROL) 1.25 MG (36607 UT) capsule capsule, Take 1 capsule by mouth 1 (One) Time Per Week., Disp: 15 capsule, Rfl: 3  •  mirtazapine (REMERON) 7.5 MG tablet, Take 3 tablets by mouth Every Night for 30 days., Disp: 90 tablet, Rfl: 0    Allergies:   Patient has no known allergies.    Vitals:  /62   Pulse 69   Ht 160 cm (63\")   SpO2 98%   BMI 28.52 kg/m²     Physical Exam:  Physical Exam  Constitutional:       Appearance: She is obese.   HENT:      Head: Normocephalic and atraumatic.      Nose: Nose normal. No congestion or rhinorrhea.   Eyes:      General: No scleral icterus.     Extraocular Movements: Extraocular movements intact.      Conjunctiva/sclera: Conjunctivae normal.      Pupils: Pupils are equal, round, and reactive to light.   Cardiovascular:      Rate and Rhythm: Normal rate and regular rhythm.      Pulses: Normal pulses.      Heart sounds: Normal heart sounds.   Pulmonary:      Effort: Pulmonary effort is normal.      Breath sounds: Normal breath sounds.   Abdominal:      General: Abdomen is flat. Bowel sounds are normal. There is no distension.      Palpations: Abdomen is soft. There is no shifting dullness, fluid wave, hepatomegaly, " splenomegaly, mass or pulsatile mass.      Tenderness: There is no abdominal tenderness. There is no guarding or rebound.      Hernia: No hernia is present.   Musculoskeletal:         General: No swelling or tenderness.      Cervical back: Normal range of motion and neck supple.   Skin:     General: Skin is warm and dry.      Coloration: Skin is not jaundiced.   Neurological:      General: No focal deficit present.      Mental Status: She is alert and oriented to person, place, and time.   Psychiatric:         Mood and Affect: Mood normal.         Behavior: Behavior normal.         Results Review:  Lab Results:   Office Visit on 02/21/2023   Component Date Value Ref Range Status   • Iron 02/21/2023 34 (L)  37 - 145 mcg/dL Final   • Iron Saturation 02/21/2023 13 (L)  20 - 50 % Final   • Transferrin 02/21/2023 182 (L)  200 - 360 mg/dL Final   • TIBC 02/21/2023 271 (L)  298 - 536 mcg/dL Final   • Ferritin 02/21/2023 1,489.00 (H)  13.00 - 150.00 ng/mL Final   Lab on 02/21/2023   Component Date Value Ref Range Status   • WBC 02/21/2023 9.02  3.40 - 10.80 10*3/mm3 Final   • RBC 02/21/2023 3.47 (L)  3.77 - 5.28 10*6/mm3 Final   • Hemoglobin 02/21/2023 9.3 (L)  12.0 - 15.9 g/dL Final   • Hematocrit 02/21/2023 31.2 (L)  34.0 - 46.6 % Final   • MCV 02/21/2023 89.9  79.0 - 97.0 fL Final   • MCH 02/21/2023 26.8  26.6 - 33.0 pg Final   • MCHC 02/21/2023 29.8 (L)  31.5 - 35.7 g/dL Final   • RDW 02/21/2023 18.1 (H)  12.3 - 15.4 % Final   • RDW-SD 02/21/2023 59.4 (H)  37.0 - 54.0 fl Final   • MPV 02/21/2023 8.5  6.0 - 12.0 fL Final   • Platelets 02/21/2023 323  140 - 450 10*3/mm3 Final   • Neutrophil % 02/21/2023 67.1  42.7 - 76.0 % Final   • Lymphocyte % 02/21/2023 21.2  19.6 - 45.3 % Final   • Monocyte % 02/21/2023 7.0  5.0 - 12.0 % Final   • Eosinophil % 02/21/2023 3.5  0.3 - 6.2 % Final   • Basophil % 02/21/2023 0.6  0.0 - 1.5 % Final   • Immature Grans % 02/21/2023 0.6 (H)  0.0 - 0.5 % Final   • Neutrophils, Absolute  02/21/2023 6.06  1.70 - 7.00 10*3/mm3 Final   • Lymphocytes, Absolute 02/21/2023 1.91  0.70 - 3.10 10*3/mm3 Final   • Monocytes, Absolute 02/21/2023 0.63  0.10 - 0.90 10*3/mm3 Final   • Eosinophils, Absolute 02/21/2023 0.32  0.00 - 0.40 10*3/mm3 Final   • Basophils, Absolute 02/21/2023 0.05  0.00 - 0.20 10*3/mm3 Final   • Immature Grans, Absolute 02/21/2023 0.05  0.00 - 0.05 10*3/mm3 Final   • nRBC 02/21/2023 0.0  0.0 - 0.2 /100 WBC Final   Admission on 02/08/2023, Discharged on 02/08/2023   Component Date Value Ref Range Status   • Glucose 02/08/2023 194 (H)  65 - 99 mg/dL Final   • BUN 02/08/2023 23  8 - 23 mg/dL Final   • Creatinine 02/08/2023 3.25 (H)  0.57 - 1.00 mg/dL Final   • Sodium 02/08/2023 138  136 - 145 mmol/L Final   • Potassium 02/08/2023 5.1  3.5 - 5.2 mmol/L Final    Specimen hemolyzed.  Results may be affected.   • Chloride 02/08/2023 101  98 - 107 mmol/L Final   • CO2 02/08/2023 23.6  22.0 - 29.0 mmol/L Final   • Calcium 02/08/2023 8.8  8.6 - 10.5 mg/dL Final   • Total Protein 02/08/2023 7.6  6.0 - 8.5 g/dL Final   • Albumin 02/08/2023 3.0 (L)  3.5 - 5.2 g/dL Final   • ALT (SGPT) 02/08/2023 34 (H)  1 - 33 U/L Final    Specimen hemolyzed.  Results may be affected.   • AST (SGOT) 02/08/2023 75 (H)  1 - 32 U/L Final   • Alkaline Phosphatase 02/08/2023 365 (H)  39 - 117 U/L Final   • Total Bilirubin 02/08/2023 0.2  0.0 - 1.2 mg/dL Final   • Globulin 02/08/2023 4.6  gm/dL Final   • A/G Ratio 02/08/2023 0.7  g/dL Final   • BUN/Creatinine Ratio 02/08/2023 7.1  7.0 - 25.0 Final   • Anion Gap 02/08/2023 13.4  5.0 - 15.0 mmol/L Final   • eGFR 02/08/2023 14.0 (L)  >60.0 mL/min/1.73 Final    <15 Indicative of kidney failure   • WBC 02/08/2023 12.57 (H)  3.40 - 10.80 10*3/mm3 Final   • RBC 02/08/2023 3.10 (L)  3.77 - 5.28 10*6/mm3 Final   • Hemoglobin 02/08/2023 8.5 (L)  12.0 - 15.9 g/dL Final   • Hematocrit 02/08/2023 27.9 (L)  34.0 - 46.6 % Final   • MCV 02/08/2023 90.0  79.0 - 97.0 fL Final   • MCH  02/08/2023 27.4  26.6 - 33.0 pg Final   • MCHC 02/08/2023 30.5 (L)  31.5 - 35.7 g/dL Final   • RDW 02/08/2023 19.7 (H)  12.3 - 15.4 % Final   • RDW-SD 02/08/2023 63.5 (H)  37.0 - 54.0 fl Final   • MPV 02/08/2023 8.5  6.0 - 12.0 fL Final   • Platelets 02/08/2023 254  140 - 450 10*3/mm3 Final   • Neutrophil % 02/08/2023 82.6 (H)  42.7 - 76.0 % Final   • Lymphocyte % 02/08/2023 9.0 (L)  19.6 - 45.3 % Final   • Monocyte % 02/08/2023 5.2  5.0 - 12.0 % Final   • Eosinophil % 02/08/2023 2.4  0.3 - 6.2 % Final   • Basophil % 02/08/2023 0.2  0.0 - 1.5 % Final   • Immature Grans % 02/08/2023 0.6 (H)  0.0 - 0.5 % Final   • Neutrophils, Absolute 02/08/2023 10.39 (H)  1.70 - 7.00 10*3/mm3 Final   • Lymphocytes, Absolute 02/08/2023 1.13  0.70 - 3.10 10*3/mm3 Final   • Monocytes, Absolute 02/08/2023 0.65  0.10 - 0.90 10*3/mm3 Final   • Eosinophils, Absolute 02/08/2023 0.30  0.00 - 0.40 10*3/mm3 Final   • Basophils, Absolute 02/08/2023 0.03  0.00 - 0.20 10*3/mm3 Final   • Immature Grans, Absolute 02/08/2023 0.07 (H)  0.00 - 0.05 10*3/mm3 Final   • nRBC 02/08/2023 0.0  0.0 - 0.2 /100 WBC Final       Assessment & Plan     Visit Diagnoses:    ICD-10-CM ICD-9-CM   1. Iron deficiency anemia, unspecified iron deficiency anemia type  D50.9 280.9   2. Cirrhosis of liver without ascites, unspecified hepatic cirrhosis type (HCC)  K74.60 571.5   3. ESRD (end stage renal disease) on dialysis (HCC)  N18.6 585.6    Z99.2 V45.11       Plan:  1. The patient's anemia is likely multifactorial.  She has cirrhosis and chronic kidney disease. Will have patient swallow a dummy pill cam to evaluate patency of small bowel. If she is able to clear the dummy pill cam, will proceed with pill cam endoscopy to evaluate anemia.   2. Follow up with hematology and nephrology as previously planned.   3. Follow up in 3 months and repeat AFP at that time and US of liver.       MEDS ORDERED DURING VISIT:  No orders of the defined types were placed in this  encounter.      Return in about 3 months (around 6/2/2023).             This document has been electronically signed by Palmira Pate MD   March 2, 2023 14:29 EST

## 2023-03-10 ENCOUNTER — TRANSCRIBE ORDERS (OUTPATIENT)
Dept: ADMINISTRATIVE | Facility: HOSPITAL | Age: 78
End: 2023-03-10
Payer: MEDICARE

## 2023-03-10 DIAGNOSIS — Q39.9: Primary | ICD-10-CM

## 2023-05-11 ENCOUNTER — OFFICE VISIT (OUTPATIENT)
Dept: CARDIOLOGY | Facility: CLINIC | Age: 78
End: 2023-05-11
Payer: MEDICARE

## 2023-05-11 VITALS
OXYGEN SATURATION: 98 % | BODY MASS INDEX: 26.97 KG/M2 | HEIGHT: 63 IN | HEART RATE: 59 BPM | SYSTOLIC BLOOD PRESSURE: 86 MMHG | DIASTOLIC BLOOD PRESSURE: 48 MMHG | WEIGHT: 152.2 LBS

## 2023-05-11 DIAGNOSIS — I50.32 CHRONIC HEART FAILURE WITH PRESERVED EJECTION FRACTION (HFPEF): ICD-10-CM

## 2023-05-11 DIAGNOSIS — I25.10 ASCVD (ARTERIOSCLEROTIC CARDIOVASCULAR DISEASE): ICD-10-CM

## 2023-05-11 DIAGNOSIS — I10 ESSENTIAL HYPERTENSION: Primary | ICD-10-CM

## 2023-05-11 RX ORDER — BUMETANIDE 1 MG/1
1 TABLET ORAL EVERY OTHER DAY
COMMUNITY
Start: 2023-03-29

## 2023-05-11 RX ORDER — CLONIDINE HYDROCHLORIDE 0.2 MG/1
1 TABLET ORAL 3 TIMES DAILY
COMMUNITY
Start: 2023-03-13 | End: 2023-05-11

## 2023-05-11 RX ORDER — LANOLIN ALCOHOL/MO/W.PET/CERES
1 CREAM (GRAM) TOPICAL EVERY 12 HOURS SCHEDULED
COMMUNITY
Start: 2023-03-14

## 2023-05-11 NOTE — PROGRESS NOTES
[facility]             PriscilaERNIE Berman Susan Marie, PA  Britta Lee  1945 05/11/2023    Patient Active Problem List   Diagnosis   • Type 2 diabetes mellitus, uncontrolled, with neuropathy   • Essential hypertension   • ASCVD (arteriosclerotic cardiovascular disease)   • Bilateral carpal tunnel syndrome   • Diabetic retinopathy associated with type 2 diabetes mellitus   • Dyslipidemia   • Sciatic neuropathy   • DDD (degenerative disc disease), lumbar   • Primary osteoarthritis of left knee   • Status post total left knee replacement   • Type 2 diabetes mellitus with chronic kidney disease, with long-term current use of insulin   • Hyperparathyroidism due to renal insufficiency   • Venous insufficiency (chronic) (peripheral)   • Class 1 obesity due to excess calories with serious comorbidity and body mass index (BMI) of 31.0 to 31.9 in adult   • PVD (peripheral vascular disease) with claudication   • LVH (left ventricular hypertrophy)   • Chronic heart failure with preserved ejection fraction (HFpEF)   • LENNY (obstructive sleep apnea)   • CKD (chronic kidney disease) stage 4, GFR 15-29 ml/min ([facility])   • Iron deficiency anemia   • Malabsorption of iron   • Acquired hypothyroidism   • Hiatal hernia   • Weight loss, abnormal   • Osteopenia of neck of left femur   • Cirrhosis   • Severe pulmonary hypertension       Dear Lexie Dolan PA:    Subjective     Chief Complaint   Patient presents with   • Follow-up     ROUTINE       HPI:     This is a 77 y.o. female with known past medical history of HFpEF, severe pulmonary hypertension, ASCVD status post PCI in 2000 at [facility] by Dr. Hussein, cirrhosis, iron deficiency anemia, diabetes mellitus type 2, chronic kidney disease on hemodialysis and obstructive sleep apnea.    Britta Lee presents today for routine cardiology follow up.  Patient states she has been doing overall well since her last  visit.  Does report some intermittent dizziness for which she sometimes falls.  Noted to be hypotensive in the office today.  Patient significant other reports patient did take blood pressure medicines this morning including clonidine, metoprolol and amlodipine.  Denies adequate water intake.  Recent lab work stable.  Denies any chest pain or shortness of breath.    • Diagnostic Testing  1. Echocardiogram 11/2020: EF greater than 70% with concentric hypertrophy, pseudonormalization and mild mitral valve regurgitation  2. Echocardiogram 10/2022: EF greater than 70% with mild concentric hypertrophy  3. Echocardiogram 11/2022: EF greater than 70% with moderate tricuspid valve regurgitation and severe pulmonary hypertension     All other systems were reviewed and were negative.    Patient Active Problem List   Diagnosis   • Type 2 diabetes mellitus, uncontrolled, with neuropathy   • Essential hypertension   • ASCVD (arteriosclerotic cardiovascular disease)   • Bilateral carpal tunnel syndrome   • Diabetic retinopathy associated with type 2 diabetes mellitus   • Dyslipidemia   • Sciatic neuropathy   • DDD (degenerative disc disease), lumbar   • Primary osteoarthritis of left knee   • Status post total left knee replacement   • Type 2 diabetes mellitus with chronic kidney disease, with long-term current use of insulin   • Hyperparathyroidism due to renal insufficiency   • Venous insufficiency (chronic) (peripheral)   • Class 1 obesity due to excess calories with serious comorbidity and body mass index (BMI) of 31.0 to 31.9 in adult   • PVD (peripheral vascular disease) with claudication   • LVH (left ventricular hypertrophy)   • Chronic heart failure with preserved ejection fraction (HFpEF)   • LENNY (obstructive sleep apnea)   • CKD (chronic kidney disease) stage 4, GFR 15-29 ml/min ([facility])   • Iron deficiency anemia   • Malabsorption of iron   • Acquired hypothyroidism   • Hiatal hernia   • Weight loss, abnormal   •  Osteopenia of neck of left femur   • Cirrhosis   • Severe pulmonary hypertension       family history includes Arthritis in her mother; Cancer in her mother; Diabetes in her daughter, maternal aunt, mother, and son; Heart disease in her father and maternal grandmother.     reports that she has never smoked. She has never used smokeless tobacco. She reports current alcohol use. She reports that she does not use drugs.    No Known Allergies      Current Outpatient Medications:   •  amLODIPine (NORVASC) 10 MG tablet, Take 1 tablet by mouth Daily., Disp: 90 tablet, Rfl: 3  •  atorvastatin (LIPITOR) 40 MG tablet, Take 1 tablet by mouth Every Night., Disp: 90 tablet, Rfl: 3  •  bumetanide (BUMEX) 1 MG tablet, Take 1 tablet by mouth Every Other Day., Disp: , Rfl:   •  escitalopram (LEXAPRO) 10 MG tablet, Take 1 tablet by mouth Daily., Disp: 90 tablet, Rfl: 3  •  famotidine (PEPCID) 20 MG tablet, Take 1 tablet by mouth Every 12 (Twelve) Hours., Disp: , Rfl:   •  ferrous sulfate 325 (65 FE) MG EC tablet, Take 1 tablet by mouth Every 12 (Twelve) Hours., Disp: , Rfl:   •  folic acid (FOLVITE) 1 MG tablet, Take 1 tablet by mouth Daily., Disp: 30 tablet, Rfl: 3  •  Insulin Lispro (humaLOG) 100 UNIT/ML injection, Inject 0-7 Units under the skin into the appropriate area as directed 3 (Three) Times a Day Before Meals. Admin Instructions: Correction - Low Dose.  Less than 40 units/day total insulin dose or lean, elderly, renal patients  Blood glucose 150-199 mg/dL - 2 units  Blood glucose 200-249 mg/dL - 3 units  Blood glucose 250-299 mg/dL - 4 units  Blood glucose 300-349 mg/dL - 5 units  Blood glucose 350-400 mg/dL - 6 units  Blood glucose greater than 400 mg/dL - 7 units and call provider, Disp: , Rfl:   •  isosorbide mononitrate (IMDUR) 60 MG 24 hr tablet, Take 1 tablet by mouth Daily., Disp: 90 tablet, Rfl: 0  •  metoprolol succinate XL (TOPROL-XL) 50 MG 24 hr tablet, Take 1.5 tablets by mouth Daily., Disp: , Rfl:   •   mirtazapine (REMERON) 7.5 MG tablet, Take 3 tablets by mouth Every Night for 30 days., Disp: 90 tablet, Rfl: 0  •  pantoprazole (Protonix) 40 MG EC tablet, Take 1 tablet by mouth Daily., Disp: 90 tablet, Rfl: 3  •  vitamin B-12 (cyancobalamin) 100 MCG tablet, Take 0.5 tablets by mouth Daily., Disp: 30 tablet, Rfl: 3  •  vitamin D (ERGOCALCIFEROL) 1.25 MG (97995 UT) capsule capsule, Take 1 capsule by mouth 1 (One) Time Per Week., Disp: 15 capsule, Rfl: 3  •  HYDROcodone-acetaminophen (NORCO) 7.5-325 MG per tablet, Take 1 tablet by mouth Every 6 (Six) Hours As Needed for Moderate Pain. (Patient not taking: Reported on 5/11/2023), Disp: 12 tablet, Rfl: 0  •  levothyroxine (SYNTHROID, LEVOTHROID) 25 MCG tablet, Take 1 tablet by mouth Daily. (Patient not taking: Reported on 5/11/2023), Disp: 90 tablet, Rfl: 3      Physical Exam:  I have reviewed the patient's current vital signs as documented in the patient's EMR.   Vitals:    05/11/23 1335   BP: (!) 86/48   Pulse: 59   SpO2: 98%     Body mass index is 26.96 kg/m².       05/11/23  1335   Weight: 69 kg (152 lb 3.2 oz)      Physical Exam  Constitutional:       General: She is not in acute distress.     Appearance: Normal appearance. She is well-developed and normal weight.   HENT:      Head: Normocephalic and atraumatic.   Eyes:      General: Lids are normal.      Conjunctiva/sclera: Conjunctivae normal.      Pupils: Pupils are equal, round, and reactive to light.   Neck:      Vascular: No carotid bruit or JVD.   Cardiovascular:      Rate and Rhythm: Normal rate and regular rhythm.      Pulses: Normal pulses.      Heart sounds: Normal heart sounds, S1 normal and S2 normal. No murmur heard.  Pulmonary:      Effort: Pulmonary effort is normal. No respiratory distress.      Breath sounds: Normal breath sounds. No wheezing.   Abdominal:      General: Bowel sounds are normal. There is no distension.      Palpations: Abdomen is soft. There is no hepatomegaly or splenomegaly.       Tenderness: There is no abdominal tenderness.   Musculoskeletal:         General: No swelling. Normal range of motion.      Cervical back: Normal range of motion and neck supple.      Right lower leg: No edema.      Left lower leg: No edema.   Skin:     General: Skin is warm and dry.      Coloration: Skin is not jaundiced.      Findings: No rash.   Neurological:      General: No focal deficit present.      Mental Status: She is alert and oriented to person, place, and time. Mental status is at baseline.   Psychiatric:         Mood and Affect: Mood normal.         Speech: Speech normal.         Behavior: Behavior normal.         Thought Content: Thought content normal.         Judgment: Judgment normal.            DATA REVIEWED:     TTE/MANI:  Results for orders placed during the hospital encounter of 11/02/22    Adult Transthoracic Echo Complete w/ Color, Spectral and Contrast if necessary per protocol    Interpretation Summary  •  Left ventricular systolic function is hyperdynamic (EF > 70%).  •  Left ventricular diastolic function is consistent with (grade II w/high LAP) pseudonormalization.  •  The left atrial cavity is mildly dilated.  •  The aortic valve exhibits sclerosis with no evidence of stenosis or regurgitation.  •  Mild mitral valve regurgitation is present.  •  Moderate tricuspid valve regurgitation is present.  •  Severe pulmonary hypertension is present.  Estimated RV systolic pressure is > 55 mmHg.  •  There is no evidence of pericardial effusion.      Laboratory evaluations:    Lab Results   Component Value Date    GLUCOSE 194 (H) 02/08/2023    BUN 23 02/08/2023    CREATININE 3.25 (H) 02/08/2023    EGFRIFNONA 23 (L) 02/21/2022    EGFRIFAFRI 41 (L) 07/30/2018    BCR 7.1 02/08/2023    K 5.1 02/08/2023    CO2 23.6 02/08/2023    CALCIUM 8.8 02/08/2023    PROTENTOTREF 7.7 07/30/2018    ALBUMIN 3.0 (L) 02/08/2023    LABIL2 1.1 (L) 07/30/2018    AST 75 (H) 02/08/2023    ALT 34 (H) 02/08/2023     Lab  Results   Component Value Date    WBC 9.02 02/21/2023    HGB 9.3 (L) 02/21/2023    HCT 31.2 (L) 02/21/2023    MCV 89.9 02/21/2023     02/21/2023     Lab Results   Component Value Date    CHOL 109 09/08/2022    CHLPL 128 06/25/2018    TRIG 122 09/08/2022    HDL 31 (L) 09/08/2022    LDL 56 09/08/2022     Lab Results   Component Value Date    TSH 4.640 (H) 01/17/2023     Lab Results   Component Value Date    HGBA1C 5.00 01/17/2023     Lab Results   Component Value Date    ALT 34 (H) 02/08/2023     Lab Results   Component Value Date    HGBA1C 5.00 01/17/2023    HGBA1C 5.00 09/08/2022    HGBA1C 6.30 (H) 06/09/2022     Lab Results   Component Value Date    MICROALBUR 211.0 01/18/2023    CREATININE 3.25 (H) 02/08/2023     Lab Results   Component Value Date    IRON 34 (L) 02/21/2023    TIBC 271 (L) 02/21/2023    FERRITIN 1,489.00 (H) 02/21/2023     Lab Results   Component Value Date    INR 1.26 (H) 01/11/2023    INR 1.29 (H) 12/03/2022    INR 1.36 (H) 11/02/2022    PROTIME 16.1 (H) 01/11/2023    PROTIME 16.4 (H) 12/03/2022    PROTIME 17.1 (H) 11/02/2022        Lab Results   Component Value Date    ABSOLUTELUNG 32 06/13/2022    ABSOLUTELUNG 27 04/25/2022    ABSOLUTELUNG 22 03/28/2022       --------------------------------------------------------------------------------------------------------------------------    ASSESSMENT/PLAN:      Diagnosis Plan   1. Essential hypertension        2. ASCVD (arteriosclerotic cardiovascular disease)        3. Chronic heart failure with preserved ejection fraction (HFpEF)            1. ASCVD  2. Chronic HFpEF  • Denies any current chest pain.  Echocardiogram in 2022 showed normal LV function.  Appears compensated today.  Patient reports she was taken off hydralazine by her nephrologist due to low blood pressure.  Continue with Lipitor, Imdur and metoprolol succinate.  Patient to follow-up with heart failure clinic as needed.    3. Essential hypertension  • Blood pressure low in the  office today.  Patient does report dizziness with frequent falls.  We will stop clonidine as this may be contributing to her hypotension.  Advised adequate water intake.    This document has been @Electronically signed by ERNIE Olsen, 05/11/23, 1:20 PM EDT.       Dictated Utilizing Dragon Dictation: Part of this note may be an electronic transcription/translation of spoken language to printed text using the Dragon Dictation System.    Follow-up appointment and medication changes provided in hand delivered After Visit Summary as well as reviewed in the room.

## 2023-05-16 DIAGNOSIS — L98.9 SKIN LESION OF LEFT LEG: ICD-10-CM

## 2023-05-16 RX ORDER — HYDRALAZINE HYDROCHLORIDE 25 MG/1
TABLET, FILM COATED ORAL
Qty: 270 TABLET | Refills: 1 | OUTPATIENT
Start: 2023-05-16

## 2023-05-23 ENCOUNTER — LAB (OUTPATIENT)
Dept: ONCOLOGY | Facility: CLINIC | Age: 78
End: 2023-05-23
Payer: MEDICARE

## 2023-05-23 ENCOUNTER — OFFICE VISIT (OUTPATIENT)
Dept: ONCOLOGY | Facility: CLINIC | Age: 78
End: 2023-05-23
Payer: MEDICARE

## 2023-05-23 VITALS
HEART RATE: 95 BPM | BODY MASS INDEX: 26.93 KG/M2 | DIASTOLIC BLOOD PRESSURE: 59 MMHG | HEIGHT: 63 IN | RESPIRATION RATE: 18 BRPM | SYSTOLIC BLOOD PRESSURE: 111 MMHG | WEIGHT: 152 LBS | TEMPERATURE: 97.1 F | OXYGEN SATURATION: 99 %

## 2023-05-23 DIAGNOSIS — E53.8 FOLATE DEFICIENCY: ICD-10-CM

## 2023-05-23 DIAGNOSIS — D63.8 ANEMIA OF CHRONIC DISEASE: ICD-10-CM

## 2023-05-23 DIAGNOSIS — K90.9 MALABSORPTION OF IRON: ICD-10-CM

## 2023-05-23 DIAGNOSIS — D50.9 IRON DEFICIENCY ANEMIA, UNSPECIFIED IRON DEFICIENCY ANEMIA TYPE: Primary | ICD-10-CM

## 2023-05-23 DIAGNOSIS — E53.8 B12 DEFICIENCY: ICD-10-CM

## 2023-05-23 DIAGNOSIS — N18.4 CKD (CHRONIC KIDNEY DISEASE) STAGE 4, GFR 15-29 ML/MIN: ICD-10-CM

## 2023-05-23 DIAGNOSIS — D50.9 IRON DEFICIENCY ANEMIA, UNSPECIFIED IRON DEFICIENCY ANEMIA TYPE: ICD-10-CM

## 2023-05-23 LAB
BASOPHILS # BLD AUTO: 0.06 10*3/MM3 (ref 0–0.2)
BASOPHILS NFR BLD AUTO: 0.6 % (ref 0–1.5)
DEPRECATED RDW RBC AUTO: 61.9 FL (ref 37–54)
EOSINOPHIL # BLD AUTO: 0.14 10*3/MM3 (ref 0–0.4)
EOSINOPHIL NFR BLD AUTO: 1.4 % (ref 0.3–6.2)
ERYTHROCYTE [DISTWIDTH] IN BLOOD BY AUTOMATED COUNT: 19.7 % (ref 12.3–15.4)
FERRITIN SERPL-MCNC: 1812 NG/ML (ref 13–150)
HCT VFR BLD AUTO: 47.3 % (ref 34–46.6)
HGB BLD-MCNC: 13.8 G/DL (ref 12–15.9)
IMM GRANULOCYTES # BLD AUTO: 0.04 10*3/MM3 (ref 0–0.05)
IMM GRANULOCYTES NFR BLD AUTO: 0.4 % (ref 0–0.5)
IRON 24H UR-MRATE: 54 MCG/DL (ref 37–145)
IRON SATN MFR SERPL: 22 % (ref 20–50)
LYMPHOCYTES # BLD AUTO: 1.91 10*3/MM3 (ref 0.7–3.1)
LYMPHOCYTES NFR BLD AUTO: 19.3 % (ref 19.6–45.3)
MCH RBC QN AUTO: 25.8 PG (ref 26.6–33)
MCHC RBC AUTO-ENTMCNC: 29.2 G/DL (ref 31.5–35.7)
MCV RBC AUTO: 88.4 FL (ref 79–97)
MONOCYTES # BLD AUTO: 0.78 10*3/MM3 (ref 0.1–0.9)
MONOCYTES NFR BLD AUTO: 7.9 % (ref 5–12)
NEUTROPHILS NFR BLD AUTO: 6.96 10*3/MM3 (ref 1.7–7)
NEUTROPHILS NFR BLD AUTO: 70.4 % (ref 42.7–76)
NRBC BLD AUTO-RTO: 0 /100 WBC (ref 0–0.2)
PLATELET # BLD AUTO: 171 10*3/MM3 (ref 140–450)
PMV BLD AUTO: 9.1 FL (ref 6–12)
RBC # BLD AUTO: 5.35 10*6/MM3 (ref 3.77–5.28)
TIBC SERPL-MCNC: 250 MCG/DL (ref 298–536)
TRANSFERRIN SERPL-MCNC: 168 MG/DL (ref 200–360)
WBC NRBC COR # BLD: 9.89 10*3/MM3 (ref 3.4–10.8)

## 2023-05-23 PROCEDURE — 84466 ASSAY OF TRANSFERRIN: CPT | Performed by: INTERNAL MEDICINE

## 2023-05-23 PROCEDURE — 83540 ASSAY OF IRON: CPT | Performed by: INTERNAL MEDICINE

## 2023-05-23 PROCEDURE — 83921 ORGANIC ACID SINGLE QUANT: CPT | Performed by: INTERNAL MEDICINE

## 2023-05-23 PROCEDURE — 85025 COMPLETE CBC W/AUTO DIFF WBC: CPT | Performed by: INTERNAL MEDICINE

## 2023-05-23 PROCEDURE — 82607 VITAMIN B-12: CPT | Performed by: INTERNAL MEDICINE

## 2023-05-23 PROCEDURE — 36415 COLL VENOUS BLD VENIPUNCTURE: CPT | Performed by: INTERNAL MEDICINE

## 2023-05-23 PROCEDURE — 82728 ASSAY OF FERRITIN: CPT | Performed by: INTERNAL MEDICINE

## 2023-05-23 NOTE — PROGRESS NOTES
Venipuncture Blood Specimen Collection  Venipuncture performed in left arm by Dayan Winston MA with good hemostasis. Patient tolerated the procedure well without complications.   05/23/23   Dayan Winston MA

## 2023-05-23 NOTE — PROGRESS NOTES
Subjective     Date: 5/23/2023    Referring Provider  Dr. Johnson     Chief Complaint   Iron deficiency anemia       Subjective          Britta Lee is a 77 y.o. female who presents today to [facility] for follow up.    HPI:   77-year-old female with history of hypertension, hyperlipidemia, depression/anxiety, hypothyroidism, coronary artery disease, CKD, congestive heart failure who presents as follow-up for her treatment of anemia.  Previously seen by ERNIE Mohr.    She has struggled with anemia since December 2016 with hemoglobin ranging from 8.6-9.8.  Iron studies at the time consistent with anemia of chronic disease.  Has previously received IV iron infusions, and taken oral iron.  Stopped taking oral ferrous sulfate due to malabsorption.  Last IV iron was received August 2022.    Treatment history:      Interval History 02/01/2023   She was started on oral ferrous sulfate by nephrology.  Currently still taking it without any nausea or constipation.  Denies any new complaints.  Denies any melena, hematochezia, hematemesis.  Per chart review patient presented to the ED on 1/11/2023 for reportedly low hemoglobin level in dialysis, hemoglobin of 6.1 mg/dL, required blood transfusion at that time.    Interval History 02/21/2023   Ms. Lee presents today for follow-up with her .  She had a fall from bed, presented to the ED February 8,  was found to have acute fracture of humeral neck and humeral head.  She reports continued oral ferrous sulfate, denies any GI discomfort with this.    Hemoglobin today is 9.3, hematocrit 31.2 today MCV 89.9.  Labs from previous visit showed vitamin B12 713, methylmalonic acid elevated to 933.  Folate level low/normal at 6.7.    She reports previously taking oral vitamin B tablets, never tried injections.  Denies any fatigue, melena, hematochezia.    Interval History 05/23/2023   Ms. Lee presents today with her  . She reports doing well overall. Continues to take oral folic acid and b12 tablet. Unsure if she is taking oral ferrous sulfate.   Recently placed an AV fistula R arm.   Denies GI bleed.  CBC today with Hgb 13.8, Hct 47.3, normal WBC and platelets. Pt is unsure if she gets epo with Dr. Johnson, her  does not think so.     The following portions of the patient's history were reviewed and updated as appropriate: allergies, current medications, past family history, past medical history, past social history, past surgical history and problem list.    Objective     Objective     Allergy:   No Known Allergies     Current Medications:   Current Outpatient Medications   Medication Sig Dispense Refill   • amLODIPine (NORVASC) 10 MG tablet Take 1 tablet by mouth Daily. 90 tablet 3   • atorvastatin (LIPITOR) 40 MG tablet Take 1 tablet by mouth Every Night. 90 tablet 3   • bumetanide (BUMEX) 1 MG tablet Take 1 tablet by mouth Every Other Day.     • escitalopram (LEXAPRO) 10 MG tablet Take 1 tablet by mouth Daily. 90 tablet 3   • famotidine (PEPCID) 20 MG tablet Take 1 tablet by mouth Every 12 (Twelve) Hours.     • ferrous sulfate 325 (65 FE) MG EC tablet Take 1 tablet by mouth Every 12 (Twelve) Hours.     • folic acid (FOLVITE) 1 MG tablet Take 1 tablet by mouth Daily. 30 tablet 3   • HYDROcodone-acetaminophen (NORCO) 7.5-325 MG per tablet Take 1 tablet by mouth Every 6 (Six) Hours As Needed for Moderate Pain. 12 tablet 0   • Insulin Lispro (humaLOG) 100 UNIT/ML injection Inject 0-7 Units under the skin into the appropriate area as directed 3 (Three) Times a Day Before Meals. Admin Instructions: Correction - Low Dose.  Less than 40 units/day total insulin dose or lean, elderly, renal patients   Blood glucose 150-199 mg/dL - 2 units   Blood glucose 200-249 mg/dL - 3 units   Blood glucose 250-299 mg/dL - 4 units   Blood glucose 300-349 mg/dL - 5 units   Blood glucose 350-400 mg/dL - 6 units   Blood glucose greater  than 400 mg/dL - 7 units and call provider     • isosorbide mononitrate (IMDUR) 60 MG 24 hr tablet Take 1 tablet by mouth Daily. 90 tablet 0   • levothyroxine (SYNTHROID, LEVOTHROID) 25 MCG tablet Take 1 tablet by mouth Daily. 90 tablet 3   • metoprolol succinate XL (TOPROL-XL) 50 MG 24 hr tablet Take 1.5 tablets by mouth Daily.     • pantoprazole (Protonix) 40 MG EC tablet Take 1 tablet by mouth Daily. 90 tablet 3   • vitamin B-12 (cyancobalamin) 100 MCG tablet Take 0.5 tablets by mouth Daily. 30 tablet 3   • vitamin D (ERGOCALCIFEROL) 1.25 MG (03544 UT) capsule capsule Take 1 capsule by mouth 1 (One) Time Per Week. 15 capsule 3   • mirtazapine (REMERON) 7.5 MG tablet Take 3 tablets by mouth Every Night for 30 days. 90 tablet 0     No current facility-administered medications for this visit.       Past Medical History:  Past Medical History:   Diagnosis Date   • Acquired hypothyroidism 4/22/2021   • Anemia    • Arthritis    • Carpal tunnel syndrome    • Coronary artery disease    • Diabetes mellitus    • Elevated cholesterol    • GERD (gastroesophageal reflux disease)    • History of pneumonia    • History of unsteady gait     OCASSIONALLY   • Hypertension    • Insomnia    • Kidney disease    • LENNY (obstructive sleep apnea) 12/1/2020   • Osteoarthritis    • Renal insufficiency    • Sciatica        Past Surgical History:  Past Surgical History:   Procedure Laterality Date   • ABDOMINAL SURGERY     • APPENDECTOMY     • CARDIAC CATHETERIZATION      1 STENT  ---  2000   • CARDIAC SURGERY     • CAROTID STENT     • CARPAL TUNNEL RELEASE Right 10/8/2019    Procedure: CARPAL TUNNEL RELEASE;  Surgeon: Feroz Johnson MD;  Location: [facility];  Service: Orthopedics   • CATARACT EXTRACTION     • CHOLECYSTECTOMY     • COLONOSCOPY     • COLONOSCOPY N/A 11/23/2021    Procedure: COLONOSCOPY FOR SCREENING;  Surgeon: Palmira Pate MD;  Location: [facility] OR;  Service: Gastroenterology;  Laterality: N/A;   •  CORONARY ANGIOPLASTY WITH STENT PLACEMENT     • ENDOSCOPY     • ENDOSCOPY N/A 3/5/2020    Procedure: ESOPHAGOGASTRODUODENOSCOPY;  Surgeon: Alexandru Bagley MD;  Location: [facility];  Service: Gastroenterology;  Laterality: N/A;   • ENDOSCOPY N/A 11/23/2021    Procedure: ESOPHAGOGASTRODUODENOSCOPY WITH BIOPSY;  Surgeon: Palmira Pate MD;  Location: [facility] OR;  Service: Gastroenterology;  Laterality: N/A;   • ENDOSCOPY AND COLONOSCOPY     • GALLBLADDER SURGERY     • INSERTION HEMODIALYSIS CATHETER N/A 11/14/2022    Procedure: HEMODIALYSIS CATHETER INSERTION;  Surgeon: Ta Blas MD;  Location: [facility];  Service: General;  Laterality: N/A;   • JOINT REPLACEMENT Left 05/02/2017    [facility]  DR LEVIN  LEFT TOTAL KNEE   • KNEE ARTHROSCOPY W/ MENISCECTOMY Right    • LAPAROSCOPIC TUBAL LIGATION     • MO ARTHRP KNE CONDYLE&PLATU MEDIAL&LAT COMPARTMENTS Left 5/2/2017    Procedure: TOTAL KNEE ARTHROPLASTY;  Surgeon: Feroz Levin MD;  Location: [facility];  Service: Orthopedics   • STERILIZATION     • TONSILLECTOMY         Social History:  Social History     Socioeconomic History   • Marital status:      Spouse name: natalie   • Number of children: 3   • Years of education: 12   Tobacco Use   • Smoking status: Never   • Smokeless tobacco: Never   Vaping Use   • Vaping Use: Never used   Substance and Sexual Activity   • Alcohol use: Yes     Comment: socially   • Drug use: No   • Sexual activity: Defer     Birth control/protection: Surgical     Britta Lee  reports that she has never smoked. She has never used smokeless tobacco.      Family History:  Family History   Problem Relation Age of Onset   • Arthritis Mother    • Diabetes Mother    • Cancer Mother    • Heart disease Father    • Diabetes Daughter    • Diabetes Son    • Diabetes Maternal Aunt    • Heart disease Maternal Grandmother    • Breast cancer Neg Hx        Review of Systems:  Review of Systems   All other systems reviewed and  "are negative.      Vital Signs:   /59   Pulse 95   Temp 97.1 °F (36.2 °C)   Resp 18   Ht 160 cm (63\")   Wt 68.9 kg (152 lb)   SpO2 99%   BMI 26.93 kg/m²      Physical Exam:  Physical Exam  Vitals and nursing note reviewed.   Constitutional:       General: She is not in acute distress.     Appearance: Normal appearance. She is not ill-appearing.      Comments: + In a wheelchair   HENT:      Head: Normocephalic and atraumatic.      Nose: Nose normal.      Mouth/Throat:      Mouth: Mucous membranes are moist.      Pharynx: Oropharynx is clear.   Eyes:      Conjunctiva/sclera: Conjunctivae normal.      Pupils: Pupils are equal, round, and reactive to light.   Cardiovascular:      Rate and Rhythm: Normal rate and regular rhythm.      Heart sounds: No murmur heard.  Pulmonary:      Effort: Pulmonary effort is normal. No respiratory distress.      Breath sounds: Normal breath sounds. No wheezing.   Abdominal:      General: Abdomen is flat. Bowel sounds are normal. There is no distension.      Palpations: Abdomen is soft. There is no mass.      Tenderness: There is no abdominal tenderness. There is no guarding.   Musculoskeletal:         General: No swelling. Normal range of motion.      Cervical back: Normal range of motion.   Lymphadenopathy:      Cervical: No cervical adenopathy.   Skin:     General: Skin is warm and dry.      Coloration: Skin is not jaundiced.      Findings: No bruising or rash.      Comments: +R AV fistula   Neurological:      General: No focal deficit present.      Mental Status: She is alert and oriented to person, place, and time.      Motor: Weakness present.      Coordination: Coordination normal.   Psychiatric:         Mood and Affect: Mood normal.         Behavior: Behavior normal.         Judgment: Judgment normal.         PHQ-9 Score  PHQ-9 Total Score: 0     Pain Score  Vitals:    05/23/23 1318   BP: 111/59   Pulse: 95   Resp: 18   Temp: 97.1 °F (36.2 °C)   SpO2: 99%   Weight: " "68.9 kg (152 lb)   Height: 160 cm (63\")   PainSc: 0-No pain       ECOG score: 0             Lab Review  Lab Results   Component Value Date    WBC 9.89 05/23/2023    HGB 13.8 05/23/2023    HCT 47.3 (H) 05/23/2023    MCV 88.4 05/23/2023    RDW 19.7 (H) 05/23/2023     05/23/2023    NEUTRORELPCT 70.4 05/23/2023    LYMPHORELPCT 19.3 (L) 05/23/2023    MONORELPCT 7.9 05/23/2023    EOSRELPCT 1.4 05/23/2023    BASORELPCT 0.6 05/23/2023    NEUTROABS 6.96 05/23/2023    LYMPHSABS 1.91 05/23/2023       Lab Results   Component Value Date     02/08/2023    K 5.1 02/08/2023    CO2 23.6 02/08/2023     02/08/2023    BUN 23 02/08/2023    CREATININE 3.25 (H) 02/08/2023    EGFRIFNONA 23 (L) 02/21/2022    EGFRIFAFRI 41 (L) 07/30/2018    GLUCOSE 194 (H) 02/08/2023    CALCIUM 8.8 02/08/2023    ALKPHOS 365 (H) 02/08/2023    AST 75 (H) 02/08/2023    ALT 34 (H) 02/08/2023    BILITOT 0.2 02/08/2023    ALBUMIN 3.0 (L) 02/08/2023    PROTEINTOT 7.6 02/08/2023    MG 1.9 01/11/2023    PHOS 3.3 10/17/2022           Endoscopy:   ENDOSCOPY:  11/23/21  ESOPHAGOGASTRODUODENOSCOPY PROCEDURE REPORT     Britta Jesus  11/23/2021     GASTROENTEROLOGIST:  Palmira Pate MD      PRE-PROCEDURE DIAGNOSIS:  Iron deficiency anemia, unspecified iron deficiency anemia type [D50.9]  Weight loss, abnormal [R63.4]     POST-PROCEDURE DIAGNOSIS:  1.  Gastritis  2.  Angiectasias     Procedure(s):  ESOPHAGOGASTRODUODENOSCOPY WITH BIOPSY  COLONOSCOPY FOR SCREENING     ANESTHESIA:  Propofol administered by anesthesia.  See anesthesia notes for ASA classification     STAFF:  Circulator: Bre Ny RN; Danelle Mccray RN  Endo Technician: Omari Lewis     FINDINGS:  1.  Normal-appearing esophagus.  Biopsies were taken  2.  Mild erythema in the antrum and stomach.  Biopsies taken for H. pylori.  3.  One angiectasia was found in the second portion of the duodenum.  This was nonbleeding.  Biopsies were taken of the duodenum to rule " out celiac disease as a source of her anemia.     OPERATIVE PROCEDURE:  After proper informed consent was obtained, patient was transferred to the OR/endoscopy suite.  Patient was then placed in left lateral decubitus position. The Olympus 180 series video gastroscope was inserted orally under direct visualization.  Esophagus, stomach, and duodenum were inspected.  The endoscope was passed to the third portion of the duodenum.  Scope was retroflexed for visualization of the cardia and incisuraThe endoscope was then withdrawn. Patient tolerated the procedure well. There were no immediate complications.     ESTIMATED BLOOD LOSS:  None     COMPLICATIONS;  None     RECOMMENDATIONS/ PLAN:  1.  Follow-up biopsy results.  2.  Proceed with colonoscopy.    COLONOSCOPY PROCEDURE NOTE     Britta Jesus  11/23/2021     GASTROENTEROLOGIST:  Palmira Pate MD        PRE-PROCEDURE DIAGNOSIS:   Iron deficiency anemia, unspecified iron deficiency anemia type [D50.9]  Weight loss, abnormal [R63.4]     POST-PROCEDURE DIAGNOSIS:  1.  Colon polyps  2.  Diverticulosis  3.  Internal hemorrhoids     PROCEDURE:  COLONOSCOPY with snare polypectomy and cold biopsy polypectomy     ANESTHESIA:  Propofol administered by anesthesia.  See anesthesia notes for ASA classification     STAFF  Circulator: Bre Ny RN; Danelle Mccray RN  Endo Technician: Omari Lewis     Findings:  1.  A 5 mm polyp was found in the ascending colon.  This was removed with cold forceps polypectomy.  2.  A 5 mm polyp was removed from the transverse colon with cold forceps biopsy.  3.  Two 6 mm polyps were found in the descending colon.  Both polyps were removed with cold snare polypectomy.  4.  A 7 mm polyp was removed from the sigmoid colon with cold snare polypectomy.  5.  Diverticulosis involving left colon.  6.  Small internal hemorrhoids.  7.  Normal-appearing terminal ileum.    OPERATIVE PROCEDURE   After proper informed consent was  obtained, the patient was taken the operating suite and placed in left lateral decubitus position.  An Olympus video colonoscope 180 series was inserted in the rectum and advanced to the terminal ileum under direct visualization.  Cecum and terminal ileum were identified by visualization of the appendiceal orifice and ileocecal valve.  The colonoscope was then slowly withdrawn from the cecum to the rectum and passed a second time from rectum to cecum.  The colonoscope was retroflexed in the cecum and rectum. Scope was then withdrawn. Patient tolerated the procedure well. There were no immediate complications. Cecal withdrawal time was 15 minutes.     ESTIMATED BLOOD LOSS  None      COMPLICATIONS  None     RECOMMENDATIONS:  1.  Follow-up pathology.  2.  Repeat colonoscopy in 3 years due to personal history of colon polyps.  3.  Proceed with capsule endoscopy if anemia persists.  Sign 4.  Follow-up in GI clinic in 6 to 8 weeks.  Assessment / Plan         Assessment and Plan   77 year old F with history of ESRD on HD who presents for normocytic anemia.     #Anemia  #iron deficiency anemia  #Vitamin B12 deficiency  #Folate deficiency  #Malabsorption  -Patient with history of anemia required multiple transfusions, most recently 1/12/2023.  Previously required IV iron after intolerance to p.o. iron.  Last IV iron received August 2022.  -Most recent EGD and colonoscopy by Dr. Simmons 11/2021.  At that time no source of bleed was discovered, it was recommended patient follow-up with capsule endoscopy if anemia persist.  -Most recent labs from 2/1/2023 with low/normal folate level, normal vitamin B12 however elevated methylmalonic acid.  -Currently taking 1 mg folic acid to be taken daily and 100 mcg of cyanocobalamin tablet to be taken daily with improvement in H/H.  - CBC today with Hgb 14, Hct 48, normal WBC and platelet count. Pt is unsure if she receiving Epo with Dr. Johnson (nephrology). Continue oral folic acid and B12  supplement at this time.    Discussed possible differential diagnoses, testing, treatment, recommended non-pharmacological interventions, risks, warning signs to monitor for that would indicate need for follow-up in clinic or ER. If no improvement with these regimens or you have new or worsening symptoms follow-up. Patient verbalizes understanding and agreement with plan of care. Denies further needs or concerns.     Patient was given instructions and counseling regarding her condition and for health maintenance advised.    I spent 30 minutes with Britta Lee today.  In the office today, more than 50% of this time was spent face-to-face with her  in counseling / coordination of care, reviewing her interim medical history and counseling on the current treatment plan.  All questions were answered to her satisfaction.      Meds ordered during this visit  No orders of the defined types were placed in this encounter.      Visit Diagnoses    ICD-10-CM ICD-9-CM   1. Iron deficiency anemia, unspecified iron deficiency anemia type  D50.9 280.9   2. B12 deficiency  E53.8 266.2   3. Folate deficiency  E53.8 266.2   4. Malabsorption of iron  K90.9 579.8   5. CKD (chronic kidney disease) stage 4, GFR 15-29 ml/min  N18.4 585.4   6. Anemia of chronic disease  D63.8 285.29       Follow Up   3 months with repeat CBC, folate, B12, MMA, iron studies, ferritin      This document has been electronically signed by Sakina Bang MD   May 23, 2023 14:08 EDT    Dictated Utilizing Dragon Dictation: Part of this note may be an electronic transcription/translation of spoken language to printed text using the Dragon Dictation System.

## 2023-05-24 LAB — VIT B12 BLD-MCNC: 1198 PG/ML (ref 211–946)

## 2023-05-25 ENCOUNTER — OFFICE VISIT (OUTPATIENT)
Dept: FAMILY MEDICINE CLINIC | Facility: CLINIC | Age: 78
End: 2023-05-25
Payer: MEDICARE

## 2023-05-25 ENCOUNTER — TELEPHONE (OUTPATIENT)
Dept: FAMILY MEDICINE CLINIC | Facility: CLINIC | Age: 78
End: 2023-05-25

## 2023-05-25 VITALS
WEIGHT: 153 LBS | OXYGEN SATURATION: 100 % | HEART RATE: 93 BPM | SYSTOLIC BLOOD PRESSURE: 128 MMHG | TEMPERATURE: 97.1 F | BODY MASS INDEX: 27.11 KG/M2 | DIASTOLIC BLOOD PRESSURE: 60 MMHG | HEIGHT: 63 IN

## 2023-05-25 DIAGNOSIS — Z99.2 TYPE 2 DIABETES MELLITUS WITH CHRONIC KIDNEY DISEASE ON CHRONIC DIALYSIS, WITH LONG-TERM CURRENT USE OF INSULIN: ICD-10-CM

## 2023-05-25 DIAGNOSIS — Z00.00 MEDICARE ANNUAL WELLNESS VISIT, SUBSEQUENT: Primary | ICD-10-CM

## 2023-05-25 DIAGNOSIS — E11.22 TYPE 2 DIABETES MELLITUS WITH CHRONIC KIDNEY DISEASE ON CHRONIC DIALYSIS, WITH LONG-TERM CURRENT USE OF INSULIN: ICD-10-CM

## 2023-05-25 DIAGNOSIS — I10 ESSENTIAL HYPERTENSION: ICD-10-CM

## 2023-05-25 DIAGNOSIS — Z91.81 AT HIGH RISK FOR FALLS: ICD-10-CM

## 2023-05-25 DIAGNOSIS — Z79.4 TYPE 2 DIABETES MELLITUS WITH CHRONIC KIDNEY DISEASE ON CHRONIC DIALYSIS, WITH LONG-TERM CURRENT USE OF INSULIN: ICD-10-CM

## 2023-05-25 DIAGNOSIS — E55.9 VITAMIN D DEFICIENCY: ICD-10-CM

## 2023-05-25 DIAGNOSIS — Z23 IMMUNIZATION DUE: ICD-10-CM

## 2023-05-25 DIAGNOSIS — K90.9 MALABSORPTION OF IRON: ICD-10-CM

## 2023-05-25 DIAGNOSIS — E78.5 DYSLIPIDEMIA: ICD-10-CM

## 2023-05-25 DIAGNOSIS — N18.6 TYPE 2 DIABETES MELLITUS WITH CHRONIC KIDNEY DISEASE ON CHRONIC DIALYSIS, WITH LONG-TERM CURRENT USE OF INSULIN: ICD-10-CM

## 2023-05-25 NOTE — PATIENT INSTRUCTIONS
Advance Care Planning and Advance Directives     You make decisions on a daily basis - decisions about where you want to live, your career, your home, your life. Perhaps one of the most important decisions you face is your choice for future medical care. Take time to talk with your family and your healthcare team and start planning today.  Advance Care Planning is a process that can help you:  · Understand possible future healthcare decisions in light of your own experiences  · Reflect on those decision in light of your goals and values  · Discuss your decisions with those closest to you and the healthcare professionals that care for you  · Make a plan by creating a document that reflects your wishes    Surrogate Decision Maker  In the event of a medical emergency, which has left you unable to communicate or to make your own decisions, you would need someone to make decisions for you.  It is important to discuss your preferences for medical treatment with this person while you are in good health.     Qualities of a surrogate decision maker:  • Willing to take on this role and responsibility  • Knows what you want for future medical care  • Willing to follow your wishes even if they don't agree with them  • Able to make difficult medical decisions under stressful circumstances    Advance Directives  These are legal documents you can create that will guide your healthcare team and decision maker(s) when needed. These documents can be stored in the electronic medical record.    · Living Will - a legal document to guide your care if you have a terminal condition or a serious illness and are unable to communicate. States vary by statute in document names/types, but most forms may include one or more of the following:        -  Directions regarding life-prolonging treatments        -  Directions regarding artificially provided nutrition/hydration        -  Choosing a healthcare decision maker        -  Direction  regarding organ/tissue donation    · Durable Power of  for Healthcare - this document names an -in-fact to make medical decisions for you, but it may also allow this person to make personal and financial decisions for you. Please seek the advice of an  if you need this type of document.    **Advance Directives are not required and no one may discriminate against you if you do not sign one.    Medical Orders  Many states allow specific forms/orders signed by your physician to record your wishes for medical treatment in your current state of health. This form, signed in personal communication with your physician, addresses resuscitation and other medical interventions that you may or may not want.      For more information or to schedule a time with a [facility] Advance Care Planning Facilitator contact: [facility] or call [phone] and someone will contact you directly.    Fall Prevention in the Home, Adult  Falls can cause injuries and affect people of all ages. There are many simple things that you can do to make your home safe and to help prevent falls. Ask for help when making these changes, if needed.  What actions can I take to prevent falls?  General instructions  • Use good lighting in all rooms. Replace any light bulbs that burn out, turn on lights if it is dark, and use night-lights.  • Place frequently used items in easy-to-reach places. Lower the shelves around your home if necessary.  • Set up furniture so that there are clear paths around it. Avoid moving your furniture around.  • Remove throw rugs and other tripping hazards from the floor.  • Avoid walking on wet floors.  • Fix any uneven floor surfaces.  • Add color or contrast paint or tape to grab bars and handrails in your home. Place contrasting color strips on the first and last steps of staircases.  • When you use a stepladder, make sure that it is completely opened and that the sides and supports are  firmly locked. Have someone hold the ladder while you are using it. Do not climb a closed stepladder.  • Know where your pets are when moving through your home.  What can I do in the bathroom?     • Keep the floor dry. Immediately clean up any water that is on the floor.  • Remove soap buildup in the tub or shower regularly.  • Use nonskid mats or decals on the floor of the tub or shower.  • Attach bath mats securely with double-sided, nonslip rug tape.  • If you need to sit down while you are in the shower, use a plastic, nonslip stool.  • Install grab bars by the toilet and in the tub and shower. Do not use towel bars as grab bars.  What can I do in the bedroom?  • Make sure that a bedside light is easy to reach.  • Do not use oversized bedding that reaches the floor.  • Have a firm chair that has side arms to use for getting dressed.  What can I do in the kitchen?  • Clean up any spills right away.  • If you need to reach for something above you, use a sturdy step stool that has a grab bar.  • Keep electrical cables out of the way.  • Do not use floor polish or wax that makes floors slippery. If you must use wax, make sure that it is non-skid floor wax.  What can I do with my stairs?  • Do not leave any items on the stairs.  • Make sure that you have a light switch at the top and the bottom of the stairs. Have them installed if you do not have them.  • Make sure that there are handrails on both sides of the stairs. Fix handrails that are broken or loose. Make sure that handrails are as long as the staircases.  • Install non-slip stair treads on all stairs in your home.  • Avoid having throw rugs at the top or bottom of stairs, or secure the rugs with carpet tape to prevent them from moving.  • Choose a carpet design that does not hide the edge of steps on the stairs.  • Check any carpeting to make sure that it is firmly attached to the stairs. Fix any carpet that is loose or worn.  What can I do on the outside of  my home?  • Use bright outdoor lighting.  • Regularly repair the edges of walkways and driveways and fix any cracks.  • Remove high doorway thresholds.  • Trim any shrubbery on the main path into your home.  • Regularly check that handrails are securely fastened and in good repair. Both sides of all steps should have handrails.  • Install guardrails along the edges of any raised decks or porches.  • Clear walkways of debris and clutter, including tools and rocks.  • Have leaves, snow, and ice cleared regularly.  • Use sand or salt on walkways during winter months.  • In the garage, clean up any spills right away, including grease or oil spills.  What other actions can I take?  • Wear closed-toe shoes that fit well and support your feet. Wear shoes that have rubber soles or low heels.  • Use mobility aids as needed, such as canes, walkers, scooters, and crutches.  • Review your medicines with your health care provider. Some medicines can cause dizziness or changes in blood pressure, which increase your risk of falling.  Talk with your health care provider about other ways that you can decrease your risk of falls. This may include working with a physical therapist or  to improve your strength, balance, and endurance.  Where to find more information  • Centers for Disease Control and Prevention, [place]: www.cdc.gov  • National [place] on Aging: www.[place].nih.gov  Contact a health care provider if:  • You are afraid of falling at home.  • You feel weak, drowsy, or dizzy at home.  • You fall at home.  Summary  • There are many simple things that you can do to make your home safe and to help prevent falls.  • Ways to make your home safe include removing tripping hazards and installing grab bars in the bathroom.  • Ask for help when making these changes in your home.  This information is not intended to replace advice given to you by your health care provider. Make sure you discuss any questions you have with your  health care provider.  Document Revised: 09/19/2022 Document Reviewed: 07/21/2021  Elsevier Patient Education © 2022 [employer]      Sit-to-Stand Exercise  The sit-to-stand exercise (also known as the chair stand or chair rise exercise) strengthens your lower body and helps you maintain or improve your mobility and independence. The end goal is to do the sit-to-stand exercise without using your hands. This will be easier as you become stronger. You should always talk with your health care provider before starting any exercise program, especially if you have had recent surgery.  Do the exercise exactly as told by your health care provider and adjust it as directed. It is normal to feel mild stretching, pulling, tightness, or discomfort as you do this exercise, but you should stop right away if you feel sudden pain or your pain gets worse. Do not begin doing this exercise until told by your health care provider.  What the sit-to-stand exercise does  The sit-to-stand exercise helps to strengthen the muscles in your thighs and the muscles in the center of your body that give you stability (core muscles). This exercise is especially helpful if:  • You have had knee or hip surgery.  • You have trouble getting up from a chair, out of a car, or off the toilet due to muscle weakness.  How to do the sit-to-stand exercise  1. Sit toward the front edge of a sturdy chair without armrests. Your knees should be bent and your feet should be flat on the floor and shoulder-width apart and underneath your hips.  2. Place your hands lightly on each side of the seat. Keep your back and neck as straight as possible, with your chest slightly forward.  3. Breathe in slowly. Lean forward and slightly shift your weight to the front of your feet.  4. Breathe out as you slowly stand up. Try not to support any weight with your hands.  5. Stand and pause for a full breath in and out.  6. Breathe in as you sit down slowly. Tighten your core  and abdominal muscles to control your lowering as much as possible. You should lower yourself back to the chair slowly, not just drop back into the seat.  7. Breathe out slowly.  8. Do this exercise 10-15 times. If needed, do it fewer times until you build up strength.  9. Rest for 1 minute, then do another set of 10-15 repetitions.  To change the difficulty of the sit-to-stand exercise  • If the exercise is too difficult, use a chair with sturdy armrests, and push off the armrests to help you come to the standing position. You can also use the armrests to help slowly lower yourself back to sitting. As this gets easier, try to use your arms less. You can also place a firm cushion or pillow on the chair to make the surface higher.  • If this exercise is too easy, do not use your arms to help raise or lower yourself. You can also wear a weighted vest, use hand weights, increase your repetitions, or try a lower chair.  General tips  • You may feel tired when starting an exercise routine. This is normal.  • You may have muscle soreness that lasts a few days. This is normal. As you get stronger, you may not feel muscle soreness.  • Use smooth, steady movements.  • Do not  hold your breath during strength exercises. This can cause unsafe changes in your blood pressure.  • Breathe in slowly through your nose, and breathe out slowly through your mouth.  Summary  • Strengthening your lower body is an important step to help you move safely and independently.  • The sit-to-stand exercise helps strengthen the muscles in your thighs and core.  • You should always talk with your health care provider before starting any exercise program, especially if you have had recent surgery.  This information is not intended to replace advice given to you by your health care provider. Make sure you discuss any questions you have with your health care provider.  Document Revised: 04/10/2022 Document Reviewed: 04/10/2022  Elsevier Patient  Education ©  [employer]    You are due for Shingrix vaccination series ( the newest shingles vaccine).  It is a two shot series spaced 2-6 months apart. Please get this vaccine series started at your earliest convenience at your local pharmacy to help avoid shingles outbreak. It is more effective than the old Zostavax vaccine and is recommended even if you have had the Zostavax vaccine in the past.  Once the Shingrix series is completed, it does not need to be repeated.   For more information, please look at the website below:  [facility] Shingrix Vaccine Information      Medicare Wellness  Personal Prevention Plan of Service     Date of Office Visit:    Encounter Provider:  RAFIQ Montanez  Place of Service:  [facility]  Patient Name: Britta Lee  :  1945    As part of the Medicare Wellness portion of your visit today, we are providing you with this personalized preventive plan of services (PPPS). This plan is based upon recommendations of the [place] Preventive Services Task Force (USPSTF) and the Advisory Committee on Immunization Practices (ACIP).    This lists the preventive care services that should be considered, and provides dates of when you are due. Items listed as completed are up-to-date and do not require any further intervention.    Health Maintenance   Topic Date Due   • ZOSTER VACCINE (2 of 2) 2016   • Pneumococcal Vaccine 65+ (3 - PCV) 2019   • COVID-19 Vaccine (4 - Booster for Pfizer series) 2021   • DIABETIC EYE EXAM  2022   • Hepatitis B (3 of 3 - Risk 3-dose series) 2023   • HEMOGLOBIN A1C  2023   • INFLUENZA VACCINE  2023   • LIPID PANEL  2023   • MAMMOGRAM  2023   • URINE MICROALBUMIN  2024   • ANNUAL WELLNESS VISIT  2024   • DXA SCAN  2024   • COLORECTAL CANCER SCREENING  2024   • TDAP/TD VACCINES (2 - Td or Tdap) 2027   • HEPATITIS C SCREENING  Completed    • PAP SMEAR  Discontinued       Orders Placed This Encounter   Procedures   • Pneumococcal Conjugate Vaccine 20-Valent All       No follow-ups on file.

## 2023-05-25 NOTE — TELEPHONE ENCOUNTER
Caller: Brandon Lee    Relationship: Emergency Contact    Best call back number:     [phone]    Who prescribed you this medication:     CHARLOTTE RUIZ    What are your concerns:     CALLER STATED DURING AN APPOINTMENT TODAY, CHARLOTTE RUIZ REMOVED (3) MEDICATIONS FROM PATIENT'S CHART    CALLER REQUESTED A CALL BACK AFTER 2:00 TODAY WITH THE NAMES OF THE (3) MEDICATIONS THAT WERE REMOVED FROM THE PATIENT'S CHART

## 2023-05-25 NOTE — PROGRESS NOTES
The ABCs of the Annual Wellness Visit  Subsequent Medicare Wellness Visit    Subjective      Britta Lee is a 77 y.o. female who presents for a Subsequent Medicare Wellness Visit.    The following portions of the patient's history were reviewed and   updated as appropriate: allergies, current medications, past family history, past medical history, past social history, past surgical history and problem list.    Compared to one year ago, the patient feels her physical   health is the same.    Compared to one year ago, the patient feels her mental   health is the same.    Recent Hospitalizations:  This patient has had a [facility] operated by [facility] admission record on file within the last 365 days.    Current Medical Providers:  Patient Care Team:  Lexie Dolan PA as PCP - General (Family Medicine)    Outpatient Medications Prior to Visit   Medication Sig Dispense Refill   • amLODIPine (NORVASC) 10 MG tablet Take 1 tablet by mouth Daily. 90 tablet 3   • atorvastatin (LIPITOR) 40 MG tablet Take 1 tablet by mouth Every Night. 90 tablet 3   • bumetanide (BUMEX) 1 MG tablet Take 1 tablet by mouth Every Other Day.     • escitalopram (LEXAPRO) 10 MG tablet Take 1 tablet by mouth Daily. 90 tablet 3   • famotidine (PEPCID) 20 MG tablet Take 1 tablet by mouth Every 12 (Twelve) Hours.     • ferrous sulfate 325 (65 FE) MG EC tablet Take 1 tablet by mouth Every 12 (Twelve) Hours.     • folic acid (FOLVITE) 1 MG tablet Take 1 tablet by mouth Daily. 30 tablet 3   • HYDROcodone-acetaminophen (NORCO) 7.5-325 MG per tablet Take 1 tablet by mouth Every 6 (Six) Hours As Needed for Moderate Pain. 12 tablet 0   • isosorbide mononitrate (IMDUR) 60 MG 24 hr tablet Take 1 tablet by mouth Daily. 90 tablet 0   • levothyroxine (SYNTHROID, LEVOTHROID) 25 MCG tablet Take 1 tablet by mouth Daily. 90 tablet 3   • metoprolol succinate XL (TOPROL-XL) 50 MG 24 hr tablet Take 1.5 tablets by mouth Daily.     • vitamin D (ERGOCALCIFEROL) 1.25 MG (91362 UT)  capsule capsule Take 1 capsule by mouth 1 (One) Time Per Week. 15 capsule 3   • Insulin Lispro (humaLOG) 100 UNIT/ML injection Inject 0-7 Units under the skin into the appropriate area as directed 3 (Three) Times a Day Before Meals. Admin Instructions: Correction - Low Dose.  Less than 40 units/day total insulin dose or lean, elderly, renal patients   Blood glucose 150-199 mg/dL - 2 units   Blood glucose 200-249 mg/dL - 3 units   Blood glucose 250-299 mg/dL - 4 units   Blood glucose 300-349 mg/dL - 5 units   Blood glucose 350-400 mg/dL - 6 units   Blood glucose greater than 400 mg/dL - 7 units and call provider     • pantoprazole (Protonix) 40 MG EC tablet Take 1 tablet by mouth Daily. 90 tablet 3   • vitamin B-12 (cyancobalamin) 100 MCG tablet Take 0.5 tablets by mouth Daily. 30 tablet 3   • mirtazapine (REMERON) 7.5 MG tablet Take 3 tablets by mouth Every Night for 30 days. 90 tablet 0     No facility-administered medications prior to visit.       Opioid medication/s are on active medication list.  and I have evaluated her active treatment plan and pain score trends (see table).  Vitals:    05/25/23 0819   PainSc: 0-No pain     I have reviewed the chart for potential of high risk medication and harmful drug interactions in the elderly.            Aspirin is not on active medication list.  Aspirin use is not indicated based on review of current medical condition/s. Risk of harm outweighs potential benefits.  .    Patient Active Problem List   Diagnosis   • Type 2 diabetes mellitus, uncontrolled, with neuropathy   • Essential hypertension   • ASCVD (arteriosclerotic cardiovascular disease)   • Bilateral carpal tunnel syndrome   • Diabetic retinopathy associated with type 2 diabetes mellitus   • Dyslipidemia   • Sciatic neuropathy   • DDD (degenerative disc disease), lumbar   • Primary osteoarthritis of left knee   • Status post total left knee replacement   • Type 2 diabetes mellitus with chronic kidney disease,  "with long-term current use of insulin   • Hyperparathyroidism due to renal insufficiency   • Venous insufficiency (chronic) (peripheral)   • Class 1 obesity due to excess calories with serious comorbidity and body mass index (BMI) of 31.0 to 31.9 in adult   • PVD (peripheral vascular disease) with claudication   • LVH (left ventricular hypertrophy)   • Chronic heart failure with preserved ejection fraction (HFpEF)   • LENNY (obstructive sleep apnea)   • CKD (chronic kidney disease) stage 4, GFR 15-29 ml/min (HCC)   • Iron deficiency anemia   • Malabsorption of iron   • Acquired hypothyroidism   • Hiatal hernia   • Weight loss, abnormal   • Osteopenia of neck of left femur   • Cirrhosis   • Severe pulmonary hypertension     Advance Care Planning   Advance Care Planning     Advance Directive is not on file.  ACP discussion was held with the patient during this visit. Patient does not have an advance directive, information provided.     Objective    Vitals:    05/25/23 0819   BP: 128/60   Pulse: 93   Temp: 97.1 °F (36.2 °C)   TempSrc: Temporal   SpO2: 100%   Weight: 69.4 kg (153 lb)   Height: 160 cm (63\")   PainSc: 0-No pain     Estimated body mass index is 27.1 kg/m² as calculated from the following:    Height as of this encounter: 160 cm (63\").    Weight as of this encounter: 69.4 kg (153 lb).    BMI is >= 25 and <30. (Overweight) The following options were offered after discussion;: exercise counseling/recommendations and nutrition counseling/recommendations      Does the patient have evidence of cognitive impairment?   No            HEALTH RISK ASSESSMENT    Smoking Status:  Social History     Tobacco Use   Smoking Status Never   Smokeless Tobacco Never     Alcohol Consumption:  Social History     Substance and Sexual Activity   Alcohol Use Yes    Comment: socially     Fall Risk Screen:    STEADI Fall Risk Assessment was completed, and patient is at HIGH risk for falls. Assessment completed on:5/25/2023    Depression " Screenin/25/2023     8:24 AM   PHQ-2/PHQ-9 Depression Screening   Little Interest or Pleasure in Doing Things 0-->not at all   Feeling Down, Depressed or Hopeless 1-->several days   PHQ-9: Brief Depression Severity Measure Score 1       Health Habits and Functional and Cognitive Screenin/25/2023     8:21 AM   Functional & Cognitive Status   Do you have difficulty preparing food and eating? Yes   Do you have difficulty bathing yourself, getting dressed or grooming yourself? Yes   Do you have difficulty using the toilet? No   Do you have difficulty moving around from place to place? Yes   Do you have trouble with steps or getting out of a bed or a chair? Yes   Current Diet Well Balanced Diet   Dental Exam Up to date   Eye Exam Up to date   Exercise (times per week) 0 times per week   Current Exercises Include No Regular Exercise   Do you need help using the phone?  No   Are you deaf or do you have serious difficulty hearing?  No   Do you need help with transportation? Yes   Do you need help shopping? Yes   Do you need help preparing meals?  Yes   Do you need help with housework?  Yes   Do you need help with laundry? Yes   Do you need help taking your medications? No   Do you need help managing money? No   Do you ever drive or ride in a car without wearing a seat belt? No   Have you felt unusual stress, anger or loneliness in the last month? No   Who do you live with? Spouse   If you need help, do you have trouble finding someone available to you? No   Have you been bothered in the last four weeks by sexual problems? No   Do you have difficulty concentrating, remembering or making decisions? Yes       Age-appropriate Screening Schedule:  Refer to the list below for future screening recommendations based on patient's age, sex and/or medical conditions. Orders for these recommended tests are listed in the plan section. The patient has been provided with a written plan.    Health Maintenance   Topic Date  Due   • ZOSTER VACCINE (2 of 2) 09/14/2016   • COVID-19 Vaccine (4 - Booster for Pfizer series) 11/22/2021   • DIABETIC EYE EXAM  12/20/2022   • Hepatitis B (3 of 3 - Risk 3-dose series) 06/03/2023   • HEMOGLOBIN A1C  07/17/2023   • INFLUENZA VACCINE  08/01/2023   • LIPID PANEL  09/08/2023   • MAMMOGRAM  09/26/2023   • URINE MICROALBUMIN  01/18/2024   • ANNUAL WELLNESS VISIT  05/25/2024   • DXA SCAN  09/26/2024   • COLORECTAL CANCER SCREENING  11/23/2024   • TDAP/TD VACCINES (2 - Td or Tdap) 03/20/2027   • HEPATITIS C SCREENING  Completed   • Pneumococcal Vaccine 65+  Completed   • PAP SMEAR  Discontinued                  CMS Preventative Services Quick Reference  Risk Factors Identified During Encounter:    Fall Risk-High or Moderate: Discussed Fall Prevention in the home  Immunizations Discussed/Encouraged: Prevnar 20 (Pneumococcal 20-valent conjugate)  Inactivity/Sedentary: Patient was advised to exercise at least 150 minutes a week per CDC recommendations.  Polypharmacy: Medication List reviewed    The above risks/problems have been discussed with the patient.  Pertinent information has been shared with the patient in the After Visit Summary.    Diagnoses and all orders for this visit:    1. Medicare annual wellness visit, subsequent (Primary)  Comments:  Performed and documented today  Her daughter is here helping with history and does help with her care.    2. At high risk for falls  Comments:  Fall risk precautions advised    3. Immunization due  -     Pneumococcal Conjugate Vaccine 20-Valent All    4. Malabsorption of iron  -     CBC & Differential; Future  -     Iron Profile; Future  -     Methylmalonic Acid, Serum; Future    5. Type 2 diabetes mellitus with chronic kidney disease on chronic dialysis, with long-term current use of insulin  -     Comprehensive Metabolic Panel; Future  -     Lipid Panel; Future  -     Hemoglobin A1c; Future  -     MicroAlbumin, Urine, Random - Urine, Clean Catch; Future  -      Methylmalonic Acid, Serum; Future    6. Essential hypertension  -     Methylmalonic Acid, Serum; Future    7. Dyslipidemia  -     Lipid Panel; Future    8. Vitamin D deficiency  -     Vitamin D,25-Hydroxy; Future        Follow Up:   Next Medicare Wellness visit to be scheduled in 1 year.      An After Visit Summary and PPPS were made available to the patient.

## 2023-05-27 LAB — METHYLMALONATE SERPL-SCNC: 846 NMOL/L (ref 0–378)

## 2023-06-07 ENCOUNTER — TRANSCRIBE ORDERS (OUTPATIENT)
Dept: ADMINISTRATIVE | Facility: HOSPITAL | Age: 78
End: 2023-06-07
Payer: MEDICARE

## 2023-06-07 DIAGNOSIS — E11.69 DIABETIC FOOT ULCER WITH OSTEOMYELITIS: ICD-10-CM

## 2023-06-07 DIAGNOSIS — L97.509 DIABETIC FOOT ULCER WITH OSTEOMYELITIS: ICD-10-CM

## 2023-06-07 DIAGNOSIS — M86.9 DIABETIC FOOT ULCER WITH OSTEOMYELITIS: ICD-10-CM

## 2023-06-07 DIAGNOSIS — L97.522 ULCER OF LEFT FOOT, WITH FAT LAYER EXPOSED: ICD-10-CM

## 2023-06-07 DIAGNOSIS — M86.472 CHRONIC OSTEOMYELITIS WITH DRAINING SINUS, LEFT ANKLE AND FOOT: Primary | ICD-10-CM

## 2023-06-07 DIAGNOSIS — E11.621 DIABETIC FOOT ULCER WITH OSTEOMYELITIS: ICD-10-CM

## 2023-06-15 ENCOUNTER — LAB (OUTPATIENT)
Dept: FAMILY MEDICINE CLINIC | Facility: CLINIC | Age: 78
End: 2023-06-15
Payer: MEDICARE

## 2023-06-15 DIAGNOSIS — Z79.4 TYPE 2 DIABETES MELLITUS WITH CHRONIC KIDNEY DISEASE ON CHRONIC DIALYSIS, WITH LONG-TERM CURRENT USE OF INSULIN: ICD-10-CM

## 2023-06-15 DIAGNOSIS — E78.5 DYSLIPIDEMIA: ICD-10-CM

## 2023-06-15 DIAGNOSIS — E11.22 TYPE 2 DIABETES MELLITUS WITH CHRONIC KIDNEY DISEASE ON CHRONIC DIALYSIS, WITH LONG-TERM CURRENT USE OF INSULIN: ICD-10-CM

## 2023-06-15 DIAGNOSIS — I10 ESSENTIAL HYPERTENSION: ICD-10-CM

## 2023-06-15 DIAGNOSIS — K90.9 MALABSORPTION OF IRON: ICD-10-CM

## 2023-06-15 DIAGNOSIS — Z99.2 TYPE 2 DIABETES MELLITUS WITH CHRONIC KIDNEY DISEASE ON CHRONIC DIALYSIS, WITH LONG-TERM CURRENT USE OF INSULIN: ICD-10-CM

## 2023-06-15 DIAGNOSIS — N18.6 TYPE 2 DIABETES MELLITUS WITH CHRONIC KIDNEY DISEASE ON CHRONIC DIALYSIS, WITH LONG-TERM CURRENT USE OF INSULIN: ICD-10-CM

## 2023-06-15 DIAGNOSIS — E55.9 VITAMIN D DEFICIENCY: ICD-10-CM

## 2023-06-15 LAB
25(OH)D3 SERPL-MCNC: 78 NG/ML (ref 30–100)
ALBUMIN SERPL-MCNC: 3.3 G/DL (ref 3.5–5.2)
ALBUMIN/GLOB SERPL: 0.8 G/DL
ALP SERPL-CCNC: 381 U/L (ref 39–117)
ALT SERPL W P-5'-P-CCNC: 29 U/L (ref 1–33)
ANION GAP SERPL CALCULATED.3IONS-SCNC: 13.2 MMOL/L (ref 5–15)
AST SERPL-CCNC: 48 U/L (ref 1–32)
BASOPHILS # BLD AUTO: 0.04 10*3/MM3 (ref 0–0.2)
BASOPHILS NFR BLD AUTO: 0.6 % (ref 0–1.5)
BILIRUB SERPL-MCNC: 0.5 MG/DL (ref 0–1.2)
BUN SERPL-MCNC: 18 MG/DL (ref 8–23)
BUN/CREAT SERPL: 5.8 (ref 7–25)
CALCIUM SPEC-SCNC: 9.3 MG/DL (ref 8.6–10.5)
CHLORIDE SERPL-SCNC: 95 MMOL/L (ref 98–107)
CHOLEST SERPL-MCNC: 172 MG/DL (ref 0–200)
CO2 SERPL-SCNC: 25.8 MMOL/L (ref 22–29)
CREAT SERPL-MCNC: 3.11 MG/DL (ref 0.57–1)
DEPRECATED RDW RBC AUTO: 47.3 FL (ref 37–54)
EGFRCR SERPLBLD CKD-EPI 2021: 14.7 ML/MIN/1.73
EOSINOPHIL # BLD AUTO: 0.18 10*3/MM3 (ref 0–0.4)
EOSINOPHIL NFR BLD AUTO: 2.5 % (ref 0.3–6.2)
ERYTHROCYTE [DISTWIDTH] IN BLOOD BY AUTOMATED COUNT: 15.1 % (ref 12.3–15.4)
GLOBULIN UR ELPH-MCNC: 4 GM/DL
GLUCOSE SERPL-MCNC: 122 MG/DL (ref 65–99)
HBA1C MFR BLD: 6.6 % (ref 4.8–5.6)
HCT VFR BLD AUTO: 38.4 % (ref 34–46.6)
HDLC SERPL-MCNC: 43 MG/DL (ref 40–60)
HGB BLD-MCNC: 12.1 G/DL (ref 12–15.9)
IMM GRANULOCYTES # BLD AUTO: 0.01 10*3/MM3 (ref 0–0.05)
IMM GRANULOCYTES NFR BLD AUTO: 0.1 % (ref 0–0.5)
IRON 24H UR-MRATE: 55 MCG/DL (ref 37–145)
IRON SATN MFR SERPL: 23 % (ref 20–50)
LDLC SERPL CALC-MCNC: 111 MG/DL (ref 0–100)
LDLC/HDLC SERPL: 2.56 {RATIO}
LYMPHOCYTES # BLD AUTO: 1.93 10*3/MM3 (ref 0.7–3.1)
LYMPHOCYTES NFR BLD AUTO: 27.2 % (ref 19.6–45.3)
MCH RBC QN AUTO: 27.3 PG (ref 26.6–33)
MCHC RBC AUTO-ENTMCNC: 31.5 G/DL (ref 31.5–35.7)
MCV RBC AUTO: 86.5 FL (ref 79–97)
MONOCYTES # BLD AUTO: 0.65 10*3/MM3 (ref 0.1–0.9)
MONOCYTES NFR BLD AUTO: 9.2 % (ref 5–12)
NEUTROPHILS NFR BLD AUTO: 4.29 10*3/MM3 (ref 1.7–7)
NEUTROPHILS NFR BLD AUTO: 60.4 % (ref 42.7–76)
NRBC BLD AUTO-RTO: 0 /100 WBC (ref 0–0.2)
PLATELET # BLD AUTO: 152 10*3/MM3 (ref 140–450)
PMV BLD AUTO: 9.2 FL (ref 6–12)
POTASSIUM SERPL-SCNC: 5.1 MMOL/L (ref 3.5–5.2)
PROT SERPL-MCNC: 7.3 G/DL (ref 6–8.5)
RBC # BLD AUTO: 4.44 10*6/MM3 (ref 3.77–5.28)
SODIUM SERPL-SCNC: 134 MMOL/L (ref 136–145)
TIBC SERPL-MCNC: 243 MCG/DL (ref 298–536)
TRANSFERRIN SERPL-MCNC: 163 MG/DL (ref 200–360)
TRIGL SERPL-MCNC: 95 MG/DL (ref 0–150)
VLDLC SERPL-MCNC: 18 MG/DL (ref 5–40)
WBC NRBC COR # BLD: 7.1 10*3/MM3 (ref 3.4–10.8)

## 2023-06-15 PROCEDURE — 82306 VITAMIN D 25 HYDROXY: CPT | Performed by: PHYSICIAN ASSISTANT

## 2023-06-15 PROCEDURE — 84466 ASSAY OF TRANSFERRIN: CPT | Performed by: PHYSICIAN ASSISTANT

## 2023-06-15 PROCEDURE — 80053 COMPREHEN METABOLIC PANEL: CPT | Performed by: PHYSICIAN ASSISTANT

## 2023-06-15 PROCEDURE — 83921 ORGANIC ACID SINGLE QUANT: CPT | Performed by: PHYSICIAN ASSISTANT

## 2023-06-15 PROCEDURE — 80061 LIPID PANEL: CPT | Performed by: PHYSICIAN ASSISTANT

## 2023-06-15 PROCEDURE — 82043 UR ALBUMIN QUANTITATIVE: CPT | Performed by: PHYSICIAN ASSISTANT

## 2023-06-15 PROCEDURE — 85025 COMPLETE CBC W/AUTO DIFF WBC: CPT | Performed by: PHYSICIAN ASSISTANT

## 2023-06-15 PROCEDURE — 83036 HEMOGLOBIN GLYCOSYLATED A1C: CPT | Performed by: PHYSICIAN ASSISTANT

## 2023-06-15 PROCEDURE — 36415 COLL VENOUS BLD VENIPUNCTURE: CPT | Performed by: PHYSICIAN ASSISTANT

## 2023-06-15 PROCEDURE — 83540 ASSAY OF IRON: CPT | Performed by: PHYSICIAN ASSISTANT

## 2023-06-16 LAB — ALBUMIN UR-MCNC: 112.1 MG/DL

## 2023-06-19 LAB — METHYLMALONATE SERPL-SCNC: 721 NMOL/L (ref 0–378)

## 2023-06-20 PROBLEM — E78.2 MIXED HYPERLIPIDEMIA: Status: ACTIVE | Noted: 2023-06-20

## 2023-08-22 NOTE — PROGRESS NOTES
Subjective     Date: 8/23/2023    Referring Provider  Dr. Johnson     Chief Complaint   Iron deficiency anemia       Subjective          Britta Lee is a 78 y.o. female who presents today to [facility] for follow up.    HPI:   78 y.o.female with history of hypertension, hyperlipidemia, depression/anxiety, hypothyroidism, coronary artery disease, CKD, congestive heart failure who presents as follow-up for her treatment of anemia.  Previously seen by RENIE Mohr.    She has struggled with anemia since December 2016 with hemoglobin ranging from 8.6-9.8.  Iron studies at the time consistent with anemia of chronic disease.  Has previously received IV iron infusions, and taken oral iron.  Stopped taking oral ferrous sulfate due to malabsorption.  Last IV iron was received August 2022.    Treatment history:      Interval History 02/01/2023   She was started on oral ferrous sulfate by nephrology.  Currently still taking it without any nausea or constipation.  Denies any new complaints.  Denies any melena, hematochezia, hematemesis.  Per chart review patient presented to the ED on 1/11/2023 for reportedly low hemoglobin level in dialysis, hemoglobin of 6.1 mg/dL, required blood transfusion at that time.    Interval History 02/21/2023   Ms. Lee presents today for follow-up with her .  She had a fall from bed, presented to the ED February 8,  was found to have acute fracture of humeral neck and humeral head.  She reports continued oral ferrous sulfate, denies any GI discomfort with this.    Hemoglobin today is 9.3, hematocrit 31.2 today MCV 89.9.  Labs from previous visit showed vitamin B12 713, methylmalonic acid elevated to 933.  Folate level low/normal at 6.7.    She reports previously taking oral vitamin B tablets, never tried injections.  Denies any fatigue, melena, hematochezia.    Interval History 05/23/2023   Ms. Lee presents today with her .  She reports doing well overall. Continues to take oral folic acid and b12 tablet. Unsure if she is taking oral ferrous sulfate.   Recently placed an AV fistula R arm.   Denies GI bleed.  CBC today with Hgb 13.8, Hct 47.3, normal WBC and platelets. Pt is unsure if she gets epo with Dr. Johnson, her  does not think so.     Interval History 08/23/2023   Ms. Lee presents today accompanied by a friend she reports doing well overall, has been getting dialysis Monday Wednesday Friday.  She denies any bleeds including melena or hematochezia.  She is unsure if she is taking folic acid and vitamin B12 capsules, as her  typically gives her medications.    CBC reviewed today which shows hemoglobin 10.8, hematocrit 34.3 (decreased from June 2023 with normal hemoglobin 12.1).  Normal WBC and platelet count.  Iron studies consistent with anemia of chronic disease      The following portions of the patient's history were reviewed and updated as appropriate: allergies, current medications, past family history, past medical history, past social history, past surgical history and problem list.    Objective     Objective     Allergy:   No Known Allergies     Current Medications:   Current Outpatient Medications   Medication Sig Dispense Refill    amLODIPine (NORVASC) 10 MG tablet Take 1 tablet by mouth Daily. 90 tablet 3    atorvastatin (LIPITOR) 40 MG tablet Take 1 tablet by mouth Every Night. 90 tablet 3    bumetanide (BUMEX) 1 MG tablet Take 1 tablet by mouth Every Other Day.      escitalopram (LEXAPRO) 10 MG tablet Take 1 tablet by mouth Daily. 90 tablet 3    famotidine (PEPCID) 20 MG tablet Take 1 tablet by mouth Every 12 (Twelve) Hours.      ferrous sulfate 325 (65 FE) MG EC tablet Take 1 tablet by mouth Every 12 (Twelve) Hours.      folic acid (FOLVITE) 1 MG tablet Take 1 tablet by mouth Daily. 30 tablet 3    HYDROcodone-acetaminophen (NORCO) 7.5-325 MG per tablet Take 1 tablet by mouth Every 6 (Six) Hours As  Needed for Moderate Pain. 12 tablet 0    isosorbide mononitrate (IMDUR) 60 MG 24 hr tablet Take 1 tablet by mouth Daily. 90 tablet 0    levothyroxine (SYNTHROID, LEVOTHROID) 25 MCG tablet Take 1 tablet by mouth Daily. 90 tablet 3    metoprolol succinate XL (TOPROL-XL) 50 MG 24 hr tablet Take 1.5 tablets by mouth Daily.      vitamin D (ERGOCALCIFEROL) 1.25 MG (54713 UT) capsule capsule Take 1 capsule by mouth 1 (One) Time Per Week. 15 capsule 3     No current facility-administered medications for this visit.       Past Medical History:  Past Medical History:   Diagnosis Date    Acquired hypothyroidism 4/22/2021    Anemia     Arthritis     Carpal tunnel syndrome     Coronary artery disease     Diabetes mellitus     Elevated cholesterol     GERD (gastroesophageal reflux disease)     History of pneumonia     History of unsteady gait     OCASSIONALLY    Hypertension     Insomnia     Kidney disease     LENNY (obstructive sleep apnea) 12/1/2020    Osteoarthritis     Renal insufficiency     Sciatica        Past Surgical History:  Past Surgical History:   Procedure Laterality Date    ABDOMINAL SURGERY      APPENDECTOMY      CARDIAC CATHETERIZATION      1 STENT  ---  2000    CARDIAC SURGERY      CAROTID STENT      CARPAL TUNNEL RELEASE Right 10/8/2019    Procedure: CARPAL TUNNEL RELEASE;  Surgeon: Feroz Johnson MD;  Location: [facility];  Service: Orthopedics    CATARACT EXTRACTION      CHOLECYSTECTOMY      COLONOSCOPY      COLONOSCOPY N/A 11/23/2021    Procedure: COLONOSCOPY FOR SCREENING;  Surgeon: Palmira Pate MD;  Location: [facility] OR;  Service: Gastroenterology;  Laterality: N/A;    CORONARY ANGIOPLASTY WITH STENT PLACEMENT      ENDOSCOPY      ENDOSCOPY N/A 3/5/2020    Procedure: ESOPHAGOGASTRODUODENOSCOPY;  Surgeon: Alexandru Bagley MD;  Location: [facility] OR;  Service: Gastroenterology;  Laterality: N/A;    ENDOSCOPY N/A 11/23/2021    Procedure: ESOPHAGOGASTRODUODENOSCOPY WITH BIOPSY;  Surgeon:  Palmira Pate MD;  Location: [facility];  Service: Gastroenterology;  Laterality: N/A;    ENDOSCOPY AND COLONOSCOPY      GALLBLADDER SURGERY      INSERTION HEMODIALYSIS CATHETER N/A 11/14/2022    Procedure: HEMODIALYSIS CATHETER INSERTION;  Surgeon: Ta Blas MD;  Location: [facility] OR;  Service: General;  Laterality: N/A;    JOINT REPLACEMENT Left 05/02/2017    [facility]  DR LEVIN  LEFT TOTAL KNEE    KNEE ARTHROSCOPY W/ MENISCECTOMY Right     LAPAROSCOPIC TUBAL LIGATION      CA ARTHRP KNE CONDYLE&PLATU MEDIAL&LAT COMPARTMENTS Left 5/2/2017    Procedure: TOTAL KNEE ARTHROPLASTY;  Surgeon: Feroz Levin MD;  Location: [facility] OR;  Service: Orthopedics    STERILIZATION      TONSILLECTOMY         Social History:  Social History     Socioeconomic History    Marital status:      Spouse name: natalie    Number of children: 3    Years of education: 12   Tobacco Use    Smoking status: Never    Smokeless tobacco: Never   Vaping Use    Vaping Use: Never used   Substance and Sexual Activity    Alcohol use: Yes     Comment: socially    Drug use: No    Sexual activity: Defer     Birth control/protection: Surgical     Britta Lee  reports that she has never smoked. She has never used smokeless tobacco.      Family History:  Family History   Problem Relation Age of Onset    Arthritis Mother     Diabetes Mother     Cancer Mother     Heart disease Father     Diabetes Daughter     Diabetes Son     Diabetes Maternal Aunt     Heart disease Maternal Grandmother     Breast cancer Neg Hx        Review of Systems:  Review of Systems   All other systems reviewed and are negative.    Vital Signs:   BP 95/55   Pulse 72   Temp 97.3 øF (36.3 øC) (Temporal)   Resp 18   Wt 77.1 kg (170 lb)   SpO2 98%   BMI 30.11 kg/mý      Physical Exam:  Physical Exam  Vitals and nursing note reviewed.   Constitutional:       General: She is not in acute distress.     Appearance: Normal appearance. She is not ill-appearing.       Comments: + In a wheelchair   HENT:      Head: Normocephalic and atraumatic.      Nose: Nose normal.      Mouth/Throat:      Mouth: Mucous membranes are moist.      Pharynx: Oropharynx is clear.   Eyes:      Conjunctiva/sclera: Conjunctivae normal.      Pupils: Pupils are equal, round, and reactive to light.   Cardiovascular:      Rate and Rhythm: Normal rate and regular rhythm.      Heart sounds: No murmur heard.  Pulmonary:      Effort: Pulmonary effort is normal. No respiratory distress.      Breath sounds: Normal breath sounds. No wheezing.   Abdominal:      General: Abdomen is flat. Bowel sounds are normal. There is no distension.      Palpations: Abdomen is soft. There is no mass.      Tenderness: There is no abdominal tenderness. There is no guarding.   Musculoskeletal:         General: No swelling. Normal range of motion.      Cervical back: Normal range of motion.   Lymphadenopathy:      Cervical: No cervical adenopathy.   Skin:     General: Skin is warm and dry.      Coloration: Skin is not jaundiced.      Findings: No bruising or rash.      Comments: +R AV fistula   Neurological:      General: No focal deficit present.      Mental Status: She is alert and oriented to person, place, and time.      Motor: Weakness present.      Coordination: Coordination normal.   Psychiatric:         Mood and Affect: Mood normal.         Behavior: Behavior normal.         Judgment: Judgment normal.       PHQ-9 Score  PHQ-9 Total Score:       Pain Score  Vitals:    08/23/23 0904   BP: 95/55   Pulse: 72   Resp: 18   Temp: 97.3 øF (36.3 øC)   TempSrc: Temporal   SpO2: 98%   Weight: 77.1 kg (170 lb)   PainSc: 0-No pain       ECOG score: 0             Lab Review  Lab Results   Component Value Date    WBC 8.41 08/23/2023    HGB 10.8 (L) 08/23/2023    HCT 34.3 08/23/2023    MCV 91.0 08/23/2023    RDW 13.8 08/23/2023     08/23/2023    NEUTRORELPCT 64.2 08/23/2023    LYMPHORELPCT 24.0 08/23/2023    MONORELPCT 7.4  08/23/2023    EOSRELPCT 3.7 08/23/2023    BASORELPCT 0.5 08/23/2023    NEUTROABS 5.40 08/23/2023    LYMPHSABS 2.02 08/23/2023       Lab Results   Component Value Date     (L) 06/15/2023    K 5.1 06/15/2023    CO2 25.8 06/15/2023    CL 95 (L) 06/15/2023    BUN 18 06/15/2023    CREATININE 3.11 (H) 06/15/2023    EGFRIFNONA 23 (L) 02/21/2022    EGFRIFAFRI 41 (L) 07/30/2018    GLUCOSE 122 (H) 06/15/2023    CALCIUM 9.3 06/15/2023    ALKPHOS 381 (H) 06/15/2023    AST 48 (H) 06/15/2023    ALT 29 06/15/2023    BILITOT 0.5 06/15/2023    ALBUMIN 3.3 (L) 06/15/2023    PROTEINTOT 7.3 06/15/2023    MG 1.9 01/11/2023    PHOS 3.3 10/17/2022           Endoscopy:   ENDOSCOPY:  11/23/21  ESOPHAGOGASTRODUODENOSCOPY PROCEDURE REPORT     Britta Lee  11/23/2021     GASTROENTEROLOGIST:  Palmira Pate MD      PRE-PROCEDURE DIAGNOSIS:  Iron deficiency anemia, unspecified iron deficiency anemia type [D50.9]  Weight loss, abnormal [R63.4]     POST-PROCEDURE DIAGNOSIS:  1.  Gastritis  2.  Angiectasias     Procedure(s):  ESOPHAGOGASTRODUODENOSCOPY WITH BIOPSY  COLONOSCOPY FOR SCREENING     ANESTHESIA:  Propofol administered by anesthesia.  See anesthesia notes for ASA classification     STAFF:  Circulator: Bre Ny RN; Danelle Mccray RN  Endo Technician: Omari Lewis     FINDINGS:  1.  Normal-appearing esophagus.  Biopsies were taken  2.  Mild erythema in the antrum and stomach.  Biopsies taken for H. pylori.  3.  One angiectasia was found in the second portion of the duodenum.  This was nonbleeding.  Biopsies were taken of the duodenum to rule out celiac disease as a source of her anemia.     OPERATIVE PROCEDURE:  After proper informed consent was obtained, patient was transferred to the OR/endoscopy suite.  Patient was then placed in left lateral decubitus position. The Olympus 180 series video gastroscope was inserted orally under direct visualization.  Esophagus, stomach, and duodenum were inspected.   The endoscope was passed to the third portion of the duodenum.  Scope was retroflexed for visualization of the cardia and incisuraThe endoscope was then withdrawn. Patient tolerated the procedure well. There were no immediate complications.     ESTIMATED BLOOD LOSS:  None     COMPLICATIONS;  None     RECOMMENDATIONS/ PLAN:  1.  Follow-up biopsy results.  2.  Proceed with colonoscopy.    COLONOSCOPY PROCEDURE NOTE     Britta Lee  11/23/2021     GASTROENTEROLOGIST:  Palmira Pate MD        PRE-PROCEDURE DIAGNOSIS:   Iron deficiency anemia, unspecified iron deficiency anemia type [D50.9]  Weight loss, abnormal [R63.4]     POST-PROCEDURE DIAGNOSIS:  1.  Colon polyps  2.  Diverticulosis  3.  Internal hemorrhoids     PROCEDURE:  COLONOSCOPY with snare polypectomy and cold biopsy polypectomy     ANESTHESIA:  Propofol administered by anesthesia.  See anesthesia notes for ASA classification     STAFF  Circulator: Bre Ny RN; Danelle Mccray RN  Endo Technician: Omari Lewis     Findings:  1.  A 5 mm polyp was found in the ascending colon.  This was removed with cold forceps polypectomy.  2.  A 5 mm polyp was removed from the transverse colon with cold forceps biopsy.  3.  Two 6 mm polyps were found in the descending colon.  Both polyps were removed with cold snare polypectomy.  4.  A 7 mm polyp was removed from the sigmoid colon with cold snare polypectomy.  5.  Diverticulosis involving left colon.  6.  Small internal hemorrhoids.  7.  Normal-appearing terminal ileum.    OPERATIVE PROCEDURE   After proper informed consent was obtained, the patient was taken the operating suite and placed in left lateral decubitus position.  An Olympus video colonoscope 180 series was inserted in the rectum and advanced to the terminal ileum under direct visualization.  Cecum and terminal ileum were identified by visualization of the appendiceal orifice and ileocecal valve.  The colonoscope was then slowly  withdrawn from the cecum to the rectum and passed a second time from rectum to cecum.  The colonoscope was retroflexed in the cecum and rectum. Scope was then withdrawn. Patient tolerated the procedure well. There were no immediate complications. Cecal withdrawal time was 15 minutes.     ESTIMATED BLOOD LOSS  None      COMPLICATIONS  None     RECOMMENDATIONS:  1.  Follow-up pathology.  2.  Repeat colonoscopy in 3 years due to personal history of colon polyps.  3.  Proceed with capsule endoscopy if anemia persists.  Sign 4.  Follow-up in GI clinic in 6 to 8 weeks.  Assessment / Plan         Assessment and Plan   77 year old F with history of ESRD on HD who presents for normocytic anemia.     Anemia  2    iron deficiency anemia  3.   Vitamin B12 deficiency  4.   Folate deficiency  5.   Malabsorption  -Patient with history of anemia required multiple transfusions, most recently 1/12/2023.  Previously required IV iron after intolerance to p.o. iron.  Last IV iron received August 2022.  -Most recent EGD and colonoscopy by Dr. Simmons 11/2021.  At that time no source of bleed was discovered, it was recommended patient follow-up with capsule endoscopy if anemia persist.  -Most recent labs from 2/1/2023 with low/normal folate level, normal vitamin B12 however elevated methylmalonic acid.  -Currently taking 1 mg folic acid to be taken daily and 100 mcg of cyanocobalamin tablet to be taken daily. Given recent drop in H/H, recommend patient check with  if folic acid and B12 are being given.   -She is unsure if receiving Erythropoietin with nephrology, Dr. Johnson   - Iron studies consistent with anemia of chronic disease.     Discussed possible differential diagnoses, testing, treatment, recommended non-pharmacological interventions, risks, warning signs to monitor for that would indicate need for follow-up in clinic or ER. If no improvement with these regimens or you have new or worsening symptoms follow-up. Patient  verbalizes understanding and agreement with plan of care. Denies further needs or concerns.     Patient was given instructions and counseling regarding her condition and for health maintenance advised.    I spent 30 minutes with Britta Lee today.  In the office today, more than 50% of this time was spent face-to-face with her  in counseling / coordination of care, reviewing her interim medical history and counseling on the current treatment plan.  All questions were answered to her satisfaction.      Meds ordered during this visit  No orders of the defined types were placed in this encounter.      Visit Diagnoses    ICD-10-CM ICD-9-CM   1. Iron deficiency anemia, unspecified iron deficiency anemia type  D50.9 280.9   2. B12 deficiency  E53.8 266.2   3. Folate deficiency  E53.8 266.2   4. Malabsorption of iron  K90.9 579.8   5. CKD (chronic kidney disease) stage 4, GFR 15-29 ml/min  N18.4 585.4   6. Anemia of chronic disease  D63.8 285.29         Follow Up   3 months with repeat CBC, folate, B12, MMA, iron studies, ferritin, epo level      This document has been electronically signed by Sakina Bang MD   August 23, 2023 10:09 EDT    Dictated Utilizing Dragon Dictation: Part of this note may be an electronic transcription/translation of spoken language to printed text using the Dragon Dictation System.

## 2023-08-23 ENCOUNTER — OFFICE VISIT (OUTPATIENT)
Dept: ONCOLOGY | Facility: CLINIC | Age: 78
End: 2023-08-23
Payer: MEDICARE

## 2023-08-23 ENCOUNTER — LAB (OUTPATIENT)
Dept: ONCOLOGY | Facility: CLINIC | Age: 78
End: 2023-08-23
Payer: MEDICARE

## 2023-08-23 VITALS
DIASTOLIC BLOOD PRESSURE: 55 MMHG | BODY MASS INDEX: 30.11 KG/M2 | RESPIRATION RATE: 18 BRPM | TEMPERATURE: 97.3 F | HEART RATE: 72 BPM | OXYGEN SATURATION: 98 % | SYSTOLIC BLOOD PRESSURE: 95 MMHG | WEIGHT: 170 LBS

## 2023-08-23 DIAGNOSIS — N18.4 CKD (CHRONIC KIDNEY DISEASE) STAGE 4, GFR 15-29 ML/MIN: ICD-10-CM

## 2023-08-23 DIAGNOSIS — K90.9 MALABSORPTION OF IRON: ICD-10-CM

## 2023-08-23 DIAGNOSIS — D63.8 ANEMIA OF CHRONIC DISEASE: ICD-10-CM

## 2023-08-23 DIAGNOSIS — E53.8 FOLATE DEFICIENCY: ICD-10-CM

## 2023-08-23 DIAGNOSIS — D50.9 IRON DEFICIENCY ANEMIA, UNSPECIFIED IRON DEFICIENCY ANEMIA TYPE: ICD-10-CM

## 2023-08-23 DIAGNOSIS — E53.8 B12 DEFICIENCY: ICD-10-CM

## 2023-08-23 DIAGNOSIS — D50.9 IRON DEFICIENCY ANEMIA, UNSPECIFIED IRON DEFICIENCY ANEMIA TYPE: Primary | ICD-10-CM

## 2023-08-23 LAB
BASOPHILS # BLD AUTO: 0.04 10*3/MM3 (ref 0–0.2)
BASOPHILS NFR BLD AUTO: 0.5 % (ref 0–1.5)
DEPRECATED RDW RBC AUTO: 46.2 FL (ref 37–54)
EOSINOPHIL # BLD AUTO: 0.31 10*3/MM3 (ref 0–0.4)
EOSINOPHIL NFR BLD AUTO: 3.7 % (ref 0.3–6.2)
ERYTHROCYTE [DISTWIDTH] IN BLOOD BY AUTOMATED COUNT: 13.8 % (ref 12.3–15.4)
FERRITIN SERPL-MCNC: 1130 NG/ML (ref 13–150)
FOLATE SERPL-MCNC: >20 NG/ML (ref 4.78–24.2)
HCT VFR BLD AUTO: 34.3 % (ref 34–46.6)
HGB BLD-MCNC: 10.8 G/DL (ref 12–15.9)
IMM GRANULOCYTES # BLD AUTO: 0.02 10*3/MM3 (ref 0–0.05)
IMM GRANULOCYTES NFR BLD AUTO: 0.2 % (ref 0–0.5)
IRON 24H UR-MRATE: 43 MCG/DL (ref 37–145)
IRON SATN MFR SERPL: 17 % (ref 20–50)
LYMPHOCYTES # BLD AUTO: 2.02 10*3/MM3 (ref 0.7–3.1)
LYMPHOCYTES NFR BLD AUTO: 24 % (ref 19.6–45.3)
MCH RBC QN AUTO: 28.6 PG (ref 26.6–33)
MCHC RBC AUTO-ENTMCNC: 31.5 G/DL (ref 31.5–35.7)
MCV RBC AUTO: 91 FL (ref 79–97)
MONOCYTES # BLD AUTO: 0.62 10*3/MM3 (ref 0.1–0.9)
MONOCYTES NFR BLD AUTO: 7.4 % (ref 5–12)
NEUTROPHILS NFR BLD AUTO: 5.4 10*3/MM3 (ref 1.7–7)
NEUTROPHILS NFR BLD AUTO: 64.2 % (ref 42.7–76)
NRBC BLD AUTO-RTO: 0 /100 WBC (ref 0–0.2)
PLATELET # BLD AUTO: 165 10*3/MM3 (ref 140–450)
PMV BLD AUTO: 8.6 FL (ref 6–12)
RBC # BLD AUTO: 3.77 10*6/MM3 (ref 3.77–5.28)
TIBC SERPL-MCNC: 258 MCG/DL (ref 298–536)
TRANSFERRIN SERPL-MCNC: 173 MG/DL (ref 200–360)
VIT B12 BLD-MCNC: 1299 PG/ML (ref 211–946)
WBC NRBC COR # BLD: 8.41 10*3/MM3 (ref 3.4–10.8)

## 2023-08-23 PROCEDURE — 85025 COMPLETE CBC W/AUTO DIFF WBC: CPT | Performed by: INTERNAL MEDICINE

## 2023-08-23 PROCEDURE — 84466 ASSAY OF TRANSFERRIN: CPT | Performed by: INTERNAL MEDICINE

## 2023-08-23 PROCEDURE — 84238 ASSAY NONENDOCRINE RECEPTOR: CPT | Performed by: INTERNAL MEDICINE

## 2023-08-23 PROCEDURE — 83540 ASSAY OF IRON: CPT | Performed by: INTERNAL MEDICINE

## 2023-08-23 PROCEDURE — 83921 ORGANIC ACID SINGLE QUANT: CPT | Performed by: INTERNAL MEDICINE

## 2023-08-23 PROCEDURE — 82607 VITAMIN B-12: CPT | Performed by: INTERNAL MEDICINE

## 2023-08-23 PROCEDURE — 82746 ASSAY OF FOLIC ACID SERUM: CPT | Performed by: INTERNAL MEDICINE

## 2023-08-23 PROCEDURE — 82728 ASSAY OF FERRITIN: CPT | Performed by: INTERNAL MEDICINE

## 2023-08-23 NOTE — PROGRESS NOTES
Venipuncture Blood Specimen Collection  Venipuncture performed in left arm by Deepa Whittington MA with good hemostasis. Patient tolerated the procedure well without complications.   08/23/23   Deepa Whittington MA

## 2023-08-25 ENCOUNTER — OFFICE VISIT (OUTPATIENT)
Dept: CARDIOLOGY | Facility: CLINIC | Age: 78
End: 2023-08-25
Payer: MEDICARE

## 2023-08-25 VITALS
BODY MASS INDEX: 30.11 KG/M2 | OXYGEN SATURATION: 99 % | HEIGHT: 63 IN | SYSTOLIC BLOOD PRESSURE: 115 MMHG | HEART RATE: 67 BPM | DIASTOLIC BLOOD PRESSURE: 61 MMHG

## 2023-08-25 DIAGNOSIS — E78.2 MIXED HYPERLIPIDEMIA: Chronic | ICD-10-CM

## 2023-08-25 DIAGNOSIS — R09.89 BILATERAL CAROTID BRUITS: ICD-10-CM

## 2023-08-25 DIAGNOSIS — I25.10 ASCVD (ARTERIOSCLEROTIC CARDIOVASCULAR DISEASE): Primary | ICD-10-CM

## 2023-08-25 DIAGNOSIS — E78.5 DYSLIPIDEMIA: ICD-10-CM

## 2023-08-25 DIAGNOSIS — I10 ESSENTIAL HYPERTENSION: ICD-10-CM

## 2023-08-25 DIAGNOSIS — I50.32 CHRONIC HEART FAILURE WITH PRESERVED EJECTION FRACTION (HFPEF): ICD-10-CM

## 2023-08-25 LAB — STFR SERPL-SCNC: 19.3 NMOL/L (ref 12.2–27.3)

## 2023-08-25 RX ORDER — ATORVASTATIN CALCIUM 80 MG/1
80 TABLET, FILM COATED ORAL NIGHTLY
Qty: 90 TABLET | Refills: 1 | Status: SHIPPED | OUTPATIENT
Start: 2023-08-25

## 2023-08-25 NOTE — PROGRESS NOTES
[facility]             [facility] ERNIE Holland Susan Marie, PA  Britta Lee  1945 08/25/2023    Patient Active Problem List   Diagnosis    Type 2 diabetes mellitus, uncontrolled, with neuropathy    Essential hypertension    ASCVD (arteriosclerotic cardiovascular disease)    Bilateral carpal tunnel syndrome    Diabetic retinopathy associated with type 2 diabetes mellitus    Dyslipidemia    Sciatic neuropathy    DDD (degenerative disc disease), lumbar    Primary osteoarthritis of left knee    Status post total left knee replacement    Type 2 diabetes mellitus with chronic kidney disease, with long-term current use of insulin    Hyperparathyroidism due to renal insufficiency    Venous insufficiency (chronic) (peripheral)    Class 1 obesity due to excess calories with serious comorbidity and body mass index (BMI) of 31.0 to 31.9 in adult    PVD (peripheral vascular disease) with claudication    LVH (left ventricular hypertrophy)    Chronic heart failure with preserved ejection fraction (HFpEF)    LENNY (obstructive sleep apnea)    CKD (chronic kidney disease) stage 4, GFR 15-29 ml/min    Iron deficiency anemia    Malabsorption of iron    Acquired hypothyroidism    Hiatal hernia    Weight loss, abnormal    Osteopenia of neck of left femur    Cirrhosis    Severe pulmonary hypertension    Mixed hyperlipidemia       Dear Lexie Dolan PA:    Subjective     Chief Complaint   Patient presents with    Follow-up     ROUTINE       HPI:     This is a 78 y.o. female with known past medical history of HFpEF, severe pulmonary hypertension, ASCVD status post PCI in 2000 at [facility] by Dr. Hussein, cirrhosis, iron deficiency anemia, diabetes mellitus type 2, chronic kidney disease on hemodialysis and obstructive sleep apnea.     Britta Lee presents today for routine cardiology follow up.  Patient states has been doing well since her last visit.  Denies any chest  pain, shortness of breath or palpitations.  Dizziness has resolved since stopping clonidine.  Blood pressure well controlled.  Recent lab work showed overall stable chronic kidney disease.  Mildly elevated LFTs at baseline and follows with GI.    Diagnostic Testing  Echocardiogram 11/2020: EF greater than 70% with concentric hypertrophy, pseudonormalization and mild mitral valve regurgitation  Echocardiogram 10/2022: EF greater than 70% with mild concentric hypertrophy  Echocardiogram 11/2022: EF greater than 70% with moderate tricuspid valve regurgitation and severe pulmonary hypertension     All other systems were reviewed and were negative.    Patient Active Problem List   Diagnosis    Type 2 diabetes mellitus, uncontrolled, with neuropathy    Essential hypertension    ASCVD (arteriosclerotic cardiovascular disease)    Bilateral carpal tunnel syndrome    Diabetic retinopathy associated with type 2 diabetes mellitus    Dyslipidemia    Sciatic neuropathy    DDD (degenerative disc disease), lumbar    Primary osteoarthritis of left knee    Status post total left knee replacement    Type 2 diabetes mellitus with chronic kidney disease, with long-term current use of insulin    Hyperparathyroidism due to renal insufficiency    Venous insufficiency (chronic) (peripheral)    Class 1 obesity due to excess calories with serious comorbidity and body mass index (BMI) of 31.0 to 31.9 in adult    PVD (peripheral vascular disease) with claudication    LVH (left ventricular hypertrophy)    Chronic heart failure with preserved ejection fraction (HFpEF)    LENNY (obstructive sleep apnea)    CKD (chronic kidney disease) stage 4, GFR 15-29 ml/min    Iron deficiency anemia    Malabsorption of iron    Acquired hypothyroidism    Hiatal hernia    Weight loss, abnormal    Osteopenia of neck of left femur    Cirrhosis    Severe pulmonary hypertension    Mixed hyperlipidemia       family history includes Arthritis in her mother; Cancer in her  mother; Diabetes in her daughter, maternal aunt, mother, and son; Heart disease in her father and maternal grandmother.     reports that she has never smoked. She has never used smokeless tobacco. She reports current alcohol use. She reports that she does not use drugs.    No Known Allergies      Current Outpatient Medications:     amLODIPine (NORVASC) 10 MG tablet, Take 1 tablet by mouth Daily., Disp: 90 tablet, Rfl: 3    atorvastatin (LIPITOR) 80 MG tablet, Take 1 tablet by mouth Every Night., Disp: 90 tablet, Rfl: 1    bumetanide (BUMEX) 1 MG tablet, Take 1 tablet by mouth Every Other Day., Disp: , Rfl:     escitalopram (LEXAPRO) 10 MG tablet, Take 1 tablet by mouth Daily., Disp: 90 tablet, Rfl: 3    famotidine (PEPCID) 20 MG tablet, Take 1 tablet by mouth Every 12 (Twelve) Hours., Disp: , Rfl:     ferrous sulfate 325 (65 FE) MG EC tablet, Take 1 tablet by mouth Every 12 (Twelve) Hours., Disp: , Rfl:     folic acid (FOLVITE) 1 MG tablet, Take 1 tablet by mouth Daily., Disp: 30 tablet, Rfl: 3    HYDROcodone-acetaminophen (NORCO) 7.5-325 MG per tablet, Take 1 tablet by mouth Every 6 (Six) Hours As Needed for Moderate Pain., Disp: 12 tablet, Rfl: 0    isosorbide mononitrate (IMDUR) 60 MG 24 hr tablet, Take 1 tablet by mouth Daily., Disp: 90 tablet, Rfl: 0    levothyroxine (SYNTHROID, LEVOTHROID) 25 MCG tablet, Take 1 tablet by mouth Daily., Disp: 90 tablet, Rfl: 3    metoprolol succinate XL (TOPROL-XL) 50 MG 24 hr tablet, Take 1.5 tablets by mouth Daily., Disp: , Rfl:     vitamin D (ERGOCALCIFEROL) 1.25 MG (03449 UT) capsule capsule, Take 1 capsule by mouth 1 (One) Time Per Week., Disp: 15 capsule, Rfl: 3      Physical Exam:  I have reviewed the patient's current vital signs as documented in the patient's EMR.   Vitals:    08/25/23 0831   BP: 115/61   Pulse: 67   SpO2: 99%     Body mass index is 30.11 kg/mý.       08/25/23 0831   Weight: (UNABLE TO WEIGH)      Physical Exam  Constitutional:       General: She is  not in acute distress.     Appearance: Normal appearance. She is well-developed and normal weight.   HENT:      Head: Normocephalic and atraumatic.   Eyes:      General: Lids are normal.      Conjunctiva/sclera: Conjunctivae normal.      Pupils: Pupils are equal, round, and reactive to light.   Neck:      Vascular: No carotid bruit or JVD.   Cardiovascular:      Rate and Rhythm: Normal rate and regular rhythm.      Pulses: Normal pulses.      Heart sounds: Normal heart sounds, S1 normal and S2 normal. No murmur heard.  Pulmonary:      Effort: Pulmonary effort is normal. No respiratory distress.      Breath sounds: Normal breath sounds. No wheezing.   Abdominal:      General: Bowel sounds are normal. There is no distension.      Palpations: Abdomen is soft. There is no hepatomegaly or splenomegaly.      Tenderness: There is no abdominal tenderness.   Musculoskeletal:         General: No swelling. Normal range of motion.      Cervical back: Normal range of motion and neck supple.      Right lower leg: No edema.      Left lower leg: No edema.   Skin:     General: Skin is warm and dry.      Coloration: Skin is not jaundiced.      Findings: No rash.   Neurological:      General: No focal deficit present.      Mental Status: She is alert and oriented to person, place, and time. Mental status is at baseline.   Psychiatric:         Mood and Affect: Mood normal.         Speech: Speech normal.         Behavior: Behavior normal.         Thought Content: Thought content normal.         Judgment: Judgment normal.          DATA REVIEWED:     TTE/MANI:  Results for orders placed during the hospital encounter of 11/02/22    Adult Transthoracic Echo Complete w/ Color, Spectral and Contrast if necessary per protocol    Interpretation Summary    Left ventricular systolic function is hyperdynamic (EF > 70%).    Left ventricular diastolic function is consistent with (grade II w/high LAP) pseudonormalization.    The left atrial cavity is  mildly dilated.    The aortic valve exhibits sclerosis with no evidence of stenosis or regurgitation.    Mild mitral valve regurgitation is present.    Moderate tricuspid valve regurgitation is present.    Severe pulmonary hypertension is present.  Estimated RV systolic pressure is > 55 mmHg.    There is no evidence of pericardial effusion.      Laboratory evaluations:    Lab Results   Component Value Date    GLUCOSE 122 (H) 06/15/2023    BUN 18 06/15/2023    CREATININE 3.11 (H) 06/15/2023    EGFRIFNONA 23 (L) 02/21/2022    EGFRIFAFRI 41 (L) 07/30/2018    BCR 5.8 (L) 06/15/2023    K 5.1 06/15/2023    CO2 25.8 06/15/2023    CALCIUM 9.3 06/15/2023    PROTENTOTREF 7.7 07/30/2018    ALBUMIN 3.3 (L) 06/15/2023    LABIL2 1.1 (L) 07/30/2018    AST 48 (H) 06/15/2023    ALT 29 06/15/2023     Lab Results   Component Value Date    WBC 8.41 08/23/2023    HGB 10.8 (L) 08/23/2023    HCT 34.3 08/23/2023    MCV 91.0 08/23/2023     08/23/2023     Lab Results   Component Value Date    CHOL 172 06/15/2023    CHLPL 128 06/25/2018    TRIG 95 06/15/2023    HDL 43 06/15/2023     (H) 06/15/2023     Lab Results   Component Value Date    TSH 4.640 (H) 01/17/2023     Lab Results   Component Value Date    HGBA1C 6.60 (H) 06/15/2023     Lab Results   Component Value Date    ALT 29 06/15/2023     Lab Results   Component Value Date    HGBA1C 6.60 (H) 06/15/2023    HGBA1C 5.00 01/17/2023    HGBA1C 5.00 09/08/2022     Lab Results   Component Value Date    MICROALBUR 112.1 06/15/2023    CREATININE 3.11 (H) 06/15/2023     Lab Results   Component Value Date    IRON 43 08/23/2023    TIBC 258 (L) 08/23/2023    FERRITIN 1,130.00 (H) 08/23/2023     Lab Results   Component Value Date    INR 1.26 (H) 01/11/2023    INR 1.29 (H) 12/03/2022    INR 1.36 (H) 11/02/2022    PROTIME 16.1 (H) 01/11/2023    PROTIME 16.4 (H) 12/03/2022    PROTIME 17.1 (H) 11/02/2022        Lab Results   Component Value Date    ABSOLUTELUNG 32 06/13/2022    ABSOLUTELUNG  27 04/25/2022    ABSOLUTELUNG 22 03/28/2022       --------------------------------------------------------------------------------------------------------------------------    ASSESSMENT/PLAN:      Diagnosis Plan   1. ASCVD (arteriosclerotic cardiovascular disease)  Comprehensive Metabolic Panel      2. Mixed hyperlipidemia  atorvastatin (LIPITOR) 80 MG tablet    Lipid Panel    Advised low-cholesterol diet  Continue atorvastatin      3. Dyslipidemia        4. Essential hypertension  Comprehensive Metabolic Panel      5. Chronic heart failure with preserved ejection fraction (HFpEF)        6. Bilateral carotid bruits  US Carotid Bilateral          ASCVD  Dyslipidemia  Denies any chest pain.  Continue medical management with Lipitor, Bumex, Imdur and metoprolol.  Not on aspirin secondary to anemia history.  Lipid panel showed LDL of 111.  Increase statin to 80 mg.  Monitor liver enzymes given her history of cirrhosis.  If she does experience elevation of LFTs we can decrease Lipitor and consider PCSK9.  Check lipid panel and CMP to further evaluate prior to next visit.    Essential hypertension  Blood pressure well controlled.  Recent lab work showed stable chronic kidney disease continue current management    4.  Carotid bruit  Obtain carotid ultrasound to evaluate further.      This document has been @Electronically signed by ERNIE Olsen, 08/25/23, 8:32 AM EDT.       Dictated Utilizing Dragon Dictation: Part of this note may be an electronic transcription/translation of spoken language to printed text using the Dragon Dictation System.    Follow-up appointment and medication changes provided in hand delivered After Visit Summary as well as reviewed in the room.

## 2023-08-28 LAB — METHYLMALONATE SERPL-SCNC: 785 NMOL/L (ref 0–378)

## 2023-09-01 ENCOUNTER — HOSPITAL ENCOUNTER (EMERGENCY)
Facility: HOSPITAL | Age: 78
Discharge: HOME | End: 2023-09-01
Attending: EMERGENCY MEDICINE
Payer: MEDICARE

## 2023-09-01 ENCOUNTER — APPOINTMENT (OUTPATIENT)
Dept: GENERAL RADIOLOGY | Facility: HOSPITAL | Age: 78
End: 2023-09-01
Payer: MEDICARE

## 2023-09-01 VITALS
WEIGHT: 167 LBS | DIASTOLIC BLOOD PRESSURE: 67 MMHG | HEIGHT: 63 IN | OXYGEN SATURATION: 97 % | HEART RATE: 68 BPM | SYSTOLIC BLOOD PRESSURE: 111 MMHG | BODY MASS INDEX: 29.59 KG/M2 | TEMPERATURE: 97.4 F | RESPIRATION RATE: 18 BRPM

## 2023-09-01 DIAGNOSIS — T82.9XXA COMPLICATION OF VASCULAR DIALYSIS CATHETER, UNSPECIFIED COMPLICATION, INITIAL ENCOUNTER: Primary | ICD-10-CM

## 2023-09-01 PROCEDURE — 71045 X-RAY EXAM CHEST 1 VIEW: CPT | Performed by: RADIOLOGY

## 2023-09-01 PROCEDURE — 99283 EMERGENCY DEPT VISIT LOW MDM: CPT

## 2023-09-01 PROCEDURE — 71045 X-RAY EXAM CHEST 1 VIEW: CPT

## 2023-09-01 PROCEDURE — C1751 CATH, INF, PER/CENT/MIDLINE: HCPCS

## 2023-09-01 RX ORDER — LIDOCAINE HYDROCHLORIDE 10 MG/ML
10 INJECTION, SOLUTION EPIDURAL; INFILTRATION; INTRACAUDAL; PERINEURAL ONCE
Status: COMPLETED | OUTPATIENT
Start: 2023-09-01 | End: 2023-09-01

## 2023-09-01 RX ADMIN — LIDOCAINE HYDROCHLORIDE 10 ML: 10 INJECTION, SOLUTION EPIDURAL; INFILTRATION; INTRACAUDAL; PERINEURAL at 16:21

## 2023-09-01 NOTE — ED PROVIDER NOTES
Subjective   History of Present Illness  Patient is an 78-year-old female presents emerged today with complaint of hemodialysis catheter not working.  Patient with Dr. Alvarez labs and was sent here.  Patient reports she was scheduled for dialysis today but is unable to due to this.      Review of Systems   Constitutional: Negative.    HENT: Negative.     Eyes: Negative.    Respiratory: Negative.     Cardiovascular: Negative.    Gastrointestinal: Negative.    Endocrine: Negative.    Genitourinary: Negative.    Musculoskeletal: Negative.    Skin: Negative.    Allergic/Immunologic: Negative.    Neurological: Negative.    Hematological: Negative.    Psychiatric/Behavioral: Negative.       Past Medical History:   Diagnosis Date    Acquired hypothyroidism 4/22/2021    Anemia     Arthritis     Carpal tunnel syndrome     Coronary artery disease     Diabetes mellitus     Elevated cholesterol     GERD (gastroesophageal reflux disease)     History of pneumonia     History of unsteady gait     OCASSIONALLY    Hypertension     Insomnia     Kidney disease     LENNY (obstructive sleep apnea) 12/1/2020    Osteoarthritis     Renal insufficiency     Sciatica        No Known Allergies    Past Surgical History:   Procedure Laterality Date    ABDOMINAL SURGERY      APPENDECTOMY      CARDIAC CATHETERIZATION      1 STENT  ---  2000    CARDIAC SURGERY      CAROTID STENT      CARPAL TUNNEL RELEASE Right 10/8/2019    Procedure: CARPAL TUNNEL RELEASE;  Surgeon: Feroz Johnson MD;  Location: [facility];  Service: Orthopedics    CATARACT EXTRACTION      CHOLECYSTECTOMY      COLONOSCOPY      COLONOSCOPY N/A 11/23/2021    Procedure: COLONOSCOPY FOR SCREENING;  Surgeon: Palmira Pate MD;  Location: [facility] OR;  Service: Gastroenterology;  Laterality: N/A;    CORONARY ANGIOPLASTY WITH STENT PLACEMENT      ENDOSCOPY      ENDOSCOPY N/A 3/5/2020    Procedure: ESOPHAGOGASTRODUODENOSCOPY;  Surgeon: Alexandru Bagley MD;  Location:   [facility] OR;  Service: Gastroenterology;  Laterality: N/A;    ENDOSCOPY N/A 11/23/2021    Procedure: ESOPHAGOGASTRODUODENOSCOPY WITH BIOPSY;  Surgeon: Palmira Pate MD;  Location: [facility] OR;  Service: Gastroenterology;  Laterality: N/A;    ENDOSCOPY AND COLONOSCOPY      GALLBLADDER SURGERY      INSERTION HEMODIALYSIS CATHETER N/A 11/14/2022    Procedure: HEMODIALYSIS CATHETER INSERTION;  Surgeon: Ta Blas MD;  Location: [facility] OR;  Service: General;  Laterality: N/A;    JOINT REPLACEMENT Left 05/02/2017    [facility]  DR JOHNSON  LEFT TOTAL KNEE    KNEE ARTHROSCOPY W/ MENISCECTOMY Right     LAPAROSCOPIC TUBAL LIGATION      AR ARTHRP KNE CONDYLE&PLATU MEDIAL&LAT COMPARTMENTS Left 5/2/2017    Procedure: TOTAL KNEE ARTHROPLASTY;  Surgeon: Feroz Johnson MD;  Location: [facility];  Service: Orthopedics    STERILIZATION      TONSILLECTOMY         Family History   Problem Relation Age of Onset    Arthritis Mother     Diabetes Mother     Cancer Mother     Heart disease Father     Diabetes Daughter     Diabetes Son     Diabetes Maternal Aunt     Heart disease Maternal Grandmother     Breast cancer Neg Hx        Social History     Socioeconomic History    Marital status:      Spouse name: natalie    Number of children: 3    Years of education: 12   Tobacco Use    Smoking status: Never    Smokeless tobacco: Never   Vaping Use    Vaping Use: Never used   Substance and Sexual Activity    Alcohol use: Yes     Comment: socially    Drug use: No    Sexual activity: Defer     Birth control/protection: Surgical           Objective   Physical Exam  Vitals and nursing note reviewed.   Constitutional:       Appearance: She is well-developed.   HENT:      Head: Normocephalic.      Right Ear: External ear normal.      Left Ear: External ear normal.   Eyes:      Conjunctiva/sclera: Conjunctivae normal.      Pupils: Pupils are equal, round, and reactive to light.   Cardiovascular:      Rate and Rhythm: Normal rate  and regular rhythm.      Heart sounds: Normal heart sounds.   Pulmonary:      Effort: Pulmonary effort is normal.      Breath sounds: Normal breath sounds.   Abdominal:      General: Bowel sounds are normal.      Palpations: Abdomen is soft.   Musculoskeletal:         General: Normal range of motion.      Cervical back: Normal range of motion and neck supple.   Skin:     General: Skin is warm and dry.      Capillary Refill: Capillary refill takes less than 2 seconds.   Neurological:      Mental Status: She is alert and oriented to person, place, and time.   Psychiatric:         Behavior: Behavior normal.         Thought Content: Thought content normal.       Procedures           ED Course  ED Course as of 09/09/23 1005   Fri Sep 01, 2023   1726 Discussed with Dr. Phillips at [facility], patient scheduled for fistular revision next Thursday.  He recommended a temporary dialysis catheter so patient could receive hemodialysis in the meantime.  Given the short timeframe, did not think a tunneled dialysis catheter was needed.  Therefore I placed a temporary dual-lumen with pigtail at bedside.  Instructed patient to contact dialysis center and keep outpatient HD appointments as scheduled [CW]      ED Course User Index  [CW] Zaki Oliver,                                            Medical Decision Making  Problems Addressed:  Complication of vascular dialysis catheter, unspecified complication, initial encounter: complicated acute illness or injury    Amount and/or Complexity of Data Reviewed  Radiology: ordered.    Risk  Prescription drug management.        Final diagnoses:   Complication of vascular dialysis catheter, unspecified complication, initial encounter       ED Disposition  ED Disposition       ED Disposition   Discharge    Condition   Stable    Comment   --               Jackson Johnson MD  [address] [place] [id number] [phone]    Schedule an appointment as soon as possible for a visit    For further evaluation         Medication List      No changes were made to your prescriptions during this visit.            Praful Mike, APRN  09/09/23 1006

## 2023-09-01 NOTE — ED NOTES
Patient signed consent form for temporary dialysis catheter placement. Consent form placed on patient chart at this time.

## 2023-09-01 NOTE — ED NOTES
Per Dr. Oliver, contact patient dialysis center to see if patient is able to get on the schedule for tomorrow. Called Dialysis Clinic, no answer. Left voice mail message to return call to the ED.

## 2023-09-01 NOTE — ED NOTES
Dr. Oliver at bedside with Natalya NAVAS at bedside for temporary catheter placement for dialysis.

## 2023-09-01 NOTE — ED PROCEDURE NOTE
Place of Service:[facility] EMERGENCY DEPARTMENT  Patient Name:Britta Lee  :1945    Procedure  Central Line At Bedside    Date/Time: 2023 5:17 PM  Performed by: Zaki Oliver DO  Authorized by: Sanya Epps MD     Consent:     Consent obtained:  Verbal    Consent given by:  Patient    Risks, benefits, and alternatives were discussed: yes      Risks discussed:  Arterial puncture, bleeding, incorrect placement, infection, nerve damage and pneumothorax    Alternatives discussed:  No treatment, delayed treatment and alternative treatment  Universal protocol:     Procedure explained and questions answered to patient or proxy's satisfaction: yes      Relevant documents present and verified: yes      Test results available: yes      Imaging studies available: yes      Required blood products, implants, devices, and special equipment available: yes      Site/side marked: yes      Immediately prior to procedure, a time out was called: yes      Patient identity confirmed:  Verbally with patient and arm band  Pre-procedure details:     Indication(s): central venous access      Hand hygiene: Hand hygiene performed prior to insertion      Sterile barrier technique: All elements of maximal sterile technique followed      Skin preparation:  Alcohol and chlorhexidine with alcohol  Anesthesia:     Anesthesia method:  None  Procedure details:     Location:  R internal jugular    Patient position:  Trendelenburg    Procedural supplies:  Triple lumen    Catheter size:  8.5 Fr    Landmarks identified: yes      Ultrasound guidance: yes      Ultrasound guidance timing: real time      Sterile ultrasound techniques: Sterile gel and sterile probe covers were used      Number of attempts:  1    Successful placement: yes    Post-procedure details:     Post-procedure:  Dressing applied and line sutured    Assessment:  Blood return through all ports, free fluid flow and no pneumothorax on x-ray     Procedure completion:  Tolerated well, no immediate complications  Comments:      Right IJ temporary dialysis catheter with pigtail          Zaki Oliver, DO  09/01/23 [id number]

## 2023-09-05 RX ORDER — BUMETANIDE 1 MG/1
TABLET ORAL
Qty: 90 TABLET | Refills: 1 | Status: SHIPPED | OUTPATIENT
Start: 2023-09-05

## 2023-09-15 ENCOUNTER — LAB (OUTPATIENT)
Dept: FAMILY MEDICINE CLINIC | Facility: CLINIC | Age: 78
End: 2023-09-15
Payer: MEDICARE

## 2023-09-15 DIAGNOSIS — I10 ESSENTIAL HYPERTENSION: ICD-10-CM

## 2023-09-15 DIAGNOSIS — I25.10 ASCVD (ARTERIOSCLEROTIC CARDIOVASCULAR DISEASE): ICD-10-CM

## 2023-09-15 DIAGNOSIS — E78.2 MIXED HYPERLIPIDEMIA: Chronic | ICD-10-CM

## 2023-09-15 LAB
ALBUMIN SERPL-MCNC: 3.5 G/DL (ref 3.5–5.2)
ALBUMIN/GLOB SERPL: 0.9 G/DL
ALP SERPL-CCNC: 242 U/L (ref 39–117)
ALT SERPL W P-5'-P-CCNC: 6 U/L (ref 1–33)
ANION GAP SERPL CALCULATED.3IONS-SCNC: 11.5 MMOL/L (ref 5–15)
AST SERPL-CCNC: 24 U/L (ref 1–32)
BASOPHILS # BLD AUTO: 0.04 10*3/MM3 (ref 0–0.2)
BASOPHILS NFR BLD AUTO: 0.5 % (ref 0–1.5)
BILIRUB SERPL-MCNC: 0.4 MG/DL (ref 0–1.2)
BUN SERPL-MCNC: 28 MG/DL (ref 8–23)
BUN/CREAT SERPL: 5.5 (ref 7–25)
CALCIUM SPEC-SCNC: 9.2 MG/DL (ref 8.6–10.5)
CHLORIDE SERPL-SCNC: 89 MMOL/L (ref 98–107)
CHOLEST SERPL-MCNC: 104 MG/DL (ref 0–200)
CO2 SERPL-SCNC: 29.5 MMOL/L (ref 22–29)
CREAT SERPL-MCNC: 5.06 MG/DL (ref 0.57–1)
D DIMER PPP FEU-MCNC: 6.5 MCGFEU/ML (ref 0–0.8)
DEPRECATED RDW RBC AUTO: 43.3 FL (ref 37–54)
EGFRCR SERPLBLD CKD-EPI 2021: 8.2 ML/MIN/1.73
EOSINOPHIL # BLD AUTO: 0.31 10*3/MM3 (ref 0–0.4)
EOSINOPHIL NFR BLD AUTO: 3.5 % (ref 0.3–6.2)
ERYTHROCYTE [DISTWIDTH] IN BLOOD BY AUTOMATED COUNT: 13.3 % (ref 12.3–15.4)
GLOBULIN UR ELPH-MCNC: 4.1 GM/DL
GLUCOSE SERPL-MCNC: 134 MG/DL (ref 65–99)
HCT VFR BLD AUTO: 35.4 % (ref 34–46.6)
HDLC SERPL-MCNC: 39 MG/DL (ref 40–60)
HGB BLD-MCNC: 11.1 G/DL (ref 12–15.9)
IMM GRANULOCYTES # BLD AUTO: 0.03 10*3/MM3 (ref 0–0.05)
IMM GRANULOCYTES NFR BLD AUTO: 0.3 % (ref 0–0.5)
LDLC SERPL CALC-MCNC: 47 MG/DL (ref 0–100)
LDLC/HDLC SERPL: 1.21 {RATIO}
LYMPHOCYTES # BLD AUTO: 1.85 10*3/MM3 (ref 0.7–3.1)
LYMPHOCYTES NFR BLD AUTO: 20.8 % (ref 19.6–45.3)
MCH RBC QN AUTO: 28.2 PG (ref 26.6–33)
MCHC RBC AUTO-ENTMCNC: 31.4 G/DL (ref 31.5–35.7)
MCV RBC AUTO: 90.1 FL (ref 79–97)
MONOCYTES # BLD AUTO: 0.59 10*3/MM3 (ref 0.1–0.9)
MONOCYTES NFR BLD AUTO: 6.6 % (ref 5–12)
NEUTROPHILS NFR BLD AUTO: 6.06 10*3/MM3 (ref 1.7–7)
NEUTROPHILS NFR BLD AUTO: 68.3 % (ref 42.7–76)
NRBC BLD AUTO-RTO: 0 /100 WBC (ref 0–0.2)
PLATELET # BLD AUTO: 175 10*3/MM3 (ref 140–450)
PMV BLD AUTO: 9.5 FL (ref 6–12)
POTASSIUM SERPL-SCNC: 3.9 MMOL/L (ref 3.5–5.2)
PROT SERPL-MCNC: 7.6 G/DL (ref 6–8.5)
RBC # BLD AUTO: 3.93 10*6/MM3 (ref 3.77–5.28)
SODIUM SERPL-SCNC: 130 MMOL/L (ref 136–145)
TRIGL SERPL-MCNC: 90 MG/DL (ref 0–150)
VLDLC SERPL-MCNC: 18 MG/DL (ref 5–40)
WBC NRBC COR # BLD: 8.88 10*3/MM3 (ref 3.4–10.8)

## 2023-09-15 PROCEDURE — 85379 FIBRIN DEGRADATION QUANT: CPT | Performed by: PHYSICIAN ASSISTANT

## 2023-09-15 PROCEDURE — 85025 COMPLETE CBC W/AUTO DIFF WBC: CPT | Performed by: PHYSICIAN ASSISTANT

## 2023-09-15 PROCEDURE — 80053 COMPREHEN METABOLIC PANEL: CPT | Performed by: PHYSICIAN ASSISTANT

## 2023-09-15 PROCEDURE — 80061 LIPID PANEL: CPT | Performed by: NURSE PRACTITIONER

## 2023-09-19 ENCOUNTER — HOSPITAL ENCOUNTER (OUTPATIENT)
Dept: CARDIOLOGY | Facility: HOSPITAL | Age: 78
Discharge: HOME | End: 2023-09-19
Admitting: NURSE PRACTITIONER
Payer: MEDICARE

## 2023-09-19 DIAGNOSIS — R09.89 BILATERAL CAROTID BRUITS: ICD-10-CM

## 2023-09-19 PROCEDURE — 93880 EXTRACRANIAL BILAT STUDY: CPT

## 2023-09-21 ENCOUNTER — OFFICE VISIT (OUTPATIENT)
Dept: FAMILY MEDICINE CLINIC | Facility: CLINIC | Age: 78
End: 2023-09-21
Payer: MEDICARE

## 2023-09-21 VITALS
SYSTOLIC BLOOD PRESSURE: 114 MMHG | HEIGHT: 63 IN | TEMPERATURE: 97.2 F | WEIGHT: 167 LBS | HEART RATE: 74 BPM | DIASTOLIC BLOOD PRESSURE: 78 MMHG | BODY MASS INDEX: 29.59 KG/M2 | OXYGEN SATURATION: 97 %

## 2023-09-21 DIAGNOSIS — Z99.2 STAGE 5 CHRONIC KIDNEY DISEASE ON CHRONIC DIALYSIS: Chronic | ICD-10-CM

## 2023-09-21 DIAGNOSIS — E03.9 ACQUIRED HYPOTHYROIDISM: Chronic | ICD-10-CM

## 2023-09-21 DIAGNOSIS — I10 ESSENTIAL HYPERTENSION: Primary | Chronic | ICD-10-CM

## 2023-09-21 DIAGNOSIS — N18.6 STAGE 5 CHRONIC KIDNEY DISEASE ON CHRONIC DIALYSIS: Chronic | ICD-10-CM

## 2023-09-21 DIAGNOSIS — F33.1 MODERATE EPISODE OF RECURRENT MAJOR DEPRESSIVE DISORDER: Chronic | ICD-10-CM

## 2023-09-21 RX ORDER — ESCITALOPRAM OXALATE 10 MG/1
10 TABLET ORAL DAILY
Qty: 90 TABLET | Refills: 3 | Status: SHIPPED | OUTPATIENT
Start: 2023-09-21

## 2023-09-21 RX ORDER — LEVOTHYROXINE SODIUM 0.03 MG/1
25 TABLET ORAL DAILY
Qty: 90 TABLET | Refills: 3 | Status: SHIPPED | OUTPATIENT
Start: 2023-09-21

## 2023-09-21 NOTE — PROGRESS NOTES
"Chief Complaint -hypertension    History of Present Illness -     Britta Lee is a 78 y.o. female.     Her  is with her today helping with history.    Hypertension-  Controlled with amlodipine metoprolol and dietary modification    Depression-  Controlled with escitalopram    Hypothyroidism-controlled with levothyroxine 25 mcg daily    Chronic kidney disease-  Stable with patient on dialysis 3 times weekly.  We discussed her recent lab work and abnormality of electrolytes.  Patient states that her electrolyte abnormalities and anemia are followed by nephrology      The following portions of the patient's history were reviewed and updated as appropriate: allergies, current medications, past family history, past medical history, past social history, past surgical history and problem list.        Objective  Vital signs:  /78   Pulse 74   Temp 97.2 °F (36.2 °C) (Temporal)   Ht 160 cm (63\")   Wt 75.8 kg (167 lb)   SpO2 97%   BMI 29.58 kg/m²     Physical Exam  Vitals and nursing note reviewed.   Constitutional:       Appearance: Normal appearance. She is well-developed.      Comments: Patient is in wheelchair today   Eyes:      Extraocular Movements: Extraocular movements intact.      Conjunctiva/sclera: Conjunctivae normal.   Cardiovascular:      Rate and Rhythm: Normal rate and regular rhythm.      Heart sounds: Normal heart sounds. No murmur heard.  Pulmonary:      Effort: Pulmonary effort is normal. No respiratory distress.      Breath sounds: Normal breath sounds. No wheezing.   Musculoskeletal:         General: No tenderness.   Skin:     General: Skin is warm and dry.      Findings: No rash.   Neurological:      Mental Status: She is alert and oriented to person, place, and time.   Psychiatric:         Mood and Affect: Mood normal.         Behavior: Behavior normal.         Thought Content: Thought content normal.       The following data was reviewed by Lexie Dolan PA-C:         Lab Results "   Component Value Date    BUN 28 (H) 09/15/2023    CREATININE 5.06 (H) 09/15/2023    EGFR 8.2 (L) 09/15/2023    ALT 6 09/15/2023    AST 24 09/15/2023    WBC 8.88 09/15/2023    HGB 11.1 (L) 09/15/2023    HCT 35.4 09/15/2023     09/15/2023    CHOL 104 09/15/2023    TRIG 90 09/15/2023    HDL 39 (L) 09/15/2023    LDL 47 09/15/2023    TSH 4.640 (H) 01/17/2023    HGBA1C 6.60 (H) 06/15/2023           Assessment & Plan     Diagnoses and all orders for this visit:    1. Essential hypertension (Primary)  Comments:  Continue amlodipine and metoprolol  Advise daily BP and pulse monitoring    2. Moderate episode of recurrent major depressive disorder  Comments:  Continue Lexapro  Conservative measures for dealing with stress were advised  Orders:  -     escitalopram (LEXAPRO) 10 MG tablet; Take 1 tablet by mouth Daily.  Dispense: 90 tablet; Refill: 3    3. Acquired hypothyroidism  Comments:  Continue Synthroid 25 mcg daily  Orders:  -     levothyroxine (SYNTHROID, LEVOTHROID) 25 MCG tablet; Take 1 tablet by mouth Daily.  Dispense: 90 tablet; Refill: 3    4. Stage 5 chronic kidney disease on chronic dialysis  Comments:  Continue dialysis 3 times weekly  Advised to discuss lab work with nephrologist                   Patient was given instructions and counseling regarding his condition or for health maintenance advice. Please see specific information pulled into the AVS if appropriate      This document has been electronically signed by:  Lexie Dolan PA-C

## 2023-09-22 PROBLEM — N18.6 STAGE 5 CHRONIC KIDNEY DISEASE ON CHRONIC DIALYSIS: Status: ACTIVE | Noted: 2020-12-01

## 2023-09-22 PROBLEM — Z99.2 STAGE 5 CHRONIC KIDNEY DISEASE ON CHRONIC DIALYSIS: Status: ACTIVE | Noted: 2020-12-01

## 2023-11-27 ENCOUNTER — OFFICE VISIT (OUTPATIENT)
Dept: CARDIOLOGY | Facility: CLINIC | Age: 78
End: 2023-11-27
Payer: MEDICARE

## 2023-11-27 VITALS
DIASTOLIC BLOOD PRESSURE: 56 MMHG | RESPIRATION RATE: 18 BRPM | SYSTOLIC BLOOD PRESSURE: 107 MMHG | OXYGEN SATURATION: 94 % | HEART RATE: 68 BPM | HEIGHT: 63 IN | BODY MASS INDEX: 29.58 KG/M2

## 2023-11-27 DIAGNOSIS — I25.10 ASCVD (ARTERIOSCLEROTIC CARDIOVASCULAR DISEASE): ICD-10-CM

## 2023-11-27 DIAGNOSIS — I10 ESSENTIAL HYPERTENSION: Primary | ICD-10-CM

## 2023-11-27 DIAGNOSIS — E78.5 DYSLIPIDEMIA: ICD-10-CM

## 2023-11-27 PROCEDURE — 1160F RVW MEDS BY RX/DR IN RCRD: CPT | Performed by: NURSE PRACTITIONER

## 2023-11-27 PROCEDURE — 3078F DIAST BP <80 MM HG: CPT | Performed by: NURSE PRACTITIONER

## 2023-11-27 PROCEDURE — 1159F MED LIST DOCD IN RCRD: CPT | Performed by: NURSE PRACTITIONER

## 2023-11-27 PROCEDURE — 99214 OFFICE O/P EST MOD 30 MIN: CPT | Performed by: NURSE PRACTITIONER

## 2023-11-27 PROCEDURE — 3074F SYST BP LT 130 MM HG: CPT | Performed by: NURSE PRACTITIONER

## 2023-11-27 RX ORDER — UBIDECARENONE 75 MG
50 CAPSULE ORAL DAILY
COMMUNITY

## 2023-11-27 RX ORDER — PANTOPRAZOLE SODIUM 40 MG/1
40 TABLET, DELAYED RELEASE ORAL DAILY
COMMUNITY

## 2023-11-27 NOTE — PROGRESS NOTES
[facility]             [facility] ERNIE Holland Susan Marie, PA  Britta Lee  1945 11/27/2023    Patient Active Problem List   Diagnosis    Type 2 diabetes mellitus, uncontrolled, with neuropathy    Essential hypertension    ASCVD (arteriosclerotic cardiovascular disease)    Bilateral carpal tunnel syndrome    Diabetic retinopathy associated with type 2 diabetes mellitus    Dyslipidemia    Sciatic neuropathy    DDD (degenerative disc disease), lumbar    Primary osteoarthritis of left knee    Status post total left knee replacement    Type 2 diabetes mellitus with chronic kidney disease, with long-term current use of insulin    Hyperparathyroidism due to renal insufficiency    Venous insufficiency (chronic) (peripheral)    Class 1 obesity due to excess calories with serious comorbidity and body mass index (BMI) of 31.0 to 31.9 in adult    PVD (peripheral vascular disease) with claudication    LVH (left ventricular hypertrophy)    Chronic heart failure with preserved ejection fraction (HFpEF)    LENNY (obstructive sleep apnea)    Stage 5 chronic kidney disease on chronic dialysis    Iron deficiency anemia    Malabsorption of iron    Acquired hypothyroidism    Hiatal hernia    Weight loss, abnormal    Osteopenia of neck of left femur    Cirrhosis    Severe pulmonary hypertension    Mixed hyperlipidemia       Dear Lexie Dolan PA:    Subjective     Chief Complaint   Patient presents with    Follow-up     Routine       HPI:     This is a 78 y.o. female with known past medical history of HFpEF, severe pulmonary hypertension, ASCVD status post PCI in 2000 at [facility] by Dr. Hussein, cirrhosis, iron deficiency anemia, diabetes mellitus type 2, chronic kidney disease on hemodialysis and obstructive sleep apnea.      Britta Lee presents today for routine cardiology follow up.  Patient states she has been doing overall well since her last visit.  Denies  any chest pain or shortness of breath.  Carotid ultrasound showed no stenosis or plaque.  Follow-up lipid panel showed improved LDL of 47.  Blood pressure well-controlled.    Diagnostic Testing  Echocardiogram 11/2020: EF greater than 70% with concentric hypertrophy, pseudonormalization and mild mitral valve regurgitation  Echocardiogram 10/2022: EF greater than 70% with mild concentric hypertrophy  Echocardiogram 11/2022: EF greater than 70% with moderate tricuspid valve regurgitation and severe pulmonary hypertension     All other systems were reviewed and were negative.    Patient Active Problem List   Diagnosis    Type 2 diabetes mellitus, uncontrolled, with neuropathy    Essential hypertension    ASCVD (arteriosclerotic cardiovascular disease)    Bilateral carpal tunnel syndrome    Diabetic retinopathy associated with type 2 diabetes mellitus    Dyslipidemia    Sciatic neuropathy    DDD (degenerative disc disease), lumbar    Primary osteoarthritis of left knee    Status post total left knee replacement    Type 2 diabetes mellitus with chronic kidney disease, with long-term current use of insulin    Hyperparathyroidism due to renal insufficiency    Venous insufficiency (chronic) (peripheral)    Class 1 obesity due to excess calories with serious comorbidity and body mass index (BMI) of 31.0 to 31.9 in adult    PVD (peripheral vascular disease) with claudication    LVH (left ventricular hypertrophy)    Chronic heart failure with preserved ejection fraction (HFpEF)    LENNY (obstructive sleep apnea)    Stage 5 chronic kidney disease on chronic dialysis    Iron deficiency anemia    Malabsorption of iron    Acquired hypothyroidism    Hiatal hernia    Weight loss, abnormal    Osteopenia of neck of left femur    Cirrhosis    Severe pulmonary hypertension    Mixed hyperlipidemia       family history includes Arthritis in her mother; Cancer in her mother; Diabetes in her daughter, maternal aunt, mother, and son; Heart  disease in her father and maternal grandmother.     reports that she has never smoked. She has never used smokeless tobacco. She reports current alcohol use. She reports that she does not use drugs.    No Known Allergies      Current Outpatient Medications:     amLODIPine (NORVASC) 10 MG tablet, Take 1 tablet by mouth Daily., Disp: 90 tablet, Rfl: 3    atorvastatin (LIPITOR) 80 MG tablet, Take 1 tablet by mouth Every Night., Disp: 90 tablet, Rfl: 1    escitalopram (LEXAPRO) 10 MG tablet, Take 1 tablet by mouth Daily., Disp: 90 tablet, Rfl: 3    ferrous sulfate 325 (65 FE) MG EC tablet, Take 1 tablet by mouth Every 12 (Twelve) Hours., Disp: , Rfl:     folic acid (FOLVITE) 1 MG tablet, Take 1 tablet by mouth Daily., Disp: 30 tablet, Rfl: 3    levothyroxine (SYNTHROID, LEVOTHROID) 25 MCG tablet, Take 1 tablet by mouth Daily., Disp: 90 tablet, Rfl: 3    metoprolol succinate XL (TOPROL-XL) 50 MG 24 hr tablet, Take 1.5 tablets by mouth Daily., Disp: , Rfl:     pantoprazole (PROTONIX) 40 MG EC tablet, Take 1 tablet by mouth Daily., Disp: , Rfl:     vitamin B-12 (CYANOCOBALAMIN) 100 MCG tablet, Take 0.5 tablets by mouth Daily., Disp: , Rfl:     vitamin D (ERGOCALCIFEROL) 1.25 MG (79103 UT) capsule capsule, Take 1 capsule by mouth 1 (One) Time Per Week., Disp: 15 capsule, Rfl: 3      Physical Exam:  I have reviewed the patient's current vital signs as documented in the patient's EMR.   Vitals:    11/27/23 0833   BP: 107/56   Pulse: 68   Resp: 18   SpO2: 94%     Body mass index is 29.58 kg/m².       11/27/23 0833   Weight: (wheelchair)      Physical Exam  Constitutional:       General: She is not in acute distress.     Appearance: Normal appearance. She is well-developed and normal weight.   HENT:      Head: Normocephalic and atraumatic.   Eyes:      General: Lids are normal.      Conjunctiva/sclera: Conjunctivae normal.      Pupils: Pupils are equal, round, and reactive to light.   Neck:      Vascular: No carotid bruit or  JVD.   Cardiovascular:      Rate and Rhythm: Normal rate and regular rhythm.      Pulses: Normal pulses.      Heart sounds: Normal heart sounds, S1 normal and S2 normal. No murmur heard.  Pulmonary:      Effort: Pulmonary effort is normal. No respiratory distress.      Breath sounds: Normal breath sounds. No wheezing.   Abdominal:      General: Bowel sounds are normal. There is no distension.      Palpations: Abdomen is soft. There is no hepatomegaly or splenomegaly.      Tenderness: There is no abdominal tenderness.   Musculoskeletal:         General: No swelling. Normal range of motion.      Cervical back: Normal range of motion and neck supple.      Right lower leg: No edema.      Left lower leg: No edema.   Skin:     General: Skin is warm and dry.      Coloration: Skin is not jaundiced.      Findings: No rash.   Neurological:      General: No focal deficit present.      Mental Status: She is alert and oriented to person, place, and time. Mental status is at baseline.   Psychiatric:         Mood and Affect: Mood normal.         Speech: Speech normal.         Behavior: Behavior normal.         Thought Content: Thought content normal.         Judgment: Judgment normal.          DATA REVIEWED:     TTE/MANI:  Results for orders placed during the hospital encounter of 11/02/22    Adult Transthoracic Echo Complete w/ Color, Spectral and Contrast if necessary per protocol    Interpretation Summary    Left ventricular systolic function is hyperdynamic (EF > 70%).    Left ventricular diastolic function is consistent with (grade II w/high LAP) pseudonormalization.    The left atrial cavity is mildly dilated.    The aortic valve exhibits sclerosis with no evidence of stenosis or regurgitation.    Mild mitral valve regurgitation is present.    Moderate tricuspid valve regurgitation is present.    Severe pulmonary hypertension is present.  Estimated RV systolic pressure is > 55 mmHg.    There is no evidence of pericardial  effusion.      Laboratory evaluations:    Lab Results   Component Value Date    GLUCOSE 134 (H) 09/15/2023    BUN 28 (H) 09/15/2023    CREATININE 5.06 (H) 09/15/2023    EGFRIFNONA 23 (L) 02/21/2022    EGFRIFAFRI 41 (L) 07/30/2018    BCR 5.5 (L) 09/15/2023    K 3.9 09/15/2023    CO2 29.5 (H) 09/15/2023    CALCIUM 9.2 09/15/2023    PROTENTOTREF 7.7 07/30/2018    ALBUMIN 3.5 09/15/2023    LABIL2 1.1 (L) 07/30/2018    AST 24 09/15/2023    ALT 6 09/15/2023     Lab Results   Component Value Date    WBC 8.88 09/15/2023    HGB 11.1 (L) 09/15/2023    HCT 35.4 09/15/2023    MCV 90.1 09/15/2023     09/15/2023     Lab Results   Component Value Date    CHOL 104 09/15/2023    CHLPL 128 06/25/2018    TRIG 90 09/15/2023    HDL 39 (L) 09/15/2023    LDL 47 09/15/2023     Lab Results   Component Value Date    TSH 4.640 (H) 01/17/2023     Lab Results   Component Value Date    HGBA1C 6.60 (H) 06/15/2023     Lab Results   Component Value Date    ALT 6 09/15/2023     Lab Results   Component Value Date    HGBA1C 6.60 (H) 06/15/2023    HGBA1C 5.00 01/17/2023    HGBA1C 5.00 09/08/2022     Lab Results   Component Value Date    MICROALBUR 112.1 06/15/2023    CREATININE 5.06 (H) 09/15/2023     Lab Results   Component Value Date    IRON 43 08/23/2023    TIBC 258 (L) 08/23/2023    FERRITIN 1,130.00 (H) 08/23/2023     Lab Results   Component Value Date    INR 1.26 (H) 01/11/2023    INR 1.29 (H) 12/03/2022    INR 1.36 (H) 11/02/2022    PROTIME 16.1 (H) 01/11/2023    PROTIME 16.4 (H) 12/03/2022    PROTIME 17.1 (H) 11/02/2022        Lab Results   Component Value Date    ABSOLUTELUNG 32 06/13/2022    ABSOLUTELUNG 27 04/25/2022    ABSOLUTELUNG 22 03/28/2022       --------------------------------------------------------------------------------------------------------------------------    ASSESSMENT/PLAN:      Diagnosis Plan   1. Essential hypertension        2. ASCVD (arteriosclerotic cardiovascular disease)        3. Dyslipidemia             ASCVD  Dyslipidemia  Denies any chest pain.  Continue medical management with Lipitor and metoprolol.  Not on aspirin secondary to anemia history per patient report.  Statin was increased to 80 mg at last visit and repeat lipid panel showed improved LDL 47.  LFTs stable.  Continue statin.    Essential hypertension  Blood pressure well-controlled.  Recent lab work shows stable chronic disease.      This document has been @Electronically signed by ERNIE Olsen, 11/27/23, 8:31 AM EST.       Dictated Utilizing Dragon Dictation: Part of this note may be an electronic transcription/translation of spoken language to printed text using the Dragon Dictation System.    Follow-up appointment and medication changes provided in hand delivered After Visit Summary as well as reviewed in the room.

## 2023-11-30 ENCOUNTER — TELEPHONE (OUTPATIENT)
Dept: ONCOLOGY | Facility: CLINIC | Age: 78
End: 2023-11-30

## 2023-11-30 NOTE — TELEPHONE ENCOUNTER
Caller: Brandon Lee    Relationship to patient: Emergency Contact    Best call back number: [phone]    Chief complaint: RESCHEDULE     Type of visit: LAB    Requested date: 12-8 @  9:00    If rescheduling, when is the original appointment: 12-7     Additional notes:PLEASE ADVISE

## 2023-12-05 NOTE — PROGRESS NOTES
Subjective     Date: 12/6/2023    Referring Provider  Dr. Johnson     Chief Complaint   Iron deficiency anemia       Subjective          Britta Lee is a 78 y.o. female who presents today to [facility] for follow up.    HPI:   78 y.o.female with history of hypertension, hyperlipidemia, depression/anxiety, hypothyroidism, coronary artery disease, CKD, congestive heart failure who presents as follow-up for her treatment of anemia.  Previously seen by ERNIE Mohr.    She has struggled with anemia since December 2016 with hemoglobin ranging from 8.6-9.8.  Iron studies at the time consistent with anemia of chronic disease.  Has previously received IV iron infusions, and taken oral iron.  Stopped taking oral ferrous sulfate due to malabsorption.  Last IV iron was received August 2022.    Treatment history:      Interval History 02/01/2023   She was started on oral ferrous sulfate by nephrology.  Currently still taking it without any nausea or constipation.  Denies any new complaints.  Denies any melena, hematochezia, hematemesis.  Per chart review patient presented to the ED on 1/11/2023 for reportedly low hemoglobin level in dialysis, hemoglobin of 6.1 mg/dL, required blood transfusion at that time.    Interval History 02/21/2023   Ms. Lee presents today for follow-up with her .  She had a fall from bed, presented to the ED February 8,  was found to have acute fracture of humeral neck and humeral head.  She reports continued oral ferrous sulfate, denies any GI discomfort with this.    Hemoglobin today is 9.3, hematocrit 31.2 today MCV 89.9.  Labs from previous visit showed vitamin B12 713, methylmalonic acid elevated to 933.  Folate level low/normal at 6.7.    She reports previously taking oral vitamin B tablets, never tried injections.  Denies any fatigue, melena, hematochezia.    Interval History 05/23/2023   Ms. Lee presents today with her .  She reports doing well overall. Continues to take oral folic acid and b12 tablet. Unsure if she is taking oral ferrous sulfate.   Recently placed an AV fistula R arm.   Denies GI bleed.  CBC today with Hgb 13.8, Hct 47.3, normal WBC and platelets. Pt is unsure if she gets epo with Dr. Johnson, her  does not think so.     Interval History 08/23/2023   Ms. Lee presents today accompanied by a friend she reports doing well overall, has been getting dialysis Monday Wednesday Friday.  She denies any bleeds including melena or hematochezia.  She is unsure if she is taking folic acid and vitamin B12 capsules, as her  typically gives her medications.    CBC reviewed today which shows hemoglobin 10.8, hematocrit 34.3 (decreased from June 2023 with normal hemoglobin 12.1).  Normal WBC and platelet count.  Iron studies consistent with anemia of chronic disease    Interval History 12/06/2023   Ms. Lee presents today, accompanied by a friend. She reports fatigue today due to having to wake up for this appt followed by dialysis. Continues to take oral folic acid and B12 capsule. Denies any bleeding    CBC with improvement in H/H 11.2/36.5, normal WBC and Platelets.     Of note, bed bugs were noted on patient at the time of lab draw.     The following portions of the patient's history were reviewed and updated as appropriate: allergies, current medications, past family history, past medical history, past social history, past surgical history and problem list.    Objective     Objective     Allergy:   No Known Allergies     Current Medications:   Current Outpatient Medications   Medication Sig Dispense Refill    amLODIPine (NORVASC) 10 MG tablet Take 1 tablet by mouth Daily. 90 tablet 3    atorvastatin (LIPITOR) 80 MG tablet Take 1 tablet by mouth Every Night. 90 tablet 1    escitalopram (LEXAPRO) 10 MG tablet Take 1 tablet by mouth Daily. 90 tablet 3    ferrous sulfate 325 (65 FE) MG EC tablet Take 1 tablet by mouth  Every 12 (Twelve) Hours.      folic acid (FOLVITE) 1 MG tablet Take 1 tablet by mouth Daily. 30 tablet 3    levothyroxine (SYNTHROID, LEVOTHROID) 25 MCG tablet Take 1 tablet by mouth Daily. 90 tablet 3    metoprolol succinate XL (TOPROL-XL) 50 MG 24 hr tablet Take 1.5 tablets by mouth Daily.      pantoprazole (PROTONIX) 40 MG EC tablet Take 1 tablet by mouth Daily.      vitamin B-12 (CYANOCOBALAMIN) 100 MCG tablet Take 0.5 tablets by mouth Daily.      vitamin D (ERGOCALCIFEROL) 1.25 MG (50541 UT) capsule capsule Take 1 capsule by mouth 1 (One) Time Per Week. 15 capsule 3     No current facility-administered medications for this visit.       Past Medical History:  Past Medical History:   Diagnosis Date    Acquired hypothyroidism 4/22/2021    Anemia     Arthritis     Carpal tunnel syndrome     Coronary artery disease     Diabetes mellitus     Elevated cholesterol     GERD (gastroesophageal reflux disease)     History of pneumonia     History of unsteady gait     OCASSIONALLY    Hypertension     Insomnia     Kidney disease     LENNY (obstructive sleep apnea) 12/1/2020    Osteoarthritis     Renal insufficiency     Sciatica        Past Surgical History:  Past Surgical History:   Procedure Laterality Date    ABDOMINAL SURGERY      APPENDECTOMY      CARDIAC CATHETERIZATION      1 STENT  ---  2000    CARDIAC SURGERY      CAROTID STENT      CARPAL TUNNEL RELEASE Right 10/8/2019    Procedure: CARPAL TUNNEL RELEASE;  Surgeon: Feroz Johnson MD;  Location: [facility];  Service: Orthopedics    CATARACT EXTRACTION      CHOLECYSTECTOMY      COLONOSCOPY      COLONOSCOPY N/A 11/23/2021    Procedure: COLONOSCOPY FOR SCREENING;  Surgeon: Palmira Pate MD;  Location: [facility] OR;  Service: Gastroenterology;  Laterality: N/A;    CORONARY ANGIOPLASTY WITH STENT PLACEMENT      ENDOSCOPY      ENDOSCOPY N/A 3/5/2020    Procedure: ESOPHAGOGASTRODUODENOSCOPY;  Surgeon: Alexandru Bagley MD;  Location: [facility] OR;  Service:  Gastroenterology;  Laterality: N/A;    ENDOSCOPY N/A 11/23/2021    Procedure: ESOPHAGOGASTRODUODENOSCOPY WITH BIOPSY;  Surgeon: Palmira Pate MD;  Location: [facility];  Service: Gastroenterology;  Laterality: N/A;    ENDOSCOPY AND COLONOSCOPY      GALLBLADDER SURGERY      INSERTION HEMODIALYSIS CATHETER N/A 11/14/2022    Procedure: HEMODIALYSIS CATHETER INSERTION;  Surgeon: Ta Blas MD;  Location: [facility];  Service: General;  Laterality: N/A;    JOINT REPLACEMENT Left 05/02/2017    [facility]  DR LEVIN  LEFT TOTAL KNEE    KNEE ARTHROSCOPY W/ MENISCECTOMY Right     LAPAROSCOPIC TUBAL LIGATION      [place] ARTHRP KNE CONDYLE&PLATU MEDIAL&LAT COMPARTMENTS Left 5/2/2017    Procedure: TOTAL KNEE ARTHROPLASTY;  Surgeon: Feroz Levin MD;  Location: [facility];  Service: Orthopedics    STERILIZATION      TONSILLECTOMY         Social History:  Social History     Socioeconomic History    Marital status:      Spouse name: natalie    Number of children: 3    Years of education: 12   Tobacco Use    Smoking status: Never    Smokeless tobacco: Never   Vaping Use    Vaping Use: Never used   Substance and Sexual Activity    Alcohol use: Yes     Comment: socially    Drug use: No    Sexual activity: Defer     Birth control/protection: Surgical     Britta Lee  reports that she has never smoked. She has never used smokeless tobacco.      Family History:  Family History   Problem Relation Age of Onset    Arthritis Mother     Diabetes Mother     Cancer Mother     Heart disease Father     Diabetes Daughter     Diabetes Son     Diabetes Maternal Aunt     Heart disease Maternal Grandmother     Breast cancer Neg Hx        Review of Systems:  Review of Systems   All other systems reviewed and are negative.      Vital Signs:   BP 99/68   Pulse 68   Temp 96.9 °F (36.1 °C) (Temporal)   Resp 18   Wt 73.8 kg (162 lb 12.8 oz)   SpO2 97%   BMI 28.84 kg/m²      Physical Exam:  Physical Exam  Vitals and nursing  note reviewed.   Constitutional:       General: She is not in acute distress.     Appearance: Normal appearance. She is not ill-appearing.      Comments: + In a wheelchair   HENT:      Head: Normocephalic and atraumatic.      Nose: Nose normal.      Mouth/Throat:      Mouth: Mucous membranes are moist.      Pharynx: Oropharynx is clear.   Eyes:      Conjunctiva/sclera: Conjunctivae normal.      Pupils: Pupils are equal, round, and reactive to light.   Cardiovascular:      Rate and Rhythm: Normal rate and regular rhythm.      Heart sounds: No murmur heard.  Pulmonary:      Effort: Pulmonary effort is normal. No respiratory distress.      Breath sounds: Normal breath sounds. No wheezing.   Abdominal:      General: Abdomen is flat. Bowel sounds are normal. There is no distension.      Palpations: Abdomen is soft. There is no mass.      Tenderness: There is no abdominal tenderness. There is no guarding.   Musculoskeletal:         General: No swelling. Normal range of motion.      Cervical back: Normal range of motion.   Lymphadenopathy:      Cervical: No cervical adenopathy.   Skin:     General: Skin is warm and dry.      Coloration: Skin is not jaundiced.      Findings: Lesion present. No bruising or rash.      Comments: +R AV fistula    Multiple excoriations on bilateral upper and lower extremities   Neurological:      General: No focal deficit present.      Mental Status: She is alert and oriented to person, place, and time.      Motor: No weakness.      Coordination: Coordination normal.   Psychiatric:         Mood and Affect: Mood normal.         Behavior: Behavior normal.         Judgment: Judgment normal.         PHQ-9 Score  PHQ-9 Total Score:       Pain Score  Vitals:    12/06/23 0920   BP: 99/68   Pulse: 68   Resp: 18   Temp: 96.9 °F (36.1 °C)   TempSrc: Temporal   SpO2: 97%   Weight: 73.8 kg (162 lb 12.8 oz)   PainSc: 0-No pain         ECOG score: 1             Lab Review  Lab Results   Component Value Date     WBC 7.58 12/06/2023    HGB 11.2 (L) 12/06/2023    HCT 36.5 12/06/2023    MCV 87.1 12/06/2023    RDW 14.7 12/06/2023     12/06/2023    NEUTRORELPCT 68.5 12/06/2023    LYMPHORELPCT 20.1 12/06/2023    MONORELPCT 6.9 12/06/2023    EOSRELPCT 3.7 12/06/2023    BASORELPCT 0.4 12/06/2023    NEUTROABS 5.20 12/06/2023    LYMPHSABS 1.52 12/06/2023       Lab Results   Component Value Date     (L) 09/15/2023    K 3.9 09/15/2023    CO2 29.5 (H) 09/15/2023    CL 89 (L) 09/15/2023    BUN 28 (H) 09/15/2023    CREATININE 5.06 (H) 09/15/2023    EGFRIFNONA 23 (L) 02/21/2022    EGFRIFAFRI 41 (L) 07/30/2018    GLUCOSE 134 (H) 09/15/2023    CALCIUM 9.2 09/15/2023    ALKPHOS 242 (H) 09/15/2023    AST 24 09/15/2023    ALT 6 09/15/2023    BILITOT 0.4 09/15/2023    ALBUMIN 3.5 09/15/2023    PROTEINTOT 7.6 09/15/2023    MG 1.9 01/11/2023    PHOS 3.3 10/17/2022           Endoscopy:   ENDOSCOPY:  11/23/21  ESOPHAGOGASTRODUODENOSCOPY PROCEDURE REPORT     Britta Lee  11/23/2021     GASTROENTEROLOGIST:  Palmira Pate MD      PRE-PROCEDURE DIAGNOSIS:  Iron deficiency anemia, unspecified iron deficiency anemia type [D50.9]  Weight loss, abnormal [R63.4]     POST-PROCEDURE DIAGNOSIS:  1.  Gastritis  2.  Angiectasias     Procedure(s):  ESOPHAGOGASTRODUODENOSCOPY WITH BIOPSY  COLONOSCOPY FOR SCREENING     ANESTHESIA:  Propofol administered by anesthesia.  See anesthesia notes for ASA classification     STAFF:  Circulator: Bre Ny RN; Danelle Mccray RN  Endo Technician: Omari Lewis     FINDINGS:  1.  Normal-appearing esophagus.  Biopsies were taken  2.  Mild erythema in the antrum and stomach.  Biopsies taken for H. pylori.  3.  One angiectasia was found in the second portion of the duodenum.  This was nonbleeding.  Biopsies were taken of the duodenum to rule out celiac disease as a source of her anemia.     OPERATIVE PROCEDURE:  After proper informed consent was obtained, patient was transferred to  the OR/endoscopy suite.  Patient was then placed in left lateral decubitus position. The Olympus 180 series video gastroscope was inserted orally under direct visualization.  Esophagus, stomach, and duodenum were inspected.  The endoscope was passed to the third portion of the duodenum.  Scope was retroflexed for visualization of the cardia and incisuraThe endoscope was then withdrawn. Patient tolerated the procedure well. There were no immediate complications.     ESTIMATED BLOOD LOSS:  None     COMPLICATIONS;  None     RECOMMENDATIONS/ PLAN:  1.  Follow-up biopsy results.  2.  Proceed with colonoscopy.    COLONOSCOPY PROCEDURE NOTE     Britat Lee  11/23/2021     GASTROENTEROLOGIST:  Palmira Pate MD        PRE-PROCEDURE DIAGNOSIS:   Iron deficiency anemia, unspecified iron deficiency anemia type [D50.9]  Weight loss, abnormal [R63.4]     POST-PROCEDURE DIAGNOSIS:  1.  Colon polyps  2.  Diverticulosis  3.  Internal hemorrhoids     PROCEDURE:  COLONOSCOPY with snare polypectomy and cold biopsy polypectomy     ANESTHESIA:  Propofol administered by anesthesia.  See anesthesia notes for ASA classification     STAFF  Circulator: Bre Ny RN; Danelle Mccray RN  Endo Technician: Omari Lewis     Findings:  1.  A 5 mm polyp was found in the ascending colon.  This was removed with cold forceps polypectomy.  2.  A 5 mm polyp was removed from the transverse colon with cold forceps biopsy.  3.  Two 6 mm polyps were found in the descending colon.  Both polyps were removed with cold snare polypectomy.  4.  A 7 mm polyp was removed from the sigmoid colon with cold snare polypectomy.  5.  Diverticulosis involving left colon.  6.  Small internal hemorrhoids.  7.  Normal-appearing terminal ileum.    OPERATIVE PROCEDURE   After proper informed consent was obtained, the patient was taken the operating suite and placed in left lateral decubitus position.  An Olympus video colonoscope 180 series was  inserted in the rectum and advanced to the terminal ileum under direct visualization.  Cecum and terminal ileum were identified by visualization of the appendiceal orifice and ileocecal valve.  The colonoscope was then slowly withdrawn from the cecum to the rectum and passed a second time from rectum to cecum.  The colonoscope was retroflexed in the cecum and rectum. Scope was then withdrawn. Patient tolerated the procedure well. There were no immediate complications. Cecal withdrawal time was 15 minutes.     ESTIMATED BLOOD LOSS  None      COMPLICATIONS  None     RECOMMENDATIONS:  1.  Follow-up pathology.  2.  Repeat colonoscopy in 3 years due to personal history of colon polyps.  3.  Proceed with capsule endoscopy if anemia persists.  Sign 4.  Follow-up in GI clinic in 6 to 8 weeks.  Assessment / Plan         Assessment and Plan   77 year old F with history of ESRD on HD who presents for normocytic anemia.     Anemia  2    iron deficiency anemia  3.   Vitamin B12 deficiency  4.   Folate deficiency  5.   Malabsorption  -Patient with history of anemia required multiple transfusions, most recently 1/12/2023.  Previously required IV iron after intolerance to p.o. iron.  Last IV iron received August 2022.  -Most recent EGD and colonoscopy by Dr. Simmons 11/2021.  At that time no source of bleed was discovered, it was recommended patient follow-up with capsule endoscopy if anemia persist.  -Most recent labs from 2/1/2023 with low/normal folate level, normal vitamin B12 however elevated methylmalonic acid.  -Currently taking 1 mg folic acid to be taken daily and 100 mcg of cyanocobalamin tablet to be taken daily. Today with improvement in H/H. Pending iron studies, folate, B12 studies   -She is unsure if receiving Erythropoietin with nephrology, Dr. Johnson   -Iron studies 8/2023 consistent with anemia of chronic disease.     Discussed possible differential diagnoses, testing, treatment, recommended non-pharmacological  interventions, risks, warning signs to monitor for that would indicate need for follow-up in clinic or ER. If no improvement with these regimens or you have new or worsening symptoms follow-up. Patient verbalizes understanding and agreement with plan of care. Denies further needs or concerns.     Patient was given instructions and counseling regarding her condition and for health maintenance advised.    I spent 30 minutes with Britta Lee today.  In the office today, more than 50% of this time was spent face-to-face with her  in counseling / coordination of care, reviewing her interim medical history and counseling on the current treatment plan.  All questions were answered to her satisfaction.      Meds ordered during this visit  No orders of the defined types were placed in this encounter.      Visit Diagnoses    ICD-10-CM ICD-9-CM   1. Iron deficiency anemia, unspecified iron deficiency anemia type  D50.9 280.9   2. B12 deficiency  E53.8 266.2   3. Folate deficiency  E53.8 266.2   4. Malabsorption of iron  K90.9 579.8   5. CKD (chronic kidney disease) stage 4, GFR 15-29 ml/min  N18.4 585.4           Follow Up   3 months with repeat CBC, folate, B12, MMA, iron studies, ferritin      This document has been electronically signed by Sakina Bang MD   December 6, 2023 09:41 EST    Dictated Utilizing Dragon Dictation: Part of this note may be an electronic transcription/translation of spoken language to printed text using the Dragon Dictation System.

## 2023-12-06 ENCOUNTER — OFFICE VISIT (OUTPATIENT)
Dept: ONCOLOGY | Facility: CLINIC | Age: 78
End: 2023-12-06
Payer: MEDICARE

## 2023-12-06 ENCOUNTER — LAB (OUTPATIENT)
Dept: ONCOLOGY | Facility: CLINIC | Age: 78
End: 2023-12-06
Payer: MEDICARE

## 2023-12-06 VITALS
DIASTOLIC BLOOD PRESSURE: 68 MMHG | WEIGHT: 162.8 LBS | RESPIRATION RATE: 18 BRPM | TEMPERATURE: 96.9 F | BODY MASS INDEX: 28.84 KG/M2 | OXYGEN SATURATION: 97 % | HEART RATE: 68 BPM | SYSTOLIC BLOOD PRESSURE: 99 MMHG

## 2023-12-06 DIAGNOSIS — E53.8 FOLATE DEFICIENCY: ICD-10-CM

## 2023-12-06 DIAGNOSIS — K90.9 MALABSORPTION OF IRON: ICD-10-CM

## 2023-12-06 DIAGNOSIS — E53.8 B12 DEFICIENCY: ICD-10-CM

## 2023-12-06 DIAGNOSIS — D50.9 IRON DEFICIENCY ANEMIA, UNSPECIFIED IRON DEFICIENCY ANEMIA TYPE: Primary | ICD-10-CM

## 2023-12-06 DIAGNOSIS — N18.4 CKD (CHRONIC KIDNEY DISEASE) STAGE 4, GFR 15-29 ML/MIN: ICD-10-CM

## 2023-12-06 DIAGNOSIS — D50.9 IRON DEFICIENCY ANEMIA, UNSPECIFIED IRON DEFICIENCY ANEMIA TYPE: ICD-10-CM

## 2023-12-06 LAB
BASOPHILS # BLD AUTO: 0.03 10*3/MM3 (ref 0–0.2)
BASOPHILS NFR BLD AUTO: 0.4 % (ref 0–1.5)
DEPRECATED RDW RBC AUTO: 46.4 FL (ref 37–54)
EOSINOPHIL # BLD AUTO: 0.28 10*3/MM3 (ref 0–0.4)
EOSINOPHIL NFR BLD AUTO: 3.7 % (ref 0.3–6.2)
ERYTHROCYTE [DISTWIDTH] IN BLOOD BY AUTOMATED COUNT: 14.7 % (ref 12.3–15.4)
FERRITIN SERPL-MCNC: 1164 NG/ML (ref 13–150)
FOLATE SERPL-MCNC: >20 NG/ML (ref 4.78–24.2)
HCT VFR BLD AUTO: 36.5 % (ref 34–46.6)
HGB BLD-MCNC: 11.2 G/DL (ref 12–15.9)
IMM GRANULOCYTES # BLD AUTO: 0.03 10*3/MM3 (ref 0–0.05)
IMM GRANULOCYTES NFR BLD AUTO: 0.4 % (ref 0–0.5)
IRON 24H UR-MRATE: 46 MCG/DL (ref 37–145)
IRON SATN MFR SERPL: 20 % (ref 20–50)
LYMPHOCYTES # BLD AUTO: 1.52 10*3/MM3 (ref 0.7–3.1)
LYMPHOCYTES NFR BLD AUTO: 20.1 % (ref 19.6–45.3)
MCH RBC QN AUTO: 26.7 PG (ref 26.6–33)
MCHC RBC AUTO-ENTMCNC: 30.7 G/DL (ref 31.5–35.7)
MCV RBC AUTO: 87.1 FL (ref 79–97)
MONOCYTES # BLD AUTO: 0.52 10*3/MM3 (ref 0.1–0.9)
MONOCYTES NFR BLD AUTO: 6.9 % (ref 5–12)
NEUTROPHILS NFR BLD AUTO: 5.2 10*3/MM3 (ref 1.7–7)
NEUTROPHILS NFR BLD AUTO: 68.5 % (ref 42.7–76)
NRBC BLD AUTO-RTO: 0 /100 WBC (ref 0–0.2)
PLATELET # BLD AUTO: 188 10*3/MM3 (ref 140–450)
PMV BLD AUTO: 8.7 FL (ref 6–12)
RBC # BLD AUTO: 4.19 10*6/MM3 (ref 3.77–5.28)
TIBC SERPL-MCNC: 226 MCG/DL (ref 298–536)
TRANSFERRIN SERPL-MCNC: 152 MG/DL (ref 200–360)
VIT B12 BLD-MCNC: 1434 PG/ML (ref 211–946)
WBC NRBC COR # BLD AUTO: 7.58 10*3/MM3 (ref 3.4–10.8)

## 2023-12-06 PROCEDURE — 82607 VITAMIN B-12: CPT | Performed by: INTERNAL MEDICINE

## 2023-12-06 PROCEDURE — 82668 ASSAY OF ERYTHROPOIETIN: CPT | Performed by: INTERNAL MEDICINE

## 2023-12-06 PROCEDURE — 85025 COMPLETE CBC W/AUTO DIFF WBC: CPT | Performed by: INTERNAL MEDICINE

## 2023-12-06 PROCEDURE — 82728 ASSAY OF FERRITIN: CPT | Performed by: INTERNAL MEDICINE

## 2023-12-06 PROCEDURE — 82746 ASSAY OF FOLIC ACID SERUM: CPT | Performed by: INTERNAL MEDICINE

## 2023-12-06 PROCEDURE — 84466 ASSAY OF TRANSFERRIN: CPT | Performed by: INTERNAL MEDICINE

## 2023-12-06 PROCEDURE — 83921 ORGANIC ACID SINGLE QUANT: CPT | Performed by: INTERNAL MEDICINE

## 2023-12-06 PROCEDURE — 83540 ASSAY OF IRON: CPT | Performed by: INTERNAL MEDICINE

## 2023-12-06 NOTE — PROGRESS NOTES
Venipuncture Blood Specimen Collection  Venipuncture performed in left arm by Charles Jasmine MA with good hemostasis. Patient tolerated the procedure well without complications.   12/06/23   Charles Jasmine MA

## 2023-12-07 LAB — EPO SERPL-ACNC: 8 MIU/ML (ref 2.6–18.5)

## 2023-12-10 NOTE — PLAN OF CARE
Goal Outcome Evaluation:  Plan of Care Reviewed With: patient  Progress: no change  Outcome Summary: VSS. Pt admitted from ED this shift. Pt has complained of shortness of breath that is worse on exertion. Pt is currently requiring 2L NC. Pt is resting in bed comfortably with no distress noted. Will continue to monitor.   cardiovascular/diabetes

## 2023-12-12 LAB — METHYLMALONATE SERPL-SCNC: 751 NMOL/L (ref 0–378)

## 2023-12-21 ENCOUNTER — OFFICE VISIT (OUTPATIENT)
Dept: FAMILY MEDICINE CLINIC | Facility: CLINIC | Age: 78
End: 2023-12-21
Payer: MEDICARE

## 2023-12-21 VITALS
HEIGHT: 63 IN | HEART RATE: 72 BPM | TEMPERATURE: 97.3 F | DIASTOLIC BLOOD PRESSURE: 80 MMHG | SYSTOLIC BLOOD PRESSURE: 124 MMHG | OXYGEN SATURATION: 100 % | WEIGHT: 167 LBS | BODY MASS INDEX: 29.59 KG/M2

## 2023-12-21 DIAGNOSIS — Z79.4 TYPE 2 DIABETES MELLITUS WITH CHRONIC KIDNEY DISEASE ON CHRONIC DIALYSIS, WITH LONG-TERM CURRENT USE OF INSULIN: ICD-10-CM

## 2023-12-21 DIAGNOSIS — Z12.31 ENCOUNTER FOR SCREENING MAMMOGRAM FOR MALIGNANT NEOPLASM OF BREAST: ICD-10-CM

## 2023-12-21 DIAGNOSIS — E78.2 MIXED HYPERLIPIDEMIA: Chronic | ICD-10-CM

## 2023-12-21 DIAGNOSIS — F33.1 MODERATE EPISODE OF RECURRENT MAJOR DEPRESSIVE DISORDER: Chronic | ICD-10-CM

## 2023-12-21 DIAGNOSIS — I10 ESSENTIAL HYPERTENSION: Primary | Chronic | ICD-10-CM

## 2023-12-21 DIAGNOSIS — E55.9 VITAMIN D DEFICIENCY: ICD-10-CM

## 2023-12-21 DIAGNOSIS — E11.22 TYPE 2 DIABETES MELLITUS WITH CHRONIC KIDNEY DISEASE ON CHRONIC DIALYSIS, WITH LONG-TERM CURRENT USE OF INSULIN: ICD-10-CM

## 2023-12-21 DIAGNOSIS — Z99.2 TYPE 2 DIABETES MELLITUS WITH CHRONIC KIDNEY DISEASE ON CHRONIC DIALYSIS, WITH LONG-TERM CURRENT USE OF INSULIN: ICD-10-CM

## 2023-12-21 DIAGNOSIS — N18.6 TYPE 2 DIABETES MELLITUS WITH CHRONIC KIDNEY DISEASE ON CHRONIC DIALYSIS, WITH LONG-TERM CURRENT USE OF INSULIN: ICD-10-CM

## 2023-12-21 DIAGNOSIS — K21.9 GASTROESOPHAGEAL REFLUX DISEASE WITHOUT ESOPHAGITIS: Chronic | ICD-10-CM

## 2023-12-21 RX ORDER — ESCITALOPRAM OXALATE 10 MG/1
10 TABLET ORAL DAILY
Qty: 90 TABLET | Refills: 3 | Status: SHIPPED | OUTPATIENT
Start: 2023-12-21

## 2023-12-21 RX ORDER — METOPROLOL SUCCINATE 25 MG/1
25 TABLET, EXTENDED RELEASE ORAL DAILY
Qty: 90 TABLET | Refills: 1 | Status: SHIPPED | OUTPATIENT
Start: 2023-12-21

## 2023-12-21 RX ORDER — PANTOPRAZOLE SODIUM 40 MG/1
40 TABLET, DELAYED RELEASE ORAL DAILY
Qty: 90 TABLET | Refills: 1 | Status: SHIPPED | OUTPATIENT
Start: 2023-12-21

## 2023-12-21 RX ORDER — ATORVASTATIN CALCIUM 80 MG/1
80 TABLET, FILM COATED ORAL NIGHTLY
Qty: 90 TABLET | Refills: 1 | Status: SHIPPED | OUTPATIENT
Start: 2023-12-21

## 2023-12-21 RX ORDER — AMLODIPINE BESYLATE 10 MG/1
10 TABLET ORAL DAILY
Qty: 90 TABLET | Refills: 3 | Status: SHIPPED | OUTPATIENT
Start: 2023-12-21

## 2023-12-21 NOTE — PROGRESS NOTES
"Chief Complaint -hypertension    History of Present Illness -     Britta Lee is a 78 y.o. female.     Her  is here with her today helping with history.    Hypertension-  Patient complains of episodes of low blood pressure when going to dialysis 3 times per week.  She reports that blood pressure is controlled on nondialysis days.  Patient currently using amlodipine 10 mg daily and states she did not take her metoprolol 75 mg last night due to low blood pressure.    Hyperlipidemia-  Stable with atorvastatin and low-cholesterol diet    Depression-  Stable with escitalopram.  She denies any hallucinations SI or HI    Diabetes mellitus type 2-  Stable with low-cholesterol diet    Chronic kidney disease-  Patient is stable on chronic dialysis 3 days a week    Gastroesophageal reflux disease-stable with pantoprazole and dietary modification    Vitamin D deficiency-stable with vitamin D supplementation      The following portions of the patient's history were reviewed and updated as appropriate: allergies, current medications, past family history, past medical history, past social history, past surgical history and problem list.        Objective  Vital signs:  /80   Pulse 72   Temp 97.3 °F (36.3 °C) (Temporal)   Ht 160 cm (63\")   Wt 75.8 kg (167 lb)   SpO2 100%   BMI 29.58 kg/m²     Physical Exam  Vitals and nursing note reviewed.   Constitutional:       Appearance: Normal appearance. She is well-developed.      Comments: Patient is in wheelchair today and assisted by her    Eyes:      Extraocular Movements: Extraocular movements intact.      Conjunctiva/sclera: Conjunctivae normal.   Cardiovascular:      Rate and Rhythm: Normal rate and regular rhythm.      Heart sounds: Normal heart sounds. No murmur heard.  Pulmonary:      Effort: Pulmonary effort is normal. No respiratory distress.      Breath sounds: Normal breath sounds. No wheezing.   Musculoskeletal:         General: No tenderness. "   Skin:     General: Skin is warm and dry.      Findings: No rash.   Neurological:      Mental Status: She is alert and oriented to person, place, and time.   Psychiatric:         Mood and Affect: Mood normal.         Behavior: Behavior normal.         Thought Content: Thought content normal.         The following data was reviewed by Lexie Dolan PA-C:         Lab Results   Component Value Date    BUN 28 (H) 09/15/2023    CREATININE 5.06 (H) 09/15/2023    EGFR 8.2 (L) 09/15/2023    ALT 6 09/15/2023    AST 24 09/15/2023    WBC 7.58 12/06/2023    HGB 11.2 (L) 12/06/2023    HCT 36.5 12/06/2023     12/06/2023    CHOL 104 09/15/2023    TRIG 90 09/15/2023    HDL 39 (L) 09/15/2023    LDL 47 09/15/2023    TSH 4.640 (H) 01/17/2023    HGBA1C 6.60 (H) 06/15/2023           Assessment & Plan     Diagnoses and all orders for this visit:    1. Essential hypertension (Primary)  Comments:  Decrease Toprol-XL 25 mg daily  Continue amlodipine 10 mg daily  Advise daily BP and pulse monitoring  Orders:  -     amLODIPine (NORVASC) 10 MG tablet; Take 1 tablet by mouth Daily.  Dispense: 90 tablet; Refill: 3  -     metoprolol succinate XL (Toprol XL) 25 MG 24 hr tablet; Take 1 tablet by mouth Daily.  Dispense: 90 tablet; Refill: 1  -     Comprehensive Metabolic Panel; Future    2. Mixed hyperlipidemia  Comments:  Advised low-cholesterol diet  Continue atorvastatin  Orders:  -     atorvastatin (LIPITOR) 80 MG tablet; Take 1 tablet by mouth Every Night.  Dispense: 90 tablet; Refill: 1  -     Comprehensive Metabolic Panel; Future  -     Lipid Panel; Future    3. Moderate episode of recurrent major depressive disorder  Comments:  Continue Lexapro  Conservative measures for dealing with stress were advised  Orders:  -     escitalopram (LEXAPRO) 10 MG tablet; Take 1 tablet by mouth Daily.  Dispense: 90 tablet; Refill: 3    4. Type 2 diabetes mellitus with chronic kidney disease on chronic dialysis, with long-term current use of insulin  -      Hemoglobin A1c; Future  -     MicroAlbumin, Urine, Random - Urine, Clean Catch; Future    5. Gastroesophageal reflux disease without esophagitis  Comments:  Continue pantoprazole  Advised to avoid known trigger foods  Orders:  -     pantoprazole (PROTONIX) 40 MG EC tablet; Take 1 tablet by mouth Daily.  Dispense: 90 tablet; Refill: 1  -     Comprehensive Metabolic Panel; Future    6. Vitamin D deficiency  -     Vitamin D,25-Hydroxy; Future    7. Encounter for screening mammogram for malignant neoplasm of breast  -     Mammo Screening Digital Tomosynthesis Bilateral With CAD; Future                   Patient was given instructions and counseling regarding his condition or for health maintenance advice. Please see specific information pulled into the AVS if appropriate      This document has been electronically signed by:  Lexie Dolan PA-C

## 2023-12-21 NOTE — PATIENT INSTRUCTIONS
Fall Prevention in the Home, Adult  Falls can cause injuries and can happen to people of all ages. There are many things you can do to make your home safe and to help prevent falls. Ask for help when making these changes.  What actions can I take to prevent falls?  General Instructions  Use good lighting in all rooms. Replace any light bulbs that burn out.  Turn on the lights in dark areas. Use night-lights.  Keep items that you use often in easy-to-reach places. Lower the shelves around your home if needed.  Set up your furniture so you have a clear path. Avoid moving your furniture around.  Do not have throw rugs or other things on the floor that can make you trip.  Avoid walking on wet floors.  If any of your floors are uneven, fix them.  Add color or contrast paint or tape to clearly eder and help you see:  Grab bars or handrails.  First and last steps of staircases.  Where the edge of each step is.  If you use a stepladder:  Make sure that it is fully opened. Do not climb a closed stepladder.  Make sure the sides of the stepladder are locked in place.  Ask someone to hold the stepladder while you use it.  Know where your pets are when moving through your home.  What can I do in the bathroom?         Keep the floor dry. Clean up any water on the floor right away.  Remove soap buildup in the tub or shower.  Use nonskid mats or decals on the floor of the tub or shower.  Attach bath mats securely with double-sided, nonslip rug tape.  If you need to sit down in the shower, use a plastic, nonslip stool.  Install grab bars by the toilet and in the tub and shower. Do not use towel bars as grab bars.  What can I do in the bedroom?  Make sure that you have a light by your bed that is easy to reach.  Do not use any sheets or blankets for your bed that hang to the floor.  Have a firm chair with side arms that you can use for support when you get dressed.  What can I do in the kitchen?  Clean up any spills right away.  If  you need to reach something above you, use a step stool with a grab bar.  Keep electrical cords out of the way.  Do not use floor polish or wax that makes floors slippery.  What can I do with my stairs?  Do not leave any items on the stairs.  Make sure that you have a light switch at the top and the bottom of the stairs.  Make sure that there are handrails on both sides of the stairs. Fix handrails that are broken or loose.  Install nonslip stair treads on all your stairs.  Avoid having throw rugs at the top or bottom of the stairs.  Choose a carpet that does not hide the edge of the steps on the stairs.  Check carpeting to make sure that it is firmly attached to the stairs. Fix carpet that is loose or worn.  What can I do on the outside of my home?  Use bright outdoor lighting.  Fix the edges of walkways and driveways and fix any cracks.  Remove anything that might make you trip as you walk through a door, such as a raised step or threshold.  Trim any bushes or trees on paths to your home.  Check to see if handrails are loose or broken and that both sides of all steps have handrails.  Install guardrails along the edges of any raised decks and porches.  Clear paths of anything that can make you trip, such as tools or rocks.  Have leaves, snow, or ice cleared regularly.  Use sand or salt on paths during winter.  Clean up any spills in your garage right away. This includes grease or oil spills.  What other actions can I take?  Wear shoes that:  Have a low heel. Do not wear high heels.  Have rubber bottoms.  Feel good on your feet and fit well.  Are closed at the toe. Do not wear open-toe sandals.  Use tools that help you move around if needed. These include:  Canes.  Walkers.  Scooters.  Crutches.  Review your medicines with your doctor. Some medicines can make you feel dizzy. This can increase your chance of falling.  Ask your doctor what else you can do to help prevent falls.  Where to find more information  Centers  for Disease Control and Prevention, [facility]: www.cdc.gov  [employer] [facility]: www.verenice.nih.gov  Contact a doctor if:  You are afraid of falling at home.  You feel weak, drowsy, or dizzy at home.  You fall at home.  Summary  There are many simple things that you can do to make your home safe and to help prevent falls.  Ways to make your home safe include removing things that can make you trip and installing grab bars in the bathroom.  Ask for help when making these changes in your home.  This information is not intended to replace advice given to you by your health care provider. Make sure you discuss any questions you have with your health care provider.  Document Revised: 09/19/2022 Document Reviewed: 07/21/2021  [employer] Patient Education © 2023 [employer]  Fat and Cholesterol Restricted Eating Plan  Getting too much fat and cholesterol in your diet may cause health problems. Choosing the right foods helps keep your fat and cholesterol at normal levels. This can keep you from getting certain diseases.  Your doctor may recommend an eating plan that includes:  Total fat: ______% or less of total calories a day. This is ______g of fat a day.  Saturated fat: ______% or less of total calories a day. This is ______g of saturated fat a day.  Cholesterol: less than _________mg a day.  Fiber: ______g a day.  What are tips for following this plan?  General tips  Work with your doctor to lose weight if you need to.  Avoid:  Foods with added sugar.  Fried foods.  Foods with trans fat or partially hydrogenated oils. This includes some margarines and baked goods.  If you drink alcohol:  Limit how much you have to:  0-1 drink a day for women who are not pregnant.  0-2 drinks a day for men.  Know how much alcohol is in a drink. In the [place] one drink equals one 12 oz bottle of beer (355 mL), one 5 oz glass of wine (148 mL), or one 1½ oz glass of hard liquor (44 mL).  Reading food labels  Check food labels for:  Trans  "fats.  Partially hydrogenated oils.  Saturated fat (g) in each serving.  Cholesterol (mg) in each serving.  Fiber (g) in each serving.  Choose foods with healthy fats, such as:  Monounsaturated fats and polyunsaturated fats. These include olive and canola oil, flaxseeds, walnuts, almonds, and seeds.  Omega-3 fats. These are found in certain fish, flaxseed oil, and ground flaxseeds.  Choose grain products that have whole grains. Look for the word \"whole\" as the first word in the ingredient list.  Cooking  Cook foods using low-fat methods. These include baking, boiling, grilling, and broiling.  Eat more home-cooked foods. Eat at restaurants and buffets less often. Eat less fast food.  Avoid cooking using saturated fats, such as butter, cream, palm oil, palm kernel oil, and coconut oil.  Meal planning    At meals, divide your plate into four equal parts:  Fill one-half of your plate with vegetables, green salads, and fruit.  Fill one-fourth of your plate with whole grains.  Fill one-fourth of your plate with low-fat (lean) protein foods.  Eat fish that is high in omega-3 fats at least two times a week. This includes mackerel, tuna, sardines, and salmon.  Eat foods that are high in fiber, such as whole grains, beans, apples, pears, berries, broccoli, carrots, peas, and barley.  What foods should I eat?  Fruits  All fresh, canned (in natural juice), or frozen fruits.  Vegetables  Fresh or frozen vegetables (raw, steamed, roasted, or grilled). Green salads.  Grains  Whole grains, such as whole wheat or whole grain breads, crackers, cereals, and pasta. Unsweetened oatmeal, bulgur, barley, quinoa, or brown rice. Corn or whole wheat flour tortillas.  Meats and other protein foods  Ground beef (85% or leaner), grass-fed beef, or beef trimmed of fat. Skinless chicken or turkey. Ground chicken or turkey. Pork trimmed of fat. All fish and seafood. Egg whites. Dried beans, peas, or lentils. Unsalted nuts or seeds. Unsalted canned " beans. Nut butters without added sugar or oil.  Dairy  Low-fat or nonfat dairy products, such as skim or 1% milk, 2% or reduced-fat cheeses, low-fat and fat-free ricotta or cottage cheese, or plain low-fat and nonfat yogurt.  Fats and oils  Tub margarine without trans fats. Light or reduced-fat mayonnaise and salad dressings. Avocado. Olive, canola, sesame, or safflower oils.  The items listed above may not be a complete list of foods and beverages you can eat. Contact a dietitian for more information.  What foods should I avoid?  Fruits  Canned fruit in heavy syrup. Fruit in cream or butter sauce. Fried fruit.  Vegetables  Vegetables cooked in cheese, cream, or butter sauce. Fried vegetables.  Grains  White bread. White pasta. White rice. Cornbread. Bagels, pastries, and croissants. Crackers and snack foods that contain trans fat and hydrogenated oils.  Meats and other protein foods  Fatty cuts of meat. Ribs, chicken wings, dixon, sausage, bologna, salami, chitterlings, fatback, hot dogs, bratwurst, and packaged lunch meats. Liver and organ meats. Whole eggs and egg yolks. Chicken and turkey with skin. Fried meat.  Dairy  Whole or 2% milk, cream, half-and-half, and cream cheese. Whole milk cheeses. Whole-fat or sweetened yogurt. Full-fat cheeses. Nondairy creamers and whipped toppings. Processed cheese, cheese spreads, and cheese curds.  Fats and oils  Butter, stick margarine, lard, shortening, ghee, or dixon fat. Coconut, palm kernel, and palm oils.  Beverages  Alcohol. Sugar-sweetened drinks such as sodas, lemonade, and fruit drinks.  Sweets and desserts  Corn syrup, sugars, honey, and molasses. Candy. Jam and jelly. Syrup. Sweetened cereals. Cookies, pies, cakes, donuts, muffins, and ice cream.  The items listed above may not be a complete list of foods and beverages you should avoid. Contact a dietitian for more information.  Summary  Choosing the right foods helps keep your fat and cholesterol at normal  levels. This can keep you from getting certain diseases.  At meals, fill one-half of your plate with vegetables, green salads, and fruits.  Eat high fiber foods, like whole grains, beans, apples, pears, berries, carrots, peas, and barley.  Limit added sugar, saturated fats, alcohol, and fried foods.  This information is not intended to replace advice given to you by your health care provider. Make sure you discuss any questions you have with your health care provider.  Document Revised: 04/29/2022 Document Reviewed: 04/29/2022  [employer] Patient Education © 2023 [employer]  Carbohydrate Counting for Diabetes Mellitus, Adult  Carbohydrate counting is a method of keeping track of how many carbohydrates you eat. Eating carbohydrates increases the amount of sugar (glucose) in the blood. Counting how many carbohydrates you eat improves how well you manage your blood glucose. This, in turn, helps you manage your diabetes.  Carbohydrates are measured in grams (g) per serving. It is important to know how many carbohydrates (in grams or by serving size) you can have in each meal. This is different for every person. A dietitian can help you make a meal plan and calculate how many carbohydrates you should have at each meal and snack.  What foods contain carbohydrates?  Carbohydrates are found in the following foods:  Grains, such as breads and cereals.  Dried beans and soy products.  Starchy vegetables, such as potatoes, peas, and corn.  Fruit and fruit juices.  Milk and yogurt.  Sweets and snack foods, such as cake, cookies, candy, chips, and soft drinks.  How do I count carbohydrates in foods?  There are two ways to count carbohydrates in food. You can read food labels or learn standard serving sizes of foods. You can use either of these methods or a combination of both.  Using the Nutrition Facts label  The Nutrition Facts list is included on the labels of almost all packaged foods and beverages in the [place]. It  includes:  The serving size.  Information about nutrients in each serving, including the grams of carbohydrate per serving.  To use the Nutrition Facts, decide how many servings you will have. Then, multiply the number of servings by the number of carbohydrates per serving. The resulting number is the total grams of carbohydrates that you will be having.  Learning the standard serving sizes of foods  When you eat carbohydrate foods that are not packaged or do not include Nutrition Facts on the label, you need to measure the servings in order to count the grams of carbohydrates.  Measure the foods that you will eat with a food scale or measuring cup, if needed.  Decide how many standard-size servings you will eat.  Multiply the number of servings by 15. For foods that contain carbohydrates, one serving equals 15 g of carbohydrates.  For example, if you eat 2 cups or 10 oz (300 g) of strawberries, you will have eaten 2 servings and 30 g of carbohydrates (2 servings x 15 g = 30 g).  For foods that have more than one food mixed, such as soups and casseroles, you must count the carbohydrates in each food that is included.  The following list contains standard serving sizes of common carbohydrate-rich foods. Each of these servings has about 15 g of carbohydrates:  1 slice of bread.  1 six-inch (15 cm) tortilla.  ? cup or 2 oz (53 g) cooked rice or pasta.  ½ cup or 3 oz (85 g) cooked or canned, drained and rinsed beans or lentils.  ½ cup or 3 oz (85 g) starchy vegetable, such as peas, corn, or squash.  ½ cup or 4 oz (120 g) hot cereal.  ½ cup or 3 oz (85 g) boiled or mashed potatoes, or ¼ or 3 oz (85 g) of a large baked potato.  ½ cup or 4 fl oz (118 mL) fruit juice.  1 cup or 8 fl oz (237 mL) milk.  1 small or 4 oz (106 g) apple.  ½ or 2 oz (63 g) of a medium banana.  1 cup or 5 oz (150 g) strawberries.  3 cups or 1 oz (28.3 g) popped popcorn.  What is an example of carbohydrate counting?  To calculate the grams of  carbohydrates in this sample meal, follow the steps shown below.  Sample meal  3 oz (85 g) chicken breast.  ? cup or 4 oz (106 g) brown rice.  ½ cup or 3 oz (85 g) corn.  1 cup or 8 fl oz (237 mL) milk.  1 cup or 5 oz (150 g) strawberries with sugar-free whipped topping.  Carbohydrate calculation  Identify the foods that contain carbohydrates:  Rice.  Corn.  Milk.  Strawberries.  Calculate how many servings you have of each food:  2 servings rice.  1 serving corn.  1 serving milk.  1 serving strawberries.  Multiply each number of servings by 15  servings rice x 15 g = 30 g.  1 serving corn x 15 g = 15 g.  1 serving milk x 15 g = 15 g.  1 serving strawberries x 15 g = 15 g.  Add together all of the amounts to find the total grams of carbohydrates eaten:  30 g + 15 g + 15 g + 15 g = 75 g of carbohydrates total.  What are tips for following this plan?  Shopping  Develop a meal plan and then make a shopping list.  Buy fresh and frozen vegetables, fresh and frozen fruit, dairy, eggs, beans, lentils, and whole grains.  Look at food labels. Choose foods that have more fiber and less sugar.  Avoid processed foods and foods with added sugars.  Meal planning  Aim to have the same number of grams of carbohydrates at each meal and for each snack time.  Plan to have regular, balanced meals and snacks.  Where to find more information  American Diabetes Association: diabetes.org  Centers for Disease Control and Prevention: cdc.gov  Academy of Nutrition and Dietetics: eatright.org  Association of Diabetes Care & Education Specialists: diabeteseducator.org  Summary  Carbohydrate counting is a method of keeping track of how many carbohydrates you eat.  Eating carbohydrates increases the amount of sugar (glucose) in your blood.  Counting how many carbohydrates you eat improves how well you manage your blood glucose. This helps you manage your diabetes.  A dietitian can help you make a meal plan and calculate how many  carbohydrates you should have at each meal and snack.  This information is not intended to replace advice given to you by your health care provider. Make sure you discuss any questions you have with your health care provider.  Document Revised: 07/21/2021 Document Reviewed: 07/21/2021  [employer] Patient Education © 2023 [employer]

## 2024-01-12 ENCOUNTER — HOSPITAL ENCOUNTER (OUTPATIENT)
Dept: MAMMOGRAPHY | Facility: HOSPITAL | Age: 79
Discharge: HOME | End: 2024-01-12
Admitting: PHYSICIAN ASSISTANT
Payer: MEDICARE

## 2024-01-12 DIAGNOSIS — Z12.31 ENCOUNTER FOR SCREENING MAMMOGRAM FOR MALIGNANT NEOPLASM OF BREAST: ICD-10-CM

## 2024-01-12 PROCEDURE — 77067 SCR MAMMO BI INCL CAD: CPT

## 2024-01-12 PROCEDURE — 77063 BREAST TOMOSYNTHESIS BI: CPT

## 2024-01-31 ENCOUNTER — TELEPHONE (OUTPATIENT)
Dept: FAMILY MEDICINE CLINIC | Facility: CLINIC | Age: 79
End: 2024-01-31

## 2024-01-31 NOTE — TELEPHONE ENCOUNTER
Caller: Brandon Lee    Relationship: Emergency Contact    Best call back number: [phone]     What orders are you requesting (i.e. lab or imaging): [facility]DERS PHYSICAL THERAPY FOR LEGS    In what timeframe would the patient need to come in: AS SOON AS POSSIBLE     Where will you receive your lab/imaging services: [facility] [place]

## 2024-02-01 DIAGNOSIS — M17.0 PRIMARY OSTEOARTHRITIS OF BOTH KNEES: Primary | ICD-10-CM

## 2024-02-08 DIAGNOSIS — R26.81 UNSTABLE GAIT: ICD-10-CM

## 2024-02-08 DIAGNOSIS — R53.81 DEBILITY: Primary | ICD-10-CM

## 2024-02-08 DIAGNOSIS — R29.898 WEAKNESS OF BOTH LOWER EXTREMITIES: ICD-10-CM

## 2024-02-08 DIAGNOSIS — E55.9 VITAMIN D DEFICIENCY: Chronic | ICD-10-CM

## 2024-02-08 RX ORDER — ERGOCALCIFEROL 1.25 MG/1
50000 CAPSULE ORAL WEEKLY
Qty: 15 CAPSULE | Refills: 3 | Status: SHIPPED | OUTPATIENT
Start: 2024-02-08

## 2024-02-28 DIAGNOSIS — D63.8 ANEMIA OF CHRONIC DISEASE: ICD-10-CM

## 2024-02-28 DIAGNOSIS — D50.9 IRON DEFICIENCY ANEMIA, UNSPECIFIED IRON DEFICIENCY ANEMIA TYPE: Primary | ICD-10-CM

## 2024-02-28 DIAGNOSIS — E53.8 FOLATE DEFICIENCY: ICD-10-CM

## 2024-02-28 DIAGNOSIS — E53.8 B12 DEFICIENCY: ICD-10-CM

## 2024-02-28 DIAGNOSIS — K90.9 MALABSORPTION OF IRON: ICD-10-CM

## 2024-03-07 ENCOUNTER — OFFICE VISIT (OUTPATIENT)
Dept: ONCOLOGY | Facility: CLINIC | Age: 79
End: 2024-03-07
Payer: MEDICARE

## 2024-03-07 ENCOUNTER — LAB (OUTPATIENT)
Dept: ONCOLOGY | Facility: CLINIC | Age: 79
End: 2024-03-07
Payer: MEDICARE

## 2024-03-07 VITALS
TEMPERATURE: 97.1 F | HEIGHT: 63 IN | RESPIRATION RATE: 18 BRPM | HEART RATE: 90 BPM | DIASTOLIC BLOOD PRESSURE: 59 MMHG | SYSTOLIC BLOOD PRESSURE: 108 MMHG | BODY MASS INDEX: 29.59 KG/M2 | OXYGEN SATURATION: 99 %

## 2024-03-07 DIAGNOSIS — K90.9 MALABSORPTION OF IRON: ICD-10-CM

## 2024-03-07 DIAGNOSIS — E53.8 B12 DEFICIENCY: ICD-10-CM

## 2024-03-07 DIAGNOSIS — D63.8 ANEMIA OF CHRONIC DISEASE: ICD-10-CM

## 2024-03-07 DIAGNOSIS — E53.8 FOLATE DEFICIENCY: ICD-10-CM

## 2024-03-07 DIAGNOSIS — D50.9 IRON DEFICIENCY ANEMIA, UNSPECIFIED IRON DEFICIENCY ANEMIA TYPE: Primary | ICD-10-CM

## 2024-03-07 DIAGNOSIS — N18.4 CKD (CHRONIC KIDNEY DISEASE) STAGE 4, GFR 15-29 ML/MIN: ICD-10-CM

## 2024-03-07 DIAGNOSIS — D50.9 IRON DEFICIENCY ANEMIA, UNSPECIFIED IRON DEFICIENCY ANEMIA TYPE: ICD-10-CM

## 2024-03-07 LAB
BASOPHILS # BLD AUTO: 0.04 10*3/MM3 (ref 0–0.2)
BASOPHILS NFR BLD AUTO: 0.5 % (ref 0–1.5)
DEPRECATED RDW RBC AUTO: 48.5 FL (ref 37–54)
EOSINOPHIL # BLD AUTO: 0.14 10*3/MM3 (ref 0–0.4)
EOSINOPHIL NFR BLD AUTO: 1.8 % (ref 0.3–6.2)
ERYTHROCYTE [DISTWIDTH] IN BLOOD BY AUTOMATED COUNT: 16.9 % (ref 12.3–15.4)
FERRITIN SERPL-MCNC: 874.6 NG/ML (ref 13–150)
HCT VFR BLD AUTO: 40.3 % (ref 34–46.6)
HGB BLD-MCNC: 11.7 G/DL (ref 12–15.9)
IMM GRANULOCYTES # BLD AUTO: 0.03 10*3/MM3 (ref 0–0.05)
IMM GRANULOCYTES NFR BLD AUTO: 0.4 % (ref 0–0.5)
IRON 24H UR-MRATE: 32 MCG/DL (ref 37–145)
IRON SATN MFR SERPL: 14 % (ref 20–50)
LYMPHOCYTES # BLD AUTO: 1.5 10*3/MM3 (ref 0.7–3.1)
LYMPHOCYTES NFR BLD AUTO: 19.3 % (ref 19.6–45.3)
MCH RBC QN AUTO: 23.3 PG (ref 26.6–33)
MCHC RBC AUTO-ENTMCNC: 29 G/DL (ref 31.5–35.7)
MCV RBC AUTO: 80.1 FL (ref 79–97)
MONOCYTES # BLD AUTO: 0.65 10*3/MM3 (ref 0.1–0.9)
MONOCYTES NFR BLD AUTO: 8.4 % (ref 5–12)
NEUTROPHILS NFR BLD AUTO: 5.4 10*3/MM3 (ref 1.7–7)
NEUTROPHILS NFR BLD AUTO: 69.6 % (ref 42.7–76)
NRBC BLD AUTO-RTO: 0 /100 WBC (ref 0–0.2)
PLATELET # BLD AUTO: 214 10*3/MM3 (ref 140–450)
PMV BLD AUTO: 8.9 FL (ref 6–12)
RBC # BLD AUTO: 5.03 10*6/MM3 (ref 3.77–5.28)
TIBC SERPL-MCNC: 235 MCG/DL (ref 298–536)
TRANSFERRIN SERPL-MCNC: 158 MG/DL (ref 200–360)
WBC NRBC COR # BLD AUTO: 7.76 10*3/MM3 (ref 3.4–10.8)

## 2024-03-07 PROCEDURE — 83921 ORGANIC ACID SINGLE QUANT: CPT | Performed by: INTERNAL MEDICINE

## 2024-03-07 PROCEDURE — 82607 VITAMIN B-12: CPT | Performed by: INTERNAL MEDICINE

## 2024-03-07 PROCEDURE — 82728 ASSAY OF FERRITIN: CPT | Performed by: INTERNAL MEDICINE

## 2024-03-07 PROCEDURE — 84466 ASSAY OF TRANSFERRIN: CPT | Performed by: INTERNAL MEDICINE

## 2024-03-07 PROCEDURE — 83540 ASSAY OF IRON: CPT | Performed by: INTERNAL MEDICINE

## 2024-03-07 PROCEDURE — 82746 ASSAY OF FOLIC ACID SERUM: CPT | Performed by: INTERNAL MEDICINE

## 2024-03-07 PROCEDURE — 85025 COMPLETE CBC W/AUTO DIFF WBC: CPT | Performed by: INTERNAL MEDICINE

## 2024-03-07 NOTE — PROGRESS NOTES
Subjective     Date: 3/7/2024    Referring Provider  Dr. Johnson     Chief Complaint   Iron deficiency anemia       Subjective          Britta Lee is a 78 y.o. female who presents today to [facility] for follow up.    HPI:   78 y.o.female with history of hypertension, hyperlipidemia, depression/anxiety, hypothyroidism, coronary artery disease, CKD, congestive heart failure who presents as follow-up for her treatment of anemia.  Previously seen by ERNIE Mohr.    She has struggled with anemia since December 2016 with hemoglobin ranging from 8.6-9.8.  Iron studies at the time consistent with anemia of chronic disease.  Has previously received IV iron infusions, and taken oral iron.  Stopped taking oral ferrous sulfate due to malabsorption.  Last IV iron was received August 2022.    Treatment history:      Interval History 02/01/2023   She was started on oral ferrous sulfate by nephrology.  Currently still taking it without any nausea or constipation.  Denies any new complaints.  Denies any melena, hematochezia, hematemesis.  Per chart review patient presented to the ED on 1/11/2023 for reportedly low hemoglobin level in dialysis, hemoglobin of 6.1 mg/dL, required blood transfusion at that time.    Interval History 02/21/2023   Ms. Lee presents today for follow-up with her .  She had a fall from bed, presented to the ED February 8,  was found to have acute fracture of humeral neck and humeral head.  She reports continued oral ferrous sulfate, denies any GI discomfort with this.    Hemoglobin today is 9.3, hematocrit 31.2 today MCV 89.9.  Labs from previous visit showed vitamin B12 713, methylmalonic acid elevated to 933.  Folate level low/normal at 6.7.    She reports previously taking oral vitamin B tablets, never tried injections.  Denies any fatigue, melena, hematochezia.    Interval History 05/23/2023   Ms. Lee presents today with her . She  reports doing well overall. Continues to take oral folic acid and b12 tablet. Unsure if she is taking oral ferrous sulfate.   Recently placed an AV fistula R arm.   Denies GI bleed.  CBC today with Hgb 13.8, Hct 47.3, normal WBC and platelets. Pt is unsure if she gets epo with Dr. Johnson, her  does not think so.     Interval History 08/23/2023   Ms. Lee presents today accompanied by a friend she reports doing well overall, has been getting dialysis Monday Wednesday Friday.  She denies any bleeds including melena or hematochezia.  She is unsure if she is taking folic acid and vitamin B12 capsules, as her  typically gives her medications.    CBC reviewed today which shows hemoglobin 10.8, hematocrit 34.3 (decreased from June 2023 with normal hemoglobin 12.1).  Normal WBC and platelet count.  Iron studies consistent with anemia of chronic disease    Interval History 12/06/2023   Ms. Lee presents today, accompanied by a friend. She reports fatigue today due to having to wake up for this appt followed by dialysis. Continues to take oral folic acid and B12 capsule. Denies any bleeding    CBC with improvement in H/H 11.2/36.5, normal WBC and Platelets.     Of note, bed bugs were noted on patient at the time of lab draw.     Interval History 03/07/2024   Ms. Lee presents today for follow up, accompanied by her . She is doing well overall. Denies any GIB. Thinks she continues to take oral folic acid and B12, but unsure. CBC today with Hgb 11.7, Hct 40, MCV 80.       Objective     Objective     Allergy:   No Known Allergies     Current Medications:   Current Outpatient Medications   Medication Sig Dispense Refill    amLODIPine (NORVASC) 10 MG tablet Take 1 tablet by mouth Daily. 90 tablet 3    atorvastatin (LIPITOR) 80 MG tablet Take 1 tablet by mouth Every Night. 90 tablet 1    escitalopram (LEXAPRO) 10 MG tablet Take 1 tablet by mouth Daily. 90 tablet 3    ferrous sulfate 325 (65 FE) MG EC tablet  Take 1 tablet by mouth Every 12 (Twelve) Hours.      folic acid (FOLVITE) 1 MG tablet Take 1 tablet by mouth Daily. 30 tablet 3    levothyroxine (SYNTHROID, LEVOTHROID) 25 MCG tablet Take 1 tablet by mouth Daily. 90 tablet 3    metoprolol succinate XL (Toprol XL) 25 MG 24 hr tablet Take 1 tablet by mouth Daily. 90 tablet 1    pantoprazole (PROTONIX) 40 MG EC tablet Take 1 tablet by mouth Daily. 90 tablet 1    vitamin B-12 (CYANOCOBALAMIN) 100 MCG tablet Take 0.5 tablets by mouth Daily.      vitamin D (ERGOCALCIFEROL) 1.25 MG (51840 UT) capsule capsule TAKE ONE Capsule BY MOUTH ONCE WEEKLY 15 capsule 3     No current facility-administered medications for this visit.       Past Medical History:  Past Medical History:   Diagnosis Date    Acquired hypothyroidism 4/22/2021    Anemia     Arthritis     Carpal tunnel syndrome     Coronary artery disease     Diabetes mellitus     Elevated cholesterol     GERD (gastroesophageal reflux disease)     History of pneumonia     History of unsteady gait     OCASSIONALLY    Hypertension     Insomnia     Kidney disease     LENNY (obstructive sleep apnea) 12/1/2020    Osteoarthritis     Renal insufficiency     Sciatica        Past Surgical History:  Past Surgical History:   Procedure Laterality Date    ABDOMINAL SURGERY      APPENDECTOMY      CARDIAC CATHETERIZATION      1 STENT  ---  2000    CARDIAC SURGERY      CAROTID STENT      CARPAL TUNNEL RELEASE Right 10/8/2019    Procedure: CARPAL TUNNEL RELEASE;  Surgeon: Feroz Johnson MD;  Location: [facility];  Service: Orthopedics    CATARACT EXTRACTION      CHOLECYSTECTOMY      COLONOSCOPY      COLONOSCOPY N/A 11/23/2021    Procedure: COLONOSCOPY FOR SCREENING;  Surgeon: Palmira Pate MD;  Location: [facility];  Service: Gastroenterology;  Laterality: N/A;    CORONARY ANGIOPLASTY WITH STENT PLACEMENT      ENDOSCOPY      ENDOSCOPY N/A 3/5/2020    Procedure: ESOPHAGOGASTRODUODENOSCOPY;  Surgeon: Alexandru Bagley MD;   "Location: [facility];  Service: Gastroenterology;  Laterality: N/A;    ENDOSCOPY N/A 11/23/2021    Procedure: ESOPHAGOGASTRODUODENOSCOPY WITH BIOPSY;  Surgeon: Palmira Pate MD;  Location: [facility];  Service: Gastroenterology;  Laterality: N/A;    ENDOSCOPY AND COLONOSCOPY      GALLBLADDER SURGERY      INSERTION HEMODIALYSIS CATHETER N/A 11/14/2022    Procedure: HEMODIALYSIS CATHETER INSERTION;  Surgeon: Ta Blas MD;  Location: [facility];  Service: General;  Laterality: N/A;    JOINT REPLACEMENT Left 05/02/2017    [facility]  DR LEVIN  LEFT TOTAL KNEE    KNEE ARTHROSCOPY W/ MENISCECTOMY Right     LAPAROSCOPIC TUBAL LIGATION      NM ARTHRP KNE CONDYLE&PLATU MEDIAL&LAT COMPARTMENTS Left 5/2/2017    Procedure: TOTAL KNEE ARTHROPLASTY;  Surgeon: Feroz Levin MD;  Location: [facility];  Service: Orthopedics    STERILIZATION      TONSILLECTOMY         Social History:  Social History     Socioeconomic History    Marital status:      Spouse name: natalie    Number of children: 3    Years of education: 12   Tobacco Use    Smoking status: Never    Smokeless tobacco: Never   Vaping Use    Vaping status: Never Used   Substance and Sexual Activity    Alcohol use: Yes     Comment: socially    Drug use: No    Sexual activity: Defer     Birth control/protection: Surgical     Britta Lee  reports that she has never smoked. She has never used smokeless tobacco.      Family History:  Family History   Problem Relation Age of Onset    Arthritis Mother     Diabetes Mother     Cancer Mother     Heart disease Father     Diabetes Daughter     Diabetes Son     Diabetes Maternal Aunt     Heart disease Maternal Grandmother     Breast cancer Neg Hx        Review of Systems:  Review of Systems   All other systems reviewed and are negative.      Vital Signs:   /59   Pulse 90   Temp 97.1 °F (36.2 °C) (Temporal)   Resp 18   Ht 160 cm (62.99\")   SpO2 99%   BMI 29.59 kg/m²      Physical Exam:  Physical " "Exam  Vitals and nursing note reviewed.   Constitutional:       General: She is not in acute distress.     Appearance: Normal appearance. She is not ill-appearing.      Comments: + In a wheelchair   HENT:      Head: Normocephalic and atraumatic.      Nose: Nose normal.      Mouth/Throat:      Mouth: Mucous membranes are moist.      Pharynx: Oropharynx is clear.   Eyes:      Conjunctiva/sclera: Conjunctivae normal.      Pupils: Pupils are equal, round, and reactive to light.   Cardiovascular:      Rate and Rhythm: Normal rate and regular rhythm.      Heart sounds: No murmur heard.  Pulmonary:      Effort: Pulmonary effort is normal. No respiratory distress.      Breath sounds: Normal breath sounds. No wheezing.   Abdominal:      General: Abdomen is flat. Bowel sounds are normal. There is no distension.      Palpations: Abdomen is soft. There is no mass.      Tenderness: There is no abdominal tenderness. There is no guarding.   Musculoskeletal:         General: No swelling. Normal range of motion.      Cervical back: Normal range of motion.   Lymphadenopathy:      Cervical: No cervical adenopathy.   Skin:     General: Skin is warm and dry.      Coloration: Skin is not jaundiced.      Findings: Lesion present. No bruising or rash.      Comments: +R AV fistula    Multiple excoriations on bilateral upper and lower extremities   Neurological:      General: No focal deficit present.      Mental Status: She is alert and oriented to person, place, and time.      Motor: No weakness.      Coordination: Coordination normal.   Psychiatric:         Mood and Affect: Mood normal.         Behavior: Behavior normal.         Judgment: Judgment normal.         PHQ-9 Score  PHQ-9 Total Score: 0     Pain Score  Vitals:    03/07/24 1535   BP: 108/59   Pulse: 90   Resp: 18   Temp: 97.1 °F (36.2 °C)   TempSrc: Temporal   SpO2: 99%   Height: 160 cm (62.99\")   PainSc: 0-No pain           ECOG score: 0             Lab Review  Lab Results "   Component Value Date    WBC 7.76 03/07/2024    HGB 11.7 (L) 03/07/2024    HCT 40.3 03/07/2024    MCV 80.1 03/07/2024    RDW 16.9 (H) 03/07/2024     03/07/2024    NEUTRORELPCT 69.6 03/07/2024    LYMPHORELPCT 19.3 (L) 03/07/2024    MONORELPCT 8.4 03/07/2024    EOSRELPCT 1.8 03/07/2024    BASORELPCT 0.5 03/07/2024    NEUTROABS 5.40 03/07/2024    LYMPHSABS 1.50 03/07/2024       Lab Results   Component Value Date     (L) 09/15/2023    K 3.9 09/15/2023    CO2 29.5 (H) 09/15/2023    CL 89 (L) 09/15/2023    BUN 28 (H) 09/15/2023    CREATININE 5.06 (H) 09/15/2023    EGFRIFNONA 23 (L) 02/21/2022    EGFRIFAFRI 41 (L) 07/30/2018    GLUCOSE 134 (H) 09/15/2023    CALCIUM 9.2 09/15/2023    ALKPHOS 242 (H) 09/15/2023    AST 24 09/15/2023    ALT 6 09/15/2023    BILITOT 0.4 09/15/2023    ALBUMIN 3.5 09/15/2023    PROTEINTOT 7.6 09/15/2023    MG 1.9 01/11/2023    PHOS 3.3 10/17/2022           Endoscopy:   ENDOSCOPY:  11/23/21  ESOPHAGOGASTRODUODENOSCOPY PROCEDURE REPORT     Britta Lee  11/23/2021     GASTROENTEROLOGIST:  Palmira Pate MD      PRE-PROCEDURE DIAGNOSIS:  Iron deficiency anemia, unspecified iron deficiency anemia type [D50.9]  Weight loss, abnormal [R63.4]     POST-PROCEDURE DIAGNOSIS:  1.  Gastritis  2.  Angiectasias     Procedure(s):  ESOPHAGOGASTRODUODENOSCOPY WITH BIOPSY  COLONOSCOPY FOR SCREENING     ANESTHESIA:  Propofol administered by anesthesia.  See anesthesia notes for ASA classification     STAFF:  Circulator: Bre Ny RN; Danelle Mccray RN  Endo Technician: Omari Lewis     FINDINGS:  1.  Normal-appearing esophagus.  Biopsies were taken  2.  Mild erythema in the antrum and stomach.  Biopsies taken for H. pylori.  3.  One angiectasia was found in the second portion of the duodenum.  This was nonbleeding.  Biopsies were taken of the duodenum to rule out celiac disease as a source of her anemia.     OPERATIVE PROCEDURE:  After proper informed consent was  obtained, patient was transferred to the OR/endoscopy suite.  Patient was then placed in left lateral decubitus position. The Olympus 180 series video gastroscope was inserted orally under direct visualization.  Esophagus, stomach, and duodenum were inspected.  The endoscope was passed to the third portion of the duodenum.  Scope was retroflexed for visualization of the cardia and incisuraThe endoscope was then withdrawn. Patient tolerated the procedure well. There were no immediate complications.     ESTIMATED BLOOD LOSS:  None     COMPLICATIONS;  None     RECOMMENDATIONS/ PLAN:  1.  Follow-up biopsy results.  2.  Proceed with colonoscopy.    COLONOSCOPY PROCEDURE NOTE     Britta Lee  11/23/2021     GASTROENTEROLOGIST:  Palmira Pate MD        PRE-PROCEDURE DIAGNOSIS:   Iron deficiency anemia, unspecified iron deficiency anemia type [D50.9]  Weight loss, abnormal [R63.4]     POST-PROCEDURE DIAGNOSIS:  1.  Colon polyps  2.  Diverticulosis  3.  Internal hemorrhoids     PROCEDURE:  COLONOSCOPY with snare polypectomy and cold biopsy polypectomy     ANESTHESIA:  Propofol administered by anesthesia.  See anesthesia notes for ASA classification     STAFF  Circulator: Bre Ny RN; Danelle Mccray RN  Endo Technician: Omari Lewis     Findings:  1.  A 5 mm polyp was found in the ascending colon.  This was removed with cold forceps polypectomy.  2.  A 5 mm polyp was removed from the transverse colon with cold forceps biopsy.  3.  Two 6 mm polyps were found in the descending colon.  Both polyps were removed with cold snare polypectomy.  4.  A 7 mm polyp was removed from the sigmoid colon with cold snare polypectomy.  5.  Diverticulosis involving left colon.  6.  Small internal hemorrhoids.  7.  Normal-appearing terminal ileum.    OPERATIVE PROCEDURE   After proper informed consent was obtained, the patient was taken the operating suite and placed in left lateral decubitus position.  An  Olympus video colonoscope 180 series was inserted in the rectum and advanced to the terminal ileum under direct visualization.  Cecum and terminal ileum were identified by visualization of the appendiceal orifice and ileocecal valve.  The colonoscope was then slowly withdrawn from the cecum to the rectum and passed a second time from rectum to cecum.  The colonoscope was retroflexed in the cecum and rectum. Scope was then withdrawn. Patient tolerated the procedure well. There were no immediate complications. Cecal withdrawal time was 15 minutes.     ESTIMATED BLOOD LOSS  None      COMPLICATIONS  None     RECOMMENDATIONS:  1.  Follow-up pathology.  2.  Repeat colonoscopy in 3 years due to personal history of colon polyps.  3.  Proceed with capsule endoscopy if anemia persists.  Sign 4.  Follow-up in GI clinic in 6 to 8 weeks.  Assessment / Plan         Assessment and Plan   77 year old F with history of ESRD on HD who presents for normocytic anemia.     Anemia  2    iron deficiency anemia  3.   Vitamin B12 deficiency  4.   Folate deficiency  5.   Malabsorption  -Patient with history of anemia required multiple transfusions, most recently 1/12/2023.  Previously required IV iron after intolerance to p.o. iron.  Last IV iron received August 2022.  -Most recent EGD and colonoscopy by Dr. Simmons 11/2021.  At that time no source of bleed was discovered, it was recommended patient follow-up with capsule endoscopy if anemia persist.  -Most recent labs from 2/1/2023 with low/normal folate level, normal vitamin B12 however elevated methylmalonic acid.  -Currently taking 1 mg folic acid to be taken daily and 100 mcg of cyanocobalamin tablet to be taken daily. Today with improvement in H/H. Pending iron studies, folate, B12 studies   -She is unsure if receiving Erythropoietin with nephrology, Dr. Johnson   -Iron studies 3/2024 consistent with anemia of chronic disease.     Discussed possible differential diagnoses, testing,  treatment, recommended non-pharmacological interventions, risks, warning signs to monitor for that would indicate need for follow-up in clinic or ER. If no improvement with these regimens or you have new or worsening symptoms follow-up. Patient verbalizes understanding and agreement with plan of care. Denies further needs or concerns.     Patient was given instructions and counseling regarding her condition and for health maintenance advised.    I spent 30 minutes with Britta Lee today.  In the office today, more than 50% of this time was spent face-to-face with her  in counseling / coordination of care, reviewing her interim medical history and counseling on the current treatment plan.  All questions were answered to her satisfaction.      Meds ordered during this visit  No orders of the defined types were placed in this encounter.      Visit Diagnoses    ICD-10-CM ICD-9-CM   1. Iron deficiency anemia, unspecified iron deficiency anemia type  D50.9 280.9   2. B12 deficiency  E53.8 266.2   3. Folate deficiency  E53.8 266.2   4. Anemia of chronic disease  D63.8 285.29   5. CKD (chronic kidney disease) stage 4, GFR 15-29 ml/min  N18.4 585.4             Follow Up   4 months with repeat CBC, folate, B12, MMA, iron studies, ferritin, STR      This document has been electronically signed by Sakina Bang MD   March 7, 2024 16:21 EST    Dictated Utilizing Dragon Dictation: Part of this note may be an electronic transcription/translation of spoken language to printed text using the Dragon Dictation System.

## 2024-03-07 NOTE — PROGRESS NOTES
Venipuncture Blood Specimen Collection  Venipuncture performed in Left arm by Tu Nazario MA with good hemostasis. Patient tolerated the procedure well without complications.   03/07/24   Kerry Fernandez MA    I

## 2024-03-08 LAB
FOLATE SERPL-MCNC: 10.9 NG/ML (ref 4.78–24.2)
VIT B12 BLD-MCNC: 1187 PG/ML (ref 211–946)

## 2024-03-14 PROCEDURE — 82306 VITAMIN D 25 HYDROXY: CPT | Performed by: PHYSICIAN ASSISTANT

## 2024-03-14 PROCEDURE — 83036 HEMOGLOBIN GLYCOSYLATED A1C: CPT | Performed by: PHYSICIAN ASSISTANT

## 2024-03-14 PROCEDURE — 80053 COMPREHEN METABOLIC PANEL: CPT | Performed by: PHYSICIAN ASSISTANT

## 2024-03-14 PROCEDURE — 80061 LIPID PANEL: CPT | Performed by: PHYSICIAN ASSISTANT

## 2024-03-20 LAB — METHYLMALONATE SERPL-SCNC: 104 NMOL/L (ref 0–378)

## 2024-03-21 ENCOUNTER — OFFICE VISIT (OUTPATIENT)
Dept: FAMILY MEDICINE CLINIC | Facility: CLINIC | Age: 79
End: 2024-03-21
Payer: MEDICARE

## 2024-03-21 VITALS
OXYGEN SATURATION: 97 % | HEART RATE: 66 BPM | SYSTOLIC BLOOD PRESSURE: 122 MMHG | HEIGHT: 63 IN | BODY MASS INDEX: 29.59 KG/M2 | DIASTOLIC BLOOD PRESSURE: 78 MMHG | WEIGHT: 167 LBS

## 2024-03-21 DIAGNOSIS — K21.9 GASTROESOPHAGEAL REFLUX DISEASE WITHOUT ESOPHAGITIS: Chronic | ICD-10-CM

## 2024-03-21 DIAGNOSIS — E78.2 MIXED HYPERLIPIDEMIA: Chronic | ICD-10-CM

## 2024-03-21 DIAGNOSIS — E03.9 ACQUIRED HYPOTHYROIDISM: Primary | Chronic | ICD-10-CM

## 2024-03-21 DIAGNOSIS — D50.8 OTHER IRON DEFICIENCY ANEMIA: Chronic | ICD-10-CM

## 2024-03-21 DIAGNOSIS — I10 ESSENTIAL HYPERTENSION: Chronic | ICD-10-CM

## 2024-03-21 DIAGNOSIS — E55.9 VITAMIN D DEFICIENCY: Chronic | ICD-10-CM

## 2024-03-21 DIAGNOSIS — L98.9 SKIN LESION OF FACE: ICD-10-CM

## 2024-03-21 PROCEDURE — 1160F RVW MEDS BY RX/DR IN RCRD: CPT | Performed by: PHYSICIAN ASSISTANT

## 2024-03-21 PROCEDURE — 3074F SYST BP LT 130 MM HG: CPT | Performed by: PHYSICIAN ASSISTANT

## 2024-03-21 PROCEDURE — 99214 OFFICE O/P EST MOD 30 MIN: CPT | Performed by: PHYSICIAN ASSISTANT

## 2024-03-21 PROCEDURE — 1159F MED LIST DOCD IN RCRD: CPT | Performed by: PHYSICIAN ASSISTANT

## 2024-03-21 PROCEDURE — 3078F DIAST BP <80 MM HG: CPT | Performed by: PHYSICIAN ASSISTANT

## 2024-03-21 RX ORDER — ATORVASTATIN CALCIUM 80 MG/1
80 TABLET, FILM COATED ORAL NIGHTLY
Qty: 90 TABLET | Refills: 1 | Status: SHIPPED | OUTPATIENT
Start: 2024-03-21

## 2024-03-21 RX ORDER — PANTOPRAZOLE SODIUM 40 MG/1
40 TABLET, DELAYED RELEASE ORAL DAILY
Qty: 90 TABLET | Refills: 1 | Status: SHIPPED | OUTPATIENT
Start: 2024-03-21

## 2024-03-21 RX ORDER — LANOLIN ALCOHOL/MO/W.PET/CERES
1 CREAM (GRAM) TOPICAL EVERY 12 HOURS SCHEDULED
Qty: 60 TABLET | Refills: 5 | Status: SHIPPED | OUTPATIENT
Start: 2024-03-21

## 2024-03-21 RX ORDER — LEVOTHYROXINE SODIUM 0.03 MG/1
25 TABLET ORAL DAILY
Qty: 90 TABLET | Refills: 3 | Status: SHIPPED | OUTPATIENT
Start: 2024-03-21

## 2024-03-21 RX ORDER — METOPROLOL SUCCINATE 25 MG/1
25 TABLET, EXTENDED RELEASE ORAL DAILY
Qty: 90 TABLET | Refills: 1 | Status: SHIPPED | OUTPATIENT
Start: 2024-03-21

## 2024-03-21 NOTE — PATIENT INSTRUCTIONS
"Fat and Cholesterol Restricted Eating Plan  Getting too much fat and cholesterol in your diet may cause health problems. Choosing the right foods helps keep your fat and cholesterol at normal levels. This can keep you from getting certain diseases.  Your doctor may recommend an eating plan that includes:  Total fat: ______% or less of total calories a day. This is ______g of fat a day.  Saturated fat: ______% or less of total calories a day. This is ______g of saturated fat a day.  Cholesterol: less than _________mg a day.  Fiber: ______g a day.  What are tips for following this plan?  General tips  Work with your doctor to lose weight if you need to.  Avoid:  Foods with added sugar.  Fried foods.  Foods with trans fat or partially hydrogenated oils. This includes some margarines and baked goods.  If you drink alcohol:  Limit how much you have to:  0-1 drink a day for women who are not pregnant.  0-2 drinks a day for men.  Know how much alcohol is in a drink. In the [place] one drink equals one 12 oz bottle of beer (355 mL), one 5 oz glass of wine (148 mL), or one 1½ oz glass of hard liquor (44 mL).  Reading food labels  Check food labels for:  Trans fats.  Partially hydrogenated oils.  Saturated fat (g) in each serving.  Cholesterol (mg) in each serving.  Fiber (g) in each serving.  Choose foods with healthy fats, such as:  Monounsaturated fats and polyunsaturated fats. These include olive and canola oil, flaxseeds, walnuts, almonds, and seeds.  Omega-3 fats. These are found in certain fish, flaxseed oil, and ground flaxseeds.  Choose grain products that have whole grains. Look for the word \"whole\" as the first word in the ingredient list.  Cooking  Cook foods using low-fat methods. These include baking, boiling, grilling, and broiling.  Eat more home-cooked foods. Eat at restaurants and buffets less often. Eat less fast food.  Avoid cooking using saturated fats, such as butter, cream, palm oil, palm kernel oil, and " coconut oil.  Meal planning    At meals, divide your plate into four equal parts:  Fill one-half of your plate with vegetables, green salads, and fruit.  Fill one-fourth of your plate with whole grains.  Fill one-fourth of your plate with low-fat (lean) protein foods.  Eat fish that is high in omega-3 fats at least two times a week. This includes mackerel, tuna, sardines, and salmon.  Eat foods that are high in fiber, such as whole grains, beans, apples, pears, berries, broccoli, carrots, peas, and barley.  What foods should I eat?  Fruits  All fresh, canned (in natural juice), or frozen fruits.  Vegetables  Fresh or frozen vegetables (raw, steamed, roasted, or grilled). Green salads.  Grains  Whole grains, such as whole wheat or whole grain breads, crackers, cereals, and pasta. Unsweetened oatmeal, bulgur, barley, quinoa, or brown rice. Corn or whole wheat flour tortillas.  Meats and other protein foods  Ground beef (85% or leaner), grass-fed beef, or beef trimmed of fat. Skinless chicken or turkey. Ground chicken or turkey. Pork trimmed of fat. All fish and seafood. Egg whites. Dried beans, peas, or lentils. Unsalted nuts or seeds. Unsalted canned beans. Nut butters without added sugar or oil.  Dairy  Low-fat or nonfat dairy products, such as skim or 1% milk, 2% or reduced-fat cheeses, low-fat and fat-free ricotta or cottage cheese, or plain low-fat and nonfat yogurt.  Fats and oils  Tub margarine without trans fats. Light or reduced-fat mayonnaise and salad dressings. Avocado. Olive, canola, sesame, or safflower oils.  The items listed above may not be a complete list of foods and beverages you can eat. Contact a dietitian for more information.  What foods should I avoid?  Fruits  Canned fruit in heavy syrup. Fruit in cream or butter sauce. Fried fruit.  Vegetables  Vegetables cooked in cheese, cream, or butter sauce. Fried vegetables.  Grains  White bread. White pasta. White rice. Cornbread. Bagels, pastries,  and croissants. Crackers and snack foods that contain trans fat and hydrogenated oils.  Meats and other protein foods  Fatty cuts of meat. Ribs, chicken wings, dixon, sausage, bologna, salami, chitterlings, fatback, hot dogs, bratwurst, and packaged lunch meats. Liver and organ meats. Whole eggs and egg yolks. Chicken and turkey with skin. Fried meat.  Dairy  Whole or 2% milk, cream, half-and-half, and cream cheese. Whole milk cheeses. Whole-fat or sweetened yogurt. Full-fat cheeses. Nondairy creamers and whipped toppings. Processed cheese, cheese spreads, and cheese curds.  Fats and oils  Butter, stick margarine, lard, shortening, ghee, or dixon fat. Coconut, palm kernel, and palm oils.  Beverages  Alcohol. Sugar-sweetened drinks such as sodas, lemonade, and fruit drinks.  Sweets and desserts  Corn syrup, sugars, honey, and molasses. Candy. Jam and jelly. Syrup. Sweetened cereals. Cookies, pies, cakes, donuts, muffins, and ice cream.  The items listed above may not be a complete list of foods and beverages you should avoid. Contact a dietitian for more information.  Summary  Choosing the right foods helps keep your fat and cholesterol at normal levels. This can keep you from getting certain diseases.  At meals, fill one-half of your plate with vegetables, green salads, and fruits.  Eat high fiber foods, like whole grains, beans, apples, pears, berries, carrots, peas, and barley.  Limit added sugar, saturated fats, alcohol, and fried foods.  This information is not intended to replace advice given to you by your health care provider. Make sure you discuss any questions you have with your health care provider.  Document Revised: 04/29/2022 Document Reviewed: 04/29/2022  Elsevier Patient Education © 2023 [employer]

## 2024-03-27 ENCOUNTER — OFFICE VISIT (OUTPATIENT)
Dept: CARDIOLOGY | Facility: CLINIC | Age: 79
End: 2024-03-27
Payer: MEDICARE

## 2024-03-27 VITALS
SYSTOLIC BLOOD PRESSURE: 118 MMHG | HEART RATE: 72 BPM | DIASTOLIC BLOOD PRESSURE: 60 MMHG | BODY MASS INDEX: 30.54 KG/M2 | HEIGHT: 62 IN | OXYGEN SATURATION: 98 %

## 2024-03-27 DIAGNOSIS — I50.32 CHRONIC HEART FAILURE WITH PRESERVED EJECTION FRACTION (HFPEF): ICD-10-CM

## 2024-03-27 DIAGNOSIS — I10 ESSENTIAL HYPERTENSION: ICD-10-CM

## 2024-03-27 DIAGNOSIS — I25.10 ASCVD (ARTERIOSCLEROTIC CARDIOVASCULAR DISEASE): Primary | ICD-10-CM

## 2024-03-27 DIAGNOSIS — E78.5 DYSLIPIDEMIA: ICD-10-CM

## 2024-03-27 NOTE — PROGRESS NOTES
[facility]             [facility] ERNIE Holland Susan Marie, PA  Britta Lee  1945 03/27/2024    Patient Active Problem List   Diagnosis    Type 2 diabetes mellitus, uncontrolled, with neuropathy    Essential hypertension    ASCVD (arteriosclerotic cardiovascular disease)    Bilateral carpal tunnel syndrome    Diabetic retinopathy associated with type 2 diabetes mellitus    Dyslipidemia    Sciatic neuropathy    DDD (degenerative disc disease), lumbar    Primary osteoarthritis of left knee    Status post total left knee replacement    Type 2 diabetes mellitus with chronic kidney disease, with long-term current use of insulin    Hyperparathyroidism due to renal insufficiency    Venous insufficiency (chronic) (peripheral)    Class 1 obesity due to excess calories with serious comorbidity and body mass index (BMI) of 31.0 to 31.9 in adult    PVD (peripheral vascular disease) with claudication    LVH (left ventricular hypertrophy)    Chronic heart failure with preserved ejection fraction (HFpEF)    LENNY (obstructive sleep apnea)    Stage 5 chronic kidney disease on chronic dialysis    Iron deficiency anemia    Malabsorption of iron    Acquired hypothyroidism    Hiatal hernia    Weight loss, abnormal    Osteopenia of neck of left femur    Cirrhosis    Severe pulmonary hypertension    Mixed hyperlipidemia    Gastroesophageal reflux disease without esophagitis    Vitamin D deficiency       DeaLexie Andersen PA:    Subjective     Chief Complaint   Patient presents with    Follow-up     Routine    Med Management     Pt didn't bring a list, but states no changes.        HPI:     This is a 78 y.o. female with known past medical history of HFpEF, severe pulmonary hypertension, ASCVD status post PCI in 2000 at [facility] by Dr. Hussein, cirrhosis, iron deficiency anemia, diabetes mellitus type 2, chronic kidney disease on hemodialysis and obstructive sleep  apnea.      Britta Lee presents today for routine cardiology follow up.  Patient states has been doing well since her last visit.  Denies any chest pain or shortness of breath.  Blood pressure well-controlled.  Had recent lab work which showed stable chronic kidney disease, blood count and lipid panel.    Diagnostic Testing  Echocardiogram 11/2020: EF greater than 70% with concentric hypertrophy, pseudonormalization and mild mitral valve regurgitation  Echocardiogram 10/2022: EF greater than 70% with mild concentric hypertrophy  Echocardiogram 11/2022: EF greater than 70% with moderate tricuspid valve regurgitation and severe pulmonary hypertension     All other systems were reviewed and were negative.    Patient Active Problem List   Diagnosis    Type 2 diabetes mellitus, uncontrolled, with neuropathy    Essential hypertension    ASCVD (arteriosclerotic cardiovascular disease)    Bilateral carpal tunnel syndrome    Diabetic retinopathy associated with type 2 diabetes mellitus    Dyslipidemia    Sciatic neuropathy    DDD (degenerative disc disease), lumbar    Primary osteoarthritis of left knee    Status post total left knee replacement    Type 2 diabetes mellitus with chronic kidney disease, with long-term current use of insulin    Hyperparathyroidism due to renal insufficiency    Venous insufficiency (chronic) (peripheral)    Class 1 obesity due to excess calories with serious comorbidity and body mass index (BMI) of 31.0 to 31.9 in adult    PVD (peripheral vascular disease) with claudication    LVH (left ventricular hypertrophy)    Chronic heart failure with preserved ejection fraction (HFpEF)    LENNY (obstructive sleep apnea)    Stage 5 chronic kidney disease on chronic dialysis    Iron deficiency anemia    Malabsorption of iron    Acquired hypothyroidism    Hiatal hernia    Weight loss, abnormal    Osteopenia of neck of left femur    Cirrhosis    Severe pulmonary hypertension    Mixed hyperlipidemia     Gastroesophageal reflux disease without esophagitis    Vitamin D deficiency       family history includes Arthritis in her mother; Cancer in her mother; Diabetes in her daughter, maternal aunt, mother, and son; Heart disease in her father and maternal grandmother.     reports that she has never smoked. She has been exposed to tobacco smoke. She has never used smokeless tobacco. She reports current alcohol use. She reports that she does not use drugs.    No Known Allergies      Current Outpatient Medications:     amLODIPine (NORVASC) 10 MG tablet, Take 1 tablet by mouth Daily., Disp: 90 tablet, Rfl: 3    atorvastatin (LIPITOR) 80 MG tablet, Take 1 tablet by mouth Every Night., Disp: 90 tablet, Rfl: 1    escitalopram (LEXAPRO) 10 MG tablet, Take 1 tablet by mouth Daily., Disp: 90 tablet, Rfl: 3    ferrous sulfate 325 (65 FE) MG EC tablet, Take 1 tablet by mouth Every 12 (Twelve) Hours., Disp: 60 tablet, Rfl: 5    levothyroxine (SYNTHROID, LEVOTHROID) 25 MCG tablet, Take 1 tablet by mouth Daily., Disp: 90 tablet, Rfl: 3    metoprolol succinate XL (Toprol XL) 25 MG 24 hr tablet, Take 1 tablet by mouth Daily., Disp: 90 tablet, Rfl: 1    pantoprazole (PROTONIX) 40 MG EC tablet, Take 1 tablet by mouth Daily., Disp: 90 tablet, Rfl: 1    vitamin D (ERGOCALCIFEROL) 1.25 MG (00347 UT) capsule capsule, TAKE ONE Capsule BY MOUTH ONCE WEEKLY, Disp: 15 capsule, Rfl: 3      Physical Exam:  I have reviewed the patient's current vital signs as documented in the patient's EMR.   Vitals:    03/27/24 0837   BP: 118/60   Pulse: 72   SpO2: 98%     Body mass index is 30.54 kg/m².       03/27/24  0837   Weight: (wheelchair)      Physical Exam  Constitutional:       General: She is not in acute distress.     Appearance: Normal appearance. She is well-developed and normal weight.   HENT:      Head: Normocephalic and atraumatic.   Eyes:      General: Lids are normal.      Conjunctiva/sclera: Conjunctivae normal.      Pupils: Pupils are equal,  round, and reactive to light.   Neck:      Vascular: No carotid bruit or JVD.   Cardiovascular:      Rate and Rhythm: Normal rate and regular rhythm.      Pulses: Normal pulses.      Heart sounds: Normal heart sounds, S1 normal and S2 normal. No murmur heard.  Pulmonary:      Effort: Pulmonary effort is normal. No respiratory distress.      Breath sounds: Normal breath sounds. No wheezing.   Abdominal:      General: Bowel sounds are normal. There is no distension.      Palpations: Abdomen is soft. There is no hepatomegaly or splenomegaly.      Tenderness: There is no abdominal tenderness.   Musculoskeletal:         General: No swelling. Normal range of motion.      Cervical back: Normal range of motion and neck supple.      Right lower leg: No edema.      Left lower leg: No edema.   Skin:     General: Skin is warm and dry.      Coloration: Skin is not jaundiced.      Findings: No rash.   Neurological:      General: No focal deficit present.      Mental Status: She is alert and oriented to person, place, and time. Mental status is at baseline.   Psychiatric:         Mood and Affect: Mood normal.         Speech: Speech normal.         Behavior: Behavior normal.         Thought Content: Thought content normal.         Judgment: Judgment normal.            DATA REVIEWED:     TTE/MANI:  Results for orders placed during the hospital encounter of 11/02/22    Adult Transthoracic Echo Complete w/ Color, Spectral and Contrast if necessary per protocol    Interpretation Summary    Left ventricular systolic function is hyperdynamic (EF > 70%).    Left ventricular diastolic function is consistent with (grade II w/high LAP) pseudonormalization.    The left atrial cavity is mildly dilated.    The aortic valve exhibits sclerosis with no evidence of stenosis or regurgitation.    Mild mitral valve regurgitation is present.    Moderate tricuspid valve regurgitation is present.    Severe pulmonary hypertension is present.  Estimated RV  systolic pressure is > 55 mmHg.    There is no evidence of pericardial effusion.      Laboratory evaluations:    Lab Results   Component Value Date    GLUCOSE 140 (H) 03/14/2024    BUN 22 03/14/2024    CREATININE 3.50 (H) 03/14/2024    EGFRIFNONA 23 (L) 02/21/2022    EGFRIFAFRI 41 (L) 07/30/2018    BCR 6.3 (L) 03/14/2024    K 3.6 03/14/2024    CO2 28.0 03/14/2024    CALCIUM 8.8 03/14/2024    PROTENTOTREF 7.7 07/30/2018    ALBUMIN 3.2 (L) 03/14/2024    LABIL2 1.1 (L) 07/30/2018    AST 31 03/14/2024    ALT 19 03/14/2024     Lab Results   Component Value Date    WBC 7.76 03/07/2024    HGB 11.7 (L) 03/07/2024    HCT 40.3 03/07/2024    MCV 80.1 03/07/2024     03/07/2024     Lab Results   Component Value Date    CHOL 88 03/14/2024    CHLPL 128 06/25/2018    TRIG 60 03/14/2024    HDL 34 (L) 03/14/2024    LDL 40 03/14/2024     Lab Results   Component Value Date    TSH 4.640 (H) 01/17/2023     Lab Results   Component Value Date    HGBA1C 6.30 (H) 03/14/2024     Lab Results   Component Value Date    ALT 19 03/14/2024     Lab Results   Component Value Date    HGBA1C 6.30 (H) 03/14/2024    HGBA1C 6.60 (H) 06/15/2023    HGBA1C 5.00 01/17/2023     Lab Results   Component Value Date    MICROALBUR 112.1 06/15/2023    CREATININE 3.50 (H) 03/14/2024     Lab Results   Component Value Date    IRON 32 (L) 03/07/2024    TIBC 235 (L) 03/07/2024    FERRITIN 874.60 (H) 03/07/2024     Lab Results   Component Value Date    INR 1.26 (H) 01/11/2023    INR 1.29 (H) 12/03/2022    INR 1.36 (H) 11/02/2022    PROTIME 16.1 (H) 01/11/2023    PROTIME 16.4 (H) 12/03/2022    PROTIME 17.1 (H) 11/02/2022        Lab Results   Component Value Date    ABSOLUTELUNG 32 06/13/2022    ABSOLUTELUNG 27 04/25/2022    ABSOLUTELUNG 22 03/28/2022       ECG 12 Lead    Date/Time: 3/27/2024 9:10 AM  Performed by: Priscila Holland APRN    Authorized by: Priscila Holland APRN  Comparison: compared with previous ECG   Rhythm: sinus rhythm  Ectopy:  atrial premature contractions  Other findings: non-specific ST-T wave changes    Clinical impression: non-specific ECG         --------------------------------------------------------------------------------------------------------------------------    ASSESSMENT/PLAN:      Diagnosis Plan   1. ASCVD (arteriosclerotic cardiovascular disease)        2. Chronic heart failure with preserved ejection fraction (HFpEF)        3. Essential hypertension        4. Dyslipidemia            ASCVD  Dyslipidemia  Denies any chest pain.  Continue medical management with Lipitor and metoprolol.  Not on aspirin secondary to anemia history per patient report.  Recent lab work showed LDL at goal.  LFT stable.  Continue statin.    Essential hypertension  Blood pressure well-controlled.  Recent lab work shows stable chronic kidney disease.    4.  Chronic HFpEF  Appears compensated.  Continue metoprolol.      This document has been @Electronically signed by ERNIE Olsen, 03/27/24, 8:26 AM EDT.       Dictated Utilizing Dragon Dictation: Part of this note may be an electronic transcription/translation of spoken language to printed text using the Dragon Dictation System.    Follow-up appointment and medication changes provided in hand delivered After Visit Summary as well as reviewed in the room.

## 2024-04-03 ENCOUNTER — APPOINTMENT (OUTPATIENT)
Dept: GENERAL RADIOLOGY | Facility: HOSPITAL | Age: 79
End: 2024-04-03
Payer: MEDICARE

## 2024-04-03 ENCOUNTER — HOSPITAL ENCOUNTER (EMERGENCY)
Facility: HOSPITAL | Age: 79
Discharge: HOME | End: 2024-04-03
Attending: STUDENT IN AN ORGANIZED HEALTH CARE EDUCATION/TRAINING PROGRAM | Admitting: STUDENT IN AN ORGANIZED HEALTH CARE EDUCATION/TRAINING PROGRAM
Payer: MEDICARE

## 2024-04-03 VITALS
HEIGHT: 60 IN | OXYGEN SATURATION: 95 % | WEIGHT: 167 LBS | HEART RATE: 73 BPM | TEMPERATURE: 97.9 F | BODY MASS INDEX: 32.79 KG/M2 | SYSTOLIC BLOOD PRESSURE: 121 MMHG | RESPIRATION RATE: 20 BRPM | DIASTOLIC BLOOD PRESSURE: 63 MMHG

## 2024-04-03 DIAGNOSIS — J06.9 ACUTE URI: Primary | ICD-10-CM

## 2024-04-03 LAB
ALBUMIN SERPL-MCNC: 3.1 G/DL (ref 3.5–5.2)
ALBUMIN/GLOB SERPL: 0.6 G/DL
ALP SERPL-CCNC: 399 U/L (ref 39–117)
ALT SERPL W P-5'-P-CCNC: 16 U/L (ref 1–33)
ANION GAP SERPL CALCULATED.3IONS-SCNC: 15.4 MMOL/L (ref 5–15)
AST SERPL-CCNC: 30 U/L (ref 1–32)
B PARAPERT DNA SPEC QL NAA+PROBE: NOT DETECTED
B PERT DNA SPEC QL NAA+PROBE: NOT DETECTED
BASOPHILS # BLD AUTO: 0.03 10*3/MM3 (ref 0–0.2)
BASOPHILS NFR BLD AUTO: 0.4 % (ref 0–1.5)
BILIRUB SERPL-MCNC: 0.6 MG/DL (ref 0–1.2)
BUN SERPL-MCNC: 20 MG/DL (ref 8–23)
BUN/CREAT SERPL: 6 (ref 7–25)
C PNEUM DNA NPH QL NAA+NON-PROBE: NOT DETECTED
CALCIUM SPEC-SCNC: 8.9 MG/DL (ref 8.6–10.5)
CHLORIDE SERPL-SCNC: 91 MMOL/L (ref 98–107)
CO2 SERPL-SCNC: 25.6 MMOL/L (ref 22–29)
CREAT SERPL-MCNC: 3.33 MG/DL (ref 0.57–1)
CRP SERPL-MCNC: 9.16 MG/DL (ref 0–0.5)
DEPRECATED RDW RBC AUTO: 47.4 FL (ref 37–54)
EGFRCR SERPLBLD CKD-EPI 2021: 13.5 ML/MIN/1.73
EOSINOPHIL # BLD AUTO: 0.11 10*3/MM3 (ref 0–0.4)
EOSINOPHIL NFR BLD AUTO: 1.5 % (ref 0.3–6.2)
ERYTHROCYTE [DISTWIDTH] IN BLOOD BY AUTOMATED COUNT: 18 % (ref 12.3–15.4)
FLUAV SUBTYP SPEC NAA+PROBE: NOT DETECTED
FLUBV RNA ISLT QL NAA+PROBE: NOT DETECTED
GEN 5 2HR TROPONIN T REFLEX: 96 NG/L
GLOBULIN UR ELPH-MCNC: 5 GM/DL
GLUCOSE SERPL-MCNC: 114 MG/DL (ref 65–99)
HADV DNA SPEC NAA+PROBE: NOT DETECTED
HCOV 229E RNA SPEC QL NAA+PROBE: NOT DETECTED
HCOV HKU1 RNA SPEC QL NAA+PROBE: NOT DETECTED
HCOV NL63 RNA SPEC QL NAA+PROBE: NOT DETECTED
HCOV OC43 RNA SPEC QL NAA+PROBE: NOT DETECTED
HCT VFR BLD AUTO: 37.9 % (ref 34–46.6)
HGB BLD-MCNC: 11.4 G/DL (ref 12–15.9)
HMPV RNA NPH QL NAA+NON-PROBE: NOT DETECTED
HOLD SPECIMEN: NORMAL
HOLD SPECIMEN: NORMAL
HPIV1 RNA ISLT QL NAA+PROBE: NOT DETECTED
HPIV2 RNA SPEC QL NAA+PROBE: NOT DETECTED
HPIV3 RNA NPH QL NAA+PROBE: NOT DETECTED
HPIV4 P GENE NPH QL NAA+PROBE: NOT DETECTED
IMM GRANULOCYTES # BLD AUTO: 0.02 10*3/MM3 (ref 0–0.05)
IMM GRANULOCYTES NFR BLD AUTO: 0.3 % (ref 0–0.5)
LYMPHOCYTES # BLD AUTO: 1.23 10*3/MM3 (ref 0.7–3.1)
LYMPHOCYTES NFR BLD AUTO: 16.8 % (ref 19.6–45.3)
M PNEUMO IGG SER IA-ACNC: NOT DETECTED
MAGNESIUM SERPL-MCNC: 1.9 MG/DL (ref 1.6–2.4)
MCH RBC QN AUTO: 22.1 PG (ref 26.6–33)
MCHC RBC AUTO-ENTMCNC: 30.1 G/DL (ref 31.5–35.7)
MCV RBC AUTO: 73.4 FL (ref 79–97)
MONOCYTES # BLD AUTO: 0.55 10*3/MM3 (ref 0.1–0.9)
MONOCYTES NFR BLD AUTO: 7.5 % (ref 5–12)
NEUTROPHILS NFR BLD AUTO: 5.39 10*3/MM3 (ref 1.7–7)
NEUTROPHILS NFR BLD AUTO: 73.5 % (ref 42.7–76)
NRBC BLD AUTO-RTO: 0 /100 WBC (ref 0–0.2)
NT-PROBNP SERPL-MCNC: ABNORMAL PG/ML (ref 0–1800)
PLATELET # BLD AUTO: 198 10*3/MM3 (ref 140–450)
PMV BLD AUTO: 8.3 FL (ref 6–12)
POTASSIUM SERPL-SCNC: 3.4 MMOL/L (ref 3.5–5.2)
PROCALCITONIN SERPL-MCNC: 0.96 NG/ML (ref 0–0.25)
PROT SERPL-MCNC: 8.1 G/DL (ref 6–8.5)
RBC # BLD AUTO: 5.16 10*6/MM3 (ref 3.77–5.28)
RHINOVIRUS RNA SPEC NAA+PROBE: NOT DETECTED
RSV RNA NPH QL NAA+NON-PROBE: NOT DETECTED
SARS-COV-2 RNA NPH QL NAA+NON-PROBE: NOT DETECTED
SODIUM SERPL-SCNC: 132 MMOL/L (ref 136–145)
TROPONIN T DELTA: -2 NG/L
TROPONIN T SERPL HS-MCNC: 98 NG/L
WBC NRBC COR # BLD AUTO: 7.33 10*3/MM3 (ref 3.4–10.8)
WHOLE BLOOD HOLD COAG: NORMAL
WHOLE BLOOD HOLD SPECIMEN: NORMAL

## 2024-04-03 PROCEDURE — 85025 COMPLETE CBC W/AUTO DIFF WBC: CPT | Performed by: STUDENT IN AN ORGANIZED HEALTH CARE EDUCATION/TRAINING PROGRAM

## 2024-04-03 PROCEDURE — 36415 COLL VENOUS BLD VENIPUNCTURE: CPT | Performed by: STUDENT IN AN ORGANIZED HEALTH CARE EDUCATION/TRAINING PROGRAM

## 2024-04-03 PROCEDURE — 83735 ASSAY OF MAGNESIUM: CPT | Performed by: PHYSICIAN ASSISTANT

## 2024-04-03 PROCEDURE — 86140 C-REACTIVE PROTEIN: CPT | Performed by: PHYSICIAN ASSISTANT

## 2024-04-03 PROCEDURE — 93005 ELECTROCARDIOGRAM TRACING: CPT | Performed by: STUDENT IN AN ORGANIZED HEALTH CARE EDUCATION/TRAINING PROGRAM

## 2024-04-03 PROCEDURE — 84484 ASSAY OF TROPONIN QUANT: CPT | Performed by: STUDENT IN AN ORGANIZED HEALTH CARE EDUCATION/TRAINING PROGRAM

## 2024-04-03 PROCEDURE — 93010 ELECTROCARDIOGRAM REPORT: CPT | Performed by: SPECIALIST

## 2024-04-03 PROCEDURE — 71046 X-RAY EXAM CHEST 2 VIEWS: CPT | Performed by: RADIOLOGY

## 2024-04-03 PROCEDURE — 71046 X-RAY EXAM CHEST 2 VIEWS: CPT

## 2024-04-03 PROCEDURE — 80053 COMPREHEN METABOLIC PANEL: CPT | Performed by: STUDENT IN AN ORGANIZED HEALTH CARE EDUCATION/TRAINING PROGRAM

## 2024-04-03 PROCEDURE — 99284 EMERGENCY DEPT VISIT MOD MDM: CPT

## 2024-04-03 PROCEDURE — 83880 ASSAY OF NATRIURETIC PEPTIDE: CPT | Performed by: STUDENT IN AN ORGANIZED HEALTH CARE EDUCATION/TRAINING PROGRAM

## 2024-04-03 PROCEDURE — 84145 PROCALCITONIN (PCT): CPT | Performed by: PHYSICIAN ASSISTANT

## 2024-04-03 PROCEDURE — 0202U NFCT DS 22 TRGT SARS-COV-2: CPT | Performed by: PHYSICIAN ASSISTANT

## 2024-04-03 RX ORDER — CEFDINIR 300 MG/1
300 CAPSULE ORAL
Qty: 5 CAPSULE | Refills: 0 | Status: SHIPPED | OUTPATIENT
Start: 2024-04-03

## 2024-04-03 RX ORDER — CEFDINIR 300 MG/1
300 CAPSULE ORAL ONCE
Status: COMPLETED | OUTPATIENT
Start: 2024-04-03 | End: 2024-04-03

## 2024-04-03 RX ORDER — SODIUM CHLORIDE 0.9 % (FLUSH) 0.9 %
10 SYRINGE (ML) INJECTION AS NEEDED
Status: DISCONTINUED | OUTPATIENT
Start: 2024-04-03 | End: 2024-04-03 | Stop reason: HOSPADM

## 2024-04-03 RX ADMIN — CEFDINIR 300 MG: 300 CAPSULE ORAL at 18:40

## 2024-04-03 NOTE — DISCHARGE INSTRUCTIONS
Follow-up with your family doctor in the office at the next available appointment.  You will take the antibiotic every other day until it is finished.  You had the first dose in the ER today so you will not need to take it again until Friday after dialysis.  Return to the ED at any time if symptoms change or worsen.

## 2024-04-03 NOTE — ED PROVIDER NOTES
Subjective   History of Present Illness  78-year-old female who presents to the ED today for a 5-day history of cough and wheezing.  She states she has been coughing up thick sputum.  She denies any fever or chills.  She had to miss her dialysis 2 days ago because she felt too ill to go.  She did go to dialysis today.  She states she was feeling short of breath prior to going to dialysis but now feels much better since completing her dialysis.  She denies any chest pain.  Denies any abdominal pain.  She denies any vomiting or diarrhea.    History provided by:  Patient  URI  Presenting symptoms: congestion and cough    Severity:  Moderate  Onset quality:  Gradual  Duration:  5 days  Timing:  Constant  Progression:  Improving  Chronicity:  New  Relieved by:  Nothing  Worsened by:  Nothing  Associated symptoms: wheezing    Risk factors: being elderly, chronic kidney disease and diabetes mellitus        Review of Systems   Constitutional: Negative.    HENT:  Positive for congestion.    Eyes: Negative.    Respiratory:  Positive for cough, shortness of breath and wheezing.    Cardiovascular:  Positive for leg swelling (chronic).   Gastrointestinal: Negative.    Genitourinary: Negative.    Musculoskeletal: Negative.    Skin: Negative.    Neurological: Negative.    Psychiatric/Behavioral: Negative.     All other systems reviewed and are negative.      Past Medical History:   Diagnosis Date    Acquired hypothyroidism 4/22/2021    Anemia     Arthritis     Carpal tunnel syndrome     Coronary artery disease     Diabetes mellitus     Elevated cholesterol     GERD (gastroesophageal reflux disease)     History of pneumonia     History of unsteady gait     OCASSIONALLY    Hypertension     Insomnia     Kidney disease     LENNY (obstructive sleep apnea) 12/1/2020    Osteoarthritis     Renal insufficiency     Sciatica        No Known Allergies    Past Surgical History:   Procedure Laterality Date    ABDOMINAL SURGERY      APPENDECTOMY       CARDIAC CATHETERIZATION      1 STENT  ---  2000    CARDIAC SURGERY      CAROTID STENT      CARPAL TUNNEL RELEASE Right 10/8/2019    Procedure: CARPAL TUNNEL RELEASE;  Surgeon: Feroz Levin MD;  Location: [facility] OR;  Service: Orthopedics    CATARACT EXTRACTION      CHOLECYSTECTOMY      COLONOSCOPY      COLONOSCOPY N/A 11/23/2021    Procedure: COLONOSCOPY FOR SCREENING;  Surgeon: Palmira Pate MD;  Location: [facility] OR;  Service: Gastroenterology;  Laterality: N/A;    CORONARY ANGIOPLASTY WITH STENT PLACEMENT      ENDOSCOPY      ENDOSCOPY N/A 3/5/2020    Procedure: ESOPHAGOGASTRODUODENOSCOPY;  Surgeon: Alexandru Bagley MD;  Location: [facility] OR;  Service: Gastroenterology;  Laterality: N/A;    ENDOSCOPY N/A 11/23/2021    Procedure: ESOPHAGOGASTRODUODENOSCOPY WITH BIOPSY;  Surgeon: Palmira Pate MD;  Location: [facility] OR;  Service: Gastroenterology;  Laterality: N/A;    ENDOSCOPY AND COLONOSCOPY      GALLBLADDER SURGERY      INSERTION HEMODIALYSIS CATHETER N/A 11/14/2022    Procedure: HEMODIALYSIS CATHETER INSERTION;  Surgeon: Ta Blas MD;  Location: [facility] OR;  Service: General;  Laterality: N/A;    JOINT REPLACEMENT Left 05/02/2017    [facility]  DR LEVIN  LEFT TOTAL KNEE    KNEE ARTHROSCOPY W/ MENISCECTOMY Right     LAPAROSCOPIC TUBAL LIGATION      MT ARTHRP KNE CONDYLE&PLATU MEDIAL&LAT COMPARTMENTS Left 5/2/2017    Procedure: TOTAL KNEE ARTHROPLASTY;  Surgeon: Feroz Levin MD;  Location: [facility] OR;  Service: Orthopedics    STERILIZATION      TONSILLECTOMY         Family History   Problem Relation Age of Onset    Arthritis Mother     Diabetes Mother     Cancer Mother     Heart disease Father     Diabetes Daughter     Diabetes Son     Diabetes Maternal Aunt     Heart disease Maternal Grandmother     Breast cancer Neg Hx        Social History     Socioeconomic History    Marital status:      Spouse name: natalie    Number of children: 3    Years of education:  12   Tobacco Use    Smoking status: Never     Passive exposure: Past    Smokeless tobacco: Never   Vaping Use    Vaping status: Never Used   Substance and Sexual Activity    Alcohol use: Yes     Comment: socially    Drug use: No    Sexual activity: Defer     Birth control/protection: Surgical           Objective   Physical Exam  Vitals and nursing note reviewed.   Constitutional:       General: She is not in acute distress.     Appearance: Normal appearance. She is not diaphoretic.   HENT:      Head: Normocephalic and atraumatic.      Right Ear: External ear normal.      Left Ear: External ear normal.      Nose: Nose normal.   Eyes:      Conjunctiva/sclera: Conjunctivae normal.      Pupils: Pupils are equal, round, and reactive to light.   Cardiovascular:      Rate and Rhythm: Normal rate and regular rhythm.      Pulses: Normal pulses.      Heart sounds: Normal heart sounds.   Pulmonary:      Effort: Pulmonary effort is normal.      Breath sounds: Normal breath sounds. No wheezing or rhonchi.   Chest:      Chest wall: No tenderness.   Abdominal:      General: Bowel sounds are normal.      Palpations: Abdomen is soft.      Tenderness: There is no abdominal tenderness.   Musculoskeletal:         General: Normal range of motion.      Cervical back: Normal range of motion and neck supple.      Comments: Mild swelling to bilateral ankles   Skin:     General: Skin is warm and dry.      Capillary Refill: Capillary refill takes less than 2 seconds.   Neurological:      General: No focal deficit present.      Mental Status: She is alert and oriented to person, place, and time.   Psychiatric:         Mood and Affect: Mood normal.         Procedures       Results for orders placed or performed during the hospital encounter of 04/03/24   Respiratory Panel PCR w/COVID-19(SARS-CoV-2) NANETTE/YOMAIRA/CHRISTAL/PAD/COR/KAYLEE In-House, NP Swab in UTM/VTM, 2 HR TAT - Swab, Nasopharynx    Specimen: Nasopharynx; Swab   Result Value Ref Range     ADENOVIRUS, PCR Not Detected Not Detected    Coronavirus 229E Not Detected Not Detected    Coronavirus HKU1 Not Detected Not Detected    Coronavirus NL63 Not Detected Not Detected    Coronavirus OC43 Not Detected Not Detected    COVID19 Not Detected Not Detected - Ref. Range    Human Metapneumovirus Not Detected Not Detected    Human Rhinovirus/Enterovirus Not Detected Not Detected    Influenza A PCR Not Detected Not Detected    Influenza B PCR Not Detected Not Detected    Parainfluenza Virus 1 Not Detected Not Detected    Parainfluenza Virus 2 Not Detected Not Detected    Parainfluenza Virus 3 Not Detected Not Detected    Parainfluenza Virus 4 Not Detected Not Detected    RSV, PCR Not Detected Not Detected    Bordetella pertussis pcr Not Detected Not Detected    Bordetella parapertussis PCR Not Detected Not Detected    Chlamydophila pneumoniae PCR Not Detected Not Detected    Mycoplasma pneumo by PCR Not Detected Not Detected   Comprehensive Metabolic Panel    Specimen: Blood   Result Value Ref Range    Glucose 114 (H) 65 - 99 mg/dL    BUN 20 8 - 23 mg/dL    Creatinine 3.33 (H) 0.57 - 1.00 mg/dL    Sodium 132 (L) 136 - 145 mmol/L    Potassium 3.4 (L) 3.5 - 5.2 mmol/L    Chloride 91 (L) 98 - 107 mmol/L    CO2 25.6 22.0 - 29.0 mmol/L    Calcium 8.9 8.6 - 10.5 mg/dL    Total Protein 8.1 6.0 - 8.5 g/dL    Albumin 3.1 (L) 3.5 - 5.2 g/dL    ALT (SGPT) 16 1 - 33 U/L    AST (SGOT) 30 1 - 32 U/L    Alkaline Phosphatase 399 (H) 39 - 117 U/L    Total Bilirubin 0.6 0.0 - 1.2 mg/dL    Globulin 5.0 gm/dL    A/G Ratio 0.6 g/dL    BUN/Creatinine Ratio 6.0 (L) 7.0 - 25.0    Anion Gap 15.4 (H) 5.0 - 15.0 mmol/L    eGFR 13.5 (L) >60.0 mL/min/1.73   BNP    Specimen: Blood   Result Value Ref Range    proBNP 21,442.0 (H) 0.0 - 1,800.0 pg/mL   Single High Sensitivity Troponin T    Specimen: Blood   Result Value Ref Range    HS Troponin T 98 (C) <14 ng/L   CBC Auto Differential    Specimen: Blood   Result Value Ref Range    WBC 7.33 3.40  - 10.80 10*3/mm3    RBC 5.16 3.77 - 5.28 10*6/mm3    Hemoglobin 11.4 (L) 12.0 - 15.9 g/dL    Hematocrit 37.9 34.0 - 46.6 %    MCV 73.4 (L) 79.0 - 97.0 fL    MCH 22.1 (L) 26.6 - 33.0 pg    MCHC 30.1 (L) 31.5 - 35.7 g/dL    RDW 18.0 (H) 12.3 - 15.4 %    RDW-SD 47.4 37.0 - 54.0 fl    MPV 8.3 6.0 - 12.0 fL    Platelets 198 140 - 450 10*3/mm3    Neutrophil % 73.5 42.7 - 76.0 %    Lymphocyte % 16.8 (L) 19.6 - 45.3 %    Monocyte % 7.5 5.0 - 12.0 %    Eosinophil % 1.5 0.3 - 6.2 %    Basophil % 0.4 0.0 - 1.5 %    Immature Grans % 0.3 0.0 - 0.5 %    Neutrophils, Absolute 5.39 1.70 - 7.00 10*3/mm3    Lymphocytes, Absolute 1.23 0.70 - 3.10 10*3/mm3    Monocytes, Absolute 0.55 0.10 - 0.90 10*3/mm3    Eosinophils, Absolute 0.11 0.00 - 0.40 10*3/mm3    Basophils, Absolute 0.03 0.00 - 0.20 10*3/mm3    Immature Grans, Absolute 0.02 0.00 - 0.05 10*3/mm3    nRBC 0.0 0.0 - 0.2 /100 WBC   C-reactive Protein    Specimen: Blood   Result Value Ref Range    C-Reactive Protein 9.16 (H) 0.00 - 0.50 mg/dL   Procalcitonin    Specimen: Blood   Result Value Ref Range    Procalcitonin 0.96 (H) 0.00 - 0.25 ng/mL   Magnesium    Specimen: Blood   Result Value Ref Range    Magnesium 1.9 1.6 - 2.4 mg/dL   High Sensitivity Troponin T 2Hr    Specimen: Arm, Left; Blood   Result Value Ref Range    HS Troponin T 96 (C) <14 ng/L    Troponin T Delta -2 >=-4 - <+4 ng/L   ECG 12 Lead ED Triage Standing Order; SOA   Result Value Ref Range    QT Interval 432 ms    QTC Interval 504 ms   Green Top (Gel)   Result Value Ref Range    Extra Tube Hold for add-ons.    Lavender Top   Result Value Ref Range    Extra Tube hold for add-on    Gold Top - SST   Result Value Ref Range    Extra Tube Hold for add-ons.    Light Blue Top   Result Value Ref Range    Extra Tube Hold for add-ons.           ED Course  ED Course as of 04/03/24 1901   Wed Apr 03, 2024   1644 XR Chest 2 View  FINDINGS:    LUNGS AND PLEURAL SPACES:  Small to moderate right pleural effusion.   No  consolidation.  No pneumothorax.    HEART:  Unremarkable as visualized.  No cardiomegaly.    MEDIASTINUM:  Unremarkable as visualized.  Normal mediastinal contour.    BONES/JOINTS:  Unremarkable as visualized.  No acute fracture.     IMPRESSION:    Small to moderate right pleural effusion.   []      ED Course User Index  [AH] Deepa Damon PA                                             Medical Decision Making  78-year-old female who presents to the ED today for cough and wheezing.  She initially was having some shortness of breath but that got better after going to dialysis today.  Chest x-ray shows a right pleural effusion.  I have reviewed past chest x-rays and this is chronic.  She does have an elevated proBNP as well as elevated troponins but they remain flat.  This is most likely due to her chronic kidney disease.  Respiratory panel was negative.  Plan is for discharge home on cefdinir which has been renally dosed.  She will follow-up as an outpatient and will return to the ED if symptoms change or worsen.    Problems Addressed:  Acute URI: complicated acute illness or injury    Amount and/or Complexity of Data Reviewed  Labs: ordered.  Radiology: ordered. Decision-making details documented in ED Course.  ECG/medicine tests: ordered.    Risk  Prescription drug management.        Final diagnoses:   Acute URI       ED Disposition  ED Disposition       ED Disposition   Discharge    Condition   Stable    Comment   --               Lexie Dolan PA  [address] [facility] [address]  [phone]    Schedule an appointment as soon as possible for a visit in 1 week           Medication List        New Prescriptions      cefdinir 300 MG capsule  Commonly known as: OMNICEF  Take 1 capsule by mouth Every Other Day.               Where to Get Your Medications        These medications were sent to [address] - [phone] [facility] [phone] [address]  Dr [address] [place] [address]      Phone: [phone]   cefdinir 300 MG capsule            Deepa Damon PA  04/03/24 5189

## 2024-04-05 LAB
QT INTERVAL: 432 MS
QTC INTERVAL: 504 MS

## 2024-04-17 ENCOUNTER — HOSPITAL ENCOUNTER (EMERGENCY)
Facility: HOSPITAL | Age: 79
Discharge: HOME | End: 2024-04-17
Attending: EMERGENCY MEDICINE
Payer: MEDICARE

## 2024-04-17 ENCOUNTER — APPOINTMENT (OUTPATIENT)
Dept: GENERAL RADIOLOGY | Facility: HOSPITAL | Age: 79
End: 2024-04-17
Payer: MEDICARE

## 2024-04-17 VITALS
OXYGEN SATURATION: 96 % | DIASTOLIC BLOOD PRESSURE: 52 MMHG | SYSTOLIC BLOOD PRESSURE: 109 MMHG | HEIGHT: 60 IN | WEIGHT: 167.11 LBS | RESPIRATION RATE: 17 BRPM | HEART RATE: 80 BPM | TEMPERATURE: 98.1 F | BODY MASS INDEX: 32.81 KG/M2

## 2024-04-17 DIAGNOSIS — L03.031 CELLULITIS OF GREAT TOE OF RIGHT FOOT: Primary | ICD-10-CM

## 2024-04-17 LAB
ALBUMIN SERPL-MCNC: 3.1 G/DL (ref 3.5–5.2)
ALBUMIN/GLOB SERPL: 0.7 G/DL
ALP SERPL-CCNC: 325 U/L (ref 39–117)
ALT SERPL W P-5'-P-CCNC: 9 U/L (ref 1–33)
ANION GAP SERPL CALCULATED.3IONS-SCNC: 13.7 MMOL/L (ref 5–15)
AST SERPL-CCNC: 25 U/L (ref 1–32)
BASOPHILS # BLD AUTO: 0.03 10*3/MM3 (ref 0–0.2)
BASOPHILS NFR BLD AUTO: 0.4 % (ref 0–1.5)
BILIRUB SERPL-MCNC: 0.5 MG/DL (ref 0–1.2)
BUN SERPL-MCNC: 40 MG/DL (ref 8–23)
BUN/CREAT SERPL: 9.5 (ref 7–25)
CALCIUM SPEC-SCNC: 9.1 MG/DL (ref 8.6–10.5)
CHLORIDE SERPL-SCNC: 91 MMOL/L (ref 98–107)
CO2 SERPL-SCNC: 26.3 MMOL/L (ref 22–29)
CREAT SERPL-MCNC: 4.22 MG/DL (ref 0.57–1)
CRP SERPL-MCNC: 7.05 MG/DL (ref 0–0.5)
D-LACTATE SERPL-SCNC: 1.4 MMOL/L (ref 0.5–2)
DEPRECATED RDW RBC AUTO: 51 FL (ref 37–54)
EGFRCR SERPLBLD CKD-EPI 2021: 10.1 ML/MIN/1.73
EOSINOPHIL # BLD AUTO: 0.12 10*3/MM3 (ref 0–0.4)
EOSINOPHIL NFR BLD AUTO: 1.7 % (ref 0.3–6.2)
ERYTHROCYTE [DISTWIDTH] IN BLOOD BY AUTOMATED COUNT: 19.6 % (ref 12.3–15.4)
ERYTHROCYTE [SEDIMENTATION RATE] IN BLOOD: >130 MM/HR (ref 0–30)
GLOBULIN UR ELPH-MCNC: 4.3 GM/DL
GLUCOSE SERPL-MCNC: 150 MG/DL (ref 65–99)
HCT VFR BLD AUTO: 33.9 % (ref 34–46.6)
HGB BLD-MCNC: 10 G/DL (ref 12–15.9)
HOLD SPECIMEN: NORMAL
HOLD SPECIMEN: NORMAL
IMM GRANULOCYTES # BLD AUTO: 0.04 10*3/MM3 (ref 0–0.05)
IMM GRANULOCYTES NFR BLD AUTO: 0.6 % (ref 0–0.5)
LYMPHOCYTES # BLD AUTO: 0.91 10*3/MM3 (ref 0.7–3.1)
LYMPHOCYTES NFR BLD AUTO: 13 % (ref 19.6–45.3)
MAGNESIUM SERPL-MCNC: 1.9 MG/DL (ref 1.6–2.4)
MCH RBC QN AUTO: 21.8 PG (ref 26.6–33)
MCHC RBC AUTO-ENTMCNC: 29.5 G/DL (ref 31.5–35.7)
MCV RBC AUTO: 73.9 FL (ref 79–97)
MONOCYTES # BLD AUTO: 0.66 10*3/MM3 (ref 0.1–0.9)
MONOCYTES NFR BLD AUTO: 9.4 % (ref 5–12)
NEUTROPHILS NFR BLD AUTO: 5.25 10*3/MM3 (ref 1.7–7)
NEUTROPHILS NFR BLD AUTO: 74.9 % (ref 42.7–76)
NRBC BLD AUTO-RTO: 0 /100 WBC (ref 0–0.2)
PLATELET # BLD AUTO: 236 10*3/MM3 (ref 140–450)
PMV BLD AUTO: 9 FL (ref 6–12)
POTASSIUM SERPL-SCNC: 3.9 MMOL/L (ref 3.5–5.2)
PROCALCITONIN SERPL-MCNC: 1.13 NG/ML (ref 0–0.25)
PROT SERPL-MCNC: 7.4 G/DL (ref 6–8.5)
RBC # BLD AUTO: 4.59 10*6/MM3 (ref 3.77–5.28)
SODIUM SERPL-SCNC: 131 MMOL/L (ref 136–145)
URATE SERPL-MCNC: 4.2 MG/DL (ref 2.4–5.7)
WBC NRBC COR # BLD AUTO: 7.01 10*3/MM3 (ref 3.4–10.8)
WHOLE BLOOD HOLD COAG: NORMAL
WHOLE BLOOD HOLD SPECIMEN: NORMAL

## 2024-04-17 PROCEDURE — 73630 X-RAY EXAM OF FOOT: CPT

## 2024-04-17 PROCEDURE — 83605 ASSAY OF LACTIC ACID: CPT | Performed by: PHYSICIAN ASSISTANT

## 2024-04-17 PROCEDURE — 80053 COMPREHEN METABOLIC PANEL: CPT | Performed by: PHYSICIAN ASSISTANT

## 2024-04-17 PROCEDURE — 85025 COMPLETE CBC W/AUTO DIFF WBC: CPT | Performed by: PHYSICIAN ASSISTANT

## 2024-04-17 PROCEDURE — 99283 EMERGENCY DEPT VISIT LOW MDM: CPT

## 2024-04-17 PROCEDURE — 73630 X-RAY EXAM OF FOOT: CPT | Performed by: RADIOLOGY

## 2024-04-17 PROCEDURE — 84145 PROCALCITONIN (PCT): CPT | Performed by: PHYSICIAN ASSISTANT

## 2024-04-17 PROCEDURE — 36415 COLL VENOUS BLD VENIPUNCTURE: CPT

## 2024-04-17 PROCEDURE — 87040 BLOOD CULTURE FOR BACTERIA: CPT | Performed by: PHYSICIAN ASSISTANT

## 2024-04-17 PROCEDURE — 84550 ASSAY OF BLOOD/URIC ACID: CPT | Performed by: PHYSICIAN ASSISTANT

## 2024-04-17 PROCEDURE — 86140 C-REACTIVE PROTEIN: CPT | Performed by: PHYSICIAN ASSISTANT

## 2024-04-17 PROCEDURE — 85652 RBC SED RATE AUTOMATED: CPT | Performed by: PHYSICIAN ASSISTANT

## 2024-04-17 PROCEDURE — 83735 ASSAY OF MAGNESIUM: CPT | Performed by: PHYSICIAN ASSISTANT

## 2024-04-17 RX ORDER — SODIUM CHLORIDE 0.9 % (FLUSH) 0.9 %
10 SYRINGE (ML) INJECTION AS NEEDED
Status: DISCONTINUED | OUTPATIENT
Start: 2024-04-17 | End: 2024-04-17 | Stop reason: HOSPADM

## 2024-04-17 RX ORDER — AMOXICILLIN AND CLAVULANATE POTASSIUM 500; 125 MG/1; MG/1
1 TABLET, FILM COATED ORAL DAILY
Qty: 9 TABLET | Refills: 0 | Status: SHIPPED | OUTPATIENT
Start: 2024-04-17 | End: 2024-04-26

## 2024-04-17 RX ORDER — DOXYCYCLINE 100 MG/1
100 CAPSULE ORAL 2 TIMES DAILY
Qty: 20 CAPSULE | Refills: 0 | Status: SHIPPED | OUTPATIENT
Start: 2024-04-17 | End: 2024-04-27

## 2024-04-17 RX ORDER — HYDROCODONE BITARTRATE AND ACETAMINOPHEN 5; 325 MG/1; MG/1
1 TABLET ORAL EVERY 12 HOURS PRN
Qty: 6 TABLET | Refills: 0 | Status: SHIPPED | OUTPATIENT
Start: 2024-04-17

## 2024-04-17 RX ORDER — AMOXICILLIN AND CLAVULANATE POTASSIUM 500; 125 MG/1; MG/1
1 TABLET, FILM COATED ORAL ONCE
Status: COMPLETED | OUTPATIENT
Start: 2024-04-17 | End: 2024-04-17

## 2024-04-17 RX ORDER — HYDROCODONE BITARTRATE AND ACETAMINOPHEN 5; 325 MG/1; MG/1
1 TABLET ORAL ONCE
Status: COMPLETED | OUTPATIENT
Start: 2024-04-17 | End: 2024-04-17

## 2024-04-17 RX ORDER — DOXYCYCLINE 100 MG/1
100 CAPSULE ORAL ONCE
Status: COMPLETED | OUTPATIENT
Start: 2024-04-17 | End: 2024-04-17

## 2024-04-17 RX ADMIN — AMOXICILLIN AND CLAVULANATE POTASSIUM 500 MG: 500; 125 TABLET, FILM COATED ORAL at 13:31

## 2024-04-17 RX ADMIN — DOXYCYCLINE 100 MG: 100 CAPSULE ORAL at 13:31

## 2024-04-17 RX ADMIN — HYDROCODONE BITARTRATE AND ACETAMINOPHEN 1 TABLET: 5; 325 TABLET ORAL at 12:14

## 2024-04-17 NOTE — ED PROVIDER NOTES
Subjective   History of Present Illness  78-year-old female who presents to the ED today for right great toe pain.  She states this has been going on for the last couple days and has progressively gotten worse.  She states she was not able to sleep last night due to the pain.  She states her toe has been red, hot and swollen.  She denies any injury to the area.  She denies any open wounds.  She denies any history of gout.  She is a dialysis patient and typically goes on Mondays, Wednesdays and Fridays.  She states she could not go to dialysis today due to the pain.  She states she has tried Tylenol and aspirin at home for the pain with no relief.  She denies any fever.  She denies any nausea or vomiting.    History provided by:  Patient  Foot Pain  Location:  Right great toe  Severity:  Moderate  Onset quality:  Gradual  Duration: a few days.  Timing:  Constant  Progression:  Worsening  Chronicity:  New  Associated symptoms: no abdominal pain, no chest pain, no fever, no nausea, no rash, no shortness of breath and no vomiting        Review of Systems   Constitutional: Negative.  Negative for fever.   HENT: Negative.     Eyes: Negative.    Respiratory:  Negative for shortness of breath.    Cardiovascular:  Negative for chest pain.   Gastrointestinal:  Negative for abdominal pain, nausea and vomiting.   Genitourinary: Negative.    Musculoskeletal:  Positive for arthralgias and joint swelling.   Skin:  Positive for color change. Negative for rash.   Neurological: Negative.    Psychiatric/Behavioral: Negative.     All other systems reviewed and are negative.      Past Medical History:   Diagnosis Date    Acquired hypothyroidism 4/22/2021    Anemia     Arthritis     Carpal tunnel syndrome     Coronary artery disease     Diabetes mellitus     Elevated cholesterol     GERD (gastroesophageal reflux disease)     History of pneumonia     History of unsteady gait     OCASSIONALLY    Hypertension     Insomnia     Kidney disease      LENNY (obstructive sleep apnea) 12/1/2020    Osteoarthritis     Renal insufficiency     Sciatica        No Known Allergies    Past Surgical History:   Procedure Laterality Date    ABDOMINAL SURGERY      APPENDECTOMY      CARDIAC CATHETERIZATION      1 STENT  ---  2000    CARDIAC SURGERY      CAROTID STENT      CARPAL TUNNEL RELEASE Right 10/8/2019    Procedure: CARPAL TUNNEL RELEASE;  Surgeon: Feroz Levin MD;  Location: [facility] OR;  Service: Orthopedics    CATARACT EXTRACTION      CHOLECYSTECTOMY      COLONOSCOPY      COLONOSCOPY N/A 11/23/2021    Procedure: COLONOSCOPY FOR SCREENING;  Surgeon: Palmira Pate MD;  Location: [facility] OR;  Service: Gastroenterology;  Laterality: N/A;    CORONARY ANGIOPLASTY WITH STENT PLACEMENT      ENDOSCOPY      ENDOSCOPY N/A 3/5/2020    Procedure: ESOPHAGOGASTRODUODENOSCOPY;  Surgeon: Alexandru Bagley MD;  Location: [facility] OR;  Service: Gastroenterology;  Laterality: N/A;    ENDOSCOPY N/A 11/23/2021    Procedure: ESOPHAGOGASTRODUODENOSCOPY WITH BIOPSY;  Surgeon: Palmira Pate MD;  Location: [facility] OR;  Service: Gastroenterology;  Laterality: N/A;    ENDOSCOPY AND COLONOSCOPY      GALLBLADDER SURGERY      INSERTION HEMODIALYSIS CATHETER N/A 11/14/2022    Procedure: HEMODIALYSIS CATHETER INSERTION;  Surgeon: Ta Blas MD;  Location: [facility] OR;  Service: General;  Laterality: N/A;    JOINT REPLACEMENT Left 05/02/2017    [facility] [place]  DR LEVIN  LEFT TOTAL KNEE    KNEE ARTHROSCOPY W/ MENISCECTOMY Right     LAPAROSCOPIC TUBAL LIGATION      SD ARTHRP KNE CONDYLE&PLATU MEDIAL&LAT COMPARTMENTS Left 5/2/2017    Procedure: TOTAL KNEE ARTHROPLASTY;  Surgeon: Feroz Levin MD;  Location: [facility] OR;  Service: Orthopedics    STERILIZATION      TONSILLECTOMY         Family History   Problem Relation Age of Onset    Arthritis Mother     Diabetes Mother     Cancer Mother     Heart disease Father     Diabetes Daughter     Diabetes Son     Diabetes  Maternal Aunt     Heart disease Maternal Grandmother     Breast cancer Neg Hx        Social History     Socioeconomic History    Marital status:      Spouse name: natalie    Number of children: 3    Years of education: 12   Tobacco Use    Smoking status: Never     Passive exposure: Past    Smokeless tobacco: Never   Vaping Use    Vaping status: Never Used   Substance and Sexual Activity    Alcohol use: Yes     Comment: socially    Drug use: No    Sexual activity: Defer     Birth control/protection: Surgical           Objective   Physical Exam  Vitals and nursing note reviewed.   Constitutional:       General: She is not in acute distress.     Appearance: Normal appearance.   HENT:      Head: Normocephalic and atraumatic.      Right Ear: External ear normal.      Left Ear: External ear normal.      Nose: Nose normal.   Eyes:      Conjunctiva/sclera: Conjunctivae normal.      Pupils: Pupils are equal, round, and reactive to light.   Cardiovascular:      Rate and Rhythm: Normal rate and regular rhythm.      Pulses: Normal pulses.      Heart sounds: Normal heart sounds.   Pulmonary:      Effort: Pulmonary effort is normal.      Breath sounds: Normal breath sounds.   Abdominal:      General: Bowel sounds are normal.      Palpations: Abdomen is soft.   Musculoskeletal:         General: Swelling (right great toe) and tenderness (right great toe) present. No deformity.      Cervical back: Normal range of motion and neck supple.      Right lower leg: Edema (3+) present.      Left lower leg: Edema (1+) present.   Skin:     General: Skin is warm and dry.      Capillary Refill: Capillary refill takes less than 2 seconds.      Findings: Erythema (right great toe) present.      Comments: No open wounds noted to right foot   Neurological:      General: No focal deficit present.      Mental Status: She is alert and oriented to person, place, and time.   Psychiatric:         Mood and Affect: Mood normal.         Procedures        Results for orders placed or performed during the hospital encounter of 04/17/24   Comprehensive Metabolic Panel    Specimen: Arm, Left; Blood   Result Value Ref Range    Glucose 150 (H) 65 - 99 mg/dL    BUN 40 (H) 8 - 23 mg/dL    Creatinine 4.22 (H) 0.57 - 1.00 mg/dL    Sodium 131 (L) 136 - 145 mmol/L    Potassium 3.9 3.5 - 5.2 mmol/L    Chloride 91 (L) 98 - 107 mmol/L    CO2 26.3 22.0 - 29.0 mmol/L    Calcium 9.1 8.6 - 10.5 mg/dL    Total Protein 7.4 6.0 - 8.5 g/dL    Albumin 3.1 (L) 3.5 - 5.2 g/dL    ALT (SGPT) 9 1 - 33 U/L    AST (SGOT) 25 1 - 32 U/L    Alkaline Phosphatase 325 (H) 39 - 117 U/L    Total Bilirubin 0.5 0.0 - 1.2 mg/dL    Globulin 4.3 gm/dL    A/G Ratio 0.7 g/dL    BUN/Creatinine Ratio 9.5 7.0 - 25.0    Anion Gap 13.7 5.0 - 15.0 mmol/L    eGFR 10.1 (L) >60.0 mL/min/1.73   Lactic Acid, Plasma    Specimen: Arm, Left; Blood   Result Value Ref Range    Lactate 1.4 0.5 - 2.0 mmol/L   Procalcitonin    Specimen: Arm, Left; Blood   Result Value Ref Range    Procalcitonin 1.13 (H) 0.00 - 0.25 ng/mL   Sedimentation Rate    Specimen: Arm, Left; Blood   Result Value Ref Range    Sed Rate >130 (H) 0 - 30 mm/hr   C-reactive Protein    Specimen: Arm, Left; Blood   Result Value Ref Range    C-Reactive Protein 7.05 (H) 0.00 - 0.50 mg/dL   Magnesium    Specimen: Arm, Left; Blood   Result Value Ref Range    Magnesium 1.9 1.6 - 2.4 mg/dL   Uric Acid    Specimen: Arm, Left; Blood   Result Value Ref Range    Uric Acid 4.2 2.4 - 5.7 mg/dL   CBC Auto Differential    Specimen: Arm, Left; Blood   Result Value Ref Range    WBC 7.01 3.40 - 10.80 10*3/mm3    RBC 4.59 3.77 - 5.28 10*6/mm3    Hemoglobin 10.0 (L) 12.0 - 15.9 g/dL    Hematocrit 33.9 (L) 34.0 - 46.6 %    MCV 73.9 (L) 79.0 - 97.0 fL    MCH 21.8 (L) 26.6 - 33.0 pg    MCHC 29.5 (L) 31.5 - 35.7 g/dL    RDW 19.6 (H) 12.3 - 15.4 %    RDW-SD 51.0 37.0 - 54.0 fl    MPV 9.0 6.0 - 12.0 fL    Platelets 236 140 - 450 10*3/mm3    Neutrophil % 74.9 42.7 - 76.0 %    Lymphocyte  % 13.0 (L) 19.6 - 45.3 %    Monocyte % 9.4 5.0 - 12.0 %    Eosinophil % 1.7 0.3 - 6.2 %    Basophil % 0.4 0.0 - 1.5 %    Immature Grans % 0.6 (H) 0.0 - 0.5 %    Neutrophils, Absolute 5.25 1.70 - 7.00 10*3/mm3    Lymphocytes, Absolute 0.91 0.70 - 3.10 10*3/mm3    Monocytes, Absolute 0.66 0.10 - 0.90 10*3/mm3    Eosinophils, Absolute 0.12 0.00 - 0.40 10*3/mm3    Basophils, Absolute 0.03 0.00 - 0.20 10*3/mm3    Immature Grans, Absolute 0.04 0.00 - 0.05 10*3/mm3    nRBC 0.0 0.0 - 0.2 /100 WBC   Green Top (Gel)   Result Value Ref Range    Extra Tube Hold for add-ons.    Lavender Top   Result Value Ref Range    Extra Tube hold for add-on    Gold Top - SST   Result Value Ref Range    Extra Tube Hold for add-ons.    Light Blue Top   Result Value Ref Range    Extra Tube Hold for add-ons.           ED Course  ED Course as of 04/17/24 1423   Wed Apr 17, 2024   1253 XR Foot 3+ View Right  FINDINGS:  A 3 view exam demonstrates no acute fracture or dislocation.  The joint spaces are preserved. Soft tissue swelling is present at the  dorsum of the foot.     IMPRESSION:  Soft tissue swelling with no acute osseous abnormality.   [AH]      ED Course User Index  [AH] Deepa Damon, PA                                             Medical Decision Making  78-year-old female who presents to the ED today for right great toe pain.  X-ray shows no acute abnormalities.  Patient has an elevated sed rate, CRP and procalcitonin.  Dr. Solano has evaluated the patient as well.  Plan is to treat her for cellulitis.  She will be started on doxycycline and Augmentin.  She will follow-up outpatient with her primary care provider and will return to the ED if symptoms change or worsen.    Problems Addressed:  Cellulitis of great toe of right foot: complicated acute illness or injury    Amount and/or Complexity of Data Reviewed  Labs: ordered.  Radiology: ordered. Decision-making details documented in ED Course.    Risk  Prescription drug  management.        Final diagnoses:   Cellulitis of great toe of right foot       ED Disposition  ED Disposition       ED Disposition   Discharge    Condition   Stable    Comment   --               Lexie Dolan PA  [address] [id number] [phone]    Call in 1 day           Medication List        New Prescriptions      amoxicillin-clavulanate 500-125 MG per tablet  Commonly known as: Augmentin  Take 1 tablet by mouth Daily for 9 days.     doxycycline 100 MG capsule  Commonly known as: MONODOX  Take 1 capsule by mouth 2 (Two) Times a Day for 10 days.     HYDROcodone-acetaminophen 5-325 MG per tablet  Commonly known as: NORCO  Take 1 tablet by mouth Every 12 (Twelve) Hours As Needed for Severe Pain.               Where to Get Your Medications        These medications were sent to [address] - [phone] [facility] [phone] [address], [facility] [address]      Phone: [phone]   amoxicillin-clavulanate 500-125 MG per tablet  doxycycline 100 MG capsule  HYDROcodone-acetaminophen 5-325 MG per tablet            Deepa Damon PA  04/17/24 4520

## 2024-04-17 NOTE — DISCHARGE INSTRUCTIONS
Follow-up with your family doctor in the office at the next available appointment.  A prescription for 2 different antibiotics have been sent to your pharmacy.  The doxycycline you are going to take twice a day and the Augmentin you are going to take once a day.  You were given the first dose of each medication in the ER.  A prescription for pain medication has been sent to your pharmacy as well.  You can take this as needed.  Return to the ED at any time if symptoms change or worsen.

## 2024-04-18 ENCOUNTER — TELEPHONE (OUTPATIENT)
Dept: FAMILY MEDICINE CLINIC | Facility: CLINIC | Age: 79
End: 2024-04-18

## 2024-04-18 ENCOUNTER — OFFICE VISIT (OUTPATIENT)
Dept: FAMILY MEDICINE CLINIC | Facility: CLINIC | Age: 79
End: 2024-04-18
Payer: MEDICARE

## 2024-04-18 VITALS
TEMPERATURE: 97.3 F | BODY MASS INDEX: 31.41 KG/M2 | HEART RATE: 77 BPM | OXYGEN SATURATION: 97 % | DIASTOLIC BLOOD PRESSURE: 60 MMHG | RESPIRATION RATE: 14 BRPM | HEIGHT: 60 IN | SYSTOLIC BLOOD PRESSURE: 100 MMHG | WEIGHT: 160 LBS

## 2024-04-18 DIAGNOSIS — Z99.2 TYPE 2 DIABETES MELLITUS WITH CHRONIC KIDNEY DISEASE ON CHRONIC DIALYSIS, WITH LONG-TERM CURRENT USE OF INSULIN: Chronic | ICD-10-CM

## 2024-04-18 DIAGNOSIS — N18.6 TYPE 2 DIABETES MELLITUS WITH CHRONIC KIDNEY DISEASE ON CHRONIC DIALYSIS, WITH LONG-TERM CURRENT USE OF INSULIN: Chronic | ICD-10-CM

## 2024-04-18 DIAGNOSIS — I73.9 PVD (PERIPHERAL VASCULAR DISEASE) WITH CLAUDICATION: ICD-10-CM

## 2024-04-18 DIAGNOSIS — Z79.4 TYPE 2 DIABETES MELLITUS WITH CHRONIC KIDNEY DISEASE ON CHRONIC DIALYSIS, WITH LONG-TERM CURRENT USE OF INSULIN: Chronic | ICD-10-CM

## 2024-04-18 DIAGNOSIS — I10 ESSENTIAL HYPERTENSION: Chronic | ICD-10-CM

## 2024-04-18 DIAGNOSIS — L03.031 CELLULITIS OF GREAT TOE OF RIGHT FOOT: Primary | ICD-10-CM

## 2024-04-18 DIAGNOSIS — E11.22 TYPE 2 DIABETES MELLITUS WITH CHRONIC KIDNEY DISEASE ON CHRONIC DIALYSIS, WITH LONG-TERM CURRENT USE OF INSULIN: Chronic | ICD-10-CM

## 2024-04-18 NOTE — TELEPHONE ENCOUNTER
RELAY    If you would call and let Britta know that I discussed her condition with Lexie and we both agree that she needed to have an MRI of her right foot which I have ordered.

## 2024-04-18 NOTE — PROGRESS NOTES
"      History of Present Illness  Britta Lee is a 78 y.o. female who presents to the clinic today for follow-up from her [facility] ED visit on April 17,2024 regarding her right great toe pain which was worsening.  She described her toe as being red, hot and swollen.  She was unable to sleep the night prior due to pain.  Britta is a dialysis patient and typically goes to dialysis Monday, Wednesday and Fridays.  She was unable to go on Wednesday due to the pain which she had taken Tylenol and aspirin at home without relief.  Upon questioning Britta she reports her last podiatrist appointment, \"the podiatrist told me that I had infection in the toe close to the bone \"  Britta has been diagnosed with 2 diabetes complicated with chronic kidney disease requiring dialysis, hypertension, peripheral vascular disease and venous insufficiency.    Toe Pain   The incident occurred more than 1 week ago. There was no injury mechanism. The pain is present in the right toes. The quality of the pain is described as stabbing. The pain is at a severity of 8/10. She reports no foreign bodies present. The symptoms are aggravated by palpation and weight bearing. She has tried elevation for the symptoms.  In last year she was diagnosed with chronic osteomyelitis of left  First digit.  Labs yesterday showed an elevated procalcitonin of 1.13, sed rate of greater than 130 and C-reactive protein of 7.05.  Blood culture was obtained with results pending.    Diabetes   Lab Results   Component Value Date    HGBA1C 6.30 (H) 03/14/2024     Hypertension  Well-controlled with amlodipine and metoprolol.    The following portions of the patient's history were reviewed and updated as appropriate: allergies, current medications, past family history, past medical history, past social history, past surgical history and problem list.    Review of Systems   Constitutional:  Positive for fatigue. Negative for chills, fever and unexpected " "weight change.   HENT: Negative.     Eyes:  Negative for visual disturbance.   Respiratory:  Negative for cough, shortness of breath and wheezing.    Cardiovascular:  Positive for leg swelling. Negative for chest pain and palpitations.   Gastrointestinal:  Negative for nausea and vomiting.   Endocrine: Negative for cold intolerance, heat intolerance, polydipsia, polyphagia and polyuria.   Musculoskeletal:  Positive for arthralgias, gait problem and joint swelling.   Skin:  Positive for color change. Negative for rash.   Neurological:  Negative for weakness.   Hematological:  Negative for adenopathy.   Psychiatric/Behavioral:  Positive for sleep disturbance.    All other systems reviewed and are negative.    Vital signs:  /60 (BP Location: Left arm, Patient Position: Sitting, Cuff Size: Adult)   Pulse 77   Temp 97.3 °F (36.3 °C) (Temporal)   Resp 14   Ht 152.4 cm (60\")   Wt 72.6 kg (160 lb)   SpO2 97%   BMI 31.25 kg/m²     Physical Exam  Vitals and nursing note reviewed.   Constitutional:       General: She is not in acute distress.     Appearance: She is well-developed.      Comments: Daughter is with her today   HENT:      Head: Normocephalic.      Nose: Nose normal.   Eyes:      General: No scleral icterus.        Right eye: No discharge.         Left eye: No discharge.      Conjunctiva/sclera: Conjunctivae normal.   Neck:      Vascular: No JVD.   Cardiovascular:      Rate and Rhythm: Normal rate and regular rhythm.      Heart sounds: Normal heart sounds. No murmur heard.     No friction rub.   Pulmonary:      Effort: Pulmonary effort is normal. No respiratory distress.      Breath sounds: Decreased breath sounds present. No wheezing, rhonchi or rales.   Abdominal:      Palpations: Abdomen is soft.   Musculoskeletal:         General: Tenderness present.      Cervical back: Neck supple.      Right lower le+      Left lower le+        Feet:    Feet:      Right foot:      Skin integrity: Erythema " and dry skin present. No skin breakdown.      Toenail Condition: Right toenails are abnormally thick.   Skin:     General: Skin is warm and dry.      Findings: No erythema or rash.   Neurological:      Mental Status: She is alert and oriented to person, place, and time.   Psychiatric:         Mood and Affect: Mood normal.         Speech: Speech normal.         Behavior: Behavior is cooperative.         Thought Content: Thought content normal.     Result Review : [facility] ED records including imaging and labs  Results for orders placed or performed during the hospital encounter of 04/17/24   Blood Culture - Blood, Arm, Left    Specimen: Arm, Left; Blood   Result Value Ref Range    Blood Culture No growth at 24 hours    Blood Culture - Blood, Arm, Left    Specimen: Arm, Left; Blood   Result Value Ref Range    Blood Culture No growth at 24 hours    Comprehensive Metabolic Panel    Specimen: Arm, Left; Blood   Result Value Ref Range    Glucose 150 (H) 65 - 99 mg/dL    BUN 40 (H) 8 - 23 mg/dL    Creatinine 4.22 (H) 0.57 - 1.00 mg/dL    Sodium 131 (L) 136 - 145 mmol/L    Potassium 3.9 3.5 - 5.2 mmol/L    Chloride 91 (L) 98 - 107 mmol/L    CO2 26.3 22.0 - 29.0 mmol/L    Calcium 9.1 8.6 - 10.5 mg/dL    Total Protein 7.4 6.0 - 8.5 g/dL    Albumin 3.1 (L) 3.5 - 5.2 g/dL    ALT (SGPT) 9 1 - 33 U/L    AST (SGOT) 25 1 - 32 U/L    Alkaline Phosphatase 325 (H) 39 - 117 U/L    Total Bilirubin 0.5 0.0 - 1.2 mg/dL    Globulin 4.3 gm/dL    A/G Ratio 0.7 g/dL    BUN/Creatinine Ratio 9.5 7.0 - 25.0    Anion Gap 13.7 5.0 - 15.0 mmol/L    eGFR 10.1 (L) >60.0 mL/min/1.73   Lactic Acid, Plasma    Specimen: Arm, Left; Blood   Result Value Ref Range    Lactate 1.4 0.5 - 2.0 mmol/L   Procalcitonin    Specimen: Arm, Left; Blood   Result Value Ref Range    Procalcitonin 1.13 (H) 0.00 - 0.25 ng/mL   Sedimentation Rate    Specimen: Arm, Left; Blood   Result Value Ref Range    Sed Rate >130 (H) 0 - 30 mm/hr   C-reactive Protein    Specimen: Arm, Left;  Blood   Result Value Ref Range    C-Reactive Protein 7.05 (H) 0.00 - 0.50 mg/dL   Magnesium    Specimen: Arm, Left; Blood   Result Value Ref Range    Magnesium 1.9 1.6 - 2.4 mg/dL   Uric Acid    Specimen: Arm, Left; Blood   Result Value Ref Range    Uric Acid 4.2 2.4 - 5.7 mg/dL   CBC Auto Differential    Specimen: Arm, Left; Blood   Result Value Ref Range    WBC 7.01 3.40 - 10.80 10*3/mm3    RBC 4.59 3.77 - 5.28 10*6/mm3    Hemoglobin 10.0 (L) 12.0 - 15.9 g/dL    Hematocrit 33.9 (L) 34.0 - 46.6 %    MCV 73.9 (L) 79.0 - 97.0 fL    MCH 21.8 (L) 26.6 - 33.0 pg    MCHC 29.5 (L) 31.5 - 35.7 g/dL    RDW 19.6 (H) 12.3 - 15.4 %    RDW-SD 51.0 37.0 - 54.0 fl    MPV 9.0 6.0 - 12.0 fL    Platelets 236 140 - 450 10*3/mm3    Neutrophil % 74.9 42.7 - 76.0 %    Lymphocyte % 13.0 (L) 19.6 - 45.3 %    Monocyte % 9.4 5.0 - 12.0 %    Eosinophil % 1.7 0.3 - 6.2 %    Basophil % 0.4 0.0 - 1.5 %    Immature Grans % 0.6 (H) 0.0 - 0.5 %    Neutrophils, Absolute 5.25 1.70 - 7.00 10*3/mm3    Lymphocytes, Absolute 0.91 0.70 - 3.10 10*3/mm3    Monocytes, Absolute 0.66 0.10 - 0.90 10*3/mm3    Eosinophils, Absolute 0.12 0.00 - 0.40 10*3/mm3    Basophils, Absolute 0.03 0.00 - 0.20 10*3/mm3    Immature Grans, Absolute 0.04 0.00 - 0.05 10*3/mm3    nRBC 0.0 0.0 - 0.2 /100 WBC   Green Top (Gel)   Result Value Ref Range    Extra Tube Hold for add-ons.    Lavender Top   Result Value Ref Range    Extra Tube hold for add-on    Gold Top - SST   Result Value Ref Range    Extra Tube Hold for add-ons.    Light Blue Top   Result Value Ref Range    Extra Tube Hold for add-ons.        Assessment & Plan     Diagnoses and all orders for this visit:    1. Cellulitis of great toe of right foot (Primary)  Comments:  Reviewed labs and right foot x-ray from yesterday's ED visit.  Per ADA recommendation will request MRI of right foot  Orders:  -     MRI foot right wo contrast; Future    2. Type 2 diabetes mellitus with chronic kidney disease on chronic dialysis, with  long-term current use of insulin  Comments:  Stable.  Continue dialysis 3 times weekly.  She was placed on Augmentin and doxycycline yesterday and we discussed how to take that considering dialysis    3. Essential hypertension  Comments:  Well-controlled.  Continue amlodipine and metoprolol    4. PVD (peripheral vascular disease) with claudication  Comments:  Additional tests may be needed and input from vascular provider    I spent 51 minutes caring for Britta on this date of service. This time includes time spent by me in the following activities:preparing for the visit, reviewing tests, obtaining and/or reviewing a separately obtained history, performing a medically appropriate examination and/or evaluation , counseling and educating the patient/family/caregiver, ordering medications, tests, or procedures, documenting information in the medical record, independently interpreting results and communicating that information with the patient/family/caregiver, and care coordination    Follow Up Scheduled her follow-up with her PCP, Lexie Dolan PA-C in 10 days  Findings and recommendations discussed with Britta. Reviewed [facility] ED records April 17, 2024.  Treatment options reviewed.  Per ADA recommendations will request MRI of right foot.  Counseled regarding supportive care measures.  Signs and symptoms of concern reviewed and if occur to seek further evaluation or if symptoms worsen or do not improve.   Britta was given instructions and counseling regarding her condition or for health maintenance advice. Please see specific information pulled into the AVS if appropriate.      This document has been electronically signed by:

## 2024-04-22 LAB
BACTERIA SPEC AEROBE CULT: NORMAL
BACTERIA SPEC AEROBE CULT: NORMAL

## 2024-04-25 ENCOUNTER — OFFICE VISIT (OUTPATIENT)
Dept: SURGERY | Facility: CLINIC | Age: 79
End: 2024-04-25
Payer: MEDICARE

## 2024-04-25 DIAGNOSIS — L98.9 SKIN LESION OF FACE: Primary | ICD-10-CM

## 2024-04-25 RX ORDER — SODIUM CHLORIDE 0.9 % (FLUSH) 0.9 %
10 SYRINGE (ML) INJECTION AS NEEDED
OUTPATIENT
Start: 2024-04-25

## 2024-04-25 RX ORDER — SODIUM CHLORIDE 9 MG/ML
40 INJECTION, SOLUTION INTRAVENOUS AS NEEDED
OUTPATIENT
Start: 2024-04-25

## 2024-04-25 RX ORDER — SODIUM CHLORIDE 0.9 % (FLUSH) 0.9 %
3 SYRINGE (ML) INJECTION EVERY 12 HOURS SCHEDULED
OUTPATIENT
Start: 2024-04-25

## 2024-04-25 NOTE — PROGRESS NOTES
Date of Service: 4/25/2024    Subjective   Britta Lee is a 78 y.o. female is being seen for consultation for right face skin lesion today at the request of Lexie Dolan PA    Chief complaint: Right face skin lesion    Britta Lee is a 78 y.o.  female, on Monday/Wednesday/Friday hemodialysis for the past year, who presents to my office with a right facial skin lesion that is progressively increased in size.  She denies ulceration or bleeding.  She had a prior nonmalignant lesion removed from the left side of her face.    Past Medical History:   Diagnosis Date    Acquired hypothyroidism 4/22/2021    Anemia     Arthritis     Carpal tunnel syndrome     Coronary artery disease     Diabetes mellitus     Elevated cholesterol     GERD (gastroesophageal reflux disease)     History of pneumonia     History of unsteady gait     OCASSIONALLY    Hypertension     Insomnia     Kidney disease     LENNY (obstructive sleep apnea) 12/1/2020    Osteoarthritis     Renal insufficiency     Sciatica        Past Surgical History:   Procedure Laterality Date    ABDOMINAL SURGERY      APPENDECTOMY      CARDIAC CATHETERIZATION      1 STENT  ---  2000    CARDIAC SURGERY      CAROTID STENT      CARPAL TUNNEL RELEASE Right 10/8/2019    Procedure: CARPAL TUNNEL RELEASE;  Surgeon: Feroz Johnson MD;  [facility];  Service: Orthopedics    CATARACT EXTRACTION      CHOLECYSTECTOMY      COLONOSCOPY      COLONOSCOPY N/A 11/23/2021    Procedure: COLONOSCOPY FOR SCREENING;  Surgeon: Palmira Pate MD;  Location: [facility] OR;  Service: Gastroenterology;  Laterality: N/A;    CORONARY ANGIOPLASTY WITH STENT PLACEMENT      ENDOSCOPY      ENDOSCOPY N/A 3/5/2020    Procedure: ESOPHAGOGASTRODUODENOSCOPY;  Surgeon: Alexandru Bagley MD;  Location: [facility] OR;  Service: Gastroenterology;  Laterality: N/A;    ENDOSCOPY N/A 11/23/2021    Procedure: ESOPHAGOGASTRODUODENOSCOPY WITH BIOPSY;  Surgeon: Palmira Pate MD;   Location: [facility] OR;  Service: Gastroenterology;  Laterality: N/A;    ENDOSCOPY AND COLONOSCOPY      GALLBLADDER SURGERY      INSERTION HEMODIALYSIS CATHETER N/A 11/14/2022    Procedure: HEMODIALYSIS CATHETER INSERTION;  Surgeon: Ta Blas MD;  Location: [facility] OR;  Service: General;  Laterality: N/A;    JOINT REPLACEMENT Left 05/02/2017    [facility]  DR JOHNSON  LEFT TOTAL KNEE    KNEE ARTHROSCOPY W/ MENISCECTOMY Right     LAPAROSCOPIC TUBAL LIGATION      PA ARTHRP KNE CONDYLE&PLATU MEDIAL&LAT COMPARTMENTS Left 5/2/2017    Procedure: TOTAL KNEE ARTHROPLASTY;  Surgeon: Feroz Johnson MD;  Location: [facility] OR;  Service: Orthopedics    STERILIZATION      TONSILLECTOMY           Family History   Problem Relation Age of Onset    Arthritis Mother     Diabetes Mother     Cancer Mother     Heart disease Father     Diabetes Daughter     Diabetes Son     Diabetes Maternal Aunt     Heart disease Maternal Grandmother     Breast cancer Neg Hx          Social History     Socioeconomic History    Marital status:      Spouse name: natalie    Number of children: 3    Years of education: 12   Tobacco Use    Smoking status: Never     Passive exposure: Past    Smokeless tobacco: Never   Vaping Use    Vaping status: Never Used   Substance and Sexual Activity    Alcohol use: Yes     Comment: socially    Drug use: No    Sexual activity: Defer     Birth control/protection: Surgical                Review of Systems  14 point review of systems negative except for what is noted in HPI      Objective     Physical Exam:  There were no vitals filed for this visit. There is no height or weight on file to calculate BMI.  Constitution: There were no vitals taken for this visit. . No acute distress   Head: Normocephalic, atraumatic.  1 cm diameter keratotic skin lesion of the right mid cheek  Eyes: Aligned without strabismus. Conjunctivae noninjected   Ears, Nose, Mouth: Poor dentition, no lesions appreciated   Respiratory:  Symmetric chest expansion. No respiratory distress.   Skin:  No cyanosis, clubbing or edema bilaterally    Lymphatics: No abnormal cervical or supraclavicular adenopathy  Neurologic: No gross deficits.  Alert and oriented x3  Psychiatric: Normal mood              Britta Lee is a 78 y.o.  female with skin lesion of the right mid cheek    Risks and benefits of excision in the operating room discussed with the patient and she elects proceed                       Ta Blas MD  [facility] Surgery

## 2024-04-25 NOTE — H&P (VIEW-ONLY)
Date of Service: 4/25/2024    Subjective   Britta Lee is a 78 y.o. female is being seen for consultation for right face skin lesion today at the request of Lexie Dolan PA    Chief complaint: Right face skin lesion    Britta Lee is a 78 y.o.  female, on Monday/Wednesday/Friday hemodialysis for the past year, who presents to my office with a right facial skin lesion that is progressively increased in size.  She denies ulceration or bleeding.  She had a prior nonmalignant lesion removed from the left side of her face.    Past Medical History:   Diagnosis Date    Acquired hypothyroidism 4/22/2021    Anemia     Arthritis     Carpal tunnel syndrome     Coronary artery disease     Diabetes mellitus     Elevated cholesterol     GERD (gastroesophageal reflux disease)     History of pneumonia     History of unsteady gait     OCASSIONALLY    Hypertension     Insomnia     Kidney disease     LENNY (obstructive sleep apnea) 12/1/2020    Osteoarthritis     Renal insufficiency     Sciatica        Past Surgical History:   Procedure Laterality Date    ABDOMINAL SURGERY      APPENDECTOMY      CARDIAC CATHETERIZATION      1 STENT  ---  2000    CARDIAC SURGERY      CAROTID STENT      CARPAL TUNNEL RELEASE Right 10/8/2019    Procedure: CARPAL TUNNEL RELEASE;  Surgeon: Feroz Johnson MD;  Location: [facility];  Service: Orthopedics    CATARACT EXTRACTION      CHOLECYSTECTOMY      COLONOSCOPY      COLONOSCOPY N/A 11/23/2021    Procedure: COLONOSCOPY FOR SCREENING;  Surgeon: Palmira Pate MD;  Location: [facility] OR;  Service: Gastroenterology;  Laterality: N/A;    CORONARY ANGIOPLASTY WITH STENT PLACEMENT      ENDOSCOPY      ENDOSCOPY N/A 3/5/2020    Procedure: ESOPHAGOGASTRODUODENOSCOPY;  Surgeon: Alexandru Bagley MD;  Location: [facility] OR;  Service: Gastroenterology;  Laterality: N/A;    ENDOSCOPY N/A 11/23/2021    Procedure: ESOPHAGOGASTRODUODENOSCOPY WITH BIOPSY;  Surgeon: Palmira Pate MD;   Location: [facility] OR;  Service: Gastroenterology;  Laterality: N/A;    ENDOSCOPY AND COLONOSCOPY      GALLBLADDER SURGERY      INSERTION HEMODIALYSIS CATHETER N/A 11/14/2022    Procedure: HEMODIALYSIS CATHETER INSERTION;  Surgeon: Ta Blas MD;  Location: [facility] OR;  Service: General;  Laterality: N/A;    JOINT REPLACEMENT Left 05/02/2017    [facility]  DR JOHNSON  LEFT TOTAL KNEE    KNEE ARTHROSCOPY W/ MENISCECTOMY Right     LAPAROSCOPIC TUBAL LIGATION      AR ARTHRP KNE CONDYLE&PLATU MEDIAL&LAT COMPARTMENTS Left 5/2/2017    Procedure: TOTAL KNEE ARTHROPLASTY;  Surgeon: Feroz Johnson MD;  Location: [facility] OR;  Service: Orthopedics    STERILIZATION      TONSILLECTOMY           Family History   Problem Relation Age of Onset    Arthritis Mother     Diabetes Mother     Cancer Mother     Heart disease Father     Diabetes Daughter     Diabetes Son     Diabetes Maternal Aunt     Heart disease Maternal Grandmother     Breast cancer Neg Hx          Social History     Socioeconomic History    Marital status:      Spouse name: natalie    Number of children: 3    Years of education: 12   Tobacco Use    Smoking status: Never     Passive exposure: Past    Smokeless tobacco: Never   Vaping Use    Vaping status: Never Used   Substance and Sexual Activity    Alcohol use: Yes     Comment: socially    Drug use: No    Sexual activity: Defer     Birth control/protection: Surgical                Review of Systems  14 point review of systems negative except for what is noted in HPI      Objective     Physical Exam:  There were no vitals filed for this visit. There is no height or weight on file to calculate BMI.  Constitution: There were no vitals taken for this visit. . No acute distress   Head: Normocephalic, atraumatic.  1 cm diameter keratotic skin lesion of the right mid cheek  Eyes: Aligned without strabismus. Conjunctivae noninjected   Ears, Nose, Mouth: Poor dentition, no lesions appreciated   Respiratory:  Symmetric chest expansion. No respiratory distress.   Skin:  No cyanosis, clubbing or edema bilaterally    Lymphatics: No abnormal cervical or supraclavicular adenopathy  Neurologic: No gross deficits.  Alert and oriented x3  Psychiatric: Normal mood              Britta Lee is a 78 y.o.  female with skin lesion of the right mid cheek    Risks and benefits of excision in the operating room discussed with the patient and she elects proceed                       Ta Blas MD  [facility] Surgery

## 2024-04-26 PROBLEM — L98.9 SKIN LESION OF FACE: Status: ACTIVE | Noted: 2024-04-25

## 2024-04-30 ENCOUNTER — ANESTHESIA EVENT (OUTPATIENT)
Dept: PERIOP | Facility: HOSPITAL | Age: 79
End: 2024-04-30
Payer: MEDICARE

## 2024-04-30 ENCOUNTER — ANESTHESIA (OUTPATIENT)
Dept: PERIOP | Facility: HOSPITAL | Age: 79
End: 2024-04-30
Payer: MEDICARE

## 2024-04-30 ENCOUNTER — HOSPITAL ENCOUNTER (OUTPATIENT)
Facility: HOSPITAL | Age: 79
Setting detail: HOSPITAL OUTPATIENT SURGERY
Discharge: HOME | End: 2024-04-30
Attending: SURGERY | Admitting: SURGERY
Payer: MEDICARE

## 2024-04-30 VITALS
HEART RATE: 79 BPM | RESPIRATION RATE: 18 BRPM | SYSTOLIC BLOOD PRESSURE: 124 MMHG | OXYGEN SATURATION: 97 % | BODY MASS INDEX: 28 KG/M2 | TEMPERATURE: 97.4 F | HEIGHT: 63 IN | DIASTOLIC BLOOD PRESSURE: 65 MMHG | WEIGHT: 158 LBS

## 2024-04-30 DIAGNOSIS — L98.9 SKIN LESION OF FACE: ICD-10-CM

## 2024-04-30 LAB
GLUCOSE BLDC GLUCOMTR-MCNC: 141 MG/DL (ref 70–130)
GLUCOSE BLDC GLUCOMTR-MCNC: 146 MG/DL (ref 70–130)

## 2024-04-30 PROCEDURE — 25010000002 BUPIVACAINE 0.5 % SOLUTION: Performed by: SURGERY

## 2024-04-30 PROCEDURE — 82948 REAGENT STRIP/BLOOD GLUCOSE: CPT

## 2024-04-30 PROCEDURE — 25010000002 ONDANSETRON PER 1 MG: Performed by: NURSE ANESTHETIST, CERTIFIED REGISTERED

## 2024-04-30 PROCEDURE — 88305 TISSUE EXAM BY PATHOLOGIST: CPT

## 2024-04-30 PROCEDURE — 11642 EXC F/E/E/N/L MAL+MRG 1.1-2: CPT | Performed by: SURGERY

## 2024-04-30 PROCEDURE — 25810000003 SODIUM CHLORIDE 0.9 % SOLUTION: Performed by: NURSE ANESTHETIST, CERTIFIED REGISTERED

## 2024-04-30 PROCEDURE — 25010000002 PROPOFOL 200 MG/20ML EMULSION: Performed by: NURSE ANESTHETIST, CERTIFIED REGISTERED

## 2024-04-30 PROCEDURE — 25010000002 CEFAZOLIN PER 500 MG: Performed by: SURGERY

## 2024-04-30 RX ORDER — OXYCODONE HYDROCHLORIDE AND ACETAMINOPHEN 5; 325 MG/1; MG/1
1 TABLET ORAL ONCE AS NEEDED
Status: DISCONTINUED | OUTPATIENT
Start: 2024-04-30 | End: 2024-04-30 | Stop reason: HOSPADM

## 2024-04-30 RX ORDER — SODIUM CHLORIDE 0.9 % (FLUSH) 0.9 %
10 SYRINGE (ML) INJECTION EVERY 12 HOURS SCHEDULED
Status: DISCONTINUED | OUTPATIENT
Start: 2024-04-30 | End: 2024-04-30 | Stop reason: HOSPADM

## 2024-04-30 RX ORDER — SODIUM CHLORIDE 9 MG/ML
INJECTION, SOLUTION INTRAVENOUS CONTINUOUS PRN
Status: DISCONTINUED | OUTPATIENT
Start: 2024-04-30 | End: 2024-04-30 | Stop reason: SURG

## 2024-04-30 RX ORDER — PROPOFOL 10 MG/ML
INJECTION, EMULSION INTRAVENOUS AS NEEDED
Status: DISCONTINUED | OUTPATIENT
Start: 2024-04-30 | End: 2024-04-30 | Stop reason: SURG

## 2024-04-30 RX ORDER — SODIUM CHLORIDE, SODIUM LACTATE, POTASSIUM CHLORIDE, CALCIUM CHLORIDE 600; 310; 30; 20 MG/100ML; MG/100ML; MG/100ML; MG/100ML
125 INJECTION, SOLUTION INTRAVENOUS ONCE
Status: DISCONTINUED | OUTPATIENT
Start: 2024-04-30 | End: 2024-04-30 | Stop reason: HOSPADM

## 2024-04-30 RX ORDER — ONDANSETRON 2 MG/ML
4 INJECTION INTRAMUSCULAR; INTRAVENOUS AS NEEDED
Status: DISCONTINUED | OUTPATIENT
Start: 2024-04-30 | End: 2024-04-30 | Stop reason: HOSPADM

## 2024-04-30 RX ORDER — SODIUM CHLORIDE 9 MG/ML
40 INJECTION, SOLUTION INTRAVENOUS AS NEEDED
Status: DISCONTINUED | OUTPATIENT
Start: 2024-04-30 | End: 2024-04-30 | Stop reason: HOSPADM

## 2024-04-30 RX ORDER — BUPIVACAINE HYDROCHLORIDE 5 MG/ML
INJECTION, SOLUTION PERINEURAL AS NEEDED
Status: DISCONTINUED | OUTPATIENT
Start: 2024-04-30 | End: 2024-04-30 | Stop reason: HOSPADM

## 2024-04-30 RX ORDER — EPHEDRINE SULFATE 5 MG/ML
INJECTION INTRAVENOUS AS NEEDED
Status: DISCONTINUED | OUTPATIENT
Start: 2024-04-30 | End: 2024-04-30 | Stop reason: SURG

## 2024-04-30 RX ORDER — IPRATROPIUM BROMIDE AND ALBUTEROL SULFATE 2.5; .5 MG/3ML; MG/3ML
3 SOLUTION RESPIRATORY (INHALATION) ONCE AS NEEDED
Status: DISCONTINUED | OUTPATIENT
Start: 2024-04-30 | End: 2024-04-30 | Stop reason: HOSPADM

## 2024-04-30 RX ORDER — IBUPROFEN 600 MG/1
600 TABLET ORAL EVERY 6 HOURS PRN
Qty: 20 TABLET | Refills: 0 | Status: SHIPPED | OUTPATIENT
Start: 2024-04-30 | End: 2025-04-30

## 2024-04-30 RX ORDER — SODIUM CHLORIDE 0.9 % (FLUSH) 0.9 %
10 SYRINGE (ML) INJECTION AS NEEDED
Status: DISCONTINUED | OUTPATIENT
Start: 2024-04-30 | End: 2024-04-30 | Stop reason: HOSPADM

## 2024-04-30 RX ORDER — SODIUM CHLORIDE, SODIUM LACTATE, POTASSIUM CHLORIDE, CALCIUM CHLORIDE 600; 310; 30; 20 MG/100ML; MG/100ML; MG/100ML; MG/100ML
100 INJECTION, SOLUTION INTRAVENOUS ONCE AS NEEDED
Status: DISCONTINUED | OUTPATIENT
Start: 2024-04-30 | End: 2024-04-30 | Stop reason: HOSPADM

## 2024-04-30 RX ORDER — SODIUM CHLORIDE 0.9 % (FLUSH) 0.9 %
3 SYRINGE (ML) INJECTION EVERY 12 HOURS SCHEDULED
Status: DISCONTINUED | OUTPATIENT
Start: 2024-04-30 | End: 2024-04-30 | Stop reason: HOSPADM

## 2024-04-30 RX ORDER — FAMOTIDINE 10 MG/ML
INJECTION, SOLUTION INTRAVENOUS AS NEEDED
Status: DISCONTINUED | OUTPATIENT
Start: 2024-04-30 | End: 2024-04-30 | Stop reason: SURG

## 2024-04-30 RX ORDER — ONDANSETRON 2 MG/ML
INJECTION INTRAMUSCULAR; INTRAVENOUS AS NEEDED
Status: DISCONTINUED | OUTPATIENT
Start: 2024-04-30 | End: 2024-04-30 | Stop reason: SURG

## 2024-04-30 RX ORDER — LIDOCAINE HYDROCHLORIDE 20 MG/ML
INJECTION, SOLUTION EPIDURAL; INFILTRATION; INTRACAUDAL; PERINEURAL AS NEEDED
Status: DISCONTINUED | OUTPATIENT
Start: 2024-04-30 | End: 2024-04-30 | Stop reason: SURG

## 2024-04-30 RX ORDER — FENTANYL CITRATE 50 UG/ML
50 INJECTION, SOLUTION INTRAMUSCULAR; INTRAVENOUS
Status: DISCONTINUED | OUTPATIENT
Start: 2024-04-30 | End: 2024-04-30 | Stop reason: HOSPADM

## 2024-04-30 RX ADMIN — LIDOCAINE HYDROCHLORIDE 40 MG: 20 INJECTION, SOLUTION EPIDURAL; INFILTRATION; INTRACAUDAL; PERINEURAL at 09:39

## 2024-04-30 RX ADMIN — PROPOFOL 60 MG: 10 INJECTION, EMULSION INTRAVENOUS at 09:39

## 2024-04-30 RX ADMIN — EPHEDRINE SULFATE 10 MG: 5 INJECTION INTRAVENOUS at 10:12

## 2024-04-30 RX ADMIN — PROPOFOL 40 MG: 10 INJECTION, EMULSION INTRAVENOUS at 09:53

## 2024-04-30 RX ADMIN — CEFAZOLIN 2000 MG: 2 INJECTION, POWDER, FOR SOLUTION INTRAMUSCULAR; INTRAVENOUS at 09:45

## 2024-04-30 RX ADMIN — FAMOTIDINE 20 MG: 10 INJECTION, SOLUTION INTRAVENOUS at 09:39

## 2024-04-30 RX ADMIN — SODIUM CHLORIDE: 9 INJECTION, SOLUTION INTRAVENOUS at 09:35

## 2024-04-30 RX ADMIN — ONDANSETRON 4 MG: 2 INJECTION INTRAMUSCULAR; INTRAVENOUS at 09:39

## 2024-04-30 NOTE — ANESTHESIA PROCEDURE NOTES
Airway  Urgency: elective    Date/Time: 4/30/2024 9:40 AM    General Information and Staff    Patient location during procedure: OR    Indications and Patient Condition    Preoxygenated: yes  Mask difficulty assessment: 0 - not attempted    Final Airway Details  Final airway type: supraglottic airway      Successful airway: LMA  Size 4     Number of attempts at approach: 1  Assessment: lips, teeth, and gum same as pre-op

## 2024-04-30 NOTE — ANESTHESIA POSTPROCEDURE EVALUATION
Patient: Britta Lee    Procedure Summary       Date: 04/30/24 Room / Location: [facility] OR 04 /  [facility] OR    Anesthesia Start: 0935 Anesthesia Stop: 1025    Procedure: FACIAL LESION/CYST EXCISION (Right) Diagnosis:       Skin lesion of face      (Skin lesion of face [L98.9])    Surgeons: Ta Blas MD Provider: Alex Long MD    Anesthesia Type: MAC, general ASA Status: 3            Anesthesia Type: MAC, general    Vitals  Vitals Value Taken Time   /58 04/30/24 1048   Temp 97.2 °F (36.2 °C) 04/30/24 1026   Pulse 70 04/30/24 1053   Resp 16 04/30/24 1046   SpO2 97 % 04/30/24 1053   Vitals shown include unfiled device data.        Post Anesthesia Care and Evaluation    Patient location during evaluation: PHASE II  Patient participation: complete - patient participated  Level of consciousness: awake and alert  Pain score: 0  Pain management: adequate    Airway patency: patent  Anesthetic complications: No anesthetic complications    Cardiovascular status: acceptable  Respiratory status: acceptable  Hydration status: acceptable

## 2024-04-30 NOTE — ANESTHESIA PREPROCEDURE EVALUATION
Anesthesia Evaluation     Patient summary reviewed and Nursing notes reviewed   no history of anesthetic complications:   NPO Solid Status: > 8 hours  NPO Liquid Status: > 8 hours           Airway   Mallampati: III  TM distance: >3 FB  Neck ROM: full  No difficulty expected  Dental    (+) poor dentition        Pulmonary - negative pulmonary ROS and normal exam    breath sounds clear to auscultation  (+) ,sleep apnea  Cardiovascular - negative cardio ROS and normal exam    ECG reviewed  Rhythm: regular  Rate: normal    (+) hypertension 2 medications or greater, CAD, cardiac stents more than 12 months ago , CHF , PVD, hyperlipidemia      Neuro/Psych- negative ROS  (+) numbness, psychiatric history Anxiety  GI/Hepatic/Renal/Endo - negative ROS   (+) obesity, hiatal hernia, GERD, liver disease cirrhosis, renal disease- ESRD and dialysis, diabetes mellitus type 2 poorly controlled, thyroid problem hypothyroidism    Musculoskeletal (-) negative ROS    Abdominal  - normal exam   Substance History - negative use     OB/GYN negative ob/gyn ROS         Other - negative ROS  arthritis,                   Anesthesia Plan    ASA 3     MAC and general     intravenous induction     Anesthetic plan, risks, benefits, and alternatives have been provided, discussed and informed consent has been obtained with: patient.

## 2024-04-30 NOTE — OP NOTE
OPERATIVE NOTE    Patient Name:  Britta Lee  YOB: 1945  [id number]    4/30/2024        PREOPERATIVE DIAGNOSIS: Right facial skin lesion      POSTOPERATIVE DIAGNOSIS: Same       PROCEDURE PERFORMED: Wide local excision of right facial skin lesion       SURGEON: Ta Blas MD       SPECIMENS: Right facial skin lesion (short stitch superior, long stitch lateral       ANESTHESIA: General.  Local      GRAFTS/IMPLANTS: None    FINDINGS:   1.  1.1 x 0.7 cm keratotic, irregular skin lesion of the right mid cheek       INDICATIONS:    The patient is a 78 y.o. female who presented with a right mid cheek facial lesion that has been progressively increasing in size.  Risks and benefits of excision in the operating room discussed with the patient and she elects to proceed     DESCRIPTION OF PROCEDURE:      After obtaining informed consent, the patient was taken to the operating room and placed in the supine position.  General anesthesia was initiated.  SCDs were placed on the patient and turned on.  Preop antibiotics were administered.  Patient was prepped and draped in a sterile manner and a proper timeout was performed    Lesion itself measured 1.1 x 0.7 cm.  Local anesthetic was administered surrounding soft tissue.  Elliptical incision was made with a 15 blade scalpel.  Cautery was used to dissect through the subcutaneous tissues.  Specimen was marked with a short stitch superior and long stitch lateral.  It was then completely excised and sent to pathology.  Hemostasis was achieved electrocautery.  Subcutaneous tissues reapproximated with interrupted 5-0 Vicryl suture.  Skin closed with a running 5-0 subcuticular Monocryl.  Skin glue placed    At the completion of the case, all needle, instrument and lap counts were correct.  Patient was awakened, extubated and taken to the postop anesthesia care unit for recovery.    EBL: Negligible       Ta Blas MD  4/30/2024  10:24 EDT

## 2024-04-30 NOTE — INTERVAL H&P NOTE
H&P reviewed.  The patient was examined and there are no changes to the H&P.  To the OR for right facial lesion removal

## 2024-05-01 ENCOUNTER — HOSPITAL ENCOUNTER (OUTPATIENT)
Dept: MRI IMAGING | Facility: HOSPITAL | Age: 79
Discharge: HOME | End: 2024-05-01
Admitting: NURSE PRACTITIONER
Payer: MEDICARE

## 2024-05-01 DIAGNOSIS — L03.031 CELLULITIS OF GREAT TOE OF RIGHT FOOT: ICD-10-CM

## 2024-05-01 LAB — REF LAB TEST METHOD: NORMAL

## 2024-05-01 PROCEDURE — 73718 MRI LOWER EXTREMITY W/O DYE: CPT

## 2024-05-01 PROCEDURE — 73718 MRI LOWER EXTREMITY W/O DYE: CPT | Performed by: RADIOLOGY

## 2024-05-06 ENCOUNTER — TELEPHONE (OUTPATIENT)
Dept: FAMILY MEDICINE CLINIC | Facility: CLINIC | Age: 79
End: 2024-05-06
Payer: MEDICARE

## 2024-05-06 DIAGNOSIS — M86.171 OTHER ACUTE OSTEOMYELITIS OF RIGHT FOOT: Primary | ICD-10-CM

## 2024-05-06 NOTE — PROGRESS NOTES
Please contact the patient and inform her of MRI right foot results.  Results are suspicious for osteomyelitis of the first toe.  I placed a referral for surgery for further evaluation and treatment

## 2024-05-10 ENCOUNTER — TELEPHONE (OUTPATIENT)
Dept: FAMILY MEDICINE CLINIC | Facility: CLINIC | Age: 79
End: 2024-05-10
Payer: MEDICARE

## 2024-05-23 ENCOUNTER — OFFICE VISIT (OUTPATIENT)
Dept: SURGERY | Facility: CLINIC | Age: 79
End: 2024-05-23
Payer: MEDICARE

## 2024-05-23 DIAGNOSIS — L98.9 SKIN LESION OF FACE: Primary | ICD-10-CM

## 2024-05-23 PROCEDURE — 1159F MED LIST DOCD IN RCRD: CPT | Performed by: SURGERY

## 2024-05-23 PROCEDURE — 99024 POSTOP FOLLOW-UP VISIT: CPT | Performed by: SURGERY

## 2024-05-23 PROCEDURE — 1160F RVW MEDS BY RX/DR IN RCRD: CPT | Performed by: SURGERY

## 2024-05-23 RX ORDER — ASPIRIN 81 MG
1 TABLET, DELAYED RELEASE (ENTERIC COATED) ORAL EVERY 12 HOURS SCHEDULED
COMMUNITY
Start: 2024-05-06

## 2024-05-23 RX ORDER — TORSEMIDE 20 MG/1
2 TABLET ORAL DAILY
COMMUNITY
Start: 2024-05-03

## 2024-05-23 NOTE — PROGRESS NOTES
Patient is following up after excision of right sided facial lesion 4/30.  The patient has been doing well and no complaints about healing.  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  Completely excised.    Patient and daughter report a separate complaint of daily diarrhea and fecal incontinence.  We went over her medications and appears that she is taking docusate-Senokot twice daily.  We discussed that this is a stool softener.  I recommended psyllium fiber supplementation.  Denies melena or hematochezia.  Reports some postprandial vomiting.  We discussed briefly EGD and colonoscopy but the patient would like to try the medication changes first    Follow-up as needed  They will call if she has persistent GI symptoms and we could schedule for EGD and colonoscopy on Tuesday (to avoid conflict with Monday Wednesday Friday dialysis)

## 2024-06-13 ENCOUNTER — LAB (OUTPATIENT)
Dept: FAMILY MEDICINE CLINIC | Facility: CLINIC | Age: 79
End: 2024-06-13
Payer: MEDICARE

## 2024-06-13 DIAGNOSIS — E03.9 ACQUIRED HYPOTHYROIDISM: Chronic | ICD-10-CM

## 2024-06-13 DIAGNOSIS — E55.9 VITAMIN D DEFICIENCY: Chronic | ICD-10-CM

## 2024-06-13 DIAGNOSIS — D50.8 OTHER IRON DEFICIENCY ANEMIA: ICD-10-CM

## 2024-06-13 LAB
25(OH)D3 SERPL-MCNC: 115 NG/ML (ref 30–100)
ALBUMIN SERPL-MCNC: 2.6 G/DL (ref 3.5–5.2)
ALBUMIN/GLOB SERPL: 0.5 G/DL
ALP SERPL-CCNC: 373 U/L (ref 39–117)
ALT SERPL W P-5'-P-CCNC: 14 U/L (ref 1–33)
ANION GAP SERPL CALCULATED.3IONS-SCNC: 12.7 MMOL/L (ref 5–15)
AST SERPL-CCNC: 31 U/L (ref 1–32)
BASOPHILS # BLD AUTO: 0.03 10*3/MM3 (ref 0–0.2)
BASOPHILS NFR BLD AUTO: 0.4 % (ref 0–1.5)
BILIRUB SERPL-MCNC: 0.5 MG/DL (ref 0–1.2)
BUN SERPL-MCNC: 18 MG/DL (ref 8–23)
BUN/CREAT SERPL: 5.4 (ref 7–25)
CALCIUM SPEC-SCNC: 8.7 MG/DL (ref 8.6–10.5)
CHLORIDE SERPL-SCNC: 98 MMOL/L (ref 98–107)
CO2 SERPL-SCNC: 25.3 MMOL/L (ref 22–29)
CREAT SERPL-MCNC: 3.33 MG/DL (ref 0.57–1)
DEPRECATED RDW RBC AUTO: 53.7 FL (ref 37–54)
EGFRCR SERPLBLD CKD-EPI 2021: 13.5 ML/MIN/1.73
EOSINOPHIL # BLD AUTO: 0.09 10*3/MM3 (ref 0–0.4)
EOSINOPHIL NFR BLD AUTO: 1.1 % (ref 0.3–6.2)
ERYTHROCYTE [DISTWIDTH] IN BLOOD BY AUTOMATED COUNT: 19.8 % (ref 12.3–15.4)
GLOBULIN UR ELPH-MCNC: 4.9 GM/DL
GLUCOSE SERPL-MCNC: 131 MG/DL (ref 65–99)
HCT VFR BLD AUTO: 41.1 % (ref 34–46.6)
HGB BLD-MCNC: 11.7 G/DL (ref 12–15.9)
IMM GRANULOCYTES # BLD AUTO: 0.01 10*3/MM3 (ref 0–0.05)
IMM GRANULOCYTES NFR BLD AUTO: 0.1 % (ref 0–0.5)
IRON 24H UR-MRATE: 25 MCG/DL (ref 37–145)
IRON SATN MFR SERPL: 14 % (ref 20–50)
LYMPHOCYTES # BLD AUTO: 1.2 10*3/MM3 (ref 0.7–3.1)
LYMPHOCYTES NFR BLD AUTO: 14.8 % (ref 19.6–45.3)
MCH RBC QN AUTO: 22.5 PG (ref 26.6–33)
MCHC RBC AUTO-ENTMCNC: 28.5 G/DL (ref 31.5–35.7)
MCV RBC AUTO: 79 FL (ref 79–97)
MONOCYTES # BLD AUTO: 0.77 10*3/MM3 (ref 0.1–0.9)
MONOCYTES NFR BLD AUTO: 9.5 % (ref 5–12)
NEUTROPHILS NFR BLD AUTO: 6 10*3/MM3 (ref 1.7–7)
NEUTROPHILS NFR BLD AUTO: 74.1 % (ref 42.7–76)
NRBC BLD AUTO-RTO: 0 /100 WBC (ref 0–0.2)
PLATELET # BLD AUTO: 163 10*3/MM3 (ref 140–450)
PMV BLD AUTO: 8.8 FL (ref 6–12)
POTASSIUM SERPL-SCNC: 3.3 MMOL/L (ref 3.5–5.2)
PROT SERPL-MCNC: 7.5 G/DL (ref 6–8.5)
RBC # BLD AUTO: 5.2 10*6/MM3 (ref 3.77–5.28)
SODIUM SERPL-SCNC: 136 MMOL/L (ref 136–145)
T4 FREE SERPL-MCNC: 1.3 NG/DL (ref 0.92–1.68)
TIBC SERPL-MCNC: 179 MCG/DL (ref 298–536)
TRANSFERRIN SERPL-MCNC: 120 MG/DL (ref 200–360)
TSH SERPL DL<=0.05 MIU/L-ACNC: 7.05 UIU/ML (ref 0.27–4.2)
WBC NRBC COR # BLD AUTO: 8.1 10*3/MM3 (ref 3.4–10.8)

## 2024-06-13 PROCEDURE — 36415 COLL VENOUS BLD VENIPUNCTURE: CPT | Performed by: PHYSICIAN ASSISTANT

## 2024-06-13 PROCEDURE — 84466 ASSAY OF TRANSFERRIN: CPT | Performed by: PHYSICIAN ASSISTANT

## 2024-06-13 PROCEDURE — 85025 COMPLETE CBC W/AUTO DIFF WBC: CPT | Performed by: PHYSICIAN ASSISTANT

## 2024-06-13 PROCEDURE — 82306 VITAMIN D 25 HYDROXY: CPT | Performed by: PHYSICIAN ASSISTANT

## 2024-06-13 PROCEDURE — 84439 ASSAY OF FREE THYROXINE: CPT | Performed by: PHYSICIAN ASSISTANT

## 2024-06-13 PROCEDURE — 82747 ASSAY OF FOLIC ACID RBC: CPT | Performed by: PHYSICIAN ASSISTANT

## 2024-06-13 PROCEDURE — 80053 COMPREHEN METABOLIC PANEL: CPT | Performed by: PHYSICIAN ASSISTANT

## 2024-06-13 PROCEDURE — 83540 ASSAY OF IRON: CPT | Performed by: PHYSICIAN ASSISTANT

## 2024-06-13 PROCEDURE — 85014 HEMATOCRIT: CPT | Performed by: PHYSICIAN ASSISTANT

## 2024-06-13 PROCEDURE — 84443 ASSAY THYROID STIM HORMONE: CPT | Performed by: PHYSICIAN ASSISTANT

## 2024-06-14 LAB
FOLATE BLD-MCNC: >620 NG/ML
FOLATE RBC-MCNC: >1498 NG/ML
HCT VFR BLD AUTO: 41.4 % (ref 34–46.6)

## 2024-06-20 ENCOUNTER — OFFICE VISIT (OUTPATIENT)
Dept: FAMILY MEDICINE CLINIC | Facility: CLINIC | Age: 79
End: 2024-06-20
Payer: MEDICARE

## 2024-06-20 VITALS
HEART RATE: 70 BPM | OXYGEN SATURATION: 98 % | DIASTOLIC BLOOD PRESSURE: 70 MMHG | WEIGHT: 167 LBS | SYSTOLIC BLOOD PRESSURE: 100 MMHG | BODY MASS INDEX: 29.59 KG/M2 | TEMPERATURE: 97.5 F | HEIGHT: 63 IN

## 2024-06-20 DIAGNOSIS — Z00.00 MEDICARE ANNUAL WELLNESS VISIT, SUBSEQUENT: Primary | ICD-10-CM

## 2024-06-20 DIAGNOSIS — Z91.81 AT MODERATE RISK FOR FALL: Chronic | ICD-10-CM

## 2024-06-20 DIAGNOSIS — L89.322 PRESSURE INJURY OF LEFT BUTTOCK, STAGE 2: Chronic | ICD-10-CM

## 2024-06-20 DIAGNOSIS — F33.1 MODERATE EPISODE OF RECURRENT MAJOR DEPRESSIVE DISORDER: Chronic | ICD-10-CM

## 2024-06-20 DIAGNOSIS — M86.171 OTHER ACUTE OSTEOMYELITIS OF RIGHT FOOT: ICD-10-CM

## 2024-06-20 PROBLEM — M86.9 PYOGENIC INFLAMMATION OF BONE: Status: ACTIVE | Noted: 2024-06-20

## 2024-06-20 PROCEDURE — 3074F SYST BP LT 130 MM HG: CPT | Performed by: PHYSICIAN ASSISTANT

## 2024-06-20 PROCEDURE — 1159F MED LIST DOCD IN RCRD: CPT | Performed by: PHYSICIAN ASSISTANT

## 2024-06-20 PROCEDURE — 1160F RVW MEDS BY RX/DR IN RCRD: CPT | Performed by: PHYSICIAN ASSISTANT

## 2024-06-20 PROCEDURE — 99214 OFFICE O/P EST MOD 30 MIN: CPT | Performed by: PHYSICIAN ASSISTANT

## 2024-06-20 PROCEDURE — G0439 PPPS, SUBSEQ VISIT: HCPCS | Performed by: PHYSICIAN ASSISTANT

## 2024-06-20 PROCEDURE — 1170F FXNL STATUS ASSESSED: CPT | Performed by: PHYSICIAN ASSISTANT

## 2024-06-20 PROCEDURE — 3078F DIAST BP <80 MM HG: CPT | Performed by: PHYSICIAN ASSISTANT

## 2024-06-20 RX ORDER — ESCITALOPRAM OXALATE 20 MG/1
20 TABLET ORAL DAILY
Qty: 30 TABLET | Refills: 5 | Status: SHIPPED | OUTPATIENT
Start: 2024-06-20

## 2024-06-20 NOTE — PATIENT INSTRUCTIONS
Carbohydrate Counting for Diabetes Mellitus, Adult  Carbohydrate counting is a method of keeping track of how many carbohydrates you eat. Eating carbohydrates increases the amount of sugar (glucose) in the blood. Counting how many carbohydrates you eat improves how well you manage your blood glucose. This, in turn, helps you manage your diabetes.  Carbohydrates are measured in grams (g) per serving. It is important to know how many carbohydrates (in grams or by serving size) you can have in each meal. This is different for every person. A dietitian can help you make a meal plan and calculate how many carbohydrates you should have at each meal and snack.  What foods contain carbohydrates?  Carbohydrates are found in the following foods:  Grains, such as breads and cereals.  Dried beans and soy products.  Starchy vegetables, such as potatoes, peas, and corn.  Fruit and fruit juices.  Milk and yogurt.  Sweets and snack foods, such as cake, cookies, candy, chips, and soft drinks.  How do I count carbohydrates in foods?  There are two ways to count carbohydrates in food. You can read food labels or learn standard serving sizes of foods. You can use either of these methods or a combination of both.  Using the Nutrition Facts label  The Nutrition Facts list is included on the labels of almost all packaged foods and beverages in the [place]. It includes:  The serving size.  Information about nutrients in each serving, including the grams of carbohydrate per serving.  To use the Nutrition Facts, decide how many servings you will have. Then, multiply the number of servings by the number of carbohydrates per serving. The resulting number is the total grams of carbohydrates that you will be having.  Learning the standard serving sizes of foods  When you eat carbohydrate foods that are not packaged or do not include Nutrition Facts on the label, you need to measure the servings in order to count the grams of  carbohydrates.  Measure the foods that you will eat with a food scale or measuring cup, if needed.  Decide how many standard-size servings you will eat.  Multiply the number of servings by 15. For foods that contain carbohydrates, one serving equals 15 g of carbohydrates.  For example, if you eat 2 cups or 10 oz (300 g) of strawberries, you will have eaten 2 servings and 30 g of carbohydrates (2 servings x 15 g = 30 g).  For foods that have more than one food mixed, such as soups and casseroles, you must count the carbohydrates in each food that is included.  The following list contains standard serving sizes of common carbohydrate-rich foods. Each of these servings has about 15 g of carbohydrates:  1 slice of bread.  1 six-inch (15 cm) tortilla.  ? cup or 2 oz (53 g) cooked rice or pasta.  ½ cup or 3 oz (85 g) cooked or canned, drained and rinsed beans or lentils.  ½ cup or 3 oz (85 g) starchy vegetable, such as peas, corn, or squash.  ½ cup or 4 oz (120 g) hot cereal.  ½ cup or 3 oz (85 g) boiled or mashed potatoes, or ¼ or 3 oz (85 g) of a large baked potato.  ½ cup or 4 fl oz (118 mL) fruit juice.  1 cup or 8 fl oz (237 mL) milk.  1 small or 4 oz (106 g) apple.  ½ or 2 oz (63 g) of a medium banana.  1 cup or 5 oz (150 g) strawberries.  3 cups or 1 oz (28.3 g) popped popcorn.  What is an example of carbohydrate counting?  To calculate the grams of carbohydrates in this sample meal, follow the steps shown below.  Sample meal  3 oz (85 g) chicken breast.  ? cup or 4 oz (106 g) brown rice.  ½ cup or 3 oz (85 g) corn.  1 cup or 8 fl oz (237 mL) milk.  1 cup or 5 oz (150 g) strawberries with sugar-free whipped topping.  Carbohydrate calculation  Identify the foods that contain carbohydrates:  Rice.  Corn.  Milk.  Strawberries.  Calculate how many servings you have of each food:  2 servings rice.  1 serving corn.  1 serving milk.  1 serving strawberries.  Multiply each number of servings by 15  servings rice x 15  g = 30 g.  1 serving corn x 15 g = 15 g.  1 serving milk x 15 g = 15 g.  1 serving strawberries x 15 g = 15 g.  Add together all of the amounts to find the total grams of carbohydrates eaten:  30 g + 15 g + 15 g + 15 g = 75 g of carbohydrates total.  What are tips for following this plan?  Shopping  Develop a meal plan and then make a shopping list.  Buy fresh and frozen vegetables, fresh and frozen fruit, dairy, eggs, beans, lentils, and whole grains.  Look at food labels. Choose foods that have more fiber and less sugar.  Avoid processed foods and foods with added sugars.  Meal planning  Aim to have the same number of grams of carbohydrates at each meal and for each snack time.  Plan to have regular, balanced meals and snacks.  Where to find more information  American Diabetes Association: diabetes.org  Centers for Disease Control and Prevention: cdc.gov  Academy of Nutrition and Dietetics: eatright.org  Association of Diabetes Care & Education Specialists: diabeteseducator.org  Summary  Carbohydrate counting is a method of keeping track of how many carbohydrates you eat.  Eating carbohydrates increases the amount of sugar (glucose) in your blood.  Counting how many carbohydrates you eat improves how well you manage your blood glucose. This helps you manage your diabetes.  A dietitian can help you make a meal plan and calculate how many carbohydrates you should have at each meal and snack.  This information is not intended to replace advice given to you by your health care provider. Make sure you discuss any questions you have with your health care provider.  Document Revised: 07/21/2021 Document Reviewed: 07/21/2021  [employer] Patient Education © 2024 [email or web address]  Fall Prevention in the Home, Adult  Falls can cause injuries and can happen to people of all ages. There are many things you can do to make your home safer and to help prevent falls.  What actions can I take to prevent falls?  General information  Use  good lighting in all rooms. Make sure to:  Replace any light bulbs that burn out.  Turn on the lights in dark areas and use night-lights.  Keep items that you use often in easy-to-reach places. Lower the shelves around your home if needed.  Move furniture so that there are clear paths around it.  Do not use throw rugs or other things on the floor that can make you trip.  If any of your floors are uneven, fix them.  Add color or contrast paint or tape to clearly eder and help you see:  Grab bars or handrails.  First and last steps of staircases.  Where the edge of each step is.  If you use a ladder or stepladder:  Make sure that it is fully opened. Do not climb a closed ladder.  Make sure the sides of the ladder are locked in place.  Have someone hold the ladder while you use it.  Know where your pets are as you move through your home.  What can I do in the bathroom?         Keep the floor dry. Clean up any water on the floor right away.  Remove soap buildup in the bathtub or shower. Buildup makes bathtubs and showers slippery.  Use non-skid mats or decals on the floor of the bathtub or shower.  Attach bath mats securely with double-sided, non-slip rug tape.  If you need to sit down in the shower, use a non-slip stool.  Install grab bars by the toilet and in the bathtub and shower. Do not use towel bars as grab bars.  What can I do in the bedroom?  Make sure that you have a light by your bed that is easy to reach.  Do not use any sheets or blankets on your bed that hang to the floor.  Have a firm chair or bench with side arms that you can use for support when you get dressed.  What can I do in the kitchen?  Clean up any spills right away.  If you need to reach something above you, use a step stool with a grab bar.  Keep electrical cords out of the way.  Do not use floor polish or wax that makes floors slippery.  What can I do with my stairs?  Do not leave anything on the stairs.  Make sure that you have a light  switch at the top and the bottom of the stairs.  Make sure that there are handrails on both sides of the stairs. Fix handrails that are broken or loose.  Install non-slip stair treads on all your stairs if they do not have carpet.  Avoid having throw rugs at the top or bottom of the stairs.  Choose a carpet that does not hide the edge of the steps on the stairs. Make sure that the carpet is firmly attached to the stairs. Fix carpet that is loose or worn.  What can I do on the outside of my home?  Use bright outdoor lighting.  Fix the edges of walkways and driveways and fix any cracks. Clear paths of anything that can make you trip, such as tools or rocks.  Add color or contrast paint or tape to clearly eder and help you see anything that might make you trip as you walk through a door, such as a raised step or threshold.  Trim any bushes or trees on paths to your home.  Check to see if handrails are loose or broken and that both sides of all steps have handrails. Install guardrails along the edges of any raised decks and porches.  Have leaves, snow, or ice cleared regularly. Use sand, salt, or ice melter on paths if you live where there is ice and snow during the winter.  Clean up any spills in your garage right away. This includes grease or oil spills.  What other actions can I take?  Review your medicines with your doctor. Some medicines can cause dizziness or changes in blood pressure, which increase your risk of falling.  Wear shoes that:  Have a low heel. Do not wear high heels.  Have rubber bottoms and are closed at the toe.  Feel good on your feet and fit well.  Use tools that help you move around if needed. These include:  Canes.  Walkers.  Scooters.  Crutches.  Ask your doctor what else you can do to help prevent falls. This may include seeing a physical therapist to learn to do exercises to move better and get stronger.  Where to find more information  Centers for Disease Control and PreventionCONCETTA:  cdc.gov  National [place] on Aging: veren[place].nih.gov  National [place] on Aging: verenice.nih.gov  Contact a doctor if:  You are afraid of falling at home.  You feel weak, drowsy, or dizzy at home.  You fall at home.  Get help right away if you:  Lose consciousness or have trouble moving after a fall.  Have a fall that causes a head injury.  These symptoms may be an emergency. Get help right away. Call 911.  Do not wait to see if the symptoms will go away.  Do not drive yourself to the hospital.  This information is not intended to replace advice given to you by your health care provider. Make sure you discuss any questions you have with your health care provider.  Document Revised: 08/21/2023 Document Reviewed: 08/21/2023  [employer] Patient Education © 2024 [employer]    Advance Care Planning and Advance Directives     You make decisions on a daily basis - decisions about where you want to live, your career, your home, your life. Perhaps one of the most important decisions you face is your choice for future medical care. Take time to talk with your family and your healthcare team and start planning today.  Advance Care Planning is a process that can help you:  Understand possible future healthcare decisions in light of your own experiences  Reflect on those decision in light of your goals and values  Discuss your decisions with those closest to you and the healthcare professionals that care for you  Make a plan by creating a document that reflects your wishes    Surrogate Decision Maker  In the event of a medical emergency, which has left you unable to communicate or to make your own decisions, you would need someone to make decisions for you.  It is important to discuss your preferences for medical treatment with this person while you are in good health.     Qualities of a surrogate decision maker:  Willing to take on this role and responsibility  Knows what you want for future medical care  Willing to follow your  wishes even if they don't agree with them  Able to make difficult medical decisions under stressful circumstances    Advance Directives  These are legal documents you can create that will guide your healthcare team and decision maker(s) when needed. These documents can be stored in the electronic medical record.    Living Will - a legal document to guide your care if you have a terminal condition or a serious illness and are unable to communicate. States vary by statute in document names/types, but most forms may include one or more of the following:        -  Directions regarding life-prolonging treatments        -  Directions regarding artificially provided nutrition/hydration        -  Choosing a healthcare decision maker        -  Direction regarding organ/tissue donation    Durable Power of  for Healthcare - this document names an -in-fact to make medical decisions for you, but it may also allow this person to make personal and financial decisions for you. Please seek the advice of an  if you need this type of document.    **Advance Directives are not required and no one may discriminate against you if you do not sign one.    Medical Orders  Many states allow specific forms/orders signed by your physician to record your wishes for medical treatment in your current state of health. This form, signed in personal communication with your physician, addresses resuscitation and other medical interventions that you may or may not want.      For more information or to schedule a time with a [facility] Advance Care Planning Facilitator contact: [email or web address] or call [phone] and someone will contact you directly.  Fall Prevention in the Home, Adult  Falls can cause injuries and affect people of all ages. There are many simple things that you can do to make your home safe and to help prevent falls.  If you need it, ask for help making these changes.  What actions can I take to  prevent falls?  General information  Use good lighting in all rooms. Make sure to:  Replace any light bulbs that burn out.  Turn on lights if it is dark and use night-lights.  Keep items that you use often in easy-to-reach places. Lower the shelves around your home if needed.  Move furniture so that there are clear paths around it.  Do not keep throw rugs or other things on the floor that can make you trip.  If any of your floors are uneven, fix them.  Add color or contrast paint or tape to clearly eder and help you see:  Grab bars or handrails.  First and last steps of staircases.  Where the edge of each step is.  If you use a ladder or stepladder:  Make sure that it is fully opened. Do not climb a closed ladder.  Make sure the sides of the ladder are locked in place.  Have someone hold the ladder while you use it.  Know where your pets are as you move through your home.  What can I do in the bathroom?         Keep the floor dry. Clean up any water that is on the floor right away.  Remove soap buildup in the bathtub or shower. Buildup makes bathtubs and showers slippery.  Use non-skid mats or decals on the floor of the bathtub or shower.  Attach bath mats securely with double-sided, non-slip rug tape.  If you need to sit down while you are in the shower, use a non-slip stool.  Install grab bars by the toilet and in the bathtub and shower. Do not use towel bars as grab bars.  What can I do in the bedroom?  Make sure that you have a light by your bed that is easy to reach.  Do not use any sheets or blankets on your bed that hang to the floor.  Have a firm bench or chair with side arms that you can use for support when you get dressed.  What can I do in the kitchen?  Clean up any spills right away.  If you need to reach something above you, use a sturdy step stool that has a grab bar.  Keep electrical cables out of the way.  Do not use floor polish or wax that makes floors slippery.  What can I do with my stairs?  Do  not leave anything on the stairs.  Make sure that you have a light switch at the top and the bottom of the stairs. Have them installed if you do not have them.  Make sure that there are handrails on both sides of the stairs. Fix handrails that are broken or loose. Make sure that handrails are as long as the staircases.  Install non-slip stair treads on all stairs in your home if they do not have carpet.  Avoid having throw rugs at the top or bottom of stairs, or secure the rugs with carpet tape to prevent them from moving.  Choose a carpet design that does not hide the edge of steps on the stairs. Make sure that carpet is firmly attached to the stairs. Fix any carpet that is loose or worn.  What can I do on the outside of my home?  Use bright outdoor lighting.  Repair the edges of walkways and driveways and fix any cracks. Clear paths of anything that can make you trip, such as tools or rocks.  Add color or contrast paint or tape to clearly eder and help you see high doorway thresholds.  Trim any bushes or trees on the main path into your home.  Check that handrails are securely fastened and in good repair. Both sides of all steps should have handrails.  Install guardrails along the edges of any raised decks or porches.  Have leaves, snow, and ice cleared regularly. Use sand, salt, or ice melt on walkways during winter months if you live where there is ice and snow.  In the garage, clean up any spills right away, including grease or oil spills.  What other actions can I take?  Review your medicines with your health care provider. Some medicines can make you confused or feel dizzy. This can increase your chance of falling.  Wear closed-toe shoes that fit well and support your feet. Wear shoes that have rubber soles and low heels.  Use a cane, walker, scooter, or crutches that help you move around if needed.  Talk with your provider about other ways that you can decrease your risk of falls. This may include seeing a  physical therapist to learn to do exercises to improve movement and strength.  Where to find more information  Centers for Disease Control and Prevention, [place]: cdc.gov  National [place] on Aging: verenice.nih.gov  National [place] on Aging: verenice.nih.gov  Contact a health care provider if:  You are afraid of falling at home.  You feel weak, drowsy, or dizzy at home.  You fall at home.  Get help right away if you:  Lose consciousness or have trouble moving after a fall.  Have a fall that causes a head injury.  These symptoms may be an emergency. Get help right away. Call 911.  Do not wait to see if the symptoms will go away.  Do not drive yourself to the hospital.  This information is not intended to replace advice given to you by your health care provider. Make sure you discuss any questions you have with your health care provider.  Document Revised: 08/21/2023 Document Reviewed: 08/21/2023  [employer] Patient Education © 2024 [employer]    Sit-to-Stand Exercise    The sit-to-stand exercise (also known as the chair stand or chair rise exercise) strengthens your lower body and helps you maintain or improve your mobility and independence. The end goal is to do the sit-to-stand exercise without using your hands. This will be easier as you become stronger. You should always talk with your health care provider before starting any exercise program, especially if you have had recent surgery.  Do the exercise exactly as told by your health care provider and adjust it as directed. It is normal to feel mild stretching, pulling, tightness, or discomfort as you do this exercise, but you should stop right away if you feel sudden pain or your pain gets worse. Do not begin doing this exercise until told by your health care provider.  What the sit-to-stand exercise does  The sit-to-stand exercise helps to strengthen the muscles in your thighs and the muscles in the center of your body that give you stability (core muscles). This  exercise is especially helpful if:  You have had knee or hip surgery.  You have trouble getting up from a chair, out of a car, or off the toilet due to muscle weakness.  How to do the sit-to-stand exercise  Sit toward the front edge of a sturdy chair without armrests. Your knees should be bent and your feet should be flat on the floor and shoulder-width apart and underneath your hips.  Place your hands lightly on each side of the seat. Keep your back and neck as straight as possible, with your chest slightly forward.  Breathe in slowly. Lean forward and slightly shift your weight to the front of your feet.  Breathe out as you slowly stand up. Try not to support any weight with your hands.  Stand and pause for a full breath in and out.  Breathe in as you sit down slowly. Tighten your core and abdominal muscles to control your lowering as much as possible. You should lower yourself back to the chair slowly, not just drop back into the seat.  Breathe out slowly.  Do this exercise 10-15 times. If needed, do it fewer times until you build up strength.  Rest for 1 minute, then do another set of 10-15 repetitions.  To change the difficulty of the sit-to-stand exercise  If the exercise is too difficult, use a chair with sturdy armrests, and push off the armrests to help you come to the standing position. You can also use the armrests to help slowly lower yourself back to sitting. As this gets easier, try to use your arms less. You can also place a firm cushion or pillow on the chair to make the surface higher.  If this exercise is too easy, do not use your arms to help raise or lower yourself. You can also wear a weighted vest, use hand weights, increase your repetitions, or try a lower chair.  General tips  You may feel tired when starting an exercise routine. This is normal.  You may have muscle soreness that lasts a few days. This is normal. As you get stronger, you may not feel muscle soreness.  Use smooth, steady  movements.  Do not  hold your breath during strength exercises. This can cause unsafe changes in your blood pressure.  Breathe in slowly through your nose, and breathe out slowly through your mouth.  Summary  Strengthening your lower body is an important step to help you move safely and independently.  The sit-to-stand exercise helps strengthen the muscles in your thighs and core.  You should always talk with your health care provider before starting any exercise program, especially if you have had recent surgery.  This information is not intended to replace advice given to you by your health care provider. Make sure you discuss any questions you have with your health care provider.  Document Revised: 04/10/2022 Document Reviewed: 04/10/2022  [employer] Patient Education ©  [employer]    You are due for Shingrix vaccination series ( the newest shingles vaccine).  It is a two shot series spaced 2-6 months apart. Please get this vaccine series started at your earliest convenience at your local pharmacy to help avoid shingles outbreak. It is more effective than the old Zostavax vaccine and is recommended even if you have had the Zostavax vaccine in the past.  Once the Shingrix series is completed, it does not need to be repeated.   For more information, please look at the website below:  [facility] Shingrix Vaccine Information      Medicare Wellness  Personal Prevention Plan of Service     Date of Office Visit:    Encounter Provider:  RAFIQ Montanez  Place of Service:  [facility]  Patient Name: Britta Lee  :  1945    As part of the Medicare Wellness portion of your visit today, we are providing you with this personalized preventive plan of services (PPPS). This plan is based upon recommendations of the [place] Preventive Services Task Force (USPSTF) and the Advisory Committee on Immunization Practices (ACIP).    This lists the preventive care services that should  be considered, and provides dates of when you are due. Items listed as completed are up-to-date and do not require any further intervention.    Health Maintenance   Topic Date Due    RSV Vaccine - Adults (1 - 1-dose 60+ series) Never done    ZOSTER VACCINE (3 of 3) 09/14/2016    DIABETIC EYE EXAM  12/20/2022    Hepatitis B (3 of 3 - Risk 3-dose series) 06/07/2023    COVID-19 Vaccine (4 - 2023-24 season) 09/01/2023    ANNUAL WELLNESS VISIT  05/25/2024    URINE MICROALBUMIN  06/15/2024    HEMOGLOBIN A1C  09/14/2024    COLORECTAL CANCER SCREENING  11/23/2024    INFLUENZA VACCINE  08/01/2024    DXA SCAN  09/26/2024    MAMMOGRAM  01/12/2025    BMI FOLLOWUP  02/08/2025    LIPID PANEL  03/14/2025    TDAP/TD VACCINES (2 - Td or Tdap) 03/20/2027    Pneumococcal Vaccine 65+  Completed    PAP SMEAR  Discontinued       Orders Placed This Encounter   Procedures    Ambulatory Referral to General Surgery     Referral Priority:   Urgent     Referral Type:   Consultation     Referral Reason:   Specialty Services Required     Requested Specialty:   General Surgery     Number of Visits Requested:   1       No follow-ups on file.

## 2024-06-20 NOTE — PROGRESS NOTES
The ABCs of the Annual Wellness Visit  Subsequent Medicare Wellness Visit    Subjective    Britta Lee is a 78 y.o. female who presents for a Subsequent Medicare Wellness Visit.    The following portions of the patient's history were reviewed and   updated as appropriate: allergies, current medications, past family history, past medical history, past social history, past surgical history, and problem list.    Compared to one year ago, the patient feels her physical   health is the same.    Compared to one year ago, the patient feels her mental   health is worse.    Recent Hospitalizations:  She was not admitted to the hospital during the last year.       Current Medical Providers:  Patient Care Team:  Lexie Dolan PA as PCP - General (Family Medicine)  Jaky aBl MD as Consulting Physician (Cardiology)  Priscila Holland APRN as Nurse Practitioner (Cardiology)    Outpatient Medications Prior to Visit   Medication Sig Dispense Refill   • amLODIPine (NORVASC) 10 MG tablet Take 1 tablet by mouth Daily. 90 tablet 3   • atorvastatin (LIPITOR) 80 MG tablet Take 1 tablet by mouth Every Night. 90 tablet 1   • ferrous sulfate 325 (65 FE) MG EC tablet Take 1 tablet by mouth Every 12 (Twelve) Hours. 60 tablet 5   • HYDROcodone-acetaminophen (NORCO) 5-325 MG per tablet Take 1 tablet by mouth Every 12 (Twelve) Hours As Needed for Severe Pain. 6 tablet 0   • ibuprofen (ADVIL,MOTRIN) 600 MG tablet Take 1 tablet by mouth Every 6 (Six) Hours As Needed for Mild Pain. 20 tablet 0   • levothyroxine (SYNTHROID, LEVOTHROID) 25 MCG tablet Take 1 tablet by mouth Daily. 90 tablet 3   • metoprolol succinate XL (Toprol XL) 25 MG 24 hr tablet Take 1 tablet by mouth Daily. 90 tablet 1   • pantoprazole (PROTONIX) 40 MG EC tablet Take 1 tablet by mouth Daily. 90 tablet 1   • Senna Plus 8.6-50 MG per tablet Take 1 tablet by mouth Every 12 (Twelve) Hours.     • torsemide (DEMADEX) 20 MG tablet Take 2 tablets by mouth  Daily.     • vitamin D (ERGOCALCIFEROL) 1.25 MG (89534 UT) capsule capsule TAKE ONE Capsule BY MOUTH ONCE WEEKLY 15 capsule 3   • escitalopram (LEXAPRO) 10 MG tablet Take 1 tablet by mouth Daily. 90 tablet 3     No facility-administered medications prior to visit.       Opioid medication/s are on active medication list.  and I have evaluated her active treatment plan and pain score trends (see table).  Vitals:    06/20/24 0948   PainSc:   2   PainLoc: Buttocks     I have reviewed the chart for potential of high risk medication and harmful drug interactions in the elderly.          Aspirin is not on active medication list.  Aspirin use is not indicated based on review of current medical condition/s. Risk of harm outweighs potential benefits.  .    Patient Active Problem List   Diagnosis   • Type 2 diabetes mellitus, uncontrolled, with neuropathy   • Essential hypertension   • ASCVD (arteriosclerotic cardiovascular disease)   • Bilateral carpal tunnel syndrome   • Diabetic retinopathy associated with type 2 diabetes mellitus   • Dyslipidemia   • Sciatic neuropathy   • DDD (degenerative disc disease), lumbar   • Primary osteoarthritis of left knee   • Status post total left knee replacement   • Type 2 diabetes mellitus with chronic kidney disease, with long-term current use of insulin   • Hyperparathyroidism due to renal insufficiency   • Venous insufficiency (chronic) (peripheral)   • Class 1 obesity due to excess calories with serious comorbidity and body mass index (BMI) of 31.0 to 31.9 in adult   • PVD (peripheral vascular disease) with claudication   • LVH (left ventricular hypertrophy)   • Chronic heart failure with preserved ejection fraction (HFpEF)   • LENNY (obstructive sleep apnea)   • Stage 5 chronic kidney disease on chronic dialysis   • Iron deficiency anemia   • Malabsorption of iron   • Acquired hypothyroidism   • Hiatal hernia   • Weight loss, abnormal   • Osteopenia of neck of left femur   • Cirrhosis  "  • Severe pulmonary hypertension   • Mixed hyperlipidemia   • Gastroesophageal reflux disease without esophagitis   • Vitamin D deficiency   • Skin lesion of face   • Pyogenic inflammation of bone   • At moderate risk for fall   • Moderate episode of recurrent major depressive disorder   • Pressure injury of left buttock, stage 2     Advance Care Planning   Advance Care Planning     Advance Directive is not on file.  ACP discussion was held with the patient during this visit. Patient does not have an advance directive, information provided.     Objective    Vitals:    24 0948   BP: 100/70   Pulse: 70   Temp: 97.5 °F (36.4 °C)   TempSrc: Temporal   SpO2: 98%   Weight: 75.8 kg (167 lb)   Height: 160 cm (62.99\")   PainSc:   2   PainLoc: Buttocks     Estimated body mass index is 29.59 kg/m² as calculated from the following:    Height as of this encounter: 160 cm (62.99\").    Weight as of this encounter: 75.8 kg (167 lb).           Does the patient have evidence of cognitive impairment? Yes          HEALTH RISK ASSESSMENT    Smoking Status:  Social History     Tobacco Use   Smoking Status Never   • Passive exposure: Past   Smokeless Tobacco Never     Alcohol Consumption:  Social History     Substance and Sexual Activity   Alcohol Use Yes    Comment: socially     Fall Risk Screen:    STEADI Fall Risk Assessment was completed, and patient is at MODERATE risk for falls. Assessment completed on:2024    Depression Screenin/20/2024     9:50 AM   PHQ-2/PHQ-9 Depression Screening   Little Interest or Pleasure in Doing Things 0-->not at all   Feeling Down, Depressed or Hopeless 0-->not at all   PHQ-9: Brief Depression Severity Measure Score 0       Health Habits and Functional and Cognitive Screenin/20/2024     9:50 AM   Functional & Cognitive Status   Do you have difficulty preparing food and eating? Yes   Do you have difficulty bathing yourself, getting dressed or grooming yourself? No   Do you " have difficulty using the toilet? No   Do you have difficulty moving around from place to place? No   Do you have trouble with steps or getting out of a bed or a chair? No   Current Diet Well Balanced Diet   Dental Exam Not up to date   Eye Exam Not up to date   Exercise (times per week) 0 times per week   Current Exercises Include No Regular Exercise   Do you need help using the phone?  No   Are you deaf or do you have serious difficulty hearing?  No   Do you need help to go to places out of walking distance? No   Do you need help shopping? No   Do you need help preparing meals?  Yes   Do you need help with housework?  Yes   Do you need help with laundry? Yes   Do you need help taking your medications? Yes   Do you need help managing money? No   Do you ever drive or ride in a car without wearing a seat belt? No   Have you felt unusual stress, anger or loneliness in the last month? No   Who do you live with? Spouse   If you need help, do you have trouble finding someone available to you? No   Have you been bothered in the last four weeks by sexual problems? No   Do you have difficulty concentrating, remembering or making decisions? No       Age-appropriate Screening Schedule:  Refer to the list below for future screening recommendations based on patient's age, sex and/or medical conditions. Orders for these recommended tests are listed in the plan section. The patient has been provided with a written plan.    Health Maintenance   Topic Date Due   • RSV Vaccine - Adults (1 - 1-dose 60+ series) Never done   • ZOSTER VACCINE (3 of 3) 09/14/2016   • DIABETIC EYE EXAM  12/20/2022   • Hepatitis B (3 of 3 - Risk 3-dose series) 06/07/2023   • COVID-19 Vaccine (4 - 2023-24 season) 09/01/2023   • URINE MICROALBUMIN  06/15/2024   • HEMOGLOBIN A1C  09/14/2024   • COLORECTAL CANCER SCREENING  11/23/2024   • INFLUENZA VACCINE  08/01/2024   • DXA SCAN  09/26/2024   • MAMMOGRAM  01/12/2025   • BMI FOLLOWUP  02/08/2025   • LIPID  PANEL  03/14/2025   • ANNUAL WELLNESS VISIT  06/20/2025   • TDAP/TD VACCINES (2 - Td or Tdap) 03/20/2027   • Pneumococcal Vaccine 65+  Completed   • PAP SMEAR  Discontinued                  CMS Preventative Services Quick Reference  Risk Factors Identified During Encounter  Depression/Dysphoria: Prescribed new antidepressant medication treatment. Follow up visit planned.  Fall Risk-High or Moderate: Discussed Fall Prevention in the home and Information on Fall Prevention Shared in After Visit Summary  Immunizations Discussed/Encouraged: Shingrix  Inactivity/Sedentary: Patient was advised to exercise at least 150 minutes a week per CDC recommendations.  Polypharmacy: Medication List reviewed  The above risks/problems have been discussed with the patient.  Pertinent information has been shared with the patient in the After Visit Summary.  We discussed her decreased cognition.  Her daughter states that she is now staying with Britta and her father to help with full-time care.    An After Visit Summary and PPPS were made available to the patient.    Follow Up:   Next Medicare Wellness visit to be scheduled in 1 year.       Additional E&M Note during same encounter follows:  Patient has multiple medical problems which are significant and separately identifiable that require additional work above and beyond the Medicare Wellness Visit.      Chief Complaint  No chief complaint on file.    Subjective        HPI  Britta Lee is also being seen today for skin problem-  Patient complains of a sore area of skin and mid buttock.  Pain is worse when she sits on the area for prolonged periods of time such as dialysis or at home.  She reports that she has to distribute her weight differently throughout the day to prevent the pain.  The area is red and tender.    Toe problem-  Patient had MRI that showed osteomyelitis of the right first toe.  She does report some tenderness.  She denies any fever.    Depression-  Uncontrolled as  "patient has had increased stress with her  having emergent open heart bypass surgery.  She reports sadness and feelings of being overwhelmed.  Minimal relief with the escitalopram.  She denies any hallucinations, SI or HI.         Objective   Vital Signs:  /70   Pulse 70   Temp 97.5 °F (36.4 °C) (Temporal)   Ht 160 cm (62.99\")   Wt 75.8 kg (167 lb)   SpO2 98%   BMI 29.59 kg/m²     Physical Exam  Vitals and nursing note reviewed.   Constitutional:       Appearance: Normal appearance. She is well-developed.      Comments: Patient is in wheelchair today   Eyes:      Extraocular Movements: Extraocular movements intact.      Conjunctiva/sclera: Conjunctivae normal.   Cardiovascular:      Rate and Rhythm: Normal rate and regular rhythm.      Heart sounds: Normal heart sounds. No murmur heard.  Pulmonary:      Effort: Pulmonary effort is normal. No respiratory distress.      Breath sounds: Normal breath sounds. No wheezing.   Musculoskeletal:         General: No tenderness.   Skin:     General: Skin is warm and dry.      Comments: Patient is wearing diapers.  Stage II pressure ulcer noted left mid buttock with minimal erythema and localized tenderness   Neurological:      Mental Status: She is alert and oriented to person, place, and time.   Psychiatric:         Mood and Affect: Mood normal.         Behavior: Behavior normal.         Thought Content: Thought content normal.                       Assessment and Plan   Diagnoses and all orders for this visit:    1. Medicare annual wellness visit, subsequent (Primary)  Comments:  Performed and documented today    2. Other acute osteomyelitis of right foot  Comments:  Right first toe  Discussed MRI results with patient  Refer to surgeon urgently for further evaluation  Orders:  -     Ambulatory Referral to General Surgery    3. At moderate risk for fall  Comments:  Fall risk precautions advised    4. Moderate episode of recurrent major depressive " disorder  Comments:  Increase Lexapro to 20 mg daily  Orders:  -     escitalopram (LEXAPRO) 20 MG tablet; Take 1 tablet by mouth Daily.  Dispense: 30 tablet; Refill: 5    5. Pressure injury of left buttock, stage 2  Comments:  Advised Desitin overnight mixed with Bactroban ointment applied to wounds after cleaning with antibacterial soap twice daily  Orders:  -     mupirocin (BACTROBAN) 2 % ointment; Apply 1 Application topically to the appropriate area as directed 3 (Three) Times a Day.  Dispense: 30 g; Refill: 0             Follow Up   Return in about 3 months (around 9/20/2024) for Followup with Lexie.  Patient was given instructions and counseling regarding her condition or for health maintenance advice. Please see specific information pulled into the AVS if appropriate.

## 2024-06-25 ENCOUNTER — TELEPHONE (OUTPATIENT)
Dept: FAMILY MEDICINE CLINIC | Facility: CLINIC | Age: 79
End: 2024-06-25
Payer: MEDICARE

## 2024-06-25 NOTE — TELEPHONE ENCOUNTER
Tried to call patient no answer. Will try again later.     ----- Message from Lexie Dolan sent at 6/20/2024  3:33 PM EDT -----   Inform the patient of laboratory results.  Labs were consistent with her being on dialysis for her chronic kidney disease including some electrolyte abnormalities.  She also has an iron deficiency anemia that is being followed by nephrology.  Vitamin D levels were elevated so I recommend that she hold the vitamin D.

## 2024-06-26 ENCOUNTER — TELEPHONE (OUTPATIENT)
Dept: FAMILY MEDICINE CLINIC | Facility: CLINIC | Age: 79
End: 2024-06-26
Payer: MEDICARE

## 2024-06-26 NOTE — TELEPHONE ENCOUNTER
Spoke with patient's , who is on verbal and he is in understanding. He will let the patient know as well.    ----- Message from Lexie Dolan sent at 6/20/2024  3:33 PM EDT -----   Inform the patient of laboratory results.  Labs were consistent with her being on dialysis for her chronic kidney disease including some electrolyte abnormalities.  She also has an iron deficiency anemia that is being followed by nephrology.  Vitamin D levels were elevated so I recommend that she hold the vitamin D.

## 2024-06-27 ENCOUNTER — OFFICE VISIT (OUTPATIENT)
Dept: SURGERY | Facility: CLINIC | Age: 79
End: 2024-06-27
Payer: MEDICARE

## 2024-06-27 VITALS
DIASTOLIC BLOOD PRESSURE: 70 MMHG | HEIGHT: 63 IN | SYSTOLIC BLOOD PRESSURE: 124 MMHG | WEIGHT: 167 LBS | BODY MASS INDEX: 29.59 KG/M2

## 2024-06-27 DIAGNOSIS — M86.071 ACUTE HEMATOGENOUS OSTEOMYELITIS OF RIGHT FOOT: Primary | ICD-10-CM

## 2024-06-27 PROCEDURE — 99213 OFFICE O/P EST LOW 20 MIN: CPT | Performed by: SURGERY

## 2024-06-27 PROCEDURE — 3078F DIAST BP <80 MM HG: CPT | Performed by: SURGERY

## 2024-06-27 PROCEDURE — 1159F MED LIST DOCD IN RCRD: CPT | Performed by: SURGERY

## 2024-06-27 PROCEDURE — 1160F RVW MEDS BY RX/DR IN RCRD: CPT | Performed by: SURGERY

## 2024-06-27 PROCEDURE — 3074F SYST BP LT 130 MM HG: CPT | Performed by: SURGERY

## 2024-06-27 NOTE — PROGRESS NOTES
Patient Name:  Britta Lee  YOB: 1945  [id number]           General Surgery Office Follow Up    Date of Service: 06/27/24    Chief Complaint-right foot imaging abnormality    Subjective      Britta Lee is a 78 y.o.  female, on Monday Wednesday Friday hemodialysis, who presents to my office with imaging abnormality of the right foot.  Patient reports having her nails trimmed by podiatrist locally, [place].  The patient reports bilateral lower extremity swelling, right greater than left.  She reports occasional pain in her right first toe.  She denies any wounds.  No fevers or drainage.    An MRI had demonstrated possible bony edema that is suspicious for osteomyelitis.  She reports that the podiatrist stated she would likely need surgery.    Allergy: No Known Allergies        PMHx:   Past Medical History:   Diagnosis Date    Acquired hypothyroidism 04/22/2021    Anemia     Arthritis     Carpal tunnel syndrome     Coronary artery disease     Diabetes mellitus     Elevated cholesterol     GERD (gastroesophageal reflux disease)     History of pneumonia     History of unsteady gait     OCASSIONALLY    Hypertension     Insomnia     Kidney disease     Kidney disease, chronic, stage IV (GFR 15-29 ml/min)     LENNY (obstructive sleep apnea) 12/01/2020    no c-pap    Osteoarthritis     Renal insufficiency     Sciatica          Past Surgical History:  Past Surgical History:   Procedure Laterality Date    ABDOMINAL SURGERY      APPENDECTOMY      CARDIAC CATHETERIZATION      1 STENT  ---  2000    CARDIAC SURGERY      CAROTID STENT      CARPAL TUNNEL RELEASE Right 10/08/2019    Procedure: CARPAL TUNNEL RELEASE;  Surgeon: Feroz Johnson MD;  Location: [facility];  Service: Orthopedics    CATARACT EXTRACTION      CHOLECYSTECTOMY      COLONOSCOPY      COLONOSCOPY N/A 11/23/2021    Procedure: COLONOSCOPY FOR SCREENING;  Surgeon: Palmira Pate MD;  Location: [facility] OR;  Service: Gastroenterology;   Laterality: N/A;    CORONARY ANGIOPLASTY WITH STENT PLACEMENT      ENDOSCOPY      ENDOSCOPY N/A 03/05/2020    Procedure: ESOPHAGOGASTRODUODENOSCOPY;  Surgeon: Alexandru Bagley MD;  Location: [facility] OR;  Service: Gastroenterology;  Laterality: N/A;    ENDOSCOPY N/A 11/23/2021    Procedure: ESOPHAGOGASTRODUODENOSCOPY WITH BIOPSY;  Surgeon: Palmira Pate MD;  Location: [facility] OR;  Service: Gastroenterology;  Laterality: N/A;    ENDOSCOPY AND COLONOSCOPY      HEAD/NECK LESION/CYST EXCISION Right 4/30/2024    Procedure: FACIAL LESION/CYST EXCISION;  Surgeon: Ta Blas MD;  Location: [facility] OR;  Service: General;  Laterality: Right;    INSERTION HEMODIALYSIS CATHETER N/A 11/14/2022    Procedure: HEMODIALYSIS CATHETER INSERTION;  Surgeon: Ta Blas MD;  Location: [facility] OR;  Service: General;  Laterality: N/A;    JOINT REPLACEMENT Left 05/02/2017    [facility]  DR LEVIN  LEFT TOTAL KNEE    KNEE ARTHROSCOPY W/ MENISCECTOMY Right     LAPAROSCOPIC TUBAL LIGATION      CT ARTHRP KNE CONDYLE&PLATU MEDIAL&LAT COMPARTMENTS Left 05/02/2017    Procedure: TOTAL KNEE ARTHROPLASTY;  Surgeon: Feroz Levin MD;  Location: [facility] OR;  Service: Orthopedics    STERILIZATION      TONSILLECTOMY           Family History:   Family History   Problem Relation Age of Onset    Arthritis Mother     Diabetes Mother     Cancer Mother     Heart disease Father     Diabetes Daughter     Diabetes Son     Diabetes Maternal Aunt     Heart disease Maternal Grandmother     Breast cancer Neg Hx          Social History:  Social History     Socioeconomic History    Marital status:      Spouse name: natalie    Number of children: 3    Years of education: 12   Tobacco Use    Smoking status: Never     Passive exposure: Past    Smokeless tobacco: Never   Vaping Use    Vaping status: Never Used   Substance and Sexual Activity    Alcohol use: Yes     Comment: socially    Drug use: No    Sexual activity: Defer     Birth  "control/protection: Surgical            Review of Systems     14 pt ROS negative except for what is noted in HPI       Objective     Physical Exam:      Vital Signs  /70   Ht 160 cm (62.99\")   Wt 75.8 kg (167 lb)   BMI 29.59 kg/m²     Body mass index is 29.59 kg/m².    Physical Exam:      Constitution: /70   Ht 160 cm (62.99\")   Wt 75.8 kg (167 lb)   BMI 29.59 kg/m²  . No acute distress.   Head: Normocephalic, atraumatic.   Eyes: Aligned without strabismus. Conjunctivae noninjected   Respiratory: Symmetric chest expansion. No respiratory distress.   Skin:  No cyanosis.  Mild swelling of the right foot compared to the contralateral side.  Extremely mild forefoot erythema.  Nontender.  Lymphatics: No abnormal cervical or supraclavicular lymphadenopathy appreciated   Neurologic: No gross deficits. Alert and oriented x3   Psychiatric: normal mood       Results Review: I have personally reviewed all of the recent lab and imaging results available at this time.     X-ray of the right foot with soft tissue swelling    MRI with bony edema of the distal first phalanx of the right foot.       Assessment & Plan     Assessment and Plan:  78 y.o.  female with possible osteomyelitis of the right first toe, but unclear    Refer to infectious disease.  I would be hard pressed to offer toe amputation without signs of worsening infection or wound.                      This document has been electronically signed by Ta Blas MD   June 27, 2024 13:30 EDT    [facility]        "

## 2024-07-01 ENCOUNTER — TELEPHONE (OUTPATIENT)
Dept: FAMILY MEDICINE CLINIC | Facility: CLINIC | Age: 79
End: 2024-07-01
Payer: MEDICARE

## 2024-07-01 NOTE — TELEPHONE ENCOUNTER
Can you get me the ER records showing the fractured nose?  I will be glad to refer her to ENT specialty but there is not one local.

## 2024-07-01 NOTE — TELEPHONE ENCOUNTER
Caller: Brandon Lee    Relationship: Emergency Contact    Best call back number: [phone]    What is the medical concern/diagnosis: PATIENT FELL AND FRACTURED HER NOSE     What specialty or service is being requested: EAR NOSE AND THROAT     What is the provider, practice or medical service name: SOMEONE IN [facility] OR CLOSE BY         Any additional details: PLEASE CALL PATIENT TO LET THEM KNOW IF SHE CAN BE REFERRED

## 2024-07-01 NOTE — TELEPHONE ENCOUNTER
Patients  (Brandon) came by the office to ask about patients broken nose. He spoke with Lexie she said that she would get her scans from [facility] and call patient back. After looking at the [facility] reports Lexie asked me to call patients  and tell him that she did not feel seeing a ENT is necessary right now unless she starts having trouble breathing thru her nose. Told  to call us back if they decide they want appointment with ENT or if something happens and the patient feels worse.

## 2024-07-02 ENCOUNTER — OFFICE VISIT (OUTPATIENT)
Dept: INFECTIOUS DISEASES | Facility: CLINIC | Age: 79
End: 2024-07-02
Payer: MEDICARE

## 2024-07-02 VITALS
HEIGHT: 63 IN | DIASTOLIC BLOOD PRESSURE: 50 MMHG | BODY MASS INDEX: 29.59 KG/M2 | OXYGEN SATURATION: 97 % | WEIGHT: 167 LBS | HEART RATE: 64 BPM | SYSTOLIC BLOOD PRESSURE: 136 MMHG

## 2024-07-02 DIAGNOSIS — M86.171 ACUTE OSTEOMYELITIS OF TOE OF RIGHT FOOT: Primary | ICD-10-CM

## 2024-07-02 PROBLEM — Z91.81 AT MODERATE RISK FOR FALL: Chronic | Status: RESOLVED | Noted: 2024-06-20 | Resolved: 2024-07-02

## 2024-07-02 PROBLEM — E03.9 ACQUIRED HYPOTHYROIDISM: Status: RESOLVED | Noted: 2021-04-22 | Resolved: 2024-07-02

## 2024-07-02 PROBLEM — E66.811 CLASS 1 OBESITY DUE TO EXCESS CALORIES WITH SERIOUS COMORBIDITY AND BODY MASS INDEX (BMI) OF 31.0 TO 31.9 IN ADULT: Status: RESOLVED | Noted: 2020-02-25 | Resolved: 2024-07-02

## 2024-07-02 PROBLEM — E66.09 CLASS 1 OBESITY DUE TO EXCESS CALORIES WITH SERIOUS COMORBIDITY AND BODY MASS INDEX (BMI) OF 31.0 TO 31.9 IN ADULT: Status: RESOLVED | Noted: 2020-02-25 | Resolved: 2024-07-02

## 2024-07-02 PROBLEM — K44.9 HIATAL HERNIA: Status: RESOLVED | Noted: 2021-07-19 | Resolved: 2024-07-02

## 2024-07-02 PROBLEM — K21.9 GASTROESOPHAGEAL REFLUX DISEASE WITHOUT ESOPHAGITIS: Status: RESOLVED | Noted: 2023-12-21 | Resolved: 2024-07-02

## 2024-07-02 LAB — MRSA DNA SPEC QL NAA+PROBE: NORMAL

## 2024-07-02 PROCEDURE — 87641 MR-STAPH DNA AMP PROBE: CPT | Performed by: INTERNAL MEDICINE

## 2024-07-02 NOTE — PROGRESS NOTES
[facility]         Referring Provider: Ta Blas MD  [address] [address]    Subjective      Chief Complaint  Initial Evaluation (Acute osteo of right foot)    History of Present Illness  Britta Lee is a 78 y.o. female who presents today to [facility] for Initial Consultation  .    Britta Lee is a 78 y.o.  female, on Monday Wednesday Friday hemodialysis, who presents to my office with imaging abnormality of the right foot.  Patient reports having her nails trimmed by podiatrist locally, [place].  The patient reports bilateral lower extremity swelling, right greater than left.  She reports occasional pain in her right first toe.  She denies any wounds.  No fevers or drainage.     An MRI had demonstrated possible bony edema that is suspicious for osteomyelitis.  She reports that the podiatrist stated she would likely need surgery.    Past Medical History:   Diagnosis Date    Acquired hypothyroidism 04/22/2021    Anemia     Arthritis     Carpal tunnel syndrome     Coronary artery disease     Diabetes mellitus     Elevated cholesterol     GERD (gastroesophageal reflux disease)     History of pneumonia     History of unsteady gait     OCASSIONALLY    Hypertension     Insomnia     Kidney disease     Kidney disease, chronic, stage IV (GFR 15-29 ml/min)     LENNY (obstructive sleep apnea) 12/01/2020    no c-pap    Osteoarthritis     Renal insufficiency     Sciatica        Past Surgical History:   Procedure Laterality Date    ABDOMINAL SURGERY      APPENDECTOMY      CARDIAC CATHETERIZATION      1 STENT  ---  2000    CARDIAC SURGERY      CAROTID STENT      CARPAL TUNNEL RELEASE Right 10/08/2019    Procedure: CARPAL TUNNEL RELEASE;  Surgeon: Feroz Johnson MD;  Location: [facility];  Service: Orthopedics    CATARACT EXTRACTION      CHOLECYSTECTOMY      COLONOSCOPY      COLONOSCOPY N/A 11/23/2021    Procedure: COLONOSCOPY FOR  SCREENING;  Surgeon: Palmira Pate MD;  Location: [facility] OR;  Service: Gastroenterology;  Laterality: N/A;    CORONARY ANGIOPLASTY WITH STENT PLACEMENT      ENDOSCOPY      ENDOSCOPY N/A 03/05/2020    Procedure: ESOPHAGOGASTRODUODENOSCOPY;  Surgeon: Alexandru Bagley MD;  Location: [facility] OR;  Service: Gastroenterology;  Laterality: N/A;    ENDOSCOPY N/A 11/23/2021    Procedure: ESOPHAGOGASTRODUODENOSCOPY WITH BIOPSY;  Surgeon: Palmira Pate MD;  Location: [facility] OR;  Service: Gastroenterology;  Laterality: N/A;    ENDOSCOPY AND COLONOSCOPY      HEAD/NECK LESION/CYST EXCISION Right 4/30/2024    Procedure: FACIAL LESION/CYST EXCISION;  Surgeon: Ta Blas MD;  Location: [facility] OR;  Service: General;  Laterality: Right;    INSERTION HEMODIALYSIS CATHETER N/A 11/14/2022    Procedure: HEMODIALYSIS CATHETER INSERTION;  Surgeon: Ta Blas MD;  Location: [facility] OR;  Service: General;  Laterality: N/A;    JOINT REPLACEMENT Left 05/02/2017    [facility]  DR LEVIN  LEFT TOTAL KNEE    KNEE ARTHROSCOPY W/ MENISCECTOMY Right     LAPAROSCOPIC TUBAL LIGATION      MA ARTHRP KNE CONDYLE&PLATU MEDIAL&LAT COMPARTMENTS Left 05/02/2017    Procedure: TOTAL KNEE ARTHROPLASTY;  Surgeon: Feroz Levin MD;  Location: [facility];  Service: Orthopedics    STERILIZATION      TONSILLECTOMY         Social History     Socioeconomic History    Marital status:      Spouse name: natalie    Number of children: 3    Years of education: 12   Tobacco Use    Smoking status: Never     Passive exposure: Past    Smokeless tobacco: Never   Vaping Use    Vaping status: Never Used   Substance and Sexual Activity    Alcohol use: Yes     Comment: socially    Drug use: No    Sexual activity: Defer     Birth control/protection: Surgical       Family History  family history includes Arthritis in her mother; Cancer in her mother; Diabetes in her daughter, maternal aunt, mother, and son; Heart disease in her father and  maternal grandmother.    Immunization History   Administered Date(s) Administered    COVID-19 (PFIZER) Purple Cap Monovalent 01/28/2021, 02/18/2021, 09/27/2021    FLUAD TRI 65YR+ 08/01/2015, 09/01/2017, 10/15/2018    Flu Vaccine Quad PF >36MO 09/18/2017    Flu Vaccine Split Quad 09/17/2018    Fluzone (or Fluarix & Flulaval for VFC) >6mos 09/17/2018, 09/04/2020    Fluzone High Dose =>65 Years ([employer] ONLY) 10/21/2019    Fluzone High-Dose 65+yrs 11/15/2021, 03/27/2023    Fluzone Quad >6mos (Multi-dose) 09/29/2016    Hep B, Dialysis 12/07/2022, 01/06/2023, 02/03/2023    Influenza Seasonal Injectable 08/01/2015, 09/01/2017, 10/15/2018    Pneumococcal Conjugate 20-Valent (PCV20) 05/25/2023    Pneumococcal Polysaccharide (PPSV23) 08/01/2015, 03/20/2017, 06/18/2018    Tdap 03/20/2017    Zostavax 06/24/2014        Allergies  No Known Allergies    The medication list has been reviewed and updated.   Current Medications    Current Outpatient Medications:     amLODIPine (NORVASC) 10 MG tablet, Take 1 tablet by mouth Daily., Disp: 90 tablet, Rfl: 3    atorvastatin (LIPITOR) 80 MG tablet, Take 1 tablet by mouth Every Night., Disp: 90 tablet, Rfl: 1    escitalopram (LEXAPRO) 20 MG tablet, Take 1 tablet by mouth Daily., Disp: 30 tablet, Rfl: 5    ferrous sulfate 325 (65 FE) MG EC tablet, Take 1 tablet by mouth Every 12 (Twelve) Hours., Disp: 60 tablet, Rfl: 5    HYDROcodone-acetaminophen (NORCO) 5-325 MG per tablet, Take 1 tablet by mouth Every 12 (Twelve) Hours As Needed for Severe Pain., Disp: 6 tablet, Rfl: 0    ibuprofen (ADVIL,MOTRIN) 600 MG tablet, Take 1 tablet by mouth Every 6 (Six) Hours As Needed for Mild Pain., Disp: 20 tablet, Rfl: 0    levothyroxine (SYNTHROID, LEVOTHROID) 25 MCG tablet, Take 1 tablet by mouth Daily., Disp: 90 tablet, Rfl: 3    metoprolol succinate XL (Toprol XL) 25 MG 24 hr tablet, Take 1 tablet by mouth Daily., Disp: 90 tablet, Rfl: 1    mupirocin (BACTROBAN) 2 % ointment, Apply 1 Application  "topically to the appropriate area as directed 3 (Three) Times a Day., Disp: 30 g, Rfl: 0    pantoprazole (PROTONIX) 40 MG EC tablet, Take 1 tablet by mouth Daily., Disp: 90 tablet, Rfl: 1    Senna Plus 8.6-50 MG per tablet, Take 1 tablet by mouth Every 12 (Twelve) Hours., Disp: , Rfl:     torsemide (DEMADEX) 20 MG tablet, Take 2 tablets by mouth Daily., Disp: , Rfl:     vitamin D (ERGOCALCIFEROL) 1.25 MG (11496 UT) capsule capsule, TAKE ONE Capsule BY MOUTH ONCE WEEKLY, Disp: 15 capsule, Rfl: 3      Review of Systems    Review of Systems   Constitutional:  Negative for activity change, appetite change, chills, diaphoresis, fatigue and fever.   HENT:  Negative for congestion, dental problem, drooling, ear discharge, ear pain, facial swelling, postnasal drip, sinus pressure, sore throat and swollen glands.    Eyes:  Negative for blurred vision, double vision, pain, discharge and itching.   Respiratory:  Negative for apnea, cough, choking, chest tightness and shortness of breath.    Cardiovascular:  Negative for chest pain, palpitations and leg swelling.   Gastrointestinal:  Negative for abdominal distention, abdominal pain, diarrhea, nausea, rectal pain and vomiting.   Genitourinary:  Negative for difficulty urinating, dysuria, flank pain, frequency, hematuria, pelvic pain and pelvic pressure.   Musculoskeletal:  Positive for arthralgias and joint swelling.   Skin:  Positive for wound.   Neurological:  Negative for dizziness, tremors, seizures, syncope, speech difficulty and confusion.   Psychiatric/Behavioral:  Negative for agitation and behavioral problems.         Objective     Vital Signs:  /50 (BP Location: Left arm, Patient Position: Sitting, Cuff Size: Small Adult)   Pulse 64   Ht 160 cm (63\")   Wt 75.8 kg (167 lb)   SpO2 97%   BMI 29.58 kg/m²   Estimated body mass index is 29.58 kg/m² as calculated from the following:    Height as of this encounter: 160 cm (63\").    Weight as of this encounter: 75.8 " kg (167 lb).    Physical Exam  Constitutional:       General: She is not in acute distress.     Appearance: Normal appearance. She is not ill-appearing, toxic-appearing or diaphoretic.   HENT:      Head: Normocephalic and atraumatic.      Nose: No congestion or rhinorrhea.      Mouth/Throat:      Pharynx: Oropharynx is clear. No oropharyngeal exudate or posterior oropharyngeal erythema.   Cardiovascular:      Rate and Rhythm: Normal rate and regular rhythm.      Heart sounds: No murmur heard.  Pulmonary:      Effort: No respiratory distress.      Breath sounds: No stridor. No wheezing, rhonchi or rales.   Chest:      Chest wall: No tenderness.   Abdominal:      General: There is no distension.      Tenderness: There is no abdominal tenderness. There is no right CVA tenderness, left CVA tenderness, guarding or rebound.   Musculoskeletal:         General: Swelling, tenderness, deformity and signs of injury present.      Cervical back: No rigidity or tenderness.   Skin:     Coloration: Skin is not jaundiced or pale.      Findings: Erythema present. No bruising or rash.   Neurological:      General: No focal deficit present.      Mental Status: She is alert. Mental status is at baseline.   Psychiatric:         Mood and Affect: Mood normal.         Behavior: Behavior normal.          Result Review :  The following data was reviewed by Alexx Lawson MD     Lab Results  Lab Results   Component Value Date    WBC 8.10 06/13/2024    HGB 11.7 (L) 06/13/2024    HCT 41.4 06/13/2024    HCT 41.1 06/13/2024    MCV 79.0 06/13/2024     06/13/2024     Lab Results   Component Value Date    GLUCOSE 131 (H) 06/13/2024    BUN 18 06/13/2024    CREATININE 3.33 (H) 06/13/2024    EGFRIFNONA 23 (L) 02/21/2022    EGFRIFAFRI 41 (L) 07/30/2018    BCR 5.4 (L) 06/13/2024    K 3.3 (L) 06/13/2024    CO2 25.3 06/13/2024    CALCIUM 8.7 06/13/2024    PROTENTOTREF 7.7 07/30/2018    ALBUMIN 2.6 (L) 06/13/2024    LABIL2 1.1 (L) 07/30/2018     "AST 31 06/13/2024    ALT 14 06/13/2024      Lab Results   Component Value Date    CRP 7.05 (H) 04/17/2024        No results found for: \"ACANTHNAEG\", \"AFBCX\", \"BPERTUSSISCX\", \"BLOODCX\"  No results found for: \"BCIDPCR\", \"CXREFLEX\", \"CSFCX\", \"CULTURETIS\"  No results found for: \"CULTURES\", \"HSVCX\", \"URCX\"  No results found for: \"EYECULTURE\", \"GCCX\", \"HSVCULTURE\", \"LABHSV\"  No results found for: \"LEGIONELLA\", \"MRSACX\", \"MUMPSCX\", \"MYCOPLASCX\"  No results found for: \"NOCARDIACX\", \"STOOLCX\"  No results found for: \"THROATCX\", \"UNSTIMCULT\", \"URINECX\", \"CULTURE\", \"VZVCULTUR\"  No results found for: \"VIRALCULTU\", \"WOUNDCX\"    Radiology Results              Assessment / Plan        Diagnoses and all orders for this visit:    1. Acute osteomyelitis of toe of right foot (Primary)  -     MRSA Screen, PCR (Inpatient) - Swab, Nares; Future        MRI 5/1/24 IMPRESSION:  1.  The first distal phalanx shows bony edema suspicious for  osteomyelitis. Questionably 5 mm focus of bony edema involving the head  of the proximal phalanx as well.  2.  Soft tissue swelling of the first digit noted with no appreciable  abscess at this time.    Patient has end stage renal disease on HD TIW with last creatinine at 3.33 on June 13, 2024.    CRP elevated on 4/17/2024 at 7.05 and 9.16 on 4/3/24.    Patient had an ED visit back on 4/17/2024 with right great toe pain and swelling and has seen podiatrist Dr. Minor since with a diagnosis of osteomyelitis in April 2024.    Unfortunately patient is at high risk for MRSA infection with her hemodialysis schedule and unable to do a PICC line or a midline at this time so we will proceed with empiric coverage with vancomycin to be giving after hemodialysis x 4 weeks with follow-up CRP levels afterwards.    Will check MRSA screen today. Labs next week. I contacted HD and Dr. Johnson to arrange for the Vancomycin.        Follow Up   Return in about 8 days (around 7/10/2024) for Follow up, With Dr. Lawson.    Visit " Diagnoses:    ICD-10-CM ICD-9-CM   1. Acute osteomyelitis of toe of right foot  M86.171 730.07       Patient was given instructions and counseling regarding her condition or for health maintenance advice. Please see specific information pulled into the AVS if appropriate.     This document has been electronically signed by Alexx Lawson MD   July 2, 2024 09:54 EDT      No orders of the defined types were placed in this encounter.     Dictated Utilizing Dragon Dictation: Part of this note may be an electronic transcription/translation of spoken language to printed text using the Dragon Dictation System.

## 2024-07-09 ENCOUNTER — LAB (OUTPATIENT)
Dept: ONCOLOGY | Facility: CLINIC | Age: 79
End: 2024-07-09
Payer: MEDICARE

## 2024-07-09 ENCOUNTER — OFFICE VISIT (OUTPATIENT)
Dept: ONCOLOGY | Facility: CLINIC | Age: 79
End: 2024-07-09
Payer: MEDICARE

## 2024-07-09 VITALS
DIASTOLIC BLOOD PRESSURE: 61 MMHG | BODY MASS INDEX: 25.12 KG/M2 | OXYGEN SATURATION: 96 % | HEART RATE: 71 BPM | SYSTOLIC BLOOD PRESSURE: 112 MMHG | WEIGHT: 141.8 LBS | RESPIRATION RATE: 20 BRPM | TEMPERATURE: 98 F | HEIGHT: 63 IN

## 2024-07-09 DIAGNOSIS — D63.8 ANEMIA OF CHRONIC DISEASE: ICD-10-CM

## 2024-07-09 DIAGNOSIS — D50.9 IRON DEFICIENCY ANEMIA, UNSPECIFIED IRON DEFICIENCY ANEMIA TYPE: Primary | ICD-10-CM

## 2024-07-09 DIAGNOSIS — D50.9 IRON DEFICIENCY ANEMIA, UNSPECIFIED IRON DEFICIENCY ANEMIA TYPE: ICD-10-CM

## 2024-07-09 DIAGNOSIS — E53.8 B12 DEFICIENCY: ICD-10-CM

## 2024-07-09 DIAGNOSIS — N18.4 CKD (CHRONIC KIDNEY DISEASE) STAGE 4, GFR 15-29 ML/MIN: ICD-10-CM

## 2024-07-09 DIAGNOSIS — E53.8 FOLATE DEFICIENCY: ICD-10-CM

## 2024-07-09 LAB
BASOPHILS # BLD AUTO: 0.03 10*3/MM3 (ref 0–0.2)
BASOPHILS NFR BLD AUTO: 0.4 % (ref 0–1.5)
DEPRECATED RDW RBC AUTO: 56 FL (ref 37–54)
EOSINOPHIL # BLD AUTO: 0.15 10*3/MM3 (ref 0–0.4)
EOSINOPHIL NFR BLD AUTO: 1.8 % (ref 0.3–6.2)
ERYTHROCYTE [DISTWIDTH] IN BLOOD BY AUTOMATED COUNT: 20.4 % (ref 12.3–15.4)
FERRITIN SERPL-MCNC: 628.4 NG/ML (ref 13–150)
HCT VFR BLD AUTO: 41.4 % (ref 34–46.6)
HGB BLD-MCNC: 12.2 G/DL (ref 12–15.9)
IMM GRANULOCYTES # BLD AUTO: 0.01 10*3/MM3 (ref 0–0.05)
IMM GRANULOCYTES NFR BLD AUTO: 0.1 % (ref 0–0.5)
IRON 24H UR-MRATE: 25 MCG/DL (ref 37–145)
IRON SATN MFR SERPL: 14 % (ref 20–50)
LYMPHOCYTES # BLD AUTO: 1.49 10*3/MM3 (ref 0.7–3.1)
LYMPHOCYTES NFR BLD AUTO: 18.3 % (ref 19.6–45.3)
MCH RBC QN AUTO: 23 PG (ref 26.6–33)
MCHC RBC AUTO-ENTMCNC: 29.5 G/DL (ref 31.5–35.7)
MCV RBC AUTO: 78.1 FL (ref 79–97)
MONOCYTES # BLD AUTO: 0.6 10*3/MM3 (ref 0.1–0.9)
MONOCYTES NFR BLD AUTO: 7.4 % (ref 5–12)
NEUTROPHILS NFR BLD AUTO: 5.85 10*3/MM3 (ref 1.7–7)
NEUTROPHILS NFR BLD AUTO: 72 % (ref 42.7–76)
NRBC BLD AUTO-RTO: 0 /100 WBC (ref 0–0.2)
PLATELET # BLD AUTO: 175 10*3/MM3 (ref 140–450)
PMV BLD AUTO: 8.6 FL (ref 6–12)
RBC # BLD AUTO: 5.3 10*6/MM3 (ref 3.77–5.28)
TIBC SERPL-MCNC: 180 MCG/DL (ref 298–536)
TRANSFERRIN SERPL-MCNC: 121 MG/DL (ref 200–360)
WBC NRBC COR # BLD AUTO: 8.13 10*3/MM3 (ref 3.4–10.8)

## 2024-07-09 PROCEDURE — 83921 ORGANIC ACID SINGLE QUANT: CPT | Performed by: INTERNAL MEDICINE

## 2024-07-09 PROCEDURE — 85025 COMPLETE CBC W/AUTO DIFF WBC: CPT | Performed by: INTERNAL MEDICINE

## 2024-07-09 PROCEDURE — 99213 OFFICE O/P EST LOW 20 MIN: CPT | Performed by: INTERNAL MEDICINE

## 2024-07-09 PROCEDURE — 1126F AMNT PAIN NOTED NONE PRSNT: CPT | Performed by: INTERNAL MEDICINE

## 2024-07-09 PROCEDURE — 82746 ASSAY OF FOLIC ACID SERUM: CPT | Performed by: INTERNAL MEDICINE

## 2024-07-09 PROCEDURE — 84466 ASSAY OF TRANSFERRIN: CPT | Performed by: INTERNAL MEDICINE

## 2024-07-09 PROCEDURE — 3078F DIAST BP <80 MM HG: CPT | Performed by: INTERNAL MEDICINE

## 2024-07-09 PROCEDURE — G2211 COMPLEX E/M VISIT ADD ON: HCPCS | Performed by: INTERNAL MEDICINE

## 2024-07-09 PROCEDURE — 82607 VITAMIN B-12: CPT | Performed by: INTERNAL MEDICINE

## 2024-07-09 PROCEDURE — 83540 ASSAY OF IRON: CPT | Performed by: INTERNAL MEDICINE

## 2024-07-09 PROCEDURE — 84238 ASSAY NONENDOCRINE RECEPTOR: CPT | Performed by: INTERNAL MEDICINE

## 2024-07-09 PROCEDURE — 82728 ASSAY OF FERRITIN: CPT | Performed by: INTERNAL MEDICINE

## 2024-07-09 PROCEDURE — 3074F SYST BP LT 130 MM HG: CPT | Performed by: INTERNAL MEDICINE

## 2024-07-09 NOTE — PROGRESS NOTES
Subjective     Date: 7/9/2024    Referring Provider  Dr. Johnson     Chief Complaint   Iron deficiency anemia       Subjective          Britta Lee is a 78 y.o. female who presents today to [facility] for follow up.    HPI:   78 y.o.female with history of hypertension, hyperlipidemia, depression/anxiety, hypothyroidism, coronary artery disease, CKD, congestive heart failure who presents as follow-up for her treatment of anemia.  Previously seen by ERNIE Mohr.    She has struggled with anemia since December 2016 with hemoglobin ranging from 8.6-9.8.  Iron studies at the time consistent with anemia of chronic disease.  Has previously received IV iron infusions, and taken oral iron.  Stopped taking oral ferrous sulfate due to malabsorption.  Last IV iron was received August 2022.    Treatment history:      Interval History 02/01/2023   She was started on oral ferrous sulfate by nephrology.  Currently still taking it without any nausea or constipation.  Denies any new complaints.  Denies any melena, hematochezia, hematemesis.  Per chart review patient presented to the ED on 1/11/2023 for reportedly low hemoglobin level in dialysis, hemoglobin of 6.1 mg/dL, required blood transfusion at that time.    Interval History 02/21/2023   Ms. Lee presents today for follow-up with her .  She had a fall from bed, presented to the ED February 8,  was found to have acute fracture of humeral neck and humeral head.  She reports continued oral ferrous sulfate, denies any GI discomfort with this.    Hemoglobin today is 9.3, hematocrit 31.2 today MCV 89.9.  Labs from previous visit showed vitamin B12 713, methylmalonic acid elevated to 933.  Folate level low/normal at 6.7.    She reports previously taking oral vitamin B tablets, never tried injections.  Denies any fatigue, melena, hematochezia.    Interval History 05/23/2023   Ms. Lee presents today with her . She  reports doing well overall. Continues to take oral folic acid and b12 tablet. Unsure if she is taking oral ferrous sulfate.   Recently placed an AV fistula R arm.   Denies GI bleed.  CBC today with Hgb 13.8, Hct 47.3, normal WBC and platelets. Pt is unsure if she gets epo with Dr. Johnson, her  does not think so.     Interval History 08/23/2023   Ms. Lee presents today accompanied by a friend she reports doing well overall, has been getting dialysis Monday Wednesday Friday.  She denies any bleeds including melena or hematochezia.  She is unsure if she is taking folic acid and vitamin B12 capsules, as her  typically gives her medications.    CBC reviewed today which shows hemoglobin 10.8, hematocrit 34.3 (decreased from June 2023 with normal hemoglobin 12.1).  Normal WBC and platelet count.  Iron studies consistent with anemia of chronic disease    Interval History 12/06/2023   Ms. Lee presents today, accompanied by a friend. She reports fatigue today due to having to wake up for this appt followed by dialysis. Continues to take oral folic acid and B12 capsule. Denies any bleeding    CBC with improvement in H/H 11.2/36.5, normal WBC and Platelets.     Of note, bed bugs were noted on patient at the time of lab draw.     Interval History 03/07/2024   Ms. Lee presents today for follow up, accompanied by her . She is doing well overall. Denies any GIB. Thinks she continues to take oral folic acid and B12, but unsure. CBC today with Hgb 11.7, Hct 40, MCV 80.     Interval History 07/09/2024   Ms. Lee presents today, accompanied by her daughter. She had a fall two weeks ago, while moving from [place], presented to [place] ED, no injury was discovered. Has a hematoma on her face and describes neck pain on turn to right or left. She denies any bleed.  CBC with Hgb 12, Hct 41.4, Plts 175. Normal WBC and platelets. MCV 78. Ferritin 628 ng/mL.       Objective     Objective     Allergy:   No Known  Allergies     Current Medications:   Current Outpatient Medications   Medication Sig Dispense Refill    amLODIPine (NORVASC) 10 MG tablet Take 1 tablet by mouth Daily. 90 tablet 3    atorvastatin (LIPITOR) 80 MG tablet Take 1 tablet by mouth Every Night. 90 tablet 1    escitalopram (LEXAPRO) 20 MG tablet Take 1 tablet by mouth Daily. 30 tablet 5    ferrous sulfate 325 (65 FE) MG EC tablet Take 1 tablet by mouth Every 12 (Twelve) Hours. 60 tablet 5    HYDROcodone-acetaminophen (NORCO) 5-325 MG per tablet Take 1 tablet by mouth Every 12 (Twelve) Hours As Needed for Severe Pain. 6 tablet 0    ibuprofen (ADVIL,MOTRIN) 600 MG tablet Take 1 tablet by mouth Every 6 (Six) Hours As Needed for Mild Pain. 20 tablet 0    levothyroxine (SYNTHROID, LEVOTHROID) 25 MCG tablet Take 1 tablet by mouth Daily. 90 tablet 3    metoprolol succinate XL (Toprol XL) 25 MG 24 hr tablet Take 1 tablet by mouth Daily. 90 tablet 1    mupirocin (BACTROBAN) 2 % ointment Apply 1 Application topically to the appropriate area as directed 3 (Three) Times a Day. 30 g 0    pantoprazole (PROTONIX) 40 MG EC tablet Take 1 tablet by mouth Daily. 90 tablet 1    Senna Plus 8.6-50 MG per tablet Take 1 tablet by mouth Every 12 (Twelve) Hours.      torsemide (DEMADEX) 20 MG tablet Take 2 tablets by mouth Daily.      vitamin D (ERGOCALCIFEROL) 1.25 MG (52062 UT) capsule capsule TAKE ONE Capsule BY MOUTH ONCE WEEKLY 15 capsule 3     No current facility-administered medications for this visit.       Past Medical History:  Past Medical History:   Diagnosis Date    Acquired hypothyroidism 04/22/2021    Anemia     Arthritis     Carpal tunnel syndrome     Coronary artery disease     Diabetes mellitus     Elevated cholesterol     GERD (gastroesophageal reflux disease)     History of pneumonia     History of unsteady gait     OCASSIONALLY    Hypertension     Insomnia     Kidney disease     Kidney disease, chronic, stage IV (GFR 15-29 ml/min)     LENNY (obstructive sleep  apnea) 12/01/2020    no c-pap    Osteoarthritis     Renal insufficiency     Sciatica        Past Surgical History:  Past Surgical History:   Procedure Laterality Date    ABDOMINAL SURGERY      APPENDECTOMY      CARDIAC CATHETERIZATION      1 STENT  ---  2000    CARDIAC SURGERY      CAROTID STENT      CARPAL TUNNEL RELEASE Right 10/08/2019    Procedure: CARPAL TUNNEL RELEASE;  Surgeon: Feroz Levin MD;  Location: [facility] OR;  Service: Orthopedics    CATARACT EXTRACTION      CHOLECYSTECTOMY      COLONOSCOPY      COLONOSCOPY N/A 11/23/2021    Procedure: COLONOSCOPY FOR SCREENING;  Surgeon: Palmira Pate MD;  Location: [facility] OR;  Service: Gastroenterology;  Laterality: N/A;    CORONARY ANGIOPLASTY WITH STENT PLACEMENT      ENDOSCOPY      ENDOSCOPY N/A 03/05/2020    Procedure: ESOPHAGOGASTRODUODENOSCOPY;  Surgeon: Alexandru Bagley MD;  Location: [facility] OR;  Service: Gastroenterology;  Laterality: N/A;    ENDOSCOPY N/A 11/23/2021    Procedure: ESOPHAGOGASTRODUODENOSCOPY WITH BIOPSY;  Surgeon: Palmira Pate MD;  Location: [facility] OR;  Service: Gastroenterology;  Laterality: N/A;    ENDOSCOPY AND COLONOSCOPY      HEAD/NECK LESION/CYST EXCISION Right 4/30/2024    Procedure: FACIAL LESION/CYST EXCISION;  Surgeon: Ta Blas MD;  Location: [facility] OR;  Service: General;  Laterality: Right;    INSERTION HEMODIALYSIS CATHETER N/A 11/14/2022    Procedure: HEMODIALYSIS CATHETER INSERTION;  Surgeon: Ta Blas MD;  Location: [facility] OR;  Service: General;  Laterality: N/A;    JOINT REPLACEMENT Left 05/02/2017    [facility]  DR LEVIN  LEFT TOTAL KNEE    KNEE ARTHROSCOPY W/ MENISCECTOMY Right     LAPAROSCOPIC TUBAL LIGATION      VA ARTHRP KNE CONDYLE&PLATU MEDIAL&LAT COMPARTMENTS Left 05/02/2017    Procedure: TOTAL KNEE ARTHROPLASTY;  Surgeon: Feroz Levin MD;  Location: [facility] OR;  Service: Orthopedics    STERILIZATION      TONSILLECTOMY         Social History:  Social History  "    Socioeconomic History    Marital status:      Spouse name: natalie    Number of children: 3    Years of education: 12   Tobacco Use    Smoking status: Never     Passive exposure: Past    Smokeless tobacco: Never   Vaping Use    Vaping status: Never Used   Substance and Sexual Activity    Alcohol use: Yes     Comment: socially    Drug use: No    Sexual activity: Defer     Birth control/protection: Surgical     Britta Lee  reports that she has never smoked. She has been exposed to tobacco smoke. She has never used smokeless tobacco.      Family History:  Family History   Problem Relation Age of Onset    Arthritis Mother     Diabetes Mother     Cancer Mother     Heart disease Father     Diabetes Daughter     Diabetes Son     Diabetes Maternal Aunt     Heart disease Maternal Grandmother     Breast cancer Neg Hx        Review of Systems:  Review of Systems   All other systems reviewed and are negative.      Vital Signs:   /61   Pulse 71   Temp 98 °F (36.7 °C) (Temporal)   Resp 20   Ht 160 cm (63\")   Wt 64.3 kg (141 lb 12.8 oz)   SpO2 96%   BMI 25.12 kg/m²      Physical Exam:  Physical Exam  Vitals and nursing note reviewed.   Constitutional:       General: She is not in acute distress.     Appearance: Normal appearance. She is not ill-appearing.      Comments: + In a wheelchair   HENT:      Head: Normocephalic and atraumatic.      Comments: +hematoma below L orbit     Nose: Nose normal.      Mouth/Throat:      Mouth: Mucous membranes are moist.      Pharynx: Oropharynx is clear.   Eyes:      Conjunctiva/sclera: Conjunctivae normal.      Pupils: Pupils are equal, round, and reactive to light.   Cardiovascular:      Rate and Rhythm: Normal rate and regular rhythm.      Heart sounds: No murmur heard.  Pulmonary:      Effort: Pulmonary effort is normal. No respiratory distress.      Breath sounds: Normal breath sounds. No wheezing.   Abdominal:      General: Abdomen is flat. Bowel sounds are normal. " "There is no distension.      Palpations: Abdomen is soft. There is no mass.      Tenderness: There is no abdominal tenderness. There is no guarding.   Musculoskeletal:         General: No swelling. Normal range of motion.      Cervical back: Normal range of motion.   Lymphadenopathy:      Cervical: No cervical adenopathy.   Skin:     General: Skin is warm and dry.      Coloration: Skin is not jaundiced.      Findings: Bruising and lesion present. No rash.      Comments: +R AV fistula    Multiple excoriations on bilateral upper and lower extremities   Neurological:      General: No focal deficit present.      Mental Status: She is alert and oriented to person, place, and time.      Motor: No weakness.      Coordination: Coordination normal.   Psychiatric:         Mood and Affect: Mood normal.         Behavior: Behavior normal.         Judgment: Judgment normal.         PHQ-9 Score  PHQ-9 Total Score:       Pain Score  Vitals:    07/09/24 1512   BP: 112/61   Pulse: 71   Resp: 20   Temp: 98 °F (36.7 °C)   TempSrc: Temporal   SpO2: 96%   Weight: 64.3 kg (141 lb 12.8 oz)   Height: 160 cm (63\")   PainSc: 0-No pain           ECOG score: 0             Lab Review  Lab Results   Component Value Date    WBC 8.13 07/09/2024    HGB 12.2 07/09/2024    HCT 41.4 07/09/2024    MCV 78.1 (L) 07/09/2024    RDW 20.4 (H) 07/09/2024     07/09/2024    NEUTRORELPCT 72.0 07/09/2024    LYMPHORELPCT 18.3 (L) 07/09/2024    MONORELPCT 7.4 07/09/2024    EOSRELPCT 1.8 07/09/2024    BASORELPCT 0.4 07/09/2024    NEUTROABS 5.85 07/09/2024    LYMPHSABS 1.49 07/09/2024       Lab Results   Component Value Date     06/13/2024    K 3.3 (L) 06/13/2024    CO2 25.3 06/13/2024    CL 98 06/13/2024    BUN 18 06/13/2024    CREATININE 3.33 (H) 06/13/2024    EGFRIFNONA 23 (L) 02/21/2022    EGFRIFAFRI 41 (L) 07/30/2018    GLUCOSE 131 (H) 06/13/2024    CALCIUM 8.7 06/13/2024    ALKPHOS 373 (H) 06/13/2024    AST 31 06/13/2024    ALT 14 06/13/2024    " BILITOT 0.5 06/13/2024    ALBUMIN 2.6 (L) 06/13/2024    PROTEINTOT 7.5 06/13/2024    MG 1.9 04/17/2024    PHOS 3.3 10/17/2022           Endoscopy:   ENDOSCOPY:  11/23/21  ESOPHAGOGASTRODUODENOSCOPY PROCEDURE REPORT     Britta Lee  11/23/2021     GASTROENTEROLOGIST:  Palmira Pate MD      PRE-PROCEDURE DIAGNOSIS:  Iron deficiency anemia, unspecified iron deficiency anemia type [D50.9]  Weight loss, abnormal [R63.4]     POST-PROCEDURE DIAGNOSIS:  1.  Gastritis  2.  Angiectasias     Procedure(s):  ESOPHAGOGASTRODUODENOSCOPY WITH BIOPSY  COLONOSCOPY FOR SCREENING     ANESTHESIA:  Propofol administered by anesthesia.  See anesthesia notes for ASA classification     STAFF:  Circulator: Bre Ny RN; Danelle Mccray RN  Endo Technician: Omari Lewis     FINDINGS:  1.  Normal-appearing esophagus.  Biopsies were taken  2.  Mild erythema in the antrum and stomach.  Biopsies taken for H. pylori.  3.  One angiectasia was found in the second portion of the duodenum.  This was nonbleeding.  Biopsies were taken of the duodenum to rule out celiac disease as a source of her anemia.     OPERATIVE PROCEDURE:  After proper informed consent was obtained, patient was transferred to the OR/endoscopy suite.  Patient was then placed in left lateral decubitus position. The Olympus 180 series video gastroscope was inserted orally under direct visualization.  Esophagus, stomach, and duodenum were inspected.  The endoscope was passed to the third portion of the duodenum.  Scope was retroflexed for visualization of the cardia and incisuraThe endoscope was then withdrawn. Patient tolerated the procedure well. There were no immediate complications.     ESTIMATED BLOOD LOSS:  None     COMPLICATIONS;  None     RECOMMENDATIONS/ PLAN:  1.  Follow-up biopsy results.  2.  Proceed with colonoscopy.    COLONOSCOPY PROCEDURE NOTE     Britta Lee  11/23/2021     GASTROENTEROLOGIST:  Palmira Pate MD         PRE-PROCEDURE DIAGNOSIS:   Iron deficiency anemia, unspecified iron deficiency anemia type [D50.9]  Weight loss, abnormal [R63.4]     POST-PROCEDURE DIAGNOSIS:  1.  Colon polyps  2.  Diverticulosis  3.  Internal hemorrhoids     PROCEDURE:  COLONOSCOPY with snare polypectomy and cold biopsy polypectomy     ANESTHESIA:  Propofol administered by anesthesia.  See anesthesia notes for ASA classification     STAFF  Circulator: Bre Ny RN; Danelle Mccray RN  Endo Technician: Omari Lewis     Findings:  1.  A 5 mm polyp was found in the ascending colon.  This was removed with cold forceps polypectomy.  2.  A 5 mm polyp was removed from the transverse colon with cold forceps biopsy.  3.  Two 6 mm polyps were found in the descending colon.  Both polyps were removed with cold snare polypectomy.  4.  A 7 mm polyp was removed from the sigmoid colon with cold snare polypectomy.  5.  Diverticulosis involving left colon.  6.  Small internal hemorrhoids.  7.  Normal-appearing terminal ileum.    OPERATIVE PROCEDURE   After proper informed consent was obtained, the patient was taken the operating suite and placed in left lateral decubitus position.  An Olympus video colonoscope 180 series was inserted in the rectum and advanced to the terminal ileum under direct visualization.  Cecum and terminal ileum were identified by visualization of the appendiceal orifice and ileocecal valve.  The colonoscope was then slowly withdrawn from the cecum to the rectum and passed a second time from rectum to cecum.  The colonoscope was retroflexed in the cecum and rectum. Scope was then withdrawn. Patient tolerated the procedure well. There were no immediate complications. Cecal withdrawal time was 15 minutes.     ESTIMATED BLOOD LOSS  None      COMPLICATIONS  None     RECOMMENDATIONS:  1.  Follow-up pathology.  2.  Repeat colonoscopy in 3 years due to personal history of colon polyps.  3.  Proceed with capsule endoscopy if  anemia persists.  Sign 4.  Follow-up in GI clinic in 6 to 8 weeks.  Assessment / Plan         Assessment and Plan   78 y.o. year old F with history of ESRD on HD who presents for normocytic anemia.     Anemia  2    iron deficiency anemia  3.   Vitamin B12 deficiency  4.   Folate deficiency  5.   Malabsorption  -Patient with history of anemia required multiple transfusions, most recently 1/12/2023.  Previously required IV iron after intolerance to p.o. iron.  Last IV iron received August 2022.  -Most recent EGD and colonoscopy by Dr. Simmons 11/2021.  At that time no source of bleed was discovered, it was recommended patient follow-up with capsule endoscopy if anemia persist.  -Previously required multiple parenteral iron with IV ferumoxytol, last received 8/2022  -Also discovered to have low folate and normal B12 but elevated methylmalonic acid  -Currently taking 1 mg folic acid to be taken daily and 100 mcg of cyanocobalamin tablet to be taken daily. Today with improvement in H/H.    -She is unsure if receiving Erythropoietin with nephrology, Dr. Johnson   -Iron studies 7/2024 consistent with anemia of chronic disease.     Discussed possible differential diagnoses, testing, treatment, recommended non-pharmacological interventions, risks, warning signs to monitor for that would indicate need for follow-up in clinic or ER. If no improvement with these regimens or you have new or worsening symptoms follow-up. Patient verbalizes understanding and agreement with plan of care. Denies further needs or concerns.     Patient was given instructions and counseling regarding her condition and for health maintenance advised.    I spent 30 minutes with Britta Lee today.  In the office today, more than 50% of this time was spent face-to-face with her  in counseling / coordination of care, reviewing her interim medical history and counseling on the current treatment plan.  All questions were answered to her  satisfaction.      Meds ordered during this visit  No orders of the defined types were placed in this encounter.      Visit Diagnoses    ICD-10-CM ICD-9-CM   1. Iron deficiency anemia, unspecified iron deficiency anemia type  D50.9 280.9   2. B12 deficiency  E53.8 266.2   3. Folate deficiency  E53.8 266.2   4. Anemia of chronic disease  D63.8 285.29   5. CKD (chronic kidney disease) stage 4, GFR 15-29 ml/min  N18.4 585.4               Follow Up   Return to clinic in 6 months with repeat CBC, folate, B12, MMA, iron studies, ferritin, STR      This document has been electronically signed by Sakina Bang MD   July 9, 2024 16:16 EDT    Dictated Utilizing Dragon Dictation: Part of this note may be an electronic transcription/translation of spoken language to printed text using the Dragon Dictation System.

## 2024-07-09 NOTE — PROGRESS NOTES
Venipuncture Blood Specimen Collection  Venipuncture performed in left arm by Liyah Butler MA with good hemostasis. Patient tolerated the procedure well without complications.   07/09/24   Liyah Butler MA

## 2024-07-10 ENCOUNTER — OFFICE VISIT (OUTPATIENT)
Dept: INFECTIOUS DISEASES | Facility: CLINIC | Age: 79
End: 2024-07-10
Payer: MEDICARE

## 2024-07-10 VITALS
BODY MASS INDEX: 25.16 KG/M2 | SYSTOLIC BLOOD PRESSURE: 99 MMHG | OXYGEN SATURATION: 97 % | HEIGHT: 63 IN | HEART RATE: 66 BPM | WEIGHT: 142 LBS | DIASTOLIC BLOOD PRESSURE: 50 MMHG

## 2024-07-10 DIAGNOSIS — M86.171 ACUTE OSTEOMYELITIS OF TOE OF RIGHT FOOT: Primary | ICD-10-CM

## 2024-07-10 LAB
FOLATE SERPL-MCNC: 5.5 NG/ML (ref 4.78–24.2)
VIT B12 BLD-MCNC: 1070 PG/ML (ref 211–946)

## 2024-07-10 NOTE — PROGRESS NOTES
[facility]         Referring Provider: Lexie Dolan PA  [address] [place] [address]    Subjective      Chief Complaint  Follow-up (Acute osteo of right foot)    Follow-upCurrent symptoms include no chest pain, no confusion, no dizziness, no nausea, no palpitations, no shortness of breath, no fatigue, no blurred vision, no abdominal pain, no choking, no cough and no sore throat.     Britta Lee is a 78 y.o. female who presents today to [facility] for Follow Up .     Britta Lee is a 78 y.o.  female, on Monday Wednesday Friday hemodialysis, who presents to my office with imaging abnormality of the right foot.  Patient reports having her nails trimmed by podiatrist locally, [place].  The patient reports bilateral lower extremity swelling, right greater than left.  She reports occasional pain in her right first toe.  She denies any wounds.  No fevers or drainage.     An MRI had demonstrated possible bony edema that is suspicious for osteomyelitis.  She reports that the podiatrist stated she would likely need surgery.    Past Medical History:   Diagnosis Date    Acquired hypothyroidism 04/22/2021    Anemia     Arthritis     Carpal tunnel syndrome     Coronary artery disease     Diabetes mellitus     Elevated cholesterol     GERD (gastroesophageal reflux disease)     History of pneumonia     History of unsteady gait     OCASSIONALLY    Hypertension     Insomnia     Kidney disease     Kidney disease, chronic, stage IV (GFR 15-29 ml/min)     LENNY (obstructive sleep apnea) 12/01/2020    no c-pap    Osteoarthritis     Renal insufficiency     Sciatica        Past Surgical History:   Procedure Laterality Date    ABDOMINAL SURGERY      APPENDECTOMY      CARDIAC CATHETERIZATION      1 STENT  ---  2000    CARDIAC SURGERY      CAROTID STENT      CARPAL TUNNEL RELEASE Right 10/08/2019    Procedure: CARPAL TUNNEL RELEASE;  Surgeon: Feroz Johnson MD;   Location: [facility] OR;  Service: Orthopedics    CATARACT EXTRACTION      CHOLECYSTECTOMY      COLONOSCOPY      COLONOSCOPY N/A 11/23/2021    Procedure: COLONOSCOPY FOR SCREENING;  Surgeon: Palmira Pate MD;  Location: [facility] OR;  Service: Gastroenterology;  Laterality: N/A;    CORONARY ANGIOPLASTY WITH STENT PLACEMENT      ENDOSCOPY      ENDOSCOPY N/A 03/05/2020    Procedure: ESOPHAGOGASTRODUODENOSCOPY;  Surgeon: Alexandru Bagley MD;  Location: [facility] OR;  Service: Gastroenterology;  Laterality: N/A;    ENDOSCOPY N/A 11/23/2021    Procedure: ESOPHAGOGASTRODUODENOSCOPY WITH BIOPSY;  Surgeon: Palmira Pate MD;  Location: [facility] OR;  Service: Gastroenterology;  Laterality: N/A;    ENDOSCOPY AND COLONOSCOPY      HEAD/NECK LESION/CYST EXCISION Right 4/30/2024    Procedure: FACIAL LESION/CYST EXCISION;  Surgeon: Ta Blas MD;  Location: [facility] OR;  Service: General;  Laterality: Right;    INSERTION HEMODIALYSIS CATHETER N/A 11/14/2022    Procedure: HEMODIALYSIS CATHETER INSERTION;  Surgeon: Ta Blas MD;  Location: [facility] OR;  Service: General;  Laterality: N/A;    JOINT REPLACEMENT Left 05/02/2017    [facility]  DR LEVIN  LEFT TOTAL KNEE    KNEE ARTHROSCOPY W/ MENISCECTOMY Right     LAPAROSCOPIC TUBAL LIGATION      ND ARTHRP KNE CONDYLE&PLATU MEDIAL&LAT COMPARTMENTS Left 05/02/2017    Procedure: TOTAL KNEE ARTHROPLASTY;  Surgeon: Feroz Levin MD;  Location: [facility] OR;  Service: Orthopedics    STERILIZATION      TONSILLECTOMY         Social History     Socioeconomic History    Marital status:      Spouse name: natalie    Number of children: 3    Years of education: 12   Tobacco Use    Smoking status: Never     Passive exposure: Past    Smokeless tobacco: Never   Vaping Use    Vaping status: Never Used   Substance and Sexual Activity    Alcohol use: Yes     Comment: socially    Drug use: No    Sexual activity: Defer     Birth control/protection: Surgical       Family  History  family history includes Arthritis in her mother; Cancer in her mother; Diabetes in her daughter, maternal aunt, mother, and son; Heart disease in her father and maternal grandmother.    Immunization History   Administered Date(s) Administered    COVID-19 (PFIZER) Purple Cap Monovalent 01/28/2021, 02/18/2021, 09/27/2021    FLUAD TRI 65YR+ 08/01/2015, 09/01/2017, 10/15/2018    Flu Vaccine Quad PF >36MO 09/18/2017    Flu Vaccine Split Quad 09/17/2018    Fluzone (or Fluarix & Flulaval for VFC) >6mos 09/17/2018, 09/04/2020    Fluzone High Dose =>65 Years ([employer] ONLY) 10/21/2019    Fluzone High-Dose 65+yrs 11/15/2021, 03/27/2023    Fluzone Quad >6mos (Multi-dose) 09/29/2016    Hep B, Dialysis 12/07/2022, 01/06/2023, 02/03/2023    Influenza Seasonal Injectable 08/01/2015, 09/01/2017, 10/15/2018    Pneumococcal Conjugate 20-Valent (PCV20) 05/25/2023    Pneumococcal Polysaccharide (PPSV23) 08/01/2015, 03/20/2017, 06/18/2018    Tdap 03/20/2017    Zostavax 06/24/2014        Allergies  No Known Allergies    The medication list has been reviewed and updated.   Current Medications    Current Outpatient Medications:     amLODIPine (NORVASC) 10 MG tablet, Take 1 tablet by mouth Daily., Disp: 90 tablet, Rfl: 3    atorvastatin (LIPITOR) 80 MG tablet, Take 1 tablet by mouth Every Night., Disp: 90 tablet, Rfl: 1    escitalopram (LEXAPRO) 20 MG tablet, Take 1 tablet by mouth Daily., Disp: 30 tablet, Rfl: 5    ferrous sulfate 325 (65 FE) MG EC tablet, Take 1 tablet by mouth Every 12 (Twelve) Hours., Disp: 60 tablet, Rfl: 5    HYDROcodone-acetaminophen (NORCO) 5-325 MG per tablet, Take 1 tablet by mouth Every 12 (Twelve) Hours As Needed for Severe Pain., Disp: 6 tablet, Rfl: 0    ibuprofen (ADVIL,MOTRIN) 600 MG tablet, Take 1 tablet by mouth Every 6 (Six) Hours As Needed for Mild Pain., Disp: 20 tablet, Rfl: 0    levothyroxine (SYNTHROID, LEVOTHROID) 25 MCG tablet, Take 1 tablet by mouth Daily., Disp: 90 tablet, Rfl: 3     "metoprolol succinate XL (Toprol XL) 25 MG 24 hr tablet, Take 1 tablet by mouth Daily., Disp: 90 tablet, Rfl: 1    mupirocin (BACTROBAN) 2 % ointment, Apply 1 Application topically to the appropriate area as directed 3 (Three) Times a Day., Disp: 30 g, Rfl: 0    pantoprazole (PROTONIX) 40 MG EC tablet, Take 1 tablet by mouth Daily., Disp: 90 tablet, Rfl: 1    Senna Plus 8.6-50 MG per tablet, Take 1 tablet by mouth Every 12 (Twelve) Hours., Disp: , Rfl:     torsemide (DEMADEX) 20 MG tablet, Take 2 tablets by mouth Daily., Disp: , Rfl:     vitamin D (ERGOCALCIFEROL) 1.25 MG (63650 UT) capsule capsule, TAKE ONE Capsule BY MOUTH ONCE WEEKLY, Disp: 15 capsule, Rfl: 3      Review of Systems    Review of Systems   Constitutional:  Negative for activity change, appetite change, chills, diaphoresis, fatigue and fever.   HENT:  Negative for congestion, dental problem, drooling, ear discharge, ear pain, facial swelling, postnasal drip, sinus pressure, sore throat and swollen glands.    Eyes:  Negative for blurred vision, double vision, pain, discharge and itching.   Respiratory:  Negative for apnea, cough, choking, chest tightness and shortness of breath.    Cardiovascular:  Negative for chest pain, palpitations and leg swelling.   Gastrointestinal:  Negative for abdominal distention, abdominal pain, diarrhea, nausea, rectal pain and vomiting.   Genitourinary:  Negative for difficulty urinating, dysuria, flank pain, frequency, hematuria, pelvic pain and pelvic pressure.   Musculoskeletal:  Positive for arthralgias.   Neurological:  Negative for dizziness, tremors, seizures, syncope, speech difficulty and confusion.   Psychiatric/Behavioral:  Negative for agitation and behavioral problems.         Objective     Vital Signs:  BP 99/50 (BP Location: Left arm, Patient Position: Sitting, Cuff Size: Small Adult)   Pulse 66   Ht 160 cm (63\")   Wt 64.4 kg (142 lb)   SpO2 97%   BMI 25.15 kg/m²   Estimated body mass index is 25.15 " "kg/m² as calculated from the following:    Height as of this encounter: 160 cm (63\").    Weight as of this encounter: 64.4 kg (142 lb).    Physical Exam  Constitutional:       General: She is not in acute distress.     Appearance: Normal appearance. She is not ill-appearing, toxic-appearing or diaphoretic.   HENT:      Head: Normocephalic and atraumatic.      Nose: No congestion or rhinorrhea.      Mouth/Throat:      Pharynx: Oropharynx is clear. No oropharyngeal exudate or posterior oropharyngeal erythema.   Cardiovascular:      Rate and Rhythm: Normal rate and regular rhythm.      Heart sounds: No murmur heard.  Pulmonary:      Effort: No respiratory distress.      Breath sounds: No stridor. No wheezing, rhonchi or rales.   Chest:      Chest wall: No tenderness.   Abdominal:      General: There is no distension.      Tenderness: There is no abdominal tenderness. There is no right CVA tenderness, left CVA tenderness, guarding or rebound.   Musculoskeletal:         General: No swelling, tenderness or signs of injury.      Cervical back: No rigidity or tenderness.   Skin:     Coloration: Skin is not jaundiced or pale.      Findings: No bruising, erythema or rash.   Neurological:      General: No focal deficit present.      Mental Status: She is alert. Mental status is at baseline.   Psychiatric:         Mood and Affect: Mood normal.         Behavior: Behavior normal.          Result Review :  The following data was reviewed by Alexx Lawson MD     Lab Results  Lab Results   Component Value Date    WBC 8.13 07/09/2024    HGB 12.2 07/09/2024    HCT 41.4 07/09/2024    MCV 78.1 (L) 07/09/2024     07/09/2024     Lab Results   Component Value Date    GLUCOSE 131 (H) 06/13/2024    BUN 18 06/13/2024    CREATININE 3.33 (H) 06/13/2024    EGFRIFNONA 23 (L) 02/21/2022    EGFRIFAFRI 41 (L) 07/30/2018    BCR 5.4 (L) 06/13/2024    K 3.3 (L) 06/13/2024    CO2 25.3 06/13/2024    CALCIUM 8.7 06/13/2024    PROTENTOTREF 7.7 " "07/30/2018    ALBUMIN 2.6 (L) 06/13/2024    LABIL2 1.1 (L) 07/30/2018    AST 31 06/13/2024    ALT 14 06/13/2024      Lab Results   Component Value Date    CRP 7.05 (H) 04/17/2024        No results found for: \"ACANTHNAEG\", \"AFBCX\", \"BPERTUSSISCX\", \"BLOODCX\"  No results found for: \"BCIDPCR\", \"CXREFLEX\", \"CSFCX\", \"CULTURETIS\"  No results found for: \"CULTURES\", \"HSVCX\", \"URCX\"  No results found for: \"EYECULTURE\", \"GCCX\", \"HSVCULTURE\", \"LABHSV\"  No results found for: \"LEGIONELLA\", \"MRSACX\", \"MUMPSCX\", \"MYCOPLASCX\"  No results found for: \"NOCARDIACX\", \"STOOLCX\"  No results found for: \"THROATCX\", \"UNSTIMCULT\", \"URINECX\", \"CULTURE\", \"VZVCULTUR\"  No results found for: \"VIRALCULTU\", \"WOUNDCX\"    Radiology Results              Assessment / Plan        Diagnoses and all orders for this visit:    1. Acute osteomyelitis of toe of right foot (Primary)  -     C-reactive Protein; Future      MRI 5/1/24 IMPRESSION:  1.  The first distal phalanx shows bony edema suspicious for  osteomyelitis. Questionably 5 mm focus of bony edema involving the head  of the proximal phalanx as well.  2.  Soft tissue swelling of the first digit noted with no appreciable  abscess at this time.     Patient has end stage renal disease on HD TIW with last creatinine at 3.33 on June 13, 2024.     CRP elevated on 4/17/2024 at 7.05 and 9.16 on 4/3/24.     Patient had an ED visit back on 4/17/2024 with right great toe pain and swelling and has seen podiatrist Dr. Minor since with a diagnosis of osteomyelitis in April 2024.     Unfortunately patient is at high risk for MRSA infection with her hemodialysis schedule and unable to do a PICC line or a midline at this time so we will proceed with empiric coverage with vancomycin to be giving after hemodialysis x 4 weeks with follow-up CRP levels afterwards.    I contacted HD and Dr. Johnson to arrange for the Vancomycin.    Overall the right foot in general and the right big toe looks significantly improved from last " visit after initiating vancomycin but unfortunately patient has not followed up with podiatry for toenail trimming and needs to do that ASAP.    Will refer to podiatry Dr. Minor for toenail trimming and order CRP for next blood draw.        Follow Up   Return in about 2 weeks (around 7/24/2024) for Follow up, With Dr. Lawson.    Visit Diagnoses:    ICD-10-CM ICD-9-CM   1. Acute osteomyelitis of toe of right foot  M86.171 730.07       Patient was given instructions and counseling regarding her condition or for health maintenance advice. Please see specific information pulled into the AVS if appropriate.     This document has been electronically signed by Alexx Lawson MD   July 10, 2024 10:15 EDT      No orders of the defined types were placed in this encounter.     Dictated Utilizing Dragon Dictation: Part of this note may be an electronic transcription/translation of spoken language to printed text using the Dragon Dictation System.

## 2024-07-11 LAB — STFR SERPL-SCNC: 42.7 NMOL/L (ref 12.2–27.3)

## 2024-07-17 LAB — METHYLMALONATE SERPL-SCNC: 981 NMOL/L (ref 0–378)

## 2024-07-24 ENCOUNTER — OFFICE VISIT (OUTPATIENT)
Dept: INFECTIOUS DISEASES | Facility: CLINIC | Age: 79
End: 2024-07-24
Payer: MEDICARE

## 2024-07-24 VITALS
HEART RATE: 73 BPM | DIASTOLIC BLOOD PRESSURE: 52 MMHG | OXYGEN SATURATION: 98 % | HEIGHT: 63 IN | BODY MASS INDEX: 25.04 KG/M2 | WEIGHT: 141.3 LBS | SYSTOLIC BLOOD PRESSURE: 93 MMHG

## 2024-07-24 DIAGNOSIS — M86.171 ACUTE OSTEOMYELITIS OF TOE OF RIGHT FOOT: Primary | ICD-10-CM

## 2024-07-24 NOTE — PROGRESS NOTES
[facility]         Referring Provider: Alexx Lawson MD  [address] [address]    Subjective      Chief Complaint  Follow-up    Follow-upCurrent symptoms include no chest pain, no confusion, no dizziness, no nausea, no palpitations, no shortness of breath, no fatigue, no blurred vision, no abdominal pain, no choking, no cough and no sore throat.     Britta Lee is a 78 y.o. female who presents today to [facility] for Follow Up .     Britta Lee is a 78 y.o.  female, on Monday Wednesday Friday hemodialysis, who presents to my office with imaging abnormality of the right foot.  Patient reports having her nails trimmed by podiatrist locally, [place].  The patient reports bilateral lower extremity swelling, right greater than left.  She reports occasional pain in her right first toe.  She denies any wounds.  No fevers or drainage.     An MRI had demonstrated possible bony edema that is suspicious for osteomyelitis.  She reports that the podiatrist stated she would likely need surgery.    Past Medical History:   Diagnosis Date    Acquired hypothyroidism 04/22/2021    Anemia     Arthritis     Carpal tunnel syndrome     Coronary artery disease     Diabetes mellitus     Elevated cholesterol     GERD (gastroesophageal reflux disease)     History of pneumonia     History of unsteady gait     OCASSIONALLY    Hypertension     Insomnia     Kidney disease     Kidney disease, chronic, stage IV (GFR 15-29 ml/min)     LENNY (obstructive sleep apnea) 12/01/2020    no c-pap    Osteoarthritis     Renal insufficiency     Sciatica        Past Surgical History:   Procedure Laterality Date    ABDOMINAL SURGERY      APPENDECTOMY      CARDIAC CATHETERIZATION      1 STENT  ---  2000    CARDIAC SURGERY      CAROTID STENT      CARPAL TUNNEL RELEASE Right 10/08/2019    Procedure: CARPAL TUNNEL RELEASE;  Surgeon: Feroz Johnson MD;  Location: [facility];   Service: Orthopedics    CATARACT EXTRACTION      CHOLECYSTECTOMY      COLONOSCOPY      COLONOSCOPY N/A 11/23/2021    Procedure: COLONOSCOPY FOR SCREENING;  Surgeon: Palmira Pate MD;  Location: [facility] OR;  Service: Gastroenterology;  Laterality: N/A;    CORONARY ANGIOPLASTY WITH STENT PLACEMENT      ENDOSCOPY      ENDOSCOPY N/A 03/05/2020    Procedure: ESOPHAGOGASTRODUODENOSCOPY;  Surgeon: Alexandru Bagley MD;  Location: [facility] OR;  Service: Gastroenterology;  Laterality: N/A;    ENDOSCOPY N/A 11/23/2021    Procedure: ESOPHAGOGASTRODUODENOSCOPY WITH BIOPSY;  Surgeon: Palmira Pate MD;  Location: [facility] OR;  Service: Gastroenterology;  Laterality: N/A;    ENDOSCOPY AND COLONOSCOPY      HEAD/NECK LESION/CYST EXCISION Right 4/30/2024    Procedure: FACIAL LESION/CYST EXCISION;  Surgeon: Ta Blas MD;  Location: [facility] OR;  Service: General;  Laterality: Right;    INSERTION HEMODIALYSIS CATHETER N/A 11/14/2022    Procedure: HEMODIALYSIS CATHETER INSERTION;  Surgeon: Ta Blas MD;  Location: [facility] OR;  Service: General;  Laterality: N/A;    JOINT REPLACEMENT Left 05/02/2017    [facility]  DR LEVIN  LEFT TOTAL KNEE    KNEE ARTHROSCOPY W/ MENISCECTOMY Right     LAPAROSCOPIC TUBAL LIGATION      AR ARTHRP KNE CONDYLE&PLATU MEDIAL&LAT COMPARTMENTS Left 05/02/2017    Procedure: TOTAL KNEE ARTHROPLASTY;  Surgeon: Feroz Levin MD;  Location: [facility] OR;  Service: Orthopedics    STERILIZATION      TONSILLECTOMY         Social History     Socioeconomic History    Marital status:      Spouse name: natalie    Number of children: 3    Years of education: 12   Tobacco Use    Smoking status: Never     Passive exposure: Past    Smokeless tobacco: Never   Vaping Use    Vaping status: Never Used   Substance and Sexual Activity    Alcohol use: Yes     Comment: socially    Drug use: No    Sexual activity: Defer     Birth control/protection: Surgical       Family History  family history  includes Arthritis in her mother; Cancer in her mother; Diabetes in her daughter, maternal aunt, mother, and son; Heart disease in her father and maternal grandmother.    Immunization History   Administered Date(s) Administered    COVID-19 (PFIZER) Purple Cap Monovalent 01/28/2021, 02/18/2021, 09/27/2021    FLUAD TRI 65YR+ 08/01/2015, 09/01/2017, 10/15/2018    Flu Vaccine Quad PF >36MO 09/18/2017    Flu Vaccine Split Quad 09/17/2018    Fluzone (or Fluarix & Flulaval for VFC) >6mos 09/17/2018, 09/04/2020    Fluzone High Dose =>65 Years ([employer] ONLY) 10/21/2019    Fluzone High-Dose 65+yrs 11/15/2021, 03/27/2023    Fluzone Quad >6mos (Multi-dose) 09/29/2016    Hep B, Dialysis 12/07/2022, 01/06/2023, 02/03/2023    Influenza Seasonal Injectable 08/01/2015, 09/01/2017, 10/15/2018    Pneumococcal Conjugate 20-Valent (PCV20) 05/25/2023    Pneumococcal Polysaccharide (PPSV23) 08/01/2015, 03/20/2017, 06/18/2018    Tdap 03/20/2017    Zostavax 06/24/2014        Allergies  No Known Allergies    The medication list has been reviewed and updated.   Current Medications    Current Outpatient Medications:     amLODIPine (NORVASC) 10 MG tablet, Take 1 tablet by mouth Daily., Disp: 90 tablet, Rfl: 3    atorvastatin (LIPITOR) 80 MG tablet, Take 1 tablet by mouth Every Night., Disp: 90 tablet, Rfl: 1    escitalopram (LEXAPRO) 20 MG tablet, Take 1 tablet by mouth Daily., Disp: 30 tablet, Rfl: 5    ferrous sulfate 325 (65 FE) MG EC tablet, Take 1 tablet by mouth Every 12 (Twelve) Hours., Disp: 60 tablet, Rfl: 5    HYDROcodone-acetaminophen (NORCO) 5-325 MG per tablet, Take 1 tablet by mouth Every 12 (Twelve) Hours As Needed for Severe Pain., Disp: 6 tablet, Rfl: 0    ibuprofen (ADVIL,MOTRIN) 600 MG tablet, Take 1 tablet by mouth Every 6 (Six) Hours As Needed for Mild Pain., Disp: 20 tablet, Rfl: 0    levothyroxine (SYNTHROID, LEVOTHROID) 25 MCG tablet, Take 1 tablet by mouth Daily., Disp: 90 tablet, Rfl: 3    metoprolol succinate XL  "(Toprol XL) 25 MG 24 hr tablet, Take 1 tablet by mouth Daily., Disp: 90 tablet, Rfl: 1    mupirocin (BACTROBAN) 2 % ointment, Apply 1 Application topically to the appropriate area as directed 3 (Three) Times a Day., Disp: 30 g, Rfl: 0    pantoprazole (PROTONIX) 40 MG EC tablet, Take 1 tablet by mouth Daily., Disp: 90 tablet, Rfl: 1    Senna Plus 8.6-50 MG per tablet, Take 1 tablet by mouth Every 12 (Twelve) Hours., Disp: , Rfl:     torsemide (DEMADEX) 20 MG tablet, Take 2 tablets by mouth Daily., Disp: , Rfl:     vitamin D (ERGOCALCIFEROL) 1.25 MG (85856 UT) capsule capsule, TAKE ONE Capsule BY MOUTH ONCE WEEKLY, Disp: 15 capsule, Rfl: 3      Review of Systems    Review of Systems   Constitutional:  Negative for activity change, appetite change, chills, diaphoresis, fatigue and fever.   HENT:  Negative for congestion, dental problem, drooling, ear discharge, ear pain, facial swelling, postnasal drip, sinus pressure, sore throat and swollen glands.    Eyes:  Negative for blurred vision, double vision, pain, discharge and itching.   Respiratory:  Negative for apnea, cough, choking, chest tightness and shortness of breath.    Cardiovascular:  Positive for leg swelling. Negative for chest pain and palpitations.   Gastrointestinal:  Negative for abdominal distention, abdominal pain, diarrhea, nausea, rectal pain and vomiting.   Genitourinary:  Negative for difficulty urinating, dysuria, flank pain, frequency, hematuria, pelvic pain and pelvic pressure.   Musculoskeletal:  Positive for arthralgias, back pain, gait problem and joint swelling.   Skin:  Positive for dry skin and wound.   Neurological:  Negative for dizziness, tremors, seizures, syncope, speech difficulty and confusion.   Psychiatric/Behavioral:  Negative for agitation and behavioral problems.         Objective     Vital Signs:  BP 93/52 (BP Location: Right arm, Patient Position: Sitting, Cuff Size: Small Adult)   Pulse 73   Ht 160 cm (63\")   Wt 64.1 kg " "(141 lb 4.8 oz)   SpO2 98%   BMI 25.03 kg/m²   Estimated body mass index is 25.03 kg/m² as calculated from the following:    Height as of this encounter: 160 cm (63\").    Weight as of this encounter: 64.1 kg (141 lb 4.8 oz).    Physical Exam  Constitutional:       General: She is not in acute distress.     Appearance: Normal appearance. She is not ill-appearing, toxic-appearing or diaphoretic.   HENT:      Head: Normocephalic and atraumatic.      Nose: No congestion or rhinorrhea.      Mouth/Throat:      Pharynx: Oropharynx is clear. No oropharyngeal exudate or posterior oropharyngeal erythema.   Cardiovascular:      Rate and Rhythm: Normal rate and regular rhythm.      Heart sounds: No murmur heard.  Pulmonary:      Effort: No respiratory distress.      Breath sounds: No stridor. No wheezing, rhonchi or rales.   Chest:      Chest wall: No tenderness.   Abdominal:      General: There is no distension.      Tenderness: There is no abdominal tenderness. There is no right CVA tenderness, left CVA tenderness, guarding or rebound.   Musculoskeletal:         General: No swelling, tenderness or signs of injury.      Cervical back: No rigidity or tenderness.      Comments: Right foot with improved erythema, no drainage, still no trimming on toenails.   Skin:     Coloration: Skin is not jaundiced or pale.      Findings: No bruising, erythema or rash.   Neurological:      General: No focal deficit present.      Mental Status: She is alert. Mental status is at baseline.   Psychiatric:         Mood and Affect: Mood normal.         Behavior: Behavior normal.          Result Review :  The following data was reviewed by Alexx Lawson MD     Lab Results  Lab Results   Component Value Date    WBC 8.13 07/09/2024    HGB 12.2 07/09/2024    HCT 41.4 07/09/2024    MCV 78.1 (L) 07/09/2024     07/09/2024     Lab Results   Component Value Date    GLUCOSE 131 (H) 06/13/2024    BUN 18 06/13/2024    CREATININE 3.33 (H) " "06/13/2024    EGFRIFNONA 23 (L) 02/21/2022    EGFRIFAFRI 41 (L) 07/30/2018    BCR 5.4 (L) 06/13/2024    K 3.3 (L) 06/13/2024    CO2 25.3 06/13/2024    CALCIUM 8.7 06/13/2024    PROTENTOTREF 7.7 07/30/2018    ALBUMIN 2.6 (L) 06/13/2024    LABIL2 1.1 (L) 07/30/2018    AST 31 06/13/2024    ALT 14 06/13/2024      Lab Results   Component Value Date    CRP 7.05 (H) 04/17/2024        No results found for: \"ACANTHNAEG\", \"AFBCX\", \"BPERTUSSISCX\", \"BLOODCX\"  No results found for: \"BCIDPCR\", \"CXREFLEX\", \"CSFCX\", \"CULTURETIS\"  No results found for: \"CULTURES\", \"HSVCX\", \"URCX\"  No results found for: \"EYECULTURE\", \"GCCX\", \"HSVCULTURE\", \"LABHSV\"  No results found for: \"LEGIONELLA\", \"MRSACX\", \"MUMPSCX\", \"MYCOPLASCX\"  No results found for: \"NOCARDIACX\", \"STOOLCX\"  No results found for: \"THROATCX\", \"UNSTIMCULT\", \"URINECX\", \"CULTURE\", \"VZVCULTUR\"  No results found for: \"VIRALCULTU\", \"WOUNDCX\"    Radiology Results              Assessment / Plan        Diagnoses and all orders for this visit:    1. Acute osteomyelitis of toe of right foot (Primary)      MRI 5/1/24 IMPRESSION:  1.  The first distal phalanx shows bony edema suspicious for  osteomyelitis. Questionably 5 mm focus of bony edema involving the head  of the proximal phalanx as well.  2.  Soft tissue swelling of the first digit noted with no appreciable  abscess at this time.     Patient has end stage renal disease on HD TIW with last creatinine at 3.33 on June 13, 2024.     Patient had an ED visit back on 4/17/2024 with right great toe pain and swelling and has seen podiatrist Dr. Minor since with a diagnosis of osteomyelitis in April 2024.     Unfortunately patient is at high risk for MRSA infection with her hemodialysis schedule and unable to do a PICC line or a midline at this time so we will proceed with empiric coverage with vancomycin to be giving after hemodialysis x 4 weeks with follow-up CRP levels afterwards.     I contacted HD and Dr. Johnson to arrange for the " Vancomycin.     The right foot looks significantly improved with complete resolution of the erythema but persistent onychomycosis and very long toenail still awaiting for trimming. Her infection has resolved.     I referred to podiatry Dr. Minor again for toenail trimming as patient didn't go yet.             Follow Up   Return in about 3 weeks (around 8/14/2024) for Follow up, With Dr. Lawson.    Visit Diagnoses:    ICD-10-CM ICD-9-CM   1. Acute osteomyelitis of toe of right foot  M86.171 730.07       Patient was given instructions and counseling regarding her condition or for health maintenance advice. Please see specific information pulled into the AVS if appropriate.     This document has been electronically signed by Alexx Lawson MD   July 24, 2024 10:00 EDT      No orders of the defined types were placed in this encounter.     Dictated Utilizing Dragon Dictation: Part of this note may be an electronic transcription/translation of spoken language to printed text using the Dragon Dictation System.

## 2024-08-14 ENCOUNTER — OFFICE VISIT (OUTPATIENT)
Dept: INFECTIOUS DISEASES | Facility: CLINIC | Age: 79
End: 2024-08-14
Payer: MEDICARE

## 2024-08-14 VITALS
OXYGEN SATURATION: 99 % | RESPIRATION RATE: 18 BRPM | WEIGHT: 147 LBS | HEIGHT: 63 IN | SYSTOLIC BLOOD PRESSURE: 105 MMHG | BODY MASS INDEX: 26.05 KG/M2 | HEART RATE: 81 BPM | DIASTOLIC BLOOD PRESSURE: 61 MMHG

## 2024-08-14 DIAGNOSIS — M86.171 ACUTE OSTEOMYELITIS OF TOE OF RIGHT FOOT: Primary | ICD-10-CM

## 2024-08-14 PROCEDURE — 3078F DIAST BP <80 MM HG: CPT | Performed by: INTERNAL MEDICINE

## 2024-08-14 PROCEDURE — 99213 OFFICE O/P EST LOW 20 MIN: CPT | Performed by: INTERNAL MEDICINE

## 2024-08-14 PROCEDURE — 3074F SYST BP LT 130 MM HG: CPT | Performed by: INTERNAL MEDICINE

## 2024-08-14 NOTE — PROGRESS NOTES
[facility]         Referring Provider: Lexie Dolan PA  [address] [place] [address]    Subjective      Chief Complaint  Follow-up (Acute Osteo of toe on Right foot)    History of Present Illness  Britta Lee is a 78 y.o. female who presents today to [facility] for Follow Up .     Britta Lee is a 78 y.o.  female, on Monday Wednesday Friday hemodialysis, who presents to my office with imaging abnormality of the right foot.  Patient reports having her nails trimmed by podiatrist locally, [place].  The patient reports bilateral lower extremity swelling, right greater than left.  She reports occasional pain in her right first toe.  She denies any wounds.  No fevers or drainage.     An MRI had demonstrated possible bony edema that is suspicious for osteomyelitis.  She reports that the podiatrist stated she would likely need surgery.    Past Medical History:   Diagnosis Date    Acquired hypothyroidism 04/22/2021    Anemia     Arthritis     Carpal tunnel syndrome     Coronary artery disease     Diabetes mellitus     Elevated cholesterol     GERD (gastroesophageal reflux disease)     History of pneumonia     History of unsteady gait     OCASSIONALLY    Hypertension     Insomnia     Kidney disease     Kidney disease, chronic, stage IV (GFR 15-29 ml/min)     LENNY (obstructive sleep apnea) 12/01/2020    no c-pap    Osteoarthritis     Renal insufficiency     Sciatica        Past Surgical History:   Procedure Laterality Date    ABDOMINAL SURGERY      APPENDECTOMY      CARDIAC CATHETERIZATION      1 STENT  ---  2000    CARDIAC SURGERY      CAROTID STENT      CARPAL TUNNEL RELEASE Right 10/08/2019    Procedure: CARPAL TUNNEL RELEASE;  Surgeon: Feroz Johnson MD;  Location: [facility];  Service: Orthopedics    CATARACT EXTRACTION      CHOLECYSTECTOMY      COLONOSCOPY      COLONOSCOPY N/A 11/23/2021    Procedure: COLONOSCOPY FOR SCREENING;  Surgeon:  Palmira Pate MD;  Location: [facility] OR;  Service: Gastroenterology;  Laterality: N/A;    CORONARY ANGIOPLASTY WITH STENT PLACEMENT      ENDOSCOPY      ENDOSCOPY N/A 03/05/2020    Procedure: ESOPHAGOGASTRODUODENOSCOPY;  Surgeon: Alexandru Bagley MD;  Location: [facility] OR;  Service: Gastroenterology;  Laterality: N/A;    ENDOSCOPY N/A 11/23/2021    Procedure: ESOPHAGOGASTRODUODENOSCOPY WITH BIOPSY;  Surgeon: Palmira Pate MD;  Location: [facility] OR;  Service: Gastroenterology;  Laterality: N/A;    ENDOSCOPY AND COLONOSCOPY      HEAD/NECK LESION/CYST EXCISION Right 4/30/2024    Procedure: FACIAL LESION/CYST EXCISION;  Surgeon: Ta Blas MD;  Location: [facility] OR;  Service: General;  Laterality: Right;    INSERTION HEMODIALYSIS CATHETER N/A 11/14/2022    Procedure: HEMODIALYSIS CATHETER INSERTION;  Surgeon: Ta Blas MD;  Location: [facility] OR;  Service: General;  Laterality: N/A;    JOINT REPLACEMENT Left 05/02/2017    [facility]  DR LEVIN  LEFT TOTAL KNEE    KNEE ARTHROSCOPY W/ MENISCECTOMY Right     LAPAROSCOPIC TUBAL LIGATION      CT ARTHRP KNE CONDYLE&PLATU MEDIAL&LAT COMPARTMENTS Left 05/02/2017    Procedure: TOTAL KNEE ARTHROPLASTY;  Surgeon: Feroz Levin MD;  Location: [facility];  Service: Orthopedics    STERILIZATION      TONSILLECTOMY         Social History     Socioeconomic History    Marital status:      Spouse name: natalie    Number of children: 3    Years of education: 12   Tobacco Use    Smoking status: Never     Passive exposure: Past    Smokeless tobacco: Never   Vaping Use    Vaping status: Never Used   Substance and Sexual Activity    Alcohol use: Yes     Comment: socially    Drug use: No    Sexual activity: Defer     Birth control/protection: Surgical       Family History  family history includes Arthritis in her mother; Cancer in her mother; Diabetes in her daughter, maternal aunt, mother, and son; Heart disease in her father and maternal  grandmother.    Immunization History   Administered Date(s) Administered    COVID-19 (PFIZER) Purple Cap Monovalent 01/28/2021, 02/18/2021, 09/27/2021    FLUAD TRI 65YR+ 08/01/2015, 09/01/2017, 10/15/2018    Flu Vaccine Quad PF >36MO 09/18/2017    Flu Vaccine Split Quad 09/17/2018    Fluzone (or Fluarix & Flulaval for VFC) >6mos 09/17/2018, 09/04/2020    Fluzone High-Dose 65+YRS 10/21/2019    Fluzone High-Dose 65+yrs 11/15/2021, 03/27/2023    Fluzone Quad >6mos (Multi-dose) 09/29/2016    Hep B, Dialysis 12/07/2022, 01/06/2023, 02/03/2023    Influenza Seasonal Injectable 08/01/2015, 09/01/2017, 10/15/2018    Pneumococcal Conjugate 20-Valent (PCV20) 05/25/2023    Pneumococcal Polysaccharide (PPSV23) 08/01/2015, 03/20/2017, 06/18/2018    Tdap 03/20/2017    Zostavax 06/24/2014        Allergies  No Known Allergies    The medication list has been reviewed and updated.   Current Medications    Current Outpatient Medications:     amLODIPine (NORVASC) 10 MG tablet, Take 1 tablet by mouth Daily., Disp: 90 tablet, Rfl: 3    atorvastatin (LIPITOR) 80 MG tablet, Take 1 tablet by mouth Every Night., Disp: 90 tablet, Rfl: 1    escitalopram (LEXAPRO) 20 MG tablet, Take 1 tablet by mouth Daily., Disp: 30 tablet, Rfl: 5    ferrous sulfate 325 (65 FE) MG EC tablet, Take 1 tablet by mouth Every 12 (Twelve) Hours., Disp: 60 tablet, Rfl: 5    HYDROcodone-acetaminophen (NORCO) 5-325 MG per tablet, Take 1 tablet by mouth Every 12 (Twelve) Hours As Needed for Severe Pain., Disp: 6 tablet, Rfl: 0    ibuprofen (ADVIL,MOTRIN) 600 MG tablet, Take 1 tablet by mouth Every 6 (Six) Hours As Needed for Mild Pain., Disp: 20 tablet, Rfl: 0    levothyroxine (SYNTHROID, LEVOTHROID) 25 MCG tablet, Take 1 tablet by mouth Daily., Disp: 90 tablet, Rfl: 3    metoprolol succinate XL (Toprol XL) 25 MG 24 hr tablet, Take 1 tablet by mouth Daily., Disp: 90 tablet, Rfl: 1    mupirocin (BACTROBAN) 2 % ointment, Apply 1 Application topically to the appropriate area  "as directed 3 (Three) Times a Day., Disp: 30 g, Rfl: 0    pantoprazole (PROTONIX) 40 MG EC tablet, Take 1 tablet by mouth Daily., Disp: 90 tablet, Rfl: 1    Senna Plus 8.6-50 MG per tablet, Take 1 tablet by mouth Every 12 (Twelve) Hours., Disp: , Rfl:     torsemide (DEMADEX) 20 MG tablet, Take 2 tablets by mouth Daily., Disp: , Rfl:     vitamin D (ERGOCALCIFEROL) 1.25 MG (93539 UT) capsule capsule, TAKE ONE Capsule BY MOUTH ONCE WEEKLY, Disp: 15 capsule, Rfl: 3      Review of Systems    Review of Systems   Constitutional:  Negative for activity change, appetite change, chills, diaphoresis, fatigue and fever.   HENT:  Negative for congestion, dental problem, drooling, ear discharge, ear pain, facial swelling, postnasal drip, sinus pressure, sore throat and swollen glands.    Eyes:  Negative for blurred vision, double vision, pain, discharge and itching.   Respiratory:  Negative for apnea, cough, choking, chest tightness and shortness of breath.    Cardiovascular:  Positive for leg swelling. Negative for chest pain and palpitations.   Gastrointestinal:  Negative for abdominal distention, abdominal pain, diarrhea, nausea, rectal pain and vomiting.   Genitourinary:  Negative for difficulty urinating, dysuria, flank pain, frequency, hematuria, pelvic pain and pelvic pressure.   Neurological:  Negative for dizziness, tremors, seizures, syncope, speech difficulty and confusion.   Psychiatric/Behavioral:  Negative for agitation and behavioral problems.         Objective     Vital Signs:  /61 (BP Location: Left arm, Patient Position: Sitting, Cuff Size: Adult)   Pulse 81   Resp 18   Ht 160 cm (63\")   Wt 66.7 kg (147 lb)   SpO2 99%   BMI 26.04 kg/m²   Estimated body mass index is 26.04 kg/m² as calculated from the following:    Height as of this encounter: 160 cm (63\").    Weight as of this encounter: 66.7 kg (147 lb).    Physical Exam  Constitutional:       General: She is not in acute distress.     Appearance: " Normal appearance. She is not ill-appearing, toxic-appearing or diaphoretic.   HENT:      Head: Normocephalic and atraumatic.      Nose: No congestion or rhinorrhea.      Mouth/Throat:      Pharynx: Oropharynx is clear. No oropharyngeal exudate or posterior oropharyngeal erythema.   Cardiovascular:      Rate and Rhythm: Normal rate and regular rhythm.      Heart sounds: No murmur heard.  Pulmonary:      Effort: No respiratory distress.      Breath sounds: No stridor. No wheezing, rhonchi or rales.   Chest:      Chest wall: No tenderness.   Abdominal:      General: There is no distension.      Tenderness: There is no abdominal tenderness. There is no right CVA tenderness, left CVA tenderness, guarding or rebound.   Musculoskeletal:         General: Swelling and tenderness present. No signs of injury.      Cervical back: No rigidity or tenderness.      Right lower leg: Edema present.      Left lower leg: Edema present.   Skin:     Coloration: Skin is not jaundiced or pale.      Findings: No bruising, erythema or rash.   Neurological:      General: No focal deficit present.      Mental Status: She is alert. Mental status is at baseline.   Psychiatric:         Mood and Affect: Mood normal.         Behavior: Behavior normal.          Result Review :  The following data was reviewed by Alexx Lawson MD     Lab Results  Lab Results   Component Value Date    WBC 8.13 07/09/2024    HGB 12.2 07/09/2024    HCT 41.4 07/09/2024    MCV 78.1 (L) 07/09/2024     07/09/2024     Lab Results   Component Value Date    GLUCOSE 131 (H) 06/13/2024    BUN 18 06/13/2024    CREATININE 3.33 (H) 06/13/2024    EGFRIFNONA 23 (L) 02/21/2022    EGFRIFAFRI 41 (L) 07/30/2018    BCR 5.4 (L) 06/13/2024    K 3.3 (L) 06/13/2024    CO2 25.3 06/13/2024    CALCIUM 8.7 06/13/2024    PROTENTOTREF 7.7 07/30/2018    ALBUMIN 2.6 (L) 06/13/2024    LABIL2 1.1 (L) 07/30/2018    AST 31 06/13/2024    ALT 14 06/13/2024      Lab Results   Component Value  "Date    CRP 7.05 (H) 04/17/2024        No results found for: \"ACANTHNAEG\", \"AFBCX\", \"BPERTUSSISCX\", \"BLOODCX\"  No results found for: \"BCIDPCR\", \"CXREFLEX\", \"CSFCX\", \"CULTURETIS\"  No results found for: \"CULTURES\", \"HSVCX\", \"URCX\"  No results found for: \"EYECULTURE\", \"GCCX\", \"HSVCULTURE\", \"LABHSV\"  No results found for: \"LEGIONELLA\", \"MRSACX\", \"MUMPSCX\", \"MYCOPLASCX\"  No results found for: \"NOCARDIACX\", \"STOOLCX\"  No results found for: \"THROATCX\", \"UNSTIMCULT\", \"URINECX\", \"CULTURE\", \"VZVCULTUR\"  No results found for: \"VIRALCULTU\", \"WOUNDCX\"    Radiology Results              Assessment / Plan        Diagnoses and all orders for this visit:    1. Acute osteomyelitis of toe of right foot (Primary)      MRI 5/1/24 IMPRESSION:  1.  The first distal phalanx shows bony edema suspicious for  osteomyelitis. Questionably 5 mm focus of bony edema involving the head  of the proximal phalanx as well.  2.  Soft tissue swelling of the first digit noted with no appreciable  abscess at this time.     Patient has end stage renal disease on HD TIW with last creatinine at 3.33 on June 13, 2024.     Patient had an ED visit back on 4/17/2024 with right great toe pain and swelling and has seen podiatrist Dr. Minor since with a diagnosis of osteomyelitis in April 2024.     Unfortunately patient is at high risk for MRSA infection with her hemodialysis schedule and unable to do a PICC line or a midline at this time so we will proceed with empiric coverage with vancomycin to be giving after hemodialysis x 4 weeks with follow-up CRP levels afterwards.     I contacted HD and Dr. Johnsno to arrange for the Vancomycin.     Patient was seen by Dr. Minor podiatrist with toenail trimming.  She did a wonderful job and the toe looks great with complete resolution of any infection.            Follow Up   Return in about 2 weeks (around 8/28/2024) for Follow up, With Dr. Lawson.    Visit Diagnoses:    ICD-10-CM ICD-9-CM   1. Acute osteomyelitis of toe of " right foot  M86.171 730.07       Patient was given instructions and counseling regarding her condition or for health maintenance advice. Please see specific information pulled into the AVS if appropriate.     This document has been electronically signed by Alexx Lawson MD   August 14, 2024 10:01 EDT      No orders of the defined types were placed in this encounter.     Dictated Utilizing Dragon Dictation: Part of this note may be an electronic transcription/translation of spoken language to printed text using the Dragon Dictation System.

## 2024-09-10 DIAGNOSIS — Z99.2 TYPE 2 DIABETES MELLITUS WITH CHRONIC KIDNEY DISEASE ON CHRONIC DIALYSIS, WITH LONG-TERM CURRENT USE OF INSULIN: Primary | ICD-10-CM

## 2024-09-10 DIAGNOSIS — Z79.4 TYPE 2 DIABETES MELLITUS WITH CHRONIC KIDNEY DISEASE ON CHRONIC DIALYSIS, WITH LONG-TERM CURRENT USE OF INSULIN: Primary | ICD-10-CM

## 2024-09-10 DIAGNOSIS — N25.81 HYPERPARATHYROIDISM DUE TO RENAL INSUFFICIENCY: ICD-10-CM

## 2024-09-10 DIAGNOSIS — N18.6 TYPE 2 DIABETES MELLITUS WITH CHRONIC KIDNEY DISEASE ON CHRONIC DIALYSIS, WITH LONG-TERM CURRENT USE OF INSULIN: Primary | ICD-10-CM

## 2024-09-10 DIAGNOSIS — E11.22 TYPE 2 DIABETES MELLITUS WITH CHRONIC KIDNEY DISEASE ON CHRONIC DIALYSIS, WITH LONG-TERM CURRENT USE OF INSULIN: Primary | ICD-10-CM

## 2024-09-12 ENCOUNTER — LAB (OUTPATIENT)
Dept: FAMILY MEDICINE CLINIC | Facility: CLINIC | Age: 79
End: 2024-09-12
Payer: MEDICARE

## 2024-09-12 DIAGNOSIS — N18.6 TYPE 2 DIABETES MELLITUS WITH CHRONIC KIDNEY DISEASE ON CHRONIC DIALYSIS, WITH LONG-TERM CURRENT USE OF INSULIN: ICD-10-CM

## 2024-09-12 DIAGNOSIS — N25.81 HYPERPARATHYROIDISM DUE TO RENAL INSUFFICIENCY: ICD-10-CM

## 2024-09-12 DIAGNOSIS — Z99.2 TYPE 2 DIABETES MELLITUS WITH CHRONIC KIDNEY DISEASE ON CHRONIC DIALYSIS, WITH LONG-TERM CURRENT USE OF INSULIN: ICD-10-CM

## 2024-09-12 DIAGNOSIS — Z79.4 TYPE 2 DIABETES MELLITUS WITH CHRONIC KIDNEY DISEASE ON CHRONIC DIALYSIS, WITH LONG-TERM CURRENT USE OF INSULIN: ICD-10-CM

## 2024-09-12 DIAGNOSIS — E11.22 TYPE 2 DIABETES MELLITUS WITH CHRONIC KIDNEY DISEASE ON CHRONIC DIALYSIS, WITH LONG-TERM CURRENT USE OF INSULIN: ICD-10-CM

## 2024-09-12 LAB
ALBUMIN SERPL-MCNC: 2.9 G/DL (ref 3.5–5.2)
ALBUMIN/GLOB SERPL: 0.6 G/DL
ALP SERPL-CCNC: 622 U/L (ref 39–117)
ALT SERPL W P-5'-P-CCNC: 29 U/L (ref 1–33)
ANION GAP SERPL CALCULATED.3IONS-SCNC: 18.3 MMOL/L (ref 5–15)
AST SERPL-CCNC: 80 U/L (ref 1–32)
BILIRUB SERPL-MCNC: 0.6 MG/DL (ref 0–1.2)
BUN SERPL-MCNC: 33 MG/DL (ref 8–23)
BUN/CREAT SERPL: 8 (ref 7–25)
CALCIUM SPEC-SCNC: 8.9 MG/DL (ref 8.6–10.5)
CHLORIDE SERPL-SCNC: 98 MMOL/L (ref 98–107)
CO2 SERPL-SCNC: 15.7 MMOL/L (ref 22–29)
CREAT SERPL-MCNC: 4.14 MG/DL (ref 0.57–1)
EGFRCR SERPLBLD CKD-EPI 2021: 10.3 ML/MIN/1.73
GLOBULIN UR ELPH-MCNC: 4.7 GM/DL
GLUCOSE SERPL-MCNC: 124 MG/DL (ref 65–99)
HBA1C MFR BLD: 5.8 % (ref 4.8–5.6)
POTASSIUM SERPL-SCNC: 4.3 MMOL/L (ref 3.5–5.2)
PROT SERPL-MCNC: 7.6 G/DL (ref 6–8.5)
SODIUM SERPL-SCNC: 132 MMOL/L (ref 136–145)
T4 FREE SERPL-MCNC: 1.12 NG/DL (ref 0.92–1.68)
TSH SERPL DL<=0.05 MIU/L-ACNC: 8.45 UIU/ML (ref 0.27–4.2)

## 2024-09-12 PROCEDURE — 83036 HEMOGLOBIN GLYCOSYLATED A1C: CPT | Performed by: PHYSICIAN ASSISTANT

## 2024-09-12 PROCEDURE — 36415 COLL VENOUS BLD VENIPUNCTURE: CPT

## 2024-09-12 PROCEDURE — 80053 COMPREHEN METABOLIC PANEL: CPT | Performed by: PHYSICIAN ASSISTANT

## 2024-09-12 PROCEDURE — 84439 ASSAY OF FREE THYROXINE: CPT | Performed by: PHYSICIAN ASSISTANT

## 2024-09-12 PROCEDURE — 84443 ASSAY THYROID STIM HORMONE: CPT | Performed by: PHYSICIAN ASSISTANT

## 2024-09-19 ENCOUNTER — OFFICE VISIT (OUTPATIENT)
Dept: FAMILY MEDICINE CLINIC | Facility: CLINIC | Age: 79
End: 2024-09-19
Payer: MEDICARE

## 2024-09-19 VITALS
SYSTOLIC BLOOD PRESSURE: 120 MMHG | HEART RATE: 71 BPM | DIASTOLIC BLOOD PRESSURE: 60 MMHG | HEIGHT: 63 IN | TEMPERATURE: 97.7 F | OXYGEN SATURATION: 97 % | BODY MASS INDEX: 23.39 KG/M2 | WEIGHT: 132 LBS

## 2024-09-19 DIAGNOSIS — K21.9 GASTROESOPHAGEAL REFLUX DISEASE WITHOUT ESOPHAGITIS: Chronic | ICD-10-CM

## 2024-09-19 DIAGNOSIS — I10 ESSENTIAL HYPERTENSION: Chronic | ICD-10-CM

## 2024-09-19 DIAGNOSIS — D50.8 OTHER IRON DEFICIENCY ANEMIA: Chronic | ICD-10-CM

## 2024-09-19 DIAGNOSIS — Z23 IMMUNIZATION DUE: ICD-10-CM

## 2024-09-19 DIAGNOSIS — R74.01 ELEVATION OF LEVELS OF LIVER TRANSAMINASE LEVELS: ICD-10-CM

## 2024-09-19 DIAGNOSIS — M86.171 ACUTE OSTEOMYELITIS OF TOE OF RIGHT FOOT: ICD-10-CM

## 2024-09-19 DIAGNOSIS — E78.2 MIXED HYPERLIPIDEMIA: Chronic | ICD-10-CM

## 2024-09-19 DIAGNOSIS — R74.8 ELEVATED LIVER ENZYMES: Primary | ICD-10-CM

## 2024-09-19 LAB
CRP SERPL-MCNC: 3.33 MG/DL (ref 0–0.5)
HAV IGM SERPL QL IA: NORMAL
HBV CORE IGM SERPL QL IA: NORMAL
HBV SURFACE AG SERPL QL IA: NORMAL
HCV AB SER QL: NORMAL

## 2024-09-19 PROCEDURE — 80074 ACUTE HEPATITIS PANEL: CPT | Performed by: PHYSICIAN ASSISTANT

## 2024-09-19 PROCEDURE — 99214 OFFICE O/P EST MOD 30 MIN: CPT | Performed by: PHYSICIAN ASSISTANT

## 2024-09-19 PROCEDURE — 36415 COLL VENOUS BLD VENIPUNCTURE: CPT | Performed by: PHYSICIAN ASSISTANT

## 2024-09-19 PROCEDURE — 1160F RVW MEDS BY RX/DR IN RCRD: CPT | Performed by: PHYSICIAN ASSISTANT

## 2024-09-19 PROCEDURE — 1126F AMNT PAIN NOTED NONE PRSNT: CPT | Performed by: PHYSICIAN ASSISTANT

## 2024-09-19 PROCEDURE — 3074F SYST BP LT 130 MM HG: CPT | Performed by: PHYSICIAN ASSISTANT

## 2024-09-19 PROCEDURE — 86140 C-REACTIVE PROTEIN: CPT | Performed by: INTERNAL MEDICINE

## 2024-09-19 PROCEDURE — 1159F MED LIST DOCD IN RCRD: CPT | Performed by: PHYSICIAN ASSISTANT

## 2024-09-19 PROCEDURE — 3078F DIAST BP <80 MM HG: CPT | Performed by: PHYSICIAN ASSISTANT

## 2024-09-19 RX ORDER — ATORVASTATIN CALCIUM 80 MG/1
80 TABLET, FILM COATED ORAL NIGHTLY
Qty: 90 TABLET | Refills: 1 | Status: SHIPPED | OUTPATIENT
Start: 2024-09-19 | End: 2024-09-19 | Stop reason: SDUPTHER

## 2024-09-19 RX ORDER — ATORVASTATIN CALCIUM 80 MG/1
40 TABLET, FILM COATED ORAL NIGHTLY
Start: 2024-09-19

## 2024-09-19 RX ORDER — PANTOPRAZOLE SODIUM 40 MG/1
40 TABLET, DELAYED RELEASE ORAL DAILY
Qty: 90 TABLET | Refills: 1 | Status: SHIPPED | OUTPATIENT
Start: 2024-09-19

## 2024-09-19 RX ORDER — FERROUS SULFATE 325(65) MG
1 TABLET, DELAYED RELEASE (ENTERIC COATED) ORAL EVERY 12 HOURS SCHEDULED
Qty: 60 TABLET | Refills: 5 | Status: SHIPPED | OUTPATIENT
Start: 2024-09-19

## 2024-09-19 RX ORDER — METOPROLOL SUCCINATE 25 MG/1
25 TABLET, EXTENDED RELEASE ORAL DAILY
Qty: 90 TABLET | Refills: 1 | Status: SHIPPED | OUTPATIENT
Start: 2024-09-19

## 2024-09-20 PROCEDURE — G0008 ADMIN INFLUENZA VIRUS VAC: HCPCS | Performed by: PHYSICIAN ASSISTANT

## 2024-09-20 PROCEDURE — 90662 IIV NO PRSV INCREASED AG IM: CPT | Performed by: PHYSICIAN ASSISTANT

## 2024-12-16 ENCOUNTER — LAB (OUTPATIENT)
Dept: FAMILY MEDICINE CLINIC | Facility: CLINIC | Age: 79
End: 2024-12-16
Payer: MEDICARE

## 2024-12-16 DIAGNOSIS — E78.2 MIXED HYPERLIPIDEMIA: Primary | ICD-10-CM

## 2024-12-16 DIAGNOSIS — R73.09 OTHER ABNORMAL GLUCOSE: ICD-10-CM

## 2024-12-16 DIAGNOSIS — E78.2 MIXED HYPERLIPIDEMIA: Chronic | ICD-10-CM

## 2024-12-16 DIAGNOSIS — R74.8 ELEVATED LIVER ENZYMES: ICD-10-CM

## 2024-12-16 LAB
ALBUMIN SERPL-MCNC: 3.4 G/DL (ref 3.5–5.2)
ALBUMIN/GLOB SERPL: 0.7 G/DL
ALP SERPL-CCNC: 474 U/L (ref 39–117)
ALT SERPL W P-5'-P-CCNC: 19 U/L (ref 1–33)
ANION GAP SERPL CALCULATED.3IONS-SCNC: 16.3 MMOL/L (ref 5–15)
AST SERPL-CCNC: 44 U/L (ref 1–32)
BASOPHILS # BLD AUTO: 0.05 10*3/MM3 (ref 0–0.2)
BASOPHILS NFR BLD AUTO: 0.6 % (ref 0–1.5)
BILIRUB SERPL-MCNC: 0.8 MG/DL (ref 0–1.2)
BUN SERPL-MCNC: 48 MG/DL (ref 8–23)
BUN/CREAT SERPL: 7.6 (ref 7–25)
CALCIUM SPEC-SCNC: 9.1 MG/DL (ref 8.6–10.5)
CHLORIDE SERPL-SCNC: 97 MMOL/L (ref 98–107)
CO2 SERPL-SCNC: 24.7 MMOL/L (ref 22–29)
CREAT SERPL-MCNC: 6.34 MG/DL (ref 0.57–1)
CRP SERPL-MCNC: 4.45 MG/DL (ref 0–0.5)
DEPRECATED RDW RBC AUTO: 47.1 FL (ref 37–54)
EGFRCR SERPLBLD CKD-EPI 2021: 6.2 ML/MIN/1.73
EOSINOPHIL # BLD AUTO: 0.34 10*3/MM3 (ref 0–0.4)
EOSINOPHIL NFR BLD AUTO: 4.2 % (ref 0.3–6.2)
ERYTHROCYTE [DISTWIDTH] IN BLOOD BY AUTOMATED COUNT: 15.4 % (ref 12.3–15.4)
GLOBULIN UR ELPH-MCNC: 4.8 GM/DL
GLUCOSE SERPL-MCNC: 136 MG/DL (ref 65–99)
HBA1C MFR BLD: 6.7 % (ref 4.8–5.6)
HCT VFR BLD AUTO: 36.7 % (ref 34–46.6)
HGB BLD-MCNC: 11.4 G/DL (ref 12–15.9)
IMM GRANULOCYTES # BLD AUTO: 0.03 10*3/MM3 (ref 0–0.05)
IMM GRANULOCYTES NFR BLD AUTO: 0.4 % (ref 0–0.5)
LYMPHOCYTES # BLD AUTO: 1.65 10*3/MM3 (ref 0.7–3.1)
LYMPHOCYTES NFR BLD AUTO: 20.6 % (ref 19.6–45.3)
MCH RBC QN AUTO: 26.2 PG (ref 26.6–33)
MCHC RBC AUTO-ENTMCNC: 31.1 G/DL (ref 31.5–35.7)
MCV RBC AUTO: 84.4 FL (ref 79–97)
MONOCYTES # BLD AUTO: 0.72 10*3/MM3 (ref 0.1–0.9)
MONOCYTES NFR BLD AUTO: 9 % (ref 5–12)
NEUTROPHILS NFR BLD AUTO: 5.23 10*3/MM3 (ref 1.7–7)
NEUTROPHILS NFR BLD AUTO: 65.2 % (ref 42.7–76)
NRBC BLD AUTO-RTO: 0 /100 WBC (ref 0–0.2)
PLATELET # BLD AUTO: 210 10*3/MM3 (ref 140–450)
PMV BLD AUTO: 9.6 FL (ref 6–12)
POTASSIUM SERPL-SCNC: 5 MMOL/L (ref 3.5–5.2)
PROT SERPL-MCNC: 8.2 G/DL (ref 6–8.5)
RBC # BLD AUTO: 4.35 10*6/MM3 (ref 3.77–5.28)
SODIUM SERPL-SCNC: 138 MMOL/L (ref 136–145)
T4 FREE SERPL-MCNC: 1.01 NG/DL (ref 0.92–1.68)
TSH SERPL DL<=0.05 MIU/L-ACNC: 3.95 UIU/ML (ref 0.27–4.2)
WBC NRBC COR # BLD AUTO: 8.02 10*3/MM3 (ref 3.4–10.8)

## 2024-12-16 PROCEDURE — 80053 COMPREHEN METABOLIC PANEL: CPT | Performed by: PHYSICIAN ASSISTANT

## 2024-12-16 PROCEDURE — 86140 C-REACTIVE PROTEIN: CPT | Performed by: PHYSICIAN ASSISTANT

## 2024-12-16 PROCEDURE — 36415 COLL VENOUS BLD VENIPUNCTURE: CPT

## 2024-12-16 PROCEDURE — 83036 HEMOGLOBIN GLYCOSYLATED A1C: CPT | Performed by: PHYSICIAN ASSISTANT

## 2024-12-16 PROCEDURE — 84439 ASSAY OF FREE THYROXINE: CPT | Performed by: PHYSICIAN ASSISTANT

## 2024-12-16 PROCEDURE — 85025 COMPLETE CBC W/AUTO DIFF WBC: CPT | Performed by: PHYSICIAN ASSISTANT

## 2024-12-16 PROCEDURE — 84443 ASSAY THYROID STIM HORMONE: CPT | Performed by: PHYSICIAN ASSISTANT

## 2025-01-01 ENCOUNTER — TELEPHONE (OUTPATIENT)
Dept: FAMILY MEDICINE CLINIC | Facility: CLINIC | Age: 80
End: 2025-01-01

## 2025-01-14 ENCOUNTER — OFFICE VISIT (OUTPATIENT)
Dept: FAMILY MEDICINE CLINIC | Facility: CLINIC | Age: 80
End: 2025-01-14
Payer: MEDICARE

## 2025-01-14 VITALS
HEIGHT: 63 IN | DIASTOLIC BLOOD PRESSURE: 76 MMHG | BODY MASS INDEX: 26.4 KG/M2 | HEART RATE: 65 BPM | SYSTOLIC BLOOD PRESSURE: 122 MMHG | WEIGHT: 149 LBS | RESPIRATION RATE: 18 BRPM

## 2025-01-14 DIAGNOSIS — K59.04 CHRONIC IDIOPATHIC CONSTIPATION: Chronic | ICD-10-CM

## 2025-01-14 DIAGNOSIS — I10 ESSENTIAL HYPERTENSION: Chronic | ICD-10-CM

## 2025-01-14 DIAGNOSIS — N18.6 STAGE 5 CHRONIC KIDNEY DISEASE ON CHRONIC DIALYSIS: Chronic | ICD-10-CM

## 2025-01-14 DIAGNOSIS — F33.1 MODERATE EPISODE OF RECURRENT MAJOR DEPRESSIVE DISORDER: Chronic | ICD-10-CM

## 2025-01-14 DIAGNOSIS — R11.0 NAUSEA: Primary | Chronic | ICD-10-CM

## 2025-01-14 DIAGNOSIS — Z12.11 SCREENING FOR COLON CANCER: ICD-10-CM

## 2025-01-14 DIAGNOSIS — Z99.2 STAGE 5 CHRONIC KIDNEY DISEASE ON CHRONIC DIALYSIS: Chronic | ICD-10-CM

## 2025-01-14 PROCEDURE — 1159F MED LIST DOCD IN RCRD: CPT | Performed by: PHYSICIAN ASSISTANT

## 2025-01-14 PROCEDURE — 99214 OFFICE O/P EST MOD 30 MIN: CPT | Performed by: PHYSICIAN ASSISTANT

## 2025-01-14 PROCEDURE — 3074F SYST BP LT 130 MM HG: CPT | Performed by: PHYSICIAN ASSISTANT

## 2025-01-14 PROCEDURE — 3078F DIAST BP <80 MM HG: CPT | Performed by: PHYSICIAN ASSISTANT

## 2025-01-14 PROCEDURE — 1126F AMNT PAIN NOTED NONE PRSNT: CPT | Performed by: PHYSICIAN ASSISTANT

## 2025-01-14 PROCEDURE — G2211 COMPLEX E/M VISIT ADD ON: HCPCS | Performed by: PHYSICIAN ASSISTANT

## 2025-01-14 PROCEDURE — 1160F RVW MEDS BY RX/DR IN RCRD: CPT | Performed by: PHYSICIAN ASSISTANT

## 2025-01-14 RX ORDER — AMOXICILLIN 250 MG
1 CAPSULE ORAL 2 TIMES DAILY PRN
Qty: 180 TABLET | Refills: 1 | Status: SHIPPED | OUTPATIENT
Start: 2025-01-14

## 2025-01-14 RX ORDER — TORSEMIDE 20 MG/1
40 TABLET ORAL DAILY
COMMUNITY

## 2025-01-14 RX ORDER — AMLODIPINE BESYLATE 10 MG/1
10 TABLET ORAL DAILY
Qty: 90 TABLET | Refills: 3 | Status: SHIPPED | OUTPATIENT
Start: 2025-01-14

## 2025-01-14 RX ORDER — ESCITALOPRAM OXALATE 20 MG/1
20 TABLET ORAL DAILY
Qty: 90 TABLET | Refills: 3 | Status: SHIPPED | OUTPATIENT
Start: 2025-01-14

## 2025-01-14 RX ORDER — ONDANSETRON 4 MG/1
4 TABLET, FILM COATED ORAL EVERY 8 HOURS PRN
Qty: 40 TABLET | Refills: 0 | Status: SHIPPED | OUTPATIENT
Start: 2025-01-14

## 2025-01-14 RX ORDER — CALCIUM ACETATE 667 MG/1
CAPSULE ORAL
COMMUNITY
Start: 2024-12-13

## 2025-01-14 NOTE — PROGRESS NOTES
"Chief Complaint -nausea    History of Present Illness -     Britta Lee is a 79 y.o. female who is here today with her  who is helping with history.    Nausea-  Patient complains of intermittent nausea and vomiting.  Symptoms occur approximately twice per week.  No known specific trigger.    Hypertension-  Controlled with amlodipine and metoprolol    Depression-  Controlled with escitalopram.  She denies any hallucinations, SI or HI    Constipation-  Patient reports some loose stool.  She is using the senna plus twice daily every day    Chronic kidney disease-  Not at goal as patient continues to be on chronic dialysis 3 times weekly.      The following portions of the patient's history were reviewed and updated as appropriate: allergies, current medications, past family history, past medical history, past social history, past surgical history and problem list.        Objective  Vital signs:  /76 (BP Location: Right arm, Patient Position: Sitting, Cuff Size: Adult)   Pulse 65   Resp 18   Ht 160 cm (62.99\")   Wt 67.6 kg (149 lb)   BMI 26.40 kg/m²     Physical Exam  Vitals and nursing note reviewed.   Constitutional:       Appearance: Normal appearance. She is well-developed.   Eyes:      Extraocular Movements: Extraocular movements intact.      Conjunctiva/sclera: Conjunctivae normal.   Cardiovascular:      Rate and Rhythm: Normal rate and regular rhythm.      Heart sounds: Normal heart sounds. No murmur heard.  Pulmonary:      Effort: Pulmonary effort is normal. No respiratory distress.      Breath sounds: Normal breath sounds. No wheezing.   Musculoskeletal:         General: No tenderness.   Skin:     General: Skin is warm and dry.      Findings: No rash.   Neurological:      Mental Status: She is alert and oriented to person, place, and time.   Psychiatric:         Mood and Affect: Mood normal.         Behavior: Behavior normal.         Thought Content: Thought content normal.         The " following data was reviewed by Lexie Dolan PA-C:         Lab Results   Component Value Date    BUN 48 (H) 12/16/2024    CREATININE 6.34 (H) 12/16/2024    EGFR 6.2 (L) 12/16/2024    ALT 19 12/16/2024    AST 44 (H) 12/16/2024    WBC 8.02 12/16/2024    HGB 11.4 (L) 12/16/2024    HCT 36.7 12/16/2024     12/16/2024    CHOL 88 03/14/2024    TRIG 60 03/14/2024    HDL 34 (L) 03/14/2024    LDL 40 03/14/2024    TSH 3.950 12/16/2024    HGBA1C 6.70 (H) 12/16/2024           Assessment & Plan     Diagnoses and all orders for this visit:    1. Nausea (Primary)  Comments:  Start Zofran as needed  Advised bland diet  Orders:  -     ondansetron (Zofran) 4 MG tablet; Take 1 tablet by mouth Every 8 (Eight) Hours As Needed for Nausea or Vomiting.  Dispense: 40 tablet; Refill: 0    2. Essential hypertension  Comments:  Continue amlodipine and metoprolol  Advise daily BP and pulse monitoring  Orders:  -     amLODIPine (NORVASC) 10 MG tablet; Take 1 tablet by mouth Daily.  Dispense: 90 tablet; Refill: 3    3. Moderate episode of recurrent major depressive disorder  Comments:  Continue Lexapro to 20 mg daily  Orders:  -     escitalopram (LEXAPRO) 20 MG tablet; Take 1 tablet by mouth Daily.  Dispense: 90 tablet; Refill: 3    4. Chronic idiopathic constipation  Comments:  Advised to decrease senna plus to twice daily as needed only  Orders:  -     sennosides-docusate (Senna Plus) 8.6-50 MG per tablet; Take 1 tablet by mouth 2 (Two) Times a Day As Needed for Constipation.  Dispense: 180 tablet; Refill: 1    5. Screening for colon cancer  -     Cologuard - Stool, Per Rectum; Future    6. Stage 5 chronic kidney disease on chronic dialysis  Comments:  Continue dialysis 3 times weekly                   Patient was given instructions and counseling regarding his condition or for health maintenance advice. Please see specific information pulled into the AVS if appropriate      This document has been electronically signed by:  Lexie Dolan  IR[facility]

## 2025-01-14 NOTE — PATIENT INSTRUCTIONS
Fall Prevention in the Home, Adult  Falls can cause injuries and can happen to people of all ages. There are many things you can do to make your home safer and to help prevent falls.  What actions can I take to prevent falls?  General information  Use good lighting in all rooms. Make sure to:  Replace any light bulbs that burn out.  Turn on the lights in dark areas and use night-lights.  Keep items that you use often in easy-to-reach places. Lower the shelves around your home if needed.  Move furniture so that there are clear paths around it.  Do not use throw rugs or other things on the floor that can make you trip.  If any of your floors are uneven, fix them.  Add color or contrast paint or tape to clearly eder and help you see:  Grab bars or handrails.  First and last steps of staircases.  Where the edge of each step is.  If you use a ladder or stepladder:  Make sure that it is fully opened. Do not climb a closed ladder.  Make sure the sides of the ladder are locked in place.  Have someone hold the ladder while you use it.  Know where your pets are as you move through your home.  What can I do in the bathroom?         Keep the floor dry. Clean up any water on the floor right away.  Remove soap buildup in the bathtub or shower. Buildup makes bathtubs and showers slippery.  Use non-skid mats or decals on the floor of the bathtub or shower.  Attach bath mats securely with double-sided, non-slip rug tape.  If you need to sit down in the shower, use a non-slip stool.  Install grab bars by the toilet and in the bathtub and shower. Do not use towel bars as grab bars.  What can I do in the bedroom?  Make sure that you have a light by your bed that is easy to reach.  Do not use any sheets or blankets on your bed that hang to the floor.  Have a firm chair or bench with side arms that you can use for support when you get dressed.  What can I do in the kitchen?  Clean up any spills right away.  If you need to reach something  above you, use a step stool with a grab bar.  Keep electrical cords out of the way.  Do not use floor polish or wax that makes floors slippery.  What can I do with my stairs?  Do not leave anything on the stairs.  Make sure that you have a light switch at the top and the bottom of the stairs.  Make sure that there are handrails on both sides of the stairs. Fix handrails that are broken or loose.  Install non-slip stair treads on all your stairs if they do not have carpet.  Avoid having throw rugs at the top or bottom of the stairs.  Choose a carpet that does not hide the edge of the steps on the stairs. Make sure that the carpet is firmly attached to the stairs. Fix carpet that is loose or worn.  What can I do on the outside of my home?  Use bright outdoor lighting.  Fix the edges of walkways and driveways and fix any cracks. Clear paths of anything that can make you trip, such as tools or rocks.  Add color or contrast paint or tape to clearly eder and help you see anything that might make you trip as you walk through a door, such as a raised step or threshold.  Trim any bushes or trees on paths to your home.  Check to see if handrails are loose or broken and that both sides of all steps have handrails. Install guardrails along the edges of any raised decks and porches.  Have leaves, snow, or ice cleared regularly. Use sand, salt, or ice melter on paths if you live where there is ice and snow during the winter.  Clean up any spills in your garage right away. This includes grease or oil spills.  What other actions can I take?  Review your medicines with your doctor. Some medicines can cause dizziness or changes in blood pressure, which increase your risk of falling.  Wear shoes that:  Have a low heel. Do not wear high heels.  Have rubber bottoms and are closed at the toe.  Feel good on your feet and fit well.  Use tools that help you move around if needed. These include:  Canes.  Walkers.  Scooters.  Crutches.  Ask  your doctor what else you can do to help prevent falls. This may include seeing a physical therapist to learn to do exercises to move better and get stronger.  Where to find more information  Centers for Disease Control and PreventionCONCETTA: cdc.gov  National [place] [employer] Aging: verenice.nih.gov  [employer] [place] [employer]ging: verenice.nih.gov  Contact a doctor if:  You are afraid of falling at home.  You feel weak, drowsy, or dizzy at home.  You fall at home.  Get help right away if you:  Lose consciousness or have trouble moving after a fall.  Have a fall that causes a head injury.  These symptoms may be an emergency. Get help right away. Call 911.  Do not wait to see if the symptoms will go away.  Do not drive yourself to the hospital.  This information is not intended to replace advice given to you by your health care provider. Make sure you discuss any questions you have with your health care provider.  Document Revised: 08/21/2023 Document Reviewed: 08/21/2023  Elsevier Patient Education © 2024 [employer]

## 2025-01-28 ENCOUNTER — OFFICE VISIT (OUTPATIENT)
Dept: FAMILY MEDICINE CLINIC | Facility: CLINIC | Age: 80
End: 2025-01-28
Payer: MEDICARE

## 2025-01-28 VITALS
SYSTOLIC BLOOD PRESSURE: 102 MMHG | HEART RATE: 83 BPM | RESPIRATION RATE: 18 BRPM | DIASTOLIC BLOOD PRESSURE: 50 MMHG | BODY MASS INDEX: 26.4 KG/M2 | WEIGHT: 149 LBS | HEIGHT: 63 IN | OXYGEN SATURATION: 93 %

## 2025-01-28 DIAGNOSIS — E78.2 MIXED HYPERLIPIDEMIA: Chronic | ICD-10-CM

## 2025-01-28 DIAGNOSIS — I50.32 CHRONIC HEART FAILURE WITH PRESERVED EJECTION FRACTION (HFPEF): Primary | Chronic | ICD-10-CM

## 2025-01-28 DIAGNOSIS — N18.6 STAGE 5 CHRONIC KIDNEY DISEASE ON CHRONIC DIALYSIS: Chronic | ICD-10-CM

## 2025-01-28 DIAGNOSIS — Z99.2 STAGE 5 CHRONIC KIDNEY DISEASE ON CHRONIC DIALYSIS: Chronic | ICD-10-CM

## 2025-01-28 DIAGNOSIS — J40 BRONCHITIS: ICD-10-CM

## 2025-01-28 PROCEDURE — 3074F SYST BP LT 130 MM HG: CPT | Performed by: PHYSICIAN ASSISTANT

## 2025-01-28 PROCEDURE — 1126F AMNT PAIN NOTED NONE PRSNT: CPT | Performed by: PHYSICIAN ASSISTANT

## 2025-01-28 PROCEDURE — 3078F DIAST BP <80 MM HG: CPT | Performed by: PHYSICIAN ASSISTANT

## 2025-01-28 PROCEDURE — 1160F RVW MEDS BY RX/DR IN RCRD: CPT | Performed by: PHYSICIAN ASSISTANT

## 2025-01-28 PROCEDURE — 1159F MED LIST DOCD IN RCRD: CPT | Performed by: PHYSICIAN ASSISTANT

## 2025-01-28 PROCEDURE — G2211 COMPLEX E/M VISIT ADD ON: HCPCS | Performed by: PHYSICIAN ASSISTANT

## 2025-01-28 PROCEDURE — 99214 OFFICE O/P EST MOD 30 MIN: CPT | Performed by: PHYSICIAN ASSISTANT

## 2025-01-28 RX ORDER — VALINE, LYSINE, HISTIDINE, ISOLEUCINE, LEUCINE, PHENYLALANINE, THREONINE, METHIONINE, TRYPTOPHAN, ALANINE, GLYCINE, ARGININE, PROLINE, GLUTAMIC ACID, SERINE, ASPARTIC ACID AND TYROSINE 1.44; 1.35; 1.18; 1.08; 1.08; 1; 980; 760; 320; 2.76; 2.06; 1.96; 1.34; 1.02; 1.02; 600; 5 G/100ML; G/100ML; G/100ML; G/100ML; G/100ML; G/100ML; MG/100ML; MG/100ML; MG/100ML; G/100ML; G/100ML; G/100ML; G/100ML; G/100ML; G/100ML; MG/100ML; MG/100ML
INJECTION, SOLUTION INTRAVENOUS
COMMUNITY
Start: 2025-01-24

## 2025-01-28 RX ORDER — AZITHROMYCIN 250 MG/1
TABLET, FILM COATED ORAL
Qty: 6 TABLET | Refills: 0 | Status: SHIPPED | OUTPATIENT
Start: 2025-01-28

## 2025-01-28 RX ORDER — BUMETANIDE 1 MG/1
1 TABLET ORAL EVERY MORNING
Qty: 7 TABLET | Refills: 0 | Status: SHIPPED | OUTPATIENT
Start: 2025-01-28

## 2025-01-28 NOTE — PATIENT INSTRUCTIONS
"Fat and Cholesterol Restricted Eating Plan  Getting too much fat and cholesterol in your diet may cause health problems. Choosing the right foods helps keep your fat and cholesterol at normal levels. This can keep you from getting certain diseases.  Your doctor may recommend an eating plan that includes:  Total fat: ______% or less of total calories a day. This is ______g of fat a day.  Saturated fat: ______% or less of total calories a day. This is ______g of saturated fat a day.  Cholesterol: less than _________mg a day.  Fiber: ______g a day.  What are tips for following this plan?  General tips  Work with your doctor to lose weight if you need to.  Avoid:  Foods with added sugar.  Fried foods.  Foods with trans fat or partially hydrogenated oils. This includes some margarines and baked goods.  If you drink alcohol:  Limit how much you have to:  0-1 drink a day for women who are not pregnant.  0-2 drinks a day for men.  Know how much alcohol is in a drink. In the [place] one drink equals one 12 oz bottle of beer (355 mL), one 5 oz glass of wine (148 mL), or one 1½ oz glass of hard liquor (44 mL).  Reading food labels  Check food labels for:  Trans fats.  Partially hydrogenated oils.  Saturated fat (g) in each serving.  Cholesterol (mg) in each serving.  Fiber (g) in each serving.  Choose foods with healthy fats, such as:  Monounsaturated fats and polyunsaturated fats. These include olive and canola oil, flaxseeds, walnuts, almonds, and seeds.  Omega-3 fats. These are found in certain fish, flaxseed oil, and ground flaxseeds.  Choose grain products that have whole grains. Look for the word \"whole\" as the first word in the ingredient list.  Cooking  Cook foods using low-fat methods. These include baking, boiling, grilling, and broiling.  Eat more home-cooked foods. Eat at restaurants and buffets less often. Eat less fast food.  Avoid cooking using saturated fats, such as butter, cream, palm oil, palm kernel oil, and " coconut oil.  Meal planning    At meals, divide your plate into four equal parts:  Fill one-half of your plate with vegetables, green salads, and fruit.  Fill one-fourth of your plate with whole grains.  Fill one-fourth of your plate with low-fat (lean) protein foods.  Eat fish that is high in omega-3 fats at least two times a week. This includes mackerel, tuna, sardines, and salmon.  Eat foods that are high in fiber, such as whole grains, beans, apples, pears, berries, broccoli, carrots, peas, and barley.  What foods should I eat?  Fruits  All fresh, canned (in natural juice), or frozen fruits.  Vegetables  Fresh or frozen vegetables (raw, steamed, roasted, or grilled). Green salads.  Grains  Whole grains, such as whole wheat or whole grain breads, crackers, cereals, and pasta. Unsweetened oatmeal, bulgur, barley, quinoa, or brown rice. Corn or whole wheat flour tortillas.  Meats and other protein foods  Ground beef (85% or leaner), grass-fed beef, or beef trimmed of fat. Skinless chicken or turkey. Ground chicken or turkey. Pork trimmed of fat. All fish and seafood. Egg whites. Dried beans, peas, or lentils. Unsalted nuts or seeds. Unsalted canned beans. Nut butters without added sugar or oil.  Dairy  Low-fat or nonfat dairy products, such as skim or 1% milk, 2% or reduced-fat cheeses, low-fat and fat-free ricotta or cottage cheese, or plain low-fat and nonfat yogurt.  Fats and oils  Tub margarine without trans fats. Light or reduced-fat mayonnaise and salad dressings. Avocado. Olive, canola, sesame, or safflower oils.  The items listed above may not be a complete list of foods and beverages you can eat. Contact a dietitian for more information.  What foods should I avoid?  Fruits  Canned fruit in heavy syrup. Fruit in cream or butter sauce. Fried fruit.  Vegetables  Vegetables cooked in cheese, cream, or butter sauce. Fried vegetables.  Grains  White bread. White pasta. White rice. Cornbread. Bagels, pastries,  and croissants. Crackers and snack foods that contain trans fat and hydrogenated oils.  Meats and other protein foods  Fatty cuts of meat. Ribs, chicken wings, dixon, sausage, bologna, salami, chitterlings, fatback, hot dogs, bratwurst, and packaged lunch meats. Liver and organ meats. Whole eggs and egg yolks. Chicken and turkey with skin. Fried meat.  Dairy  Whole or 2% milk, cream, half-and-half, and cream cheese. Whole milk cheeses. Whole-fat or sweetened yogurt. Full-fat cheeses. Nondairy creamers and whipped toppings. Processed cheese, cheese spreads, and cheese curds.  Fats and oils  Butter, stick margarine, lard, shortening, ghee, or dixon fat. Coconut, palm kernel, and palm oils.  Beverages  Alcohol. Sugar-sweetened drinks such as sodas, lemonade, and fruit drinks.  Sweets and desserts  Corn syrup, sugars, honey, and molasses. Candy. Jam and jelly. Syrup. Sweetened cereals. Cookies, pies, cakes, donuts, muffins, and ice cream.  The items listed above may not be a complete list of foods and beverages you should avoid. Contact a dietitian for more information.  Summary  Choosing the right foods helps keep your fat and cholesterol at normal levels. This can keep you from getting certain diseases.  At meals, fill one-half of your plate with vegetables, green salads, and fruits.  Eat high fiber foods, like whole grains, beans, apples, pears, berries, carrots, peas, and barley.  Limit added sugar, saturated fats, alcohol, and fried foods.  This information is not intended to replace advice given to you by your health care provider. Make sure you discuss any questions you have with your health care provider.  Document Revised: 04/29/2022 Document Reviewed: 04/29/2022  Elsevier Patient Education © 2024 [employer]

## 2025-01-28 NOTE — PROGRESS NOTES
"Chief Complaint -shortness of breath    History of Present Illness -     Britta Lee is a 79 y.o. female who is here today with her  who is helping with history.    Shortness of breath-  Patient complains of shortness of breath and leg swelling with wheezing and chest congestion.  Minimal relief with torsemide.    Chronic kidney disease-  Stable on chronic dialysis    Hyperlipidemia-  Stable with atorvastatin and low-cholesterol diet    The following portions of the patient's history were reviewed and updated as appropriate: allergies, current medications, past family history, past medical history, past social history, past surgical history and problem list.        Objective  Vital signs:  /50 (BP Location: Right arm, Patient Position: Sitting, Cuff Size: Adult)   Pulse 83   Resp 18   Ht 160 cm (62.99\")   Wt 67.6 kg (149 lb)   SpO2 93%   BMI 26.40 kg/m²     Physical Exam  Vitals and nursing note reviewed.   Constitutional:       General: She is not in acute distress.     Appearance: Normal appearance. She is well-developed. She is not diaphoretic.   HENT:      Head: Normocephalic and atraumatic.      Right Ear: Tympanic membrane, ear canal and external ear normal.      Left Ear: Tympanic membrane, ear canal and external ear normal.      Nose: Nose normal.      Mouth/Throat:      Mouth: Mucous membranes are moist.      Pharynx: No oropharyngeal exudate or posterior oropharyngeal erythema.   Eyes:      Extraocular Movements: Extraocular movements intact.      Conjunctiva/sclera: Conjunctivae normal.   Neck:      Thyroid: No thyromegaly.   Cardiovascular:      Rate and Rhythm: Normal rate and regular rhythm.      Heart sounds: Normal heart sounds. No murmur heard.  Pulmonary:      Effort: Pulmonary effort is normal. No respiratory distress.      Breath sounds: Wheezing present. No rales.      Comments: Crackles noted bilateral lung bases  Musculoskeletal:         General: No tenderness.      " Cervical back: Normal range of motion and neck supple.      Right lower leg: Edema present.      Left lower leg: Edema present.      Comments: Patient is in wheelchair today assisted by her    Lymphadenopathy:      Cervical: No cervical adenopathy.   Skin:     General: Skin is warm and dry.      Findings: No rash.   Neurological:      Mental Status: She is alert and oriented to person, place, and time.   Psychiatric:         Mood and Affect: Mood normal.         Behavior: Behavior normal.         Thought Content: Thought content normal.         The following data was reviewed by Lexie Dolan PA-C:         Lab Results   Component Value Date    BUN 48 (H) 12/16/2024    CREATININE 6.34 (H) 12/16/2024    EGFR 6.2 (L) 12/16/2024    ALT 19 12/16/2024    AST 44 (H) 12/16/2024    WBC 8.02 12/16/2024    HGB 11.4 (L) 12/16/2024    HCT 36.7 12/16/2024     12/16/2024    CHOL 88 03/14/2024    TRIG 60 03/14/2024    HDL 34 (L) 03/14/2024    LDL 40 03/14/2024    TSH 3.950 12/16/2024    HGBA1C 6.70 (H) 12/16/2024           Assessment & Plan     Diagnoses and all orders for this visit:    1. Chronic heart failure with preserved ejection fraction (HFpEF) (Primary)  Comments:  Start Bumex 1 mg every morning x 3 days then as needed  Orders:  -     bumetanide (Bumex) 1 MG tablet; Take 1 tablet by mouth Every Morning.  Dispense: 7 tablet; Refill: 0    2. Bronchitis  Comments:  Plan to empirically treat with azithromycin  Orders:  -     azithromycin (Zithromax Z-Yvon) 250 MG tablet; Take 2 tablets by mouth on day 1, then 1 tablet daily on days 2-5  Dispense: 6 tablet; Refill: 0    3. Stage 5 chronic kidney disease on chronic dialysis  Comments:  Continue dialysis Monday Wednesday Friday    4. Mixed hyperlipidemia  Comments:  Continue atorvastatin  Advised low-cholesterol diet                   Patient was given instructions and counseling regarding his condition or for health maintenance advice. Please see specific information  pulled into the AVS if appropriate      This document has been electronically signed by:  Lexie Dolan PA-C

## 2025-01-30 DIAGNOSIS — R19.5 POSITIVE COLORECTAL CANCER SCREENING USING COLOGUARD TEST: Primary | ICD-10-CM

## 2025-02-17 ENCOUNTER — APPOINTMENT (OUTPATIENT)
Dept: GENERAL RADIOLOGY | Facility: HOSPITAL | Age: 80
End: 2025-02-17
Payer: MEDICARE

## 2025-02-17 ENCOUNTER — HOSPITAL ENCOUNTER (EMERGENCY)
Facility: HOSPITAL | Age: 80
Discharge: HOME | End: 2025-02-17
Attending: EMERGENCY MEDICINE | Admitting: EMERGENCY MEDICINE
Payer: MEDICARE

## 2025-02-17 VITALS
SYSTOLIC BLOOD PRESSURE: 125 MMHG | TEMPERATURE: 98.4 F | WEIGHT: 147 LBS | RESPIRATION RATE: 20 BRPM | DIASTOLIC BLOOD PRESSURE: 57 MMHG | HEIGHT: 63 IN | BODY MASS INDEX: 26.05 KG/M2 | HEART RATE: 81 BPM | OXYGEN SATURATION: 100 %

## 2025-02-17 DIAGNOSIS — R07.89 CHEST DISCOMFORT: Primary | ICD-10-CM

## 2025-02-17 LAB
ALBUMIN SERPL-MCNC: 3 G/DL (ref 3.5–5.2)
ALBUMIN/GLOB SERPL: 0.6 G/DL
ALP SERPL-CCNC: 543 U/L (ref 39–117)
ALT SERPL W P-5'-P-CCNC: 18 U/L (ref 1–33)
ANION GAP SERPL CALCULATED.3IONS-SCNC: 13.8 MMOL/L (ref 5–15)
AST SERPL-CCNC: 46 U/L (ref 1–32)
BASOPHILS # BLD AUTO: 0.03 10*3/MM3 (ref 0–0.2)
BASOPHILS NFR BLD AUTO: 0.5 % (ref 0–1.5)
BILIRUB SERPL-MCNC: 0.5 MG/DL (ref 0–1.2)
BUN SERPL-MCNC: 14 MG/DL (ref 8–23)
BUN/CREAT SERPL: 5.5 (ref 7–25)
CALCIUM SPEC-SCNC: 8.9 MG/DL (ref 8.6–10.5)
CHLORIDE SERPL-SCNC: 95 MMOL/L (ref 98–107)
CO2 SERPL-SCNC: 26.2 MMOL/L (ref 22–29)
CREAT SERPL-MCNC: 2.53 MG/DL (ref 0.57–1)
DEPRECATED RDW RBC AUTO: 48.7 FL (ref 37–54)
EGFRCR SERPLBLD CKD-EPI 2021: 18.6 ML/MIN/1.73
EOSINOPHIL # BLD AUTO: 0.22 10*3/MM3 (ref 0–0.4)
EOSINOPHIL NFR BLD AUTO: 3.9 % (ref 0.3–6.2)
ERYTHROCYTE [DISTWIDTH] IN BLOOD BY AUTOMATED COUNT: 15.7 % (ref 12.3–15.4)
GEN 5 1HR TROPONIN T REFLEX: 68 NG/L
GLOBULIN UR ELPH-MCNC: 5.4 GM/DL
GLUCOSE SERPL-MCNC: 110 MG/DL (ref 65–99)
HCT VFR BLD AUTO: 29.8 % (ref 34–46.6)
HGB BLD-MCNC: 9.2 G/DL (ref 12–15.9)
HOLD SPECIMEN: NORMAL
HOLD SPECIMEN: NORMAL
IMM GRANULOCYTES # BLD AUTO: 0.01 10*3/MM3 (ref 0–0.05)
IMM GRANULOCYTES NFR BLD AUTO: 0.2 % (ref 0–0.5)
LYMPHOCYTES # BLD AUTO: 1.21 10*3/MM3 (ref 0.7–3.1)
LYMPHOCYTES NFR BLD AUTO: 21.4 % (ref 19.6–45.3)
MAGNESIUM SERPL-MCNC: 1.9 MG/DL (ref 1.6–2.4)
MCH RBC QN AUTO: 26.5 PG (ref 26.6–33)
MCHC RBC AUTO-ENTMCNC: 30.9 G/DL (ref 31.5–35.7)
MCV RBC AUTO: 85.9 FL (ref 79–97)
MONOCYTES # BLD AUTO: 0.71 10*3/MM3 (ref 0.1–0.9)
MONOCYTES NFR BLD AUTO: 12.5 % (ref 5–12)
NEUTROPHILS NFR BLD AUTO: 3.48 10*3/MM3 (ref 1.7–7)
NEUTROPHILS NFR BLD AUTO: 61.5 % (ref 42.7–76)
NRBC BLD AUTO-RTO: 0 /100 WBC (ref 0–0.2)
PLATELET # BLD AUTO: 262 10*3/MM3 (ref 140–450)
PMV BLD AUTO: 8.7 FL (ref 6–12)
POTASSIUM SERPL-SCNC: 3.6 MMOL/L (ref 3.5–5.2)
PROT SERPL-MCNC: 8.4 G/DL (ref 6–8.5)
RBC # BLD AUTO: 3.47 10*6/MM3 (ref 3.77–5.28)
SODIUM SERPL-SCNC: 135 MMOL/L (ref 136–145)
TROPONIN T % DELTA: -3
TROPONIN T NUMERIC DELTA: -2 NG/L
TROPONIN T SERPL HS-MCNC: 70 NG/L
TSH SERPL DL<=0.05 MIU/L-ACNC: 6.18 UIU/ML (ref 0.27–4.2)
WBC NRBC COR # BLD AUTO: 5.66 10*3/MM3 (ref 3.4–10.8)
WHOLE BLOOD HOLD COAG: NORMAL
WHOLE BLOOD HOLD SPECIMEN: NORMAL

## 2025-02-17 PROCEDURE — 80053 COMPREHEN METABOLIC PANEL: CPT | Performed by: NURSE PRACTITIONER

## 2025-02-17 PROCEDURE — 36415 COLL VENOUS BLD VENIPUNCTURE: CPT

## 2025-02-17 PROCEDURE — 71045 X-RAY EXAM CHEST 1 VIEW: CPT | Performed by: RADIOLOGY

## 2025-02-17 PROCEDURE — 71045 X-RAY EXAM CHEST 1 VIEW: CPT

## 2025-02-17 PROCEDURE — 93005 ELECTROCARDIOGRAM TRACING: CPT | Performed by: NURSE PRACTITIONER

## 2025-02-17 PROCEDURE — 93005 ELECTROCARDIOGRAM TRACING: CPT | Performed by: EMERGENCY MEDICINE

## 2025-02-17 PROCEDURE — 84484 ASSAY OF TROPONIN QUANT: CPT | Performed by: EMERGENCY MEDICINE

## 2025-02-17 PROCEDURE — 85025 COMPLETE CBC W/AUTO DIFF WBC: CPT | Performed by: NURSE PRACTITIONER

## 2025-02-17 PROCEDURE — 83735 ASSAY OF MAGNESIUM: CPT | Performed by: NURSE PRACTITIONER

## 2025-02-17 PROCEDURE — 84443 ASSAY THYROID STIM HORMONE: CPT | Performed by: NURSE PRACTITIONER

## 2025-02-17 PROCEDURE — 99284 EMERGENCY DEPT VISIT MOD MDM: CPT

## 2025-02-17 PROCEDURE — 25810000003 SODIUM CHLORIDE 0.9 % SOLUTION: Performed by: NURSE PRACTITIONER

## 2025-02-17 PROCEDURE — 96360 HYDRATION IV INFUSION INIT: CPT

## 2025-02-17 RX ORDER — ASPIRIN 81 MG/1
324 TABLET, CHEWABLE ORAL ONCE
Status: COMPLETED | OUTPATIENT
Start: 2025-02-17 | End: 2025-02-17

## 2025-02-17 RX ORDER — SODIUM CHLORIDE 0.9 % (FLUSH) 0.9 %
10 SYRINGE (ML) INJECTION AS NEEDED
Status: DISCONTINUED | OUTPATIENT
Start: 2025-02-17 | End: 2025-02-17 | Stop reason: HOSPADM

## 2025-02-17 RX ADMIN — SODIUM CHLORIDE 500 ML: 9 INJECTION, SOLUTION INTRAVENOUS at 15:19

## 2025-02-17 RX ADMIN — ASPIRIN 324 MG: 81 TABLET, CHEWABLE ORAL at 15:16

## 2025-02-17 NOTE — ED PROVIDER NOTES
Subjective   History of Present Illness  Patient is an 79-year-old female presents today complaining of chest discomfort.  Patient reports she has just discomfort by her dialysis but states it is improved some now.  Patient reports only mild pain denies any shortness of breath.  Patient denies any other symptoms.    Chest Pain      Review of Systems   Constitutional: Negative.    HENT: Negative.     Eyes: Negative.    Respiratory: Negative.     Cardiovascular:  Positive for chest pain.   Gastrointestinal: Negative.    Endocrine: Negative.    Genitourinary: Negative.    Musculoskeletal: Negative.    Skin: Negative.    Allergic/Immunologic: Negative.    Neurological: Negative.    Hematological: Negative.    Psychiatric/Behavioral: Negative.         Past Medical History:   Diagnosis Date    Acquired hypothyroidism 04/22/2021    Anemia     Arthritis     Carpal tunnel syndrome     Coronary artery disease     Diabetes mellitus     Elevated cholesterol     GERD (gastroesophageal reflux disease)     History of pneumonia     History of unsteady gait     OCASSIONALLY    Hypertension     Insomnia     Kidney disease     Kidney disease, chronic, stage IV (GFR 15-29 ml/min)     LENNY (obstructive sleep apnea) 12/01/2020    no c-pap    Osteoarthritis     Renal insufficiency     Sciatica        No Known Allergies    Past Surgical History:   Procedure Laterality Date    ABDOMINAL SURGERY      APPENDECTOMY      CARDIAC CATHETERIZATION      1 STENT  ---  2000    CARDIAC SURGERY      CAROTID STENT      CARPAL TUNNEL RELEASE Right 10/08/2019    Procedure: CARPAL TUNNEL RELEASE;  Surgeon: Feroz Johnson MD;  Location: [facility] OR;  Service: Orthopedics    CATARACT EXTRACTION      CHOLECYSTECTOMY      COLONOSCOPY      COLONOSCOPY N/A 11/23/2021    Procedure: COLONOSCOPY FOR SCREENING;  Surgeon: Palmira Pate MD;  Location: [facility] OR;  Service: Gastroenterology;  Laterality: N/A;    CORONARY ANGIOPLASTY WITH STENT  PLACEMENT      ENDOSCOPY      ENDOSCOPY N/A 03/05/2020    Procedure: ESOPHAGOGASTRODUODENOSCOPY;  Surgeon: Alexandru Bagley MD;  Location: [facility] OR;  Service: Gastroenterology;  Laterality: N/A;    ENDOSCOPY N/A 11/23/2021    Procedure: ESOPHAGOGASTRODUODENOSCOPY WITH BIOPSY;  Surgeon: Palmira Pate MD;  Location: [facility] OR;  Service: Gastroenterology;  Laterality: N/A;    ENDOSCOPY AND COLONOSCOPY      HEAD/NECK LESION/CYST EXCISION Right 4/30/2024    Procedure: FACIAL LESION/CYST EXCISION;  Surgeon: Ta Blas MD;  Location: [facility] OR;  Service: General;  Laterality: Right;    INSERTION HEMODIALYSIS CATHETER N/A 11/14/2022    Procedure: HEMODIALYSIS CATHETER INSERTION;  Surgeon: Ta Blas MD;  Location: [facility] OR;  Service: General;  Laterality: N/A;    JOINT REPLACEMENT Left 05/02/2017    [facility]  DR LEVIN  LEFT TOTAL KNEE    KNEE ARTHROSCOPY W/ MENISCECTOMY Right     LAPAROSCOPIC TUBAL LIGATION      AR ARTHRP KNE CONDYLE&PLATU MEDIAL&LAT COMPARTMENTS Left 05/02/2017    Procedure: TOTAL KNEE ARTHROPLASTY;  Surgeon: Feroz Levin MD;  Location: [facility] OR;  Service: Orthopedics    STERILIZATION      TONSILLECTOMY         Family History   Problem Relation Age of Onset    Arthritis Mother     Diabetes Mother     Cancer Mother     Heart disease Father     Diabetes Daughter     Diabetes Son     Diabetes Maternal Aunt     Heart disease Maternal Grandmother     Breast cancer Neg Hx        Social History     Socioeconomic History    Marital status:      Spouse name: natalie    Number of children: 3    Years of education: 12   Tobacco Use    Smoking status: Never     Passive exposure: Past    Smokeless tobacco: Never   Vaping Use    Vaping status: Never Used   Substance and Sexual Activity    Alcohol use: Yes     Comment: socially    Drug use: No    Sexual activity: Defer     Birth control/protection: Surgical           Objective   Physical Exam  Vitals and nursing note reviewed.    Constitutional:       Appearance: She is well-developed.   HENT:      Head: Normocephalic.      Right Ear: External ear normal.      Left Ear: External ear normal.   Eyes:      Conjunctiva/sclera: Conjunctivae normal.      Pupils: Pupils are equal, round, and reactive to light.   Cardiovascular:      Rate and Rhythm: Regular rhythm. Tachycardia present.      Heart sounds: Normal heart sounds.   Pulmonary:      Effort: Pulmonary effort is normal.      Breath sounds: Normal breath sounds.   Abdominal:      General: Bowel sounds are normal.      Palpations: Abdomen is soft.   Musculoskeletal:         General: Normal range of motion.      Cervical back: Normal range of motion and neck supple.   Skin:     General: Skin is warm and dry.      Capillary Refill: Capillary refill takes less than 2 seconds.   Neurological:      Mental Status: She is alert and oriented to person, place, and time.   Psychiatric:         Behavior: Behavior normal.         Thought Content: Thought content normal.         Procedures           ED Course  ED Course as of 02/17/25 1736   Mon Feb 17, 2025 1724 EKG at 1510 showed a regular supraventricular tachycardia, rate 133.  Possibly sinus tachycardia.  Evidence of anterolateral infarct, age indeterminant; similar findings noted on most recent EKG from 4/3/2024.  EKG at 1716 shows sinus rhythm, rate 81.  SC interval 182, QRS duration 96, QTc 519 ms.  Evidence of anterolateral infarct, age indeterminant.  Prolonged QT.  These tracings have been sent to general cardiologist Dr. Arevalo for his opinion, awaiting his return call. [CM]   1728 Today's EKGs as well as the most recent on file from 4/3/2024 were reviewed by cardiologist Dr. Arevalo.  He advised that the first EKG was indeed sinus tachycardia.  He advised no acute injury pattern. [CM]   1733 Patient remains asymptomatic. Declines admission. Explained that she should follow up with PCP and should return to the emergency room for worsening  symptoms.  [JONAS]      ED Course User Index  [CM] Ge Kapadia MD  [JONAS] Praful Mike, ERNIE                  HEART Score: 5   Shared Decision Making  I discussed the findings with the patient/patient representative who is in agreement with the treatment plan and the final disposition.  Risks and benefits of discharge and/or observation/admission were discussed: Yes                          Results for orders placed or performed during the hospital encounter of 02/17/25   High Sensitivity Troponin T    Collection Time: 02/17/25  3:13 PM    Specimen: Blood   Result Value Ref Range    HS Troponin T 70 (C) <14 ng/L   Comprehensive Metabolic Panel    Collection Time: 02/17/25  3:13 PM    Specimen: Blood   Result Value Ref Range    Glucose 110 (H) 65 - 99 mg/dL    BUN 14 8 - 23 mg/dL    Creatinine 2.53 (H) 0.57 - 1.00 mg/dL    Sodium 135 (L) 136 - 145 mmol/L    Potassium 3.6 3.5 - 5.2 mmol/L    Chloride 95 (L) 98 - 107 mmol/L    CO2 26.2 22.0 - 29.0 mmol/L    Calcium 8.9 8.6 - 10.5 mg/dL    Total Protein 8.4 6.0 - 8.5 g/dL    Albumin 3.0 (L) 3.5 - 5.2 g/dL    ALT (SGPT) 18 1 - 33 U/L    AST (SGOT) 46 (H) 1 - 32 U/L    Alkaline Phosphatase 543 (H) 39 - 117 U/L    Total Bilirubin 0.5 0.0 - 1.2 mg/dL    Globulin 5.4 gm/dL    A/G Ratio 0.6 g/dL    BUN/Creatinine Ratio 5.5 (L) 7.0 - 25.0    Anion Gap 13.8 5.0 - 15.0 mmol/L    eGFR 18.6 (L) >60.0 mL/min/1.73   CBC Auto Differential    Collection Time: 02/17/25  3:13 PM    Specimen: Blood   Result Value Ref Range    WBC 5.66 3.40 - 10.80 10*3/mm3    RBC 3.47 (L) 3.77 - 5.28 10*6/mm3    Hemoglobin 9.2 (L) 12.0 - 15.9 g/dL    Hematocrit 29.8 (L) 34.0 - 46.6 %    MCV 85.9 79.0 - 97.0 fL    MCH 26.5 (L) 26.6 - 33.0 pg    MCHC 30.9 (L) 31.5 - 35.7 g/dL    RDW 15.7 (H) 12.3 - 15.4 %    RDW-SD 48.7 37.0 - 54.0 fl    MPV 8.7 6.0 - 12.0 fL    Platelets 262 140 - 450 10*3/mm3    Neutrophil % 61.5 42.7 - 76.0 %    Lymphocyte % 21.4 19.6 - 45.3 %    Monocyte % 12.5 (H) 5.0 - 12.0  %    Eosinophil % 3.9 0.3 - 6.2 %    Basophil % 0.5 0.0 - 1.5 %    Immature Grans % 0.2 0.0 - 0.5 %    Neutrophils, Absolute 3.48 1.70 - 7.00 10*3/mm3    Lymphocytes, Absolute 1.21 0.70 - 3.10 10*3/mm3    Monocytes, Absolute 0.71 0.10 - 0.90 10*3/mm3    Eosinophils, Absolute 0.22 0.00 - 0.40 10*3/mm3    Basophils, Absolute 0.03 0.00 - 0.20 10*3/mm3    Immature Grans, Absolute 0.01 0.00 - 0.05 10*3/mm3    nRBC 0.0 0.0 - 0.2 /100 WBC   TSH    Collection Time: 02/17/25  3:13 PM    Specimen: Blood   Result Value Ref Range    TSH 6.180 (H) 0.270 - 4.200 uIU/mL   Magnesium    Collection Time: 02/17/25  3:13 PM    Specimen: Blood   Result Value Ref Range    Magnesium 1.9 1.6 - 2.4 mg/dL   Green Top (Gel)    Collection Time: 02/17/25  3:13 PM   Result Value Ref Range    Extra Tube Hold for add-ons.    Lavender Top    Collection Time: 02/17/25  3:13 PM   Result Value Ref Range    Extra Tube hold for add-on    Gold Top - SST    Collection Time: 02/17/25  3:13 PM   Result Value Ref Range    Extra Tube Hold for add-ons.    Light Blue Top    Collection Time: 02/17/25  3:13 PM   Result Value Ref Range    Extra Tube Hold for add-ons.    High Sensitivity Troponin T 1Hr    Collection Time: 02/17/25  4:40 PM    Specimen: Blood   Result Value Ref Range    HS Troponin T 68 (C) <14 ng/L    Troponin T Numeric Delta -2 ng/L    Troponin T % Delta -3 Abnormal if >/= 20%   ECG 12 Lead Rhythm Change    Collection Time: 02/17/25  5:16 PM   Result Value Ref Range    QT Interval 446 ms    QTC Interval 518 ms     XR Chest 1 View   Final Result   1.  Right basilar airspace disease.   2.  Cardiomegaly.   3.  Decreased right pleural effusion.       This report was finalized on 2/17/2025 4:10 PM by Dr. Guillermo Macias MD.                        Medical Decision Making  MDM:    Escalation of care including admission/observation considered    - Discussions of management with other providers:  Cardiology    - Discussed/reviewed with Radiology regarding  test interpretation    - Independent interpretation: None    - Additional patient history obtained from: None    - Review of external non-ED record (if available):  Prior Inpt record, Office record, Outpt record, Prior Outpt labs, PCP record, Outside ED record, Other    - Chronic conditions affecting care: See HPI and medical Hx.    - Social Determinants of health significantly affecting care:  None        Medical Decision Making Discussion:    Patient is an 79-year-old female presents today complaining of chest discomfort.  Patient reports she has just discomfort by her dialysis but states it is improved some now.  Patient reports only mild pain denies any shortness of breath.  Patient denies any other symptoms.      The patient has been given very strict return precautions to return to the emergency department should there be any acute change or worsening of their condition.  I have explained my findings and the patient has expressed understanding to me.  I explained that the work-up performed in the ED has been based on the specific complaint and concern, as the nature of emergency medicine is complaint driven and they understand that new symptoms may arise.  I have told them that, should there be any new symptoms, worsening or changing symptoms, a new work-up may be indicated that they are encouraged to return to the emergency department or promptly contact their primary care physician. We have employed a shared decision-making process as the discussion of their disposition.  The patient has been educated as to the nature of the visit, the tests and work-up performed and the findings from today's visit. At this time, there does not appear to be any acute emergent process that necessitates admission to the hospital, however, the patient understands that this can change unexpectedly. At this time, the patient is stable for discharge home and agrees to follow-up with her primary care physician in the next 24 to 48  hours or earlier should they be able to obtain an appointment.    The patient was counseled regarding diagnostic results and treatment plan and patient has indicated understanding of these instructions.      Problems Addressed:  Chest discomfort: complicated acute illness or injury    Amount and/or Complexity of Data Reviewed  Labs: ordered. Decision-making details documented in ED Course.  Radiology: ordered. Decision-making details documented in ED Course.  ECG/medicine tests: ordered. Decision-making details documented in ED Course.    Risk  OTC drugs.  Prescription drug management.        Final diagnoses:   Chest discomfort       ED Disposition  ED Disposition       ED Disposition   Discharge    Condition   Stable    Comment   --               Lexie Dolan, PA  [address] [facility] [id number] [phone]    Schedule an appointment as soon as possible for a visit   For further evaluation         Medication List      No changes were made to your prescriptions during this visit.            Praful Mike, APRN  02/17/25 1739

## 2025-02-17 NOTE — DISCHARGE INSTRUCTIONS

## 2025-02-18 ENCOUNTER — OFFICE VISIT (OUTPATIENT)
Dept: SURGERY | Facility: CLINIC | Age: 80
End: 2025-02-18
Payer: MEDICARE

## 2025-02-18 VITALS
BODY MASS INDEX: 27.05 KG/M2 | HEIGHT: 62 IN | DIASTOLIC BLOOD PRESSURE: 72 MMHG | WEIGHT: 147 LBS | SYSTOLIC BLOOD PRESSURE: 126 MMHG | HEART RATE: 78 BPM

## 2025-02-18 DIAGNOSIS — R19.5 POSITIVE COLORECTAL CANCER SCREENING USING COLOGUARD TEST: Primary | ICD-10-CM

## 2025-02-18 PROCEDURE — 1159F MED LIST DOCD IN RCRD: CPT | Performed by: SURGERY

## 2025-02-18 PROCEDURE — 3078F DIAST BP <80 MM HG: CPT | Performed by: SURGERY

## 2025-02-18 PROCEDURE — 99213 OFFICE O/P EST LOW 20 MIN: CPT | Performed by: SURGERY

## 2025-02-18 PROCEDURE — 3074F SYST BP LT 130 MM HG: CPT | Performed by: SURGERY

## 2025-02-18 PROCEDURE — 1160F RVW MEDS BY RX/DR IN RCRD: CPT | Performed by: SURGERY

## 2025-02-18 RX ORDER — POLYETHYLENE GLYCOL 3350, SODIUM SULFATE ANHYDROUS, SODIUM BICARBONATE, SODIUM CHLORIDE, POTASSIUM CHLORIDE 236; 22.74; 6.74; 5.86; 2.97 G/4L; G/4L; G/4L; G/4L; G/4L
4 POWDER, FOR SOLUTION ORAL ONCE
Qty: 1 ML | Refills: 0 | Status: SHIPPED | OUTPATIENT
Start: 2025-02-18 | End: 2025-02-18

## 2025-02-18 NOTE — PROGRESS NOTES
Subjective   Britta Lee is a 79 y.o. female.     Chief Complaint: positive colon screening test    History of Present Illness She is a 78 yo who had a cologuard test come back positive.     The following portions of the patient's history were reviewed and updated as appropriate: current medications, past family history, past medical history, past social history, past surgical history and problem list.    Review of Systems   Constitutional:  Negative for activity change, appetite change, chills, fever and unexpected weight change.   HENT:  Negative for congestion, facial swelling and sore throat.    Eyes:  Negative for photophobia and visual disturbance.   Respiratory:  Negative for chest tightness, shortness of breath and wheezing.    Cardiovascular:  Negative for chest pain, palpitations and leg swelling.   Gastrointestinal:  Negative for abdominal distention, abdominal pain, anal bleeding, blood in stool, constipation, diarrhea, nausea, rectal pain and vomiting.   Endocrine: Negative for cold intolerance, heat intolerance, polydipsia and polyuria.   Genitourinary:  Negative for difficulty urinating, dysuria, flank pain and urgency.   Musculoskeletal:  Negative for back pain and myalgias.   Skin:  Negative for rash and wound.   Allergic/Immunologic: Negative for immunocompromised state.   Neurological:  Negative for dizziness, seizures, syncope, light-headedness, numbness and headaches.   Hematological:  Negative for adenopathy. Does not bruise/bleed easily.   Psychiatric/Behavioral:  Negative for behavioral problems and confusion. The patient is not nervous/anxious.        Objective   Physical Exam  Vitals reviewed.   Constitutional:       General: She is not in acute distress.     Appearance: She is well-developed. She is not ill-appearing.   HENT:      Head: Normocephalic. No laceration. Hair is normal.      Right Ear: Hearing and ear canal normal.      Left Ear: Hearing and ear canal normal.      Nose: Nose  normal.      Right Sinus: No maxillary sinus tenderness or frontal sinus tenderness.      Left Sinus: No maxillary sinus tenderness or frontal sinus tenderness.   Eyes:      General: Lids are normal.      Conjunctiva/sclera: Conjunctivae normal.      Pupils: Pupils are equal, round, and reactive to light.   Neck:      Thyroid: No thyroid mass or thyromegaly.      Vascular: No JVD.      Trachea: No tracheal tenderness or tracheal deviation.   Cardiovascular:      Rate and Rhythm: Normal rate and regular rhythm.      Heart sounds: No murmur heard.     No gallop.   Pulmonary:      Effort: Pulmonary effort is normal.      Breath sounds: Normal breath sounds. No stridor. No wheezing.   Chest:      Chest wall: No tenderness.   Abdominal:      General: Bowel sounds are normal. There is no distension.      Palpations: Abdomen is soft. There is no mass.      Tenderness: There is no abdominal tenderness. There is no guarding or rebound.      Hernia: No hernia is present.   Musculoskeletal:         General: No deformity.      Cervical back: Normal range of motion.   Lymphadenopathy:      Cervical: No cervical adenopathy.      Upper Body:      Right upper body: No supraclavicular adenopathy.      Left upper body: No supraclavicular adenopathy.   Skin:     General: Skin is warm and dry.      Coloration: Skin is not pale.      Findings: No erythema or rash.   Neurological:      Mental Status: She is alert and oriented to person, place, and time.      Motor: No abnormal muscle tone.   Psychiatric:         Behavior: Behavior normal.         Thought Content: Thought content normal.         Past Medical History:   Diagnosis Date    Acquired hypothyroidism 04/22/2021    Anemia     Arthritis     Carpal tunnel syndrome     Coronary artery disease     Diabetes mellitus     Elevated cholesterol     GERD (gastroesophageal reflux disease)     History of pneumonia     History of unsteady gait     OCASSIONALLY    Hypertension     Insomnia      Kidney disease     Kidney disease, chronic, stage IV (GFR 15-29 ml/min)     LENNY (obstructive sleep apnea) 12/01/2020    no c-pap    Osteoarthritis     Renal insufficiency     Sciatica        Family History   Problem Relation Age of Onset    Arthritis Mother     Diabetes Mother     Cancer Mother     Heart disease Father     Diabetes Daughter     Diabetes Son     Diabetes Maternal Aunt     Heart disease Maternal Grandmother     Breast cancer Neg Hx        Social History     Tobacco Use    Smoking status: Never     Passive exposure: Past    Smokeless tobacco: Never   Vaping Use    Vaping status: Never Used   Substance Use Topics    Alcohol use: Yes     Comment: socially    Drug use: No       Past Surgical History:   Procedure Laterality Date    ABDOMINAL SURGERY      APPENDECTOMY      CARDIAC CATHETERIZATION      1 STENT  ---  2000    CARDIAC SURGERY      CAROTID STENT      CARPAL TUNNEL RELEASE Right 10/08/2019    Procedure: CARPAL TUNNEL RELEASE;  Surgeon: Feroz Johnson MD;  Location: [facility] OR;  Service: Orthopedics    CATARACT EXTRACTION      CHOLECYSTECTOMY      COLONOSCOPY      COLONOSCOPY N/A 11/23/2021    Procedure: COLONOSCOPY FOR SCREENING;  Surgeon: Palmira Pate MD;  Location: [facility] OR;  Service: Gastroenterology;  Laterality: N/A;    CORONARY ANGIOPLASTY WITH STENT PLACEMENT      ENDOSCOPY      ENDOSCOPY N/A 03/05/2020    Procedure: ESOPHAGOGASTRODUODENOSCOPY;  Surgeon: Alexandru Bagley MD;  Location: [facility] OR;  Service: Gastroenterology;  Laterality: N/A;    ENDOSCOPY N/A 11/23/2021    Procedure: ESOPHAGOGASTRODUODENOSCOPY WITH BIOPSY;  Surgeon: Palmira Pate MD;  Location: [facility] OR;  Service: Gastroenterology;  Laterality: N/A;    ENDOSCOPY AND COLONOSCOPY      HEAD/NECK LESION/CYST EXCISION Right 4/30/2024    Procedure: FACIAL LESION/CYST EXCISION;  Surgeon: Ta Blas MD;  Location: [facility] OR;  Service: General;  Laterality: Right;    INSERTION  HEMODIALYSIS CATHETER N/A 11/14/2022    Procedure: HEMODIALYSIS CATHETER INSERTION;  Surgeon: Ta Blas MD;  Location:  [facility] OR;  Service: General;  Laterality: N/A;    JOINT REPLACEMENT Left 05/02/2017    [facility]  DR JOHNSON  LEFT TOTAL KNEE    KNEE ARTHROSCOPY W/ MENISCECTOMY Right     LAPAROSCOPIC TUBAL LIGATION      DE ARTHRP KNE CONDYLE&PLATU MEDIAL&LAT COMPARTMENTS Left 05/02/2017    Procedure: TOTAL KNEE ARTHROPLASTY;  Surgeon: Feroz Johnson MD;  Location:  [facility] OR;  Service: Orthopedics    STERILIZATION      TONSILLECTOMY         Current Outpatient Medications   Medication Instructions    Amino Acid Infusion (Prosol) 20 % solution     amLODIPine (NORVASC) 10 mg, Oral, Daily    atorvastatin (LIPITOR) 40 mg, Oral, Nightly    azithromycin (Zithromax Z-Yvon) 250 MG tablet Take 2 tablets by mouth on day 1, then 1 tablet daily on days 2-5    bumetanide (BUMEX) 1 mg, Oral, Every Morning    calcium acetate (PHOS BINDER,) 667 MG capsule capsule TAKE ONE Capsule BY MOUTH THREE TIMES DAILY WITH MEALS    escitalopram (LEXAPRO) 20 mg, Oral, Daily    ferrous sulfate 325 mg, Oral, Every 12 Hours Scheduled    levothyroxine (SYNTHROID, LEVOTHROID) 25 mcg, Oral, Daily    metoprolol succinate XL (TOPROL XL) 25 mg, Oral, Daily    ondansetron (ZOFRAN) 4 mg, Oral, Every 8 Hours PRN    pantoprazole (PROTONIX) 40 mg, Oral, Daily    sennosides-docusate (Senna Plus) 8.6-50 MG per tablet 1 tablet, Oral, 2 Times Daily PRN    torsemide (DEMADEX) 40 mg, Daily    vitamin D (ERGOCALCIFEROL) 50,000 Units, Oral, Weekly         Assessment & Plan   Diagnoses and all orders for this visit:    1. Positive colorectal cancer screening using Cologuard test (Primary)      colonoscopy           This document has been electronically signed by Alexandru Bagley MD   February 18, 2025 10:08 EST

## 2025-02-18 NOTE — H&P (VIEW-ONLY)
Subjective   Britta Lee is a 79 y.o. female.     Chief Complaint: positive colon screening test    History of Present Illness She is a 80 yo who had a cologuard test come back positive.     The following portions of the patient's history were reviewed and updated as appropriate: current medications, past family history, past medical history, past social history, past surgical history and problem list.    Review of Systems   Constitutional:  Negative for activity change, appetite change, chills, fever and unexpected weight change.   HENT:  Negative for congestion, facial swelling and sore throat.    Eyes:  Negative for photophobia and visual disturbance.   Respiratory:  Negative for chest tightness, shortness of breath and wheezing.    Cardiovascular:  Negative for chest pain, palpitations and leg swelling.   Gastrointestinal:  Negative for abdominal distention, abdominal pain, anal bleeding, blood in stool, constipation, diarrhea, nausea, rectal pain and vomiting.   Endocrine: Negative for cold intolerance, heat intolerance, polydipsia and polyuria.   Genitourinary:  Negative for difficulty urinating, dysuria, flank pain and urgency.   Musculoskeletal:  Negative for back pain and myalgias.   Skin:  Negative for rash and wound.   Allergic/Immunologic: Negative for immunocompromised state.   Neurological:  Negative for dizziness, seizures, syncope, light-headedness, numbness and headaches.   Hematological:  Negative for adenopathy. Does not bruise/bleed easily.   Psychiatric/Behavioral:  Negative for behavioral problems and confusion. The patient is not nervous/anxious.        Objective   Physical Exam  Vitals reviewed.   Constitutional:       General: She is not in acute distress.     Appearance: She is well-developed. She is not ill-appearing.   HENT:      Head: Normocephalic. No laceration. Hair is normal.      Right Ear: Hearing and ear canal normal.      Left Ear: Hearing and ear canal normal.      Nose: Nose  normal.      Right Sinus: No maxillary sinus tenderness or frontal sinus tenderness.      Left Sinus: No maxillary sinus tenderness or frontal sinus tenderness.   Eyes:      General: Lids are normal.      Conjunctiva/sclera: Conjunctivae normal.      Pupils: Pupils are equal, round, and reactive to light.   Neck:      Thyroid: No thyroid mass or thyromegaly.      Vascular: No JVD.      Trachea: No tracheal tenderness or tracheal deviation.   Cardiovascular:      Rate and Rhythm: Normal rate and regular rhythm.      Heart sounds: No murmur heard.     No gallop.   Pulmonary:      Effort: Pulmonary effort is normal.      Breath sounds: Normal breath sounds. No stridor. No wheezing.   Chest:      Chest wall: No tenderness.   Abdominal:      General: Bowel sounds are normal. There is no distension.      Palpations: Abdomen is soft. There is no mass.      Tenderness: There is no abdominal tenderness. There is no guarding or rebound.      Hernia: No hernia is present.   Musculoskeletal:         General: No deformity.      Cervical back: Normal range of motion.   Lymphadenopathy:      Cervical: No cervical adenopathy.      Upper Body:      Right upper body: No supraclavicular adenopathy.      Left upper body: No supraclavicular adenopathy.   Skin:     General: Skin is warm and dry.      Coloration: Skin is not pale.      Findings: No erythema or rash.   Neurological:      Mental Status: She is alert and oriented to person, place, and time.      Motor: No abnormal muscle tone.   Psychiatric:         Behavior: Behavior normal.         Thought Content: Thought content normal.         Past Medical History:   Diagnosis Date    Acquired hypothyroidism 04/22/2021    Anemia     Arthritis     Carpal tunnel syndrome     Coronary artery disease     Diabetes mellitus     Elevated cholesterol     GERD (gastroesophageal reflux disease)     History of pneumonia     History of unsteady gait     OCASSIONALLY    Hypertension     Insomnia      Kidney disease     Kidney disease, chronic, stage IV (GFR 15-29 ml/min)     LENNY (obstructive sleep apnea) 12/01/2020    no c-pap    Osteoarthritis     Renal insufficiency     Sciatica        Family History   Problem Relation Age of Onset    Arthritis Mother     Diabetes Mother     Cancer Mother     Heart disease Father     Diabetes Daughter     Diabetes Son     Diabetes Maternal Aunt     Heart disease Maternal Grandmother     Breast cancer Neg Hx        Social History     Tobacco Use    Smoking status: Never     Passive exposure: Past    Smokeless tobacco: Never   Vaping Use    Vaping status: Never Used   Substance Use Topics    Alcohol use: Yes     Comment: socially    Drug use: No       Past Surgical History:   Procedure Laterality Date    ABDOMINAL SURGERY      APPENDECTOMY      CARDIAC CATHETERIZATION      1 STENT  ---  2000    CARDIAC SURGERY      CAROTID STENT      CARPAL TUNNEL RELEASE Right 10/08/2019    Procedure: CARPAL TUNNEL RELEASE;  Surgeon: Feroz Johnson MD;  Location: [facility] OR;  Service: Orthopedics    CATARACT EXTRACTION      CHOLECYSTECTOMY      COLONOSCOPY      COLONOSCOPY N/A 11/23/2021    Procedure: COLONOSCOPY FOR SCREENING;  Surgeon: Palmira Pate MD;  Location: [facility] OR;  Service: Gastroenterology;  Laterality: N/A;    CORONARY ANGIOPLASTY WITH STENT PLACEMENT      ENDOSCOPY      ENDOSCOPY N/A 03/05/2020    Procedure: ESOPHAGOGASTRODUODENOSCOPY;  Surgeon: Alexandru Bagley MD;  Location: [facility] OR;  Service: Gastroenterology;  Laterality: N/A;    ENDOSCOPY N/A 11/23/2021    Procedure: ESOPHAGOGASTRODUODENOSCOPY WITH BIOPSY;  Surgeon: Palmira Pate MD;  Location: [facility] OR;  Service: Gastroenterology;  Laterality: N/A;    ENDOSCOPY AND COLONOSCOPY      HEAD/NECK LESION/CYST EXCISION Right 4/30/2024    Procedure: FACIAL LESION/CYST EXCISION;  Surgeon: Ta Blas MD;  Location: [facility] OR;  Service: General;  Laterality: Right;    INSERTION  HEMODIALYSIS CATHETER N/A 11/14/2022    Procedure: HEMODIALYSIS CATHETER INSERTION;  Surgeon: Ta Blas MD;  Location:  [facility] OR;  Service: General;  Laterality: N/A;    JOINT REPLACEMENT Left 05/02/2017    [facility]  DR JOHNSON  LEFT TOTAL KNEE    KNEE ARTHROSCOPY W/ MENISCECTOMY Right     LAPAROSCOPIC TUBAL LIGATION      IN ARTHRP KNE CONDYLE&PLATU MEDIAL&LAT COMPARTMENTS Left 05/02/2017    Procedure: TOTAL KNEE ARTHROPLASTY;  Surgeon: Feroz Johnson MD;  Location:  [facility] OR;  Service: Orthopedics    STERILIZATION      TONSILLECTOMY         Current Outpatient Medications   Medication Instructions    Amino Acid Infusion (Prosol) 20 % solution     amLODIPine (NORVASC) 10 mg, Oral, Daily    atorvastatin (LIPITOR) 40 mg, Oral, Nightly    azithromycin (Zithromax Z-Yvon) 250 MG tablet Take 2 tablets by mouth on day 1, then 1 tablet daily on days 2-5    bumetanide (BUMEX) 1 mg, Oral, Every Morning    calcium acetate (PHOS BINDER,) 667 MG capsule capsule TAKE ONE Capsule BY MOUTH THREE TIMES DAILY WITH MEALS    escitalopram (LEXAPRO) 20 mg, Oral, Daily    ferrous sulfate 325 mg, Oral, Every 12 Hours Scheduled    levothyroxine (SYNTHROID, LEVOTHROID) 25 mcg, Oral, Daily    metoprolol succinate XL (TOPROL XL) 25 mg, Oral, Daily    ondansetron (ZOFRAN) 4 mg, Oral, Every 8 Hours PRN    pantoprazole (PROTONIX) 40 mg, Oral, Daily    sennosides-docusate (Senna Plus) 8.6-50 MG per tablet 1 tablet, Oral, 2 Times Daily PRN    torsemide (DEMADEX) 40 mg, Daily    vitamin D (ERGOCALCIFEROL) 50,000 Units, Oral, Weekly         Assessment & Plan   Diagnoses and all orders for this visit:    1. Positive colorectal cancer screening using Cologuard test (Primary)      colonoscopy           This document has been electronically signed by Alexandru Bagley MD   February 18, 2025 10:08 EST

## 2025-02-20 LAB
QT INTERVAL: 354 MS
QT INTERVAL: 446 MS
QTC INTERVAL: 518 MS
QTC INTERVAL: 526 MS

## 2025-02-24 ENCOUNTER — TELEPHONE (OUTPATIENT)
Dept: SURGERY | Facility: CLINIC | Age: 80
End: 2025-02-24
Payer: MEDICARE

## 2025-02-24 NOTE — TELEPHONE ENCOUNTER
Contacted the patient regarding upcoming surgery. Patient is aware of their PAT (pre-op) appointment date/time. Patient was also informed of surgery date and arrival time. Patient instructed to be NPO after midnight the night prior to surgery. Patient also instructed to have a /adult present with them on day of surgery. Patient understands that neglecting to follow NPO rule and not having someone accompany them may result in cancellation of surgery. Patient acknowledged understanding of all information regarding surgery and had no further questions.

## 2025-02-27 ENCOUNTER — HOSPITAL ENCOUNTER (OUTPATIENT)
Facility: HOSPITAL | Age: 80
Setting detail: HOSPITAL OUTPATIENT SURGERY
Discharge: HOME | End: 2025-02-27
Attending: SURGERY | Admitting: SURGERY
Payer: MEDICARE

## 2025-02-27 ENCOUNTER — ANESTHESIA EVENT (OUTPATIENT)
Dept: PERIOP | Facility: HOSPITAL | Age: 80
End: 2025-02-27
Payer: MEDICARE

## 2025-02-27 ENCOUNTER — ANESTHESIA (OUTPATIENT)
Dept: PERIOP | Facility: HOSPITAL | Age: 80
End: 2025-02-27
Payer: MEDICARE

## 2025-02-27 VITALS
DIASTOLIC BLOOD PRESSURE: 51 MMHG | RESPIRATION RATE: 16 BRPM | HEIGHT: 63 IN | OXYGEN SATURATION: 93 % | TEMPERATURE: 97.4 F | HEART RATE: 72 BPM | SYSTOLIC BLOOD PRESSURE: 130 MMHG | BODY MASS INDEX: 26.04 KG/M2

## 2025-02-27 DIAGNOSIS — R19.5 POSITIVE COLORECTAL CANCER SCREENING USING COLOGUARD TEST: ICD-10-CM

## 2025-02-27 LAB — GLUCOSE BLDC GLUCOMTR-MCNC: 128 MG/DL (ref 70–130)

## 2025-02-27 PROCEDURE — 94799 UNLISTED PULMONARY SVC/PX: CPT

## 2025-02-27 PROCEDURE — 94640 AIRWAY INHALATION TREATMENT: CPT

## 2025-02-27 PROCEDURE — 45380 COLONOSCOPY AND BIOPSY: CPT | Performed by: SURGERY

## 2025-02-27 PROCEDURE — 82948 REAGENT STRIP/BLOOD GLUCOSE: CPT

## 2025-02-27 PROCEDURE — 25010000002 PROPOFOL 200 MG/20ML EMULSION: Performed by: NURSE ANESTHETIST, CERTIFIED REGISTERED

## 2025-02-27 RX ORDER — OXYCODONE AND ACETAMINOPHEN 5; 325 MG/1; MG/1
1 TABLET ORAL ONCE AS NEEDED
Status: DISCONTINUED | OUTPATIENT
Start: 2025-02-27 | End: 2025-02-27 | Stop reason: HOSPADM

## 2025-02-27 RX ORDER — SODIUM CHLORIDE 0.9 % (FLUSH) 0.9 %
10 SYRINGE (ML) INJECTION AS NEEDED
Status: DISCONTINUED | OUTPATIENT
Start: 2025-02-27 | End: 2025-02-27 | Stop reason: HOSPADM

## 2025-02-27 RX ORDER — SODIUM CHLORIDE 0.9 % (FLUSH) 0.9 %
10 SYRINGE (ML) INJECTION EVERY 12 HOURS SCHEDULED
Status: DISCONTINUED | OUTPATIENT
Start: 2025-02-27 | End: 2025-02-27 | Stop reason: HOSPADM

## 2025-02-27 RX ORDER — ONDANSETRON 2 MG/ML
4 INJECTION INTRAMUSCULAR; INTRAVENOUS AS NEEDED
Status: DISCONTINUED | OUTPATIENT
Start: 2025-02-27 | End: 2025-02-27 | Stop reason: HOSPADM

## 2025-02-27 RX ORDER — PROPOFOL 10 MG/ML
INJECTION, EMULSION INTRAVENOUS AS NEEDED
Status: DISCONTINUED | OUTPATIENT
Start: 2025-02-27 | End: 2025-02-27 | Stop reason: SURG

## 2025-02-27 RX ORDER — SODIUM CHLORIDE 9 MG/ML
40 INJECTION, SOLUTION INTRAVENOUS AS NEEDED
Status: DISCONTINUED | OUTPATIENT
Start: 2025-02-27 | End: 2025-02-27 | Stop reason: HOSPADM

## 2025-02-27 RX ORDER — SODIUM CHLORIDE, SODIUM LACTATE, POTASSIUM CHLORIDE, CALCIUM CHLORIDE 600; 310; 30; 20 MG/100ML; MG/100ML; MG/100ML; MG/100ML
125 INJECTION, SOLUTION INTRAVENOUS ONCE
Status: DISCONTINUED | OUTPATIENT
Start: 2025-02-27 | End: 2025-02-27 | Stop reason: HOSPADM

## 2025-02-27 RX ORDER — FENTANYL CITRATE 50 UG/ML
50 INJECTION, SOLUTION INTRAMUSCULAR; INTRAVENOUS
Status: DISCONTINUED | OUTPATIENT
Start: 2025-02-27 | End: 2025-02-27 | Stop reason: HOSPADM

## 2025-02-27 RX ORDER — IPRATROPIUM BROMIDE AND ALBUTEROL SULFATE 2.5; .5 MG/3ML; MG/3ML
3 SOLUTION RESPIRATORY (INHALATION) ONCE AS NEEDED
Status: COMPLETED | OUTPATIENT
Start: 2025-02-27 | End: 2025-02-27

## 2025-02-27 RX ORDER — MIDAZOLAM HYDROCHLORIDE 1 MG/ML
0.5 INJECTION, SOLUTION INTRAMUSCULAR; INTRAVENOUS
Status: DISCONTINUED | OUTPATIENT
Start: 2025-02-27 | End: 2025-02-27 | Stop reason: HOSPADM

## 2025-02-27 RX ADMIN — PROPOFOL 50 MG: 10 INJECTION, EMULSION INTRAVENOUS at 08:07

## 2025-02-27 RX ADMIN — PROPOFOL 50 MG: 10 INJECTION, EMULSION INTRAVENOUS at 08:13

## 2025-02-27 RX ADMIN — IPRATROPIUM BROMIDE AND ALBUTEROL SULFATE 3 ML: .5; 2.5 SOLUTION RESPIRATORY (INHALATION) at 09:32

## 2025-02-27 RX ADMIN — PROPOFOL 50 MG: 10 INJECTION, EMULSION INTRAVENOUS at 08:36

## 2025-02-27 RX ADMIN — PROPOFOL 50 MG: 10 INJECTION, EMULSION INTRAVENOUS at 08:18

## 2025-02-27 RX ADMIN — PROPOFOL 50 MG: 10 INJECTION, EMULSION INTRAVENOUS at 08:30

## 2025-02-27 RX ADMIN — PROPOFOL 50 MG: 10 INJECTION, EMULSION INTRAVENOUS at 08:24

## 2025-02-27 NOTE — ANESTHESIA POSTPROCEDURE EVALUATION
Patient: Britta Lee    Procedure Summary       Date: 02/27/25 Room / Location: [facility] OR  /  [facility] OR    Anesthesia Start: 0803 Anesthesia Stop: 0836    Procedure: COLONOSCOPY Diagnosis:       Positive colorectal cancer screening using Cologuard test      (Positive colorectal cancer screening using Cologuard test [R19.5])    Surgeons: Alexandru Bagley MD Provider: Rahul Moser MD    Anesthesia Type: general ASA Status: 3            Anesthesia Type: general    Vitals  Vitals Value Taken Time   /51 02/27/25 0922   Temp 97.4 °F (36.3 °C) 02/27/25 0839   Pulse 72 02/27/25 0932   Resp 16 02/27/25 0932   SpO2 93 % 02/27/25 0943           Post Anesthesia Care and Evaluation    Patient location during evaluation: bedside  Patient participation: complete - patient participated  Level of consciousness: awake and alert  Pain score: 1  Pain management: adequate    Airway patency: patent  Anesthetic complications: No anesthetic complications  PONV Status: none  Cardiovascular status: acceptable  Respiratory status: acceptable  Hydration status: acceptable

## 2025-02-27 NOTE — ANESTHESIA PREPROCEDURE EVALUATION
Anesthesia Evaluation     Patient summary reviewed and Nursing notes reviewed   no history of anesthetic complications:   NPO Solid Status: > 8 hours  NPO Liquid Status: > 8 hours           Airway   Mallampati: III  TM distance: >3 FB  Neck ROM: full  No difficulty expected  Dental    (+) poor dentition        Pulmonary - normal exam    breath sounds clear to auscultation  (+) ,sleep apnea  Cardiovascular - normal exam    ECG reviewed  Rhythm: regular  Rate: normal    (+) hypertension 2 medications or greater, CAD, cardiac stents more than 12 months ago , CHF , PVD, hyperlipidemia      Neuro/Psych  (+) numbness, psychiatric history Anxiety  GI/Hepatic/Renal/Endo    (+) obesity, hiatal hernia, GERD, liver disease cirrhosis, renal disease- ESRD and dialysis, diabetes mellitus type 2 poorly controlled, thyroid problem hypothyroidism    Musculoskeletal     Abdominal  - normal exam    Abdomen: soft.   Substance History - negative use     OB/GYN negative ob/gyn ROS         Other   arthritis,     ROS/Med Hx Other: 11/3/22      ·  Left ventricular systolic function is hyperdynamic (EF > 70%).  ·  Left ventricular diastolic function is consistent with (grade II w/high LAP) pseudonormalization.  ·  The left atrial cavity is mildly dilated.  ·  The aortic valve exhibits sclerosis with no evidence of stenosis or regurgitation.  ·  Mild mitral valve regurgitation is present.  ·  Moderate tricuspid valve regurgitation is present.  ·  Severe pulmonary hypertension is present.  Estimated RV systolic pressure is > 55 mmHg.  ·  There is no evidence of pericardial effusion.                     Anesthesia Plan    ASA 3     general     intravenous induction     Anesthetic plan, risks, benefits, and alternatives have been provided, discussed and informed consent has been obtained with: patient.  Pre-procedure education provided  Use of blood products discussed with  Consented to blood products.    Plan discussed with CRNA.

## 2025-02-27 NOTE — OP NOTE
COLONOSCOPY  Procedure Note    Britta Lee  2/27/2025    Pre-op Diagnosis:   Positive colorectal cancer screening using Cologuard test [R19.5]    Post-op Diagnosis:  small polyps, diverticulosis       Procedure(s):  COLONOSCOPY    Surgeon(s):  Alexandru Bagley MD    Anesthesia: General    Staff:   Circulator: Shanta Mcgarry RN  Endo Technician: Omari Lewis    Estimated Blood Loss: none    Specimens:                Order Name Source Comment Collection Info Order Time   TISSUE EXAM, [facility] ([facility], [facility]) Large Intestine, Splenic Flexure  Collected By: Alexandru Bagley MD 2/27/2025  8:26 AM     Release to patient   Routine Release              Drains: * No LDAs found *    Procedure: The scope passed from the rectum to the right colon. There was a lot of bilious material coating the colon walls. I could not get what looked like the cecum to distend out completely so I could not be sure it was the cecum and ileocecal valve. After a lot of irrigating and suctioning, the scope was slowly withdrawn and a small polyp was removed in the splenic flexure area with biopsy forceps. There were a few diverticuli in the sigmoid and one more small polyp in the rectum removed with biopsy forceps. No other lesions or inflammation seen.     Findings: polyps           Complications: none   Grafts / Implants N/A    Alexandru Bagley MD     Date: 2/27/2025  Time: 08:41 EST

## 2025-02-28 LAB — REF LAB TEST METHOD: NORMAL

## 2025-03-11 ENCOUNTER — READMISSION MANAGEMENT (OUTPATIENT)
Dept: CALL CENTER | Facility: HOSPITAL | Age: 80
End: 2025-03-11
Payer: MEDICARE

## 2025-03-11 ENCOUNTER — TELEPHONE (OUTPATIENT)
Dept: FAMILY MEDICINE CLINIC | Facility: CLINIC | Age: 80
End: 2025-03-11

## 2025-03-11 NOTE — OUTREACH NOTE
Prep Survey      Flowsheet Row Responses   [facility] facility patient discharged from? Non-[facility]   Is LACE score < 7 ? Non-BH Discharge   Eligibility [facility]   Date of Admission 03/03/25   Date of Discharge 03/11/25   Discharge Disposition Home or Self Care   Discharge diagnosis ACUTE RESPIRATORY FAILURE   Does the patient have one of the following disease processes/diagnoses(primary or secondary)? Other   Prep survey completed? Yes            Nettie CRUZ - Registered Nurse

## 2025-03-11 NOTE — TELEPHONE ENCOUNTER
Caller: [facility]    Relationship to patient:     Best call back number: [phone]     New or established patient?  [] New  [x] Established    Date of discharge: 03/11/25    Facility discharged from: [facility]    Diagnosis/Symptoms: ACUTE RESPIRATORY FAILURE    Length of stay (If applicable): 3/3/25-3/11/25    Specialty Only: Did you see a [facility] provider?    [] Yes  [] No  If so, who?     Additional Details:

## 2025-03-12 ENCOUNTER — TRANSITIONAL CARE MANAGEMENT TELEPHONE ENCOUNTER (OUTPATIENT)
Dept: CALL CENTER | Facility: HOSPITAL | Age: 80
End: 2025-03-12
Payer: MEDICARE

## 2025-03-12 NOTE — OUTREACH NOTE
Call Center TCM Note      Flowsheet Row Responses   [facility] patient discharged from? Non-  [[facility]]   Does the patient have one of the following disease processes/diagnoses(primary or secondary)? Other   TCM attempt successful? Yes  [VR for Brandon Lee, spouse and Na Montano dtr]   Call start time 1248   Call end time 1254   Discharge diagnosis ACUTE RESPIRATORY FAILURE   Person spoke with today (if not patient) and relationship Brandon, spouse   Meds reviewed with patient/caregiver? Yes   Is the patient having any side effects they believe may be caused by any medication additions or changes? No   Does the patient have all medications ordered at discharge? Yes   Prescription comments added vitamin C but no other changes   Is the patient taking all medications as directed (includes completed medication regime)? Yes   Comments Hospital f/u 3/25/25@1000am.   requested assistance rescheduling her TCM due to work conflict.  Challenges scheduling around his work along with pt HD schedule but TCM in compliance with guidelines.   Does the patient have an appointment with their PCP within 7-14 days of discharge? Yes   Nursing Interventions Assisted patient with making appointment per protocol   What DME was ordered? Oxygen   Has all DME been delivered? Yes   DME comments Pt has oxygen ordered for prn use,  did not need to use yesterday   Did the patient receive a copy of their discharge instructions? Yes   Nursing interventions Reviewed instructions with patient   What is the patient's perception of their health status since discharge? Improving  [Pt at HD at time of call,  reports pt is doing better,  Reports her swelling to feet/ankles resolved and SOA is better now. Weighs at HD.  Aware to monitor for increased edema, SOA and will use O2 prn.]   Is the patient/caregiver able to teach back signs and symptoms related to disease process for when to call PCP? Yes   Is the patient/caregiver able to  teach back signs and symptoms related to disease process for when to call 911? Yes   TCM call completed? Yes   Call end time 7522            Hanna Culp RN    3/12/2025, 12:58 EDT

## 2025-03-13 ENCOUNTER — HOSPITAL ENCOUNTER (OUTPATIENT)
Dept: GENERAL RADIOLOGY | Facility: HOSPITAL | Age: 80
Discharge: HOME | End: 2025-03-13
Admitting: SURGERY
Payer: MEDICARE

## 2025-03-13 DIAGNOSIS — R19.5 POSITIVE COLORECTAL CANCER SCREENING USING COLOGUARD TEST: ICD-10-CM

## 2025-03-13 PROCEDURE — 74018 RADEX ABDOMEN 1 VIEW: CPT

## 2025-03-25 ENCOUNTER — OFFICE VISIT (OUTPATIENT)
Dept: SURGERY | Facility: CLINIC | Age: 80
End: 2025-03-25
Payer: MEDICARE

## 2025-03-25 DIAGNOSIS — Z98.890 STATUS POST COLONOSCOPY: Primary | ICD-10-CM

## 2025-03-25 NOTE — PROGRESS NOTES
Alison Lee is a 79 y.o. female.     Chief Complaint: post colonoscopy    History of Present Illness She is a 78 yo who had a positive cologuard test and had a colonoscopy which was not clear on if it was to the cecum. She only had a small hyperplastic polyp and a small tubular adenoma polyp removed. No other tumors or inflammation.   She also has a tunneled dialysis catheter that is not used now that she has a fistula.    The following portions of the patient's history were reviewed and updated as appropriate: current medications, past family history, past medical history, past social history, past surgical history and problem list.    Review of Systems    Objective   Physical Exam abdomen soft and not tender  Tunneled catheter in the right upper chest removed with local    Past Medical History:   Diagnosis Date    Acquired hypothyroidism 04/22/2021    Anemia     Arthritis     Carpal tunnel syndrome     Coronary artery disease     Diabetes mellitus     Elevated cholesterol     GERD (gastroesophageal reflux disease)     History of pneumonia     History of unsteady gait     OCASSIONALLY    Hypertension     Insomnia     Kidney disease     Kidney disease, chronic, stage IV (GFR 15-29 ml/min)     LENNY (obstructive sleep apnea) 12/01/2020    no c-pap    Osteoarthritis     Renal insufficiency     Sciatica        Family History   Problem Relation Age of Onset    Arthritis Mother     Diabetes Mother     Cancer Mother     Heart disease Father     Diabetes Daughter     Diabetes Son     Diabetes Maternal Aunt     Heart disease Maternal Grandmother     Breast cancer Neg Hx        Social History     Tobacco Use    Smoking status: Never     Passive exposure: Past    Smokeless tobacco: Never   Vaping Use    Vaping status: Never Used   Substance Use Topics    Alcohol use: Yes     Comment: socially    Drug use: No       Past Surgical History:   Procedure Laterality Date    ABDOMINAL SURGERY      APPENDECTOMY       CARDIAC CATHETERIZATION      1 STENT  ---  2000    CARDIAC SURGERY      CAROTID STENT      CARPAL TUNNEL RELEASE Right 10/08/2019    Procedure: CARPAL TUNNEL RELEASE;  Surgeon: Feroz Levin MD;  Location: [facility] OR;  Service: Orthopedics    CATARACT EXTRACTION      CHOLECYSTECTOMY      COLONOSCOPY      COLONOSCOPY N/A 11/23/2021    Procedure: COLONOSCOPY FOR SCREENING;  Surgeon: Palmira Pate MD;  Location: [facility] OR;  Service: Gastroenterology;  Laterality: N/A;    COLONOSCOPY N/A 2/27/2025    Procedure: COLONOSCOPY;  Surgeon: Alexandru Bagley MD;  Location: [facility] OR;  Service: Gastroenterology;  Laterality: N/A;    CORONARY ANGIOPLASTY WITH STENT PLACEMENT      ENDOSCOPY      ENDOSCOPY N/A 03/05/2020    Procedure: ESOPHAGOGASTRODUODENOSCOPY;  Surgeon: Alexandru Bagley MD;  Location: [facility] OR;  Service: Gastroenterology;  Laterality: N/A;    ENDOSCOPY N/A 11/23/2021    Procedure: ESOPHAGOGASTRODUODENOSCOPY WITH BIOPSY;  Surgeon: Palmira Pate MD;  Location: [facility] OR;  Service: Gastroenterology;  Laterality: N/A;    ENDOSCOPY AND COLONOSCOPY      HEAD/NECK LESION/CYST EXCISION Right 4/30/2024    Procedure: FACIAL LESION/CYST EXCISION;  Surgeon: Ta Blas MD;  Location: [facility] OR;  Service: General;  Laterality: Right;    INSERTION HEMODIALYSIS CATHETER N/A 11/14/2022    Procedure: HEMODIALYSIS CATHETER INSERTION;  Surgeon: Ta Blas MD;  Location: [facility] OR;  Service: General;  Laterality: N/A;    JOINT REPLACEMENT Left 05/02/2017    [facility]  DR LEVIN  LEFT TOTAL KNEE    KNEE ARTHROSCOPY W/ MENISCECTOMY Right     LAPAROSCOPIC TUBAL LIGATION      CO ARTHRP KNE CONDYLE&PLATU MEDIAL&LAT COMPARTMENTS Left 05/02/2017    Procedure: TOTAL KNEE ARTHROPLASTY;  Surgeon: Feroz Levin MD;  Location: [facility] OR;  Service: Orthopedics    STERILIZATION      TONSILLECTOMY         Current Outpatient Medications   Medication Instructions    Amino Acid Infusion (Prosol)  20 % solution     amLODIPine (NORVASC) 10 mg, Oral, Daily    atorvastatin (LIPITOR) 40 mg, Oral, Nightly    bumetanide (BUMEX) 1 mg, Oral, Every Morning    calcium acetate (PHOS BINDER,) 667 MG capsule capsule TAKE ONE Capsule BY MOUTH THREE TIMES DAILY WITH MEALS    escitalopram (LEXAPRO) 20 mg, Oral, Daily    ferrous sulfate 325 mg, Oral, Every 12 Hours Scheduled    levothyroxine (SYNTHROID, LEVOTHROID) 25 mcg, Oral, Daily    metoprolol succinate XL (TOPROL XL) 25 mg, Oral, Daily    ondansetron (ZOFRAN) 4 mg, Oral, Every 8 Hours PRN    pantoprazole (PROTONIX) 40 mg, Oral, Daily    sennosides-docusate (Senna Plus) 8.6-50 MG per tablet 1 tablet, Oral, 2 Times Daily PRN    torsemide (DEMADEX) 40 mg, Daily    vitamin D (ERGOCALCIFEROL) 50,000 Units, Oral, Weekly         Assessment & Plan   Diagnoses and all orders for this visit:    1. Status post colonoscopy (Primary)    At her age, unless she had other symptoms or problems she is not likely to need another colonoscopy if the BE is ok.   Check BE           This document has been electronically signed by Alexandru Bagley MD   March 25, 2025 09:02 EDT

## 2025-04-09 NOTE — PROGRESS NOTES
Call patient.  His insurance would no longer pay for the Nexium for his reflux.  I will have to prescribe Protonix which is what they will pay for and I will call this in for him.  He can discuss it is at his upcoming visit on April 23. Subjective   Britta Lee is a 71 y.o. female.     History of Present Illness     Hypertension-  She is here today to follow-up on hypertension.  Much improved since healing and decrease in pain after knee replacement surgery.    Depression-well controlled with Celexa.  She states that she has more energy and is getting up and able to complete her activities of daily living in a more effective manner.    Coronary artery disease-stable    Vitamin D deficiency-stable with supplementation    The following portions of the patient's history were reviewed and updated as appropriate: allergies, current medications, past family history, past medical history, past social history, past surgical history and problem list.    Review of Systems   Constitutional: Negative for activity change, appetite change and fever.   HENT: Negative for ear pain, sinus pressure and sore throat.    Eyes: Negative for pain and visual disturbance.   Respiratory: Negative for cough and chest tightness.    Cardiovascular: Negative for chest pain and palpitations.   Gastrointestinal: Negative for abdominal pain, constipation, diarrhea, nausea and vomiting.   Endocrine: Negative for polydipsia and polyuria.   Genitourinary: Negative for dysuria and frequency.   Musculoskeletal: Negative for back pain and myalgias.   Skin: Negative for color change and rash.   Allergic/Immunologic: Negative for food allergies and immunocompromised state.   Neurological: Negative for dizziness, syncope and headaches.   Hematological: Negative for adenopathy. Does not bruise/bleed easily.   Psychiatric/Behavioral: Negative for hallucinations and suicidal ideas. The patient is not nervous/anxious.        Objective   Physical Exam   Constitutional: She is oriented to person, place, and time. She appears well-developed and well-nourished.   HENT:   Head: Normocephalic and atraumatic.   Nose: Nose normal.   Mouth/Throat: Oropharynx is clear and moist.   Eyes: Conjunctivae  and EOM are normal. Pupils are equal, round, and reactive to light.   Neck: Normal range of motion. Neck supple. No tracheal deviation present. No thyromegaly present.   Cardiovascular: Normal rate, regular rhythm, normal heart sounds and intact distal pulses.    No murmur heard.  Pulmonary/Chest: Effort normal and breath sounds normal. No respiratory distress. She has no wheezes.   Abdominal: Soft. Bowel sounds are normal. There is no tenderness. There is no guarding.   Musculoskeletal: She exhibits edema and tenderness.    Approximately 10 cm vertical scar noted status post left total knee replacement on May 2, 2017   Lymphadenopathy:     She has no cervical adenopathy.   Neurological: She is alert and oriented to person, place, and time.   Skin: Skin is warm and dry. No rash noted.   Psychiatric: She has a normal mood and affect. Her behavior is normal.   Nursing note and vitals reviewed.      Assessment/Plan   Diagnoses and all orders for this visit:    Benign essential hypertension  -     quinapril (ACCUPRIL) 40 MG tablet; Take 1 tablet by mouth Daily.    Recurrent major depressive disorder, in full remission  -     escitalopram (LEXAPRO) 10 MG tablet; Take 1 tablet by mouth every night at bedtime.    Atherosclerosis of native coronary artery of native heart without angina pectoris  -     isosorbide mononitrate (IMDUR) 30 MG 24 hr tablet; Take 1 tablet by mouth Daily.    Vitamin D deficiency  -     vitamin D (ERGOCALCIFEROL) 33812 UNITS capsule capsule; Take 1 capsule by mouth 1 (One) Time Per Week. Takes wednesdays    Other orders  -     aspirin 81 MG tablet; Take 1 tablet by mouth Daily.

## 2025-04-22 ENCOUNTER — OFFICE VISIT (OUTPATIENT)
Dept: FAMILY MEDICINE CLINIC | Facility: CLINIC | Age: 80
End: 2025-04-22
Payer: MEDICARE

## 2025-04-22 ENCOUNTER — TELEPHONE (OUTPATIENT)
Dept: FAMILY MEDICINE CLINIC | Facility: CLINIC | Age: 80
End: 2025-04-22

## 2025-04-22 VITALS
HEART RATE: 68 BPM | DIASTOLIC BLOOD PRESSURE: 50 MMHG | BODY MASS INDEX: 26.72 KG/M2 | HEIGHT: 63 IN | WEIGHT: 150.8 LBS | OXYGEN SATURATION: 96 % | SYSTOLIC BLOOD PRESSURE: 90 MMHG | TEMPERATURE: 98 F

## 2025-04-22 DIAGNOSIS — E87.5 HYPERKALEMIA: ICD-10-CM

## 2025-04-22 DIAGNOSIS — L89.301 PRESSURE INJURY OF BUTTOCK, STAGE 1, UNSPECIFIED LATERALITY: Primary | Chronic | ICD-10-CM

## 2025-04-22 DIAGNOSIS — Z79.4 TYPE 2 DIABETES MELLITUS WITH CHRONIC KIDNEY DISEASE ON CHRONIC DIALYSIS, WITH LONG-TERM CURRENT USE OF INSULIN: Chronic | ICD-10-CM

## 2025-04-22 DIAGNOSIS — E78.2 MIXED HYPERLIPIDEMIA: Chronic | ICD-10-CM

## 2025-04-22 DIAGNOSIS — E03.9 ACQUIRED HYPOTHYROIDISM: Chronic | ICD-10-CM

## 2025-04-22 DIAGNOSIS — D50.8 IRON DEFICIENCY ANEMIA SECONDARY TO INADEQUATE DIETARY IRON INTAKE: Primary | ICD-10-CM

## 2025-04-22 DIAGNOSIS — I10 ESSENTIAL HYPERTENSION: Chronic | ICD-10-CM

## 2025-04-22 DIAGNOSIS — E55.9 VITAMIN D DEFICIENCY: Chronic | ICD-10-CM

## 2025-04-22 DIAGNOSIS — K21.9 GASTROESOPHAGEAL REFLUX DISEASE WITHOUT ESOPHAGITIS: Chronic | ICD-10-CM

## 2025-04-22 DIAGNOSIS — Z99.2 TYPE 2 DIABETES MELLITUS WITH CHRONIC KIDNEY DISEASE ON CHRONIC DIALYSIS, WITH LONG-TERM CURRENT USE OF INSULIN: Chronic | ICD-10-CM

## 2025-04-22 DIAGNOSIS — F33.1 MODERATE EPISODE OF RECURRENT MAJOR DEPRESSIVE DISORDER: Chronic | ICD-10-CM

## 2025-04-22 DIAGNOSIS — E03.9 ACQUIRED HYPOTHYROIDISM: ICD-10-CM

## 2025-04-22 DIAGNOSIS — D50.8 IRON DEFICIENCY ANEMIA SECONDARY TO INADEQUATE DIETARY IRON INTAKE: ICD-10-CM

## 2025-04-22 DIAGNOSIS — N18.6 TYPE 2 DIABETES MELLITUS WITH CHRONIC KIDNEY DISEASE ON CHRONIC DIALYSIS, WITH LONG-TERM CURRENT USE OF INSULIN: Chronic | ICD-10-CM

## 2025-04-22 DIAGNOSIS — E11.22 TYPE 2 DIABETES MELLITUS WITH CHRONIC KIDNEY DISEASE ON CHRONIC DIALYSIS, WITH LONG-TERM CURRENT USE OF INSULIN: Chronic | ICD-10-CM

## 2025-04-22 LAB
ALBUMIN SERPL-MCNC: 3.3 G/DL (ref 3.5–5.2)
ALBUMIN/GLOB SERPL: 0.6 G/DL
ALP SERPL-CCNC: 285 U/L (ref 39–117)
ALT SERPL W P-5'-P-CCNC: 14 U/L (ref 1–33)
ANION GAP SERPL CALCULATED.3IONS-SCNC: 12.5 MMOL/L (ref 5–15)
AST SERPL-CCNC: 30 U/L (ref 1–32)
BASOPHILS # BLD AUTO: 0.03 10*3/MM3 (ref 0–0.2)
BASOPHILS NFR BLD AUTO: 0.4 % (ref 0–1.5)
BILIRUB SERPL-MCNC: 0.5 MG/DL (ref 0–1.2)
BUN SERPL-MCNC: 54 MG/DL (ref 8–23)
BUN/CREAT SERPL: 9.6 (ref 7–25)
CALCIUM SPEC-SCNC: 9.1 MG/DL (ref 8.6–10.5)
CHLORIDE SERPL-SCNC: 91 MMOL/L (ref 98–107)
CHOLEST SERPL-MCNC: 91 MG/DL (ref 0–200)
CO2 SERPL-SCNC: 26.5 MMOL/L (ref 22–29)
CREAT SERPL-MCNC: 5.63 MG/DL (ref 0.57–1)
DEPRECATED RDW RBC AUTO: 56.8 FL (ref 37–54)
EGFRCR SERPLBLD CKD-EPI 2021: 7.1 ML/MIN/1.73
EOSINOPHIL # BLD AUTO: 0.37 10*3/MM3 (ref 0–0.4)
EOSINOPHIL NFR BLD AUTO: 5.5 % (ref 0.3–6.2)
ERYTHROCYTE [DISTWIDTH] IN BLOOD BY AUTOMATED COUNT: 19.6 % (ref 12.3–15.4)
GLOBULIN UR ELPH-MCNC: 5.2 GM/DL
GLUCOSE SERPL-MCNC: 116 MG/DL (ref 65–99)
HBA1C MFR BLD: 5.3 % (ref 4.8–5.6)
HCT VFR BLD AUTO: 28.3 % (ref 34–46.6)
HDLC SERPL-MCNC: 39 MG/DL (ref 40–60)
HGB BLD-MCNC: 8.4 G/DL (ref 12–15.9)
IMM GRANULOCYTES # BLD AUTO: 0.02 10*3/MM3 (ref 0–0.05)
IMM GRANULOCYTES NFR BLD AUTO: 0.3 % (ref 0–0.5)
IRON 24H UR-MRATE: 33 MCG/DL (ref 37–145)
IRON SATN MFR SERPL: 12 % (ref 20–50)
LDLC SERPL CALC-MCNC: 40 MG/DL (ref 0–100)
LDLC/HDLC SERPL: 1.09 {RATIO}
LYMPHOCYTES # BLD AUTO: 1.17 10*3/MM3 (ref 0.7–3.1)
LYMPHOCYTES NFR BLD AUTO: 17.4 % (ref 19.6–45.3)
MCH RBC QN AUTO: 23.5 PG (ref 26.6–33)
MCHC RBC AUTO-ENTMCNC: 29.7 G/DL (ref 31.5–35.7)
MCV RBC AUTO: 79.1 FL (ref 79–97)
MONOCYTES # BLD AUTO: 0.59 10*3/MM3 (ref 0.1–0.9)
MONOCYTES NFR BLD AUTO: 8.8 % (ref 5–12)
NEUTROPHILS NFR BLD AUTO: 4.53 10*3/MM3 (ref 1.7–7)
NEUTROPHILS NFR BLD AUTO: 67.6 % (ref 42.7–76)
NRBC BLD AUTO-RTO: 0 /100 WBC (ref 0–0.2)
PLATELET # BLD AUTO: 289 10*3/MM3 (ref 140–450)
PMV BLD AUTO: 9.2 FL (ref 6–12)
POTASSIUM SERPL-SCNC: 5.4 MMOL/L (ref 3.5–5.2)
PROT SERPL-MCNC: 8.5 G/DL (ref 6–8.5)
RBC # BLD AUTO: 3.58 10*6/MM3 (ref 3.77–5.28)
SODIUM SERPL-SCNC: 130 MMOL/L (ref 136–145)
T4 FREE SERPL-MCNC: 1.2 NG/DL (ref 0.92–1.68)
TIBC SERPL-MCNC: 276 MCG/DL (ref 298–536)
TRANSFERRIN SERPL-MCNC: 185 MG/DL (ref 200–360)
TRIGL SERPL-MCNC: 47 MG/DL (ref 0–150)
TSH SERPL DL<=0.05 MIU/L-ACNC: 7.02 UIU/ML (ref 0.27–4.2)
VLDLC SERPL-MCNC: 12 MG/DL (ref 5–40)
WBC NRBC COR # BLD AUTO: 6.71 10*3/MM3 (ref 3.4–10.8)

## 2025-04-22 PROCEDURE — 83036 HEMOGLOBIN GLYCOSYLATED A1C: CPT | Performed by: PHYSICIAN ASSISTANT

## 2025-04-22 PROCEDURE — 84466 ASSAY OF TRANSFERRIN: CPT | Performed by: PHYSICIAN ASSISTANT

## 2025-04-22 PROCEDURE — 84443 ASSAY THYROID STIM HORMONE: CPT | Performed by: PHYSICIAN ASSISTANT

## 2025-04-22 PROCEDURE — 80053 COMPREHEN METABOLIC PANEL: CPT | Performed by: PHYSICIAN ASSISTANT

## 2025-04-22 PROCEDURE — 84439 ASSAY OF FREE THYROXINE: CPT | Performed by: PHYSICIAN ASSISTANT

## 2025-04-22 PROCEDURE — 83540 ASSAY OF IRON: CPT | Performed by: PHYSICIAN ASSISTANT

## 2025-04-22 PROCEDURE — 36415 COLL VENOUS BLD VENIPUNCTURE: CPT | Performed by: PHYSICIAN ASSISTANT

## 2025-04-22 PROCEDURE — 80061 LIPID PANEL: CPT | Performed by: PHYSICIAN ASSISTANT

## 2025-04-22 PROCEDURE — 85025 COMPLETE CBC W/AUTO DIFF WBC: CPT | Performed by: PHYSICIAN ASSISTANT

## 2025-04-22 PROCEDURE — 82306 VITAMIN D 25 HYDROXY: CPT | Performed by: PHYSICIAN ASSISTANT

## 2025-04-22 RX ORDER — LEVOTHYROXINE SODIUM 25 UG/1
25 TABLET ORAL DAILY
Qty: 90 TABLET | Refills: 3 | Status: SHIPPED | OUTPATIENT
Start: 2025-04-22 | End: 2025-04-22

## 2025-04-22 RX ORDER — ESCITALOPRAM OXALATE 20 MG/1
20 TABLET ORAL DAILY
Qty: 90 TABLET | Refills: 3 | Status: SHIPPED | OUTPATIENT
Start: 2025-04-22

## 2025-04-22 RX ORDER — LEVOTHYROXINE SODIUM 50 UG/1
50 TABLET ORAL DAILY
Qty: 30 TABLET | Refills: 5 | Status: SHIPPED | OUTPATIENT
Start: 2025-04-22

## 2025-04-22 RX ORDER — PANTOPRAZOLE SODIUM 40 MG/1
40 TABLET, DELAYED RELEASE ORAL DAILY
Qty: 90 TABLET | Refills: 3 | Status: SHIPPED | OUTPATIENT
Start: 2025-04-22

## 2025-04-22 RX ORDER — ASPIRIN 81 MG/1
81 TABLET ORAL DAILY
COMMUNITY

## 2025-04-22 RX ORDER — FERROUS SULFATE 325(65) MG
325 TABLET ORAL
Qty: 30 TABLET | Refills: 5 | Status: SHIPPED | OUTPATIENT
Start: 2025-04-22

## 2025-04-22 RX ORDER — ERGOCALCIFEROL 1.25 MG/1
50000 CAPSULE, LIQUID FILLED ORAL WEEKLY
Qty: 15 CAPSULE | Refills: 3 | Status: SHIPPED | OUTPATIENT
Start: 2025-04-22

## 2025-04-22 RX ORDER — METOPROLOL SUCCINATE 25 MG/1
25 TABLET, EXTENDED RELEASE ORAL DAILY
Qty: 90 TABLET | Refills: 3 | Status: SHIPPED | OUTPATIENT
Start: 2025-04-22

## 2025-04-22 RX ORDER — ATORVASTATIN CALCIUM 80 MG/1
40 TABLET, FILM COATED ORAL NIGHTLY
Qty: 90 TABLET | Refills: 3
Start: 2025-04-22

## 2025-04-22 NOTE — PROGRESS NOTES
"Chief Complaint -pressure ulcer    History of Present Illness -     Britta Lee is a 79 y.o. female who is here today with her  who is helping with history    Pressure ulcer-  She complains of irritated skin where she sits up to sleep and sits prolonged periods of time without changing position.  The area of skin is located in the mid sacral/buttock area.    Hypertension-  Blood pressure is notably low with amlodipine 10 mg daily and metoprolol 25 mg daily.  Patient does complain of chronic fatigue    Vitamin D deficiency-  Stable with vitamin D supplementation    Gastroesophageal reflux disease-  Stable with pantoprazole    Hypothyroidism-  Stable with Synthroid 25 mcg daily    Depression-  Stable with the Lexapro.  She denies any hallucinations, SI or HI    Hyperlipidemia-  Stable with atorvastatin and dietary modification    Diabetes mellitus-  Controlled with most recent hemoglobin A1c of 6.7 with diet    Chronic kidney disease-  Not at goal as patient is on dialysis.  She is followed by nephrology and [facility].  She does have a history of iron deficiency anemia but this needs to be rechecked.  She has not been taking her iron supplementation in the last few months      The following portions of the patient's history were reviewed and updated as appropriate: allergies, current medications, past family history, past medical history, past social history, past surgical history and problem list.        Objective  Vital signs:  BP 90/50 (BP Location: Right arm, Patient Position: Sitting, Cuff Size: Infant)   Pulse 68   Temp 98 °F (36.7 °C) (Temporal)   Ht 160 cm (63\")   Wt 68.4 kg (150 lb 12.8 oz)   SpO2 96%   BMI 26.71 kg/m²     Physical Exam  Vitals and nursing note reviewed.   Constitutional:       Appearance: Normal appearance. She is well-developed.   Eyes:      Extraocular Movements: Extraocular movements intact.      Conjunctiva/sclera: Conjunctivae normal.   Cardiovascular:      " Rate and Rhythm: Normal rate and regular rhythm.      Heart sounds: Normal heart sounds. No murmur heard.  Pulmonary:      Effort: Pulmonary effort is normal. No respiratory distress.      Breath sounds: Normal breath sounds. No wheezing.   Musculoskeletal:         General: No tenderness.   Skin:     General: Skin is warm and dry.      Findings: Erythema present.      Comments: Approximately 3 x 3 cm erythematous stage I pressure ulcer noted mid sacrum   Neurological:      Mental Status: She is alert and oriented to person, place, and time.   Psychiatric:         Mood and Affect: Mood normal.         Behavior: Behavior normal.         Thought Content: Thought content normal.         The following data was reviewed by Lexie Dolan PA-C:         Lab Results   Component Value Date    BUN 14 02/17/2025    CREATININE 2.53 (H) 02/17/2025    EGFR 18.6 (L) 02/17/2025    ALT 18 02/17/2025    AST 46 (H) 02/17/2025    WBC 5.66 02/17/2025    HGB 9.2 (L) 02/17/2025    HCT 29.8 (L) 02/17/2025     02/17/2025    CHOL 88 03/14/2024    TRIG 60 03/14/2024    HDL 34 (L) 03/14/2024    LDL 40 03/14/2024    TSH 6.180 (H) 02/17/2025    HGBA1C 6.70 (H) 12/16/2024           Assessment & Plan     Diagnoses and all orders for this visit:    1. Pressure injury of buttock, stage 1, unspecified laterality (Primary)  Comments:  Rx written for cushion for wheelchair  Advised changing positions frequently to relieve pressure  Start Desitin and keep the area clean  Orders:  -     zinc oxide (Desitin) 40 % paste; Apply 1 Application topically to the appropriate area as directed 3 (Three) Times a Day As Needed (skin irritation).  Dispense: 113 g; Refill: 2    2. Vitamin D deficiency  Comments:  Continue vitamin D supplementation  Orders:  -     vitamin D (ERGOCALCIFEROL) 1.25 MG (92360 UT) capsule capsule; Take 1 capsule by mouth 1 (One) Time Per Week.  Dispense: 15 capsule; Refill: 3  -     Vitamin D,25-Hydroxy    3. Gastroesophageal reflux  disease without esophagitis  Comments:  Continue pantoprazole  Advised to avoid known trigger foods  Orders:  -     pantoprazole (PROTONIX) 40 MG EC tablet; Take 1 tablet by mouth Daily.  Dispense: 90 tablet; Refill: 3    4. Essential hypertension  Comments:  Decrease amlodipine 5 mg daily  Continue metoprolol 25 mg daily  Advise daily BP and pulse monitoring  Orders:  -     metoprolol succinate XL (Toprol XL) 25 MG 24 hr tablet; Take 1 tablet by mouth Daily.  Dispense: 90 tablet; Refill: 3  -     CBC & Differential  -     Comprehensive Metabolic Panel  -     Lipid Panel  -     Iron Profile    5. Acquired hypothyroidism  Comments:  Continue Synthroid 25 mcg daily  Orders:  -     levothyroxine (SYNTHROID, LEVOTHROID) 25 MCG tablet; Take 1 tablet by mouth Daily.  Dispense: 90 tablet; Refill: 3  -     TSH  -     T4, Free    6. Moderate episode of recurrent major depressive disorder  Comments:  Continue Lexapro to 20 mg daily  Orders:  -     escitalopram (LEXAPRO) 20 MG tablet; Take 1 tablet by mouth Daily.  Dispense: 90 tablet; Refill: 3    7. Mixed hyperlipidemia  Comments:  Advised low-cholesterol diet  Continue atorvastatin  Orders:  -     atorvastatin (LIPITOR) 80 MG tablet; Take 0.5 tablets by mouth Every Night.  Dispense: 90 tablet; Refill: 3    8. Type 2 diabetes mellitus with chronic kidney disease on chronic dialysis, with long-term current use of insulin  Comments:  Advise low carbohydrate diabetic diet  Orders:  -     Hemoglobin A1c    9. Iron deficiency anemia secondary to inadequate dietary iron intake  -     Iron Profile        BMI is >= 25 and <30. (Overweight) The following options were offered after discussion;: exercise counseling/recommendations and nutrition counseling/recommendations          Patient was given instructions and counseling regarding his condition or for health maintenance advice. Please see specific information pulled into the AVS if appropriate      This document has been  electronically signed by:  Lexie Dolan PA-C

## 2025-04-22 NOTE — PATIENT INSTRUCTIONS
Carbohydrate Counting for Diabetes Mellitus, Adult  Carbohydrate counting is a method of keeping track of how many carbohydrates you eat. Eating carbohydrates increases the amount of sugar (glucose) in the blood. Counting how many carbohydrates you eat improves how well you manage your blood glucose. This, in turn, helps you manage your diabetes.  Carbohydrates are measured in grams (g) per serving. It is important to know how many carbohydrates (in grams or by serving size) you can have in each meal. This is different for every person. A dietitian can help you make a meal plan and calculate how many carbohydrates you should have at each meal and snack.  What foods contain carbohydrates?  Carbohydrates are found in the following foods:  Grains, such as breads and cereals.  Dried beans and soy products.  Starchy vegetables, such as potatoes, peas, and corn.  Fruit and fruit juices.  Milk and yogurt.  Sweets and snack foods, such as cake, cookies, candy, chips, and soft drinks.  How do I count carbohydrates in foods?  There are two ways to count carbohydrates in food. You can read food labels or learn standard serving sizes of foods. You can use either of these methods or a combination of both.  Using the Nutrition Facts label  The Nutrition Facts list is included on the labels of almost all packaged foods and beverages in the [place]. It includes:  The serving size.  Information about nutrients in each serving, including the grams of carbohydrate per serving.  To use the Nutrition Facts, decide how many servings you will have. Then, multiply the number of servings by the number of carbohydrates per serving. The resulting number is the total grams of carbohydrates that you will be having.  Learning the standard serving sizes of foods  When you eat carbohydrate foods that are not packaged or do not include Nutrition Facts on the label, you need to measure the servings in order to count the grams of  carbohydrates.  Measure the foods that you will eat with a food scale or measuring cup, if needed.  Decide how many standard-size servings you will eat.  Multiply the number of servings by 15. For foods that contain carbohydrates, one serving equals 15 g of carbohydrates.  For example, if you eat 2 cups or 10 oz (300 g) of strawberries, you will have eaten 2 servings and 30 g of carbohydrates (2 servings x 15 g = 30 g).  For foods that have more than one food mixed, such as soups and casseroles, you must count the carbohydrates in each food that is included.  The following list contains standard serving sizes of common carbohydrate-rich foods. Each of these servings has about 15 g of carbohydrates:  1 slice of bread.  1 six-inch (15 cm) tortilla.  ? cup or 2 oz (53 g) cooked rice or pasta.  ½ cup or 3 oz (85 g) cooked or canned, drained and rinsed beans or lentils.  ½ cup or 3 oz (85 g) starchy vegetable, such as peas, corn, or squash.  ½ cup or 4 oz (120 g) hot cereal.  ½ cup or 3 oz (85 g) boiled or mashed potatoes, or ¼ or 3 oz (85 g) of a large baked potato.  ½ cup or 4 fl oz (118 mL) fruit juice.  1 cup or 8 fl oz (237 mL) milk.  1 small or 4 oz (106 g) apple.  ½ or 2 oz (63 g) of a medium banana.  1 cup or 5 oz (150 g) strawberries.  3 cups or 1 oz (28.3 g) popped popcorn.  What is an example of carbohydrate counting?  To calculate the grams of carbohydrates in this sample meal, follow the steps shown below.  Sample meal  3 oz (85 g) chicken breast.  ? cup or 4 oz (106 g) brown rice.  ½ cup or 3 oz (85 g) corn.  1 cup or 8 fl oz (237 mL) milk.  1 cup or 5 oz (150 g) strawberries with sugar-free whipped topping.  Carbohydrate calculation  Identify the foods that contain carbohydrates:  Rice.  Corn.  Milk.  Strawberries.  Calculate how many servings you have of each food:  2 servings rice.  1 serving corn.  1 serving milk.  1 serving strawberries.  Multiply each number of servings by 15  servings rice x 15  g = 30 g.  1 serving corn x 15 g = 15 g.  1 serving milk x 15 g = 15 g.  1 serving strawberries x 15 g = 15 g.  Add together all of the amounts to find the total grams of carbohydrates eaten:  30 g + 15 g + 15 g + 15 g = 75 g of carbohydrates total.  What are tips for following this plan?  Shopping  Develop a meal plan and then make a shopping list.  Buy fresh and frozen vegetables, fresh and frozen fruit, dairy, eggs, beans, lentils, and whole grains.  Look at food labels. Choose foods that have more fiber and less sugar.  Avoid processed foods and foods with added sugars.  Meal planning  Aim to have the same number of grams of carbohydrates at each meal and for each snack time.  Plan to have regular, balanced meals and snacks.  Where to find more information  American Diabetes Association: diabetes.org  Centers for Disease Control and Prevention: cdc.gov  Academy of Nutrition and Dietetics: eatright.org  Association of Diabetes Care & Education Specialists: diabeteseducator.org  Summary  Carbohydrate counting is a method of keeping track of how many carbohydrates you eat.  Eating carbohydrates increases the amount of sugar (glucose) in your blood.  Counting how many carbohydrates you eat improves how well you manage your blood glucose. This helps you manage your diabetes.  A dietitian can help you make a meal plan and calculate how many carbohydrates you should have at each meal and snack.  This information is not intended to replace advice given to you by your health care provider. Make sure you discuss any questions you have with your health care provider.  Document Revised: 07/20/2021 Document Reviewed: 07/21/2021  [employer] Patient Education © 2024 [employer]Fat and Cholesterol Restricted Eating Plan  Getting too much fat and cholesterol in your diet may cause health problems. Choosing the right foods helps keep your fat and cholesterol at normal levels. This can keep you from getting certain diseases.  Your  "doctor may recommend an eating plan that includes:  Total fat: ______% or less of total calories a day. This is ______g of fat a day.  Saturated fat: ______% or less of total calories a day. This is ______g of saturated fat a day.  Cholesterol: less than _________mg a day.  Fiber: ______g a day.  What are tips for following this plan?  General tips  Work with your doctor to lose weight if you need to.  Avoid:  Foods with added sugar.  Fried foods.  Foods with trans fat or partially hydrogenated oils. This includes some margarines and baked goods.  If you drink alcohol:  Limit how much you have to:  0-1 drink a day for women who are not pregnant.  0-2 drinks a day for men.  Know how much alcohol is in a drink. In the [place] one drink equals one 12 oz bottle of beer (355 mL), one 5 oz glass of wine (148 mL), or one 1½ oz glass of hard liquor (44 mL).  Reading food labels  Check food labels for:  Trans fats.  Partially hydrogenated oils.  Saturated fat (g) in each serving.  Cholesterol (mg) in each serving.  Fiber (g) in each serving.  Choose foods with healthy fats, such as:  Monounsaturated fats and polyunsaturated fats. These include olive and canola oil, flaxseeds, walnuts, almonds, and seeds.  Omega-3 fats. These are found in certain fish, flaxseed oil, and ground flaxseeds.  Choose grain products that have whole grains. Look for the word \"whole\" as the first word in the ingredient list.  Cooking  Cook foods using low-fat methods. These include baking, boiling, grilling, and broiling.  Eat more home-cooked foods. Eat at restaurants and buffets less often. Eat less fast food.  Avoid cooking using saturated fats, such as butter, cream, palm oil, palm kernel oil, and coconut oil.  Meal planning    At meals, divide your plate into four equal parts:  Fill one-half of your plate with vegetables, green salads, and fruit.  Fill one-fourth of your plate with whole grains.  Fill one-fourth of your plate with low-fat (lean) " protein foods.  Eat fish that is high in omega-3 fats at least two times a week. This includes mackerel, tuna, sardines, and salmon.  Eat foods that are high in fiber, such as whole grains, beans, apples, pears, berries, broccoli, carrots, peas, and barley.  What foods should I eat?  Fruits  All fresh, canned (in natural juice), or frozen fruits.  Vegetables  Fresh or frozen vegetables (raw, steamed, roasted, or grilled). Green salads.  Grains  Whole grains, such as whole wheat or whole grain breads, crackers, cereals, and pasta. Unsweetened oatmeal, bulgur, barley, quinoa, or brown rice. Corn or whole wheat flour tortillas.  Meats and other protein foods  Ground beef (85% or leaner), grass-fed beef, or beef trimmed of fat. Skinless chicken or turkey. Ground chicken or turkey. Pork trimmed of fat. All fish and seafood. Egg whites. Dried beans, peas, or lentils. Unsalted nuts or seeds. Unsalted canned beans. Nut butters without added sugar or oil.  Dairy  Low-fat or nonfat dairy products, such as skim or 1% milk, 2% or reduced-fat cheeses, low-fat and fat-free ricotta or cottage cheese, or plain low-fat and nonfat yogurt.  Fats and oils  Tub margarine without trans fats. Light or reduced-fat mayonnaise and salad dressings. Avocado. Olive, canola, sesame, or safflower oils.  The items listed above may not be a complete list of foods and beverages you can eat. Contact a dietitian for more information.  What foods should I avoid?  Fruits  Canned fruit in heavy syrup. Fruit in cream or butter sauce. Fried fruit.  Vegetables  Vegetables cooked in cheese, cream, or butter sauce. Fried vegetables.  Grains  White bread. White pasta. White rice. Cornbread. Bagels, pastries, and croissants. Crackers and snack foods that contain trans fat and hydrogenated oils.  Meats and other protein foods  Fatty cuts of meat. Ribs, chicken wings, dixon, sausage, bologna, salami, chitterlings, fatback, hot dogs, bratwurst, and packaged lunch  meats. Liver and organ meats. Whole eggs and egg yolks. Chicken and turkey with skin. Fried meat.  Dairy  Whole or 2% milk, cream, half-and-half, and cream cheese. Whole milk cheeses. Whole-fat or sweetened yogurt. Full-fat cheeses. Nondairy creamers and whipped toppings. Processed cheese, cheese spreads, and cheese curds.  Fats and oils  Butter, stick margarine, lard, shortening, ghee, or dixon fat. Coconut, palm kernel, and palm oils.  Beverages  Alcohol. Sugar-sweetened drinks such as sodas, lemonade, and fruit drinks.  Sweets and desserts  Corn syrup, sugars, honey, and molasses. Candy. Jam and jelly. Syrup. Sweetened cereals. Cookies, pies, cakes, donuts, muffins, and ice cream.  The items listed above may not be a complete list of foods and beverages you should avoid. Contact a dietitian for more information.  Summary  Choosing the right foods helps keep your fat and cholesterol at normal levels. This can keep you from getting certain diseases.  At meals, fill one-half of your plate with vegetables, green salads, and fruits.  Eat high fiber foods, like whole grains, beans, apples, pears, berries, carrots, peas, and barley.  Limit added sugar, saturated fats, alcohol, and fried foods.  This information is not intended to replace advice given to you by your health care provider. Make sure you discuss any questions you have with your health care provider.  Document Revised: 04/29/2022 Document Reviewed: 04/29/2022  [employer] Patient Education © 2024 [employer]Fall Prevention in the Home, Adult  Falls can cause injuries and can happen to people of all ages. There are many things you can do to make your home safer and to help prevent falls.  What actions can I take to prevent falls?  General information  Use good lighting in all rooms. Make sure to:  Replace any light bulbs that burn out.  Turn on the lights in dark areas and use night-lights.  Keep items that you use often in easy-to-reach places. Lower the  shelves around your home if needed.  Move furniture so that there are clear paths around it.  Do not use throw rugs or other things on the floor that can make you trip.  If any of your floors are uneven, fix them.  Add color or contrast paint or tape to clearly eder and help you see:  Grab bars or handrails.  First and last steps of staircases.  Where the edge of each step is.  If you use a ladder or stepladder:  Make sure that it is fully opened. Do not climb a closed ladder.  Make sure the sides of the ladder are locked in place.  Have someone hold the ladder while you use it.  Know where your pets are as you move through your home.  What can I do in the bathroom?         Keep the floor dry. Clean up any water on the floor right away.  Remove soap buildup in the bathtub or shower. Buildup makes bathtubs and showers slippery.  Use non-skid mats or decals on the floor of the bathtub or shower.  Attach bath mats securely with double-sided, non-slip rug tape.  If you need to sit down in the shower, use a non-slip stool.  Install grab bars by the toilet and in the bathtub and shower. Do not use towel bars as grab bars.  What can I do in the bedroom?  Make sure that you have a light by your bed that is easy to reach.  Do not use any sheets or blankets on your bed that hang to the floor.  Have a firm chair or bench with side arms that you can use for support when you get dressed.  What can I do in the kitchen?  Clean up any spills right away.  If you need to reach something above you, use a step stool with a grab bar.  Keep electrical cords out of the way.  Do not use floor polish or wax that makes floors slippery.  What can I do with my stairs?  Do not leave anything on the stairs.  Make sure that you have a light switch at the top and the bottom of the stairs.  Make sure that there are handrails on both sides of the stairs. Fix handrails that are broken or loose.  Install non-slip stair treads on all your stairs if  they do not have carpet.  Avoid having throw rugs at the top or bottom of the stairs.  Choose a carpet that does not hide the edge of the steps on the stairs. Make sure that the carpet is firmly attached to the stairs. Fix carpet that is loose or worn.  What can I do on the outside of my home?  Use bright outdoor lighting.  Fix the edges of walkways and driveways and fix any cracks. Clear paths of anything that can make you trip, such as tools or rocks.  Add color or contrast paint or tape to clearly eder and help you see anything that might make you trip as you walk through a door, such as a raised step or threshold.  Trim any bushes or trees on paths to your home.  Check to see if handrails are loose or broken and that both sides of all steps have handrails. Install guardrails along the edges of any raised decks and porches.  Have leaves, snow, or ice cleared regularly. Use sand, salt, or ice melter on paths if you live where there is ice and snow during the winter.  Clean up any spills in your garage right away. This includes grease or oil spills.  What other actions can I take?  Review your medicines with your doctor. Some medicines can cause dizziness or changes in blood pressure, which increase your risk of falling.  Wear shoes that:  Have a low heel. Do not wear high heels.  Have rubber bottoms and are closed at the toe.  Feel good on your feet and fit well.  Use tools that help you move around if needed. These include:  Canes.  Walkers.  Scooters.  Crutches.  Ask your doctor what else you can do to help prevent falls. This may include seeing a physical therapist to learn to do exercises to move better and get stronger.  Where to find more information  Centers for Disease Control and Prevention, STEAD[employer]: cdc.gov  National [place] on Aging: verenice.nih.gov  National [place]o on Aging: verenice.nih.gov  Contact a doctor if:  You are afraid of falling at home.  You feel weak, drowsy, or dizzy at home.  You fall at  home.  Get help right away if you:  Lose consciousness or have trouble moving after a fall.  Have a fall that causes a head injury.  These symptoms may be an emergency. Get help right away. Call 911.  Do not wait to see if the symptoms will go away.  Do not drive yourself to the hospital.  This information is not intended to replace advice given to you by your health care provider. Make sure you discuss any questions you have with your health care provider.  Document Revised: 08/21/2023 Document Reviewed: 08/21/2023  Elsevier Patient Education © 2024 [employer] Inc.

## 2025-04-22 NOTE — TELEPHONE ENCOUNTER
Spoke to patient and her spouse regarding abnormal lab results, they are both aware and understanding, Patient will  new medication form the pharmacy and stop potassium supplements

## 2025-04-23 ENCOUNTER — APPOINTMENT (OUTPATIENT)
Dept: GENERAL RADIOLOGY | Facility: HOSPITAL | Age: 80
End: 2025-04-23
Payer: MEDICARE

## 2025-04-23 ENCOUNTER — APPOINTMENT (OUTPATIENT)
Dept: ULTRASOUND IMAGING | Facility: HOSPITAL | Age: 80
End: 2025-04-23
Payer: MEDICARE

## 2025-04-23 ENCOUNTER — HOSPITAL ENCOUNTER (EMERGENCY)
Facility: HOSPITAL | Age: 80
Discharge: HOME | End: 2025-04-23
Payer: MEDICARE

## 2025-04-23 VITALS
WEIGHT: 150 LBS | TEMPERATURE: 97.1 F | HEIGHT: 63 IN | SYSTOLIC BLOOD PRESSURE: 123 MMHG | DIASTOLIC BLOOD PRESSURE: 63 MMHG | BODY MASS INDEX: 26.58 KG/M2 | OXYGEN SATURATION: 92 % | RESPIRATION RATE: 19 BRPM | HEART RATE: 83 BPM

## 2025-04-23 DIAGNOSIS — R07.9 CHEST PAIN, UNSPECIFIED TYPE: Primary | ICD-10-CM

## 2025-04-23 LAB
25(OH)D3 SERPL-MCNC: 106 NG/ML (ref 30–100)
ALBUMIN SERPL-MCNC: 3.3 G/DL (ref 3.5–5.2)
ALBUMIN/GLOB SERPL: 0.6 G/DL
ALP SERPL-CCNC: 312 U/L (ref 39–117)
ALT SERPL W P-5'-P-CCNC: 18 U/L (ref 1–33)
ANION GAP SERPL CALCULATED.3IONS-SCNC: 10.7 MMOL/L (ref 5–15)
AST SERPL-CCNC: 41 U/L (ref 1–32)
BASOPHILS # BLD AUTO: 0.03 10*3/MM3 (ref 0–0.2)
BASOPHILS NFR BLD AUTO: 0.5 % (ref 0–1.5)
BILIRUB SERPL-MCNC: 0.5 MG/DL (ref 0–1.2)
BUN SERPL-MCNC: 32 MG/DL (ref 8–23)
BUN/CREAT SERPL: 8.8 (ref 7–25)
CALCIUM SPEC-SCNC: 8.7 MG/DL (ref 8.6–10.5)
CHLORIDE SERPL-SCNC: 95 MMOL/L (ref 98–107)
CO2 SERPL-SCNC: 28.3 MMOL/L (ref 22–29)
CREAT SERPL-MCNC: 3.64 MG/DL (ref 0.57–1)
D DIMER PPP FEU-MCNC: 9.14 MCGFEU/ML (ref 0–0.81)
DEPRECATED RDW RBC AUTO: 59.3 FL (ref 37–54)
EGFRCR SERPLBLD CKD-EPI 2021: 12 ML/MIN/1.73
EOSINOPHIL # BLD AUTO: 0.27 10*3/MM3 (ref 0–0.4)
EOSINOPHIL NFR BLD AUTO: 4.1 % (ref 0.3–6.2)
ERYTHROCYTE [DISTWIDTH] IN BLOOD BY AUTOMATED COUNT: 20 % (ref 12.3–15.4)
GEN 5 1HR TROPONIN T REFLEX: 74 NG/L
GLOBULIN UR ELPH-MCNC: 5.4 GM/DL
GLUCOSE SERPL-MCNC: 168 MG/DL (ref 65–99)
HCT VFR BLD AUTO: 30.2 % (ref 34–46.6)
HGB BLD-MCNC: 9 G/DL (ref 12–15.9)
IMM GRANULOCYTES # BLD AUTO: 0.03 10*3/MM3 (ref 0–0.05)
IMM GRANULOCYTES NFR BLD AUTO: 0.5 % (ref 0–0.5)
LYMPHOCYTES # BLD AUTO: 0.72 10*3/MM3 (ref 0.7–3.1)
LYMPHOCYTES NFR BLD AUTO: 11 % (ref 19.6–45.3)
MCH RBC QN AUTO: 24.2 PG (ref 26.6–33)
MCHC RBC AUTO-ENTMCNC: 29.8 G/DL (ref 31.5–35.7)
MCV RBC AUTO: 81.2 FL (ref 79–97)
MONOCYTES # BLD AUTO: 0.46 10*3/MM3 (ref 0.1–0.9)
MONOCYTES NFR BLD AUTO: 7.1 % (ref 5–12)
NEUTROPHILS NFR BLD AUTO: 5.01 10*3/MM3 (ref 1.7–7)
NEUTROPHILS NFR BLD AUTO: 76.8 % (ref 42.7–76)
NRBC BLD AUTO-RTO: 0 /100 WBC (ref 0–0.2)
PLATELET # BLD AUTO: 231 10*3/MM3 (ref 140–450)
PMV BLD AUTO: 8.6 FL (ref 6–12)
POTASSIUM SERPL-SCNC: 4.2 MMOL/L (ref 3.5–5.2)
PROT SERPL-MCNC: 8.7 G/DL (ref 6–8.5)
QT INTERVAL: 434 MS
QTC INTERVAL: 504 MS
RBC # BLD AUTO: 3.72 10*6/MM3 (ref 3.77–5.28)
SODIUM SERPL-SCNC: 134 MMOL/L (ref 136–145)
TROPONIN T % DELTA: -6
TROPONIN T NUMERIC DELTA: -5 NG/L
TROPONIN T SERPL HS-MCNC: 79 NG/L
WBC NRBC COR # BLD AUTO: 6.52 10*3/MM3 (ref 3.4–10.8)

## 2025-04-23 PROCEDURE — 99284 EMERGENCY DEPT VISIT MOD MDM: CPT

## 2025-04-23 PROCEDURE — 36415 COLL VENOUS BLD VENIPUNCTURE: CPT

## 2025-04-23 PROCEDURE — 93005 ELECTROCARDIOGRAM TRACING: CPT

## 2025-04-23 PROCEDURE — 93970 EXTREMITY STUDY: CPT | Performed by: RADIOLOGY

## 2025-04-23 PROCEDURE — 71045 X-RAY EXAM CHEST 1 VIEW: CPT | Performed by: RADIOLOGY

## 2025-04-23 PROCEDURE — 80053 COMPREHEN METABOLIC PANEL: CPT

## 2025-04-23 PROCEDURE — 71045 X-RAY EXAM CHEST 1 VIEW: CPT

## 2025-04-23 PROCEDURE — 93970 EXTREMITY STUDY: CPT

## 2025-04-23 PROCEDURE — 84484 ASSAY OF TROPONIN QUANT: CPT

## 2025-04-23 PROCEDURE — 85379 FIBRIN DEGRADATION QUANT: CPT

## 2025-04-23 PROCEDURE — 85025 COMPLETE CBC W/AUTO DIFF WBC: CPT

## 2025-04-23 NOTE — DISCHARGE INSTRUCTIONS
Use the oxygen at 2 L for the next 24 to 48 hours.  Once she gets dialyzed next asked them to pull some extra fluid off.

## 2025-04-23 NOTE — ED PROVIDER NOTES
Subjective   History of Present Illness  This 79-year-old female has been on dialysis for the last year.  She has previous history of stent placement in her heart.    Was on dialysis today and she started having some episodes of chest pain.  Abdominal dialysis was stopped and she has been sent over here to the emergency room.  No vomiting no sweating with it.  EKG is a sinus rhythm with.  No acute ST segment changes SD interval is 81 ventricular rate is 81 SD interval is 184 QRS is 102.  No signs of ischemia on the EKG        Review of Systems   Respiratory:  Positive for chest tightness.    Cardiovascular:  Positive for chest pain.       Past Medical History:   Diagnosis Date    Acquired hypothyroidism 04/22/2021    Anemia     Arthritis     Carpal tunnel syndrome     Coronary artery disease     Diabetes mellitus     Elevated cholesterol     GERD (gastroesophageal reflux disease)     History of pneumonia     History of unsteady gait     OCASSIONALLY    Hypertension     Insomnia     Kidney disease     Kidney disease, chronic, stage IV (GFR 15-29 ml/min)     LENNY (obstructive sleep apnea) 12/01/2020    no c-pap    Osteoarthritis     Renal insufficiency     Sciatica        No Known Allergies    Past Surgical History:   Procedure Laterality Date    ABDOMINAL SURGERY      APPENDECTOMY      CARDIAC CATHETERIZATION      1 STENT  ---  2000    CARDIAC SURGERY      CAROTID STENT      CARPAL TUNNEL RELEASE Right 10/08/2019    Procedure: CARPAL TUNNEL RELEASE;  Surgeon: Feroz Johnson MD;  Location: [facility] OR;  Service: Orthopedics    CATARACT EXTRACTION      CHOLECYSTECTOMY      COLONOSCOPY      COLONOSCOPY N/A 11/23/2021    Procedure: COLONOSCOPY FOR SCREENING;  Surgeon: Palmira Pate MD;  Location: [facility] OR;  Service: Gastroenterology;  Laterality: N/A;    COLONOSCOPY N/A 2/27/2025    Procedure: COLONOSCOPY;  Surgeon: Alexandru Bagley MD;  Location: [facility] OR;  Service: Gastroenterology;  Laterality:  N/A;    CORONARY ANGIOPLASTY WITH STENT PLACEMENT      ENDOSCOPY      ENDOSCOPY N/A 03/05/2020    Procedure: ESOPHAGOGASTRODUODENOSCOPY;  Surgeon: Alexandru Bagley MD;  Location: [facility] OR;  Service: Gastroenterology;  Laterality: N/A;    ENDOSCOPY N/A 11/23/2021    Procedure: ESOPHAGOGASTRODUODENOSCOPY WITH BIOPSY;  Surgeon: Palmira Pate MD;  Location: [facility] OR;  Service: Gastroenterology;  Laterality: N/A;    ENDOSCOPY AND COLONOSCOPY      HEAD/NECK LESION/CYST EXCISION Right 4/30/2024    Procedure: FACIAL LESION/CYST EXCISION;  Surgeon: Ta Blas MD;  Location: [facility] OR;  Service: General;  Laterality: Right;    INSERTION HEMODIALYSIS CATHETER N/A 11/14/2022    Procedure: HEMODIALYSIS CATHETER INSERTION;  Surgeon: Ta Blas MD;  Location: [facility] OR;  Service: General;  Laterality: N/A;    JOINT REPLACEMENT Left 05/02/2017    [facility]  DR LEVIN  LEFT TOTAL KNEE    KNEE ARTHROSCOPY W/ MENISCECTOMY Right     LAPAROSCOPIC TUBAL LIGATION      ND ARTHRP KNE CONDYLE&PLATU MEDIAL&LAT COMPARTMENTS Left 05/02/2017    Procedure: TOTAL KNEE ARTHROPLASTY;  Surgeon: Feroz Levin MD;  Location: [facility] OR;  Service: Orthopedics    STERILIZATION      TONSILLECTOMY         Family History   Problem Relation Age of Onset    Arthritis Mother     Diabetes Mother     Cancer Mother     Heart disease Father     Diabetes Daughter     Diabetes Son     Diabetes Maternal Aunt     Heart disease Maternal Grandmother     Breast cancer Neg Hx        Social History     Socioeconomic History    Marital status:      Spouse name: natalie    Number of children: 3    Years of education: 12   Tobacco Use    Smoking status: Never     Passive exposure: Past    Smokeless tobacco: Never   Vaping Use    Vaping status: Never Used   Substance and Sexual Activity    Alcohol use: Yes     Comment: socially    Drug use: No    Sexual activity: Defer     Birth control/protection: Surgical           Objective   Physical  Exam  HENT:      Head: Normocephalic.      Right Ear: External ear normal.      Left Ear: External ear normal.      Mouth/Throat:      Mouth: Mucous membranes are moist.   Eyes:      Extraocular Movements: Extraocular movements intact.      Pupils: Pupils are equal, round, and reactive to light.   Cardiovascular:      Rate and Rhythm: Normal rate and regular rhythm.      Heart sounds: No murmur heard.  Pulmonary:      Effort: Pulmonary effort is normal.      Breath sounds: Normal breath sounds.   Abdominal:      General: Abdomen is flat. Bowel sounds are normal.      Palpations: Abdomen is soft.   Musculoskeletal:         General: No swelling. Normal range of motion.      Cervical back: Normal range of motion. No rigidity.   Skin:     General: Skin is warm and dry.      Capillary Refill: Capillary refill takes less than 2 seconds.   Neurological:      General: No focal deficit present.      Mental Status: She is alert.   Psychiatric:         Mood and Affect: Mood normal.         Procedures           ED Course  ED Course as of 04/23/25 1921 Wed Apr 23, 2025   1736 Britta Lee has a heart rate of 81 bpm AL interval is 192.  QRS is 102   [GP]   1736 Waiting for ultrasound [GP]   1737 Urine is 32 creatinine is 3.6.  She is on dialysis.  Blood sugars since stay.  PVR was 9 troponin is 74 and 79.  White count is normal at 6.  Hemoglobin is 930. [GP]      ED Course User Index  [GP] Ramsey Gant MD                                                       Medical Decision Making  Problems Addressed:  Chest pain, unspecified type: complicated acute illness or injury    Amount and/or Complexity of Data Reviewed  Labs: ordered.  Radiology: ordered.  ECG/medicine tests: ordered.        Final diagnoses:   Chest pain, unspecified type       ED Disposition  ED Disposition       ED Disposition   Discharge    Condition   Stable    Comment   --               Lexie Dolan, PA  [address]  [address]  [phone]               Medication List      No changes were made to your prescriptions during this visit.            Ramsey Gant MD  04/23/25 [id number]

## 2025-04-29 ENCOUNTER — LAB (OUTPATIENT)
Dept: FAMILY MEDICINE CLINIC | Facility: CLINIC | Age: 80
End: 2025-04-29
Payer: MEDICARE

## 2025-04-29 DIAGNOSIS — D50.8 IRON DEFICIENCY ANEMIA SECONDARY TO INADEQUATE DIETARY IRON INTAKE: ICD-10-CM

## 2025-04-29 DIAGNOSIS — E87.5 HYPERKALEMIA: ICD-10-CM

## 2025-04-29 LAB
ALBUMIN SERPL-MCNC: 2.9 G/DL (ref 3.5–5.2)
ALBUMIN/GLOB SERPL: 0.6 G/DL
ALP SERPL-CCNC: 309 U/L (ref 39–117)
ALT SERPL W P-5'-P-CCNC: 19 U/L (ref 1–33)
ANION GAP SERPL CALCULATED.3IONS-SCNC: 12.4 MMOL/L (ref 5–15)
AST SERPL-CCNC: 33 U/L (ref 1–32)
BASOPHILS # BLD AUTO: 0.04 10*3/MM3 (ref 0–0.2)
BASOPHILS NFR BLD AUTO: 0.6 % (ref 0–1.5)
BILIRUB SERPL-MCNC: 0.4 MG/DL (ref 0–1.2)
BUN SERPL-MCNC: 27 MG/DL (ref 8–23)
BUN/CREAT SERPL: 7.7 (ref 7–25)
CALCIUM SPEC-SCNC: 8.5 MG/DL (ref 8.6–10.5)
CHLORIDE SERPL-SCNC: 93 MMOL/L (ref 98–107)
CO2 SERPL-SCNC: 26.6 MMOL/L (ref 22–29)
CREAT SERPL-MCNC: 3.52 MG/DL (ref 0.57–1)
DEPRECATED RDW RBC AUTO: 56.8 FL (ref 37–54)
EGFRCR SERPLBLD CKD-EPI 2021: 12.5 ML/MIN/1.73
EOSINOPHIL # BLD AUTO: 0.34 10*3/MM3 (ref 0–0.4)
EOSINOPHIL NFR BLD AUTO: 5.3 % (ref 0.3–6.2)
ERYTHROCYTE [DISTWIDTH] IN BLOOD BY AUTOMATED COUNT: 19.9 % (ref 12.3–15.4)
GLOBULIN UR ELPH-MCNC: 4.9 GM/DL
GLUCOSE SERPL-MCNC: 104 MG/DL (ref 65–99)
HCT VFR BLD AUTO: 26.4 % (ref 34–46.6)
HGB BLD-MCNC: 8.1 G/DL (ref 12–15.9)
IMM GRANULOCYTES # BLD AUTO: 0.02 10*3/MM3 (ref 0–0.05)
IMM GRANULOCYTES NFR BLD AUTO: 0.3 % (ref 0–0.5)
IRON 24H UR-MRATE: 33 MCG/DL (ref 37–145)
IRON SATN MFR SERPL: 13 % (ref 20–50)
LYMPHOCYTES # BLD AUTO: 1.22 10*3/MM3 (ref 0.7–3.1)
LYMPHOCYTES NFR BLD AUTO: 19 % (ref 19.6–45.3)
MCH RBC QN AUTO: 24.2 PG (ref 26.6–33)
MCHC RBC AUTO-ENTMCNC: 30.7 G/DL (ref 31.5–35.7)
MCV RBC AUTO: 78.8 FL (ref 79–97)
MONOCYTES # BLD AUTO: 0.63 10*3/MM3 (ref 0.1–0.9)
MONOCYTES NFR BLD AUTO: 9.8 % (ref 5–12)
NEUTROPHILS NFR BLD AUTO: 4.16 10*3/MM3 (ref 1.7–7)
NEUTROPHILS NFR BLD AUTO: 65 % (ref 42.7–76)
NRBC BLD AUTO-RTO: 0 /100 WBC (ref 0–0.2)
PLATELET # BLD AUTO: 238 10*3/MM3 (ref 140–450)
PMV BLD AUTO: 9.6 FL (ref 6–12)
POTASSIUM SERPL-SCNC: 4.4 MMOL/L (ref 3.5–5.2)
PROT SERPL-MCNC: 7.8 G/DL (ref 6–8.5)
RBC # BLD AUTO: 3.35 10*6/MM3 (ref 3.77–5.28)
SODIUM SERPL-SCNC: 132 MMOL/L (ref 136–145)
TIBC SERPL-MCNC: 261 MCG/DL (ref 298–536)
TRANSFERRIN SERPL-MCNC: 175 MG/DL (ref 200–360)
WBC NRBC COR # BLD AUTO: 6.41 10*3/MM3 (ref 3.4–10.8)

## 2025-04-29 PROCEDURE — 80053 COMPREHEN METABOLIC PANEL: CPT | Performed by: PHYSICIAN ASSISTANT

## 2025-04-29 PROCEDURE — 83540 ASSAY OF IRON: CPT | Performed by: PHYSICIAN ASSISTANT

## 2025-04-29 PROCEDURE — 84466 ASSAY OF TRANSFERRIN: CPT | Performed by: PHYSICIAN ASSISTANT

## 2025-04-29 PROCEDURE — 85025 COMPLETE CBC W/AUTO DIFF WBC: CPT | Performed by: PHYSICIAN ASSISTANT

## 2025-04-29 PROCEDURE — 36415 COLL VENOUS BLD VENIPUNCTURE: CPT

## 2025-04-30 ENCOUNTER — TELEPHONE (OUTPATIENT)
Dept: FAMILY MEDICINE CLINIC | Facility: CLINIC | Age: 80
End: 2025-04-30
Payer: MEDICARE

## 2025-05-12 DIAGNOSIS — L89.322 PRESSURE INJURY OF LEFT BUTTOCK, STAGE 2: Primary | Chronic | ICD-10-CM

## 2025-05-12 DIAGNOSIS — R53.81 DEBILITY: ICD-10-CM

## 2025-05-13 ENCOUNTER — HOSPITAL ENCOUNTER (OUTPATIENT)
Dept: GENERAL RADIOLOGY | Facility: HOSPITAL | Age: 80
Discharge: HOME | End: 2025-05-13
Admitting: SURGERY
Payer: MEDICARE

## 2025-05-13 DIAGNOSIS — Z98.890 STATUS POST COLONOSCOPY: ICD-10-CM

## 2025-05-13 PROCEDURE — 74280 X-RAY XM COLON 2CNTRST STD: CPT

## 2025-05-13 PROCEDURE — 74280 X-RAY XM COLON 2CNTRST STD: CPT | Performed by: RADIOLOGY

## 2025-05-20 ENCOUNTER — OFFICE VISIT (OUTPATIENT)
Dept: FAMILY MEDICINE CLINIC | Facility: CLINIC | Age: 80
End: 2025-05-20
Payer: MEDICARE

## 2025-05-20 VITALS
BODY MASS INDEX: 26.4 KG/M2 | WEIGHT: 149 LBS | SYSTOLIC BLOOD PRESSURE: 142 MMHG | HEART RATE: 73 BPM | OXYGEN SATURATION: 96 % | HEIGHT: 63 IN | TEMPERATURE: 98 F | DIASTOLIC BLOOD PRESSURE: 60 MMHG

## 2025-05-20 DIAGNOSIS — E11.22 TYPE 2 DIABETES MELLITUS WITH CHRONIC KIDNEY DISEASE ON CHRONIC DIALYSIS, WITH LONG-TERM CURRENT USE OF INSULIN: Chronic | ICD-10-CM

## 2025-05-20 DIAGNOSIS — Z79.4 TYPE 2 DIABETES MELLITUS WITH CHRONIC KIDNEY DISEASE ON CHRONIC DIALYSIS, WITH LONG-TERM CURRENT USE OF INSULIN: Chronic | ICD-10-CM

## 2025-05-20 DIAGNOSIS — Z99.2 TYPE 2 DIABETES MELLITUS WITH CHRONIC KIDNEY DISEASE ON CHRONIC DIALYSIS, WITH LONG-TERM CURRENT USE OF INSULIN: Chronic | ICD-10-CM

## 2025-05-20 DIAGNOSIS — I10 ESSENTIAL HYPERTENSION: Chronic | ICD-10-CM

## 2025-05-20 DIAGNOSIS — N18.6 TYPE 2 DIABETES MELLITUS WITH CHRONIC KIDNEY DISEASE ON CHRONIC DIALYSIS, WITH LONG-TERM CURRENT USE OF INSULIN: Chronic | ICD-10-CM

## 2025-05-20 DIAGNOSIS — I87.2 VENOUS INSUFFICIENCY (CHRONIC) (PERIPHERAL): Primary | Chronic | ICD-10-CM

## 2025-05-20 RX ORDER — METOPROLOL SUCCINATE 50 MG/1
50 TABLET, EXTENDED RELEASE ORAL DAILY
Qty: 30 TABLET | Refills: 5 | Status: SHIPPED | OUTPATIENT
Start: 2025-05-20

## 2025-05-20 RX ORDER — AMLODIPINE BESYLATE 5 MG/1
5 TABLET ORAL DAILY
Start: 2025-05-20

## 2025-05-20 NOTE — PATIENT INSTRUCTIONS
Carbohydrate Counting for Diabetes Mellitus, Adult  Carbohydrate counting is a method of keeping track of how many carbohydrates you eat. Eating carbohydrates increases the amount of sugar (glucose) in the blood. Counting how many carbohydrates you eat improves how well you manage your blood glucose. This, in turn, helps you manage your diabetes.  Carbohydrates are measured in grams (g) per serving. It is important to know how many carbohydrates (in grams or by serving size) you can have in each meal. This is different for every person. A dietitian can help you make a meal plan and calculate how many carbohydrates you should have at each meal and snack.  What foods contain carbohydrates?  Carbohydrates are found in the following foods:  Grains, such as breads and cereals.  Dried beans and soy products.  Starchy vegetables, such as potatoes, peas, and corn.  Fruit and fruit juices.  Milk and yogurt.  Sweets and snack foods, such as cake, cookies, candy, chips, and soft drinks.  How do I count carbohydrates in foods?  There are two ways to count carbohydrates in food. You can read food labels or learn standard serving sizes of foods. You can use either of these methods or a combination of both.  Using the Nutrition Facts label  The Nutrition Facts list is included on the labels of almost all packaged foods and beverages in the [place]. It includes:  The serving size.  Information about nutrients in each serving, including the grams of carbohydrate per serving.  To use the Nutrition Facts, decide how many servings you will have. Then, multiply the number of servings by the number of carbohydrates per serving. The resulting number is the total grams of carbohydrates that you will be having.  Learning the standard serving sizes of foods  When you eat carbohydrate foods that are not packaged or do not include Nutrition Facts on the label, you need to measure the servings in order to count the grams of  carbohydrates.  Measure the foods that you will eat with a food scale or measuring cup, if needed.  Decide how many standard-size servings you will eat.  Multiply the number of servings by 15. For foods that contain carbohydrates, one serving equals 15 g of carbohydrates.  For example, if you eat 2 cups or 10 oz (300 g) of strawberries, you will have eaten 2 servings and 30 g of carbohydrates (2 servings x 15 g = 30 g).  For foods that have more than one food mixed, such as soups and casseroles, you must count the carbohydrates in each food that is included.  The following list contains standard serving sizes of common carbohydrate-rich foods. Each of these servings has about 15 g of carbohydrates:  1 slice of bread.  1 six-inch (15 cm) tortilla.  ? cup or 2 oz (53 g) cooked rice or pasta.  ½ cup or 3 oz (85 g) cooked or canned, drained and rinsed beans or lentils.  ½ cup or 3 oz (85 g) starchy vegetable, such as peas, corn, or squash.  ½ cup or 4 oz (120 g) hot cereal.  ½ cup or 3 oz (85 g) boiled or mashed potatoes, or ¼ or 3 oz (85 g) of a large baked potato.  ½ cup or 4 fl oz (118 mL) fruit juice.  1 cup or 8 fl oz (237 mL) milk.  1 small or 4 oz (106 g) apple.  ½ or 2 oz (63 g) of a medium banana.  1 cup or 5 oz (150 g) strawberries.  3 cups or 1 oz (28.3 g) popped popcorn.  What is an example of carbohydrate counting?  To calculate the grams of carbohydrates in this sample meal, follow the steps shown below.  Sample meal  3 oz (85 g) chicken breast.  ? cup or 4 oz (106 g) brown rice.  ½ cup or 3 oz (85 g) corn.  1 cup or 8 fl oz (237 mL) milk.  1 cup or 5 oz (150 g) strawberries with sugar-free whipped topping.  Carbohydrate calculation  Identify the foods that contain carbohydrates:  Rice.  Corn.  Milk.  Strawberries.  Calculate how many servings you have of each food:  2 servings rice.  1 serving corn.  1 serving milk.  1 serving strawberries.  Multiply each number of servings by 15  servings rice x 15  g = 30 g.  1 serving corn x 15 g = 15 g.  1 serving milk x 15 g = 15 g.  1 serving strawberries x 15 g = 15 g.  Add together all of the amounts to find the total grams of carbohydrates eaten:  30 g + 15 g + 15 g + 15 g = 75 g of carbohydrates total.  What are tips for following this plan?  Shopping  Develop a meal plan and then make a shopping list.  Buy fresh and frozen vegetables, fresh and frozen fruit, dairy, eggs, beans, lentils, and whole grains.  Look at food labels. Choose foods that have more fiber and less sugar.  Avoid processed foods and foods with added sugars.  Meal planning  Aim to have the same number of grams of carbohydrates at each meal and for each snack time.  Plan to have regular, balanced meals and snacks.  Where to find more information  American Diabetes Association: diabetes.org  Centers for Disease Control and Prevention: cdc.gov  Academy of Nutrition and Dietetics: eatright.org  Association of Diabetes Care & Education Specialists: diabeteseducator.org  Summary  Carbohydrate counting is a method of keeping track of how many carbohydrates you eat.  Eating carbohydrates increases the amount of sugar (glucose) in your blood.  Counting how many carbohydrates you eat improves how well you manage your blood glucose. This helps you manage your diabetes.  A dietitian can help you make a meal plan and calculate how many carbohydrates you should have at each meal and snack.  This information is not intended to replace advice given to you by your health care provider. Make sure you discuss any questions you have with your health care provider.  Document Revised: 07/20/2021 Document Reviewed: 07/21/2021  Elsevier Patient Education © 2024 [employer]

## 2025-05-20 NOTE — PROGRESS NOTES
"Chief Complaint -leg swelling    History of Present Illness -     Britta Lee is a 79 y.o. female who is here today with her  who is helping with the history.    Leg swelling-  Patient complains of bilateral lower extremity swelling.  She states that she has not been elevating her legs regularly.  She does lay flat in a bed to sleep.    Hypertension-  Not at goal with amlodipine 10 mg daily and metoprolol XL 25 mg daily.  She denies any chest pain or shortness of breath    Diabetes mellitus-  Stable with most recent hemoglobin A1c of 5.3 within the past month.  Patient is not currently on diabetic medication.    Chronic kidney disease-  Stable with chronic dialysis.      The following portions of the patient's history were reviewed and updated as appropriate: allergies, current medications, past family history, past medical history, past social history, past surgical history and problem list.        Objective  Vital signs:  /60   Pulse 73   Temp 98 °F (36.7 °C) (Temporal)   Ht 160 cm (63\")   Wt 67.6 kg (149 lb)   SpO2 96%   BMI 26.39 kg/m²     Physical Exam  Vitals and nursing note reviewed.   Constitutional:       Appearance: She is well-developed.      Comments: Patient appears frail in wheelchair today   Eyes:      Extraocular Movements: Extraocular movements intact.      Conjunctiva/sclera: Conjunctivae normal.   Cardiovascular:      Rate and Rhythm: Normal rate and regular rhythm.      Heart sounds: Normal heart sounds. No murmur heard.  Pulmonary:      Effort: Pulmonary effort is normal. No respiratory distress.      Breath sounds: Normal breath sounds. No wheezing.   Musculoskeletal:         General: No tenderness.   Skin:     General: Skin is warm and dry.      Findings: No rash.   Neurological:      Mental Status: She is alert and oriented to person, place, and time.   Psychiatric:         Mood and Affect: Mood normal.         Behavior: Behavior normal.         Thought Content: Thought " content normal.         The following data was reviewed by Lexie Dolan PA-C:         Lab Results   Component Value Date    BUN 27 (H) 04/29/2025    CREATININE 3.52 (H) 04/29/2025    EGFR 12.5 (L) 04/29/2025    ALT 19 04/29/2025    AST 33 (H) 04/29/2025    WBC 6.41 04/29/2025    HGB 8.1 (L) 04/29/2025    HCT 26.4 (L) 04/29/2025     04/29/2025    CHOL 91 04/22/2025    TRIG 47 04/22/2025    HDL 39 (L) 04/22/2025    LDL 40 04/22/2025    TSH 7.020 (H) 04/22/2025    HGBA1C 5.30 04/22/2025           Assessment & Plan     Diagnoses and all orders for this visit:    1. Venous insufficiency (chronic) (peripheral) (Primary)  Comments:  Decrease amlodipine to 5 mg daily as the 10 mg dose may be causing leg swelling.  Advised leg elevation and compression    2. Essential hypertension  Comments:  Decrease amlodipine 5 mg daily due to leg swelling  Increase Toprol-XL 50 mg daily  Advise daily BP and pulse monitoring  Orders:  -     amLODIPine (NORVASC) 5 MG tablet; Take 1 tablet by mouth Daily.  -     metoprolol succinate XL (TOPROL-XL) 50 MG 24 hr tablet; Take 1 tablet by mouth Daily.  Dispense: 30 tablet; Refill: 5    3. Type 2 diabetes mellitus with chronic kidney disease on chronic dialysis, with long-term current use of insulin  Comments:  Advised a low carbohydrate diet and continue dialysis                   Patient was given instructions and counseling regarding his condition or for health maintenance advice. Please see specific information pulled into the AVS if appropriate      This document has been electronically signed by:  Lexie Dolan PA-C

## 2025-05-21 NOTE — TELEPHONE ENCOUNTER
Caller: FRITZ([facility])    Relationship: Other    Best call back number: [phone]     What medication are you requesting: GLUCOSE METER AND KIT    What are your current symptoms: MONITOR GLUCOSE      If a prescription is needed, what is your preferred pharmacy and phone number: [address] [address] - [phone]  - [phone]     Additional notes:  PLEASE CALL AND ADVISE IF AND WHEN THIS PRESCRIPTION CAN BE SENT TO PHARMACY.

## 2025-05-23 DIAGNOSIS — E53.8 B12 DEFICIENCY: ICD-10-CM

## 2025-05-23 DIAGNOSIS — K90.9 MALABSORPTION OF IRON: ICD-10-CM

## 2025-05-23 DIAGNOSIS — E53.8 FOLATE DEFICIENCY: ICD-10-CM

## 2025-05-23 DIAGNOSIS — D50.9 IRON DEFICIENCY ANEMIA, UNSPECIFIED IRON DEFICIENCY ANEMIA TYPE: Primary | ICD-10-CM

## 2025-05-26 NOTE — PROGRESS NOTES
Subjective     Date: 5/27/2025    Referring Provider  Dr. Johnson     Chief Complaint   Iron deficiency anemia       Subjective          Britta Lee is a 79 y.o. female who presents today to [facility] for follow up.    HPI:   79 y.o.female with history of hypertension, hyperlipidemia, depression/anxiety, hypothyroidism, coronary artery disease, CKD, congestive heart failure who presents as follow-up for her treatment of anemia.  Previously seen by ERNIE Mohr.    She has struggled with anemia since December 2016 with hemoglobin ranging from 8.6-9.8.  Iron studies at the time consistent with anemia of chronic disease.  Has previously received IV iron infusions, and taken oral iron.  Stopped taking oral ferrous sulfate due to malabsorption.  Last IV iron was received August 2022.    Treatment history:      Interval History 02/01/2023   She was started on oral ferrous sulfate by nephrology.  Currently still taking it without any nausea or constipation.  Denies any new complaints.  Denies any melena, hematochezia, hematemesis.  Per chart review patient presented to the ED on 1/11/2023 for reportedly low hemoglobin level in dialysis, hemoglobin of 6.1 mg/dL, required blood transfusion at that time.    Interval History 02/21/2023   Ms. Lee presents today for follow-up with her .  She had a fall from bed, presented to the ED February 8,  was found to have acute fracture of humeral neck and humeral head.  She reports continued oral ferrous sulfate, denies any GI discomfort with this.    Hemoglobin today is 9.3, hematocrit 31.2 today MCV 89.9.  Labs from previous visit showed vitamin B12 713, methylmalonic acid elevated to 933.  Folate level low/normal at 6.7.    She reports previously taking oral vitamin B tablets, never tried injections.  Denies any fatigue, melena, hematochezia.    Interval History 05/23/2023   Ms. Lee presents today with her .  She reports doing well overall. Continues to take oral folic acid and b12 tablet. Unsure if she is taking oral ferrous sulfate.   Recently placed an AV fistula R arm.   Denies GI bleed.  CBC today with Hgb 13.8, Hct 47.3, normal WBC and platelets. Pt is unsure if she gets epo with Dr. Johnson, her  does not think so.     Interval History 08/23/2023   Ms. Lee presents today accompanied by a friend she reports doing well overall, has been getting dialysis Monday Wednesday Friday.  She denies any bleeds including melena or hematochezia.  She is unsure if she is taking folic acid and vitamin B12 capsules, as her  typically gives her medications.    CBC reviewed today which shows hemoglobin 10.8, hematocrit 34.3 (decreased from June 2023 with normal hemoglobin 12.1).  Normal WBC and platelet count.  Iron studies consistent with anemia of chronic disease    Interval History 12/06/2023   Ms. Lee presents today, accompanied by a friend. She reports fatigue today due to having to wake up for this appt followed by dialysis. Continues to take oral folic acid and B12 capsule. Denies any bleeding    CBC with improvement in H/H 11.2/36.5, normal WBC and Platelets.     Of note, bed bugs were noted on patient at the time of lab draw.     Interval History 03/07/2024   Ms. Lee presents today for follow up, accompanied by her . She is doing well overall. Denies any GIB. Thinks she continues to take oral folic acid and B12, but unsure. CBC today with Hgb 11.7, Hct 40, MCV 80.     Interval History 07/09/2024   Ms. Lee presents today, accompanied by her daughter. She had a fall two weeks ago, while moving from [place], presented to [place] ED, no injury was discovered. Has a hematoma on her face and describes neck pain on turn to right or left. She denies any bleed.  CBC with Hgb 12, Hct 41.4, Plts 175. Normal WBC and platelets. MCV 78. Ferritin 628 ng/mL.     Interval History 05/27/2025   Ms. Lee presents  for follow up, accompanied by her . She has had nausea/vomiting after eating meals later in the day. Seeing Dr. Dumont 6/13. Denies any evidence of bleeding including hematemesis. Does not think she is taking oral B12 or folic acid.  CBC: 5.69/10.1/35/182. Ferritin 701 ng/mL.       Objective     Objective     Allergy:   No Known Allergies     Current Medications:   Current Outpatient Medications   Medication Sig Dispense Refill    amLODIPine (NORVASC) 5 MG tablet Take 1 tablet by mouth Daily.      aspirin 81 MG EC tablet Take 1 tablet by mouth Daily.      atorvastatin (LIPITOR) 80 MG tablet Take 0.5 tablets by mouth Every Night. 90 tablet 3    escitalopram (LEXAPRO) 20 MG tablet Take 1 tablet by mouth Daily. 90 tablet 3    ferrous sulfate 325 (65 FE) MG tablet Take 1 tablet by mouth Daily With Breakfast. 30 tablet 5    levothyroxine (SYNTHROID, LEVOTHROID) 50 MCG tablet Take 1 tablet by mouth Daily. 30 tablet 5    metoprolol succinate XL (TOPROL-XL) 50 MG 24 hr tablet Take 1 tablet by mouth Daily. 30 tablet 5    pantoprazole (PROTONIX) 40 MG EC tablet Take 1 tablet by mouth Daily. 90 tablet 3    vitamin D (ERGOCALCIFEROL) 1.25 MG (38325 UT) capsule capsule Take 1 capsule by mouth 1 (One) Time Per Week. 15 capsule 3    zinc oxide (Desitin) 40 % paste Apply 1 Application topically to the appropriate area as directed 3 (Three) Times a Day As Needed (skin irritation). 113 g 2     No current facility-administered medications for this visit.       Past Medical History:  Past Medical History:   Diagnosis Date    Acquired hypothyroidism 04/22/2021    Anemia     Arthritis     Carpal tunnel syndrome     Coronary artery disease     Diabetes mellitus     Elevated cholesterol     GERD (gastroesophageal reflux disease)     History of pneumonia     History of unsteady gait     OCASSIONALLY    Hypertension     Insomnia     Kidney disease     Kidney disease, chronic, stage IV (GFR 15-29 ml/min)     LENNY (obstructive sleep  apnea) 12/01/2020    no c-pap    Osteoarthritis     Renal insufficiency     Sciatica        Past Surgical History:  Past Surgical History:   Procedure Laterality Date    ABDOMINAL SURGERY      APPENDECTOMY      CARDIAC CATHETERIZATION      1 STENT  ---  2000    CARDIAC SURGERY      CAROTID STENT      CARPAL TUNNEL RELEASE Right 10/08/2019    Procedure: CARPAL TUNNEL RELEASE;  Surgeon: Feroz Levin MD;  Location: [facility] OR;  Service: Orthopedics    CATARACT EXTRACTION      CHOLECYSTECTOMY      COLONOSCOPY      COLONOSCOPY N/A 11/23/2021    Procedure: COLONOSCOPY FOR SCREENING;  Surgeon: Palmira Pate MD;  Location:  [facility] OR;  Service: Gastroenterology;  Laterality: N/A;    COLONOSCOPY N/A 2/27/2025    Procedure: COLONOSCOPY;  Surgeon: Alexandru Bagley MD;  Location: [facility] OR;  Service: Gastroenterology;  Laterality: N/A;    CORONARY ANGIOPLASTY WITH STENT PLACEMENT      ENDOSCOPY      ENDOSCOPY N/A 03/05/2020    Procedure: ESOPHAGOGASTRODUODENOSCOPY;  Surgeon: Alexandru Bagley MD;  Location: [facility] OR;  Service: Gastroenterology;  Laterality: N/A;    ENDOSCOPY N/A 11/23/2021    Procedure: ESOPHAGOGASTRODUODENOSCOPY WITH BIOPSY;  Surgeon: Palmira Pate MD;  Location: [facility] OR;  Service: Gastroenterology;  Laterality: N/A;    ENDOSCOPY AND COLONOSCOPY      HEAD/NECK LESION/CYST EXCISION Right 4/30/2024    Procedure: FACIAL LESION/CYST EXCISION;  Surgeon: Ta Blas MD;  Location: [facility] OR;  Service: General;  Laterality: Right;    INSERTION HEMODIALYSIS CATHETER N/A 11/14/2022    Procedure: HEMODIALYSIS CATHETER INSERTION;  Surgeon: Ta Blas MD;  Location: [facility] OR;  Service: General;  Laterality: N/A;    JOINT REPLACEMENT Left 05/02/2017    [facility] [place]  DR LEVIN  LEFT TOTAL KNEE    KNEE ARTHROSCOPY W/ MENISCECTOMY Right     LAPAROSCOPIC TUBAL LIGATION      OH ARTHRP KNE CONDYLE&PLATU MEDIAL&LAT COMPARTMENTS Left 05/02/2017    Procedure: TOTAL KNEE ARTHROPLASTY;   "Surgeon: Feroz Johnson MD;  Location: [facility];  Service: Orthopedics    STERILIZATION      TONSILLECTOMY         Social History:  Social History     Socioeconomic History    Marital status:      Spouse name: natalie    Number of children: 3    Years of education: 12   Tobacco Use    Smoking status: Never     Passive exposure: Past    Smokeless tobacco: Never   Vaping Use    Vaping status: Never Used   Substance and Sexual Activity    Alcohol use: Yes     Comment: socially    Drug use: No    Sexual activity: Defer     Birth control/protection: Surgical     Britta Lee  reports that she has never smoked. She has been exposed to tobacco smoke. She has never used smokeless tobacco.      Family History:  Family History   Problem Relation Age of Onset    Arthritis Mother     Diabetes Mother     Cancer Mother     Heart disease Father     Diabetes Daughter     Diabetes Son     Diabetes Maternal Aunt     Heart disease Maternal Grandmother     Breast cancer Neg Hx        Review of Systems:  Review of Systems   All other systems reviewed and are negative.      Vital Signs:   /62   Pulse 65   Temp 97 °F (36.1 °C) (Temporal)   Resp 20   Ht 160 cm (63\")   Wt 67.6 kg (149 lb)   SpO2 96%   BMI 26.39 kg/m²      Physical Exam:  Physical Exam  Vitals and nursing note reviewed.   Constitutional:       General: She is not in acute distress.     Appearance: Normal appearance. She is not ill-appearing.      Comments: + In a wheelchair   HENT:      Head: Normocephalic and atraumatic.      Nose: Nose normal.      Mouth/Throat:      Mouth: Mucous membranes are moist.      Pharynx: Oropharynx is clear.   Eyes:      Conjunctiva/sclera: Conjunctivae normal.      Pupils: Pupils are equal, round, and reactive to light.   Cardiovascular:      Rate and Rhythm: Normal rate and regular rhythm.      Heart sounds: No murmur heard.  Pulmonary:      Effort: Pulmonary effort is normal. No respiratory distress.      Breath " "sounds: Normal breath sounds. No wheezing.   Abdominal:      General: Abdomen is flat. Bowel sounds are normal. There is no distension.      Palpations: Abdomen is soft. There is no mass.      Tenderness: There is no abdominal tenderness. There is no guarding.   Musculoskeletal:         General: No swelling. Normal range of motion.      Cervical back: Normal range of motion.   Lymphadenopathy:      Cervical: No cervical adenopathy.   Skin:     General: Skin is warm and dry.      Coloration: Skin is not jaundiced.      Findings: No lesion or rash.      Comments: +R AV fistula    Multiple excoriations on bilateral upper and lower extremities   Neurological:      General: No focal deficit present.      Mental Status: She is alert and oriented to person, place, and time.      Motor: No weakness.      Coordination: Coordination normal.   Psychiatric:         Mood and Affect: Mood normal.         Behavior: Behavior normal.         Judgment: Judgment normal.         PHQ-9 Score  PHQ-9 Total Score:       Pain Score  Vitals:    05/27/25 1124   BP: 126/62   Pulse: 65   Resp: 20   Temp: 97 °F (36.1 °C)   TempSrc: Temporal   SpO2: 96%   Weight: 67.6 kg (149 lb)   Height: 160 cm (63\")   PainSc: 3    PainLoc: Arm             ECOG score: 0             Lab Review  Lab Results   Component Value Date    WBC 5.69 05/27/2025    HGB 10.1 (L) 05/27/2025    HCT 35.0 05/27/2025    MCV 79.5 05/27/2025    RDW 19.3 (H) 05/27/2025     05/27/2025    NEUTRORELPCT 59.9 05/27/2025    LYMPHORELPCT 18.8 (L) 05/27/2025    MONORELPCT 8.6 05/27/2025    EOSRELPCT 12.0 (H) 05/27/2025    BASORELPCT 0.5 05/27/2025    NEUTROABS 3.41 05/27/2025    LYMPHSABS 1.07 05/27/2025       Lab Results   Component Value Date     (L) 04/29/2025    K 4.4 04/29/2025    CO2 26.6 04/29/2025    CL 93 (L) 04/29/2025    BUN 27 (H) 04/29/2025    CREATININE 3.52 (H) 04/29/2025    EGFRIFNONA 23 (L) 02/21/2022    EGFRIFAFRI 41 (L) 07/30/2018    GLUCOSE 104 (H) " 04/29/2025    CALCIUM 8.5 (L) 04/29/2025    ALKPHOS 309 (H) 04/29/2025    AST 33 (H) 04/29/2025    ALT 19 04/29/2025    BILITOT 0.4 04/29/2025    ALBUMIN 2.9 (L) 04/29/2025    PROTEINTOT 7.8 04/29/2025    MG 1.9 02/17/2025    PHOS 3.3 10/17/2022           Endoscopy:   ENDOSCOPY:  11/23/21  ESOPHAGOGASTRODUODENOSCOPY PROCEDURE REPORT     Britta Lee  11/23/2021     GASTROENTEROLOGIST:  Palmira Pate MD      PRE-PROCEDURE DIAGNOSIS:  Iron deficiency anemia, unspecified iron deficiency anemia type [D50.9]  Weight loss, abnormal [R63.4]     POST-PROCEDURE DIAGNOSIS:  1.  Gastritis  2.  Angiectasias     Procedure(s):  ESOPHAGOGASTRODUODENOSCOPY WITH BIOPSY  COLONOSCOPY FOR SCREENING     ANESTHESIA:  Propofol administered by anesthesia.  See anesthesia notes for ASA classification     STAFF:  Circulator: Bre Ny RN; Danelle Mccray RN  Endo Technician: Omari Lewis     FINDINGS:  1.  Normal-appearing esophagus.  Biopsies were taken  2.  Mild erythema in the antrum and stomach.  Biopsies taken for H. pylori.  3.  One angiectasia was found in the second portion of the duodenum.  This was nonbleeding.  Biopsies were taken of the duodenum to rule out celiac disease as a source of her anemia.     OPERATIVE PROCEDURE:  After proper informed consent was obtained, patient was transferred to the OR/endoscopy suite.  Patient was then placed in left lateral decubitus position. The Olympus 180 series video gastroscope was inserted orally under direct visualization.  Esophagus, stomach, and duodenum were inspected.  The endoscope was passed to the third portion of the duodenum.  Scope was retroflexed for visualization of the cardia and incisuraThe endoscope was then withdrawn. Patient tolerated the procedure well. There were no immediate complications.     ESTIMATED BLOOD LOSS:  None     COMPLICATIONS;  None     RECOMMENDATIONS/ PLAN:  1.  Follow-up biopsy results.  2.  Proceed with  colonoscopy.    COLONOSCOPY PROCEDURE NOTE     Britta Lee  11/23/2021     GASTROENTEROLOGIST:  Palmira Pate MD        PRE-PROCEDURE DIAGNOSIS:   Iron deficiency anemia, unspecified iron deficiency anemia type [D50.9]  Weight loss, abnormal [R63.4]     POST-PROCEDURE DIAGNOSIS:  1.  Colon polyps  2.  Diverticulosis  3.  Internal hemorrhoids     PROCEDURE:  COLONOSCOPY with snare polypectomy and cold biopsy polypectomy     ANESTHESIA:  Propofol administered by anesthesia.  See anesthesia notes for ASA classification     STAFF  Circulator: Bre Ny RN; Danelle Mccray RN  Endo Technician: Omari Lewis     Findings:  1.  A 5 mm polyp was found in the ascending colon.  This was removed with cold forceps polypectomy.  2.  A 5 mm polyp was removed from the transverse colon with cold forceps biopsy.  3.  Two 6 mm polyps were found in the descending colon.  Both polyps were removed with cold snare polypectomy.  4.  A 7 mm polyp was removed from the sigmoid colon with cold snare polypectomy.  5.  Diverticulosis involving left colon.  6.  Small internal hemorrhoids.  7.  Normal-appearing terminal ileum.    OPERATIVE PROCEDURE   After proper informed consent was obtained, the patient was taken the operating suite and placed in left lateral decubitus position.  An Olympus video colonoscope 180 series was inserted in the rectum and advanced to the terminal ileum under direct visualization.  Cecum and terminal ileum were identified by visualization of the appendiceal orifice and ileocecal valve.  The colonoscope was then slowly withdrawn from the cecum to the rectum and passed a second time from rectum to cecum.  The colonoscope was retroflexed in the cecum and rectum. Scope was then withdrawn. Patient tolerated the procedure well. There were no immediate complications. Cecal withdrawal time was 15 minutes.     ESTIMATED BLOOD LOSS  None      COMPLICATIONS  None     RECOMMENDATIONS:  1.   Follow-up pathology.  2.  Repeat colonoscopy in 3 years due to personal history of colon polyps.  3.  Proceed with capsule endoscopy if anemia persists.  Sign 4.  Follow-up in GI clinic in 6 to 8 weeks.  Assessment / Plan         Assessment and Plan   79 y.o. year old F with history of ESRD on HD who presents for normocytic anemia.     Anemia  2    iron deficiency anemia  3.   Vitamin B12 deficiency  4.   Folate deficiency  5.   Malabsorption  -Patient with history of anemia required multiple transfusions, most recently 1/12/2023.  Previously required IV iron after intolerance to p.o. iron.  Last IV iron received August 2022.  -Most recent EGD and colonoscopy by Dr. Simmons 11/2021.  At that time no source of bleed was discovered, it was recommended patient follow-up with capsule endoscopy if anemia persist.  -Previously required multiple parenteral iron with IV ferumoxytol, last received 8/2022  -Also discovered to have low folate and normal B12 but elevated methylmalonic acid  -Has not been taking oral B12 and folic acid  -She is unsure if receiving Erythropoietin with nephrology, Dr. Johnson   -Iron studies today consistent with anemia of chronic disease.     Discussed possible differential diagnoses, testing, treatment, recommended non-pharmacological interventions, risks, warning signs to monitor for that would indicate need for follow-up in clinic or ER. If no improvement with these regimens or you have new or worsening symptoms follow-up. Patient verbalizes understanding and agreement with plan of care. Denies further needs or concerns.     Patient was given instructions and counseling regarding her condition and for health maintenance advised.    I spent 30 minutes with Britta Jesus today.  In the office today, more than 50% of this time was spent face-to-face with her  in counseling / coordination of care, reviewing her interim medical history and counseling on the current treatment plan.  All questions were  answered to her satisfaction.      Meds ordered during this visit  No orders of the defined types were placed in this encounter.      Visit Diagnoses    ICD-10-CM ICD-9-CM   1. Iron deficiency anemia, unspecified iron deficiency anemia type  D50.9 280.9   2. CKD (chronic kidney disease) stage 4, GFR 15-29 ml/min  N18.4 585.4   3. Anemia of chronic disease  D63.8 285.29   4. B12 deficiency  E53.8 266.2   5. Folate deficiency  E53.8 266.2                 Follow Up   Return to clinic in 3 months with repeat CBC, folate, B12, MMA, iron studies, ferritin, STR      This document has been electronically signed by Sakina Bang MD   May 27, 2025 12:27 EDT    Dictated Utilizing Dragon Dictation: Part of this note may be an electronic transcription/translation of spoken language to printed text using the Dragon Dictation System.

## 2025-05-27 ENCOUNTER — OFFICE VISIT (OUTPATIENT)
Dept: ONCOLOGY | Facility: CLINIC | Age: 80
End: 2025-05-27
Payer: MEDICARE

## 2025-05-27 ENCOUNTER — LAB (OUTPATIENT)
Dept: ONCOLOGY | Facility: CLINIC | Age: 80
End: 2025-05-27
Payer: MEDICARE

## 2025-05-27 VITALS
HEIGHT: 63 IN | TEMPERATURE: 97 F | DIASTOLIC BLOOD PRESSURE: 62 MMHG | RESPIRATION RATE: 20 BRPM | SYSTOLIC BLOOD PRESSURE: 126 MMHG | OXYGEN SATURATION: 96 % | HEART RATE: 65 BPM | WEIGHT: 149 LBS | BODY MASS INDEX: 26.4 KG/M2

## 2025-05-27 DIAGNOSIS — E53.8 B12 DEFICIENCY: ICD-10-CM

## 2025-05-27 DIAGNOSIS — D50.9 IRON DEFICIENCY ANEMIA, UNSPECIFIED IRON DEFICIENCY ANEMIA TYPE: Primary | ICD-10-CM

## 2025-05-27 DIAGNOSIS — D63.8 ANEMIA OF CHRONIC DISEASE: ICD-10-CM

## 2025-05-27 DIAGNOSIS — K90.9 MALABSORPTION OF IRON: ICD-10-CM

## 2025-05-27 DIAGNOSIS — E53.8 FOLATE DEFICIENCY: ICD-10-CM

## 2025-05-27 DIAGNOSIS — N18.4 CKD (CHRONIC KIDNEY DISEASE) STAGE 4, GFR 15-29 ML/MIN: ICD-10-CM

## 2025-05-27 DIAGNOSIS — D50.9 IRON DEFICIENCY ANEMIA, UNSPECIFIED IRON DEFICIENCY ANEMIA TYPE: ICD-10-CM

## 2025-05-27 LAB
BASOPHILS # BLD AUTO: 0.03 10*3/MM3 (ref 0–0.2)
BASOPHILS NFR BLD AUTO: 0.5 % (ref 0–1.5)
DEPRECATED RDW RBC AUTO: 54.6 FL (ref 37–54)
EOSINOPHIL # BLD AUTO: 0.68 10*3/MM3 (ref 0–0.4)
EOSINOPHIL NFR BLD AUTO: 12 % (ref 0.3–6.2)
ERYTHROCYTE [DISTWIDTH] IN BLOOD BY AUTOMATED COUNT: 19.3 % (ref 12.3–15.4)
FERRITIN SERPL-MCNC: 701 NG/ML (ref 13–150)
FOLATE SERPL-MCNC: 5.58 NG/ML (ref 4.78–24.2)
HCT VFR BLD AUTO: 35 % (ref 34–46.6)
HGB BLD-MCNC: 10.1 G/DL (ref 12–15.9)
IMM GRANULOCYTES # BLD AUTO: 0.01 10*3/MM3 (ref 0–0.05)
IMM GRANULOCYTES NFR BLD AUTO: 0.2 % (ref 0–0.5)
IRON 24H UR-MRATE: 23 MCG/DL (ref 37–145)
IRON SATN MFR SERPL: 11 % (ref 20–50)
LYMPHOCYTES # BLD AUTO: 1.07 10*3/MM3 (ref 0.7–3.1)
LYMPHOCYTES NFR BLD AUTO: 18.8 % (ref 19.6–45.3)
MCH RBC QN AUTO: 23 PG (ref 26.6–33)
MCHC RBC AUTO-ENTMCNC: 28.9 G/DL (ref 31.5–35.7)
MCV RBC AUTO: 79.5 FL (ref 79–97)
MONOCYTES # BLD AUTO: 0.49 10*3/MM3 (ref 0.1–0.9)
MONOCYTES NFR BLD AUTO: 8.6 % (ref 5–12)
NEUTROPHILS NFR BLD AUTO: 3.41 10*3/MM3 (ref 1.7–7)
NEUTROPHILS NFR BLD AUTO: 59.9 % (ref 42.7–76)
NRBC BLD AUTO-RTO: 0 /100 WBC (ref 0–0.2)
PLATELET # BLD AUTO: 182 10*3/MM3 (ref 140–450)
PMV BLD AUTO: 8.2 FL (ref 6–12)
RBC # BLD AUTO: 4.4 10*6/MM3 (ref 3.77–5.28)
TIBC SERPL-MCNC: 209 MCG/DL (ref 298–536)
TRANSFERRIN SERPL-MCNC: 140 MG/DL (ref 200–360)
VIT B12 BLD-MCNC: 1300 PG/ML (ref 211–946)
WBC NRBC COR # BLD AUTO: 5.69 10*3/MM3 (ref 3.4–10.8)

## 2025-05-27 PROCEDURE — 82728 ASSAY OF FERRITIN: CPT | Performed by: INTERNAL MEDICINE

## 2025-05-27 PROCEDURE — 84238 ASSAY NONENDOCRINE RECEPTOR: CPT | Performed by: INTERNAL MEDICINE

## 2025-05-27 PROCEDURE — 84466 ASSAY OF TRANSFERRIN: CPT | Performed by: INTERNAL MEDICINE

## 2025-05-27 PROCEDURE — 85025 COMPLETE CBC W/AUTO DIFF WBC: CPT | Performed by: INTERNAL MEDICINE

## 2025-05-27 PROCEDURE — 82746 ASSAY OF FOLIC ACID SERUM: CPT | Performed by: INTERNAL MEDICINE

## 2025-05-27 PROCEDURE — 82607 VITAMIN B-12: CPT | Performed by: INTERNAL MEDICINE

## 2025-05-27 PROCEDURE — 83921 ORGANIC ACID SINGLE QUANT: CPT | Performed by: INTERNAL MEDICINE

## 2025-05-27 PROCEDURE — 83540 ASSAY OF IRON: CPT | Performed by: INTERNAL MEDICINE

## 2025-05-27 NOTE — PROGRESS NOTES
Venipuncture Blood Specimen Collection  Venipuncture performed in right arm by Liyah Cristina MA with good hemostasis. Patient tolerated the procedure well without complications.   05/27/25   Liyah Cristina MA

## 2025-05-28 ENCOUNTER — TELEPHONE (OUTPATIENT)
Dept: ONCOLOGY | Facility: CLINIC | Age: 80
End: 2025-05-28
Payer: MEDICARE

## 2025-05-28 ENCOUNTER — RESULTS FOLLOW-UP (OUTPATIENT)
Dept: ONCOLOGY | Facility: CLINIC | Age: 80
End: 2025-05-28
Payer: MEDICARE

## 2025-05-28 RX ORDER — FOLIC ACID 1 MG/1
1 TABLET ORAL DAILY
Qty: 30 TABLET | Refills: 3 | Status: SHIPPED | OUTPATIENT
Start: 2025-05-28

## 2025-05-28 NOTE — TELEPHONE ENCOUNTER
RN informed patient's spouse of low folate level and educated him on the importance of having enough folate in the body as it helps produce red blood cells. RN informed him that MD has prescribed patient folic acid to take daily. He voiced understanding and denies any questions at this time.

## 2025-05-28 NOTE — TELEPHONE ENCOUNTER
----- Message from Sakina Bang sent at 5/28/2025  8:16 AM EDT -----  Could we please start her on folic acid 1 mg daily    Thank you!  ----- Message -----  From: Lab, Background User  Sent: 5/27/2025  11:28 AM EDT  To: Sakina Bang MD

## 2025-05-28 NOTE — TELEPHONE ENCOUNTER
Dorene with [facility] called and stated that she is at the home with Britta and she is very sick at her stomach and vomiting.  She is able to keep breakfast down but everything  she eats and drinks after that is not able to keep down. She stated she feels so bad and sick that she missed dialysis today. Dorene stated she was about concerned about her getting dehydrated. Talk to RN let Dorene know to call PCP for Vomiting. Dorene stated that she already called the PCP and they told her to call Dr. Bang office. Let her know that I would send message to Dr. Bang nurse     Dorene stated this was the best number to reach her back at  [phone]     
RN discussed with MD who stated the home health nurse will need to follow up with ordering provider that referred patient and manages home health orders. Patient does not receive treatment and is being followed for iron deficiency anemia.     RN called and spoke to Dorene and relayed this information to her. She voiced understanding.       
no

## 2025-05-29 DIAGNOSIS — Z99.2 TYPE 2 DIABETES MELLITUS WITH CHRONIC KIDNEY DISEASE ON CHRONIC DIALYSIS, WITH LONG-TERM CURRENT USE OF INSULIN: Primary | ICD-10-CM

## 2025-05-29 DIAGNOSIS — N18.6 TYPE 2 DIABETES MELLITUS WITH CHRONIC KIDNEY DISEASE ON CHRONIC DIALYSIS, WITH LONG-TERM CURRENT USE OF INSULIN: Primary | ICD-10-CM

## 2025-05-29 DIAGNOSIS — E11.22 TYPE 2 DIABETES MELLITUS WITH CHRONIC KIDNEY DISEASE ON CHRONIC DIALYSIS, WITH LONG-TERM CURRENT USE OF INSULIN: Primary | ICD-10-CM

## 2025-05-29 DIAGNOSIS — Z79.4 TYPE 2 DIABETES MELLITUS WITH CHRONIC KIDNEY DISEASE ON CHRONIC DIALYSIS, WITH LONG-TERM CURRENT USE OF INSULIN: Primary | ICD-10-CM

## 2025-05-29 LAB — STFR SERPL-SCNC: 47.2 NMOL/L (ref 12.2–27.3)

## 2025-05-29 RX ORDER — ALCOHOL ANTISEPTIC PADS
1 PADS, MEDICATED (EA) TOPICAL DAILY
Qty: 30 EACH | Refills: 11 | Status: SHIPPED | OUTPATIENT
Start: 2025-05-29

## 2025-05-29 NOTE — TELEPHONE ENCOUNTER
Inform patient prescription for blood glucose monitor test strips and lancets was sent to her pharmacy

## 2025-05-30 LAB — METHYLMALONATE SERPL-SCNC: 744 NMOL/L (ref 0–378)

## 2025-06-01 ENCOUNTER — HOSPITAL ENCOUNTER (EMERGENCY)
Facility: HOSPITAL | Age: 80
Discharge: OTHER INSTITUTION | End: 2025-06-01
Attending: FAMILY MEDICINE
Payer: MEDICARE

## 2025-06-01 ENCOUNTER — APPOINTMENT (OUTPATIENT)
Dept: CT IMAGING | Facility: HOSPITAL | Age: 80
End: 2025-06-01
Payer: MEDICARE

## 2025-06-01 VITALS
TEMPERATURE: 98.3 F | BODY MASS INDEX: 26.05 KG/M2 | HEART RATE: 75 BPM | DIASTOLIC BLOOD PRESSURE: 67 MMHG | HEIGHT: 63 IN | RESPIRATION RATE: 18 BRPM | OXYGEN SATURATION: 96 % | SYSTOLIC BLOOD PRESSURE: 136 MMHG | WEIGHT: 147 LBS

## 2025-06-01 DIAGNOSIS — E87.5 HYPERKALEMIA: ICD-10-CM

## 2025-06-01 DIAGNOSIS — R79.89 ELEVATED TROPONIN: ICD-10-CM

## 2025-06-01 DIAGNOSIS — E87.70 HYPERVOLEMIA, UNSPECIFIED HYPERVOLEMIA TYPE: ICD-10-CM

## 2025-06-01 LAB
A-A DO2: 24.5 MMHG (ref 0–300)
ALBUMIN SERPL-MCNC: 3.1 G/DL (ref 3.5–5.2)
ALBUMIN/GLOB SERPL: 0.6 G/DL
ALP SERPL-CCNC: 345 U/L (ref 39–117)
ALT SERPL W P-5'-P-CCNC: 15 U/L (ref 1–33)
ANION GAP SERPL CALCULATED.3IONS-SCNC: 14.5 MMOL/L (ref 5–15)
ANION GAP SERPL CALCULATED.3IONS-SCNC: 16.8 MMOL/L (ref 5–15)
ARTERIAL PATENCY WRIST A: ABNORMAL
AST SERPL-CCNC: 38 U/L (ref 1–32)
ATMOSPHERIC PRESS: 722 MMHG
BASE EXCESS BLDA CALC-SCNC: -2.1 MMOL/L (ref 0–2)
BASOPHILS # BLD AUTO: 0.03 10*3/MM3 (ref 0–0.2)
BASOPHILS NFR BLD AUTO: 0.4 % (ref 0–1.5)
BDY SITE: ABNORMAL
BILIRUB SERPL-MCNC: 0.5 MG/DL (ref 0–1.2)
BUN SERPL-MCNC: 64.5 MG/DL (ref 8–23)
BUN SERPL-MCNC: 65.8 MG/DL (ref 8–23)
BUN/CREAT SERPL: 8.2 (ref 7–25)
BUN/CREAT SERPL: 8.5 (ref 7–25)
CALCIUM SPEC-SCNC: 8.6 MG/DL (ref 8.6–10.5)
CALCIUM SPEC-SCNC: 8.7 MG/DL (ref 8.6–10.5)
CHLORIDE SERPL-SCNC: 94 MMOL/L (ref 98–107)
CHLORIDE SERPL-SCNC: 96 MMOL/L (ref 98–107)
CO2 BLDA-SCNC: 24.7 MMOL/L (ref 22–33)
CO2 SERPL-SCNC: 20.2 MMOL/L (ref 22–29)
CO2 SERPL-SCNC: 22.5 MMOL/L (ref 22–29)
COHGB MFR BLD: 2.2 % (ref 0–5)
CREAT SERPL-MCNC: 7.7 MG/DL (ref 0.57–1)
CREAT SERPL-MCNC: 7.84 MG/DL (ref 0.57–1)
DEPRECATED RDW RBC AUTO: 56.5 FL (ref 37–54)
EGFRCR SERPLBLD CKD-EPI 2021: 4.8 ML/MIN/1.73
EGFRCR SERPLBLD CKD-EPI 2021: 4.9 ML/MIN/1.73
EOSINOPHIL # BLD AUTO: 0.28 10*3/MM3 (ref 0–0.4)
EOSINOPHIL NFR BLD AUTO: 4.1 % (ref 0.3–6.2)
ERYTHROCYTE [DISTWIDTH] IN BLOOD BY AUTOMATED COUNT: 19.7 % (ref 12.3–15.4)
GEN 5 1HR TROPONIN T REFLEX: 101 NG/L
GLOBULIN UR ELPH-MCNC: 5.1 GM/DL
GLUCOSE BLDC GLUCOMTR-MCNC: 139 MG/DL (ref 70–130)
GLUCOSE BLDC GLUCOMTR-MCNC: 259 MG/DL (ref 70–130)
GLUCOSE SERPL-MCNC: 120 MG/DL (ref 65–99)
GLUCOSE SERPL-MCNC: 137 MG/DL (ref 65–99)
HCO3 BLDA-SCNC: 23.4 MMOL/L (ref 20–26)
HCT VFR BLD AUTO: 35.3 % (ref 34–46.6)
HCT VFR BLD CALC: 31 % (ref 38–51)
HGB BLD-MCNC: 10.1 G/DL (ref 12–15.9)
HGB BLDA-MCNC: 10.1 G/DL (ref 13.5–17.5)
HOLD SPECIMEN: NORMAL
HOLD SPECIMEN: NORMAL
IMM GRANULOCYTES # BLD AUTO: 0.03 10*3/MM3 (ref 0–0.05)
IMM GRANULOCYTES NFR BLD AUTO: 0.4 % (ref 0–0.5)
INHALED O2 CONCENTRATION: 21 %
LYMPHOCYTES # BLD AUTO: 0.76 10*3/MM3 (ref 0.7–3.1)
LYMPHOCYTES NFR BLD AUTO: 11.2 % (ref 19.6–45.3)
Lab: ABNORMAL
MCH RBC QN AUTO: 22.8 PG (ref 26.6–33)
MCHC RBC AUTO-ENTMCNC: 28.6 G/DL (ref 31.5–35.7)
MCV RBC AUTO: 79.7 FL (ref 79–97)
METHGB BLD QL: 0.2 % (ref 0–3)
MODALITY: ABNORMAL
MONOCYTES # BLD AUTO: 0.39 10*3/MM3 (ref 0.1–0.9)
MONOCYTES NFR BLD AUTO: 5.8 % (ref 5–12)
NEUTROPHILS NFR BLD AUTO: 5.27 10*3/MM3 (ref 1.7–7)
NEUTROPHILS NFR BLD AUTO: 78.1 % (ref 42.7–76)
NRBC BLD AUTO-RTO: 0 /100 WBC (ref 0–0.2)
OXYHGB MFR BLDV: 90.8 % (ref 94–99)
PCO2 BLDA: 41.9 MM HG (ref 35–45)
PCO2 TEMP ADJ BLD: ABNORMAL MM[HG]
PH BLDA: 7.36 PH UNITS (ref 7.35–7.45)
PH, TEMP CORRECTED: ABNORMAL
PLATELET # BLD AUTO: 149 10*3/MM3 (ref 140–450)
PMV BLD AUTO: 8.4 FL (ref 6–12)
PO2 BLDA: 69.8 MM HG (ref 83–108)
PO2 TEMP ADJ BLD: ABNORMAL MM[HG]
POTASSIUM SERPL-SCNC: 5.7 MMOL/L (ref 3.5–5.2)
POTASSIUM SERPL-SCNC: 6.3 MMOL/L (ref 3.5–5.2)
PROT SERPL-MCNC: 8.2 G/DL (ref 6–8.5)
QT INTERVAL: 408 MS
QT INTERVAL: 430 MS
QTC INTERVAL: 473 MS
QTC INTERVAL: 496 MS
RBC # BLD AUTO: 4.43 10*6/MM3 (ref 3.77–5.28)
SAO2 % BLDCOA: 93 % (ref 94–99)
SODIUM SERPL-SCNC: 131 MMOL/L (ref 136–145)
SODIUM SERPL-SCNC: 133 MMOL/L (ref 136–145)
TROPONIN T % DELTA: 1
TROPONIN T NUMERIC DELTA: 1 NG/L
TROPONIN T SERPL HS-MCNC: 100 NG/L
VENTILATOR MODE: ABNORMAL
WBC NRBC COR # BLD AUTO: 6.76 10*3/MM3 (ref 3.4–10.8)
WHOLE BLOOD HOLD COAG: NORMAL
WHOLE BLOOD HOLD SPECIMEN: NORMAL

## 2025-06-01 PROCEDURE — 93005 ELECTROCARDIOGRAM TRACING: CPT

## 2025-06-01 PROCEDURE — 70450 CT HEAD/BRAIN W/O DYE: CPT

## 2025-06-01 PROCEDURE — 36600 WITHDRAWAL OF ARTERIAL BLOOD: CPT

## 2025-06-01 PROCEDURE — 36415 COLL VENOUS BLD VENIPUNCTURE: CPT

## 2025-06-01 PROCEDURE — 93005 ELECTROCARDIOGRAM TRACING: CPT | Performed by: FAMILY MEDICINE

## 2025-06-01 PROCEDURE — 82375 ASSAY CARBOXYHB QUANT: CPT

## 2025-06-01 PROCEDURE — 96374 THER/PROPH/DIAG INJ IV PUSH: CPT

## 2025-06-01 PROCEDURE — 72125 CT NECK SPINE W/O DYE: CPT | Performed by: RADIOLOGY

## 2025-06-01 PROCEDURE — 70450 CT HEAD/BRAIN W/O DYE: CPT | Performed by: RADIOLOGY

## 2025-06-01 PROCEDURE — 96375 TX/PRO/DX INJ NEW DRUG ADDON: CPT

## 2025-06-01 PROCEDURE — 82948 REAGENT STRIP/BLOOD GLUCOSE: CPT

## 2025-06-01 PROCEDURE — 63710000001 INSULIN REGULAR HUMAN PER 5 UNITS: Performed by: FAMILY MEDICINE

## 2025-06-01 PROCEDURE — 93010 ELECTROCARDIOGRAM REPORT: CPT | Performed by: INTERNAL MEDICINE

## 2025-06-01 PROCEDURE — 82805 BLOOD GASES W/O2 SATURATION: CPT

## 2025-06-01 PROCEDURE — 72125 CT NECK SPINE W/O DYE: CPT

## 2025-06-01 PROCEDURE — 99285 EMERGENCY DEPT VISIT HI MDM: CPT

## 2025-06-01 PROCEDURE — 84484 ASSAY OF TROPONIN QUANT: CPT

## 2025-06-01 PROCEDURE — 25010000002 CALCIUM GLUCONATE-NACL 1-0.675 GM/50ML-% SOLUTION: Performed by: FAMILY MEDICINE

## 2025-06-01 PROCEDURE — 80053 COMPREHEN METABOLIC PANEL: CPT | Performed by: FAMILY MEDICINE

## 2025-06-01 PROCEDURE — 83050 HGB METHEMOGLOBIN QUAN: CPT

## 2025-06-01 PROCEDURE — 85025 COMPLETE CBC W/AUTO DIFF WBC: CPT | Performed by: FAMILY MEDICINE

## 2025-06-01 RX ORDER — CALCIUM GLUCONATE 20 MG/ML
1000 INJECTION, SOLUTION INTRAVENOUS ONCE
Status: COMPLETED | OUTPATIENT
Start: 2025-06-01 | End: 2025-06-01

## 2025-06-01 RX ORDER — FUROSEMIDE 10 MG/ML
80 INJECTION INTRAMUSCULAR; INTRAVENOUS ONCE
Status: DISCONTINUED | OUTPATIENT
Start: 2025-06-01 | End: 2025-06-01 | Stop reason: HOSPADM

## 2025-06-01 RX ORDER — INDOMETHACIN 25 MG/1
50 CAPSULE ORAL ONCE
Status: COMPLETED | OUTPATIENT
Start: 2025-06-01 | End: 2025-06-01

## 2025-06-01 RX ORDER — DEXTROSE MONOHYDRATE 25 G/50ML
25 INJECTION, SOLUTION INTRAVENOUS ONCE
Status: COMPLETED | OUTPATIENT
Start: 2025-06-01 | End: 2025-06-01

## 2025-06-01 RX ADMIN — DEXTROSE MONOHYDRATE 25 G: 25 INJECTION, SOLUTION INTRAVENOUS at 05:05

## 2025-06-01 RX ADMIN — SODIUM ZIRCONIUM CYCLOSILICATE 10 G: 10 POWDER, FOR SUSPENSION ORAL at 05:07

## 2025-06-01 RX ADMIN — SODIUM BICARBONATE 50 MEQ: 84 INJECTION INTRAVENOUS at 04:59

## 2025-06-01 RX ADMIN — INSULIN HUMAN 5 UNITS: 100 INJECTION, SOLUTION PARENTERAL at 05:05

## 2025-06-01 RX ADMIN — CALCIUM GLUCONATE 1000 MG: 20 INJECTION, SOLUTION INTRAVENOUS at 05:06

## 2025-06-01 NOTE — ED NOTES
Called [facility] requesting possible transfer. Dr. Christianson accepted patient to [facility] ED Called [facility] requesting possible transfer. Awaiting return call pending weather check.

## 2025-06-01 NOTE — ED PROVIDER NOTES
History     Chief Complaint   Patient presents with    Shortness of Breath       History of Present Illness    79-year-old female comes in complaining of headache, fall x 3.  Patient fell 2 days ago and yesterday yesterday hitting her forehead above right eyebrow x2 on the coffee table.  She also fell off the couch yesterday.  Patient remained quite unstable the family was concerned because she continued to complain of a headache so they brought her in today.  She also has been sick with a viral illness for the last week and has missed 3 days of dialysis so she is quite overloaded.  Complains of leg swelling        RN triage note has been reviewed.     Past Medical History:   Diagnosis Date    Acquired hypothyroidism 04/22/2021    Anemia     Arthritis     Carpal tunnel syndrome     Coronary artery disease     Diabetes mellitus     Elevated cholesterol     GERD (gastroesophageal reflux disease)     History of pneumonia     History of unsteady gait     OCASSIONALLY    Hypertension     Insomnia     Kidney disease     Kidney disease, chronic, stage IV (GFR 15-29 ml/min)     LENNY (obstructive sleep apnea) 12/01/2020    no c-pap    Osteoarthritis     Renal insufficiency     Sciatica            Medication List        CONTINUE taking these medications      Blood Glucose Monitoring Suppl misc  Use 1 each Daily.     Lancets 30G misc  Use 1 each Daily.            ASK your doctor about these medications      amLODIPine 5 MG tablet  Commonly known as: NORVASC  Take 1 tablet by mouth Daily.     aspirin 81 MG EC tablet     atorvastatin 80 MG tablet  Commonly known as: LIPITOR  Take 0.5 tablets by mouth Every Night.     escitalopram 20 MG tablet  Commonly known as: LEXAPRO  Take 1 tablet by mouth Daily.     ferrous sulfate 325 (65 FE) MG tablet  Take 1 tablet by mouth Daily With Breakfast.     folic acid 1 MG tablet  Commonly known as: FOLVITE  Take 1 tablet by mouth Daily.     glucose blood test strip  Use as instructed      levothyroxine 50 MCG tablet  Commonly known as: SYNTHROID, LEVOTHROID  Take 1 tablet by mouth Daily.     metoprolol succinate XL 50 MG 24 hr tablet  Commonly known as: TOPROL-XL  Take 1 tablet by mouth Daily.     pantoprazole 40 MG EC tablet  Commonly known as: PROTONIX  Take 1 tablet by mouth Daily.     vitamin D 1.25 MG (19835 UT) capsule capsule  Commonly known as: ERGOCALCIFEROL  Take 1 capsule by mouth 1 (One) Time Per Week.     zinc oxide 40 % paste  Commonly known as: Desitin  Apply 1 Application topically to the appropriate area as directed 3 (Three) Times a Day As Needed (skin irritation).                        Focused Physical Exam     Vitals:    06/01/25 0720   BP: 138/66   Pulse: 78   Resp:    Temp:    SpO2: 95%        Constitutional: No apparent distress. Well appearing  Skin: Warm, dry, patient has contusion over right eyebrow on forehead consistent with history of head trauma, mild cellulitis on left base of right thumb over and around wound  HENT: Normocephalic, atraumatic  Pulmonary:  no accessory muscle use or stridor  Cardiovascular: Regular rate, rhythm.   Gastrointestinal:  non-distended  Musculoskeletal: Extremities are nontender and have no gross deformity, gross 3+ nonpitting edema bilateral legs mildly tender  Neurologic: Alert.. Moves all extremities without motor or sensory deficit, GCS 15  Psychiatric: Normal mood and affect            ED MDM     MDM     Amount and/or Complexity of Data Reviewed  Clinical lab tests: ordered and reviewed  Tests in the radiology section of CPT®: ordered and reviewed  Discuss the patient with other providers: yes  Independent visualization of images, tracings, or specimens: yes    Risk of Complications, Morbidity, and/or Mortality  Presenting problems: high  Diagnostic procedures: high  Management options: high  General comments: At this time patient is at risk for intracranial bleed, abnormal electrolytes, fluid overload,          LAB AND IMAGING  RESULTS    Results for orders placed or performed during the hospital encounter of 06/01/25   ECG 12 Lead Dyspnea    Collection Time: 06/01/25  1:57 AM   Result Value Ref Range    QT Interval 408 ms    QTC Interval 496 ms   Blood Gas, Arterial With Co-Ox    Collection Time: 06/01/25  2:11 AM    Specimen: Arterial Blood   Result Value Ref Range    Site Left Brachial     Carlos's Test N/A     pH, Arterial 7.355 7.350 - 7.450 pH units    pCO2, Arterial 41.9 35.0 - 45.0 mm Hg    pO2, Arterial 69.8 (L) 83.0 - 108.0 mm Hg    HCO3, Arterial 23.4 20.0 - 26.0 mmol/L    Base Excess, Arterial -2.1 (L) 0.0 - 2.0 mmol/L    O2 Saturation, Arterial 93.0 (L) 94.0 - 99.0 %    Hemoglobin, Blood Gas 10.1 (L) 13.5 - 17.5 g/dL    Hematocrit, Blood Gas 31.0 (L) 38.0 - 51.0 %    Oxyhemoglobin 90.8 (L) 94 - 99 %    Methemoglobin 0.20 0.00 - 3.00 %    Carboxyhemoglobin 2.2 0 - 5 %    A-a DO2 24.5 0.0 - 300.0 mmHg    CO2 Content 24.7 22 - 33 mmol/L    Barometric Pressure for Blood Gas 722 mmHg    Modality Room Air     FIO2 21 %    Ventilator Mode NA     Collected by [id number]     pH, Temp Corrected      pCO2, Temperature Corrected      pO2, Temperature Corrected     High Sensitivity Troponin T    Collection Time: 06/01/25  2:53 AM    Specimen: Blood   Result Value Ref Range    HS Troponin T 100 (C) <14 ng/L   Comprehensive Metabolic Panel    Collection Time: 06/01/25  2:53 AM    Specimen: Blood   Result Value Ref Range    Glucose 137 (H) 65 - 99 mg/dL    BUN 64.5 (H) 8.0 - 23.0 mg/dL    Creatinine 7.84 (H) 0.57 - 1.00 mg/dL    Sodium 131 (L) 136 - 145 mmol/L    Potassium 6.3 (C) 3.5 - 5.2 mmol/L    Chloride 94 (L) 98 - 107 mmol/L    CO2 20.2 (L) 22.0 - 29.0 mmol/L    Calcium 8.7 8.6 - 10.5 mg/dL    Total Protein 8.2 6.0 - 8.5 g/dL    Albumin 3.1 (L) 3.5 - 5.2 g/dL    ALT (SGPT) 15 1 - 33 U/L    AST (SGOT) 38 (H) 1 - 32 U/L    Alkaline Phosphatase 345 (H) 39 - 117 U/L    Total Bilirubin 0.5 0.0 - 1.2 mg/dL    Globulin 5.1 gm/dL    A/G Ratio 0.6  g/dL    BUN/Creatinine Ratio 8.2 7.0 - 25.0    Anion Gap 16.8 (H) 5.0 - 15.0 mmol/L    eGFR 4.8 (L) >60.0 mL/min/1.73   CBC Auto Differential    Collection Time: 06/01/25  2:53 AM    Specimen: Blood   Result Value Ref Range    WBC 6.76 3.40 - 10.80 10*3/mm3    RBC 4.43 3.77 - 5.28 10*6/mm3    Hemoglobin 10.1 (L) 12.0 - 15.9 g/dL    Hematocrit 35.3 34.0 - 46.6 %    MCV 79.7 79.0 - 97.0 fL    MCH 22.8 (L) 26.6 - 33.0 pg    MCHC 28.6 (L) 31.5 - 35.7 g/dL    RDW 19.7 (H) 12.3 - 15.4 %    RDW-SD 56.5 (H) 37.0 - 54.0 fl    MPV 8.4 6.0 - 12.0 fL    Platelets 149 140 - 450 10*3/mm3    Neutrophil % 78.1 (H) 42.7 - 76.0 %    Lymphocyte % 11.2 (L) 19.6 - 45.3 %    Monocyte % 5.8 5.0 - 12.0 %    Eosinophil % 4.1 0.3 - 6.2 %    Basophil % 0.4 0.0 - 1.5 %    Immature Grans % 0.4 0.0 - 0.5 %    Neutrophils, Absolute 5.27 1.70 - 7.00 10*3/mm3    Lymphocytes, Absolute 0.76 0.70 - 3.10 10*3/mm3    Monocytes, Absolute 0.39 0.10 - 0.90 10*3/mm3    Eosinophils, Absolute 0.28 0.00 - 0.40 10*3/mm3    Basophils, Absolute 0.03 0.00 - 0.20 10*3/mm3    Immature Grans, Absolute 0.03 0.00 - 0.05 10*3/mm3    nRBC 0.0 0.0 - 0.2 /100 WBC   Green Top (Gel)    Collection Time: 06/01/25  2:53 AM   Result Value Ref Range    Extra Tube Hold for add-ons.    Lavender Top    Collection Time: 06/01/25  2:53 AM   Result Value Ref Range    Extra Tube hold for add-on    Gold Top - SST    Collection Time: 06/01/25  2:53 AM   Result Value Ref Range    Extra Tube Hold for add-ons.    Light Blue Top    Collection Time: 06/01/25  2:53 AM   Result Value Ref Range    Extra Tube Hold for add-ons.    High Sensitivity Troponin T 1Hr    Collection Time: 06/01/25  4:03 AM    Specimen: Arm, Right; Blood   Result Value Ref Range    HS Troponin T 101 (C) <14 ng/L    Troponin T Numeric Delta 1 ng/L    Troponin T % Delta 1 Abnormal if >/= 20%   ECG 12 Lead Rhythm Change    Collection Time: 06/01/25  4:29 AM   Result Value Ref Range    QT Interval 430 ms    QTC Interval 473 ms    POC Glucose Once    Collection Time: 06/01/25  5:34 AM    Specimen: Blood   Result Value Ref Range    Glucose 259 (H) 70 - 130 mg/dL   POC Glucose Once    Collection Time: 06/01/25  6:51 AM    Specimen: Blood   Result Value Ref Range    Glucose 139 (H) 70 - 130 mg/dL   Basic Metabolic Panel    Collection Time: 06/01/25  7:23 AM    Specimen: Hand, Right; Blood   Result Value Ref Range    Glucose 120 (H) 65 - 99 mg/dL    BUN 65.8 (H) 8.0 - 23.0 mg/dL    Creatinine 7.70 (H) 0.57 - 1.00 mg/dL    Sodium 133 (L) 136 - 145 mmol/L    Potassium 5.7 (H) 3.5 - 5.2 mmol/L    Chloride 96 (L) 98 - 107 mmol/L    CO2 22.5 22.0 - 29.0 mmol/L    Calcium 8.6 8.6 - 10.5 mg/dL    BUN/Creatinine Ratio 8.5 7.0 - 25.0    Anion Gap 14.5 5.0 - 15.0 mmol/L    eGFR 4.9 (L) >60.0 mL/min/1.73     *Note: Due to a large number of results and/or encounters for the requested time period, some results have not been displayed. A complete set of results can be found in Results Review.            CT Head Without Contrast  Result Date: 6/1/2025   HEAD CT: ACUTE HEMATOMA IN THE POSTERIOR BODY OF THE RIGHT LATERAL VENTRICLE.  CERVICAL SPINE CT: 1.  EXAM IS DEGRADED BY MOTION ARTIFACT. 2.  NO FINDINGSFOR ACUTE FRACTURE OR TRAUMATIC MALALIGNMENT. 3.  PULMONARY EDEMA.  This report was finalized on 6/1/2025 5:25 AM by Emily Jc MD.      CT Cervical Spine Without Contrast  Result Date: 6/1/2025   HEAD CT: ACUTE HEMATOMA IN THE POSTERIOR BODY OF THE RIGHT LATERAL VENTRICLE.  CERVICAL SPINE CT: 1.  EXAM IS DEGRADED BY MOTION ARTIFACT. 2.  NO FINDINGSFOR ACUTE FRACTURE OR TRAUMATIC MALALIGNMENT. 3.  PULMONARY EDEMA.  This report was finalized on 6/1/2025 5:25 AM by Emily Jc MD.                       ED Course:             Medications given:   Medications   furosemide (LASIX) injection 80 mg (has no administration in time range)   calcium gluconate 1000 Mg/50ml 0.675% NaCl IV SOLN (0 mg Intravenous Stopped 6/1/25 6297)   insulin regular (humuLIN  R,novoLIN R) injection 5 Units (5 Units Intravenous Given 6/1/25 0505)   dextrose (D50W) (25 g/50 mL) IV injection 25 g (25 g Intravenous Given 6/1/25 0505)   sodium zirconium cyclosilicate (LOKELMA) packet 10 g (10 g Oral Given 6/1/25 8847)   sodium bicarbonate injection 8.4% 50 mEq (50 mEq Intravenous Given 6/1/25 7269)                    Comments:     ED Course as of 06/01/25 0747   Sun Jun 01, 2025   0423 Dr raymond -trauma surgeon [CP]   0424 Transfer to ed to ed [CP]      ED Course User Index  [CP] Patricia Landis MD              ED Disposition   ED Disposition: Transfer to Another Facility     All vital signs reviewed and stable. The results of pertinent diagnostic studies and exam findings were discussed. Plan for discharge was discusses with patient and/or present family who expressed understanding of the diagnosis and plan. All questions and concerns have been addressed at this time.     The patient is instructed to follow up with primary care physician/specialist over the next 1-2 days. Should any new symptoms or concerns deemed by the patient/family to be significant prior to follow up, the patient is instructed to return to the emergency department for repeat evaluation. If the patient is unable to follow up with primary care, instructed to return to ED for continued symptoms.     I reviewed any diagnostic testing results with the patient and discussed the need for follow up with primary care physician to discuss the final reports and the necessity for any further follow up testing that is either indicated or recommended.              Medication List        CONTINUE taking these medications      Blood Glucose Monitoring Suppl misc  Use 1 each Daily.     Lancets 30G misc  Use 1 each Daily.            ASK your doctor about these medications      amLODIPine 5 MG tablet  Commonly known as: NORVASC  Take 1 tablet by mouth Daily.     aspirin 81 MG EC tablet     atorvastatin 80 MG tablet  Commonly known as:  LIPITOR  Take 0.5 tablets by mouth Every Night.     escitalopram 20 MG tablet  Commonly known as: LEXAPRO  Take 1 tablet by mouth Daily.     ferrous sulfate 325 (65 FE) MG tablet  Take 1 tablet by mouth Daily With Breakfast.     folic acid 1 MG tablet  Commonly known as: FOLVITE  Take 1 tablet by mouth Daily.     glucose blood test strip  Use as instructed     levothyroxine 50 MCG tablet  Commonly known as: SYNTHROID, LEVOTHROID  Take 1 tablet by mouth Daily.     metoprolol succinate XL 50 MG 24 hr tablet  Commonly known as: TOPROL-XL  Take 1 tablet by mouth Daily.     pantoprazole 40 MG EC tablet  Commonly known as: PROTONIX  Take 1 tablet by mouth Daily.     vitamin D 1.25 MG (98949 UT) capsule capsule  Commonly known as: ERGOCALCIFEROL  Take 1 capsule by mouth 1 (One) Time Per Week.     zinc oxide 40 % paste  Commonly known as: Desitin  Apply 1 Application topically to the appropriate area as directed 3 (Three) Times a Day As Needed (skin irritation).              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

## 2025-06-01 NOTE — ED NOTES
Patient is reporting SOB and feels like she's breathing heavy.  MD Landis made aware of new symptoms.  Provider is now at bedside.
